# Patient Record
Sex: MALE | Race: WHITE | Employment: OTHER
[De-identification: names, ages, dates, MRNs, and addresses within clinical notes are randomized per-mention and may not be internally consistent; named-entity substitution may affect disease eponyms.]

---

## 2017-01-30 ENCOUNTER — OFFICE VISIT (OUTPATIENT)
Facility: CLINIC | Age: 62
End: 2017-01-30

## 2017-01-30 VITALS
HEART RATE: 105 BPM | SYSTOLIC BLOOD PRESSURE: 128 MMHG | TEMPERATURE: 97.5 F | OXYGEN SATURATION: 94 % | DIASTOLIC BLOOD PRESSURE: 82 MMHG | WEIGHT: 312.2 LBS | BODY MASS INDEX: 42.34 KG/M2

## 2017-01-30 DIAGNOSIS — R73.02 IGT (IMPAIRED GLUCOSE TOLERANCE): ICD-10-CM

## 2017-01-30 DIAGNOSIS — I10 ESSENTIAL HYPERTENSION: Primary | ICD-10-CM

## 2017-01-30 DIAGNOSIS — E55.9 VITAMIN D DEFICIENCY: ICD-10-CM

## 2017-01-30 DIAGNOSIS — M81.0 OSTEOPOROSIS: ICD-10-CM

## 2017-01-30 DIAGNOSIS — K21.9 GASTROESOPHAGEAL REFLUX DISEASE WITHOUT ESOPHAGITIS: ICD-10-CM

## 2017-01-30 DIAGNOSIS — E66.01 OBESITY, CLASS III, BMI 40-49.9 (MORBID OBESITY) (HCC): ICD-10-CM

## 2017-01-30 DIAGNOSIS — E78.5 HYPERLIPIDEMIA, UNSPECIFIED HYPERLIPIDEMIA TYPE: ICD-10-CM

## 2017-01-30 DIAGNOSIS — M79.2 PERIPHERAL NEUROPATHIC PAIN: ICD-10-CM

## 2017-01-30 DIAGNOSIS — M62.81 MUSCLE WEAKNESS (GENERALIZED): ICD-10-CM

## 2017-01-30 DIAGNOSIS — M10.9 GOUT, UNSPECIFIED: ICD-10-CM

## 2017-01-30 DIAGNOSIS — C61 PROSTATE CA (HCC): ICD-10-CM

## 2017-01-30 DIAGNOSIS — F32.A DEPRESSION, UNSPECIFIED DEPRESSION TYPE: ICD-10-CM

## 2017-01-30 PROCEDURE — G8427 DOCREV CUR MEDS BY ELIG CLIN: HCPCS | Performed by: FAMILY MEDICINE

## 2017-01-30 PROCEDURE — 4005F PHARM THX FOR OP RXD: CPT | Performed by: FAMILY MEDICINE

## 2017-01-30 PROCEDURE — 3017F COLORECTAL CA SCREEN DOC REV: CPT | Performed by: FAMILY MEDICINE

## 2017-01-30 PROCEDURE — 1036F TOBACCO NON-USER: CPT | Performed by: FAMILY MEDICINE

## 2017-01-30 PROCEDURE — G8484 FLU IMMUNIZE NO ADMIN: HCPCS | Performed by: FAMILY MEDICINE

## 2017-01-30 PROCEDURE — 99214 OFFICE O/P EST MOD 30 MIN: CPT | Performed by: FAMILY MEDICINE

## 2017-01-30 PROCEDURE — G8419 CALC BMI OUT NRM PARAM NOF/U: HCPCS | Performed by: FAMILY MEDICINE

## 2017-01-30 PROCEDURE — 1111F DSCHRG MED/CURRENT MED MERGE: CPT | Performed by: FAMILY MEDICINE

## 2017-02-14 ENCOUNTER — HOSPITAL ENCOUNTER (OUTPATIENT)
Age: 62
Discharge: HOME | End: 2017-02-14
Payer: COMMERCIAL

## 2017-02-14 DIAGNOSIS — E78.5 HYPERLIPIDEMIA, UNSPECIFIED HYPERLIPIDEMIA TYPE: ICD-10-CM

## 2017-02-14 DIAGNOSIS — E55.9 VITAMIN D DEFICIENCY: ICD-10-CM

## 2017-02-14 DIAGNOSIS — I10 ESSENTIAL HYPERTENSION: ICD-10-CM

## 2017-02-14 LAB
ALBUMIN SERPL-MCNC: 4.1 G/DL (ref 3.5–5.2)
ALBUMIN/GLOBULIN RATIO: ABNORMAL (ref 1–2.5)
ALP BLD-CCNC: 62 U/L (ref 40–129)
ALT SERPL-CCNC: 101 U/L (ref 5–41)
ANION GAP SERPL CALCULATED.3IONS-SCNC: 16 MMOL/L (ref 9–17)
AST SERPL-CCNC: 75 U/L
BILIRUB SERPL-MCNC: 0.33 MG/DL (ref 0.3–1.2)
BUN BLDV-MCNC: 18 MG/DL (ref 8–23)
BUN/CREAT BLD: ABNORMAL (ref 9–20)
CALCIUM SERPL-MCNC: 9.7 MG/DL (ref 8.6–10.4)
CHLORIDE BLD-SCNC: 101 MMOL/L (ref 98–107)
CHOLESTEROL/HDL RATIO: 4.8
CHOLESTEROL: 153 MG/DL
CO2: 24 MMOL/L (ref 20–31)
CREAT SERPL-MCNC: 0.76 MG/DL (ref 0.7–1.2)
GFR AFRICAN AMERICAN: >60 ML/MIN
GFR NON-AFRICAN AMERICAN: >60 ML/MIN
GFR SERPL CREATININE-BSD FRML MDRD: ABNORMAL ML/MIN/{1.73_M2}
GFR SERPL CREATININE-BSD FRML MDRD: ABNORMAL ML/MIN/{1.73_M2}
GLUCOSE BLD-MCNC: 150 MG/DL (ref 70–99)
HCT VFR BLD CALC: 41.1 % (ref 41–53)
HDLC SERPL-MCNC: 32 MG/DL
HEMOGLOBIN: 13.7 G/DL (ref 13.5–17.5)
LDL CHOLESTEROL: 54 MG/DL (ref 0–130)
MCH RBC QN AUTO: 29.9 PG (ref 26–34)
MCHC RBC AUTO-ENTMCNC: 33.4 G/DL (ref 31–37)
MCV RBC AUTO: 89.6 FL (ref 80–100)
PDW BLD-RTO: 14.5 % (ref 11.5–14.9)
PLATELET # BLD: 200 K/UL (ref 150–450)
PMV BLD AUTO: 9.5 FL (ref 6–12)
POTASSIUM SERPL-SCNC: 4.5 MMOL/L (ref 3.7–5.3)
RBC # BLD: 4.58 M/UL (ref 4.5–5.9)
SODIUM BLD-SCNC: 141 MMOL/L (ref 135–144)
TOTAL PROTEIN: 7.4 G/DL (ref 6.4–8.3)
TRIGL SERPL-MCNC: 336 MG/DL
VITAMIN D 25-HYDROXY: 22.8 NG/ML (ref 30–100)
VLDLC SERPL CALC-MCNC: ABNORMAL MG/DL (ref 1–30)
WBC # BLD: 7.1 K/UL (ref 3.5–11)

## 2017-02-14 PROCEDURE — 85027 COMPLETE CBC AUTOMATED: CPT

## 2017-02-14 PROCEDURE — 80061 LIPID PANEL: CPT

## 2017-02-14 PROCEDURE — 80053 COMPREHEN METABOLIC PANEL: CPT

## 2017-02-14 PROCEDURE — 36415 COLL VENOUS BLD VENIPUNCTURE: CPT

## 2017-02-14 PROCEDURE — 82306 VITAMIN D 25 HYDROXY: CPT

## 2017-03-01 ENCOUNTER — HOSPITAL ENCOUNTER (OUTPATIENT)
Dept: PHYSICAL THERAPY | Age: 62
Setting detail: THERAPIES SERIES
Discharge: HOME | End: 2017-03-01
Payer: COMMERCIAL

## 2017-03-01 PROCEDURE — 97162 PT EVAL MOD COMPLEX 30 MIN: CPT

## 2017-03-01 ASSESSMENT — PAIN SCALES - GENERAL: PAINLEVEL_OUTOF10: 8

## 2017-03-01 ASSESSMENT — PAIN DESCRIPTION - DESCRIPTORS: DESCRIPTORS: CONSTANT;ACHING;DULL

## 2017-03-01 ASSESSMENT — PAIN DESCRIPTION - LOCATION: LOCATION: BACK

## 2017-03-01 ASSESSMENT — PAIN DESCRIPTION - PAIN TYPE: TYPE: SURGICAL PAIN

## 2017-03-04 DIAGNOSIS — M10.9 GOUT, UNSPECIFIED: ICD-10-CM

## 2017-03-06 ENCOUNTER — OFFICE VISIT (OUTPATIENT)
Dept: UROLOGY | Facility: CLINIC | Age: 62
End: 2017-03-06

## 2017-03-06 VITALS
HEART RATE: 69 BPM | BODY MASS INDEX: 45.01 KG/M2 | SYSTOLIC BLOOD PRESSURE: 126 MMHG | DIASTOLIC BLOOD PRESSURE: 64 MMHG | HEIGHT: 70 IN | WEIGHT: 314.4 LBS | TEMPERATURE: 97.8 F

## 2017-03-06 DIAGNOSIS — C61 PROSTATE CANCER (HCC): Primary | ICD-10-CM

## 2017-03-06 DIAGNOSIS — R35.1 NOCTURIA: ICD-10-CM

## 2017-03-06 DIAGNOSIS — R97.20 RISING PSA LEVEL: ICD-10-CM

## 2017-03-06 PROCEDURE — G8484 FLU IMMUNIZE NO ADMIN: HCPCS | Performed by: UROLOGY

## 2017-03-06 PROCEDURE — G8419 CALC BMI OUT NRM PARAM NOF/U: HCPCS | Performed by: UROLOGY

## 2017-03-06 PROCEDURE — 3017F COLORECTAL CA SCREEN DOC REV: CPT | Performed by: UROLOGY

## 2017-03-06 PROCEDURE — G8427 DOCREV CUR MEDS BY ELIG CLIN: HCPCS | Performed by: UROLOGY

## 2017-03-06 PROCEDURE — 1036F TOBACCO NON-USER: CPT | Performed by: UROLOGY

## 2017-03-06 PROCEDURE — 99214 OFFICE O/P EST MOD 30 MIN: CPT | Performed by: UROLOGY

## 2017-03-06 PROCEDURE — 96402 CHEMO HORMON ANTINEOPL SQ/IM: CPT | Performed by: UROLOGY

## 2017-03-06 RX ORDER — ALLOPURINOL 300 MG/1
TABLET ORAL
Qty: 90 TABLET | Refills: 0 | OUTPATIENT
Start: 2017-03-06

## 2017-03-06 RX ORDER — HYDROCODONE BITARTRATE AND ACETAMINOPHEN 5; 325 MG/1; MG/1
1 TABLET ORAL EVERY 6 HOURS PRN
COMMUNITY
End: 2017-10-02

## 2017-03-06 ASSESSMENT — ENCOUNTER SYMPTOMS
EYE PAIN: 0
VOMITING: 0
SHORTNESS OF BREATH: 1
NAUSEA: 0
BACK PAIN: 1
EYE REDNESS: 0
ABDOMINAL PAIN: 0
COLOR CHANGE: 0
WHEEZING: 1
COUGH: 0

## 2017-03-08 ENCOUNTER — HOSPITAL ENCOUNTER (OUTPATIENT)
Dept: PHYSICAL THERAPY | Age: 62
Setting detail: THERAPIES SERIES
Discharge: HOME | End: 2017-03-08
Payer: COMMERCIAL

## 2017-03-08 PROCEDURE — 97113 AQUATIC THERAPY/EXERCISES: CPT

## 2017-03-09 ENCOUNTER — HOSPITAL ENCOUNTER (OUTPATIENT)
Dept: PHYSICAL THERAPY | Age: 62
Setting detail: THERAPIES SERIES
Discharge: HOME | End: 2017-03-09
Payer: COMMERCIAL

## 2017-03-09 PROCEDURE — 97113 AQUATIC THERAPY/EXERCISES: CPT

## 2017-03-13 ENCOUNTER — HOSPITAL ENCOUNTER (OUTPATIENT)
Dept: PHYSICAL THERAPY | Age: 62
Setting detail: THERAPIES SERIES
Discharge: HOME | End: 2017-03-13
Payer: COMMERCIAL

## 2017-03-13 PROCEDURE — 97113 AQUATIC THERAPY/EXERCISES: CPT

## 2017-03-15 ENCOUNTER — HOSPITAL ENCOUNTER (OUTPATIENT)
Dept: PHYSICAL THERAPY | Age: 62
Setting detail: THERAPIES SERIES
Discharge: HOME | End: 2017-03-15
Payer: COMMERCIAL

## 2017-03-15 PROCEDURE — 97113 AQUATIC THERAPY/EXERCISES: CPT

## 2017-03-15 ASSESSMENT — PAIN SCALES - GENERAL: PAINLEVEL_OUTOF10: 2

## 2017-03-15 ASSESSMENT — PAIN DESCRIPTION - LOCATION: LOCATION: BACK

## 2017-03-15 ASSESSMENT — PAIN DESCRIPTION - ORIENTATION: ORIENTATION: LOWER

## 2017-03-23 ENCOUNTER — HOSPITAL ENCOUNTER (OUTPATIENT)
Dept: PHYSICAL THERAPY | Age: 62
Setting detail: THERAPIES SERIES
Discharge: HOME | End: 2017-03-23
Payer: COMMERCIAL

## 2017-03-23 ENCOUNTER — OFFICE VISIT (OUTPATIENT)
Dept: FAMILY MEDICINE CLINIC | Age: 62
End: 2017-03-23
Payer: COMMERCIAL

## 2017-03-23 ENCOUNTER — HOSPITAL ENCOUNTER (OUTPATIENT)
Age: 62
Discharge: HOME | End: 2017-03-23
Payer: COMMERCIAL

## 2017-03-23 VITALS
WEIGHT: 314.2 LBS | HEART RATE: 64 BPM | TEMPERATURE: 97.2 F | SYSTOLIC BLOOD PRESSURE: 133 MMHG | BODY MASS INDEX: 43.99 KG/M2 | OXYGEN SATURATION: 91 % | DIASTOLIC BLOOD PRESSURE: 76 MMHG | HEIGHT: 71 IN

## 2017-03-23 DIAGNOSIS — I10 ESSENTIAL HYPERTENSION: ICD-10-CM

## 2017-03-23 DIAGNOSIS — M79.2 PERIPHERAL NEUROPATHIC PAIN: Primary | ICD-10-CM

## 2017-03-23 DIAGNOSIS — K75.2 NONSPECIFIC REACTIVE HEPATITIS: ICD-10-CM

## 2017-03-23 DIAGNOSIS — Z98.890 STATUS POST LUMBAR LAMINECTOMY: ICD-10-CM

## 2017-03-23 DIAGNOSIS — F33.1 MODERATE EPISODE OF RECURRENT MAJOR DEPRESSIVE DISORDER (HCC): ICD-10-CM

## 2017-03-23 DIAGNOSIS — R73.9 HYPERGLYCEMIA: ICD-10-CM

## 2017-03-23 DIAGNOSIS — M10.9 GOUT, UNSPECIFIED: ICD-10-CM

## 2017-03-23 DIAGNOSIS — J44.9 COPD MIXED TYPE (HCC): ICD-10-CM

## 2017-03-23 DIAGNOSIS — L29.9 PRURITIC DERMATITIS: ICD-10-CM

## 2017-03-23 DIAGNOSIS — E79.0 ELEVATED URIC ACID IN BLOOD: ICD-10-CM

## 2017-03-23 DIAGNOSIS — Z12.11 COLON CANCER SCREENING: ICD-10-CM

## 2017-03-23 DIAGNOSIS — I73.9 PVD (PERIPHERAL VASCULAR DISEASE) WITH CLAUDICATION (HCC): ICD-10-CM

## 2017-03-23 DIAGNOSIS — Z79.899 POLYPHARMACY: ICD-10-CM

## 2017-03-23 DIAGNOSIS — B35.3 TINEA PEDIS OF BOTH FEET: ICD-10-CM

## 2017-03-23 DIAGNOSIS — E88.09 PSEUDOCHOLINESTERASE DEFICIENCY: ICD-10-CM

## 2017-03-23 DIAGNOSIS — M79.2 PERIPHERAL NEUROPATHIC PAIN: ICD-10-CM

## 2017-03-23 LAB
ALBUMIN SERPL-MCNC: 4.2 G/DL (ref 3.5–5.2)
ALBUMIN/GLOBULIN RATIO: ABNORMAL (ref 1–2.5)
ALP BLD-CCNC: 72 U/L (ref 40–129)
ALT SERPL-CCNC: 90 U/L (ref 5–41)
ANION GAP SERPL CALCULATED.3IONS-SCNC: 16 MMOL/L (ref 9–17)
AST SERPL-CCNC: 66 U/L
BILIRUB SERPL-MCNC: 0.31 MG/DL (ref 0.3–1.2)
BUN BLDV-MCNC: 14 MG/DL (ref 8–23)
BUN/CREAT BLD: ABNORMAL (ref 9–20)
CALCIUM SERPL-MCNC: 10.5 MG/DL (ref 8.6–10.4)
CHLORIDE BLD-SCNC: 100 MMOL/L (ref 98–107)
CO2: 27 MMOL/L (ref 20–31)
CREAT SERPL-MCNC: 0.72 MG/DL (ref 0.7–1.2)
ESTIMATED AVERAGE GLUCOSE: 157 MG/DL
FOLATE: 18.5 NG/ML
GFR AFRICAN AMERICAN: >60 ML/MIN
GFR NON-AFRICAN AMERICAN: >60 ML/MIN
GFR SERPL CREATININE-BSD FRML MDRD: ABNORMAL ML/MIN/{1.73_M2}
GFR SERPL CREATININE-BSD FRML MDRD: ABNORMAL ML/MIN/{1.73_M2}
GLUCOSE BLD-MCNC: 147 MG/DL (ref 70–99)
HAV IGM SER IA-ACNC: NONREACTIVE
HBA1C MFR BLD: 7.1 % (ref 4–6)
HCT VFR BLD CALC: 43.7 % (ref 41–53)
HEMOGLOBIN: 14.5 G/DL (ref 13.5–17.5)
HEPATITIS B CORE IGM ANTIBODY: NONREACTIVE
HEPATITIS B SURFACE ANTIGEN: NONREACTIVE
HEPATITIS C ANTIBODY: NONREACTIVE
MCH RBC QN AUTO: 30 PG (ref 26–34)
MCHC RBC AUTO-ENTMCNC: 33.3 G/DL (ref 31–37)
MCV RBC AUTO: 90.3 FL (ref 80–100)
PDW BLD-RTO: 15.5 % (ref 11.5–14.9)
PLATELET # BLD: 199 K/UL (ref 150–450)
PMV BLD AUTO: 10.6 FL (ref 6–12)
POTASSIUM SERPL-SCNC: 4.3 MMOL/L (ref 3.7–5.3)
RBC # BLD: 4.84 M/UL (ref 4.5–5.9)
SODIUM BLD-SCNC: 143 MMOL/L (ref 135–144)
TOTAL PROTEIN: 7.6 G/DL (ref 6.4–8.3)
TSH SERPL DL<=0.05 MIU/L-ACNC: 0.74 MIU/L (ref 0.3–5)
URIC ACID: 5.5 MG/DL (ref 3.4–7)
VITAMIN B-12: 593 PG/ML (ref 211–946)
WBC # BLD: 7.7 K/UL (ref 3.5–11)

## 2017-03-23 PROCEDURE — G8484 FLU IMMUNIZE NO ADMIN: HCPCS | Performed by: FAMILY MEDICINE

## 2017-03-23 PROCEDURE — 3023F SPIROM DOC REV: CPT | Performed by: FAMILY MEDICINE

## 2017-03-23 PROCEDURE — G8926 SPIRO NO PERF OR DOC: HCPCS | Performed by: FAMILY MEDICINE

## 2017-03-23 PROCEDURE — 82746 ASSAY OF FOLIC ACID SERUM: CPT

## 2017-03-23 PROCEDURE — 83036 HEMOGLOBIN GLYCOSYLATED A1C: CPT

## 2017-03-23 PROCEDURE — 80074 ACUTE HEPATITIS PANEL: CPT

## 2017-03-23 PROCEDURE — 82607 VITAMIN B-12: CPT

## 2017-03-23 PROCEDURE — 3017F COLORECTAL CA SCREEN DOC REV: CPT | Performed by: FAMILY MEDICINE

## 2017-03-23 PROCEDURE — G8417 CALC BMI ABV UP PARAM F/U: HCPCS | Performed by: FAMILY MEDICINE

## 2017-03-23 PROCEDURE — 1036F TOBACCO NON-USER: CPT | Performed by: FAMILY MEDICINE

## 2017-03-23 PROCEDURE — 85027 COMPLETE CBC AUTOMATED: CPT

## 2017-03-23 PROCEDURE — 97113 AQUATIC THERAPY/EXERCISES: CPT

## 2017-03-23 PROCEDURE — 84156 ASSAY OF PROTEIN URINE: CPT

## 2017-03-23 PROCEDURE — 80053 COMPREHEN METABOLIC PANEL: CPT

## 2017-03-23 PROCEDURE — 84166 PROTEIN E-PHORESIS/URINE/CSF: CPT

## 2017-03-23 PROCEDURE — 36415 COLL VENOUS BLD VENIPUNCTURE: CPT

## 2017-03-23 PROCEDURE — 99215 OFFICE O/P EST HI 40 MIN: CPT | Performed by: FAMILY MEDICINE

## 2017-03-23 PROCEDURE — 96127 BRIEF EMOTIONAL/BEHAV ASSMT: CPT | Performed by: FAMILY MEDICINE

## 2017-03-23 PROCEDURE — G8427 DOCREV CUR MEDS BY ELIG CLIN: HCPCS | Performed by: FAMILY MEDICINE

## 2017-03-23 PROCEDURE — 84165 PROTEIN E-PHORESIS SERUM: CPT

## 2017-03-23 PROCEDURE — 84443 ASSAY THYROID STIM HORMONE: CPT

## 2017-03-23 PROCEDURE — 84550 ASSAY OF BLOOD/URIC ACID: CPT

## 2017-03-23 PROCEDURE — 84155 ASSAY OF PROTEIN SERUM: CPT

## 2017-03-23 RX ORDER — ALBUTEROL SULFATE 90 UG/1
2 AEROSOL, METERED RESPIRATORY (INHALATION) EVERY 6 HOURS PRN
Qty: 18 G | Refills: 0 | Status: SHIPPED
Start: 2017-03-23 | End: 2020-11-09 | Stop reason: SDUPTHER

## 2017-03-23 RX ORDER — ALBUTEROL SULFATE 2.5 MG/3ML
2.5 SOLUTION RESPIRATORY (INHALATION) EVERY 6 HOURS PRN
COMMUNITY
Start: 2017-03-02 | End: 2020-03-18 | Stop reason: ALTCHOICE

## 2017-03-23 RX ORDER — BUDESONIDE AND FORMOTEROL FUMARATE DIHYDRATE 160; 4.5 UG/1; UG/1
AEROSOL RESPIRATORY (INHALATION)
COMMUNITY
Start: 2017-01-18 | End: 2017-03-23

## 2017-03-23 RX ORDER — PREDNISONE 10 MG/1
TABLET ORAL
COMMUNITY
Start: 2017-01-18 | End: 2017-03-23 | Stop reason: ALTCHOICE

## 2017-03-23 RX ORDER — BUDESONIDE AND FORMOTEROL FUMARATE DIHYDRATE 160; 4.5 UG/1; UG/1
2 AEROSOL RESPIRATORY (INHALATION) 2 TIMES DAILY
Qty: 10.2 G | Refills: 3 | Status: SHIPPED | OUTPATIENT
Start: 2017-03-23 | End: 2017-03-23 | Stop reason: SDUPTHER

## 2017-03-23 RX ORDER — DULOXETIN HYDROCHLORIDE 60 MG/1
60 CAPSULE, DELAYED RELEASE ORAL EVERY MORNING
Qty: 90 CAPSULE | Refills: 0
Start: 2017-03-23 | End: 2017-04-11 | Stop reason: SDUPTHER

## 2017-03-23 RX ORDER — BUDESONIDE AND FORMOTEROL FUMARATE DIHYDRATE 160; 4.5 UG/1; UG/1
2 AEROSOL RESPIRATORY (INHALATION) 2 TIMES DAILY
Qty: 10.2 G | Refills: 3 | Status: SHIPPED | OUTPATIENT
Start: 2017-03-23 | End: 2019-04-09 | Stop reason: SDUPTHER

## 2017-03-23 RX ORDER — FUROSEMIDE 20 MG/1
TABLET ORAL
COMMUNITY
Start: 2017-01-18 | End: 2017-03-23 | Stop reason: ALTCHOICE

## 2017-03-23 RX ORDER — ASPIRIN 81 MG/1
81 TABLET ORAL DAILY
Qty: 30 TABLET | Refills: 0 | Status: ON HOLD
Start: 2017-03-23 | End: 2018-08-04 | Stop reason: HOSPADM

## 2017-03-23 RX ORDER — AMMONIUM LACTATE 12 G/100G
CREAM TOPICAL
Qty: 385 G | Refills: 4 | Status: SHIPPED | OUTPATIENT
Start: 2017-03-23 | End: 2018-09-24 | Stop reason: SDUPTHER

## 2017-03-23 RX ORDER — ALBUTEROL SULFATE 90 UG/1
2 AEROSOL, METERED RESPIRATORY (INHALATION) EVERY 6 HOURS PRN
Qty: 18 G | Refills: 0 | Status: SHIPPED | OUTPATIENT
Start: 2017-03-23 | End: 2017-03-23 | Stop reason: SDUPTHER

## 2017-03-23 ASSESSMENT — PATIENT HEALTH QUESTIONNAIRE - PHQ9
1. LITTLE INTEREST OR PLEASURE IN DOING THINGS: 1
7. TROUBLE CONCENTRATING ON THINGS, SUCH AS READING THE NEWSPAPER OR WATCHING TELEVISION: 0
SUM OF ALL RESPONSES TO PHQ9 QUESTIONS 1 & 2: 4
SUM OF ALL RESPONSES TO PHQ QUESTIONS 1-9: 15
8. MOVING OR SPEAKING SO SLOWLY THAT OTHER PEOPLE COULD HAVE NOTICED. OR THE OPPOSITE, BEING SO FIGETY OR RESTLESS THAT YOU HAVE BEEN MOVING AROUND A LOT MORE THAN USUAL: 2
2. FEELING DOWN, DEPRESSED OR HOPELESS: 3
6. FEELING BAD ABOUT YOURSELF - OR THAT YOU ARE A FAILURE OR HAVE LET YOURSELF OR YOUR FAMILY DOWN: 0
9. THOUGHTS THAT YOU WOULD BE BETTER OFF DEAD, OR OF HURTING YOURSELF: 0
10. IF YOU CHECKED OFF ANY PROBLEMS, HOW DIFFICULT HAVE THESE PROBLEMS MADE IT FOR YOU TO DO YOUR WORK, TAKE CARE OF THINGS AT HOME, OR GET ALONG WITH OTHER PEOPLE: 0
3. TROUBLE FALLING OR STAYING ASLEEP: 3
4. FEELING TIRED OR HAVING LITTLE ENERGY: 3
5. POOR APPETITE OR OVEREATING: 3

## 2017-03-23 ASSESSMENT — ENCOUNTER SYMPTOMS
ABDOMINAL PAIN: 0
WHEEZING: 0
CONSTIPATION: 0
NAUSEA: 0
BACK PAIN: 1
CHEST TIGHTNESS: 0
VOMITING: 0
COLOR CHANGE: 1
COUGH: 0
ABDOMINAL DISTENTION: 0
DIARRHEA: 0

## 2017-03-23 ASSESSMENT — PAIN DESCRIPTION - PAIN TYPE: TYPE_2: CHRONIC PAIN

## 2017-03-23 ASSESSMENT — PAIN DESCRIPTION - ORIENTATION
ORIENTATION: LOWER
ORIENTATION_2: LEFT

## 2017-03-23 ASSESSMENT — PAIN SCALES - GENERAL: PAINLEVEL_OUTOF10: 5

## 2017-03-23 ASSESSMENT — PAIN DESCRIPTION - LOCATION
LOCATION_2: KNEE
LOCATION: BACK

## 2017-03-23 ASSESSMENT — PAIN DESCRIPTION - INTENSITY: RATING_2: 9

## 2017-03-25 DIAGNOSIS — E83.52 HYPERCALCEMIA: ICD-10-CM

## 2017-03-25 DIAGNOSIS — E55.9 VITAMIN D DEFICIENCY: Primary | ICD-10-CM

## 2017-03-25 PROBLEM — R73.9 HYPERGLYCEMIA: Status: ACTIVE | Noted: 2017-03-25

## 2017-03-25 PROBLEM — E88.09 PSEUDOCHOLINESTERASE DEFICIENCY: Status: ACTIVE | Noted: 2017-03-25

## 2017-03-25 PROBLEM — J44.9 COPD MIXED TYPE (HCC): Status: ACTIVE | Noted: 2017-03-25

## 2017-03-25 PROBLEM — E79.0 ELEVATED URIC ACID IN BLOOD: Status: RESOLVED | Noted: 2017-03-25 | Resolved: 2017-03-25

## 2017-03-25 PROBLEM — I73.9 PVD (PERIPHERAL VASCULAR DISEASE) WITH CLAUDICATION (HCC): Status: ACTIVE | Noted: 2017-03-25

## 2017-03-25 PROBLEM — L29.9 PRURITIC DERMATITIS: Status: ACTIVE | Noted: 2017-03-25

## 2017-03-25 PROBLEM — L30.8 PRURITIC DERMATITIS: Status: ACTIVE | Noted: 2017-03-25

## 2017-03-25 PROBLEM — E79.0 ELEVATED URIC ACID IN BLOOD: Status: ACTIVE | Noted: 2017-03-25

## 2017-03-25 PROBLEM — M79.2 PERIPHERAL NEUROPATHIC PAIN: Status: ACTIVE | Noted: 2017-03-25

## 2017-03-25 PROBLEM — B35.3 TINEA PEDIS OF BOTH FEET: Status: ACTIVE | Noted: 2017-03-25

## 2017-03-25 PROBLEM — Z98.890 STATUS POST LUMBAR LAMINECTOMY: Status: ACTIVE | Noted: 2017-03-25

## 2017-03-25 PROBLEM — E11.65 TYPE 2 DIABETES MELLITUS WITH HYPERGLYCEMIA, WITHOUT LONG-TERM CURRENT USE OF INSULIN (HCC): Status: ACTIVE | Noted: 2017-03-25

## 2017-03-25 PROBLEM — K75.2 NONSPECIFIC REACTIVE HEPATITIS: Status: ACTIVE | Noted: 2017-03-25

## 2017-03-25 PROBLEM — Z79.899 POLYPHARMACY: Status: ACTIVE | Noted: 2017-03-25

## 2017-03-25 RX ORDER — CHOLECALCIFEROL (VITAMIN D3) 50 MCG
2000 TABLET ORAL DAILY
Qty: 30 TABLET | Refills: 3 | Status: SHIPPED | OUTPATIENT
Start: 2017-03-25 | End: 2017-04-11 | Stop reason: SDUPTHER

## 2017-03-25 ASSESSMENT — ENCOUNTER SYMPTOMS: SHORTNESS OF BREATH: 1

## 2017-03-27 LAB
ALBUMIN (CALCULATED): 4.4 G/DL (ref 3.2–5.2)
ALBUMIN PERCENT: 63 % (ref 45–65)
ALPHA 1 PERCENT: 3 % (ref 3–6)
ALPHA 2 PERCENT: 12 % (ref 6–13)
ALPHA-1-GLOBULIN: 0.2 G/DL (ref 0.1–0.4)
ALPHA-2-GLOBULIN: 0.8 G/DL (ref 0.5–0.9)
BETA GLOBULIN: 1 G/DL (ref 0.7–1.4)
BETA PERCENT: 13 % (ref 11–19)
GAMMA GLOBULIN %: 10 % (ref 9–20)
GAMMA GLOBULIN: 0.7 G/DL (ref 0.5–1.5)
P E INTERPRETATION, U: NORMAL
PATHOLOGIST: NORMAL
PATHOLOGIST: NORMAL
PROTEIN ELECTROPHORESIS, SERUM: NORMAL
SPECIMEN TYPE: NORMAL
TOTAL PROT. SUM,%: 101 % (ref 98–102)
TOTAL PROT. SUM: 7.1 G/DL (ref 6.3–8.2)
TOTAL PROTEIN: 7.1 G/DL (ref 6.4–8.3)
URINE TOTAL PROTEIN: 14 MG/DL

## 2017-03-30 ENCOUNTER — TELEPHONE (OUTPATIENT)
Dept: FAMILY MEDICINE CLINIC | Age: 62
End: 2017-03-30

## 2017-03-30 DIAGNOSIS — R19.5 POSITIVE FIT (FECAL IMMUNOCHEMICAL TEST): Primary | ICD-10-CM

## 2017-03-30 DIAGNOSIS — Z12.11 COLON CANCER SCREENING: ICD-10-CM

## 2017-03-30 DIAGNOSIS — K21.9 GASTROESOPHAGEAL REFLUX DISEASE WITHOUT ESOPHAGITIS: ICD-10-CM

## 2017-03-30 LAB
CONTROL: PRESENT
HEMOCCULT STL QL: POSITIVE

## 2017-03-30 PROCEDURE — 82274 ASSAY TEST FOR BLOOD FECAL: CPT | Performed by: FAMILY MEDICINE

## 2017-04-11 ENCOUNTER — OFFICE VISIT (OUTPATIENT)
Dept: FAMILY MEDICINE CLINIC | Age: 62
End: 2017-04-11
Payer: COMMERCIAL

## 2017-04-11 VITALS
BODY MASS INDEX: 44.81 KG/M2 | RESPIRATION RATE: 20 BRPM | DIASTOLIC BLOOD PRESSURE: 69 MMHG | WEIGHT: 313 LBS | TEMPERATURE: 98.4 F | OXYGEN SATURATION: 95 % | SYSTOLIC BLOOD PRESSURE: 136 MMHG | HEART RATE: 55 BPM | HEIGHT: 70 IN

## 2017-04-11 DIAGNOSIS — R19.5 POSITIVE FIT (FECAL IMMUNOCHEMICAL TEST): ICD-10-CM

## 2017-04-11 DIAGNOSIS — M79.2 PERIPHERAL NEUROPATHIC PAIN: ICD-10-CM

## 2017-04-11 DIAGNOSIS — Z23 NEED FOR PNEUMOCOCCAL VACCINATION: ICD-10-CM

## 2017-04-11 DIAGNOSIS — I10 ESSENTIAL HYPERTENSION: ICD-10-CM

## 2017-04-11 DIAGNOSIS — F33.1 MODERATE EPISODE OF RECURRENT MAJOR DEPRESSIVE DISORDER (HCC): ICD-10-CM

## 2017-04-11 DIAGNOSIS — M10.9 GOUT, UNSPECIFIED: ICD-10-CM

## 2017-04-11 DIAGNOSIS — E55.9 VITAMIN D DEFICIENCY: ICD-10-CM

## 2017-04-11 DIAGNOSIS — E11.65 TYPE 2 DIABETES MELLITUS WITH HYPERGLYCEMIA, WITHOUT LONG-TERM CURRENT USE OF INSULIN (HCC): Primary | ICD-10-CM

## 2017-04-11 DIAGNOSIS — Z13.31 POSITIVE DEPRESSION SCREENING: ICD-10-CM

## 2017-04-11 PROCEDURE — 90732 PPSV23 VACC 2 YRS+ SUBQ/IM: CPT | Performed by: FAMILY MEDICINE

## 2017-04-11 PROCEDURE — G8417 CALC BMI ABV UP PARAM F/U: HCPCS | Performed by: FAMILY MEDICINE

## 2017-04-11 PROCEDURE — 90471 IMMUNIZATION ADMIN: CPT | Performed by: FAMILY MEDICINE

## 2017-04-11 PROCEDURE — 3045F PR MOST RECENT HEMOGLOBIN A1C LEVEL 7.0-9.0%: CPT | Performed by: FAMILY MEDICINE

## 2017-04-11 PROCEDURE — G8431 POS CLIN DEPRES SCRN F/U DOC: HCPCS | Performed by: FAMILY MEDICINE

## 2017-04-11 PROCEDURE — 1036F TOBACCO NON-USER: CPT | Performed by: FAMILY MEDICINE

## 2017-04-11 PROCEDURE — 3017F COLORECTAL CA SCREEN DOC REV: CPT | Performed by: FAMILY MEDICINE

## 2017-04-11 PROCEDURE — G8427 DOCREV CUR MEDS BY ELIG CLIN: HCPCS | Performed by: FAMILY MEDICINE

## 2017-04-11 PROCEDURE — 99214 OFFICE O/P EST MOD 30 MIN: CPT | Performed by: FAMILY MEDICINE

## 2017-04-11 RX ORDER — CHOLECALCIFEROL (VITAMIN D3) 50 MCG
2000 TABLET ORAL DAILY
Qty: 90 TABLET | Refills: 1 | Status: SHIPPED | OUTPATIENT
Start: 2017-04-11 | End: 2017-09-29 | Stop reason: SDUPTHER

## 2017-04-11 RX ORDER — COLCHICINE 0.6 MG/1
TABLET ORAL
Qty: 90 TABLET | Refills: 1
Start: 2017-04-11 | End: 2020-03-25 | Stop reason: DRUGHIGH

## 2017-04-11 RX ORDER — LANCETS
EACH MISCELLANEOUS
Qty: 100 EACH | Refills: 3 | Status: SHIPPED | OUTPATIENT
Start: 2017-04-11 | End: 2017-05-04 | Stop reason: ALTCHOICE

## 2017-04-11 RX ORDER — DULOXETIN HYDROCHLORIDE 60 MG/1
60 CAPSULE, DELAYED RELEASE ORAL EVERY MORNING
Qty: 90 CAPSULE | Refills: 3 | Status: SHIPPED | OUTPATIENT
Start: 2017-04-11 | End: 2017-10-02 | Stop reason: SDUPTHER

## 2017-04-11 RX ORDER — ALLOPURINOL 100 MG/1
100 TABLET ORAL DAILY
Qty: 90 TABLET | Refills: 3 | Status: SHIPPED | OUTPATIENT
Start: 2017-04-11 | End: 2017-05-04 | Stop reason: SDUPTHER

## 2017-04-11 RX ORDER — METFORMIN HYDROCHLORIDE 500 MG/1
500 TABLET, EXTENDED RELEASE ORAL
Qty: 90 TABLET | Refills: 3 | Status: SHIPPED | OUTPATIENT
Start: 2017-04-11 | End: 2017-05-04 | Stop reason: SDUPTHER

## 2017-04-11 RX ORDER — BLOOD PRESSURE TEST KIT
KIT MISCELLANEOUS
Qty: 100 EACH | Refills: 5 | Status: SHIPPED | OUTPATIENT
Start: 2017-04-11 | End: 2017-05-04 | Stop reason: ALTCHOICE

## 2017-04-11 RX ORDER — GLUCOSAMINE HCL/CHONDROITIN SU 500-400 MG
CAPSULE ORAL
Qty: 100 STRIP | Refills: 3 | Status: SHIPPED | OUTPATIENT
Start: 2017-04-11 | End: 2017-05-04 | Stop reason: ALTCHOICE

## 2017-04-11 RX ORDER — THIAMINE MONONITRATE (VIT B1) 100 MG
100 TABLET ORAL DAILY
COMMUNITY
End: 2017-07-03 | Stop reason: HOSPADM

## 2017-04-11 RX ORDER — HYDROCODONE BITARTRATE AND ACETAMINOPHEN 5; 325 MG/1; MG/1
TABLET ORAL
COMMUNITY
End: 2017-04-11 | Stop reason: SDUPTHER

## 2017-04-11 ASSESSMENT — ENCOUNTER SYMPTOMS
ABDOMINAL PAIN: 0
DIARRHEA: 0
COUGH: 0
SHORTNESS OF BREATH: 1
NAUSEA: 0
COLOR CHANGE: 1
CONSTIPATION: 0
WHEEZING: 0
VOMITING: 0
BACK PAIN: 1
ABDOMINAL DISTENTION: 0
CHEST TIGHTNESS: 0

## 2017-04-12 ENCOUNTER — TELEPHONE (OUTPATIENT)
Dept: FAMILY MEDICINE CLINIC | Age: 62
End: 2017-04-12

## 2017-04-17 DIAGNOSIS — M79.2 PERIPHERAL NEUROPATHIC PAIN: ICD-10-CM

## 2017-04-17 RX ORDER — PREGABALIN 100 MG/1
100 CAPSULE ORAL 3 TIMES DAILY
Qty: 270 CAPSULE | Refills: 3 | OUTPATIENT
Start: 2017-04-17

## 2017-04-19 ENCOUNTER — HOSPITAL ENCOUNTER (OUTPATIENT)
Age: 62
Discharge: HOME | End: 2017-04-19
Payer: COMMERCIAL

## 2017-04-19 DIAGNOSIS — I10 ESSENTIAL HYPERTENSION: ICD-10-CM

## 2017-04-19 DIAGNOSIS — E11.65 TYPE 2 DIABETES MELLITUS WITH HYPERGLYCEMIA, WITHOUT LONG-TERM CURRENT USE OF INSULIN (HCC): ICD-10-CM

## 2017-04-19 LAB
ALBUMIN SERPL-MCNC: 3.9 G/DL (ref 3.5–5.2)
ALBUMIN/GLOBULIN RATIO: ABNORMAL (ref 1–2.5)
ALP BLD-CCNC: 80 U/L (ref 40–129)
ALT SERPL-CCNC: 71 U/L (ref 5–41)
ANION GAP SERPL CALCULATED.3IONS-SCNC: 12 MMOL/L (ref 9–17)
AST SERPL-CCNC: 66 U/L
BILIRUB SERPL-MCNC: 0.28 MG/DL (ref 0.3–1.2)
BUN BLDV-MCNC: 14 MG/DL (ref 8–23)
BUN/CREAT BLD: ABNORMAL (ref 9–20)
CALCIUM SERPL-MCNC: 9.7 MG/DL (ref 8.6–10.4)
CHLORIDE BLD-SCNC: 100 MMOL/L (ref 98–107)
CO2: 29 MMOL/L (ref 20–31)
CREAT SERPL-MCNC: 0.79 MG/DL (ref 0.7–1.2)
CREATININE URINE: 239.9 MG/DL (ref 39–259)
GFR AFRICAN AMERICAN: >60 ML/MIN
GFR NON-AFRICAN AMERICAN: >60 ML/MIN
GFR SERPL CREATININE-BSD FRML MDRD: ABNORMAL ML/MIN/{1.73_M2}
GFR SERPL CREATININE-BSD FRML MDRD: ABNORMAL ML/MIN/{1.73_M2}
GLUCOSE BLD-MCNC: 160 MG/DL (ref 70–99)
MICROALBUMIN/CREAT 24H UR: 35 MG/L
MICROALBUMIN/CREAT UR-RTO: 15 MCG/MG CREAT
POTASSIUM SERPL-SCNC: 4.5 MMOL/L (ref 3.7–5.3)
SODIUM BLD-SCNC: 141 MMOL/L (ref 135–144)
TOTAL PROTEIN: 7.1 G/DL (ref 6.4–8.3)

## 2017-04-19 PROCEDURE — 36415 COLL VENOUS BLD VENIPUNCTURE: CPT

## 2017-04-19 PROCEDURE — 82570 ASSAY OF URINE CREATININE: CPT

## 2017-04-19 PROCEDURE — 82043 UR ALBUMIN QUANTITATIVE: CPT

## 2017-04-19 PROCEDURE — 80053 COMPREHEN METABOLIC PANEL: CPT

## 2017-04-20 ENCOUNTER — TELEPHONE (OUTPATIENT)
Dept: FAMILY MEDICINE CLINIC | Age: 62
End: 2017-04-20

## 2017-04-20 DIAGNOSIS — M79.2 PERIPHERAL NEUROPATHIC PAIN: ICD-10-CM

## 2017-04-20 RX ORDER — PREGABALIN 100 MG/1
100 CAPSULE ORAL 3 TIMES DAILY
Qty: 270 CAPSULE | Refills: 0 | Status: SHIPPED | OUTPATIENT
Start: 2017-04-20 | End: 2017-07-03 | Stop reason: SDUPTHER

## 2017-04-26 ENCOUNTER — ANESTHESIA EVENT (OUTPATIENT)
Dept: OPERATING ROOM | Age: 62
End: 2017-04-26
Payer: COMMERCIAL

## 2017-04-26 ENCOUNTER — HOSPITAL ENCOUNTER (OUTPATIENT)
Dept: PREADMISSION TESTING | Age: 62
Discharge: HOME | End: 2017-04-26
Payer: COMMERCIAL

## 2017-04-26 VITALS
WEIGHT: 311 LBS | BODY MASS INDEX: 44.52 KG/M2 | HEART RATE: 56 BPM | SYSTOLIC BLOOD PRESSURE: 127 MMHG | RESPIRATION RATE: 18 BRPM | HEIGHT: 70 IN | TEMPERATURE: 97.3 F | OXYGEN SATURATION: 94 % | DIASTOLIC BLOOD PRESSURE: 72 MMHG

## 2017-04-26 LAB
ABSOLUTE EOS #: 0.1 K/UL (ref 0–0.4)
ABSOLUTE LYMPH #: 2.7 K/UL (ref 1–4.8)
ABSOLUTE MONO #: 0.5 K/UL (ref 0.1–1.3)
ANION GAP SERPL CALCULATED.3IONS-SCNC: 14 MMOL/L (ref 9–17)
BASOPHILS # BLD: 1 %
BASOPHILS ABSOLUTE: 0.1 K/UL (ref 0–0.2)
BUN BLDV-MCNC: 18 MG/DL (ref 8–23)
BUN/CREAT BLD: ABNORMAL (ref 9–20)
CALCIUM SERPL-MCNC: 10.1 MG/DL (ref 8.6–10.4)
CHLORIDE BLD-SCNC: 99 MMOL/L (ref 98–107)
CO2: 28 MMOL/L (ref 20–31)
CREAT SERPL-MCNC: 0.82 MG/DL (ref 0.7–1.2)
DIFFERENTIAL TYPE: ABNORMAL
EOSINOPHILS RELATIVE PERCENT: 1 %
GFR AFRICAN AMERICAN: >60 ML/MIN
GFR NON-AFRICAN AMERICAN: >60 ML/MIN
GFR SERPL CREATININE-BSD FRML MDRD: ABNORMAL ML/MIN/{1.73_M2}
GFR SERPL CREATININE-BSD FRML MDRD: ABNORMAL ML/MIN/{1.73_M2}
GLUCOSE BLD-MCNC: 134 MG/DL (ref 70–99)
HCT VFR BLD CALC: 43.1 % (ref 41–53)
HEMOGLOBIN: 14 G/DL (ref 13.5–17.5)
LYMPHOCYTES # BLD: 30 %
MCH RBC QN AUTO: 29.2 PG (ref 26–34)
MCHC RBC AUTO-ENTMCNC: 32.5 G/DL (ref 31–37)
MCV RBC AUTO: 89.9 FL (ref 80–100)
MONOCYTES # BLD: 6 %
PDW BLD-RTO: 15 % (ref 11.5–14.9)
PLATELET # BLD: 198 K/UL (ref 150–450)
PLATELET ESTIMATE: ABNORMAL
PMV BLD AUTO: 9.7 FL (ref 6–12)
POTASSIUM SERPL-SCNC: 4 MMOL/L (ref 3.7–5.3)
RBC # BLD: 4.8 M/UL (ref 4.5–5.9)
RBC # BLD: ABNORMAL 10*6/UL
SEG NEUTROPHILS: 62 %
SEGMENTED NEUTROPHILS ABSOLUTE COUNT: 5.5 K/UL (ref 1.3–9.1)
SODIUM BLD-SCNC: 141 MMOL/L (ref 135–144)
WBC # BLD: 8.8 K/UL (ref 3.5–11)
WBC # BLD: ABNORMAL 10*3/UL

## 2017-04-26 PROCEDURE — 80048 BASIC METABOLIC PNL TOTAL CA: CPT

## 2017-04-26 PROCEDURE — 85025 COMPLETE CBC W/AUTO DIFF WBC: CPT

## 2017-04-26 PROCEDURE — 36415 COLL VENOUS BLD VENIPUNCTURE: CPT

## 2017-04-26 ASSESSMENT — PAIN DESCRIPTION - DESCRIPTORS: DESCRIPTORS: ACHING;CONSTANT

## 2017-04-26 ASSESSMENT — PAIN DESCRIPTION - ORIENTATION: ORIENTATION: LEFT

## 2017-04-26 ASSESSMENT — PAIN DESCRIPTION - LOCATION: LOCATION: KNEE

## 2017-04-26 ASSESSMENT — PAIN SCALES - GENERAL: PAINLEVEL_OUTOF10: 8

## 2017-04-26 ASSESSMENT — PAIN DESCRIPTION - PROGRESSION: CLINICAL_PROGRESSION: GRADUALLY WORSENING

## 2017-04-26 ASSESSMENT — PAIN DESCRIPTION - FREQUENCY: FREQUENCY: CONTINUOUS

## 2017-04-26 ASSESSMENT — PAIN DESCRIPTION - ONSET: ONSET: ON-GOING

## 2017-05-04 ENCOUNTER — OFFICE VISIT (OUTPATIENT)
Dept: FAMILY MEDICINE CLINIC | Age: 62
End: 2017-05-04
Payer: COMMERCIAL

## 2017-05-04 VITALS
WEIGHT: 311 LBS | TEMPERATURE: 96.9 F | SYSTOLIC BLOOD PRESSURE: 138 MMHG | HEIGHT: 70 IN | HEART RATE: 60 BPM | RESPIRATION RATE: 20 BRPM | OXYGEN SATURATION: 95 % | DIASTOLIC BLOOD PRESSURE: 80 MMHG | BODY MASS INDEX: 44.52 KG/M2

## 2017-05-04 DIAGNOSIS — F33.1 MODERATE EPISODE OF RECURRENT MAJOR DEPRESSIVE DISORDER (HCC): ICD-10-CM

## 2017-05-04 DIAGNOSIS — M10.9 GOUT, UNSPECIFIED: ICD-10-CM

## 2017-05-04 DIAGNOSIS — J44.9 COPD MIXED TYPE (HCC): ICD-10-CM

## 2017-05-04 DIAGNOSIS — E78.1 HYPERTRIGLYCERIDEMIA: ICD-10-CM

## 2017-05-04 DIAGNOSIS — K75.2 NONSPECIFIC REACTIVE HEPATITIS: ICD-10-CM

## 2017-05-04 DIAGNOSIS — E78.5 HYPERLIPIDEMIA WITH TARGET LDL LESS THAN 100: ICD-10-CM

## 2017-05-04 DIAGNOSIS — I10 ESSENTIAL HYPERTENSION: Primary | ICD-10-CM

## 2017-05-04 DIAGNOSIS — M79.2 PERIPHERAL NEUROPATHIC PAIN: ICD-10-CM

## 2017-05-04 DIAGNOSIS — E11.65 TYPE 2 DIABETES MELLITUS WITH HYPERGLYCEMIA, WITHOUT LONG-TERM CURRENT USE OF INSULIN (HCC): ICD-10-CM

## 2017-05-04 DIAGNOSIS — I73.9 PVD (PERIPHERAL VASCULAR DISEASE) WITH CLAUDICATION (HCC): ICD-10-CM

## 2017-05-04 PROCEDURE — G8417 CALC BMI ABV UP PARAM F/U: HCPCS | Performed by: FAMILY MEDICINE

## 2017-05-04 PROCEDURE — 3045F PR MOST RECENT HEMOGLOBIN A1C LEVEL 7.0-9.0%: CPT | Performed by: FAMILY MEDICINE

## 2017-05-04 PROCEDURE — 3023F SPIROM DOC REV: CPT | Performed by: FAMILY MEDICINE

## 2017-05-04 PROCEDURE — 3017F COLORECTAL CA SCREEN DOC REV: CPT | Performed by: FAMILY MEDICINE

## 2017-05-04 PROCEDURE — G8926 SPIRO NO PERF OR DOC: HCPCS | Performed by: FAMILY MEDICINE

## 2017-05-04 PROCEDURE — G8427 DOCREV CUR MEDS BY ELIG CLIN: HCPCS | Performed by: FAMILY MEDICINE

## 2017-05-04 PROCEDURE — G0444 DEPRESSION SCREEN ANNUAL: HCPCS | Performed by: FAMILY MEDICINE

## 2017-05-04 PROCEDURE — 1036F TOBACCO NON-USER: CPT | Performed by: FAMILY MEDICINE

## 2017-05-04 PROCEDURE — 99215 OFFICE O/P EST HI 40 MIN: CPT | Performed by: FAMILY MEDICINE

## 2017-05-04 RX ORDER — CYANOCOBALAMIN/FOLIC AC/VIT B6 1-2.5-25MG
TABLET ORAL
COMMUNITY
Start: 2017-04-27 | End: 2018-09-04 | Stop reason: DRUGHIGH

## 2017-05-04 RX ORDER — METFORMIN HYDROCHLORIDE 500 MG/1
1000 TABLET, EXTENDED RELEASE ORAL
Qty: 180 TABLET | Refills: 3 | Status: SHIPPED | OUTPATIENT
Start: 2017-05-04 | End: 2017-10-02 | Stop reason: SDUPTHER

## 2017-05-04 RX ORDER — ALLOPURINOL 100 MG/1
100 TABLET ORAL DAILY
Qty: 90 TABLET | Refills: 0
Start: 2017-05-04 | End: 2018-10-05 | Stop reason: HOSPADM

## 2017-05-04 RX ORDER — METFORMIN HYDROCHLORIDE 500 MG/1
1000 TABLET, EXTENDED RELEASE ORAL
Qty: 180 TABLET | Refills: 3 | Status: SHIPPED | OUTPATIENT
Start: 2017-05-04 | End: 2017-05-04 | Stop reason: SDUPTHER

## 2017-05-04 ASSESSMENT — ENCOUNTER SYMPTOMS
CHEST TIGHTNESS: 0
COUGH: 0
NAUSEA: 0
ABDOMINAL DISTENTION: 0
WHEEZING: 0
BACK PAIN: 1
DIARRHEA: 0
ABDOMINAL PAIN: 0
VOMITING: 0
COLOR CHANGE: 1
SHORTNESS OF BREATH: 1
CONSTIPATION: 0

## 2017-05-04 ASSESSMENT — PATIENT HEALTH QUESTIONNAIRE - PHQ9
6. FEELING BAD ABOUT YOURSELF - OR THAT YOU ARE A FAILURE OR HAVE LET YOURSELF OR YOUR FAMILY DOWN: 0
10. IF YOU CHECKED OFF ANY PROBLEMS, HOW DIFFICULT HAVE THESE PROBLEMS MADE IT FOR YOU TO DO YOUR WORK, TAKE CARE OF THINGS AT HOME, OR GET ALONG WITH OTHER PEOPLE: 0
1. LITTLE INTEREST OR PLEASURE IN DOING THINGS: 1
SUM OF ALL RESPONSES TO PHQ QUESTIONS 1-9: 10
4. FEELING TIRED OR HAVING LITTLE ENERGY: 1
7. TROUBLE CONCENTRATING ON THINGS, SUCH AS READING THE NEWSPAPER OR WATCHING TELEVISION: 2
9. THOUGHTS THAT YOU WOULD BE BETTER OFF DEAD, OR OF HURTING YOURSELF: 0
SUM OF ALL RESPONSES TO PHQ9 QUESTIONS 1 & 2: 3
5. POOR APPETITE OR OVEREATING: 2
3. TROUBLE FALLING OR STAYING ASLEEP: 2
8. MOVING OR SPEAKING SO SLOWLY THAT OTHER PEOPLE COULD HAVE NOTICED. OR THE OPPOSITE, BEING SO FIGETY OR RESTLESS THAT YOU HAVE BEEN MOVING AROUND A LOT MORE THAN USUAL: 0
2. FEELING DOWN, DEPRESSED OR HOPELESS: 2

## 2017-05-06 DIAGNOSIS — J44.9 COPD MIXED TYPE (HCC): Primary | ICD-10-CM

## 2017-05-06 PROBLEM — G47.33 OSA ON CPAP: Status: ACTIVE | Noted: 2017-05-06

## 2017-05-06 PROBLEM — Z99.89 OSA ON CPAP: Status: ACTIVE | Noted: 2017-05-06

## 2017-05-06 PROBLEM — E78.1 HYPERTRIGLYCERIDEMIA: Status: ACTIVE | Noted: 2017-05-06

## 2017-05-09 ENCOUNTER — HOSPITAL ENCOUNTER (OUTPATIENT)
Age: 62
Setting detail: OUTPATIENT SURGERY
Discharge: HOME | End: 2017-05-09
Attending: SURGERY | Admitting: SURGERY
Payer: COMMERCIAL

## 2017-05-09 ENCOUNTER — ANESTHESIA (OUTPATIENT)
Dept: OPERATING ROOM | Age: 62
End: 2017-05-09
Payer: COMMERCIAL

## 2017-05-09 VITALS
RESPIRATION RATE: 16 BRPM | DIASTOLIC BLOOD PRESSURE: 88 MMHG | WEIGHT: 311 LBS | HEART RATE: 57 BPM | OXYGEN SATURATION: 92 % | BODY MASS INDEX: 44.52 KG/M2 | TEMPERATURE: 96.8 F | HEIGHT: 70 IN | SYSTOLIC BLOOD PRESSURE: 124 MMHG

## 2017-05-09 VITALS — OXYGEN SATURATION: 96 % | DIASTOLIC BLOOD PRESSURE: 57 MMHG | SYSTOLIC BLOOD PRESSURE: 112 MMHG

## 2017-05-09 LAB — GLUCOSE BLD-MCNC: 159 MG/DL (ref 75–110)

## 2017-05-09 PROCEDURE — 3700000000 HC ANESTHESIA ATTENDED CARE: Performed by: SURGERY

## 2017-05-09 PROCEDURE — 88305 TISSUE EXAM BY PATHOLOGIST: CPT

## 2017-05-09 PROCEDURE — 6360000002 HC RX W HCPCS: Performed by: NURSE ANESTHETIST, CERTIFIED REGISTERED

## 2017-05-09 PROCEDURE — 2580000003 HC RX 258: Performed by: SURGERY

## 2017-05-09 PROCEDURE — 7100000030 HC ASPR PHASE II RECOVERY - FIRST 15 MIN: Performed by: SURGERY

## 2017-05-09 PROCEDURE — 3700000001 HC ADD 15 MINUTES (ANESTHESIA): Performed by: SURGERY

## 2017-05-09 PROCEDURE — 3609010300 HC COLONOSCOPY W/BIOPSY SINGLE/MULTIPLE: Performed by: SURGERY

## 2017-05-09 PROCEDURE — 7100000031 HC ASPR PHASE II RECOVERY - ADDTL 15 MIN: Performed by: SURGERY

## 2017-05-09 PROCEDURE — 2500000003 HC RX 250 WO HCPCS: Performed by: NURSE ANESTHETIST, CERTIFIED REGISTERED

## 2017-05-09 PROCEDURE — 82947 ASSAY GLUCOSE BLOOD QUANT: CPT

## 2017-05-09 PROCEDURE — 7100000000 HC PACU RECOVERY - FIRST 15 MIN: Performed by: SURGERY

## 2017-05-09 PROCEDURE — 7100000001 HC PACU RECOVERY - ADDTL 15 MIN: Performed by: SURGERY

## 2017-05-09 RX ORDER — ONDANSETRON 2 MG/ML
4 INJECTION INTRAMUSCULAR; INTRAVENOUS
Status: DISCONTINUED | OUTPATIENT
Start: 2017-05-09 | End: 2017-05-09 | Stop reason: HOSPADM

## 2017-05-09 RX ORDER — SODIUM CHLORIDE 9 MG/ML
INJECTION, SOLUTION INTRAVENOUS CONTINUOUS
Status: DISCONTINUED | OUTPATIENT
Start: 2017-05-09 | End: 2017-05-09 | Stop reason: HOSPADM

## 2017-05-09 RX ORDER — DIPHENHYDRAMINE HYDROCHLORIDE 50 MG/ML
12.5 INJECTION INTRAMUSCULAR; INTRAVENOUS
Status: DISCONTINUED | OUTPATIENT
Start: 2017-05-09 | End: 2017-05-09 | Stop reason: HOSPADM

## 2017-05-09 RX ORDER — PROPOFOL 10 MG/ML
INJECTION, EMULSION INTRAVENOUS CONTINUOUS PRN
Status: DISCONTINUED | OUTPATIENT
Start: 2017-05-09 | End: 2017-05-09 | Stop reason: SDUPTHER

## 2017-05-09 RX ORDER — KETAMINE HYDROCHLORIDE 50 MG/ML
INJECTION, SOLUTION, CONCENTRATE INTRAMUSCULAR; INTRAVENOUS PRN
Status: DISCONTINUED | OUTPATIENT
Start: 2017-05-09 | End: 2017-05-09 | Stop reason: SDUPTHER

## 2017-05-09 RX ORDER — LABETALOL HYDROCHLORIDE 5 MG/ML
5 INJECTION, SOLUTION INTRAVENOUS EVERY 10 MIN PRN
Status: DISCONTINUED | OUTPATIENT
Start: 2017-05-09 | End: 2017-05-09 | Stop reason: HOSPADM

## 2017-05-09 RX ORDER — MIDAZOLAM HYDROCHLORIDE 1 MG/ML
INJECTION INTRAMUSCULAR; INTRAVENOUS PRN
Status: DISCONTINUED | OUTPATIENT
Start: 2017-05-09 | End: 2017-05-09 | Stop reason: SDUPTHER

## 2017-05-09 RX ADMIN — KETAMINE HYDROCHLORIDE 20 MG: 50 INJECTION, SOLUTION INTRAMUSCULAR; INTRAVENOUS at 08:13

## 2017-05-09 RX ADMIN — PROPOFOL 100 MCG/KG/MIN: 10 INJECTION, EMULSION INTRAVENOUS at 08:13

## 2017-05-09 RX ADMIN — SODIUM CHLORIDE: 9 INJECTION, SOLUTION INTRAVENOUS at 07:40

## 2017-05-09 RX ADMIN — MIDAZOLAM HYDROCHLORIDE 2 MG: 1 INJECTION, SOLUTION INTRAMUSCULAR; INTRAVENOUS at 08:10

## 2017-05-09 RX ADMIN — SODIUM CHLORIDE: 9 INJECTION, SOLUTION INTRAVENOUS at 08:10

## 2017-05-09 ASSESSMENT — PAIN DESCRIPTION - PAIN TYPE
TYPE: SURGICAL PAIN
TYPE: SURGICAL PAIN

## 2017-05-09 ASSESSMENT — PAIN DESCRIPTION - DESCRIPTORS: DESCRIPTORS: CRAMPING

## 2017-05-09 ASSESSMENT — PAIN DESCRIPTION - ORIENTATION: ORIENTATION: MID

## 2017-05-09 ASSESSMENT — PAIN SCALES - GENERAL
PAINLEVEL_OUTOF10: 0
PAINLEVEL_OUTOF10: 2
PAINLEVEL_OUTOF10: 3
PAINLEVEL_OUTOF10: 0
PAINLEVEL_OUTOF10: 3
PAINLEVEL_OUTOF10: 0

## 2017-05-09 ASSESSMENT — PAIN DESCRIPTION - LOCATION: LOCATION: ABDOMEN

## 2017-05-09 ASSESSMENT — PAIN DESCRIPTION - ONSET: ONSET: GRADUAL

## 2017-05-09 ASSESSMENT — ENCOUNTER SYMPTOMS
DYSPNEA ACTIVITY LEVEL: NO INTERVAL CHANGE
SHORTNESS OF BREATH: 1

## 2017-05-09 ASSESSMENT — PAIN DESCRIPTION - PROGRESSION: CLINICAL_PROGRESSION: NOT CHANGED

## 2017-05-09 ASSESSMENT — PAIN DESCRIPTION - FREQUENCY: FREQUENCY: CONTINUOUS

## 2017-05-10 ENCOUNTER — HOSPITAL ENCOUNTER (OUTPATIENT)
Dept: ULTRASOUND IMAGING | Age: 62
Discharge: HOME | End: 2017-05-10
Payer: COMMERCIAL

## 2017-05-10 DIAGNOSIS — K75.2 NONSPECIFIC REACTIVE HEPATITIS: ICD-10-CM

## 2017-05-10 LAB — SURGICAL PATHOLOGY REPORT: NORMAL

## 2017-05-10 PROCEDURE — 76705 ECHO EXAM OF ABDOMEN: CPT

## 2017-05-11 ENCOUNTER — HOSPITAL ENCOUNTER (OUTPATIENT)
Dept: VASCULAR LAB | Age: 62
Discharge: HOME | End: 2017-05-11
Payer: COMMERCIAL

## 2017-05-11 PROCEDURE — 93923 UPR/LXTR ART STDY 3+ LVLS: CPT

## 2017-05-11 PROCEDURE — 93970 EXTREMITY STUDY: CPT

## 2017-05-13 PROBLEM — D12.6 TUBULAR ADENOMA OF COLON: Status: ACTIVE | Noted: 2017-04-11

## 2017-05-19 ENCOUNTER — HOSPITAL ENCOUNTER (OUTPATIENT)
Dept: GENERAL RADIOLOGY | Age: 62
Discharge: HOME | End: 2017-05-19
Payer: COMMERCIAL

## 2017-05-19 ENCOUNTER — HOSPITAL ENCOUNTER (OUTPATIENT)
Age: 62
Discharge: HOME | End: 2017-05-19
Payer: COMMERCIAL

## 2017-05-19 ENCOUNTER — OFFICE VISIT (OUTPATIENT)
Dept: FAMILY MEDICINE CLINIC | Age: 62
End: 2017-05-19
Payer: COMMERCIAL

## 2017-05-19 VITALS
TEMPERATURE: 97.9 F | HEIGHT: 70 IN | WEIGHT: 307 LBS | BODY MASS INDEX: 43.95 KG/M2 | OXYGEN SATURATION: 89 % | SYSTOLIC BLOOD PRESSURE: 164 MMHG | DIASTOLIC BLOOD PRESSURE: 84 MMHG | RESPIRATION RATE: 16 BRPM | HEART RATE: 62 BPM

## 2017-05-19 DIAGNOSIS — R05.9 COUGH: ICD-10-CM

## 2017-05-19 DIAGNOSIS — J06.9 UPPER RESPIRATORY TRACT INFECTION, UNSPECIFIED TYPE: ICD-10-CM

## 2017-05-19 DIAGNOSIS — J06.9 UPPER RESPIRATORY TRACT INFECTION, UNSPECIFIED TYPE: Primary | ICD-10-CM

## 2017-05-19 PROCEDURE — 4004F PT TOBACCO SCREEN RCVD TLK: CPT | Performed by: FAMILY MEDICINE

## 2017-05-19 PROCEDURE — G8427 DOCREV CUR MEDS BY ELIG CLIN: HCPCS | Performed by: FAMILY MEDICINE

## 2017-05-19 PROCEDURE — 3017F COLORECTAL CA SCREEN DOC REV: CPT | Performed by: FAMILY MEDICINE

## 2017-05-19 PROCEDURE — 71020 XR CHEST STANDARD TWO VW: CPT

## 2017-05-19 PROCEDURE — 99214 OFFICE O/P EST MOD 30 MIN: CPT | Performed by: FAMILY MEDICINE

## 2017-05-19 PROCEDURE — G8417 CALC BMI ABV UP PARAM F/U: HCPCS | Performed by: FAMILY MEDICINE

## 2017-05-19 RX ORDER — BROMPHENIRAMINE MALEATE, PSEUDOEPHEDRINE HYDROCHLORIDE, AND DEXTROMETHORPHAN HYDROBROMIDE 2; 30; 10 MG/5ML; MG/5ML; MG/5ML
5 SYRUP ORAL 3 TIMES DAILY
Qty: 180 ML | Refills: 0 | Status: SHIPPED | OUTPATIENT
Start: 2017-05-19 | End: 2017-07-03

## 2017-05-19 RX ORDER — FLUTICASONE PROPIONATE 50 MCG
1 SPRAY, SUSPENSION (ML) NASAL 2 TIMES DAILY
Qty: 1 BOTTLE | Refills: 2 | Status: SHIPPED | OUTPATIENT
Start: 2017-05-19 | End: 2017-07-03

## 2017-05-19 RX ORDER — PREDNISONE 50 MG/1
50 TABLET ORAL DAILY
Qty: 7 TABLET | Refills: 0 | Status: SHIPPED | OUTPATIENT
Start: 2017-05-19 | End: 2017-05-26

## 2017-05-19 RX ORDER — LEVOFLOXACIN 500 MG/1
500 TABLET, FILM COATED ORAL DAILY
Qty: 10 TABLET | Refills: 0 | Status: SHIPPED | OUTPATIENT
Start: 2017-05-19 | End: 2017-05-29

## 2017-05-19 ASSESSMENT — ENCOUNTER SYMPTOMS
GASTROINTESTINAL NEGATIVE: 1
SHORTNESS OF BREATH: 1
SINUS PRESSURE: 1
COUGH: 1
EYES NEGATIVE: 1
ALLERGIC/IMMUNOLOGIC NEGATIVE: 1
CHEST TIGHTNESS: 0
APNEA: 0
SORE THROAT: 0
WHEEZING: 1
STRIDOR: 0
RHINORRHEA: 1

## 2017-05-23 ENCOUNTER — HOSPITAL ENCOUNTER (OUTPATIENT)
Age: 62
Discharge: HOME | End: 2017-05-23
Payer: COMMERCIAL

## 2017-05-23 ENCOUNTER — HOSPITAL ENCOUNTER (OUTPATIENT)
Dept: GENERAL RADIOLOGY | Age: 62
Discharge: HOME | End: 2017-05-23
Payer: COMMERCIAL

## 2017-05-23 PROCEDURE — 71020 XR CHEST STANDARD TWO VW: CPT

## 2017-06-02 DIAGNOSIS — I10 ESSENTIAL HYPERTENSION: ICD-10-CM

## 2017-06-02 DIAGNOSIS — I10 ESSENTIAL HYPERTENSION: Primary | ICD-10-CM

## 2017-06-02 RX ORDER — LISINOPRIL 20 MG/1
TABLET ORAL
Qty: 180 TABLET | Refills: 3 | Status: SHIPPED | OUTPATIENT
Start: 2017-06-02 | End: 2018-03-01 | Stop reason: SDUPTHER

## 2017-06-02 RX ORDER — ATENOLOL AND CHLORTHALIDONE TABLET 50; 25 MG/1; MG/1
TABLET ORAL
Qty: 90 TABLET | Refills: 3 | Status: SHIPPED | OUTPATIENT
Start: 2017-06-02 | End: 2017-12-06 | Stop reason: ALTCHOICE

## 2017-06-07 ENCOUNTER — HOSPITAL ENCOUNTER (OUTPATIENT)
Age: 62
Discharge: HOME | End: 2017-06-07
Payer: COMMERCIAL

## 2017-06-07 DIAGNOSIS — R97.20 RISING PSA LEVEL: ICD-10-CM

## 2017-06-07 DIAGNOSIS — C61 PROSTATE CANCER (HCC): ICD-10-CM

## 2017-06-07 LAB — PROSTATE SPECIFIC ANTIGEN: 7.13 UG/L

## 2017-06-07 PROCEDURE — 36415 COLL VENOUS BLD VENIPUNCTURE: CPT

## 2017-06-07 PROCEDURE — 84153 ASSAY OF PSA TOTAL: CPT

## 2017-06-12 ENCOUNTER — OFFICE VISIT (OUTPATIENT)
Dept: UROLOGY | Age: 62
End: 2017-06-12
Payer: COMMERCIAL

## 2017-06-12 VITALS
HEIGHT: 70 IN | HEART RATE: 55 BPM | TEMPERATURE: 98.2 F | BODY MASS INDEX: 43.67 KG/M2 | WEIGHT: 305 LBS | DIASTOLIC BLOOD PRESSURE: 58 MMHG | SYSTOLIC BLOOD PRESSURE: 94 MMHG

## 2017-06-12 DIAGNOSIS — R97.20 RISING PSA LEVEL: ICD-10-CM

## 2017-06-12 DIAGNOSIS — C61 PROSTATE CANCER (HCC): Primary | ICD-10-CM

## 2017-06-12 PROCEDURE — 4004F PT TOBACCO SCREEN RCVD TLK: CPT | Performed by: UROLOGY

## 2017-06-12 PROCEDURE — 99214 OFFICE O/P EST MOD 30 MIN: CPT | Performed by: UROLOGY

## 2017-06-12 PROCEDURE — G8427 DOCREV CUR MEDS BY ELIG CLIN: HCPCS | Performed by: UROLOGY

## 2017-06-12 PROCEDURE — 3017F COLORECTAL CA SCREEN DOC REV: CPT | Performed by: UROLOGY

## 2017-06-12 PROCEDURE — G8417 CALC BMI ABV UP PARAM F/U: HCPCS | Performed by: UROLOGY

## 2017-06-12 ASSESSMENT — ENCOUNTER SYMPTOMS
EYE REDNESS: 0
WHEEZING: 1
VOMITING: 0
NAUSEA: 0
ABDOMINAL PAIN: 0
SHORTNESS OF BREATH: 1
BACK PAIN: 0
EYE PAIN: 0
COLOR CHANGE: 0
COUGH: 1

## 2017-06-13 ENCOUNTER — TELEPHONE (OUTPATIENT)
Dept: UROLOGY | Age: 62
End: 2017-06-13

## 2017-06-22 ENCOUNTER — HOSPITAL ENCOUNTER (OUTPATIENT)
Dept: NUCLEAR MEDICINE | Age: 62
Discharge: HOME | End: 2017-06-22
Payer: COMMERCIAL

## 2017-06-22 DIAGNOSIS — R97.20 RISING PSA LEVEL: ICD-10-CM

## 2017-06-22 DIAGNOSIS — C61 PROSTATE CANCER (HCC): ICD-10-CM

## 2017-06-22 PROCEDURE — 3430000000 HC RX DIAGNOSTIC RADIOPHARMACEUTICAL: Performed by: UROLOGY

## 2017-06-22 PROCEDURE — A9503 TC99M MEDRONATE: HCPCS | Performed by: UROLOGY

## 2017-06-22 PROCEDURE — 78306 BONE IMAGING WHOLE BODY: CPT

## 2017-06-22 RX ORDER — TC 99M MEDRONATE 20 MG/10ML
26.8 INJECTION, POWDER, LYOPHILIZED, FOR SOLUTION INTRAVENOUS
Status: COMPLETED | OUTPATIENT
Start: 2017-06-22 | End: 2017-06-22

## 2017-06-22 RX ADMIN — Medication 26.8 MILLICURIE: at 08:06

## 2017-06-26 ENCOUNTER — HOSPITAL ENCOUNTER (OUTPATIENT)
Age: 62
Discharge: HOME | End: 2017-06-26
Payer: COMMERCIAL

## 2017-06-26 DIAGNOSIS — E78.1 HYPERTRIGLYCERIDEMIA: ICD-10-CM

## 2017-06-26 DIAGNOSIS — K75.2 NONSPECIFIC REACTIVE HEPATITIS: ICD-10-CM

## 2017-06-26 DIAGNOSIS — I10 ESSENTIAL HYPERTENSION: ICD-10-CM

## 2017-06-26 DIAGNOSIS — E11.65 TYPE 2 DIABETES MELLITUS WITH HYPERGLYCEMIA, WITHOUT LONG-TERM CURRENT USE OF INSULIN (HCC): ICD-10-CM

## 2017-06-26 DIAGNOSIS — E78.5 HYPERLIPIDEMIA WITH TARGET LDL LESS THAN 100: ICD-10-CM

## 2017-06-26 LAB
ALBUMIN SERPL-MCNC: 3.9 G/DL (ref 3.5–5.2)
ALBUMIN/GLOBULIN RATIO: ABNORMAL (ref 1–2.5)
ALP BLD-CCNC: 79 U/L (ref 40–129)
ALT SERPL-CCNC: 52 U/L (ref 5–41)
ANION GAP SERPL CALCULATED.3IONS-SCNC: 16 MMOL/L (ref 9–17)
AST SERPL-CCNC: 44 U/L
BILIRUB SERPL-MCNC: 0.36 MG/DL (ref 0.3–1.2)
BUN BLDV-MCNC: 17 MG/DL (ref 8–23)
BUN/CREAT BLD: ABNORMAL (ref 9–20)
CALCIUM SERPL-MCNC: 10 MG/DL (ref 8.6–10.4)
CHLORIDE BLD-SCNC: 97 MMOL/L (ref 98–107)
CHOLESTEROL/HDL RATIO: 4.1
CHOLESTEROL: 132 MG/DL
CO2: 26 MMOL/L (ref 20–31)
CREAT SERPL-MCNC: 0.72 MG/DL (ref 0.7–1.2)
GFR AFRICAN AMERICAN: >60 ML/MIN
GFR NON-AFRICAN AMERICAN: >60 ML/MIN
GFR SERPL CREATININE-BSD FRML MDRD: ABNORMAL ML/MIN/{1.73_M2}
GFR SERPL CREATININE-BSD FRML MDRD: ABNORMAL ML/MIN/{1.73_M2}
GLUCOSE BLD-MCNC: 147 MG/DL (ref 70–99)
HCT VFR BLD CALC: 43.8 % (ref 41–53)
HDLC SERPL-MCNC: 32 MG/DL
HEMOGLOBIN: 14.5 G/DL (ref 13.5–17.5)
LDL CHOLESTEROL: 47 MG/DL (ref 0–130)
MCH RBC QN AUTO: 29.7 PG (ref 26–34)
MCHC RBC AUTO-ENTMCNC: 33.2 G/DL (ref 31–37)
MCV RBC AUTO: 89.4 FL (ref 80–100)
PDW BLD-RTO: 14.9 % (ref 11.5–14.9)
PLATELET # BLD: 189 K/UL (ref 150–450)
PMV BLD AUTO: 10.3 FL (ref 6–12)
POTASSIUM SERPL-SCNC: 3.8 MMOL/L (ref 3.7–5.3)
RBC # BLD: 4.9 M/UL (ref 4.5–5.9)
SODIUM BLD-SCNC: 139 MMOL/L (ref 135–144)
TOTAL PROTEIN: 7.3 G/DL (ref 6.4–8.3)
TRIGL SERPL-MCNC: 263 MG/DL
VLDLC SERPL CALC-MCNC: ABNORMAL MG/DL (ref 1–30)
WBC # BLD: 7.6 K/UL (ref 3.5–11)

## 2017-06-26 PROCEDURE — 36415 COLL VENOUS BLD VENIPUNCTURE: CPT

## 2017-06-26 PROCEDURE — 80061 LIPID PANEL: CPT

## 2017-06-26 PROCEDURE — 80053 COMPREHEN METABOLIC PANEL: CPT

## 2017-06-26 PROCEDURE — 85027 COMPLETE CBC AUTOMATED: CPT

## 2017-06-30 ENCOUNTER — TELEPHONE (OUTPATIENT)
Dept: FAMILY MEDICINE CLINIC | Age: 62
End: 2017-06-30

## 2017-07-03 ENCOUNTER — OFFICE VISIT (OUTPATIENT)
Dept: UROLOGY | Age: 62
End: 2017-07-03
Payer: COMMERCIAL

## 2017-07-03 ENCOUNTER — OFFICE VISIT (OUTPATIENT)
Dept: FAMILY MEDICINE CLINIC | Age: 62
End: 2017-07-03
Payer: COMMERCIAL

## 2017-07-03 VITALS
HEART RATE: 57 BPM | WEIGHT: 305.8 LBS | DIASTOLIC BLOOD PRESSURE: 73 MMHG | TEMPERATURE: 97.7 F | SYSTOLIC BLOOD PRESSURE: 119 MMHG | BODY MASS INDEX: 43.78 KG/M2 | HEIGHT: 70 IN

## 2017-07-03 VITALS
WEIGHT: 305.4 LBS | DIASTOLIC BLOOD PRESSURE: 77 MMHG | OXYGEN SATURATION: 94 % | BODY MASS INDEX: 43.72 KG/M2 | TEMPERATURE: 97.7 F | HEIGHT: 70 IN | HEART RATE: 59 BPM | SYSTOLIC BLOOD PRESSURE: 135 MMHG

## 2017-07-03 DIAGNOSIS — R97.20 RISING PSA LEVEL: ICD-10-CM

## 2017-07-03 DIAGNOSIS — R39.198 SLOW URINARY STREAM: ICD-10-CM

## 2017-07-03 DIAGNOSIS — M79.2 PERIPHERAL NEUROPATHIC PAIN: Primary | ICD-10-CM

## 2017-07-03 DIAGNOSIS — Z11.4 SCREENING FOR HIV (HUMAN IMMUNODEFICIENCY VIRUS): ICD-10-CM

## 2017-07-03 DIAGNOSIS — R35.1 NOCTURIA: ICD-10-CM

## 2017-07-03 DIAGNOSIS — C61 PROSTATE CA (HCC): ICD-10-CM

## 2017-07-03 DIAGNOSIS — E11.65 TYPE 2 DIABETES MELLITUS WITH HYPERGLYCEMIA, WITHOUT LONG-TERM CURRENT USE OF INSULIN (HCC): ICD-10-CM

## 2017-07-03 DIAGNOSIS — C61 PROSTATE CANCER (HCC): Primary | ICD-10-CM

## 2017-07-03 DIAGNOSIS — I10 ESSENTIAL HYPERTENSION: ICD-10-CM

## 2017-07-03 DIAGNOSIS — E66.01 OBESITY, CLASS III, BMI 40-49.9 (MORBID OBESITY) (HCC): ICD-10-CM

## 2017-07-03 LAB — HBA1C MFR BLD: 7.5 %

## 2017-07-03 PROCEDURE — G8427 DOCREV CUR MEDS BY ELIG CLIN: HCPCS | Performed by: UROLOGY

## 2017-07-03 PROCEDURE — 4004F PT TOBACCO SCREEN RCVD TLK: CPT | Performed by: UROLOGY

## 2017-07-03 PROCEDURE — G8417 CALC BMI ABV UP PARAM F/U: HCPCS | Performed by: FAMILY MEDICINE

## 2017-07-03 PROCEDURE — 3017F COLORECTAL CA SCREEN DOC REV: CPT | Performed by: FAMILY MEDICINE

## 2017-07-03 PROCEDURE — 4004F PT TOBACCO SCREEN RCVD TLK: CPT | Performed by: FAMILY MEDICINE

## 2017-07-03 PROCEDURE — 3046F HEMOGLOBIN A1C LEVEL >9.0%: CPT | Performed by: FAMILY MEDICINE

## 2017-07-03 PROCEDURE — 83036 HEMOGLOBIN GLYCOSYLATED A1C: CPT | Performed by: FAMILY MEDICINE

## 2017-07-03 PROCEDURE — 99215 OFFICE O/P EST HI 40 MIN: CPT | Performed by: FAMILY MEDICINE

## 2017-07-03 PROCEDURE — G8427 DOCREV CUR MEDS BY ELIG CLIN: HCPCS | Performed by: FAMILY MEDICINE

## 2017-07-03 PROCEDURE — 99214 OFFICE O/P EST MOD 30 MIN: CPT | Performed by: UROLOGY

## 2017-07-03 PROCEDURE — G8417 CALC BMI ABV UP PARAM F/U: HCPCS | Performed by: UROLOGY

## 2017-07-03 PROCEDURE — 3017F COLORECTAL CA SCREEN DOC REV: CPT | Performed by: UROLOGY

## 2017-07-03 RX ORDER — L-METHYLFOLATE-ALGAE-VIT B12-B6 CAP 3-90.314-2-35 MG 3-90.314-2-35 MG
1 CAP ORAL DAILY
Qty: 90 CAPSULE | Refills: 3 | Status: SHIPPED | OUTPATIENT
Start: 2017-07-03 | End: 2018-01-02

## 2017-07-03 RX ORDER — PREGABALIN 100 MG/1
CAPSULE ORAL
Qty: 360 CAPSULE | Refills: 0 | Status: SHIPPED | OUTPATIENT
Start: 2017-07-03 | End: 2017-10-20 | Stop reason: SDUPTHER

## 2017-07-03 ASSESSMENT — ENCOUNTER SYMPTOMS
SHORTNESS OF BREATH: 0
CONSTIPATION: 0
VOMITING: 0
DIARRHEA: 0
ABDOMINAL PAIN: 0
COUGH: 0
ABDOMINAL PAIN: 0
COLOR CHANGE: 0
VOMITING: 0
APNEA: 1
CHEST TIGHTNESS: 0
WHEEZING: 0
EYE PAIN: 0
NAUSEA: 0
BACK PAIN: 1
BACK PAIN: 0
EYE REDNESS: 0
ABDOMINAL DISTENTION: 0
SHORTNESS OF BREATH: 1
COLOR CHANGE: 1
WHEEZING: 0
NAUSEA: 0
COUGH: 0

## 2017-07-04 ENCOUNTER — TELEPHONE (OUTPATIENT)
Dept: FAMILY MEDICINE CLINIC | Age: 62
End: 2017-07-04

## 2017-07-04 PROBLEM — Z11.4 SCREENING FOR HIV (HUMAN IMMUNODEFICIENCY VIRUS): Status: ACTIVE | Noted: 2017-07-04

## 2017-09-26 ENCOUNTER — HOSPITAL ENCOUNTER (OUTPATIENT)
Age: 62
Discharge: HOME | End: 2017-09-26
Payer: COMMERCIAL

## 2017-09-26 DIAGNOSIS — E11.65 TYPE 2 DIABETES MELLITUS WITH HYPERGLYCEMIA, WITHOUT LONG-TERM CURRENT USE OF INSULIN (HCC): ICD-10-CM

## 2017-09-26 DIAGNOSIS — C61 PROSTATE CANCER (HCC): ICD-10-CM

## 2017-09-26 DIAGNOSIS — Z11.4 SCREENING FOR HIV (HUMAN IMMUNODEFICIENCY VIRUS): ICD-10-CM

## 2017-09-26 DIAGNOSIS — R97.20 RISING PSA LEVEL: ICD-10-CM

## 2017-09-26 DIAGNOSIS — I10 ESSENTIAL HYPERTENSION: ICD-10-CM

## 2017-09-26 LAB
ALBUMIN SERPL-MCNC: 3.9 G/DL (ref 3.5–5.2)
ALBUMIN/GLOBULIN RATIO: ABNORMAL (ref 1–2.5)
ALP BLD-CCNC: 88 U/L (ref 40–129)
ALT SERPL-CCNC: 18 U/L (ref 5–41)
ANION GAP SERPL CALCULATED.3IONS-SCNC: 12 MMOL/L (ref 9–17)
AST SERPL-CCNC: 20 U/L
BILIRUB SERPL-MCNC: 0.21 MG/DL (ref 0.3–1.2)
BUN BLDV-MCNC: 19 MG/DL (ref 8–23)
BUN/CREAT BLD: ABNORMAL (ref 9–20)
CALCIUM SERPL-MCNC: 10.4 MG/DL (ref 8.6–10.4)
CHLORIDE BLD-SCNC: 103 MMOL/L (ref 98–107)
CO2: 27 MMOL/L (ref 20–31)
CREAT SERPL-MCNC: 0.8 MG/DL (ref 0.7–1.2)
GFR AFRICAN AMERICAN: >60 ML/MIN
GFR NON-AFRICAN AMERICAN: >60 ML/MIN
GFR SERPL CREATININE-BSD FRML MDRD: ABNORMAL ML/MIN/{1.73_M2}
GFR SERPL CREATININE-BSD FRML MDRD: ABNORMAL ML/MIN/{1.73_M2}
GLUCOSE BLD-MCNC: 176 MG/DL (ref 70–99)
HIV AG/AB: NONREACTIVE
POTASSIUM SERPL-SCNC: 4.6 MMOL/L (ref 3.7–5.3)
PROSTATE SPECIFIC ANTIGEN: 0.05 UG/L
SODIUM BLD-SCNC: 142 MMOL/L (ref 135–144)
TOTAL PROTEIN: 7.1 G/DL (ref 6.4–8.3)

## 2017-09-26 PROCEDURE — 84153 ASSAY OF PSA TOTAL: CPT

## 2017-09-26 PROCEDURE — 87389 HIV-1 AG W/HIV-1&-2 AB AG IA: CPT

## 2017-09-26 PROCEDURE — 80053 COMPREHEN METABOLIC PANEL: CPT

## 2017-09-26 PROCEDURE — 36415 COLL VENOUS BLD VENIPUNCTURE: CPT

## 2017-09-29 DIAGNOSIS — E78.5 HYPERLIPIDEMIA, UNSPECIFIED HYPERLIPIDEMIA TYPE: ICD-10-CM

## 2017-09-29 DIAGNOSIS — E78.5 HYPERLIPIDEMIA WITH TARGET LDL LESS THAN 100: Primary | ICD-10-CM

## 2017-09-29 DIAGNOSIS — E55.9 VITAMIN D DEFICIENCY: ICD-10-CM

## 2017-09-29 RX ORDER — CHOLECALCIFEROL (VITAMIN D3) 50 MCG
TABLET ORAL
Qty: 90 TABLET | Refills: 1 | Status: SHIPPED | OUTPATIENT
Start: 2017-09-29 | End: 2017-10-02 | Stop reason: DRUGHIGH

## 2017-09-29 RX ORDER — ROSUVASTATIN CALCIUM 10 MG/1
TABLET, COATED ORAL
Qty: 90 TABLET | Refills: 3 | Status: SHIPPED | OUTPATIENT
Start: 2017-09-29 | End: 2018-03-01 | Stop reason: ALTCHOICE

## 2017-10-02 ENCOUNTER — OFFICE VISIT (OUTPATIENT)
Dept: UROLOGY | Age: 62
End: 2017-10-02
Payer: COMMERCIAL

## 2017-10-02 ENCOUNTER — HOSPITAL ENCOUNTER (OUTPATIENT)
Age: 62
Discharge: HOME | End: 2017-10-02
Payer: COMMERCIAL

## 2017-10-02 ENCOUNTER — OFFICE VISIT (OUTPATIENT)
Dept: FAMILY MEDICINE CLINIC | Age: 62
End: 2017-10-02
Payer: COMMERCIAL

## 2017-10-02 VITALS
TEMPERATURE: 97.7 F | SYSTOLIC BLOOD PRESSURE: 127 MMHG | WEIGHT: 299 LBS | HEIGHT: 70 IN | BODY MASS INDEX: 42.8 KG/M2 | RESPIRATION RATE: 16 BRPM | DIASTOLIC BLOOD PRESSURE: 68 MMHG | HEART RATE: 46 BPM

## 2017-10-02 VITALS
HEART RATE: 53 BPM | WEIGHT: 299 LBS | HEIGHT: 70 IN | BODY MASS INDEX: 42.8 KG/M2 | DIASTOLIC BLOOD PRESSURE: 69 MMHG | TEMPERATURE: 98.3 F | OXYGEN SATURATION: 97 % | SYSTOLIC BLOOD PRESSURE: 139 MMHG

## 2017-10-02 DIAGNOSIS — I73.9 PVD (PERIPHERAL VASCULAR DISEASE) WITH CLAUDICATION (HCC): ICD-10-CM

## 2017-10-02 DIAGNOSIS — E83.52 HYPERCALCEMIA: ICD-10-CM

## 2017-10-02 DIAGNOSIS — R23.2 HOT FLASHES: ICD-10-CM

## 2017-10-02 DIAGNOSIS — M79.2 PERIPHERAL NEUROPATHIC PAIN: ICD-10-CM

## 2017-10-02 DIAGNOSIS — M54.42 CHRONIC BILATERAL LOW BACK PAIN WITH BILATERAL SCIATICA: ICD-10-CM

## 2017-10-02 DIAGNOSIS — R39.198 SLOW URINARY STREAM: ICD-10-CM

## 2017-10-02 DIAGNOSIS — M54.41 CHRONIC BILATERAL LOW BACK PAIN WITH BILATERAL SCIATICA: ICD-10-CM

## 2017-10-02 DIAGNOSIS — F33.1 MODERATE EPISODE OF RECURRENT MAJOR DEPRESSIVE DISORDER (HCC): ICD-10-CM

## 2017-10-02 DIAGNOSIS — C61 PROSTATE CANCER (HCC): Primary | ICD-10-CM

## 2017-10-02 DIAGNOSIS — E55.9 VITAMIN D DEFICIENCY: ICD-10-CM

## 2017-10-02 DIAGNOSIS — E11.65 TYPE 2 DIABETES MELLITUS WITH HYPERGLYCEMIA, WITHOUT LONG-TERM CURRENT USE OF INSULIN (HCC): Primary | ICD-10-CM

## 2017-10-02 DIAGNOSIS — Z23 NEEDS FLU SHOT: ICD-10-CM

## 2017-10-02 DIAGNOSIS — G89.29 CHRONIC BILATERAL LOW BACK PAIN WITH BILATERAL SCIATICA: ICD-10-CM

## 2017-10-02 DIAGNOSIS — J44.9 COPD MIXED TYPE (HCC): ICD-10-CM

## 2017-10-02 DIAGNOSIS — R35.1 NOCTURIA: ICD-10-CM

## 2017-10-02 DIAGNOSIS — I10 ESSENTIAL HYPERTENSION: ICD-10-CM

## 2017-10-02 LAB
HBA1C MFR BLD: 5.7 %
VITAMIN D 25-HYDROXY: 40.4 NG/ML (ref 30–100)

## 2017-10-02 PROCEDURE — G8926 SPIRO NO PERF OR DOC: HCPCS | Performed by: FAMILY MEDICINE

## 2017-10-02 PROCEDURE — 99214 OFFICE O/P EST MOD 30 MIN: CPT | Performed by: FAMILY MEDICINE

## 2017-10-02 PROCEDURE — 4004F PT TOBACCO SCREEN RCVD TLK: CPT | Performed by: UROLOGY

## 2017-10-02 PROCEDURE — 4004F PT TOBACCO SCREEN RCVD TLK: CPT | Performed by: FAMILY MEDICINE

## 2017-10-02 PROCEDURE — 83036 HEMOGLOBIN GLYCOSYLATED A1C: CPT | Performed by: FAMILY MEDICINE

## 2017-10-02 PROCEDURE — 3017F COLORECTAL CA SCREEN DOC REV: CPT | Performed by: FAMILY MEDICINE

## 2017-10-02 PROCEDURE — 83970 ASSAY OF PARATHORMONE: CPT

## 2017-10-02 PROCEDURE — G8484 FLU IMMUNIZE NO ADMIN: HCPCS | Performed by: FAMILY MEDICINE

## 2017-10-02 PROCEDURE — G8484 FLU IMMUNIZE NO ADMIN: HCPCS | Performed by: UROLOGY

## 2017-10-02 PROCEDURE — 3017F COLORECTAL CA SCREEN DOC REV: CPT | Performed by: UROLOGY

## 2017-10-02 PROCEDURE — 3046F HEMOGLOBIN A1C LEVEL >9.0%: CPT | Performed by: FAMILY MEDICINE

## 2017-10-02 PROCEDURE — 36415 COLL VENOUS BLD VENIPUNCTURE: CPT

## 2017-10-02 PROCEDURE — 99214 OFFICE O/P EST MOD 30 MIN: CPT | Performed by: UROLOGY

## 2017-10-02 PROCEDURE — 90688 IIV4 VACCINE SPLT 0.5 ML IM: CPT | Performed by: FAMILY MEDICINE

## 2017-10-02 PROCEDURE — G8427 DOCREV CUR MEDS BY ELIG CLIN: HCPCS | Performed by: FAMILY MEDICINE

## 2017-10-02 PROCEDURE — G8417 CALC BMI ABV UP PARAM F/U: HCPCS | Performed by: UROLOGY

## 2017-10-02 PROCEDURE — 96402 CHEMO HORMON ANTINEOPL SQ/IM: CPT | Performed by: UROLOGY

## 2017-10-02 PROCEDURE — G8427 DOCREV CUR MEDS BY ELIG CLIN: HCPCS | Performed by: UROLOGY

## 2017-10-02 PROCEDURE — 82306 VITAMIN D 25 HYDROXY: CPT

## 2017-10-02 PROCEDURE — G8417 CALC BMI ABV UP PARAM F/U: HCPCS | Performed by: FAMILY MEDICINE

## 2017-10-02 PROCEDURE — 82330 ASSAY OF CALCIUM: CPT

## 2017-10-02 PROCEDURE — 90471 IMMUNIZATION ADMIN: CPT | Performed by: FAMILY MEDICINE

## 2017-10-02 PROCEDURE — 3023F SPIROM DOC REV: CPT | Performed by: FAMILY MEDICINE

## 2017-10-02 RX ORDER — DULOXETIN HYDROCHLORIDE 30 MG/1
30 CAPSULE, DELAYED RELEASE ORAL EVERY MORNING
Qty: 90 CAPSULE | Refills: 3 | Status: SHIPPED | OUTPATIENT
Start: 2017-10-02 | End: 2018-01-02 | Stop reason: ALTCHOICE

## 2017-10-02 RX ORDER — METFORMIN HYDROCHLORIDE 500 MG/1
500 TABLET, EXTENDED RELEASE ORAL
Qty: 180 TABLET | Refills: 3
Start: 2017-10-02 | End: 2018-01-02

## 2017-10-02 ASSESSMENT — ENCOUNTER SYMPTOMS
ABDOMINAL DISTENTION: 0
EYE REDNESS: 0
WHEEZING: 0
COLOR CHANGE: 1
DIARRHEA: 0
BACK PAIN: 0
NAUSEA: 0
ABDOMINAL PAIN: 0
SHORTNESS OF BREATH: 0
EYE PAIN: 0
BACK PAIN: 1
CHEST TIGHTNESS: 0
ABDOMINAL PAIN: 0
NAUSEA: 0
VOMITING: 0
VOMITING: 0
COLOR CHANGE: 0
CONSTIPATION: 0
WHEEZING: 0
SHORTNESS OF BREATH: 1
COUGH: 0
COUGH: 0
APNEA: 1

## 2017-10-02 NOTE — PROGRESS NOTES
54 for men, 72 for women), diabetes mellitus, dyslipidemia, hypertension, male gender, obesity (BMI >= 30 kg/m2) and smoking/ tobacco exposure  Current diabetic medications include oral agent (monotherapy): Glucophage XR. Eye exam current (within one year): yes  Weight trend: decreasing steadily    Wt Readings from Last 3 Encounters:   10/02/17 299 lb (135.6 kg)   10/02/17 299 lb (135.6 kg)   07/03/17 (!) 305 lb 12.8 oz (138.7 kg)       Prior visit with dietician: no  Current diet: in general, a \"healthy\" diet  , diabetic, low fat/ cholesterol, low salt  Current exercise: walking but limited due to leg pains and back pain    Current monitoring regimen: home blood tests - daily  Home blood sugar records: fasting range:  119-117-124  Any episodes of hypoglycemia? no    Is He on ACE inhibitor or angiotensin II receptor blocker? Yes     A1c  Is controled and stable   Lab Results   Component Value Date    LABA1C 5.7 10/02/2017       Lab Results   Component Value Date    LABA1C 7.5 07/03/2017       . taking Aspirin 81 mg  Nicotine dependence. Smoker, counseling given to quit smoking. He is trying to quit smoking, but has been under a lot of stress. His brother is currently dying in hospice due to lung cancer  Ready to quit: Yes  Counseling given: Yes        Urine microabumin is within normal limits   Lab Results   Component Value Date    LABMICR 15 04/19/2017       LDL controlled    Lab Results   Component Value Date    LDLCHOLESTEROL 47 06/26/2017     -vital signs stable and within normal limits except mild bradycardia and Morbid obesity per BMI. /69  Pulse 53  Temp 98.3 °F (36.8 °C) (Tympanic)   Ht 5' 10\" (1.778 m)  Wt 299 lb (135.6 kg)  SpO2 97% Comment: ra @ rest  BMI 42.9 kg/m2  Body mass index is 42.9 kg/(m^2). Hypertension: Patient here for follow-up of elevated blood pressure. He is not exercising, but walking, and is adherent to low salt diet. Blood pressure is well controlled at home. PHQ9 Score 10 15     Interpretation of Total Score Depression Severity: 1-4 = Minimal depression, 5-9 = Mild depression, 10-14 = Moderate depression, 15-19 = Moderately severe depression, 20-27 = Severe depression      Needs handicap placard renewed  Has chronic back pain and history of back surgery 1/5/17. He continues to walk with walker      Discussed testing with the patient and all questions fully answered. Greatly improved PSA. Hypercalcemia noted. He takes vitamin D but denies taking multivitamins with calcium. Hypertriglyceridemia  Low HDL  LDL is controlled. Hyperglycemia  Vitamin D deficiency     Hospital Outpatient Visit on 09/26/2017   Component Date Value Ref Range Status    PSA 09/26/2017 0.05  <4.1 ug/L Final    Comment: The Roche \"ECLIA\" assay is used. Results obtained with different assay methods   cannot be used interchangeably.   Performed at [facility] [id number] [place] [address], [address] [place] [address] [phone]         Lab Results   Component Value Date    WBC 7.6 06/26/2017    HGB 14.5 06/26/2017    HCT 43.8 06/26/2017    MCV 89.4 06/26/2017     06/26/2017       Lab Results   Component Value Date     09/26/2017    K 4.6 09/26/2017     09/26/2017    CO2 27 09/26/2017    BUN 19 09/26/2017    CREATININE 0.80 09/26/2017    GLUCOSE 176 09/26/2017    CALCIUM 10.4 09/26/2017        Lab Results   Component Value Date    ALT 18 09/26/2017    AST 20 09/26/2017    ALKPHOS 88 09/26/2017    BILITOT 0.21 (L) 09/26/2017       Lab Results   Component Value Date    TSH 0.74 03/23/2017       Lab Results   Component Value Date    CHOL 132 06/26/2017    CHOL 153 02/14/2017    CHOL 155 04/11/2016     Lab Results   Component Value Date    TRIG 263 (H) 06/26/2017    TRIG 336 (H) 02/14/2017    TRIG 163 (H) 04/11/2016     Lab Results   Component Value Date    HDL 32 (L) 06/26/2017    HDL 32 (L) 02/14/2017    HDL 33 (L) 04/11/2016     Lab Results   Component Value Date    LDLCHOLESTEROL 47 06/26/2017    LDLCHOLESTEROL 54 02/14/2017    LDLCHOLESTEROL 89 04/11/2016       Lab Results   Component Value Date    CHOLHDLRATIO 4.1 06/26/2017    CHOLHDLRATIO 4.8 02/14/2017    CHOLHDLRATIO 4.7 04/11/2016         Lab Results   Component Value Date    BMAIVYXQ09 593 03/23/2017     Lab Results   Component Value Date    FOLATE 18.5 03/23/2017     Lab Results   Component Value Date    VITD25 22.8 (L) 02/14/2017             Current Outpatient Prescriptions   Medication Sig Dispense Refill    VITAMIN D-3 SUPER STRENGTH 2000 units TABS TAKE 1 TABLET DAILY 90 tablet 1    rosuvastatin (CRESTOR) 10 MG tablet TAKE 1 TABLET NIGHTLY 90 tablet 3    enzalutamide (XTANDI) 40 MG capsule Take 40 mg by mouth Indications: take four 40 mg capsules daily.  pregabalin (LYRICA) 100 MG capsule Take 100 mg po morning & lunch, and 200 mg at bedtime 360 capsule 0    L-methylfolate-B6-B12 (METANX) 1-16.674-9-20 MG CAPS capsule Take 1 capsule by mouth daily 90 capsule 3    lisinopril (PRINIVIL;ZESTRIL) 20 MG tablet TAKE 1 TABLET TWICE A  tablet 3    atenolol-chlorthalidone (TENORETIC) 50-25 MG per tablet TAKE 1 TABLET DAILY 90 tablet 3    Umeclidinium Bromide (INCRUSE ELLIPTA) 62.5 MCG/INH AEPB Inhale 1 each into the lungs daily Per Dr. Peterson Cox 30 each 11    FOLBEE 2.5-25-1 MG TABS tablet       allopurinol (ZYLOPRIM) 100 MG tablet Take 1 tablet by mouth daily 90 tablet 0    metFORMIN (GLUCOPHAGE XR) 500 MG extended release tablet Take 2 tablets by mouth daily (with breakfast) 180 tablet 3    DULoxetine (CYMBALTA) 60 MG extended release capsule Take 1 capsule by mouth every morning With food. 90 capsule 3    colchicine (COLCRYS) 0.6 MG tablet ADJUST SIG-ONLY AS NEEDED FOR GOUT ATTACK. Take 2 tablets now, then 1 tablet one hour later. Wait 12 hours then start 1 tablet bid for 5 days.  90 tablet 1    albuterol (PROVENTIL) (2.5 MG/3ML) 0.083% nebulizer solution Take 2.5 mg by nebulization 4 times daily       albuterol Future     Number of Occurrences:   1     Standing Expiration Date:   10/2/2018    PTH, INTACT WITH IONIZED CALCIUM     Standing Status:   Future     Number of Occurrences:   1     Standing Expiration Date:   10/2/2018    POCT glycosylated hemoglobin (Hb A1C)       Medications Discontinued During This Encounter   Medication Reason    HYDROcodone-acetaminophen (Herve Wilson) 5-325 MG per tablet Patient Choice    VITAMIN D-3 SUPER STRENGTH 2000 units TABS Dose adjustment    DULoxetine (CYMBALTA) 60 MG extended release capsule Reorder    metFORMIN (GLUCOPHAGE XR) 500 MG extended release tablet Reorder         Collette Rio received counseling on the following healthy behaviors: nutrition, exercise, medication adherence and tobacco cessation  Reviewed prior labs and health maintenance  Continue current medications, diet and exercise. Discussed use, benefit, and side effects of prescribed medications. Barriers to medication compliance addressed. Patient given educational materials - see patient instructions  Was a self-tracking handout given in paper form or via [employer]? Yes    Requested Prescriptions     Signed Prescriptions Disp Refills    DULoxetine (CYMBALTA) 30 MG extended release capsule 90 capsule 3     Sig: Take 1 capsule by mouth every morning With food. **Decreased dose on 10/2/2017 **    metFORMIN (GLUCOPHAGE XR) 500 MG extended release tablet 180 tablet 3     Sig: Take 1 tablet by mouth daily (with breakfast)    vitamin D (CHOLECALCIFEROL) 1000 UNIT TABS tablet 90 tablet 3     Sig: Take 1 tablet by mouth daily    Handicap Placard MISC 1 each 0     Sig: by Does not apply route DX: COPD, difficulty walking  . Can't walk greater than 200 feet. Expires in 5 years. All patient questions answered. Patient voiced understanding. Quality Measures    Body mass index is 42.9 kg/(m^2). Elevated. Weight control planned discussed conventional weight loss and Healthy diet and regular exercise.     BP: 139/69 Blood

## 2017-10-02 NOTE — MR AVS SNAPSHOT
eat, monitoring blood sugar, taking medicines if prescribed, and getting regular exercise. · Do not smoke. Smoking affects blood flow and can make foot problems worse. If you need help quitting, talk to your doctor about stop-smoking programs and medicines. These can increase your chances of quitting for good. · Eat a diet that is low in fats. High fat intake can cause fat to build up in your blood vessels and decrease blood flow. · Inspect your feet daily for blisters, cuts, cracks, or sores. If you cannot see well, use a mirror or have someone help you. · Take care of your feet:  [facility] your feet every day. Use warm (not hot) water. Check the water temperature with your wrists or other part of your body, not your feet. ¨ Dry your feet well. Pat them dry. Do not rub the skin on your feet too hard. Dry well between your toes. If the skin on your feet stays moist, bacteria or a fungus can grow, which can lead to infection. ¨ Keep your skin soft. Use moisturizing skin cream to keep the skin on your feet soft and prevent calluses and cracks. But do not put the cream between your toes, and stop using any cream that causes a rash. ¨ Clean underneath your toenails carefully. Do not use a sharp object to clean underneath your toenails. Use the blunt end of a nail file or other rounded tool. ¨ Trim and file your toenails straight across to prevent ingrown toenails. Use a nail clipper, not scissors. Use an emery board to smooth the edges. · Change socks daily. Socks without seams are best, because seams often rub the feet. You can find socks for people with diabetes from specialty catalogs. · Look inside your shoes every day for things like gravel or torn linings, which could cause blisters or sores. · Buy shoes that fit well:  ¨ Look for shoes that have plenty of space around the toes. This helps prevent bunions and blisters. ¨ Try on shoes while wearing the kind of socks you will usually wear with the shoes. ¨ Avoid plastic shoes. They may rub your feet and cause blisters. Good shoes should be made of materials that are flexible and breathable, such as leather or cloth. ¨ Break in new shoes slowly by wearing them for no more than an hour a day for several days. Take extra time to check your feet for red areas, blisters, or other problems after you wear new shoes. · Do not go barefoot. Do not wear sandals, and do not wear shoes with very thin soles. Thin soles are easy to puncture. They also do not protect your feet from hot pavement or cold weather. · Have your doctor check your feet during each visit. If you have a foot problem, see your doctor. Do not try to treat an early foot problem at home. Home remedies or treatments that you can buy without a prescription (such as corn removers) can be harmful. · Always get early treatment for foot problems. A minor irritation can lead to a major problem if not properly cared for early. When should you call for help? Call your doctor now or seek immediate medical care if:  · You have a foot sore, an ulcer or break in the skin that is not healing after 4 days, bleeding corns or calluses, or an ingrown toenail. · You have blue or black areas, which can mean bruising or blood flow problems. · You have peeling skin or tiny blisters between your toes or cracking or oozing of the skin. · You have a fever for more than 24 hours and a foot sore. · You have new numbness or tingling in your feet that does not go away after you move your feet or change positions. · You have unexplained or unusual swelling of the foot or ankle. Watch closely for changes in your health, and be sure to contact your doctor if:  · You cannot do proper foot care. Where can you learn more? Go to [email or web address] and sign in to your [employer] account. Enter A722 in the [facility] box to learn more about \"Diabetes Foot Health: Care Instructions. \" If you do not have an account, please click on the \"Sign Up Now\" link. Current as of: March 13, 2017  Content Version: 11.3  © 9829-4247 [employer]. Care instructions adapted under license by [facility] ([facility]). If you have questions about a medical condition or this instruction, always ask your healthcare professional. [address] any warranty or liability for your use of this information. Diabetes and Preventing Falls: Care Instructions  Your Care Instructions  If you are an older adult who has diabetes, you may have a higher risk of falling. Complications of diabetessuch as nerve damage, foot problems, and reduced visionmay increase your risk of a fall. Some of your medicines also may add to your risk. By making your home safer, you can lower your risk of falling. Doing things to prevent diabetes complications may also help to lower your risk. You can make your home safer with a few simple measures. Follow-up care is a key part of your treatment and safety. Be sure to make and go to all appointments, and call your doctor if you are having problems. It's also a good idea to know your test results and keep a list of the medicines you take. How can you care for yourself at home? Taking care of yourself  · Keep your blood sugar at a target level (which you set with your doctor). · Exercise regularly to improve your strength, muscle tone, and balance. Walk if you can. Swimming may be a good choice if you cannot walk easily. · Have your vision checked as often as your doctor recommends. It is usually once a year or more often if you have eye problems. · Know the side effects of the medicines you take. Ask your doctor or pharmacist whether the medicines you take can affect your balance. Sleeping pills or sedatives can affect your balance. · Limit the amount of alcohol you drink. Alcohol can impair your balance and other senses. · Have your doctor check your feet during each visit. If you have a foot problem, see your doctor. Preventing falls at home  · Remove raised doorway thresholds, throw rugs, and clutter. Repair loose carpet or raised areas in the floor. · Move furniture and electrical cords to keep them out of walking paths. · Use nonskid floor wax, and wipe up spills right away, especially on ceramic tile floors. · If you use a walker or cane, put rubber tips on it. If you use crutches, clean the bottoms of them regularly with an abrasive pad, such as steel wool. · Keep your house well lit, especially [facility], and outside walkways. Use night-lights in areas such as hallways and bathrooms. Add extra light switches or use remote switches (such as switches that go on or off when you clap your hands) to make it easier to turn lights on if you have to get up during the night. · Install sturdy handrails on stairways. Put grab bars near your shower, bathtub, and toilet. · Store household items on low shelves so that you do not have to climb or reach high. Or use a reaching device that you can get at a medical supply store. If you have to climb for something, use a step stool with handrails, or ask someone to get it for you. · Keep a cordless phone and a flashlight with new batteries by your bed. If possible, put a phone in each of the main rooms of your house, or carry a cell phone in case you fall and cannot reach a phone. Or you can wear a device around your neck or wrist. You push a button that sends a signal for help. · Wear low-heeled shoes that fit well and give your feet good support. Use footwear with nonskid soles. Check the heels and soles of your shoes for wear. Repair or replace worn heels or soles. · Do not wear socks without shoes on wood floors. · Walk on the grass when the sidewalks are slippery.  If you live in an area that gets snow and ice in the winter, sprinkle salt on slippery steps into the lungs daily Per Dr. Vincent Max 2.5-25-1 MG TABS tablet     allopurinol (ZYLOPRIM) 100 MG tablet Take 1 tablet by mouth daily    colchicine (COLCRYS) 0.6 MG tablet ADJUST SIG-ONLY AS NEEDED FOR GOUT ATTACK. Take 2 tablets now, then 1 tablet one hour later. Wait 12 hours then start 1 tablet bid for 5 days.     albuterol (PROVENTIL) (2.5 MG/3ML) 0.083% nebulizer solution Take 2.5 mg by nebulization 4 times daily     albuterol sulfate  (90 BASE) MCG/ACT inhaler Inhale 2 puffs into the lungs every 6 hours as needed for Wheezing or Shortness of Breath (COUGH)    SYMBICORT 160-4.5 MCG/ACT AERO Inhale 2 puffs into the lungs 2 times daily    aspirin EC 81 MG EC tablet Take 1 tablet by mouth daily    omeprazole (PRILOSEC) 20 MG capsule Take 1 capsule by mouth 2 times daily      Allergies              Succinylcholine Chloride Other (See Comments)    PATIENT HAS PSEUDOCHOLINESTERASE DEFICIENCY      We Ordered/Performed the following           INFLUENZA, QUADV, 3 YRS AND OLDER, IM, MDV, 0.5ML (FLUZONE QUADV)     POCT glycosylated hemoglobin (Hb A1C)          Result Summary for POCT glycosylated hemoglobin (Hb A1C)      Result Information     Status          Final result (Collected: 10/2/2017  8:15 AM)           10/2/2017  8:15 AM      Component Results     Component Value Ref Range & Units Status    Hemoglobin A1C 5.7 % Final               Additional Information        Basic Information     Date Of Birth Sex Race Ethnicity Preferred Language    1955 Male White Non-/Non  English      Problem List as of 10/2/2017  Date Reviewed: 10/2/2017                REYNALDO on CPAP    Status post lumbar laminectomy    Tubular adenoma of colon 4/11/17    Screening for HIV (human immunodeficiency virus)    Hypertriglyceridemia    Positive FIT (fecal immunochemical test)    Type 2 diabetes mellitus with hyperglycemia, without long-term current use of insulin (HCC) receive care. Remember, [employer] is NOT to be used for urgent needs. For medical emergencies, dial 911. For questions regarding your [employer] account call [phone]. If you have a clinical question, please call your doctor's office.

## 2017-10-02 NOTE — PROGRESS NOTES
DIABETES and HYPERTENSION visit    BP Readings from Last 3 Encounters:   10/02/17 139/69   07/03/17 119/73   07/03/17 135/77        Hemoglobin A1C (%)   Date Value   07/03/2017 7.5   03/23/2017 7.1 (H)   04/20/2016 5.8     Microalb/Crt. Ratio (mcg/mg creat)   Date Value   04/19/2017 15     LDL Cholesterol (mg/dL)   Date Value   06/26/2017 47     HDL (mg/dL)   Date Value   06/26/2017 32 (L)     BUN (mg/dL)   Date Value   09/26/2017 19     CREATININE (mg/dL)   Date Value   09/26/2017 0.80     Glucose (mg/dL)   Date Value   09/26/2017 176 (H)            Have you changed or started any medications since your last visit including any over-the-counter medicines, vitamins, or herbal medicines? no   Have you stopped taking any of your medications? Is so, why? -  no  Are you having any side effects from any of your medications? - no    Have you seen any other physician or provider since your last visit? yes - eye md, pod, pt   Have you had any other diagnostic tests since your last visit?  no   Have you been seen in the emergency room and/or had an admission in a hospital since we last saw you?  no   Have you had your routine dental cleaning in the past 6 months?  no     Do you have an active MyChart account? If no, what is the barrier?   Yes    Patient Care Team:  Sherwin Santacruz MD as PCP - General (Family Medicine)  Theresa Pruitt MD as Consulting Physician (Neurosurgery)  Liz Bates MD as Consulting Physician (Cardiology)  Lanny Shaffer MD as Consulting Physician (Urology)  Sean Guzman MD as Surgeon (Vascular Surgery)  Mercedes Portillo MD as Consulting Physician (Pulmonology)  Mechelle Piper MD as Consulting Physician (Orthopedic Surgery)  Joselyn Rodriguez DO as Consulting Physician (General Surgery)  Keke Schneider OD (Ophthalmology)    Medical History Review  Past Medical, Family, and Social History reviewed and does contribute to the patient presenting condition    Health Maintenance

## 2017-10-02 NOTE — MR AVS SNAPSHOT
Changed by:  Eliot Maya MD         STOP taking these medications           NORCO 5-325 MG per tablet   Generic drug:  HYDROcodone-acetaminophen   Stopped by:  Eliot Maya MD            Where to Get Your Medications      These medications were sent to [facility] [address] [facility], [facility] [address]on [address] [place] [id number]     Phone:  [phone]     DULoxetine 30 MG extended release capsule    vitamin D 1000 UNIT Tabs tablet         You can get these medications from any pharmacy     Bring a paper prescription for each of these medications     Handicap Placard Misc         Information about where to get these medications is not yet available     ! Ask your nurse or doctor about these medications     metFORMIN 500 MG extended release tablet               Your Current Medications Are              DULoxetine (CYMBALTA) 30 MG extended release capsule Take 1 capsule by mouth every morning With food. **Decreased dose on 10/2/2017 **    metFORMIN (GLUCOPHAGE XR) 500 MG extended release tablet Take 1 tablet by mouth daily (with breakfast)    vitamin D (CHOLECALCIFEROL) 1000 UNIT TABS tablet Take 1 tablet by mouth daily    Handicap Placard MISC by Does not apply route DX: COPD, difficulty walking  . Can't walk greater than 200 feet. Expires in 5 years. rosuvastatin (CRESTOR) 10 MG tablet TAKE 1 TABLET NIGHTLY    enzalutamide (XTANDI) 40 MG capsule Take 40 mg by mouth Indications: take four 40 mg capsules daily.     pregabalin (LYRICA) 100 MG capsule Take 100 mg po morning & lunch, and 200 mg at bedtime    L-methylfolate-B6-B12 (METANX) 4-47.406-5-31 MG CAPS capsule Take 1 capsule by mouth daily    lisinopril (PRINIVIL;ZESTRIL) 20 MG tablet TAKE 1 TABLET TWICE A DAY    atenolol-chlorthalidone (TENORETIC) 50-25 MG per tablet TAKE 1 TABLET DAILY    Umeclidinium Bromide (INCRUSE ELLIPTA) 62.5 MCG/INH AEPB Inhale 1 each into the lungs daily Per Dr. Vincent Max 2.5-25-1 MG TABS tablet     allopurinol (ZYLOPRIM) 100 MG tablet Take 1 tablet by mouth daily    colchicine (COLCRYS) 0.6 MG tablet ADJUST SIG-ONLY AS NEEDED FOR GOUT ATTACK. Take 2 tablets now, then 1 tablet one hour later. Wait 12 hours then start 1 tablet bid for 5 days.     albuterol (PROVENTIL) (2.5 MG/3ML) 0.083% nebulizer solution Take 2.5 mg by nebulization 4 times daily     albuterol sulfate  (90 BASE) MCG/ACT inhaler Inhale 2 puffs into the lungs every 6 hours as needed for Wheezing or Shortness of Breath (COUGH)    SYMBICORT 160-4.5 MCG/ACT AERO Inhale 2 puffs into the lungs 2 times daily    aspirin EC 81 MG EC tablet Take 1 tablet by mouth daily    omeprazole (PRILOSEC) 20 MG capsule Take 1 capsule by mouth 2 times daily      Allergies              Succinylcholine Chloride Other (See Comments)    PATIENT HAS PSEUDOCHOLINESTERASE DEFICIENCY         Additional Information        Basic Information     Date Of Birth Sex Race Ethnicity Preferred Language    1955 Male White Non-/Non  English      Problem List as of 10/2/2017  Date Reviewed: 10/2/2017                REYNALDO on CPAP    Status post lumbar laminectomy    Tubular adenoma of colon 4/11/17    Screening for HIV (human immunodeficiency virus)    Hypertriglyceridemia    Positive FIT (fecal immunochemical test)    Type 2 diabetes mellitus with hyperglycemia, without long-term current use of insulin (HCC)    PVD (peripheral vascular disease) with claudication (HCC)    Peripheral neuropathic pain (HCC)    Pruritic dermatitis    Tinea pedis of both feet    Hyperglycemia    Nonspecific reactive hepatitis    COPD mixed type (HCC)    Polypharmacy    Pseudocholinesterase deficiency    Vitamin D deficiency    Hypercalcemia    DDD (degenerative disc disease), lumbar    Chronic bilateral low back pain with bilateral sciatica    Osteoarthritis of spine with radiculopathy, lumbar region Essential hypertension    Gout, unspecified    Hyperlipidemia with target LDL less than 100    Osteoporosis    Prostate CA (HCC)    Moderate episode of recurrent major depressive disorder (HCC)    Gastroesophageal reflux disease without esophagitis    Obesity, Class III, BMI 40-49.9 (morbid obesity) ([facility] [address]      Immunizations as of 10/2/2017     Name Date    Influenza, Intradermal, Quadrivalent, Preservative Free 10/27/2016    Influenza, Quadv, 3 Years and older, IM 10/2/2017    Pneumococcal Polysaccharide ([id number]) 4/11/2017      Preventive Care        Date Due    Tetanus Combination Vaccine (1 - Tdap) 6/20/1973    Diabetic Foot Exam 4/11/2018    Urine Check For Kidney Problems 4/19/2018    Cholesterol Screening 6/26/2018    Eye Exam By An Eye Doctor 8/10/2018    Hemoglobin A1C (Test For Long-Term Glucose Control) 10/2/2018    Colonoscopy 5/9/2022            SvitStylet Signup           Our records indicate that you have an active [email or web address] account. You can view your After Visit Summary by going to [email or web address] and logging in with your [email or web address] username and password. If you don't have a [email or web address] username and password but a parent or guardian has access to your record, the parent or guardian should login with their own [email or web address] username and password and access your record to view the After Visit Summary. Additional Information  If you have questions, please contact the physician practice where you receive care. Remember, [email or web address] is NOT to be used for urgent needs. For medical emergencies, dial 911. For questions regarding your [email or web address] account call [phone]. If you have a clinical question, please call your doctor's office.

## 2017-10-02 NOTE — PROGRESS NOTES
After obtaining consent, and per orders of Dr. Tyler Reyes, injection of Eligard 45mg given in Right upper quad. abdomen by [facility] Moises BELTRÁN. Patient tolerated well without complaint.
defined types were placed in this encounter.      Orders Placed:  Orders Placed This Encounter   Procedures    PSA, Diagnostic     Standing Status:   Future     Standing Expiration Date:   10/2/2018          Edgardo Aleman MD

## 2017-10-02 NOTE — PATIENT INSTRUCTIONS
Diabetes Foot Health: Care Instructions  Your Care Instructions    When you have diabetes, your feet need extra care and attention. Diabetes can damage the nerve endings and blood vessels in your feet, making you less likely to notice when your feet are injured. Diabetes also limits your body's ability to fight infection and get blood to areas that need it. If you get a minor foot injury, it could become an ulcer or a serious infection. With good foot care, you can prevent most of these problems. Caring for your feet can be quick and easy. Most of the care can be done when you are bathing or getting ready for bed. Follow-up care is a key part of your treatment and safety. Be sure to make and go to all appointments, and call your doctor if you are having problems. Its also a good idea to know your test results and keep a list of the medicines you take. How can you care for yourself at home? · Keep your blood sugar close to normal by watching what and how much you eat, monitoring blood sugar, taking medicines if prescribed, and getting regular exercise. · Do not smoke. Smoking affects blood flow and can make foot problems worse. If you need help quitting, talk to your doctor about stop-smoking programs and medicines. These can increase your chances of quitting for good. · Eat a diet that is low in fats. High fat intake can cause fat to build up in your blood vessels and decrease blood flow. · Inspect your feet daily for blisters, cuts, cracks, or sores. If you cannot see well, use a mirror or have someone help you. · Take care of your feet:  [facility] your feet every day. Use warm (not hot) water. Check the water temperature with your wrists or other part of your body, not your feet. ¨ Dry your feet well. Pat them dry. Do not rub the skin on your feet too hard. Dry well between your toes. If the skin on your feet stays moist, bacteria or a fungus can grow, which can lead to infection.   ¨ Keep your skin soft. Use moisturizing skin cream to keep the skin on your feet soft and prevent calluses and cracks. But do not put the cream between your toes, and stop using any cream that causes a rash. ¨ Clean underneath your toenails carefully. Do not use a sharp object to clean underneath your toenails. Use the blunt end of a nail file or other rounded tool. ¨ Trim and file your toenails straight across to prevent ingrown toenails. Use a nail clipper, not scissors. Use an emery board to smooth the edges. · Change socks daily. Socks without seams are best, because seams often rub the feet. You can find socks for people with diabetes from specialty catalogs. · Look inside your shoes every day for things like gravel or torn linings, which could cause blisters or sores. · Buy shoes that fit well:  ¨ Look for shoes that have plenty of space around the toes. This helps prevent bunions and blisters. ¨ Try on shoes while wearing the kind of socks you will usually wear with the shoes. ¨ Avoid plastic shoes. They may rub your feet and cause blisters. Good shoes should be made of materials that are flexible and breathable, such as leather or cloth. ¨ Break in new shoes slowly by wearing them for no more than an hour a day for several days. Take extra time to check your feet for red areas, blisters, or other problems after you wear new shoes. · Do not go barefoot. Do not wear sandals, and do not wear shoes with very thin soles. Thin soles are easy to puncture. They also do not protect your feet from hot pavement or cold weather. · Have your doctor check your feet during each visit. If you have a foot problem, see your doctor. Do not try to treat an early foot problem at home. Home remedies or treatments that you can buy without a prescription (such as corn removers) can be harmful. · Always get early treatment for foot problems. A minor irritation can lead to a major problem if not properly cared for early.   When should you call for help? Call your doctor now or seek immediate medical care if:  · You have a foot sore, an ulcer or break in the skin that is not healing after 4 days, bleeding corns or calluses, or an ingrown toenail. · You have blue or black areas, which can mean bruising or blood flow problems. · You have peeling skin or tiny blisters between your toes or cracking or oozing of the skin. · You have a fever for more than 24 hours and a foot sore. · You have new numbness or tingling in your feet that does not go away after you move your feet or change positions. · You have unexplained or unusual swelling of the foot or ankle. Watch closely for changes in your health, and be sure to contact your doctor if:  · You cannot do proper foot care. Where can you learn more? Go to [email or web address] and sign in to your FrenchWeb account. Enter A739 in the TUTORize box to learn more about \"Diabetes Foot Health: Care Instructions. \"     If you do not have an account, please click on the \"Sign Up Now\" link. Current as of: March 13, 2017  Content Version: 11.3  © 7605-5671 Platform[employer]. Care instructions adapted under license by [facility] ([facility]). If you have questions about a medical condition or this instruction, always ask your healthcare professional. [place] [address] any warranty or liability for your use of this information. Diabetes and Preventing Falls: Care Instructions  Your Care Instructions  If you are an older adult who has diabetes, you may have a higher risk of falling. Complications of diabetessuch as nerve damage, foot problems, and reduced visionmay increase your risk of a fall. Some of your medicines also may add to your risk. By making your home safer, you can lower your risk of falling. Doing things to prevent diabetes complications may also help to lower your risk. You can make your home safer with a few simple measures.   Follow-up care is a key part of your treatment and safety. Be sure to make and go to all appointments, and call your doctor if you are having problems. It's also a good idea to know your test results and keep a list of the medicines you take. How can you care for yourself at home? Taking care of yourself  · Keep your blood sugar at a target level (which you set with your doctor). · Exercise regularly to improve your strength, muscle tone, and balance. Walk if you can. Swimming may be a good choice if you cannot walk easily. · Have your vision checked as often as your doctor recommends. It is usually once a year or more often if you have eye problems. · Know the side effects of the medicines you take. Ask your doctor or pharmacist whether the medicines you take can affect your balance. Sleeping pills or sedatives can affect your balance. · Limit the amount of alcohol you drink. Alcohol can impair your balance and other senses. · Have your doctor check your feet during each visit. If you have a foot problem, see your doctor. Preventing falls at home  · Remove raised doorway thresholds, throw rugs, and clutter. Repair loose carpet or raised areas in the floor. · Move furniture and electrical cords to keep them out of walking paths. · Use nonskid floor wax, and wipe up spills right away, especially on ceramic tile floors. · If you use a walker or cane, put rubber tips on it. If you use crutches, clean the bottoms of them regularly with an abrasive pad, such as steel wool. · Keep your house well lit, especially Bennie Basil, and outside walkways. Use night-lights in areas such as hallways and bathrooms. Add extra light switches or use remote switches (such as switches that go on or off when you clap your hands) to make it easier to turn lights on if you have to get up during the night. · Install sturdy handrails on stairways. Put grab bars near your shower, bathtub, and toilet.   · Store household items on low shelves

## 2017-10-03 LAB
CALCIUM IONIZED: 1.39 MMOL/L (ref 1.13–1.33)
PTH INTACT: 28.57 PG/ML (ref 15–65)

## 2017-10-03 NOTE — PROGRESS NOTES
PLEASE NOTIFY PATIENT. Hormonal testing is normal, PTH=parathyroid hormone normal.    Vitamin D is normal-can stop Vitamin D supplementation x 1 mo, then restart Vitamin D 1000 . Ionic calcium, the real calcium is mildly high. Increase fluids. Avoid any multivitamin with calcium or Tums.

## 2017-10-18 DIAGNOSIS — M79.2 PERIPHERAL NEUROPATHIC PAIN: ICD-10-CM

## 2017-10-18 RX ORDER — PREGABALIN 100 MG/1
CAPSULE ORAL
Qty: 360 CAPSULE | Refills: 0 | OUTPATIENT
Start: 2017-10-18

## 2017-10-20 DIAGNOSIS — M54.41 CHRONIC BILATERAL LOW BACK PAIN WITH BILATERAL SCIATICA: ICD-10-CM

## 2017-10-20 DIAGNOSIS — M54.42 CHRONIC BILATERAL LOW BACK PAIN WITH BILATERAL SCIATICA: ICD-10-CM

## 2017-10-20 DIAGNOSIS — E11.65 TYPE 2 DIABETES MELLITUS WITH HYPERGLYCEMIA, WITHOUT LONG-TERM CURRENT USE OF INSULIN (HCC): Primary | ICD-10-CM

## 2017-10-20 DIAGNOSIS — M79.2 PERIPHERAL NEUROPATHIC PAIN: ICD-10-CM

## 2017-10-20 DIAGNOSIS — E11.42 TYPE 2 DIABETES MELLITUS WITH DIABETIC POLYNEUROPATHY, WITHOUT LONG-TERM CURRENT USE OF INSULIN (HCC): ICD-10-CM

## 2017-10-20 DIAGNOSIS — G89.29 CHRONIC BILATERAL LOW BACK PAIN WITH BILATERAL SCIATICA: ICD-10-CM

## 2017-10-20 RX ORDER — PREGABALIN 100 MG/1
100 CAPSULE ORAL 3 TIMES DAILY
Qty: 270 CAPSULE | Refills: 0 | Status: SHIPPED | OUTPATIENT
Start: 2017-10-20 | End: 2017-10-26 | Stop reason: DRUGHIGH

## 2017-10-20 NOTE — TELEPHONE ENCOUNTER
Please Approve and Refuse. Send to Pharmacy per Pt's Request: [facility]   Pt states he takes 100 mg of lyrica. Next Visit Date:  1/2/2018    Hemoglobin A1C (%)   Date Value   10/02/2017 5.7   07/03/2017 7.5   03/23/2017 7.1 (H)             ( goal A1C is < 7)   Microalb/Crt.  Ratio (mcg/mg creat)   Date Value   04/19/2017 15     LDL Cholesterol (mg/dL)   Date Value   06/26/2017 47       (goal LDL is <100)   AST (U/L)   Date Value   09/26/2017 20     ALT (U/L)   Date Value   09/26/2017 18     BUN (mg/dL)   Date Value   09/26/2017 19     BP Readings from Last 3 Encounters:   10/02/17 127/68   10/02/17 139/69   07/03/17 119/73          (goal 120/80)        Patient Active Problem List:     Essential hypertension     Gout     Hyperlipidemia with target LDL less than 100     Osteoporosis     Prostate CA (HCC)     Moderate episode of recurrent major depressive disorder (HCC)     Gastroesophageal reflux disease without esophagitis     Obesity, Class III, BMI 40-49.9 (morbid obesity) (HCC)     DDD (degenerative disc disease), lumbar     Chronic bilateral low back pain with bilateral sciatica     Osteoarthritis of spine with radiculopathy, lumbar region     Type 2 diabetes mellitus with hyperglycemia, without long-term current use of insulin (HCC)     PVD (peripheral vascular disease) with claudication (HCC)     Peripheral neuropathic pain     Pruritic dermatitis     Tinea pedis of both feet     Hyperglycemia     Nonspecific reactive hepatitis     COPD mixed type ([facility] [address]     Status post lumbar laminectomy     Polypharmacy     Pseudocholinesterase deficiency     Vitamin D deficiency     Hypercalcemia     Positive FIT (fecal immunochemical test)     Hypertriglyceridemia     REYNALDO on CPAP     Tubular adenoma of colon 4/11/17     Screening for HIV (human immunodeficiency virus)      ----Macho Leaf

## 2017-10-26 RX ORDER — PREGABALIN 100 MG/1
100 CAPSULE ORAL 3 TIMES DAILY
Qty: 180 CAPSULE | Refills: 0 | Status: SHIPPED | OUTPATIENT
Start: 2017-10-26 | End: 2018-01-02 | Stop reason: SDUPTHER

## 2017-12-04 ENCOUNTER — TELEPHONE (OUTPATIENT)
Dept: FAMILY MEDICINE CLINIC | Age: 62
End: 2017-12-04

## 2017-12-04 DIAGNOSIS — E83.52 HYPERCALCEMIA: ICD-10-CM

## 2017-12-04 DIAGNOSIS — I10 ESSENTIAL HYPERTENSION: Primary | ICD-10-CM

## 2017-12-04 NOTE — TELEPHONE ENCOUNTER
Needs to stop Chlorthalidone  **    BP Readings from Last 3 Encounters:   10/02/17 127/68   10/02/17 139/69   07/03/17 119/73     Pulse Readings from Last 3 Encounters:   10/02/17 (!) 46   10/02/17 53   07/03/17 57          FYI  Health and Safety Notification    [address] works with your patients' plan sponsors to provide you with the enclosed [employer] safety and health considerations for patients in your practice. Please review the health information provided and make any changes in therapy that you believe are appropriate. [facility] understands that the information may not be applicable to every patient's therapy and therefore presents it as informational only. Drug Safety Consideration: ATENOLOL-CHLORTHALIDONE and HYPERPARATHYROIDISM OR HYPERCALCEMIA Our claims record suggests that your patient is receiving ATENOLOL-CHLORTHALIDONE and has been diagnosed with HYPERPARATHYROIDISM OR HYPERCALCEMIA. Hypercalcemia may occur with thiazides or thiazide-like diuretics, particularly in patients with hyperparathyroidism or those receiving vitamin D. Please consider the potential risks versus benefits of therapy and determine appropriate monitoring for your patient.

## 2017-12-06 PROBLEM — Z11.4 SCREENING FOR HIV (HUMAN IMMUNODEFICIENCY VIRUS): Status: RESOLVED | Noted: 2017-07-04 | Resolved: 2017-12-06

## 2017-12-06 RX ORDER — ATENOLOL 50 MG/1
50 TABLET ORAL DAILY
Qty: 90 TABLET | Refills: 3 | Status: SHIPPED | OUTPATIENT
Start: 2017-12-06 | End: 2018-01-02 | Stop reason: DRUGHIGH

## 2017-12-06 RX ORDER — FUROSEMIDE 20 MG/1
20 TABLET ORAL DAILY
Qty: 90 TABLET | Refills: 3 | Status: SHIPPED | OUTPATIENT
Start: 2017-12-06 | End: 2018-03-01 | Stop reason: SDUPTHER

## 2017-12-06 NOTE — TELEPHONE ENCOUNTER
Please let the patient know to  prescription from pharmacy. I had to stop Tenoretic 50- 25 mg due to possible increasing his calcium level. I received a note from his pharmacist..    I sent new prescriptions to his pharmacy for atenolol 50 mg, same dose he was taking before. And a water pill, furosemide, which replaces the chlorthalidone in Tenoretic he was taking before. He should continue to monitor his blood pressure and if above 140/85 call us for an appointment with a nurse for blood pressure check. Again, stopped tenoretic, start Atenolol 50 mg and Lasix 20, and he can take them together at same time he was taking the Tenoretic    Ask him to write down and also to talk with his pharmacist       Requested Prescriptions     Signed Prescriptions Disp Refills    atenolol (TENORMIN) 50 MG tablet 90 tablet 3     Sig: Take 1 tablet by mouth daily ** stop Tenoretic 50-25**     Authorizing Provider: ANABELL SNYDER    furosemide (LASIX) 20 MG tablet 90 tablet 3     Sig: Take 1 tablet by mouth daily **stop Tenoretic 50-25**     Authorizing Provider: Mikey Barksdale         Thank you!         FYI    Future Appointments  Date Time Provider Keri English   1/2/2018 8:15 AM Brenda Delgadillo MD [id number]   1/8/2018 9:15 AM Chiqui Alberto MD [address] [place] [address]       Controlled Substances Monitoring:    BP Readings from Last 3 Encounters:   10/02/17 127/68   10/02/17 139/69   07/03/17 119/73

## 2017-12-28 ENCOUNTER — HOSPITAL ENCOUNTER (OUTPATIENT)
Age: 62
Discharge: HOME | End: 2017-12-28
Payer: COMMERCIAL

## 2017-12-28 ENCOUNTER — TELEPHONE (OUTPATIENT)
Dept: CASE MANAGEMENT | Age: 62
End: 2017-12-28

## 2017-12-28 DIAGNOSIS — C61 PROSTATE CANCER (HCC): ICD-10-CM

## 2017-12-28 LAB — PROSTATE SPECIFIC ANTIGEN: 0.02 UG/L

## 2017-12-28 PROCEDURE — 84153 ASSAY OF PSA TOTAL: CPT

## 2017-12-28 PROCEDURE — 36415 COLL VENOUS BLD VENIPUNCTURE: CPT

## 2018-01-02 ENCOUNTER — OFFICE VISIT (OUTPATIENT)
Dept: FAMILY MEDICINE CLINIC | Age: 63
End: 2018-01-02
Payer: COMMERCIAL

## 2018-01-02 VITALS
DIASTOLIC BLOOD PRESSURE: 80 MMHG | HEART RATE: 51 BPM | HEIGHT: 70 IN | SYSTOLIC BLOOD PRESSURE: 162 MMHG | TEMPERATURE: 97.3 F | WEIGHT: 302.6 LBS | BODY MASS INDEX: 43.32 KG/M2 | OXYGEN SATURATION: 97 %

## 2018-01-02 DIAGNOSIS — E11.43 TYPE 2 DIABETES MELLITUS WITH DIABETIC AUTONOMIC NEUROPATHY, WITHOUT LONG-TERM CURRENT USE OF INSULIN (HCC): Primary | ICD-10-CM

## 2018-01-02 DIAGNOSIS — F33.1 MODERATE EPISODE OF RECURRENT MAJOR DEPRESSIVE DISORDER (HCC): ICD-10-CM

## 2018-01-02 DIAGNOSIS — Z13.31 POSITIVE DEPRESSION SCREENING: ICD-10-CM

## 2018-01-02 DIAGNOSIS — B37.0 THRUSH: ICD-10-CM

## 2018-01-02 DIAGNOSIS — M79.2 PERIPHERAL NEUROPATHIC PAIN: ICD-10-CM

## 2018-01-02 DIAGNOSIS — I10 ESSENTIAL HYPERTENSION: ICD-10-CM

## 2018-01-02 DIAGNOSIS — F51.04 PSYCHOPHYSIOLOGICAL INSOMNIA: ICD-10-CM

## 2018-01-02 LAB — HBA1C MFR BLD: 6.1 %

## 2018-01-02 PROCEDURE — 3017F COLORECTAL CA SCREEN DOC REV: CPT | Performed by: FAMILY MEDICINE

## 2018-01-02 PROCEDURE — G8417 CALC BMI ABV UP PARAM F/U: HCPCS | Performed by: FAMILY MEDICINE

## 2018-01-02 PROCEDURE — G0444 DEPRESSION SCREEN ANNUAL: HCPCS | Performed by: FAMILY MEDICINE

## 2018-01-02 PROCEDURE — 99215 OFFICE O/P EST HI 40 MIN: CPT | Performed by: FAMILY MEDICINE

## 2018-01-02 PROCEDURE — 4004F PT TOBACCO SCREEN RCVD TLK: CPT | Performed by: FAMILY MEDICINE

## 2018-01-02 PROCEDURE — 83036 HEMOGLOBIN GLYCOSYLATED A1C: CPT | Performed by: FAMILY MEDICINE

## 2018-01-02 PROCEDURE — G8484 FLU IMMUNIZE NO ADMIN: HCPCS | Performed by: FAMILY MEDICINE

## 2018-01-02 PROCEDURE — G8427 DOCREV CUR MEDS BY ELIG CLIN: HCPCS | Performed by: FAMILY MEDICINE

## 2018-01-02 PROCEDURE — 3044F HG A1C LEVEL LT 7.0%: CPT | Performed by: FAMILY MEDICINE

## 2018-01-02 PROCEDURE — G8431 POS CLIN DEPRES SCRN F/U DOC: HCPCS | Performed by: FAMILY MEDICINE

## 2018-01-02 RX ORDER — TRAZODONE HYDROCHLORIDE 50 MG/1
50 TABLET ORAL NIGHTLY PRN
Qty: 30 TABLET | Refills: 0 | Status: SHIPPED | OUTPATIENT
Start: 2018-01-02 | End: 2018-01-11 | Stop reason: SDUPTHER

## 2018-01-02 RX ORDER — AMLODIPINE BESYLATE 5 MG/1
5 TABLET ORAL DAILY
Qty: 90 TABLET | Refills: 3 | Status: SHIPPED | OUTPATIENT
Start: 2018-01-02 | End: 2018-01-08 | Stop reason: SDUPTHER

## 2018-01-02 RX ORDER — PREGABALIN 100 MG/1
100 CAPSULE ORAL 2 TIMES DAILY
Qty: 180 CAPSULE | Refills: 0 | Status: SHIPPED | OUTPATIENT
Start: 2018-01-02 | End: 2018-03-01 | Stop reason: SDUPTHER

## 2018-01-02 RX ORDER — ATENOLOL 25 MG/1
25 TABLET ORAL DAILY
Qty: 90 TABLET | Refills: 3 | Status: SHIPPED | OUTPATIENT
Start: 2018-01-02 | End: 2018-03-01 | Stop reason: SDUPTHER

## 2018-01-02 RX ORDER — AMLODIPINE BESYLATE 5 MG/1
5 TABLET ORAL DAILY
Qty: 30 TABLET | Refills: 0 | Status: SHIPPED | OUTPATIENT
Start: 2018-01-02 | End: 2018-03-01 | Stop reason: SDUPTHER

## 2018-01-02 RX ORDER — FLUOXETINE 20 MG/1
20 TABLET, FILM COATED ORAL DAILY
Qty: 90 TABLET | Refills: 3 | Status: SHIPPED | OUTPATIENT
Start: 2018-01-02 | End: 2018-03-01 | Stop reason: DRUGHIGH

## 2018-01-02 ASSESSMENT — PATIENT HEALTH QUESTIONNAIRE - PHQ9
3. TROUBLE FALLING OR STAYING ASLEEP: 3
9. THOUGHTS THAT YOU WOULD BE BETTER OFF DEAD, OR OF HURTING YOURSELF: 0
7. TROUBLE CONCENTRATING ON THINGS, SUCH AS READING THE NEWSPAPER OR WATCHING TELEVISION: 0
SUM OF ALL RESPONSES TO PHQ QUESTIONS 1-9: 17
4. FEELING TIRED OR HAVING LITTLE ENERGY: 3
5. POOR APPETITE OR OVEREATING: 3
8. MOVING OR SPEAKING SO SLOWLY THAT OTHER PEOPLE COULD HAVE NOTICED. OR THE OPPOSITE, BEING SO FIGETY OR RESTLESS THAT YOU HAVE BEEN MOVING AROUND A LOT MORE THAN USUAL: 0
1. LITTLE INTEREST OR PLEASURE IN DOING THINGS: 3
2. FEELING DOWN, DEPRESSED OR HOPELESS: 3
SUM OF ALL RESPONSES TO PHQ9 QUESTIONS 1 & 2: 6
10. IF YOU CHECKED OFF ANY PROBLEMS, HOW DIFFICULT HAVE THESE PROBLEMS MADE IT FOR YOU TO DO YOUR WORK, TAKE CARE OF THINGS AT HOME, OR GET ALONG WITH OTHER PEOPLE: 0
6. FEELING BAD ABOUT YOURSELF - OR THAT YOU ARE A FAILURE OR HAVE LET YOURSELF OR YOUR FAMILY DOWN: 2

## 2018-01-02 ASSESSMENT — ENCOUNTER SYMPTOMS
CHEST TIGHTNESS: 0
APNEA: 1
ABDOMINAL PAIN: 0
VOMITING: 0
COUGH: 0
ABDOMINAL DISTENTION: 0
WHEEZING: 0
NAUSEA: 0
DIARRHEA: 0
CONSTIPATION: 0
BACK PAIN: 1
COLOR CHANGE: 1

## 2018-01-02 NOTE — PROGRESS NOTES
Review  Past Medical, Family, and Social History reviewed and does contribute to the patient presenting condition    Health Maintenance   Topic Date Due    DTaP/Tdap/Td vaccine (1 - Tdap) 06/20/1973    Diabetic foot exam  04/11/2018    Diabetic microalbuminuria test  04/19/2018    Lipid screen  06/26/2018    Diabetic retinal exam  08/10/2018    Diabetic hemoglobin A1C test  10/02/2018    Colon cancer screen colonoscopy  05/09/2022    Zostavax vaccine  Completed    Flu vaccine  Completed    Pneumococcal med risk  Completed    Hepatitis C screen  Addressed    HIV screen  Addressed

## 2018-01-02 NOTE — PATIENT INSTRUCTIONS
Patient Education        Learning About Diabetes Food Guidelines  Your Care Instructions    Meal planning is important to manage diabetes. It helps keep your blood sugar at a target level (which you set with your doctor). You don't have to eat special foods. You can eat what your family eats, including sweets once in a while. But you do have to pay attention to how often you eat and how much you eat of certain foods. You may want to work with a dietitian or a certified diabetes educator (CDE) to help you plan meals and snacks. A dietitian or CDE can also help you lose weight if that is one of your goals. What should you know about eating carbs? Managing the amount of carbohydrate (carbs) you eat is an important part of healthy meals when you have diabetes. Carbohydrate is found in many foods. · Learn which foods have carbs. And learn the amounts of carbs in different foods. ¨ Bread, cereal, pasta, and rice have about 15 grams of carbs in a serving. A serving is 1 slice of bread (1 ounce), ½ cup of cooked cereal, or 1/3 cup of cooked pasta or rice. ¨ Fruits have 15 grams of carbs in a serving. A serving is 1 small fresh fruit, such as an apple or orange; ½ of a banana; ½ cup of cooked or canned fruit; ½ cup of fruit juice; 1 cup of melon or raspberries; or 2 tablespoons of dried fruit. ¨ Milk and no-sugar-added yogurt have 15 grams of carbs in a serving. A serving is 1 cup of milk or 2/3 cup of no-sugar-added yogurt. ¨ Starchy vegetables have 15 grams of carbs in a serving. A serving is ½ cup of mashed potatoes or sweet potato; 1 cup winter squash; ½ of a small baked potato; ½ cup of cooked beans; or ½ cup cooked corn or green peas. · Learn how much carbs to eat each day and at each meal. A dietitian or CDE can teach you how to keep track of the amount of carbs you eat. This is called carbohydrate counting. · If you are not sure how to count carbohydrate grams, use the Plate Method to plan meals.  It is a and go to all appointments, and call your doctor if you are having problems. It's also a good idea to know your test results and keep a list of the medicines you take. How can you care for yourself at home? · Keep your blood sugar close to normal by watching what and how much you eat, monitoring blood sugar, taking medicines if prescribed, and getting regular exercise. · Do not smoke. Smoking affects blood flow and can make foot problems worse. If you need help quitting, talk to your doctor about stop-smoking programs and medicines. These can increase your chances of quitting for good. · Eat a diet that is low in fats. High fat intake can cause fat to build up in your blood vessels and decrease blood flow. · Inspect your feet daily for blisters, cuts, cracks, or sores. If you cannot see well, use a mirror or have someone help you. · Take care of your feet:  [facility] your feet every day. Use warm (not hot) water. Check the water temperature with your wrists or other part of your body, not your feet. ¨ Dry your feet well. Pat them dry. Do not rub the skin on your feet too hard. Dry well between your toes. If the skin on your feet stays moist, bacteria or a fungus can grow, which can lead to infection. ¨ Keep your skin soft. Use moisturizing skin cream to keep the skin on your feet soft and prevent calluses and cracks. But do not put the cream between your toes, and stop using any cream that causes a rash. ¨ Clean underneath your toenails carefully. Do not use a sharp object to clean underneath your toenails. Use the blunt end of a nail file or other rounded tool. ¨ Trim and file your toenails straight across to prevent ingrown toenails. Use a nail clipper, not scissors. Use an emery board to smooth the edges. · Change socks daily. Socks without seams are best, because seams often rub the feet. You can find socks for people with diabetes from specialty catalogs.   · Look inside your shoes every day for things to contact your doctor if:  ? · You cannot do proper foot care. Where can you learn more? Go to https://chpepiceweb.[employer]. org and sign in to your [employer] account. Enter A739 in the [facility] box to learn more about \"Diabetes Foot Health: Care Instructions. \"     If you do not have an account, please click on the \"Sign Up Now\" link. Current as of: March 13, 2017  Content Version: 11.4  © 5570-6807 [employer]. Care instructions adapted under license by [employer] [facility] ([facility]). If you have questions about a medical condition or this instruction, always ask your healthcare professional. [place] [address] any warranty or liability for your use of this information. Patient Education        Learning About Tests When You Have Diabetes  Why do you need regular diabetes tests? Diabetes can be hard on your body if it's not well controlled. But having tests on a regular schedule can help you and your doctor find problems early, when it's easier to start managing them. What tests do you need? The tests you may have, how often you should have them, and the goals of the tests are:  A1c blood test. This test shows the average level of blood sugar over the past 2 to 3 months. It helps your doctor see whether blood sugar levels have been staying within your target range. · How often: Every 3 to 6 months  · Goal: A blood sugar level in your target range  Blood pressure test: This test measures the pressure of blood flow in the arteries. Controlling blood pressure can help prevent damage to nerves and blood vessels. · How often: Every 3 to 6 months  · Goal: A blood pressure level in your target range  Cholesterol test: This test measures the amount of a type of fat in the blood. It is common for people with diabetes to also have high cholesterol. Too much cholesterol in the blood can build up inside the blood vessels and raise the risk for heart attack and stroke.   · How When you choose a program, look for one that has proven success. Ask your doctor for ideas. You will greatly increase your chances of success if you take medicine as well as get counseling or join a cessation program.  Some of the changes you feel when you first quit tobacco are uncomfortable. Your body will miss the nicotine at first, and you may feel short-tempered and grumpy. You may have trouble sleeping or concentrating. Medicine can help you deal with these symptoms. You may struggle with changing your smoking habits and rituals. The last step is the tricky one: Be prepared for the smoking urge to continue for a time. This is a lot to deal with, but keep at it. You will feel better. Follow-up care is a key part of your treatment and safety. Be sure to make and go to all appointments, and call your doctor if you are having problems. It's also a good idea to know your test results and keep a list of the medicines you take. How can you care for yourself at home? · Ask your family, friends, and coworkers for support. You have a better chance of quitting if you have help and support. · Join a support group, such as Nicotine Anonymous, for people who are trying to quit smoking. · Consider signing up for a smoking cessation program, such as the American Lung Association's Freedom from Smoking program.  · Set a quit date. Pick your date carefully so that it is not right in the middle of a big deadline or stressful time. Once you quit, do not even take a puff. Get rid of all ashtrays and lighters after your last cigarette. Clean your house and your clothes so that they do not smell of smoke. · Learn how to be a nonsmoker. Think about ways you can avoid those things that make you reach for a cigarette. ¨ Avoid situations that put you at greatest risk for smoking. For some people, it is hard to have a drink with friends without smoking. For others, they might skip a coffee break with coworkers who smoke.   ¨ Change

## 2018-01-02 NOTE — PROGRESS NOTES
quit smoking. He is currently cutting down on smoking  Ready to quit: Yes  Counseling given: Yes      Urine microabumin is within normal limits . Urine microalbumin was very high on 4/7/15, but now it's normal    Lab Results   Component Value Date    LABMICR 15 04/19/2017       LDL controlled   Lab Results   Component Value Date    LDLCHOLESTEROL 47 06/26/2017       Brooke Rutledge has prostate cancer diagnosed since 2014, had radiotherapy in 2014, with rising PSA after wards. Prostate cancer is recurrent, he is now on [place], he is asking about the PSA level which is better than before. Lab Results   Component Value Date    PSA 0.02 12/28/2017    PSA 0.05 09/26/2017    PSA 7.13 (H) 06/07/2017     -vital signs stable and within normal limits except high BP, bradycardia, Morbid obesity per BMI. BP (!) 162/80   Pulse 51   Temp 97.3 °F (36.3 °C) (Tympanic)   Ht 5' 10\" (1.778 m)   Wt (!) 302 lb 9.6 oz (137.3 kg)   SpO2 97% Comment: ra @ rest  BMI 43.42 kg/m²   Body mass index is 43.42 kg/m². Hypertension: Patient here for follow-up of elevated blood pressure. He is not exercising and is adherent to low salt diet. Blood pressure is well controlled at home. Cardiac symptoms dyspnea, fatigue and lower extremity edema improved from prior . Patient denies chest pain, chest pressure/discomfort, claudication, exertional chest pressure/discomfort, irregular heart beat, near-syncope, orthopnea, palpitations, paroxysmal nocturnal dyspnea, syncope and tachypnea. Cardiovascular risk factors: advanced age (older than 54 for men, 72 for women), diabetes mellitus, dyslipidemia, hypertension, male gender, obesity (BMI >= 30 kg/m2) and smoking/ tobacco exposure. Use of agents associated with hypertension: none. History of target organ damage: peripheral artery disease. SOB when outside.   Patient has stress test on 7/20/15, low risk, EF 73%  Has known peripheral vascular disease, worse on the right side, follows with vascular surgeon, Dr. Jeremiah Bay. Patient has CPAP and reports compliance with it, follows with Dr. Alexis Melgar. He took his BP meds. BP uncontrolled     BP Readings from Last 3 Encounters:   01/02/18 (!) 162/80   10/02/17 127/68   10/02/17 139/69      . bradyc   Pulse Readings from Last 3 Encounters:   01/02/18 51   10/02/17 (!) 46   10/02/17 53       Bernardino complains of Taste buds taste changing and not feeling anything, could be from Cymbalta. He also complains of white coating on the tongue and methalic taste. Went to dentist and didn't need any anesthesia, which concerns him. He also complains of Fingertips burning sensation at nighttime. Anything on his fingers, even the bed sheet, makes the burning worse, and they hurt like when fingers get frozen. Onset of symptoms about 2-3 weeks ago. If he wakes up, he could not sleep due to burning in his hands  He cut down on Lyrica to twice a day and has been trying to cut down on Cymbalta. He is currently taking 30 mg  Every other day. He also says he stopped Metformin as his Blood Glucose are good. Intensity of pain is 6/10  Cold makes pain and burning worse in the hands, coming from the parking lot made his fingers burn and hurt. Bernardino complains of depression. Chau [place] reports anhedonia, depressed mood, fatigue, feelings of worthlessness/guilt, hopelessness and insomnia. Denies suicidal ideation, plan or intent. PHQ Scores 1/2/2018 5/4/2017 3/23/2017   PHQ2 Score 6 3 4   PHQ9 Score 17 10 15     Interpretation of Total Score Depression Severity: 1-4 = Minimal depression, 5-9 = Mild depression, 10-14 = Moderate depression, 15-19 = Moderately severe depression, 20-27 = Severe depression    Discussed testing with the patient and all questions fully answered.   Improve PSA  high blood glucose  hypertriglyceridemia   Hospital Outpatient Visit on 12/28/2017   Component Date Value Ref Range Status    PSA 12/28/2017 0.02  <4.1 ug/L Final No    Drug use: No    Sexual activity: Not Currently     Partners: Female     Other Topics Concern    Not on file     Social History Narrative    No narrative on file     Ready to quit: Yes  Counseling given: Yes        Family History   Problem Relation Age of Onset    Diabetes Mother     Lung Cancer Brother     Liver Cancer Brother                  Review of Systems   Constitutional: Positive for fatigue and unexpected weight change. Negative for activity change, appetite change, chills, diaphoresis and fever. Eyes: Positive for visual disturbance (wears glasses). Respiratory: Positive for apnea (on CPAP) and shortness of breath (worse in cold weather). Negative for cough, chest tightness and wheezing. Cardiovascular: Positive for leg swelling (improved). Negative for chest pain and palpitations. Gastrointestinal: Negative for abdominal distention, abdominal pain, constipation, diarrhea, nausea and vomiting. Endocrine: Negative for cold intolerance, heat intolerance, polydipsia, polyphagia and polyuria. Genitourinary: Positive for difficulty urinating. Musculoskeletal: Positive for arthralgias, back pain (on and off), gait problem (walks with walker) and myalgias. Skin: Positive for color change (hands, when getting out of the shower). Negative for rash. Allergic/Immunologic: Positive for immunocompromised state. Neurological: Positive for numbness. Psychiatric/Behavioral: Positive for dysphoric mood and sleep disturbance. Negative for decreased concentration, self-injury and suicidal ideas. The patient is nervous/anxious. Physical Exam   Constitutional: He is oriented to person, place, and time. He appears well-developed and well-nourished. No distress. HENT:   Head: Normocephalic and atraumatic. Mouth/Throat: Oropharynx is clear and moist.   White coated tongue on the sides and on the top   Eyes: Conjunctivae and EOM are normal. Right eye exhibits no discharge.  Left

## 2018-01-03 PROBLEM — Z13.31 POSITIVE DEPRESSION SCREENING: Status: ACTIVE | Noted: 2018-01-03

## 2018-01-03 PROBLEM — B37.0 THRUSH: Status: ACTIVE | Noted: 2018-01-03

## 2018-01-03 PROBLEM — F51.04 PSYCHOPHYSIOLOGICAL INSOMNIA: Status: ACTIVE | Noted: 2018-01-03

## 2018-01-03 ASSESSMENT — ENCOUNTER SYMPTOMS: SHORTNESS OF BREATH: 1

## 2018-01-08 ENCOUNTER — OFFICE VISIT (OUTPATIENT)
Dept: UROLOGY | Age: 63
End: 2018-01-08
Payer: COMMERCIAL

## 2018-01-08 VITALS
DIASTOLIC BLOOD PRESSURE: 76 MMHG | TEMPERATURE: 98.2 F | WEIGHT: 298 LBS | HEART RATE: 71 BPM | BODY MASS INDEX: 42.66 KG/M2 | SYSTOLIC BLOOD PRESSURE: 117 MMHG | HEIGHT: 70 IN

## 2018-01-08 DIAGNOSIS — R35.1 NOCTURIA: ICD-10-CM

## 2018-01-08 DIAGNOSIS — R35.0 FREQUENCY OF MICTURITION: ICD-10-CM

## 2018-01-08 DIAGNOSIS — C61 PROSTATE CANCER (HCC): Primary | ICD-10-CM

## 2018-01-08 DIAGNOSIS — R23.2 HOT FLASHES: ICD-10-CM

## 2018-01-08 PROCEDURE — G8484 FLU IMMUNIZE NO ADMIN: HCPCS | Performed by: UROLOGY

## 2018-01-08 PROCEDURE — 99214 OFFICE O/P EST MOD 30 MIN: CPT | Performed by: UROLOGY

## 2018-01-08 PROCEDURE — 4004F PT TOBACCO SCREEN RCVD TLK: CPT | Performed by: UROLOGY

## 2018-01-08 PROCEDURE — G8417 CALC BMI ABV UP PARAM F/U: HCPCS | Performed by: UROLOGY

## 2018-01-08 PROCEDURE — 3017F COLORECTAL CA SCREEN DOC REV: CPT | Performed by: UROLOGY

## 2018-01-08 PROCEDURE — G8427 DOCREV CUR MEDS BY ELIG CLIN: HCPCS | Performed by: UROLOGY

## 2018-01-08 ASSESSMENT — ENCOUNTER SYMPTOMS
COUGH: 0
VOMITING: 0
COLOR CHANGE: 0
WHEEZING: 0
EYE REDNESS: 0
ABDOMINAL PAIN: 0
BACK PAIN: 0
NAUSEA: 0
SHORTNESS OF BREATH: 1
EYE PAIN: 0

## 2018-01-08 NOTE — PROGRESS NOTES
after back surgery due to being given succinylcholine    Prostate cancer ([facility] [address] 10/2013    finished radiation tx 5/2014    Pseudocholinesterase deficiency 03/25/2017    Pt. \"on life support\" for 3 days after back surgery 1/5/17 after being given succinylcholine    Short of breath on exertion     Sleep apnea     uses CPAP machine nightly    Status post lumbar laminectomy 3/25/2017    Tubular adenoma of colon 4/11/17 5/13/2017    Type 2 diabetes mellitus with hyperglycemia, without long-term current use of insulin ([facility] [address] 3/25/2017    Lab Results Component Value Date  LABA1C 7.1 (H) 03/23/2017     Vitamin D deficiency 3/25/2017    Wears glasses      Past Surgical History:   Procedure Laterality Date    BACK SURGERY      x 2     BACK SURGERY  01/05/2017    lumbar laminectomy L2, L3, L4    CARDIAC CATHETERIZATION  2007    no stents    KNEE SURGERY Left     LUMBAR LAMINECTOMY  01/05/2017    L2-L4    ME COLONOSCOPY W/BIOPSY SINGLE/MULTIPLE N/A 5/9/2017    COLONOSCOPY WITH BIOPSY performed by Vermell Severe, DO at [address]     TONSILLECTOMY AND ADENOIDECTOMY       Family History   Problem Relation Age of Onset    Diabetes Mother     Lung Cancer Brother     Liver Cancer Brother      Outpatient Prescriptions Marked as Taking for the 1/8/18 encounter (Office Visit) with Candice Collazo MD   Medication Sig Dispense Refill    atenolol (TENORMIN) 25 MG tablet Take 1 tablet by mouth daily ** Dose decreased 1/2/2018 from 50 mg to 25 mg** 90 tablet 3    pregabalin (LYRICA) 100 MG capsule Take 1 capsule by mouth 2 times daily for 90 days.  180 capsule 0    FLUoxetine (PROZAC) 20 MG tablet Take 1 tablet by mouth daily 90 tablet 3    nystatin (MYCOSTATIN) 915781 UNIT/ML suspension Take 5 mLs by mouth 4 times daily Swish and swallow 240 mL 0    amLODIPine (NORVASC) 5 MG tablet Take 1 tablet by mouth daily 30 tablet 0    traZODone (DESYREL) 50 MG tablet Take 1 tablet by mouth nightly as Return in about 3 months (around 4/8/2018). Prescriptions Ordered:  No orders of the defined types were placed in this encounter.      Orders Placed:  Orders Placed This Encounter   Procedures    PSA, Diagnostic     Standing Status:   Future     Standing Expiration Date:   1/8/2019          Gissel Ralph MD

## 2018-01-11 ENCOUNTER — NURSE ONLY (OUTPATIENT)
Dept: FAMILY MEDICINE CLINIC | Age: 63
End: 2018-01-11
Payer: COMMERCIAL

## 2018-01-11 VITALS — DIASTOLIC BLOOD PRESSURE: 60 MMHG | OXYGEN SATURATION: 98 % | HEART RATE: 65 BPM | SYSTOLIC BLOOD PRESSURE: 130 MMHG

## 2018-01-11 DIAGNOSIS — I10 ESSENTIAL HYPERTENSION: Primary | ICD-10-CM

## 2018-01-11 DIAGNOSIS — F51.04 PSYCHOPHYSIOLOGICAL INSOMNIA: ICD-10-CM

## 2018-01-11 PROCEDURE — 99211 OFF/OP EST MAY X REQ PHY/QHP: CPT | Performed by: FAMILY MEDICINE

## 2018-01-11 RX ORDER — TRAZODONE HYDROCHLORIDE 50 MG/1
50 TABLET ORAL NIGHTLY PRN
Qty: 90 TABLET | Refills: 3 | Status: SHIPPED | OUTPATIENT
Start: 2018-01-11 | End: 2018-03-01 | Stop reason: DRUGHIGH

## 2018-01-11 NOTE — PROGRESS NOTES
Patient has taken his Atenolol 25 mg this am and lisinopril; 20 mg this am, he said he takes the second Lisinopril at supper time. The Norvasc he takes at [facility].

## 2018-01-15 ENCOUNTER — HOSPITAL ENCOUNTER (OUTPATIENT)
Dept: CT IMAGING | Age: 63
Discharge: HOME | End: 2018-01-15
Payer: COMMERCIAL

## 2018-01-15 DIAGNOSIS — J44.9 CHRONIC OBSTRUCTIVE PULMONARY DISEASE, UNSPECIFIED COPD TYPE (HCC): ICD-10-CM

## 2018-01-15 LAB
BUN BLDV-MCNC: 17 MG/DL (ref 8–23)
CREAT SERPL-MCNC: 0.67 MG/DL (ref 0.7–1.2)
GFR AFRICAN AMERICAN: >60 ML/MIN
GFR NON-AFRICAN AMERICAN: >60 ML/MIN
GFR SERPL CREATININE-BSD FRML MDRD: ABNORMAL ML/MIN/{1.73_M2}
GFR SERPL CREATININE-BSD FRML MDRD: ABNORMAL ML/MIN/{1.73_M2}

## 2018-01-15 PROCEDURE — 36415 COLL VENOUS BLD VENIPUNCTURE: CPT

## 2018-01-15 PROCEDURE — G0297 LDCT FOR LUNG CA SCREEN: HCPCS

## 2018-01-15 PROCEDURE — 82565 ASSAY OF CREATININE: CPT

## 2018-01-15 PROCEDURE — 84520 ASSAY OF UREA NITROGEN: CPT

## 2018-01-15 PROCEDURE — 71250 CT THORAX DX C-: CPT

## 2018-01-29 DIAGNOSIS — E11.65 TYPE 2 DIABETES MELLITUS WITH HYPERGLYCEMIA, WITHOUT LONG-TERM CURRENT USE OF INSULIN (HCC): ICD-10-CM

## 2018-01-29 RX ORDER — GLUCOSAMINE HCL/CHONDROITIN SU 500-400 MG
CAPSULE ORAL
Qty: 100 STRIP | Refills: 3 | Status: SHIPPED | OUTPATIENT
Start: 2018-01-29 | End: 2018-03-01 | Stop reason: SDUPTHER

## 2018-02-12 ENCOUNTER — HOSPITAL ENCOUNTER (OUTPATIENT)
Age: 63
Discharge: HOME | End: 2018-02-12
Payer: COMMERCIAL

## 2018-02-12 DIAGNOSIS — I10 ESSENTIAL HYPERTENSION: ICD-10-CM

## 2018-02-12 DIAGNOSIS — E11.43 TYPE 2 DIABETES MELLITUS WITH DIABETIC AUTONOMIC NEUROPATHY, WITHOUT LONG-TERM CURRENT USE OF INSULIN (HCC): ICD-10-CM

## 2018-02-12 DIAGNOSIS — F33.1 MODERATE EPISODE OF RECURRENT MAJOR DEPRESSIVE DISORDER (HCC): ICD-10-CM

## 2018-02-12 DIAGNOSIS — M79.2 PERIPHERAL NEUROPATHIC PAIN: ICD-10-CM

## 2018-02-12 LAB
ALBUMIN SERPL-MCNC: 4.1 G/DL (ref 3.5–5.2)
ALBUMIN/GLOBULIN RATIO: ABNORMAL (ref 1–2.5)
ALP BLD-CCNC: 119 U/L (ref 40–129)
ALT SERPL-CCNC: 19 U/L (ref 5–41)
ANION GAP SERPL CALCULATED.3IONS-SCNC: 13 MMOL/L (ref 9–17)
AST SERPL-CCNC: 22 U/L
BILIRUB SERPL-MCNC: 0.46 MG/DL (ref 0.3–1.2)
BUN BLDV-MCNC: 20 MG/DL (ref 8–23)
BUN/CREAT BLD: ABNORMAL (ref 9–20)
CALCIUM SERPL-MCNC: 10.6 MG/DL (ref 8.6–10.4)
CHLORIDE BLD-SCNC: 103 MMOL/L (ref 98–107)
CO2: 26 MMOL/L (ref 20–31)
CREAT SERPL-MCNC: 0.78 MG/DL (ref 0.7–1.2)
FOLATE: >20 NG/ML
GFR AFRICAN AMERICAN: >60 ML/MIN
GFR NON-AFRICAN AMERICAN: >60 ML/MIN
GFR SERPL CREATININE-BSD FRML MDRD: ABNORMAL ML/MIN/{1.73_M2}
GFR SERPL CREATININE-BSD FRML MDRD: ABNORMAL ML/MIN/{1.73_M2}
GLUCOSE BLD-MCNC: 104 MG/DL (ref 70–99)
HCT VFR BLD CALC: 45.9 % (ref 41–53)
HEMOGLOBIN: 15.5 G/DL (ref 13.5–17.5)
MCH RBC QN AUTO: 31.2 PG (ref 26–34)
MCHC RBC AUTO-ENTMCNC: 33.7 G/DL (ref 31–37)
MCV RBC AUTO: 92.5 FL (ref 80–100)
NRBC AUTOMATED: NORMAL PER 100 WBC
PDW BLD-RTO: 14.1 % (ref 11.5–14.9)
PLATELET # BLD: 233 K/UL (ref 150–450)
PMV BLD AUTO: 9.3 FL (ref 6–12)
POTASSIUM SERPL-SCNC: 5.2 MMOL/L (ref 3.7–5.3)
RBC # BLD: 4.97 M/UL (ref 4.5–5.9)
SODIUM BLD-SCNC: 142 MMOL/L (ref 135–144)
TOTAL PROTEIN: 7.6 G/DL (ref 6.4–8.3)
TSH SERPL DL<=0.05 MIU/L-ACNC: 1.93 MIU/L (ref 0.3–5)
VITAMIN B-12: 1517 PG/ML (ref 232–1245)
WBC # BLD: 7.6 K/UL (ref 3.5–11)

## 2018-02-12 PROCEDURE — 85027 COMPLETE CBC AUTOMATED: CPT

## 2018-02-12 PROCEDURE — 82746 ASSAY OF FOLIC ACID SERUM: CPT

## 2018-02-12 PROCEDURE — 80053 COMPREHEN METABOLIC PANEL: CPT

## 2018-02-12 PROCEDURE — 84443 ASSAY THYROID STIM HORMONE: CPT

## 2018-02-12 PROCEDURE — 36415 COLL VENOUS BLD VENIPUNCTURE: CPT

## 2018-02-12 PROCEDURE — 82607 VITAMIN B-12: CPT

## 2018-02-13 DIAGNOSIS — E83.52 HYPERCALCEMIA: Primary | ICD-10-CM

## 2018-03-01 ENCOUNTER — OFFICE VISIT (OUTPATIENT)
Dept: FAMILY MEDICINE CLINIC | Age: 63
End: 2018-03-01
Payer: COMMERCIAL

## 2018-03-01 VITALS
WEIGHT: 296 LBS | OXYGEN SATURATION: 98 % | DIASTOLIC BLOOD PRESSURE: 87 MMHG | SYSTOLIC BLOOD PRESSURE: 136 MMHG | HEART RATE: 57 BPM | BODY MASS INDEX: 42.37 KG/M2 | HEIGHT: 70 IN

## 2018-03-01 DIAGNOSIS — F33.1 MODERATE EPISODE OF RECURRENT MAJOR DEPRESSIVE DISORDER (HCC): ICD-10-CM

## 2018-03-01 DIAGNOSIS — I10 ESSENTIAL HYPERTENSION: ICD-10-CM

## 2018-03-01 DIAGNOSIS — Z98.890 STATUS POST LUMBAR LAMINECTOMY: ICD-10-CM

## 2018-03-01 DIAGNOSIS — C61 PROSTATE CA (HCC): ICD-10-CM

## 2018-03-01 DIAGNOSIS — E11.43 TYPE 2 DIABETES MELLITUS WITH DIABETIC AUTONOMIC NEUROPATHY, WITHOUT LONG-TERM CURRENT USE OF INSULIN (HCC): ICD-10-CM

## 2018-03-01 DIAGNOSIS — M51.36 DDD (DEGENERATIVE DISC DISEASE), LUMBAR: ICD-10-CM

## 2018-03-01 DIAGNOSIS — F51.04 PSYCHOPHYSIOLOGICAL INSOMNIA: ICD-10-CM

## 2018-03-01 DIAGNOSIS — E78.5 HYPERLIPIDEMIA WITH TARGET LDL LESS THAN 100: ICD-10-CM

## 2018-03-01 DIAGNOSIS — Z23 NEED FOR DIPHTHERIA-TETANUS-PERTUSSIS (TDAP) VACCINE: ICD-10-CM

## 2018-03-01 DIAGNOSIS — M79.2 PERIPHERAL NEUROPATHIC PAIN: Primary | ICD-10-CM

## 2018-03-01 PROCEDURE — 3017F COLORECTAL CA SCREEN DOC REV: CPT | Performed by: FAMILY MEDICINE

## 2018-03-01 PROCEDURE — G0444 DEPRESSION SCREEN ANNUAL: HCPCS | Performed by: FAMILY MEDICINE

## 2018-03-01 PROCEDURE — 3044F HG A1C LEVEL LT 7.0%: CPT | Performed by: FAMILY MEDICINE

## 2018-03-01 PROCEDURE — 4004F PT TOBACCO SCREEN RCVD TLK: CPT | Performed by: FAMILY MEDICINE

## 2018-03-01 PROCEDURE — G8484 FLU IMMUNIZE NO ADMIN: HCPCS | Performed by: FAMILY MEDICINE

## 2018-03-01 PROCEDURE — 99215 OFFICE O/P EST HI 40 MIN: CPT | Performed by: FAMILY MEDICINE

## 2018-03-01 PROCEDURE — G8427 DOCREV CUR MEDS BY ELIG CLIN: HCPCS | Performed by: FAMILY MEDICINE

## 2018-03-01 PROCEDURE — G8417 CALC BMI ABV UP PARAM F/U: HCPCS | Performed by: FAMILY MEDICINE

## 2018-03-01 RX ORDER — AMLODIPINE BESYLATE 5 MG/1
5 TABLET ORAL DAILY
Qty: 90 TABLET | Refills: 3 | Status: SHIPPED | OUTPATIENT
Start: 2018-03-01 | End: 2018-06-01 | Stop reason: HOSPADM

## 2018-03-01 RX ORDER — PRAVASTATIN SODIUM 40 MG
40 TABLET ORAL EVERY EVENING
Qty: 90 TABLET | Refills: 3 | Status: SHIPPED | OUTPATIENT
Start: 2018-03-01 | End: 2019-01-08 | Stop reason: SDUPTHER

## 2018-03-01 RX ORDER — PREGABALIN 100 MG/1
100 CAPSULE ORAL 3 TIMES DAILY
Qty: 270 CAPSULE | Refills: 0 | Status: SHIPPED | OUTPATIENT
Start: 2018-03-01 | End: 2018-03-05 | Stop reason: SDUPTHER

## 2018-03-01 RX ORDER — TRAZODONE HYDROCHLORIDE 150 MG/1
150 TABLET ORAL NIGHTLY
Qty: 90 TABLET | Refills: 3 | Status: SHIPPED | OUTPATIENT
Start: 2018-03-01 | End: 2019-01-08

## 2018-03-01 RX ORDER — ATENOLOL 25 MG/1
25 TABLET ORAL DAILY
Qty: 90 TABLET | Refills: 3 | Status: SHIPPED | OUTPATIENT
Start: 2018-03-01 | End: 2018-06-01 | Stop reason: HOSPADM

## 2018-03-01 RX ORDER — OXYCODONE HYDROCHLORIDE AND ACETAMINOPHEN 5; 325 MG/1; MG/1
1 TABLET ORAL EVERY 8 HOURS PRN
Qty: 21 TABLET | Refills: 0 | Status: SHIPPED | OUTPATIENT
Start: 2018-03-01 | End: 2018-03-08

## 2018-03-01 RX ORDER — GLUCOSAMINE HCL/CHONDROITIN SU 500-400 MG
CAPSULE ORAL
Qty: 100 STRIP | Refills: 3 | Status: SHIPPED | OUTPATIENT
Start: 2018-03-01 | End: 2021-04-23 | Stop reason: SDUPTHER

## 2018-03-01 RX ORDER — FLUOXETINE HYDROCHLORIDE 40 MG/1
40 CAPSULE ORAL DAILY
Qty: 90 CAPSULE | Refills: 3 | Status: SHIPPED | OUTPATIENT
Start: 2018-03-01 | End: 2018-09-04 | Stop reason: DRUGHIGH

## 2018-03-01 RX ORDER — LISINOPRIL 20 MG/1
TABLET ORAL
Qty: 180 TABLET | Refills: 3 | Status: SHIPPED | OUTPATIENT
Start: 2018-03-01 | End: 2018-08-02 | Stop reason: SDUPTHER

## 2018-03-01 RX ORDER — FUROSEMIDE 20 MG/1
20 TABLET ORAL DAILY
Qty: 90 TABLET | Refills: 3 | Status: SHIPPED | OUTPATIENT
Start: 2018-03-01 | End: 2018-10-05 | Stop reason: SDUPTHER

## 2018-03-01 ASSESSMENT — PATIENT HEALTH QUESTIONNAIRE - PHQ9
5. POOR APPETITE OR OVEREATING: 3
6. FEELING BAD ABOUT YOURSELF - OR THAT YOU ARE A FAILURE OR HAVE LET YOURSELF OR YOUR FAMILY DOWN: 0
3. TROUBLE FALLING OR STAYING ASLEEP: 3
2. FEELING DOWN, DEPRESSED OR HOPELESS: 1
1. LITTLE INTEREST OR PLEASURE IN DOING THINGS: 2
4. FEELING TIRED OR HAVING LITTLE ENERGY: 3
SUM OF ALL RESPONSES TO PHQ QUESTIONS 1-9: 14
7. TROUBLE CONCENTRATING ON THINGS, SUCH AS READING THE NEWSPAPER OR WATCHING TELEVISION: 0
SUM OF ALL RESPONSES TO PHQ9 QUESTIONS 1 & 2: 3
8. MOVING OR SPEAKING SO SLOWLY THAT OTHER PEOPLE COULD HAVE NOTICED. OR THE OPPOSITE, BEING SO FIGETY OR RESTLESS THAT YOU HAVE BEEN MOVING AROUND A LOT MORE THAN USUAL: 2
10. IF YOU CHECKED OFF ANY PROBLEMS, HOW DIFFICULT HAVE THESE PROBLEMS MADE IT FOR YOU TO DO YOUR WORK, TAKE CARE OF THINGS AT HOME, OR GET ALONG WITH OTHER PEOPLE: 1
9. THOUGHTS THAT YOU WOULD BE BETTER OFF DEAD, OR OF HURTING YOURSELF: 0

## 2018-03-01 ASSESSMENT — ENCOUNTER SYMPTOMS
ABDOMINAL DISTENTION: 0
WHEEZING: 0
SHORTNESS OF BREATH: 1
APNEA: 1
ABDOMINAL PAIN: 0
CONSTIPATION: 0
COUGH: 0
NAUSEA: 0
COLOR CHANGE: 1
DIARRHEA: 0
VOMITING: 0
CHEST TIGHTNESS: 0
BACK PAIN: 1

## 2018-03-01 NOTE — LETTER
listed if:  · I do not show any improvement in pain, or my activity has not improved. · I develop rapid tolerance or loss of improvement, as described in my treatment plan. · I develop significant side effects from the medication. · My behavior is inconsistent with the responsibilities outlined above, which may also result in my being prevented from receiving further care from this office. · Other:____________________________________________________________________    AGREEMENT:    I have read the above and have had all of my questions answered. For chronic disease management, I know that my symptoms can be managed with many types of treatments. A chronic medication trial may be part of my treatment, but I must be an active participant in my care. Medication therapy is only one part of my symptom management plan. In some cases, there may be limited scientific evidence to support the chronic use of certain medications to improve symptoms and daily function. Furthermore, in certain circumstances, there may be scientific information that suggests that use of chronic controlled substances may actually worsen my symptoms and increase my risk of unintentional death directly related to this medication therapy. I know that if my provider feels my risk from controlled medications is greater than my benefit, I will have my controlled substance medication(s) compassionately lowered or removed altogether. I agree to a controlled substance medication trial.      I further agree to allow this office to contact family or friends if there are concerns about my safety and use of the controlled medications. I have agreed to use the following medications above as instructed by my physician and as stated in this [place] [address].      Patient Signature:  ______________________  UAVY:4/0/2240 or _____________    Provider Signature:______________________  RHMX:5/0/4424 or _____________

## 2018-03-01 NOTE — PROGRESS NOTES
beat, near-syncope, orthopnea, palpitations, paroxysmal nocturnal dyspnea, syncope and tachypnea. Cardiovascular risk factors: advanced age (older than 54 for men, 72 for women), diabetes mellitus, dyslipidemia, hypertension, obesity (BMI >= 30 kg/m2) and smoking/ tobacco exposure. Use of agents associated with hypertension: none. History of target organ damage: peripheral artery disease. Has appointment next week with Dr. Lisette Osorio   Had stress test on 7/20/15, low risk, EF 72%. Peripheral vascular disease per Dr. Phylicia Kuhn. Has CPAP and reports compliance with beat, follows with Dr. Beata Angeles for obstructive sleep apnea and COPD    BP controlled. Yvette reports compliance with BP medications, and tolerates them well, denies side effects. BP Readings from Last 3 Encounters:   03/01/18 136/87   01/11/18 130/60   01/08/18 117/76        Pulse Readings from Last 3 Encounters:   03/01/18 57   01/11/18 65   01/08/18 71         Diabetes Mellitus Type II, Follow-up: Patient here for follow-up of Type 2 diabetes mellitus. Current symptoms/problems include hyperglycemia, paresthesia of the feet and visual disturbances and have been stable. Symptoms have been present for several years. Patient continues to complain of pain in his legs and burning. It made to better a bit. He is also on B complex. He has peripheral vascular disease and she was started on Pletal per Dr. Phylicia Kuhn, but he couldn't tolerate it. Known diabetic complications: nephropathy, peripheral neuropathy and peripheral vascular disease  Cardiovascular risk factors: advanced age (older than 54 for men, 72 for women), diabetes mellitus, dyslipidemia, hypertension, male gender, obesity (BMI >= 30 kg/m2) and smoking/ tobacco exposure  Current diabetic medications include none.   He stopped metformin, as his blood glucoses were well controlled    Eye exam current (within one year): yes  Weight trend: decreasing steadily    Wt Readings from Last 3 patient and all questions fully answered. Blood Glucose 104, controlled hyperglycemia  Hypercalcemia, persistent. Patient reports she is not taking any vitamins with calcium  PTH on 10/2/17 was normal.  Patient has history of prostate cancer and an ambulance scan on 6/22/17 was negative for metastatic disease. .    She has CT lung screening on 1/15/18- showing just emphysema    Hyperlipidemia and hypertriglyceridemia, he is on Crestor, he does have muscle cramps, he agrees to change it  Hospital Outpatient Visit on 02/12/2018   Component Date Value Ref Range Status    WBC 02/12/2018 7.6  3.5 - 11.0 k/uL Final    RBC 02/12/2018 4.97  4.5 - 5.9 m/uL Final    Hemoglobin 02/12/2018 15.5  13.5 - 17.5 g/dL Final    Hematocrit 02/12/2018 45.9  41 - 53 % Final    MCV 02/12/2018 92.5  80 - 100 fL Final    MCH 02/12/2018 31.2  26 - 34 pg Final    MCHC 02/12/2018 33.7  31 - 37 g/dL Final    RDW 02/12/2018 14.1  11.5 - 14.9 % Final    Platelets 87/51/6299 233  150 - 450 k/uL Final    MPV 02/12/2018 9.3  6.0 - 12.0 fL Final    Comment: Performed at [address] [place] [address] Dr Andrea Renteria.  [address]   [phone]      NRBC Automated 02/12/2018 NOT REPORTED  per 100 WBC Final    Glucose 02/12/2018 104* 70 - 99 mg/dL Final    BUN 02/12/2018 20  8 - 23 mg/dL Final    CREATININE 02/12/2018 0.78  0.70 - 1.20 mg/dL Final    Bun/Cre Ratio 02/12/2018 NOT REPORTED  9 - 20 Final    Calcium 02/12/2018 10.6* 8.6 - 10.4 mg/dL Final    Sodium 02/12/2018 142  135 - 144 mmol/L Final    Potassium 02/12/2018 5.2  3.7 - 5.3 mmol/L Final    Chloride 02/12/2018 103  98 - 107 mmol/L Final    CO2 02/12/2018 26  20 - 31 mmol/L Final    Anion Gap 02/12/2018 13  9 - 17 mmol/L Final    Alkaline Phosphatase 02/12/2018 119  40 - 129 U/L Final    ALT 02/12/2018 19  5 - 41 U/L Final    AST 02/12/2018 22  <40 U/L Final    Total Bilirubin 02/12/2018 0.46  0.3 - 1.2 mg/dL Final    Total Protein 02/12/2018 7.6  6.4 - 8.3 g/dL Final    Alb 02/12/2018 4.1  3.5 - 5.2 g/dL Final    Albumin/Globulin Ratio 02/12/2018 NOT REPORTED  1.0 - 2.5 Final    GFR Non- 02/12/2018 >60  >60 mL/min Final    GFR  02/12/2018 >60  >60 mL/min Final    GFR Comment 02/12/2018        Final    Comment: Average GFR for 61-76 years old:   80 mL/min/1.73sq m  Chronic Kidney Disease:   <60 mL/min/1.73sq m  Kidney failure:   <15 mL/min/1.73sq m              eGFR calculated using average adult body mass. Additional eGFR calculator   available at:        [email or web address]        Performed at [facility]: IZZYKIMBERLY SARKAR [address] [place] [address]. [address]   [phone]      GFR Staging 02/12/2018 NOT REPORTED   Final    TSH 02/12/2018 1.93  0.30 - 5.00 mIU/L Final    Comment: Performed at [facility]: RICH DYSONEZRA SARKAR [facility].  [address]   [phone]      Vitamin B-12 02/12/2018 1517* 232 - 1245 pg/mL Final    Folate 02/12/2018 >20.0  >4.8 ng/mL Final         Lab Results   Component Value Date    CHOL 132 06/26/2017    CHOL 153 02/14/2017    CHOL 155 04/11/2016     Lab Results   Component Value Date    TRIG 263 (H) 06/26/2017    TRIG 336 (H) 02/14/2017    TRIG 163 (H) 04/11/2016     Lab Results   Component Value Date    HDL 32 (L) 06/26/2017    HDL 32 (L) 02/14/2017    HDL 33 (L) 04/11/2016     Lab Results   Component Value Date    LDLCHOLESTEROL 47 06/26/2017    LDLCHOLESTEROL 54 02/14/2017    LDLCHOLESTEROL 89 04/11/2016       Lab Results   Component Value Date    CHOLHDLRATIO 4.1 06/26/2017    CHOLHDLRATIO 4.8 02/14/2017    CHOLHDLRATIO 4.7 04/11/2016         Lab Results   Component Value Date    JJGKYOCD71 8335 (H) 02/12/2018     Lab Results   Component Value Date    FOLATE >20.0 02/12/2018     Lab Results   Component Value Date    VITD25 40.4 10/02/2017     Lab Results   Component Value Date    PSA 0.02 12/28/2017    PSA 0.05 09/26/2017    PSA 7.13 (H) 06/07/2017 field. He has no wheezes. He has no rales. He exhibits no tenderness. Abdominal: Soft. Bowel sounds are normal. He exhibits no distension. There is no tenderness. Obese abdomen. Musculoskeletal: He exhibits edema (trace pitting leg edema b/l) and tenderness. Lumbar back: He exhibits decreased range of motion (not attempted), tenderness, bony tenderness and spasm. Walks slowly with walker. Neurological: He is alert and oriented to person, place, and time. No cranial nerve deficit. He exhibits normal muscle tone. Coordination normal.   Skin: Skin is warm and dry. Rash noted. He is not diaphoretic.   bilateral stasis dermatitis improved from prior    Psychiatric: He has a normal mood and affect. His behavior is normal. Judgment and thought content normal.   Nursing note and vitals reviewed. ASSESSMENT AND PLAN      1. Peripheral neuropathic pain  Not controlled  -Increased frequency from BID to TID:   pregabalin (LYRICA) 100 MG capsule; Take 1 capsule by mouth 3 times daily for 90 days. Dispense: 270 capsule; Refill: 0  - oxyCODONE-acetaminophen (PERCOCET) 5-325 MG per tablet; Take 1 tablet by mouth every 8 hours as needed for Pain for up to 7 days. Dispense: 21 tablet; Refill: 0  Stop Crestor, start pravastatin and hopefully this will help improve the pain and cramps in the legs  Continue B complex    Advised to restart taking the inhalers-Incruse and Symbicort to improve oxygenation    SpO2 Readings from Last 4 Encounters:   03/01/18 98%   01/11/18 98%   01/02/18 97%   10/02/17 97%       Has appointment  with Dr. Chandler Don and Dr. Billy Byars  Referral resent to Dr. Rosana Miranda for Hypercalcemia     2. Essential hypertension  Controlled  - furosemide (LASIX) 20 MG tablet; Take 1 tablet by mouth daily **stop Tenoretic 50-25**  Dispense: 90 tablet; Refill: 3  - atenolol (TENORMIN) 25 MG tablet; Take 1 tablet by mouth daily  Dispense: 90 tablet; Refill: 3  - amLODIPine (NORVASC) 5 MG tablet;  Take 1 Increased Physical Activity, Avoid Tobacco and Second-hand Smoke, Patient In-home Blood Pressure Monitoring and No treatment change needed. LDL controlled   Lab Results   Component Value Date    LDLCHOLESTEROL 47 06/26/2017    (goal LDL reduction with dx if diabetes is 50% LDL reduction)      Moderate depression    PHQ Scores 3/1/2018 1/2/2018 5/4/2017 3/23/2017   PHQ2 Score 3 6 3 4   PHQ9 Score 14 17 10 15     Interpretation of Total Score Depression Severity: 1-4 = Minimal depression, 5-9 = Mild depression, 10-14 = Moderate depression, 15-19 = Moderately severe depression, 20-27 = Severe depression      The patient's past medical, surgical, social, and family history as well as his   current medications and allergies were reviewed as documented in today's encounter. Medications, labs, diagnostic studies, consultations and follow-up as documented in this encounter. Return in about 3 months (around 6/1/2018) for ALWAYS NEEDS 30 MINS APPTS., POC A1C, DM2, HTN, HLP, LABS F/U. Patient was seen with total face to face time of  40 minutes due to complexity of care and multiple comorbidities. More than 50% of this visit was counseling and education. Future Appointments  Date Time Provider Department Center   4/16/2018 9:20 AM Barb Goetz  [facility]       This note was completed by using the assistance of a speech-recognition program. However, inadvertent computerized transcription errors may be present. Although every effort was made to ensure accuracy, no guarantees can be provided that every mistake has been identified and corrected by editing .     Electronically signed by Joao Islas MD on 3/4/2018 at 6:48 PM

## 2018-03-01 NOTE — PROGRESS NOTES
 Colon cancer screen colonoscopy  05/09/2022    Flu vaccine  Completed    Pneumococcal med risk  Completed    Hepatitis C screen  Addressed    HIV screen  Addressed

## 2018-03-05 ENCOUNTER — TELEPHONE (OUTPATIENT)
Dept: FAMILY MEDICINE CLINIC | Age: 63
End: 2018-03-05

## 2018-03-05 DIAGNOSIS — E11.43 TYPE 2 DIABETES MELLITUS WITH DIABETIC AUTONOMIC NEUROPATHY, WITHOUT LONG-TERM CURRENT USE OF INSULIN (HCC): ICD-10-CM

## 2018-03-05 DIAGNOSIS — M79.2 PERIPHERAL NEUROPATHIC PAIN: ICD-10-CM

## 2018-03-05 RX ORDER — PREGABALIN 100 MG/1
100 CAPSULE ORAL 3 TIMES DAILY
Qty: 12 CAPSULE | Refills: 0 | Status: SHIPPED | OUTPATIENT
Start: 2018-03-05 | End: 2018-06-01 | Stop reason: SDUPTHER

## 2018-03-05 NOTE — TELEPHONE ENCOUNTER
Please let the patient know to  prescription from pharmacy. Requested Prescriptions     Signed Prescriptions Disp Refills    pregabalin (LYRICA) 100 MG capsule 12 capsule 0     Sig: Take 1 capsule by mouth 3 times daily for 4 days. Authorizing Provider: Juan Daniel Bonilla [facility], [address] [place] [address]  Phone: [phone] Fax: [phone]      Thank you!         FYI    Future Appointments  Date Time Provider Department Center   4/16/2018 9:20 Brooke Blanchard MD [facility]   6/1/2018 10:30 AM Susie Fitzpatrick MD [facility]       Controlled Substances Monitoring:

## 2018-03-20 DIAGNOSIS — M10.9 GOUT: ICD-10-CM

## 2018-03-20 RX ORDER — ALLOPURINOL 100 MG/1
TABLET ORAL
Qty: 90 TABLET | Refills: 3 | Status: SHIPPED | OUTPATIENT
Start: 2018-03-20 | End: 2018-06-01 | Stop reason: SDUPTHER

## 2018-03-23 ENCOUNTER — HOSPITAL ENCOUNTER (OUTPATIENT)
Age: 63
Discharge: HOME | End: 2018-03-23
Payer: COMMERCIAL

## 2018-03-23 LAB
CALCIUM SERPL-MCNC: 10.1 MG/DL (ref 8.6–10.4)
PTH INTACT: 33.36 PG/ML (ref 15–65)
VITAMIN D 25-HYDROXY: 43.6 NG/ML (ref 30–100)

## 2018-03-23 PROCEDURE — 36415 COLL VENOUS BLD VENIPUNCTURE: CPT

## 2018-03-23 PROCEDURE — 82310 ASSAY OF CALCIUM: CPT

## 2018-03-23 PROCEDURE — 83970 ASSAY OF PARATHORMONE: CPT

## 2018-03-23 PROCEDURE — 82306 VITAMIN D 25 HYDROXY: CPT

## 2018-04-06 ENCOUNTER — HOSPITAL ENCOUNTER (OUTPATIENT)
Age: 63
Discharge: HOME | End: 2018-04-06
Payer: COMMERCIAL

## 2018-04-06 LAB — PROSTATE SPECIFIC ANTIGEN: 0.01 UG/L

## 2018-04-06 PROCEDURE — 36415 COLL VENOUS BLD VENIPUNCTURE: CPT

## 2018-04-06 PROCEDURE — 84153 ASSAY OF PSA TOTAL: CPT

## 2018-04-16 ENCOUNTER — OFFICE VISIT (OUTPATIENT)
Dept: UROLOGY | Age: 63
End: 2018-04-16
Payer: COMMERCIAL

## 2018-04-16 VITALS
HEIGHT: 71 IN | BODY MASS INDEX: 39.87 KG/M2 | TEMPERATURE: 97.9 F | DIASTOLIC BLOOD PRESSURE: 62 MMHG | WEIGHT: 284.8 LBS | SYSTOLIC BLOOD PRESSURE: 121 MMHG

## 2018-04-16 DIAGNOSIS — C61 PROSTATE CANCER (HCC): Primary | ICD-10-CM

## 2018-04-16 DIAGNOSIS — R23.2 HOT FLASHES: ICD-10-CM

## 2018-04-16 DIAGNOSIS — R35.1 NOCTURIA: ICD-10-CM

## 2018-04-16 PROCEDURE — G8417 CALC BMI ABV UP PARAM F/U: HCPCS | Performed by: UROLOGY

## 2018-04-16 PROCEDURE — 99214 OFFICE O/P EST MOD 30 MIN: CPT | Performed by: UROLOGY

## 2018-04-16 PROCEDURE — 4004F PT TOBACCO SCREEN RCVD TLK: CPT | Performed by: UROLOGY

## 2018-04-16 PROCEDURE — 3017F COLORECTAL CA SCREEN DOC REV: CPT | Performed by: UROLOGY

## 2018-04-16 PROCEDURE — 96402 CHEMO HORMON ANTINEOPL SQ/IM: CPT | Performed by: UROLOGY

## 2018-04-16 PROCEDURE — G8427 DOCREV CUR MEDS BY ELIG CLIN: HCPCS | Performed by: UROLOGY

## 2018-04-16 RX ORDER — AMMONIUM LACTATE 12 G/100G
CREAM TOPICAL
COMMUNITY
Start: 2018-03-17 | End: 2018-10-05

## 2018-04-16 RX ORDER — HYDROCODONE BITARTRATE AND ACETAMINOPHEN 5; 325 MG/1; MG/1
TABLET ORAL
COMMUNITY
End: 2018-06-02 | Stop reason: ALTCHOICE

## 2018-04-16 ASSESSMENT — ENCOUNTER SYMPTOMS
COUGH: 0
EYE PAIN: 1
ABDOMINAL PAIN: 0
BACK PAIN: 0
COLOR CHANGE: 0
SHORTNESS OF BREATH: 0
EYE REDNESS: 0
VOMITING: 0
WHEEZING: 0
NAUSEA: 0

## 2018-05-01 RX ORDER — CHOLECALCIFEROL (VITAMIN D3) 50 MCG
TABLET ORAL
Qty: 90 TABLET | Refills: 1 | OUTPATIENT
Start: 2018-05-01

## 2018-05-16 DIAGNOSIS — E55.9 VITAMIN D DEFICIENCY: ICD-10-CM

## 2018-05-17 RX ORDER — CHOLECALCIFEROL (VITAMIN D3) 50 MCG
TABLET ORAL
Qty: 30 TABLET | Refills: 3 | Status: SHIPPED | OUTPATIENT
Start: 2018-05-17 | End: 2018-06-01 | Stop reason: HOSPADM

## 2018-06-01 ENCOUNTER — OFFICE VISIT (OUTPATIENT)
Dept: FAMILY MEDICINE CLINIC | Age: 63
End: 2018-06-01
Payer: COMMERCIAL

## 2018-06-01 VITALS
HEIGHT: 71 IN | HEART RATE: 45 BPM | BODY MASS INDEX: 38.36 KG/M2 | WEIGHT: 274 LBS | TEMPERATURE: 97.8 F | OXYGEN SATURATION: 97 % | DIASTOLIC BLOOD PRESSURE: 66 MMHG | SYSTOLIC BLOOD PRESSURE: 133 MMHG

## 2018-06-01 DIAGNOSIS — G47.33 OSA ON CPAP: ICD-10-CM

## 2018-06-01 DIAGNOSIS — I73.9 PVD (PERIPHERAL VASCULAR DISEASE) WITH CLAUDICATION (HCC): ICD-10-CM

## 2018-06-01 DIAGNOSIS — F33.1 MODERATE EPISODE OF RECURRENT MAJOR DEPRESSIVE DISORDER (HCC): ICD-10-CM

## 2018-06-01 DIAGNOSIS — Z99.89 OSA ON CPAP: ICD-10-CM

## 2018-06-01 DIAGNOSIS — E78.5 HYPERLIPIDEMIA WITH TARGET LDL LESS THAN 100: ICD-10-CM

## 2018-06-01 DIAGNOSIS — B35.3 TINEA PEDIS OF BOTH FEET: ICD-10-CM

## 2018-06-01 DIAGNOSIS — E11.43 TYPE 2 DIABETES MELLITUS WITH DIABETIC AUTONOMIC NEUROPATHY, WITHOUT LONG-TERM CURRENT USE OF INSULIN (HCC): Primary | ICD-10-CM

## 2018-06-01 DIAGNOSIS — M79.2 PERIPHERAL NEUROPATHIC PAIN: ICD-10-CM

## 2018-06-01 DIAGNOSIS — I10 ESSENTIAL HYPERTENSION: ICD-10-CM

## 2018-06-01 DIAGNOSIS — R00.1 BRADYCARDIA: ICD-10-CM

## 2018-06-01 LAB — HBA1C MFR BLD: 5.5 %

## 2018-06-01 PROCEDURE — 83036 HEMOGLOBIN GLYCOSYLATED A1C: CPT | Performed by: FAMILY MEDICINE

## 2018-06-01 PROCEDURE — 2022F DILAT RTA XM EVC RTNOPTHY: CPT | Performed by: FAMILY MEDICINE

## 2018-06-01 PROCEDURE — 99214 OFFICE O/P EST MOD 30 MIN: CPT | Performed by: FAMILY MEDICINE

## 2018-06-01 PROCEDURE — 3017F COLORECTAL CA SCREEN DOC REV: CPT | Performed by: FAMILY MEDICINE

## 2018-06-01 PROCEDURE — 3044F HG A1C LEVEL LT 7.0%: CPT | Performed by: FAMILY MEDICINE

## 2018-06-01 PROCEDURE — 4004F PT TOBACCO SCREEN RCVD TLK: CPT | Performed by: FAMILY MEDICINE

## 2018-06-01 PROCEDURE — G8427 DOCREV CUR MEDS BY ELIG CLIN: HCPCS | Performed by: FAMILY MEDICINE

## 2018-06-01 PROCEDURE — G8417 CALC BMI ABV UP PARAM F/U: HCPCS | Performed by: FAMILY MEDICINE

## 2018-06-01 RX ORDER — CLOTRIMAZOLE AND BETAMETHASONE DIPROPIONATE 10; .64 MG/G; MG/G
CREAM TOPICAL
Qty: 45 G | Refills: 0 | Status: SHIPPED | OUTPATIENT
Start: 2018-06-01 | End: 2018-07-25 | Stop reason: SDUPTHER

## 2018-06-01 RX ORDER — NYSTATIN 100000 [USP'U]/G
POWDER TOPICAL
Qty: 56.7 G | Refills: 3 | Status: SHIPPED | OUTPATIENT
Start: 2018-06-01 | End: 2018-10-22 | Stop reason: ALTCHOICE

## 2018-06-01 RX ORDER — PREGABALIN 100 MG/1
100 CAPSULE ORAL 3 TIMES DAILY
Qty: 42 CAPSULE | Refills: 0 | Status: SHIPPED | OUTPATIENT
Start: 2018-06-01 | End: 2018-06-08

## 2018-06-01 RX ORDER — AMLODIPINE BESYLATE 10 MG/1
10 TABLET ORAL DAILY
Qty: 90 TABLET | Refills: 3 | Status: SHIPPED | OUTPATIENT
Start: 2018-06-01 | End: 2018-10-22 | Stop reason: ALTCHOICE

## 2018-06-01 RX ORDER — PREGABALIN 100 MG/1
100 CAPSULE ORAL 3 TIMES DAILY
Qty: 270 CAPSULE | Refills: 0 | Status: SHIPPED | OUTPATIENT
Start: 2018-06-01 | End: 2018-08-20 | Stop reason: SDUPTHER

## 2018-06-01 ASSESSMENT — ENCOUNTER SYMPTOMS
BACK PAIN: 1
VOMITING: 0
CONSTIPATION: 0
COUGH: 0
CHEST TIGHTNESS: 0
ABDOMINAL DISTENTION: 0
ABDOMINAL PAIN: 0
APNEA: 1
WHEEZING: 0
SHORTNESS OF BREATH: 1
NAUSEA: 0
DIARRHEA: 0

## 2018-06-02 PROBLEM — B37.0 THRUSH: Status: RESOLVED | Noted: 2018-01-03 | Resolved: 2018-06-02

## 2018-06-02 PROBLEM — R00.1 BRADYCARDIA: Status: ACTIVE | Noted: 2018-06-02

## 2018-06-08 ENCOUNTER — NURSE ONLY (OUTPATIENT)
Dept: FAMILY MEDICINE CLINIC | Age: 63
End: 2018-06-08
Payer: COMMERCIAL

## 2018-06-08 VITALS — DIASTOLIC BLOOD PRESSURE: 70 MMHG | SYSTOLIC BLOOD PRESSURE: 141 MMHG | HEART RATE: 56 BPM

## 2018-06-08 DIAGNOSIS — I10 ESSENTIAL HYPERTENSION: Primary | ICD-10-CM

## 2018-06-08 PROCEDURE — 99211 OFF/OP EST MAY X REQ PHY/QHP: CPT | Performed by: FAMILY MEDICINE

## 2018-06-11 ENCOUNTER — APPOINTMENT (OUTPATIENT)
Dept: GENERAL RADIOLOGY | Age: 63
End: 2018-06-11
Payer: COMMERCIAL

## 2018-06-11 ENCOUNTER — HOSPITAL ENCOUNTER (EMERGENCY)
Age: 63
Discharge: HOME | End: 2018-06-11
Attending: EMERGENCY MEDICINE
Payer: COMMERCIAL

## 2018-06-11 VITALS
SYSTOLIC BLOOD PRESSURE: 162 MMHG | RESPIRATION RATE: 16 BRPM | TEMPERATURE: 98 F | DIASTOLIC BLOOD PRESSURE: 76 MMHG | WEIGHT: 270 LBS | HEART RATE: 65 BPM | OXYGEN SATURATION: 98 % | HEIGHT: 71 IN | BODY MASS INDEX: 37.8 KG/M2

## 2018-06-11 DIAGNOSIS — R07.81 RIB PAIN ON LEFT SIDE: ICD-10-CM
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
## 2018-06-11 DIAGNOSIS — M25.552 LEFT HIP PAIN: ICD-10-CM

## 2018-06-11 DIAGNOSIS — M25.512 ACUTE PAIN OF LEFT SHOULDER: ICD-10-CM

## 2018-06-11 PROCEDURE — 6370000000 HC RX 637 (ALT 250 FOR IP): Performed by: PHYSICIAN ASSISTANT

## 2018-06-11 PROCEDURE — 73030 X-RAY EXAM OF SHOULDER: CPT

## 2018-06-11 PROCEDURE — 71101 X-RAY EXAM UNILAT RIBS/CHEST: CPT

## 2018-06-11 PROCEDURE — 99283 EMERGENCY DEPT VISIT LOW MDM: CPT

## 2018-06-11 PROCEDURE — 73502 X-RAY EXAM HIP UNI 2-3 VIEWS: CPT

## 2018-06-11 RX ORDER — OXYCODONE HYDROCHLORIDE AND ACETAMINOPHEN 5; 325 MG/1; MG/1
1 TABLET ORAL EVERY 6 HOURS PRN
Qty: 8 TABLET | Refills: 0 | Status: SHIPPED | OUTPATIENT
Start: 2018-06-11 | End: 2018-06-13

## 2018-06-11 RX ORDER — OXYCODONE HYDROCHLORIDE AND ACETAMINOPHEN 5; 325 MG/1; MG/1
1 TABLET ORAL ONCE
Status: COMPLETED | OUTPATIENT
Start: 2018-06-11 | End: 2018-06-11

## 2018-06-11 RX ADMIN — OXYCODONE HYDROCHLORIDE AND ACETAMINOPHEN 1 TABLET: 5; 325 TABLET ORAL at 11:29

## 2018-06-11 ASSESSMENT — PAIN DESCRIPTION - DESCRIPTORS: DESCRIPTORS: ACHING;THROBBING

## 2018-06-11 ASSESSMENT — PAIN SCALES - GENERAL
PAINLEVEL_OUTOF10: 5
PAINLEVEL_OUTOF10: 10
PAINLEVEL_OUTOF10: 8

## 2018-06-11 ASSESSMENT — PAIN DESCRIPTION - LOCATION
LOCATION: SHOULDER
LOCATION: SHOULDER

## 2018-06-11 ASSESSMENT — PAIN DESCRIPTION - PAIN TYPE
TYPE: ACUTE PAIN
TYPE: ACUTE PAIN

## 2018-06-11 ASSESSMENT — PAIN DESCRIPTION - ORIENTATION
ORIENTATION: LEFT
ORIENTATION: RIGHT

## 2018-06-14 ENCOUNTER — TELEPHONE (OUTPATIENT)
Dept: FAMILY MEDICINE CLINIC | Age: 63
End: 2018-06-14

## 2018-06-14 DIAGNOSIS — R07.81 RIB PAIN ON LEFT SIDE: ICD-10-CM

## 2018-06-14 DIAGNOSIS — M25.552 LEFT HIP PAIN: ICD-10-CM
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
## 2018-06-14 DIAGNOSIS — M25.512 ACUTE PAIN OF LEFT SHOULDER: ICD-10-CM

## 2018-06-14 RX ORDER — OXYCODONE HYDROCHLORIDE AND ACETAMINOPHEN 5; 325 MG/1; MG/1
1 TABLET ORAL EVERY 6 HOURS PRN
Qty: 12 TABLET | Refills: 0 | Status: SHIPPED | OUTPATIENT
Start: 2018-06-14 | End: 2018-06-17

## 2018-06-20 ENCOUNTER — OFFICE VISIT (OUTPATIENT)
Dept: ORTHOPEDIC SURGERY | Age: 63
End: 2018-06-20
Payer: COMMERCIAL

## 2018-06-20 ENCOUNTER — HOSPITAL ENCOUNTER (OUTPATIENT)
Age: 63
Discharge: HOME | End: 2018-06-20
Payer: COMMERCIAL

## 2018-06-20 VITALS
DIASTOLIC BLOOD PRESSURE: 63 MMHG | SYSTOLIC BLOOD PRESSURE: 130 MMHG | BODY MASS INDEX: 37.8 KG/M2 | HEART RATE: 70 BPM | WEIGHT: 270 LBS | HEIGHT: 71 IN

## 2018-06-20 DIAGNOSIS — S49.92XA INJURY OF LEFT SHOULDER, INITIAL ENCOUNTER: Primary | ICD-10-CM

## 2018-06-20 LAB
CALCIUM SERPL-MCNC: 10 MG/DL (ref 8.6–10.4)
PTH INTACT: 36.02 PG/ML (ref 15–65)

## 2018-06-20 PROCEDURE — G8417 CALC BMI ABV UP PARAM F/U: HCPCS | Performed by: ORTHOPAEDIC SURGERY

## 2018-06-20 PROCEDURE — 82310 ASSAY OF CALCIUM: CPT

## 2018-06-20 PROCEDURE — 4004F PT TOBACCO SCREEN RCVD TLK: CPT | Performed by: ORTHOPAEDIC SURGERY

## 2018-06-20 PROCEDURE — 36415 COLL VENOUS BLD VENIPUNCTURE: CPT

## 2018-06-20 PROCEDURE — 99203 OFFICE O/P NEW LOW 30 MIN: CPT | Performed by: ORTHOPAEDIC SURGERY

## 2018-06-20 PROCEDURE — 83970 ASSAY OF PARATHORMONE: CPT

## 2018-06-20 PROCEDURE — 3017F COLORECTAL CA SCREEN DOC REV: CPT | Performed by: ORTHOPAEDIC SURGERY

## 2018-06-20 PROCEDURE — G8427 DOCREV CUR MEDS BY ELIG CLIN: HCPCS | Performed by: ORTHOPAEDIC SURGERY

## 2018-06-20 RX ORDER — OXYCODONE HYDROCHLORIDE AND ACETAMINOPHEN 5; 325 MG/1; MG/1
1 TABLET ORAL EVERY 6 HOURS PRN
Qty: 28 TABLET | Refills: 0 | Status: SHIPPED | OUTPATIENT
Start: 2018-06-20 | End: 2018-06-27

## 2018-06-21 ENCOUNTER — NURSE ONLY (OUTPATIENT)
Dept: FAMILY MEDICINE CLINIC | Age: 63
End: 2018-06-21
Payer: COMMERCIAL

## 2018-06-21 VITALS — OXYGEN SATURATION: 97 % | HEART RATE: 69 BPM | SYSTOLIC BLOOD PRESSURE: 137 MMHG | DIASTOLIC BLOOD PRESSURE: 80 MMHG

## 2018-06-21 DIAGNOSIS — I10 ESSENTIAL HYPERTENSION: Primary | ICD-10-CM

## 2018-06-21 PROCEDURE — 99211 OFF/OP EST MAY X REQ PHY/QHP: CPT | Performed by: NURSE PRACTITIONER

## 2018-06-27 ENCOUNTER — TELEPHONE (OUTPATIENT)
Dept: ORTHOPEDIC SURGERY | Age: 63
End: 2018-06-27

## 2018-06-27 ENCOUNTER — HOSPITAL ENCOUNTER (OUTPATIENT)
Dept: MRI IMAGING | Facility: CLINIC | Age: 63
Discharge: HOME | End: 2018-06-29
Payer: COMMERCIAL

## 2018-06-27 DIAGNOSIS — S49.92XA INJURY OF LEFT SHOULDER, INITIAL ENCOUNTER: ICD-10-CM

## 2018-06-27 PROCEDURE — 73221 MRI JOINT UPR EXTREM W/O DYE: CPT

## 2018-06-29 ENCOUNTER — OFFICE VISIT (OUTPATIENT)
Dept: ORTHOPEDIC SURGERY | Age: 63
End: 2018-06-29
Payer: COMMERCIAL

## 2018-06-29 VITALS — HEIGHT: 71 IN | BODY MASS INDEX: 37.1 KG/M2 | WEIGHT: 265 LBS

## 2018-06-29 DIAGNOSIS — M75.122 COMPLETE TEAR OF LEFT ROTATOR CUFF: Primary | ICD-10-CM

## 2018-06-29 PROCEDURE — 3017F COLORECTAL CA SCREEN DOC REV: CPT | Performed by: ORTHOPAEDIC SURGERY

## 2018-06-29 PROCEDURE — 99213 OFFICE O/P EST LOW 20 MIN: CPT | Performed by: ORTHOPAEDIC SURGERY

## 2018-06-29 PROCEDURE — G8427 DOCREV CUR MEDS BY ELIG CLIN: HCPCS | Performed by: ORTHOPAEDIC SURGERY

## 2018-06-29 PROCEDURE — 4004F PT TOBACCO SCREEN RCVD TLK: CPT | Performed by: ORTHOPAEDIC SURGERY

## 2018-06-29 PROCEDURE — G8417 CALC BMI ABV UP PARAM F/U: HCPCS | Performed by: ORTHOPAEDIC SURGERY

## 2018-07-01 NOTE — PROGRESS NOTES
ORTHOPEDIC PATIENT EVALUATION      HPI / Chief Complaint  Demario Yusuf is a 58 y.o. old right-hand-dominant male who presents for evaluation of an acute injury to his left shoulder. On 6/11/18 he indicates that he tripped when his dog leash wrapped around his leg. He fell off his deck and hurt his left shoulder process. He has been unable to lift his left arm since that occurred. He feels a clunking in the shoulder. He was seen in the emergency department and placed in a sling. His hand goes numb from time to time when in the sling. Denies any prior issues with the shoulder. He was seen by Dr. Everett Ernst who suspected a massive rotator cuff tear and ordered an MRI study referring him to my clinic subsequently for further evaluation and treatment. Past Medical History  Macrina Hoyt  has a past medical history of Acid reflux; Arthritis; Chronic bilateral low back pain with bilateral sciatica; Gout; H/O cardiac catheterization; Hyperlipidemia; Hyperlipidemia with target LDL less than 100; Hypertension; Knee pain, chronic; Moderate episode of recurrent major depressive disorder ([address] REYNALDO on CPAP; Polypharmacy; Prolonged emergence from general anesthesia; Prostate cancer ([address] Pseudocholinesterase deficiency; Short of breath on exertion; Sleep apnea; Status post lumbar laminectomy; Tubular adenoma of colon 4/11/17; Type 2 diabetes mellitus with hyperglycemia, without long-term current use of insulin ([address] Vitamin D deficiency; and Wears glasses. Past Surgical History  Macrina Hoyt  has a past surgical history that includes knee surgery (Left); lumbar laminectomy (01/05/2017); back surgery; back surgery (01/05/2017); Tonsillectomy and adenoidectomy; Cardiac catheterization (2007); pr colonoscopy w/biopsy single/multiple (N/A, 5/9/2017); and shoulder surgery (Right, 1989?).     Current Medications  Current Outpatient Prescriptions   Medication Sig Dispense Refill    pregabalin (LYRICA) 100 MG capsule Take 1 capsule by g 0    SYMBICORT 160-4.5 MCG/ACT AERO Inhale 2 puffs into the lungs 2 times daily 10.2 g 3    aspirin EC 81 MG EC tablet Take 1 tablet by mouth daily 30 tablet 0    omeprazole (PRILOSEC) 20 MG capsule Take 1 capsule by mouth 2 times daily (Patient taking differently: Take 20 mg by mouth Daily ) 90 capsule 1     No current facility-administered medications for this visit. Allergies  Allergies have been reviewed. Lynda Duke is allergic to succinylcholine chloride and cymbalta [duloxetine hcl]. Social History  Lynda Duke  reports that he has been smoking Cigarettes. He has been smoking about 0.50 packs per day. He has never used smokeless tobacco. He reports that he does not drink alcohol or use drugs. Family History  Bernardino's family history includes Cancer in his father; Diabetes in his mother; Liver Cancer in his brother; Malena Castles in his brother. Review of Systems   History obtained from the patient. REVIEW OF SYSTEMS:   Constitution: negative for fever, chills, weight loss or malaise   Musculoskeletal: As noted in the HPI   Neurologic: As noted in the HPI    Physical Exam  Ht 5' 11\" (1.803 m)   Wt 265 lb (120.2 kg)   BMI 36.96 kg/m²    General Appearance: alert, well appearing, and in no distress  Mental Status: alert, oriented to person, place, and time  Evaluation patient's left shoulder and upper extremity demonstrates intact skin with no warmth or erythema. He does have moderate diffuse swelling with some ecchymosis involving the left chest wall. He is unable to actively elevate the arm away from his body or abduct. Sensation is intact to light touch in all dermatomes and he has a 2+ radial pulse with brisk capillary refill in his fingers. He has positive external rotation lag. Imaging Studies  Xrays of the left shoulder obtained on 6/11/18 were independently reviewed demonstrating appears to be significant superior humeral head migration.   No obvious fracture or dislocation is

## 2018-07-02 DIAGNOSIS — M75.100 TEAR OF ROTATOR CUFF, UNSPECIFIED LATERALITY, UNSPECIFIED TEAR EXTENT: Primary | ICD-10-CM

## 2018-07-02 RX ORDER — HYDROCODONE BITARTRATE AND ACETAMINOPHEN 7.5; 325 MG/1; MG/1
TABLET ORAL
Qty: 40 TABLET | Refills: 0 | Status: SHIPPED | OUTPATIENT
Start: 2018-07-02 | End: 2018-07-09

## 2018-07-05 ENCOUNTER — HOSPITAL ENCOUNTER (OUTPATIENT)
Dept: CT IMAGING | Age: 63
Discharge: HOME | End: 2018-07-07
Payer: COMMERCIAL

## 2018-07-05 DIAGNOSIS — M75.122 COMPLETE TEAR OF LEFT ROTATOR CUFF: ICD-10-CM
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
## 2018-07-05 PROCEDURE — 76376 3D RENDER W/INTRP POSTPROCES: CPT

## 2018-07-05 PROCEDURE — 73200 CT UPPER EXTREMITY W/O DYE: CPT

## 2018-07-09 ENCOUNTER — HOSPITAL ENCOUNTER (OUTPATIENT)
Age: 63
Setting detail: SPECIMEN
Discharge: HOME | End: 2018-07-09
Payer: COMMERCIAL

## 2018-07-09 ENCOUNTER — TELEPHONE (OUTPATIENT)
Dept: UROLOGY | Age: 63
End: 2018-07-09

## 2018-07-09 ENCOUNTER — HOSPITAL ENCOUNTER (OUTPATIENT)
Dept: PREADMISSION TESTING | Age: 63
Discharge: HOME | End: 2018-07-13
Payer: COMMERCIAL

## 2018-07-09 VITALS
RESPIRATION RATE: 18 BRPM | WEIGHT: 262 LBS | BODY MASS INDEX: 36.68 KG/M2 | DIASTOLIC BLOOD PRESSURE: 56 MMHG | OXYGEN SATURATION: 99 % | TEMPERATURE: 98.1 F | HEART RATE: 62 BPM | SYSTOLIC BLOOD PRESSURE: 112 MMHG | HEIGHT: 71 IN

## 2018-07-09 DIAGNOSIS — C61 PROSTATE CANCER (HCC): ICD-10-CM

## 2018-07-09 LAB
ABSOLUTE EOS #: 0.1 K/UL (ref 0–0.4)
ABSOLUTE IMMATURE GRANULOCYTE: NORMAL K/UL (ref 0–0.3)
ABSOLUTE LYMPH #: 2.1 K/UL (ref 1–4.8)
ABSOLUTE MONO #: 0.5 K/UL (ref 0.1–1.3)
ANION GAP SERPL CALCULATED.3IONS-SCNC: 13 MMOL/L (ref 9–17)
BASOPHILS # BLD: 1 % (ref 0–2)
BASOPHILS ABSOLUTE: 0 K/UL (ref 0–0.2)
BILIRUBIN URINE: NEGATIVE
BUN BLDV-MCNC: 15 MG/DL (ref 8–23)
BUN/CREAT BLD: ABNORMAL (ref 9–20)
CALCIUM SERPL-MCNC: 10.1 MG/DL (ref 8.6–10.4)
CHLORIDE BLD-SCNC: 103 MMOL/L (ref 98–107)
CO2: 25 MMOL/L (ref 20–31)
COLOR: YELLOW
COMMENT UA: NORMAL
CREAT SERPL-MCNC: 0.56 MG/DL (ref 0.7–1.2)
DIFFERENTIAL TYPE: NORMAL
EKG ATRIAL RATE: 59 BPM
EKG P AXIS: -25 DEGREES
EKG P-R INTERVAL: 150 MS
EKG Q-T INTERVAL: 398 MS
EKG QRS DURATION: 96 MS
EKG QTC CALCULATION (BAZETT): 394 MS
EKG R AXIS: 40 DEGREES
EKG T AXIS: 56 DEGREES
EKG VENTRICULAR RATE: 59 BPM
EOSINOPHILS RELATIVE PERCENT: 1 % (ref 0–4)
GFR AFRICAN AMERICAN: >60 ML/MIN
GFR NON-AFRICAN AMERICAN: >60 ML/MIN
GFR SERPL CREATININE-BSD FRML MDRD: ABNORMAL ML/MIN/{1.73_M2}
GFR SERPL CREATININE-BSD FRML MDRD: ABNORMAL ML/MIN/{1.73_M2}
GLUCOSE BLD-MCNC: 93 MG/DL (ref 70–99)
GLUCOSE URINE: NEGATIVE
HCT VFR BLD CALC: 42.2 % (ref 41–53)
HEMOGLOBIN: 14.2 G/DL (ref 13.5–17.5)
IMMATURE GRANULOCYTES: NORMAL %
KETONES, URINE: NEGATIVE
LEUKOCYTE ESTERASE, URINE: NEGATIVE
LYMPHOCYTES # BLD: 26 % (ref 24–44)
MCH RBC QN AUTO: 30.8 PG (ref 26–34)
MCHC RBC AUTO-ENTMCNC: 33.7 G/DL (ref 31–37)
MCV RBC AUTO: 91.3 FL (ref 80–100)
MONOCYTES # BLD: 6 % (ref 1–7)
MRSA, DNA, NASAL: NORMAL
NITRITE, URINE: NEGATIVE
NRBC AUTOMATED: NORMAL PER 100 WBC
PDW BLD-RTO: 13.9 % (ref 11.5–14.9)
PH UA: 5.5 (ref 5–8)
PLATELET # BLD: 223 K/UL (ref 150–450)
PLATELET ESTIMATE: NORMAL
PMV BLD AUTO: 9.5 FL (ref 6–12)
POTASSIUM SERPL-SCNC: 4.8 MMOL/L (ref 3.7–5.3)
PROSTATE SPECIFIC ANTIGEN: <0.01 UG/L
PROTEIN UA: NEGATIVE
RBC # BLD: 4.62 M/UL (ref 4.5–5.9)
RBC # BLD: NORMAL 10*6/UL
SEG NEUTROPHILS: 66 % (ref 36–66)
SEGMENTED NEUTROPHILS ABSOLUTE COUNT: 5.2 K/UL (ref 1.3–9.1)
SODIUM BLD-SCNC: 141 MMOL/L (ref 135–144)
SPECIFIC GRAVITY UA: 1.01 (ref 1–1.03)
SPECIMEN DESCRIPTION: NORMAL
TURBIDITY: CLEAR
URINE HGB: NEGATIVE
UROBILINOGEN, URINE: NORMAL
WBC # BLD: 8 K/UL (ref 3.5–11)
WBC # BLD: NORMAL 10*3/UL

## 2018-07-09 PROCEDURE — 80048 BASIC METABOLIC PNL TOTAL CA: CPT

## 2018-07-09 PROCEDURE — 81003 URINALYSIS AUTO W/O SCOPE: CPT

## 2018-07-09 PROCEDURE — 87641 MR-STAPH DNA AMP PROBE: CPT

## 2018-07-09 PROCEDURE — 36415 COLL VENOUS BLD VENIPUNCTURE: CPT

## 2018-07-09 PROCEDURE — 85025 COMPLETE CBC W/AUTO DIFF WBC: CPT

## 2018-07-09 PROCEDURE — 93005 ELECTROCARDIOGRAM TRACING: CPT

## 2018-07-09 NOTE — H&P
\"on life support\" for 3 days after back surgery 1/5/17 after being given succinylcholine    Short of breath on exertion     Sleep apnea     uses CPAP machine nightly    Status post lumbar laminectomy 3/25/2017    Tubular adenoma of colon 4/11/17 5/13/2017    Type 2 diabetes mellitus with hyperglycemia, without long-term current use of insulin ([facility] 3/25/2017    Lab Results Component Value Date  LABA1C 7.1 (H) 03/23/2017     Vitamin D deficiency 3/25/2017    Wears glasses        Pt denies any history of Diabetes mellitus type 2, hypertension, stroke, heart disease, COPD, Asthma, GERD, HLD, Cancer, Seizures,Thyroid disease, Kidney Disease, Hepatitis, TB, Psychiatric Disorders or Substance abuse. SURGICAL HISTORY       Past Surgical History:   Procedure Laterality Date    BACK SURGERY      x 2     BACK SURGERY  01/05/2017    lumbar laminectomy L2, L3, L4    CARDIAC CATHETERIZATION  2007    no stents    CARPAL TUNNEL RELEASE Bilateral     KNEE SURGERY Left     LUMBAR LAMINECTOMY  01/05/2017    L2-L4    UT COLONOSCOPY W/BIOPSY SINGLE/MULTIPLE N/A 5/9/2017    COLONOSCOPY WITH BIOPSY performed by Chalo Kincaid, DO at . [address]?     rotator cuff repair    TONSILLECTOMY AND ADENOIDECTOMY         FAMILY HISTORY       Family History   Problem Relation Age of Onset    Diabetes Mother     Lung Cancer Brother     Liver Cancer Brother     Cancer Father        SOCIAL HISTORY       Social History     Social History    Marital status:      Spouse name: N/A    Number of children: N/A    Years of education: N/A     Social History Main Topics    Smoking status: Current Every Day Smoker     Packs/day: 0.75     Years: 30.00     Types: Cigarettes    Smokeless tobacco: Never Used    Alcohol use No    Drug use: No    Sexual activity: Not Currently     Partners: Female     Other Topics Concern    None     Social History Narrative    None           REVIEW OF SYSTEMS Allergies   Allergen Reactions    Succinylcholine Chloride Other (See Comments)     PATIENT HAS PSEUDOCHOLINESTERASE DEFICIENCY      Cymbalta [Duloxetine Hcl]      Taste loss         Current Outpatient Prescriptions on File Prior to Encounter   Medication Sig Dispense Refill    HYDROcodone-acetaminophen (NORCO) 7.5-325 MG per tablet 1-2 tabs PO every 4-6 hours as needed for pain. 40 tablet 0    pregabalin (LYRICA) 100 MG capsule Take 1 capsule by mouth 3 times daily for 90 days. . 270 capsule 0    amLODIPine (NORVASC) 10 MG tablet Take 1 tablet by mouth daily Dose increased  6/1/2018. STOP ATENOLOL 90 tablet 3    nystatin (MYCOSTATIN) 617966 UNIT/GM powder Apply 3 times daily for feet 56.7 g 3    clotrimazole-betamethasone (LOTRISONE) 1-0.05 % cream Apply topically at bedtime on the feet x 4 weeks 45 g 0    mupirocin (BACTROBAN) 2 % ointment Apply topically 1-2 times daily on the affected area . OK to substitute to cream 30 g 2    ammonium lactate (AMLACTIN) 12 % cream       NONFORMULARY Bioidentical pain cream      furosemide (LASIX) 20 MG tablet Take 1 tablet by mouth daily **stop Tenoretic 50-25** 90 tablet 3    traZODone (DESYREL) 150 MG tablet Take 1 tablet by mouth nightly Insomnia 90 tablet 3    FLUoxetine (PROZAC) 40 MG capsule Take 1 capsule by mouth daily 90 capsule 3    pravastatin (PRAVACHOL) 40 MG tablet Take 1 tablet by mouth every evening **stop Crestor** 90 tablet 3    lisinopril (PRINIVIL;ZESTRIL) 20 MG tablet TAKE 1 TABLET TWICE A  tablet 3    enzalutamide (XTANDI) 40 MG capsule Take 40 mg by mouth Indications: take four 40 mg capsules daily.  Umeclidinium Bromide (INCRUSE ELLIPTA) 62.5 MCG/INH AEPB Inhale 1 each into the lungs daily Per Dr. Castle Breath 30 each 11    FOLBEE 2.5-25-1 MG TABS tablet       allopurinol (ZYLOPRIM) 100 MG tablet Take 1 tablet by mouth daily 90 tablet 0    colchicine (COLCRYS) 0.6 MG tablet ADJUST SIG-ONLY AS NEEDED FOR GOUT ATTACK.  Take 2 long-term current use of insulin ([facility] 03/25/2017    PVD (peripheral vascular disease) with claudication (HCC) 03/25/2017    Peripheral neuropathic pain 03/25/2017    Pruritic dermatitis 03/25/2017    Tinea pedis of both feet 03/25/2017    Hyperglycemia 03/25/2017    Nonspecific reactive hepatitis 03/25/2017    COPD mixed type ([facility] 03/25/2017    Polypharmacy 03/25/2017    Pseudocholinesterase deficiency 03/25/2017    Vitamin D deficiency 03/25/2017    Hypercalcemia 03/25/2017    DDD (degenerative disc disease), lumbar 11/03/2016    Chronic bilateral low back pain with bilateral sciatica 11/03/2016    Osteoarthritis of spine with radiculopathy, lumbar region 11/03/2016    Essential hypertension 01/20/2016    Gout 01/20/2016    Hyperlipidemia with target LDL less than 100 01/20/2016    Osteoporosis 01/20/2016    Prostate CA ([facility] 01/20/2016    Moderate episode of recurrent major depressive disorder ([facility] 01/20/2016    Gastroesophageal reflux disease without esophagitis 01/20/2016    Obesity, Class III, BMI 40-49.9 (morbid obesity) ([facility] 01/20/2016           MARCELINO Schultz, JAVI - CNP on 7/9/2018 at 9:18 AM

## 2018-07-09 NOTE — PROGRESS NOTES
Dr. Asmita White, anesthesia, was contacted and informed of patient's history. Medical clearance requested. Surgery scheduling will notify Dr. Yesenia Martinez office who will be responsible for arranging clearance.

## 2018-07-10 ENCOUNTER — ANESTHESIA EVENT (OUTPATIENT)
Dept: OPERATING ROOM | Age: 63
DRG: 483 | End: 2018-07-10
Payer: COMMERCIAL

## 2018-07-10 ENCOUNTER — TELEPHONE (OUTPATIENT)
Dept: FAMILY MEDICINE CLINIC | Age: 63
End: 2018-07-10

## 2018-07-13 ENCOUNTER — OFFICE VISIT (OUTPATIENT)
Dept: ORTHOPEDIC SURGERY | Age: 63
End: 2018-07-13

## 2018-07-13 VITALS — WEIGHT: 259 LBS | BODY MASS INDEX: 37.08 KG/M2 | HEIGHT: 70 IN

## 2018-07-13 DIAGNOSIS — M75.122 COMPLETE ROTATOR CUFF TEAR OF LEFT SHOULDER: Primary | ICD-10-CM

## 2018-07-13 PROCEDURE — 99024 POSTOP FOLLOW-UP VISIT: CPT | Performed by: ORTHOPAEDIC SURGERY

## 2018-07-13 RX ORDER — HYDROCODONE BITARTRATE AND ACETAMINOPHEN 7.5; 325 MG/1; MG/1
TABLET ORAL
Qty: 40 TABLET | Refills: 0 | Status: SHIPPED | OUTPATIENT
Start: 2018-07-13 | End: 2018-08-02 | Stop reason: SDUPTHER

## 2018-07-13 RX ORDER — ONDANSETRON 4 MG/1
4 TABLET, FILM COATED ORAL DAILY PRN
Qty: 20 TABLET | Refills: 0 | Status: SHIPPED | OUTPATIENT
Start: 2018-07-13 | End: 2018-09-04 | Stop reason: ALTCHOICE

## 2018-07-16 ENCOUNTER — OFFICE VISIT (OUTPATIENT)
Dept: UROLOGY | Age: 63
End: 2018-07-16
Payer: COMMERCIAL

## 2018-07-16 VITALS
TEMPERATURE: 97.9 F | BODY MASS INDEX: 37.51 KG/M2 | DIASTOLIC BLOOD PRESSURE: 65 MMHG | WEIGHT: 262 LBS | HEIGHT: 70 IN | SYSTOLIC BLOOD PRESSURE: 110 MMHG | HEART RATE: 64 BPM

## 2018-07-16 DIAGNOSIS — R35.0 FREQUENCY OF MICTURITION: ICD-10-CM

## 2018-07-16 DIAGNOSIS — C61 PROSTATE CANCER (HCC): Primary | ICD-10-CM

## 2018-07-16 DIAGNOSIS — R35.1 NOCTURIA: ICD-10-CM

## 2018-07-16 PROCEDURE — 99214 OFFICE O/P EST MOD 30 MIN: CPT | Performed by: UROLOGY

## 2018-07-16 ASSESSMENT — ENCOUNTER SYMPTOMS
COUGH: 0
WHEEZING: 0
NAUSEA: 0
VOMITING: 0
SHORTNESS OF BREATH: 0
ABDOMINAL PAIN: 0
BACK PAIN: 0
COLOR CHANGE: 0
EYE PAIN: 0
EYE REDNESS: 0

## 2018-07-16 NOTE — PROGRESS NOTES
Review of Systems   Constitutional: Negative for appetite change, chills and fever. Eyes: Negative for pain, redness and visual disturbance. Respiratory: Negative for cough, shortness of breath and wheezing. Cardiovascular: Negative for chest pain and leg swelling. Gastrointestinal: Negative for abdominal pain, nausea and vomiting. Genitourinary: Negative for difficulty urinating, discharge, dysuria, flank pain, frequency, hematuria, scrotal swelling, testicular pain and urgency. Musculoskeletal: Negative for back pain, joint swelling (shoulder ) and myalgias. Skin: Negative for color change, rash and wound. Neurological: Negative for dizziness, tremors and numbness. Hematological: Negative for adenopathy. Does not bruise/bleed easily.

## 2018-07-17 ENCOUNTER — PREP FOR PROCEDURE (OUTPATIENT)
Dept: ORTHOPEDIC SURGERY | Age: 63
End: 2018-07-17

## 2018-07-17 RX ORDER — SODIUM CHLORIDE 0.9 % (FLUSH) 0.9 %
10 SYRINGE (ML) INJECTION PRN
Status: CANCELLED | OUTPATIENT
Start: 2018-07-17 | End: 2019-07-17

## 2018-07-17 RX ORDER — DEXAMETHASONE SODIUM PHOSPHATE 10 MG/ML
10 INJECTION INTRAMUSCULAR; INTRAVENOUS ONCE
Status: CANCELLED | OUTPATIENT
Start: 2018-07-18

## 2018-07-17 RX ORDER — SODIUM CHLORIDE 0.9 % (FLUSH) 0.9 %
10 SYRINGE (ML) INJECTION EVERY 12 HOURS SCHEDULED
Status: CANCELLED | OUTPATIENT
Start: 2018-07-17 | End: 2019-07-17

## 2018-07-18 ENCOUNTER — HOSPITAL ENCOUNTER (INPATIENT)
Age: 63
LOS: 1 days | Discharge: HOME | DRG: 483 | End: 2018-07-19
Attending: ORTHOPAEDIC SURGERY | Admitting: ORTHOPAEDIC SURGERY
Payer: COMMERCIAL

## 2018-07-18 ENCOUNTER — APPOINTMENT (OUTPATIENT)
Dept: GENERAL RADIOLOGY | Age: 63
DRG: 483 | End: 2018-07-18
Attending: ORTHOPAEDIC SURGERY
Payer: COMMERCIAL

## 2018-07-18 ENCOUNTER — ANESTHESIA (OUTPATIENT)
Dept: OPERATING ROOM | Age: 63
DRG: 483 | End: 2018-07-18
Payer: COMMERCIAL

## 2018-07-18 VITALS — DIASTOLIC BLOOD PRESSURE: 57 MMHG | TEMPERATURE: 95.5 F | SYSTOLIC BLOOD PRESSURE: 124 MMHG | OXYGEN SATURATION: 90 %

## 2018-07-18 DIAGNOSIS — M75.122 COMPLETE ROTATOR CUFF TEAR OF LEFT SHOULDER: ICD-10-CM

## 2018-07-18 DIAGNOSIS — Z96.612 S/P REVERSE TOTAL SHOULDER ARTHROPLASTY, LEFT: Primary | ICD-10-CM

## 2018-07-18 LAB
ANION GAP SERPL CALCULATED.3IONS-SCNC: 14 MMOL/L (ref 9–17)
BUN BLDV-MCNC: 10 MG/DL (ref 8–23)
BUN/CREAT BLD: ABNORMAL (ref 9–20)
CALCIUM SERPL-MCNC: 9.4 MG/DL (ref 8.6–10.4)
CHLORIDE BLD-SCNC: 105 MMOL/L (ref 98–107)
CO2: 21 MMOL/L (ref 20–31)
CREAT SERPL-MCNC: 0.64 MG/DL (ref 0.7–1.2)
GFR AFRICAN AMERICAN: >60 ML/MIN
GFR NON-AFRICAN AMERICAN: >60 ML/MIN
GFR SERPL CREATININE-BSD FRML MDRD: ABNORMAL ML/MIN/{1.73_M2}
GFR SERPL CREATININE-BSD FRML MDRD: ABNORMAL ML/MIN/{1.73_M2}
GLUCOSE BLD-MCNC: 146 MG/DL (ref 75–110)
GLUCOSE BLD-MCNC: 174 MG/DL (ref 70–99)
HCT VFR BLD CALC: 41.4 % (ref 41–53)
HEMOGLOBIN: 13.4 G/DL (ref 13.5–17.5)
MCH RBC QN AUTO: 29.6 PG (ref 26–34)
MCHC RBC AUTO-ENTMCNC: 32.3 G/DL (ref 31–37)
MCV RBC AUTO: 91.6 FL (ref 80–100)
NRBC AUTOMATED: ABNORMAL PER 100 WBC
PDW BLD-RTO: 14 % (ref 11.5–14.9)
PLATELET # BLD: 189 K/UL (ref 150–450)
PMV BLD AUTO: 9.2 FL (ref 6–12)
POTASSIUM SERPL-SCNC: 4.3 MMOL/L (ref 3.7–5.3)
RBC # BLD: 4.52 M/UL (ref 4.5–5.9)
SODIUM BLD-SCNC: 140 MMOL/L (ref 135–144)
WBC # BLD: 11.5 K/UL (ref 3.5–11)

## 2018-07-18 PROCEDURE — 3600000014 HC SURGERY LEVEL 4 ADDTL 15MIN: Performed by: ORTHOPAEDIC SURGERY

## 2018-07-18 PROCEDURE — 82947 ASSAY GLUCOSE BLOOD QUANT: CPT

## 2018-07-18 PROCEDURE — 3700000000 HC ANESTHESIA ATTENDED CARE: Performed by: ORTHOPAEDIC SURGERY

## 2018-07-18 PROCEDURE — 23395 MUSCLE TRANSFER SHOULDER/ARM: CPT | Performed by: ORTHOPAEDIC SURGERY

## 2018-07-18 PROCEDURE — 6370000000 HC RX 637 (ALT 250 FOR IP): Performed by: ORTHOPAEDIC SURGERY

## 2018-07-18 PROCEDURE — 2580000003 HC RX 258: Performed by: ANESTHESIOLOGY

## 2018-07-18 PROCEDURE — 2709999900 HC NON-CHARGEABLE SUPPLY: Performed by: ORTHOPAEDIC SURGERY

## 2018-07-18 PROCEDURE — 6360000002 HC RX W HCPCS: Performed by: ORTHOPAEDIC SURGERY

## 2018-07-18 PROCEDURE — 0RRK00Z REPLACEMENT OF LEFT SHOULDER JOINT WITH REVERSE BALL AND SOCKET SYNTHETIC SUBSTITUTE, OPEN APPROACH: ICD-10-PCS | Performed by: ORTHOPAEDIC SURGERY

## 2018-07-18 PROCEDURE — 94761 N-INVAS EAR/PLS OXIMETRY MLT: CPT

## 2018-07-18 PROCEDURE — 0LX20ZZ TRANSFER LEFT SHOULDER TENDON, OPEN APPROACH: ICD-10-PCS | Performed by: ORTHOPAEDIC SURGERY

## 2018-07-18 PROCEDURE — 6360000002 HC RX W HCPCS

## 2018-07-18 PROCEDURE — 64415 NJX AA&/STRD BRCH PLXS IMG: CPT | Performed by: ANESTHESIOLOGY

## 2018-07-18 PROCEDURE — C1776 JOINT DEVICE (IMPLANTABLE): HCPCS | Performed by: ORTHOPAEDIC SURGERY

## 2018-07-18 PROCEDURE — 80048 BASIC METABOLIC PNL TOTAL CA: CPT

## 2018-07-18 PROCEDURE — 7100000001 HC PACU RECOVERY - ADDTL 15 MIN: Performed by: ORTHOPAEDIC SURGERY

## 2018-07-18 PROCEDURE — 3700000001 HC ADD 15 MINUTES (ANESTHESIA): Performed by: ORTHOPAEDIC SURGERY

## 2018-07-18 PROCEDURE — 7100000000 HC PACU RECOVERY - FIRST 15 MIN: Performed by: ORTHOPAEDIC SURGERY

## 2018-07-18 PROCEDURE — 2700000000 HC OXYGEN THERAPY PER DAY

## 2018-07-18 PROCEDURE — 94664 DEMO&/EVAL PT USE INHALER: CPT

## 2018-07-18 PROCEDURE — 94640 AIRWAY INHALATION TREATMENT: CPT

## 2018-07-18 PROCEDURE — 73020 X-RAY EXAM OF SHOULDER: CPT

## 2018-07-18 PROCEDURE — L3650 SO 8 ABD RESTRAINT PRE OTS: HCPCS | Performed by: ORTHOPAEDIC SURGERY

## 2018-07-18 PROCEDURE — 85027 COMPLETE CBC AUTOMATED: CPT

## 2018-07-18 PROCEDURE — 2580000003 HC RX 258: Performed by: ORTHOPAEDIC SURGERY

## 2018-07-18 PROCEDURE — 1200000000 HC SEMI PRIVATE

## 2018-07-18 PROCEDURE — 2500000003 HC RX 250 WO HCPCS

## 2018-07-18 PROCEDURE — 36415 COLL VENOUS BLD VENIPUNCTURE: CPT

## 2018-07-18 PROCEDURE — 23472 RECONSTRUCT SHOULDER JOINT: CPT | Performed by: ORTHOPAEDIC SURGERY

## 2018-07-18 PROCEDURE — 3600000004 HC SURGERY LEVEL 4 BASE: Performed by: ORTHOPAEDIC SURGERY

## 2018-07-18 DEVICE — BASEPLATE, GLENOID HA-COAT, RSP, 6.5MM X 30MM
Type: IMPLANTABLE DEVICE | Site: SHOULDER | Status: FUNCTIONAL
Brand: DJO SURGICAL

## 2018-07-18 DEVICE — IMPLANTABLE DEVICE: Type: IMPLANTABLE DEVICE | Site: SHOULDER | Status: FUNCTIONAL

## 2018-07-18 DEVICE — SCREW, LOCKING BONE, RSP, 5X26
Type: IMPLANTABLE DEVICE | Site: SHOULDER | Status: FUNCTIONAL
Brand: DJO SURGICAL

## 2018-07-18 DEVICE — SCREW, LOCKING BONE, RSP, 5X22
Type: IMPLANTABLE DEVICE | Site: SHOULDER | Status: FUNCTIONAL
Brand: DJO SURGICAL

## 2018-07-18 DEVICE — SCREW, LOCKING BONE, RSP, 5X18
Type: IMPLANTABLE DEVICE | Site: SHOULDER | Status: FUNCTIONAL
Brand: DJO SURGICAL

## 2018-07-18 DEVICE — GLENOID, HEAD W/RETAINING SCREW, RSP, 32MM/NEUTRAL
Type: IMPLANTABLE DEVICE | Site: SHOULDER | Status: FUNCTIONAL
Brand: DJO SURGICAL

## 2018-07-18 RX ORDER — SODIUM CHLORIDE 9 MG/ML
INJECTION, SOLUTION INTRAVENOUS CONTINUOUS
Status: DISCONTINUED | OUTPATIENT
Start: 2018-07-18 | End: 2018-07-19 | Stop reason: HOSPADM

## 2018-07-18 RX ORDER — FENTANYL CITRATE 50 UG/ML
25 INJECTION, SOLUTION INTRAMUSCULAR; INTRAVENOUS EVERY 5 MIN PRN
Status: DISCONTINUED | OUTPATIENT
Start: 2018-07-18 | End: 2018-07-18 | Stop reason: HOSPADM

## 2018-07-18 RX ORDER — HYDRALAZINE HYDROCHLORIDE 20 MG/ML
5 INJECTION INTRAMUSCULAR; INTRAVENOUS EVERY 10 MIN PRN
Status: DISCONTINUED | OUTPATIENT
Start: 2018-07-18 | End: 2018-07-18 | Stop reason: HOSPADM

## 2018-07-18 RX ORDER — FUROSEMIDE 20 MG/1
20 TABLET ORAL DAILY
Status: DISCONTINUED | OUTPATIENT
Start: 2018-07-18 | End: 2018-07-19 | Stop reason: HOSPADM

## 2018-07-18 RX ORDER — ALBUTEROL SULFATE 2.5 MG/3ML
2.5 SOLUTION RESPIRATORY (INHALATION) 4 TIMES DAILY
Status: DISCONTINUED | OUTPATIENT
Start: 2018-07-18 | End: 2018-07-19 | Stop reason: HOSPADM

## 2018-07-18 RX ORDER — OXYCODONE HYDROCHLORIDE 5 MG/1
5 TABLET ORAL EVERY 4 HOURS PRN
Status: DISCONTINUED | OUTPATIENT
Start: 2018-07-18 | End: 2018-07-19 | Stop reason: HOSPADM

## 2018-07-18 RX ORDER — ASPIRIN 81 MG/1
81 TABLET ORAL DAILY
Status: DISCONTINUED | OUTPATIENT
Start: 2018-07-18 | End: 2018-07-19 | Stop reason: HOSPADM

## 2018-07-18 RX ORDER — AMLODIPINE BESYLATE 10 MG/1
10 TABLET ORAL DAILY
Status: DISCONTINUED | OUTPATIENT
Start: 2018-07-18 | End: 2018-07-19 | Stop reason: HOSPADM

## 2018-07-18 RX ORDER — DEXAMETHASONE SODIUM PHOSPHATE 10 MG/ML
INJECTION INTRAMUSCULAR; INTRAVENOUS PRN
Status: DISCONTINUED | OUTPATIENT
Start: 2018-07-18 | End: 2018-07-18 | Stop reason: SDUPTHER

## 2018-07-18 RX ORDER — DIPHENHYDRAMINE HYDROCHLORIDE 50 MG/ML
12.5 INJECTION INTRAMUSCULAR; INTRAVENOUS
Status: DISCONTINUED | OUTPATIENT
Start: 2018-07-18 | End: 2018-07-18 | Stop reason: HOSPADM

## 2018-07-18 RX ORDER — PANTOPRAZOLE SODIUM 40 MG/1
40 TABLET, DELAYED RELEASE ORAL
Status: DISCONTINUED | OUTPATIENT
Start: 2018-07-19 | End: 2018-07-19 | Stop reason: HOSPADM

## 2018-07-18 RX ORDER — FENTANYL CITRATE 50 UG/ML
50 INJECTION, SOLUTION INTRAMUSCULAR; INTRAVENOUS EVERY 5 MIN PRN
Status: DISCONTINUED | OUTPATIENT
Start: 2018-07-18 | End: 2018-07-18 | Stop reason: HOSPADM

## 2018-07-18 RX ORDER — DEXAMETHASONE SODIUM PHOSPHATE 10 MG/ML
10 INJECTION INTRAMUSCULAR; INTRAVENOUS ONCE
Status: DISCONTINUED | OUTPATIENT
Start: 2018-07-18 | End: 2018-07-18 | Stop reason: HOSPADM

## 2018-07-18 RX ORDER — CEFAZOLIN SODIUM 1 G/3ML
INJECTION, POWDER, FOR SOLUTION INTRAMUSCULAR; INTRAVENOUS
Status: DISPENSED
Start: 2018-07-18 | End: 2018-07-18

## 2018-07-18 RX ORDER — ACETAMINOPHEN 500 MG
1000 TABLET ORAL EVERY 6 HOURS SCHEDULED
Status: DISCONTINUED | OUTPATIENT
Start: 2018-07-18 | End: 2018-07-19 | Stop reason: HOSPADM

## 2018-07-18 RX ORDER — SODIUM CHLORIDE 0.9 % (FLUSH) 0.9 %
10 SYRINGE (ML) INJECTION EVERY 12 HOURS SCHEDULED
Status: DISCONTINUED | OUTPATIENT
Start: 2018-07-18 | End: 2018-07-18 | Stop reason: HOSPADM

## 2018-07-18 RX ORDER — EPHEDRINE SULFATE/0.9% NACL/PF 50 MG/5 ML
SYRINGE (ML) INTRAVENOUS PRN
Status: DISCONTINUED | OUTPATIENT
Start: 2018-07-18 | End: 2018-07-18 | Stop reason: SDUPTHER

## 2018-07-18 RX ORDER — ALLOPURINOL 100 MG/1
100 TABLET ORAL DAILY
Status: DISCONTINUED | OUTPATIENT
Start: 2018-07-18 | End: 2018-07-19 | Stop reason: HOSPADM

## 2018-07-18 RX ORDER — PRAVASTATIN SODIUM 40 MG
40 TABLET ORAL EVERY EVENING
Status: DISCONTINUED | OUTPATIENT
Start: 2018-07-18 | End: 2018-07-19 | Stop reason: HOSPADM

## 2018-07-18 RX ORDER — MORPHINE SULFATE 2 MG/ML
2 INJECTION, SOLUTION INTRAMUSCULAR; INTRAVENOUS EVERY 5 MIN PRN
Status: DISCONTINUED | OUTPATIENT
Start: 2018-07-18 | End: 2018-07-18 | Stop reason: HOSPADM

## 2018-07-18 RX ORDER — METOCLOPRAMIDE HYDROCHLORIDE 5 MG/ML
10 INJECTION INTRAMUSCULAR; INTRAVENOUS
Status: DISCONTINUED | OUTPATIENT
Start: 2018-07-18 | End: 2018-07-18 | Stop reason: HOSPADM

## 2018-07-18 RX ORDER — ROCURONIUM BROMIDE 10 MG/ML
INJECTION, SOLUTION INTRAVENOUS PRN
Status: DISCONTINUED | OUTPATIENT
Start: 2018-07-18 | End: 2018-07-18 | Stop reason: SDUPTHER

## 2018-07-18 RX ORDER — NEOSTIGMINE METHYLSULFATE 5 MG/5 ML
SYRINGE (ML) INTRAVENOUS PRN
Status: DISCONTINUED | OUTPATIENT
Start: 2018-07-18 | End: 2018-07-18 | Stop reason: SDUPTHER

## 2018-07-18 RX ORDER — PREGABALIN 100 MG/1
100 CAPSULE ORAL 3 TIMES DAILY
Status: DISCONTINUED | OUTPATIENT
Start: 2018-07-18 | End: 2018-07-19 | Stop reason: HOSPADM

## 2018-07-18 RX ORDER — FENTANYL CITRATE 50 UG/ML
INJECTION, SOLUTION INTRAMUSCULAR; INTRAVENOUS PRN
Status: DISCONTINUED | OUTPATIENT
Start: 2018-07-18 | End: 2018-07-18 | Stop reason: SDUPTHER

## 2018-07-18 RX ORDER — PROPOFOL 10 MG/ML
INJECTION, EMULSION INTRAVENOUS PRN
Status: DISCONTINUED | OUTPATIENT
Start: 2018-07-18 | End: 2018-07-18 | Stop reason: SDUPTHER

## 2018-07-18 RX ORDER — GLYCOPYRROLATE 1 MG/5 ML
SYRINGE (ML) INTRAVENOUS PRN
Status: DISCONTINUED | OUTPATIENT
Start: 2018-07-18 | End: 2018-07-18 | Stop reason: SDUPTHER

## 2018-07-18 RX ORDER — LIDOCAINE HYDROCHLORIDE 10 MG/ML
INJECTION, SOLUTION EPIDURAL; INFILTRATION; INTRACAUDAL; PERINEURAL PRN
Status: DISCONTINUED | OUTPATIENT
Start: 2018-07-18 | End: 2018-07-18 | Stop reason: SDUPTHER

## 2018-07-18 RX ORDER — KETOROLAC TROMETHAMINE 30 MG/ML
30 INJECTION, SOLUTION INTRAMUSCULAR; INTRAVENOUS EVERY 8 HOURS
Status: COMPLETED | OUTPATIENT
Start: 2018-07-18 | End: 2018-07-19

## 2018-07-18 RX ORDER — MEPERIDINE HYDROCHLORIDE 50 MG/ML
12.5 INJECTION INTRAMUSCULAR; INTRAVENOUS; SUBCUTANEOUS EVERY 5 MIN PRN
Status: DISCONTINUED | OUTPATIENT
Start: 2018-07-18 | End: 2018-07-18 | Stop reason: HOSPADM

## 2018-07-18 RX ORDER — HYDROCODONE BITARTRATE AND ACETAMINOPHEN 5; 325 MG/1; MG/1
1 TABLET ORAL PRN
Status: DISCONTINUED | OUTPATIENT
Start: 2018-07-18 | End: 2018-07-18 | Stop reason: HOSPADM

## 2018-07-18 RX ORDER — DOCUSATE SODIUM 100 MG/1
100 CAPSULE, LIQUID FILLED ORAL DAILY
Status: DISCONTINUED | OUTPATIENT
Start: 2018-07-18 | End: 2018-07-19 | Stop reason: HOSPADM

## 2018-07-18 RX ORDER — HYDROCODONE BITARTRATE AND ACETAMINOPHEN 5; 325 MG/1; MG/1
2 TABLET ORAL PRN
Status: DISCONTINUED | OUTPATIENT
Start: 2018-07-18 | End: 2018-07-18 | Stop reason: HOSPADM

## 2018-07-18 RX ORDER — TRANEXAMIC ACID 100 MG/ML
INJECTION, SOLUTION INTRAVENOUS PRN
Status: DISCONTINUED | OUTPATIENT
Start: 2018-07-18 | End: 2018-07-18 | Stop reason: SDUPTHER

## 2018-07-18 RX ORDER — ONDANSETRON 2 MG/ML
INJECTION INTRAMUSCULAR; INTRAVENOUS PRN
Status: DISCONTINUED | OUTPATIENT
Start: 2018-07-18 | End: 2018-07-18 | Stop reason: SDUPTHER

## 2018-07-18 RX ORDER — FLUOXETINE HYDROCHLORIDE 20 MG/1
40 CAPSULE ORAL DAILY
Status: DISCONTINUED | OUTPATIENT
Start: 2018-07-18 | End: 2018-07-19 | Stop reason: HOSPADM

## 2018-07-18 RX ORDER — LIDOCAINE HYDROCHLORIDE 10 MG/ML
1 INJECTION, SOLUTION EPIDURAL; INFILTRATION; INTRACAUDAL; PERINEURAL
Status: DISCONTINUED | OUTPATIENT
Start: 2018-07-18 | End: 2018-07-18 | Stop reason: HOSPADM

## 2018-07-18 RX ORDER — ONDANSETRON 2 MG/ML
4 INJECTION INTRAMUSCULAR; INTRAVENOUS
Status: DISCONTINUED | OUTPATIENT
Start: 2018-07-18 | End: 2018-07-18 | Stop reason: HOSPADM

## 2018-07-18 RX ORDER — SODIUM CHLORIDE 0.9 % (FLUSH) 0.9 %
10 SYRINGE (ML) INJECTION PRN
Status: DISCONTINUED | OUTPATIENT
Start: 2018-07-18 | End: 2018-07-18 | Stop reason: HOSPADM

## 2018-07-18 RX ORDER — MIDAZOLAM HYDROCHLORIDE 1 MG/ML
INJECTION INTRAMUSCULAR; INTRAVENOUS PRN
Status: DISCONTINUED | OUTPATIENT
Start: 2018-07-18 | End: 2018-07-18 | Stop reason: SDUPTHER

## 2018-07-18 RX ORDER — ONDANSETRON 2 MG/ML
4 INJECTION INTRAMUSCULAR; INTRAVENOUS EVERY 6 HOURS PRN
Status: DISCONTINUED | OUTPATIENT
Start: 2018-07-18 | End: 2018-07-19 | Stop reason: HOSPADM

## 2018-07-18 RX ORDER — SODIUM CHLORIDE, SODIUM LACTATE, POTASSIUM CHLORIDE, CALCIUM CHLORIDE 600; 310; 30; 20 MG/100ML; MG/100ML; MG/100ML; MG/100ML
INJECTION, SOLUTION INTRAVENOUS CONTINUOUS
Status: DISCONTINUED | OUTPATIENT
Start: 2018-07-18 | End: 2018-07-18

## 2018-07-18 RX ORDER — ALBUTEROL SULFATE 90 UG/1
2 AEROSOL, METERED RESPIRATORY (INHALATION) EVERY 6 HOURS PRN
Status: DISCONTINUED | OUTPATIENT
Start: 2018-07-18 | End: 2018-07-19 | Stop reason: HOSPADM

## 2018-07-18 RX ORDER — LISINOPRIL 20 MG/1
20 TABLET ORAL 2 TIMES DAILY
Status: DISCONTINUED | OUTPATIENT
Start: 2018-07-19 | End: 2018-07-19 | Stop reason: HOSPADM

## 2018-07-18 RX ORDER — LABETALOL HYDROCHLORIDE 5 MG/ML
5 INJECTION, SOLUTION INTRAVENOUS EVERY 10 MIN PRN
Status: DISCONTINUED | OUTPATIENT
Start: 2018-07-18 | End: 2018-07-18 | Stop reason: HOSPADM

## 2018-07-18 RX ADMIN — Medication 0.6 MG: at 14:02

## 2018-07-18 RX ADMIN — PHENYLEPHRINE HYDROCHLORIDE 100 MCG: 10 INJECTION INTRAVENOUS at 13:16

## 2018-07-18 RX ADMIN — SODIUM CHLORIDE, POTASSIUM CHLORIDE, SODIUM LACTATE AND CALCIUM CHLORIDE: 600; 310; 30; 20 INJECTION, SOLUTION INTRAVENOUS at 09:26

## 2018-07-18 RX ADMIN — Medication 10 MG: at 11:54

## 2018-07-18 RX ADMIN — FENTANYL CITRATE 100 MCG: 50 INJECTION, SOLUTION INTRAMUSCULAR; INTRAVENOUS at 11:37

## 2018-07-18 RX ADMIN — ROCURONIUM BROMIDE 20 MG: 10 INJECTION, SOLUTION INTRAVENOUS at 13:08

## 2018-07-18 RX ADMIN — TRANEXAMIC ACID 1000 MG: 100 INJECTION, SOLUTION INTRAVENOUS at 11:57

## 2018-07-18 RX ADMIN — FENTANYL CITRATE 50 MCG: 50 INJECTION, SOLUTION INTRAMUSCULAR; INTRAVENOUS at 12:00

## 2018-07-18 RX ADMIN — CEFAZOLIN 1 G: 1 INJECTION, POWDER, FOR SOLUTION INTRAMUSCULAR; INTRAVENOUS at 17:20

## 2018-07-18 RX ADMIN — Medication 3 MG: at 14:02

## 2018-07-18 RX ADMIN — ALBUTEROL SULFATE 2.5 MG: 2.5 SOLUTION RESPIRATORY (INHALATION) at 19:23

## 2018-07-18 RX ADMIN — Medication 5 MG: at 12:24

## 2018-07-18 RX ADMIN — LIDOCAINE HYDROCHLORIDE 50 MG: 10 INJECTION, SOLUTION EPIDURAL; INFILTRATION; INTRACAUDAL; PERINEURAL at 11:37

## 2018-07-18 RX ADMIN — KETOROLAC TROMETHAMINE 30 MG: 30 INJECTION, SOLUTION INTRAMUSCULAR at 16:32

## 2018-07-18 RX ADMIN — PROPOFOL 30 MG: 10 INJECTION, EMULSION INTRAVENOUS at 14:22

## 2018-07-18 RX ADMIN — SODIUM CHLORIDE, POTASSIUM CHLORIDE, SODIUM LACTATE AND CALCIUM CHLORIDE: 600; 310; 30; 20 INJECTION, SOLUTION INTRAVENOUS at 12:00

## 2018-07-18 RX ADMIN — PRAVASTATIN SODIUM 40 MG: 40 TABLET ORAL at 16:33

## 2018-07-18 RX ADMIN — DOCUSATE SODIUM 100 MG: 100 CAPSULE, LIQUID FILLED ORAL at 16:33

## 2018-07-18 RX ADMIN — ALBUTEROL SULFATE 2.5 MG: 2.5 SOLUTION RESPIRATORY (INHALATION) at 16:07

## 2018-07-18 RX ADMIN — PROPOFOL 180 MG: 10 INJECTION, EMULSION INTRAVENOUS at 11:37

## 2018-07-18 RX ADMIN — ACETAMINOPHEN 1000 MG: 500 TABLET, FILM COATED ORAL at 16:32

## 2018-07-18 RX ADMIN — Medication 0.2 MG: at 12:12

## 2018-07-18 RX ADMIN — MIDAZOLAM HYDROCHLORIDE 2 MG: 2 INJECTION, SOLUTION INTRAMUSCULAR; INTRAVENOUS at 11:30

## 2018-07-18 RX ADMIN — CEFAZOLIN 2000 MG: 1 INJECTION, POWDER, FOR SOLUTION INTRAMUSCULAR; INTRAVENOUS at 11:42

## 2018-07-18 RX ADMIN — FENTANYL CITRATE 50 MCG: 50 INJECTION, SOLUTION INTRAMUSCULAR; INTRAVENOUS at 12:49

## 2018-07-18 RX ADMIN — Medication 5 MG: at 12:23

## 2018-07-18 RX ADMIN — ACETAMINOPHEN 1000 MG: 500 TABLET, FILM COATED ORAL at 23:34

## 2018-07-18 RX ADMIN — PREGABALIN 100 MG: 100 CAPSULE ORAL at 21:29

## 2018-07-18 RX ADMIN — FENTANYL CITRATE 50 MCG: 50 INJECTION, SOLUTION INTRAMUSCULAR; INTRAVENOUS at 12:08

## 2018-07-18 RX ADMIN — FENTANYL CITRATE 50 MCG: 50 INJECTION, SOLUTION INTRAMUSCULAR; INTRAVENOUS at 14:22

## 2018-07-18 RX ADMIN — ONDANSETRON 4 MG: 2 INJECTION, SOLUTION INTRAMUSCULAR; INTRAVENOUS at 14:00

## 2018-07-18 RX ADMIN — DEXAMETHASONE SODIUM PHOSPHATE 10 MG: 10 INJECTION INTRAMUSCULAR; INTRAVENOUS at 11:45

## 2018-07-18 RX ADMIN — SODIUM CHLORIDE: 9 INJECTION, SOLUTION INTRAVENOUS at 15:52

## 2018-07-18 RX ADMIN — TRAZODONE HYDROCHLORIDE 150 MG: 100 TABLET ORAL at 21:29

## 2018-07-18 RX ADMIN — ROCURONIUM BROMIDE 40 MG: 10 INJECTION, SOLUTION INTRAVENOUS at 11:37

## 2018-07-18 ASSESSMENT — PULMONARY FUNCTION TESTS
PIF_VALUE: 3
PIF_VALUE: 2
PIF_VALUE: 20
PIF_VALUE: 18
PIF_VALUE: 24
PIF_VALUE: 19
PIF_VALUE: 18
PIF_VALUE: 19
PIF_VALUE: 18
PIF_VALUE: 19
PIF_VALUE: 1
PIF_VALUE: 19
PIF_VALUE: 18
PIF_VALUE: 16
PIF_VALUE: 2
PIF_VALUE: 19
PIF_VALUE: 2
PIF_VALUE: 21
PIF_VALUE: 2
PIF_VALUE: 20
PIF_VALUE: 20
PIF_VALUE: 19
PIF_VALUE: 16
PIF_VALUE: 2
PIF_VALUE: 2
PIF_VALUE: 19
PIF_VALUE: 21
PIF_VALUE: 20
PIF_VALUE: 19
PIF_VALUE: 20
PIF_VALUE: 16
PIF_VALUE: 17
PIF_VALUE: 3
PIF_VALUE: 2
PIF_VALUE: 21
PIF_VALUE: 2
PIF_VALUE: 18
PIF_VALUE: 3
PIF_VALUE: 2
PIF_VALUE: 20
PIF_VALUE: 3
PIF_VALUE: 20
PIF_VALUE: 18
PIF_VALUE: 20
PIF_VALUE: 2
PIF_VALUE: 20
PIF_VALUE: 2
PIF_VALUE: 18
PIF_VALUE: 9
PIF_VALUE: 18
PIF_VALUE: 18
PIF_VALUE: 17
PIF_VALUE: 16
PIF_VALUE: 20
PIF_VALUE: 16
PIF_VALUE: 18
PIF_VALUE: 20
PIF_VALUE: 15
PIF_VALUE: 17
PIF_VALUE: 20
PIF_VALUE: 20
PIF_VALUE: 16
PIF_VALUE: 16
PIF_VALUE: 2
PIF_VALUE: 22
PIF_VALUE: 18
PIF_VALUE: 20
PIF_VALUE: 20
PIF_VALUE: 16
PIF_VALUE: 2
PIF_VALUE: 16
PIF_VALUE: 3
PIF_VALUE: 31
PIF_VALUE: 19
PIF_VALUE: 20
PIF_VALUE: 17
PIF_VALUE: 16
PIF_VALUE: 20
PIF_VALUE: 16
PIF_VALUE: 19
PIF_VALUE: 17
PIF_VALUE: 3
PIF_VALUE: 16
PIF_VALUE: 15
PIF_VALUE: 18
PIF_VALUE: 16
PIF_VALUE: 20
PIF_VALUE: 18
PIF_VALUE: 20
PIF_VALUE: 16
PIF_VALUE: 2
PIF_VALUE: 17
PIF_VALUE: 19
PIF_VALUE: 1
PIF_VALUE: 19
PIF_VALUE: 20
PIF_VALUE: 13
PIF_VALUE: 18
PIF_VALUE: 1
PIF_VALUE: 19
PIF_VALUE: 18
PIF_VALUE: 16
PIF_VALUE: 1
PIF_VALUE: 22
PIF_VALUE: 16
PIF_VALUE: 18
PIF_VALUE: 9
PIF_VALUE: 19
PIF_VALUE: 2
PIF_VALUE: 2
PIF_VALUE: 19
PIF_VALUE: 3
PIF_VALUE: 20
PIF_VALUE: 16
PIF_VALUE: 16
PIF_VALUE: 18
PIF_VALUE: 18
PIF_VALUE: 20
PIF_VALUE: 21
PIF_VALUE: 20
PIF_VALUE: 16
PIF_VALUE: 20
PIF_VALUE: 16
PIF_VALUE: 23
PIF_VALUE: 2
PIF_VALUE: 20
PIF_VALUE: 18
PIF_VALUE: 20
PIF_VALUE: 20
PIF_VALUE: 1
PIF_VALUE: 20
PIF_VALUE: 18
PIF_VALUE: 21
PIF_VALUE: 3
PIF_VALUE: 17
PIF_VALUE: 11
PIF_VALUE: 17
PIF_VALUE: 16
PIF_VALUE: 2
PIF_VALUE: 20
PIF_VALUE: 18
PIF_VALUE: 2
PIF_VALUE: 18
PIF_VALUE: 21
PIF_VALUE: 20
PIF_VALUE: 20
PIF_VALUE: 18
PIF_VALUE: 20
PIF_VALUE: 18
PIF_VALUE: 18
PIF_VALUE: 20
PIF_VALUE: 19
PIF_VALUE: 20
PIF_VALUE: 20
PIF_VALUE: 30
PIF_VALUE: 18
PIF_VALUE: 1
PIF_VALUE: 16
PIF_VALUE: 16
PIF_VALUE: 18
PIF_VALUE: 16
PIF_VALUE: 16
PIF_VALUE: 20
PIF_VALUE: 20
PIF_VALUE: 26
PIF_VALUE: 20
PIF_VALUE: 2
PIF_VALUE: 18
PIF_VALUE: 18
PIF_VALUE: 16
PIF_VALUE: 20
PIF_VALUE: 10

## 2018-07-18 ASSESSMENT — PAIN SCALES - GENERAL
PAINLEVEL_OUTOF10: 7
PAINLEVEL_OUTOF10: 7
PAINLEVEL_OUTOF10: 4
PAINLEVEL_OUTOF10: 0

## 2018-07-18 ASSESSMENT — ENCOUNTER SYMPTOMS: SHORTNESS OF BREATH: 1

## 2018-07-18 NOTE — ANESTHESIA PRE PROCEDURE
mouth every evening **stop Crestor** 3/1/18   Beba Osborn MD   lisinopril (PRINIVIL;ZESTRIL) 20 MG tablet TAKE 1 TABLET TWICE A DAY 3/1/18   Beba Osborn MD   Glucose Blood (BLOOD GLUCOSE TEST STRIPS) STRP tesing once a day fasting 3/1/18   Beba Osborn MD   enzalutamide (XTANDI) 40 MG capsule Take 40 mg by mouth Indications: take four 40 mg capsules daily. Historical Provider, MD   Umeclidinium Bromide (INCRUSE ELLIPTA) 62.5 MCG/INH AEPB Inhale 1 each into the lungs daily Per Dr. Mathur Mail 5/6/17   Beba Osborn MD   FOLBEE 2.5-25-1 MG TABS tablet  4/27/17   Historical Provider, MD   allopurinol (ZYLOPRIM) 100 MG tablet Take 1 tablet by mouth daily 5/4/17   Beba Osborn MD   colchicine (COLCRYS) 0.6 MG tablet ADJUST SIG-ONLY AS NEEDED FOR GOUT ATTACK. Take 2 tablets now, then 1 tablet one hour later. Wait 12 hours then start 1 tablet bid for 5 days.  4/11/17   Beba Osborn MD   albuterol (PROVENTIL) (2.5 MG/3ML) 0.083% nebulizer solution Take 2.5 mg by nebulization 4 times daily  3/2/17   Historical Provider, MD   albuterol sulfate  (90 BASE) MCG/ACT inhaler Inhale 2 puffs into the lungs every 6 hours as needed for Wheezing or Shortness of Breath (COUGH) 3/23/17   Beba Osborn MD   SYMBICORT 160-4.5 MCG/ACT AERO Inhale 2 puffs into the lungs 2 times daily 3/23/17   Beba Osborn MD   aspirin EC 81 MG EC tablet Take 1 tablet by mouth daily 3/23/17   Beba Osborn MD   omeprazole (PRILOSEC) 20 MG capsule Take 1 capsule by mouth 2 times daily  Patient taking differently: Take 20 mg by mouth Daily  1/20/16   Tyesha Swann MD       Current medications:    Current Facility-Administered Medications   Medication Dose Route Frequency Provider Last Rate Last Dose    lactated ringers infusion   Intravenous Continuous Mahendra Owen MD        sodium chloride flush 0.9 % injection 10 mL  10 mL Intravenous 2 times per day Mahendra Owen MD        sodium chloride flush 0.9 % injection 10 mL  10 mL Intravenous PRN Jaden Arce MD        lidocaine PF 1 % injection 1 mL  1 mL Intradermal Once PRN Jaden Arce MD        ceFAZolin (ANCEF) 2 g in dextrose 5 % 50 mL IVPB  2 g Intravenous On Call to [address], MD        sodium chloride flush 0.9 % injection 10 mL  10 mL Intravenous 2 times per day Irina Jarquin MD        sodium chloride flush 0.9 % injection 10 mL  10 mL Intravenous PRN Irina Jarquin MD        dexamethasone (DECADRON) injection 10 mg  10 mg Intravenous Once Irina Jarquin MD        ceFAZolin (ANCEF) 1 g injection                Allergies:     Allergies   Allergen Reactions    Succinylcholine Chloride Other (See Comments)     PATIENT HAS PSEUDOCHOLINESTERASE DEFICIENCY      Cymbalta [Duloxetine Hcl]      Taste loss         Problem List:    Patient Active Problem List   Diagnosis Code    Essential hypertension I10    Gout M10.9    Hyperlipidemia with target LDL less than 100 E78.5    Osteoporosis M81.0    Prostate CA ([facility] [address] C61    Moderate episode of recurrent major depressive disorder ([facility] [address] F33.1    Gastroesophageal reflux disease without esophagitis K21.9    Obesity, Class III, BMI 40-49.9 (morbid obesity) ([facility]) E66.01    DDD (degenerative disc disease), lumbar M51.36    Chronic bilateral low back pain with bilateral sciatica M54.42, M54.41, G89.29    Osteoarthritis of spine with radiculopathy, lumbar region M47.26    Type 2 diabetes mellitus with diabetic autonomic neuropathy, without long-term current use of insulin ([facility]) E11.43    PVD (peripheral vascular disease) with claudication ([facility]) I73.9    Peripheral neuropathic pain M79.2    Pruritic dermatitis L29.9    Tinea pedis of both feet B35.3    Hyperglycemia R73.9    Nonspecific reactive hepatitis K75.2    COPD mixed type ([facility]) J44.9    Status post lumbar laminectomy Z98.890    Polypharmacy Z79.899    Pseudocholinesterase deficiency E88.09    Vitamin D deficiency

## 2018-07-18 NOTE — PLAN OF CARE
Problem: Infection:  Goal: Will remain free from infection  Will remain free from infection  Outcome: Ongoing  PT remained free of s/s of infection  PT received antibiotics as ordered    Problem: Daily Care:  Goal: Daily care needs are met  Daily care needs are met  Outcome: Ongoing      Problem: Pain:  Goal: Patient's pain/discomfort is manageable  Patient's pain/discomfort is manageable  Outcome: Ongoing  Adequate pain control achieved this shift. See MAR.

## 2018-07-18 NOTE — PROGRESS NOTES
Pt arried to floor form surgery via bed at this time.   Assessment complete, vital signs obtained, no distressed noted RN will continue to monitor the PT.

## 2018-07-18 NOTE — H&P (VIEW-ONLY)
HISTORY and Tre[address] Quezada [id number]       NAME:  Lior Quezada  MRN: [id number]   YOB: 1955   Date: 7/9/2018   Age: 58 y.o. Gender: male       COMPLAINT AND PRESENT HISTORY:      Lior Quezada is 58 y.o.,  male, undergoing preadmission testing for left Shoulder rotator cuff tear, impingement. Patient injured the left Shoulder on June 11, 2018. Pt has a Complete tear of left rotator cuff. Pt has had xray, CT and MRI. Done to confirm. patient states that he had a fall after tripping over a dog and entangled leash. Pt states that he fell off the deck landing on his shoulder. Pt states that he had an immediate pop out to the shoulder with an fissing sensation. Pt reports lying 40 minutes before being able to get up. Pt c/o pain, weakness, stiffness, popping and limitation in the range of motion Pt states that he is unable to do any movements with his right arm, he states he was in a sling. Pt rates his pain at 6/10 without movement and can reach up to 9/10 with movement. Pt takes [place] for his discomfort. No recent falls or trauma. No redness or  swelling . PAST MEDICAL HISTORY     Past Medical History:   Diagnosis Date    Acid reflux     Arthritis     Chronic bilateral low back pain with bilateral sciatica 11/3/2016    Gout     H/O cardiac catheterization     yrs ago   no stents    Hyperlipidemia     Hyperlipidemia with target LDL less than 100 1/20/2016    Hypertension     Knee pain, chronic     left    MDRO (multiple drug resistant organisms) resistance     Moderate episode of recurrent major depressive disorder ([facility] [address] 1/20/2016    REYNALDO on CPAP 5/6/2017    Polypharmacy 3/25/2017    Prolonged emergence from general anesthesia 01/05/2017    Patient \"on life support for 3 days\" after back surgery due to being given succinylcholine    Prostate cancer ([address] 10/2013    finished radiation tx 5/2014    Pseudocholinesterase deficiency 03/25/2017    Pt. \"on life support\" for 3 days after back surgery 1/5/17 after being given succinylcholine    Short of breath on exertion     Sleep apnea     uses CPAP machine nightly    Status post lumbar laminectomy 3/25/2017    Tubular adenoma of colon 4/11/17 5/13/2017    Type 2 diabetes mellitus with hyperglycemia, without long-term current use of insulin ([facility] [address] 3/25/2017    Lab Results Component Value Date  LABA1C 7.1 (H) 03/23/2017     Vitamin D deficiency 3/25/2017    Wears glasses        Pt denies any history of Diabetes mellitus type 2, hypertension, stroke, heart disease, COPD, Asthma, GERD, HLD, Cancer, Seizures,Thyroid disease, Kidney Disease, Hepatitis, TB, Psychiatric Disorders or Substance abuse. SURGICAL HISTORY       Past Surgical History:   Procedure Laterality Date    BACK SURGERY      x 2     BACK SURGERY  01/05/2017    lumbar laminectomy L2, L3, L4    CARDIAC CATHETERIZATION  2007    no stents    CARPAL TUNNEL RELEASE Bilateral     KNEE SURGERY Left     LUMBAR LAMINECTOMY  01/05/2017    L2-L4    HI COLONOSCOPY W/BIOPSY SINGLE/MULTIPLE N/A 5/9/2017    COLONOSCOPY WITH BIOPSY performed by Bev Malone DO at . [address]     rotator cuff repair    TONSILLECTOMY AND ADENOIDECTOMY         FAMILY HISTORY       Family History   Problem Relation Age of Onset    Diabetes Mother     Lung Cancer Brother     Liver Cancer Brother     Cancer Father        SOCIAL HISTORY       Social History     Social History    Marital status:      Spouse name: N/A    Number of children: N/A    Years of education: N/A     Social History Main Topics    Smoking status: Current Every Day Smoker     Packs/day: 0.75     Years: 30.00     Types: Cigarettes    Smokeless tobacco: Never Used    Alcohol use No    Drug use: No    Sexual activity: Not Currently     Partners: Female     Other Topics Concern    None     Social History Narrative    None           REVIEW OF SYSTEMS Allergies   Allergen Reactions    Succinylcholine Chloride Other (See Comments)     PATIENT HAS PSEUDOCHOLINESTERASE DEFICIENCY      Cymbalta [Duloxetine Hcl]      Taste loss         Current Outpatient Prescriptions on File Prior to Encounter   Medication Sig Dispense Refill    HYDROcodone-acetaminophen (NORCO) 7.5-325 MG per tablet 1-2 tabs PO every 4-6 hours as needed for pain. 40 tablet 0    pregabalin (LYRICA) 100 MG capsule Take 1 capsule by mouth 3 times daily for 90 days. . 270 capsule 0    amLODIPine (NORVASC) 10 MG tablet Take 1 tablet by mouth daily Dose increased  6/1/2018. STOP ATENOLOL 90 tablet 3    nystatin (MYCOSTATIN) 462679 UNIT/GM powder Apply 3 times daily for feet 56.7 g 3    clotrimazole-betamethasone (LOTRISONE) 1-0.05 % cream Apply topically at bedtime on the feet x 4 weeks 45 g 0    mupirocin (BACTROBAN) 2 % ointment Apply topically 1-2 times daily on the affected area . OK to substitute to cream 30 g 2    ammonium lactate (AMLACTIN) 12 % cream       NONFORMULARY Bioidentical pain cream      furosemide (LASIX) 20 MG tablet Take 1 tablet by mouth daily **stop Tenoretic 50-25** 90 tablet 3    traZODone (DESYREL) 150 MG tablet Take 1 tablet by mouth nightly Insomnia 90 tablet 3    FLUoxetine (PROZAC) 40 MG capsule Take 1 capsule by mouth daily 90 capsule 3    pravastatin (PRAVACHOL) 40 MG tablet Take 1 tablet by mouth every evening **stop Crestor** 90 tablet 3    lisinopril (PRINIVIL;ZESTRIL) 20 MG tablet TAKE 1 TABLET TWICE A  tablet 3    enzalutamide (XTANDI) 40 MG capsule Take 40 mg by mouth Indications: take four 40 mg capsules daily.  Umeclidinium Bromide (INCRUSE ELLIPTA) 62.5 MCG/INH AEPB Inhale 1 each into the lungs daily Per Dr. Megha Gruber 30 each 11    FOLBEE 2.5-25-1 MG TABS tablet       allopurinol (ZYLOPRIM) 100 MG tablet Take 1 tablet by mouth daily 90 tablet 0    colchicine (COLCRYS) 0.6 MG tablet ADJUST SIG-ONLY AS NEEDED FOR GOUT ATTACK.  Take 2 tablets now, then 1 tablet one hour later. Wait 12 hours then start 1 tablet bid for 5 days. 90 tablet 1    albuterol (PROVENTIL) (2.5 MG/3ML) 0.083% nebulizer solution Take 2.5 mg by nebulization 4 times daily       albuterol sulfate  (90 BASE) MCG/ACT inhaler Inhale 2 puffs into the lungs every 6 hours as needed for Wheezing or Shortness of Breath (COUGH) 18 g 0    SYMBICORT 160-4.5 MCG/ACT AERO Inhale 2 puffs into the lungs 2 times daily 10.2 g 3    aspirin EC 81 MG EC tablet Take 1 tablet by mouth daily 30 tablet 0    omeprazole (PRILOSEC) 20 MG capsule Take 1 capsule by mouth 2 times daily (Patient taking differently: Take 20 mg by mouth Daily ) 90 capsule 1    Glucose Blood (BLOOD GLUCOSE TEST STRIPS) STRP tesing once a day fasting 100 strip 3     No current facility-administered medications on file prior to encounter. General health:  Fairly good. No fever or chills. Skin:  No itching, redness or rash. HEENT:  No headache, epistaxis or sore throat. Neck:  No pain, stiffness or masses. Cardiovascular/Respiratory system:  No chest pain, palpitation or shortness of breath. Gastrointestinal tract: No abdominal pain, nausea, vomiting, diarrhea or constipation. Genitourinary:  No burning on micturition. No hesitancy, urgency, frequency or discoloration of urine. Locomotor:  See HPI. Neuropsychiatric:  No referable complaints. GENERAL PHYSICAL EXAM:     Vitals: BP (!) 112/56   Pulse 62   Temp 98.1 °F (36.7 °C)   Resp 18   Ht 5' 11\" (1.803 m)   Wt 262 lb (118.8 kg)   SpO2 99%   BMI 36.54 kg/m²  Body mass index is 36.54 kg/m². GENERAL APPEARANCE:   Grayson Coyle is 58 y.o.,  male, moderately obese, nourished, conscious, alert. Does not appear to be distress or pain at this time.                             SKIN:  Warm, dry, no cyanosis or jaundice. HEAD:  Normocephalic, atraumatic, no swelling or tenderness. EYES:  Pupils equal, reactive to light. EARS:  No discharge, no marked hearing loss. NOSE:  No rhinorrhea, epistaxis or septal deformity. THROAT:  Not congested. No ulceration bleeding or discharge. NECK:  No stiffness, trachea central.  No palpable masses or L.N.                 CHEST:  Symmetrical and equal on expansion. HEART:  RRR S1 > S2. No audible murmurs or gallops. LUNGS:  Equal on expansion, normal breath sounds. No adventitious sounds. ABDOMEN:  Obese and distended. No localized tenderness. No guarding or rigidity. No palpable organomegaly. LYMPHATICS:  No palpable cervical lymphadenopathy. LOCOMOTOR, BACK AND SPINE:  No tenderness or deformities. EXTREMITIES:  Symmetrical, no pedal edema. Ginos sign negative. No discoloration or ulcerations. Unable to lift left arm, tender. Pt has drop foot to right. Using a cane. NEUROLOGIC:  The patient is conscious, alert, oriented, No apparent focal sensory or motor deficits.                                                                                    PROVISIONAL DIAGNOSES / SURGERY:      Complete tear of left rotator cuff    SHOULDER TOTAL ARTHROPLASTY REVERSE LEFT DJO & BICEP TENDON TRANSFER Left    Patient Active Problem List    Diagnosis Date Noted    REYNALDO on CPAP 05/06/2017     Priority: High    Status post lumbar laminectomy 03/25/2017     Priority: High    Tubular adenoma of colon 4/11/17 04/11/2017     Priority: Medium    Bradycardia 06/02/2018    Psychophysiological insomnia 01/03/2018    Positive depression screening 01/03/2018    Hypertriglyceridemia 05/06/2017    Positive FIT (fecal immunochemical test) 04/11/2017    Type 2 diabetes mellitus with diabetic autonomic neuropathy, without

## 2018-07-18 NOTE — BRIEF OP NOTE
Brief Postoperative Note    Stephany Jean  YOB: 1955  [id number]    Pre-operative Diagnosis: Left massive rotator cuff tear with pseudoparalysis and anterosuperior escape    Post-operative Diagnosis: Same    Procedure: Left reverse shoulder arthroplasty, biceps tendon transfer    Anesthesia: General    Surgeons/Assistants: Hyacinth Diggs MD; Marco Burt DO (PGY 4)    Estimated Blood Loss: 691     Complications: None    Specimens: Was Not Obtained    Findings: See operative report    Electronically signed by Luke Collet, MD on 7/18/2018 at 2:42 PM

## 2018-07-18 NOTE — ANESTHESIA POSTPROCEDURE EVALUATION
POST- ANESTHESIA EVALUATION       Pt Name: Doris Sandoval  MRN: [id number]  YOB: 1955  Date of evaluation: 7/18/2018  Time:  3:02 PM      BP (!) 120/47   Pulse (!) 49   Temp 96.8 °F (36 °C) (Infrared)   Resp 15   Ht 5' 11\" (1.803 m)   Wt 262 lb (118.8 kg)   SpO2 95%   BMI 36.54 kg/m²      Consciousness Level  Awake  Cardiopulmonary Status  Stable  Pain Adequately Treated YES  Nausea / Vomiting  NO  Adequate Hydration  YES  Anesthesia Related Complications NONE      Electronically signed by Rod Oh MD on 7/18/2018 at 3:02 PM       Department of Anesthesiology  Postprocedure Note    Patient: Doris Sandoval  MRN: [id number]  YOB: 1955  Date of evaluation: 7/18/2018  Time:  3:01 PM     Procedure Summary     Date:  07/18/18 Room / Location:  [place] [address]    Anesthesia Start:  1129 Anesthesia Stop:  1441    Procedure:  SHOULDER TOTAL ARTHROPLASTY REVERSE LEFT DJO & BICEP TENDON TRANSFER (Left Shoulder) Diagnosis:  (MASSIVE ROTATOR CUFF TEAR LEFT SHOULDER )    Surgeon:  Alex Jaeger MD Responsible Provider:  Rod Oh MD    Anesthesia Type:  general, regional ASA Status:  3          Anesthesia Type: general, regional    Benigno Phase I:      Benigno Phase II:      Last vitals: Reviewed and per EMR flowsheets.        Anesthesia Post Evaluation

## 2018-07-18 NOTE — OP NOTE
Operative Report     Date of Procedure: 7/18/2018     Preop Diagnosis:   Left shoulder massive rotator cuff tear (M75.122) with pseudoparalysis and anterosuperior escape    Postop Diagnosis:   Left shoulder massive rotator cuff tear (M75.122) with pseudoparalysis and anterosuperior escape    Operation:     1. Left Reverse Shoulder Arthroplasty (69187)    2. Leftopen biceps tendon transfer (92887)    Surgeon: Becky Taveras MD    Surgical Assistant:  Martinez Starks DO (PGY 4)     Anesthesia: General with left interscalene nerve block    EBL: 100 cc    Summary of findings:  Massive rotator cuff tear      Implants used:  DJO Altivate Reverse Shoulder    - Uncemented baseplate with four 5.0 mm locking screws screws measuring 26  mm, 26 mm, 22 mm, 18 mm   - 32 Neutral Glenosphere   - Uncemented AltiVate size 10 Humeral Stem   - 32 +4 Semi-constrained Poly Insert    Indications: Lior Quezada is a 58 y.o. old male who presents with severe functionally disabling shoulder pain, instability, and pseudoparalysis following a fall. An MRI demonstrated him to have a massive rotator cuff tear with anterosuperior subluxation. Following a discussion with regards to his treatment options including continued non-surgical treatment and operative intervention, he elected to forgo continued attempts at conservative treatment and proceed with surgery by way of a left reverse shoulder replacement. Risks, benefits, and alternatives were fully discussed. Postoperative limitations and limitations of the procedure were discussed in detail. The patient understood risks, alternatives, complications, & post-operative limitations and wishes to proceed. Operative Report: Following appropriate identification of the patient and his operative extremity in the preoperative area, consent was reviewed with patient. Risks, benefits, and alternatives were discussed. All questions were answered.  The anesthesia team administered a left interscalene nerve block and the patient was taken back to the OR and transferred onto the operative table. The anesthesia team administered a general anesthetic and established/secured his airway . SCDs/TEDs were placed on both legs. The patient was carefully positioned and secured in the beach-chair position, padding all prominences. The left upper extremity was prepped and draped in the standard sterile fashion. The patient finished a course of pre-operative antibiotics by way of 3 g of IV Ancef. A time out was performed during which verification of surgical site, patient identification, and surgical procedure planned were confirmed by the circulating nurse and anesthetic team.    A 12 cm deltopectoral incision was made. The cephalic vein was identified and protected medially. The subdeltoid, subcoracoid and subacromial spaces were developed and released. There is complete tear of the subscapularis, supraspinatus, and infraspinatus. The humeral head was subluxated anterosuperiorly. The biceps tendon was dislocated posteriorly behind the humeral head into the joint. The axillary nerve was identified and protected. Given the severity of this pathology, the decision was made to transfer the long head of the biceps tendon to the pectoralis major tendon and resect the intra-articular section. Thus, the biceps tendon was transferred to the pectoralis major tendon using two number 2 ethibond sutures, resulting in a stable tendon transfer. The inferior capsule was released, and the humeral head was atraumatically dislocated. An osteotomy of the humeral head was made in 30 degrees of retroversion using the appropriate jig. Attention was directed to the glenoid. The axillary nerve was identified and protected. The tug test was normal.  A 360 degree santiago-glenoid release was then performed.  The position for the centering hole was identified along the inferior half of the glenoid using the anatomic center line and drilled using a 2.5mm drill bit. The depth measured 28 mm. A 6.5mm tap was then inserted which served as a guide for cannulated reamers. Excellent purchase was noted on the tap. Sequential cannulated glenoid reamers were then used to prepare the glenoid, obtaining 100% coverage. The tap was removed, and the baseplate was inserted to achieve maximum compression on the glenoid. 4 additional 5 mm locking locking screws were placed after appropriate  holes measuring 26 mm, 26 mm, 22 mm, and 18 mm were drilled using a 4.0 mm drill. The periphery of the baseplate was co-planed and potential impingement points resected. A 32 neutral glenosphere component was then impacted onto the baseplate morise taper. The morise taper was then tested and ensured to be properly engaged. The retention screw was then placed. Attention was focused back on the proximal humerus. The metaphyseal reamer was used to shape the proximal Humerus. The canal was hand sounded to 10 mm. Four #2 fiberwire sutures were placed through bone tunnels in the lesser tuberosity to repair the subscapularis. Bone graft from the resected humeral head was placed into the graft hole on the implant and into the humeral canal.  The size 10 Altivate humeral stem was then impacted in 30 degrees of retroversion obtaining an excellent press fit. The 32 standard trial was inserted, the shoulder reduced and taken through a range of motion demonstrating forward elevation of 140, 40 of ER; however it was unstable. The trial was removed and switched to a 32+4 semi-constrained insert. Shoulder was reduced again taken through a range of motion similar motion as previously achieved and good stability. The trial was removed, and the 32+4 semi-constrained polyethylene was impacted on to the stem and the shoulder was reduced. Even after extensive releases the subscapularis was not mobilizable to be repaired consequently it was not.   The axillary nerve was examined and was noted to be intact and move freely without tension. Copious amounts of irrigation was used to irrigate the shoulder. The deltopectoral interval was closed loosely with 0 vicryl. Subcutaneous closure with 3-0 Vicryl. The skin was closed with a 3-0 prolene in a running subcuticular fashion followed by steri-strips. Sterile dressings were applied using 4 x 4 gauze and Tegaderm. The patient's arm was placed in a shoulder immobilizer. he was awoken, transferred to his bed and taken to recovery room in stable condition.        Complications: None    Condition: Stable

## 2018-07-19 VITALS
RESPIRATION RATE: 14 BRPM | DIASTOLIC BLOOD PRESSURE: 56 MMHG | SYSTOLIC BLOOD PRESSURE: 125 MMHG | HEART RATE: 58 BPM | HEIGHT: 71 IN | WEIGHT: 262 LBS | BODY MASS INDEX: 36.68 KG/M2 | TEMPERATURE: 97.8 F | OXYGEN SATURATION: 94 %

## 2018-07-19 PROCEDURE — 6370000000 HC RX 637 (ALT 250 FOR IP): Performed by: ORTHOPAEDIC SURGERY

## 2018-07-19 PROCEDURE — 97530 THERAPEUTIC ACTIVITIES: CPT

## 2018-07-19 PROCEDURE — G8980 MOBILITY D/C STATUS: HCPCS

## 2018-07-19 PROCEDURE — 99024 POSTOP FOLLOW-UP VISIT: CPT | Performed by: ORTHOPAEDIC SURGERY

## 2018-07-19 PROCEDURE — 6360000002 HC RX W HCPCS: Performed by: ORTHOPAEDIC SURGERY

## 2018-07-19 PROCEDURE — G8978 MOBILITY CURRENT STATUS: HCPCS

## 2018-07-19 PROCEDURE — G8979 MOBILITY GOAL STATUS: HCPCS

## 2018-07-19 PROCEDURE — 99221 1ST HOSP IP/OBS SF/LOW 40: CPT | Performed by: INTERNAL MEDICINE

## 2018-07-19 PROCEDURE — 2580000003 HC RX 258: Performed by: ORTHOPAEDIC SURGERY

## 2018-07-19 PROCEDURE — 97161 PT EVAL LOW COMPLEX 20 MIN: CPT

## 2018-07-19 PROCEDURE — 97165 OT EVAL LOW COMPLEX 30 MIN: CPT

## 2018-07-19 RX ORDER — CEFAZOLIN SODIUM 1 G/3ML
INJECTION, POWDER, FOR SOLUTION INTRAMUSCULAR; INTRAVENOUS PRN
Status: DISCONTINUED | OUTPATIENT
Start: 2018-07-18 | End: 2018-07-19 | Stop reason: SDUPTHER

## 2018-07-19 RX ADMIN — DOCUSATE SODIUM 100 MG: 100 CAPSULE, LIQUID FILLED ORAL at 08:07

## 2018-07-19 RX ADMIN — FUROSEMIDE 20 MG: 20 TABLET ORAL at 08:07

## 2018-07-19 RX ADMIN — ASPIRIN 81 MG: 81 TABLET, COATED ORAL at 08:07

## 2018-07-19 RX ADMIN — ACETAMINOPHEN 1000 MG: 500 TABLET, FILM COATED ORAL at 06:06

## 2018-07-19 RX ADMIN — AMLODIPINE BESYLATE 10 MG: 10 TABLET ORAL at 08:08

## 2018-07-19 RX ADMIN — LISINOPRIL 20 MG: 20 TABLET ORAL at 08:08

## 2018-07-19 RX ADMIN — FLUOXETINE 40 MG: 20 CAPSULE ORAL at 08:06

## 2018-07-19 RX ADMIN — PANTOPRAZOLE SODIUM 40 MG: 40 TABLET, DELAYED RELEASE ORAL at 06:06

## 2018-07-19 RX ADMIN — ACETAMINOPHEN 1000 MG: 500 TABLET, FILM COATED ORAL at 11:35

## 2018-07-19 RX ADMIN — KETOROLAC TROMETHAMINE 30 MG: 30 INJECTION, SOLUTION INTRAMUSCULAR at 00:50

## 2018-07-19 RX ADMIN — KETOROLAC TROMETHAMINE 30 MG: 30 INJECTION, SOLUTION INTRAMUSCULAR at 08:08

## 2018-07-19 RX ADMIN — CEFAZOLIN 1 G: 1 INJECTION, POWDER, FOR SOLUTION INTRAMUSCULAR; INTRAVENOUS at 00:50

## 2018-07-19 RX ADMIN — PREGABALIN 100 MG: 100 CAPSULE ORAL at 08:07

## 2018-07-19 RX ADMIN — SODIUM CHLORIDE: 9 INJECTION, SOLUTION INTRAVENOUS at 00:56

## 2018-07-19 RX ADMIN — ALLOPURINOL 100 MG: 100 TABLET ORAL at 08:07

## 2018-07-19 RX ADMIN — CEFAZOLIN 1 G: 1 INJECTION, POWDER, FOR SOLUTION INTRAMUSCULAR; INTRAVENOUS at 08:18

## 2018-07-19 RX ADMIN — OXYCODONE HYDROCHLORIDE 5 MG: 5 TABLET ORAL at 11:36

## 2018-07-19 ASSESSMENT — PAIN DESCRIPTION - ORIENTATION
ORIENTATION: LEFT
ORIENTATION: LEFT

## 2018-07-19 ASSESSMENT — PAIN DESCRIPTION - LOCATION
LOCATION: SHOULDER
LOCATION: SHOULDER

## 2018-07-19 ASSESSMENT — PAIN DESCRIPTION - PAIN TYPE
TYPE: SURGICAL PAIN;ACUTE PAIN
TYPE: SURGICAL PAIN

## 2018-07-19 ASSESSMENT — PAIN SCALES - GENERAL
PAINLEVEL_OUTOF10: 6
PAINLEVEL_OUTOF10: 7
PAINLEVEL_OUTOF10: 5
PAINLEVEL_OUTOF10: 5
PAINLEVEL_OUTOF10: 7
PAINLEVEL_OUTOF10: 5
PAINLEVEL_OUTOF10: 5

## 2018-07-19 ASSESSMENT — PAIN DESCRIPTION - FREQUENCY: FREQUENCY: CONTINUOUS

## 2018-07-19 ASSESSMENT — PAIN DESCRIPTION - DESCRIPTORS
DESCRIPTORS: ACHING
DESCRIPTORS: ACHING

## 2018-07-19 ASSESSMENT — PAIN DESCRIPTION - PROGRESSION: CLINICAL_PROGRESSION: RAPIDLY IMPROVING

## 2018-07-19 NOTE — PROGRESS NOTES
[address] [facility]    Occupational Therapy Evaluation  Date: 18  Patient Name: Mina Rodriguez       Room: 9581/8493-62  MRN: [id number]  Account: [de-identified]   : 1955  (61 y.o.) Gender: male     Discharge Recommendations:  Home with assist PRN    Referring Practitioner: Dr. Carol Boo  Diagnosis: L reverse TSA with biceps tendon transfer 18    Past Medical History:  has a past medical history of Acid reflux; Arthritis; Chronic bilateral low back pain with bilateral sciatica; Gout; H/O cardiac catheterization; Hyperlipidemia; Hyperlipidemia with target LDL less than 100; Hypertension; Knee pain, chronic; MDRO (multiple drug resistant organisms) resistance; Moderate episode of recurrent major depressive disorder ([facility] REYNALDO on CPAP; Polypharmacy; Prolonged emergence from general anesthesia; Prostate cancer ([facility] Pseudocholinesterase deficiency; Short of breath on exertion; Sleep apnea; Status post lumbar laminectomy; Tubular adenoma of colon 17; Type 2 diabetes mellitus with hyperglycemia, without long-term current use of insulin ([facility] Vitamin D deficiency; and Wears glasses. Past Surgical History:   has a past surgical history that includes knee surgery (Left); lumbar laminectomy (2017); Tonsillectomy and adenoidectomy; Cardiac catheterization (); pr colonoscopy w/biopsy single/multiple (N/A, 2017); shoulder surgery (Right, ?); back surgery; back surgery (2017); Carpal tunnel release (Bilateral); and pr reconstr total shoulder implant (Left, 2018).     Restrictions  Restrictions/Precautions: Fall Risk, Surgical Protocols, General Precautions  Implants present? : Metal implants (L reverse TSA)  Required Braces or Orthoses?: Yes (L shoulder immobilizer)     Vitals  Temp: 97.8 °F (36.6 °C)  Pulse: 58  Resp: 14  BP: (!) 125/56  Height: 5' 11\" (180.3 cm)  Weight: 262 lb (118.8 kg)  BMI (Calculated): 36.6  Oxygen Therapy  SpO2: 94 %  Pulse Oximeter Device Mode: Intermittent  Pulse Oximeter Device Location: Finger  O2 Device: None (Room air)  O2 Flow Rate (L/min): 3 L/min  Level of Consciousness: Alert    Subjective  Subjective: \"I think it was sleeping in the bes last night - my pain was just out of control this morning and I had a headache. I don't usually get headaches. Once I got up to this recliner it just all went away\"  Comments: Pt reporting that he will be sleeping in a recliner at home and that he has been for awhile leading up to surgery. Overall Orientation Status: Within Functional Limits  Vision  Vision: Impaired  Vision Exceptions: Wears glasses at all times  Hearing  Hearing: Within functional limits  Social/Functional History  Lives With: Spouse  Type of Home: House  Home Layout: One level  Home Access: Stairs to enter with rails, Ramped entrance  [address] - Number of Steps: 2  Entrance Stairs - Rails: Both  Bathroom Shower/Tub: Walk-in shower  Bathroom Toilet: Handicap height  Bathroom Equipment: Grab bars in shower, Built-in shower seat  Bathroom Accessibility: Accessible  Home Equipment: Rolling walker, Cane  ADL Assistance: Independent  Homemaking Assistance: Independent  Homemaking Responsibilities: Yes (Shares duties with spouse)  Ambulation Assistance: Independent (w/cane)  Transfer Assistance: Independent  Active : Yes  Occupation: Retired  Additional Comments: Pt's spouse is also retired and able to A as needed. Objective  Vision - Basic Assessment  Patient Visual Report: No visual complaint reported. Cognition  Overall Cognitive Status: WFL   Perception  Overall Perceptual Status: WFL  Sensation  Overall Sensation Status: Impaired  Additional Comments: Reports premorbid numbness in digits 3-5 on B hands, slightly worse in L hand post-op   ADL  Feeding: Setup  Grooming: Setup, Increased time to complete  UE Bathing: Minimal assistance  LE Bathing: Minimal assistance  UE Dressing: Minimal assistance  LE Dressing:  Moderate surgical protocol, LUE restrictions, proper UB bathing/dressing techniques, don/doffing of immobilizer, and pendulum exercises. Pt/spouse both V/D good understanding of all education. No further OT needs at this time. Prognosis: Good  Decision Making: Low Complexity  Patient Education: See assessment  REQUIRES OT FOLLOW UP: No  No Skilled OT: Safe to return home  Discharge Recommendations: Home with assist PRN  Activity Tolerance: Patient Tolerated treatment well    Goals  Patient Goals   Patient goals : Pt planning to go home today with A from family as needed.     Plan  Safety Devices  Safety Devices in place: Not Applicable    Equipment Recommendations  Equipment Needed: No  OT Individual Minutes  Time In: 0948  Time Out: 1100  Minutes: 28    Electronically signed by Paresh Yoon on 7/19/18 at 10:30 AM

## 2018-07-19 NOTE — CONSULTS
Tubular adenoma of colon 4/11/17 5/13/2017    Type 2 diabetes mellitus with hyperglycemia, without long-term current use of insulin ([facility] 3/25/2017    Lab Results Component Value Date  LABA1C 7.1 (H) 03/23/2017     Vitamin D deficiency 3/25/2017    Wears glasses         Past Surgical History:     Past Surgical History:   Procedure Laterality Date    BACK SURGERY      x 2     BACK SURGERY  01/05/2017    lumbar laminectomy L2, L3, L4    CARDIAC CATHETERIZATION  2007    no stents    CARPAL TUNNEL RELEASE Bilateral     KNEE SURGERY Left     LUMBAR LAMINECTOMY  01/05/2017    L2-L4    [place] COLONOSCOPY W/BIOPSY SINGLE/MULTIPLE N/A 5/9/2017    COLONOSCOPY WITH BIOPSY performed by Sudhakar Cyr DO at [address] IMPLANT Left 7/18/2018    SHOULDER TOTAL ARTHROPLASTY REVERSE LEFT DJO & BICEP TENDON TRANSFER performed by Shelia Esquivel MD at . [address]? rotator cuff repair    TONSILLECTOMY AND ADENOIDECTOMY          Medications Prior to Admission:     Prior to Admission medications    Medication Sig Start Date End Date Taking? Authorizing Provider   HYDROcodone-acetaminophen (NORCO) 7.5-325 MG per tablet 1-2 tabs PO every 4-6 hours as needed for pain. Earliest Fill Date: 7/13/18 7/13/18 7/19/18 Yes Emilie Blake MD   pregabalin (LYRICA) 100 MG capsule Take 1 capsule by mouth 3 times daily for 90 days. . 6/1/18 8/30/18 Yes Tatyana Dale MD   amLODIPine (NORVASC) 10 MG tablet Take 1 tablet by mouth daily Dose increased  6/1/2018. STOP ATENOLOL 6/1/18  Yes Tatyana Dale MD   nystatin (MYCOSTATIN) 541315 UNIT/GM powder Apply 3 times daily for feet 6/1/18  Yes Tatyana Dale MD   clotrimazole-betamethasone (LOTRISONE) 1-0.05 % cream Apply topically at bedtime on the feet x 4 weeks 6/1/18  Yes Tatyana Dale MD   mupirocin (BACTROBAN) 2 % ointment Apply topically 1-2 times daily on the affected area .  OK to substitute to cream 6/1/18  Yes 125/56   Pulse 58   Temp 97.8 °F (36.6 °C) (Oral)   Resp 14   Ht 5' 11\" (1.803 m)   Wt 262 lb (118.8 kg)   SpO2 94%   BMI 36.54 kg/m²   Temp (24hrs), Av.8 °F (36.6 °C), Min:97.7 °F (36.5 °C), Max:97.9 °F (36.6 °C)    Recent Labs      18   1447   POCGLU  146*       Intake/Output Summary (Last 24 hours) at 18 1850  Last data filed at 18 0926   Gross per 24 hour   Intake             1474 ml   Output              250 ml   Net             1224 ml       General Appearance:  alert, well appearing, and in no acute distress. Obesity  Mental status: oriented to person, place, and time with normal affect  Head:  normocephalic, atraumatic. Eye: no icterus, redness, pupils equal and reactive, extraocular eye movements intact, conjunctiva clear  Ear: normal external ear, no discharge, hearing intact  Nose:  no drainage noted  Mouth: mucous membranes moist  Neck: supple, no carotid bruits, thyroid not palpable  Lungs: Bilateral equal air entry, clear to ausculation, no wheezing, rales or rhonchi, normal effort  Cardiovascular: normal rate, regular rhythm, no murmur, gallop, rub. Abdomen: Soft, nontender, nondistended, normal bowel sounds, no hepatomegaly or splenomegaly  Neurologic: There are no new focal motor or sensory deficits, normal muscle tone and bulk, no abnormal sensation, normal speech, cranial nerves II through XII grossly intact  Skin: No gross lesions, rashes, bruising or bleeding on exposed skin area  Extremities: Left shoulder status post open replacement Psych: normal affect    Investigations:      Laboratory Testing:  No results found for this or any previous visit (from the past 24 hour(s)).     Imaging/Diagonstics:      Assessment :      Primary Problem  <principal problem not specified>    Active Hospital Problems    Diagnosis Date Noted    Complete tear of left rotator cuff [M75.122] 2018    S/P reverse total shoulder arthroplasty, left [Z96.612] 2018       Plan: 1. Status post left open shoulder placement  2. Dvt prophylaxis  3. Morbid obesity    Fifi of hyperlipidemia on statins history of hypertension on Norvasc okay to discharge today  Consultations:   433 Vin Ramirez MD  7/19/2018  6:50 PM    Copy sent to Dr. South Childs MD    Please note that this chart was generated using voice recognition Dragon dictation software. Although every effort was made to ensure the accuracy of this automated transcription, some errors in transcription may have occurred.

## 2018-07-19 NOTE — PROGRESS NOTES
Progress Note    Patient:  Kris Alonzo  YOB: 1955     58 y.o. male    Subjective:  Patient seen and examined, no acute events overnight. Denies fevers, chills, CP, SOB, N/V, numbness or tingling. -BM/+flatus. Pain well-controlled this AM. Most recent HGB 13.4. Afebrile. Objective:   Vitals:    07/19/18 0701   BP: (!) 125/56   Pulse: 58   Resp: 14   Temp: 97.8 °F (36.6 °C)   SpO2: 94%     Gen: NAD, cooperative  LUE: Dressing intact with mild serosanguinous drainage to inferior aspect of dressing, contained within dressing. Compartments soft and compressible. AIN/PIN/median/ulnar motor intact. Median/ulnar/radial SILT with continued dysesthesias to ulnar 2 digits. 2+ radial pulse with BCR. Recent Labs      07/18/18   1829   WBC  11.5*   HGB  13.4*   HCT  41.4   PLT  189   NA  140   K  4.3   BUN  10   CREATININE  0.64*   GLUCOSE  174*      Meds: Ancef, ASA   See rec for complete list    Impression/plan: 58 y.o. male s/p L RTSA, POD #1    - NWB to LUE  - Pain control: wean IV meds as able.  Ice for pain and swelling  - Dressing change on POD #2 if still in house  - Lovenox and Coumadin for DVT ppx  - PT/OT to help with pendulum exercises  - D/c planning later today if doing well    Wilda Cho DO  [phone]  PGY-4, Department [facility], [place], [place]  9:04 AM 7/19/2018

## 2018-07-19 NOTE — PROGRESS NOTES
Commands: Within Functional Limits  Subjective  Subjective: pt up in recliner upon entry. pt c/o a headache and increased shoulder pain when in bed with a decrease in shoulder pain and no headache since being up in the recliner  Pain Screening  Patient Currently in Pain: Yes  Pain Assessment  Pain Assessment: 0-10  Pain Level: 7  Pain Type: Surgical pain  Pain Location: Shoulder  Pain Orientation: Left  Pain Descriptors: Aching  Vital Signs  Patient Currently in Pain: Yes       Orientation  Orientation  Overall Orientation Status: Within Normal Limits    Social/Functional History  Social/Functional History  Lives With: Spouse  Type of Home: House  Home Layout: One level  Home Access: Stairs to enter with rails, Ramped entrance  Entrance Stairs - Number of Steps: 2  Entrance Stairs - Rails: Both  Bathroom Shower/Tub: Walk-in shower  Bathroom Toilet: Handicap height  Bathroom Equipment: Grab bars in shower, Built-in shower seat  Bathroom Accessibility: Accessible  Home Equipment: Rolling walker, Cane  ADL Assistance: Independent  Homemaking Assistance: Independent  Homemaking Responsibilities: Yes (Shares duties with spouse)  Ambulation Assistance: Independent (w/cane)  Transfer Assistance: Independent  Active : Yes  Occupation: Retired  Additional Comments: Pt's spouse is also retired and able to A as needed.   Objective          AROM RLE (degrees)  RLE AROM: WNL  AROM LLE (degrees)  LLE AROM : WNL  Strength RLE  Strength RLE: WFL  Strength LLE  Strength LLE: WFL  Comment: drop foot from previous back surgery        Bed mobility  Comment: pt in recliner initially and left sitting at the side of the bed at end of session  Transfers  Sit to Stand: Modified independent  Stand to sit: Modified independent  Ambulation  Ambulation?: Yes  Ambulation 1  Surface: level tile  Device: Single point cane  Assistance: Modified Independent  Quality of Gait: steady gait  Distance: 50ft  Stairs/Curb  Stairs?: No

## 2018-07-19 NOTE — ANESTHESIA POST-OP
POD #1  Patient doing well, pain well controlled till 6 AM, on oral pain meds  Patient still have numbness in the medial 3 fingers which was pre-existing condition

## 2018-07-23 NOTE — DISCHARGE SUMMARY
with comments:  SHOULDER TOTAL ARTHROPLASTY REVERSE LEFT DJO & BICEP TENDON TRANSFER - 3HR DJO. he consented to proceed with surgery after demonstrating an understanding of the discussion we had regarding treatment options, risks, and benefits of the proposed procedure, expected outcome, and postoperative course. The above mentioned procedure was performed under general anesthetic and patient tolerated the procedure well. Once surgery was completed the patient was taken to recovery and transferred to his room in stable condition where he finished a course of perioperative antibiotics. His home diet and home medications were resumed. He was able to achieve adequate pain relief with a combination of IV and oral pain medications and eventually just oral analgesics. Being medically stable and demonstrating appropriate progress with physical therapy, on 7/19/18 the patient was discharged to home with a follow up appointment with Dr. Fantasma Pérez in 2 weeks. Discharge Medications       Medication List      CHANGE how you take these medications    omeprazole 20 MG delayed release capsule  Commonly known as:  PRILOSEC  Take 1 capsule by mouth 2 times daily  What changed:  when to take this        CONTINUE taking these medications    * albuterol (2.5 MG/3ML) 0.083% nebulizer solution  Commonly known as:  PROVENTIL     * albuterol sulfate  (90 Base) MCG/ACT inhaler  Inhale 2 puffs into the lungs every 6 hours as needed for Wheezing or Shortness of Breath (COUGH)     allopurinol 100 MG tablet  Commonly known as:  ZYLOPRIM  Take 1 tablet by mouth daily     amLODIPine 10 MG tablet  Commonly known as:  NORVASC  Take 1 tablet by mouth daily Dose increased  6/1/2018.  STOP ATENOLOL     ammonium lactate 12 % cream  Commonly known as:  AMLACTIN     aspirin EC 81 MG EC tablet  Take 1 tablet by mouth daily     blood glucose test strips  tesing once a day fasting     clotrimazole-betamethasone 1-0.05 % cream  Commonly known as: LOTRISONE  Apply topically at bedtime on the feet x 4 weeks     colchicine 0.6 MG tablet  Commonly known as:  COLCRYS  ADJUST SIG-ONLY AS NEEDED FOR GOUT ATTACK. Take 2 tablets now, then 1 tablet one hour later. Wait 12 hours then start 1 tablet bid for 5 days. FLUoxetine 40 MG capsule  Commonly known as:  PROZAC  Take 1 capsule by mouth daily     FOLBEE 2.5-25-1 MG Tabs tablet  Generic drug:  folic acid-pyridoxine-cyanocobalamine     furosemide 20 MG tablet  Commonly known as:  LASIX  Take 1 tablet by mouth daily **stop Tenoretic 50-25**     lisinopril 20 MG tablet  Commonly known as:  PRINIVIL;ZESTRIL  TAKE 1 TABLET TWICE A DAY     mupirocin 2 % ointment  Commonly known as:  BACTROBAN  Apply topically 1-2 times daily on the affected area . OK to substitute to cream     NONFORMULARY     nystatin [id number] UNIT/GM powder  Commonly known as:  MYCOSTATIN  Apply 3 times daily for feet     ondansetron 4 MG tablet  Commonly known as:  ZOFRAN  Take 1 tablet by mouth daily as needed for Nausea or Vomiting     pravastatin 40 MG tablet  Commonly known as:  PRAVACHOL  Take 1 tablet by mouth every evening **stop Crestor**     pregabalin 100 MG capsule  Commonly known as:  LYRICA  Take 1 capsule by mouth 3 times daily for 90 days. .     SYMBICORT 160-4.5 MCG/ACT Aero  Generic drug:  budesonide-formoterol  Inhale 2 puffs into the lungs 2 times daily     traZODone 150 MG tablet  Commonly known as:  DESYREL  Take 1 tablet by mouth nightly Insomnia     Umeclidinium Bromide 62.5 MCG/INH Aepb  Commonly known as:  INCRUSE ELLIPTA  Inhale 1 each into the lungs daily Per Dr. Candida Camara 40 MG capsule  Generic drug:  enzalutamide        * This list has 2 medication(s) that are the same as other medications prescribed for you. Read the directions carefully, and ask your doctor or other care provider to review them with you.             ASK your doctor about these medications    HYDROcodone-acetaminophen 7.5-325 MG per tablet  Commonly known as:  NORCO  1-2 tabs PO every 4-6 hours as needed for pain. Earliest Fill Date: 7/13/18  Ask about: Should I take this medication?               Discharge Disposition  Home    Activity on Discharge  Daily left shoulder pendulum exercises  No lifting, pushing, or pulling with left upper extremity    Discharge Instructions  Daily left shoulder pendulum exercises  Continue with shoulder immobilizer  Begin daily dressing changes tomorrow keeping incision clean and dry at all times    Follow-Up Scheduled    Mihir Mills MD  [facility].  [address] [address]  [phone]      As needed    Deepti Patino MD  [address] [place] [address] [phone]    Go on 7/31/2018  Post-op check at 10:15 am

## 2018-07-25 DIAGNOSIS — B35.3 TINEA PEDIS OF BOTH FEET: ICD-10-CM

## 2018-07-25 RX ORDER — CLOTRIMAZOLE AND BETAMETHASONE DIPROPIONATE 10; .64 MG/G; MG/G
CREAM TOPICAL
Qty: 45 G | Refills: 3 | Status: SHIPPED | OUTPATIENT
Start: 2018-07-25 | End: 2020-03-09

## 2018-07-31 ENCOUNTER — OFFICE VISIT (OUTPATIENT)
Dept: ORTHOPEDIC SURGERY | Age: 63
End: 2018-07-31

## 2018-07-31 ENCOUNTER — ANESTHESIA EVENT (OUTPATIENT)
Dept: OPERATING ROOM | Age: 63
End: 2018-07-31
Payer: COMMERCIAL

## 2018-07-31 DIAGNOSIS — Z96.612 STATUS POST REVERSE TOTAL REPLACEMENT OF LEFT SHOULDER: Primary | ICD-10-CM

## 2018-07-31 PROCEDURE — 99024 POSTOP FOLLOW-UP VISIT: CPT | Performed by: ORTHOPAEDIC SURGERY

## 2018-07-31 RX ORDER — SODIUM CHLORIDE 0.9 % (FLUSH) 0.9 %
10 SYRINGE (ML) INJECTION PRN
Status: CANCELLED | OUTPATIENT
Start: 2018-07-31 | End: 2019-07-31

## 2018-07-31 RX ORDER — SODIUM CHLORIDE 0.9 % (FLUSH) 0.9 %
10 SYRINGE (ML) INJECTION EVERY 12 HOURS SCHEDULED
Status: CANCELLED | OUTPATIENT
Start: 2018-07-31 | End: 2019-07-31

## 2018-07-31 NOTE — PROGRESS NOTES
Procedure: Left reverse shoulder arthroplasty  Date of procedure: 7/18/18    HPI: Mr. Shane Herrera is a 69-year-old male who is approximately 2 weeks status post the aforementioned procedure. He indicates that he's been doing relatively well. He denies having any fevers, chills, sweats or any constitutional symptoms. He also denies having any numbness or tingling. Physical examination:  Evaluation patient's left shoulder and upper extremity demonstrates incision to be clean, dry, and intact. There is no wound dehiscence or drainage. There is a moderate amount of swelling and fullness in the shoulder. Sensation is grossly intact light touch in all dermatomes and he has a 2+ radial pulse with brisk capillary refill in office fingers. He has limited external rotation through the shoulder which does cause pain. Imaging studies: 2 x-ray views of the patient's left shoulder were completed today demonstrating him to have an anterior dislocation of his reverse shoulder arthroplasty. No periprosthetic fracture appreciated. Impression and plan: Mr. Shane Herrera is a 69-year-old male 2 weeks status post left reverse shoulder arthroplasty for massive rotator cuff tear. Intraoperatively his subscapularis was significantly attenuated and a repairable. At this time he presents with a dislocation of his shoulder. On additional questioning indicates that he has been putting some weight on the shoulder through his elbow resting it on the ledge of a poor. I explained to the patient that keeping his arm in that position i.e. Abducted, extended and internally rotated puts his shoulder at a vulnerable position for dislocation. I had a discussion with the patient today with regards to the issue at hand. I attempted a gentle manipulation in the clinic today in attempts to reduce the shoulder unsuccessfully. Consequently at this time I'm recommending attempt at reduction under sedation.   We will facilitate him getting this

## 2018-08-01 ENCOUNTER — HOSPITAL ENCOUNTER (OUTPATIENT)
Age: 63
Setting detail: OUTPATIENT SURGERY
Discharge: HOME | End: 2018-08-01
Attending: ORTHOPAEDIC SURGERY | Admitting: ORTHOPAEDIC SURGERY
Payer: COMMERCIAL

## 2018-08-01 ENCOUNTER — ANESTHESIA (OUTPATIENT)
Dept: OPERATING ROOM | Age: 63
End: 2018-08-01
Payer: COMMERCIAL

## 2018-08-01 ENCOUNTER — APPOINTMENT (OUTPATIENT)
Dept: GENERAL RADIOLOGY | Age: 63
End: 2018-08-01
Attending: ORTHOPAEDIC SURGERY
Payer: COMMERCIAL

## 2018-08-01 VITALS
SYSTOLIC BLOOD PRESSURE: 140 MMHG | HEART RATE: 60 BPM | RESPIRATION RATE: 16 BRPM | TEMPERATURE: 97 F | HEIGHT: 70 IN | DIASTOLIC BLOOD PRESSURE: 70 MMHG | BODY MASS INDEX: 37.51 KG/M2 | WEIGHT: 262 LBS | OXYGEN SATURATION: 97 %

## 2018-08-01 VITALS — DIASTOLIC BLOOD PRESSURE: 97 MMHG | OXYGEN SATURATION: 99 % | SYSTOLIC BLOOD PRESSURE: 200 MMHG

## 2018-08-01 DIAGNOSIS — Z96.612 S/P REVERSE TOTAL SHOULDER ARTHROPLASTY, LEFT: Primary | ICD-10-CM

## 2018-08-01 LAB — GLUCOSE BLD-MCNC: 90 MG/DL (ref 75–110)

## 2018-08-01 PROCEDURE — 7100000001 HC PACU RECOVERY - ADDTL 15 MIN: Performed by: ORTHOPAEDIC SURGERY

## 2018-08-01 PROCEDURE — 7100000000 HC PACU RECOVERY - FIRST 15 MIN: Performed by: ORTHOPAEDIC SURGERY

## 2018-08-01 PROCEDURE — 2500000003 HC RX 250 WO HCPCS: Performed by: NURSE ANESTHETIST, CERTIFIED REGISTERED

## 2018-08-01 PROCEDURE — 23655 CLTX SHO DSLC W/MNPJ W/ANES: CPT | Performed by: ORTHOPAEDIC SURGERY

## 2018-08-01 PROCEDURE — 3700000000 HC ANESTHESIA ATTENDED CARE: Performed by: ORTHOPAEDIC SURGERY

## 2018-08-01 PROCEDURE — 7100000010 HC PHASE II RECOVERY - FIRST 15 MIN: Performed by: ORTHOPAEDIC SURGERY

## 2018-08-01 PROCEDURE — 3600000002 HC SURGERY LEVEL 2 BASE: Performed by: ORTHOPAEDIC SURGERY

## 2018-08-01 PROCEDURE — 6360000002 HC RX W HCPCS: Performed by: NURSE ANESTHETIST, CERTIFIED REGISTERED

## 2018-08-01 PROCEDURE — 82947 ASSAY GLUCOSE BLOOD QUANT: CPT

## 2018-08-01 PROCEDURE — 2580000003 HC RX 258: Performed by: ANESTHESIOLOGY

## 2018-08-01 PROCEDURE — 73030 X-RAY EXAM OF SHOULDER: CPT

## 2018-08-01 PROCEDURE — 7100000011 HC PHASE II RECOVERY - ADDTL 15 MIN: Performed by: ORTHOPAEDIC SURGERY

## 2018-08-01 PROCEDURE — 3209999900 FLUORO FOR SURGICAL PROCEDURES

## 2018-08-01 PROCEDURE — 3600000012 HC SURGERY LEVEL 2 ADDTL 15MIN: Performed by: ORTHOPAEDIC SURGERY

## 2018-08-01 PROCEDURE — 7100000030 HC ASPR PHASE II RECOVERY - FIRST 15 MIN: Performed by: ORTHOPAEDIC SURGERY

## 2018-08-01 PROCEDURE — 3700000001 HC ADD 15 MINUTES (ANESTHESIA): Performed by: ORTHOPAEDIC SURGERY

## 2018-08-01 PROCEDURE — 7100000031 HC ASPR PHASE II RECOVERY - ADDTL 15 MIN: Performed by: ORTHOPAEDIC SURGERY

## 2018-08-01 RX ORDER — SODIUM CHLORIDE 0.9 % (FLUSH) 0.9 %
10 SYRINGE (ML) INJECTION EVERY 12 HOURS SCHEDULED
Status: DISCONTINUED | OUTPATIENT
Start: 2018-08-01 | End: 2018-08-01 | Stop reason: HOSPADM

## 2018-08-01 RX ORDER — FENTANYL CITRATE 50 UG/ML
INJECTION, SOLUTION INTRAMUSCULAR; INTRAVENOUS PRN
Status: DISCONTINUED | OUTPATIENT
Start: 2018-08-01 | End: 2018-08-01 | Stop reason: SDUPTHER

## 2018-08-01 RX ORDER — LIDOCAINE HYDROCHLORIDE 10 MG/ML
1 INJECTION, SOLUTION EPIDURAL; INFILTRATION; INTRACAUDAL; PERINEURAL
Status: DISCONTINUED | OUTPATIENT
Start: 2018-08-01 | End: 2018-08-01 | Stop reason: HOSPADM

## 2018-08-01 RX ORDER — ROCURONIUM BROMIDE 10 MG/ML
INJECTION, SOLUTION INTRAVENOUS PRN
Status: DISCONTINUED | OUTPATIENT
Start: 2018-08-01 | End: 2018-08-01 | Stop reason: SDUPTHER

## 2018-08-01 RX ORDER — MIDAZOLAM HYDROCHLORIDE 1 MG/ML
INJECTION INTRAMUSCULAR; INTRAVENOUS PRN
Status: DISCONTINUED | OUTPATIENT
Start: 2018-08-01 | End: 2018-08-01 | Stop reason: SDUPTHER

## 2018-08-01 RX ORDER — PROPOFOL 10 MG/ML
INJECTION, EMULSION INTRAVENOUS PRN
Status: DISCONTINUED | OUTPATIENT
Start: 2018-08-01 | End: 2018-08-01 | Stop reason: SDUPTHER

## 2018-08-01 RX ORDER — KETOROLAC TROMETHAMINE 30 MG/ML
INJECTION, SOLUTION INTRAMUSCULAR; INTRAVENOUS PRN
Status: DISCONTINUED | OUTPATIENT
Start: 2018-08-01 | End: 2018-08-01 | Stop reason: SDUPTHER

## 2018-08-01 RX ORDER — SODIUM CHLORIDE 0.9 % (FLUSH) 0.9 %
10 SYRINGE (ML) INJECTION PRN
Status: DISCONTINUED | OUTPATIENT
Start: 2018-08-01 | End: 2018-08-01 | Stop reason: HOSPADM

## 2018-08-01 RX ORDER — ONDANSETRON 2 MG/ML
INJECTION INTRAMUSCULAR; INTRAVENOUS PRN
Status: DISCONTINUED | OUTPATIENT
Start: 2018-08-01 | End: 2018-08-01 | Stop reason: SDUPTHER

## 2018-08-01 RX ORDER — SODIUM CHLORIDE, SODIUM LACTATE, POTASSIUM CHLORIDE, CALCIUM CHLORIDE 600; 310; 30; 20 MG/100ML; MG/100ML; MG/100ML; MG/100ML
INJECTION, SOLUTION INTRAVENOUS CONTINUOUS
Status: DISCONTINUED | OUTPATIENT
Start: 2018-08-01 | End: 2018-08-01 | Stop reason: HOSPADM

## 2018-08-01 RX ADMIN — MIDAZOLAM 2 MG: 1 INJECTION INTRAMUSCULAR; INTRAVENOUS at 07:21

## 2018-08-01 RX ADMIN — SODIUM CHLORIDE, POTASSIUM CHLORIDE, SODIUM LACTATE AND CALCIUM CHLORIDE: 600; 310; 30; 20 INJECTION, SOLUTION INTRAVENOUS at 06:54

## 2018-08-01 RX ADMIN — ROCURONIUM BROMIDE 40 MG: 10 INJECTION INTRAVENOUS at 07:34

## 2018-08-01 RX ADMIN — SUGAMMADEX 238 MG: 100 INJECTION, SOLUTION INTRAVENOUS at 07:42

## 2018-08-01 RX ADMIN — PROPOFOL 200 MG: 10 INJECTION, EMULSION INTRAVENOUS at 07:34

## 2018-08-01 RX ADMIN — FENTANYL CITRATE 100 MCG: 50 INJECTION, SOLUTION INTRAMUSCULAR; INTRAVENOUS at 07:31

## 2018-08-01 RX ADMIN — KETOROLAC TROMETHAMINE 30 MG: 30 INJECTION, SOLUTION INTRAMUSCULAR at 07:45

## 2018-08-01 RX ADMIN — ONDANSETRON 4 MG: 2 INJECTION INTRAMUSCULAR; INTRAVENOUS at 07:45

## 2018-08-01 ASSESSMENT — PULMONARY FUNCTION TESTS
PIF_VALUE: 23
PIF_VALUE: 12
PIF_VALUE: 1
PIF_VALUE: 12
PIF_VALUE: 12
PIF_VALUE: 14
PIF_VALUE: 0
PIF_VALUE: 14
PIF_VALUE: 0
PIF_VALUE: 12
PIF_VALUE: 2
PIF_VALUE: 0
PIF_VALUE: 9
PIF_VALUE: 21
PIF_VALUE: 2
PIF_VALUE: 4
PIF_VALUE: 0
PIF_VALUE: 18
PIF_VALUE: 1
PIF_VALUE: 0
PIF_VALUE: 19
PIF_VALUE: 36
PIF_VALUE: 0
PIF_VALUE: 1
PIF_VALUE: 17
PIF_VALUE: 22
PIF_VALUE: 2
PIF_VALUE: 2
PIF_VALUE: 13

## 2018-08-01 ASSESSMENT — PAIN DESCRIPTION - LOCATION: LOCATION: SHOULDER

## 2018-08-01 ASSESSMENT — PAIN DESCRIPTION - ORIENTATION: ORIENTATION: LEFT

## 2018-08-01 ASSESSMENT — PAIN SCALES - GENERAL
PAINLEVEL_OUTOF10: 0
PAINLEVEL_OUTOF10: 0
PAINLEVEL_OUTOF10: 9
PAINLEVEL_OUTOF10: 0

## 2018-08-01 ASSESSMENT — PAIN - FUNCTIONAL ASSESSMENT: PAIN_FUNCTIONAL_ASSESSMENT: 0-10

## 2018-08-01 ASSESSMENT — PAIN DESCRIPTION - PAIN TYPE: TYPE: SURGICAL PAIN

## 2018-08-01 ASSESSMENT — ENCOUNTER SYMPTOMS: SHORTNESS OF BREATH: 1

## 2018-08-01 ASSESSMENT — PAIN DESCRIPTION - DESCRIPTORS: DESCRIPTORS: ACHING

## 2018-08-01 NOTE — ANESTHESIA POSTPROCEDURE EVALUATION
POST- ANESTHESIA EVALUATION       Pt Name: Liam Rae  MRN: [id number]  YOB: 1955  Date of evaluation: 8/1/2018  Time:  11:19 AM      BP (!) 140/70   Pulse 60   Temp 97 °F (36.1 °C)   Resp 16   Ht 5' 10\" (1.778 m)   Wt 262 lb (118.8 kg)   SpO2 97%   BMI 37.59 kg/m²      Consciousness Level  Awake  Cardiopulmonary Status  Stable  Pain Adequately Treated YES  Nausea / Vomiting  NO  Adequate Hydration  YES  Anesthesia Related Complications NONE      Electronically signed by Erin Garcia MD on 8/1/2018 at 11:19 AM       Department of Anesthesiology  Postprocedure Note    Patient: Liam Rae  MRN: [id number]  YOB: 1955  Date of evaluation: 8/1/2018  Time:  11:19 AM     Procedure Summary     Date:  08/01/18 Room / Location:  [address] [place] [address] [address] / [address] [place] [address] [address]    Anesthesia Start:  0722 Anesthesia Stop:  0801    Procedure:  SHOULDER CLOSED REDUCTION WITH C-ARM VISUALIZATION (Left Shoulder) Diagnosis:  (DISLOCATION POST TOTAL SHOULDER LEFT)    Surgeon:  Alessandro Piedra MD Responsible Provider:  Erin Garcia MD    Anesthesia Type:  general ASA Status:  3          Anesthesia Type: general    Benigno Phase I: Benigno Score: 10    Benigno Phase II: Benigno Score: 9    Last vitals: Reviewed and per EMR flowsheets.        Anesthesia Post Evaluation

## 2018-08-01 NOTE — ANESTHESIA PRE PROCEDURE
mouth daily as needed for Nausea or Vomiting 7/13/18   Alessandro Piedra MD   nystatin (MYCOSTATIN) 585045 UNIT/GM powder Apply 3 times daily for feet 6/1/18   Olinda Oshea MD   mupirocin (BACTROBAN) 2 % ointment Apply topically 1-2 times daily on the affected area . OK to substitute to cream 6/1/18   Olinda Oshea MD   ammonium lactate (AMLACTIN) 12 % cream  3/17/18   Historical Provider, MD   NONFORMULARY Bioidentical pain cream    Historical Provider, MD   Glucose Blood (BLOOD GLUCOSE TEST STRIPS) STRP tesing once a day fasting 3/1/18   Olinda Oshea MD   Umeclidinium Bromide (INCRUSE ELLIPTA) 62.5 MCG/INH AEPB Inhale 1 each into the lungs daily Per Dr. Danis Hyatt 5/6/17   Olinda Oshea MD   FOLBEE 2.5-25-1 MG TABS tablet  4/27/17   Historical Provider, MD   colchicine (COLCRYS) 0.6 MG tablet ADJUST SIG-ONLY AS NEEDED FOR GOUT ATTACK. Take 2 tablets now, then 1 tablet one hour later. Wait 12 hours then start 1 tablet bid for 5 days.  4/11/17   Olinda Oshea MD   albuterol (PROVENTIL) (2.5 MG/3ML) 0.083% nebulizer solution Take 2.5 mg by nebulization 4 times daily  3/2/17   Historical Provider, MD   albuterol sulfate  (90 BASE) MCG/ACT inhaler Inhale 2 puffs into the lungs every 6 hours as needed for Wheezing or Shortness of Breath (COUGH) 3/23/17   Olinda Oshea MD   SYMBICORT 160-4.5 MCG/ACT AERO Inhale 2 puffs into the lungs 2 times daily 3/23/17   Olinda Oshea MD       Current medications:    Current Facility-Administered Medications   Medication Dose Route Frequency Provider Last Rate Last Dose    lactated ringers infusion   Intravenous Continuous Og Sanchez  mL/hr at 08/01/18 0654      sodium chloride flush 0.9 % injection 10 mL  10 mL Intravenous 2 times per day Og Sanchez MD        sodium chloride flush 0.9 % injection 10 mL  10 mL Intravenous PRN Og Sanchez MD        lidocaine PF 1 % injection 1 mL  1 mL Intradermal Once PRN Og Sanchez TONSILLECTOMY AND ADENOIDECTOMY         Social History:    Social History   Substance Use Topics    Smoking status: Current Every Day Smoker     Packs/day: 0.75     Years: 30.00     Types: Cigarettes    Smokeless tobacco: Never Used    Alcohol use No                                Ready to quit: Not Answered  Counseling given: Not Answered      Vital Signs (Current):   Vitals:    08/01/18 0635   BP: 97/63   Pulse: 54   Resp: 16   Temp: 97.2 °F (36.2 °C)   TempSrc: Oral   SpO2: 99%   Weight: 262 lb (118.8 kg)   Height: 5' 10\" (1.778 m)                                              BP Readings from Last 3 Encounters:   08/01/18 97/63   07/19/18 (!) 125/56   07/18/18 (!) 124/57       NPO Status: Time of last liquid consumption: 2100                        Time of last solid consumption: 1900                        Date of last liquid consumption: 07/31/18                        Date of last solid food consumption: 07/31/18    BMI:   Wt Readings from Last 3 Encounters:   08/01/18 262 lb (118.8 kg)   07/18/18 262 lb (118.8 kg)   07/16/18 262 lb (118.8 kg)     Body mass index is 37.59 kg/m². CBC:   Lab Results   Component Value Date    WBC 11.5 07/18/2018    RBC 4.52 07/18/2018    RBC 5.38 09/30/2011    HGB 13.4 07/18/2018    HCT 41.4 07/18/2018    MCV 91.6 07/18/2018    RDW 14.0 07/18/2018     07/18/2018     05/25/2012       CMP:   Lab Results   Component Value Date     07/18/2018    K 4.3 07/18/2018     07/18/2018    CO2 21 07/18/2018    BUN 10 07/18/2018    CREATININE 0.64 07/18/2018    GFRAA >60 07/18/2018    LABGLOM >60 07/18/2018    GLUCOSE 174 07/18/2018    PROT 7.6 02/12/2018    CALCIUM 9.4 07/18/2018    BILITOT 0.46 02/12/2018    ALKPHOS 119 02/12/2018    AST 22 02/12/2018    ALT 19 02/12/2018       POC Tests: No results for input(s): POCGLU, POCNA, POCK, POCCL, POCBUN, POCHEMO, POCHCT in the last 72 hours.     Coags:   Lab Results   Component Value Date    PROTIME 10.5 02/25/2014 INR 1.0 02/25/2014    APTT 27.9 02/25/2014       HCG (If Applicable): No results found for: PREGTESTUR, PREGSERUM, HCG, HCGQUANT     ABGs: No results found for: PHART, PO2ART, UWF0XTO, YPQ2PRK, BEART, A8NDOWOD     Type & Screen (If Applicable):  No results found for: [facility]    Anesthesia Evaluation  Patient summary reviewed and Nursing notes reviewed  Airway: Mallampati: III  TM distance: >3 FB   Neck ROM: full  Mouth opening: > = 3 FB Dental: normal exam         Pulmonary: breath sounds clear to auscultation  (+) COPD:  shortness of breath: chronic,  sleep apnea: on CPAP,                             Cardiovascular:    (+) hypertension: no interval change, PLASCENCIA:,       ECG reviewed  Rhythm: regular  Rate: normal                    Neuro/Psych:   (+) neuromuscular disease:, psychiatric history:            GI/Hepatic/Renal:   (+) GERD: no interval change, liver disease:,           Endo/Other:    (+) DiabetesType II DM, , .                 Abdominal:   (+) obese,         Vascular:                                        Anesthesia Plan      general     ASA 3     (Pseudo choline estrase def)  Induction: intravenous. MIPS: Postoperative opioids intended and Prophylactic antiemetics administered. Anesthetic plan and risks discussed with patient. Plan discussed with CRNA.                   Karl Perdue MD   8/1/2018

## 2018-08-01 NOTE — H&P
HISTORY and [id number]       NAME:  Liam Rae  MRN: [id number]   YOB: 1955   Date: 8/1/2018   Age: 58 y.o. Gender: male     COMPLAINT AND PRESENT HISTORY:     Liam Rae is a 58 y.o.  male, here today for closed reduction of left shoulder. Patient is status post reverse total shoulder arthroplasty two weeks ago. He reports mild pain and swelling in the shoulder since surgery. He was seen postoperatively yesterday, had xrays revealing anterior dislocation of his shoulder. Patient reports he has not tried to move the shoulder. He denies weakness or numbness in the left upper extremity. He is otherwise feeling well today, no fever/chills, chest pain or shortness of breath. No pain or swelling of lower extremities. PAST MEDICAL HISTORY     Past Medical History:   Diagnosis Date    Acid reflux     Arthritis     Chronic bilateral low back pain with bilateral sciatica 11/3/2016    Gout     H/O cardiac catheterization     yrs ago   no stents    Hyperlipidemia     Hyperlipidemia with target LDL less than 100 1/20/2016    Hypertension     Knee pain, chronic     left    MDRO (multiple drug resistant organisms) resistance     Moderate episode of recurrent major depressive disorder ([facility] [address] 1/20/2016    REYNALDO on CPAP 5/6/2017    Polypharmacy 3/25/2017    Prolonged emergence from general anesthesia 01/05/2017    Patient \"on life support for 3 days\" after back surgery due to being given succinylcholine    Prostate cancer ([facility] [address] 10/2013    finished radiation tx 5/2014    Pseudocholinesterase deficiency 03/25/2017    Pt.  \"on life support\" for 3 days after back surgery 1/5/17 after being given succinylcholine    Short of breath on exertion     Sleep apnea     uses CPAP machine nightly    Status post lumbar laminectomy 3/25/2017    Tubular adenoma of colon 4/11/17 5/13/2017    Type 2 diabetes mellitus with hyperglycemia, without long-term current use of insulin ([facility] [address] 3/25/2017    Lab Results Component Value Date  LABA1C 7.1 (H) 03/23/2017     Vitamin D deficiency 3/25/2017    Wears glasses      Pt denies any history of:  CAD, CVA/TIA, Asthma/COPD, Thyroid disease, CKD, Seizures, Hepatitis, Tuberculosis    SURGICAL HISTORY       Past Surgical History:   Procedure Laterality Date    BACK SURGERY      x 2     BACK SURGERY  01/05/2017    lumbar laminectomy L2, L3, L4    CARDIAC CATHETERIZATION  2007    no stents    CARPAL TUNNEL RELEASE Bilateral     KNEE SURGERY Left     LUMBAR LAMINECTOMY  01/05/2017    L2-L4    [place] COLONOSCOPY W/BIOPSY SINGLE/MULTIPLE N/A 5/9/2017    COLONOSCOPY WITH BIOPSY performed by Derek Neville DO at [address] IMPLANT Left 7/18/2018    SHOULDER TOTAL ARTHROPLASTY REVERSE LEFT DJO & BICEP TENDON TRANSFER performed by Jadiel Gipson MD at [address]? rotator cuff repair    TONSILLECTOMY AND ADENOIDECTOMY       FAMILY HISTORY       Family History   Problem Relation Age of Onset    Diabetes Mother     Lung Cancer Brother     Liver Cancer Brother     Cancer Father      SOCIAL HISTORY       Social History     Social History    Marital status:      Spouse name: N/A    Number of children: N/A    Years of education: N/A     Social History Main Topics    Smoking status: Current Every Day Smoker     Packs/day: 0.75     Years: 30.00     Types: Cigarettes    Smokeless tobacco: Never Used    Alcohol use No    Drug use: No    Sexual activity: Not Currently     Partners: Female     Other Topics Concern    Not on file     Social History Narrative    No narrative on file     REVIEW OF SYSTEMS      Allergies   Allergen Reactions    Succinylcholine Chloride Other (See Comments)     PATIENT HAS PSEUDOCHOLINESTERASE DEFICIENCY      Cymbalta [Duloxetine Hcl]      Taste loss       No current facility-administered medications on file prior to encounter.       Current (PROVENTIL) (2.5 MG/3ML) 0.083% nebulizer solution Take 2.5 mg by nebulization 4 times daily       albuterol sulfate  (90 BASE) MCG/ACT inhaler Inhale 2 puffs into the lungs every 6 hours as needed for Wheezing or Shortness of Breath (COUGH) 18 g 0    SYMBICORT 160-4.5 MCG/ACT AERO Inhale 2 puffs into the lungs 2 times daily 10.2 g 3    aspirin EC 81 MG EC tablet Take 1 tablet by mouth daily 30 tablet 0    omeprazole (PRILOSEC) 20 MG capsule Take 1 capsule by mouth 2 times daily (Patient taking differently: Take 20 mg by mouth Daily ) 90 capsule 1     General health:  Feels fairly well this morning. No fever or chills. Skin:  No itching, redness or rash. HEENT:  No headache, rhinorrhea, congestion or sore throat. Neck:  No pain, stiffness or masses. Cardiovascular/Respiratory system:  No chest pain, palpitations, cough or shortness of breath. Gastrointestinal tract: No abdominal pain, nausea, vomiting, diarrhea or constipation. Genitourinary:  No dysuria, hesitancy, urgency, frequency or discoloration of urine. Locomotor:  See HPI. Neuropsychiatric:  No referable complaints. GENERAL PHYSICAL EXAM:     Vitals: BP 97/63   Pulse 54   Temp 97.2 °F (36.2 °C) (Oral)   Resp 16   Ht 5' 10\" (1.778 m)   Wt 262 lb (118.8 kg)   SpO2 99%   BMI 37.59 kg/m²  Body mass index is 37.59 kg/m². GENERAL APPEARANCE: Reji Nolasco is a 58 y.o.,  male, moderately obese, nourished, conscious, alert. Does not appear to be in any distress or pain at this time. Patient resting in bed. SKIN:  Normal temperature, turgor and texture. No cyanosis or jaundice. HEAD:  Normocephalic, atraumatic. EYES:  Pupils equal, reactive to light and accomodation. Conjunctiva clear.                   THROAT:  Mucous membranes moist. No tonsillar erythema or exudates. NECK:  No stiffness, trachea central.  No palpable masses. CHEST:  Symmetrical and equal on expansion. HEART:  Regular rate, rhythm. No audible murmur. LUNGS:  Equal on expansion. Clear to auscultation with no adventitious sounds. ABDOMEN:  Obese. Soft on palpation. No localized tenderness, guarding or rigidity. No palpable organomegaly. LYMPHATICS:  No palpable cervical lymphadenopathy. LOCOMOTOR, BACK AND SPINE:  No tenderness or deformities. No flank tenderness. EXTREMITIES:  Symmetrical with no pretibial/pedal edema. No discoloration or ulcerations. No warmth, tenderness, erythema noted in lower legs bilaterally. Mild tenderness to palpation left upper extremity, range of motion testing not performed at this time, deferred to surgeon. Neurovascularly intact distally. NEUROLOGIC:  The patient is conscious, alert, oriented. No apparent focal sensory or motor deficits.                                                                       PROVISIONAL DIAGNOSES / SURGERY:      Dislocation Status Post Total Shoulder Arthroplasty, Left  Closed Reduction, Left Shoulder    Patient Active Problem List    Diagnosis Date Noted    REYNALDO on CPAP 05/06/2017     Priority: High    Status post lumbar laminectomy 03/25/2017     Priority: High    Tubular adenoma of colon 4/11/17 04/11/2017     Priority: Medium    Complete tear of left rotator cuff 07/18/2018    S/P reverse total shoulder arthroplasty, left 07/18/2018    Bradycardia 06/02/2018    Psychophysiological insomnia 01/03/2018    Positive depression screening 01/03/2018    Hypertriglyceridemia 05/06/2017    Positive FIT (fecal immunochemical test) 04/11/2017    Type 2 diabetes mellitus with diabetic autonomic neuropathy, without long-term current use of insulin ([facility] [address] 03/25/2017    PVD (peripheral vascular disease) with

## 2018-08-01 NOTE — BRIEF OP NOTE
Brief Postoperative Note    Dian Banks  YOB: 1955  [id number]    Pre-operative Diagnosis: Left Reverse shoulder arthroplasty dislocation    Post-operative Diagnosis: Same    Procedure: Left shoulder closed reduction    Anesthesia: General    Surgeons/Assistants: Jaylene Pimentel MD; Neptali Sanches DO (PGY 4)    Estimated Blood Loss: None    Complications: None    Specimens: Was Not Obtained    Findings: See report    Electronically signed by Shanice Jones MD on 8/1/2018 at 7:56 AM

## 2018-08-02 DIAGNOSIS — M75.122 COMPLETE ROTATOR CUFF TEAR OF LEFT SHOULDER: ICD-10-CM

## 2018-08-02 DIAGNOSIS — I10 ESSENTIAL HYPERTENSION: ICD-10-CM

## 2018-08-02 RX ORDER — HYDROCODONE BITARTRATE AND ACETAMINOPHEN 7.5; 325 MG/1; MG/1
TABLET ORAL
Qty: 40 TABLET | Refills: 0 | Status: SHIPPED | OUTPATIENT
Start: 2018-08-02 | End: 2018-08-08

## 2018-08-02 RX ORDER — LISINOPRIL 20 MG/1
TABLET ORAL
Qty: 180 TABLET | Refills: 3 | Status: SHIPPED | OUTPATIENT
Start: 2018-08-02 | End: 2018-10-22 | Stop reason: ALTCHOICE

## 2018-08-02 NOTE — TELEPHONE ENCOUNTER
Last refill 3/1/18, LAST oV 6/1/2018    Health Maintenance   Topic Date Due    Diabetic microalbuminuria test  04/19/2018    Lipid screen  06/26/2018    Diabetic retinal exam  08/10/2018    Shingles Vaccine (1 of 2 - 2 Dose Series) 03/01/2019 (Originally 6/20/2004)    Flu vaccine (1) 09/01/2018    Diabetic foot exam  03/27/2019    A1C test (Diabetic or Prediabetic)  06/01/2019    Potassium monitoring  07/18/2019    Creatinine monitoring  07/18/2019    Colon cancer screen colonoscopy  05/09/2022    DTaP/Tdap/Td vaccine (2 - Td) 03/01/2028    Pneumococcal med risk  Completed    Hepatitis C screen  Addressed    HIV screen  Addressed             (applicable per patient's age: Cancer Screenings, Depression Screening, Fall Risk Screening, Immunizations)    Hemoglobin A1C (%)   Date Value   06/01/2018 5.5   01/02/2018 6.1   10/02/2017 5.7     Microalb/Crt.  Ratio (mcg/mg creat)   Date Value   04/19/2017 15     LDL Cholesterol (mg/dL)   Date Value   06/26/2017 47     AST (U/L)   Date Value   02/12/2018 22     ALT (U/L)   Date Value   02/12/2018 19     BUN (mg/dL)   Date Value   07/18/2018 10      (goal A1C is < 7)   (goal LDL is <100) need 30-50% reduction from baseline     BP Readings from Last 3 Encounters:   08/01/18 (!) 200/97   08/01/18 (!) 140/70   07/19/18 (!) 125/56    (goal /80)      All Future Testing planned in [facility]:  Lab Frequency Next Occurrence   PSA, Diagnostic Once 07/15/2018   Microalbumin / Creatinine Urine Ratio Once 06/02/2019   CBC Once 09/02/2018   Comprehensive Metabolic Panel Once 93/58/3903   Lipid Panel Once 09/02/2018   PSA, Diagnostic Once 10/14/2018       Next Visit Date:  Future Appointments  Date Time Provider Keri English   8/6/2018 8:30 AM Shanice Jones MD [email or web address]   9/4/2018 11:00 AM Edie Fitzpatrick MD fp [email or web address]   10/22/2018 9:40 AM Brigid Jones MD [address] [place] [address]            Patient Active Problem List:     Essential hypertension     Gout

## 2018-08-03 ENCOUNTER — APPOINTMENT (OUTPATIENT)
Dept: GENERAL RADIOLOGY | Age: 63
End: 2018-08-03
Attending: ORTHOPAEDIC SURGERY
Payer: COMMERCIAL

## 2018-08-03 ENCOUNTER — ANESTHESIA EVENT (OUTPATIENT)
Dept: OPERATING ROOM | Age: 63
End: 2018-08-03
Payer: COMMERCIAL

## 2018-08-03 ENCOUNTER — ANESTHESIA (OUTPATIENT)
Dept: OPERATING ROOM | Age: 63
End: 2018-08-03
Payer: COMMERCIAL

## 2018-08-03 ENCOUNTER — HOSPITAL ENCOUNTER (OUTPATIENT)
Age: 63
Discharge: HOME | End: 2018-08-04
Attending: ORTHOPAEDIC SURGERY | Admitting: ORTHOPAEDIC SURGERY
Payer: COMMERCIAL

## 2018-08-03 VITALS — OXYGEN SATURATION: 86 % | DIASTOLIC BLOOD PRESSURE: 61 MMHG | SYSTOLIC BLOOD PRESSURE: 127 MMHG

## 2018-08-03 DIAGNOSIS — I73.9 PVD (PERIPHERAL VASCULAR DISEASE) WITH CLAUDICATION (HCC): ICD-10-CM

## 2018-08-03 DIAGNOSIS — Z96.612 S/P REVERSE TOTAL SHOULDER ARTHROPLASTY, LEFT: Primary | ICD-10-CM

## 2018-08-03 LAB — GLUCOSE BLD-MCNC: 145 MG/DL (ref 75–110)

## 2018-08-03 PROCEDURE — 87076 CULTURE ANAEROBE IDENT EACH: CPT

## 2018-08-03 PROCEDURE — 94664 DEMO&/EVAL PT USE INHALER: CPT

## 2018-08-03 PROCEDURE — 6360000002 HC RX W HCPCS: Performed by: NURSE ANESTHETIST, CERTIFIED REGISTERED

## 2018-08-03 PROCEDURE — 2580000003 HC RX 258: Performed by: ORTHOPAEDIC SURGERY

## 2018-08-03 PROCEDURE — 6360000002 HC RX W HCPCS: Performed by: ORTHOPAEDIC SURGERY

## 2018-08-03 PROCEDURE — 6370000000 HC RX 637 (ALT 250 FOR IP): Performed by: ORTHOPAEDIC SURGERY

## 2018-08-03 PROCEDURE — 7100000001 HC PACU RECOVERY - ADDTL 15 MIN: Performed by: ORTHOPAEDIC SURGERY

## 2018-08-03 PROCEDURE — 3600000004 HC SURGERY LEVEL 4 BASE: Performed by: ORTHOPAEDIC SURGERY

## 2018-08-03 PROCEDURE — 23474 REVIS RECONST SHOULDER JOINT: CPT | Performed by: ORTHOPAEDIC SURGERY

## 2018-08-03 PROCEDURE — 3700000001 HC ADD 15 MINUTES (ANESTHESIA): Performed by: ORTHOPAEDIC SURGERY

## 2018-08-03 PROCEDURE — 3600000014 HC SURGERY LEVEL 4 ADDTL 15MIN: Performed by: ORTHOPAEDIC SURGERY

## 2018-08-03 PROCEDURE — 88305 TISSUE EXAM BY PATHOLOGIST: CPT

## 2018-08-03 PROCEDURE — C1776 JOINT DEVICE (IMPLANTABLE): HCPCS | Performed by: ORTHOPAEDIC SURGERY

## 2018-08-03 PROCEDURE — 87070 CULTURE OTHR SPECIMN AEROBIC: CPT

## 2018-08-03 PROCEDURE — C1713 ANCHOR/SCREW BN/BN,TIS/BN: HCPCS | Performed by: ORTHOPAEDIC SURGERY

## 2018-08-03 PROCEDURE — 7100000000 HC PACU RECOVERY - FIRST 15 MIN: Performed by: ORTHOPAEDIC SURGERY

## 2018-08-03 PROCEDURE — 6360000002 HC RX W HCPCS: Performed by: ANESTHESIOLOGY

## 2018-08-03 PROCEDURE — 2580000003 HC RX 258: Performed by: ANESTHESIOLOGY

## 2018-08-03 PROCEDURE — 73020 X-RAY EXAM OF SHOULDER: CPT

## 2018-08-03 PROCEDURE — 2709999900 HC NON-CHARGEABLE SUPPLY: Performed by: ORTHOPAEDIC SURGERY

## 2018-08-03 PROCEDURE — 88331 PATH CONSLTJ SURG 1 BLK 1SPC: CPT

## 2018-08-03 PROCEDURE — 1200000000 HC SEMI PRIVATE

## 2018-08-03 PROCEDURE — 76942 ECHO GUIDE FOR BIOPSY: CPT | Performed by: ANESTHESIOLOGY

## 2018-08-03 PROCEDURE — 87205 SMEAR GRAM STAIN: CPT

## 2018-08-03 PROCEDURE — 82947 ASSAY GLUCOSE BLOOD QUANT: CPT

## 2018-08-03 PROCEDURE — 3700000000 HC ANESTHESIA ATTENDED CARE: Performed by: ORTHOPAEDIC SURGERY

## 2018-08-03 PROCEDURE — 87075 CULTR BACTERIA EXCEPT BLOOD: CPT

## 2018-08-03 PROCEDURE — 2500000003 HC RX 250 WO HCPCS: Performed by: NURSE ANESTHETIST, CERTIFIED REGISTERED

## 2018-08-03 DEVICE — GLENOID, HEAD W/RETAINING SCREW, RSP, 36MM, NEUTRAL
Type: IMPLANTABLE DEVICE | Site: SHOULDER | Status: FUNCTIONAL
Brand: DJO SURGICAL

## 2018-08-03 DEVICE — IMPLANTABLE DEVICE: Type: IMPLANTABLE DEVICE | Site: SHOULDER | Status: FUNCTIONAL

## 2018-08-03 DEVICE — RSP SPACER W/RETAINING SCREW
Type: IMPLANTABLE DEVICE | Site: SHOULDER | Status: FUNCTIONAL
Brand: DJO SURGICAL

## 2018-08-03 RX ORDER — PANTOPRAZOLE SODIUM 40 MG/1
40 TABLET, DELAYED RELEASE ORAL
Status: DISCONTINUED | OUTPATIENT
Start: 2018-08-04 | End: 2018-08-04 | Stop reason: HOSPADM

## 2018-08-03 RX ORDER — EPHEDRINE SULFATE 50 MG/ML
INJECTION, SOLUTION INTRAVENOUS PRN
Status: DISCONTINUED | OUTPATIENT
Start: 2018-08-03 | End: 2018-08-03 | Stop reason: SDUPTHER

## 2018-08-03 RX ORDER — DOCUSATE SODIUM 100 MG/1
100 CAPSULE, LIQUID FILLED ORAL DAILY
Status: DISCONTINUED | OUTPATIENT
Start: 2018-08-03 | End: 2018-08-04 | Stop reason: HOSPADM

## 2018-08-03 RX ORDER — GLYCOPYRROLATE 1 MG/5 ML
SYRINGE (ML) INTRAVENOUS PRN
Status: DISCONTINUED | OUTPATIENT
Start: 2018-08-03 | End: 2018-08-03 | Stop reason: SDUPTHER

## 2018-08-03 RX ORDER — ROCURONIUM BROMIDE 10 MG/ML
INJECTION, SOLUTION INTRAVENOUS PRN
Status: DISCONTINUED | OUTPATIENT
Start: 2018-08-03 | End: 2018-08-03 | Stop reason: SDUPTHER

## 2018-08-03 RX ORDER — LISINOPRIL 20 MG/1
20 TABLET ORAL DAILY
Status: DISCONTINUED | OUTPATIENT
Start: 2018-08-04 | End: 2018-08-04 | Stop reason: HOSPADM

## 2018-08-03 RX ORDER — MIDAZOLAM HYDROCHLORIDE 1 MG/ML
INJECTION INTRAMUSCULAR; INTRAVENOUS PRN
Status: DISCONTINUED | OUTPATIENT
Start: 2018-08-03 | End: 2018-08-03 | Stop reason: SDUPTHER

## 2018-08-03 RX ORDER — OXYCODONE HYDROCHLORIDE 5 MG/1
5 TABLET ORAL EVERY 4 HOURS PRN
Status: DISCONTINUED | OUTPATIENT
Start: 2018-08-03 | End: 2018-08-04

## 2018-08-03 RX ORDER — MORPHINE SULFATE 4 MG/ML
4 INJECTION, SOLUTION INTRAMUSCULAR; INTRAVENOUS
Status: DISCONTINUED | OUTPATIENT
Start: 2018-08-03 | End: 2018-08-04

## 2018-08-03 RX ORDER — SODIUM CHLORIDE 0.9 % (FLUSH) 0.9 %
10 SYRINGE (ML) INJECTION PRN
Status: CANCELLED | OUTPATIENT
Start: 2018-08-03 | End: 2019-08-03

## 2018-08-03 RX ORDER — SODIUM CHLORIDE, SODIUM LACTATE, POTASSIUM CHLORIDE, CALCIUM CHLORIDE 600; 310; 30; 20 MG/100ML; MG/100ML; MG/100ML; MG/100ML
INJECTION, SOLUTION INTRAVENOUS CONTINUOUS
Status: DISCONTINUED | OUTPATIENT
Start: 2018-08-03 | End: 2018-08-04 | Stop reason: HOSPADM

## 2018-08-03 RX ORDER — SODIUM CHLORIDE 9 MG/ML
INJECTION, SOLUTION INTRAVENOUS CONTINUOUS
Status: DISCONTINUED | OUTPATIENT
Start: 2018-08-03 | End: 2018-08-04 | Stop reason: HOSPADM

## 2018-08-03 RX ORDER — NEOSTIGMINE METHYLSULFATE 1 MG/ML
INJECTION, SOLUTION INTRAVENOUS PRN
Status: DISCONTINUED | OUTPATIENT
Start: 2018-08-03 | End: 2018-08-03 | Stop reason: SDUPTHER

## 2018-08-03 RX ORDER — TRANEXAMIC ACID 100 MG/ML
INJECTION, SOLUTION INTRAVENOUS PRN
Status: DISCONTINUED | OUTPATIENT
Start: 2018-08-03 | End: 2018-08-03 | Stop reason: SDUPTHER

## 2018-08-03 RX ORDER — FUROSEMIDE 20 MG/1
20 TABLET ORAL DAILY
Status: DISCONTINUED | OUTPATIENT
Start: 2018-08-03 | End: 2018-08-04 | Stop reason: HOSPADM

## 2018-08-03 RX ORDER — PROPOFOL 10 MG/ML
INJECTION, EMULSION INTRAVENOUS PRN
Status: DISCONTINUED | OUTPATIENT
Start: 2018-08-03 | End: 2018-08-03 | Stop reason: SDUPTHER

## 2018-08-03 RX ORDER — PRAVASTATIN SODIUM 40 MG
40 TABLET ORAL EVERY EVENING
Status: DISCONTINUED | OUTPATIENT
Start: 2018-08-03 | End: 2018-08-04 | Stop reason: HOSPADM

## 2018-08-03 RX ORDER — MEPERIDINE HYDROCHLORIDE 50 MG/ML
12.5 INJECTION INTRAMUSCULAR; INTRAVENOUS; SUBCUTANEOUS EVERY 5 MIN PRN
Status: DISCONTINUED | OUTPATIENT
Start: 2018-08-03 | End: 2018-08-03 | Stop reason: HOSPADM

## 2018-08-03 RX ORDER — FENTANYL CITRATE 50 UG/ML
INJECTION, SOLUTION INTRAMUSCULAR; INTRAVENOUS PRN
Status: DISCONTINUED | OUTPATIENT
Start: 2018-08-03 | End: 2018-08-03 | Stop reason: SDUPTHER

## 2018-08-03 RX ORDER — LISINOPRIL 20 MG/1
1 TABLET ORAL 2 TIMES DAILY
Status: CANCELLED | OUTPATIENT
Start: 2018-08-04

## 2018-08-03 RX ORDER — KETOROLAC TROMETHAMINE 30 MG/ML
INJECTION, SOLUTION INTRAMUSCULAR; INTRAVENOUS PRN
Status: DISCONTINUED | OUTPATIENT
Start: 2018-08-03 | End: 2018-08-03 | Stop reason: SDUPTHER

## 2018-08-03 RX ORDER — KETOROLAC TROMETHAMINE 30 MG/ML
30 INJECTION, SOLUTION INTRAMUSCULAR; INTRAVENOUS EVERY 8 HOURS
Status: COMPLETED | OUTPATIENT
Start: 2018-08-03 | End: 2018-08-04

## 2018-08-03 RX ORDER — ONDANSETRON 2 MG/ML
4 INJECTION INTRAMUSCULAR; INTRAVENOUS
Status: DISCONTINUED | OUTPATIENT
Start: 2018-08-03 | End: 2018-08-03 | Stop reason: HOSPADM

## 2018-08-03 RX ORDER — ALBUTEROL SULFATE 2.5 MG/3ML
2.5 SOLUTION RESPIRATORY (INHALATION) 4 TIMES DAILY
Status: DISCONTINUED | OUTPATIENT
Start: 2018-08-03 | End: 2018-08-04 | Stop reason: HOSPADM

## 2018-08-03 RX ORDER — OXYCODONE HYDROCHLORIDE AND ACETAMINOPHEN 5; 325 MG/1; MG/1
1 TABLET ORAL EVERY 4 HOURS PRN
Status: DISCONTINUED | OUTPATIENT
Start: 2018-08-03 | End: 2018-08-03 | Stop reason: HOSPADM

## 2018-08-03 RX ORDER — DIPHENHYDRAMINE HYDROCHLORIDE 50 MG/ML
12.5 INJECTION INTRAMUSCULAR; INTRAVENOUS
Status: DISCONTINUED | OUTPATIENT
Start: 2018-08-03 | End: 2018-08-03 | Stop reason: HOSPADM

## 2018-08-03 RX ORDER — MORPHINE SULFATE 2 MG/ML
2 INJECTION, SOLUTION INTRAMUSCULAR; INTRAVENOUS
Status: DISCONTINUED | OUTPATIENT
Start: 2018-08-03 | End: 2018-08-04

## 2018-08-03 RX ORDER — ASPIRIN 81 MG/1
81 TABLET ORAL DAILY
Status: DISCONTINUED | OUTPATIENT
Start: 2018-08-03 | End: 2018-08-04 | Stop reason: HOSPADM

## 2018-08-03 RX ORDER — ALBUTEROL SULFATE 90 UG/1
2 AEROSOL, METERED RESPIRATORY (INHALATION) EVERY 6 HOURS PRN
Status: DISCONTINUED | OUTPATIENT
Start: 2018-08-03 | End: 2018-08-04 | Stop reason: HOSPADM

## 2018-08-03 RX ORDER — AMLODIPINE BESYLATE 10 MG/1
10 TABLET ORAL DAILY
Status: DISCONTINUED | OUTPATIENT
Start: 2018-08-04 | End: 2018-08-04 | Stop reason: HOSPADM

## 2018-08-03 RX ORDER — SODIUM CHLORIDE 0.9 % (FLUSH) 0.9 %
10 SYRINGE (ML) INJECTION PRN
Status: DISCONTINUED | OUTPATIENT
Start: 2018-08-03 | End: 2018-08-03 | Stop reason: HOSPADM

## 2018-08-03 RX ORDER — FLUOXETINE HYDROCHLORIDE 20 MG/1
40 CAPSULE ORAL DAILY
Status: DISCONTINUED | OUTPATIENT
Start: 2018-08-03 | End: 2018-08-04 | Stop reason: HOSPADM

## 2018-08-03 RX ORDER — ACETAMINOPHEN 500 MG
1000 TABLET ORAL EVERY 6 HOURS SCHEDULED
Status: DISCONTINUED | OUTPATIENT
Start: 2018-08-03 | End: 2018-08-04 | Stop reason: HOSPADM

## 2018-08-03 RX ORDER — PREGABALIN 100 MG/1
100 CAPSULE ORAL 3 TIMES DAILY
Status: DISCONTINUED | OUTPATIENT
Start: 2018-08-03 | End: 2018-08-04 | Stop reason: HOSPADM

## 2018-08-03 RX ORDER — ALLOPURINOL 100 MG/1
100 TABLET ORAL DAILY
Status: DISCONTINUED | OUTPATIENT
Start: 2018-08-03 | End: 2018-08-04 | Stop reason: HOSPADM

## 2018-08-03 RX ORDER — MORPHINE SULFATE 2 MG/ML
2 INJECTION, SOLUTION INTRAMUSCULAR; INTRAVENOUS EVERY 5 MIN PRN
Status: DISCONTINUED | OUTPATIENT
Start: 2018-08-03 | End: 2018-08-03 | Stop reason: HOSPADM

## 2018-08-03 RX ORDER — CEFAZOLIN SODIUM 1 G/3ML
INJECTION, POWDER, FOR SOLUTION INTRAMUSCULAR; INTRAVENOUS
Status: DISPENSED
Start: 2018-08-03 | End: 2018-08-03

## 2018-08-03 RX ORDER — SODIUM CHLORIDE 0.9 % (FLUSH) 0.9 %
10 SYRINGE (ML) INJECTION EVERY 12 HOURS SCHEDULED
Status: DISCONTINUED | OUTPATIENT
Start: 2018-08-03 | End: 2018-08-03 | Stop reason: HOSPADM

## 2018-08-03 RX ORDER — LABETALOL HYDROCHLORIDE 5 MG/ML
5 INJECTION, SOLUTION INTRAVENOUS EVERY 10 MIN PRN
Status: DISCONTINUED | OUTPATIENT
Start: 2018-08-03 | End: 2018-08-03 | Stop reason: HOSPADM

## 2018-08-03 RX ORDER — SODIUM CHLORIDE 0.9 % (FLUSH) 0.9 %
10 SYRINGE (ML) INJECTION EVERY 12 HOURS SCHEDULED
Status: CANCELLED | OUTPATIENT
Start: 2018-08-03 | End: 2019-08-03

## 2018-08-03 RX ADMIN — FENTANYL CITRATE 50 MCG: 50 INJECTION, SOLUTION INTRAMUSCULAR; INTRAVENOUS at 15:01

## 2018-08-03 RX ADMIN — TRANEXAMIC ACID 1000 MG: 100 INJECTION, SOLUTION INTRAVENOUS at 12:58

## 2018-08-03 RX ADMIN — ROCURONIUM BROMIDE 10 MG: 10 INJECTION INTRAVENOUS at 14:33

## 2018-08-03 RX ADMIN — OXYCODONE HYDROCHLORIDE 5 MG: 5 TABLET ORAL at 18:34

## 2018-08-03 RX ADMIN — PROPOFOL 200 MG: 10 INJECTION, EMULSION INTRAVENOUS at 12:49

## 2018-08-03 RX ADMIN — FENTANYL CITRATE 50 MCG: 50 INJECTION, SOLUTION INTRAMUSCULAR; INTRAVENOUS at 14:47

## 2018-08-03 RX ADMIN — ROCURONIUM BROMIDE 10 MG: 10 INJECTION INTRAVENOUS at 14:17

## 2018-08-03 RX ADMIN — PRAVASTATIN SODIUM 40 MG: 40 TABLET ORAL at 18:34

## 2018-08-03 RX ADMIN — KETOROLAC TROMETHAMINE 30 MG: 30 INJECTION, SOLUTION INTRAMUSCULAR at 14:41

## 2018-08-03 RX ADMIN — KETOROLAC TROMETHAMINE 30 MG: 30 INJECTION, SOLUTION INTRAMUSCULAR at 18:24

## 2018-08-03 RX ADMIN — Medication 0.6 MG: at 14:59

## 2018-08-03 RX ADMIN — SODIUM CHLORIDE: 9 INJECTION, SOLUTION INTRAVENOUS at 18:27

## 2018-08-03 RX ADMIN — OXYCODONE HYDROCHLORIDE 5 MG: 5 TABLET ORAL at 22:40

## 2018-08-03 RX ADMIN — CEFAZOLIN 1 G: 1 INJECTION, POWDER, FOR SOLUTION INTRAMUSCULAR; INTRAVENOUS at 22:40

## 2018-08-03 RX ADMIN — FENTANYL CITRATE 100 MCG: 50 INJECTION, SOLUTION INTRAMUSCULAR; INTRAVENOUS at 12:49

## 2018-08-03 RX ADMIN — MOMETASONE FUROATE AND FORMOTEROL FUMARATE DIHYDRATE 2 PUFF: 100; 5 AEROSOL RESPIRATORY (INHALATION) at 22:40

## 2018-08-03 RX ADMIN — FENTANYL CITRATE 50 MCG: 50 INJECTION, SOLUTION INTRAMUSCULAR; INTRAVENOUS at 15:12

## 2018-08-03 RX ADMIN — ROCURONIUM BROMIDE 50 MG: 10 INJECTION INTRAVENOUS at 12:49

## 2018-08-03 RX ADMIN — PREGABALIN 100 MG: 100 CAPSULE ORAL at 22:40

## 2018-08-03 RX ADMIN — EPHEDRINE SULFATE 10 MG: 50 INJECTION INTRAMUSCULAR; INTRAVENOUS; SUBCUTANEOUS at 13:21

## 2018-08-03 RX ADMIN — HYDROMORPHONE HYDROCHLORIDE 0.5 MG: 1 INJECTION, SOLUTION INTRAMUSCULAR; INTRAVENOUS; SUBCUTANEOUS at 16:35

## 2018-08-03 RX ADMIN — MORPHINE SULFATE 4 MG: 4 INJECTION INTRAVENOUS at 20:21

## 2018-08-03 RX ADMIN — TRANEXAMIC ACID 1000 MG: 100 INJECTION, SOLUTION INTRAVENOUS at 15:01

## 2018-08-03 RX ADMIN — Medication 2 G: at 12:49

## 2018-08-03 RX ADMIN — HYDROMORPHONE HYDROCHLORIDE 0.5 MG: 1 INJECTION, SOLUTION INTRAMUSCULAR; INTRAVENOUS; SUBCUTANEOUS at 16:43

## 2018-08-03 RX ADMIN — SODIUM CHLORIDE 500 ML: 9 INJECTION, SOLUTION INTRAVENOUS at 16:06

## 2018-08-03 RX ADMIN — FLUOXETINE 40 MG: 20 CAPSULE ORAL at 18:34

## 2018-08-03 RX ADMIN — ROCURONIUM BROMIDE 20 MG: 10 INJECTION INTRAVENOUS at 13:22

## 2018-08-03 RX ADMIN — HYDROMORPHONE HYDROCHLORIDE 0.5 MG: 1 INJECTION, SOLUTION INTRAMUSCULAR; INTRAVENOUS; SUBCUTANEOUS at 16:27

## 2018-08-03 RX ADMIN — MIDAZOLAM 2 MG: 1 INJECTION INTRAMUSCULAR; INTRAVENOUS at 12:36

## 2018-08-03 RX ADMIN — SODIUM CHLORIDE, POTASSIUM CHLORIDE, SODIUM LACTATE AND CALCIUM CHLORIDE: 600; 310; 30; 20 INJECTION, SOLUTION INTRAVENOUS at 11:08

## 2018-08-03 RX ADMIN — ACETAMINOPHEN 1000 MG: 500 TABLET, FILM COATED ORAL at 18:24

## 2018-08-03 RX ADMIN — NEOSTIGMINE METHYLSULFATE 4 MG: 1 INJECTION, SOLUTION INTRAVENOUS at 14:59

## 2018-08-03 ASSESSMENT — PULMONARY FUNCTION TESTS
PIF_VALUE: 20
PIF_VALUE: 19
PIF_VALUE: 20
PIF_VALUE: 40
PIF_VALUE: 17
PIF_VALUE: 21
PIF_VALUE: 18
PIF_VALUE: 20
PIF_VALUE: 20
PIF_VALUE: 16
PIF_VALUE: 20
PIF_VALUE: 16
PIF_VALUE: 20
PIF_VALUE: 20
PIF_VALUE: 21
PIF_VALUE: 20
PIF_VALUE: 14
PIF_VALUE: 20
PIF_VALUE: 0
PIF_VALUE: 4
PIF_VALUE: 20
PIF_VALUE: 15
PIF_VALUE: 20
PIF_VALUE: 16
PIF_VALUE: 20
PIF_VALUE: 18
PIF_VALUE: 20
PIF_VALUE: 20
PIF_VALUE: 16
PIF_VALUE: 20
PIF_VALUE: 1
PIF_VALUE: 3
PIF_VALUE: 28
PIF_VALUE: 17
PIF_VALUE: 20
PIF_VALUE: 20
PIF_VALUE: 0
PIF_VALUE: 16
PIF_VALUE: 16
PIF_VALUE: 20
PIF_VALUE: 19
PIF_VALUE: 0
PIF_VALUE: 20
PIF_VALUE: 19
PIF_VALUE: 20
PIF_VALUE: 0
PIF_VALUE: 3
PIF_VALUE: 20
PIF_VALUE: 20
PIF_VALUE: 15
PIF_VALUE: 18
PIF_VALUE: 5
PIF_VALUE: 20
PIF_VALUE: 20
PIF_VALUE: 17
PIF_VALUE: 16
PIF_VALUE: 3
PIF_VALUE: 15
PIF_VALUE: 20
PIF_VALUE: 19
PIF_VALUE: 20
PIF_VALUE: 16
PIF_VALUE: 18
PIF_VALUE: 20
PIF_VALUE: 17
PIF_VALUE: 15
PIF_VALUE: 15
PIF_VALUE: 16
PIF_VALUE: 20
PIF_VALUE: 19
PIF_VALUE: 20
PIF_VALUE: 20
PIF_VALUE: 1
PIF_VALUE: 20
PIF_VALUE: 15
PIF_VALUE: 17
PIF_VALUE: 20
PIF_VALUE: 19
PIF_VALUE: 20
PIF_VALUE: 20
PIF_VALUE: 19
PIF_VALUE: 20
PIF_VALUE: 20
PIF_VALUE: 16
PIF_VALUE: 20
PIF_VALUE: 20
PIF_VALUE: 17
PIF_VALUE: 26
PIF_VALUE: 1
PIF_VALUE: 3
PIF_VALUE: 20
PIF_VALUE: 1
PIF_VALUE: 15
PIF_VALUE: 20
PIF_VALUE: 20
PIF_VALUE: 16
PIF_VALUE: 20
PIF_VALUE: 25
PIF_VALUE: 19
PIF_VALUE: 20
PIF_VALUE: 20
PIF_VALUE: 4
PIF_VALUE: 20
PIF_VALUE: 17
PIF_VALUE: 16
PIF_VALUE: 17
PIF_VALUE: 26
PIF_VALUE: 20
PIF_VALUE: 17
PIF_VALUE: 20
PIF_VALUE: 16
PIF_VALUE: 19
PIF_VALUE: 3
PIF_VALUE: 20
PIF_VALUE: 1
PIF_VALUE: 20
PIF_VALUE: 16
PIF_VALUE: 20
PIF_VALUE: 21
PIF_VALUE: 20
PIF_VALUE: 16
PIF_VALUE: 20
PIF_VALUE: 20

## 2018-08-03 ASSESSMENT — PAIN SCALES - GENERAL
PAINLEVEL_OUTOF10: 5
PAINLEVEL_OUTOF10: 8
PAINLEVEL_OUTOF10: 9
PAINLEVEL_OUTOF10: 9
PAINLEVEL_OUTOF10: 8
PAINLEVEL_OUTOF10: 9
PAINLEVEL_OUTOF10: 8
PAINLEVEL_OUTOF10: 6
PAINLEVEL_OUTOF10: 7
PAINLEVEL_OUTOF10: 5
PAINLEVEL_OUTOF10: 9

## 2018-08-03 ASSESSMENT — PAIN DESCRIPTION - PAIN TYPE
TYPE: SURGICAL PAIN

## 2018-08-03 ASSESSMENT — PAIN DESCRIPTION - LOCATION
LOCATION: SHOULDER

## 2018-08-03 ASSESSMENT — LIFESTYLE VARIABLES: SMOKING_STATUS: 0

## 2018-08-03 ASSESSMENT — ENCOUNTER SYMPTOMS
DYSPNEA ACTIVITY LEVEL: NO INTERVAL CHANGE
SHORTNESS OF BREATH: 1

## 2018-08-03 ASSESSMENT — PAIN DESCRIPTION - ORIENTATION
ORIENTATION: LEFT

## 2018-08-03 ASSESSMENT — PAIN DESCRIPTION - DESCRIPTORS
DESCRIPTORS: ACHING

## 2018-08-03 ASSESSMENT — COPD QUESTIONNAIRES: CAT_SEVERITY: NO INTERVAL CHANGE

## 2018-08-03 ASSESSMENT — PAIN - FUNCTIONAL ASSESSMENT: PAIN_FUNCTIONAL_ASSESSMENT: 0-10

## 2018-08-03 ASSESSMENT — PAIN DESCRIPTION - FREQUENCY
FREQUENCY: CONTINUOUS

## 2018-08-03 NOTE — PROGRESS NOTES
Admitted to room 2047 per bed from surgery. Awake, alert, and in no distress. Family at bedside. Assessment completed. No signs of bleeding. Neuro checks intact to left arm.

## 2018-08-03 NOTE — ANESTHESIA PROCEDURE NOTES
Peripheral Block    Patient location during procedure: PACU  Start time: 8/3/2018 11:20 AM  End time: 8/3/2018 11:40 AM  Staffing  Anesthesiologist: Bekah Wagner  Preanesthetic Checklist  Completed: patient identified, site marked, surgical consent, pre-op evaluation, timeout performed, IV checked, risks and benefits discussed, monitors and equipment checked, anesthesia consent given, oxygen available and patient being monitored  Peripheral Block  Patient position: supine  Prep: ChloraPrep  Patient monitoring: cardiac monitor, continuous pulse ox, frequent blood pressure checks and IV access  Block type: Brachial plexus  Laterality: left  Injection technique: single-shot  Procedures: ultrasound guided  Interscalene  Provider prep: mask and sterile gloves  Needle  Needle type: short-bevel   Needle gauge: 22 G  Needle length: 5 cm  Needle localization: ultrasound guidance  Test dose: negative  Assessment  Injection assessment: negative aspiration for heme, no paresthesia on injection and local visualized surrounding nerve on ultrasound  Slow fractionated injection: yes  Hemodynamics: stable  Reason for block: post-op pain management

## 2018-08-03 NOTE — H&P
HISTORY and [address]       NAME:  Karen Martinez  MRN: [id number]   YOB: 1955   Date: 8/3/2018   Age: 58 y.o. Gender: male     H&P Update Note    H&P from 8/1/2018  reviewed and updated, Patient examined. Vitals: BP (!) 117/53   Pulse 62   Temp 97.3 °F (36.3 °C) (Oral)   Resp 16   Ht 5' 10\" (1.778 m)   Wt 262 lb (118.8 kg)   SpO2 97%   BMI 37.59 kg/m²  Body mass index is 37.59 kg/m². No interval changes except for pt was here two days ago 8/1/18 for closed reduction of the left shoulder after having on Left reverse shoulder arthroplasty on 7/18/18. He is here today for left total shoulder reverse arthroplasty revision. I concur with the findings. He is currently in a shoulder immobilizer brace. Pain 3/10 currently, aching, deep. On exam, pt alert and oriented. HRRR. Lungs clear.      PROVISIONAL DIAGNOSES / SURGERY:      Dislocated Left Total Shoulder   Left Total Shoulder Reverse Arthroplasty Revision    Patient Active Problem List    Diagnosis Date Noted    REYNALDO on CPAP 05/06/2017     Priority: High    Status post lumbar laminectomy 03/25/2017     Priority: High    Tubular adenoma of colon 4/11/17 04/11/2017     Priority: Medium    Complete tear of left rotator cuff 07/18/2018    S/P reverse total shoulder arthroplasty, left 07/18/2018    Bradycardia 06/02/2018    Psychophysiological insomnia 01/03/2018    Positive depression screening 01/03/2018    Hypertriglyceridemia 05/06/2017    Positive FIT (fecal immunochemical test) 04/11/2017    Type 2 diabetes mellitus with diabetic autonomic neuropathy, without long-term current use of insulin ([address] 03/25/2017    PVD (peripheral vascular disease) with claudication ([address] 03/25/2017    Peripheral neuropathic pain 03/25/2017    Pruritic dermatitis 03/25/2017    Tinea pedis of both feet 03/25/2017    Hyperglycemia 03/25/2017    Nonspecific reactive hepatitis 03/25/2017    COPD mixed type ([address] 03/25/2017    Polypharmacy 03/25/2017    Pseudocholinesterase deficiency 03/25/2017    Vitamin D deficiency 03/25/2017    Hypercalcemia 03/25/2017    DDD (degenerative disc disease), lumbar 11/03/2016    Chronic bilateral low back pain with bilateral sciatica 11/03/2016    Osteoarthritis of spine with radiculopathy, lumbar region 11/03/2016    Essential hypertension 01/20/2016    Gout 01/20/2016    Hyperlipidemia with target LDL less than 100 01/20/2016    Osteoporosis 01/20/2016    Prostate CA ([facility] 01/20/2016    Moderate episode of recurrent major depressive disorder ([facility] 01/20/2016    Gastroesophageal reflux disease without esophagitis 01/20/2016    Obesity, Class III, BMI 40-49.9 (morbid obesity) ([facility] 01/20/2016           JAVI Brewer - CNP on 8/3/2018 at 10:51 AM    Isatu Olson PA-C Physician Assistant Signed Internal Medicine  H&P Date of Service: 8/1/2018  6:21 AM   Related encounter: Admission (Discharged) from 8/1/2018 in [facility]    []Manual[]Template  []Copied        HISTORY and [address]         NAME:  Stephany Jean  MRN: [id number]   YOB: 1955   Date: 8/1/2018   Age: 58 y.o. Gender: male      COMPLAINT AND PRESENT HISTORY:      Stephany Jean is a 58 y.o.  male, here today for closed reduction of left shoulder. Patient is status post reverse total shoulder arthroplasty two weeks ago. He reports mild pain and swelling in the shoulder since surgery. He was seen postoperatively yesterday, had xrays revealing anterior dislocation of his shoulder. Patient reports he has not tried to move the shoulder. He denies weakness or numbness in the left upper extremity. He is otherwise feeling well today, no fever/chills, chest pain or shortness of breath.  No pain or swelling of lower extremities.      PAST MEDICAL HISTORY      Past Medical History        Past Medical History:   Diagnosis Date    Acid reflux      Arthritis      Chronic bilateral low back pain with bilateral sciatica 11/3/2016    Gout      H/O cardiac catheterization       yrs ago   no stents    Hyperlipidemia      Hyperlipidemia with target LDL less than 100 1/20/2016    Hypertension      Knee pain, chronic       left    MDRO (multiple drug resistant organisms) resistance      Moderate episode of recurrent major depressive disorder ([facility] 1/20/2016    REYNALDO on CPAP 5/6/2017    Polypharmacy 3/25/2017    Prolonged emergence from general anesthesia 01/05/2017     Patient \"on life support for 3 days\" after back surgery due to being given succinylcholine    Prostate cancer ([facility] 10/2013     finished radiation tx 5/2014    Pseudocholinesterase deficiency 03/25/2017     Pt.  \"on life support\" for 3 days after back surgery 1/5/17 after being given succinylcholine    Short of breath on exertion      Sleep apnea       uses CPAP machine nightly    Status post lumbar laminectomy 3/25/2017    Tubular adenoma of colon 4/11/17 5/13/2017    Type 2 diabetes mellitus with hyperglycemia, without long-term current use of insulin ([facility] 3/25/2017     Lab Results Component   Value   Date            LABA1C          7.1 (H)      03/23/2017     Vitamin D deficiency 3/25/2017    Wears glasses           Pt denies any history of:  CAD, CVA/TIA, Asthma/COPD, Thyroid disease, CKD, Seizures, Hepatitis, Tuberculosis     SURGICAL HISTORY        Past Surgical History         Past Surgical History:   Procedure Laterality Date    BACK SURGERY         x 2     BACK SURGERY   01/05/2017     lumbar laminectomy L2, L3, L4    CARDIAC CATHETERIZATION   2007     no stents    CARPAL TUNNEL RELEASE Bilateral      KNEE SURGERY Left      LUMBAR LAMINECTOMY   01/05/2017     L2-L4    NC COLONOSCOPY W/BIOPSY SINGLE/MULTIPLE N/A 5/9/2017     COLONOS[facility] WITH [facility]OPSY [facility] Sudhakar Cyr DO [facility]18[facility]2018     chills. Skin:  No itching, redness or rash. HEENT:  No headache, rhinorrhea, congestion or sore throat. Neck:  No pain, stiffness or masses. Cardiovascular/Respiratory system:  No chest pain, palpitations, cough or shortness of breath. Gastrointestinal tract: No abdominal pain, nausea, vomiting, diarrhea or constipation. Genitourinary:  No dysuria, hesitancy, urgency, frequency or discoloration of urine. Locomotor:  See HPI. Neuropsychiatric:  No referable complaints.                  GENERAL PHYSICAL EXAM:      Vitals: BP 97/63   Pulse 54   Temp 97.2 °F (36.2 °C) (Oral)   Resp 16   Ht 5' 10\" (1.778 m)   Wt 262 lb (118.8 kg)   SpO2 99%   BMI 37.59 kg/m²  Body mass index is 37.59 kg/m².      GENERAL APPEARANCE: Susan Manning is a 58 y.o.,  male, moderately obese, nourished, conscious, alert. Does not appear to be in any distress or pain at this time. Patient resting in bed. SKIN:  Normal temperature, turgor and texture. No cyanosis or jaundice. HEAD:  Normocephalic, atraumatic. EYES:  Pupils equal, reactive to light and accomodation. Conjunctiva clear. THROAT:  Mucous membranes moist. No tonsillar erythema or exudates. NECK:  No stiffness, trachea central.  No palpable masses. CHEST:  Symmetrical and equal on expansion. HEART:  Regular rate, rhythm. No audible murmur. LUNGS:  Equal on expansion. Clear to auscultation with no adventitious sounds. ABDOMEN:  Obese. Soft on palpation. No localized tenderness, guarding or rigidity. No palpable organomegaly. LYMPHATICS:  No palpable cervical lymphadenopathy.      LOCOMOTOR, BACK AND SPINE:  No tenderness or deformities. No flank tenderness.                 EXTREMITIES: Symmetrical with no pretibial/pedal edema. No discoloration or ulcerations. No warmth, tenderness, erythema noted in lower legs bilaterally. Mild tenderness to palpation left upper extremity, range of motion testing not performed at this time, deferred to surgeon. Neurovascularly intact distally.      NEUROLOGIC:  The patient is conscious, alert, oriented. No apparent focal sensory or motor deficits.                                                                       PROVISIONAL DIAGNOSES / SURGERY:       Dislocation Status Post Total Shoulder Arthroplasty, Left  Closed Reduction, Left Shoulder           Patient Active Problem List     Diagnosis Date Noted    REYNALDO on CPAP 05/06/2017       Priority: High    Status post lumbar laminectomy 03/25/2017       Priority: High    Tubular adenoma of colon 4/11/17 04/11/2017       Priority: Medium    Complete tear of left rotator cuff 07/18/2018    S/P reverse total shoulder arthroplasty, left 07/18/2018    Bradycardia 06/02/2018    Psychophysiological insomnia 01/03/2018    Positive depression screening 01/03/2018    Hypertriglyceridemia 05/06/2017    Positive FIT (fecal immunochemical test) 04/11/2017    Type 2 diabetes mellitus with diabetic autonomic neuropathy, without long-term current use of insulin ([address] 03/25/2017    PVD (peripheral vascular disease) with claudication ([facility]) 03/25/2017    Peripheral neuropathic pain 03/25/2017    Pruritic dermatitis 03/25/2017    Tinea pedis of both feet 03/25/2017    Hyperglycemia 03/25/2017    Nonspecific reactive hepatitis 03/25/2017    COPD mixed type ([address] 03/25/2017    Polypharmacy 03/25/2017    Pseudocholinesterase deficiency 03/25/2017    Vitamin D deficiency 03/25/2017    Hypercalcemia 03/25/2017    DDD (degenerative disc disease), lumbar 11/03/2016    Chronic bilateral low back pain with bilateral sciatica 11/03/2016    Osteoarthritis of spine with radiculopathy, lumbar region 11/03/2016   [facility]

## 2018-08-03 NOTE — OP NOTE
Operative Report     Date of Procedure: 8/3/2018     Preop Diagnosis:   Dislocated left reverse shoulder arthroplasty     Postop Diagnosis:   Dislocated left reverse shoulder arthroplasty    Operation:     Revision left Reverse Shoulder Arthroplasty      Surgeon: Jaylene Pimentel MD    Surgical Assistant: Neptali Sanches DO ([id number])     Anesthesia: General with left interscalene nerve block    EBL: 100 cc    Summary of findings:  Unstable reverse shoulder arthroplasty      Implants used:  DJO Altivate Reverse Shoulder    - 36 neutral Glenosphere   - +8 humeral augment   - 36 semi-constrained Poly Insert    Indications: Dian Banks is a 58 y.o. old male who presented to weeks status post left reverse shoulder arthroplasty with a dislocated shoulder. He was successfully reduced in closed fashion under general anesthetic but then redislocated. Consequently following a discussion with the patient with regards to his treatment options including continued non-surgical treatment and operative intervention, he elected to forgo continued attempts at conservative treatment and proceed with surgery by way of a revision left reverse shoulder replacement. The goal of surgery was to better tension shoulder and provide additional stability. Risks, benefits, and alternatives were fully discussed. Postoperative limitations and limitations of the procedure were discussed in detail. The patient understood risks, alternatives, complications, & post-operative limitations and wishes to proceed. Operative Report: Following appropriate identification of the patient and his operative extremity in the preoperative area, consent was reviewed with patient. Risks, benefits, and alternatives were discussed. All questions were answered. The anesthesia team administered a left interscalene nerve block and the patient was taken back to the OR and transferred onto the operative table.  The anesthesia team administered a general anesthetic and established/secured his airway . SCDs/TEDs were placed on both legs. The patient was carefully positioned and secured in the beach-chair position, padding all prominences. The left upper extremity was prepped and draped in the standard sterile fashion. The patient finished a course of pre-operative antibiotics by way of 2 g of IV Ancef. A time out was performed during which verification of surgical site, patient identification, and surgical procedure planned were confirmed by the circulating nurse and anesthetic team.    The patient's left shoulder was reduced and a 12 cm deltopectoral incision was made using his previous incisional scar. The deltopectoral interval was developed. The subdeltoid, subcoracoid and subacromial spaces were developed and released. There was a tear of the supraspinatus. The axillary nerve was identified and protected. Through the course of the exposure and approach series of cultures were obtained from the subcutaneous tissue, subdeltoid space, and subcoracoid space. The patient's joint was dislocated and the polyethylene insert removed by drilling a  hole into the insert and following this up by inserting a 6.5 mm cancellus screw. The base of the humerus was then cultured. Focus was turned to the [place] has fair which was removed and again cultures taken from the baseplate. Frozen section samples were obtained from deep within the wound. This came back demonstrating 3-4 white blood cells per high-powered field. After performing a thorough debridement he periphery of the baseplate properly assessed to ensure there were no impingement points. A trial 36 neutral glenosphere component was then impacted onto the baseplate morise taper. Attention was then focused back in the proximal humerus. A series of 36 trial inserts were inserted each time reducing the joint and assessing for appropriate stability.   I felt additional tension was required consequently a +8 trial

## 2018-08-03 NOTE — ANESTHESIA PRE PROCEDURE
Department of Anesthesiology  Preprocedure Note       Name:  Kris Alonzo   Age:  58 y.o.  :  1955                                          MRN:  [id number]         Date:  8/3/2018      Surgeon: Carl Hutchison):  Justino Hanson MD    Procedure: Procedure(s):  SHOULDER TOTAL REVERSE  ARTHROPLASTY REVISION    Medications prior to admission:   Prior to Admission medications    Medication Sig Start Date End Date Taking? Authorizing Provider   HYDROcodone-acetaminophen (NORCO) 7.5-325 MG per tablet 1-2 tabs PO every 4-6 hours as needed for pain. 18 Yes Angelita Blake MD   lisinopril (PRINIVIL;ZESTRIL) 20 MG tablet TAKE 1 TABLET TWICE A DAY 18  Yes Pili Myers MD   pregabalin (LYRICA) 100 MG capsule Take 1 capsule by mouth 3 times daily for 90 days. . 18 Yes Pili Myers MD   amLODIPine (NORVASC) 10 MG tablet Take 1 tablet by mouth daily Dose increased  2018.  STOP ATENOLOL 18  Yes Pili Myers MD   furosemide (LASIX) 20 MG tablet Take 1 tablet by mouth daily **stop Tenoretic 50-25** 3/1/18  Yes Pili Myers MD   traZODone (DESYREL) 150 MG tablet Take 1 tablet by mouth nightly Insomnia 3/1/18  Yes Pili Myers MD   FLUoxetine (PROZAC) 40 MG capsule Take 1 capsule by mouth daily 3/1/18  Yes Pili Myers MD   allopurinol (ZYLOPRIM) 100 MG tablet Take 1 tablet by mouth daily 17  Yes Pili Myers MD   aspirin EC 81 MG EC tablet Take 1 tablet by mouth daily 3/23/17  Yes Pili Myers MD   omeprazole (PRILOSEC) 20 MG capsule Take 1 capsule by mouth 2 times daily  Patient taking differently: Take 20 mg by mouth Daily  16  Yes Urmila Das MD   clotrimazole-betamethasone (LOTRISONE) 1-0.05 % cream Apply topically at bedtime on the feet x 4 weeks 18   Pili Myers MD   ondansetron (ZOFRAN) 4 MG tablet Take 1 tablet by mouth daily as needed for Nausea or Vomiting 18   Justino Hanson MD   nystatin (MYCOSTATIN) 412150 UNIT/GM powder Apply 3 times daily for feet 6/1/18   Beba Osborn MD   mupirocin (BACTROBAN) 2 % ointment Apply topically 1-2 times daily on the affected area . OK to substitute to cream 6/1/18   Beba Osborn MD   ammonium lactate (AMLACTIN) 12 % cream  3/17/18   Historical Provider, MD   NONFORMULARY Bioidentical pain cream    Historical Provider, MD   pravastatin (PRAVACHOL) 40 MG tablet Take 1 tablet by mouth every evening **stop Crestor** 3/1/18   Beba Osborn MD   Glucose Blood (BLOOD GLUCOSE TEST STRIPS) STRP tesing once a day fasting 3/1/18   Beba Osborn MD   enzalutamide (XTANDI) 40 MG capsule Take 40 mg by mouth Indications: take four 40 mg capsules daily. Historical Provider, MD   Umeclidinium Bromide (INCRUSE ELLIPTA) 62.5 MCG/INH AEPB Inhale 1 each into the lungs daily Per Dr. Mathur Mail 5/6/17   Beba Osborn MD   FOLBEE 2.5-25-1 MG TABS tablet  4/27/17   Historical Provider, MD   colchicine (COLCRYS) 0.6 MG tablet ADJUST SIG-ONLY AS NEEDED FOR GOUT ATTACK. Take 2 tablets now, then 1 tablet one hour later. Wait 12 hours then start 1 tablet bid for 5 days.  4/11/17   Beba Osborn MD   albuterol (PROVENTIL) (2.5 MG/3ML) 0.083% nebulizer solution Take 2.5 mg by nebulization 4 times daily  3/2/17   Historical Provider, MD   albuterol sulfate  (90 BASE) MCG/ACT inhaler Inhale 2 puffs into the lungs every 6 hours as needed for Wheezing or Shortness of Breath (COUGH) 3/23/17   Beba Osborn MD   SYMBICORT 160-4.5 MCG/ACT AERO Inhale 2 puffs into the lungs 2 times daily 3/23/17   Beba Osborn MD       Current medications:    Current Facility-Administered Medications   Medication Dose Route Frequency Provider Last Rate Last Dose    ceFAZolin (ANCEF) 1 g injection             ceFAZolin (ANCEF) 2 g in dextrose 5 % 50 mL IVPB  2 g Intravenous On Call to [facility] [address], MD        sodium chloride flush 0.9 % injection 10 mL  10 mL Intravenous 2 times per day Azalia Mccracken MD        sodium chloride flush 0.9 % injection 10 mL  10 mL Intravenous PRN Azalia Mccracken MD           Allergies:     Allergies   Allergen Reactions    Succinylcholine Chloride Other (See Comments)     PATIENT HAS PSEUDOCHOLINESTERASE DEFICIENCY      Cymbalta [Duloxetine Hcl]      Taste loss         Problem List:    Patient Active Problem List   Diagnosis Code    Essential hypertension I10    Gout M10.9    Hyperlipidemia with target LDL less than 100 E78.5    Osteoporosis M81.0    Prostate CA ([facility] C61    Moderate episode of recurrent major depressive disorder ([facility] F33.1    Gastroesophageal reflux disease without esophagitis K21.9    Obesity, Class III, BMI 40-49.9 (morbid obesity) ([facility]) E66.01    DDD (degenerative disc disease), lumbar M51.36    Chronic bilateral low back pain with bilateral sciatica M54.42, M54.41, G89.29    Osteoarthritis of spine with radiculopathy, lumbar region M47.26    Type 2 diabetes mellitus with diabetic autonomic neuropathy, without long-term current use of insulin ([facility]) E11.43    PVD (peripheral vascular disease) with claudication ([facility]) I73.9    Peripheral neuropathic pain M79.2    Pruritic dermatitis L29.9    Tinea pedis of both feet B35.3    Hyperglycemia R73.9    Nonspecific reactive hepatitis K75.2    COPD mixed type ([facility]) J44.9    Status post lumbar laminectomy Z98.890    Polypharmacy Z79.899    Pseudocholinesterase deficiency E88.09    Vitamin D deficiency E55.9    Hypercalcemia E83.52    Positive FIT (fecal immunochemical test) R19.5    Hypertriglyceridemia E78.1    REYNALDO on CPAP G47.33, Z99.89    Tubular adenoma of colon 4/11/17 D12.6    Psychophysiological insomnia F51.04    Positive depression screening Z13.89    Bradycardia R00.1    Complete tear of left rotator cuff M75.122    S/P reverse total shoulder arthroplasty, left J15.342       Past Medical History:        Diagnosis Date    Acid reflux     Arthritis PROTIME 10.5 02/25/2014    INR 1.0 02/25/2014    APTT 27.9 02/25/2014       HCG (If Applicable): No results found for: PREGTESTUR, PREGSERUM, HCG, HCGQUANT     ABGs: No results found for: PHART, PO2ART, OFO4EAR, ZVE7ELA, BEART, H3VRWIXI     Type & Screen (If Applicable):  No results found for: LABABO, [address]    Anesthesia Evaluation  Patient summary reviewed no history of anesthetic complications:   Airway: Mallampati: III  TM distance: >3 FB   Neck ROM: full  Mouth opening: > = 3 FB Dental: normal exam         Pulmonary:normal exam  breath sounds clear to auscultation  (+) COPD: no interval change,  shortness of breath: no interval change,  sleep apnea: on CPAP,      (-) asthma, recent URI and not a current smoker          Patient did not smoke on day of surgery. Cardiovascular:  Exercise tolerance: good (>4 METS),   (+) hypertension: no interval change, PLASCENCIA: no interval change,     (-) pacemaker, valvular problems/murmurs, past MI, CAD, CABG/stent, dysrhythmias,  angina,  CHF, orthopnea, PND, murmur, weak pulses,  friction rub, systolic click, carotid bruit,  JVD and peripheral edema    ECG reviewed  Rhythm: regular  Rate: normal           Beta Blocker:  Not on Beta Blocker         Neuro/Psych:   (+) neuromuscular disease:, psychiatric history: stable with treatment   (-) seizures, TIA, CVA, headaches and depression/anxiety            GI/Hepatic/Renal:   (+) GERD: no interval change, liver disease:,      (-) hiatal hernia, PUD, hepatitis, no renal disease, bowel prep and no morbid obesity       Endo/Other:    (+) DiabetesType II DM, , no malignancy/cancer. (-) hypothyroidism, hyperthyroidism, blood dyscrasia, arthritis, no electrolyte abnormalities, no malignancy/cancer               Abdominal:           Vascular:                                        Anesthesia Plan      regional and general     ASA 3       Induction: intravenous.     MIPS: Postoperative opioids intended and Prophylactic antiemetics administered. Anesthetic plan and risks discussed with patient. Plan discussed with CRNA.                   Dinora Montano MD   8/3/2018

## 2018-08-04 VITALS
BODY MASS INDEX: 37.51 KG/M2 | RESPIRATION RATE: 16 BRPM | OXYGEN SATURATION: 93 % | SYSTOLIC BLOOD PRESSURE: 131 MMHG | TEMPERATURE: 97.5 F | HEIGHT: 70 IN | DIASTOLIC BLOOD PRESSURE: 72 MMHG | HEART RATE: 70 BPM | WEIGHT: 262 LBS

## 2018-08-04 LAB
ANION GAP SERPL CALCULATED.3IONS-SCNC: 12 MMOL/L (ref 9–17)
BUN BLDV-MCNC: 17 MG/DL (ref 8–23)
BUN/CREAT BLD: ABNORMAL (ref 9–20)
CALCIUM SERPL-MCNC: 9.5 MG/DL (ref 8.6–10.4)
CHLORIDE BLD-SCNC: 102 MMOL/L (ref 98–107)
CO2: 24 MMOL/L (ref 20–31)
CREAT SERPL-MCNC: 0.82 MG/DL (ref 0.7–1.2)
GFR AFRICAN AMERICAN: >60 ML/MIN
GFR NON-AFRICAN AMERICAN: >60 ML/MIN
GFR SERPL CREATININE-BSD FRML MDRD: ABNORMAL ML/MIN/{1.73_M2}
GFR SERPL CREATININE-BSD FRML MDRD: ABNORMAL ML/MIN/{1.73_M2}
GLUCOSE BLD-MCNC: 122 MG/DL (ref 70–99)
HCT VFR BLD CALC: 36.2 % (ref 41–53)
HEMOGLOBIN: 12.2 G/DL (ref 13.5–17.5)
MCH RBC QN AUTO: 30.5 PG (ref 26–34)
MCHC RBC AUTO-ENTMCNC: 33.6 G/DL (ref 31–37)
MCV RBC AUTO: 90.8 FL (ref 80–100)
NRBC AUTOMATED: ABNORMAL PER 100 WBC
PDW BLD-RTO: 14.2 % (ref 11.5–14.9)
PLATELET # BLD: 273 K/UL (ref 150–450)
PMV BLD AUTO: 9.1 FL (ref 6–12)
POTASSIUM SERPL-SCNC: 4.5 MMOL/L (ref 3.7–5.3)
RBC # BLD: 3.99 M/UL (ref 4.5–5.9)
SODIUM BLD-SCNC: 138 MMOL/L (ref 135–144)
WBC # BLD: 9.4 K/UL (ref 3.5–11)

## 2018-08-04 PROCEDURE — 36415 COLL VENOUS BLD VENIPUNCTURE: CPT

## 2018-08-04 PROCEDURE — 6370000000 HC RX 637 (ALT 250 FOR IP): Performed by: ORTHOPAEDIC SURGERY

## 2018-08-04 PROCEDURE — 80048 BASIC METABOLIC PNL TOTAL CA: CPT

## 2018-08-04 PROCEDURE — 85027 COMPLETE CBC AUTOMATED: CPT

## 2018-08-04 PROCEDURE — 99024 POSTOP FOLLOW-UP VISIT: CPT | Performed by: ORTHOPAEDIC SURGERY

## 2018-08-04 PROCEDURE — 2580000003 HC RX 258: Performed by: ORTHOPAEDIC SURGERY

## 2018-08-04 PROCEDURE — 6360000002 HC RX W HCPCS: Performed by: ORTHOPAEDIC SURGERY

## 2018-08-04 RX ORDER — ASPIRIN 325 MG
325 TABLET, DELAYED RELEASE (ENTERIC COATED) ORAL DAILY
Qty: 28 TABLET | Refills: 0 | Status: SHIPPED | OUTPATIENT
Start: 2018-08-04 | End: 2018-09-04 | Stop reason: ALTCHOICE

## 2018-08-04 RX ORDER — OXYCODONE HYDROCHLORIDE 5 MG/1
10 TABLET ORAL EVERY 4 HOURS PRN
Status: DISCONTINUED | OUTPATIENT
Start: 2018-08-04 | End: 2018-08-04 | Stop reason: HOSPADM

## 2018-08-04 RX ADMIN — CEFAZOLIN 1 G: 1 INJECTION, POWDER, FOR SOLUTION INTRAMUSCULAR; INTRAVENOUS at 06:19

## 2018-08-04 RX ADMIN — PANTOPRAZOLE SODIUM 40 MG: 40 TABLET, DELAYED RELEASE ORAL at 06:22

## 2018-08-04 RX ADMIN — FLUOXETINE 40 MG: 20 CAPSULE ORAL at 08:10

## 2018-08-04 RX ADMIN — LISINOPRIL 20 MG: 20 TABLET ORAL at 08:03

## 2018-08-04 RX ADMIN — MORPHINE SULFATE 4 MG: 4 INJECTION INTRAVENOUS at 02:10

## 2018-08-04 RX ADMIN — MOMETASONE FUROATE AND FORMOTEROL FUMARATE DIHYDRATE 2 PUFF: 100; 5 AEROSOL RESPIRATORY (INHALATION) at 08:02

## 2018-08-04 RX ADMIN — PREGABALIN 100 MG: 100 CAPSULE ORAL at 08:04

## 2018-08-04 RX ADMIN — ACETAMINOPHEN 1000 MG: 500 TABLET, FILM COATED ORAL at 00:03

## 2018-08-04 RX ADMIN — KETOROLAC TROMETHAMINE 30 MG: 30 INJECTION, SOLUTION INTRAMUSCULAR at 09:40

## 2018-08-04 RX ADMIN — AMLODIPINE BESYLATE 10 MG: 10 TABLET ORAL at 08:02

## 2018-08-04 RX ADMIN — DOCUSATE SODIUM 100 MG: 100 CAPSULE, LIQUID FILLED ORAL at 08:04

## 2018-08-04 RX ADMIN — FUROSEMIDE 20 MG: 20 TABLET ORAL at 08:04

## 2018-08-04 RX ADMIN — ACETAMINOPHEN 1000 MG: 500 TABLET, FILM COATED ORAL at 06:19

## 2018-08-04 RX ADMIN — ASPIRIN 81 MG: 81 TABLET, COATED ORAL at 08:03

## 2018-08-04 RX ADMIN — OXYCODONE HYDROCHLORIDE 5 MG: 5 TABLET ORAL at 02:51

## 2018-08-04 RX ADMIN — KETOROLAC TROMETHAMINE 30 MG: 30 INJECTION, SOLUTION INTRAMUSCULAR at 02:51

## 2018-08-04 RX ADMIN — OXYCODONE HYDROCHLORIDE 5 MG: 5 TABLET ORAL at 08:10

## 2018-08-04 RX ADMIN — MORPHINE SULFATE 4 MG: 4 INJECTION INTRAVENOUS at 08:02

## 2018-08-04 RX ADMIN — ALLOPURINOL 100 MG: 100 TABLET ORAL at 08:03

## 2018-08-04 ASSESSMENT — PAIN SCALES - GENERAL
PAINLEVEL_OUTOF10: 8
PAINLEVEL_OUTOF10: 9
PAINLEVEL_OUTOF10: 8
PAINLEVEL_OUTOF10: 6
PAINLEVEL_OUTOF10: 8
PAINLEVEL_OUTOF10: 5
PAINLEVEL_OUTOF10: 9
PAINLEVEL_OUTOF10: 5

## 2018-08-06 LAB — SURGICAL PATHOLOGY REPORT: NORMAL

## 2018-08-17 LAB
CULTURE: ABNORMAL
CULTURE: ABNORMAL
CULTURE: NORMAL
DIRECT EXAM: ABNORMAL
DIRECT EXAM: ABNORMAL
DIRECT EXAM: NORMAL
Lab: ABNORMAL
Lab: NORMAL
SPECIMEN DESCRIPTION: ABNORMAL
SPECIMEN DESCRIPTION: NORMAL
STATUS: ABNORMAL
STATUS: NORMAL

## 2018-08-20 ENCOUNTER — OFFICE VISIT (OUTPATIENT)
Dept: ORTHOPEDIC SURGERY | Age: 63
End: 2018-08-20

## 2018-08-20 VITALS — HEIGHT: 70 IN | WEIGHT: 253.6 LBS | BODY MASS INDEX: 36.31 KG/M2

## 2018-08-20 DIAGNOSIS — M79.2 PERIPHERAL NEUROPATHIC PAIN: ICD-10-CM

## 2018-08-20 DIAGNOSIS — Z96.612 S/P REVERSE TOTAL SHOULDER ARTHROPLASTY, LEFT: Primary | ICD-10-CM

## 2018-08-20 DIAGNOSIS — E11.43 TYPE 2 DIABETES MELLITUS WITH DIABETIC AUTONOMIC NEUROPATHY, WITHOUT LONG-TERM CURRENT USE OF INSULIN (HCC): ICD-10-CM

## 2018-08-20 PROCEDURE — 99024 POSTOP FOLLOW-UP VISIT: CPT | Performed by: ORTHOPAEDIC SURGERY

## 2018-08-20 RX ORDER — PREGABALIN 100 MG/1
100 CAPSULE ORAL 3 TIMES DAILY
Qty: 270 CAPSULE | Refills: 0 | Status: SHIPPED | OUTPATIENT
Start: 2018-08-20 | End: 2018-10-30 | Stop reason: ALTCHOICE

## 2018-08-20 NOTE — PROGRESS NOTES
extremity for light activities of daily living sparingly. I'll see him back in my clinic in 4 weeks for reevaluation but he was encouraged to return or call earlier with questions and/or concerns. Of note his intraoperative cultures have remained without growth 2 weeks out.

## 2018-08-27 ENCOUNTER — HOSPITAL ENCOUNTER (OUTPATIENT)
Age: 63
Discharge: HOME | End: 2018-08-27
Payer: COMMERCIAL

## 2018-08-27 DIAGNOSIS — E11.43 TYPE 2 DIABETES MELLITUS WITH DIABETIC AUTONOMIC NEUROPATHY, WITHOUT LONG-TERM CURRENT USE OF INSULIN (HCC): ICD-10-CM

## 2018-08-27 DIAGNOSIS — I10 ESSENTIAL HYPERTENSION: ICD-10-CM

## 2018-08-27 DIAGNOSIS — E78.5 HYPERLIPIDEMIA WITH TARGET LDL LESS THAN 100: ICD-10-CM

## 2018-08-27 LAB
ALBUMIN SERPL-MCNC: 3.9 G/DL (ref 3.5–5.2)
ALBUMIN/GLOBULIN RATIO: ABNORMAL (ref 1–2.5)
ALP BLD-CCNC: 97 U/L (ref 40–129)
ALT SERPL-CCNC: 12 U/L (ref 5–41)
ANION GAP SERPL CALCULATED.3IONS-SCNC: 12 MMOL/L (ref 9–17)
AST SERPL-CCNC: 16 U/L
BILIRUB SERPL-MCNC: 0.39 MG/DL (ref 0.3–1.2)
BUN BLDV-MCNC: 8 MG/DL (ref 8–23)
BUN/CREAT BLD: ABNORMAL (ref 9–20)
CALCIUM SERPL-MCNC: 10.1 MG/DL (ref 8.6–10.4)
CHLORIDE BLD-SCNC: 107 MMOL/L (ref 98–107)
CHOLESTEROL/HDL RATIO: 4.3
CHOLESTEROL: 173 MG/DL
CO2: 24 MMOL/L (ref 20–31)
CREAT SERPL-MCNC: 0.63 MG/DL (ref 0.7–1.2)
CREATININE URINE: 255.5 MG/DL (ref 39–259)
GFR AFRICAN AMERICAN: >60 ML/MIN
GFR NON-AFRICAN AMERICAN: >60 ML/MIN
GFR SERPL CREATININE-BSD FRML MDRD: ABNORMAL ML/MIN/{1.73_M2}
GFR SERPL CREATININE-BSD FRML MDRD: ABNORMAL ML/MIN/{1.73_M2}
GLUCOSE BLD-MCNC: 98 MG/DL (ref 70–99)
HCT VFR BLD CALC: 38.2 % (ref 41–53)
HDLC SERPL-MCNC: 40 MG/DL
HEMOGLOBIN: 12.7 G/DL (ref 13.5–17.5)
LDL CHOLESTEROL: 106 MG/DL (ref 0–130)
MCH RBC QN AUTO: 29.8 PG (ref 26–34)
MCHC RBC AUTO-ENTMCNC: 33.2 G/DL (ref 31–37)
MCV RBC AUTO: 89.7 FL (ref 80–100)
MICROALBUMIN/CREAT 24H UR: 13 MG/L
MICROALBUMIN/CREAT UR-RTO: 5 MCG/MG CREAT
NRBC AUTOMATED: ABNORMAL PER 100 WBC
PDW BLD-RTO: 14.5 % (ref 11.5–14.9)
PLATELET # BLD: 236 K/UL (ref 150–450)
PMV BLD AUTO: 9 FL (ref 6–12)
POTASSIUM SERPL-SCNC: 3.8 MMOL/L (ref 3.7–5.3)
RBC # BLD: 4.26 M/UL (ref 4.5–5.9)
SODIUM BLD-SCNC: 143 MMOL/L (ref 135–144)
TOTAL PROTEIN: 6.9 G/DL (ref 6.4–8.3)
TRIGL SERPL-MCNC: 137 MG/DL
VLDLC SERPL CALC-MCNC: ABNORMAL MG/DL (ref 1–30)
WBC # BLD: 5.8 K/UL (ref 3.5–11)

## 2018-08-27 PROCEDURE — 36415 COLL VENOUS BLD VENIPUNCTURE: CPT

## 2018-08-27 PROCEDURE — 80061 LIPID PANEL: CPT

## 2018-08-27 PROCEDURE — 85027 COMPLETE CBC AUTOMATED: CPT

## 2018-08-27 PROCEDURE — 82043 UR ALBUMIN QUANTITATIVE: CPT

## 2018-08-27 PROCEDURE — 82570 ASSAY OF URINE CREATININE: CPT

## 2018-08-27 PROCEDURE — 80053 COMPREHEN METABOLIC PANEL: CPT

## 2018-08-27 NOTE — PROGRESS NOTES
Suleman comment sent to patient. Will discuss at appointment on 9/4.  worsening lipids. Anemia stable, just had shoulder surgery on 8/1/18. Otherwise labs within normal limits  Continue current treatment.

## 2018-09-04 ENCOUNTER — OFFICE VISIT (OUTPATIENT)
Dept: FAMILY MEDICINE CLINIC | Age: 63
End: 2018-09-04
Payer: COMMERCIAL

## 2018-09-04 VITALS
HEART RATE: 66 BPM | OXYGEN SATURATION: 96 % | BODY MASS INDEX: 36.42 KG/M2 | HEIGHT: 70 IN | TEMPERATURE: 98.8 F | DIASTOLIC BLOOD PRESSURE: 75 MMHG | WEIGHT: 254.4 LBS | SYSTOLIC BLOOD PRESSURE: 127 MMHG

## 2018-09-04 DIAGNOSIS — Z23 NEED FOR IMMUNIZATION AGAINST INFLUENZA: ICD-10-CM

## 2018-09-04 DIAGNOSIS — I10 ESSENTIAL HYPERTENSION: ICD-10-CM

## 2018-09-04 DIAGNOSIS — F33.42 RECURRENT MAJOR DEPRESSIVE DISORDER, IN FULL REMISSION (HCC): ICD-10-CM

## 2018-09-04 DIAGNOSIS — Z96.612 STATUS POST REVERSE TOTAL ARTHROPLASTY OF LEFT SHOULDER: ICD-10-CM

## 2018-09-04 DIAGNOSIS — E78.5 HYPERLIPIDEMIA WITH TARGET LDL LESS THAN 100: ICD-10-CM

## 2018-09-04 DIAGNOSIS — R43.2 ALTERED TASTE: ICD-10-CM

## 2018-09-04 DIAGNOSIS — E11.42 TYPE 2 DIABETES MELLITUS WITH DIABETIC POLYNEUROPATHY, WITHOUT LONG-TERM CURRENT USE OF INSULIN (HCC): Primary | ICD-10-CM

## 2018-09-04 DIAGNOSIS — B37.0 ORAL THRUSH: ICD-10-CM

## 2018-09-04 PROBLEM — L82.1 SEBORRHEIC KERATOSIS: Status: ACTIVE | Noted: 2018-09-04

## 2018-09-04 PROBLEM — L57.8 SOLAR DEGENERATION: Status: ACTIVE | Noted: 2018-09-04

## 2018-09-04 LAB — HBA1C MFR BLD: 5.3 %

## 2018-09-04 PROCEDURE — 90686 IIV4 VACC NO PRSV 0.5 ML IM: CPT | Performed by: FAMILY MEDICINE

## 2018-09-04 PROCEDURE — 83036 HEMOGLOBIN GLYCOSYLATED A1C: CPT | Performed by: FAMILY MEDICINE

## 2018-09-04 PROCEDURE — 99214 OFFICE O/P EST MOD 30 MIN: CPT | Performed by: FAMILY MEDICINE

## 2018-09-04 PROCEDURE — 90471 IMMUNIZATION ADMIN: CPT | Performed by: FAMILY MEDICINE

## 2018-09-04 RX ORDER — FLUOXETINE HYDROCHLORIDE 20 MG/1
20 CAPSULE ORAL DAILY
Qty: 90 CAPSULE | Refills: 3 | Status: SHIPPED | OUTPATIENT
Start: 2018-09-04 | End: 2018-10-05 | Stop reason: HOSPADM

## 2018-09-04 RX ORDER — ASPIRIN 81 MG/1
81 TABLET ORAL DAILY
Qty: 90 TABLET | Refills: 0
Start: 2018-09-04

## 2018-09-04 ASSESSMENT — ENCOUNTER SYMPTOMS
VOMITING: 0
DIARRHEA: 0
SHORTNESS OF BREATH: 1
CONSTIPATION: 0
ABDOMINAL DISTENTION: 0
APNEA: 1
WHEEZING: 0
COUGH: 0
BACK PAIN: 1
ABDOMINAL PAIN: 0
NAUSEA: 0
CHEST TIGHTNESS: 0

## 2018-09-04 ASSESSMENT — PATIENT HEALTH QUESTIONNAIRE - PHQ9
SUM OF ALL RESPONSES TO PHQ QUESTIONS 1-9: 0
2. FEELING DOWN, DEPRESSED OR HOPELESS: 0
SUM OF ALL RESPONSES TO PHQ9 QUESTIONS 1 & 2: 0
1. LITTLE INTEREST OR PLEASURE IN DOING THINGS: 0
SUM OF ALL RESPONSES TO PHQ QUESTIONS 1-9: 0

## 2018-09-04 NOTE — PROGRESS NOTES
5/4/2017 3/23/2017   PHQ2 Score 0 3 6 3 4   PHQ9 Score 0 14 17 10 15     Interpretation of Total Score Depression Severity: 1-4 = Minimal depression, 5-9 = Mild depression, 10-14 = Moderate depression, 15-19 = Moderately severe depression, 20-27 = Severe depression          Current Outpatient Prescriptions   Medication Sig Dispense Refill    pregabalin (LYRICA) 100 MG capsule Take 1 capsule by mouth 3 times daily for 90 days. . 270 capsule 0    lisinopril (PRINIVIL;ZESTRIL) 20 MG tablet TAKE 1 TABLET TWICE A  tablet 3    clotrimazole-betamethasone (LOTRISONE) 1-0.05 % cream Apply topically at bedtime on the feet x 4 weeks 45 g 3    ondansetron (ZOFRAN) 4 MG tablet Take 1 tablet by mouth daily as needed for Nausea or Vomiting 20 tablet 0    amLODIPine (NORVASC) 10 MG tablet Take 1 tablet by mouth daily Dose increased  6/1/2018. STOP ATENOLOL 90 tablet 3    nystatin (MYCOSTATIN) 763598 UNIT/GM powder Apply 3 times daily for feet 56.7 g 3    mupirocin (BACTROBAN) 2 % ointment Apply topically 1-2 times daily on the affected area . OK to substitute to cream 30 g 2    ammonium lactate (AMLACTIN) 12 % cream       NONFORMULARY Bioidentical pain cream      furosemide (LASIX) 20 MG tablet Take 1 tablet by mouth daily **stop Tenoretic 50-25** 90 tablet 3    traZODone (DESYREL) 150 MG tablet Take 1 tablet by mouth nightly Insomnia 90 tablet 3    FLUoxetine (PROZAC) 40 MG capsule Take 1 capsule by mouth daily 90 capsule 3    pravastatin (PRAVACHOL) 40 MG tablet Take 1 tablet by mouth every evening **stop Crestor** 90 tablet 3    Glucose Blood (BLOOD GLUCOSE TEST STRIPS) STRP tesing once a day fasting 100 strip 3    enzalutamide (XTANDI) 40 MG capsule Take 40 mg by mouth Indications: take four 40 mg capsules daily.       Umeclidinium Bromide (INCRUSE ELLIPTA) 62.5 MCG/INH AEPB Inhale 1 each into the lungs daily Per Dr. Skip Shankar 30 each 11    FOLBEE 2.5-25-1 MG TABS tablet       allopurinol (ZYLOPRIM) 100 MG Platelets 88/49/8234 236  150 - 450 k/uL Final    MPV 08/27/2018 9.0  6.0 - 12.0 fL Final    NRBC Automated 08/27/2018 NOT REPORTED  per 100 WBC Final    Glucose 08/27/2018 98  70 - 99 mg/dL Final    BUN 08/27/2018 8  8 - 23 mg/dL Final    CREATININE 08/27/2018 0.63* 0.70 - 1.20 mg/dL Final    Bun/Cre Ratio 08/27/2018 NOT REPORTED  9 - 20 Final    Calcium 08/27/2018 10.1  8.6 - 10.4 mg/dL Final    Sodium 08/27/2018 143  135 - 144 mmol/L Final    Potassium 08/27/2018 3.8  3.7 - 5.3 mmol/L Final    Chloride 08/27/2018 107  98 - 107 mmol/L Final    CO2 08/27/2018 24  20 - 31 mmol/L Final    Anion Gap 08/27/2018 12  9 - 17 mmol/L Final    Alkaline Phosphatase 08/27/2018 97  40 - 129 U/L Final    ALT 08/27/2018 12  5 - 41 U/L Final    AST 08/27/2018 16  <40 U/L Final    Total Bilirubin 08/27/2018 0.39  0.3 - 1.2 mg/dL Final    Total Protein 08/27/2018 6.9  6.4 - 8.3 g/dL Final    Alb 08/27/2018 3.9  3.5 - 5.2 g/dL Final    Albumin/Globulin Ratio 08/27/2018 NOT REPORTED  1.0 - 2.5 Final    GFR Non- 08/27/2018 >60  >60 mL/min Final    GFR  08/27/2018 >60  >60 mL/min Final    GFR Comment 08/27/2018        Final    Comment: Average GFR for 61-76 years old:   80 mL/min/1.73sq m  Chronic Kidney Disease:   <60 mL/min/1.73sq m  Kidney failure:   <15 mL/min/1.73sq m              eGFR calculated using average adult body mass.  Additional eGFR calculator   available at:        [email or web address]            GFR Staging 08/27/2018 NOT REPORTED   Final    Cholesterol 08/27/2018 173  <200 mg/dL Final    Comment:    Cholesterol Guidelines:      <200  Desirable   200-240  Borderline      >240  Undesirable         HDL 08/27/2018 40* >40 mg/dL Final    Comment:    HDL Guidelines:    <40     Undesirable   40-59    Borderline    >59     Desirable         LDL Cholesterol 08/27/2018 106  0 - 130 mg/dL Final    Comment:    LDL Guidelines:     <100 in visit on 09/04/18   POCT glycosylated hemoglobin (Hb A1C)   Result Value Ref Range    Hemoglobin A1C 5.3 %       - aspirin EC 81 MG EC tablet; Take 1 tablet by mouth daily  Dispense: 90 tablet; Refill: 0  - folic acid-pyridoxine-cyanocobalamine (FOLTX) 2.5-25-1 MG TABS tablet; Take 1 tablet by mouth daily  Dispense: 30 tablet; Refill: 0-unable to afford $90/mo, advised to buy separate vitamins      -advised home blood glucose testing  daily  -daily feet exam, Foot care: advised to wash feet daily, pat dry and apply lotion at night, avoiding between toes. Need to look at feet daily and report to a physician any signs of inflammation or skin damage  -annual dilated eye exam  -Low carb, low fat diet, increase fruits and vegetables, and exercise 4-5 times a day 30-40 minutes a day discussed  -continue current treatment-diet control  -continue Aspirin  -continue ACEI and statin  On Lyrica for neuropathy and chronic pain      2. Essential hypertension  Controlled  Discussed low salt diet and BP and pulse monitoring daily, BP log given  Continue current treatment. - CBC; Future  - Comprehensive Metabolic Panel; Future    3. Oral thrush  - nystatin (MYCOSTATIN) 944493 UNIT/ML suspension; Take 5 mLs by mouth 4 times daily Swish and swallow  Dispense: 240 mL; Refill: 0    4. Recurrent major depressive disorder, in full remission ([facility] [address]  Improved    -Dose decreased  FLUoxetine (PROZAC) 20 MG capsule; Take 1 capsule by mouth daily Dose decreased 9/4/2018  Dispense: 90 capsule; Refill: 3    5. Hyperlipidemia with target LDL less than 100  Worsening  He says he is taking Pravastatin, continue same dose, will rechek in 6 mo   Lab Results   Component Value Date    LDLCHOLESTEROL 106 08/27/2018         6. Need for immunization against influenza  - INFLUENZA, QUADV, 3 YRS AND OLDER, IM, PF, PREFILL SYR OR SDV, 0.5ML (FLUZONE QUADV, PF)    7.  Altered taste  Dose decreased for Prozac  Trial of Lyrica 100 mg BID instead of TID  Nystatin for thrush given  Call or return if symptoms persist or fail to improve. 8. Status post reverse total arthroplasty of left shoulder  In sling  follow up with orthopedics        Orders Placed This Encounter   Procedures    INFLUENZA, QUADV, 3 YRS AND OLDER, IM, PF, PREFILL SYR OR SDV, 0.5ML (FLUZONE QUADV, PF)    CBC     Standing Status:   Future     Standing Expiration Date:   9/4/2019    Comprehensive Metabolic Panel     Standing Status:   Future     Standing Expiration Date:   9/4/2019    POCT glycosylated hemoglobin (Hb A1C)       Medications Discontinued During This Encounter   Medication Reason    ondansetron (ZOFRAN) 4 MG tablet Therapy completed    Umeclidinium Bromide (INCRUSE ELLIPTA) 62.5 MCG/INH AEPB LIST CLEANUP    aspirin 325 MG EC tablet Therapy completed    FOLBEE 2.5-25-1 MG TABS tablet DOSE ADJUSTMENT    FLUoxetine (PROZAC) 40 MG capsule DOSE ADJUSTMENT         Bernardino received counseling on the following healthy behaviors: nutrition, exercise, medication adherence, tobacco cessation and weight loss    Reviewed prior labs and health maintenance  Continue current medications, diet and exercise. Discussed use, benefit, and side effects of prescribed medications. Barriers to medication compliance addressed. Patient given educational materials - see patient instructions  Was a self-tracking handout given in paper form or via Venmo? Yes    Requested Prescriptions     Signed Prescriptions Disp Refills    FLUoxetine (PROZAC) 20 MG capsule 90 capsule 3     Sig: Take 1 capsule by mouth daily Dose decreased 9/4/2018    aspirin EC 81 MG EC tablet 90 tablet 0     Sig: Take 1 tablet by mouth daily    folic acid-pyridoxine-cyanocobalamine (FOLTX) 2.5-25-1 MG TABS tablet 30 tablet 0     Sig: Take 1 tablet by mouth daily    nystatin (MYCOSTATIN) 027246 UNIT/ML suspension 240 mL 0     Sig: Take 5 mLs by mouth 4 times daily Swish and swallow       All patient questions answered. Patient voiced understanding. Quality Measures    Body mass index is 36.5 kg/m². Elevated. Weight control planned discussed conventional weight loss and Healthy diet and regular exercise. BP: 127/75 Blood pressure is normal. Treatment plan consists of Weight Reduction, DASH Eating Plan, Dietary Sodium Restriction, Increased Physical Activity, Avoid Tobacco and Second-hand Smoke, Patient In-home Blood Pressure Monitoring and No treatment change needed. Lab Results   Component Value Date    LDLCHOLESTEROL 106 08/27/2018    (goal LDL reduction with dx if diabetes is 50% LDL reduction)      PHQ Scores 9/4/2018 3/1/2018 1/2/2018 5/4/2017 3/23/2017   PHQ2 Score 0 3 6 3 4   PHQ9 Score 0 14 17 10 15     Interpretation of Total Score Depression Severity: 1-4 = Minimal depression, 5-9 = Mild depression, 10-14 = Moderate depression, 15-19 = Moderately severe depression, 20-27 = Severe depression        The patient's past medical, surgical, social, and family history as well as his   current medications and allergies were reviewed as documented in today's encounter. Medications, labs, diagnostic studies, consultations and follow-up as documented in this encounter. Return in about 4 months (around 1/4/2019) for Always 30 mins for chronic problems F/U only, POC A1C, DM2, HTN, HLP, LABS F/U. Patient was seen with total face to face time of  25 minutes. More than 50% of this visit was counseling and education. Future Appointments  Date Time Provider Keri English   9/17/2018 9:15 AM Tabitha Smith MD [id number]   10/22/2018 9:40 AM Harvin Baumgarten, MD [address] [place] [address]       This note was completed by using the assistance of a speech-recognition program. However, inadvertent computerized transcription errors may be present. Although every effort was made to ensure accuracy, no guarantees can be provided that every mistake has been identified and corrected by editing .     Electronically signed by Adriana White MD on 9/4/2018 at 11:03 PM

## 2018-09-04 NOTE — PROGRESS NOTES
cancer screen colonoscopy  05/09/2022    DTaP/Tdap/Td vaccine (2 - Td) 03/01/2028    Pneumococcal med risk  Completed    Hepatitis C screen  Addressed    HIV screen  Addressed

## 2018-09-04 NOTE — PROGRESS NOTES
Addressed during office visit today A1c 5.3, improved from prior,  within normal limits   Continue current treatment discussed during visit

## 2018-09-04 NOTE — PATIENT INSTRUCTIONS
carbohydrate (carbs) you eat is an important part of healthy meals when you have diabetes. Carbohydrate is found in many foods. · Learn which foods have carbs. And learn the amounts of carbs in different foods. ¨ Bread, cereal, pasta, and rice have about 15 grams of carbs in a serving. A serving is 1 slice of bread (1 ounce), ½ cup of cooked cereal, or 1/3 cup of cooked pasta or rice. ¨ Fruits have 15 grams of carbs in a serving. A serving is 1 small fresh fruit, such as an apple or orange; ½ of a banana; ½ cup of cooked or canned fruit; ½ cup of fruit juice; 1 cup of melon or raspberries; or 2 tablespoons of dried fruit. ¨ Milk and no-sugar-added yogurt have 15 grams of carbs in a serving. A serving is 1 cup of milk or 2/3 cup of no-sugar-added yogurt. ¨ Starchy vegetables have 15 grams of carbs in a serving. A serving is ½ cup of mashed potatoes or sweet potato; 1 cup winter squash; ½ of a small baked potato; ½ cup of cooked beans; or ½ cup cooked corn or green peas. · Learn how much carbs to eat each day and at each meal. A dietitian or CDE can teach you how to keep track of the amount of carbs you eat. This is called carbohydrate counting. · If you are not sure how to count carbohydrate grams, use the Plate Method to plan meals. It is a good, quick way to make sure that you have a balanced meal. It also helps you spread carbs throughout the day. ¨ Divide your plate by types of foods. Put non-starchy vegetables on half the plate, meat or other protein food on one-quarter of the plate, and a grain or starchy vegetable in the final quarter of the plate. To this you can add a small piece of fruit and 1 cup of milk or yogurt, depending on how many carbs you are supposed to eat at a meal.  · Try to eat about the same amount of carbs at each meal. Do not \"save up\" your daily allowance of carbs to eat at one meal.  · Proteins have very little or no carbs per serving.  Examples of proteins are beef, chicken, turkey, fish, eggs, tofu, cheese, cottage cheese, and peanut butter. A serving size of meat is 3 ounces, which is about the size of a deck of cards. Examples of meat substitute serving sizes (equal to 1 ounce of meat) are 1/4 cup of cottage cheese, 1 egg, 1 tablespoon of peanut butter, and ½ cup of tofu. How can you eat out and still eat healthy? · Learn to estimate the serving sizes of foods that have carbohydrate. If you measure food at home, it will be easier to estimate the amount in a serving of restaurant food. · If the meal you order has too much carbohydrate (such as potatoes, corn, or baked beans), ask to have a low-carbohydrate food instead. Ask for a salad or green vegetables. · If you use insulin, check your blood sugar before and after eating out to help you plan how much to eat in the future. · If you eat more carbohydrate at a meal than you had planned, take a walk or do other exercise. This will help lower your blood sugar. What else should you know? · Limit saturated fat, such as the fat from meat and dairy products. This is a healthy choice because people who have diabetes are at higher risk of heart disease. So choose lean cuts of meat and nonfat or low-fat dairy products. Use olive or canola oil instead of butter or shortening when cooking. · Don't skip meals. Your blood sugar may drop too low if you skip meals and take insulin or certain medicines for diabetes. · Check with your doctor before you drink alcohol. Alcohol can cause your blood sugar to drop too low. Alcohol can also cause a bad reaction if you take certain diabetes medicines. Follow-up care is a key part of your treatment and safety. Be sure to make and go to all appointments, and call your doctor if you are having problems. It's also a good idea to know your test results and keep a list of the medicines you take. Where can you learn more? Go to https://[email or web address].[email or web address] org and sign in to your [employer] account. Enter O105 in the [facility] box to learn more about \"Learning About Diabetes Food Guidelines. \"     If you do not have an account, please click on the \"Sign Up Now\" link. Current as of: December 7, 2017  Content Version: 11.7  © 4147-5034 [employer]. Care instructions adapted under license by [facility] ([facility]). If you have questions about a medical condition or this instruction, always ask your healthcare professional. [address] any warranty or liability for your use of this information.

## 2018-09-17 ENCOUNTER — OFFICE VISIT (OUTPATIENT)
Dept: ORTHOPEDIC SURGERY | Age: 63
End: 2018-09-17

## 2018-09-17 DIAGNOSIS — Z96.612 S/P REVERSE TOTAL SHOULDER ARTHROPLASTY, LEFT: Primary | ICD-10-CM

## 2018-09-17 PROCEDURE — 99024 POSTOP FOLLOW-UP VISIT: CPT | Performed by: ORTHOPAEDIC SURGERY

## 2018-09-17 NOTE — PROGRESS NOTES
Procedure: Left reverse shoulder arthroplasty; revision left reverse shoulder arthroplasty  Date of procedure: 7/18/18; 8/3/18    HPI: Mr. Elizabeth Rousseau is a 26-year-old male who is approximately 6 weeks status post aforementioned procedures. He is doing well at this time indicating that he has no pain. He has remained compliant with his mobilization and daily pendulum exercises. Physical examination:  Evaluation of patient's left shoulder and upper extremity demonstrates a healed incision. No warmth or erythema is present. No wound dehiscence or drainage present. Sensation is grossly intact light touch in all dermatomes and he has a 2+ radial pulse with brisk capillary refill in his fingers. Imaging studies: Four x-ray views of the patient's left shoulder completed on 9/17/18 were independently reviewed demonstrating his reverse shoulder arthroplasty to be in acceptable alignment without any obvious dislocation or subluxation. No periprosthetic fracture noted. Impression and plan: Mr. Elizabeth Rousseau is a 26-year-old male now approximately 6 weeks status post the revision left reverse shoulder arthroplasty. He continues to do well at this time. He was taken out of his immobilizer which she can stop wearing at this juncture. He was taught active-assisted range of motion exercises. He may start using the arm for light activities of daily living. I'll see him back my clinic in 6 weeks for reevaluation but he was encouraged to return or call earlier with questions and/or concerns.

## 2018-09-24 DIAGNOSIS — L29.9 PRURITIC DERMATITIS: ICD-10-CM

## 2018-09-25 RX ORDER — AMMONIUM LACTATE 12 G/100G
CREAM TOPICAL
Qty: 1 BOTTLE | Refills: 3 | Status: SHIPPED | OUTPATIENT
Start: 2018-09-25 | End: 2020-03-09

## 2018-09-25 NOTE — TELEPHONE ENCOUNTER
Please Approve or Refuse.   Send to Pharmacy per Pt's Request:     Next Visit Date:  1/8/2019   Last Visit Date: 9/4/2018    Hemoglobin A1C (%)   Date Value   09/04/2018 5.3   06/01/2018 5.5   01/02/2018 6.1             ( goal A1C is < 7)   BP Readings from Last 3 Encounters:   09/04/18 127/75   08/04/18 131/72   08/03/18 127/61          (goal 120/80)  BUN   Date Value Ref Range Status   08/27/2018 8 8 - 23 mg/dL Final     CREATININE   Date Value Ref Range Status   08/27/2018 0.63 (L) 0.70 - 1.20 mg/dL Final     Potassium   Date Value Ref Range Status   08/27/2018 3.8 3.7 - 5.3 mmol/L Final

## 2018-09-27 ENCOUNTER — HOSPITAL ENCOUNTER (OUTPATIENT)
Age: 63
Discharge: HOME | End: 2018-09-27
Payer: COMMERCIAL

## 2018-09-27 DIAGNOSIS — Z96.612 HISTORY OF LEFT SHOULDER REPLACEMENT: Primary | ICD-10-CM

## 2018-09-27 DIAGNOSIS — Z96.619 HISTORY OF SHOULDER REPLACEMENT, UNSPECIFIED LATERALITY: Primary | ICD-10-CM

## 2018-09-27 LAB
CALCIUM SERPL-MCNC: 10.4 MG/DL (ref 8.6–10.4)
PTH INTACT: 37.09 PG/ML (ref 15–65)

## 2018-09-27 PROCEDURE — 36415 COLL VENOUS BLD VENIPUNCTURE: CPT

## 2018-09-27 PROCEDURE — 83970 ASSAY OF PARATHORMONE: CPT

## 2018-09-27 PROCEDURE — 82310 ASSAY OF CALCIUM: CPT

## 2018-09-27 RX ORDER — AMOXICILLIN 500 MG/1
500 CAPSULE ORAL 2 TIMES DAILY
Qty: 6 CAPSULE | Refills: 0 | Status: CANCELLED | OUTPATIENT
Start: 2018-09-27 | End: 2018-09-30

## 2018-09-28 RX ORDER — AMOXICILLIN 500 MG/1
CAPSULE ORAL
Qty: 6 CAPSULE | Refills: 0 | OUTPATIENT
Start: 2018-09-28

## 2018-09-28 NOTE — TELEPHONE ENCOUNTER
Tried to contact patient to let him know that it is too soon post total shoulder for routine dental appt( He likes it no sooner than 90 days post op). Left message for him to call back when he gets his appointment scheduled and we will call in his antibiotic at that time. Dr Arjun Fairchild, pt's dentist was notified as well.

## 2018-09-28 NOTE — TELEPHONE ENCOUNTER
I would recommend patient be 3 months out prior to dental procedure.  Can call prior to his dental appointment at that time to get a prescription for amoxicillin

## 2018-10-02 ENCOUNTER — TELEPHONE (OUTPATIENT)
Dept: ORTHOPEDIC SURGERY | Age: 63
End: 2018-10-02

## 2018-10-03 ENCOUNTER — TELEPHONE (OUTPATIENT)
Dept: FAMILY MEDICINE CLINIC | Age: 63
End: 2018-10-03

## 2018-10-03 ENCOUNTER — APPOINTMENT (OUTPATIENT)
Dept: GENERAL RADIOLOGY | Age: 63
End: 2018-10-03
Payer: COMMERCIAL

## 2018-10-03 ENCOUNTER — APPOINTMENT (OUTPATIENT)
Dept: CT IMAGING | Age: 63
End: 2018-10-03
Payer: COMMERCIAL

## 2018-10-03 ENCOUNTER — HOSPITAL ENCOUNTER (EMERGENCY)
Age: 63
Discharge: HOME | End: 2018-10-03
Attending: EMERGENCY MEDICINE
Payer: COMMERCIAL

## 2018-10-03 VITALS
DIASTOLIC BLOOD PRESSURE: 65 MMHG | WEIGHT: 247 LBS | HEART RATE: 65 BPM | SYSTOLIC BLOOD PRESSURE: 148 MMHG | HEIGHT: 71 IN | OXYGEN SATURATION: 95 % | RESPIRATION RATE: 18 BRPM | BODY MASS INDEX: 34.58 KG/M2

## 2018-10-03 DIAGNOSIS — R42 LIGHTHEADEDNESS: Primary | ICD-10-CM

## 2018-10-03 DIAGNOSIS — R20.0 NUMBNESS: ICD-10-CM

## 2018-10-03 LAB
ABSOLUTE EOS #: 0.1 K/UL (ref 0–0.4)
ABSOLUTE IMMATURE GRANULOCYTE: ABNORMAL K/UL (ref 0–0.3)
ABSOLUTE LYMPH #: 2.4 K/UL (ref 1–4.8)
ABSOLUTE MONO #: 0.5 K/UL (ref 0.1–1.3)
ANION GAP SERPL CALCULATED.3IONS-SCNC: 15 MMOL/L (ref 9–17)
BASOPHILS # BLD: 0 % (ref 0–2)
BASOPHILS ABSOLUTE: 0 K/UL (ref 0–0.2)
BILIRUBIN URINE: NEGATIVE
BUN BLDV-MCNC: 11 MG/DL (ref 8–23)
BUN/CREAT BLD: NORMAL (ref 9–20)
CALCIUM SERPL-MCNC: 10 MG/DL (ref 8.6–10.4)
CHLORIDE BLD-SCNC: 100 MMOL/L (ref 98–107)
CO2: 24 MMOL/L (ref 20–31)
COLOR: YELLOW
COMMENT UA: NORMAL
CREAT SERPL-MCNC: 0.76 MG/DL (ref 0.7–1.2)
DIFFERENTIAL TYPE: ABNORMAL
EKG ATRIAL RATE: 63 BPM
EKG P AXIS: 74 DEGREES
EKG P-R INTERVAL: 166 MS
EKG Q-T INTERVAL: 408 MS
EKG QRS DURATION: 92 MS
EKG QTC CALCULATION (BAZETT): 417 MS
EKG R AXIS: 49 DEGREES
EKG T AXIS: 58 DEGREES
EKG VENTRICULAR RATE: 63 BPM
EOSINOPHILS RELATIVE PERCENT: 1 % (ref 0–4)
GFR AFRICAN AMERICAN: >60 ML/MIN
GFR NON-AFRICAN AMERICAN: >60 ML/MIN
GFR SERPL CREATININE-BSD FRML MDRD: NORMAL ML/MIN/{1.73_M2}
GFR SERPL CREATININE-BSD FRML MDRD: NORMAL ML/MIN/{1.73_M2}
GLUCOSE BLD-MCNC: 101 MG/DL (ref 75–110)
GLUCOSE BLD-MCNC: 93 MG/DL (ref 70–99)
GLUCOSE URINE: NEGATIVE
HCT VFR BLD CALC: 39.9 % (ref 41–53)
HEMOGLOBIN: 13.6 G/DL (ref 13.5–17.5)
IMMATURE GRANULOCYTES: ABNORMAL %
KETONES, URINE: NEGATIVE
LEUKOCYTE ESTERASE, URINE: NEGATIVE
LYMPHOCYTES # BLD: 27 % (ref 24–44)
MCH RBC QN AUTO: 30.7 PG (ref 26–34)
MCHC RBC AUTO-ENTMCNC: 34.1 G/DL (ref 31–37)
MCV RBC AUTO: 90.1 FL (ref 80–100)
MONOCYTES # BLD: 5 % (ref 1–7)
NITRITE, URINE: NEGATIVE
NRBC AUTOMATED: ABNORMAL PER 100 WBC
PDW BLD-RTO: 14.2 % (ref 11.5–14.9)
PH UA: 6 (ref 5–8)
PLATELET # BLD: 269 K/UL (ref 150–450)
PLATELET ESTIMATE: ABNORMAL
PMV BLD AUTO: 8.9 FL (ref 6–12)
POTASSIUM SERPL-SCNC: 3.8 MMOL/L (ref 3.7–5.3)
PROTEIN UA: NEGATIVE
RBC # BLD: 4.43 M/UL (ref 4.5–5.9)
RBC # BLD: ABNORMAL 10*6/UL
SEG NEUTROPHILS: 67 % (ref 36–66)
SEGMENTED NEUTROPHILS ABSOLUTE COUNT: 6.1 K/UL (ref 1.3–9.1)
SODIUM BLD-SCNC: 139 MMOL/L (ref 135–144)
SPECIFIC GRAVITY UA: 1.01 (ref 1–1.03)
TROPONIN INTERP: NORMAL
TROPONIN T: <0.03 NG/ML
TURBIDITY: CLEAR
URINE HGB: NEGATIVE
UROBILINOGEN, URINE: NORMAL
WBC # BLD: 9.2 K/UL (ref 3.5–11)
WBC # BLD: ABNORMAL 10*3/UL

## 2018-10-03 PROCEDURE — 99284 EMERGENCY DEPT VISIT MOD MDM: CPT

## 2018-10-03 PROCEDURE — 84484 ASSAY OF TROPONIN QUANT: CPT

## 2018-10-03 PROCEDURE — 80048 BASIC METABOLIC PNL TOTAL CA: CPT

## 2018-10-03 PROCEDURE — 70450 CT HEAD/BRAIN W/O DYE: CPT

## 2018-10-03 PROCEDURE — 81003 URINALYSIS AUTO W/O SCOPE: CPT

## 2018-10-03 PROCEDURE — 36415 COLL VENOUS BLD VENIPUNCTURE: CPT

## 2018-10-03 PROCEDURE — 2580000003 HC RX 258: Performed by: STUDENT IN AN ORGANIZED HEALTH CARE EDUCATION/TRAINING PROGRAM

## 2018-10-03 PROCEDURE — 71046 X-RAY EXAM CHEST 2 VIEWS: CPT

## 2018-10-03 PROCEDURE — 93005 ELECTROCARDIOGRAM TRACING: CPT

## 2018-10-03 PROCEDURE — 85025 COMPLETE CBC W/AUTO DIFF WBC: CPT

## 2018-10-03 PROCEDURE — 82947 ASSAY GLUCOSE BLOOD QUANT: CPT

## 2018-10-03 RX ORDER — 0.9 % SODIUM CHLORIDE 0.9 %
1000 INTRAVENOUS SOLUTION INTRAVENOUS ONCE
Status: COMPLETED | OUTPATIENT
Start: 2018-10-03 | End: 2018-10-03

## 2018-10-03 RX ADMIN — SODIUM CHLORIDE 1000 ML: 9 INJECTION, SOLUTION INTRAVENOUS at 21:56

## 2018-10-03 ASSESSMENT — ENCOUNTER SYMPTOMS
NAUSEA: 0
VOMITING: 0
SHORTNESS OF BREATH: 0
RHINORRHEA: 0
COUGH: 0
ABDOMINAL PAIN: 0
DIARRHEA: 0

## 2018-10-04 NOTE — ED PROVIDER NOTES
tablet 3    clotrimazole-betamethasone (LOTRISONE) 1-0.05 % cream Apply topically at bedtime on the feet x 4 weeks 45 g 3    amLODIPine (NORVASC) 10 MG tablet Take 1 tablet by mouth daily Dose increased  6/1/2018. STOP ATENOLOL 90 tablet 3    nystatin (MYCOSTATIN) 456611 UNIT/GM powder Apply 3 times daily for feet 56.7 g 3    mupirocin (BACTROBAN) 2 % ointment Apply topically 1-2 times daily on the affected area . OK to substitute to cream 30 g 2    ammonium lactate (AMLACTIN) 12 % cream       NONFORMULARY Bioidentical pain cream      furosemide (LASIX) 20 MG tablet Take 1 tablet by mouth daily **stop Tenoretic 50-25** 90 tablet 3    traZODone (DESYREL) 150 MG tablet Take 1 tablet by mouth nightly Insomnia 90 tablet 3    pravastatin (PRAVACHOL) 40 MG tablet Take 1 tablet by mouth every evening **stop Crestor** 90 tablet 3    Glucose Blood (BLOOD GLUCOSE TEST STRIPS) STRP tesing once a day fasting 100 strip 3    enzalutamide (XTANDI) 40 MG capsule Take 40 mg by mouth Indications: take four 40 mg capsules daily.  allopurinol (ZYLOPRIM) 100 MG tablet Take 1 tablet by mouth daily 90 tablet 0    colchicine (COLCRYS) 0.6 MG tablet ADJUST SIG-ONLY AS NEEDED FOR GOUT ATTACK. Take 2 tablets now, then 1 tablet one hour later. Wait 12 hours then start 1 tablet bid for 5 days.  90 tablet 1    albuterol (PROVENTIL) (2.5 MG/3ML) 0.083% nebulizer solution Take 2.5 mg by nebulization 4 times daily       albuterol sulfate  (90 BASE) MCG/ACT inhaler Inhale 2 puffs into the lungs every 6 hours as needed for Wheezing or Shortness of Breath (COUGH) 18 g 0    SYMBICORT 160-4.5 MCG/ACT AERO Inhale 2 puffs into the lungs 2 times daily 10.2 g 3    omeprazole (PRILOSEC) 20 MG capsule Take 1 capsule by mouth 2 times daily (Patient taking differently: Take 20 mg by mouth Daily ) 90 capsule 1       Past Medical History:   Diagnosis Date    Acid reflux     Arthritis     Chronic bilateral low back pain with bilateral sciatica 11/3/2016    Gout     H/O cardiac catheterization     yrs ago   no stents    Hyperlipidemia     Hyperlipidemia with target LDL less than 100 1/20/2016    Hypertension     Knee pain, chronic     left    MDRO (multiple drug resistant organisms) resistance     Moderate episode of recurrent major depressive disorder ([address] 1/20/2016    Obesity, Class III, BMI 40-49.9 (morbid obesity) ([address] 1/20/2016    REYANLDO on CPAP 5/6/2017    Polypharmacy 3/25/2017    Prolonged emergence from general anesthesia 01/05/2017    Patient \"on life support for 3 days\" after back surgery due to being given succinylcholine    Prostate cancer ([address] 10/2013    finished radiation tx 5/2014    Pseudocholinesterase deficiency 03/25/2017    Pt.  \"on life support\" for 3 days after back surgery 1/5/17 after being given succinylcholine    Short of breath on exertion     Sleep apnea     uses CPAP machine nightly    Status post lumbar laminectomy 3/25/2017    Tubular adenoma of colon 4/11/17 5/13/2017    Type 2 diabetes mellitus with hyperglycemia, without long-term current use of insulin ([address] 3/25/2017    Lab Results Component Value Date  LABA1C 7.1 (H) 03/23/2017     Vitamin D deficiency 3/25/2017    Wears glasses        Past Surgical History:   Procedure Laterality Date    BACK SURGERY      x 2     BACK SURGERY  01/05/2017    lumbar laminectomy L2, L3, L4    CARDIAC CATHETERIZATION  2007    no stents    CARPAL TUNNEL RELEASE Bilateral     KNEE SURGERY Left     LUMBAR LAMINECTOMY  01/05/2017    L2-L4    [place] CLOSED RX SHLDR DISLOC,ANESTHESIA Left 8/1/2018    SHOULDER CLOSED REDUCTION WITH C-ARM VISUALIZATION performed by Lucila Grimaldo MD at [facility] N/A 5/9/2017    COLONOSCOPY WITH BIOPSY performed by Latasha Resendiz DO at [address] IMPLANT Left 7/18/2018    SHOULDER TOTAL ARTHROPLASTY REVERSE LEFT DJO & BICEP TENDON TRANSFER performed by Ferrell Bamberger, MD at [address] HUMERAL/GLENOID COMPNT Left 8/3/2018    SHOULDER TOTAL REVERSE  ARTHROPLASTY REVISION performed by Ferrell Bamberger, MD at [address]? rotator cuff repair    TONSILLECTOMY AND ADENOIDECTOMY         Family History   Problem Relation Age of Onset    Diabetes Mother     Lung Cancer Brother     Liver Cancer Brother     Cancer Father          Exam: Vitals:   Vitals:    10/03/18 2115 10/03/18 2130 10/03/18 2145 10/03/18 2149   BP: (!) 142/65 137/69 (!) 149/67    Pulse:  59 60    Resp:       TempSrc:       SpO2:  94% 94% 93%   Weight:       Height:         EKG Interpretation    Interpreted by emergency department physician    Rhythm: normal sinus   Rate: normal, 63  Axis: left  Ectopy: none  Conduction: normal  ST Segments: normal  T Waves: normal  Q Waves: nonspecific    EKG  Impression: non-specific EKG    Patient presents with multiple nonspecific complaints, infiltrating through most of them, this  Being complains her lightheadedness this feeling of numbness. The numbness is not unilateral, he reports it feels all over his body, low suspicion for stroke. Due to the lightheadedness, we'll obtain cardiopulmonary evaluation CBC BMP and troponin EKG chest x-ray. EKG is normal first troponin is negative, low suspicion for ACS dissection or pulmonary Embolus. Urinalysis is also negative. At this time we'll provide fluid bolus, namely patient and is back to his baseline likely discharge home follow up PCP otherwise admit. Patient reevaluated 2247, asymptomatic, waiting for his fluid bolus to finish, will ambulate. .    Patient reliably without difficulty, asymptomatic, okay to discharge home follow up with PCP. Low suspicion for hemorrhagic or ischemic stroke, brain mass, infectious process.     Patient instructed to follow-up with PCP or return to the nearest emergency department for uncontrolled fevers, vomiting,

## 2018-10-04 NOTE — ED NOTES
Pt said that he's been having these issues with numbness, mouth taste, and feeling faint/dizzy for \"about 10 days\".       Octavia Henderson RN  10/04/18 8621

## 2018-10-04 NOTE — ED PROVIDER NOTES
Skin: Negative for rash. Neurological: Positive for dizziness, weakness, light-headedness and numbness. Negative for headaches. Psychiatric/Behavioral: Negative for confusion. PAST MEDICAL / SURGICAL / SOCIAL / FAMILY HISTORY      has a past medical history of Acid reflux; Arthritis; Chronic bilateral low back pain with bilateral sciatica; Gout; H/O cardiac catheterization; Hyperlipidemia; Hyperlipidemia with target LDL less than 100; Hypertension; Knee pain, chronic; MDRO (multiple drug resistant organisms) resistance; Moderate episode of recurrent major depressive disorder ([facility] [address] Obesity, Class III, BMI 40-49.9 (morbid obesity) ([facility] [address] REYNALDO on CPAP; Polypharmacy; Prolonged emergence from general anesthesia; Prostate cancer ([facility] [address] Pseudocholinesterase deficiency; Short of breath on exertion; Sleep apnea; Status post lumbar laminectomy; Tubular adenoma of colon 4/11/17; Type 2 diabetes mellitus with hyperglycemia, without long-term current use of insulin ([facility] [address] Vitamin D deficiency; and Wears glasses. has a past surgical history that includes knee surgery (Left); lumbar laminectomy (01/05/2017); Tonsillectomy and adenoidectomy; Cardiac catheterization (2007); pr colonoscopy w/biopsy single/multiple (N/A, 5/9/2017); shoulder surgery (Right, 1989?); back surgery; back surgery (01/05/2017); Carpal tunnel release (Bilateral); pr reconstr total shoulder implant (Left, 7/18/2018); pr closed rx shldr disloc,anesthesia (Left, 8/1/2018); and pr taryn shoulder arthrplsty humeral/glenoid compnt (Left, 8/3/2018). Social History     Social History    Marital status:      Spouse name: N/A    Number of children: N/A    Years of education: N/A     Occupational History    Not on file.      Social History Main Topics    Smoking status: Current Every Day Smoker     Packs/day: 0.75     Years: 30.00     Types: Cigarettes    Smokeless tobacco: Never Used    Alcohol use No    Drug use: No    Sexual activity: MD   SYMBICORT 160-4.5 MCG/ACT AERO Inhale 2 puffs into the lungs 2 times daily 3/23/17   Janusz Patricia MD   omeprazole (PRILOSEC) 20 MG capsule Take 1 capsule by mouth 2 times daily  Patient taking differently: Take 20 mg by mouth Daily  1/20/16   Leena Hendrickson MD     PHYSICAL EXAM   (up to 7 for level 4, 8 or more for level 5)      INITIAL VITALS:    height is 5' 11\" (1.803 m) and weight is 247 lb (112 kg). His blood pressure is 148/65 (abnormal) and his pulse is 65. His respiration is 18 and oxygen saturation is 95%. Physical Exam   Constitutional: He is oriented to person, place, and time. He appears well-developed and well-nourished. HENT:   Head: Normocephalic and atraumatic. Mouth/Throat: Oropharynx is clear and moist.   Eyes: Pupils are equal, round, and reactive to light. EOM are normal.   Neck: Normal range of motion. Neck supple. Cardiovascular: Normal rate, regular rhythm, normal heart sounds and intact distal pulses. Exam reveals no gallop and no friction rub. No murmur heard. Pulmonary/Chest: Effort normal and breath sounds normal.   Abdominal: Soft. Bowel sounds are normal. He exhibits no distension. There is no tenderness. There is no rebound and no guarding. Musculoskeletal: Normal range of motion. He exhibits no edema. Neurological: He is alert and oriented to person, place, and time. No cranial nerve deficit. Coordination normal.   5/5 strength all extremities   Skin: Skin is warm and dry. No rash noted. Psychiatric: He has a normal mood and affect. His behavior is normal.   Vitals reviewed.     Vitals:    Vitals:    10/03/18 2245 10/03/18 2300 10/03/18 2315 10/03/18 2330   BP: (!) 134/58 (!) 140/59 (!) 147/60 (!) 148/65   Pulse: 59 62 62 65   Resp:       TempSrc:       SpO2: 97% 96% 94% 95%   Weight:       Height:         DIFFERENTIAL  DIAGNOSIS     PLAN (LABS / IMAGING / EKG):  Orders Placed This Encounter   Procedures    XR CHEST STANDARD (2 VW)    CT Head WO normal.  No acute infiltrate, failure, effusion, or pneumothorax is noted. Left shoulder arthroplasty is noted. Interstitial markings are coarsened but unchanged, likely related to stable fibrosis. Chronic changes. No evidence of acute cardiopulmonary disease. Ct Head Wo Contrast    Result Date: 10/3/2018  EXAMINATION: CT OF THE HEAD WITHOUT CONTRAST  10/3/2018 9:01 pm TECHNIQUE: CT of the head was performed without the administration of intravenous contrast. Dose modulation, iterative reconstruction, and/or weight based adjustment of the mA/kV was utilized to reduce the radiation dose to as low as reasonably achievable. COMPARISON: 12 July 2011 HISTORY: ORDERING SYSTEM PROVIDED HISTORY: syncope, numbness TECHNOLOGIST PROVIDED HISTORY: Ordering Physician Provided Reason for Exam: patient c/o dizziness, his whole body being numb and tasting metal for 10 days FINDINGS: BRAIN/VENTRICLES: There is no acute intracranial hemorrhage, mass effect or midline shift. No abnormal extra-axial fluid collection. The gray-white differentiation is maintained without evidence of an acute infarct. There is no evidence of hydrocephalus. Scattered hypodensity is present in the white matter likely related to chronic microvascular change. Calcification of both cavernous carotid arteries and of the vertebral arteries is noted. ORBITS: The visualized portion of the orbits demonstrate no acute abnormality. SINUSES: The visualized paranasal sinuses and mastoid air cells demonstrate no acute abnormality. SOFT TISSUES/SKULL:  No acute abnormality of the visualized skull or soft tissues. Estimated biologic radiation dose for this procedure:952.11 mGy/cm2. No acute intracranial abnormality. Senescent changes including mild chronic small vessel ischemic change.      EKG  EKG Interpretation    Interpreted by emergency department physician    Rhythm: normal sinus   Rate: 63   Axis: normal  Ectopy: none  Conduction: normal  ST

## 2018-10-05 ENCOUNTER — OFFICE VISIT (OUTPATIENT)
Dept: FAMILY MEDICINE CLINIC | Age: 63
End: 2018-10-05
Payer: COMMERCIAL

## 2018-10-05 VITALS
SYSTOLIC BLOOD PRESSURE: 117 MMHG | HEIGHT: 70 IN | HEART RATE: 60 BPM | OXYGEN SATURATION: 100 % | DIASTOLIC BLOOD PRESSURE: 65 MMHG | BODY MASS INDEX: 35.1 KG/M2 | WEIGHT: 245.2 LBS | TEMPERATURE: 97.2 F

## 2018-10-05 DIAGNOSIS — R53.1 GENERAL WEAKNESS: ICD-10-CM

## 2018-10-05 DIAGNOSIS — I10 ESSENTIAL HYPERTENSION: ICD-10-CM

## 2018-10-05 DIAGNOSIS — Z80.9 FAMILY HISTORY OF CANCER: ICD-10-CM

## 2018-10-05 DIAGNOSIS — J44.9 COPD MIXED TYPE (HCC): ICD-10-CM

## 2018-10-05 DIAGNOSIS — D64.9 ANEMIA, UNSPECIFIED TYPE: ICD-10-CM

## 2018-10-05 DIAGNOSIS — I65.22 STENOSIS OF LEFT CAROTID ARTERY: ICD-10-CM

## 2018-10-05 DIAGNOSIS — R42 DIZZINESSES: Primary | ICD-10-CM

## 2018-10-05 DIAGNOSIS — R63.4 UNINTENDED WEIGHT LOSS: ICD-10-CM

## 2018-10-05 PROCEDURE — 99214 OFFICE O/P EST MOD 30 MIN: CPT | Performed by: FAMILY MEDICINE

## 2018-10-05 RX ORDER — FOLIC ACID/VIT B COMPLEX AND C 5 MG
1 TABLET ORAL DAILY
COMMUNITY
End: 2019-01-21 | Stop reason: SDUPTHER

## 2018-10-05 RX ORDER — FUROSEMIDE 20 MG/1
20 TABLET ORAL PRN
Qty: 90 TABLET | Refills: 0
Start: 2018-10-05 | End: 2019-01-08 | Stop reason: SDUPTHER

## 2018-10-05 ASSESSMENT — ENCOUNTER SYMPTOMS
VOMITING: 0
WHEEZING: 0
COUGH: 0
SHORTNESS OF BREATH: 0
CONSTIPATION: 0
BACK PAIN: 1
ABDOMINAL DISTENTION: 0
DIARRHEA: 0
CHEST TIGHTNESS: 0
ABDOMINAL PAIN: 0
NAUSEA: 0

## 2018-10-05 NOTE — PATIENT INSTRUCTIONS
might trip on. ¨ Letting family and friends know that you have been feeling dizzy. This will help them know how to help you. When should you call for help? Call 911 anytime you think you may need emergency care. For example, call if:    · You passed out (lost consciousness).     · You have dizziness along with symptoms of a heart attack. These may include:  ¨ Chest pain or pressure, or a strange feeling in the chest.  ¨ Sweating. ¨ Shortness of breath. ¨ Nausea or vomiting. ¨ Pain, pressure, or a strange feeling in the back, neck, jaw, or upper belly or in one or both shoulders or arms. ¨ Lightheadedness or sudden weakness. ¨ A fast or irregular heartbeat.     · You have symptoms of a stroke. These may include:  ¨ Sudden numbness, tingling, weakness, or loss of movement in your face, arm, or leg, especially on only one side of your body. ¨ Sudden vision changes. ¨ Sudden trouble speaking. ¨ Sudden confusion or trouble understanding simple statements. ¨ Sudden problems with walking or balance. ¨ A sudden, severe headache that is different from past headaches.    Call your doctor now or seek immediate medical care if:    · You feel dizzy and have a fever, headache, or ringing in your ears.     · You have new or increased nausea and vomiting.     · Your dizziness does not go away or comes back.    Watch closely for changes in your health, and be sure to contact your doctor if:    · You do not get better as expected. Where can you learn more? Go to https://CertusNetjocy.Science Fantasy. org and sign in to your Include Fitness account. Enter R064 in the SourceLair box to learn more about \"Dizziness: Care Instructions. \"     If you do not have an account, please click on the \"Sign Up Now\" link. Current as of: November 20, 2017  Content Version: 11.7  © 7478-1603 [employer]. Care instructions adapted under license by [facility] ([facility]).  If you have questions about a medical condition or this

## 2018-10-05 NOTE — PROGRESS NOTES
quality, interpretation may be adversely affected  Normal sinus rhythm  Normal ECG  When compared with ECG of 09-JUL-2018 08:34,  No significant change was found         Allergies   Allergen Reactions    Succinylcholine Chloride Other (See Comments)     PATIENT HAS PSEUDOCHOLINESTERASE DEFICIENCY      Cymbalta [Duloxetine Hcl]      Taste loss           Current Outpatient Prescriptions   Medication Sig Dispense Refill    folbee plus (FOLBEE PLUS) TABS Take 1 tablet by mouth daily      ammonium lactate (AMLACTIN) 12 % cream APPLY TOPICALLY TO LEGS ONE TO TWO TIMES A DAY AS NEEDED 1 Bottle 3    FLUoxetine (PROZAC) 20 MG capsule Take 1 capsule by mouth daily Dose decreased 9/4/2018 90 capsule 3    aspirin EC 81 MG EC tablet Take 1 tablet by mouth daily 90 tablet 0    folic acid-pyridoxine-cyanocobalamine (FOLTX) 2.5-25-1 MG TABS tablet Take 1 tablet by mouth daily 30 tablet 0    nystatin (MYCOSTATIN) 672977 UNIT/ML suspension Take 5 mLs by mouth 4 times daily Swish and swallow 240 mL 0    pregabalin (LYRICA) 100 MG capsule Take 1 capsule by mouth 3 times daily for 90 days. . 270 capsule 0    lisinopril (PRINIVIL;ZESTRIL) 20 MG tablet TAKE 1 TABLET TWICE A  tablet 3    clotrimazole-betamethasone (LOTRISONE) 1-0.05 % cream Apply topically at bedtime on the feet x 4 weeks 45 g 3    amLODIPine (NORVASC) 10 MG tablet Take 1 tablet by mouth daily Dose increased  6/1/2018.  STOP ATENOLOL 90 tablet 3    NONFORMULARY Bioidentical pain cream      furosemide (LASIX) 20 MG tablet Take 1 tablet by mouth daily **stop Tenoretic 50-25** 90 tablet 3    traZODone (DESYREL) 150 MG tablet Take 1 tablet by mouth nightly Insomnia 90 tablet 3    pravastatin (PRAVACHOL) 40 MG tablet Take 1 tablet by mouth every evening **stop Crestor** 90 tablet 3    Glucose Blood (BLOOD GLUCOSE TEST STRIPS) STRP tesing once a day fasting 100 strip 3    enzalutamide (XTANDI) 40 MG capsule Take 40 mg by mouth Indications: take four 40 mg capsules daily.  allopurinol (ZYLOPRIM) 100 MG tablet Take 1 tablet by mouth daily 90 tablet 0    colchicine (COLCRYS) 0.6 MG tablet ADJUST SIG-ONLY AS NEEDED FOR GOUT ATTACK. Take 2 tablets now, then 1 tablet one hour later. Wait 12 hours then start 1 tablet bid for 5 days. 90 tablet 1    omeprazole (PRILOSEC) 20 MG capsule Take 1 capsule by mouth 2 times daily (Patient taking differently: Take 20 mg by mouth Daily ) 90 capsule 1    nystatin (MYCOSTATIN) 063972 UNIT/GM powder Apply 3 times daily for feet 56.7 g 3    mupirocin (BACTROBAN) 2 % ointment Apply topically 1-2 times daily on the affected area . OK to substitute to cream 30 g 2    albuterol (PROVENTIL) (2.5 MG/3ML) 0.083% nebulizer solution Take 2.5 mg by nebulization 4 times daily       albuterol sulfate  (90 BASE) MCG/ACT inhaler Inhale 2 puffs into the lungs every 6 hours as needed for Wheezing or Shortness of Breath (COUGH) 18 g 0    SYMBICORT 160-4.5 MCG/ACT AERO Inhale 2 puffs into the lungs 2 times daily 10.2 g 3     No current facility-administered medications for this visit. Social History     Social History    Marital status:      Spouse name: N/A    Number of children: N/A    Years of education: N/A     Social History Main Topics    Smoking status: Current Every Day Smoker     Packs/day: 0.75     Years: 30.00     Types: Cigarettes    Smokeless tobacco: Never Used    Alcohol use No    Drug use: No    Sexual activity: Not Currently     Partners: Female     Other Topics Concern    None     Social History Narrative    None     Ready to quit: Yes  Counseling given: Yes                    The patient's past medical, surgical, social, and family history as well as his current medications and allergies were reviewed as documented in today's encounter. Rest of complaints with corresponding details per ROS. Review of Systems   Constitutional: Positive for fatigue and unexpected weight change.  Negative for activity change, appetite change, chills, diaphoresis and fever. Respiratory: Positive for apnea (on CPAP). Negative for cough, chest tightness, shortness of breath and wheezing. Cardiovascular: Negative for chest pain, palpitations and leg swelling. Gastrointestinal: Negative for abdominal distention, abdominal pain, constipation, diarrhea, nausea and vomiting. Endocrine: Positive for heat intolerance (due to [address]). Negative for cold intolerance, polydipsia, polyphagia and polyuria. Musculoskeletal: Positive for arthralgias, back pain and gait problem (ambulates with cane). Allergic/Immunologic: Positive for immunocompromised state (due to h/o prostate cancer on Xtandi). Neurological: Positive for dizziness, weakness, light-headedness and numbness (feet). Psychiatric/Behavioral: Positive for sleep disturbance. Negative for dysphoric mood, self-injury and suicidal ideas. The patient is nervous/anxious.          -vital signs stable and within normal limits except Obesity per BMI. /65   Pulse 60   Temp 97.2 °F (36.2 °C) (Tympanic)   Ht 5' 10\" (1.778 m)   Wt 245 lb 3.2 oz (111.2 kg)   SpO2 100%   BMI 35.18 kg/m²        Physical Exam   Constitutional: He is oriented to person, place, and time. He appears well-developed and well-nourished. No distress. HENT:   Head: Normocephalic and atraumatic. Mouth/Throat: Oropharynx is clear and moist.   Looks tired   Eyes: Conjunctivae and EOM are normal. Right eye exhibits no discharge. Left eye exhibits no discharge. No scleral icterus. Neck: Normal range of motion. Neck supple. No thyromegaly present. Cardiovascular: Normal rate, regular rhythm, normal heart sounds and intact distal pulses. No murmur heard. Pulmonary/Chest: Effort normal and breath sounds normal. No respiratory distress. He has no wheezes. He has no rales. He exhibits no tenderness. Abdominal: Soft. Bowel sounds are normal. He exhibits no distension.  There is no in this encounter. Return for KEEP APPT. Patient was seen with total face to face time of  25 minutes. More than 50% of this visit was counseling and education. Future Appointments  Date Time Provider Keri Tameka   10/22/2018 9:40 AM Benita Solorio MD [facility]   10/29/2018 10:15 AM Fadi Looney MD [facility]   1/8/2019 10:00 AM Donette Soulier, MD [facility]     This note was completed by using the assistance of a speech-recognition program. However, inadvertent computerized transcription errors may be present. Although every effort was made to ensure accuracy, no guarantees can be provided that every mistake has been identified and corrected by editing.     Electronically signed by Donette Soulier, MD on 10/7/2018 at 12:47 PM

## 2018-10-07 PROBLEM — R53.1 GENERAL WEAKNESS: Status: ACTIVE | Noted: 2018-10-07

## 2018-10-07 PROBLEM — R42 DIZZINESSES: Status: ACTIVE | Noted: 2018-10-07

## 2018-10-07 PROBLEM — E88.89: Status: ACTIVE | Noted: 2017-03-25

## 2018-10-07 PROBLEM — I77.9 PERIPHERAL ARTERIAL OCCLUSIVE DISEASE (HCC): Status: ACTIVE | Noted: 2017-03-25

## 2018-10-07 PROBLEM — I65.22 STENOSIS OF LEFT CAROTID ARTERY: Status: ACTIVE | Noted: 2018-10-07

## 2018-10-07 PROBLEM — I71.40 ABDOMINAL AORTIC ANEURYSM (HCC): Status: ACTIVE | Noted: 2018-04-30

## 2018-10-07 PROBLEM — Z80.9 FAMILY HISTORY OF CANCER: Status: ACTIVE | Noted: 2018-10-07

## 2018-10-07 PROBLEM — D64.9 ANEMIA: Status: ACTIVE | Noted: 2018-10-07

## 2018-10-07 PROBLEM — R63.4 UNINTENDED WEIGHT LOSS: Status: ACTIVE | Noted: 2018-10-07

## 2018-10-07 ASSESSMENT — ENCOUNTER SYMPTOMS: APNEA: 1

## 2018-10-11 ENCOUNTER — HOSPITAL ENCOUNTER (OUTPATIENT)
Age: 63
Discharge: HOME | End: 2018-10-11
Payer: COMMERCIAL

## 2018-10-11 DIAGNOSIS — C61 PROSTATE CANCER (HCC): ICD-10-CM

## 2018-10-11 LAB — PROSTATE SPECIFIC ANTIGEN: <0.01 UG/L

## 2018-10-11 PROCEDURE — 84153 ASSAY OF PSA TOTAL: CPT

## 2018-10-11 PROCEDURE — 36415 COLL VENOUS BLD VENIPUNCTURE: CPT

## 2018-10-22 ENCOUNTER — OFFICE VISIT (OUTPATIENT)
Dept: UROLOGY | Age: 63
End: 2018-10-22
Payer: COMMERCIAL

## 2018-10-22 VITALS
WEIGHT: 251 LBS | BODY MASS INDEX: 35.14 KG/M2 | HEART RATE: 52 BPM | HEIGHT: 71 IN | DIASTOLIC BLOOD PRESSURE: 70 MMHG | SYSTOLIC BLOOD PRESSURE: 147 MMHG | TEMPERATURE: 97.5 F

## 2018-10-22 DIAGNOSIS — R35.0 FREQUENCY OF MICTURITION: ICD-10-CM

## 2018-10-22 DIAGNOSIS — R23.2 HOT FLASHES: ICD-10-CM

## 2018-10-22 DIAGNOSIS — R39.198 SLOW URINARY STREAM: ICD-10-CM

## 2018-10-22 DIAGNOSIS — R35.1 NOCTURIA: ICD-10-CM

## 2018-10-22 DIAGNOSIS — C61 PROSTATE CANCER (HCC): Primary | ICD-10-CM

## 2018-10-22 PROCEDURE — 96402 CHEMO HORMON ANTINEOPL SQ/IM: CPT | Performed by: UROLOGY

## 2018-10-22 PROCEDURE — 99214 OFFICE O/P EST MOD 30 MIN: CPT | Performed by: UROLOGY

## 2018-10-22 ASSESSMENT — ENCOUNTER SYMPTOMS
COLOR CHANGE: 0
COUGH: 0
VOMITING: 0
EYE PAIN: 0
EYE REDNESS: 0
ABDOMINAL PAIN: 0
WHEEZING: 0
NAUSEA: 0
SHORTNESS OF BREATH: 0
BACK PAIN: 0

## 2018-10-22 NOTE — PROGRESS NOTES
After obtaining consent, and per orders of Dr. Yoli Nuñez, injection of Eligard 45 mg given SQ in Right upper quad. abdomen by [facility] Moises BELTRÁN. Patient tolerated well without complaint.

## 2018-10-22 NOTE — PROGRESS NOTES
[facility]  [facility] [place]  [address]  [address]  [address] [facility] [address]  Dept: 92 Margie Handley [facility] Office Note - Established    Patient:  Rasheeda Mcintyre  YOB: 1955  Date: 10/22/2018    The patient is a 58 y.o. male who presents todayfor evaluation of the following problems:   Chief Complaint   Patient presents with    Prostate Cancer     eligard  last given 4/16/2018 luq abdomen       HPI  Pt has h/o prostate ca. On xtandi and eligard. Has had undetectable psa. Has LUTS including nocturia, frequency and weak stream. Has occasional hot flashes from eligard, but this is better. Was told to stop taking ca and vit d due to high ca. Summary of old records: N/A    Additional History: N/A    Procedures Today: N/A    Urinalysis today:  No results found for this visit on 10/22/18. Last several PSA's:  Lab Results   Component Value Date    PSA <0.01 10/11/2018    PSA <0.01 07/09/2018    PSA 0.01 04/06/2018     Last total testosterone:  No results found for: TESTOSTERONE    AUA Symptom Score (10/22/2018):   INCOMPLETE EMPTYING: How often have you had the sensation of not emptying your bladder?: Not at all  FREQUENCY: How often do you have to urinate less than every two hours?: Less than 1 to 5 times (with lasix)  INTERMITTENCY: How often have you found you stopped and started again several times when you urinated?: Not at all  URGENCY: How often have you found it difficult to postpone urination?: Less than Half the time (with lasix)  WEAK STREAM: How often have you had a weak urinary stream?: Not at all  STRAINING: How often have you had to strain to start  urination?: Not at all  NOCTURIA: How many times did you typically get up at night to uriniate?: 1 Time  TOTAL I-PSS SCORE[de-identified] 4  How would you feel if you were to spend the rest of your life with your urinary condition?: Pleased    Last BUN and creatinine:  Lab Results   Component Value Date    BUN

## 2018-10-29 ENCOUNTER — OFFICE VISIT (OUTPATIENT)
Dept: ORTHOPEDIC SURGERY | Age: 63
End: 2018-10-29

## 2018-10-29 DIAGNOSIS — Z96.612 S/P REVERSE TOTAL SHOULDER ARTHROPLASTY, LEFT: Primary | ICD-10-CM

## 2018-10-29 PROCEDURE — 99024 POSTOP FOLLOW-UP VISIT: CPT | Performed by: ORTHOPAEDIC SURGERY

## 2018-10-30 ENCOUNTER — OFFICE VISIT (OUTPATIENT)
Dept: FAMILY MEDICINE CLINIC | Age: 63
End: 2018-10-30
Payer: COMMERCIAL

## 2018-10-30 VITALS
WEIGHT: 247 LBS | HEART RATE: 73 BPM | BODY MASS INDEX: 34.58 KG/M2 | SYSTOLIC BLOOD PRESSURE: 130 MMHG | OXYGEN SATURATION: 92 % | DIASTOLIC BLOOD PRESSURE: 64 MMHG | HEIGHT: 71 IN | RESPIRATION RATE: 16 BRPM | TEMPERATURE: 97.7 F

## 2018-10-30 DIAGNOSIS — Z96.612 H/O TOTAL SHOULDER REPLACEMENT, LEFT: Primary | ICD-10-CM

## 2018-10-30 DIAGNOSIS — R06.2 WHEEZING: ICD-10-CM

## 2018-10-30 DIAGNOSIS — J06.9 ACUTE URI: ICD-10-CM

## 2018-10-30 DIAGNOSIS — J20.9 ACUTE BRONCHITIS, UNSPECIFIED ORGANISM: Primary | ICD-10-CM

## 2018-10-30 PROCEDURE — 99213 OFFICE O/P EST LOW 20 MIN: CPT | Performed by: FAMILY MEDICINE

## 2018-10-30 RX ORDER — AZITHROMYCIN 250 MG/1
TABLET, FILM COATED ORAL
Qty: 1 PACKET | Refills: 1 | Status: SHIPPED | OUTPATIENT
Start: 2018-10-30 | End: 2018-11-09

## 2018-10-30 RX ORDER — BENZONATATE 100 MG/1
100 CAPSULE ORAL 3 TIMES DAILY PRN
Qty: 25 CAPSULE | Refills: 0 | Status: SHIPPED | OUTPATIENT
Start: 2018-10-30 | End: 2018-11-24

## 2018-10-30 RX ORDER — PREDNISONE 20 MG/1
20 TABLET ORAL 2 TIMES DAILY
Qty: 10 TABLET | Refills: 0 | Status: SHIPPED | OUTPATIENT
Start: 2018-10-30 | End: 2018-11-09

## 2018-10-30 RX ORDER — PREDNISONE 20 MG/1
20 TABLET ORAL 2 TIMES DAILY
Qty: 10 TABLET | Refills: 0 | Status: SHIPPED | OUTPATIENT
Start: 2018-10-30 | End: 2018-10-30 | Stop reason: SDUPTHER

## 2018-10-30 RX ORDER — AMOXICILLIN 500 MG/1
CAPSULE ORAL
Qty: 6 CAPSULE | Refills: 0 | Status: SHIPPED | OUTPATIENT
Start: 2018-10-30 | End: 2018-12-13 | Stop reason: SDUPTHER

## 2018-10-30 ASSESSMENT — ENCOUNTER SYMPTOMS
WHEEZING: 1
ABDOMINAL PAIN: 0
RHINORRHEA: 1
DIARRHEA: 0
SINUS PRESSURE: 0
SINUS PAIN: 0
CHEST TIGHTNESS: 1
VOICE CHANGE: 1
BACK PAIN: 0
EYE PAIN: 0
NAUSEA: 0
VOMITING: 0
COUGH: 1
EYE REDNESS: 0
SHORTNESS OF BREATH: 1
SORE THROAT: 0
HEMOPTYSIS: 0

## 2018-10-30 NOTE — PROGRESS NOTES
myalgias. Skin: Negative for rash. Allergic/Immunologic: Negative for environmental allergies. Neurological: Positive for dizziness and light-headedness. Negative for headaches. Objective:     Physical Exam   Constitutional: He is oriented to person, place, and time. He appears well-developed and well-nourished. No distress. HENT:   Head: Normocephalic. Right Ear: External ear normal. A middle ear effusion is present. Left Ear: External ear normal. A middle ear effusion is present. Nose: Mucosal edema, rhinorrhea and sinus tenderness present. Mouth/Throat: Uvula is midline, oropharynx is clear and moist and mucous membranes are normal. No uvula swelling. No oropharyngeal exudate or posterior oropharyngeal edema. Eyes: Pupils are equal, round, and reactive to light. Conjunctivae and EOM are normal. Right eye exhibits no discharge. Left eye exhibits no discharge. Neck: Normal range of motion. Neck supple. Cardiovascular: Normal rate, regular rhythm and normal heart sounds. No murmur heard. Pulmonary/Chest: Effort normal and breath sounds normal. No respiratory distress. He has no wheezes. He has no rales. Abdominal: Soft. Bowel sounds are normal. He exhibits no distension and no mass. There is no tenderness. Musculoskeletal: Normal range of motion. He exhibits no edema or tenderness. Lymphadenopathy:     He has no cervical adenopathy. Neurological: He is alert and oriented to person, place, and time. No cranial nerve deficit. Coordination normal.   Skin: Skin is warm. No rash noted. He is not diaphoretic. Psychiatric: He has a normal mood and affect. His behavior is normal. Judgment and thought content normal.   Nursing note and vitals reviewed.     /64 (Site: Right Upper Arm, Position: Sitting, Cuff Size: Large Adult)   Pulse 73   Temp 97.7 °F (36.5 °C) (Oral)   Resp 16   Ht 5' 10.5\" (1.791 m)   Wt 247 lb (112 kg)   SpO2 92%   BMI 34.94 kg/m²     Assessment:

## 2018-10-30 NOTE — PATIENT INSTRUCTIONS
good.  When should you call for help? Call 911 anytime you think you may need emergency care. For example, call if:    · You have severe trouble breathing.    Call your doctor now or seek immediate medical care if:    · You have new or worse trouble breathing.     · You cough up dark brown or bloody mucus (sputum).     · You have a new or higher fever.     · You have a new rash.    Watch closely for changes in your health, and be sure to contact your doctor if:    · You cough more deeply or more often, especially if you notice more mucus or a change in the color of your mucus.     · You are not getting better as expected. Where can you learn more? Go to [email or web address]. org and sign in to your Fitocracy account. Enter H333 in the PhotoSpotLand box to learn more about \"Bronchitis: Care Instructions. \"     If you do not have an account, please click on the \"Sign Up Now\" link. Current as of: December 6, 2017  Content Version: 11.7  © 4930-9978 [employer]. Care instructions adapted under license by [facility] ([facility]). If you have questions about a medical condition or this instruction, always ask your healthcare professional. [place] [address] any warranty or liability for your use of this information. Patient Education        Wheezing or Bronchoconstriction: Care Instructions  Your Care Instructions  Wheezing is a whistling noise made during breathing. It occurs when the small airways, or bronchial tubes, that lead to your lungs swell or contract (spasm) and become narrow. This narrowing is called bronchoconstriction. When your airways constrict, it is hard for air to pass through and this makes it hard for you to breathe. Wheezing and bronchoconstriction can be caused by many problems, including:  · An infection such as the flu or a cold. · Allergies such as hay fever.   · Diseases such as asthma or chronic obstructive pulmonary away.  Follow-up care is a key part of your treatment and safety. Be sure to make and go to all appointments, and call your doctor if you are having problems. It's also a good idea to know your test results and keep a list of the medicines you take. How can you care for yourself at home? · To prevent dehydration, drink plenty of fluids, enough so that your urine is light yellow or clear like water. Choose water and other caffeine-free clear liquids until you feel better. If you have kidney, heart, or liver disease and have to limit fluids, talk with your doctor before you increase the amount of fluids you drink. · Take an over-the-counter pain medicine, such as acetaminophen (Tylenol), ibuprofen (Advil, Motrin), or naproxen (Aleve). Read and follow all instructions on the label. · Before you use cough and cold medicines, check the label. These medicines may not be safe for young children or for people with certain health problems. · Be careful when taking over-the-counter cold or flu medicines and Tylenol at the same time. Many of these medicines have acetaminophen, which is Tylenol. Read the labels to make sure that you are not taking more than the recommended dose. Too much acetaminophen (Tylenol) can be harmful. · Get plenty of rest.  · Do not smoke or allow others to smoke around you. If you need help quitting, talk to your doctor about stop-smoking programs and medicines. These can increase your chances of quitting for good. When should you call for help? Call 911 anytime you think you may need emergency care.  For example, call if:    · You have severe trouble breathing.    Call your doctor now or seek immediate medical care if:    · You seem to be getting much sicker.     · You have new or worse trouble breathing.     · You have a new or higher fever.     · You have a new rash.    Watch closely for changes in your health, and be sure to contact your doctor if:    · You have a new symptom, such as a

## 2018-11-07 ENCOUNTER — HOSPITAL ENCOUNTER (OUTPATIENT)
Age: 63
Discharge: HOME | End: 2018-11-07
Payer: COMMERCIAL

## 2018-11-07 ENCOUNTER — OFFICE VISIT (OUTPATIENT)
Dept: GASTROENTEROLOGY | Age: 63
End: 2018-11-07
Payer: COMMERCIAL

## 2018-11-07 VITALS
DIASTOLIC BLOOD PRESSURE: 73 MMHG | HEART RATE: 60 BPM | WEIGHT: 241.6 LBS | BODY MASS INDEX: 34.18 KG/M2 | SYSTOLIC BLOOD PRESSURE: 145 MMHG

## 2018-11-07 DIAGNOSIS — D64.9 ANEMIA, UNSPECIFIED TYPE: ICD-10-CM

## 2018-11-07 DIAGNOSIS — D64.9 ANEMIA, UNSPECIFIED TYPE: Primary | ICD-10-CM

## 2018-11-07 LAB
ABSOLUTE RETIC #: 0.07 M/UL (ref 0.02–0.1)
FOLATE: >20 NG/ML
HCT VFR BLD CALC: 42.3 % (ref 41–53)
HEMOGLOBIN: 14.1 G/DL (ref 13.5–17.5)
IMMATURE RETIC FRACT: NORMAL %
IRON SATURATION: 22 % (ref 20–55)
IRON: 74 UG/DL (ref 59–158)
MCH RBC QN AUTO: 30.4 PG (ref 26–34)
MCHC RBC AUTO-ENTMCNC: 33.2 G/DL (ref 31–37)
MCV RBC AUTO: 91.3 FL (ref 80–100)
NRBC AUTOMATED: ABNORMAL PER 100 WBC
PDW BLD-RTO: 14.1 % (ref 11.5–14.9)
PLATELET # BLD: 402 K/UL (ref 150–450)
PMV BLD AUTO: 8.9 FL (ref 6–12)
RBC # BLD: 4.64 M/UL (ref 4.5–5.9)
RETIC %: 1.5 % (ref 0.5–2)
RETIC HEMOGLOBIN: NORMAL PG (ref 28.2–35.7)
TOTAL IRON BINDING CAPACITY: 334 UG/DL (ref 250–450)
UNSATURATED IRON BINDING CAPACITY: 260 UG/DL (ref 112–347)
VITAMIN B-12: 1335 PG/ML (ref 232–1245)
WBC # BLD: 11.5 K/UL (ref 3.5–11)

## 2018-11-07 PROCEDURE — 82607 VITAMIN B-12: CPT

## 2018-11-07 PROCEDURE — 82746 ASSAY OF FOLIC ACID SERUM: CPT

## 2018-11-07 PROCEDURE — 83550 IRON BINDING TEST: CPT

## 2018-11-07 PROCEDURE — 83516 IMMUNOASSAY NONANTIBODY: CPT

## 2018-11-07 PROCEDURE — 83540 ASSAY OF IRON: CPT

## 2018-11-07 PROCEDURE — 99244 OFF/OP CNSLTJ NEW/EST MOD 40: CPT | Performed by: INTERNAL MEDICINE

## 2018-11-07 PROCEDURE — 85045 AUTOMATED RETICULOCYTE COUNT: CPT

## 2018-11-07 PROCEDURE — 85027 COMPLETE CBC AUTOMATED: CPT

## 2018-11-07 PROCEDURE — 36415 COLL VENOUS BLD VENIPUNCTURE: CPT

## 2018-11-07 ASSESSMENT — ENCOUNTER SYMPTOMS
CHOKING: 0
SINUS PAIN: 0
SINUS PRESSURE: 0
VOMITING: 0
COUGH: 0
CONSTIPATION: 0
ABDOMINAL PAIN: 0
ABDOMINAL DISTENTION: 0
SHORTNESS OF BREATH: 1
NAUSEA: 0
TROUBLE SWALLOWING: 0
BLOOD IN STOOL: 0
DIARRHEA: 0
ANAL BLEEDING: 0
RECTAL PAIN: 0

## 2018-11-08 LAB — TISSUE TRANSGLUTAMINASE IGA: 0.8 U/ML

## 2018-11-29 ENCOUNTER — TELEPHONE (OUTPATIENT)
Dept: ONCOLOGY | Age: 63
End: 2018-11-29

## 2018-11-29 NOTE — LETTER
12/3/2018        [address] [place] [address]    Dear Kriss Gleason:    Your healthcare provider has ordered a low dose CT scan of the chest for lung cancer screening. You will find enclosed, information about CT lung screening. Please review the statement of understanding, you will be asked to sign a copy of this at the time of your CT scan    If you have not already been contacted to make the appointment for your scan, please call our scheduling department at [phone]    Keep in mind that CT lung screening does not take the place of smoking cessation. If you are a current smoker, you will find enclosed smoking cessation resources. Please do not hesitate to contact me if you have any questions or concerns.     [address] [place] [address] Lung Screening Program  [phone]

## 2018-11-29 NOTE — TELEPHONE ENCOUNTER
Patient called and left message to follow up on scheduling Lung CT. We or scheduling have not received orders. I tried calling Dr. Alisha Cobb office but there was not answer on multiple tries, it would ring several times then disconnect the call. I faxed request to the office for the orders. I called patient back and informed him that once we receive the orders we will have scheduling contact him. He expressed understanding.

## 2018-12-06 ENCOUNTER — OFFICE VISIT (OUTPATIENT)
Dept: GASTROENTEROLOGY | Age: 63
End: 2018-12-06
Payer: COMMERCIAL

## 2018-12-06 VITALS
HEART RATE: 69 BPM | SYSTOLIC BLOOD PRESSURE: 125 MMHG | BODY MASS INDEX: 35.79 KG/M2 | DIASTOLIC BLOOD PRESSURE: 59 MMHG | WEIGHT: 253 LBS

## 2018-12-06 DIAGNOSIS — D64.9 ANEMIA, UNSPECIFIED TYPE: Primary | ICD-10-CM

## 2018-12-06 PROCEDURE — 99213 OFFICE O/P EST LOW 20 MIN: CPT | Performed by: INTERNAL MEDICINE

## 2018-12-06 ASSESSMENT — ENCOUNTER SYMPTOMS
NAUSEA: 0
APNEA: 1
RECTAL PAIN: 0
COUGH: 0
SINUS PAIN: 0
SHORTNESS OF BREATH: 1
DIARRHEA: 0
VOMITING: 0
TROUBLE SWALLOWING: 0
BLOOD IN STOOL: 0
ABDOMINAL PAIN: 0
CONSTIPATION: 0
ANAL BLEEDING: 0
SINUS PRESSURE: 0
ABDOMINAL DISTENTION: 0
CHOKING: 0

## 2018-12-10 ENCOUNTER — TELEPHONE (OUTPATIENT)
Dept: FAMILY MEDICINE CLINIC | Age: 63
End: 2018-12-10

## 2018-12-10 DIAGNOSIS — M79.2 PERIPHERAL NEUROPATHIC PAIN: ICD-10-CM

## 2018-12-10 DIAGNOSIS — E11.42 TYPE 2 DIABETES MELLITUS WITH DIABETIC POLYNEUROPATHY, WITHOUT LONG-TERM CURRENT USE OF INSULIN (HCC): Primary | ICD-10-CM

## 2018-12-10 RX ORDER — PREGABALIN 100 MG/1
100 CAPSULE ORAL 3 TIMES DAILY
Qty: 270 CAPSULE | Refills: 0 | Status: SHIPPED | OUTPATIENT
Start: 2018-12-10 | End: 2019-01-08 | Stop reason: SDUPTHER

## 2018-12-10 NOTE — TELEPHONE ENCOUNTER
Please let the patient know I sent it prescription from pharmacy. NEEDS prior authorization. Requested Prescriptions     Signed Prescriptions Disp Refills    pregabalin (LYRICA) 100 MG capsule 270 capsule 0     Sig: Take 1 capsule by mouth 3 times daily for 90 days. Polina Paige     Authorizing Provider: Mauricio Ortiz [facility] [address], [address] [phone]  [place] [address]  Phone: [phone] Fax: [phone]      Thank you! FYI    Future Appointments  Date Time Provider Keri English   1/8/2019 10:00 AM Abel Freeman MD fp sc [facility]   1/28/2019 9:40 AM Joe Cantor MD [facility] Uro [facility]   1/29/2019 9:15 AM Laura James MD [facility]   9/9/2019 1:30 PM Patti Justice MD [facility] GI [facility]       Controlled Substances Monitoring: Attestation: The Prescription Monitoring Report for this patient was reviewed today. Abel Freeman MD)  Documentation: No signs of potential drug abuse or diversion identified.  Abel Freeman MD)

## 2018-12-13 DIAGNOSIS — Z96.612 H/O TOTAL SHOULDER REPLACEMENT, LEFT: ICD-10-CM

## 2018-12-13 RX ORDER — AMOXICILLIN 500 MG/1
CAPSULE ORAL
Qty: 6 CAPSULE | Refills: 0 | Status: SHIPPED | OUTPATIENT
Start: 2018-12-13 | End: 2019-01-03 | Stop reason: ALTCHOICE

## 2018-12-17 ENCOUNTER — TELEPHONE (OUTPATIENT)
Dept: ONCOLOGY | Age: 63
End: 2018-12-17

## 2019-01-02 ENCOUNTER — HOSPITAL ENCOUNTER (OUTPATIENT)
Age: 64
Discharge: HOME | End: 2019-01-02
Payer: COMMERCIAL

## 2019-01-02 DIAGNOSIS — I10 ESSENTIAL HYPERTENSION: ICD-10-CM

## 2019-01-02 LAB
ALBUMIN SERPL-MCNC: 3.9 G/DL (ref 3.5–5.2)
ALBUMIN/GLOBULIN RATIO: ABNORMAL (ref 1–2.5)
ALP BLD-CCNC: 123 U/L (ref 40–129)
ALT SERPL-CCNC: 12 U/L (ref 5–41)
ANION GAP SERPL CALCULATED.3IONS-SCNC: 14 MMOL/L (ref 9–17)
AST SERPL-CCNC: 13 U/L
BILIRUB SERPL-MCNC: 0.44 MG/DL (ref 0.3–1.2)
BUN BLDV-MCNC: 10 MG/DL (ref 8–23)
BUN/CREAT BLD: ABNORMAL (ref 9–20)
CALCIUM SERPL-MCNC: 10.4 MG/DL (ref 8.6–10.4)
CALCIUM SERPL-MCNC: 10.4 MG/DL (ref 8.6–10.4)
CHLORIDE BLD-SCNC: 104 MMOL/L (ref 98–107)
CO2: 26 MMOL/L (ref 20–31)
CREAT SERPL-MCNC: 0.58 MG/DL (ref 0.7–1.2)
GFR AFRICAN AMERICAN: >60 ML/MIN
GFR NON-AFRICAN AMERICAN: >60 ML/MIN
GFR SERPL CREATININE-BSD FRML MDRD: ABNORMAL ML/MIN/{1.73_M2}
GFR SERPL CREATININE-BSD FRML MDRD: ABNORMAL ML/MIN/{1.73_M2}
GLUCOSE BLD-MCNC: 103 MG/DL (ref 70–99)
HCT VFR BLD CALC: 39.1 % (ref 41–53)
HEMOGLOBIN: 13.4 G/DL (ref 13.5–17.5)
MCH RBC QN AUTO: 31.5 PG (ref 26–34)
MCHC RBC AUTO-ENTMCNC: 34.3 G/DL (ref 31–37)
MCV RBC AUTO: 91.8 FL (ref 80–100)
NRBC AUTOMATED: ABNORMAL PER 100 WBC
PDW BLD-RTO: 14.6 % (ref 11.5–14.9)
PLATELET # BLD: 288 K/UL (ref 150–450)
PMV BLD AUTO: 8.6 FL (ref 6–12)
POTASSIUM SERPL-SCNC: 4.8 MMOL/L (ref 3.7–5.3)
PTH INTACT: 41.29 PG/ML (ref 15–65)
RBC # BLD: 4.26 M/UL (ref 4.5–5.9)
SODIUM BLD-SCNC: 144 MMOL/L (ref 135–144)
TOTAL PROTEIN: 7.4 G/DL (ref 6.4–8.3)
VITAMIN D 25-HYDROXY: 56.1 NG/ML (ref 30–100)
WBC # BLD: 8.3 K/UL (ref 3.5–11)

## 2019-01-02 PROCEDURE — 85027 COMPLETE CBC AUTOMATED: CPT

## 2019-01-02 PROCEDURE — 36415 COLL VENOUS BLD VENIPUNCTURE: CPT

## 2019-01-02 PROCEDURE — 83970 ASSAY OF PARATHORMONE: CPT

## 2019-01-02 PROCEDURE — 82310 ASSAY OF CALCIUM: CPT

## 2019-01-02 PROCEDURE — 82306 VITAMIN D 25 HYDROXY: CPT

## 2019-01-02 PROCEDURE — 80053 COMPREHEN METABOLIC PANEL: CPT

## 2019-01-03 ENCOUNTER — OFFICE VISIT (OUTPATIENT)
Dept: FAMILY MEDICINE CLINIC | Age: 64
End: 2019-01-03
Payer: COMMERCIAL

## 2019-01-03 VITALS
OXYGEN SATURATION: 95 % | RESPIRATION RATE: 16 BRPM | BODY MASS INDEX: 36.08 KG/M2 | HEIGHT: 70 IN | TEMPERATURE: 97.5 F | WEIGHT: 252 LBS | DIASTOLIC BLOOD PRESSURE: 69 MMHG | HEART RATE: 67 BPM | SYSTOLIC BLOOD PRESSURE: 130 MMHG

## 2019-01-03 DIAGNOSIS — J01.90 ACUTE SINUSITIS, RECURRENCE NOT SPECIFIED, UNSPECIFIED LOCATION: ICD-10-CM

## 2019-01-03 DIAGNOSIS — J20.9 ACUTE BRONCHITIS, UNSPECIFIED ORGANISM: Primary | ICD-10-CM

## 2019-01-03 DIAGNOSIS — H66.90 ACUTE OTITIS MEDIA, UNSPECIFIED OTITIS MEDIA TYPE: ICD-10-CM

## 2019-01-03 PROCEDURE — 99213 OFFICE O/P EST LOW 20 MIN: CPT | Performed by: FAMILY MEDICINE

## 2019-01-03 RX ORDER — BUDESONIDE AND FORMOTEROL FUMARATE DIHYDRATE 160; 4.5 UG/1; UG/1
AEROSOL RESPIRATORY (INHALATION)
COMMUNITY
End: 2019-01-08 | Stop reason: SDUPTHER

## 2019-01-03 RX ORDER — ALLOPURINOL 100 MG/1
TABLET ORAL
COMMUNITY
End: 2019-01-08 | Stop reason: ALTCHOICE

## 2019-01-03 RX ORDER — ATENOLOL 25 MG/1
TABLET ORAL
COMMUNITY
End: 2019-01-08 | Stop reason: ALTCHOICE

## 2019-01-03 RX ORDER — PRAVASTATIN SODIUM 40 MG
TABLET ORAL
COMMUNITY
End: 2019-01-03 | Stop reason: SDUPTHER

## 2019-01-03 RX ORDER — COLCHICINE 0.6 MG/1
TABLET ORAL
COMMUNITY
End: 2019-01-03 | Stop reason: SDUPTHER

## 2019-01-03 RX ORDER — IPRATROPIUM BROMIDE AND ALBUTEROL SULFATE 2.5; .5 MG/3ML; MG/3ML
SOLUTION RESPIRATORY (INHALATION)
COMMUNITY
End: 2019-01-08 | Stop reason: CLARIF

## 2019-01-03 RX ORDER — FUROSEMIDE 20 MG/1
TABLET ORAL
COMMUNITY
End: 2019-01-03 | Stop reason: SDUPTHER

## 2019-01-03 RX ORDER — TRIAMCINOLONE ACETONIDE 0.25 MG/G
CREAM TOPICAL
COMMUNITY
End: 2019-01-08 | Stop reason: SDUPTHER

## 2019-01-03 RX ORDER — AMMONIUM LACTATE 12 G/100G
CREAM TOPICAL
COMMUNITY
End: 2019-01-03 | Stop reason: SDUPTHER

## 2019-01-03 RX ORDER — CLOTRIMAZOLE AND BETAMETHASONE DIPROPIONATE 10; .64 MG/G; MG/G
CREAM TOPICAL
COMMUNITY
End: 2019-01-03 | Stop reason: SDUPTHER

## 2019-01-03 RX ORDER — FLUOXETINE HYDROCHLORIDE 40 MG/1
CAPSULE ORAL
COMMUNITY
End: 2019-01-08

## 2019-01-03 RX ORDER — ROSUVASTATIN CALCIUM 10 MG/1
TABLET, COATED ORAL
COMMUNITY
End: 2019-01-08 | Stop reason: ALTCHOICE

## 2019-01-03 RX ORDER — BROMPHENIRAMINE MALEATE, PSEUDOEPHEDRINE HYDROCHLORIDE, AND DEXTROMETHORPHAN HYDROBROMIDE 2; 30; 10 MG/5ML; MG/5ML; MG/5ML
5 SYRUP ORAL 4 TIMES DAILY PRN
Qty: 180 ML | Refills: 0 | Status: SHIPPED | OUTPATIENT
Start: 2019-01-03 | End: 2019-01-08 | Stop reason: ALTCHOICE

## 2019-01-03 RX ORDER — FLUTICASONE PROPIONATE 50 MCG
SPRAY, SUSPENSION (ML) NASAL
COMMUNITY
End: 2019-01-28 | Stop reason: ALTCHOICE

## 2019-01-03 RX ORDER — AZITHROMYCIN 250 MG/1
TABLET, FILM COATED ORAL
Qty: 1 PACKET | Refills: 1 | Status: SHIPPED | OUTPATIENT
Start: 2019-01-03 | End: 2019-01-08 | Stop reason: ALTCHOICE

## 2019-01-03 RX ORDER — ALBUTEROL SULFATE 90 UG/1
AEROSOL, METERED RESPIRATORY (INHALATION)
COMMUNITY
Start: 2017-03-23 | End: 2019-01-03 | Stop reason: SDUPTHER

## 2019-01-03 ASSESSMENT — ENCOUNTER SYMPTOMS
SHORTNESS OF BREATH: 1
COUGH: 1
SORE THROAT: 1
ABDOMINAL PAIN: 0
NAUSEA: 0
WHEEZING: 1
HEMOPTYSIS: 0
DIARRHEA: 0
SINUS PRESSURE: 1
RHINORRHEA: 1
BACK PAIN: 1
VOMITING: 0
CHEST TIGHTNESS: 0
SINUS PAIN: 0

## 2019-01-08 ENCOUNTER — OFFICE VISIT (OUTPATIENT)
Dept: FAMILY MEDICINE CLINIC | Age: 64
End: 2019-01-08
Payer: COMMERCIAL

## 2019-01-08 VITALS
HEIGHT: 72 IN | WEIGHT: 248 LBS | BODY MASS INDEX: 33.59 KG/M2 | OXYGEN SATURATION: 97 % | HEART RATE: 65 BPM | SYSTOLIC BLOOD PRESSURE: 127 MMHG | DIASTOLIC BLOOD PRESSURE: 70 MMHG

## 2019-01-08 DIAGNOSIS — E11.42 TYPE 2 DIABETES MELLITUS WITH DIABETIC POLYNEUROPATHY, WITHOUT LONG-TERM CURRENT USE OF INSULIN (HCC): Primary | ICD-10-CM

## 2019-01-08 DIAGNOSIS — F33.42 RECURRENT MAJOR DEPRESSIVE DISORDER, IN FULL REMISSION (HCC): ICD-10-CM

## 2019-01-08 DIAGNOSIS — M79.2 PERIPHERAL NEUROPATHIC PAIN: ICD-10-CM

## 2019-01-08 DIAGNOSIS — I73.9 PVD (PERIPHERAL VASCULAR DISEASE) WITH CLAUDICATION (HCC): ICD-10-CM

## 2019-01-08 DIAGNOSIS — M21.371 FOOT DROP, RIGHT: ICD-10-CM

## 2019-01-08 DIAGNOSIS — J44.9 COPD MIXED TYPE (HCC): ICD-10-CM

## 2019-01-08 DIAGNOSIS — I10 ESSENTIAL HYPERTENSION: ICD-10-CM

## 2019-01-08 DIAGNOSIS — E78.5 HYPERLIPIDEMIA WITH TARGET LDL LESS THAN 100: ICD-10-CM

## 2019-01-08 DIAGNOSIS — D64.9 ANEMIA, UNSPECIFIED TYPE: ICD-10-CM

## 2019-01-08 DIAGNOSIS — R26.89 POOR BALANCE: ICD-10-CM

## 2019-01-08 LAB — HBA1C MFR BLD: 5.1 %

## 2019-01-08 PROCEDURE — 83036 HEMOGLOBIN GLYCOSYLATED A1C: CPT | Performed by: FAMILY MEDICINE

## 2019-01-08 PROCEDURE — 99214 OFFICE O/P EST MOD 30 MIN: CPT | Performed by: FAMILY MEDICINE

## 2019-01-08 RX ORDER — AMLODIPINE BESYLATE 10 MG/1
10 TABLET ORAL DAILY
Qty: 90 TABLET | Refills: 3 | Status: SHIPPED | OUTPATIENT
Start: 2019-01-08 | End: 2020-01-28 | Stop reason: SDUPTHER

## 2019-01-08 RX ORDER — FUROSEMIDE 20 MG/1
20 TABLET ORAL DAILY PRN
Qty: 90 TABLET | Refills: 3 | Status: SHIPPED | OUTPATIENT
Start: 2019-01-08 | End: 2019-01-21 | Stop reason: SDUPTHER

## 2019-01-08 RX ORDER — PREGABALIN 100 MG/1
100 CAPSULE ORAL 3 TIMES DAILY
Qty: 270 CAPSULE | Refills: 0 | Status: SHIPPED | OUTPATIENT
Start: 2019-01-08 | End: 2019-04-09 | Stop reason: SDUPTHER

## 2019-01-08 RX ORDER — LISINOPRIL 20 MG/1
20 TABLET ORAL DAILY
Qty: 90 TABLET | Refills: 0
Start: 2019-01-08 | End: 2019-04-09 | Stop reason: SDUPTHER

## 2019-01-08 RX ORDER — PRAVASTATIN SODIUM 40 MG
40 TABLET ORAL EVERY EVENING
Qty: 90 TABLET | Refills: 3 | Status: SHIPPED | OUTPATIENT
Start: 2019-01-08 | End: 2019-01-21 | Stop reason: SDUPTHER

## 2019-01-08 ASSESSMENT — ENCOUNTER SYMPTOMS
APNEA: 1
WHEEZING: 0
BACK PAIN: 1
CONSTIPATION: 0
ABDOMINAL PAIN: 0
COUGH: 0
SHORTNESS OF BREATH: 0
ABDOMINAL DISTENTION: 0
CHEST TIGHTNESS: 0
NAUSEA: 0
VOMITING: 0
DIARRHEA: 0

## 2019-01-08 ASSESSMENT — PATIENT HEALTH QUESTIONNAIRE - PHQ9
2. FEELING DOWN, DEPRESSED OR HOPELESS: 0
SUM OF ALL RESPONSES TO PHQ9 QUESTIONS 1 & 2: 0
SUM OF ALL RESPONSES TO PHQ QUESTIONS 1-9: 0
1. LITTLE INTEREST OR PLEASURE IN DOING THINGS: 0
SUM OF ALL RESPONSES TO PHQ QUESTIONS 1-9: 0

## 2019-01-13 PROBLEM — R42 DIZZINESSES: Status: RESOLVED | Noted: 2018-10-07 | Resolved: 2019-01-13

## 2019-01-13 PROBLEM — R26.89 POOR BALANCE: Status: ACTIVE | Noted: 2019-01-13

## 2019-01-13 PROBLEM — Z79.899 POLYPHARMACY: Status: RESOLVED | Noted: 2017-03-25 | Resolved: 2019-01-13

## 2019-01-13 PROBLEM — R53.1 GENERAL WEAKNESS: Status: RESOLVED | Noted: 2018-10-07 | Resolved: 2019-01-13

## 2019-01-13 PROBLEM — R00.1 BRADYCARDIA: Status: RESOLVED | Noted: 2018-06-02 | Resolved: 2019-01-13

## 2019-01-13 PROBLEM — M21.371 FOOT DROP, RIGHT: Status: ACTIVE | Noted: 2019-01-13

## 2019-01-16 ENCOUNTER — HOSPITAL ENCOUNTER (OUTPATIENT)
Dept: CT IMAGING | Age: 64
Discharge: HOME | End: 2019-01-18
Payer: COMMERCIAL

## 2019-01-16 DIAGNOSIS — Z72.0 TOBACCO USE: ICD-10-CM

## 2019-01-16 PROCEDURE — G0297 LDCT FOR LUNG CA SCREEN: HCPCS

## 2019-01-21 DIAGNOSIS — E11.42 TYPE 2 DIABETES MELLITUS WITH DIABETIC POLYNEUROPATHY, WITHOUT LONG-TERM CURRENT USE OF INSULIN (HCC): Primary | ICD-10-CM

## 2019-01-21 DIAGNOSIS — E78.5 HYPERLIPIDEMIA WITH TARGET LDL LESS THAN 100: ICD-10-CM

## 2019-01-21 DIAGNOSIS — I10 ESSENTIAL HYPERTENSION: ICD-10-CM

## 2019-01-21 RX ORDER — PRAVASTATIN SODIUM 40 MG
40 TABLET ORAL EVERY EVENING
Qty: 90 TABLET | Refills: 3 | Status: SHIPPED | OUTPATIENT
Start: 2019-01-21 | End: 2019-09-24 | Stop reason: SDUPTHER

## 2019-01-21 RX ORDER — FOLIC ACID/VIT B COMPLEX AND C 5 MG
1 TABLET ORAL DAILY
Qty: 90 TABLET | Refills: 3 | Status: ON HOLD | OUTPATIENT
Start: 2019-01-21 | End: 2021-07-08

## 2019-01-21 RX ORDER — FUROSEMIDE 20 MG/1
20 TABLET ORAL DAILY PRN
Qty: 90 TABLET | Refills: 3 | Status: SHIPPED | OUTPATIENT
Start: 2019-01-21 | End: 2020-01-28 | Stop reason: SDUPTHER

## 2019-01-22 ENCOUNTER — HOSPITAL ENCOUNTER (OUTPATIENT)
Age: 64
Discharge: HOME | End: 2019-01-22
Payer: COMMERCIAL

## 2019-01-22 DIAGNOSIS — C61 PROSTATE CANCER (HCC): ICD-10-CM

## 2019-01-22 LAB — PROSTATE SPECIFIC ANTIGEN: <0.01 UG/L

## 2019-01-22 PROCEDURE — 84153 ASSAY OF PSA TOTAL: CPT

## 2019-01-22 PROCEDURE — 36415 COLL VENOUS BLD VENIPUNCTURE: CPT

## 2019-01-28 ENCOUNTER — OFFICE VISIT (OUTPATIENT)
Dept: UROLOGY | Age: 64
End: 2019-01-28
Payer: COMMERCIAL

## 2019-01-28 VITALS — SYSTOLIC BLOOD PRESSURE: 133 MMHG | HEART RATE: 65 BPM | DIASTOLIC BLOOD PRESSURE: 72 MMHG | TEMPERATURE: 97.7 F

## 2019-01-28 DIAGNOSIS — R35.1 NOCTURIA: ICD-10-CM

## 2019-01-28 DIAGNOSIS — R35.0 FREQUENCY OF MICTURITION: ICD-10-CM

## 2019-01-28 DIAGNOSIS — C61 PROSTATE CANCER (HCC): Primary | ICD-10-CM

## 2019-01-28 DIAGNOSIS — Z96.612 S/P REVERSE TOTAL SHOULDER ARTHROPLASTY, LEFT: Primary | ICD-10-CM

## 2019-01-28 DIAGNOSIS — R23.2 HOT FLASHES: ICD-10-CM

## 2019-01-28 PROCEDURE — 99214 OFFICE O/P EST MOD 30 MIN: CPT | Performed by: UROLOGY

## 2019-01-28 ASSESSMENT — ENCOUNTER SYMPTOMS
NAUSEA: 0
BACK PAIN: 0
COLOR CHANGE: 0
EYE PAIN: 0
EYE REDNESS: 0
SHORTNESS OF BREATH: 0
ABDOMINAL PAIN: 0
VOMITING: 0
WHEEZING: 0
COUGH: 0

## 2019-01-29 ENCOUNTER — OFFICE VISIT (OUTPATIENT)
Dept: ORTHOPEDIC SURGERY | Age: 64
End: 2019-01-29
Payer: COMMERCIAL

## 2019-01-29 VITALS — WEIGHT: 248.02 LBS | BODY MASS INDEX: 33.59 KG/M2 | HEIGHT: 72 IN

## 2019-01-29 DIAGNOSIS — Z96.612 S/P REVERSE TOTAL SHOULDER ARTHROPLASTY, LEFT: Primary | ICD-10-CM

## 2019-01-29 PROCEDURE — 99213 OFFICE O/P EST LOW 20 MIN: CPT | Performed by: ORTHOPAEDIC SURGERY

## 2019-04-01 ENCOUNTER — HOSPITAL ENCOUNTER (OUTPATIENT)
Age: 64
Setting detail: SPECIMEN
Discharge: HOME | End: 2019-04-01
Payer: COMMERCIAL

## 2019-04-01 DIAGNOSIS — M79.2 PERIPHERAL NEUROPATHIC PAIN: ICD-10-CM

## 2019-04-01 DIAGNOSIS — I10 ESSENTIAL HYPERTENSION: ICD-10-CM

## 2019-04-01 DIAGNOSIS — E11.42 TYPE 2 DIABETES MELLITUS WITH DIABETIC POLYNEUROPATHY, WITHOUT LONG-TERM CURRENT USE OF INSULIN (HCC): ICD-10-CM

## 2019-04-01 DIAGNOSIS — D64.9 ANEMIA, UNSPECIFIED TYPE: ICD-10-CM

## 2019-04-01 LAB
ABSOLUTE EOS #: 0.2 K/UL (ref 0–0.4)
ABSOLUTE IMMATURE GRANULOCYTE: NORMAL K/UL (ref 0–0.3)
ABSOLUTE LYMPH #: 1.9 K/UL (ref 1–4.8)
ABSOLUTE MONO #: 0.5 K/UL (ref 0.1–1.3)
ALBUMIN SERPL-MCNC: 4.3 G/DL (ref 3.5–5.2)
ALBUMIN/GLOBULIN RATIO: ABNORMAL (ref 1–2.5)
ALP BLD-CCNC: 117 U/L (ref 40–129)
ALT SERPL-CCNC: 18 U/L (ref 5–41)
ANION GAP SERPL CALCULATED.3IONS-SCNC: 13 MMOL/L (ref 9–17)
AST SERPL-CCNC: 16 U/L
BASOPHILS # BLD: 1 % (ref 0–2)
BASOPHILS ABSOLUTE: 0.1 K/UL (ref 0–0.2)
BILIRUB SERPL-MCNC: 0.19 MG/DL (ref 0.3–1.2)
BUN BLDV-MCNC: 13 MG/DL (ref 8–23)
BUN/CREAT BLD: ABNORMAL (ref 9–20)
CALCIUM SERPL-MCNC: 10.1 MG/DL (ref 8.6–10.4)
CHLORIDE BLD-SCNC: 104 MMOL/L (ref 98–107)
CO2: 25 MMOL/L (ref 20–31)
CREAT SERPL-MCNC: 0.63 MG/DL (ref 0.7–1.2)
DIFFERENTIAL TYPE: NORMAL
EOSINOPHILS RELATIVE PERCENT: 3 % (ref 0–4)
GFR AFRICAN AMERICAN: >60 ML/MIN
GFR NON-AFRICAN AMERICAN: >60 ML/MIN
GFR SERPL CREATININE-BSD FRML MDRD: ABNORMAL ML/MIN/{1.73_M2}
GFR SERPL CREATININE-BSD FRML MDRD: ABNORMAL ML/MIN/{1.73_M2}
GLUCOSE BLD-MCNC: 97 MG/DL (ref 70–99)
HCT VFR BLD CALC: 44.6 % (ref 41–53)
HEMOGLOBIN: 14.7 G/DL (ref 13.5–17.5)
IMMATURE GRANULOCYTES: NORMAL %
LYMPHOCYTES # BLD: 26 % (ref 24–44)
MCH RBC QN AUTO: 30.1 PG (ref 26–34)
MCHC RBC AUTO-ENTMCNC: 32.9 G/DL (ref 31–37)
MCV RBC AUTO: 91.5 FL (ref 80–100)
MONOCYTES # BLD: 7 % (ref 1–7)
NRBC AUTOMATED: NORMAL PER 100 WBC
PDW BLD-RTO: 13.3 % (ref 11.5–14.9)
PLATELET # BLD: 235 K/UL (ref 150–450)
PLATELET ESTIMATE: NORMAL
PMV BLD AUTO: 9.2 FL (ref 6–12)
POTASSIUM SERPL-SCNC: 4.3 MMOL/L (ref 3.7–5.3)
RBC # BLD: 4.87 M/UL (ref 4.5–5.9)
RBC # BLD: NORMAL 10*6/UL
SEG NEUTROPHILS: 63 % (ref 36–66)
SEGMENTED NEUTROPHILS ABSOLUTE COUNT: 4.9 K/UL (ref 1.3–9.1)
SODIUM BLD-SCNC: 142 MMOL/L (ref 135–144)
TOTAL PROTEIN: 7.4 G/DL (ref 6.4–8.3)
TSH SERPL DL<=0.05 MIU/L-ACNC: 1.81 MIU/L (ref 0.3–5)
WBC # BLD: 7.6 K/UL (ref 3.5–11)
WBC # BLD: NORMAL 10*3/UL

## 2019-04-01 PROCEDURE — 36415 COLL VENOUS BLD VENIPUNCTURE: CPT

## 2019-04-01 PROCEDURE — 84443 ASSAY THYROID STIM HORMONE: CPT

## 2019-04-01 PROCEDURE — 80053 COMPREHEN METABOLIC PANEL: CPT

## 2019-04-01 PROCEDURE — 85025 COMPLETE CBC W/AUTO DIFF WBC: CPT

## 2019-04-09 ENCOUNTER — OFFICE VISIT (OUTPATIENT)
Dept: FAMILY MEDICINE CLINIC | Age: 64
End: 2019-04-09
Payer: COMMERCIAL

## 2019-04-09 ENCOUNTER — TELEPHONE (OUTPATIENT)
Dept: FAMILY MEDICINE CLINIC | Age: 64
End: 2019-04-09

## 2019-04-09 VITALS
HEART RATE: 70 BPM | BODY MASS INDEX: 36.79 KG/M2 | HEIGHT: 71 IN | DIASTOLIC BLOOD PRESSURE: 72 MMHG | SYSTOLIC BLOOD PRESSURE: 119 MMHG | WEIGHT: 262.8 LBS | OXYGEN SATURATION: 99 %

## 2019-04-09 DIAGNOSIS — M54.42 CHRONIC BILATERAL LOW BACK PAIN WITH BILATERAL SCIATICA: Primary | ICD-10-CM

## 2019-04-09 DIAGNOSIS — M79.2 PERIPHERAL NEUROPATHIC PAIN: ICD-10-CM

## 2019-04-09 DIAGNOSIS — R68.89 DIFFICULTY RISING FROM CHAIR: ICD-10-CM

## 2019-04-09 DIAGNOSIS — R26.89 POOR BALANCE: ICD-10-CM

## 2019-04-09 DIAGNOSIS — J44.9 COPD MIXED TYPE (HCC): ICD-10-CM

## 2019-04-09 DIAGNOSIS — G89.29 CHRONIC BILATERAL LOW BACK PAIN WITH BILATERAL SCIATICA: Primary | ICD-10-CM

## 2019-04-09 DIAGNOSIS — M54.41 CHRONIC BILATERAL LOW BACK PAIN WITH BILATERAL SCIATICA: Primary | ICD-10-CM

## 2019-04-09 DIAGNOSIS — Z23 NEED FOR PROPHYLACTIC VACCINATION AND INOCULATION AGAINST VARICELLA: ICD-10-CM

## 2019-04-09 DIAGNOSIS — Z98.890 STATUS POST LUMBAR LAMINECTOMY: ICD-10-CM

## 2019-04-09 DIAGNOSIS — E11.42 TYPE 2 DIABETES MELLITUS WITH DIABETIC POLYNEUROPATHY, WITHOUT LONG-TERM CURRENT USE OF INSULIN (HCC): ICD-10-CM

## 2019-04-09 DIAGNOSIS — F51.04 PSYCHOPHYSIOLOGICAL INSOMNIA: ICD-10-CM

## 2019-04-09 DIAGNOSIS — I10 ESSENTIAL HYPERTENSION: ICD-10-CM

## 2019-04-09 DIAGNOSIS — M21.371 FOOT DROP, RIGHT: ICD-10-CM

## 2019-04-09 PROBLEM — Z13.31 POSITIVE DEPRESSION SCREENING: Status: RESOLVED | Noted: 2018-01-03 | Resolved: 2019-04-09

## 2019-04-09 PROBLEM — R19.5 POSITIVE FIT (FECAL IMMUNOCHEMICAL TEST): Status: RESOLVED | Noted: 2017-04-11 | Resolved: 2019-04-09

## 2019-04-09 LAB — HBA1C MFR BLD: 5.5 %

## 2019-04-09 PROCEDURE — 83036 HEMOGLOBIN GLYCOSYLATED A1C: CPT | Performed by: FAMILY MEDICINE

## 2019-04-09 PROCEDURE — 99215 OFFICE O/P EST HI 40 MIN: CPT | Performed by: FAMILY MEDICINE

## 2019-04-09 RX ORDER — LISINOPRIL 20 MG/1
20 TABLET ORAL DAILY
Qty: 90 TABLET | Refills: 3 | Status: SHIPPED | OUTPATIENT
Start: 2019-04-09 | End: 2020-01-28 | Stop reason: SDUPTHER

## 2019-04-09 RX ORDER — TRAZODONE HYDROCHLORIDE 150 MG/1
150 TABLET ORAL NIGHTLY
Qty: 90 TABLET | Refills: 3 | Status: SHIPPED | OUTPATIENT
Start: 2019-04-09 | End: 2020-01-28 | Stop reason: ALTCHOICE

## 2019-04-09 RX ORDER — BUDESONIDE AND FORMOTEROL FUMARATE DIHYDRATE 160; 4.5 UG/1; UG/1
2 AEROSOL RESPIRATORY (INHALATION) 2 TIMES DAILY
Qty: 3 INHALER | Refills: 3 | Status: SHIPPED | OUTPATIENT
Start: 2019-04-09 | End: 2020-03-18 | Stop reason: SDUPTHER

## 2019-04-09 RX ORDER — LIDOCAINE 50 MG/G
1 PATCH TOPICAL DAILY
Qty: 90 PATCH | Refills: 0 | Status: SHIPPED | OUTPATIENT
Start: 2019-04-09 | End: 2019-07-08

## 2019-04-09 RX ORDER — PREGABALIN 150 MG/1
150 CAPSULE ORAL 3 TIMES DAILY
Qty: 270 CAPSULE | Refills: 0 | Status: SHIPPED | OUTPATIENT
Start: 2019-04-09 | End: 2020-01-28 | Stop reason: DRUGHIGH

## 2019-04-09 RX ORDER — HYDROCODONE BITARTRATE AND ACETAMINOPHEN 5; 325 MG/1; MG/1
1 TABLET ORAL EVERY 8 HOURS PRN
Qty: 21 TABLET | Refills: 0 | Status: SHIPPED | OUTPATIENT
Start: 2019-04-09 | End: 2019-09-24 | Stop reason: SDUPTHER

## 2019-04-09 ASSESSMENT — ENCOUNTER SYMPTOMS
ABDOMINAL PAIN: 0
CHEST TIGHTNESS: 0
CONSTIPATION: 0
SHORTNESS OF BREATH: 0
NAUSEA: 0
ABDOMINAL DISTENTION: 0
DIARRHEA: 0
VOMITING: 0
BACK PAIN: 1
COUGH: 0
APNEA: 1
WHEEZING: 0

## 2019-04-09 NOTE — PROGRESS NOTES
Chief Complaint   Patient presents with    Diabetes    Hypertension    Hyperlipidemia    Discuss Labs    Back Pain       Kris Specter  here today for follow up on chronic medical problems, go over labs and/or diagnostic studies, and medication refills. Diabetes; Hypertension; Hyperlipidemia; Discuss Labs; and Back Pain    Magy Al complains of flare up of his back pain. He says he pulled a muscle in the back and has pain bilateral lumbar. Sometimes the pain radiates to the legs  Onset after turning wrong. He says he gets muscle spasms and frequent flare ups of his back pain. Pain is worse with range of motion. .  When wakes up, his back is doing better. Had back surgery, lumbar laminectomy L2 L4, on 1/5/17  Did try to use a back brace that it makes his back pain worse. Has a Water bed which helps with his back pain. Intensity of pain is 10/10, on bilateral sides and sacroiliac  Patient had surgery by Dr. Rashawn Rosa,, I advised him to follow up with him    Patient has chronic Right foot drop since 1990s. Balance problems his balance is getting worse. He also has severe neuropathy in his legs. Ambulates with cane, didn't fall but walks \"like a turtle\". He does have a walker which he uses at times,    Patient reports difficulty getting up from regular chairs, severe neuropathy in his legs and his legs feel weak. He is asking for a lift chair. He says his insurance will cover  He never completed the physical therapy due to the death of his mother      Diabetes Mellitus Type II, Follow-up: Patient here for follow-up ofType 2 diabetes mellitus. Current symptoms/problems include hyperglycemia, increase appetite and paresthesia of the feet Symptoms have been present for several years. Feet feel pounding, and throbbing at nighttime. He also has numbness and tingling during the daytime.   Takes Lyrica TID and Folbee    Known diabetic complications: peripheral neuropathy and peripheral vascular disease  Cardiovascular risk factors: advanced age (older than 54 for men, 72 for women), diabetes mellitus, dyslipidemia, hypertension, male gender, obesity (BMI >= 30 kg/m2), sedentary lifestyle and smoking/ tobacco exposure    Medication compliance:  n/A  Current diabetic medications include none. Eye exam current (within one year): yes  Weight trend: increasing rapidly 14 lbs    Wt Readings from Last 3 Encounters:   04/09/19 262 lb 12.8 oz (119.2 kg)   01/29/19 248 lb 0.3 oz (112.5 kg)   01/08/19 248 lb (112.5 kg)         Wt Readings from Last 6 Encounters:   04/09/19 262 lb 12.8 oz (119.2 kg)   01/29/19 248 lb 0.3 oz (112.5 kg)   01/08/19 248 lb (112.5 kg)   01/03/19 252 lb (114.3 kg)   12/06/18 253 lb (114.8 kg)   11/07/18 241 lb 9.6 oz (109.6 kg)       Prior visit with dietician: yes   Current diet: in general, a \"healthy\" diet  , diabetic, low fat/ cholesterol, low salt   Patient says he has been cooking more since his shoulder is better    Current exercise: none  Barriers: impairment:  Chronic back pain, difficulty walking    Current monitoring regimen: home blood tests - daily  Home blood sugar records: fasting range: 127, 102-125  Any episodes of hypoglycemia? no    Is He on ACE inhibitor or angiotensin II receptor blocker? Yes      reports that he has been smoking cigarettes. He has a 45.00 pack-year smoking history. He has never used smokeless tobacco.     Ready to quit: No  Counseling given: Yes      BP Readings from Last 3 Encounters:   04/09/19 119/72   01/28/19 133/72   01/08/19 127/70       Daily Aspirin?  Yes      A1c worsening , but Well controlled     Urine microabumin is within normal limits   Lab Results   Component Value Date    LABA1C 5.5 04/09/2019    LABA1C 5.1 01/08/2019    LABA1C 5.3 09/04/2018     Lab Results   Component Value Date    LABMICR 5 08/27/2018    CREATININE 0.63 (L) 04/01/2019     Lab Results   Component Value Date    ALT 18 04/01/2019    AST 16 04/01/2019     Lab Results   Component Value Date    CHOL 173 08/27/2018    TRIG 137 08/27/2018    HDL 40 (L) 08/27/2018        Lab Results   Component Value Date    LDLCHOLESTEROL 106 08/27/2018         Hypertension: Patient here for follow-up of elevated blood pressure. He is not  exercising and is adherent to low salt diet. Blood pressure is well controlled at home. Cardiac symptoms  dyspnea and fatigue. Patient denies  chest pain, chest pressure/discomfort, claudication, exertional chest pressure/discomfort, irregular heart beat, lower extremity edema, near-syncope, orthopnea, palpitations, paroxysmal nocturnal dyspnea, syncope and tachypnea. Cardiovascular risk factors: advanced age (older than 54 for men, 72 for women), diabetes mellitus, dyslipidemia, hypertension, male gender, obesity (BMI >= 30 kg/m2), sedentary lifestyle and smoking/ tobacco exposure. Use of agents associated with hypertension: none. History of target organ damage: peripheral artery disease. Cardiac cath 2017 showed nonobstructive coronary artery disease  Patient has peripheral vascular disease, follows with Dr. Violet Thompson  He denies muscle cramps when he walks  He couldn't tolerate Pletal in the past and he started her on time ago    PVR on 5/11/17 showed right peripheral vascular disease, normal on the left side      BP controlled. Blanca Currie reports compliance with BP medications, and tolerates them well, denies side effects. BP Readings from Last 3 Encounters:   04/09/19 119/72   01/28/19 133/72   01/08/19 127/70        Pulse Readings from Last 3 Encounters:   04/09/19 70   01/28/19 65   01/08/19 65         Hyperlipidemia:  No GI upset on pravastatin (Pravachol). Medication compliance: compliant all of the time. Patient is  following a low fat, low cholesterol diet. He is not exercising regularly.      Lab Results   Component Value Date    CHOL 173 08/27/2018    TRIG 137 08/27/2018    HDL 40 (L) 08/27/2018     Lab Results   Component Value Date    ALT 18 intolerance, polydipsia and polyuria. Genitourinary: Negative for difficulty urinating. Musculoskeletal: Positive for arthralgias (left knee, left shoulder), back pain, gait problem (balance is poor) and myalgias (back). Ambulate with cane at all times   Skin: Negative for rash. Allergic/Immunologic: Positive for immunocompromised state. Neurological: Positive for numbness (feet). Psychiatric/Behavioral: Positive for sleep disturbance. Negative for dysphoric mood, self-injury and suicidal ideas. The patient is nervous/anxious.          -vital signs stable and within normal limits except Obesity per BMI. /72   Pulse 70   Ht 5' 11\" (1.803 m)   Wt 262 lb 12.8 oz (119.2 kg)   SpO2 99%   BMI 36.65 kg/m²         Physical Exam   Constitutional: He is oriented to person, place, and time. He appears well-developed and well-nourished. No distress. HENT:   Head: Normocephalic and atraumatic. Right Ear: External ear normal.   Left Ear: External ear normal.   Nose: Nose normal.   Mouth/Throat: Oropharynx is clear and moist. No oropharyngeal exudate. Eyes: Conjunctivae and EOM are normal. Right eye exhibits no discharge. Left eye exhibits no discharge. No scleral icterus. Neck: Normal range of motion. Neck supple. No thyromegaly present. Cardiovascular: Normal rate, regular rhythm, normal heart sounds and intact distal pulses. No murmur heard. Pulses:       Dorsalis pedis pulses are 2+ on the right side, and 2+ on the left side. Posterior tibial pulses are 2+ on the right side, and 2+ on the left side. Pulmonary/Chest: Effort normal and breath sounds normal. No respiratory distress. He has no wheezes. He has no rales. He exhibits no tenderness. Abdominal: Soft. Bowel sounds are normal. He exhibits no distension. There is no hepatosplenomegaly. There is no tenderness. Obese abdomen. Musculoskeletal: He exhibits tenderness. He exhibits no edema.         Left shoulder: He exhibits tenderness, pain and spasm. Left knee: He exhibits decreased range of motion. Tenderness found. Patellar tendon tenderness noted. Right foot: There is decreased range of motion and deformity. Left foot: There is decreased range of motion and deformity. coarse crepitus in the left knee  Right leg foot drop. Walks with wide base. Ambulates with cane. Up and go test more than 12 seconds. High risk for falls. Feet:   Right Foot:   Protective Sensation: 5 sites tested. 0 sites sensed. Skin Integrity: Positive for callus and dry skin (wart ). Negative for ulcer, blister, skin breakdown, erythema or warmth. Left Foot:   Protective Sensation: 5 sites tested. 2 sites sensed. Skin Integrity: Positive for callus and dry skin. Negative for ulcer, blister, skin breakdown, erythema or warmth. Neurological: He is alert and oriented to person, place, and time. A sensory deficit is present. No cranial nerve deficit. He exhibits abnormal muscle tone. Gait abnormal. Coordination normal.   Right leg foot drop. Walks with wide base. Ambulates with cane. Up and go test more than 12 seconds. High risk for falls. Skin: Skin is warm and dry. Capillary refill takes less than 2 seconds. No rash noted. He is not diaphoretic. Psychiatric: His behavior is normal. Judgment and thought content normal. His mood appears anxious. Nursing note and vitals reviewed. Discussed testing withthe patient and all questions fully answered. I personally reviewed testing with patient.   Hyperglycemia controlled  hyperlipidemia worsening        Otherwise labs within normal limits    Hospital Outpatient Visit on 04/01/2019   Component Date Value Ref Range Status    WBC 04/01/2019 7.6  3.5 - 11.0 k/uL Final    RBC 04/01/2019 4.87  4.5 - 5.9 m/uL Final    Hemoglobin 04/01/2019 14.7  13.5 - 17.5 g/dL Final    Hematocrit 04/01/2019 44.6  41 - 53 % Final    MCV 04/01/2019 91.5  80 - 100 fL Final    MCH 04/01/2019 30.1  26 - 34 pg Final    MCHC 04/01/2019 32.9  31 - 37 g/dL Final    RDW 04/01/2019 13.3  11.5 - 14.9 % Final    Platelets 49/19/8943 235  150 - 450 k/uL Final    MPV 04/01/2019 9.2  6.0 - 12.0 fL Final    NRBC Automated 04/01/2019 NOT REPORTED  per 100 WBC Final    Differential Type 04/01/2019 NOT REPORTED   Final    Seg Neutrophils 04/01/2019 63  36 - 66 % Final    Lymphocytes 04/01/2019 26  24 - 44 % Final    Monocytes 04/01/2019 7  1 - 7 % Final    Eosinophils % 04/01/2019 3  0 - 4 % Final    Basophils 04/01/2019 1  0 - 2 % Final    Immature Granulocytes 04/01/2019 NOT REPORTED  0 % Final    Segs Absolute 04/01/2019 4.90  1.3 - 9.1 k/uL Final    Absolute Lymph # 04/01/2019 1.90  1.0 - 4.8 k/uL Final    Absolute Mono # 04/01/2019 0.50  0.1 - 1.3 k/uL Final    Absolute Eos # 04/01/2019 0.20  0.0 - 0.4 k/uL Final    Basophils # 04/01/2019 0.10  0.0 - 0.2 k/uL Final    Absolute Immature Granulocyte 04/01/2019 NOT REPORTED  0.00 - 0.30 k/uL Final    WBC Morphology 04/01/2019 NOT REPORTED   Final    RBC Morphology 04/01/2019 NOT REPORTED   Final    Platelet Estimate 11/71/5633 NOT REPORTED   Final    Glucose 04/01/2019 97  70 - 99 mg/dL Final    BUN 04/01/2019 13  8 - 23 mg/dL Final    CREATININE 04/01/2019 0.63* 0.70 - 1.20 mg/dL Final    Bun/Cre Ratio 04/01/2019 NOT REPORTED  9 - 20 Final    Calcium 04/01/2019 10.1  8.6 - 10.4 mg/dL Final    Sodium 04/01/2019 142  135 - 144 mmol/L Final    Potassium 04/01/2019 4.3  3.7 - 5.3 mmol/L Final    Chloride 04/01/2019 104  98 - 107 mmol/L Final    CO2 04/01/2019 25  20 - 31 mmol/L Final    Anion Gap 04/01/2019 13  9 - 17 mmol/L Final    Alkaline Phosphatase 04/01/2019 117  40 - 129 U/L Final    ALT 04/01/2019 18  5 - 41 U/L Final    AST 04/01/2019 16  <40 U/L Final    Total Bilirubin 04/01/2019 0.19* 0.3 - 1.2 mg/dL Final    Total Protein 04/01/2019 7.4  6.4 - 8.3 g/dL Final    Alb 04/01/2019 4.3  3.5 - 5.2 g/dL Final    Albumin/Globulin Ratio 04/01/2019 NOT REPORTED  1.0 - 2.5 Final    GFR Non- 04/01/2019 >60  >60 mL/min Final    GFR  04/01/2019 >60  >60 mL/min Final    GFR Comment 04/01/2019        Final    Comment: Average GFR for 61-76 years old:   80 mL/min/1.73sq m  Chronic Kidney Disease:   <60 mL/min/1.73sq m  Kidney failure:   <15 mL/min/1.73sq m              eGFR calculated using average adult body mass. Additional eGFR calculator available at:        [email or web address]            GFR Staging 04/01/2019 NOT REPORTED   Final    TSH 04/01/2019 1.81  0.30 - 5.00 mIU/L Final                    Lab Results   Component Value Date    TSH 1.81 04/01/2019       Lab Results   Component Value Date    CHOL 173 08/27/2018    CHOL 132 06/26/2017    CHOL 153 02/14/2017     Lab Results   Component Value Date    TRIG 137 08/27/2018    TRIG 263 (H) 06/26/2017    TRIG 336 (H) 02/14/2017     Lab Results   Component Value Date    HDL 40 (L) 08/27/2018    HDL 32 (L) 06/26/2017    HDL 32 (L) 02/14/2017     Lab Results   Component Value Date    LDLCHOLESTEROL 106 08/27/2018    LDLCHOLESTEROL 47 06/26/2017    LDLCHOLESTEROL 54 02/14/2017       Lab Results   Component Value Date    CHOLHDLRATIO 4.3 08/27/2018    CHOLHDLRATIO 4.1 06/26/2017    CHOLHDLRATIO 4.8 02/14/2017         Lab Results   Component Value Date    GYYSGFRV40 1335 (H) 11/07/2018     Lab Results   Component Value Date    FOLATE >20.0 11/07/2018     Lab Results   Component Value Date    VITD25 56.1 01/02/2019           ASSESSMENT AND PLAN  1. Chronic bilateral low back pain with bilateral sciatica  Inadequately controlled    -start Camphor-Menthol-Methyl Sal 3.1-16-10 % GEL; Apply every 8 hours as needed for pain. OK to substitute  Dispense: 1 Tube; Refill: 1  - [facility]  - Dose increased  pregabalin (LYRICA) 150 MG capsule; Take 1 capsule by mouth 3 times daily for 90 days.   Dispense: 270 capsule; Refill: 0  - Lift Chair MISC; by Does not apply route Patient has difficulty getting up from usual chair  Dispense: 1 each; Refill: 0  -short term HYDROcodone-acetaminophen (NORCO) 5-325 MG per tablet; Take 1 tablet by mouth every 8 hours as needed for Pain for up to 7 days. Take lowest dose possible to manage pain  Dispense: 21 tablet; Refill: 0    Falls precautions discussed  follow up with neurosurgeon    2. Type 2 diabetes mellitus with diabetic polyneuropathy, without long-term current use of insulin ([facility])  Worsening, but still Well controlled      -  DIABETES FOOT EXAM  - POCT glycosylated hemoglobin (Hb A1C)  Lab Results   Component Value Date    LABA1C 5.5 04/09/2019    LABA1C 5.1 01/08/2019    LABA1C 5.3 09/04/2018       -Dose increased:  pregabalin (LYRICA) 150 MG capsule; Take 1 capsule by mouth 3 times daily for 90 days. Dispense: 270 capsule; Refill: 0  - Basic Metabolic Panel; Future  -start lidocaine (LIDODERM) 5 %; Place 1 patch onto the skin daily 12 hours on, 12 hours off. Dispense: 90 patch; Refill: 0  - Lift Chair MISC; by Does not apply route Patient has difficulty getting up from usual chair  Dispense: 1 each; Refill: 0    -advised home blood glucose testing daily  -daily feet exam, Foot care: advised to wash feet daily, pat dry and apply lotion at night, avoiding between toes. Need to look at feet daily and report to a physician any signs of inflammation or skin damage  -annual dilated eye exam  -Low carb, low fat diet, increase fruits and vegetables, and exercise 4-5 times a day 30-40 minutes a day discussed  -continue current treatment  -continue Aspirin  -continue ACEI and statin      3. Essential hypertension  Well controlled  Discussed low salt diet and BP and pulse monitoring daily, BP log given  Continue current treatment. - lisinopril (PRINIVIL;ZESTRIL) 20 MG tablet; Take 1 tablet by mouth daily  Dispense: 90 tablet; Refill: 3    4.  COPD mixed type ([address]  stable  - SYMBICORT 160-4.5 MCG/ACT AERO; Inhale 2 puffs into the lungs 2 times daily 10.2 inhaler  Dispense: 3 Inhaler; Refill: 3  Will request PFTs from Dr. Kerline Kovacs  Smoking cessation counseling given. 5. Difficulty rising from chair  - [address]; by Does not apply route Patient has difficulty getting up from usual chair  Dispense: 1 each; Refill: 0  It is medically necessary for him to have a lift chair to be able to move around and get up from regular chairs, to prevent falls    6. Status post lumbar laminectomy  follow up with neurosurgeon   - Camphor-Menthol-Methyl Sal 3.1-16-10 % GEL; Apply every 8 hours as needed for pain. OK to substitute  Dispense: 1 Tube; Refill: 1  - [address]; by Does not apply route Patient has difficulty getting up from usual chair  Dispense: 1 each; Refill: 0    7. Poor balance  worsening    - Camphor-Menthol-Methyl Sal 3.1-16-10 % GEL; Apply every 8 hours as needed for pain. OK to substitute  Dispense: 1 Tube; Refill: 1  - [address]; by Does not apply route Patient has difficulty getting up from usual chair  Dispense: 1 each; Refill: 0    8. Foot drop, right  worsening    - Camphor-Menthol-Methyl Sal 3.1-16-10 % GEL; Apply every 8 hours as needed for pain. OK to substitute  Dispense: 1 Tube; Refill: 1  - [address]; by Does not apply route Patient has difficulty getting up from usual chair  Dispense: 1 each; Refill: 0    9. Peripheral neuropathic pain  worsening    -Dose increased   pregabalin (LYRICA) 150 MG capsule; Take 1 capsule by mouth 3 times daily for 90 days. Dispense: 270 capsule; Refill: 0  - Lift Chair MISC; by Does not apply route Patient has difficulty getting up from usual chair  Dispense: 1 each; Refill: 0    10. Psychophysiological insomnia  Inadequately controlled  Increase Lyrica   - traZODone (DESYREL) 150 MG tablet;  Take 1 tablet by mouth nightly  Dispense: 90 tablet; Refill: 3    11. Need for prophylactic vaccination and inoculation against varicella  - zoster recombinant adjuvanted vaccine [facility]) 50 MCG/0.5ML SUSR injection; Inject 0.5 mLs into the muscle once for 1 dose 50 MCG IM then repeat 2-6 months. Dispense: 1 each; Refill: 1      Data Unavailable     Controlled Substances Monitoring:     RX Monitoring 4/9/2019   Attestation The Prescription Monitoring Report for this patient was reviewed today. Chronic Pain Routine Monitoring Possible medication side effects, risk of tolerance/dependence & alternative treatments discussed. ;No signs of potential drug abuse or diversion identified: otherwise, see note documentation   Chronic Pain > 50 MEDD Obtained or confirmened \"Consent of Opioid Use\" on file. Chronic Pain > 80 MEDD -         Orders Placed This Encounter   Procedures    Basic Metabolic Panel     Standing Status:   Future     Standing Expiration Date:   4/8/2020   [facility] [address]     Referral Priority:   Routine     Referral Type:   Eval and Treat     Referral Reason:   Specialty Services Required     Requested Specialty:   Physical Therapy     Number of Visits Requested:   1    POCT glycosylated hemoglobin (Hb A1C)     DIABETES FOOT EXAM       Medications Discontinued During This Encounter   Medication Reason    pregabalin (LYRICA) 100 MG capsule REORDER    SYMBICORT 160-4.5 MCG/ACT AERO REORDER    lisinopril (PRINIVIL;ZESTRIL) 20 MG tablet Jean Carlos Fierro received counseling on the following healthy behaviors: nutrition, exercise, medication adherence, tobacco cessation and weight loss   Reviewed prior labs and health maintenance  Continue current medications, diet and exercise. Discussed use, benefit, and side effects of prescribed medications. Barriers to medication compliance addressed.    Patient given educational materials - see patient instructions  Was a self-tracking handout given in paper form or via [employer]? Yes    Requested Prescriptions     Signed Prescriptions Disp Refills    zoster recombinant adjuvanted vaccine (SHINGRIX) 50 MCG/0.5ML SUSR injection 1 each 1     Sig: Inject 0.5 mLs into the muscle once for 1 dose 50 MCG IM then repeat 2-6 months.  Camphor-Menthol-Methyl Sal 3.1-16-10 % GEL 1 Tube 1     Sig: Apply every 8 hours as needed for pain. OK to substitute    pregabalin (LYRICA) 150 MG capsule 270 capsule 0     Sig: Take 1 capsule by mouth 3 times daily for 90 days.  SYMBICORT 160-4.5 MCG/ACT AERO 3 Inhaler 3     Sig: Inhale 2 puffs into the lungs 2 times daily 10.2 inhaler    lidocaine (LIDODERM) 5 % 90 patch 0     Sig: Place 1 patch onto the skin daily 12 hours on, 12 hours off.  Lift Chair MISC 1 each 0     Sig: by Does not apply route Patient has difficulty getting up from usual chair    lisinopril (PRINIVIL;ZESTRIL) 20 MG tablet 90 tablet 3     Sig: Take 1 tablet by mouth daily    traZODone (DESYREL) 150 MG tablet 90 tablet 3     Sig: Take 1 tablet by mouth nightly    HYDROcodone-acetaminophen (NORCO) 5-325 MG per tablet 21 tablet 0     Sig: Take 1 tablet by mouth every 8 hours as needed for Pain for up to 7 days. Take lowest dose possible to manage pain       All patient questions answered. Patient voiced understanding. Quality Measures    Body mass index is 36.65 kg/m². Elevated. Weight control planned discussed conventional weight loss and Healthy diet and regular exercise. BP: 119/72 Blood pressure is normal. Treatment plan consists of Weight Reduction, DASH Eating Plan, Dietary Sodium Restriction, Increased Physical Activity, Avoid Tobacco and Second-hand Smoke, Patient In-home Blood Pressure Monitoring and No treatment change needed.     Lab Results   Component Value Date    LDLCHOLESTEROL 106 08/27/2018    (goal LDL reduction with dx if diabetes is 50% LDL reduction)      PHQ Scores 1/8/2019 9/4/2018 3/1/2018 1/2/2018 5/4/2017 3/23/2017

## 2019-04-09 NOTE — PATIENT INSTRUCTIONS
well between your toes. If the skin on your feet stays moist, bacteria or a fungus can grow, which can lead to infection. ? Keep your skin soft. Use moisturizing skin cream to keep the skin on your feet soft and prevent calluses and cracks. But do not put the cream between your toes, and stop using any cream that causes a rash. ? Clean underneath your toenails carefully. Do not use a sharp object to clean underneath your toenails. Use the blunt end of a nail file or other rounded tool. ? Trim and file your toenails straight across to prevent ingrown toenails. Use a nail clipper, not scissors. Use an emery board to smooth the edges. · Change socks daily. Socks without seams are best, because seams often rub the feet. You can find socks for people with diabetes from specialty catalogs. · Look inside your shoes every day for things like gravel or torn linings, which could cause blisters or sores. · Buy shoes that fit well:  ? Look for shoes that have plenty of space around the toes. This helps prevent bunions and blisters. ? Try on shoes while wearing the kind of socks you will usually wear with the shoes. ? Avoid plastic shoes. They may rub your feet and cause blisters. Good shoes should be made of materials that are flexible and breathable, such as leather or cloth. ? Break in new shoes slowly by wearing them for no more than an hour a day for several days. Take extra time to check your feet for red areas, blisters, or other problems after you wear new shoes. · Do not go barefoot. Do not wear sandals, and do not wear shoes with very thin soles. Thin soles are easy to puncture. They also do not protect your feet from hot pavement or cold weather. · Have your doctor check your feet during each visit. If you have a foot problem, see your doctor. Do not try to treat an early foot problem at home.  Home remedies or treatments that you can buy without a prescription (such as corn removers) can be harmful. · Always get early treatment for foot problems. A minor irritation can lead to a major problem if not properly cared for early. When should you call for help? Call your doctor now or seek immediate medical care if:    · You have a foot sore, an ulcer or break in the skin that is not healing after 4 days, bleeding corns or calluses, or an ingrown toenail.     · You have blue or black areas, which can mean bruising or blood flow problems.     · You have peeling skin or tiny blisters between your toes or cracking or oozing of the skin.     · You have a fever for more than 24 hours and a foot sore.     · You have new numbness or tingling in your feet that does not go away after you move your feet or change positions.     · You have unexplained or unusual swelling of the foot or ankle.    Watch closely for changes in your health, and be sure to contact your doctor if:    · You cannot do proper foot care. Where can you learn more? Go to https://[employer]. org and sign in to your [employer] account. Enter A739 in the Aubrey box to learn more about \"Diabetes Foot Health: Care Instructions. \"     If you do not have an account, please click on the \"Sign Up Now\" link. Current as of: July 25, 2018  Content Version: 11.9  © 0078-5049 [employer]. Care instructions adapted under license by [facility] [place] ([facility]). If you have questions about a medical condition or this instruction, always ask your healthcare professional. [place] [address] any warranty or liability for your use of this information.            Lab Results   Component Value Date    WBC 7.6 04/01/2019    HGB 14.7 04/01/2019    HCT 44.6 04/01/2019    MCV 91.5 04/01/2019     04/01/2019       Lab Results   Component Value Date     04/01/2019    K 4.3 04/01/2019     04/01/2019    CO2 25 04/01/2019    BUN 13 04/01/2019    CREATININE 0.63 04/01/2019    GLUCOSE 97 04/01/2019    CALCIUM 10.1 04/01/2019        Lab Results   Component Value Date    ALT 18 04/01/2019    AST 16 04/01/2019    ALKPHOS 117 04/01/2019    BILITOT 0.19 (L) 04/01/2019       Lab Results   Component Value Date    TSH 1.81 04/01/2019       Lab Results   Component Value Date    CHOL 173 08/27/2018    CHOL 132 06/26/2017    CHOL 153 02/14/2017     Lab Results   Component Value Date    TRIG 137 08/27/2018    TRIG 263 (H) 06/26/2017    TRIG 336 (H) 02/14/2017     Lab Results   Component Value Date    HDL 40 (L) 08/27/2018    HDL 32 (L) 06/26/2017    HDL 32 (L) 02/14/2017     Lab Results   Component Value Date    LDLCHOLESTEROL 106 08/27/2018    LDLCHOLESTEROL 47 06/26/2017    LDLCHOLESTEROL 54 02/14/2017     Lab Results   Component Value Date    VLDL NOT REPORTED 08/27/2018    VLDL NOT REPORTED 06/26/2017    VLDL NOT REPORTED 02/14/2017     Lab Results   Component Value Date    CHOLHDLRATIO 4.3 08/27/2018    CHOLHDLRATIO 4.1 06/26/2017    CHOLHDLRATIO 4.8 02/14/2017       Lab Results   Component Value Date    LABA1C 5.5 04/09/2019       Lab Results   Component Value Date    WFCRPTTP85 1335 (H) 11/07/2018       Lab Results   Component Value Date    FOLATE >20.0 11/07/2018       Lab Results   Component Value Date    IRON 74 11/07/2018    TIBC 334 11/07/2018       Lab Results   Component Value Date    VITD25 56.1 01/02/2019

## 2019-04-09 NOTE — LETTER
MEDICATION AGREEMENT     Alaina Lauraet  5/76/1937      For certain conditions, multiple classes of medications may be used to help better manage your symptoms, and to improve your ability to function at home, work and in social settings. However, these medications do have risks, which will be discussed with you, including addiction and dependency. The following prescribed medications need frequent monitoring and will require you to partner and assist in your healthcare. Medication  Dose, instructions and quantity as indicated on current prescription bottle Diagnosis/Reason(s) for Taking Category   Orders Placed This Encounter   Medications    pregabalin (LYRICA) 150 MG capsule     Sig: Take 1 capsule by mouth 3 times daily for 90 days. Dispense:  270 capsule     Refill:  0         Peripheral neuropathic pain   Type 2 diabetes mellitus with diabetic polyneuropathy, without long-term current use of insulin (HCC)  Chronic bilateral low back pain with bilateral sciatica   OLIVERIO analog           f                 Benefits and goals of Controlled Substance Medications: There are two potential goals for your treatment: (1) decreased pain and suffering (2) improved daily life functions. There are many possible treatments for your chronic condition(s), and, in addition to controlled substance medications, we will try alternatives such as physical therapy, yoga, massage, home daily exercise, meditation, relaxation techniques, injections, chiropractic manipulations, surgery, cognitive therapy, hypnosis and many medications that are not habit-forming. Use of controlled substance medications may be helpful, but they are unlikely to resolve all of your symptoms or restore all function. Risks of Controlled Substance Medications:    Opioid pain medications:   These medications can lead to problems such as addiction/dependence, sedation, lightheadedness/dizziness, memory issues, falls, constipation, nausea, or vomiting. They may also impair the ability to drive or operate machinery. Additionally, these medications may lower testosterone levels, leading to loss of bone strength, stamina and sex drive. They may cause problems with breathing, sleep apnea and reduced coughing, which are especially dangerous for patients with lung disease. Overdose or dangerous interactions with alcohol and other medications may occur, leading to death. Hyperalgesia may develop, in which patients receiving opioids for the treatment of pain may actually become more sensitive to certain painful stimuli, and in some cases, experience pain from ordinarily non-painful stimuli. Women between the ages of 14-53 who could become pregnant should carefully weigh the risks and benefits of opioids with their physicians, as these medications increase the risk of pregnancy complications, including miscarriage,  delivery and stillbirth. It is also possible for babies to be born addicted to opioids. Opioid dependence withdrawal symptoms may include; feelings of uneasiness, increased pain, irritability, belly pain, diarrhea, sweats and goose-flesh. Benzodiazepines and non-benzodiazepine sleep medications: These medications can lead to problems such as addiction/dependence, sedation, fatigue, lightheadedness, dizziness, incoordination, falls, depression, hallucinations, and impaired judgment, memory and concentration. The ability to drive and operate machinery may also be affected. Abnormal sleep-related behaviors have been reported, including sleep walking, driving, making telephone calls, eating, or having sex while not fully awake. These medications can suppress breathing and worsen sleep apnea, particularly when combined with alcohol or other sedating medications, potentially leading to death. Dependence withdrawal symptoms may include tremors, anxiety, hallucinations and seizures. Stimulants:  Common adverse effects include addiction/dependence, increased blood pressure and heart rate, decreased appetite, nausea, involuntary weight loss, insomnia, irritability, and headaches. These risks may increase when these medications are combined with other stimulants, such as caffeine pills or energy drinks, certain weight loss supplements and oral decongestants. Dependence withdrawal symptoms may include depressed mood, loss of interest, suicidal thoughts, anxiety, fatigue, appetite changes and agitation. Testosterone replacement therapy:  Potential side effects include increased risk of stroke and heart attack, blood clots, increased blood pressure, increased cholesterol, enlarged prostate, sleep apnea, irritability/aggression and other mood disorders, and decreased fertility. Other:     1. I understand that I have the following responsibilities:  · I will take medications at the dose and frequency prescribed. · I will not increase or change how I take my medications without the approval of the health care provider who signs this Medication Agreement. · I will arrange for refills at the prescribed interval ONLY during regular office hours. I will not ask for refills earlier than agreed, after-hours, on holidays or on weekends. · I will obtain all refills for these medications at  ·  ____________________________________  pharmacy (phone number  ·  ________________________), with full consent for my provider and pharmacist to exchange information in writing or verbally. · I will not request any pain medications or controlled substances from other providers and will inform this provider of all other medications I am taking. · I will inform my other health care providers that I am taking these medications and of the existence of this [place] [address]. In the event of an emergency, I will provide the same information to the emergency department providers. · I will protect my prescriptions and medications. I understand that lost or misplaced prescriptions will not be replaced. · I will keep medications only for my own use and will not share them with others. I will keep all medications away from children. · I agree to participate in any medical, psychological or psychiatric assessments recommended by my provider. · I will actively participate in any program designed to improve function, including social, physical, psychological and daily or work activities. 2. I will not use illegal or street drugs or another person's prescription. If I have an addiction problem with drugs or alcohol and my provider asks me to enter a program to address this issue, I agree to follow through. Such programs may include:  · 12-Step program and securing a sponsor  · Individual counseling   · Inpatient or outpatient treatment  · Other:_____________________________________________________________________________________________________________________________________________    If in treatment, I will request that a copy of the programs initial evaluation and treatment recommendations be sent to this provider and will not expect refills until that is received. I will also request written monthly updates be sent to this provider to verify my continuing treatment. 3. I will consent to drug screening upon my providers request to assure I am only taking the prescribed drugs, described in this MEDICATION AGREEMENT. I understand that a drug screen is a laboratory test in which a sample of my urine, blood or saliva is checked to see what drugs I have been taking. 4. I agree that I will treat the providers and staff at this office with respect at all times. I will keep all of my scheduled appointments, but if I need to cancel my appointment, I will do so a minimum of 24 hours before it is scheduled.       5. I understand that this provider may stop prescribing the medications

## 2019-04-09 NOTE — RESULT ENCOUNTER NOTE
Addressed during office visit today, A1c 5.5, worsening diabetes but still well controlled  Continue current treatment discussed during visit

## 2019-04-15 ENCOUNTER — HOSPITAL ENCOUNTER (OUTPATIENT)
Dept: PHYSICAL THERAPY | Age: 64
Setting detail: THERAPIES SERIES
Discharge: HOME | End: 2019-04-15
Payer: COMMERCIAL

## 2019-04-15 PROCEDURE — 97162 PT EVAL MOD COMPLEX 30 MIN: CPT

## 2019-04-15 PROCEDURE — 97110 THERAPEUTIC EXERCISES: CPT

## 2019-04-15 NOTE — FLOWSHEET NOTE
Bo Fall Risk Assessment    Patient Name:  Mack Craig  : 1955        Risk Factor Scale  Score   History of Falls [] Yes  [x] No 25  0    Secondary Diagnosis [] Yes  [] No 15  0    Ambulatory Aid [] Furniture  [x] Crutches/cane/walker  [] None/bedrest/wheelchair/nurse 30  15  0    IV/Heparin Lock [] Yes  [x] No 20  0    Gait/Transferring [] Impaired  [] Weak  [x] Normal/bedrest/immobile 20  10  0    Mental Status [] Forgets limitations  [x] Oriented to own ability 15  0       Total: 15     Based on the Assessment score: check the appropriate box.     []  No intervention needed   Low =   Score of 0-24    [x]  Use standard prevention interventions Moderate =  Score of 24-44   [x] Give patient handout and discuss fall prevention strategies   [x] Establish goal of education for patient/family RE: fall prevention strategies    []  Use high risk prevention interventions High = Score of 45 and higher   [] Give patient handout and discuss fall prevention strategies   [] Establish goal of education for patient/family Re: fall prevention strategies   [] Discuss lifeline / other resources    Electronically signed by:   Terrell Jang PT  Date: 4/15/2019

## 2019-04-15 NOTE — CONSULTS
[address] [facility]  [address] [facility]         Phone: [phone]       Fax: [phone]     Physical Therapy Spine Evaluation    Date:  4/15/2019  Patient: Sebastian Elizabeth  : 1955  MRN: [id number]  Physician: Luigi Vallejo MD   Insurance:  [facility]               Eligibility Status:  Eligible        DOS: 4/15/19  # of visits allowed/remainin / 61 combined with OT and Speech. HARD MAX. Source: Website  Spoke To: [employer]  Reference: [id number]  Auth: NPRe     Electronically signed by Willard Brenner on 4/15/19 at 11:28 AM     Medical Diagnosis:   M54.42, M54.41, G89.29 (ICD-10-CM) - Chronic bilateral low back pain with bilateral sciatica   Z98.890 (ICD-10-CM) - Status post lumbar laminectomy   R26.89 (ICD-10-CM) - Poor balance   M21.371 (ICD-10-CM) - Foot drop, right     Rehab Codes: M62.81, M54.5, R26.81  Onset Date: 16  Next 's appt. : tbd    Subjective:   CC:  HPI: (16  Bernardino complains of flare up of his back pain. He says he pulled a muscle in the back and has pain bilateral lumbar. Sometimes the pain radiates to the legs  Onset after turning wrong. He says he gets muscle spasms and frequent flare ups of his back pain. Pain is worse with range of motion. .  When wakes up, his back is doing better. Had back surgery, lumbar laminectomy L2 L4, on 17  Did try to use a back brace that it makes his back pain worse.     Has a Water bed which helps with his back pain. Intensity of pain is 10/10, on bilateral sides and sacroiliac  Patient had surgery by Dr. Vita Sheldon,, I advised him to follow up with him     Patient has chronic Right foot drop since . Balance problems his balance is getting worse. He also has severe neuropathy in his legs. Ambulates with cane, didn't fall but walks \"like a turtle\".   He does have a walker which he uses at times,     Patient reports difficulty getting up from regular chairs, severe neuropathy in Yes:  Domestic Concerns:  [x] No  [] Yes:    Pt. Education:  [x] Plans/Goals, Risks/Benefits discussed  [x] Home exercise program  Method of Education: [x] Verbal  [x] Demo  [x] Written  Comprehension of Education:  [x] Verbalizes understanding. [x] Demonstrates understanding. [x] Needs Review. [] Demonstrates/verbalizes understanding of HEP/Ed previously given. Treatment Plan:  [x] Therapeutic Exercise    [x] Modalities:  [] Therapeutic Activity    [] Ultrasound  [x] Electrical Stimulation  [x] Gait Training     []Massage       [] Lumbar/Cervical Traction  [x] Neuromuscular Re-education [x] Cold/hotpack [] Iontophoresis: 4 mg/mL  [x] Instruction in HEP             Dexamethasone Sodium  [x] Manual Therapy             Phosphate 40-80 mAmin  [x] Aquatic Therapy   [] Other:    []  Medication allergies reviewed for use of    Dexamethasone Sodium Phosphate 4mg/ml     with iontophoresis treatments. Pt is not allergic. Frequency:  2 x/week for 12 visits    Todays Treatment:  Modalities:   Precautions:  Exercises:  Exercise Reps/ Time Weight/ Level Comments   SKTC   HEP   Hooklie rotation   HEP   Piriformis stretch   HEP               Other: HEP issued with HO, instruction, RD, copy in softchart    Specific Instructions for next treatment:  Flexion bias, LE/core strength and flexibility, pain modulation    Treatment Charges: Mins Units   [x] Evaluation       []  Low       [x]  Moderate       []  High 40 1   []  Modalities     [x]  Ther Exercise 15 1   []  Manual Therapy     []  Ther Activities     []  Aquatics     []  Neuromuscular     []  Other       TOTAL TREATMENT TIME: 55    Time in: 1800      Time out:1855    Electronically signed by: Rody Justice PT        Physician Signature:________________________________Date:__________________  By signing above or cosigning this note, I have reviewed this plan of care and certify a need for medically necessary rehabilitation services.      *PLEASE SIGN ABOVE AND FAX BACK ALL PAGES*

## 2019-04-23 ENCOUNTER — HOSPITAL ENCOUNTER (OUTPATIENT)
Dept: PHYSICAL THERAPY | Age: 64
Setting detail: THERAPIES SERIES
Discharge: HOME | End: 2019-04-23
Payer: COMMERCIAL

## 2019-04-23 ENCOUNTER — HOSPITAL ENCOUNTER (OUTPATIENT)
Age: 64
Setting detail: SPECIMEN
Discharge: HOME | End: 2019-04-23
Payer: COMMERCIAL

## 2019-04-23 DIAGNOSIS — C61 PROSTATE CANCER (HCC): ICD-10-CM

## 2019-04-23 LAB — PROSTATE SPECIFIC ANTIGEN: <0.01 UG/L

## 2019-04-23 PROCEDURE — 97110 THERAPEUTIC EXERCISES: CPT

## 2019-04-23 PROCEDURE — 84153 ASSAY OF PSA TOTAL: CPT

## 2019-04-23 PROCEDURE — G0283 ELEC STIM OTHER THAN WOUND: HCPCS

## 2019-04-23 PROCEDURE — 36415 COLL VENOUS BLD VENIPUNCTURE: CPT

## 2019-04-23 NOTE — PROGRESS NOTES
[address] [facility]   [address] [facility] [address]   Phone: [phone]   Fax: [phone]    Physical Therapy Daily Treatment Note      Date:  2019  Patient Name:  Krishna Madden    :  1955  MRN: [id number]  Physician: Carlo Pham MD                             Insurance:  [facility]               Eligibility Status: Chay Hui     DOS: 4/15/19  # of visits allowed/remainin / 61 combined with OT and Speech. HARD MAX. Source: Website  Spoke To: Definition 6  Reference: [id number]  Auth: NPRe     Electronically signed by [facility] on 4/15/19 at 11:28 AM     Medical Diagnosis:   M54.42, M54.41, G89.29 (ICD-10-CM) - Chronic bilateral low back pain with bilateral sciatica   Z98.890 (ICD-10-CM) - Status post lumbar laminectomy   R26.89 (ICD-10-CM) - Poor balance   M21.371 (ICD-10-CM) - Foot drop, right      Rehab Codes: M62.81, M54.5, R26.81  Onset Date: 16                 Next 's appt. : tbd        Visit# / total visits:  Cancels/No Shows: 0/0    Subjective:  Patient arrived stating he cannot complete HEP any longer due to unable to lay flat on back. Reports he feels as though he \"pulled\" something even worse from getting up off floor and to complete HEP. Upon arrival patient immediately stated \" I will not be doing exercise today, I should've canceled. \"   Pain:  [x] Yes  [] No Location: Low back R>L Pain Rating: (0-10 scale) 10/10  Pain altered Tx:  [] No  [] Yes  Action:    Objective:  Modalities:  IFC 15' LB with HP patient sitting.    Precautions:  Exercises:  Exercise Reps/ Time Weight/ Level Comments   SKTC     HEP   Hooklie rotation     HEP   Piriformis stretch     HEP    Seated marches  10x2         Seated lumbar flexion  10x 5\" hold        Seated HS curls  10x      Seated Rows/Pull downs  10x  R2     Seated hip add/abd ball/band  10x2  Lime     Other: HEP issued with HO, instruction, RD, copy in softchart     Specific Instructions for next treatment:  Flexion bias, LE/core strength and flexibility, pain modulation    Treatment Charges: Mins Units   [x]  Modalities-IFC 15 1   [x]  Ther Exercise 25 2   []  Manual Therapy     []  Ther Activities     []  Aquatics     []  Neuromuscular     []  Other     Total Treatment time 40 3       Assessment: [] Progressing toward goals. Patient required increased amount of time to complete all exercises with various breaks in between reps/sets. Patient required multiple cues of encouragement throughout session to participate in therapy exercise despite pain levels, towards end of session patient felt some relief noting pain 7/10, patient requests to continue with seated exercises only. [x] No change. [] Other:    STG: (to be met in 10 treatments)  1. ? Pain: Back pain 5/10  2. ? ROM:  Lumbar 75% all planes  3. ? Strength:  LE and core 5-  4. ? Function: Oswestry 40%  5. Independent with Home Exercise Programs  6. Demonstrate Knowledge of fall prevention  LTG: (to be met in 20 treatments)  1. Back pain 2/10  2. Oswestry 20%     Patient goals: Don't know    Pt. Education:  [] Yes  [x] No  [] Reviewed Prior HEP/Ed  Method of Education: [] Verbal  [] Demo  [] Written  Comprehension of Education:  [] Verbalizes understanding. [] Demonstrates understanding. [x] Needs review. [] Demonstrates/verbalizes HEP/Ed previously given. Plan: [x] Continue per plan of care.    [] Other:      Time In:1245pm           Time Out: 125pm    Electronically signed by:  Niru Barrios PTA

## 2019-04-26 ENCOUNTER — HOSPITAL ENCOUNTER (OUTPATIENT)
Dept: PHYSICAL THERAPY | Age: 64
Setting detail: THERAPIES SERIES
Discharge: HOME | End: 2019-04-26
Payer: COMMERCIAL

## 2019-04-26 PROCEDURE — 97113 AQUATIC THERAPY/EXERCISES: CPT

## 2019-04-26 NOTE — PROGRESS NOTES
[address] [place] [address] Outpatient Physical Therapy   [address] [place] [address] [address]   Phone: [phone]   Fax: [phone]    Physical Therapy Daily Treatment Note    Date:  2019  Patient Name:  Krishna Madden    :  1955  MRN: [id number]  Physician: Carlo Pham MD                             Insurance:  [facility]               Eligibility Status: Chay Hui     DOS: 4/15/19  # of visits allowed/remainin / 61 combined with OT and Speech. HARD MAX. Source: Website  Spoke To: [place]  Reference: [id number]  Auth: NPRe     Electronically signed by [facility] on 4/15/19 at 11:28 AM     Medical Diagnosis:   M54.42, M54.41, G89.29 (ICD-10-CM) - Chronic bilateral low back pain with bilateral sciatica   Z98.890 (ICD-10-CM) - Status post lumbar laminectomy   R26.89 (ICD-10-CM) - Poor balance   M21.371 (ICD-10-CM) - Foot drop, right      Rehab Codes: M62.81, M54.5, R26.81  Onset Date: 16                 Next 's appt. : tbd    Visit# / total visits: 3 Cancels/No Shows: 0/0    Subjective:  Pt reports pain in lumbar back and (B) feet today. Notes neuropathy numbness in (B) feet that does not change. Pain:  [x] Yes  [] No Location: Low back R>L Pain Rating: (0-10 scale) 3/10  Pain altered Tx:  [] No  [] Yes  Action:  Comments:  Initial aquatic therapy visit. Emphasis on cores stability and postural awareness. Instructed in warm ups, exercise technique and working in pain free ranges. Objective:    [address]  [employer] Exercise Log  Aquatic, Hip & DLS Program- Phase 1    Date of Eval:                                Primary PT:  Diagnosis:   Things to Focus On (goals):   Surgical Precautions:  Medical Precautions:  [] C-9 dates  [] Occ Med   [] Medicare       Date 19       Visit # 3/12       Walk F/L/R 2 Laps       Marching 10x       Squats 10x5\"       Step-Ups F/L        Heel-toe raises 10x       SLR F/L/R 10x       Knee/Flex/Ext 10x       F/L Lunges

## 2019-04-29 ENCOUNTER — OFFICE VISIT (OUTPATIENT)
Dept: UROLOGY | Age: 64
End: 2019-04-29
Payer: COMMERCIAL

## 2019-04-29 VITALS
TEMPERATURE: 98.1 F | WEIGHT: 266 LBS | DIASTOLIC BLOOD PRESSURE: 70 MMHG | SYSTOLIC BLOOD PRESSURE: 132 MMHG | HEART RATE: 62 BPM | BODY MASS INDEX: 37.24 KG/M2 | HEIGHT: 71 IN

## 2019-04-29 DIAGNOSIS — C61 PROSTATE CANCER (HCC): Primary | ICD-10-CM

## 2019-04-29 DIAGNOSIS — R23.2 HOT FLASHES: ICD-10-CM

## 2019-04-29 DIAGNOSIS — R35.0 FREQUENCY OF MICTURITION: ICD-10-CM

## 2019-04-29 PROCEDURE — 96402 CHEMO HORMON ANTINEOPL SQ/IM: CPT | Performed by: UROLOGY

## 2019-04-29 PROCEDURE — 99214 OFFICE O/P EST MOD 30 MIN: CPT | Performed by: UROLOGY

## 2019-04-29 ASSESSMENT — ENCOUNTER SYMPTOMS
COUGH: 0
ABDOMINAL PAIN: 0
EYE REDNESS: 0
SHORTNESS OF BREATH: 0
BACK PAIN: 0
NAUSEA: 0
EYE PAIN: 0
VOMITING: 0
WHEEZING: 0
COLOR CHANGE: 0

## 2019-04-29 NOTE — PROGRESS NOTES
[facility]  [facility] UROLOGY SPECIALISTS - [place]  [address]  [address]  [address] [facility] [address]  Dept: [address] Urology Office Note - Established    Patient:  Xu Rodriguez  YOB: 1955  Date: 4/29/2019    The patient is a 58 y.o. male who presents todayfor evaluation of the following problems:   Chief Complaint   Patient presents with    Prostate Cancer     3 mo f/u with PSA, last Eligard 10/22/18        HPI  Pt has history of prostate cancer. On xtandi and eligard. Due today for injections. Has urgency and frequency of urination and nocturia x 2 per night. Has hot flashes. No new complaints. Summary of old records: N/A    Additional History: N/A    Procedures Today: N/A    Urinalysis today:  No results found for this visit on 04/29/19.   Last several PSA's:  Lab Results   Component Value Date    PSA <0.01 04/23/2019    PSA <0.01 01/22/2019    PSA <0.01 10/11/2018     Last total testosterone:  No results found for: TESTOSTERONE    AUA Symptom Score (4/29/2019):                               Last BUN and creatinine:  Lab Results   Component Value Date    BUN 13 04/01/2019     Lab Results   Component Value Date    CREATININE 0.63 (L) 04/01/2019       Additional Lab/Culture results: none    Imaging Reviewed during this Office Visit: none  (results were independently reviewed by physician and radiology report verified)    PAST MEDICAL, FAMILY AND SOCIAL HISTORY UPDATE:  Past Medical History:   Diagnosis Date    Acid reflux     Arthritis     Chronic bilateral low back pain with bilateral sciatica 11/3/2016    Complete tear of left rotator cuff 7/18/2018    COPD (chronic obstructive pulmonary disease) ([address]     Gout     H/O cardiac catheterization     yrs ago   no stents    Hyperlipidemia     Hyperlipidemia with target LDL less than 100 1/20/2016    Hypertension     Knee pain, chronic     left    Liver disease     MDRO (multiple drug resistant organisms) performed by Arpit Aviles MD at . [address]? rotator cuff repair    TONSILLECTOMY AND ADENOIDECTOMY       Family History   Problem Relation Age of Onset    Diabetes Mother     Lung Cancer Brother     Liver Cancer Brother     Cancer Father      Outpatient Medications Marked as Taking for the 4/29/19 encounter (Office Visit) with Michelle Torrez MD   Medication Sig Dispense Refill    Camphor-Menthol-Methyl Sal 3.1-16-10 % GEL Apply every 8 hours as needed for pain. OK to substitute 1 Tube 1    pregabalin (LYRICA) 150 MG capsule Take 1 capsule by mouth 3 times daily for 90 days. 270 capsule 0    SYMBICORT 160-4.5 MCG/ACT AERO Inhale 2 puffs into the lungs 2 times daily 10.2 inhaler 3 Inhaler 3    lidocaine (LIDODERM) 5 % Place 1 patch onto the skin daily 12 hours on, 12 hours off. 90 patch 0    Lift Chair MISC by Does not apply route Patient has difficulty getting up from usual chair 1 each 0    lisinopril (PRINIVIL;ZESTRIL) 20 MG tablet Take 1 tablet by mouth daily 90 tablet 3    traZODone (DESYREL) 150 MG tablet Take 1 tablet by mouth nightly 90 tablet 3    folbee plus (FOLBEE PLUS) TABS Take 1 tablet by mouth daily 90 tablet 3    furosemide (LASIX) 20 MG tablet Take 1 tablet by mouth daily as needed (only if BP over 140/90 or leg edema) **stop Tenoretic 50-25** 90 tablet 3    pravastatin (PRAVACHOL) 40 MG tablet Take 1 tablet by mouth every evening **stop Crestor** 90 tablet 3    amLODIPine (NORVASC) 10 MG tablet Take 1 tablet by mouth daily 90 tablet 3    ammonium lactate (AMLACTIN) 12 % cream APPLY TOPICALLY TO LEGS ONE TO TWO TIMES A DAY AS NEEDED 1 Bottle 3    aspirin EC 81 MG EC tablet Take 1 tablet by mouth daily 90 tablet 0    clotrimazole-betamethasone (LOTRISONE) 1-0.05 % cream Apply topically at bedtime on the feet x 4 weeks 45 g 3    mupirocin (BACTROBAN) 2 % ointment Apply topically 1-2 times daily on the affected area .  OK to substitute to cream 30 g 2    NONFORMULARY Bioidentical pain cream      Glucose Blood (BLOOD GLUCOSE TEST STRIPS) STRP tesing once a day fasting 100 strip 3    enzalutamide (XTANDI) 40 MG capsule Take 40 mg by mouth Indications: take four 40 mg capsules daily.  colchicine (COLCRYS) 0.6 MG tablet ADJUST SIG-ONLY AS NEEDED FOR GOUT ATTACK. Take 2 tablets now, then 1 tablet one hour later. Wait 12 hours then start 1 tablet bid for 5 days. 90 tablet 1    albuterol (PROVENTIL) (2.5 MG/3ML) 0.083% nebulizer solution Take 2.5 mg by nebulization 4 times daily       albuterol sulfate  (90 BASE) MCG/ACT inhaler Inhale 2 puffs into the lungs every 6 hours as needed for Wheezing or Shortness of Breath (COUGH) 18 g 0       Succinylcholine chloride and Cymbalta [duloxetine hcl]  Social History     Tobacco Use   Smoking Status Current Every Day Smoker    Packs/day: 1.50    Years: 30.00    Pack years: 45.00    Types: Cigarettes   Smokeless Tobacco Never Used     (Ifpatient a smoker, smoking cessation counseling offered)    Social History     Substance and Sexual Activity   Alcohol Use No    Alcohol/week: 0.0 oz       REVIEW OF SYSTEMS:  Review of Systems    Physical Exam:      Vitals:    04/29/19 0944   BP: 132/70   Pulse: 62   Temp: 98.1 °F (36.7 °C)     Body mass index is 37.12 kg/m². Patient is a 58 y.o. male in no acute distress and alert and oriented to person, place and time. Physical Exam  Constitutional: Patient in no acute distress. Neuro: Alert and oriented to person, place and time.   Psych: Mood normal, affect normal  Skin: No rash noted  HEENT: Head: Normocephalic andatraumatic  Conjunctivae and EOM are normal. Pupils are equal, round  Nose:Normal  Right External Ear: Normal; Left External Ear: Normal  Mouth: Mucosa Moist  Neck: Supple  Lungs: Respiratory effort is normal  Cardiovascular: Warm & Pink  Abdomen: Soft, non-tender, non-distended with no CVA,  No flank tenderness,  Or hepatosplenomegaly   Lymphatics: No palpablelymphadenopathy. Bladder non-tender and not distended. Musculoskeletal: Normal gait and station      Assessment and Plan      1. Prostate cancer ([address]    2. Hot flashes    3. Frequency of micturition           Plan:   eligard today  F/u 3 mo with psa and T      Return in about 3 months (around 7/29/2019). Prescriptions Ordered:  No orders of the defined types were placed in this encounter. Orders Placed:  Orders Placed This Encounter   Procedures    PSA, Diagnostic     Standing Status:   Future     Standing Expiration Date:   4/28/2020    Testosterone     Standing Status:   Future     Standing Expiration Date:   4/23/2020           Hai Pulliam MD    Agree with the ROS entered by the MA.

## 2019-04-30 ENCOUNTER — HOSPITAL ENCOUNTER (OUTPATIENT)
Dept: PHYSICAL THERAPY | Age: 64
Setting detail: THERAPIES SERIES
Discharge: HOME | End: 2019-04-30
Payer: COMMERCIAL

## 2019-04-30 PROCEDURE — G0283 ELEC STIM OTHER THAN WOUND: HCPCS

## 2019-04-30 PROCEDURE — 97110 THERAPEUTIC EXERCISES: CPT

## 2019-04-30 NOTE — PROGRESS NOTES
[address] [facility] Outpatient Physical Therapy   [address] [place] [address] [address]   Phone: [phone]   Fax: [phone]    Physical Therapy Daily Treatment Note      Date:  2019  Patient Name:  Karyn Diehl    :  1955  MRN: [id number]  Physician: Lucille Mendez MD                             Insurance:  [facility]               Eligibility Status: Billy Come     DOS: 4/15/19  # of visits allowed/remainin / 61 combined with OT and Speech. HARD MAX. Source: Website  Spoke To: [employer]  Reference: [id number]  Auth: NPRe     Electronically signed by Isa Boeck on 4/15/19 at 11:28 AM     Medical Diagnosis:   M54.42, M54.41, G89.29 (ICD-10-CM) - Chronic bilateral low back pain with bilateral sciatica   Z98.890 (ICD-10-CM) - Status post lumbar laminectomy   R26.89 (ICD-10-CM) - Poor balance   M21.371 (ICD-10-CM) - Foot drop, right      Rehab Codes: M62.81, M54.5, R26.81  Onset Date: 16                 Next 's appt. : tbd        Visit# / total visits:  Cancels/No Shows: 0/0    Subjective:  Patient arrived using straight cane this date noting back pain is more tolerable, states post last gym session he felt like \"a million bucks\" but once he got home and tried reaching for something on low table he felt a \"strain\" in lower back and was back to high pain once again. Pain:  [x] Yes  [] No Location: Low back R>L Pain Rating: (0-10 scale) 7/10  Pain altered Tx:  [] No  [] Yes  Action:    Objective:  Modalities:  IFC 15' LB patient sitting. Precautions: Prostate Cancer, No Heat.   Exercises:  Exercise Reps/ Time Weight/ Level Comments   SKTC     HEP   Hooklie rotation     HEP   Piriformis stretch     HEP    Seated marches  10x2         Seated lumbar flexion  10x 5\" hold        Seated HS curls  10x      Standing Rows/Pull downs  10x  R2      Seated hip add/abd ball/band  10x2  Lime     Other: HEP issued with HO, instruction, RD, copy in softchart     Specific Instructions for next treatment:  Flexion bias, LE/core strength and flexibility, pain modulation    Treatment Charges: Mins Units   [x]  Modalities-IFC 15 1   [x]  Ther Exercise 25 2   []  Manual Therapy     []  Ther Activities     []  Aquatics     []  Neuromuscular     []  Other     Total Treatment time 40 3       Assessment: [] Progressing toward goals. Patient  Had better tolerance to exercise this date with reports of some relief of pain post estim at 6/10. [x] No change. [] Other:    STG: (to be met in 10 treatments)  1. ? Pain: Back pain 5/10  2. ? ROM:  Lumbar 75% all planes  3. ? Strength:  LE and core 5-  4. ? Function: Oswestry 40%  5. Independent with Home Exercise Programs  6. Demonstrate Knowledge of fall prevention  LTG: (to be met in 20 treatments)  1. Back pain 2/10  2. Oswestry 20%     Patient goals: Don't know    Pt. Education:  [] Yes  [x] No  [] Reviewed Prior HEP/Ed  Method of Education: [] Verbal  [] Demo  [] Written  Comprehension of Education:  [] Verbalizes understanding. [] Demonstrates understanding. [x] Needs review. [] Demonstrates/verbalizes HEP/Ed previously given. Plan: [x] Continue per plan of care.    [] Other:      Time In:1245pm           Time Out: 125pm    Electronically signed by:  Mumtaz Steward PTA

## 2019-05-01 ENCOUNTER — TELEPHONE (OUTPATIENT)
Dept: UROLOGY | Age: 64
End: 2019-05-01

## 2019-05-01 NOTE — TELEPHONE ENCOUNTER
Pt called stating that he has been undergoing physical therapy for back pain and they have been using heat packs and TENS unit. He was advised to ask if using these things would cause any problems regarding previous cancer diagnosis. Writer informed him that this will be discussed with Dr Baby Mom next Monday when he is in the office next and pt verbalized understanding.

## 2019-05-03 ENCOUNTER — HOSPITAL ENCOUNTER (OUTPATIENT)
Dept: PHYSICAL THERAPY | Age: 64
Setting detail: THERAPIES SERIES
Discharge: HOME | End: 2019-05-03
Payer: COMMERCIAL

## 2019-05-03 PROCEDURE — 97113 AQUATIC THERAPY/EXERCISES: CPT

## 2019-05-03 NOTE — PROGRESS NOTES
[address] [facility] Outpatient Physical Therapy   [address] [place] [address] [address]   Phone: [phone]   Fax: [phone]    Physical Therapy Daily Treatment Note    Date:  5/3/2019  Patient Name:  Karyn Diehl    :  1955  MRN: [id number]  Physician: Lucille Mendez MD                             Insurance:  [facility]               Eligibility Status: Billy Come     DOS: 4/15/19  # of visits allowed/remainin / 61 combined with OT and Speech. HARD MAX. Source: Website  Spoke To: [employer]  Reference: [id number]  Auth: NPRe     Electronically signed by Isa Boeck on 4/15/19 at 11:28 AM     Medical Diagnosis:   M54.42, M54.41, G89.29 (ICD-10-CM) - Chronic bilateral low back pain with bilateral sciatica   Z98.890 (ICD-10-CM) - Status post lumbar laminectomy   R26.89 (ICD-10-CM) - Poor balance   M21.371 (ICD-10-CM) - Foot drop, right      Rehab Codes: M62.81, M54.5, R26.81  Onset Date: 16                 Next 's appt. : tbd    Visit# / total visits:  Cancels/No Shows: 0/0    Subjective:  Pt reports pain in lumbar back and (B) feet today. Notes neuropathy numbness in (B) feet that does not change. Pain:  [x] Yes  [] No Location: Low back R>L Pain Rating: (0-10 scale) 3/10  Pain altered Tx:  [] No  [] Yes  Action:  Comments:  Initial aquatic therapy visit. Emphasis on cores stability and postural awareness. Instructed in warm ups, exercise technique and working in pain free ranges. Objective:    [address] [employer] Exercise Log  Aquatic, Hip & DLS Program- Phase 1    Date of Eval:                                Primary PT:  Diagnosis:   Things to Focus On (goals):   Surgical Precautions:  Medical Precautions:  [] C-9 dates  [] Occ Med   [] Medicare       Date 4/26/19 5/3/19      Visit # 3/12 4/12      Walk F/L/R 2 Laps 2 Laps      Marching 10x 15      Squats 10x5\" 15x5\"      Step-Ups F/L  Low 15x      Heel-toe raises 10x 15      SLR F/L/R 10x 15 Knee/Flex/Ext 10x 15      F/L Lunges        Kickboard Ex. Iso Abd. Push-pull        Paddling                Balance                        Stretches        Achllies        Hamstring 2x20\" 2x20\"              Deep water Hang 1 noodle  5' 1 Noodle  5' #2 weights              Cool down                Pain Rating 3 3         Specific Instructions for next treatment:  Flexion bias, LE/core strength and flexibility, pain modulation    Treatment Charges: Mins Units   []  Modalities-IFC     []  Ther Exercise     []  Manual Therapy     []  Ther Activities     [x]  Aquatics 30 2   []  Neuromuscular     []  Other     Total Treatment time 30 2     Assessment: [x] Progressing toward goals. Initiated aquatic therapy today. [] No change. [] Other:    STG: (to be met in 10 treatments)  1. ? Pain: Back pain 5/10  2. ? ROM:  Lumbar 75% all planes  3. ? Strength:  LE and core 5-  4. ? Function: Oswestry 40%  5. Independent with Home Exercise Programs  6. Demonstrate Knowledge of fall prevention  LTG: (to be met in 20 treatments)  1. Back pain 2/10  2. Oswestry 20%     Patient goals: Don't know    Pt. Education:  [] Yes  [x] No  [] Reviewed Prior HEP/Ed  Method of Education: [] Verbal  [] Demo  [] Written  Comprehension of Education:  [] Verbalizes understanding. [] Demonstrates understanding. [x] Needs review. [] Demonstrates/verbalizes HEP/Ed previously given. Plan: [x] Continue per plan of care.    [] Other:      Time In:1110         Time Out: 1155    Electronically signed by:  Vivian Gutierres PTA

## 2019-05-07 ENCOUNTER — APPOINTMENT (OUTPATIENT)
Dept: PHYSICAL THERAPY | Age: 64
End: 2019-05-07
Payer: COMMERCIAL

## 2019-05-07 ENCOUNTER — HOSPITAL ENCOUNTER (OUTPATIENT)
Dept: PHYSICAL THERAPY | Age: 64
Setting detail: THERAPIES SERIES
Discharge: HOME | End: 2019-05-07
Payer: COMMERCIAL

## 2019-05-07 PROCEDURE — G0283 ELEC STIM OTHER THAN WOUND: HCPCS

## 2019-05-07 NOTE — DISCHARGE SUMMARY
modulation    Treatment Charges: Mins Units   [x]  Modalities-IFC 15 1   []  Ther Exercise     []  Manual Therapy     []  Ther Activities     []  Aquatics     []  Neuromuscular     []  Other     Total Treatment time 15 1   Patient 30 min late for  Appointment, mixed up time. Assessment: [] Progressing toward goals. Patient  Had better tolerance to exercise this date with reports of some relief of pain post estim at 6/10. [] No change. [x] Other: DC Summary:  Patient seen for initial PT evaluation 4/15/19. Medical Diagnosis:   M54.42, M54.41, G89.29 (ICD-10-CM) - Chronic bilateral low back pain with bilateral sciatica   Z98.890 (ICD-10-CM) - Status post lumbar laminectomy   R26.89 (ICD-10-CM) - Poor balance   M21.371 (ICD-10-CM) - Foot drop, right     Patient seen for 6 visits attending land and aqua PT. Patient pain fluctuates. He states he will return to Dr. Deshaun Quinn for follow up and wants to DC at this time. STG: (to be met in 10 treatments)  1. ? Pain: Back pain 5/10. Goal Met  2. ? ROM:  Lumbar 75% all planes  3. ? Strength:  LE and core 5-  4. ? Function: Oswestry 40%  5. Independent with Home Exercise Programs  6. Demonstrate Knowledge of fall prevention  LTG: (to be met in 20 treatments)  1. Back pain 2/10. Goal Not Met  2. Oswestry 20%     Patient goals: Don't know    Pt. Education:  [] Yes  [] No  [] Reviewed Prior HEP/Ed  Method of Education: [] Verbal  [] Demo  [] Written  Comprehension of Education:  [] Verbalizes understanding. [] Demonstrates understanding. [x] Needs review. [] Demonstrates/verbalizes HEP/Ed previously given. Plan: [] Continue per plan of care.    [x] Other:  DC PT per patient request.      Time VX:6252         Time Out: 1500    Electronically signed by:  Argelia Burden, PT

## 2019-05-10 ENCOUNTER — APPOINTMENT (OUTPATIENT)
Dept: PHYSICAL THERAPY | Age: 64
End: 2019-05-10
Payer: COMMERCIAL

## 2019-05-23 DIAGNOSIS — Z96.612 H/O TOTAL SHOULDER REPLACEMENT, LEFT: Primary | ICD-10-CM

## 2019-05-24 ENCOUNTER — OFFICE VISIT (OUTPATIENT)
Dept: ORTHOPEDIC SURGERY | Age: 64
End: 2019-05-24
Payer: COMMERCIAL

## 2019-05-24 VITALS — WEIGHT: 270 LBS | BODY MASS INDEX: 36.57 KG/M2 | HEIGHT: 72 IN

## 2019-05-24 DIAGNOSIS — Z96.612 S/P REVERSE TOTAL SHOULDER ARTHROPLASTY, LEFT: Primary | ICD-10-CM

## 2019-05-24 PROCEDURE — 99213 OFFICE O/P EST LOW 20 MIN: CPT | Performed by: ORTHOPAEDIC SURGERY

## 2019-05-27 NOTE — PROGRESS NOTES
Procedure: Left reverse shoulder arthroplasty; revision left reverse shoulder arthroplasty for dislocation  Date of procedure: 7/18/18; 8/3/18    HPI: Jennifer James is a 58 y.o.  right-hand dominant male who presents for evaluation of his left shoulder. 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 He indicates that he was doing well up until several weeks ago when he was working out in the pool and he was pushing down on the ball and felt a pop in his left shoulder. Subsequent to that he developed some pain in the anterior aspect of the shoulder. He states that it comes on randomly at which time he has a difficult time moving his shoulder due to pain but then quickly goes away restoring motion. His pain doesn't radiate and it is not associated with any numbness or tingling. He denies having any fevers, chills, sweats or any constitutional symptoms.     Previous treatment:    NSAIDs: None    Physical Therapy:  no    Injections: none    Surgeries: See above    Review of Systems:     Constitution: no fever or chills   Pain level: 10/10  Musculoskeletal: As noted in the HPI   Neurologic: no neurologic symptoms    Past Medical History  [facility]  has a past medical history of Acid reflux, Arthritis, Chronic bilateral low back pain with bilateral sciatica, Complete tear of left rotator cuff, COPD (chronic obstructive pulmonary disease) ([address] Gout, H/O cardiac catheterization, Hyperlipidemia, Hyperlipidemia with target LDL less than 100, Hypertension, Knee pain, chronic, Liver disease, MDRO (multiple drug resistant organisms) resistance, Moderate episode of recurrent major depressive disorder ([address] Obesity, Class III, BMI 40-49.9 (morbid obesity) ([address] REYNALDO on CPAP, Osteoarthritis, Polypharmacy, Positive FIT (fecal immunochemical test), Prolonged emergence from general anesthesia, Prostate cancer ([address] Pseudocholinesterase deficiency, Short of breath on exertion, Sleep apnea, Status post lumbar laminectomy, Stenosis of left carotid artery, Tubular adenoma of colon 4/11/17, Type 2 diabetes mellitus with hyperglycemia, without long-term current use of insulin ([facility] Vitamin D deficiency, and Wears glasses. Past Surgical History  [facility]  has a past surgical history that includes knee surgery (Left); lumbar laminectomy (01/05/2017); Tonsillectomy and adenoidectomy; Cardiac catheterization (2007); pr colonoscopy w/biopsy single/multiple (N/A, 5/9/2017); shoulder surgery (Right, 1989?); back surgery; back surgery (01/05/2017); Carpal tunnel release (Bilateral); pr reconstr total shoulder implant (Left, 7/18/2018); pr closed rx shldr disloc,anesthesia (Left, 8/1/2018); and pr taryn shoulder arthrplsty humeral/glenoid compnt (Left, 8/3/2018). Current Medications  Current Outpatient Medications   Medication Sig Dispense Refill    Camphor-Menthol-Methyl Sal 3.1-16-10 % GEL Apply every 8 hours as needed for pain. OK to substitute 1 Tube 1    pregabalin (LYRICA) 150 MG capsule Take 1 capsule by mouth 3 times daily for 90 days. 270 capsule 0    SYMBICORT 160-4.5 MCG/ACT AERO Inhale 2 puffs into the lungs 2 times daily 10.2 inhaler 3 Inhaler 3    lidocaine (LIDODERM) 5 % Place 1 patch onto the skin daily 12 hours on, 12 hours off.  90 patch 0    Lift Chair MISC by Does not apply route Patient has difficulty getting up from usual chair 1 each 0    lisinopril (PRINIVIL;ZESTRIL) 20 MG tablet Take 1 tablet by mouth daily 90 tablet 3    traZODone (DESYREL) 150 MG tablet Take 1 tablet by mouth nightly 90 tablet 3    folbee plus (FOLBEE PLUS) TABS Take 1 tablet by mouth daily 90 tablet 3    furosemide (LASIX) 20 MG tablet Take 1 tablet by mouth daily as needed (only if BP over 140/90 or leg edema) **stop Tenoretic 50-25** 90 tablet 3    pravastatin (PRAVACHOL) 40 MG tablet Take 1 tablet by mouth every evening **stop Crestor** 90 tablet 3  amLODIPine (NORVASC) 10 MG tablet Take 1 tablet by mouth daily 90 tablet 3    ammonium lactate (AMLACTIN) 12 % cream APPLY TOPICALLY TO LEGS ONE TO TWO TIMES A DAY AS NEEDED 1 Bottle 3    aspirin EC 81 MG EC tablet Take 1 tablet by mouth daily 90 tablet 0    clotrimazole-betamethasone (LOTRISONE) 1-0.05 % cream Apply topically at bedtime on the feet x 4 weeks 45 g 3    mupirocin (BACTROBAN) 2 % ointment Apply topically 1-2 times daily on the affected area . OK to substitute to cream 30 g 2    NONFORMULARY Bioidentical pain cream      Glucose Blood (BLOOD GLUCOSE TEST STRIPS) STRP tesing once a day fasting 100 strip 3    enzalutamide (XTANDI) 40 MG capsule Take 40 mg by mouth Indications: take four 40 mg capsules daily.  colchicine (COLCRYS) 0.6 MG tablet ADJUST SIG-ONLY AS NEEDED FOR GOUT ATTACK. Take 2 tablets now, then 1 tablet one hour later. Wait 12 hours then start 1 tablet bid for 5 days. 90 tablet 1    albuterol (PROVENTIL) (2.5 MG/3ML) 0.083% nebulizer solution Take 2.5 mg by nebulization 4 times daily       albuterol sulfate  (90 BASE) MCG/ACT inhaler Inhale 2 puffs into the lungs every 6 hours as needed for Wheezing or Shortness of Breath (COUGH) 18 g 0     No current facility-administered medications for this visit. Allergies  Allergies have been reviewed. Emma Knowles is allergic to succinylcholine chloride and cymbalta [duloxetine hcl]. Social History  Emma Knowles  reports that he has been smoking cigarettes. He has a 45.00 pack-year smoking history. He has never used smokeless tobacco. He reports that he does not drink alcohol or use drugs. Family History  Bernardino's family history includes Cancer in his father; Diabetes in his mother; Liver Cancer in his brother; [place] in his brother.      Physical Exam:     Ht 6' (1.829 m)   Wt 270 lb (122.5 kg)   BMI 36.62 kg/m²    General Appearance: alert, well appearing, and in no distress  Mental Status: alert, oriented to

## 2019-07-08 ENCOUNTER — HOSPITAL ENCOUNTER (OUTPATIENT)
Age: 64
Setting detail: SPECIMEN
Discharge: HOME | End: 2019-07-08
Payer: MEDICARE

## 2019-07-08 LAB
CALCIUM SERPL-MCNC: 10 MG/DL (ref 8.6–10.4)
PTH INTACT: 48.06 PG/ML (ref 15–65)

## 2019-07-08 PROCEDURE — 82306 VITAMIN D 25 HYDROXY: CPT

## 2019-07-08 PROCEDURE — 82310 ASSAY OF CALCIUM: CPT

## 2019-07-08 PROCEDURE — 36415 COLL VENOUS BLD VENIPUNCTURE: CPT

## 2019-07-08 PROCEDURE — 83970 ASSAY OF PARATHORMONE: CPT

## 2019-07-09 LAB — VITAMIN D 25-HYDROXY: 43.2 NG/ML (ref 30–100)

## 2019-07-15 ENCOUNTER — HOSPITAL ENCOUNTER (OUTPATIENT)
Age: 64
Setting detail: SPECIMEN
Discharge: HOME | End: 2019-07-15
Payer: MEDICARE

## 2019-07-15 LAB
CALCIUM SERPL-MCNC: 9.9 MG/DL (ref 8.6–10.4)
PTH INTACT: 63.5 PG/ML (ref 15–65)
VITAMIN D 25-HYDROXY: 43.3 NG/ML (ref 30–100)

## 2019-07-15 PROCEDURE — 83970 ASSAY OF PARATHORMONE: CPT

## 2019-07-15 PROCEDURE — 82306 VITAMIN D 25 HYDROXY: CPT

## 2019-07-15 PROCEDURE — 82310 ASSAY OF CALCIUM: CPT

## 2019-07-15 PROCEDURE — 36415 COLL VENOUS BLD VENIPUNCTURE: CPT

## 2019-07-23 ENCOUNTER — HOSPITAL ENCOUNTER (OUTPATIENT)
Age: 64
Setting detail: SPECIMEN
Discharge: HOME | End: 2019-07-23
Payer: MEDICARE

## 2019-07-23 ENCOUNTER — OFFICE VISIT (OUTPATIENT)
Dept: FAMILY MEDICINE CLINIC | Age: 64
End: 2019-07-23
Payer: MEDICARE

## 2019-07-23 VITALS
HEART RATE: 55 BPM | WEIGHT: 263.8 LBS | HEIGHT: 72 IN | SYSTOLIC BLOOD PRESSURE: 130 MMHG | OXYGEN SATURATION: 99 % | DIASTOLIC BLOOD PRESSURE: 76 MMHG | BODY MASS INDEX: 35.73 KG/M2

## 2019-07-23 DIAGNOSIS — G47.33 OSA ON CPAP: ICD-10-CM

## 2019-07-23 DIAGNOSIS — G89.29 CHRONIC NECK PAIN: Primary | ICD-10-CM

## 2019-07-23 DIAGNOSIS — Z23 NEED FOR PROPHYLACTIC VACCINATION AGAINST STREPTOCOCCUS PNEUMONIAE (PNEUMOCOCCUS): ICD-10-CM

## 2019-07-23 DIAGNOSIS — I73.9 PVD (PERIPHERAL VASCULAR DISEASE) WITH CLAUDICATION (HCC): ICD-10-CM

## 2019-07-23 DIAGNOSIS — I10 ESSENTIAL HYPERTENSION: ICD-10-CM

## 2019-07-23 DIAGNOSIS — E88.89: ICD-10-CM

## 2019-07-23 DIAGNOSIS — Z99.89 OSA ON CPAP: ICD-10-CM

## 2019-07-23 DIAGNOSIS — C61 PROSTATE CANCER (HCC): ICD-10-CM

## 2019-07-23 DIAGNOSIS — M54.2 CHRONIC NECK PAIN: Primary | ICD-10-CM

## 2019-07-23 DIAGNOSIS — E11.42 TYPE 2 DIABETES MELLITUS WITH DIABETIC POLYNEUROPATHY, WITHOUT LONG-TERM CURRENT USE OF INSULIN (HCC): ICD-10-CM

## 2019-07-23 DIAGNOSIS — Z91.81 AT HIGH RISK FOR FALLS: ICD-10-CM

## 2019-07-23 DIAGNOSIS — E78.5 HYPERLIPIDEMIA WITH TARGET LDL LESS THAN 100: ICD-10-CM

## 2019-07-23 LAB
HBA1C MFR BLD: 5.4 %
PROSTATE SPECIFIC ANTIGEN: <0.01 UG/L
TESTOSTERONE TOTAL: 3 NG/DL (ref 220–1000)

## 2019-07-23 PROCEDURE — 4040F PNEUMOC VAC/ADMIN/RCVD: CPT | Performed by: FAMILY MEDICINE

## 2019-07-23 PROCEDURE — 1123F ACP DISCUSS/DSCN MKR DOCD: CPT | Performed by: FAMILY MEDICINE

## 2019-07-23 PROCEDURE — 4004F PT TOBACCO SCREEN RCVD TLK: CPT | Performed by: FAMILY MEDICINE

## 2019-07-23 PROCEDURE — G8427 DOCREV CUR MEDS BY ELIG CLIN: HCPCS | Performed by: FAMILY MEDICINE

## 2019-07-23 PROCEDURE — 84403 ASSAY OF TOTAL TESTOSTERONE: CPT

## 2019-07-23 PROCEDURE — 0518F FALL PLAN OF CARE DOCD: CPT | Performed by: FAMILY MEDICINE

## 2019-07-23 PROCEDURE — 3288F FALL RISK ASSESSMENT DOCD: CPT | Performed by: FAMILY MEDICINE

## 2019-07-23 PROCEDURE — 99214 OFFICE O/P EST MOD 30 MIN: CPT | Performed by: FAMILY MEDICINE

## 2019-07-23 PROCEDURE — 3044F HG A1C LEVEL LT 7.0%: CPT | Performed by: FAMILY MEDICINE

## 2019-07-23 PROCEDURE — 83036 HEMOGLOBIN GLYCOSYLATED A1C: CPT | Performed by: FAMILY MEDICINE

## 2019-07-23 PROCEDURE — 2022F DILAT RTA XM EVC RTNOPTHY: CPT | Performed by: FAMILY MEDICINE

## 2019-07-23 PROCEDURE — G0009 ADMIN PNEUMOCOCCAL VACCINE: HCPCS | Performed by: FAMILY MEDICINE

## 2019-07-23 PROCEDURE — G8599 NO ASA/ANTIPLAT THER USE RNG: HCPCS | Performed by: FAMILY MEDICINE

## 2019-07-23 PROCEDURE — 84153 ASSAY OF PSA TOTAL: CPT

## 2019-07-23 PROCEDURE — 36415 COLL VENOUS BLD VENIPUNCTURE: CPT

## 2019-07-23 PROCEDURE — 3017F COLORECTAL CA SCREEN DOC REV: CPT | Performed by: FAMILY MEDICINE

## 2019-07-23 PROCEDURE — G8417 CALC BMI ABV UP PARAM F/U: HCPCS | Performed by: FAMILY MEDICINE

## 2019-07-23 PROCEDURE — 90670 PCV13 VACCINE IM: CPT | Performed by: FAMILY MEDICINE

## 2019-07-23 ASSESSMENT — ENCOUNTER SYMPTOMS
COUGH: 0
NAUSEA: 0
ABDOMINAL PAIN: 0
CONSTIPATION: 0
VOMITING: 0
DIARRHEA: 0
CHEST TIGHTNESS: 0
APNEA: 1
WHEEZING: 0
SHORTNESS OF BREATH: 0
ABDOMINAL DISTENTION: 0
BACK PAIN: 1

## 2019-07-23 NOTE — PROGRESS NOTES
Breath (COUGH) 18 g 0    pregabalin (LYRICA) 150 MG capsule Take 1 capsule by mouth 3 times daily for 90 days. 270 capsule 0     No current facility-administered medications for this visit. Allergies   Allergen Reactions    Succinylcholine Chloride Other (See Comments)     PATIENT HAS PSEUDOCHOLINESTERASE DEFICIENCY      Cymbalta [Duloxetine Hcl]      Taste loss          Rest of complaints with corresponding details per ROS      The patient'spast medical, surgical, social, and family history as well as his current medications and allergies were reviewed as documented in today's encounter. Social History     Tobacco Use    Smoking status: Current Every Day Smoker     Packs/day: 1.50     Years: 30.00     Pack years: 45.00     Types: Cigarettes    Smokeless tobacco: Never Used   Substance Use Topics    Alcohol use: No     Alcohol/week: 0.0 standard drinks    Drug use: No       Ready to quit: No  Counseling given: Yes            Quality & Risk Score Accuracy    Visit Dx:  E88.89 - Serum cholinesterase defect (HCC)  Assessment and plan:  Stable based upon symptoms and exam. Continue current treatment plan and follow up at least yearly. Last edited 07/23/19 11:17 EDT by Jessy Nielson MD               Review of Systems   Constitutional: Positive for fatigue. Negative for activity change, appetite change, chills, diaphoresis, fever and unexpected weight change. Eyes: Positive for visual disturbance (stable, chronic, wears glasses). Respiratory: Positive for apnea (using CPAP). Negative for cough, chest tightness, shortness of breath and wheezing. Cardiovascular: Negative for chest pain, palpitations and leg swelling. Gastrointestinal: Negative for abdominal distention, abdominal pain, constipation, diarrhea, nausea and vomiting. Endocrine: Positive for heat intolerance (due to prostate cancer medication, Rebecca Abad). Negative for cold intolerance, polydipsia, polyphagia and polyuria.

## 2019-07-23 NOTE — PROGRESS NOTES
Visit Information    Have you changed or started any medications since your last visit including any over-the-counter medicines, vitamins, or herbal medicines? no   Are you having any side effects from any of your medications? -  no  Have you stopped taking any of your medications? Is so, why? -  no    Have you seen any other physician or provider since your last visit? Yes - Records Obtained  Have you had any other diagnostic tests since your last visit? Yes - Records Obtained  Have you been seen in the emergency room and/or had an admission to a hospital since we last saw you? No  Have you had your routine dental cleaning in the past 6 months? yes -     Have you activated your [employer] account? If not, what are your barriers?  Yes     Patient Care Team:  Juno Dolan MD as PCP - General (Family Medicine)  Juno Dolan MD as PCP - [facility]  Iwona Aranda MD as Consulting Physician (Neurosurgery)  Saskia Pacheco MD as Consulting Physician (Cardiology)  Nic Lake MD as Consulting Physician (Urology)  Sonal Byers MD as Surgeon (Vascular Surgery)  Jose Antonio MD as Consulting Physician (Pulmonology)  Reji Csatro MD as Consulting Physician (Orthopedic Surgery)  Rod Massey DO as Consulting Physician (General Surgery)  Porfirio Waggoner OD (Ophthalmology)  Debbie Solano, RN, BSN as Nurse Cristobal Menchaca MD as Consulting Physician (Endocrinology)  Ifrah Alas MD as Consulting Physician (Orthopedic Surgery)  Larry Lind MD as Consulting Physician (Gastroenterology)    Medical History Review  Past Medical, Family, and Social History reviewed and does contribute to the patient presenting condition    Health Maintenance   Topic Date Due    Shingles Vaccine (1 of 2) 06/20/2004    Annual Wellness Visit (AWV)  06/20/2017    Pneumococcal 65+ years Vaccine (1 of 2 - PCV13) 06/20/2019    Diabetic microalbuminuria test  08/27/2019    Lipid screen  08/27/2019    Flu vaccine (1) 09/01/2019    Diabetic retinal exam  10/29/2019    Low dose CT lung screening  01/16/2020    Potassium monitoring  04/01/2020    Creatinine monitoring  04/01/2020    Diabetic foot exam  04/09/2020    A1C test (Diabetic or Prediabetic)  04/09/2020    Colon cancer screen colonoscopy  05/09/2022    DTaP/Tdap/Td vaccine (2 - Td) 03/01/2028    Hepatitis C screen  Addressed    HIV screen  Addressed

## 2019-07-27 ENCOUNTER — HOSPITAL ENCOUNTER (OUTPATIENT)
Dept: GENERAL RADIOLOGY | Age: 64
Discharge: HOME | End: 2019-07-29
Payer: MEDICARE

## 2019-07-27 ENCOUNTER — HOSPITAL ENCOUNTER (OUTPATIENT)
Age: 64
Discharge: HOME | End: 2019-07-29
Payer: MEDICARE

## 2019-07-27 DIAGNOSIS — M54.2 CHRONIC NECK PAIN: ICD-10-CM

## 2019-07-27 DIAGNOSIS — G89.29 CHRONIC NECK PAIN: ICD-10-CM

## 2019-07-27 PROCEDURE — 72040 X-RAY EXAM NECK SPINE 2-3 VW: CPT

## 2019-07-28 PROBLEM — M54.2 CHRONIC NECK PAIN: Status: ACTIVE | Noted: 2019-07-28

## 2019-07-28 PROBLEM — G89.29 CHRONIC NECK PAIN: Status: ACTIVE | Noted: 2019-07-28

## 2019-07-28 PROBLEM — Z91.81 AT HIGH RISK FOR FALLS: Status: ACTIVE | Noted: 2019-07-28

## 2019-07-28 PROBLEM — M50.30 DEGENERATIVE DISC DISEASE, CERVICAL: Status: ACTIVE | Noted: 2019-07-28

## 2019-07-29 ENCOUNTER — OFFICE VISIT (OUTPATIENT)
Dept: UROLOGY | Age: 64
End: 2019-07-29
Payer: MEDICARE

## 2019-07-29 VITALS
DIASTOLIC BLOOD PRESSURE: 91 MMHG | WEIGHT: 264 LBS | TEMPERATURE: 97.8 F | SYSTOLIC BLOOD PRESSURE: 173 MMHG | HEART RATE: 63 BPM | HEIGHT: 72 IN | BODY MASS INDEX: 35.76 KG/M2

## 2019-07-29 DIAGNOSIS — R23.2 HOT FLASHES: ICD-10-CM

## 2019-07-29 DIAGNOSIS — Z96.612 H/O TOTAL SHOULDER REPLACEMENT, LEFT: Primary | ICD-10-CM

## 2019-07-29 DIAGNOSIS — C61 PROSTATE CANCER (HCC): Primary | ICD-10-CM

## 2019-07-29 DIAGNOSIS — R35.0 FREQUENCY OF MICTURITION: ICD-10-CM

## 2019-07-29 PROCEDURE — 3017F COLORECTAL CA SCREEN DOC REV: CPT | Performed by: UROLOGY

## 2019-07-29 PROCEDURE — 99214 OFFICE O/P EST MOD 30 MIN: CPT | Performed by: UROLOGY

## 2019-07-29 PROCEDURE — 4040F PNEUMOC VAC/ADMIN/RCVD: CPT | Performed by: UROLOGY

## 2019-07-29 PROCEDURE — 1123F ACP DISCUSS/DSCN MKR DOCD: CPT | Performed by: UROLOGY

## 2019-07-29 PROCEDURE — G8417 CALC BMI ABV UP PARAM F/U: HCPCS | Performed by: UROLOGY

## 2019-07-29 PROCEDURE — 3288F FALL RISK ASSESSMENT DOCD: CPT | Performed by: UROLOGY

## 2019-07-29 PROCEDURE — G8599 NO ASA/ANTIPLAT THER USE RNG: HCPCS | Performed by: UROLOGY

## 2019-07-29 PROCEDURE — 4004F PT TOBACCO SCREEN RCVD TLK: CPT | Performed by: UROLOGY

## 2019-07-29 PROCEDURE — G8427 DOCREV CUR MEDS BY ELIG CLIN: HCPCS | Performed by: UROLOGY

## 2019-07-29 PROCEDURE — 0518F FALL PLAN OF CARE DOCD: CPT | Performed by: UROLOGY

## 2019-07-29 ASSESSMENT — ENCOUNTER SYMPTOMS
COUGH: 0
ABDOMINAL PAIN: 0
WHEEZING: 0
DIARRHEA: 0
VOMITING: 0
EYE REDNESS: 0
BACK PAIN: 0
EYE PAIN: 0
CONSTIPATION: 0
NAUSEA: 0
SHORTNESS OF BREATH: 0

## 2019-07-29 NOTE — PROGRESS NOTES
[facility]  [facility] - [facility]  [address] [address]  Dept:  Margie Handley [facility] Urology Office Note - Established    Patient:  Driss Vincent  YOB: 1955  Date: 7/29/2019    The patient is a 71 y.o. male who presents todayfor evaluation of the following problems:   Chief Complaint   Patient presents with    Results    Prostate Cancer       HPI    Pt has h/o prostate cancer. Has been on eligard and xtandi. Has hot flashes but manages well. Has frequency of urination but not bothered. No pain. Summary of old records: N/A    Additional History: N/A    Procedures Today: N/A    Urinalysis today:  No results found for this visit on 07/29/19.   Last several PSA's:  Lab Results   Component Value Date    PSA <0.01 07/23/2019    PSA <0.01 04/23/2019    PSA <0.01 01/22/2019     Last total testosterone:  Lab Results   Component Value Date    TESTOSTERONE 3 (L) 07/23/2019       AUA Symptom Score (7/29/2019):  INCOMPLETE EMPTYING: How often have you had the sensation of not emptying your bladder?: Not at all  FREQUENCY: How often do you have to urinate less than every two hours?: Not at all  INTERMITTENCY: How often have you found you stopped and started again several times when you urinated?: Not at all  URGENCY: How often have you found it difficult to postpone urination?: Not at all  WEAK STREAM: How often have you had a weak urinary stream?: Not at all  STRAINING: How often have you had to strain to start  urination?: Not at all  NOCTURIA: How many times did you typically get up at night to uriniate?: 1 Time  TOTAL I-PSS SCORE[de-identified] 1       Last BUN and creatinine:  Lab Results   Component Value Date    BUN 13 04/01/2019     Lab Results   Component Value Date    CREATININE 0.63 (L) 04/01/2019       Additional Lab/Culture results: none    Imaging Reviewed during this Office Visit: none  (results were independently reviewed by physician and tablet 3    ammonium lactate (AMLACTIN) 12 % cream APPLY TOPICALLY TO LEGS ONE TO TWO TIMES A DAY AS NEEDED 1 Bottle 3    aspirin EC 81 MG EC tablet Take 1 tablet by mouth daily 90 tablet 0    clotrimazole-betamethasone (LOTRISONE) 1-0.05 % cream Apply topically at bedtime on the feet x 4 weeks 45 g 3    mupirocin (BACTROBAN) 2 % ointment Apply topically 1-2 times daily on the affected area . OK to substitute to cream 30 g 2    NONFORMULARY Bioidentical pain cream      Glucose Blood (BLOOD GLUCOSE TEST STRIPS) STRP tesing once a day fasting 100 strip 3    enzalutamide (XTANDI) 40 MG capsule Take 40 mg by mouth Indications: take four 40 mg capsules daily.  colchicine (COLCRYS) 0.6 MG tablet ADJUST SIG-ONLY AS NEEDED FOR GOUT ATTACK. Take 2 tablets now, then 1 tablet one hour later. Wait 12 hours then start 1 tablet bid for 5 days. 90 tablet 1    albuterol (PROVENTIL) (2.5 MG/3ML) 0.083% nebulizer solution Take 2.5 mg by nebulization 4 times daily       albuterol sulfate  (90 BASE) MCG/ACT inhaler Inhale 2 puffs into the lungs every 6 hours as needed for Wheezing or Shortness of Breath (COUGH) 18 g 0       Succinylcholine chloride and Cymbalta [duloxetine hcl]  Social History     Tobacco Use   Smoking Status Current Every Day Smoker    Packs/day: 1.50    Years: 30.00    Pack years: 45.00    Types: Cigarettes   Smokeless Tobacco Never Used     (Ifpatient a smoker, smoking cessation counseling offered)    Social History     Substance and Sexual Activity   Alcohol Use No    Alcohol/week: 0.0 standard drinks       REVIEW OF SYSTEMS:  Review of Systems    Physical Exam:      Vitals:    07/29/19 0941   BP: (!) 173/91   Pulse: 63   Temp: 97.8 °F (36.6 °C)     Body mass index is 35.8 kg/m². Patient is a 71 y.o. male in no acute distress and alert and oriented to person, place and time. Physical Exam  Constitutional: Patient in no acute distress.   Neuro: Alert and oriented to person, place and

## 2019-07-30 ENCOUNTER — OFFICE VISIT (OUTPATIENT)
Dept: ORTHOPEDIC SURGERY | Age: 64
End: 2019-07-30
Payer: MEDICARE

## 2019-07-30 DIAGNOSIS — Z96.612 S/P REVERSE TOTAL SHOULDER ARTHROPLASTY, LEFT: Primary | ICD-10-CM

## 2019-07-30 PROCEDURE — 3288F FALL RISK ASSESSMENT DOCD: CPT | Performed by: ORTHOPAEDIC SURGERY

## 2019-07-30 PROCEDURE — 1123F ACP DISCUSS/DSCN MKR DOCD: CPT | Performed by: ORTHOPAEDIC SURGERY

## 2019-07-30 PROCEDURE — G8417 CALC BMI ABV UP PARAM F/U: HCPCS | Performed by: ORTHOPAEDIC SURGERY

## 2019-07-30 PROCEDURE — 4040F PNEUMOC VAC/ADMIN/RCVD: CPT | Performed by: ORTHOPAEDIC SURGERY

## 2019-07-30 PROCEDURE — 0518F FALL PLAN OF CARE DOCD: CPT | Performed by: ORTHOPAEDIC SURGERY

## 2019-07-30 PROCEDURE — G8427 DOCREV CUR MEDS BY ELIG CLIN: HCPCS | Performed by: ORTHOPAEDIC SURGERY

## 2019-07-30 PROCEDURE — 99213 OFFICE O/P EST LOW 20 MIN: CPT | Performed by: ORTHOPAEDIC SURGERY

## 2019-07-30 PROCEDURE — 4004F PT TOBACCO SCREEN RCVD TLK: CPT | Performed by: ORTHOPAEDIC SURGERY

## 2019-07-30 PROCEDURE — G8599 NO ASA/ANTIPLAT THER USE RNG: HCPCS | Performed by: ORTHOPAEDIC SURGERY

## 2019-07-30 PROCEDURE — 3017F COLORECTAL CA SCREEN DOC REV: CPT | Performed by: ORTHOPAEDIC SURGERY

## 2019-07-30 NOTE — PROGRESS NOTES
Orthopedic Shoulder Encounter Note     Procedure: Left reverse shoulder arthroplasty; revision left reverse shoulder arthroplasty for dislocation  Date of procedure: 7/18/18; 8/3/18    HPI: Lena Garcia is a 71 y.o.  right-hand dominant male who presents for reevaluation of his left shoulder. Is about a year out from a reverse shoulder arthroplasty. He indicates that he is doing well. The pain that he was having a couple months ago has pretty much resolved. He is able to use his arm for everything he needs to and he indicates that he is really having no issues. He is happy with his shoulder at this time. Previous treatment:    NSAIDs: None    Physical Therapy: No    Injections: None    Surgeries: See above    Review of Systems:     Constitution: no fever or chills   Pain level: 0/10  Musculoskeletal: As noted in the HPI   Neurologic: no neurologic symptoms    Past Medical History  Roxie Grimaldo  has a past medical history of Acid reflux, Arthritis, Chronic bilateral low back pain with bilateral sciatica, Complete tear of left rotator cuff, COPD (chronic obstructive pulmonary disease) ([address] Degenerative disc disease, cervical, Gout, H/O cardiac catheterization, Hyperlipidemia, Hyperlipidemia with target LDL less than 100, Hypertension, Knee pain, chronic, Liver disease, MDRO (multiple drug resistant organisms) resistance, Moderate episode of recurrent major depressive disorder ([address] Obesity, Class III, BMI 40-49.9 (morbid obesity) ([address] REYNALDO on CPAP, Osteoarthritis, Polypharmacy, Positive FIT (fecal immunochemical test), Prolonged emergence from general anesthesia, Prostate cancer ([address] Pseudocholinesterase deficiency, Short of breath on exertion, Sleep apnea, Status post lumbar laminectomy, Stenosis of left carotid artery, Tubular adenoma of colon 4/11/17, Type 2 diabetes mellitus with hyperglycemia, without long-term current use of insulin ([address] Vitamin D deficiency, and Wears glasses.     Past Surgical History  Antonio Sheldon  has a past surgical history that includes knee surgery (Left); lumbar laminectomy (01/05/2017); Tonsillectomy and adenoidectomy; Cardiac catheterization (2007); pr colonoscopy w/biopsy single/multiple (N/A, 5/9/2017); shoulder surgery (Right, 1989?); back surgery; back surgery (01/05/2017); Carpal tunnel release (Bilateral); pr reconstr total shoulder implant (Left, 7/18/2018); pr closed rx shldr disloc,anesthesia (Left, 8/1/2018); and pr tarny shoulder arthrplsty humeral/glenoid compnt (Left, 8/3/2018). Current Medications  Current Outpatient Medications   Medication Sig Dispense Refill    Camphor-Menthol-Methyl Sal 3.1-16-10 % GEL Apply every 8 hours as needed for pain. OK to substitute 1 Tube 1    pregabalin (LYRICA) 150 MG capsule Take 1 capsule by mouth 3 times daily for 90 days.  270 capsule 0    SYMBICORT 160-4.5 MCG/ACT AERO Inhale 2 puffs into the lungs 2 times daily 10.2 inhaler 3 Inhaler 3    Lift Chair MISC by Does not apply route Patient has difficulty getting up from usual chair 1 each 0    lisinopril (PRINIVIL;ZESTRIL) 20 MG tablet Take 1 tablet by mouth daily 90 tablet 3    traZODone (DESYREL) 150 MG tablet Take 1 tablet by mouth nightly 90 tablet 3    folbee plus (FOLBEE PLUS) TABS Take 1 tablet by mouth daily 90 tablet 3    furosemide (LASIX) 20 MG tablet Take 1 tablet by mouth daily as needed (only if BP over 140/90 or leg edema) **stop Tenoretic 50-25** 90 tablet 3    pravastatin (PRAVACHOL) 40 MG tablet Take 1 tablet by mouth every evening **stop Crestor** 90 tablet 3    amLODIPine (NORVASC) 10 MG tablet Take 1 tablet by mouth daily 90 tablet 3    ammonium lactate (AMLACTIN) 12 % cream APPLY TOPICALLY TO LEGS ONE TO TWO TIMES A DAY AS NEEDED 1 Bottle 3    aspirin EC 81 MG EC tablet Take 1 tablet by mouth daily 90 tablet 0    clotrimazole-betamethasone (LOTRISONE) 1-0.05 % cream Apply topically at bedtime on the feet x 4 weeks 45 g 3    mupirocin (BACTROBAN) 2 %

## 2019-07-31 ENCOUNTER — HOSPITAL ENCOUNTER (OUTPATIENT)
Dept: VASCULAR LAB | Age: 64
Discharge: HOME | End: 2019-07-31
Payer: MEDICARE

## 2019-07-31 DIAGNOSIS — I73.9 PVD (PERIPHERAL VASCULAR DISEASE) WITH CLAUDICATION (HCC): ICD-10-CM

## 2019-07-31 PROCEDURE — 93923 UPR/LXTR ART STDY 3+ LVLS: CPT

## 2019-08-15 DIAGNOSIS — E78.5 HYPERLIPIDEMIA WITH TARGET LDL LESS THAN 100: Primary | ICD-10-CM

## 2019-08-15 RX ORDER — CHLORAL HYDRATE 500 MG
1000 CAPSULE ORAL 4 TIMES DAILY
Qty: 120 CAPSULE | Refills: 3
Start: 2019-08-15 | End: 2020-03-09

## 2019-08-29 ENCOUNTER — HOSPITAL ENCOUNTER (OUTPATIENT)
Age: 64
Setting detail: SPECIMEN
Discharge: HOME | End: 2019-08-29
Payer: MEDICARE

## 2019-08-29 LAB
ABSOLUTE EOS #: 0.2 K/UL (ref 0–0.4)
ABSOLUTE IMMATURE GRANULOCYTE: NORMAL K/UL (ref 0–0.3)
ABSOLUTE LYMPH #: 2.8 K/UL (ref 1–4.8)
ABSOLUTE MONO #: 0.4 K/UL (ref 0.1–1.3)
BASOPHILS # BLD: 1 % (ref 0–2)
BASOPHILS ABSOLUTE: 0.1 K/UL (ref 0–0.2)
DIFFERENTIAL TYPE: NORMAL
EOSINOPHILS RELATIVE PERCENT: 2 % (ref 0–4)
HCT VFR BLD CALC: 43.2 % (ref 41–53)
HEMOGLOBIN: 14.6 G/DL (ref 13.5–17.5)
IMMATURE GRANULOCYTES: NORMAL %
IRON: 84 UG/DL (ref 59–158)
LYMPHOCYTES # BLD: 33 % (ref 24–44)
MCH RBC QN AUTO: 31.3 PG (ref 26–34)
MCHC RBC AUTO-ENTMCNC: 33.7 G/DL (ref 31–37)
MCV RBC AUTO: 93 FL (ref 80–100)
MONOCYTES # BLD: 5 % (ref 1–7)
NRBC AUTOMATED: NORMAL PER 100 WBC
PDW BLD-RTO: 13.8 % (ref 11.5–14.9)
PLATELET # BLD: 228 K/UL (ref 150–450)
PLATELET ESTIMATE: NORMAL
PMV BLD AUTO: 9.8 FL (ref 6–12)
RBC # BLD: 4.64 M/UL (ref 4.5–5.9)
RBC # BLD: NORMAL 10*6/UL
SEG NEUTROPHILS: 59 % (ref 36–66)
SEGMENTED NEUTROPHILS ABSOLUTE COUNT: 5 K/UL (ref 1.3–9.1)
WBC # BLD: 8.5 K/UL (ref 3.5–11)
WBC # BLD: NORMAL 10*3/UL

## 2019-08-29 PROCEDURE — 85025 COMPLETE CBC W/AUTO DIFF WBC: CPT

## 2019-08-29 PROCEDURE — 83540 ASSAY OF IRON: CPT

## 2019-08-29 PROCEDURE — 36415 COLL VENOUS BLD VENIPUNCTURE: CPT

## 2019-09-09 ENCOUNTER — OFFICE VISIT (OUTPATIENT)
Dept: GASTROENTEROLOGY | Age: 64
End: 2019-09-09
Payer: MEDICARE

## 2019-09-09 VITALS
WEIGHT: 265 LBS | SYSTOLIC BLOOD PRESSURE: 124 MMHG | DIASTOLIC BLOOD PRESSURE: 65 MMHG | BODY MASS INDEX: 35.94 KG/M2 | HEART RATE: 65 BPM

## 2019-09-09 DIAGNOSIS — D12.6 TUBULAR ADENOMA OF COLON: ICD-10-CM

## 2019-09-09 DIAGNOSIS — K75.2 NONSPECIFIC REACTIVE HEPATITIS: ICD-10-CM

## 2019-09-09 DIAGNOSIS — Z12.11 SCREEN FOR COLON CANCER: ICD-10-CM

## 2019-09-09 DIAGNOSIS — K21.9 GASTROESOPHAGEAL REFLUX DISEASE WITHOUT ESOPHAGITIS: Primary | ICD-10-CM

## 2019-09-09 PROCEDURE — 99213 OFFICE O/P EST LOW 20 MIN: CPT | Performed by: INTERNAL MEDICINE

## 2019-09-09 PROCEDURE — G8417 CALC BMI ABV UP PARAM F/U: HCPCS | Performed by: INTERNAL MEDICINE

## 2019-09-09 PROCEDURE — APPSS15 APP SPLIT SHARED TIME 0-15 MINUTES: Performed by: NURSE PRACTITIONER

## 2019-09-09 PROCEDURE — 3288F FALL RISK ASSESSMENT DOCD: CPT | Performed by: INTERNAL MEDICINE

## 2019-09-09 PROCEDURE — 1123F ACP DISCUSS/DSCN MKR DOCD: CPT | Performed by: INTERNAL MEDICINE

## 2019-09-09 PROCEDURE — G8599 NO ASA/ANTIPLAT THER USE RNG: HCPCS | Performed by: INTERNAL MEDICINE

## 2019-09-09 PROCEDURE — 0518F FALL PLAN OF CARE DOCD: CPT | Performed by: INTERNAL MEDICINE

## 2019-09-09 PROCEDURE — 3017F COLORECTAL CA SCREEN DOC REV: CPT | Performed by: INTERNAL MEDICINE

## 2019-09-09 PROCEDURE — 4040F PNEUMOC VAC/ADMIN/RCVD: CPT | Performed by: INTERNAL MEDICINE

## 2019-09-09 PROCEDURE — G8427 DOCREV CUR MEDS BY ELIG CLIN: HCPCS | Performed by: INTERNAL MEDICINE

## 2019-09-09 PROCEDURE — 4004F PT TOBACCO SCREEN RCVD TLK: CPT | Performed by: INTERNAL MEDICINE

## 2019-09-09 ASSESSMENT — ENCOUNTER SYMPTOMS
GASTROINTESTINAL NEGATIVE: 1
EYES NEGATIVE: 1
ALLERGIC/IMMUNOLOGIC NEGATIVE: 1
RESPIRATORY NEGATIVE: 1

## 2019-09-16 ENCOUNTER — HOSPITAL ENCOUNTER (OUTPATIENT)
Age: 64
Setting detail: SPECIMEN
Discharge: HOME | End: 2019-09-16
Payer: MEDICARE

## 2019-09-16 DIAGNOSIS — E11.42 TYPE 2 DIABETES MELLITUS WITH DIABETIC POLYNEUROPATHY, WITHOUT LONG-TERM CURRENT USE OF INSULIN (HCC): ICD-10-CM

## 2019-09-16 DIAGNOSIS — I10 ESSENTIAL HYPERTENSION: ICD-10-CM

## 2019-09-16 DIAGNOSIS — E78.5 HYPERLIPIDEMIA WITH TARGET LDL LESS THAN 100: ICD-10-CM

## 2019-09-16 LAB
ANION GAP SERPL CALCULATED.3IONS-SCNC: 13 MMOL/L (ref 9–17)
BILIRUBIN URINE: NEGATIVE
BUN BLDV-MCNC: 10 MG/DL (ref 8–23)
BUN/CREAT BLD: ABNORMAL (ref 9–20)
CALCIUM SERPL-MCNC: 10.5 MG/DL (ref 8.6–10.4)
CHLORIDE BLD-SCNC: 103 MMOL/L (ref 98–107)
CHOLESTEROL/HDL RATIO: 4.5
CHOLESTEROL: 201 MG/DL
CO2: 27 MMOL/L (ref 20–31)
COLOR: YELLOW
COMMENT UA: NORMAL
CREAT SERPL-MCNC: 0.61 MG/DL (ref 0.7–1.2)
CREATININE URINE: 7.3 MG/DL (ref 39–259)
FOLATE: >20 NG/ML
GFR AFRICAN AMERICAN: >60 ML/MIN
GFR NON-AFRICAN AMERICAN: >60 ML/MIN
GFR SERPL CREATININE-BSD FRML MDRD: ABNORMAL ML/MIN/{1.73_M2}
GFR SERPL CREATININE-BSD FRML MDRD: ABNORMAL ML/MIN/{1.73_M2}
GLUCOSE BLD-MCNC: 94 MG/DL (ref 70–99)
GLUCOSE URINE: NEGATIVE
HDLC SERPL-MCNC: 45 MG/DL
KETONES, URINE: NEGATIVE
LDL CHOLESTEROL: 107 MG/DL (ref 0–130)
LEUKOCYTE ESTERASE, URINE: NEGATIVE
MICROALBUMIN/CREAT 24H UR: <12 MG/L
MICROALBUMIN/CREAT UR-RTO: ABNORMAL MCG/MG CREAT
NITRITE, URINE: NEGATIVE
PH UA: 6.5 (ref 5–8)
POTASSIUM SERPL-SCNC: 4.1 MMOL/L (ref 3.7–5.3)
PROTEIN UA: NEGATIVE
SODIUM BLD-SCNC: 143 MMOL/L (ref 135–144)
SPECIFIC GRAVITY UA: 1 (ref 1–1.03)
TRIGL SERPL-MCNC: 247 MG/DL
TURBIDITY: CLEAR
URINE HGB: NEGATIVE
UROBILINOGEN, URINE: NORMAL
VITAMIN B-12: 1281 PG/ML (ref 232–1245)
VLDLC SERPL CALC-MCNC: ABNORMAL MG/DL (ref 1–30)

## 2019-09-16 PROCEDURE — 82043 UR ALBUMIN QUANTITATIVE: CPT

## 2019-09-16 PROCEDURE — 82570 ASSAY OF URINE CREATININE: CPT

## 2019-09-16 PROCEDURE — 36415 COLL VENOUS BLD VENIPUNCTURE: CPT

## 2019-09-16 PROCEDURE — 81003 URINALYSIS AUTO W/O SCOPE: CPT

## 2019-09-16 PROCEDURE — 80061 LIPID PANEL: CPT

## 2019-09-16 PROCEDURE — 80048 BASIC METABOLIC PNL TOTAL CA: CPT

## 2019-09-16 PROCEDURE — 82607 VITAMIN B-12: CPT

## 2019-09-16 PROCEDURE — 82746 ASSAY OF FOLIC ACID SERUM: CPT

## 2019-09-24 ENCOUNTER — OFFICE VISIT (OUTPATIENT)
Dept: FAMILY MEDICINE CLINIC | Age: 64
End: 2019-09-24
Payer: MEDICARE

## 2019-09-24 VITALS
SYSTOLIC BLOOD PRESSURE: 138 MMHG | OXYGEN SATURATION: 97 % | BODY MASS INDEX: 36.13 KG/M2 | WEIGHT: 272.6 LBS | DIASTOLIC BLOOD PRESSURE: 78 MMHG | HEIGHT: 73 IN | HEART RATE: 57 BPM

## 2019-09-24 DIAGNOSIS — M54.42 CHRONIC BILATERAL LOW BACK PAIN WITH BILATERAL SCIATICA: ICD-10-CM

## 2019-09-24 DIAGNOSIS — E83.52 HYPERCALCEMIA: ICD-10-CM

## 2019-09-24 DIAGNOSIS — I73.9 PVD (PERIPHERAL VASCULAR DISEASE) WITH CLAUDICATION (HCC): ICD-10-CM

## 2019-09-24 DIAGNOSIS — G89.29 CHRONIC BILATERAL LOW BACK PAIN WITH BILATERAL SCIATICA: ICD-10-CM

## 2019-09-24 DIAGNOSIS — E11.42 TYPE 2 DIABETES MELLITUS WITH DIABETIC POLYNEUROPATHY, WITHOUT LONG-TERM CURRENT USE OF INSULIN (HCC): Primary | ICD-10-CM

## 2019-09-24 DIAGNOSIS — I10 ESSENTIAL HYPERTENSION: ICD-10-CM

## 2019-09-24 DIAGNOSIS — E78.5 HYPERLIPIDEMIA WITH TARGET LDL LESS THAN 100: ICD-10-CM

## 2019-09-24 DIAGNOSIS — M54.41 CHRONIC BILATERAL LOW BACK PAIN WITH BILATERAL SCIATICA: ICD-10-CM

## 2019-09-24 DIAGNOSIS — M79.2 PERIPHERAL NEUROPATHIC PAIN: ICD-10-CM

## 2019-09-24 PROCEDURE — 2022F DILAT RTA XM EVC RTNOPTHY: CPT | Performed by: FAMILY MEDICINE

## 2019-09-24 PROCEDURE — 4040F PNEUMOC VAC/ADMIN/RCVD: CPT | Performed by: FAMILY MEDICINE

## 2019-09-24 PROCEDURE — G8417 CALC BMI ABV UP PARAM F/U: HCPCS | Performed by: FAMILY MEDICINE

## 2019-09-24 PROCEDURE — 3288F FALL RISK ASSESSMENT DOCD: CPT | Performed by: FAMILY MEDICINE

## 2019-09-24 PROCEDURE — 3044F HG A1C LEVEL LT 7.0%: CPT | Performed by: FAMILY MEDICINE

## 2019-09-24 PROCEDURE — 4004F PT TOBACCO SCREEN RCVD TLK: CPT | Performed by: FAMILY MEDICINE

## 2019-09-24 PROCEDURE — 1123F ACP DISCUSS/DSCN MKR DOCD: CPT | Performed by: FAMILY MEDICINE

## 2019-09-24 PROCEDURE — 3017F COLORECTAL CA SCREEN DOC REV: CPT | Performed by: FAMILY MEDICINE

## 2019-09-24 PROCEDURE — 99214 OFFICE O/P EST MOD 30 MIN: CPT | Performed by: FAMILY MEDICINE

## 2019-09-24 PROCEDURE — 0518F FALL PLAN OF CARE DOCD: CPT | Performed by: FAMILY MEDICINE

## 2019-09-24 PROCEDURE — G8599 NO ASA/ANTIPLAT THER USE RNG: HCPCS | Performed by: FAMILY MEDICINE

## 2019-09-24 PROCEDURE — G8427 DOCREV CUR MEDS BY ELIG CLIN: HCPCS | Performed by: FAMILY MEDICINE

## 2019-09-24 RX ORDER — PRAVASTATIN SODIUM 80 MG/1
80 TABLET ORAL EVERY EVENING
Qty: 90 TABLET | Refills: 3 | Status: SHIPPED | OUTPATIENT
Start: 2019-09-24 | End: 2020-07-07 | Stop reason: SDUPTHER

## 2019-09-24 RX ORDER — HYDROCODONE BITARTRATE AND ACETAMINOPHEN 5; 325 MG/1; MG/1
1 TABLET ORAL EVERY 8 HOURS PRN
Qty: 21 TABLET | Refills: 0 | Status: SHIPPED | OUTPATIENT
Start: 2019-09-24 | End: 2019-10-01

## 2019-09-24 RX ORDER — PREGABALIN 200 MG/1
200 CAPSULE ORAL 3 TIMES DAILY
Qty: 270 CAPSULE | Refills: 0 | Status: SHIPPED | OUTPATIENT
Start: 2019-09-24 | End: 2020-01-19 | Stop reason: SDUPTHER

## 2019-09-24 ASSESSMENT — ENCOUNTER SYMPTOMS
APNEA: 1
VOMITING: 0
SHORTNESS OF BREATH: 0
CONSTIPATION: 0
COUGH: 0
ABDOMINAL PAIN: 0
NAUSEA: 0
WHEEZING: 0
ABDOMINAL DISTENTION: 0
CHEST TIGHTNESS: 0
DIARRHEA: 0
BACK PAIN: 1

## 2019-09-24 NOTE — PATIENT INSTRUCTIONS
Lab Results   Component Value Date    WBC 8.5 08/29/2019    HGB 14.6 08/29/2019    HCT 43.2 08/29/2019    MCV 93.0 08/29/2019     08/29/2019       Lab Results   Component Value Date     09/16/2019    K 4.1 09/16/2019     09/16/2019    CO2 27 09/16/2019    BUN 10 09/16/2019    CREATININE 0.61 09/16/2019    GLUCOSE 94 09/16/2019    CALCIUM 10.5 09/16/2019        Lab Results   Component Value Date    ALT 18 04/01/2019    AST 16 04/01/2019    ALKPHOS 117 04/01/2019    BILITOT 0.19 (L) 04/01/2019       Lab Results   Component Value Date    TSH 1.81 04/01/2019       Lab Results   Component Value Date    CHOL 201 (H) 09/16/2019    CHOL 173 08/27/2018    CHOL 132 06/26/2017     Lab Results   Component Value Date    TRIG 247 (H) 09/16/2019    TRIG 137 08/27/2018    TRIG 263 (H) 06/26/2017     Lab Results   Component Value Date    HDL 45 09/16/2019    HDL 40 (L) 08/27/2018    HDL 32 (L) 06/26/2017     Lab Results   Component Value Date    LDLCHOLESTEROL 107 09/16/2019    LDLCHOLESTEROL 106 08/27/2018    LDLCHOLESTEROL 47 06/26/2017     Lab Results   Component Value Date    VLDL NOT REPORTED (H) 09/16/2019    VLDL NOT REPORTED 08/27/2018    VLDL NOT REPORTED 06/26/2017     Lab Results   Component Value Date    CHOLHDLRATIO 4.5 09/16/2019    CHOLHDLRATIO 4.3 08/27/2018    CHOLHDLRATIO 4.1 06/26/2017       Lab Results   Component Value Date    LABA1C 5.4 07/23/2019       Lab Results   Component Value Date    QVMYCAUU30 1281 (H) 09/16/2019       Lab Results   Component Value Date    FOLATE >20.0 09/16/2019       Lab Results   Component Value Date    IRON 84 08/29/2019    TIBC 334 11/07/2018       Lab Results   Component Value Date    VITD25 43.3 07/15/2019       Patient Education        Learning About High Cholesterol  What is high cholesterol? Cholesterol is a type of fat in your blood. It is needed for many body functions, such as making new cells. Cholesterol is made by your body.  It also comes from food

## 2019-09-24 NOTE — PROGRESS NOTES
difficulty getting up from usual chair 1 each 0    lisinopril (PRINIVIL;ZESTRIL) 20 MG tablet Take 1 tablet by mouth daily 90 tablet 3    traZODone (DESYREL) 150 MG tablet Take 1 tablet by mouth nightly 90 tablet 3    folbee plus (FOLBEE PLUS) TABS Take 1 tablet by mouth daily 90 tablet 3    furosemide (LASIX) 20 MG tablet Take 1 tablet by mouth daily as needed (only if BP over 140/90 or leg edema) **stop Tenoretic 50-25** 90 tablet 3    pravastatin (PRAVACHOL) 40 MG tablet Take 1 tablet by mouth every evening **stop Crestor** 90 tablet 3    amLODIPine (NORVASC) 10 MG tablet Take 1 tablet by mouth daily 90 tablet 3    ammonium lactate (AMLACTIN) 12 % cream APPLY TOPICALLY TO LEGS ONE TO TWO TIMES A DAY AS NEEDED 1 Bottle 3    aspirin EC 81 MG EC tablet Take 1 tablet by mouth daily 90 tablet 0    clotrimazole-betamethasone (LOTRISONE) 1-0.05 % cream Apply topically at bedtime on the feet x 4 weeks 45 g 3    mupirocin (BACTROBAN) 2 % ointment Apply topically 1-2 times daily on the affected area . OK to substitute to cream 30 g 2    NONFORMULARY Bioidentical pain cream      Glucose Blood (BLOOD GLUCOSE TEST STRIPS) STRP tesing once a day fasting 100 strip 3    enzalutamide (XTANDI) 40 MG capsule Take 40 mg by mouth Indications: take four 40 mg capsules daily.  colchicine (COLCRYS) 0.6 MG tablet ADJUST SIG-ONLY AS NEEDED FOR GOUT ATTACK. Take 2 tablets now, then 1 tablet one hour later. Wait 12 hours then start 1 tablet bid for 5 days. 90 tablet 1    albuterol (PROVENTIL) (2.5 MG/3ML) 0.083% nebulizer solution Take 2.5 mg by nebulization 4 times daily       albuterol sulfate  (90 BASE) MCG/ACT inhaler Inhale 2 puffs into the lungs every 6 hours as needed for Wheezing or Shortness of Breath (COUGH) 18 g 0    pregabalin (LYRICA) 150 MG capsule Take 1 capsule by mouth 3 times daily for 90 days. 270 capsule 0     No current facility-administered medications for this visit.         Allergies Final    Urinalysis Comments 09/16/2019 Microscopic exam not performed based on chemical results unless requested in original order. Final       Lab Results   Component Value Date    WBC 8.5 08/29/2019    HGB 14.6 08/29/2019    HCT 43.2 08/29/2019    MCV 93.0 08/29/2019     08/29/2019            Lab Results   Component Value Date    ALT 18 04/01/2019    AST 16 04/01/2019    ALKPHOS 117 04/01/2019    BILITOT 0.19 (L) 04/01/2019       Lab Results   Component Value Date    TSH 1.81 04/01/2019       Lab Results   Component Value Date    CHOL 201 (H) 09/16/2019    CHOL 173 08/27/2018    CHOL 132 06/26/2017     Lab Results   Component Value Date    TRIG 247 (H) 09/16/2019    TRIG 137 08/27/2018    TRIG 263 (H) 06/26/2017     Lab Results   Component Value Date    HDL 45 09/16/2019    HDL 40 (L) 08/27/2018    HDL 32 (L) 06/26/2017     Lab Results   Component Value Date    LDLCHOLESTEROL 107 09/16/2019    LDLCHOLESTEROL 106 08/27/2018    LDLCHOLESTEROL 47 06/26/2017     Lab Results   Component Value Date    CHOLHDLRATIO 4.5 09/16/2019    CHOLHDLRATIO 4.3 08/27/2018    CHOLHDLRATIO 4.1 06/26/2017         Lab Results   Component Value Date    [id number] 1281 (H) 09/16/2019     Lab Results   Component Value Date    FOLATE >20.0 09/16/2019     Lab Results   Component Value Date    VITD25 43.3 07/15/2019           ASSESSMENT AND PLAN    1. Type 2 diabetes mellitus with diabetic polyneuropathy, without long-term current use of insulin ([facility]  improved  Lab Results   Component Value Date    LABA1C 5.4 07/23/2019    LABA1C 5.5 04/09/2019    LABA1C 5.1 01/08/2019       - Hemoglobin A1C; Future  - Comprehensive Metabolic Panel; Future  -Dose increased   pregabalin (LYRICA) 200 MG capsule; Take 1 capsule by mouth 3 times daily for 90 days. Dispense: 270 capsule;  Refill: 0  -advised home blood glucose testing  daily  -daily feet exam, Foot care: advised to wash feet daily, pat dry and apply lotion at night, avoiding between Monitoring:    Acute and Chronic Pain Monitoring:   RX Monitoring 9/24/2019   Attestation -   Periodic Controlled Substance Monitoring Possible medication side effects, risk of tolerance/dependence & alternative treatments discussed. ;No signs of potential drug abuse or diversion identified. ;Assessed functional status. Chronic Pain > 50 MEDD Obtained or confirmed \"Consent for Opioid Use\" on file. Chronic Pain > 80 MEDD -           Data Unavailable    Orders Placed This Encounter   Procedures    Hemoglobin A1C     Standing Status:   Future     Standing Expiration Date:   9/23/2020    PTH, INTACT WITH IONIZED CALCIUM     Standing Status:   Future     Standing Expiration Date:   9/23/2020    Comprehensive Metabolic Panel     Standing Status:   Future     Standing Expiration Date:   9/24/2020       Medications Discontinued During This Encounter   Medication Reason    pravastatin (PRAVACHOL) 40 MG tablet REORDER    HYDROcodone-acetaminophen (NORCO) 5-325 MG per tablet Liz Mckeon received counseling on the following healthy behaviors: nutrition, exercise, medication adherence, tobacco cessation and weight loss    Reviewed prior labs and health maintenance  Continue current medications, diet and exercise. Discussed use, benefit, and side effects of prescribed medications. Barriers to medication compliance addressed. Patient given educational materials - see patient instructions  Was a self-tracking handout given in paper form or via [employer]? Yes    Requested Prescriptions     Signed Prescriptions Disp Refills    pravastatin (PRAVACHOL) 80 MG tablet 90 tablet 3     Sig: Take 1 tablet by mouth every evening Dose increased  9/24/2019    HYDROcodone-acetaminophen (NORCO) 5-325 MG per tablet 21 tablet 0     Sig: Take 1 tablet by mouth every 8 hours as needed for Pain for up to 7 days.  Take lowest dose possible to manage pain    pregabalin (LYRICA) 200 MG capsule 270 capsule 0     Sig: Take 1 capsule by mouth 3 times daily for 90 days. All patient questions answered. Patient voiced understanding. Quality Measures    Body mass index is 35.97 kg/m². Elevated. Weight control planned discussed conventional weight loss and Healthy diet and regular exercise. BP: 138/78. Blood pressure is normal. Treatment plan consists of Weight Reduction, DASH Eating Plan, Dietary Sodium Restriction, Increased Physical Activity, Avoid Tobacco and Second-hand Smoke, Patient In-home Blood Pressure Monitoring and No treatment change needed. Fall Risk 7/23/2019   2 or more falls in past year? yes   Fall with injury in past year? yes     The patient has a history of falls. I did , complete a risk assessment for falls. A plan of care for falls in-office gait and balance testing performed using The Timed Up and Go Test was positive for increased falls risk, home safety tips provided, patient declines any further evaluation/treatment for increased falls risk    Lab Results   Component Value Date    LDLCHOLESTEROL 107 09/16/2019    (goal LDL reduction with dx if diabetes is 50% LDL reduction)      Negative depression screening. PHQ Scores 1/8/2019 9/4/2018 3/1/2018 1/2/2018 5/4/2017 3/23/2017   PHQ2 Score 0 0 3 6 3 4   PHQ9 Score 0 0 14 17 10 15     Interpretation of Total Score Depression Severity: 1-4 = Minimal depression, 5-9 = Mild depression, 10-14 = Moderate depression, 15-19 = Moderately severe depression, 20-27 = Severe depression        The patient's past medical,surgical, social, and family history as well as his   current medications and allergies were reviewed as documented in today's encounter. Medications, labs, diagnostic studies, consultations and follow-up asdocumented in this encounter. Return in about 4 months (around 1/24/2020) for Needs 30 mins ONLY FOR F/U chronic pb., **POC A1C, DM2, HTN, HLP, LABS F/U. Patient was seen with total face to face time of  25 minutes.  More than 50% of this visit was counseling and education. Future Appointments   Date Time Provider Keri English   11/4/2019  9:40 AM [facility]   1/28/2020 10:00 AM [facility]   7/30/2020  8:45 AM Manfred Vieyra MD [facility]       This note was completed by using the assistance of a speech-recognition program. However, inadvertent computerized transcription errors may be present. Although every effort was made to ensure accuracy, no guarantees can be provided that every mistake has been identified and corrected by editing .     Electronically signed by Kanwal Hernandez MD on 9/29/2019 at 2:48 PM

## 2019-09-29 PROBLEM — E88.09 PSEUDOCHOLINESTERASE DEFICIENCY: Status: ACTIVE | Noted: 2017-03-25

## 2019-10-29 ENCOUNTER — HOSPITAL ENCOUNTER (OUTPATIENT)
Age: 64
Setting detail: SPECIMEN
Discharge: HOME | End: 2019-10-29
Payer: MEDICARE

## 2019-10-29 DIAGNOSIS — C61 PROSTATE CANCER (HCC): ICD-10-CM

## 2019-10-29 LAB
PROSTATE SPECIFIC ANTIGEN: <0.01 UG/L
TESTOSTERONE TOTAL: 3 NG/DL (ref 220–1000)

## 2019-11-04 ENCOUNTER — OFFICE VISIT (OUTPATIENT)
Dept: UROLOGY | Age: 64
End: 2019-11-04
Payer: MEDICARE

## 2019-11-04 VITALS
TEMPERATURE: 97.7 F | WEIGHT: 276 LBS | SYSTOLIC BLOOD PRESSURE: 132 MMHG | HEART RATE: 63 BPM | HEIGHT: 73 IN | BODY MASS INDEX: 36.58 KG/M2 | DIASTOLIC BLOOD PRESSURE: 72 MMHG

## 2019-11-04 DIAGNOSIS — C61 PROSTATE CANCER (HCC): Primary | ICD-10-CM

## 2019-11-04 DIAGNOSIS — R35.0 FREQUENCY OF MICTURITION: ICD-10-CM

## 2019-11-04 DIAGNOSIS — R23.2 HOT FLASHES: ICD-10-CM

## 2019-11-04 DIAGNOSIS — R35.1 NOCTURIA: ICD-10-CM

## 2019-11-04 PROCEDURE — 0518F FALL PLAN OF CARE DOCD: CPT | Performed by: UROLOGY

## 2019-11-04 PROCEDURE — G8428 CUR MEDS NOT DOCUMENT: HCPCS | Performed by: UROLOGY

## 2019-11-04 PROCEDURE — G8417 CALC BMI ABV UP PARAM F/U: HCPCS | Performed by: UROLOGY

## 2019-11-04 PROCEDURE — 96402 CHEMO HORMON ANTINEOPL SQ/IM: CPT | Performed by: UROLOGY

## 2019-11-04 PROCEDURE — G8484 FLU IMMUNIZE NO ADMIN: HCPCS | Performed by: UROLOGY

## 2019-11-04 PROCEDURE — 4004F PT TOBACCO SCREEN RCVD TLK: CPT | Performed by: UROLOGY

## 2019-11-04 PROCEDURE — 4040F PNEUMOC VAC/ADMIN/RCVD: CPT | Performed by: UROLOGY

## 2019-11-04 PROCEDURE — 99214 OFFICE O/P EST MOD 30 MIN: CPT | Performed by: UROLOGY

## 2019-11-04 PROCEDURE — 3288F FALL RISK ASSESSMENT DOCD: CPT | Performed by: UROLOGY

## 2019-11-04 PROCEDURE — 1123F ACP DISCUSS/DSCN MKR DOCD: CPT | Performed by: UROLOGY

## 2019-11-04 PROCEDURE — 3017F COLORECTAL CA SCREEN DOC REV: CPT | Performed by: UROLOGY

## 2019-11-04 PROCEDURE — G8599 NO ASA/ANTIPLAT THER USE RNG: HCPCS | Performed by: UROLOGY

## 2019-11-04 ASSESSMENT — ENCOUNTER SYMPTOMS
VOMITING: 0
BACK PAIN: 0
NAUSEA: 0
COLOR CHANGE: 0
WHEEZING: 0
EYE REDNESS: 0
COUGH: 0
EYE PAIN: 0
ABDOMINAL PAIN: 0
SHORTNESS OF BREATH: 0

## 2019-11-23 ENCOUNTER — PATIENT MESSAGE (OUTPATIENT)
Dept: FAMILY MEDICINE CLINIC | Age: 64
End: 2019-11-23

## 2019-11-25 ENCOUNTER — PATIENT MESSAGE (OUTPATIENT)
Dept: FAMILY MEDICINE CLINIC | Age: 64
End: 2019-11-25

## 2019-12-05 LAB
LEFT VENTRICULAR EJECTION FRACTION MODE: NORMAL
LV EF: 60 % (ref 60–65)

## 2019-12-10 ENCOUNTER — HOSPITAL ENCOUNTER (OUTPATIENT)
Age: 64
Setting detail: SPECIMEN
Discharge: HOME | End: 2019-12-10
Payer: MEDICARE

## 2019-12-10 DIAGNOSIS — C61 PROSTATE CANCER (HCC): ICD-10-CM

## 2019-12-10 LAB
PROSTATE SPECIFIC ANTIGEN: <0.01 UG/L
TESTOSTERONE TOTAL: <3 NG/DL (ref 220–1000)

## 2019-12-10 PROCEDURE — 36415 COLL VENOUS BLD VENIPUNCTURE: CPT

## 2019-12-10 PROCEDURE — 84403 ASSAY OF TOTAL TESTOSTERONE: CPT

## 2019-12-10 PROCEDURE — 84153 ASSAY OF PSA TOTAL: CPT

## 2019-12-23 ENCOUNTER — HOSPITAL ENCOUNTER (OUTPATIENT)
Age: 64
Setting detail: SPECIMEN
Discharge: HOME | End: 2019-12-23
Payer: MEDICARE

## 2019-12-23 ENCOUNTER — OFFICE VISIT (OUTPATIENT)
Dept: UROLOGY | Age: 64
End: 2019-12-23
Payer: MEDICARE

## 2019-12-23 VITALS
SYSTOLIC BLOOD PRESSURE: 122 MMHG | HEART RATE: 62 BPM | WEIGHT: 275.57 LBS | DIASTOLIC BLOOD PRESSURE: 63 MMHG | TEMPERATURE: 98 F | HEIGHT: 73 IN | BODY MASS INDEX: 36.52 KG/M2

## 2019-12-23 DIAGNOSIS — R35.0 FREQUENCY OF MICTURITION: ICD-10-CM

## 2019-12-23 DIAGNOSIS — C61 PROSTATE CANCER (HCC): Primary | ICD-10-CM

## 2019-12-23 DIAGNOSIS — R23.2 HOT FLASHES: ICD-10-CM

## 2019-12-23 DIAGNOSIS — R35.1 NOCTURIA: ICD-10-CM

## 2019-12-23 LAB
-: NORMAL
CALCIUM SERPL-MCNC: 9.8 MG/DL (ref 8.6–10.4)
REASON FOR REJECTION: NORMAL
VITAMIN D 25-HYDROXY: 40.8 NG/ML (ref 30–100)
ZZ NTE CLEAN UP: ORDERED TEST: NORMAL
ZZ NTE WITH NAME CLEAN UP: SPECIMEN SOURCE: NORMAL

## 2019-12-23 PROCEDURE — G8427 DOCREV CUR MEDS BY ELIG CLIN: HCPCS | Performed by: UROLOGY

## 2019-12-23 PROCEDURE — 0518F FALL PLAN OF CARE DOCD: CPT | Performed by: UROLOGY

## 2019-12-23 PROCEDURE — 99214 OFFICE O/P EST MOD 30 MIN: CPT | Performed by: UROLOGY

## 2019-12-23 PROCEDURE — 3017F COLORECTAL CA SCREEN DOC REV: CPT | Performed by: UROLOGY

## 2019-12-23 PROCEDURE — 36415 COLL VENOUS BLD VENIPUNCTURE: CPT

## 2019-12-23 PROCEDURE — 4004F PT TOBACCO SCREEN RCVD TLK: CPT | Performed by: UROLOGY

## 2019-12-23 PROCEDURE — 4040F PNEUMOC VAC/ADMIN/RCVD: CPT | Performed by: UROLOGY

## 2019-12-23 PROCEDURE — G8417 CALC BMI ABV UP PARAM F/U: HCPCS | Performed by: UROLOGY

## 2019-12-23 PROCEDURE — 3288F FALL RISK ASSESSMENT DOCD: CPT | Performed by: UROLOGY

## 2019-12-23 PROCEDURE — G8484 FLU IMMUNIZE NO ADMIN: HCPCS | Performed by: UROLOGY

## 2019-12-23 PROCEDURE — 82306 VITAMIN D 25 HYDROXY: CPT

## 2019-12-23 PROCEDURE — G8598 ASA/ANTIPLAT THER USED: HCPCS | Performed by: UROLOGY

## 2019-12-23 PROCEDURE — 1123F ACP DISCUSS/DSCN MKR DOCD: CPT | Performed by: UROLOGY

## 2019-12-23 PROCEDURE — 82310 ASSAY OF CALCIUM: CPT

## 2019-12-23 RX ORDER — CLOPIDOGREL BISULFATE 75 MG/1
1 TABLET ORAL DAILY
COMMUNITY
Start: 2019-12-18 | End: 2020-01-17

## 2019-12-23 ASSESSMENT — ENCOUNTER SYMPTOMS
WHEEZING: 0
COLOR CHANGE: 0
BACK PAIN: 0
VOMITING: 0
EYE PAIN: 0
ABDOMINAL PAIN: 0
COUGH: 0
SHORTNESS OF BREATH: 0
EYE REDNESS: 0
NAUSEA: 0

## 2020-01-15 ENCOUNTER — OFFICE VISIT (OUTPATIENT)
Dept: ORTHOPEDIC SURGERY | Age: 65
End: 2020-01-15
Payer: MEDICARE

## 2020-01-15 PROCEDURE — 99213 OFFICE O/P EST LOW 20 MIN: CPT | Performed by: ORTHOPAEDIC SURGERY

## 2020-01-15 ASSESSMENT — PROMIS GLOBAL HEALTH SCALE
SUM OF RESPONSES TO QUESTIONS 2, 4, 5, & 10: 11
IN GENERAL, WOULD YOU SAY YOUR HEALTH IS...[ON A SCALE OF 1 (POOR) TO 5 (EXCELLENT)]: 2
SUM OF RESPONSES TO QUESTIONS 3, 6, 7, & 8: 15
IN GENERAL, PLEASE RATE HOW WELL YOU CARRY OUT YOUR USUAL SOCIAL ACTIVITIES (INCLUDES ACTIVITIES AT HOME, AT WORK, AND IN YOUR COMMUNITY, AND RESPONSIBILITIES AS A PARENT, CHILD, SPOUSE, EMPLOYEE, FRIEND, ETC) [ON A SCALE OF 1 (POOR) TO 5 (EXCELLENT)]?: 4
IN GENERAL, HOW WOULD YOU RATE YOUR SATISFACTION WITH YOUR SOCIAL ACTIVITIES AND RELATIONSHIPS [ON A SCALE OF 1 (POOR) TO 5 (EXCELLENT)]?: 4
IN THE PAST 7 DAYS, HOW WOULD YOU RATE YOUR FATIGUE ON AVERAGE [ON A SCALE FROM 1 (NONE) TO 5 (VERY SEVERE)]?: 2
IN THE PAST 7 DAYS, HOW OFTEN HAVE YOU BEEN BOTHERED BY EMOTIONAL PROBLEMS, SUCH AS FEELING ANXIOUS, DEPRESSED, OR IRRITABLE [ON A SCALE FROM 1 (NEVER) TO 5 (ALWAYS)]?: 3
HOW IS THE PROMIS V1.1 BEING ADMINISTERED?: 0
WHO IS THE PERSON COMPLETING THE PROMIS V1.1 SURVEY?: 0
IN THE PAST 7 DAYS, HOW WOULD YOU RATE YOUR PAIN ON AVERAGE [ON A SCALE FROM 0 (NO PAIN) TO 10 (WORST IMAGINABLE PAIN)]?: 8
IN GENERAL, HOW WOULD YOU RATE YOUR MENTAL HEALTH, INCLUDING YOUR MOOD AND YOUR ABILITY TO THINK [ON A SCALE OF 1 (POOR) TO 5 (EXCELLENT)]?: 2
IN GENERAL, HOW WOULD YOU RATE YOUR PHYSICAL HEALTH [ON A SCALE OF 1 (POOR) TO 5 (EXCELLENT)]?: 2
IN GENERAL, WOULD YOU SAY YOUR QUALITY OF LIFE IS...[ON A SCALE OF 1 (POOR) TO 5 (EXCELLENT)]: 2
TO WHAT EXTENT ARE YOU ABLE TO CARRY OUT YOUR EVERYDAY PHYSICAL ACTIVITIES SUCH AS WALKING, CLIMBING STAIRS, CARRYING GROCERIES, OR MOVING A CHAIR [ON A SCALE OF 1 (NOT AT ALL) TO 5 (COMPLETELY)]?: 3

## 2020-01-15 ASSESSMENT — KOOS JR
STANDING UPRIGHT: 3
RISING FROM SITTING: 3
TWISING OR PIVOTING ON KNEE: 3
HOW SEVERE IS YOUR KNEE STIFFNESS AFTER FIRST WAKING IN MORNING: 4
STRAIGHTENING KNEE FULLY: 4
BENDING TO THE FLOOR TO PICK UP OBJECT: 4
GOING UP OR DOWN STAIRS: 3

## 2020-01-15 NOTE — PROGRESS NOTES
3. 1-16-10 % GEL, Apply every 8 hours as needed for pain. OK to substitute, Disp: 1 Tube, Rfl: 1    pregabalin (LYRICA) 150 MG capsule, Take 1 capsule by mouth 3 times daily for 90 days. , Disp: 270 capsule, Rfl: 0    SYMBICORT 160-4.5 MCG/ACT AERO, Inhale 2 puffs into the lungs 2 times daily 10.2 inhaler, Disp: 3 Inhaler, Rfl: 3    Lift Chair MISC, by Does not apply route Patient has difficulty getting up from usual chair, Disp: 1 each, Rfl: 0    lisinopril (PRINIVIL;ZESTRIL) 20 MG tablet, Take 1 tablet by mouth daily, Disp: 90 tablet, Rfl: 3    traZODone (DESYREL) 150 MG tablet, Take 1 tablet by mouth nightly, Disp: 90 tablet, Rfl: 3    folbee plus (FOLBEE PLUS) TABS, Take 1 tablet by mouth daily, Disp: 90 tablet, Rfl: 3    furosemide (LASIX) 20 MG tablet, Take 1 tablet by mouth daily as needed (only if BP over 140/90 or leg edema) **stop Tenoretic 50-25**, Disp: 90 tablet, Rfl: 3    amLODIPine (NORVASC) 10 MG tablet, Take 1 tablet by mouth daily, Disp: 90 tablet, Rfl: 3    ammonium lactate (AMLACTIN) 12 % cream, APPLY TOPICALLY TO LEGS ONE TO TWO TIMES A DAY AS NEEDED, Disp: 1 Bottle, Rfl: 3    aspirin EC 81 MG EC tablet, Take 1 tablet by mouth daily, Disp: 90 tablet, Rfl: 0    clotrimazole-betamethasone (LOTRISONE) 1-0.05 % cream, Apply topically at bedtime on the feet x 4 weeks, Disp: 45 g, Rfl: 3    mupirocin (BACTROBAN) 2 % ointment, Apply topically 1-2 times daily on the affected area . OK to substitute to cream, Disp: 30 g, Rfl: 2    NONFORMULARY, Bioidentical pain cream, Disp: , Rfl:     Glucose Blood (BLOOD GLUCOSE TEST STRIPS) STRP, tesing once a day fasting, Disp: 100 strip, Rfl: 3    colchicine (COLCRYS) 0.6 MG tablet, ADJUST SIG-ONLY AS NEEDED FOR GOUT ATTACK. Take 2 tablets now, then 1 tablet one hour later. Wait 12 hours then start 1 tablet bid for 5 days. , Disp: 90 tablet, Rfl: 1    albuterol (PROVENTIL) (2.5 MG/3ML) 0.083% nebulizer solution, Take 2.5 mg by nebulization 4 times daily Chronic bilateral low back pain with bilateral sciatica 11/3/2016    Complete tear of left rotator cuff 7/18/2018    COPD (chronic obstructive pulmonary disease) (HCC)     Degenerative disc disease, cervical 7/28/2019    Gout     H/O cardiac catheterization     yrs ago   no stents    Hyperlipidemia     Hyperlipidemia with target LDL less than 100 1/20/2016    Hypertension     Knee pain, chronic     left    Liver disease     MDRO (multiple drug resistant organisms) resistance     Moderate episode of recurrent major depressive disorder ([address] 1/20/2016    Obesity, Class III, BMI 40-49.9 (morbid obesity) ([address] 1/20/2016    REYNALDO on CPAP 5/6/2017    Osteoarthritis     Polypharmacy 3/25/2017    Positive FIT (fecal immunochemical test) 4/11/2017    Prolonged emergence from general anesthesia 01/05/2017    Patient \"on life support for 3 days\" after back surgery due to being given succinylcholine    Prostate cancer ([address] 10/2013    finished radiation tx 5/2014    Pseudocholinesterase deficiency 03/25/2017    Pt.  \"on life support\" for 3 days after back surgery 1/5/17 after being given succinylcholine    Short of breath on exertion     Sleep apnea     uses CPAP machine nightly    Status post lumbar laminectomy 3/25/2017    Stenosis of left carotid artery 10/7/2018    Tubular adenoma of colon 4/11/17 5/13/2017    Type 2 diabetes mellitus with hyperglycemia, without long-term current use of insulin ([address] 3/25/2017    Lab Results Component Value Date  LABA1C 7.1 (H) 03/23/2017     Vitamin D deficiency 3/25/2017    Wears glasses      Past Surgical History:   Procedure Laterality Date    BACK SURGERY      x 2     BACK SURGERY  01/05/2017    lumbar laminectomy L2, L3, L4    CARDIAC CATHETERIZATION  2007    no stents    CAROTID ENDARTERECTOMY  12/16/2019    CARPAL TUNNEL RELEASE Bilateral     KNEE SURGERY Left     LUMBAR LAMINECTOMY  01/05/2017    L2-L4    OK CLOSED RX SHLDR DISLOC,ANESTHESIA Left 8/1/2018    SHOULDER CLOSED REDUCTION WITH C-ARM VISUALIZATION performed by Joann Vincent MD at [address] [facility] W/BIOPSY SINGLE/MULTIPLE N/A 5/9/2017    COLONOSCOPY WITH BIOPSY performed by Renny Johnson DO at [address] [facility] [address] [facility] [address] IMPLANT Left 7/18/2018    SHOULDER TOTAL ARTHROPLASTY REVERSE LEFT DJO & BICEP TENDON TRANSFER performed by Joann Vincent MD at [address] HUMERAL/GLENOID COMPNT Left 8/3/2018    SHOULDER TOTAL REVERSE  ARTHROPLASTY REVISION performed by Joann Vincent MD at [address]? rotator cuff repair    TONSILLECTOMY AND ADENOIDECTOMY       Family History   Problem Relation Age of Onset    Diabetes Mother     Lung Cancer Brother     Liver Cancer Brother     Cancer Father           Provider Attestation:  I, Matilde Zamora, personally performed the services described in this documentation. All medical record entries made by the scribe were at my direction and in my presence. I have reviewed the chart and discharge instructions and agree that the records reflect my personal performance and is accurate and complete. Matilde Zamora MD. 01/15/20      Please note that this chart was generated using voice recognition Dragon dictation software. Although every effort was made to ensure the accuracy of this automated transcription, some errors in transcription may have occurred.

## 2020-01-17 ENCOUNTER — HOSPITAL ENCOUNTER (OUTPATIENT)
Age: 65
Setting detail: SPECIMEN
Discharge: HOME | End: 2020-01-17
Payer: MEDICARE

## 2020-01-17 PROBLEM — E21.3 HYPERPARATHYROIDISM (HCC): Status: ACTIVE | Noted: 2020-01-17

## 2020-01-17 LAB
ALBUMIN SERPL-MCNC: 4.2 G/DL (ref 3.5–5.2)
ALBUMIN/GLOBULIN RATIO: 1.3 (ref 1–2.5)
ALP BLD-CCNC: 118 U/L (ref 40–129)
ALT SERPL-CCNC: 33 U/L (ref 5–41)
ANION GAP SERPL CALCULATED.3IONS-SCNC: 16 MMOL/L (ref 9–17)
AST SERPL-CCNC: 25 U/L
BILIRUB SERPL-MCNC: 0.27 MG/DL (ref 0.3–1.2)
BUN BLDV-MCNC: 8 MG/DL (ref 8–23)
BUN/CREAT BLD: ABNORMAL (ref 9–20)
CALCIUM IONIZED: 1.34 MMOL/L (ref 1.13–1.33)
CALCIUM SERPL-MCNC: 10 MG/DL (ref 8.6–10.4)
CHLORIDE BLD-SCNC: 104 MMOL/L (ref 98–107)
CO2: 24 MMOL/L (ref 20–31)
CREAT SERPL-MCNC: 0.59 MG/DL (ref 0.7–1.2)
ESTIMATED AVERAGE GLUCOSE: 114 MG/DL
GFR AFRICAN AMERICAN: >60 ML/MIN
GFR NON-AFRICAN AMERICAN: >60 ML/MIN
GFR SERPL CREATININE-BSD FRML MDRD: ABNORMAL ML/MIN/{1.73_M2}
GFR SERPL CREATININE-BSD FRML MDRD: ABNORMAL ML/MIN/{1.73_M2}
GLUCOSE BLD-MCNC: 89 MG/DL (ref 70–99)
HBA1C MFR BLD: 5.6 % (ref 4–6)
POTASSIUM SERPL-SCNC: 4 MMOL/L (ref 3.7–5.3)
PTH INTACT: 69.28 PG/ML (ref 15–65)
SODIUM BLD-SCNC: 144 MMOL/L (ref 135–144)
TOTAL PROTEIN: 7.5 G/DL (ref 6.4–8.3)

## 2020-01-20 RX ORDER — PREGABALIN 200 MG/1
200 CAPSULE ORAL 3 TIMES DAILY
Qty: 270 CAPSULE | Refills: 0 | Status: SHIPPED | OUTPATIENT
Start: 2020-01-20 | End: 2020-04-20 | Stop reason: SDUPTHER

## 2020-01-22 ENCOUNTER — TELEPHONE (OUTPATIENT)
Dept: ONCOLOGY | Age: 65
End: 2020-01-22

## 2020-01-28 ENCOUNTER — OFFICE VISIT (OUTPATIENT)
Dept: FAMILY MEDICINE CLINIC | Age: 65
End: 2020-01-28
Payer: MEDICARE

## 2020-01-28 VITALS
OXYGEN SATURATION: 95 % | BODY MASS INDEX: 39.06 KG/M2 | SYSTOLIC BLOOD PRESSURE: 128 MMHG | DIASTOLIC BLOOD PRESSURE: 72 MMHG | HEIGHT: 72 IN | HEART RATE: 63 BPM | WEIGHT: 288.4 LBS

## 2020-01-28 LAB — HBA1C MFR BLD: 5.5 %

## 2020-01-28 PROCEDURE — 90653 IIV ADJUVANT VACCINE IM: CPT | Performed by: FAMILY MEDICINE

## 2020-01-28 PROCEDURE — G0296 VISIT TO DETERM LDCT ELIG: HCPCS | Performed by: FAMILY MEDICINE

## 2020-01-28 PROCEDURE — G0444 DEPRESSION SCREEN ANNUAL: HCPCS | Performed by: FAMILY MEDICINE

## 2020-01-28 PROCEDURE — 83036 HEMOGLOBIN GLYCOSYLATED A1C: CPT | Performed by: FAMILY MEDICINE

## 2020-01-28 PROCEDURE — 99214 OFFICE O/P EST MOD 30 MIN: CPT | Performed by: FAMILY MEDICINE

## 2020-01-28 PROCEDURE — G0008 ADMIN INFLUENZA VIRUS VAC: HCPCS | Performed by: FAMILY MEDICINE

## 2020-01-28 RX ORDER — VENLAFAXINE 50 MG/1
50 TABLET ORAL 2 TIMES DAILY
Qty: 60 TABLET | Refills: 0 | Status: SHIPPED | OUTPATIENT
Start: 2020-01-28 | End: 2020-03-18 | Stop reason: ALTCHOICE

## 2020-01-28 RX ORDER — FUROSEMIDE 20 MG/1
20 TABLET ORAL DAILY PRN
Qty: 90 TABLET | Refills: 3 | Status: SHIPPED | OUTPATIENT
Start: 2020-01-28 | End: 2020-03-25 | Stop reason: DRUGHIGH

## 2020-01-28 RX ORDER — LISINOPRIL 20 MG/1
20 TABLET ORAL DAILY
Qty: 90 TABLET | Refills: 3 | Status: SHIPPED | OUTPATIENT
Start: 2020-01-28 | End: 2020-03-18 | Stop reason: SDUPTHER

## 2020-01-28 RX ORDER — AMLODIPINE BESYLATE 10 MG/1
10 TABLET ORAL DAILY
Qty: 90 TABLET | Refills: 3 | Status: SHIPPED | OUTPATIENT
Start: 2020-01-28 | End: 2020-10-13 | Stop reason: SDUPTHER

## 2020-01-28 RX ORDER — CLOPIDOGREL BISULFATE 75 MG/1
75 TABLET ORAL DAILY
COMMUNITY
End: 2020-03-25 | Stop reason: ALTCHOICE

## 2020-01-28 ASSESSMENT — PATIENT HEALTH QUESTIONNAIRE - PHQ9
SUM OF ALL RESPONSES TO PHQ QUESTIONS 1-9: 17
8. MOVING OR SPEAKING SO SLOWLY THAT OTHER PEOPLE COULD HAVE NOTICED. OR THE OPPOSITE, BEING SO FIGETY OR RESTLESS THAT YOU HAVE BEEN MOVING AROUND A LOT MORE THAN USUAL: 3
5. POOR APPETITE OR OVEREATING: 1
SUM OF ALL RESPONSES TO PHQ9 QUESTIONS 1 & 2: 6
9. THOUGHTS THAT YOU WOULD BE BETTER OFF DEAD, OR OF HURTING YOURSELF: 0
1. LITTLE INTEREST OR PLEASURE IN DOING THINGS: 3
4. FEELING TIRED OR HAVING LITTLE ENERGY: 3
2. FEELING DOWN, DEPRESSED OR HOPELESS: 3
6. FEELING BAD ABOUT YOURSELF - OR THAT YOU ARE A FAILURE OR HAVE LET YOURSELF OR YOUR FAMILY DOWN: 0
3. TROUBLE FALLING OR STAYING ASLEEP: 1
7. TROUBLE CONCENTRATING ON THINGS, SUCH AS READING THE NEWSPAPER OR WATCHING TELEVISION: 3
SUM OF ALL RESPONSES TO PHQ QUESTIONS 1-9: 17

## 2020-01-28 ASSESSMENT — ENCOUNTER SYMPTOMS
COUGH: 0
DIARRHEA: 0
SHORTNESS OF BREATH: 1
ABDOMINAL DISTENTION: 0
VOMITING: 0
BACK PAIN: 1
CHEST TIGHTNESS: 0
NAUSEA: 0
WHEEZING: 0
ABDOMINAL PAIN: 0
CONSTIPATION: 0

## 2020-01-28 NOTE — PROGRESS NOTES
smokeless tobacco.     Ready to quit: No  Counseling given: Yes      Daily Aspirin? Yes      A1c is improved    Lab Results   Component Value Date    LABA1C 5.5 01/28/2020    LABA1C 5.6 01/17/2020    LABA1C 5.4 07/23/2019       Urine microabumin is Normal less than 12 on 9/16/2019       LDL is Elevated. Lab Results   Component Value Date    LDLCHOLESTEROL 107 09/16/2019           Hypertension :  He is not  exercising and is adherent to low salt diet. Blood pressure is well controlled at home. Cardiac symptoms occasional  Claudication, dyspnea on exertion and fatigue at baseline. Patient denies  chest pain, chest pressure/discomfort, dyspnea, exertional chest pressure/discomfort, irregular heart beat, lower extremity edema, near-syncope, orthopnea, palpitations, paroxysmal nocturnal dyspnea, syncope and tachypnea. Use of agents associated with hypertension: none. History of target organ damage: peripheral artery disease and CAD. BP controlled. Valentín Reis reports compliance with BP medications, and tolerates them well, denies side effects. BP Readings from Last 3 Encounters:   01/28/20 128/72   12/23/19 122/63   11/04/19 132/72        Pulse is Normal.    Pulse Readings from Last 3 Encounters:   01/28/20 63   12/23/19 62   11/04/19 63     PVD  Denies cramps at rest, but occasionally gets cramps in the right lower extremity , relieved with rest  Saw Dr. Jessa Nur recently , will retest him in FEB 2020    Dr. Jessa Nur did \"right carotid cleaning\" was at 73% on 12/16/19 7/31/19-RLE PVD  Summary        Evidence of severe iliac inflow disease involving right lower extremity.    Normal PVR at rest of the left lower extremity.    Severe small vessel and/or vasospastic disease of the digits.             Hyperlipidemia:  No new myalgias or GI upset on pravastatin (Pravachol) and fish oil. Medication compliance: compliant all of the time. Patient is  following a low fat, low cholesterol diet.      LDL is having little energy: Nearly every day  Poor appetite or overeating: Several days  Feeling bad about yourself - or that you are a failure or have let yourself or your family down: Not at all  Trouble concentrating on things, such as reading the newspaper or watching television: Nearly every day  Moving or speaking so slowly that other people could have noticed. Or the opposite - being so fidgety or restless that you have been moving around a lot more than usual: Nearly every day  Thoughts that you would be better off dead, or of hurting yourself in some way: Not at all  PHQ-9 Completed?: Complete  PHQ-9 Total Score: 17       [x]15-19 = Moderately severe depression      PHQ Scores 1/28/2020 1/8/2019 9/4/2018 3/1/2018 1/2/2018 5/4/2017 3/23/2017   PHQ2 Score 6 0 0 3 6 3 4   PHQ9 Score 17 0 0 14 17 10 15       Patient has history of pseudocholinesterase deficiency, he cannot have anesthesia with succinylcholine. Had prolonged emergence from general anesthesia in 2017, and he was kept on life support for 3 days due to succinylcholine    Duncan Odor is due for Shingles vaccine. his  indication is age over 48. Benefits of getting immunization against shingles discussed, and patient is agreeable. he  denies side effects to prior immunizations. Duncan Odor is due for Influenza vaccine. Denies side effects from prior flu shots. Denies allergy to eggs. Denies history of Guillain-Barré syndrome. Current Outpatient Medications   Medication Sig Dispense Refill    clopidogrel (PLAVIX) 75 MG tablet Take 75 mg by mouth daily      pregabalin (LYRICA) 200 MG capsule Take 1 capsule by mouth 3 times daily for 90 days. 270 capsule 0    pravastatin (PRAVACHOL) 80 MG tablet Take 1 tablet by mouth every evening Dose increased  9/24/2019 90 tablet 3    Camphor-Menthol-Methyl Sal 3.1-16-10 % GEL Apply every 8 hours as needed for pain.  OK to substitute 1 Tube 1    SYMBICORT 160-4.5 MCG/ACT AERO Inhale 2 puffs into the Pulse 63   Ht 6' (1.829 m)   Wt 288 lb 6.4 oz (130.8 kg)   SpO2 95%   BMI 39.11 kg/m²         Physical Exam  Vitals signs and nursing note reviewed. Constitutional:       General: He is not in acute distress. Appearance: He is well-developed. He is obese. He is not diaphoretic. HENT:      Head: Normocephalic and atraumatic. Right Ear: External ear normal.      Left Ear: External ear normal.      Nose: Nose normal.      Mouth/Throat:      Mouth: Mucous membranes are moist.      Pharynx: No oropharyngeal exudate. Eyes:      General: Lids are normal. No scleral icterus. Right eye: No discharge. Left eye: No discharge. Conjunctiva/sclera: Conjunctivae normal.   Neck:      Musculoskeletal: Normal range of motion and neck supple. Thyroid: No thyromegaly. Cardiovascular:      Rate and Rhythm: Normal rate and regular rhythm. Pulses:           Dorsalis pedis pulses are 0 on the right side and 1+ on the left side. Posterior tibial pulses are 1+ on the right side and 1+ on the left side. Heart sounds: Normal heart sounds. No murmur. Comments: Hairless legs bilateral   Pulmonary:      Effort: Pulmonary effort is normal. No respiratory distress. Breath sounds: Normal breath sounds. No wheezing or rales. Chest:      Chest wall: No tenderness. Abdominal:      General: Bowel sounds are normal. There is no distension. Palpations: Abdomen is soft. There is no hepatomegaly or splenomegaly. Tenderness: There is no abdominal tenderness. Comments: Obese abdomen. Musculoskeletal:         General: Tenderness present. Right shoulder: He exhibits decreased range of motion, tenderness and bony tenderness. Lumbar back: He exhibits decreased range of motion, tenderness and bony tenderness. Right lower leg: No edema. Left lower leg: No edema. Comments: Right leg foot drop. Walks with wide base. Ambulates with cane.   Up and 118  40 - 129 U/L Final    ALT 01/17/2020 33  5 - 41 U/L Final    AST 01/17/2020 25  <40 U/L Final    Total Bilirubin 01/17/2020 0.27* 0.3 - 1.2 mg/dL Final    Total Protein 01/17/2020 7.5  6.4 - 8.3 g/dL Final    Alb 01/17/2020 4.2  3.5 - 5.2 g/dL Final    Albumin/Globulin Ratio 01/17/2020 1.3  1.0 - 2.5 Final    GFR Non- 01/17/2020 >60  >60 mL/min Final    GFR  01/17/2020 >60  >60 mL/min Final    GFR Comment 01/17/2020        Final    Comment: Average GFR for 61-76 years old:   80 mL/min/1.73sq m  Chronic Kidney Disease:   <60 mL/min/1.73sq m  Kidney failure:   <15 mL/min/1.73sq m              eGFR calculated using average adult body mass. Additional eGFR calculator available at:        [employer]            GFR Staging 01/17/2020 NOT REPORTED   Final    Pth Intact 01/17/2020 69.28* 15.0 - 65.0 pg/mL Final    Comment: SAMPLES FROM PATIENTS ROUTINELY RECEIVING HIGH DOSE BIOTIN THERAPY MAY SHOW FALSELY   DEPRESSED RESULTS. ADDITIONAL INFORMATION MAY BE REQUIRED FOR DIAGNOSIS.  Calcium, Ion 01/17/2020 1.34* 1.13 - 1.33 mmol/L Final    Hemoglobin A1C 01/17/2020 5.6  4.0 - 6.0 % Final    Estimated Avg Glucose 01/17/2020 114  mg/dL Final    Comment: The ADA and AACC recommend providing the estimated average glucose result to permit better   patient understanding of their HBA1c result.          Lab Results   Component Value Date    WBC 8.5 08/29/2019    HGB 14.6 08/29/2019    HCT 43.2 08/29/2019    MCV 93.0 08/29/2019     08/29/2019                Lab Results   Component Value Date    TSH 1.81 04/01/2019       Lab Results   Component Value Date    CHOL 201 (H) 09/16/2019    CHOL 173 08/27/2018    CHOL 132 06/26/2017     Lab Results   Component Value Date    TRIG 247 (H) 09/16/2019    TRIG 137 08/27/2018    TRIG 263 (H) 06/26/2017     Lab Results   Component Value Date    HDL 45 09/16/2019    HDL 40 (L) 08/27/2018    HDL 32 (L) Plavix, attempt to walk more  Patient does have appointment with Dr. Melvi Garcia in February    4. Hyperlipidemia with target LDL less than 100  Worsening  Continue pravastatin and fish oil  Recheck labs  - Lipid Panel; Future    5. COPD mixed type ([facility]  Stable  Continue current inhalers  Smoking cessation    6. Prostate CA (HCC)  Stable  PSA undetectable  Lab Results   Component Value Date    PSA <0.01 12/10/2019    PSA <0.01 10/29/2019    PSA <0.01 07/23/2019     Has been off chemotherapy    7. Hyperparathyroidism ([facility]  Worsening  Patient advised to follow-up with Dr. Rios   The patient verbalizes understanding and agrees with the plan. 8. Moderate episode of recurrent major depressive disorder (HCC)  worsening  -start venlafaxine (EFFEXOR) 50 MG tablet; Take 1 tablet by mouth 2 times daily For depression, neuropathy, pain  Dispense: 60 tablet; Refill: 0    9. Serum cholinesterase defect (HCC)  Stable  Patient to avoid succinylcholine for anesthesia    10. Personal history of tobacco use  - DC VISIT TO DISCUSS LUNG CA SCREEN W LDCT  - CT Lung Screen (Annual); Future    11. Need for shingles vaccine  - zoster recombinant adjuvanted vaccine [facility]) 50 MCG/0.5ML SUSR injection; Inject 0.5 mLs into the muscle See Admin Instructions 1 dose now and repeat in 2-6 months  Dispense: 0.5 mL; Refill: 0    12. Need for immunization against influenza  - INFLUENZA, TRIV, INACTIVATED, SUBUNIT, ADJUVANTED, 65 YRS AND OLDER, IM, PREFILL SYR, 0.5ML (FLUAD TRIV)    13. Severe obesity (BMI 35.0-35.9 with comorbidity) ([facility]  worsening  Patient was asked about his current diet and exercise habits, and personalized advice was provided regarding recommended lifestyle changes.   Patient's comorbid health conditions associated with elevated BMI were discussed, including COPD/asthma, coronary artery disease, degenerative joint disease, diabetes, dyslipidemia, hypertension, mood disorder and peripheral vascular disease, as well as the

## 2020-01-28 NOTE — PROGRESS NOTES
Visit Information    Have you changed or started any medications since your last visit including any over-the-counter medicines, vitamins, or herbal medicines? no   Have you stopped taking any of your medications? Is so, why? -  no  Are you having any side effects from any of your medications? - no    Have you seen any other physician or provider since your last visit?  no   Have you had any other diagnostic tests since your last visit?  no   Have you been seen in the emergency room and/or had an admission in a hospital since we last saw you?  no   Have you had your routine dental cleaning in the past 6 months?  yes -      Do you have an active MyChart account? If no, what is the barrier?   Yes    Patient Care Team:  Miller Simon MD as PCP - General (Family Medicine)  Miller Simon MD as PCP - [facility] Provider  Grace Esquivel MD as Consulting Physician (Neurosurgery)  Mónica Kaye MD as Consulting Physician (Cardiology)  Heaven Peterson MD as Consulting Physician (Urology)  Yovani Singh MD as Surgeon (Vascular Surgery)  Gallo Pineda MD as Consulting Physician (Pulmonology)  Maninder Hernandez MD as Consulting Physician (Orthopedic Surgery)  Kym Pugh DO as Consulting Physician (General Surgery)  Riya Farnsworth OD (Ophthalmology)  Porsha Barba, RN, BSN as Nurse Leonardo Vergara MD as Consulting Physician (Endocrinology)  Yamilet Arango MD as Consulting Physician (Orthopedic Surgery)  Obinna Felipe MD as Consulting Physician (Gastroenterology)    Medical History Review  Past Medical, Family, and Social History reviewed and does contribute to the patient presenting condition    Health Maintenance   Topic Date Due    Shingles Vaccine (2 of 3) 03/26/2016    Annual Wellness Visit (AWV)  07/09/2019    Flu vaccine (1) 09/01/2019    Low dose CT lung screening  01/16/2020    Diabetic foot exam  04/09/2020    Diabetic microalbuminuria test  09/16/2020    Lipid screen  09/16/2020    Diabetic retinal exam  11/07/2020    A1C test (Diabetic or Prediabetic)  01/17/2021    Potassium monitoring  01/17/2021    Creatinine monitoring  01/17/2021    Pneumococcal 65+ years Vaccine (2 of 2 - PPSV23) 04/11/2022    Colon cancer screen colonoscopy  05/09/2022    DTaP/Tdap/Td vaccine (2 - Td) 03/01/2028    Hepatitis C screen  Addressed    HIV screen  Addressed

## 2020-01-30 ENCOUNTER — HOSPITAL ENCOUNTER (EMERGENCY)
Age: 65
Discharge: HOME | End: 2020-01-30
Attending: EMERGENCY MEDICINE
Payer: MEDICARE

## 2020-01-30 ENCOUNTER — APPOINTMENT (OUTPATIENT)
Dept: GENERAL RADIOLOGY | Age: 65
End: 2020-01-30
Payer: MEDICARE

## 2020-01-30 VITALS
DIASTOLIC BLOOD PRESSURE: 62 MMHG | TEMPERATURE: 98.2 F | OXYGEN SATURATION: 98 % | BODY MASS INDEX: 39.01 KG/M2 | HEART RATE: 79 BPM | SYSTOLIC BLOOD PRESSURE: 149 MMHG | WEIGHT: 288 LBS | HEIGHT: 72 IN | RESPIRATION RATE: 17 BRPM

## 2020-01-30 PROCEDURE — 73030 X-RAY EXAM OF SHOULDER: CPT

## 2020-01-30 PROCEDURE — 99283 EMERGENCY DEPT VISIT LOW MDM: CPT

## 2020-01-30 RX ORDER — OXYCODONE HYDROCHLORIDE AND ACETAMINOPHEN 5; 325 MG/1; MG/1
1 TABLET ORAL EVERY 6 HOURS PRN
Qty: 15 TABLET | Refills: 0 | Status: SHIPPED | OUTPATIENT
Start: 2020-01-30 | End: 2020-02-03

## 2020-01-30 ASSESSMENT — PAIN DESCRIPTION - PAIN TYPE: TYPE: ACUTE PAIN

## 2020-01-30 ASSESSMENT — PAIN DESCRIPTION - LOCATION: LOCATION: SHOULDER

## 2020-01-30 ASSESSMENT — PAIN DESCRIPTION - ORIENTATION: ORIENTATION: LEFT

## 2020-01-30 ASSESSMENT — PAIN DESCRIPTION - FREQUENCY: FREQUENCY: CONTINUOUS

## 2020-01-30 ASSESSMENT — PAIN SCALES - GENERAL: PAINLEVEL_OUTOF10: 9

## 2020-01-30 ASSESSMENT — PAIN DESCRIPTION - DESCRIPTORS: DESCRIPTORS: ACHING

## 2020-01-31 ENCOUNTER — TELEPHONE (OUTPATIENT)
Dept: FAMILY MEDICINE CLINIC | Age: 65
End: 2020-01-31

## 2020-01-31 NOTE — TELEPHONE ENCOUNTER
[facility] ([facility]) ED Follow up Call    Reason for ED visit: strain left shoulder        Hi Mallory Catching , this is [place]  from Dr. Rayford Goldmann office, just calling to see how you are doing after your recent ED visit. Did you receive discharge instructions? Yes  Do you understand the discharge instructions? Yes  Did the ED give you any new prescriptions? Yes  Were you able to fill your prescriptions? Yes      Do you have one of our red, yellow and green  Zone sheets that help you to determine when you should go to the ED? Not Applicable      Do you need or want to make a follow up appt with your PCP? No    Do you have any further needs in the home i.e. Equipment?   No        FU appts/Provider:    Future Appointments   Date Time Provider Keri English   2/7/2020  9:00 AM Lore Rangel MD [facility]   3/30/2020  9:40 AM Michael Brown MD [facility]   5/1/2020  1:15 PM Kiya Bolanos MD [facility]

## 2020-02-02 PROBLEM — E66.01 SEVERE OBESITY (BMI 35.0-35.9 WITH COMORBIDITY) (HCC): Status: ACTIVE | Noted: 2020-02-02

## 2020-02-02 PROBLEM — I25.10 CORONARY ARTERY DISEASE INVOLVING NATIVE CORONARY ARTERY OF NATIVE HEART WITHOUT ANGINA PECTORIS: Status: ACTIVE | Noted: 2020-02-02

## 2020-02-02 ASSESSMENT — ENCOUNTER SYMPTOMS: TROUBLE SWALLOWING: 0

## 2020-02-04 NOTE — ED PROVIDER NOTES
[address]  eMERGENCY dEPARTMENT eNCOUnter      Pt Name: Yandy Bower  MRN: [id number]  [place] 1955  Date of evaluation: 1/30/2020  Provider: NATACHA Rao    CHIEF COMPLAINT       Chief Complaint   Patient presents with    Shoulder Pain           HISTORY OF PRESENT ILLNESS  (Location/Symptom, Timing/Onset, Context/Setting, Quality, Duration, Modifying Factors, Severity.)   Yandy Bower is a 71 y.o. male who presents to the emergency department complaining of right shoulder pain. States that it started hurting 3 days ago. Pt hx recent hx of shoulder reversal by Dr. Shashank Rodriguez 1 year ago. . Patient states that the pain is worse with over the head reaching and flexion and extension. Patient denies any chest pain or shortness of breath, denies any abdominal pain nausea or vomiting   Denies any injury, states last time it felt this way it was dislocated    Location/Symptom: left shoulder  Timing/Onset: 3 days  Severity: mild-moderate    Nursing Notes were reviewed. REVIEW OF SYSTEMS    (2-9 systems for level 4, 10 or more for level 5)     Review of Systems   Constitutional: Negative. Musculoskeletal: left shoulder pain. Except as noted above the remainder of the review of systems was reviewed and negative.        PAST MEDICAL HISTORY         Diagnosis Date    Acid reflux     Arthritis     Chronic bilateral low back pain with bilateral sciatica 11/3/2016    Complete tear of left rotator cuff 7/18/2018    COPD (chronic obstructive pulmonary disease) ([address]     Coronary artery disease involving native coronary artery of native heart without angina pectoris 2/2/2020    Degenerative disc disease, cervical 7/28/2019    Gout     H/O cardiac catheterization     yrs ago   no stents    Hyperlipidemia     Hyperlipidemia with target LDL less than 100 1/20/2016    Hyperparathyroidism ([address] 1/17/2020    Hypertension     Knee pain, chronic     left    Liver disease     MDRO REVERSE LEFT DJO & BICEP TENDON TRANSFER performed by Mamadou Brooks MD at [address] HUMERAL/GLENOID COMPNT Left 8/3/2018    SHOULDER TOTAL REVERSE  ARTHROPLASTY REVISION performed by Mamadou Brooks MD at . [address]? rotator cuff repair    TONSILLECTOMY AND ADENOIDECTOMY       None otherwise stated in nurses note    CURRENT MEDICATIONS       Discharge Medication List as of 1/30/2020  2:40 PM      CONTINUE these medications which have NOT CHANGED    Details   clopidogrel (PLAVIX) 75 MG tablet Take 75 mg by mouth dailyHistorical Med      amLODIPine (NORVASC) 10 MG tablet Take 1 tablet by mouth daily, Disp-90 tablet, R-3Normal      lisinopril (PRINIVIL;ZESTRIL) 20 MG tablet Take 1 tablet by mouth daily, Disp-90 tablet, R-3Normal      furosemide (LASIX) 20 MG tablet Take 1 tablet by mouth daily as needed (only if BP over 140/90 or leg edema) **stop Tenoretic 50-25**, Disp-90 tablet, R-3Normal      venlafaxine (EFFEXOR) 50 MG tablet Take 1 tablet by mouth 2 times daily For depression, neuropathy, pain, Disp-60 tablet, R-0Normal      zoster recombinant adjuvanted vaccine (SHINGRIX) 50 MCG/0.5ML SUSR injection Inject 0.5 mLs into the muscle See Admin Instructions 1 dose now and repeat in 2-6 months, Disp-0.5 mL, R-0Normal      pregabalin (LYRICA) 200 MG capsule Take 1 capsule by mouth 3 times daily for 90 days. , Disp-270 capsule, R-0Normal      pravastatin (PRAVACHOL) 80 MG tablet Take 1 tablet by mouth every evening Dose increased  9/24/2019, Disp-90 tablet, R-3Normal      Omega-3 Fatty Acids (FISH OIL) 1000 MG CAPS Take 1 capsule by mouth 4 times daily, Disp-120 capsule, R-3Adjust Sig      Camphor-Menthol-Methyl Sal 3.1-16-10 % GEL Apply every 8 hours as needed for pain.  OK to substitute, Disp-1 Tube, R-1Normal      SYMBICORT 160-4.5 MCG/ACT AERO Inhale 2 puffs into the lungs 2 times daily 10.2 inhaler, Disp-3 Inhaler, R-3, DAWNormal      Lift Chair MISC Starting 2019, Disp-1 each, R-0, PrintPatient has difficulty getting up from usual chair      folbee plus (FOLBEE PLUS) TABS Take 1 tablet by mouth daily, Disp-90 tablet, R-3Normal      ammonium lactate (AMLACTIN) 12 % cream APPLY TOPICALLY TO LEGS ONE TO TWO TIMES A DAY AS NEEDED, Disp-1 Bottle, R-3, Normal      aspirin EC 81 MG EC tablet Take 1 tablet by mouth daily, Disp-90 tablet, R-0Adjust Sig      clotrimazole-betamethasone (LOTRISONE) 1-0.05 % cream Apply topically at bedtime on the feet x 4 weeks, Disp-45 g, R-3, Normal      mupirocin (BACTROBAN) 2 % ointment Apply topically 1-2 times daily on the affected area . OK to substitute to cream, Disp-30 g, R-2, Normal      NONFORMULARY Bioidentical pain creamHistorical Med      Glucose Blood (BLOOD GLUCOSE TEST STRIPS) STRP tesing once a day fasting, Disp-100 strip, R-3Print      colchicine (COLCRYS) 0.6 MG tablet ADJUST SIG-ONLY AS NEEDED FOR GOUT ATTACK. Take 2 tablets now, then 1 tablet one hour later. Wait 12 hours then start 1 tablet bid for 5 days. , Disp-90 tablet, R-1Adjust Sig      albuterol (PROVENTIL) (2.5 MG/3ML) 0.083% nebulizer solution Take 2.5 mg by nebulization 4 times daily Historical Med      albuterol sulfate  (90 BASE) MCG/ACT inhaler Inhale 2 puffs into the lungs every 6 hours as needed for Wheezing or Shortness of Breath (COUGH), Disp-18 g, R-0Normal             ALLERGIES     Succinylcholine chloride and Cymbalta [duloxetine hcl]    FAMILY HISTORY           Problem Relation Age of Onset    Diabetes Mother     Lung Cancer Brother     Liver Cancer Brother     Cancer Father      Family Status   Relation Name Status    Mother  Alive    Brother  (Not Specified)    Brother  (Not Specified)    Father        None otherwise stated in nurses note    SOCIAL HISTORY      reports that he has been smoking cigarettes. He has a 45.00 pack-year smoking history.  He has never used smokeless tobacco. He reports that he does not drink alcohol or use drugs. Lives at home with others    PHYSICAL EXAM    (up to 7 for level 4, 8 or more for level 5)     ED Triage Vitals [01/30/20 1356]   BP Temp Temp src Pulse Resp SpO2 Height Weight   (!) 149/62 98.2 °F (36.8 °C) -- 79 17 98 % 6' (1.829 m) 288 lb (130.6 kg)       Physical Exam   Nursing note and vitals reviewed. Constitutional: Oriented to person, place, and time and well-developed, well-nourished, and in no distress. Head: Normocephalic and atraumatic. Ear: External ears normal.   Nose: Nose normal and midline. Eyes: Conjunctivae and EOM are normal. Pupils are equal, round, and reactive to light. Cardiovascular: Normal rate, regular rhythm, normal heart sounds and intact distal pulses. Pulmonary/Chest: Effort normal and breath sounds normal. No respiratory distress. No wheezes. No rales. No chest tenderness. Musculoskeletal: there is minimal tenderness palpation to the trapezius and the general left shoulder. Range of motion is intact but painful and limited. Distal neurovascular intact with cap refill less than 2 seconds. There is no obvious deformity or swelling or warmth to the joint. There is no dislocations present. Neurological: Alert and oriented to person, place, and time. GCS score is 15. Skin: Skin is warm and dry. No rash noted. No erythema. No pallor. DIAGNOSTIC RESULTS     RADIOLOGY:   All plain film, CT, MRI, and formal ultrasound images (except ED bedside ultrasound) are read by the radiologist   XR SHOULDER LEFT (MIN 2 VIEWS)   Final Result   Left total shoulder arthroplasty. No hardware complication. Xr Knee Left (1-2 Views)    Result Date: 1/15/2020  X-rays taken today reviewed by me show standing AP of both knees and a lateral left knee. Patient has significant degenerative joint disease of the left knee with essentially bone-on-bone apposition of the medial compartment.   He also has spur components on both medial and lateral sides. Patient's lateral view and also is noted very market patellofemoral narrowing and spur formation as well. Patient's right knee is relatively unremarkable. Xr Shoulder Left (min 2 Views)    Result Date: 1/31/2020  EXAMINATION: THREE XRAY VIEWS OF THE LEFT SHOULDER 1/30/2020 2:11 pm COMPARISON: July 30, 2019 HISTORY: ORDERING SYSTEM PROVIDED HISTORY: left shoulder injury TECHNOLOGIST PROVIDED HISTORY: left shoulder injury Reason for Exam: left shoulder injury, hx of dislocation per pt Acuity: Unknown Type of Exam: Unknown Initial exam FINDINGS: Stable appearance of the left total shoulder arthroplasty. No periprosthetic fracture or lucency. The [facility] joint is degenerated. The visualized left lung is clear. Left total shoulder arthroplasty. No hardware complication. LABS:  Labs Reviewed - No data to display    All other labs were within normal range or not returned as of this dictation. EMERGENCY DEPARTMENT COURSE and DIFFERENTIAL DIAGNOSIS/MDM:   Vitals:    Vitals:    01/30/20 1356   BP: (!) 149/62   Pulse: 79   Resp: 17   Temp: 98.2 °F (36.8 °C)   SpO2: 98%   Weight: 288 lb (130.6 kg)   Height: 6' (1.829 m)       Pt isntructed to see his ortho doctor who performed the relacement which he has an appointment with next week. Will give short RX pain medication which helped in the past  Patient instructed to return to the emergency room if symptoms worsen, return, or any other concern right away which is agreed. MEDS  Orders Placed This Encounter   Medications    oxyCODONE-acetaminophen (PERCOCET) 5-325 MG per tablet     Sig: Take 1 tablet by mouth every 6 hours as needed for Pain for up to 4 days. Intended supply: 3 days. Take lowest dose possible to manage pain     Dispense:  15 tablet     Refill:  0         CONSULTS:  None    PROCEDURES:  None        FINAL IMPRESSION      1.  Strain of left shoulder, initial encounter          DISPOSITION/PLAN   DISPOSITION Decision To

## 2020-02-07 ENCOUNTER — OFFICE VISIT (OUTPATIENT)
Dept: ORTHOPEDIC SURGERY | Age: 65
End: 2020-02-07
Payer: MEDICARE

## 2020-02-07 ENCOUNTER — HOSPITAL ENCOUNTER (OUTPATIENT)
Age: 65
Setting detail: SPECIMEN
Discharge: HOME | End: 2020-02-07
Payer: MEDICARE

## 2020-02-07 VITALS — HEIGHT: 72 IN | BODY MASS INDEX: 39.01 KG/M2 | WEIGHT: 288 LBS

## 2020-02-07 LAB
C-REACTIVE PROTEIN: 10 MG/L (ref 0–5)
HCT VFR BLD CALC: 44.3 % (ref 41–53)
HEMOGLOBIN: 14.8 G/DL (ref 13.5–17.5)
MCH RBC QN AUTO: 30.1 PG (ref 26–34)
MCHC RBC AUTO-ENTMCNC: 33.4 G/DL (ref 31–37)
MCV RBC AUTO: 90.1 FL (ref 80–100)
NRBC AUTOMATED: NORMAL PER 100 WBC
PDW BLD-RTO: 14.1 % (ref 11.5–14.9)
PLATELET # BLD: 195 K/UL (ref 150–450)
PMV BLD AUTO: 10.4 FL (ref 6–12)
RBC # BLD: 4.92 M/UL (ref 4.5–5.9)
SEDIMENTATION RATE, ERYTHROCYTE: 15 MM (ref 0–15)
WBC # BLD: 9.1 K/UL (ref 3.5–11)

## 2020-02-07 PROCEDURE — 85027 COMPLETE CBC AUTOMATED: CPT

## 2020-02-07 PROCEDURE — 85651 RBC SED RATE NONAUTOMATED: CPT

## 2020-02-07 PROCEDURE — 99213 OFFICE O/P EST LOW 20 MIN: CPT | Performed by: ORTHOPAEDIC SURGERY

## 2020-02-07 PROCEDURE — 36415 COLL VENOUS BLD VENIPUNCTURE: CPT

## 2020-02-07 PROCEDURE — 86140 C-REACTIVE PROTEIN: CPT

## 2020-02-07 NOTE — PROGRESS NOTES
depressive disorder ([facility] [address] Obesity, Class III, BMI 40-49.9 (morbid obesity) ([facility] [address] REYNALDO on CPAP, Osteoarthritis, Polypharmacy, Positive FIT (fecal immunochemical test), Prolonged emergence from general anesthesia, Prostate cancer ([facility] [address] Pseudocholinesterase deficiency, Severe obesity (BMI 35.0-35.9 with comorbidity) ([facility] [address] Short of breath on exertion, Sleep apnea, Status post lumbar laminectomy, Stenosis of left carotid artery, Tubular adenoma of colon 4/11/17, Type 2 diabetes mellitus with hyperglycemia, without long-term current use of insulin ([facility] [address] Vitamin D deficiency, and Wears glasses. Past Surgical History  Rebeca Askew  has a past surgical history that includes knee surgery (Left); lumbar laminectomy (01/05/2017); Tonsillectomy and adenoidectomy; Cardiac catheterization (2007); pr colonoscopy w/biopsy single/multiple (N/A, 5/9/2017); shoulder surgery (Right, 1989?); back surgery; back surgery (01/05/2017); Carpal tunnel release (Bilateral); pr reconstr total shoulder implant (Left, 7/18/2018); pr closed rx shldr disloc,anesthesia (Left, 8/1/2018); pr taryn shoulder arthrplsty humeral/glenoid compnt (Left, 8/3/2018); and Carotid endarterectomy (Right, 12/16/2019).     Current Medications  Current Outpatient Medications   Medication Sig Dispense Refill    clopidogrel (PLAVIX) 75 MG tablet Take 75 mg by mouth daily      amLODIPine (NORVASC) 10 MG tablet Take 1 tablet by mouth daily 90 tablet 3    lisinopril (PRINIVIL;ZESTRIL) 20 MG tablet Take 1 tablet by mouth daily 90 tablet 3    furosemide (LASIX) 20 MG tablet Take 1 tablet by mouth daily as needed (only if BP over 140/90 or leg edema) **stop Tenoretic 50-25** 90 tablet 3    venlafaxine (EFFEXOR) 50 MG tablet Take 1 tablet by mouth 2 times daily For depression, neuropathy, pain 60 tablet 0    zoster recombinant adjuvanted vaccine (SHINGRIX) 50 MCG/0.5ML SUSR injection Inject 0.5 mLs into the muscle See Admin Instructions 1 dose now and repeat in 2-6 months 0.5 mL 0    pregabalin (LYRICA) 200 MG capsule Take 1 capsule by mouth 3 times daily for 90 days. 270 capsule 0    pravastatin (PRAVACHOL) 80 MG tablet Take 1 tablet by mouth every evening Dose increased  9/24/2019 90 tablet 3    Omega-3 Fatty Acids (FISH OIL) 1000 MG CAPS Take 1 capsule by mouth 4 times daily (Patient not taking: Reported on 1/28/2020) 120 capsule 3    Camphor-Menthol-Methyl Sal 3.1-16-10 % GEL Apply every 8 hours as needed for pain. OK to substitute 1 Tube 1    SYMBICORT 160-4.5 MCG/ACT AERO Inhale 2 puffs into the lungs 2 times daily 10.2 inhaler 3 Inhaler 3    Lift Chair MISC by Does not apply route Patient has difficulty getting up from usual chair 1 each 0    folbee plus (FOLBEE PLUS) TABS Take 1 tablet by mouth daily 90 tablet 3    ammonium lactate (AMLACTIN) 12 % cream APPLY TOPICALLY TO LEGS ONE TO TWO TIMES A DAY AS NEEDED 1 Bottle 3    aspirin EC 81 MG EC tablet Take 1 tablet by mouth daily 90 tablet 0    clotrimazole-betamethasone (LOTRISONE) 1-0.05 % cream Apply topically at bedtime on the feet x 4 weeks 45 g 3    mupirocin (BACTROBAN) 2 % ointment Apply topically 1-2 times daily on the affected area . OK to substitute to cream 30 g 2    NONFORMULARY Bioidentical pain cream      Glucose Blood (BLOOD GLUCOSE TEST STRIPS) STRP tesing once a day fasting 100 strip 3    colchicine (COLCRYS) 0.6 MG tablet ADJUST SIG-ONLY AS NEEDED FOR GOUT ATTACK. Take 2 tablets now, then 1 tablet one hour later. Wait 12 hours then start 1 tablet bid for 5 days. 90 tablet 1    albuterol (PROVENTIL) (2.5 MG/3ML) 0.083% nebulizer solution Take 2.5 mg by nebulization 4 times daily       albuterol sulfate  (90 BASE) MCG/ACT inhaler Inhale 2 puffs into the lungs every 6 hours as needed for Wheezing or Shortness of Breath (COUGH) 18 g 0     No current facility-administered medications for this visit. Allergies  Allergies have been reviewed.   Daniella Betancur is allergic to succinylcholine and discuss and proceed with additional treatment recommendations as indicated at that time.

## 2020-02-10 ENCOUNTER — TELEPHONE (OUTPATIENT)
Dept: ONCOLOGY | Age: 65
End: 2020-02-10

## 2020-02-11 ENCOUNTER — HOSPITAL ENCOUNTER (OUTPATIENT)
Age: 65
Discharge: HOME | End: 2020-02-11
Payer: MEDICARE

## 2020-02-11 ENCOUNTER — HOSPITAL ENCOUNTER (OUTPATIENT)
Dept: CT IMAGING | Age: 65
Discharge: HOME | End: 2020-02-13
Payer: MEDICARE

## 2020-02-11 LAB
ALBUMIN SERPL-MCNC: 4.1 G/DL (ref 3.5–5.2)
ALBUMIN/GLOBULIN RATIO: ABNORMAL (ref 1–2.5)
ALP BLD-CCNC: 115 U/L (ref 40–129)
ALT SERPL-CCNC: 29 U/L (ref 5–41)
ANION GAP SERPL CALCULATED.3IONS-SCNC: 14 MMOL/L (ref 9–17)
AST SERPL-CCNC: 20 U/L
BILIRUB SERPL-MCNC: 0.25 MG/DL (ref 0.3–1.2)
BUN BLDV-MCNC: 12 MG/DL (ref 8–23)
BUN/CREAT BLD: ABNORMAL (ref 9–20)
CALCIUM SERPL-MCNC: 10 MG/DL (ref 8.6–10.4)
CHLORIDE BLD-SCNC: 103 MMOL/L (ref 98–107)
CHOLESTEROL/HDL RATIO: 3.6
CHOLESTEROL: 160 MG/DL
CO2: 28 MMOL/L (ref 20–31)
CREAT SERPL-MCNC: 0.63 MG/DL (ref 0.7–1.2)
FOLATE: >20 NG/ML
GFR AFRICAN AMERICAN: >60 ML/MIN
GFR NON-AFRICAN AMERICAN: >60 ML/MIN
GFR SERPL CREATININE-BSD FRML MDRD: ABNORMAL ML/MIN/{1.73_M2}
GFR SERPL CREATININE-BSD FRML MDRD: ABNORMAL ML/MIN/{1.73_M2}
GLUCOSE BLD-MCNC: 98 MG/DL (ref 70–99)
HCT VFR BLD CALC: 45.1 % (ref 41–53)
HDLC SERPL-MCNC: 44 MG/DL
HEMOGLOBIN: 15 G/DL (ref 13.5–17.5)
LDL CHOLESTEROL: 80 MG/DL (ref 0–130)
MCH RBC QN AUTO: 29.9 PG (ref 26–34)
MCHC RBC AUTO-ENTMCNC: 33.3 G/DL (ref 31–37)
MCV RBC AUTO: 89.9 FL (ref 80–100)
NRBC AUTOMATED: NORMAL PER 100 WBC
PDW BLD-RTO: 14.1 % (ref 11.5–14.9)
PLATELET # BLD: 206 K/UL (ref 150–450)
PMV BLD AUTO: 8.9 FL (ref 6–12)
POTASSIUM SERPL-SCNC: 4.3 MMOL/L (ref 3.7–5.3)
RBC # BLD: 5.02 M/UL (ref 4.5–5.9)
SODIUM BLD-SCNC: 145 MMOL/L (ref 135–144)
TOTAL PROTEIN: 7.4 G/DL (ref 6.4–8.3)
TRIGL SERPL-MCNC: 182 MG/DL
TSH SERPL DL<=0.05 MIU/L-ACNC: 1.84 MIU/L (ref 0.3–5)
VITAMIN B-12: 1784 PG/ML (ref 232–1245)
VLDLC SERPL CALC-MCNC: ABNORMAL MG/DL (ref 1–30)
WBC # BLD: 10.1 K/UL (ref 3.5–11)

## 2020-02-11 PROCEDURE — 80061 LIPID PANEL: CPT

## 2020-02-11 PROCEDURE — 80053 COMPREHEN METABOLIC PANEL: CPT

## 2020-02-11 PROCEDURE — 73200 CT UPPER EXTREMITY W/O DYE: CPT

## 2020-02-11 PROCEDURE — 82607 VITAMIN B-12: CPT

## 2020-02-11 PROCEDURE — 85027 COMPLETE CBC AUTOMATED: CPT

## 2020-02-11 PROCEDURE — 84443 ASSAY THYROID STIM HORMONE: CPT

## 2020-02-11 PROCEDURE — 82746 ASSAY OF FOLIC ACID SERUM: CPT

## 2020-02-11 PROCEDURE — 36415 COLL VENOUS BLD VENIPUNCTURE: CPT

## 2020-02-17 ENCOUNTER — HOSPITAL ENCOUNTER (OUTPATIENT)
Dept: CT IMAGING | Age: 65
Discharge: HOME | End: 2020-02-19
Payer: MEDICARE

## 2020-02-17 ENCOUNTER — OFFICE VISIT (OUTPATIENT)
Dept: ORTHOPEDIC SURGERY | Age: 65
End: 2020-02-17
Payer: MEDICARE

## 2020-02-17 VITALS — HEIGHT: 72 IN | WEIGHT: 288 LBS | BODY MASS INDEX: 39.01 KG/M2

## 2020-02-17 PROCEDURE — G0297 LDCT FOR LUNG CA SCREEN: HCPCS

## 2020-02-17 PROCEDURE — 99212 OFFICE O/P EST SF 10 MIN: CPT | Performed by: ORTHOPAEDIC SURGERY

## 2020-02-19 RX ORDER — TRAMADOL HYDROCHLORIDE 50 MG/1
50-100 TABLET ORAL EVERY 6 HOURS PRN
Qty: 30 TABLET | Refills: 0 | Status: ON HOLD | OUTPATIENT
Start: 2020-02-19 | End: 2020-10-15 | Stop reason: HOSPADM

## 2020-02-21 ENCOUNTER — HOSPITAL ENCOUNTER (OUTPATIENT)
Dept: NUCLEAR MEDICINE | Age: 65
Discharge: HOME | End: 2020-02-23
Payer: MEDICARE

## 2020-02-21 ENCOUNTER — HOSPITAL ENCOUNTER (OUTPATIENT)
Dept: NUCLEAR MEDICINE | Age: 65
End: 2020-02-21
Payer: MEDICARE

## 2020-02-24 ENCOUNTER — HOSPITAL ENCOUNTER (OUTPATIENT)
Dept: NUCLEAR MEDICINE | Age: 65
Discharge: HOME | End: 2020-02-26
Payer: MEDICARE

## 2020-02-24 PROCEDURE — 3430000000 HC RX DIAGNOSTIC RADIOPHARMACEUTICAL: Performed by: ORTHOPAEDIC SURGERY

## 2020-02-24 PROCEDURE — 78800 RP LOCLZJ TUM 1 AREA 1 D IMG: CPT

## 2020-02-24 PROCEDURE — A9547 IN111 OXYQUINOLINE: HCPCS | Performed by: ORTHOPAEDIC SURGERY

## 2020-02-24 PROCEDURE — 2580000003 HC RX 258: Performed by: ORTHOPAEDIC SURGERY

## 2020-02-24 RX ORDER — INDIUM IN-111 OXYQUINOLINE 1 UG/ML
500 SOLUTION INTRAVENOUS
Status: COMPLETED | OUTPATIENT
Start: 2020-02-24 | End: 2020-02-24

## 2020-02-24 RX ORDER — SODIUM CHLORIDE 0.9 % (FLUSH) 0.9 %
10 SYRINGE (ML) INJECTION PRN
Status: DISCONTINUED | OUTPATIENT
Start: 2020-02-24 | End: 2020-02-27 | Stop reason: HOSPADM

## 2020-02-24 RX ADMIN — INDIUM IN-111 OXYQUINOLINE 0.54 MILLICURIE: 1 SOLUTION INTRAVENOUS at 11:15

## 2020-02-24 RX ADMIN — Medication 10 ML: at 11:15

## 2020-02-24 RX ADMIN — Medication 10 ML: at 11:14

## 2020-02-25 ENCOUNTER — HOSPITAL ENCOUNTER (OUTPATIENT)
Dept: NUCLEAR MEDICINE | Age: 65
Discharge: HOME | End: 2020-02-27
Payer: MEDICARE

## 2020-02-25 PROCEDURE — A9541 TC99M SULFUR COLLOID: HCPCS | Performed by: ORTHOPAEDIC SURGERY

## 2020-02-25 PROCEDURE — 2580000003 HC RX 258: Performed by: ORTHOPAEDIC SURGERY

## 2020-02-25 PROCEDURE — 3430000000 HC RX DIAGNOSTIC RADIOPHARMACEUTICAL: Performed by: ORTHOPAEDIC SURGERY

## 2020-02-25 RX ORDER — SODIUM CHLORIDE 0.9 % (FLUSH) 0.9 %
10 SYRINGE (ML) INJECTION PRN
Status: DISCONTINUED | OUTPATIENT
Start: 2020-02-25 | End: 2020-02-27 | Stop reason: HOSPADM

## 2020-02-25 RX ADMIN — Medication 20.6 MILLICURIE: at 08:32

## 2020-02-25 RX ADMIN — Medication 10 ML: at 08:32

## 2020-02-26 ENCOUNTER — HOSPITAL ENCOUNTER (OUTPATIENT)
Dept: NUCLEAR MEDICINE | Age: 65
Discharge: HOME | End: 2020-02-28
Payer: MEDICARE

## 2020-02-27 ENCOUNTER — HOSPITAL ENCOUNTER (OUTPATIENT)
Dept: NUCLEAR MEDICINE | Age: 65
Discharge: HOME | End: 2020-02-29
Payer: MEDICARE

## 2020-03-03 ENCOUNTER — OFFICE VISIT (OUTPATIENT)
Dept: ORTHOPEDIC SURGERY | Age: 65
End: 2020-03-03
Payer: MEDICARE

## 2020-03-03 PROCEDURE — 99212 OFFICE O/P EST SF 10 MIN: CPT | Performed by: ORTHOPAEDIC SURGERY

## 2020-03-03 NOTE — PROGRESS NOTES
HPI: Mr. Francia Hardy is here today to review the results of his WBC indium scan. I did review the results today it appears to be negative indicating no obvious signs of infection or loosening about his left shoulder prosthesis. I had a discussion with the patient today with regards to this. At this time he states that his pain has gradually gotten better but he has noticed a decline in his function i.e. unable to raise his arm up as far as he used to in the past.  He denies having any fevers, chills, sweats or any constitutional symptoms. We did have a discussion with regards to this. Its not clear as to the cause of the sudden decline in function. It does not appear that he has a palpable defect in his deltoid. We did discuss the possibility of this being an indolent infection. A possibility would be to go in arthroscopically and take several biopsies for culture. At this time he would like to avoid this. Consequently we will get a continue to monitor him for now. He was encouraged to resume his home exercise program working on stretching as well as strengthening exercises. I will have him follow-up my clinic with persistent or worsening pain but he may return or call at anytime with questions and or concerns.

## 2020-03-09 ENCOUNTER — HOSPITAL ENCOUNTER (INPATIENT)
Age: 65
LOS: 7 days | Discharge: HOME | DRG: 194 | End: 2020-03-16
Attending: EMERGENCY MEDICINE | Admitting: INTERNAL MEDICINE
Payer: MEDICARE

## 2020-03-09 ENCOUNTER — APPOINTMENT (OUTPATIENT)
Dept: GENERAL RADIOLOGY | Age: 65
DRG: 194 | End: 2020-03-09
Payer: MEDICARE

## 2020-03-09 PROBLEM — J18.9 PNEUMONIA: Status: ACTIVE | Noted: 2020-03-09

## 2020-03-09 PROBLEM — J44.1 COPD WITH ACUTE EXACERBATION (HCC): Status: ACTIVE | Noted: 2017-03-25

## 2020-03-09 LAB
ABSOLUTE BANDS #: 0.38 K/UL (ref 0–1)
ABSOLUTE EOS #: 0 K/UL (ref 0–0.4)
ABSOLUTE IMMATURE GRANULOCYTE: ABNORMAL K/UL (ref 0–0.3)
ABSOLUTE LYMPH #: 1.9 K/UL (ref 1–4.8)
ABSOLUTE MONO #: 0.95 K/UL (ref 0.1–1.3)
ANION GAP SERPL CALCULATED.3IONS-SCNC: 14 MMOL/L (ref 9–17)
BANDS: 2 % (ref 0–10)
BASOPHILS # BLD: 0 % (ref 0–2)
BASOPHILS ABSOLUTE: 0 K/UL (ref 0–0.2)
BNP INTERPRETATION: NORMAL
BUN BLDV-MCNC: 13 MG/DL (ref 8–23)
BUN/CREAT BLD: ABNORMAL (ref 9–20)
CALCIUM SERPL-MCNC: 10.1 MG/DL (ref 8.6–10.4)
CHLORIDE BLD-SCNC: 101 MMOL/L (ref 98–107)
CO2: 25 MMOL/L (ref 20–31)
CREAT SERPL-MCNC: 0.75 MG/DL (ref 0.7–1.2)
D-DIMER QUANTITATIVE: 0.57 MG/L FEU (ref 0–0.59)
DIFFERENTIAL TYPE: ABNORMAL
DIRECT EXAM: NORMAL
EOSINOPHILS RELATIVE PERCENT: 0 % (ref 0–4)
GFR AFRICAN AMERICAN: >60 ML/MIN
GFR NON-AFRICAN AMERICAN: >60 ML/MIN
GFR SERPL CREATININE-BSD FRML MDRD: ABNORMAL ML/MIN/{1.73_M2}
GFR SERPL CREATININE-BSD FRML MDRD: ABNORMAL ML/MIN/{1.73_M2}
GLUCOSE BLD-MCNC: 122 MG/DL (ref 70–99)
GLUCOSE BLD-MCNC: 240 MG/DL (ref 75–110)
HCT VFR BLD CALC: 46.9 % (ref 41–53)
HEMOGLOBIN: 15.5 G/DL (ref 13.5–17.5)
IMMATURE GRANULOCYTES: ABNORMAL %
LYMPHOCYTES # BLD: 10 % (ref 24–44)
Lab: NORMAL
MCH RBC QN AUTO: 29.5 PG (ref 26–34)
MCHC RBC AUTO-ENTMCNC: 33.1 G/DL (ref 31–37)
MCV RBC AUTO: 89.2 FL (ref 80–100)
MONOCYTES # BLD: 5 % (ref 1–7)
MORPHOLOGY: NORMAL
NRBC AUTOMATED: ABNORMAL PER 100 WBC
PDW BLD-RTO: 14.1 % (ref 11.5–14.9)
PLATELET # BLD: 202 K/UL (ref 150–450)
PLATELET ESTIMATE: ABNORMAL
PMV BLD AUTO: 9.3 FL (ref 6–12)
POTASSIUM SERPL-SCNC: 4.5 MMOL/L (ref 3.7–5.3)
PRO-BNP: 58 PG/ML
RBC # BLD: 5.26 M/UL (ref 4.5–5.9)
RBC # BLD: ABNORMAL 10*6/UL
SEG NEUTROPHILS: 83 % (ref 36–66)
SEGMENTED NEUTROPHILS ABSOLUTE COUNT: 15.77 K/UL (ref 1.3–9.1)
SODIUM BLD-SCNC: 140 MMOL/L (ref 135–144)
SPECIMEN DESCRIPTION: NORMAL
TROPONIN INTERP: NORMAL
TROPONIN INTERP: NORMAL
TROPONIN T: NORMAL NG/ML
TROPONIN T: NORMAL NG/ML
TROPONIN, HIGH SENSITIVITY: 12 NG/L (ref 0–22)
TROPONIN, HIGH SENSITIVITY: 17 NG/L (ref 0–22)
WBC # BLD: 19 K/UL (ref 3.5–11)
WBC # BLD: ABNORMAL 10*3/UL

## 2020-03-09 PROCEDURE — 36415 COLL VENOUS BLD VENIPUNCTURE: CPT

## 2020-03-09 PROCEDURE — 99223 1ST HOSP IP/OBS HIGH 75: CPT | Performed by: INTERNAL MEDICINE

## 2020-03-09 PROCEDURE — 87070 CULTURE OTHR SPECIMN AEROBIC: CPT

## 2020-03-09 PROCEDURE — 6370000000 HC RX 637 (ALT 250 FOR IP): Performed by: EMERGENCY MEDICINE

## 2020-03-09 PROCEDURE — 83036 HEMOGLOBIN GLYCOSYLATED A1C: CPT

## 2020-03-09 PROCEDURE — 2580000003 HC RX 258: Performed by: EMERGENCY MEDICINE

## 2020-03-09 PROCEDURE — 85379 FIBRIN DEGRADATION QUANT: CPT

## 2020-03-09 PROCEDURE — 87205 SMEAR GRAM STAIN: CPT

## 2020-03-09 PROCEDURE — 94640 AIRWAY INHALATION TREATMENT: CPT

## 2020-03-09 PROCEDURE — 6360000002 HC RX W HCPCS: Performed by: EMERGENCY MEDICINE

## 2020-03-09 PROCEDURE — 6370000000 HC RX 637 (ALT 250 FOR IP): Performed by: STUDENT IN AN ORGANIZED HEALTH CARE EDUCATION/TRAINING PROGRAM

## 2020-03-09 PROCEDURE — 6370000000 HC RX 637 (ALT 250 FOR IP)

## 2020-03-09 PROCEDURE — 2580000003 HC RX 258: Performed by: STUDENT IN AN ORGANIZED HEALTH CARE EDUCATION/TRAINING PROGRAM

## 2020-03-09 PROCEDURE — 84484 ASSAY OF TROPONIN QUANT: CPT

## 2020-03-09 PROCEDURE — 94660 CPAP INITIATION&MGMT: CPT

## 2020-03-09 PROCEDURE — 80048 BASIC METABOLIC PNL TOTAL CA: CPT

## 2020-03-09 PROCEDURE — 71045 X-RAY EXAM CHEST 1 VIEW: CPT

## 2020-03-09 PROCEDURE — 99285 EMERGENCY DEPT VISIT HI MDM: CPT

## 2020-03-09 PROCEDURE — 6360000002 HC RX W HCPCS: Performed by: STUDENT IN AN ORGANIZED HEALTH CARE EDUCATION/TRAINING PROGRAM

## 2020-03-09 PROCEDURE — 83880 ASSAY OF NATRIURETIC PEPTIDE: CPT

## 2020-03-09 PROCEDURE — 6370000000 HC RX 637 (ALT 250 FOR IP): Performed by: NURSE PRACTITIONER

## 2020-03-09 PROCEDURE — 85025 COMPLETE CBC W/AUTO DIFF WBC: CPT

## 2020-03-09 PROCEDURE — 93005 ELECTROCARDIOGRAM TRACING: CPT | Performed by: EMERGENCY MEDICINE

## 2020-03-09 PROCEDURE — 2060000000 HC ICU INTERMEDIATE R&B

## 2020-03-09 PROCEDURE — 87641 MR-STAPH DNA AMP PROBE: CPT

## 2020-03-09 PROCEDURE — 2700000000 HC OXYGEN THERAPY PER DAY

## 2020-03-09 PROCEDURE — 87804 INFLUENZA ASSAY W/OPTIC: CPT

## 2020-03-09 PROCEDURE — 0100U HC RESPIRPTHGN MULT REV TRANS & AMP PRB TECH 21 TRGT: CPT

## 2020-03-09 PROCEDURE — 82947 ASSAY GLUCOSE BLOOD QUANT: CPT

## 2020-03-09 RX ORDER — SODIUM CHLORIDE 0.9 % (FLUSH) 0.9 %
10 SYRINGE (ML) INJECTION EVERY 12 HOURS SCHEDULED
Status: DISCONTINUED | OUTPATIENT
Start: 2020-03-09 | End: 2020-03-09 | Stop reason: SDUPTHER

## 2020-03-09 RX ORDER — ALBUTEROL SULFATE 2.5 MG/3ML
2.5 SOLUTION RESPIRATORY (INHALATION) EVERY 6 HOURS PRN
Status: DISCONTINUED | OUTPATIENT
Start: 2020-03-09 | End: 2020-03-13

## 2020-03-09 RX ORDER — ACETAMINOPHEN 325 MG/1
650 TABLET ORAL EVERY 4 HOURS PRN
Status: DISCONTINUED | OUTPATIENT
Start: 2020-03-09 | End: 2020-03-09 | Stop reason: SDUPTHER

## 2020-03-09 RX ORDER — BUDESONIDE AND FORMOTEROL FUMARATE DIHYDRATE 160; 4.5 UG/1; UG/1
2 AEROSOL RESPIRATORY (INHALATION) 2 TIMES DAILY
Status: DISCONTINUED | OUTPATIENT
Start: 2020-03-09 | End: 2020-03-16 | Stop reason: HOSPADM

## 2020-03-09 RX ORDER — ACETAMINOPHEN 325 MG/1
650 TABLET ORAL EVERY 6 HOURS PRN
Status: DISCONTINUED | OUTPATIENT
Start: 2020-03-09 | End: 2020-03-16 | Stop reason: HOSPADM

## 2020-03-09 RX ORDER — POTASSIUM CHLORIDE 7.45 MG/ML
10 INJECTION INTRAVENOUS PRN
Status: DISCONTINUED | OUTPATIENT
Start: 2020-03-09 | End: 2020-03-16 | Stop reason: HOSPADM

## 2020-03-09 RX ORDER — METHYLPREDNISOLONE SODIUM SUCCINATE 40 MG/ML
40 INJECTION, POWDER, LYOPHILIZED, FOR SOLUTION INTRAMUSCULAR; INTRAVENOUS EVERY 6 HOURS
Status: DISCONTINUED | OUTPATIENT
Start: 2020-03-09 | End: 2020-03-11

## 2020-03-09 RX ORDER — METHYLPREDNISOLONE SODIUM SUCCINATE 40 MG/ML
40 INJECTION, POWDER, LYOPHILIZED, FOR SOLUTION INTRAMUSCULAR; INTRAVENOUS EVERY 6 HOURS
Status: DISCONTINUED | OUTPATIENT
Start: 2020-03-09 | End: 2020-03-09 | Stop reason: SDUPTHER

## 2020-03-09 RX ORDER — POTASSIUM CHLORIDE 20 MEQ/1
40 TABLET, EXTENDED RELEASE ORAL PRN
Status: DISCONTINUED | OUTPATIENT
Start: 2020-03-09 | End: 2020-03-16 | Stop reason: HOSPADM

## 2020-03-09 RX ORDER — ONDANSETRON 2 MG/ML
4 INJECTION INTRAMUSCULAR; INTRAVENOUS EVERY 6 HOURS PRN
Status: DISCONTINUED | OUTPATIENT
Start: 2020-03-09 | End: 2020-03-16 | Stop reason: HOSPADM

## 2020-03-09 RX ORDER — PRAVASTATIN SODIUM 40 MG
80 TABLET ORAL EVERY EVENING
Status: DISCONTINUED | OUTPATIENT
Start: 2020-03-09 | End: 2020-03-16 | Stop reason: HOSPADM

## 2020-03-09 RX ORDER — IPRATROPIUM BROMIDE AND ALBUTEROL SULFATE 2.5; .5 MG/3ML; MG/3ML
SOLUTION RESPIRATORY (INHALATION)
Status: COMPLETED
Start: 2020-03-09 | End: 2020-03-09

## 2020-03-09 RX ORDER — FUROSEMIDE 20 MG/1
20 TABLET ORAL DAILY PRN
Status: DISCONTINUED | OUTPATIENT
Start: 2020-03-09 | End: 2020-03-12

## 2020-03-09 RX ORDER — DEXTROSE MONOHYDRATE 25 G/50ML
12.5 INJECTION, SOLUTION INTRAVENOUS PRN
Status: DISCONTINUED | OUTPATIENT
Start: 2020-03-09 | End: 2020-03-16 | Stop reason: HOSPADM

## 2020-03-09 RX ORDER — ACETAMINOPHEN 325 MG/1
650 TABLET ORAL ONCE
Status: COMPLETED | OUTPATIENT
Start: 2020-03-09 | End: 2020-03-09

## 2020-03-09 RX ORDER — NICOTINE 21 MG/24HR
1 PATCH, TRANSDERMAL 24 HOURS TRANSDERMAL DAILY
Status: DISCONTINUED | OUTPATIENT
Start: 2020-03-09 | End: 2020-03-16 | Stop reason: HOSPADM

## 2020-03-09 RX ORDER — DEXTROSE MONOHYDRATE 50 MG/ML
100 INJECTION, SOLUTION INTRAVENOUS PRN
Status: DISCONTINUED | OUTPATIENT
Start: 2020-03-09 | End: 2020-03-16 | Stop reason: HOSPADM

## 2020-03-09 RX ORDER — POLYETHYLENE GLYCOL 3350 17 G/17G
17 POWDER, FOR SOLUTION ORAL DAILY PRN
Status: DISCONTINUED | OUTPATIENT
Start: 2020-03-09 | End: 2020-03-16 | Stop reason: HOSPADM

## 2020-03-09 RX ORDER — METHYLPREDNISOLONE SODIUM SUCCINATE 125 MG/2ML
125 INJECTION, POWDER, LYOPHILIZED, FOR SOLUTION INTRAMUSCULAR; INTRAVENOUS ONCE
Status: COMPLETED | OUTPATIENT
Start: 2020-03-09 | End: 2020-03-09

## 2020-03-09 RX ORDER — NICOTINE POLACRILEX 4 MG
15 LOZENGE BUCCAL PRN
Status: DISCONTINUED | OUTPATIENT
Start: 2020-03-09 | End: 2020-03-16 | Stop reason: HOSPADM

## 2020-03-09 RX ORDER — ALBUTEROL SULFATE 2.5 MG/3ML
2.5 SOLUTION RESPIRATORY (INHALATION)
Status: DISCONTINUED | OUTPATIENT
Start: 2020-03-09 | End: 2020-03-09 | Stop reason: SDUPTHER

## 2020-03-09 RX ORDER — ACETAMINOPHEN 650 MG/1
650 SUPPOSITORY RECTAL EVERY 6 HOURS PRN
Status: DISCONTINUED | OUTPATIENT
Start: 2020-03-09 | End: 2020-03-16 | Stop reason: HOSPADM

## 2020-03-09 RX ORDER — LISINOPRIL 20 MG/1
20 TABLET ORAL DAILY
Status: DISCONTINUED | OUTPATIENT
Start: 2020-03-09 | End: 2020-03-16 | Stop reason: HOSPADM

## 2020-03-09 RX ORDER — SODIUM CHLORIDE 0.9 % (FLUSH) 0.9 %
10 SYRINGE (ML) INJECTION PRN
Status: DISCONTINUED | OUTPATIENT
Start: 2020-03-09 | End: 2020-03-09 | Stop reason: SDUPTHER

## 2020-03-09 RX ORDER — ASPIRIN 81 MG/1
81 TABLET ORAL DAILY
Status: DISCONTINUED | OUTPATIENT
Start: 2020-03-09 | End: 2020-03-16 | Stop reason: HOSPADM

## 2020-03-09 RX ORDER — SODIUM CHLORIDE 0.9 % (FLUSH) 0.9 %
10 SYRINGE (ML) INJECTION PRN
Status: DISCONTINUED | OUTPATIENT
Start: 2020-03-09 | End: 2020-03-16 | Stop reason: HOSPADM

## 2020-03-09 RX ORDER — PREGABALIN 100 MG/1
200 CAPSULE ORAL 3 TIMES DAILY
Status: DISCONTINUED | OUTPATIENT
Start: 2020-03-09 | End: 2020-03-16 | Stop reason: HOSPADM

## 2020-03-09 RX ORDER — GUAIFENESIN 600 MG/1
600 TABLET, EXTENDED RELEASE ORAL 2 TIMES DAILY
Status: DISCONTINUED | OUTPATIENT
Start: 2020-03-09 | End: 2020-03-11

## 2020-03-09 RX ORDER — BENZONATATE 100 MG/1
100 CAPSULE ORAL 3 TIMES DAILY PRN
Status: DISCONTINUED | OUTPATIENT
Start: 2020-03-09 | End: 2020-03-11

## 2020-03-09 RX ORDER — IPRATROPIUM BROMIDE AND ALBUTEROL SULFATE 2.5; .5 MG/3ML; MG/3ML
1 SOLUTION RESPIRATORY (INHALATION)
Status: DISCONTINUED | OUTPATIENT
Start: 2020-03-09 | End: 2020-03-09 | Stop reason: SDUPTHER

## 2020-03-09 RX ORDER — AMLODIPINE BESYLATE 5 MG/1
10 TABLET ORAL DAILY
Status: DISCONTINUED | OUTPATIENT
Start: 2020-03-09 | End: 2020-03-16 | Stop reason: HOSPADM

## 2020-03-09 RX ORDER — IPRATROPIUM BROMIDE AND ALBUTEROL SULFATE 2.5; .5 MG/3ML; MG/3ML
1 SOLUTION RESPIRATORY (INHALATION)
Status: DISCONTINUED | OUTPATIENT
Start: 2020-03-10 | End: 2020-03-13

## 2020-03-09 RX ORDER — CLOPIDOGREL BISULFATE 75 MG/1
75 TABLET ORAL DAILY
Status: CANCELLED | OUTPATIENT
Start: 2020-03-09

## 2020-03-09 RX ORDER — VENLAFAXINE 50 MG/1
50 TABLET ORAL 2 TIMES DAILY
Status: DISCONTINUED | OUTPATIENT
Start: 2020-03-09 | End: 2020-03-16 | Stop reason: HOSPADM

## 2020-03-09 RX ORDER — SODIUM CHLORIDE 0.9 % (FLUSH) 0.9 %
10 SYRINGE (ML) INJECTION EVERY 12 HOURS SCHEDULED
Status: DISCONTINUED | OUTPATIENT
Start: 2020-03-09 | End: 2020-03-16 | Stop reason: HOSPADM

## 2020-03-09 RX ORDER — IPRATROPIUM BROMIDE AND ALBUTEROL SULFATE 2.5; .5 MG/3ML; MG/3ML
1 SOLUTION RESPIRATORY (INHALATION) ONCE
Status: COMPLETED | OUTPATIENT
Start: 2020-03-09 | End: 2020-03-09

## 2020-03-09 RX ADMIN — METHYLPREDNISOLONE SODIUM SUCCINATE 40 MG: 40 INJECTION, POWDER, FOR SOLUTION INTRAMUSCULAR; INTRAVENOUS at 19:58

## 2020-03-09 RX ADMIN — AZITHROMYCIN MONOHYDRATE 500 MG: 500 INJECTION, POWDER, LYOPHILIZED, FOR SOLUTION INTRAVENOUS at 11:38

## 2020-03-09 RX ADMIN — PRAVASTATIN SODIUM 80 MG: 40 TABLET ORAL at 22:54

## 2020-03-09 RX ADMIN — IPRATROPIUM BROMIDE AND ALBUTEROL SULFATE 1 AMPULE: .5; 3 SOLUTION RESPIRATORY (INHALATION) at 20:01

## 2020-03-09 RX ADMIN — ALBUTEROL SULFATE 2.5 MG: 2.5 SOLUTION RESPIRATORY (INHALATION) at 15:48

## 2020-03-09 RX ADMIN — METHYLPREDNISOLONE SODIUM SUCCINATE 40 MG: 40 INJECTION, POWDER, LYOPHILIZED, FOR SOLUTION INTRAMUSCULAR; INTRAVENOUS at 22:55

## 2020-03-09 RX ADMIN — IPRATROPIUM BROMIDE AND ALBUTEROL SULFATE 3 ML: 2.5; .5 SOLUTION RESPIRATORY (INHALATION) at 10:39

## 2020-03-09 RX ADMIN — BUDESONIDE AND FORMOTEROL FUMARATE DIHYDRATE 2 PUFF: 160; 4.5 AEROSOL RESPIRATORY (INHALATION) at 22:55

## 2020-03-09 RX ADMIN — VENLAFAXINE 50 MG: 50 TABLET ORAL at 22:54

## 2020-03-09 RX ADMIN — ACETAMINOPHEN 650 MG: 325 TABLET, FILM COATED ORAL at 10:59

## 2020-03-09 RX ADMIN — METHYLPREDNISOLONE SODIUM SUCCINATE 125 MG: 125 INJECTION, POWDER, FOR SOLUTION INTRAMUSCULAR; INTRAVENOUS at 10:47

## 2020-03-09 RX ADMIN — LISINOPRIL 20 MG: 20 TABLET ORAL at 22:54

## 2020-03-09 RX ADMIN — CEFTRIAXONE SODIUM 1 G: 1 INJECTION, POWDER, FOR SOLUTION INTRAMUSCULAR; INTRAVENOUS at 11:12

## 2020-03-09 RX ADMIN — AMLODIPINE BESYLATE 10 MG: 5 TABLET ORAL at 22:55

## 2020-03-09 RX ADMIN — GUAIFENESIN 600 MG: 600 TABLET, EXTENDED RELEASE ORAL at 22:54

## 2020-03-09 RX ADMIN — IPRATROPIUM BROMIDE AND ALBUTEROL SULFATE 3 ML: .5; 3 SOLUTION RESPIRATORY (INHALATION) at 10:39

## 2020-03-09 RX ADMIN — PREGABALIN 200 MG: 100 CAPSULE ORAL at 22:54

## 2020-03-09 RX ADMIN — Medication 10 ML: at 22:55

## 2020-03-09 RX ADMIN — INSULIN LISPRO 2 UNITS: 100 INJECTION, SOLUTION INTRAVENOUS; SUBCUTANEOUS at 23:18

## 2020-03-09 RX ADMIN — ENOXAPARIN SODIUM 30 MG: 30 INJECTION SUBCUTANEOUS at 22:56

## 2020-03-09 ASSESSMENT — PAIN SCALES - GENERAL
PAINLEVEL_OUTOF10: 0

## 2020-03-09 ASSESSMENT — ENCOUNTER SYMPTOMS
SHORTNESS OF BREATH: 1
SORE THROAT: 0
SINUS PAIN: 0
WHEEZING: 1
RHINORRHEA: 0
COUGH: 1
EYE DISCHARGE: 0
CONSTIPATION: 0
COLOR CHANGE: 0
VOMITING: 0
CHEST TIGHTNESS: 0
EYE REDNESS: 0
FACIAL SWELLING: 0
BLOOD IN STOOL: 0
DIARRHEA: 0
NAUSEA: 0
SINUS PRESSURE: 0
TROUBLE SWALLOWING: 0
BACK PAIN: 0
ABDOMINAL PAIN: 0
EYE PAIN: 0

## 2020-03-09 NOTE — ED PROVIDER NOTES
[address]  eMERGENCY dEPARTMENT eNCOUnter      Pt Name: Sunil Velarde  MRN: [id number]  [place] 1955  Date of evaluation: 3/9/20      CHIEF COMPLAINT       Chief Complaint   Patient presents with    Shortness of Breath    Cough         HISTORY OF PRESENT ILLNESS    Sunil Velarde is a 71 y.o. male who presents complaining of shortness of breath. Patient states last night before going to bed he started feeling short of breath. Patient states he has had a cough productive of sputum. Patient does have a history of COPD and he was taking his inhalers which were not helping. Patient denies any chest pain. Patient denies any swelling in the legs. EMS arrived found the patient was slightly hypoxic start him on a breathing treatment not improved but patient is still feeling short of breath. REVIEW OF SYSTEMS       Review of Systems   Constitutional: Negative for activity change, appetite change, chills, diaphoresis and fever. HENT: Negative for congestion, ear pain, facial swelling, nosebleeds, rhinorrhea, sinus pressure, sore throat and trouble swallowing. Eyes: Negative for pain, discharge and redness. Respiratory: Positive for cough, shortness of breath and wheezing. Negative for chest tightness. Cardiovascular: Negative for chest pain, palpitations and leg swelling. Gastrointestinal: Negative for abdominal pain, blood in stool, constipation, diarrhea, nausea and vomiting. Genitourinary: Negative for difficulty urinating, dysuria, flank pain, frequency, genital sores and hematuria. Musculoskeletal: Negative for arthralgias, back pain, gait problem, joint swelling, myalgias and neck pain. Skin: Negative for color change, pallor, rash and wound. Neurological: Negative for dizziness, tremors, seizures, syncope, speech difficulty, weakness, numbness and headaches.    Psychiatric/Behavioral: Negative for confusion, decreased concentration, hallucinations, self-injury, sleep NEEDED FOR GOUT ATTACK. Take 2 tablets now, then 1 tablet one hour later. Wait 12 hours then start 1 tablet bid for 5 days. FOLBEE PLUS (FOLBEE PLUS) TABS    Take 1 tablet by mouth daily    FUROSEMIDE (LASIX) 20 MG TABLET    Take 1 tablet by mouth daily as needed (only if BP over 140/90 or leg edema) **stop Tenoretic 50-25**    GLUCOSE BLOOD (BLOOD GLUCOSE TEST STRIPS) STRP    tesing once a day fasting    LIFT CHAIR MISC    by Does not apply route Patient has difficulty getting up from usual chair    LISINOPRIL (PRINIVIL;ZESTRIL) 20 MG TABLET    Take 1 tablet by mouth daily    MUPIROCIN (BACTROBAN) 2 % OINTMENT    Apply topically 1-2 times daily on the affected area . OK to substitute to cream    NONFORMULARY    Bioidentical pain cream    OMEGA-3 FATTY ACIDS (FISH OIL) 1000 MG CAPS    Take 1 capsule by mouth 4 times daily    PRAVASTATIN (PRAVACHOL) 80 MG TABLET    Take 1 tablet by mouth every evening Dose increased  9/24/2019    PREGABALIN (LYRICA) 200 MG CAPSULE    Take 1 capsule by mouth 3 times daily for 90 days. SYMBICORT 160-4.5 MCG/ACT AERO    Inhale 2 puffs into the lungs 2 times daily 10.2 inhaler    TRAMADOL (ULTRAM) 50 MG TABLET    Take 1-2 tablets by mouth every 6 hours as needed for Pain. VENLAFAXINE (EFFEXOR) 50 MG TABLET    Take 1 tablet by mouth 2 times daily For depression, neuropathy, pain    ZOSTER RECOMBINANT ADJUVANTED VACCINE (SHINGRIX) 50 MCG/0.5ML SUSR INJECTION    Inject 0.5 mLs into the muscle See Admin Instructions 1 dose now and repeat in 2-6 months       ALLERGIES     is allergic to succinylcholine chloride and cymbalta [duloxetine hcl]. FAMILY HISTORY     [unfilled]     SOCIAL HISTORY      reports that he has been smoking cigarettes. He has a 52.50 pack-year smoking history. He has never used smokeless tobacco. He reports that he does not drink alcohol or use drugs.     PHYSICAL EXAM     INITIAL VITALS: /74   Pulse 93   Temp 100.3 °F (37.9 °C) (Oral)   Resp 22   Ht 6' (1.829 m)   Wt 280 lb (127 kg)   SpO2 94%   BMI 37.97 kg/m²      Physical Exam  Vitals signs and nursing note reviewed. Constitutional:       General: He is not in acute distress. Appearance: He is well-developed. He is not diaphoretic. HENT:      Head: Normocephalic and atraumatic. Eyes:      General: No scleral icterus. Right eye: No discharge. Left eye: No discharge. Conjunctiva/sclera: Conjunctivae normal.      Pupils: Pupils are equal, round, and reactive to light. Cardiovascular:      Rate and Rhythm: Normal rate and regular rhythm. Heart sounds: Normal heart sounds. No murmur. No friction rub. No gallop. Pulmonary:      Effort: Pulmonary effort is normal. Tachypnea present. No respiratory distress. Breath sounds: Decreased breath sounds present. No wheezing or rales. Chest:      Chest wall: No tenderness. Abdominal:      General: Bowel sounds are normal. There is no distension. Palpations: Abdomen is soft. There is no mass. Tenderness: There is no abdominal tenderness. There is no guarding or rebound. Musculoskeletal: Normal range of motion. General: No tenderness. Skin:     General: Skin is warm and dry. Coloration: Skin is not pale. Findings: No erythema or rash. Neurological:      Mental Status: He is alert and oriented to person, place, and time. Cranial Nerves: No cranial nerve deficit. Sensory: No sensory deficit. Motor: No abnormal muscle tone. Coordination: Coordination normal.      Deep Tendon Reflexes: Reflexes normal.   Psychiatric:         Behavior: Behavior normal.         Thought Content: Thought content normal.         Judgment: Judgment normal.         MEDICAL DECISION MAKING:     Patient symptoms are most likely related to a COPD exacerbation. Will do a full work-up on the patient to make sure that this is not a cardiac issue.     DIAGNOSTIC RESULTS     EKG: All EKG's are interpreted by the Emergency Department Physician who either signs or Co-signs this chart in the absence of a cardiologist.    EKG Interpretation    Interpreted by me    Rhythm: normal sinus   Rate: normal  Axis: normal  Ectopy: none  Conduction: normal  ST Segments: no acute change  T Waves: no acute change  Q Waves: none    Clinical Impression: no acute changes and normal EKG    RADIOLOGY:All plain film, CT, MRI, and formal ultrasound images (except ED bedside ultrasound)are read by the radiologist and interpretations are directly viewed by the emergency physician. Xr Chest Portable    Result Date: 3/9/2020  Cardiomegaly with bilateral lower lobe infiltrates. LABS: All lab results were reviewed bymyself, and all abnormals are listed below. Labs Reviewed   RAPID INFLUENZA A/B ANTIGENS   CBC WITH AUTO DIFFERENTIAL   BASIC METABOLIC PANEL   D-DIMER, QUANTITATIVE   TROPONIN   TROPONIN   BRAIN NATRIURETIC PEPTIDE         EMERGENCY DEPARTMENT COURSE:   Vitals:    Vitals:    03/09/20 1030 03/09/20 1040   BP: 132/74    Pulse: 93    Resp: 22    Temp: 100.3 °F (37.9 °C)    TempSrc: Oral    SpO2: 92% 94%   Weight: 280 lb (127 kg)    Height: 6' (1.829 m)        The patient was given the following medications while in the emergency department:     Orders Placed This Encounter   Medications    methylPREDNISolone sodium (SOLU-MEDROL) injection 125 mg    ipratropium-albuterol (DUONEB) nebulizer solution 1 ampule    albuterol (PROVENTIL) nebulizer solution 2.5 mg    acetaminophen (TYLENOL) tablet 650 mg    ipratropium-albuterol (DUONEB) 0.5-2.5 (3) MG/3ML nebulizer solution     IMAN ALERGIA: cabinet override    cefTRIAXone (ROCEPHIN) 1 g IVPB in 50 mL D5W minibag    azithromycin (ZITHROMAX) 500 mg in D5W 250ml addavial       -------------------------  10:49 AM EDT  Patient was updated on results and we will go ahead and start treating as community-acquired pneumonia. I spoke with Dr. Wendy Allan who agrees to the admission.

## 2020-03-09 NOTE — ED NOTES
Bed: 07B  Expected date:   Expected time:   Means of arrival: [facility]  Comments: Pt is brought to this ER by EMS from home with c/o SOB at rest and yellow productive cough. [facility] reports pt's SpO2 = 87% RA upon their arrival.  Pt was placed on NC and given a Duoneb while enroute. Pt's SpO2 increases to 98% for EMS. Pt arrives A+O x 4, GCS = 15, PMS x 4 intact, and PWD. Pt speaks in complete sentences without difficulty, but he has diminished lung sounds t/o bilat. Pt has no visible peripheral edema.      Jordan Mcfadden, RN  03/09/20 9141

## 2020-03-09 NOTE — H&P
ago   no stents    Hyperlipidemia     Hyperlipidemia with target LDL less than 100 1/20/2016    Hyperparathyroidism ([address] 1/17/2020    Hypertension     Knee pain, chronic     left    Liver disease     MDRO (multiple drug resistant organisms) resistance     Moderate episode of recurrent major depressive disorder ([address] 1/20/2016    Obesity, Class III, BMI 40-49.9 (morbid obesity) ([address] 1/20/2016    REYNALDO on CPAP 5/6/2017    Osteoarthritis     Polypharmacy 3/25/2017    Positive FIT (fecal immunochemical test) 4/11/2017    Prolonged emergence from general anesthesia 01/05/2017    Patient \"on life support for 3 days\" after back surgery due to being given succinylcholine    Prostate cancer ([address] 10/2013    finished radiation tx 5/2014    Pseudocholinesterase deficiency 03/25/2017    Pt.  \"on life support\" for 3 days after back surgery 1/5/17 after being given succinylcholine    Severe obesity (BMI 35.0-35.9 with comorbidity) ([address] 2/2/2020    Short of breath on exertion     Sleep apnea     uses CPAP machine nightly    Status post lumbar laminectomy 3/25/2017    Stenosis of left carotid artery 10/7/2018    Tubular adenoma of colon 4/11/17 5/13/2017    Type 2 diabetes mellitus with hyperglycemia, without long-term current use of insulin ([address] 3/25/2017    Lab Results Component Value Date  LABA1C 7.1 (H) 03/23/2017     Vitamin D deficiency 3/25/2017    Wears glasses         Past SurgicalHistory:     Past Surgical History:   Procedure Laterality Date    BACK SURGERY      x 2     BACK SURGERY  01/05/2017    lumbar laminectomy L2, L3, L4    CARDIAC CATHETERIZATION  2007    no stents    CAROTID ENDARTERECTOMY Right 12/16/2019    Dr. Dionna Stevens Bilateral     KNEE SURGERY Left     LUMBAR LAMINECTOMY  01/05/2017    L2-L4    CA CLOSED RX SHLDR DISLOC,ANESTHESIA Left 8/1/2018    SHOULDER CLOSED REDUCTION WITH C-ARM VISUALIZATION performed by Marius Gottron, MD at [address] [place] [address] COLONOSCOPY W/BIOPSY SINGLE/MULTIPLE N/A 5/9/2017    COLONOSCOPY WITH BIOPSY performed by Francisco Silva DO at [address] IMPLANT Left 7/18/2018    SHOULDER TOTAL ARTHROPLASTY REVERSE LEFT DJO & BICEP TENDON TRANSFER performed by Kalen Diamond MD at [address] HUMERAL/GLENOID COMPNT Left 8/3/2018    SHOULDER TOTAL REVERSE  ARTHROPLASTY REVISION performed by Kalen Diamond MD at [address]? rotator cuff repair    TONSILLECTOMY AND ADENOIDECTOMY          Medications Prior to Admission:        Prior to Admission medications    Medication Sig Start Date End Date Taking? Authorizing Provider   clopidogrel (PLAVIX) 75 MG tablet Take 75 mg by mouth daily   Yes Historical Provider, MD   amLODIPine (NORVASC) 10 MG tablet Take 1 tablet by mouth daily 1/28/20  Yes Janusz Maya MD   lisinopril (PRINIVIL;ZESTRIL) 20 MG tablet Take 1 tablet by mouth daily 1/28/20  Yes Janusz Maya MD   furosemide (LASIX) 20 MG tablet Take 1 tablet by mouth daily as needed (only if BP over 140/90 or leg edema) **stop Tenoretic 50-25** 1/28/20  Yes Janusz Maya MD   venlafaxine (EFFEXOR) 50 MG tablet Take 1 tablet by mouth 2 times daily For depression, neuropathy, pain 1/28/20  Yes Janusz Maya MD   pregabalin (LYRICA) 200 MG capsule Take 1 capsule by mouth 3 times daily for 90 days. 1/20/20 4/19/20 Yes Janusz Maya MD   pravastatin (PRAVACHOL) 80 MG tablet Take 1 tablet by mouth every evening Dose increased  9/24/2019 9/24/19  Yes Janusz Maya MD   Camphor-Menthol-Methyl Sal 3.1-16-10 % GEL Apply every 8 hours as needed for pain.  OK to substitute 4/9/19  Yes Janusz Maya MD   SYMBICORT 160-4.5 MCG/ACT AERO Inhale 2 puffs into the lungs 2 times daily 10.2 inhaler 4/9/19  Yes Janusz Maya MD   folbee plus (FOLBEE PLUS) TABS Take 1 tablet by mouth daily 1/21/19  Yes Janusz Maya MD   aspirin EC 81 MG EC tablet Take 1 tablet by mouth daily 9/4/18  Yes Pratibha Nicholas MD   colchicine (COLCRYS) 0.6 MG tablet ADJUST SIG-ONLY AS NEEDED FOR GOUT ATTACK. Take 2 tablets now, then 1 tablet one hour later. Wait 12 hours then start 1 tablet bid for 5 days. 4/11/17  Yes Pratibha Nicholas MD   albuterol (PROVENTIL) (2.5 MG/3ML) 0.083% nebulizer solution Take 2.5 mg by nebulization every 6 hours as needed  3/2/17  Yes Historical Provider, MD   albuterol sulfate  (90 BASE) MCG/ACT inhaler Inhale 2 puffs into the lungs every 6 hours as needed for Wheezing or Shortness of Breath (COUGH) 3/23/17  Yes Pratibha Nicholas MD   traMADol (ULTRAM) 50 MG tablet Take 1-2 tablets by mouth every 6 hours as needed for Pain. 2/19/20 2/18/21  Marrie Lennox, MD   zoster recombinant adjuvanted vaccine [facility]) 50 MCG/0.5ML SUSR injection Inject 0.5 mLs into the muscle See Admin Instructions 1 dose now and repeat in 2-6 months 1/28/20 7/26/20  Pratibha Nicholas MD   Lift Chair MISC by Does not apply route Patient has difficulty getting up from usual chair 4/9/19   Pratibha Nicholas MD   Glucose Blood (BLOOD GLUCOSE TEST STRIPS) STRP tesing once a day fasting 3/1/18   Pratibha Nicholas MD        Allergies:     Succinylcholine chloride and Cymbalta [duloxetine hcl]    Social History:     Tobacco:    reports that he has been smoking cigarettes. He has a 52.50 pack-year smoking history. He has never used smokeless tobacco.  Alcohol:      reports no history of alcohol use. Drug Use:  reports no history of drug use. Family History:     Family History   Problem Relation Age of Onset    Diabetes Mother     Lung Cancer Brother     Liver Cancer Brother     Cancer Father        Review of Systems:     Positive and Negative as described in HPI. Review of Systems   Constitutional: Positive for fatigue. Negative for chills, diaphoresis and fever.    HENT: Negative for congestion, rhinorrhea, sinus pressure, sinus pain and sore throat. Respiratory: Positive for cough and shortness of breath. Negative for chest tightness. Cardiovascular: Negative for chest pain, palpitations and leg swelling. Gastrointestinal: Negative for abdominal pain, constipation, diarrhea, nausea and vomiting. Genitourinary: Negative. Musculoskeletal: Positive for myalgias (Bilateral lower extremity myalgias, chronic). Skin: Negative for color change and wound. Neurological: Negative. Physical Exam:   /73   Pulse 82   Temp 99 °F (37.2 °C) (Oral)   Resp 18   Ht 6' (1.829 m)   Wt 280 lb (127 kg)   SpO2 90%   BMI 37.97 kg/m²   Temp (24hrs), Av.7 °F (37.6 °C), Min:99 °F (37.2 °C), Max:100.3 °F (37.9 °C)    No results for input(s): POCGLU in the last 72 hours. Intake/Output Summary (Last 24 hours) at 3/9/2020 1712  Last data filed at 3/9/2020 1607  Gross per 24 hour   Intake 550 ml   Output 950 ml   Net -400 ml       Physical Exam  Constitutional:       Appearance: He is obese. He is ill-appearing. HENT:      Head: Normocephalic and atraumatic. Nose: Nose normal.      Mouth/Throat:      Mouth: Mucous membranes are moist.      Pharynx: Oropharynx is clear. Eyes:      Extraocular Movements: Extraocular movements intact. Pupils: Pupils are equal, round, and reactive to light. Neck:      Musculoskeletal: Normal range of motion and neck supple. Cardiovascular:      Rate and Rhythm: Normal rate and regular rhythm. Pulses: Normal pulses. Heart sounds: No murmur. Pulmonary:      Effort: Pulmonary effort is normal.      Breath sounds: Wheezing and rales present. Abdominal:      General: Abdomen is flat. Bowel sounds are normal. There is no distension. Tenderness: There is no abdominal tenderness. Musculoskeletal:      Right lower leg: No edema. Left lower leg: No edema. Skin:     General: Skin is warm and dry. Findings: No erythema.    Neurological:      General: No focal deficit present.          Investigations:     Laboratory Testing:  Recent Results (from the past 24 hour(s))   EKG 12 Lead    Collection Time: 03/09/20 10:34 AM   Result Value Ref Range    Ventricular Rate 85 BPM    Atrial Rate 85 BPM    P-R Interval 158 ms    QRS Duration 92 ms    Q-T Interval 346 ms    QTc Calculation (Bazett) 411 ms    P Axis 80 degrees    R Axis 73 degrees    T Axis 71 degrees   CBC Auto Differential    Collection Time: 03/09/20 10:44 AM   Result Value Ref Range    WBC 19.0 (H) 3.5 - 11.0 k/uL    RBC 5.26 4.5 - 5.9 m/uL    Hemoglobin 15.5 13.5 - 17.5 g/dL    Hematocrit 46.9 41 - 53 %    MCV 89.2 80 - 100 fL    MCH 29.5 26 - 34 pg    MCHC 33.1 31 - 37 g/dL    RDW 14.1 11.5 - 14.9 %    Platelets 585 723 - 315 k/uL    MPV 9.3 6.0 - 12.0 fL    NRBC Automated NOT REPORTED per 100 WBC    Differential Type NOT REPORTED     Immature Granulocytes NOT REPORTED 0 %    Absolute Immature Granulocyte NOT REPORTED 0.00 - 0.30 k/uL    WBC Morphology NOT REPORTED     RBC Morphology NOT REPORTED     Platelet Estimate NOT REPORTED     Seg Neutrophils 83 (H) 36 - 66 %    Lymphocytes 10 (L) 24 - 44 %    Monocytes 5 1 - 7 %    Eosinophils % 0 0 - 4 %    Basophils 0 0 - 2 %    Bands 2 0 - 10 %    Segs Absolute 15.77 (H) 1.3 - 9.1 k/uL    Absolute Lymph # 1.90 1.0 - 4.8 k/uL    Absolute Mono # 0.95 0.1 - 1.3 k/uL    Absolute Eos # 0.00 0.0 - 0.4 k/uL    Basophils Absolute 0.00 0.0 - 0.2 k/uL    Absolute Bands # 0.38 0.0 - 1.0 k/uL    Morphology Normal    Basic Metabolic Panel    Collection Time: 03/09/20 10:44 AM   Result Value Ref Range    Glucose 122 (H) 70 - 99 mg/dL    BUN 13 8 - 23 mg/dL    CREATININE 0.75 0.70 - 1.20 mg/dL    Bun/Cre Ratio NOT REPORTED 9 - 20    Calcium 10.1 8.6 - 10.4 mg/dL    Sodium 140 135 - 144 mmol/L    Potassium 4.5 3.7 - 5.3 mmol/L    Chloride 101 98 - 107 mmol/L    CO2 25 20 - 31 mmol/L    Anion Gap 14 9 - 17 mmol/L    GFR Non-African American >60 >60 mL/min    GFR African American >60 >60 mL/min shoulder replacement 1.5 years ago FINDINGS: [employer] bone images: A photopenic area is present in the left shoulder consistent with a left shoulder arthroplasty. Symmetrical activity is noted in the acromioclavicular and glenohumeral aspects of both shoulders as well as the sternoclavicular joints. No areas of abnormal asymmetric radiotracer uptake is noted. No evidence of prosthetic loosening. [employer] 111 white blood cell study for infection: Photopenic area in the left shoulder. No areas of abnormal radiotracer uptake to suggest infection. No imaging evidence of prosthetic loosening or infection. Evidence of left shoulder arthroplasty. Ct Shoulder Left Wo Contrast    Result Date: 2/11/2020  EXAMINATION: CT OF THE LEFT SHOULDER WITHOUT CONTRAST 2/11/2020 9:56 am TECHNIQUE: CT of the left shoulder was performed without the administration of intravenous contrast.  Multiplanar reformatted images are provided for review. Dose modulation, iterative reconstruction, and/or weight based adjustment of the mA/kV was utilized to reduce the radiation dose to as low as reasonably achievable. COMPARISON: Radiographs 1/30/2020 HISTORY: ORDERING SYSTEM PROVIDED HISTORY: H/O total shoulder replacement, left TECHNOLOGIST PROVIDED HISTORY: Reason for Exam: H/O total shoulder replacement, left Acuity: Unknown Type of Exam: Unknown Additional signs and symptoms: PT [address] [place] 08/2018, C/O PAIN TO SHOULDER FINDINGS: Left shoulder reverse arthroplasty components cause artifact, limiting adjacent evaluation. No shoulder dislocation. No acute periprosthetic fracture. Reverse left shoulder arthroplasty components appear intact. Moderate acromioclavicular degenerative changes. No large shoulder joint effusion. No left axillary adenopathy. No acute soft tissue abnormality. Left shoulder arthroplasty without evidence of complication.      Xr Chest Portable    Result Date: 3/9/2020  EXAMINATION: ONE Stable adreniform enlargement of the adrenal glands. Soft Tissues/Bones: Reverse left shoulder arthroplasty hardware. Mild diffuse degenerative changes throughout the spine. 1. Aspirated or mucoid secretions in the posterior trachea. 2. Moderate emphysema. Scarring and atelectasis in the lingula. No discrete noncalcified pulmonary nodule. 3. Cholelithiasis. 4. Atherosclerotic calcification of the aorta and branch vasculature. Coronary artery disease. 5. Reverse left shoulder arthroplasty hardware. 6. Stable prominent mediastinal lymph nodes without overt lymphadenopathy. 7. Stable adreniform enlargement of the adrenal glands likely adrenal hyperplasia. LUNG RADS: Per ACR Lung-RADS Version 1.0 Category 1, Negative (No nodules and definitely benign nodules). Management:Continue annual lung screening with LDCT in 12 months.(probability of malignancy <1%). RECOMMENDATIONS: If you would like to register your patient with the [facility] Nodule/Lung Cancer Screening Program, please contact the Nurse Navigator at [phone]        Assessment :      Primary Problem  Pneumonia    Active Hospital Problems    Diagnosis Date Noted    Severe obesity (BMI 35.0-35.9 with comorbidity) ([facility] [E66.01, Z68.35] 02/02/2020     Priority: High    Pneumonia [J18.9] 03/09/2020    Coronary artery disease involving native coronary artery of native heart without angina pectoris [I25.10] 02/02/2020    COPD mixed type ([facility] [J44.9] 03/25/2017    Peripheral neuropathic pain [M79.2] 03/25/2017    Type 2 diabetes mellitus with diabetic polyneuropathy, without long-term current use of insulin ([facility] [E11.42] 03/25/2017    Essential hypertension [I10] 01/20/2016       Plan:     Patient status Admit as inpatient in the  Progressive Unit/Step down    Community Acquired Pneumonia, Likely Strep vs. Haemophilus vs. Atypicals vs. Viral  -CXR: bilateral lower lobe infiltrates  -WBC 19  -Rocephin 1 g daily  -Zithromax 500  -F/u pneumonia

## 2020-03-10 LAB
ABSOLUTE BANDS #: 0.45 K/UL (ref 0–1)
ABSOLUTE EOS #: 0 K/UL (ref 0–0.4)
ABSOLUTE IMMATURE GRANULOCYTE: ABNORMAL K/UL (ref 0–0.3)
ABSOLUTE LYMPH #: 1.36 K/UL (ref 1–4.8)
ABSOLUTE MONO #: 0.91 K/UL (ref 0.1–1.3)
ADENOVIRUS PCR: NOT DETECTED
ALBUMIN SERPL-MCNC: 4.1 G/DL (ref 3.5–5.2)
ALBUMIN/GLOBULIN RATIO: ABNORMAL (ref 1–2.5)
ALP BLD-CCNC: 102 U/L (ref 40–129)
ALT SERPL-CCNC: 27 U/L (ref 5–41)
ANION GAP SERPL CALCULATED.3IONS-SCNC: 14 MMOL/L (ref 9–17)
AST SERPL-CCNC: 21 U/L
BANDS: 2 % (ref 0–10)
BASOPHILS # BLD: 0 % (ref 0–2)
BASOPHILS ABSOLUTE: 0 K/UL (ref 0–0.2)
BILIRUB SERPL-MCNC: 0.35 MG/DL (ref 0.3–1.2)
BORDETELLA PARAPERTUSSIS: NOT DETECTED
BORDETELLA PERTUSSIS PCR: NOT DETECTED
BUN BLDV-MCNC: 28 MG/DL (ref 8–23)
BUN/CREAT BLD: ABNORMAL (ref 9–20)
CALCIUM SERPL-MCNC: 9.9 MG/DL (ref 8.6–10.4)
CHLAMYDIA PNEUMONIAE BY PCR: NOT DETECTED
CHLORIDE BLD-SCNC: 102 MMOL/L (ref 98–107)
CO2: 21 MMOL/L (ref 20–31)
CORONAVIRUS 229E PCR: NOT DETECTED
CORONAVIRUS HKU1 PCR: NOT DETECTED
CORONAVIRUS NL63 PCR: NOT DETECTED
CORONAVIRUS OC43 PCR: NOT DETECTED
CREAT SERPL-MCNC: 0.8 MG/DL (ref 0.7–1.2)
DIFFERENTIAL TYPE: ABNORMAL
DIRECT EXAM: NORMAL
DIRECT EXAM: NORMAL
EKG ATRIAL RATE: 78 BPM
EKG ATRIAL RATE: 85 BPM
EKG P AXIS: 70 DEGREES
EKG P AXIS: 80 DEGREES
EKG P-R INTERVAL: 158 MS
EKG P-R INTERVAL: 176 MS
EKG Q-T INTERVAL: 346 MS
EKG Q-T INTERVAL: 362 MS
EKG QRS DURATION: 92 MS
EKG QRS DURATION: 96 MS
EKG QTC CALCULATION (BAZETT): 411 MS
EKG QTC CALCULATION (BAZETT): 412 MS
EKG R AXIS: 38 DEGREES
EKG R AXIS: 73 DEGREES
EKG T AXIS: 61 DEGREES
EKG T AXIS: 71 DEGREES
EKG VENTRICULAR RATE: 78 BPM
EKG VENTRICULAR RATE: 85 BPM
EOSINOPHILS RELATIVE PERCENT: 0 % (ref 0–4)
ESTIMATED AVERAGE GLUCOSE: 114 MG/DL
GFR AFRICAN AMERICAN: >60 ML/MIN
GFR NON-AFRICAN AMERICAN: >60 ML/MIN
GFR SERPL CREATININE-BSD FRML MDRD: ABNORMAL ML/MIN/{1.73_M2}
GFR SERPL CREATININE-BSD FRML MDRD: ABNORMAL ML/MIN/{1.73_M2}
GLUCOSE BLD-MCNC: 155 MG/DL (ref 75–110)
GLUCOSE BLD-MCNC: 164 MG/DL (ref 70–99)
GLUCOSE BLD-MCNC: 181 MG/DL (ref 75–110)
GLUCOSE BLD-MCNC: 218 MG/DL (ref 75–110)
GLUCOSE BLD-MCNC: 235 MG/DL (ref 75–110)
GLUCOSE BLD-MCNC: 245 MG/DL (ref 75–110)
HBA1C MFR BLD: 5.6 % (ref 4–6)
HCT VFR BLD CALC: 43 % (ref 41–53)
HEMOGLOBIN: 14.3 G/DL (ref 13.5–17.5)
HUMAN METAPNEUMOVIRUS PCR: NOT DETECTED
IMMATURE GRANULOCYTES: ABNORMAL %
INFLUENZA A BY PCR: NOT DETECTED
INFLUENZA A H1 (2009) PCR: NORMAL
INFLUENZA A H1 PCR: NORMAL
INFLUENZA A H3 PCR: NORMAL
INFLUENZA B BY PCR: NOT DETECTED
LYMPHOCYTES # BLD: 6 % (ref 24–44)
Lab: NORMAL
Lab: NORMAL
MCH RBC QN AUTO: 29.7 PG (ref 26–34)
MCHC RBC AUTO-ENTMCNC: 33.2 G/DL (ref 31–37)
MCV RBC AUTO: 89.3 FL (ref 80–100)
MONOCYTES # BLD: 4 % (ref 1–7)
MORPHOLOGY: NORMAL
MRSA, DNA, NASAL: NORMAL
MYCOPLASMA PNEUMONIAE PCR: NOT DETECTED
NRBC AUTOMATED: ABNORMAL PER 100 WBC
PARAINFLUENZA 1 PCR: NOT DETECTED
PARAINFLUENZA 2 PCR: NOT DETECTED
PARAINFLUENZA 3 PCR: NOT DETECTED
PARAINFLUENZA 4 PCR: NOT DETECTED
PDW BLD-RTO: 14.2 % (ref 11.5–14.9)
PLATELET # BLD: 188 K/UL (ref 150–450)
PLATELET ESTIMATE: ABNORMAL
PMV BLD AUTO: 9.5 FL (ref 6–12)
POTASSIUM SERPL-SCNC: 4 MMOL/L (ref 3.7–5.3)
RBC # BLD: 4.81 M/UL (ref 4.5–5.9)
RBC # BLD: ABNORMAL 10*6/UL
RESP SYNCYTIAL VIRUS PCR: NOT DETECTED
RHINO/ENTEROVIRUS PCR: NOT DETECTED
SEG NEUTROPHILS: 88 % (ref 36–66)
SEGMENTED NEUTROPHILS ABSOLUTE COUNT: 19.98 K/UL (ref 1.3–9.1)
SODIUM BLD-SCNC: 137 MMOL/L (ref 135–144)
SPECIMEN DESCRIPTION: NORMAL
TOTAL PROTEIN: 7.8 G/DL (ref 6.4–8.3)
WBC # BLD: 22.7 K/UL (ref 3.5–11)
WBC # BLD: ABNORMAL 10*3/UL

## 2020-03-10 PROCEDURE — 94640 AIRWAY INHALATION TREATMENT: CPT

## 2020-03-10 PROCEDURE — 36415 COLL VENOUS BLD VENIPUNCTURE: CPT

## 2020-03-10 PROCEDURE — 6370000000 HC RX 637 (ALT 250 FOR IP): Performed by: STUDENT IN AN ORGANIZED HEALTH CARE EDUCATION/TRAINING PROGRAM

## 2020-03-10 PROCEDURE — 2060000000 HC ICU INTERMEDIATE R&B

## 2020-03-10 PROCEDURE — 80053 COMPREHEN METABOLIC PANEL: CPT

## 2020-03-10 PROCEDURE — 87449 NOS EACH ORGANISM AG IA: CPT

## 2020-03-10 PROCEDURE — 82947 ASSAY GLUCOSE BLOOD QUANT: CPT

## 2020-03-10 PROCEDURE — 2580000003 HC RX 258: Performed by: STUDENT IN AN ORGANIZED HEALTH CARE EDUCATION/TRAINING PROGRAM

## 2020-03-10 PROCEDURE — 94761 N-INVAS EAR/PLS OXIMETRY MLT: CPT

## 2020-03-10 PROCEDURE — 6370000000 HC RX 637 (ALT 250 FOR IP): Performed by: NURSE PRACTITIONER

## 2020-03-10 PROCEDURE — 6360000002 HC RX W HCPCS: Performed by: STUDENT IN AN ORGANIZED HEALTH CARE EDUCATION/TRAINING PROGRAM

## 2020-03-10 PROCEDURE — 99232 SBSQ HOSP IP/OBS MODERATE 35: CPT | Performed by: INTERNAL MEDICINE

## 2020-03-10 PROCEDURE — 87899 AGENT NOS ASSAY W/OPTIC: CPT

## 2020-03-10 PROCEDURE — 93010 ELECTROCARDIOGRAM REPORT: CPT | Performed by: INTERNAL MEDICINE

## 2020-03-10 PROCEDURE — 2700000000 HC OXYGEN THERAPY PER DAY

## 2020-03-10 PROCEDURE — 85025 COMPLETE CBC W/AUTO DIFF WBC: CPT

## 2020-03-10 RX ADMIN — LISINOPRIL 20 MG: 20 TABLET ORAL at 08:23

## 2020-03-10 RX ADMIN — METHYLPREDNISOLONE SODIUM SUCCINATE 40 MG: 40 INJECTION, POWDER, LYOPHILIZED, FOR SOLUTION INTRAMUSCULAR; INTRAVENOUS at 08:23

## 2020-03-10 RX ADMIN — METHYLPREDNISOLONE SODIUM SUCCINATE 40 MG: 40 INJECTION, POWDER, LYOPHILIZED, FOR SOLUTION INTRAMUSCULAR; INTRAVENOUS at 21:49

## 2020-03-10 RX ADMIN — METHYLPREDNISOLONE SODIUM SUCCINATE 40 MG: 40 INJECTION, POWDER, LYOPHILIZED, FOR SOLUTION INTRAMUSCULAR; INTRAVENOUS at 04:30

## 2020-03-10 RX ADMIN — GUAIFENESIN 600 MG: 600 TABLET, EXTENDED RELEASE ORAL at 08:12

## 2020-03-10 RX ADMIN — AMLODIPINE BESYLATE 10 MG: 5 TABLET ORAL at 08:12

## 2020-03-10 RX ADMIN — PREGABALIN 200 MG: 100 CAPSULE ORAL at 08:12

## 2020-03-10 RX ADMIN — ENOXAPARIN SODIUM 30 MG: 30 INJECTION SUBCUTANEOUS at 08:11

## 2020-03-10 RX ADMIN — INSULIN LISPRO 4 UNITS: 100 INJECTION, SOLUTION INTRAVENOUS; SUBCUTANEOUS at 11:33

## 2020-03-10 RX ADMIN — IPRATROPIUM BROMIDE AND ALBUTEROL SULFATE 1 AMPULE: .5; 3 SOLUTION RESPIRATORY (INHALATION) at 15:28

## 2020-03-10 RX ADMIN — INSULIN LISPRO 2 UNITS: 100 INJECTION, SOLUTION INTRAVENOUS; SUBCUTANEOUS at 21:49

## 2020-03-10 RX ADMIN — AZITHROMYCIN MONOHYDRATE 500 MG: 500 INJECTION, POWDER, LYOPHILIZED, FOR SOLUTION INTRAVENOUS at 12:24

## 2020-03-10 RX ADMIN — IPRATROPIUM BROMIDE AND ALBUTEROL SULFATE 1 AMPULE: .5; 3 SOLUTION RESPIRATORY (INHALATION) at 10:49

## 2020-03-10 RX ADMIN — METHYLPREDNISOLONE SODIUM SUCCINATE 40 MG: 40 INJECTION, POWDER, LYOPHILIZED, FOR SOLUTION INTRAMUSCULAR; INTRAVENOUS at 15:11

## 2020-03-10 RX ADMIN — PREGABALIN 200 MG: 100 CAPSULE ORAL at 15:11

## 2020-03-10 RX ADMIN — ACETAMINOPHEN 650 MG: 325 TABLET, FILM COATED ORAL at 07:31

## 2020-03-10 RX ADMIN — PRAVASTATIN SODIUM 80 MG: 40 TABLET ORAL at 16:53

## 2020-03-10 RX ADMIN — VENLAFAXINE 50 MG: 50 TABLET ORAL at 21:51

## 2020-03-10 RX ADMIN — ASPIRIN 81 MG: 81 TABLET, COATED ORAL at 08:12

## 2020-03-10 RX ADMIN — Medication 10 ML: at 21:49

## 2020-03-10 RX ADMIN — VENLAFAXINE 50 MG: 50 TABLET ORAL at 12:24

## 2020-03-10 RX ADMIN — IPRATROPIUM BROMIDE AND ALBUTEROL SULFATE 1 AMPULE: .5; 3 SOLUTION RESPIRATORY (INHALATION) at 07:12

## 2020-03-10 RX ADMIN — BUDESONIDE AND FORMOTEROL FUMARATE DIHYDRATE 2 PUFF: 160; 4.5 AEROSOL RESPIRATORY (INHALATION) at 21:49

## 2020-03-10 RX ADMIN — CEFTRIAXONE SODIUM 1 G: 1 INJECTION, POWDER, FOR SOLUTION INTRAMUSCULAR; INTRAVENOUS at 11:33

## 2020-03-10 RX ADMIN — PREGABALIN 200 MG: 100 CAPSULE ORAL at 21:49

## 2020-03-10 RX ADMIN — INSULIN LISPRO 2 UNITS: 100 INJECTION, SOLUTION INTRAVENOUS; SUBCUTANEOUS at 08:13

## 2020-03-10 RX ADMIN — INSULIN LISPRO 2 UNITS: 100 INJECTION, SOLUTION INTRAVENOUS; SUBCUTANEOUS at 17:12

## 2020-03-10 RX ADMIN — ENOXAPARIN SODIUM 30 MG: 30 INJECTION SUBCUTANEOUS at 21:49

## 2020-03-10 RX ADMIN — IPRATROPIUM BROMIDE AND ALBUTEROL SULFATE 1 AMPULE: .5; 3 SOLUTION RESPIRATORY (INHALATION) at 19:46

## 2020-03-10 RX ADMIN — GUAIFENESIN 600 MG: 600 TABLET, EXTENDED RELEASE ORAL at 21:49

## 2020-03-10 RX ADMIN — BUDESONIDE AND FORMOTEROL FUMARATE DIHYDRATE 2 PUFF: 160; 4.5 AEROSOL RESPIRATORY (INHALATION) at 08:11

## 2020-03-10 ASSESSMENT — PAIN SCALES - GENERAL
PAINLEVEL_OUTOF10: 7
PAINLEVEL_OUTOF10: 0

## 2020-03-10 NOTE — PROGRESS NOTES
Pt is sitting and awake and on 3.5 LPM nasal cannula at this time with a SpO2 of 92%. No resp distress reported. Pt doesn't wear O2 at home.

## 2020-03-10 NOTE — CARE COORDINATION
ONGOING DISCHARGE PLAN:    Spoke with patient regarding discharge plan and patient confirms that plan is still to go home with no needs. Following for home oxygen    Remains on Iv rocephin, IV Zithromax, IV solu medrol    Labs; WBC 22.7    Will continue to follow for additional discharge needs.     Electronically signed by Inder Mora RN on 3/10/2020 at 2:26 PM

## 2020-03-10 NOTE — PROGRESS NOTES
Patient arrived to PCU room 2110 around 2100. Patient transferred to bed per self w/ cane. Telemetry applied and vital signs obtained. Admission and assessment completed. No s/s of distress. Will continue to monitor.    Electronically signed by Chela Ronquillo RN on 3/9/2020 at 9:47 PM

## 2020-03-10 NOTE — PROGRESS NOTES
[address]   OCCUPATIONAL THERAPY MISSED TREATMENT NOTE   INPATIENT   Date: 3/10/20  Patient Name: Wolfgang Castañeda       Room:   MRN: [id number]   Account #: [de-identified]    : 1955  (71 y.o.)  Gender: male     REASON FOR MISSED TREATMENT:  Pt reports that he is at baseline with mobility and self-care tasks, denies need for therapy at this time.   -   OT being discontinued at this time.  Patient functioning at Premorbid Level  No further needs  [facility] [address]

## 2020-03-11 LAB
ABSOLUTE EOS #: 0 K/UL (ref 0–0.4)
ABSOLUTE IMMATURE GRANULOCYTE: ABNORMAL K/UL (ref 0–0.3)
ABSOLUTE LYMPH #: 2.07 K/UL (ref 1–4.8)
ABSOLUTE MONO #: 0.69 K/UL (ref 0.1–1.3)
ALBUMIN SERPL-MCNC: 4.3 G/DL (ref 3.5–5.2)
ALBUMIN/GLOBULIN RATIO: ABNORMAL (ref 1–2.5)
ALP BLD-CCNC: 102 U/L (ref 40–129)
ALT SERPL-CCNC: 38 U/L (ref 5–41)
ANION GAP SERPL CALCULATED.3IONS-SCNC: 15 MMOL/L (ref 9–17)
AST SERPL-CCNC: 31 U/L
ATYPICAL LYMPHOCYTE ABSOLUTE COUNT: 0.23 K/UL
ATYPICAL LYMPHOCYTES: 1 %
BASOPHILS # BLD: 0 % (ref 0–2)
BASOPHILS ABSOLUTE: 0 K/UL (ref 0–0.2)
BILIRUB SERPL-MCNC: 0.24 MG/DL (ref 0.3–1.2)
BUN BLDV-MCNC: 27 MG/DL (ref 8–23)
BUN/CREAT BLD: ABNORMAL (ref 9–20)
CALCIUM SERPL-MCNC: 10.2 MG/DL (ref 8.6–10.4)
CHLORIDE BLD-SCNC: 99 MMOL/L (ref 98–107)
CO2: 22 MMOL/L (ref 20–31)
CREAT SERPL-MCNC: 0.67 MG/DL (ref 0.7–1.2)
CULTURE: ABNORMAL
DIFFERENTIAL TYPE: ABNORMAL
DIRECT EXAM: ABNORMAL
EOSINOPHILS RELATIVE PERCENT: 0 % (ref 0–4)
GFR AFRICAN AMERICAN: >60 ML/MIN
GFR NON-AFRICAN AMERICAN: >60 ML/MIN
GFR SERPL CREATININE-BSD FRML MDRD: ABNORMAL ML/MIN/{1.73_M2}
GFR SERPL CREATININE-BSD FRML MDRD: ABNORMAL ML/MIN/{1.73_M2}
GLUCOSE BLD-MCNC: 169 MG/DL (ref 75–110)
GLUCOSE BLD-MCNC: 194 MG/DL (ref 70–99)
GLUCOSE BLD-MCNC: 242 MG/DL (ref 75–110)
GLUCOSE BLD-MCNC: 248 MG/DL (ref 75–110)
GLUCOSE BLD-MCNC: 333 MG/DL (ref 75–110)
HCT VFR BLD CALC: 43.4 % (ref 41–53)
HEMOGLOBIN: 14.4 G/DL (ref 13.5–17.5)
IMMATURE GRANULOCYTES: ABNORMAL %
LYMPHOCYTES # BLD: 9 % (ref 24–44)
Lab: ABNORMAL
MCH RBC QN AUTO: 29.4 PG (ref 26–34)
MCHC RBC AUTO-ENTMCNC: 33.2 G/DL (ref 31–37)
MCV RBC AUTO: 88.6 FL (ref 80–100)
MONOCYTES # BLD: 3 % (ref 1–7)
MORPHOLOGY: ABNORMAL
NRBC AUTOMATED: ABNORMAL PER 100 WBC
PDW BLD-RTO: 14.5 % (ref 11.5–14.9)
PLATELET # BLD: 203 K/UL (ref 150–450)
PLATELET ESTIMATE: ABNORMAL
PMV BLD AUTO: 9.1 FL (ref 6–12)
POTASSIUM SERPL-SCNC: 4.4 MMOL/L (ref 3.7–5.3)
RBC # BLD: 4.89 M/UL (ref 4.5–5.9)
RBC # BLD: ABNORMAL 10*6/UL
SEG NEUTROPHILS: 87 % (ref 36–66)
SEGMENTED NEUTROPHILS ABSOLUTE COUNT: 20.01 K/UL (ref 1.3–9.1)
SODIUM BLD-SCNC: 136 MMOL/L (ref 135–144)
SPECIMEN DESCRIPTION: ABNORMAL
TOTAL PROTEIN: 7.9 G/DL (ref 6.4–8.3)
WBC # BLD: 23 K/UL (ref 3.5–11)
WBC # BLD: ABNORMAL 10*3/UL

## 2020-03-11 PROCEDURE — 6360000002 HC RX W HCPCS: Performed by: INTERNAL MEDICINE

## 2020-03-11 PROCEDURE — 80053 COMPREHEN METABOLIC PANEL: CPT

## 2020-03-11 PROCEDURE — 6370000000 HC RX 637 (ALT 250 FOR IP): Performed by: STUDENT IN AN ORGANIZED HEALTH CARE EDUCATION/TRAINING PROGRAM

## 2020-03-11 PROCEDURE — 82947 ASSAY GLUCOSE BLOOD QUANT: CPT

## 2020-03-11 PROCEDURE — 2580000003 HC RX 258: Performed by: STUDENT IN AN ORGANIZED HEALTH CARE EDUCATION/TRAINING PROGRAM

## 2020-03-11 PROCEDURE — 94640 AIRWAY INHALATION TREATMENT: CPT

## 2020-03-11 PROCEDURE — 94660 CPAP INITIATION&MGMT: CPT

## 2020-03-11 PROCEDURE — 85025 COMPLETE CBC W/AUTO DIFF WBC: CPT

## 2020-03-11 PROCEDURE — 36415 COLL VENOUS BLD VENIPUNCTURE: CPT

## 2020-03-11 PROCEDURE — 6370000000 HC RX 637 (ALT 250 FOR IP): Performed by: NURSE PRACTITIONER

## 2020-03-11 PROCEDURE — 94761 N-INVAS EAR/PLS OXIMETRY MLT: CPT

## 2020-03-11 PROCEDURE — 94664 DEMO&/EVAL PT USE INHALER: CPT

## 2020-03-11 PROCEDURE — 6370000000 HC RX 637 (ALT 250 FOR IP): Performed by: INTERNAL MEDICINE

## 2020-03-11 PROCEDURE — 2060000000 HC ICU INTERMEDIATE R&B

## 2020-03-11 PROCEDURE — 2700000000 HC OXYGEN THERAPY PER DAY

## 2020-03-11 PROCEDURE — 0100U HC RESPIRPTHGN MULT REV TRANS & AMP PRB TECH 21 TRGT: CPT

## 2020-03-11 PROCEDURE — 99232 SBSQ HOSP IP/OBS MODERATE 35: CPT | Performed by: INTERNAL MEDICINE

## 2020-03-11 PROCEDURE — 6360000002 HC RX W HCPCS: Performed by: STUDENT IN AN ORGANIZED HEALTH CARE EDUCATION/TRAINING PROGRAM

## 2020-03-11 RX ORDER — GUAIFENESIN 600 MG/1
1200 TABLET, EXTENDED RELEASE ORAL 2 TIMES DAILY
Status: DISCONTINUED | OUTPATIENT
Start: 2020-03-11 | End: 2020-03-16 | Stop reason: HOSPADM

## 2020-03-11 RX ORDER — INSULIN GLARGINE 100 [IU]/ML
5 INJECTION, SOLUTION SUBCUTANEOUS ONCE
Status: COMPLETED | OUTPATIENT
Start: 2020-03-11 | End: 2020-03-11

## 2020-03-11 RX ORDER — BENZONATATE 100 MG/1
100 CAPSULE ORAL 3 TIMES DAILY
Status: DISCONTINUED | OUTPATIENT
Start: 2020-03-11 | End: 2020-03-16 | Stop reason: HOSPADM

## 2020-03-11 RX ORDER — METHYLPREDNISOLONE SODIUM SUCCINATE 40 MG/ML
40 INJECTION, POWDER, LYOPHILIZED, FOR SOLUTION INTRAMUSCULAR; INTRAVENOUS EVERY 8 HOURS
Status: DISCONTINUED | OUTPATIENT
Start: 2020-03-11 | End: 2020-03-12

## 2020-03-11 RX ORDER — DEXTROMETHORPHAN POLISTIREX 30 MG/5ML
60 SUSPENSION ORAL 2 TIMES DAILY
Status: DISCONTINUED | OUTPATIENT
Start: 2020-03-11 | End: 2020-03-16 | Stop reason: HOSPADM

## 2020-03-11 RX ADMIN — CEFTRIAXONE SODIUM 1 G: 1 INJECTION, POWDER, FOR SOLUTION INTRAMUSCULAR; INTRAVENOUS at 10:10

## 2020-03-11 RX ADMIN — AZITHROMYCIN MONOHYDRATE 500 MG: 500 INJECTION, POWDER, LYOPHILIZED, FOR SOLUTION INTRAVENOUS at 10:51

## 2020-03-11 RX ADMIN — Medication 10 ML: at 15:35

## 2020-03-11 RX ADMIN — AMLODIPINE BESYLATE 10 MG: 5 TABLET ORAL at 09:59

## 2020-03-11 RX ADMIN — INSULIN LISPRO 8 UNITS: 100 INJECTION, SOLUTION INTRAVENOUS; SUBCUTANEOUS at 11:40

## 2020-03-11 RX ADMIN — METHYLPREDNISOLONE SODIUM SUCCINATE 40 MG: 40 INJECTION, POWDER, LYOPHILIZED, FOR SOLUTION INTRAMUSCULAR; INTRAVENOUS at 04:30

## 2020-03-11 RX ADMIN — BENZONATATE 100 MG: 100 CAPSULE ORAL at 10:10

## 2020-03-11 RX ADMIN — METHYLPREDNISOLONE SODIUM SUCCINATE 40 MG: 40 INJECTION, POWDER, LYOPHILIZED, FOR SOLUTION INTRAMUSCULAR; INTRAVENOUS at 15:34

## 2020-03-11 RX ADMIN — VENLAFAXINE 50 MG: 50 TABLET ORAL at 09:59

## 2020-03-11 RX ADMIN — ENOXAPARIN SODIUM 30 MG: 30 INJECTION SUBCUTANEOUS at 09:57

## 2020-03-11 RX ADMIN — GUAIFENESIN 1200 MG: 600 TABLET, EXTENDED RELEASE ORAL at 20:32

## 2020-03-11 RX ADMIN — PREGABALIN 200 MG: 100 CAPSULE ORAL at 20:31

## 2020-03-11 RX ADMIN — ENOXAPARIN SODIUM 30 MG: 30 INJECTION SUBCUTANEOUS at 20:32

## 2020-03-11 RX ADMIN — BUDESONIDE AND FORMOTEROL FUMARATE DIHYDRATE 2 PUFF: 160; 4.5 AEROSOL RESPIRATORY (INHALATION) at 10:00

## 2020-03-11 RX ADMIN — VENLAFAXINE 50 MG: 50 TABLET ORAL at 20:35

## 2020-03-11 RX ADMIN — IPRATROPIUM BROMIDE AND ALBUTEROL SULFATE 1 AMPULE: .5; 3 SOLUTION RESPIRATORY (INHALATION) at 18:58

## 2020-03-11 RX ADMIN — INSULIN LISPRO 2 UNITS: 100 INJECTION, SOLUTION INTRAVENOUS; SUBCUTANEOUS at 22:07

## 2020-03-11 RX ADMIN — METHYLPREDNISOLONE SODIUM SUCCINATE 40 MG: 40 INJECTION, POWDER, LYOPHILIZED, FOR SOLUTION INTRAMUSCULAR; INTRAVENOUS at 10:10

## 2020-03-11 RX ADMIN — Medication 10 ML: at 22:07

## 2020-03-11 RX ADMIN — BUDESONIDE AND FORMOTEROL FUMARATE DIHYDRATE 2 PUFF: 160; 4.5 AEROSOL RESPIRATORY (INHALATION) at 20:31

## 2020-03-11 RX ADMIN — DEXTROMETHORPHAN POLISTIREX 60 MG: 30 SUSPENSION ORAL at 20:35

## 2020-03-11 RX ADMIN — Medication 10 ML: at 10:00

## 2020-03-11 RX ADMIN — IPRATROPIUM BROMIDE AND ALBUTEROL SULFATE 1 AMPULE: .5; 3 SOLUTION RESPIRATORY (INHALATION) at 08:25

## 2020-03-11 RX ADMIN — GUAIFENESIN 600 MG: 600 TABLET, EXTENDED RELEASE ORAL at 09:58

## 2020-03-11 RX ADMIN — PREGABALIN 200 MG: 100 CAPSULE ORAL at 15:35

## 2020-03-11 RX ADMIN — INSULIN LISPRO 4 UNITS: 100 INJECTION, SOLUTION INTRAVENOUS; SUBCUTANEOUS at 17:12

## 2020-03-11 RX ADMIN — INSULIN GLARGINE 5 UNITS: 100 INJECTION, SOLUTION SUBCUTANEOUS at 15:35

## 2020-03-11 RX ADMIN — IPRATROPIUM BROMIDE AND ALBUTEROL SULFATE 1 AMPULE: .5; 3 SOLUTION RESPIRATORY (INHALATION) at 14:46

## 2020-03-11 RX ADMIN — ASPIRIN 81 MG: 81 TABLET, COATED ORAL at 09:59

## 2020-03-11 RX ADMIN — METHYLPREDNISOLONE SODIUM SUCCINATE 40 MG: 40 INJECTION, POWDER, LYOPHILIZED, FOR SOLUTION INTRAMUSCULAR; INTRAVENOUS at 22:07

## 2020-03-11 RX ADMIN — PREGABALIN 200 MG: 100 CAPSULE ORAL at 09:58

## 2020-03-11 RX ADMIN — BENZONATATE 100 MG: 100 CAPSULE ORAL at 20:32

## 2020-03-11 RX ADMIN — LISINOPRIL 20 MG: 20 TABLET ORAL at 09:59

## 2020-03-11 RX ADMIN — IPRATROPIUM BROMIDE AND ALBUTEROL SULFATE 1 AMPULE: .5; 3 SOLUTION RESPIRATORY (INHALATION) at 11:12

## 2020-03-11 RX ADMIN — PRAVASTATIN SODIUM 80 MG: 40 TABLET ORAL at 17:11

## 2020-03-11 ASSESSMENT — PAIN SCALES - GENERAL: PAINLEVEL_OUTOF10: 0

## 2020-03-11 NOTE — PROGRESS NOTES
Home Oxygen Evaluation    Room air SpO2 at Rest = 95%    Room air with exercise/exertion = 84%    SpO2 on prescribed O2 level at 2LPM  at rest = 95% , 4LPM with exercise/exertion =96%     Pt does qualify for home Oxygen

## 2020-03-11 NOTE — FLOWSHEET NOTE
Pt up to bathroom, oxygen had accidentally pulled off, pulse ox down to 84% on RA, O2 2L/NC reapplied, pulse ox up to 91%. no acute distress noted. No c/o pain, no increase in SOB.

## 2020-03-11 NOTE — PROGRESS NOTES
[address] [facility].    PROGRESS NOTE             3/11/2020    7:52 AM    Name:   Radha Macias  MRN:     [id number]     Acct:      [de-identified]   Room:   2110/2110-01  IP Day:  2  Admit Date:  3/9/2020 10:23 AM    PCP:  Verna Dukes MD  Code Status:  Full Code    Subjective:     C/C:   Chief Complaint   Patient presents with    Shortness of Breath    Cough     Interval History Status: improved. Patient has been seen and examined at the bedside, no major event happened over the night, patient requested to come off of home CPAP this time. Patient is sitting comfortably on the bed he is using nasal cannula, he is not using his BiPAP machine he brought his BiPAP machine from home, patient mentioned that he almost got back to his normal level. Brief History:     See H&P    Review of Systems:     Review of Systems  Constitutional: Positive for fatigue. Negative for chills, diaphoresis and fever. HENT: Negative for congestion, rhinorrhea, sinus pressure, sinus pain and sore throat.    Respiratory: Positive for cough and shortness of breath. Negative for chest tightness.    Cardiovascular: Negative for chest pain, palpitations and leg swelling. Gastrointestinal: Negative for abdominal pain, constipation, diarrhea, nausea and vomiting. Genitourinary: Negative.    Skin: Negative for color change and wound. Neurological: Negative.      Medications: Allergies:     Allergies   Allergen Reactions    Succinylcholine Chloride Other (See Comments)     PATIENT HAS PSEUDOCHOLINESTERASE DEFICIENCY      Cymbalta [Duloxetine Hcl]      Taste loss         Current Meds:   Scheduled Meds:    ipratropium-albuterol  1 ampule Inhalation Q4H WA    amLODIPine  10 mg Oral Daily    aspirin EC  81 mg Oral Daily    lisinopril  20 mg Oral Daily    pravastatin  80 mg Oral QPM    pregabalin  200 mg Oral TID    budesonide-formoterol  2 puff Inhalation BID    venlafaxine  50 mg Oral BID    sodium chloride flush  10 mL Intravenous 2 times per day    enoxaparin  30 mg Subcutaneous BID    nicotine  1 patch Transdermal Daily    methylPREDNISolone  40 mg Intravenous Q6H    guaiFENesin  600 mg Oral BID    cefTRIAXone (ROCEPHIN) IV  1 g Intravenous Q24H    azithromycin  500 mg Intravenous Q24H    insulin lispro  0-12 Units Subcutaneous TID WC    insulin lispro  0-6 Units Subcutaneous Nightly     Continuous Infusions:    dextrose       PRN Meds: albuterol, furosemide, sodium chloride flush, acetaminophen **OR** acetaminophen, polyethylene glycol, potassium chloride **OR** potassium alternative oral replacement **OR** potassium chloride, ondansetron, benzonatate, glucose, dextrose, glucagon (rDNA), dextrose    Data:     Past Medical History:   has a past medical history of Acid reflux, Arthritis, Chronic bilateral low back pain with bilateral sciatica, Complete tear of left rotator cuff, COPD (chronic obstructive pulmonary disease) ([facility] [address] Coronary artery disease involving native coronary artery of native heart without angina pectoris, Degenerative disc disease, cervical, Gout, H/O cardiac catheterization, Hyperlipidemia, Hyperlipidemia with target LDL less than 100, Hyperparathyroidism ([facility] [address] Hypertension, Knee pain, chronic, Liver disease, MDRO (multiple drug resistant organisms) resistance, Moderate episode of recurrent major depressive disorder ([facility] [address] Obesity, Class III, BMI 40-49.9 (morbid obesity) ([facility] [address] REYNALDO on CPAP, Osteoarthritis, Polypharmacy, Positive FIT (fecal immunochemical test), Prolonged emergence from general anesthesia, Prostate cancer ([facility] [address] Pseudocholinesterase deficiency, Severe obesity (BMI 35.0-35.9 with comorbidity) ([facility] Short of breath on exertion, Sleep apnea, Status post lumbar laminectomy, Stenosis of left carotid artery, Tubular adenoma of colon 4/11/17, Type 2 diabetes mellitus with hyperglycemia, without long-term current use of insulin ([address] Vitamin D deficiency, and Wears glasses. Social History:   reports that he has been smoking cigarettes. He has a 52.50 pack-year smoking history. He has never used smokeless tobacco. He reports that he does not drink alcohol or use drugs. Family History:   Family History   Problem Relation Age of Onset    Diabetes Mother     Lung Cancer Brother     Liver Cancer Brother     Cancer Father        Vitals:  /67   Pulse 73   Temp 97.7 °F (36.5 °C) (Oral)   Resp 16   Ht 6' (1.829 m)   Wt 296 lb 1.2 oz (134.3 kg)   SpO2 (!) 89%   BMI 40.16 kg/m²   Temp (24hrs), Av.7 °F (36.5 °C), Min:97.2 °F (36.2 °C), Max:97.8 °F (36.6 °C)    Recent Labs     03/10/20  1122 03/10/20  1705 03/10/20  2103 03/10/20  2127   POCGLU 235* 181* 218* 245*       I/O(24Hr): Intake/Output Summary (Last 24 hours) at 3/11/2020 0752  Last data filed at 3/11/2020 0530  Gross per 24 hour   Intake 400 ml   Output --   Net 400 ml       Labs:  [unfilled]    Lab Results   Component Value Date/Time    SPECIAL NOT REPORTED 03/10/2020 02:57 AM     Lab Results   Component Value Date/Time    CULTURE PENDING 2020 11:46 PM       [facility]    Radiology:    Nm Abscess Localization Limited    Result Date: 2020  EXAMINATION: INDIUM WBC FOR ABCESS 2020 7:59 am TECHNIQUE: Gamma camera imaging of the neck, shoulders and chest was performed following IV administration of white blood cells labeled with 538 microcuries of indium 111.  24-hour delayed images were obtained. Gamma camera imaging of the shoulders was performed following uneventful IV administration of 05.5 millicuries of PIYMFAOXAM-06O.  COMPARISON: Left shoulder x-rays of 2019 and 2020 HISTORY: ORDERING SYSTEM PROVIDED HISTORY: H/O total shoulder replacement, left TECHNOLOGIST PROVIDED HISTORY: Reason for Exam: H/O total shoulder replacement, left Acuity: Unknown Type of Exam: Unknown Additional signs and 3/9/2020  EXAMINATION: ONE XRAY VIEW OF THE CHEST 3/9/2020 10:28 am COMPARISON: 10/03/2018 HISTORY: ORDERING SYSTEM PROVIDED HISTORY: shortness of breath TECHNOLOGIST PROVIDED HISTORY: shortness of breath Reason for Exam: chest pain, sob Acuity: Unknown Type of Exam: Unknown Initial exam. FINDINGS: Airspace opacities both lower lobes right greater than left. The heart is enlarged. No pneumothorax or pulmonary edema. Cardiomegaly with bilateral lower lobe infiltrates. Ct Lung Screen (annual)    Result Date: 2020  EXAMINATION: LOW DOSE SCREENING CT OF THE CHEST WITHOUT CONTRAST TECHNIQUE: Low dose lung cancer screening CT of the chest was performed without the administration of intravenous contrast.  Multiplanar reformatted images are provided for review. Dose modulation, iterative reconstruction, and/or weight based adjustment of the mA/kV was utilized to reduce the radiation dose to as low as reasonably achievable. Field-of-view: 40 cm Dose Length Product: 106.97 mGy CTDlvol: 2.71 mGy COMPARISON: 2019 HISTORY: Screening. Patient Age: 70 y/o Number of pack years of smokin pack years If no longer smoking, number of years since cessation:  Current everyday smoker Other symptoms:  None. FINDINGS: Mediastinum:  Visualized thyroid gland grossly unremarkable in appearance. Atherosclerotic calcification of the aorta and branch vasculature. Coronary artery disease. No pericardial or pleural effusions. Prominent mediastinal lymph nodes, stable. No axillary, mediastinal, or axillary lymphadenopathy. No periaortic or mediastinal hemorrhage. Lungs/Pleura:  Mild opacity in the posterior aspect of the trachea which may represent aspirated or mucoid secretions. Trachea and proximal central airways appear patent. Moderate emphysema. Scarring and atelectasis in the lingula. No pneumothorax. No discrete noncalcified pulmonary nodule.  Upper Abdomen:  Atherosclerotic calcification of the upper abdominal aorta. Cholelithiasis. Stable adreniform enlargement of the adrenal glands. Soft Tissues/Bones: Reverse left shoulder arthroplasty hardware. Mild diffuse degenerative changes throughout the spine. 1. Aspirated or mucoid secretions in the posterior trachea. 2. Moderate emphysema. Scarring and atelectasis in the lingula. No discrete noncalcified pulmonary nodule. 3. Cholelithiasis. 4. Atherosclerotic calcification of the aorta and branch vasculature. Coronary artery disease. 5. Reverse left shoulder arthroplasty hardware. 6. Stable prominent mediastinal lymph nodes without overt lymphadenopathy. 7. Stable adreniform enlargement of the adrenal glands likely adrenal hyperplasia. LUNG RADS: Per ACR Lung-RADS Version 1.0 Category 1, Negative (No nodules and definitely benign nodules). Management:Continue annual lung screening with LDCT in 12 months.(probability of malignancy <1%). RECOMMENDATIONS: If you would like to register your patient with the [facility] Nodule/Lung Cancer Screening Program, please contact the Nurse Navigator at [phone] Physical Examination:        Physical Exam  Constitutional:       Appearance: He is obese. He is ill-appearing. HENT:      Head: Normocephalic and atraumatic.      Nose: Nose normal.      Mouth/Throat:      Mouth: Mucous membranes are moist.      Pharynx: Oropharynx is clear. Eyes:      Extraocular Movements: Extraocular movements intact.      Pupils: Pupils are equal, round, and reactive to light. Neck:      Musculoskeletal: Normal range of motion and neck supple. Cardiovascular:      Rate and Rhythm: Normal rate and regular rhythm.      Pulses: Normal pulses.      Heart sounds: No murmur. Pulmonary:      Effort: Pulmonary effort is normal.      Breath sounds: Wheezing and rales present. Abdominal:      General: Abdomen is flat. Bowel sounds are normal. There is no distension.      Tenderness: There is no abdominal tenderness. Musculoskeletal:      Right lower leg: No edema.      Left lower leg: No edema. Skin:     General: Skin is warm and dry.      Findings: No erythema.    Neurological:      General: No focal deficit present.      Assessment:        Primary Problem  Pneumonia    Active Hospital Problems    Diagnosis Date Noted    Severe obesity (BMI 35.0-35.9 with comorbidity) ([address] [facility] [E66.01, Z68.35] 02/02/2020     Priority: High    Pneumonia [J18.9] 03/09/2020    Coronary artery disease involving native coronary artery of native heart without angina pectoris [I25.10] 02/02/2020    Peripheral neuropathic pain [M79.2] 03/25/2017    Type 2 diabetes mellitus with diabetic polyneuropathy, without long-term current use of insulin ([address] [E11.42] 03/25/2017    COPD with acute exacerbation ([address] [J44.1] 03/25/2017    Essential hypertension [I10] 01/20/2016       Plan:        Vitals: 127/55, 81, 20, 98.1      Community Acquired Pneumonia, Likely Strep vs. Haemophilus vs. Atypicals vs. Viral  -On admission: T-max: 100.3  -CXR: bilateral lower lobe infiltrates  -WBC 19  -Legionella antigen, strep pneumonia antigen, culture respiratory, MRSA DNA probe, respiratory virus PCR, mycoplasma antibody: All negative  -Blood culture: Pending  -Sporadic culture: Mixed bacteria morphology seen on Gram stain  -D-dimer normal  -Rocephin 1 g daily  -Zithromax 500  -f/u BNP for possible other causes     COPD Exacerbation 2/2 Pneumonia  -Solumedrol 40 q6hr  -Duoneb tx  -Respiratory therapy eval and tx  -Continue home meds  -Symbicort  -CXR: Cardiomegaly with bilateral lower lobe infiltrates     Type II Diabetes Mellitus  -POC Glucose 333  -Lantus 5 units  -Hypoglycemia protocol  -Monitor  -Follow-up on Lantus dose     Diabetic Peripheral Neuropathy  -Continue home meds  -Lyrica 200 mg     Essential Hypertension  -Continue home meds  -Norvasc 10 mg  -Lisinopril 20 mg     Carotid Artery Stenosis s/p Carotid endarterectomy  -Continue home

## 2020-03-11 NOTE — CARE COORDINATION
ONGOING DISCHARGE PLAN:    Spoke with patient regarding discharge plan and patient confirms that plan is still to go return home with  No needs. Following for possible home oxygen. He is currently on 2l/nc at 92% sitting in chair. Patients sats charted in RN note this morning  At 84% while up to bathroom. Patient does have his CPAP at home and spouse brought it in today    Remains on IV Rocephin, IV Zithromax,IV  Solu medrol. Will continue to follow for additional discharge needs.     Electronically signed by Inder Mora RN on 3/11/2020 at 3:30 PM

## 2020-03-12 ENCOUNTER — ANESTHESIA EVENT (OUTPATIENT)
Dept: ENDOSCOPY | Age: 65
DRG: 194 | End: 2020-03-12
Payer: MEDICARE

## 2020-03-12 ENCOUNTER — APPOINTMENT (OUTPATIENT)
Dept: CT IMAGING | Age: 65
DRG: 194 | End: 2020-03-12
Payer: MEDICARE

## 2020-03-12 LAB
ABSOLUTE BANDS #: 0.33 K/UL (ref 0–1)
ABSOLUTE EOS #: 0 K/UL (ref 0–0.4)
ABSOLUTE IMMATURE GRANULOCYTE: ABNORMAL K/UL (ref 0–0.3)
ABSOLUTE LYMPH #: 1.8 K/UL (ref 1–4.8)
ABSOLUTE MONO #: 0.98 K/UL (ref 0.1–1.3)
ADENOVIRUS PCR: NOT DETECTED
ALBUMIN SERPL-MCNC: 3.8 G/DL (ref 3.5–5.2)
ALBUMIN/GLOBULIN RATIO: ABNORMAL (ref 1–2.5)
ALP BLD-CCNC: 91 U/L (ref 40–129)
ALT SERPL-CCNC: 48 U/L (ref 5–41)
ANION GAP SERPL CALCULATED.3IONS-SCNC: 12 MMOL/L (ref 9–17)
AST SERPL-CCNC: 27 U/L
ATYPICAL LYMPHOCYTE ABSOLUTE COUNT: 0.16 K/UL
ATYPICAL LYMPHOCYTES: 1 %
BANDS: 2 % (ref 0–10)
BASOPHILS # BLD: 0 % (ref 0–2)
BASOPHILS ABSOLUTE: 0 K/UL (ref 0–0.2)
BILIRUB SERPL-MCNC: 0.21 MG/DL (ref 0.3–1.2)
BORDETELLA PARAPERTUSSIS: NOT DETECTED
BORDETELLA PERTUSSIS PCR: NOT DETECTED
BUN BLDV-MCNC: 20 MG/DL (ref 8–23)
BUN/CREAT BLD: ABNORMAL (ref 9–20)
CALCIUM SERPL-MCNC: 9.8 MG/DL (ref 8.6–10.4)
CHLAMYDIA PNEUMONIAE BY PCR: NOT DETECTED
CHLORIDE BLD-SCNC: 103 MMOL/L (ref 98–107)
CO2: 25 MMOL/L (ref 20–31)
CORONAVIRUS 229E PCR: NOT DETECTED
CORONAVIRUS HKU1 PCR: NOT DETECTED
CORONAVIRUS NL63 PCR: NOT DETECTED
CORONAVIRUS OC43 PCR: NOT DETECTED
CREAT SERPL-MCNC: 0.61 MG/DL (ref 0.7–1.2)
DIFFERENTIAL TYPE: ABNORMAL
EOSINOPHILS RELATIVE PERCENT: 0 % (ref 0–4)
GFR AFRICAN AMERICAN: >60 ML/MIN
GFR NON-AFRICAN AMERICAN: >60 ML/MIN
GFR SERPL CREATININE-BSD FRML MDRD: ABNORMAL ML/MIN/{1.73_M2}
GFR SERPL CREATININE-BSD FRML MDRD: ABNORMAL ML/MIN/{1.73_M2}
GLUCOSE BLD-MCNC: 144 MG/DL (ref 75–110)
GLUCOSE BLD-MCNC: 147 MG/DL (ref 75–110)
GLUCOSE BLD-MCNC: 165 MG/DL (ref 70–99)
GLUCOSE BLD-MCNC: 174 MG/DL (ref 75–110)
GLUCOSE BLD-MCNC: 249 MG/DL (ref 75–110)
HCT VFR BLD CALC: 42.5 % (ref 41–53)
HEMOGLOBIN: 14.1 G/DL (ref 13.5–17.5)
HUMAN METAPNEUMOVIRUS PCR: NOT DETECTED
IMMATURE GRANULOCYTES: ABNORMAL %
INFLUENZA A BY PCR: NOT DETECTED
INFLUENZA A H1 (2009) PCR: NORMAL
INFLUENZA A H1 PCR: NORMAL
INFLUENZA A H3 PCR: NORMAL
INFLUENZA B BY PCR: NOT DETECTED
LYMPHOCYTES # BLD: 11 % (ref 24–44)
MCH RBC QN AUTO: 29.3 PG (ref 26–34)
MCHC RBC AUTO-ENTMCNC: 33.1 G/DL (ref 31–37)
MCV RBC AUTO: 88.8 FL (ref 80–100)
MONOCYTES # BLD: 6 % (ref 1–7)
MORPHOLOGY: NORMAL
MYCOPLASMA PNEUMONIAE PCR: NOT DETECTED
NRBC AUTOMATED: ABNORMAL PER 100 WBC
PARAINFLUENZA 1 PCR: NOT DETECTED
PARAINFLUENZA 2 PCR: NOT DETECTED
PARAINFLUENZA 3 PCR: NOT DETECTED
PARAINFLUENZA 4 PCR: NOT DETECTED
PDW BLD-RTO: 14.6 % (ref 11.5–14.9)
PLATELET # BLD: 235 K/UL (ref 150–450)
PLATELET ESTIMATE: ABNORMAL
PMV BLD AUTO: 9.8 FL (ref 6–12)
POTASSIUM SERPL-SCNC: 4.9 MMOL/L (ref 3.7–5.3)
RBC # BLD: 4.79 M/UL (ref 4.5–5.9)
RBC # BLD: ABNORMAL 10*6/UL
RESP SYNCYTIAL VIRUS PCR: NOT DETECTED
RHINO/ENTEROVIRUS PCR: NOT DETECTED
SEG NEUTROPHILS: 80 % (ref 36–66)
SEGMENTED NEUTROPHILS ABSOLUTE COUNT: 13.13 K/UL (ref 1.3–9.1)
SODIUM BLD-SCNC: 140 MMOL/L (ref 135–144)
SPECIMEN DESCRIPTION: NORMAL
TOTAL PROTEIN: 7.2 G/DL (ref 6.4–8.3)
WBC # BLD: 16.4 K/UL (ref 3.5–11)
WBC # BLD: ABNORMAL 10*3/UL

## 2020-03-12 PROCEDURE — 94761 N-INVAS EAR/PLS OXIMETRY MLT: CPT

## 2020-03-12 PROCEDURE — 6370000000 HC RX 637 (ALT 250 FOR IP): Performed by: STUDENT IN AN ORGANIZED HEALTH CARE EDUCATION/TRAINING PROGRAM

## 2020-03-12 PROCEDURE — 6370000000 HC RX 637 (ALT 250 FOR IP): Performed by: INTERNAL MEDICINE

## 2020-03-12 PROCEDURE — 2060000000 HC ICU INTERMEDIATE R&B

## 2020-03-12 PROCEDURE — 80053 COMPREHEN METABOLIC PANEL: CPT

## 2020-03-12 PROCEDURE — 2700000000 HC OXYGEN THERAPY PER DAY

## 2020-03-12 PROCEDURE — 6360000002 HC RX W HCPCS: Performed by: STUDENT IN AN ORGANIZED HEALTH CARE EDUCATION/TRAINING PROGRAM

## 2020-03-12 PROCEDURE — 94640 AIRWAY INHALATION TREATMENT: CPT

## 2020-03-12 PROCEDURE — 6370000000 HC RX 637 (ALT 250 FOR IP): Performed by: NURSE PRACTITIONER

## 2020-03-12 PROCEDURE — 99232 SBSQ HOSP IP/OBS MODERATE 35: CPT | Performed by: INTERNAL MEDICINE

## 2020-03-12 PROCEDURE — 6360000002 HC RX W HCPCS: Performed by: INTERNAL MEDICINE

## 2020-03-12 PROCEDURE — 82947 ASSAY GLUCOSE BLOOD QUANT: CPT

## 2020-03-12 PROCEDURE — 71250 CT THORAX DX C-: CPT

## 2020-03-12 PROCEDURE — 36415 COLL VENOUS BLD VENIPUNCTURE: CPT

## 2020-03-12 PROCEDURE — 2580000003 HC RX 258: Performed by: STUDENT IN AN ORGANIZED HEALTH CARE EDUCATION/TRAINING PROGRAM

## 2020-03-12 PROCEDURE — 85025 COMPLETE CBC W/AUTO DIFF WBC: CPT

## 2020-03-12 RX ORDER — METHYLPREDNISOLONE SODIUM SUCCINATE 40 MG/ML
40 INJECTION, POWDER, LYOPHILIZED, FOR SOLUTION INTRAMUSCULAR; INTRAVENOUS EVERY 12 HOURS
Status: DISCONTINUED | OUTPATIENT
Start: 2020-03-12 | End: 2020-03-16

## 2020-03-12 RX ORDER — FUROSEMIDE 20 MG/1
20 TABLET ORAL DAILY
Status: DISCONTINUED | OUTPATIENT
Start: 2020-03-12 | End: 2020-03-16 | Stop reason: HOSPADM

## 2020-03-12 RX ADMIN — BENZONATATE 100 MG: 100 CAPSULE ORAL at 17:06

## 2020-03-12 RX ADMIN — BENZONATATE 100 MG: 100 CAPSULE ORAL at 20:33

## 2020-03-12 RX ADMIN — DEXTROMETHORPHAN POLISTIREX 60 MG: 30 SUSPENSION ORAL at 09:07

## 2020-03-12 RX ADMIN — METHYLPREDNISOLONE SODIUM SUCCINATE 40 MG: 40 INJECTION, POWDER, FOR SOLUTION INTRAMUSCULAR; INTRAVENOUS at 20:48

## 2020-03-12 RX ADMIN — AZITHROMYCIN MONOHYDRATE 500 MG: 500 INJECTION, POWDER, LYOPHILIZED, FOR SOLUTION INTRAVENOUS at 13:09

## 2020-03-12 RX ADMIN — GUAIFENESIN 1200 MG: 600 TABLET, EXTENDED RELEASE ORAL at 20:33

## 2020-03-12 RX ADMIN — INSULIN LISPRO 1 UNITS: 100 INJECTION, SOLUTION INTRAVENOUS; SUBCUTANEOUS at 20:57

## 2020-03-12 RX ADMIN — PREGABALIN 200 MG: 100 CAPSULE ORAL at 09:07

## 2020-03-12 RX ADMIN — GUAIFENESIN 1200 MG: 600 TABLET, EXTENDED RELEASE ORAL at 09:07

## 2020-03-12 RX ADMIN — PREGABALIN 200 MG: 100 CAPSULE ORAL at 20:33

## 2020-03-12 RX ADMIN — LISINOPRIL 20 MG: 20 TABLET ORAL at 09:08

## 2020-03-12 RX ADMIN — BUDESONIDE AND FORMOTEROL FUMARATE DIHYDRATE 2 PUFF: 160; 4.5 AEROSOL RESPIRATORY (INHALATION) at 20:43

## 2020-03-12 RX ADMIN — IPRATROPIUM BROMIDE AND ALBUTEROL SULFATE 1 AMPULE: .5; 3 SOLUTION RESPIRATORY (INHALATION) at 08:01

## 2020-03-12 RX ADMIN — DEXTROMETHORPHAN POLISTIREX 60 MG: 30 SUSPENSION ORAL at 21:04

## 2020-03-12 RX ADMIN — INSULIN LISPRO 2 UNITS: 100 INJECTION, SOLUTION INTRAVENOUS; SUBCUTANEOUS at 09:09

## 2020-03-12 RX ADMIN — AMLODIPINE BESYLATE 10 MG: 5 TABLET ORAL at 09:07

## 2020-03-12 RX ADMIN — IPRATROPIUM BROMIDE AND ALBUTEROL SULFATE 1 AMPULE: .5; 3 SOLUTION RESPIRATORY (INHALATION) at 20:07

## 2020-03-12 RX ADMIN — ENOXAPARIN SODIUM 30 MG: 30 INJECTION SUBCUTANEOUS at 20:32

## 2020-03-12 RX ADMIN — BUDESONIDE AND FORMOTEROL FUMARATE DIHYDRATE 2 PUFF: 160; 4.5 AEROSOL RESPIRATORY (INHALATION) at 09:07

## 2020-03-12 RX ADMIN — IPRATROPIUM BROMIDE AND ALBUTEROL SULFATE 1 AMPULE: .5; 3 SOLUTION RESPIRATORY (INHALATION) at 15:31

## 2020-03-12 RX ADMIN — INSULIN LISPRO 4 UNITS: 100 INJECTION, SOLUTION INTRAVENOUS; SUBCUTANEOUS at 17:06

## 2020-03-12 RX ADMIN — VENLAFAXINE 50 MG: 50 TABLET ORAL at 09:07

## 2020-03-12 RX ADMIN — ENOXAPARIN SODIUM 30 MG: 30 INJECTION SUBCUTANEOUS at 09:07

## 2020-03-12 RX ADMIN — VENLAFAXINE 50 MG: 50 TABLET ORAL at 20:44

## 2020-03-12 RX ADMIN — Medication 10 ML: at 09:08

## 2020-03-12 RX ADMIN — PRAVASTATIN SODIUM 80 MG: 40 TABLET ORAL at 17:06

## 2020-03-12 RX ADMIN — PREGABALIN 200 MG: 100 CAPSULE ORAL at 17:06

## 2020-03-12 RX ADMIN — METHYLPREDNISOLONE SODIUM SUCCINATE 40 MG: 40 INJECTION, POWDER, LYOPHILIZED, FOR SOLUTION INTRAMUSCULAR; INTRAVENOUS at 09:07

## 2020-03-12 RX ADMIN — INSULIN LISPRO 2 UNITS: 100 INJECTION, SOLUTION INTRAVENOUS; SUBCUTANEOUS at 13:09

## 2020-03-12 RX ADMIN — Medication 10 ML: at 20:41

## 2020-03-12 RX ADMIN — FUROSEMIDE 20 MG: 20 TABLET ORAL at 13:09

## 2020-03-12 RX ADMIN — BENZONATATE 100 MG: 100 CAPSULE ORAL at 09:07

## 2020-03-12 RX ADMIN — ASPIRIN 81 MG: 81 TABLET, COATED ORAL at 09:07

## 2020-03-12 RX ADMIN — CEFTRIAXONE SODIUM 1 G: 1 INJECTION, POWDER, FOR SOLUTION INTRAMUSCULAR; INTRAVENOUS at 11:35

## 2020-03-12 RX ADMIN — IPRATROPIUM BROMIDE AND ALBUTEROL SULFATE 1 AMPULE: .5; 3 SOLUTION RESPIRATORY (INHALATION) at 11:24

## 2020-03-12 ASSESSMENT — ENCOUNTER SYMPTOMS
COUGH: 1
SHORTNESS OF BREATH: 1
ABDOMINAL DISTENTION: 0
VOMITING: 0
NAUSEA: 0
ABDOMINAL PAIN: 0
SHORTNESS OF BREATH: 1

## 2020-03-12 ASSESSMENT — LIFESTYLE VARIABLES: SMOKING_STATUS: 1

## 2020-03-12 NOTE — CONSULTS
from Dr. Alveta Ganser states that his AHI is 1.6 with  100% compliance. He does not have oxygen at home and here, he has been  on oxygen at 2-4 liters. PAST MEDICAL HISTORY:  Significant for COPD, sleep apnea, hypertension,  morbid obesity. He has a history of prostate cancer, basal cell  carcinoma. Reportedly pseudocholinesterase deficiency, which is why he  may be allergic to the succinylcholine. Tonsillectomy, \"lumbar tumor. \"   He has had a lumbar laminectomy in 01/2017. SOCIAL HISTORY:  He is smoking. He smoked about a pack a day initially  for 25 years and quit 15 years and then resumed smoking about 10 years  ago and was smoking until he came into the hospital.    FAMILY HISTORY:  Noncontributory. REVIEW OF SYSTEMS:  As per history of present illness, all other systems  reviewed and otherwise negative. PHYSICAL EXAMINATION:  GENERAL APPEARANCE:  Middle-aged gentleman in no acute respiratory  stress. VITAL SIGNS:  His temperature is 98.1, respiratory rate 16-20, pulse is  80, blood pressure 127/55, oxygen saturation on 2 liters is 94%. HEENT:  Oral cavity is clear. He has macroglossia, Mallampati class III  airway. Tonsils are removed. NECK:  Neck size easily 19 inches diameter. LUNGS:  Notable for some crackles with dullness at the right base but no  wheezes or rhonchi. CARDIAC:  S1, S2.  ABDOMEN:  Obese. EXTREMITIES:  Show no peripheral edema. NEUROLOGIC:  There are no focal deficits. LABORATORY DATA:  Sodium 136, potassium 4.4, chloride 99, bicarb 22, BUN  27, creatinine 0.67. Blood sugars have been in the 200s to 300s. White  count is 23,000 with an H and H of 14.4 and 43.4, platelet count is 171. Sputum culture is growing normal respiratory wisam. DIAGNOSTIC DATA:  Chest x-ray results are as above. IMPRESSION:  My impression is that the patient probably has pneumonia. I suspect right lower lobe but he is an active smoker.   He did have a  screening CT of the chest done in mid February, which showed \"aspirated  or mucoid secretions in the posterior trachea. \"  There was no obvious  pulmonary nodule noted. IMPRESSION:  My impression is that the patient probably has pneumonia,  probably in the right lower lobe. He has exacerbation of COPD, which  seems to be improving but he also has hemoptysis. He was on Plavix  before but has not been getting it since his admission. At this point,  my recommendation would be to repeat CT of the chest, see what is going  on in his right lower lobe, decrease his IV Solu-Medrol to 40 q.8 and  depending on the results, most likely we need a bronchoscopy. His sleep  apnea appears to be stable. He is using and benefiting from his  machine. He is currently on Rocephin and Zithromax, which is adequate  coverage. I will also order a respiratory viral panel. We will also  put him on antitussives, specifically Tessalon Perles and Delsym cough  syrup. I would also increase his Mucinex to 1200 mg twice a day. Further recommendations to follow.         Jessie Barragan    D: 03/11/2020 19:14:22       T: 03/11/2020 19:21:42     [id number]  Job#: [id number]     Doc#: [id number]    CC:

## 2020-03-12 NOTE — PROGRESS NOTES
sounds are normal.  Regular rate and rhythm   Abdomen - Soft, nontender, nondistended, no masses or organomegaly  Neurologic - There are no focal motor or sensory deficits  Skin - No bruising or bleeding  Extremities - No clubbing, cyanosis, edema    MEDS      furosemide  20 mg Oral Daily    methylPREDNISolone  40 mg Intravenous Q8H    guaiFENesin  1,200 mg Oral BID    benzonatate  100 mg Oral TID    dextromethorphan  60 mg Oral BID    ipratropium-albuterol  1 ampule Inhalation Q4H WA    amLODIPine  10 mg Oral Daily    aspirin EC  81 mg Oral Daily    lisinopril  20 mg Oral Daily    pravastatin  80 mg Oral QPM    pregabalin  200 mg Oral TID    budesonide-formoterol  2 puff Inhalation BID    venlafaxine  50 mg Oral BID    sodium chloride flush  10 mL Intravenous 2 times per day    enoxaparin  30 mg Subcutaneous BID    nicotine  1 patch Transdermal Daily    cefTRIAXone (ROCEPHIN) IV  1 g Intravenous Q24H    azithromycin  500 mg Intravenous Q24H    insulin lispro  0-12 Units Subcutaneous TID WC    insulin lispro  0-6 Units Subcutaneous Nightly      dextrose       albuterol, sodium chloride flush, acetaminophen **OR** acetaminophen, polyethylene glycol, potassium chloride **OR** potassium alternative oral replacement **OR** potassium chloride, ondansetron, glucose, dextrose, glucagon (rDNA), dextrose    LABS   CBC   Recent Labs     03/12/20  0518   WBC 16.4*   HGB 14.1   HCT 42.5   MCV 88.8        BMP:   Lab Results   Component Value Date     03/12/2020    K 4.9 03/12/2020     03/12/2020    CO2 25 03/12/2020    BUN 20 03/12/2020    LABALBU 3.8 03/12/2020    LABALBU 4.1 02/16/2012    CREATININE 0.61 03/12/2020    CALCIUM 9.8 03/12/2020    GFRAA >60 03/12/2020    LABGLOM >60 03/12/2020     ABGs:No results found for: PHART, PO2ART, DDV1KFT No results found for: IFIO2, MODE, SETTIDVOL, SETPEEP  Ionized Calcium:  No results found for: IONCA  Magnesium:  No results found for: MG  Phosphorus:  No results found for: PHOS     LIVER PROFILE   Recent Labs     03/12/20  0518   AST 27   ALT 48*   BILITOT 0.21*   ALKPHOS 91     INR No results for input(s): INR in the last 72 hours. PTT   Lab Results   Component Value Date    APTT 27.9 02/25/2014         RADIOLOGY     CT of chest noted, no discrete mass or significant nodules, there is evidence of \"debris\" which I suspect is mucus in the trachea    ASSESSMENT/PLAN   Principal Problem:    Pneumonia  Active Problems:    Severe obesity (BMI 35.0-35.9 with comorbidity) ([facility] [address]    Essential hypertension    Type 2 diabetes mellitus with diabetic polyneuropathy, without long-term current use of insulin (HCC)    Peripheral neuropathic pain    COPD with acute exacerbation (HCC)    Coronary artery disease involving native coronary artery of native heart without angina pectoris  Resolved Problems:    * No resolved hospital problems.  *    Will plan on bronchoscopy tomorrow patient agreeable, risks and benefits explained  Decrease IV Solu-Medrol  Will have anesthesia do conscious sedation since he has COPD and sleep apnea and will want to concentrate on airway  Continue antitussives  Electronically signed by Emperatriz Campos MD on 3/12/2020 at 1:11 PM

## 2020-03-12 NOTE — ANESTHESIA PRE PROCEDURE
patch Transdermal Daily Chales Claude, MD   1 patch at 03/12/20 0915    cefTRIAXone (ROCEPHIN) 1 g IVPB in 50 mL D5W minibag  1 g Intravenous Q24H Chales Claude, MD   Stopped at 03/12/20 1205    azithromycin (ZITHROMAX) 500 mg in D5W 250ml addavial  500 mg Intravenous Q24H Lance Price  mL/hr at 03/12/20 1309 500 mg at 03/12/20 1309    glucose (GLUTOSE) 40 % oral gel 15 g  15 g Oral PRN Chrissy Baston, APRN - CNP        dextrose 50 % IV solution  12.5 g Intravenous PRN Chrissy Baston, APRN - CNP        glucagon (rDNA) injection 1 mg  1 mg Intramuscular PRN Chrissy Baston, APRN - CNP        dextrose 5 % solution  100 mL/hr Intravenous PRN Chrissy Baston, APRN - CNP        insulin lispro (HUMALOG) injection vial 0-12 Units  0-12 Units Subcutaneous TID WC Chrissy Baston, APRN - CNP   2 Units at 03/12/20 1309    insulin lispro (HUMALOG) injection vial 0-6 Units  0-6 Units Subcutaneous Nightly Chrissy Baston, APRN - CNP   2 Units at 03/11/20 2207       Allergies:     Allergies   Allergen Reactions    Succinylcholine Chloride Other (See Comments)     PATIENT HAS PSEUDOCHOLINESTERASE DEFICIENCY      Cymbalta [Duloxetine Hcl]      Taste loss         Problem List:    Patient Active Problem List   Diagnosis Code    Essential hypertension I10    Gout M10.9    Hyperlipidemia with target LDL less than 100 E78.5    Osteoporosis M81.0    Prostate CA ([facility] C61    Moderate episode of recurrent major depressive disorder ([facility] F33.1    Gastroesophageal reflux disease without esophagitis K21.9    DDD (degenerative disc disease), lumbar M51.36    Chronic bilateral low back pain with bilateral sciatica M54.42, M54.41, G89.29    Osteoarthritis of spine with radiculopathy, lumbar region M47.26    Type 2 diabetes mellitus with diabetic polyneuropathy, without long-term current use of insulin (HCC) E11.42    PVD (peripheral vascular disease) with claudication ([facility]) I73.9    Peripheral neuropathic pain M79.2    Pruritic dermatitis L29.9    Tinea pedis of both feet B35.3    Hyperglycemia R73.9    Nonspecific reactive hepatitis K75.2    COPD with acute exacerbation ([facility]) J44.1    Status post lumbar laminectomy Z98.890    Serum cholinesterase defect ([facility]) E88.89    Vitamin D deficiency E55.9    Hypercalcemia E83.52    Hypertriglyceridemia E78.1    REYNALDO on CPAP G47.33, Z99.89    Tubular adenoma of colon 4/11/17 D12.6    Psychophysiological insomnia F51.04    S/P reverse total shoulder arthroplasty, left Z96.612    Recurrent major depressive disorder, in full remission ([facility]) F33.42    Actinic keratosis L57.0    Photodermatitis due to sun L56.8    Seborrheic keratosis L82.1    Senile hyperkeratosis L57.0    Solar degeneration L57.8    Abdominal aortic aneurysm ([facility]) I71.4    Peripheral arterial occlusive disease ([facility]) I77.9    Anemia D64.9    Unintended weight loss R63.4    Family history of cancer Z80.9    Stenosis of left carotid artery I65.22    Poor balance R26.89    Foot drop, right M21.371    Difficulty rising from chair R68.89    Degenerative disc disease, cervical M50.30    Chronic neck pain M54.2, G89.29    At high risk for falls Z91.81    Pseudocholinesterase deficiency E88.09    Hyperparathyroidism ([facility]) E21.3    Coronary artery disease involving native coronary artery of native heart without angina pectoris I25.10    Severe obesity (BMI 35.0-35.9 with comorbidity) ([facility]) E66.01, Z68.35    Pneumonia J18.9       Past Medical History:        Diagnosis Date    Acid reflux     Arthritis     Chronic bilateral low back pain with bilateral sciatica 11/3/2016    Complete tear of left rotator cuff 7/18/2018    COPD (chronic obstructive pulmonary disease) ([facility] [facility]     Coronary artery disease involving native coronary artery of native heart without angina pectoris 2/2/2020    Degenerative disc disease, cervical 7/28/2019    Gout     H/O cardiac catheterization     yrs ago SINGLE/MULTIPLE N/A 5/9/2017    COLONOSCOPY WITH BIOPSY performed by Clay Jean DO at [address] IMPLANT Left 7/18/2018    SHOULDER TOTAL ARTHROPLASTY REVERSE LEFT DJO & BICEP TENDON TRANSFER performed by Mayra Jean MD at [address] HUMERAL/GLENOID COMPNT Left 8/3/2018    SHOULDER TOTAL REVERSE  ARTHROPLASTY REVISION performed by Mayra Jean MD at [address]? rotator cuff repair    TONSILLECTOMY AND ADENOIDECTOMY         Social History:    Social History     Tobacco Use    Smoking status: Current Every Day Smoker     Packs/day: 1.50     Years: 35.00     Pack years: 52.50     Types: Cigarettes    Smokeless tobacco: Never Used   Substance Use Topics    Alcohol use: No     Alcohol/week: 0.0 standard drinks                                Ready to quit: Not Answered  Counseling given: Not Answered      Vital Signs (Current):   Vitals:    03/12/20 0801 03/12/20 1124 03/12/20 1343 03/12/20 1531   BP:   138/64    Pulse:   71    Resp:   16 20   Temp:   97.5 °F (36.4 °C)    TempSrc:   Oral    SpO2: 90% 91% 94% 90%   Weight:       Height:                                                  BP Readings from Last 3 Encounters:   03/12/20 138/64   01/30/20 (!) 149/62   01/28/20 128/72       NPO Status:                                                                                 BMI:   Wt Readings from Last 3 Encounters:   03/11/20 296 lb 1.2 oz (134.3 kg)   02/17/20 288 lb (130.6 kg)   02/07/20 288 lb (130.6 kg)     Body mass index is 40.16 kg/m².     CBC:   Lab Results   Component Value Date    WBC 16.4 03/12/2020    RBC 4.79 03/12/2020    RBC 5.38 09/30/2011    HGB 14.1 03/12/2020    HCT 42.5 03/12/2020    MCV 88.8 03/12/2020    RDW 14.6 03/12/2020     03/12/2020     05/25/2012       CMP:   Lab Results   Component Value Date     03/12/2020    K 4.9 03/12/2020     03/12/2020    CO2 25 03/12/2020 BUN 20 03/12/2020    CREATININE 0.61 03/12/2020    GFRAA >60 03/12/2020    LABGLOM >60 03/12/2020    GLUCOSE 165 03/12/2020    PROT 7.2 03/12/2020    CALCIUM 9.8 03/12/2020    BILITOT 0.21 03/12/2020    ALKPHOS 91 03/12/2020    AST 27 03/12/2020    ALT 48 03/12/2020       POC Tests:   Recent Labs     03/12/20  1616   POCGLU 249*       Coags:   Lab Results   Component Value Date    PROTIME 10.5 02/25/2014    INR 1.0 02/25/2014    APTT 27.9 02/25/2014       HCG (If Applicable): No results found for: PREGTESTUR, PREGSERUM, HCG, HCGQUANT     ABGs: No results found for: PHART, PO2ART, PJY1CGR, RKR6NFI, BEART, D5FOQKQE     Type & Screen (If Applicable):  No results found for: LABABO, [address]    Anesthesia Evaluation  Patient summary reviewed and Nursing notes reviewed history of anesthetic complications (Pseudocholinesterase deficiency): Airway: Mallampati: III  TM distance: <3 FB   Neck ROM: limited  Mouth opening: > = 3 FB Dental:      Comment: Many missing teeth, poor dentition, chipped and broken    Pulmonary:normal exam  breath sounds clear to auscultation  (+) pneumonia:  COPD:  shortness of breath:  sleep apnea: on CPAP,  decreased breath sounds,  current smoker                          ROS comment: hemoptysis   Cardiovascular:    (+) hypertension:, CAD: no interval change, PLASCENCIA:,       ECG reviewed  Rhythm: regular  Rate: normal  Echocardiogram reviewed  Stress test reviewed                Neuro/Psych:   (+) neuromuscular disease:, psychiatric history:            GI/Hepatic/Renal:   (+) GERD:, liver disease:, morbid obesity          Endo/Other:    (+) DiabetesType II DM, , : arthritis:., .                 Abdominal:           Vascular:   + PVD, aortic or cerebral, . ROS comment: S/p CEA. Anesthesia Plan      MAC     ASA 3       Induction: intravenous. Anesthetic plan and risks discussed with patient. Plan discussed with CRNA.                   Trula Councilman, MD 3/12/2020

## 2020-03-12 NOTE — PROGRESS NOTES
[facility]    PROGRESS NOTE             3/12/2020    9:04 AM    Name:   Joelle Resendiz  MRN:     [id number]     Acct:      [de-identified]   Room:   2110/2110-01   Day:  3  Admit Date:  3/9/2020 10:23 AM    PCP:  Apurva Puente MD  Code Status:  Full Code    Subjective:     C/C:   Chief Complaint   Patient presents with    Shortness of Breath    Cough     Interval History Status: improved. No acute events overnight. Patient sitting up in chair this morning. Complaining of mild shortness of breath and continued productive cough. Cough has decreased in frequency. Still producing re/brown sputum. Patient went for CT chest this morning. Will f/u with read. Plavix currently on hold per pulm. Currently on rocephin and zithromax. Brief History:     See H&P     Review of Systems:     Review of Systems   Constitutional: Negative for chills and fever. HENT: Negative for congestion. Eyes: Negative for visual disturbance. Respiratory: Positive for cough and shortness of breath. Cardiovascular: Negative for chest pain, palpitations and leg swelling. Gastrointestinal: Negative for abdominal distention, abdominal pain, nausea and vomiting. Musculoskeletal: Negative for arthralgias. Neurological: Negative for weakness and headaches. Psychiatric/Behavioral: Negative for agitation. Medications: Allergies:     Allergies   Allergen Reactions    Succinylcholine Chloride Other (See Comments)     PATIENT HAS PSEUDOCHOLINESTERASE DEFICIENCY      Cymbalta [Duloxetine Hcl]      Taste loss         Current Meds:   Scheduled Meds:    methylPREDNISolone  40 mg Intravenous Q8H    guaiFENesin  1,200 mg Oral BID    benzonatate  100 mg Oral TID    dextromethorphan  60 mg Oral BID    ipratropium-albuterol  1 ampule Inhalation Q4H WA    amLODIPine  10 mg Oral Daily    aspirin EC  81 mg Oral Daily    lisinopril  20 mg Oral Daily    pravastatin  80 mg Oral QPM    pregabalin  200 mg Oral TID    budesonide-formoterol  2 puff Inhalation BID    venlafaxine  50 mg Oral BID    sodium chloride flush  10 mL Intravenous 2 times per day    enoxaparin  30 mg Subcutaneous BID    nicotine  1 patch Transdermal Daily    cefTRIAXone (ROCEPHIN) IV  1 g Intravenous Q24H    azithromycin  500 mg Intravenous Q24H    insulin lispro  0-12 Units Subcutaneous TID WC    insulin lispro  0-6 Units Subcutaneous Nightly     Continuous Infusions:    dextrose       PRN Meds: albuterol, furosemide, sodium chloride flush, acetaminophen **OR** acetaminophen, polyethylene glycol, potassium chloride **OR** potassium alternative oral replacement **OR** potassium chloride, ondansetron, glucose, dextrose, glucagon (rDNA), dextrose    Data:     Past Medical History:   has a past medical history of Acid reflux, Arthritis, Chronic bilateral low back pain with bilateral sciatica, Complete tear of left rotator cuff, COPD (chronic obstructive pulmonary disease) ([facility] [address] Coronary artery disease involving native coronary artery of native heart without angina pectoris, Degenerative disc disease, cervical, Gout, H/O cardiac catheterization, Hyperlipidemia, Hyperlipidemia with target LDL less than 100, Hyperparathyroidism ([facility] [address] Hypertension, Knee pain, chronic, Liver disease, MDRO (multiple drug resistant organisms) resistance, Moderate episode of recurrent major depressive disorder ([facility] [address] Obesity, Class III, BMI 40-49.9 (morbid obesity) ([facility] REYNALDO on CPAP, Osteoarthritis, Polypharmacy, Positive FIT (fecal immunochemical test), Prolonged emergence from general anesthesia, Prostate cancer ([facility] [address] Pseudocholinesterase deficiency, Severe obesity (BMI 35.0-35.9 with comorbidity) ([facility] Short of breath on exertion, Sleep apnea, Status post lumbar laminectomy, Stenosis of left carotid artery, Tubular adenoma of colon 4/11/17, Type 2 diabetes mellitus with hyperglycemia, without long-term current use of insulin ([address] Vitamin D deficiency, and Wears glasses. Social History:   reports that he has been smoking cigarettes. He has a 52.50 pack-year smoking history. He has never used smokeless tobacco. He reports that he does not drink alcohol or use drugs. Family History:   Family History   Problem Relation Age of Onset    Diabetes Mother     Lung Cancer Brother     Liver Cancer Brother     Cancer Father        Vitals:  /76   Pulse 67   Temp 98.2 °F (36.8 °C) (Oral)   Resp 16   Ht 6' (1.829 m)   Wt 296 lb 1.2 oz (134.3 kg)   SpO2 90%   BMI 40.16 kg/m²   Temp (24hrs), Av.8 °F (36.6 °C), Min:97.3 °F (36.3 °C), Max:98.2 °F (36.8 °C)    Recent Labs     20  1111 20  1608 20  2041 20  0722   POCGLU 333* 242* 248* 144*       I/O(24Hr): Intake/Output Summary (Last 24 hours) at 3/12/2020 0904  Last data filed at 3/11/2020 1818  Gross per 24 hour   Intake 1180 ml   Output --   Net 1180 ml       Labs:  [unfilled]    Lab Results   Component Value Date/Time    SPECIAL NOT REPORTED 03/10/2020 02:57 AM     Lab Results   Component Value Date/Time    CULTURE NORMAL RESPIRATORY IN[facility]S [facility]OW[facility] 2020 11:46 PM       [unfilled]    Radiology:    Ct Chest Wo Contrast    Result Date: 3/12/2020  EXAMINATION: CT OF THE CHEST WITHOUT CONTRAST 3/12/2020 8:23 am TECHNIQUE: CT of the chest was performed without the administration of intravenous contrast. Multiplanar reformatted images are provided for review. Dose modulation, iterative reconstruction, and/or weight based adjustment of the mA/kV was utilized to reduce the radiation dose to as low as reasonably achievable. COMPARISON: 2020 HISTORY: ORDERING SYSTEM PROVIDED HISTORY: hemoptysis, smoker/copd TECHNOLOGIST PROVIDED HISTORY: hemoptysis, smoker/copd Reason for Exam: hemoptysis, smoker/copd Acuity: Acute Type of Exam: Initial FINDINGS: Mediastinum: The heart and great vessels are normal in size. Calcified atheromatous plaque and coronary calcifications are noted. No pericardial effusion. No enlarged or suspicious-appearing lymph nodes are identified. Lungs/pleura: Centrilobular emphysematous disease. Bilateral interstitial opacities and more localized alveolar opacities in the left lower lobe are noted. No effusion. Small amount debris in the distal trachea and mainstem bronchi. Upper Abdomen: Cholelithiasis in a contracted gallbladder. Low-density nodular enlargement of the adrenal glands consistent with hyperplasia and likely underlying adenomas, unchanged. Soft Tissues/Bones: No acute findings. Status post laminectomy at T12. Partially visualized left shoulder hardware. 1.  Small amount debris in the distal trachea and mainstem bronchi. Bilateral lower lobe opacities, left greater than right, consistent with an inflammatory process, infection or aspiration. 2.  Emphysema. 3.  Cholelithiasis. Nm Abscess Localization Limited    Result Date: 2/25/2020  EXAMINATION: INDIUM WBC FOR ABCESS 2/24/2020 7:59 am TECHNIQUE: Gamma camera imaging of the neck, shoulders and chest was performed following IV administration of white blood cells labeled with 538 microcuries of indium 111.  24-hour delayed images were obtained. Gamma camera imaging of the shoulders was performed following uneventful IV administration of 38.0 millicuries of [id number]. COMPARISON: Left shoulder x-rays of 16 January 2019 and 30 January 2020 HISTORY: ORDERING SYSTEM PROVIDED HISTORY: H/O total shoulder replacement, left TECHNOLOGIST PROVIDED HISTORY: Reason for Exam: H/O total shoulder replacement, left Acuity: Unknown Type of Exam: Unknown Additional signs and symptoms: lt shoulder replacement 1.5 years ago FINDINGS: [employer] bone images: A photopenic area is present in the left shoulder consistent with a left shoulder arthroplasty.   Symmetrical activity is noted in the acromioclavicular and glenohumeral aspects of both shoulders as well as the sternoclavicular joints. No areas of abnormal asymmetric radiotracer uptake is noted. No evidence of prosthetic loosening. Indium 111 white blood cell study for infection: Photopenic area in the left shoulder. No areas of abnormal radiotracer uptake to suggest infection. No imaging evidence of prosthetic loosening or infection. Evidence of left shoulder arthroplasty. Ct Shoulder Left Wo Contrast    Result Date: 2/11/2020  EXAMINATION: CT OF THE LEFT SHOULDER WITHOUT CONTRAST 2/11/2020 9:56 am TECHNIQUE: CT of the left shoulder was performed without the administration of intravenous contrast.  Multiplanar reformatted images are provided for review. Dose modulation, iterative reconstruction, and/or weight based adjustment of the mA/kV was utilized to reduce the radiation dose to as low as reasonably achievable. COMPARISON: Radiographs 1/30/2020 HISTORY: ORDERING SYSTEM PROVIDED HISTORY: H/O total shoulder replacement, left TECHNOLOGIST PROVIDED HISTORY: Reason for Exam: H/O total shoulder replacement, left Acuity: Unknown Type of Exam: Unknown Additional signs and symptoms: PT [address] [place] 08/2018, C/O PAIN TO SHOULDER FINDINGS: Left shoulder reverse arthroplasty components cause artifact, limiting adjacent evaluation. No shoulder dislocation. No acute periprosthetic fracture. Reverse left shoulder arthroplasty components appear intact. Moderate acromioclavicular degenerative changes. No large shoulder joint effusion. No left axillary adenopathy. No acute soft tissue abnormality. Left shoulder arthroplasty without evidence of complication.      Xr Chest Portable    Result Date: 3/11/2020  EXAMINATION: ONE XRAY VIEW OF THE CHEST 3/9/2020 10:28 am COMPARISON: 10/03/2018 HISTORY: ORDERING SYSTEM PROVIDED HISTORY: shortness of breath TECHNOLOGIST PROVIDED HISTORY: shortness of breath Reason for Exam: chest pain, sob Acuity: Unknown Type of Exam: Unknown Initial exam. FINDINGS: Airspace opacities both lower lobes right greater than left. The heart is enlarged. No pneumothorax or pulmonary edema. Cardiomegaly with bilateral lower lobe infiltrates. Ct Lung Screen (annual)    Result Date: 2020  EXAMINATION: LOW DOSE SCREENING CT OF THE CHEST WITHOUT CONTRAST TECHNIQUE: Low dose lung cancer screening CT of the chest was performed without the administration of intravenous contrast.  Multiplanar reformatted images are provided for review. Dose modulation, iterative reconstruction, and/or weight based adjustment of the mA/kV was utilized to reduce the radiation dose to as low as reasonably achievable. Field-of-view: 40 cm Dose Length Product: 106.97 mGy CTDlvol: 2.71 mGy COMPARISON: 2019 HISTORY: Screening. Patient Age: 70 y/o Number of pack years of smokin pack years If no longer smoking, number of years since cessation:  Current everyday smoker Other symptoms:  None. FINDINGS: Mediastinum:  Visualized thyroid gland grossly unremarkable in appearance. Atherosclerotic calcification of the aorta and branch vasculature. Coronary artery disease. No pericardial or pleural effusions. Prominent mediastinal lymph nodes, stable. No axillary, mediastinal, or axillary lymphadenopathy. No periaortic or mediastinal hemorrhage. Lungs/Pleura:  Mild opacity in the posterior aspect of the trachea which may represent aspirated or mucoid secretions. Trachea and proximal central airways appear patent. Moderate emphysema. Scarring and atelectasis in the lingula. No pneumothorax. No discrete noncalcified pulmonary nodule. Upper Abdomen:  Atherosclerotic calcification of the upper abdominal aorta. Cholelithiasis. Stable adreniform enlargement of the adrenal glands. Soft Tissues/Bones: Reverse left shoulder arthroplasty hardware. Mild diffuse degenerative changes throughout the spine. 1. Aspirated or mucoid secretions in the posterior trachea.  2. Moderate pain [M79.2] 03/25/2017    Type 2 diabetes mellitus with diabetic polyneuropathy, without long-term current use of insulin ([facility] [E11.42] 03/25/2017    COPD with acute exacerbation ([facility] [J44.1] 03/25/2017    Essential hypertension [I10] 01/20/2016       Plan:        Community Acquired Pneumonia, Likely Strep vs. Haemophilus vs. Atypicals vs. Viral  -On admission: T-max: 100.3  -CXR: bilateral lower lobe infiltrates  -WBC trending down. 16.4   -Legionella antigen, strep pneumonia antigen, culture respiratory, MRSA DNA probe, respiratory virus PCR, mycoplasma antibody: All negative  -Blood culture: no result  -Sporadic culture: Mixed bacteria morphology seen on Gram stain  -D-dimer normal  -Rocephin 1 g daily  -Zithromax 500  -f/u BNP for possible other causes  - CT chest : trachea debris and bibasilar infiltrates  - bronch tomorrow per pulm      COPD Exacerbation 2/2 Pneumonia  -Solumedrol 40 q8  -Duoneb tx  -Respiratory therapy eval and tx  -Continue home meds  -Symbicort  -CXR: Cardiomegaly with bilateral lower lobe infiltrates     Type II Diabetes Mellitus  -POC Glucose 144  -Lantus 5 units  -Hypoglycemia protocol  -Monitor  -Follow-up on Lantus dose     Diabetic Peripheral Neuropathy  -Continue home meds  -Lyrica 200 mg     Essential Hypertension  -Continue home meds  -Norvasc 10 mg  -Lisinopril 20 mg     Carotid Artery Stenosis s/p Carotid endarterectomy  -Continue home meds  -ASA  -Troponin: Unremarkable  -proBNP: 58 normal  -D-dimer: 0.57 normal     DVT Prophylaxis: Lovenox 30  Diet: Cardiac, Diabetic diet  PT/OT  Dispo: social work for [address] Carina Orosco MD  3/12/2020  9:04 AM     Attending Physician Statement  I have discussed the care of Azam Lennon with the resident team. I have examined the patient myself and taken ros and hpi , including pertinent history and exam findings,  with the resident. I have reviewed the key elements of all parts of the encounter with the resident.   I agree with the assessment, plan and orders as documented by the resident. 59-year-old male with history of COPD admitted with COPD exacerbation secondary to pneumonia. No further wheeze, still needing nasal cannula O2-2 L. Qualified for home O2. Continues to have hemoptysis although improved since admission. CT chest shows-tracheal debris. Discussed with pulmonology-plan for bronchoscopy tomorrow. Class III obesityBody mass index is 40.16 kg/m².       Electronically signed by Polo Walls MD

## 2020-03-12 NOTE — CARE COORDINATION
[address] Transitions Interview     3/12/2020    Patient: Cornelio Argueta Patient : 1955   MRN: [id number]  Reason for Admission: pneumonia  RARS: Readmission Risk Score: 21         Spoke with: Aníbal Brown met with patient, explained role of CTN, provided contact information, plan is home with no services will continue to follow //JU      Readmission Risk  Patient Active Problem List   Diagnosis    Essential hypertension    Gout    Hyperlipidemia with target LDL less than 100    Osteoporosis    Prostate CA ([address]    Moderate episode of recurrent major depressive disorder ([address]    Gastroesophageal reflux disease without esophagitis    DDD (degenerative disc disease), lumbar    Chronic bilateral low back pain with bilateral sciatica    Osteoarthritis of spine with radiculopathy, lumbar region    Type 2 diabetes mellitus with diabetic polyneuropathy, without long-term current use of insulin ([address]    PVD (peripheral vascular disease) with claudication (HCC)    Peripheral neuropathic pain    Pruritic dermatitis    Tinea pedis of both feet    Hyperglycemia    Nonspecific reactive hepatitis    COPD with acute exacerbation ([address]    Status post lumbar laminectomy    Serum cholinesterase defect ([address]    Vitamin D deficiency    Hypercalcemia    Hypertriglyceridemia    REYNALDO on CPAP    Tubular adenoma of colon 17    Psychophysiological insomnia    S/P reverse total shoulder arthroplasty, left    Recurrent major depressive disorder, in full remission ([address]    Actinic keratosis    Photodermatitis due to sun    Seborrheic keratosis    Senile hyperkeratosis    Solar degeneration    Abdominal aortic aneurysm ([address]    Peripheral arterial occlusive disease ([address]    Anemia    Unintended weight loss    Family history of cancer    Stenosis of left carotid artery    Poor balance    Foot drop, right    Difficulty rising from chair    Degenerative disc disease, cervical

## 2020-03-12 NOTE — DISCHARGE INSTR - COC
Continuity of Care Form    Patient Name: Arslan Irene   :  1955  MRN:  Manfred Marrufo date:  3/9/2020  Discharge date:  ***    Code Status Order: Full Code   Advance Directives:   [facility] Documentation     Date/Time Healthcare Directive Type of Healthcare Directive Copy in [address] Agent's Name Healthcare Agent's Phone Number    20  Yes, patient has an advance directive for healthcare treatment  Durable power of  for health care;Living will  No, copy requested from family  --  --  --          Admitting Physician:  April Samuel MD  PCP: Alta Gonzalez MD    Discharging Nurse: [facility] Unit/Room#:   Discharging Unit Phone Number: ***    Emergency Contact:   Extended Emergency Contact Information  Primary Emergency Contact: [facility]  Address: [facility], [address] [place] [address] Phone: [phone]  Work Phone: [phone]  Mobile Phone: [phone]  Relation: Spouse  Hearing or visual needs: None  Other needs: None  Preferred language: English   needed?  No    Past Surgical History:  Past Surgical History:   Procedure Laterality Date    BACK SURGERY      x 2     BACK SURGERY  2017    lumbar laminectomy L2, L3, L4    CARDIAC CATHETERIZATION      no stents    CAROTID ENDARTERECTOMY Right 2019    Dr. Banks Para Bilateral     KNEE SURGERY Left     LUMBAR LAMINECTOMY  2017    L2-L4    SC CLOSED RX SHLDR DISLOC,ANESTHESIA Left 2018    SHOULDER CLOSED REDUCTION WITH C-ARM VISUALIZATION performed by Sterling Hubbard MD at [facility] W/BIOPSY SINGLE/MULTIPLE N/A 2017    COLONOSCOPY WITH BIOPSY performed by Jaimie Prince DO at [facility] IMPLANT Left 2018    SHOULDER TOTAL ARTHROPLASTY REVERSE LEFT DJO & BICEP TENDON TRANSFER performed by MKEGJD:[id number]}    Nursing Mobility/ADLs:  Walking   {CHP DME KUHK:257743131}  Transfer  {CHP DME ATED:332489151}  Bathing  {CHP DME ZJWW:310228619}  Dressing  {CHP DME URCA:142973741}  Toileting  {CHP DME YUCL:285198579}  Feeding  {[facility] DME PXSE:524123869}  Med Admin  {P DME PRXB:929796391}  Med Delivery   {[facility] MED Delivery:712629725}    Wound Care Documentation and Therapy:  Incision 18 Shoulder Left (Active)   Number of days: 586        Elimination:  Continence:   · Bowel: {YES / SN:67629}  · Bladder: {YES / TM:28429}  Urinary Catheter: {Urinary Catheter:134607361}   Colostomy/Ileostomy/Ileal Conduit: {YES / NC:88225}       Date of Last BM: ***    Intake/Output Summary (Last 24 hours) at 3/12/2020 1434  Last data filed at 3/11/2020 1818  Gross per 24 hour   Intake 400 ml   Output --   Net 400 ml     I/O last 3 completed shifts: In: 1480 [P.O.:1170;  I.V.:10; IV Piggyback:300]  Out: -     Safety Concerns:     [address] [employer] Safety Concerns:313495732}    Impairments/Disabilities:      [address] [employer] Impairments/Disabilities:519704999}    Nutrition Therapy:  Current Nutrition Therapy:   [address] [employer] Diet List:016771842}    Routes of Feeding: {[facility] DME Other Feedings:892728024}  Liquids: {Slp liquid thickness:98467}  Daily Fluid Restriction: {CHP DME Yes amt example:572190923}  Last Modified Barium Swallow with Video (Video Swallowing Test): {Done Not Done HNJO:590691159}    Treatments at the Time of Hospital Discharge:   Respiratory Treatments: ***  Oxygen Therapy:  {Therapy; copd oxygen:04679}  Ventilator:    { CC Vent AYXI:717346840}    Rehab Therapies: {THERAPEUTIC INTERVENTION:9308635854}  Weight Bearing Status/Restrictions: [address] [employer] Weight Bearin}  Other Medical Equipment (for information only, NOT a DME order):  {EQUIPMENT:465916430}  Other Treatments: ***    Patient's personal belongings (please select all that are sent with patient):  {KAEL DME Belongings:130594167}    RN SIGNATURE: {Esignature:400983981}    CASE MANAGEMENT/SOCIAL WORK SECTION    Inpatient Status Date: ***    Readmission Risk Assessment Score:  Readmission Risk              Risk of Unplanned Readmission:        20           Discharging to Facility/ Agency   · Name:   · Address:  · Phone:  · Fax:    Dialysis Facility (if applicable)   · Name:  · Address:  · Dialysis Schedule:  · Phone:  · Fax:    / signature: {Esignature:622246292}    PHYSICIAN SECTION    Prognosis: {Prognosis:5504946488}    Condition at Discharge: [address] [place] [address] Patient Condition:301218432}    Rehab Potential (if transferring to Rehab): {Prognosis:0683628404}    Recommended Labs or Other Treatments After Discharge: ***    Physician Certification: I certify the above information and transfer of Yariel Hastings  is necessary for the continuing treatment of the diagnosis listed and that he requires {Admit to Appropriate Level of Care:02641} for {GREATER/LESS:828879536} 30 days.      Update Admission H&P: {CHP DME Changes in OMQII:390530990}    PHYSICIAN SIGNATURE:  {Esignature:995078811}

## 2020-03-13 ENCOUNTER — ANESTHESIA (OUTPATIENT)
Dept: ENDOSCOPY | Age: 65
DRG: 194 | End: 2020-03-13
Payer: MEDICARE

## 2020-03-13 VITALS — SYSTOLIC BLOOD PRESSURE: 168 MMHG | OXYGEN SATURATION: 90 % | DIASTOLIC BLOOD PRESSURE: 81 MMHG

## 2020-03-13 LAB
-: NORMAL
ABSOLUTE EOS #: 0 K/UL (ref 0–0.4)
ABSOLUTE IMMATURE GRANULOCYTE: ABNORMAL K/UL (ref 0–0.3)
ABSOLUTE LYMPH #: 2.9 K/UL (ref 1–4.8)
ABSOLUTE MONO #: 1.38 K/UL (ref 0.1–1.3)
ALBUMIN SERPL-MCNC: 3.7 G/DL (ref 3.5–5.2)
ALBUMIN/GLOBULIN RATIO: ABNORMAL (ref 1–2.5)
ALP BLD-CCNC: 90 U/L (ref 40–129)
ALT SERPL-CCNC: 65 U/L (ref 5–41)
ANION GAP SERPL CALCULATED.3IONS-SCNC: 12 MMOL/L (ref 9–17)
AST SERPL-CCNC: 54 U/L
BAL DIFF COMMENT: ABNORMAL
BASOPHILS # BLD: 0 % (ref 0–2)
BASOPHILS ABSOLUTE: 0 K/UL (ref 0–0.2)
BILIRUB SERPL-MCNC: 0.24 MG/DL (ref 0.3–1.2)
BUN BLDV-MCNC: 21 MG/DL (ref 8–23)
BUN/CREAT BLD: ABNORMAL (ref 9–20)
CALCIUM SERPL-MCNC: 9.5 MG/DL (ref 8.6–10.4)
CHLORIDE BLD-SCNC: 104 MMOL/L (ref 98–107)
CO2: 24 MMOL/L (ref 20–31)
COLUMNAR EPIS BAL: ABNORMAL
CREAT SERPL-MCNC: 0.69 MG/DL (ref 0.7–1.2)
DIFFERENTIAL TYPE: ABNORMAL
EOSINOPHILS BAL: 4 % (ref 0–1)
EOSINOPHILS RELATIVE PERCENT: 0 % (ref 0–4)
FLUID DIFF COMMENT: NORMAL
GFR AFRICAN AMERICAN: >60 ML/MIN
GFR NON-AFRICAN AMERICAN: >60 ML/MIN
GFR SERPL CREATININE-BSD FRML MDRD: ABNORMAL ML/MIN/{1.73_M2}
GFR SERPL CREATININE-BSD FRML MDRD: ABNORMAL ML/MIN/{1.73_M2}
GLUCOSE BLD-MCNC: 117 MG/DL (ref 75–110)
GLUCOSE BLD-MCNC: 126 MG/DL (ref 75–110)
GLUCOSE BLD-MCNC: 127 MG/DL (ref 75–110)
GLUCOSE BLD-MCNC: 147 MG/DL (ref 75–110)
GLUCOSE BLD-MCNC: 162 MG/DL (ref 70–99)
GLUCOSE BLD-MCNC: 182 MG/DL (ref 75–110)
GLUCOSE BLD-MCNC: 220 MG/DL (ref 75–110)
HCT VFR BLD CALC: 44.7 % (ref 41–53)
HEMOGLOBIN: 14.8 G/DL (ref 13.5–17.5)
IMMATURE GRANULOCYTES: ABNORMAL %
LYMPHOCYTES # BLD: 21 % (ref 24–44)
LYMPHOCYTES, BAL: 28 % (ref 8–12)
MACROPHAGES, BAL: 17 % (ref 85–95)
MCH RBC QN AUTO: 29.4 PG (ref 26–34)
MCHC RBC AUTO-ENTMCNC: 33 G/DL (ref 31–37)
MCV RBC AUTO: 89 FL (ref 80–100)
MONOCYTES # BLD: 10 % (ref 1–7)
MORPHOLOGY: NORMAL
NRBC AUTOMATED: ABNORMAL PER 100 WBC
PDW BLD-RTO: 14.4 % (ref 11.5–14.9)
PLATELET # BLD: 248 K/UL (ref 150–450)
PLATELET ESTIMATE: ABNORMAL
PMV BLD AUTO: 8.7 FL (ref 6–12)
POTASSIUM SERPL-SCNC: 4.6 MMOL/L (ref 3.7–5.3)
RBC # BLD: 5.02 M/UL (ref 4.5–5.9)
RBC # BLD: ABNORMAL 10*6/UL
RBC, BAL: 340 /MM3
REASON FOR REJECTION: NORMAL
SEG NEUTROPHILS: 69 % (ref 36–66)
SEGMENTED NEUTROPHILS ABSOLUTE COUNT: 9.52 K/UL (ref 1.3–9.1)
SEGMENTED NEUTROPHILS, BAL: 51 % (ref 0–10)
SODIUM BLD-SCNC: 140 MMOL/L (ref 135–144)
SPECIMEN TYPE: NORMAL
TOTAL PROTEIN: 6.9 G/DL (ref 6.4–8.3)
WBC # BLD: 13.8 K/UL (ref 3.5–11)
WBC # BLD: ABNORMAL 10*3/UL
WBC, BAL: 230 /MM3
ZZ NTE CLEAN UP: ORDERED TEST: NORMAL
ZZ NTE WITH NAME CLEAN UP: SPECIMEN SOURCE: NORMAL

## 2020-03-13 PROCEDURE — 94640 AIRWAY INHALATION TREATMENT: CPT

## 2020-03-13 PROCEDURE — 2709999900 HC NON-CHARGEABLE SUPPLY: Performed by: INTERNAL MEDICINE

## 2020-03-13 PROCEDURE — 6370000000 HC RX 637 (ALT 250 FOR IP): Performed by: INTERNAL MEDICINE

## 2020-03-13 PROCEDURE — 2500000003 HC RX 250 WO HCPCS: Performed by: INTERNAL MEDICINE

## 2020-03-13 PROCEDURE — 6360000002 HC RX W HCPCS: Performed by: INTERNAL MEDICINE

## 2020-03-13 PROCEDURE — 3700000001 HC ADD 15 MINUTES (ANESTHESIA): Performed by: INTERNAL MEDICINE

## 2020-03-13 PROCEDURE — 87102 FUNGUS ISOLATION CULTURE: CPT

## 2020-03-13 PROCEDURE — 2700000000 HC OXYGEN THERAPY PER DAY

## 2020-03-13 PROCEDURE — 36415 COLL VENOUS BLD VENIPUNCTURE: CPT

## 2020-03-13 PROCEDURE — 6370000000 HC RX 637 (ALT 250 FOR IP): Performed by: STUDENT IN AN ORGANIZED HEALTH CARE EDUCATION/TRAINING PROGRAM

## 2020-03-13 PROCEDURE — 87252 VIRUS INOCULATION TISSUE: CPT

## 2020-03-13 PROCEDURE — 82947 ASSAY GLUCOSE BLOOD QUANT: CPT

## 2020-03-13 PROCEDURE — 7100000000 HC PACU RECOVERY - FIRST 15 MIN: Performed by: INTERNAL MEDICINE

## 2020-03-13 PROCEDURE — 2060000000 HC ICU INTERMEDIATE R&B

## 2020-03-13 PROCEDURE — 87015 SPECIMEN INFECT AGNT CONCNTJ: CPT

## 2020-03-13 PROCEDURE — 87300 AG DETECTION POLYVAL IF: CPT

## 2020-03-13 PROCEDURE — 2580000003 HC RX 258: Performed by: INTERNAL MEDICINE

## 2020-03-13 PROCEDURE — 87070 CULTURE OTHR SPECIMN AEROBIC: CPT

## 2020-03-13 PROCEDURE — 99232 SBSQ HOSP IP/OBS MODERATE 35: CPT | Performed by: INTERNAL MEDICINE

## 2020-03-13 PROCEDURE — 87206 SMEAR FLUORESCENT/ACID STAI: CPT

## 2020-03-13 PROCEDURE — 88112 CYTOPATH CELL ENHANCE TECH: CPT

## 2020-03-13 PROCEDURE — 88108 CYTOPATH CONCENTRATE TECH: CPT

## 2020-03-13 PROCEDURE — 3700000000 HC ANESTHESIA ATTENDED CARE: Performed by: INTERNAL MEDICINE

## 2020-03-13 PROCEDURE — 0B9J8ZX DRAINAGE OF LEFT LOWER LUNG LOBE, VIA NATURAL OR ARTIFICIAL OPENING ENDOSCOPIC, DIAGNOSTIC: ICD-10-PCS | Performed by: INTERNAL MEDICINE

## 2020-03-13 PROCEDURE — 6360000002 HC RX W HCPCS: Performed by: NURSE ANESTHETIST, CERTIFIED REGISTERED

## 2020-03-13 PROCEDURE — 87116 MYCOBACTERIA CULTURE: CPT

## 2020-03-13 PROCEDURE — 3609027000 HC BRONCHOSCOPY: Performed by: INTERNAL MEDICINE

## 2020-03-13 PROCEDURE — 87205 SMEAR GRAM STAIN: CPT

## 2020-03-13 PROCEDURE — 80053 COMPREHEN METABOLIC PANEL: CPT

## 2020-03-13 PROCEDURE — 2500000003 HC RX 250 WO HCPCS: Performed by: NURSE ANESTHETIST, CERTIFIED REGISTERED

## 2020-03-13 PROCEDURE — 89051 BODY FLUID CELL COUNT: CPT

## 2020-03-13 PROCEDURE — 87106 FUNGI IDENTIFICATION YEAST: CPT

## 2020-03-13 PROCEDURE — 87255 GENET VIRUS ISOLATE HSV: CPT

## 2020-03-13 PROCEDURE — 7100000001 HC PACU RECOVERY - ADDTL 15 MIN: Performed by: INTERNAL MEDICINE

## 2020-03-13 PROCEDURE — 85025 COMPLETE CBC W/AUTO DIFF WBC: CPT

## 2020-03-13 PROCEDURE — 88305 TISSUE EXAM BY PATHOLOGIST: CPT

## 2020-03-13 PROCEDURE — 87140 CULTURE TYPE IMMUNOFLUORESC: CPT

## 2020-03-13 PROCEDURE — 2580000003 HC RX 258: Performed by: ANESTHESIOLOGY

## 2020-03-13 RX ORDER — LABETALOL 20 MG/4 ML (5 MG/ML) INTRAVENOUS SYRINGE
5 EVERY 10 MIN PRN
Status: DISCONTINUED | OUTPATIENT
Start: 2020-03-13 | End: 2020-03-13 | Stop reason: HOSPADM

## 2020-03-13 RX ORDER — ALBUTEROL SULFATE 2.5 MG/3ML
2.5 SOLUTION RESPIRATORY (INHALATION) ONCE
Status: COMPLETED | OUTPATIENT
Start: 2020-03-13 | End: 2020-03-13

## 2020-03-13 RX ORDER — LIDOCAINE HYDROCHLORIDE 10 MG/ML
INJECTION, SOLUTION EPIDURAL; INFILTRATION; INTRACAUDAL; PERINEURAL PRN
Status: DISCONTINUED | OUTPATIENT
Start: 2020-03-13 | End: 2020-03-13 | Stop reason: ALTCHOICE

## 2020-03-13 RX ORDER — MEPERIDINE HYDROCHLORIDE 25 MG/ML
12.5 INJECTION INTRAMUSCULAR; INTRAVENOUS; SUBCUTANEOUS EVERY 5 MIN PRN
Status: DISCONTINUED | OUTPATIENT
Start: 2020-03-13 | End: 2020-03-13 | Stop reason: HOSPADM

## 2020-03-13 RX ORDER — MORPHINE SULFATE 2 MG/ML
2 INJECTION, SOLUTION INTRAMUSCULAR; INTRAVENOUS EVERY 5 MIN PRN
Status: DISCONTINUED | OUTPATIENT
Start: 2020-03-13 | End: 2020-03-13 | Stop reason: HOSPADM

## 2020-03-13 RX ORDER — LIDOCAINE HYDROCHLORIDE 20 MG/ML
SOLUTION OROPHARYNGEAL PRN
Status: DISCONTINUED | OUTPATIENT
Start: 2020-03-13 | End: 2020-03-13 | Stop reason: ALTCHOICE

## 2020-03-13 RX ORDER — PROPOFOL 10 MG/ML
INJECTION, EMULSION INTRAVENOUS CONTINUOUS PRN
Status: DISCONTINUED | OUTPATIENT
Start: 2020-03-13 | End: 2020-03-13 | Stop reason: SDUPTHER

## 2020-03-13 RX ORDER — PROPOFOL 10 MG/ML
INJECTION, EMULSION INTRAVENOUS PRN
Status: DISCONTINUED | OUTPATIENT
Start: 2020-03-13 | End: 2020-03-13 | Stop reason: SDUPTHER

## 2020-03-13 RX ORDER — ONDANSETRON 2 MG/ML
4 INJECTION INTRAMUSCULAR; INTRAVENOUS
Status: DISCONTINUED | OUTPATIENT
Start: 2020-03-13 | End: 2020-03-13 | Stop reason: HOSPADM

## 2020-03-13 RX ORDER — ALBUTEROL SULFATE 2.5 MG/3ML
2.5 SOLUTION RESPIRATORY (INHALATION) EVERY 4 HOURS
Status: DISCONTINUED | OUTPATIENT
Start: 2020-03-13 | End: 2020-03-16 | Stop reason: HOSPADM

## 2020-03-13 RX ORDER — MIDAZOLAM HYDROCHLORIDE 1 MG/ML
INJECTION INTRAMUSCULAR; INTRAVENOUS PRN
Status: DISCONTINUED | OUTPATIENT
Start: 2020-03-13 | End: 2020-03-13 | Stop reason: SDUPTHER

## 2020-03-13 RX ORDER — METOPROLOL TARTRATE 5 MG/5ML
INJECTION INTRAVENOUS PRN
Status: DISCONTINUED | OUTPATIENT
Start: 2020-03-13 | End: 2020-03-13 | Stop reason: SDUPTHER

## 2020-03-13 RX ORDER — KETAMINE HYDROCHLORIDE 10 MG/ML
INJECTION, SOLUTION INTRAMUSCULAR; INTRAVENOUS PRN
Status: DISCONTINUED | OUTPATIENT
Start: 2020-03-13 | End: 2020-03-13 | Stop reason: SDUPTHER

## 2020-03-13 RX ORDER — ACETYLCYSTEINE 200 MG/ML
200 SOLUTION ORAL; RESPIRATORY (INHALATION) EVERY 4 HOURS
Status: DISCONTINUED | OUTPATIENT
Start: 2020-03-13 | End: 2020-03-16 | Stop reason: HOSPADM

## 2020-03-13 RX ORDER — LIDOCAINE HYDROCHLORIDE 40 MG/ML
4 INJECTION, SOLUTION RETROBULBAR; TOPICAL ONCE
Status: COMPLETED | OUTPATIENT
Start: 2020-03-13 | End: 2020-03-13

## 2020-03-13 RX ORDER — DIPHENHYDRAMINE HYDROCHLORIDE 50 MG/ML
12.5 INJECTION INTRAMUSCULAR; INTRAVENOUS
Status: DISCONTINUED | OUTPATIENT
Start: 2020-03-13 | End: 2020-03-13 | Stop reason: HOSPADM

## 2020-03-13 RX ORDER — SODIUM CHLORIDE 9 MG/ML
INJECTION, SOLUTION INTRAVENOUS CONTINUOUS
Status: DISCONTINUED | OUTPATIENT
Start: 2020-03-13 | End: 2020-03-13

## 2020-03-13 RX ORDER — GLYCOPYRROLATE 1 MG/5 ML
SYRINGE (ML) INTRAVENOUS PRN
Status: DISCONTINUED | OUTPATIENT
Start: 2020-03-13 | End: 2020-03-13 | Stop reason: SDUPTHER

## 2020-03-13 RX ORDER — LIDOCAINE HYDROCHLORIDE 20 MG/ML
INJECTION, SOLUTION EPIDURAL; INFILTRATION; INTRACAUDAL; PERINEURAL PRN
Status: DISCONTINUED | OUTPATIENT
Start: 2020-03-13 | End: 2020-03-13 | Stop reason: SDUPTHER

## 2020-03-13 RX ADMIN — ALBUTEROL SULFATE 2.5 MG: 2.5 SOLUTION RESPIRATORY (INHALATION) at 09:00

## 2020-03-13 RX ADMIN — INSULIN LISPRO 2 UNITS: 100 INJECTION, SOLUTION INTRAVENOUS; SUBCUTANEOUS at 18:29

## 2020-03-13 RX ADMIN — PREGABALIN 200 MG: 100 CAPSULE ORAL at 13:04

## 2020-03-13 RX ADMIN — ACETYLCYSTEINE 200 MG: 200 SOLUTION ORAL; RESPIRATORY (INHALATION) at 15:39

## 2020-03-13 RX ADMIN — MIDAZOLAM 2 MG: 1 INJECTION INTRAMUSCULAR; INTRAVENOUS at 09:43

## 2020-03-13 RX ADMIN — GUAIFENESIN 1200 MG: 600 TABLET, EXTENDED RELEASE ORAL at 20:45

## 2020-03-13 RX ADMIN — METHYLPREDNISOLONE SODIUM SUCCINATE 40 MG: 40 INJECTION, POWDER, FOR SOLUTION INTRAMUSCULAR; INTRAVENOUS at 20:49

## 2020-03-13 RX ADMIN — KETAMINE HYDROCHLORIDE 20 MG: 10 INJECTION, SOLUTION INTRAMUSCULAR; INTRAVENOUS at 09:49

## 2020-03-13 RX ADMIN — SODIUM CHLORIDE: 9 INJECTION, SOLUTION INTRAVENOUS at 09:01

## 2020-03-13 RX ADMIN — LIDOCAINE HYDROCHLORIDE 100 MG: 20 INJECTION, SOLUTION EPIDURAL; INFILTRATION; INTRACAUDAL at 09:49

## 2020-03-13 RX ADMIN — BENZONATATE 100 MG: 100 CAPSULE ORAL at 20:45

## 2020-03-13 RX ADMIN — ALBUTEROL SULFATE 2.5 MG: 2.5 SOLUTION RESPIRATORY (INHALATION) at 15:39

## 2020-03-13 RX ADMIN — METHYLPREDNISOLONE SODIUM SUCCINATE 40 MG: 40 INJECTION, POWDER, FOR SOLUTION INTRAMUSCULAR; INTRAVENOUS at 08:26

## 2020-03-13 RX ADMIN — ALBUTEROL SULFATE 2.5 MG: 2.5 SOLUTION RESPIRATORY (INHALATION) at 20:54

## 2020-03-13 RX ADMIN — NYSTATIN 500000 UNITS: 100000 SUSPENSION ORAL at 20:45

## 2020-03-13 RX ADMIN — PROPOFOL 30 MG: 10 INJECTION, EMULSION INTRAVENOUS at 10:00

## 2020-03-13 RX ADMIN — LIDOCAINE HYDROCHLORIDE 4 ML: 40 INJECTION, SOLUTION RETROBULBAR; TOPICAL at 09:00

## 2020-03-13 RX ADMIN — Medication 10 ML: at 20:49

## 2020-03-13 RX ADMIN — BENZONATATE 100 MG: 100 CAPSULE ORAL at 13:04

## 2020-03-13 RX ADMIN — Medication 0.2 MG: at 09:43

## 2020-03-13 RX ADMIN — NYSTATIN 500000 UNITS: 100000 SUSPENSION ORAL at 18:29

## 2020-03-13 RX ADMIN — BUDESONIDE AND FORMOTEROL FUMARATE DIHYDRATE 2 PUFF: 160; 4.5 AEROSOL RESPIRATORY (INHALATION) at 20:48

## 2020-03-13 RX ADMIN — NYSTATIN 500000 UNITS: 100000 SUSPENSION ORAL at 13:03

## 2020-03-13 RX ADMIN — INSULIN LISPRO 2 UNITS: 100 INJECTION, SOLUTION INTRAVENOUS; SUBCUTANEOUS at 21:02

## 2020-03-13 RX ADMIN — BUDESONIDE AND FORMOTEROL FUMARATE DIHYDRATE 2 PUFF: 160; 4.5 AEROSOL RESPIRATORY (INHALATION) at 08:26

## 2020-03-13 RX ADMIN — PROPOFOL 30 MG: 10 INJECTION, EMULSION INTRAVENOUS at 09:49

## 2020-03-13 RX ADMIN — AZITHROMYCIN MONOHYDRATE 500 MG: 500 INJECTION, POWDER, LYOPHILIZED, FOR SOLUTION INTRAVENOUS at 13:04

## 2020-03-13 RX ADMIN — ENOXAPARIN SODIUM 30 MG: 30 INJECTION SUBCUTANEOUS at 20:51

## 2020-03-13 RX ADMIN — PROPOFOL 100 MCG/KG/MIN: 10 INJECTION, EMULSION INTRAVENOUS at 09:49

## 2020-03-13 RX ADMIN — DEXTROMETHORPHAN POLISTIREX 60 MG: 30 SUSPENSION ORAL at 21:02

## 2020-03-13 RX ADMIN — ACETYLCYSTEINE 200 MG: 200 SOLUTION ORAL; RESPIRATORY (INHALATION) at 20:55

## 2020-03-13 RX ADMIN — PREGABALIN 200 MG: 100 CAPSULE ORAL at 20:45

## 2020-03-13 RX ADMIN — MIDAZOLAM 2 MG: 1 INJECTION INTRAMUSCULAR; INTRAVENOUS at 09:49

## 2020-03-13 RX ADMIN — VENLAFAXINE 50 MG: 50 TABLET ORAL at 20:46

## 2020-03-13 RX ADMIN — METOROPROLOL TARTRATE 2.5 MG: 5 INJECTION, SOLUTION INTRAVENOUS at 09:56

## 2020-03-13 RX ADMIN — PRAVASTATIN SODIUM 80 MG: 40 TABLET ORAL at 18:29

## 2020-03-13 ASSESSMENT — PULMONARY FUNCTION TESTS
PIF_VALUE: 1
PIF_VALUE: 0
PIF_VALUE: 1
PIF_VALUE: 0
PIF_VALUE: 1

## 2020-03-13 ASSESSMENT — PAIN - FUNCTIONAL ASSESSMENT: PAIN_FUNCTIONAL_ASSESSMENT: 0-10

## 2020-03-13 ASSESSMENT — PAIN SCALES - GENERAL
PAINLEVEL_OUTOF10: 0
PAINLEVEL_OUTOF10: 0

## 2020-03-13 NOTE — PROGRESS NOTES
Pulmonary Progress Note  [facility]      Patient - Adenike Malone,  Age - 71 y.o.    - 1955      Room Number - [facility] Pool/NONE   MRN -  [id number]   [facility]  Date of Admission -  3/9/2020 10:23 AM        Consulting Nikia Kapoor MD  Primary Care Physician - Kiya Bolanos MD     SUBJECTIVE   Still coughing up some dark red secretions but otherwise had an uneventful night    OBJECTIVE   VITALS    height is 6' (1.829 m) and weight is 295 lb 6.7 oz (134 kg). His oral temperature is 98.1 °F (36.7 °C). His blood pressure is 154/76 (abnormal) and his pulse is 59. His respiration is 14 and oxygen saturation is 100%. Body mass index is 40.07 kg/m². Temperature Range: Temp: 98.1 °F (36.7 °C) Temp  Av.8 °F (36.6 °C)  Min: 97.5 °F (36.4 °C)  Max: 98.1 °F (36.7 °C)  BP Range:  Systolic (07FUO), KWM:336 , Min:114 , ADA:249     Diastolic (03KNG), UQT:27, Min:63, Max:96    Pulse Range: Pulse  Av.8  Min: 59  Max: 76  Respiration Range: Resp  Av.3  Min: 14  Max: 20  Current Pulse Ox[de-identified]  SpO2: 100 %  24HR Pulse Ox Range:  SpO2  Av %  Min: 87 %  Max: 100 %  Oxygen Amount and Delivery: O2 Flow Rate (L/min): 2 L/min    Wt Readings from Last 3 Encounters:   20 295 lb 6.7 oz (134 kg)   20 288 lb (130.6 kg)   20 288 lb (130.6 kg)       I/O (24 Hours)    Intake/Output Summary (Last 24 hours) at 3/13/2020 1012  Last data filed at 3/13/2020 1010  Gross per 24 hour   Intake 1160 ml   Output 1000 ml   Net 160 ml       EXAM     General Appearance  Awake, alert, oriented, in no acute distress  HEENT - normocephalic, atraumatic.  []  Mallampati  [x] Crowded airway   [x] Macroglossia  []  Retrognathia  [] Micrognathia  []  Normal tongue size []  Normal Bite  [] [place] sign positive    Neck - Supple,  trachea midline   Lungs -diminished scattered wheezes  Cardiovascular - Heart sounds are normal.  Regular rate and rhythm   Abdomen - Soft, nontender, nondistended, no masses or organomegaly  Neurologic - There are no focal motor or sensory deficits  Skin - No bruising or bleeding  Extremities - No clubbing, cyanosis, edema    MEDS      [MAR Hold] furosemide  20 mg Oral Daily    [MAR Hold] methylPREDNISolone  40 mg Intravenous Q12H    [MAR Hold] guaiFENesin  1,200 mg Oral BID    [MAR Hold] benzonatate  100 mg Oral TID    [MAR Hold] dextromethorphan  60 mg Oral BID    [MAR Hold] ipratropium-albuterol  1 ampule Inhalation Q4H WA    [MAR Hold] amLODIPine  10 mg Oral Daily    [MAR Hold] aspirin EC  81 mg Oral Daily    [MAR Hold] lisinopril  20 mg Oral Daily    [MAR Hold] pravastatin  80 mg Oral QPM    [MAR Hold] pregabalin  200 mg Oral TID    [MAR Hold] budesonide-formoterol  2 puff Inhalation BID    [MAR Hold] venlafaxine  50 mg Oral BID    [MAR Hold] sodium chloride flush  10 mL Intravenous 2 times per day    [MAR Hold] enoxaparin  30 mg Subcutaneous BID    [MAR Hold] nicotine  1 patch Transdermal Daily    [MAR Hold] cefTRIAXone (ROCEPHIN) IV  1 g Intravenous Q24H    [MAR Hold] azithromycin  500 mg Intravenous Q24H    [MAR Hold] insulin lispro  0-12 Units Subcutaneous TID WC    [MAR Hold] insulin lispro  0-6 Units Subcutaneous Nightly      sodium chloride 100 mL/hr at 03/13/20 0901    [MAR Hold] dextrose       HYDROmorphone, morphine, diphenhydrAMINE, ondansetron, labetalol, meperidine, lidocaine viscous hcl, lidocaine PF, [MAR Hold] albuterol, [MAR Hold] sodium chloride flush, [MAR Hold] acetaminophen **OR** [MAR Hold] acetaminophen, [MAR Hold] polyethylene glycol, [MAR Hold] potassium chloride **OR** [MAR Hold] potassium alternative oral replacement **OR** [MAR Hold] potassium chloride, [MAR Hold] ondansetron, [MAR Hold] glucose, [MAR Hold] dextrose, [MAR Hold] glucagon (rDNA), [MAR Hold] dextrose    LABS   CBC   Recent Labs     03/13/20  0711   WBC 13.8*   HGB 14.8   HCT 44.7 MCV 89.0        BMP:   Lab Results   Component Value Date     03/13/2020    K 4.6 03/13/2020     03/13/2020    CO2 24 03/13/2020    BUN 21 03/13/2020    LABALBU 3.7 03/13/2020    LABALBU 4.1 02/16/2012    CREATININE 0.69 03/13/2020    CALCIUM 9.5 03/13/2020    GFRAA >60 03/13/2020    LABGLOM >60 03/13/2020     ABGs:No results found for: PHART, PO2ART, SMU8GDG No results found for: IFIO2, MODE, SETTIDVOL, SETPEEP  Ionized Calcium:  No results found for: IONCA  Magnesium:  No results found for: MG  Phosphorus:  No results found for: PHOS     LIVER PROFILE   Recent Labs     03/13/20  0522   AST 54*   ALT 65*   BILITOT 0.24*   ALKPHOS 90     INR No results for input(s): INR in the last 72 hours. PTT   Lab Results   Component Value Date    APTT 27.9 02/25/2014         RADIOLOGY     (See actual reports for details)    ASSESSMENT/PLAN   Principal Problem:    Pneumonia  Active Problems:    Severe obesity (BMI 35.0-35.9 with comorbidity) ([address]    Essential hypertension    Type 2 diabetes mellitus with diabetic polyneuropathy, without long-term current use of insulin (HCC)    Peripheral neuropathic pain    COPD with acute exacerbation (HCC)    Coronary artery disease involving native coronary artery of native heart without angina pectoris  Resolved Problems:    * No resolved hospital problems.  *    Plan on bronchoscopy both diagnostic and therapeutic  Continue antibiotics  Needs aggressive pulmonary hygiene  Continue his CPAP machine    Electronically signed by Claudia Judge MD on 3/13/2020 at 10:12 AM

## 2020-03-13 NOTE — OP NOTE
Bronchoscopy Procedure Note    Date of Operation: 3/13/2020    Pre-op Diagnosis: Hemoptysis    Post-op Diagnosis: Significant mucus plugging bilaterally, no active bleeding possible thrush    Surgeon: Joao Morocho    Assistants: None    Anesthesia: Monitored Local Anesthesia with Sedation    Operation: Flexible fiberoptic bronchoscopy, diagnostic BAL done in left lower lobe      Estimated Blood Loss: None      Complications: None    Indications and History:  The patient is a 71 y.o. male with REYNALDO, COPD and active tobacco use who was having hemoptysis. The risks, benefits, complications, treatment options and expected outcomes were discussed with the patient. The possibilities of reaction to medication, pulmonary aspiration, perforation of a viscus, bleeding, failure to diagnose a condition and creating a complication requiring transfusion or operation were discussed with the patient who freely signed the consent. Description of Procedure: The patient was seen in the Holding Room and the site of surgery properly noted/marked. The patient was taken to endoscopy suite, identified as Huma Waddell and the procedure verified as Flexible Fiberoptic Bronchoscopy. A Time Out was held and the above information confirmed. After the induction of topical nasopharyngeal anesthesia, the patient was positioned supine and the bronchoscope was passed through the right nares. The vocal cords were visualized and  1% plain lidocaine 3 ml was topically placed onto the cords. The cords were covered with mucus and also it appears that there may have been thrush in the oral cavity. The scope was then passed into the trachea. 1% plain lidocaine 3 ml was used topically on the elizabeth. Careful inspection of the tracheal lumen was accomplished. There was significant amount of secretions in the trachea. They clogged up the scope.   Also in the right and left mainstem bronchus there were thick copious dark gray to reddish

## 2020-03-13 NOTE — PLAN OF CARE
Problem: Falls - Risk of:  Goal: Will remain free from falls  Description: Will remain free from falls  Outcome: Met This Shift  Goal: Absence of physical injury  Description: Absence of physical injury  Outcome: Met This Shift     Problem: Safety:  Goal: Free from accidental physical injury  Description: Free from accidental physical injury  Outcome: Met This Shift  Goal: Free from intentional harm  Description: Free from intentional harm  Outcome: Met This Shift     Problem: Pain:  Goal: Patient's pain/discomfort is manageable  Description: Patient's pain/discomfort is manageable  Outcome: Met This Shift     Problem: Skin Integrity:  Goal: Skin integrity will stabilize  Description: Skin integrity will stabilize  Outcome: Met This Shift     Problem: Discharge Planning:  Goal: Discharged to appropriate level of care  Description: Discharged to appropriate level of care  Outcome: Ongoing  Goal: Participates in care planning  Description: Participates in care planning  Outcome: Ongoing     Problem: Airway Clearance - Ineffective:  Goal: Clear lung sounds  Description: Clear lung sounds  Outcome: Ongoing  Goal: Ability to maintain a clear airway will improve  Description: Ability to maintain a clear airway will improve  Outcome: Ongoing     Problem: Gas Exchange - Impaired:  Goal: Levels of oxygenation will improve  Description: Levels of oxygenation will improve  Outcome: Ongoing     Problem: Infection:  Goal: Will remain free from infection  Description: Will remain free from infection  Outcome: Ongoing     Problem: Daily Care:  Goal: Daily care needs are met  Description: Daily care needs are met  Outcome: Ongoing

## 2020-03-13 NOTE — PROGRESS NOTES
[facility]    PROGRESS NOTE             3/13/2020    1:43 PM    Name:   Singh Sexton  MRN:     [id number]     Acct:      [de-identified]   Room:   2110/2110-01  IP Day:  4  Admit Date:  3/9/2020 10:23 AM    PCP:  Virgil Bartlett MD  Code Status:  Full Code    Subjective:     C/C:   Chief Complaint   Patient presents with    Shortness of Breath    Cough     Interval History Status: improved. And examined at the bedside patient has been seen and examined at the bedside, no major event happened over the night, patient is off BiPAP, he said he feels much better today, pulmonology performed bronchoscopy showed thick mucus in the prone chills to the point clogged the scope, will keep the patient for 1 more day keep him on antibiotic and acapella. Brief History:     See H&P    Review of Systems:     Review of Systems  Constitutional: Negative for chills and fever. HENT: Negative for congestion. Eyes: Negative for visual disturbance. Respiratory: Positive for cough and shortness of breath. Cardiovascular: Negative for chest pain, palpitations and leg swelling. Gastrointestinal: Negative for abdominal distention, abdominal pain, nausea and vomiting. Musculoskeletal: Negative for arthralgias. Neurological: Negative for weakness and headaches. Psychiatric/Behavioral: Negative for agitation. Medications: Allergies:     Allergies   Allergen Reactions    Succinylcholine Chloride Other (See Comments)     PATIENT HAS PSEUDOCHOLINESTERASE DEFICIENCY      Cymbalta [Duloxetine Hcl]      Taste loss         Current Meds:   Scheduled Meds:    albuterol  2.5 mg Nebulization Q4H    acetylcysteine  200 mg Inhalation Q4H    nystatin  5 mL Oral 4x Daily    furosemide  20 mg Oral Daily    methylPREDNISolone  40 mg Intravenous Q12H    guaiFENesin  1,200 mg Oral BID    benzonatate  100 mg Oral TID    dextromethorphan  60 mg Tubular adenoma of colon 17, Type 2 diabetes mellitus with hyperglycemia, without long-term current use of insulin ([address] Vitamin D deficiency, and Wears glasses. Social History:   reports that he has been smoking cigarettes. He has a 52.50 pack-year smoking history. He has never used smokeless tobacco. He reports that he does not drink alcohol or use drugs. Family History:   Family History   Problem Relation Age of Onset    Diabetes Mother     Lung Cancer Brother     Liver Cancer Brother     Cancer Father        Vitals:  BP (!) 175/92   Pulse 73   Temp 97.5 °F (36.4 °C) (Infrared)   Resp 14   Ht 6' (1.829 m)   Wt 295 lb 6.7 oz (134 kg)   SpO2 92%   BMI 40.07 kg/m²   Temp (24hrs), Av.8 °F (36.6 °C), Min:97.5 °F (36.4 °C), Max:98.1 °F (36.7 °C)    Recent Labs     20  0758 20  0901 20  1014 20  1206   POCGLU 147* 126* 117* 127*       I/O(24Hr): Intake/Output Summary (Last 24 hours) at 3/13/2020 1343  Last data filed at 3/13/2020 1050  Gross per 24 hour   Intake 1230 ml   Output 1000 ml   Net 230 ml       Labs:  [unfilled]    Lab Results   Component Value Date/Time    SPECIAL NOT REPORTED 03/10/2020 02:57 AM     Lab Results   Component Value Date/Time    CULTURE NORMAL RESPIRATORY INÉS HEAVY GROWTH 2020 11:46 PM       [unfilled]    Radiology:    Ct Chest Wo Contrast    Result Date: 3/12/2020  EXAMINATION: CT OF THE CHEST WITHOUT CONTRAST 3/12/2020 8:23 am TECHNIQUE: CT of the chest was performed without the administration of intravenous contrast. Multiplanar reformatted images are provided for review. Dose modulation, iterative reconstruction, and/or weight based adjustment of the mA/kV was utilized to reduce the radiation dose to as low as reasonably achievable.  COMPARISON: 2020 HISTORY: ORDERING SYSTEM PROVIDED HISTORY: hemoptysis, smoker/copd TECHNOLOGIST PROVIDED HISTORY: hemoptysis, smoker/copd Reason for Exam: hemoptysis, smoker/copd Acuity: arthroplasty. Symmetrical activity is noted in the acromioclavicular and glenohumeral aspects of both shoulders as well as the sternoclavicular joints. No areas of abnormal asymmetric radiotracer uptake is noted. No evidence of prosthetic loosening. Indium 111 white blood cell study for infection: Photopenic area in the left shoulder. No areas of abnormal radiotracer uptake to suggest infection. No imaging evidence of prosthetic loosening or infection. Evidence of left shoulder arthroplasty. Xr Chest Portable    Result Date: 3/11/2020  EXAMINATION: ONE XRAY VIEW OF THE CHEST 3/9/2020 10:28 am COMPARISON: 10/03/2018 HISTORY: ORDERING SYSTEM PROVIDED HISTORY: shortness of breath TECHNOLOGIST PROVIDED HISTORY: shortness of breath Reason for Exam: chest pain, sob Acuity: Unknown Type of Exam: Unknown Initial exam. FINDINGS: Airspace opacities both lower lobes right greater than left. The heart is enlarged. No pneumothorax or pulmonary edema. Cardiomegaly with bilateral lower lobe infiltrates. Ct Lung Screen (annual)    Result Date: 2020  EXAMINATION: LOW DOSE SCREENING CT OF THE CHEST WITHOUT CONTRAST TECHNIQUE: Low dose lung cancer screening CT of the chest was performed without the administration of intravenous contrast.  Multiplanar reformatted images are provided for review. Dose modulation, iterative reconstruction, and/or weight based adjustment of the mA/kV was utilized to reduce the radiation dose to as low as reasonably achievable. Field-of-view: 40 cm Dose Length Product: 106.97 mGy CTDlvol: 2.71 mGy COMPARISON: 2019 HISTORY: Screening. Patient Age: 72 y/o Number of pack years of smokin pack years If no longer smoking, number of years since cessation:  Current everyday smoker Other symptoms:  None. FINDINGS: Mediastinum:  Visualized thyroid gland grossly unremarkable in appearance. Atherosclerotic calcification of the aorta and branch vasculature.   Coronary artery

## 2020-03-14 ENCOUNTER — APPOINTMENT (OUTPATIENT)
Dept: GENERAL RADIOLOGY | Age: 65
DRG: 194 | End: 2020-03-14
Payer: MEDICARE

## 2020-03-14 LAB
ABSOLUTE EOS #: 0 K/UL (ref 0–0.4)
ABSOLUTE IMMATURE GRANULOCYTE: ABNORMAL K/UL (ref 0–0.3)
ABSOLUTE LYMPH #: 1.63 K/UL (ref 1–4.8)
ABSOLUTE MONO #: 0.91 K/UL (ref 0.1–1.3)
ALBUMIN SERPL-MCNC: 3.6 G/DL (ref 3.5–5.2)
ALBUMIN/GLOBULIN RATIO: ABNORMAL (ref 1–2.5)
ALP BLD-CCNC: 93 U/L (ref 40–129)
ALT SERPL-CCNC: 64 U/L (ref 5–41)
ANION GAP SERPL CALCULATED.3IONS-SCNC: 12 MMOL/L (ref 9–17)
AST SERPL-CCNC: 47 U/L
BASOPHILS # BLD: 0 % (ref 0–2)
BASOPHILS ABSOLUTE: 0 K/UL (ref 0–0.2)
BILIRUB SERPL-MCNC: 0.22 MG/DL (ref 0.3–1.2)
BNP INTERPRETATION: NORMAL
BUN BLDV-MCNC: 23 MG/DL (ref 8–23)
BUN/CREAT BLD: ABNORMAL (ref 9–20)
CALCIUM SERPL-MCNC: 9.5 MG/DL (ref 8.6–10.4)
CHLORIDE BLD-SCNC: 103 MMOL/L (ref 98–107)
CO2: 24 MMOL/L (ref 20–31)
CREAT SERPL-MCNC: 0.7 MG/DL (ref 0.7–1.2)
CULTURE: ABNORMAL
DIFFERENTIAL TYPE: ABNORMAL
DIRECT EXAM: ABNORMAL
DIRECT EXAM: ABNORMAL
DIRECT EXAM: NORMAL
EOSINOPHILS RELATIVE PERCENT: 0 % (ref 0–4)
GFR AFRICAN AMERICAN: >60 ML/MIN
GFR NON-AFRICAN AMERICAN: >60 ML/MIN
GFR SERPL CREATININE-BSD FRML MDRD: ABNORMAL ML/MIN/{1.73_M2}
GFR SERPL CREATININE-BSD FRML MDRD: ABNORMAL ML/MIN/{1.73_M2}
GLUCOSE BLD-MCNC: 130 MG/DL (ref 75–110)
GLUCOSE BLD-MCNC: 134 MG/DL (ref 75–110)
GLUCOSE BLD-MCNC: 149 MG/DL (ref 75–110)
GLUCOSE BLD-MCNC: 156 MG/DL (ref 70–99)
GLUCOSE BLD-MCNC: 178 MG/DL (ref 75–110)
HCT VFR BLD CALC: 47.2 % (ref 41–53)
HEMOGLOBIN: 15.5 G/DL (ref 13.5–17.5)
IMMATURE GRANULOCYTES: ABNORMAL %
LYMPHOCYTES # BLD: 9 % (ref 24–44)
Lab: ABNORMAL
Lab: NORMAL
MCH RBC QN AUTO: 29 PG (ref 26–34)
MCHC RBC AUTO-ENTMCNC: 32.9 G/DL (ref 31–37)
MCV RBC AUTO: 88 FL (ref 80–100)
MONOCYTES # BLD: 5 % (ref 1–7)
MORPHOLOGY: NORMAL
NRBC AUTOMATED: ABNORMAL PER 100 WBC
PDW BLD-RTO: 14.4 % (ref 11.5–14.9)
PLATELET # BLD: 246 K/UL (ref 150–450)
PLATELET ESTIMATE: ABNORMAL
PMV BLD AUTO: 9.1 FL (ref 6–12)
POTASSIUM SERPL-SCNC: 4.4 MMOL/L (ref 3.7–5.3)
PRO-BNP: 157 PG/ML
RBC # BLD: 5.36 M/UL (ref 4.5–5.9)
RBC # BLD: ABNORMAL 10*6/UL
SEG NEUTROPHILS: 86 % (ref 36–66)
SEGMENTED NEUTROPHILS ABSOLUTE COUNT: 15.56 K/UL (ref 1.3–9.1)
SODIUM BLD-SCNC: 139 MMOL/L (ref 135–144)
SPECIMEN DESCRIPTION: ABNORMAL
SPECIMEN DESCRIPTION: NORMAL
TOTAL PROTEIN: 6.9 G/DL (ref 6.4–8.3)
WBC # BLD: 18.1 K/UL (ref 3.5–11)
WBC # BLD: ABNORMAL 10*3/UL

## 2020-03-14 PROCEDURE — 80053 COMPREHEN METABOLIC PANEL: CPT

## 2020-03-14 PROCEDURE — 94640 AIRWAY INHALATION TREATMENT: CPT

## 2020-03-14 PROCEDURE — 83880 ASSAY OF NATRIURETIC PEPTIDE: CPT

## 2020-03-14 PROCEDURE — 6360000002 HC RX W HCPCS: Performed by: INTERNAL MEDICINE

## 2020-03-14 PROCEDURE — 2580000003 HC RX 258: Performed by: INTERNAL MEDICINE

## 2020-03-14 PROCEDURE — 82947 ASSAY GLUCOSE BLOOD QUANT: CPT

## 2020-03-14 PROCEDURE — 99232 SBSQ HOSP IP/OBS MODERATE 35: CPT | Performed by: INTERNAL MEDICINE

## 2020-03-14 PROCEDURE — 6370000000 HC RX 637 (ALT 250 FOR IP): Performed by: INTERNAL MEDICINE

## 2020-03-14 PROCEDURE — 94761 N-INVAS EAR/PLS OXIMETRY MLT: CPT

## 2020-03-14 PROCEDURE — 2060000000 HC ICU INTERMEDIATE R&B

## 2020-03-14 PROCEDURE — 85025 COMPLETE CBC W/AUTO DIFF WBC: CPT

## 2020-03-14 PROCEDURE — 2700000000 HC OXYGEN THERAPY PER DAY

## 2020-03-14 PROCEDURE — 71046 X-RAY EXAM CHEST 2 VIEWS: CPT

## 2020-03-14 PROCEDURE — 36415 COLL VENOUS BLD VENIPUNCTURE: CPT

## 2020-03-14 RX ORDER — OMEPRAZOLE 10 MG/1
20 CAPSULE, DELAYED RELEASE ORAL DAILY
COMMUNITY
End: 2020-03-18 | Stop reason: DRUGHIGH

## 2020-03-14 RX ORDER — FUROSEMIDE 10 MG/ML
40 INJECTION INTRAMUSCULAR; INTRAVENOUS ONCE
Status: COMPLETED | OUTPATIENT
Start: 2020-03-14 | End: 2020-03-14

## 2020-03-14 RX ORDER — CALCIUM CARBONATE 200(500)MG
500 TABLET,CHEWABLE ORAL 3 TIMES DAILY PRN
Status: DISCONTINUED | OUTPATIENT
Start: 2020-03-14 | End: 2020-03-16 | Stop reason: HOSPADM

## 2020-03-14 RX ORDER — OMEPRAZOLE 20 MG/1
20 CAPSULE, DELAYED RELEASE ORAL DAILY
Status: DISCONTINUED | OUTPATIENT
Start: 2020-03-14 | End: 2020-03-16 | Stop reason: HOSPADM

## 2020-03-14 RX ADMIN — Medication 10 ML: at 15:32

## 2020-03-14 RX ADMIN — ONDANSETRON 4 MG: 2 INJECTION INTRAMUSCULAR; INTRAVENOUS at 05:25

## 2020-03-14 RX ADMIN — ACETYLCYSTEINE 200 MG: 200 SOLUTION ORAL; RESPIRATORY (INHALATION) at 11:53

## 2020-03-14 RX ADMIN — BUDESONIDE AND FORMOTEROL FUMARATE DIHYDRATE 2 PUFF: 160; 4.5 AEROSOL RESPIRATORY (INHALATION) at 08:40

## 2020-03-14 RX ADMIN — NYSTATIN 500000 UNITS: 100000 SUSPENSION ORAL at 08:40

## 2020-03-14 RX ADMIN — ALBUTEROL SULFATE 2.5 MG: 2.5 SOLUTION RESPIRATORY (INHALATION) at 17:06

## 2020-03-14 RX ADMIN — Medication 10 ML: at 20:49

## 2020-03-14 RX ADMIN — ALBUTEROL SULFATE 2.5 MG: 2.5 SOLUTION RESPIRATORY (INHALATION) at 07:57

## 2020-03-14 RX ADMIN — DEXTROMETHORPHAN POLISTIREX 60 MG: 30 SUSPENSION ORAL at 20:56

## 2020-03-14 RX ADMIN — PREGABALIN 200 MG: 100 CAPSULE ORAL at 13:37

## 2020-03-14 RX ADMIN — Medication 10 ML: at 08:40

## 2020-03-14 RX ADMIN — BUDESONIDE AND FORMOTEROL FUMARATE DIHYDRATE 2 PUFF: 160; 4.5 AEROSOL RESPIRATORY (INHALATION) at 20:49

## 2020-03-14 RX ADMIN — ALBUTEROL SULFATE 2.5 MG: 2.5 SOLUTION RESPIRATORY (INHALATION) at 20:04

## 2020-03-14 RX ADMIN — OMEPRAZOLE 20 MG: 20 CAPSULE, DELAYED RELEASE ORAL at 09:46

## 2020-03-14 RX ADMIN — PREGABALIN 200 MG: 100 CAPSULE ORAL at 08:40

## 2020-03-14 RX ADMIN — METHYLPREDNISOLONE SODIUM SUCCINATE 40 MG: 40 INJECTION, POWDER, FOR SOLUTION INTRAMUSCULAR; INTRAVENOUS at 08:40

## 2020-03-14 RX ADMIN — ASPIRIN 81 MG: 81 TABLET, COATED ORAL at 08:40

## 2020-03-14 RX ADMIN — ENOXAPARIN SODIUM 30 MG: 30 INJECTION SUBCUTANEOUS at 08:40

## 2020-03-14 RX ADMIN — VENLAFAXINE 50 MG: 50 TABLET ORAL at 20:49

## 2020-03-14 RX ADMIN — VENLAFAXINE 50 MG: 50 TABLET ORAL at 08:41

## 2020-03-14 RX ADMIN — NYSTATIN 500000 UNITS: 100000 SUSPENSION ORAL at 13:37

## 2020-03-14 RX ADMIN — AMLODIPINE BESYLATE 10 MG: 5 TABLET ORAL at 08:40

## 2020-03-14 RX ADMIN — ENOXAPARIN SODIUM 30 MG: 30 INJECTION SUBCUTANEOUS at 20:48

## 2020-03-14 RX ADMIN — DEXTROMETHORPHAN POLISTIREX 60 MG: 30 SUSPENSION ORAL at 08:40

## 2020-03-14 RX ADMIN — ACETYLCYSTEINE 200 MG: 200 SOLUTION ORAL; RESPIRATORY (INHALATION) at 17:06

## 2020-03-14 RX ADMIN — FUROSEMIDE 40 MG: 10 INJECTION, SOLUTION INTRAMUSCULAR; INTRAVENOUS at 15:32

## 2020-03-14 RX ADMIN — LISINOPRIL 20 MG: 20 TABLET ORAL at 08:41

## 2020-03-14 RX ADMIN — BENZONATATE 100 MG: 100 CAPSULE ORAL at 08:40

## 2020-03-14 RX ADMIN — GUAIFENESIN 1200 MG: 600 TABLET, EXTENDED RELEASE ORAL at 08:41

## 2020-03-14 RX ADMIN — PREGABALIN 200 MG: 100 CAPSULE ORAL at 20:49

## 2020-03-14 RX ADMIN — BENZONATATE 100 MG: 100 CAPSULE ORAL at 20:49

## 2020-03-14 RX ADMIN — NYSTATIN 500000 UNITS: 100000 SUSPENSION ORAL at 17:06

## 2020-03-14 RX ADMIN — ACETYLCYSTEINE 200 MG: 200 SOLUTION ORAL; RESPIRATORY (INHALATION) at 20:04

## 2020-03-14 RX ADMIN — INSULIN LISPRO 2 UNITS: 100 INJECTION, SOLUTION INTRAVENOUS; SUBCUTANEOUS at 17:07

## 2020-03-14 RX ADMIN — ALBUTEROL SULFATE 2.5 MG: 2.5 SOLUTION RESPIRATORY (INHALATION) at 11:53

## 2020-03-14 RX ADMIN — METHYLPREDNISOLONE SODIUM SUCCINATE 40 MG: 40 INJECTION, POWDER, FOR SOLUTION INTRAMUSCULAR; INTRAVENOUS at 20:48

## 2020-03-14 RX ADMIN — BENZONATATE 100 MG: 100 CAPSULE ORAL at 13:37

## 2020-03-14 RX ADMIN — ANTACID TABLETS 500 MG: 500 TABLET, CHEWABLE ORAL at 09:46

## 2020-03-14 RX ADMIN — ACETYLCYSTEINE 200 MG: 200 SOLUTION ORAL; RESPIRATORY (INHALATION) at 07:57

## 2020-03-14 RX ADMIN — PRAVASTATIN SODIUM 80 MG: 40 TABLET ORAL at 17:06

## 2020-03-14 RX ADMIN — GUAIFENESIN 1200 MG: 600 TABLET, EXTENDED RELEASE ORAL at 20:49

## 2020-03-14 RX ADMIN — FUROSEMIDE 20 MG: 20 TABLET ORAL at 08:41

## 2020-03-14 RX ADMIN — NYSTATIN 500000 UNITS: 100000 SUSPENSION ORAL at 20:48

## 2020-03-14 ASSESSMENT — PAIN SCALES - GENERAL
PAINLEVEL_OUTOF10: 0
PAINLEVEL_OUTOF10: 0

## 2020-03-14 NOTE — CARE COORDINATION
DISCHARGE PLANNING NOTE:    Plan is for this patient to return to home. We are following for home O2 - Previous home O2 eval  and order placed for another eval to be done tomorrow. Will need order and face to face notes. Declines need for VNS. On IV steroids 40Q12. Orange Header - 22%    Has F/U appt scheduled. Will continue to follow along.      Electronically signed by Karla Bartlett RN on 3/14/2020 at 2:12 PM

## 2020-03-14 NOTE — PLAN OF CARE
Problem: Falls - Risk of:  Goal: Will remain free from falls  Description: Will remain free from falls  3/14/2020 0049 by Isidra Rivero RN  Outcome: Ongoing  Note: Patient alert and oriented. Up per steady gait in room. Instructed to call nurse if help needed. 3/13/2020 1834 by Andrew Murray RN  Outcome: Met This Shift  Goal: Absence of physical injury  Description: Absence of physical injury  3/14/2020 0049 by Isidra Rivero RN  Outcome: Ongoing  3/13/2020 1834 by Andrew Murray RN  Outcome: Met This Shift     Problem: Discharge Planning:  Goal: Discharged to appropriate level of care  Description: Discharged to appropriate level of care  3/14/2020 0049 by Isidra Rivero RN  Outcome: Ongoing  Note: Lung sounds diminished. Short of breath with exertion.   Bipap worn at [facility]  3/13/2020 1834 by Andrew Murray RN  Outcome: Ongoing  Goal: Participates in care planning  Description: Participates in care planning  3/14/2020 0049 by Isidra Rivero RN  Outcome: Ongoing  3/13/2020 1834 by Andrew Murray RN  Outcome: Ongoing     Problem: Airway Clearance - Ineffective:  Goal: Clear lung sounds  Description: Clear lung sounds  3/14/2020 0049 by Isidra Rivero RN  Outcome: Ongoing  3/13/2020 1834 by Andrew Murray RN  Outcome: Ongoing  Goal: Ability to maintain a clear airway will improve  Description: Ability to maintain a clear airway will improve  3/14/2020 0049 by Isidra Rivero RN  Outcome: Ongoing  3/13/2020 1834 by Andrew Murray RN  Outcome: Ongoing     Problem: Gas Exchange - Impaired:  Goal: Levels of oxygenation will improve  Description: Levels of oxygenation will improve  3/14/2020 0049 by Isidra Rivero RN  Outcome: Ongoing  3/13/2020 1834 by Andrew Murray RN  Outcome: Ongoing     Problem: Infection:  Goal: Will remain free from infection  Description: Will remain free from infection  3/14/2020 0049 by Isidra Rivero RN  Outcome:

## 2020-03-14 NOTE — PROGRESS NOTES
[address] [facility].    PROGRESS NOTE             3/14/2020    9:26 AM    Name:   Yariel Hastings  MRN:     [id number]     Acct:      [de-identified]   Room:   2110/2110-01   Day:  5  Admit Date:  3/9/2020 10:23 AM    PCP:  Liz Lim MD  Code Status:  Full Code    Subjective:     C/C:   Chief Complaint   Patient presents with    Shortness of Breath    Cough     Interval History Status: improved. Patient has been seen and examined at the bedside, no major event happened over the night, patient is feeling much better, his shortness of breath is subsided, however he still on nasal cannula, he denied any further hemoptysis, however patient complained of some heartburn    Brief History:     See H&P    Review of Systems:     Review of Systems  Constitutional: Negative for chills and fever. HENT: Negative for congestion.    Eyes: Negative for visual disturbance. Respiratory: Positive for cough and shortness of breath.    Cardiovascular: Negative for chest pain, palpitations and leg swelling. Gastrointestinal: Negative for abdominal distention, abdominal pain, nausea and vomiting. Musculoskeletal: Negative for arthralgias. Neurological: Negative for weakness and headaches. Psychiatric/Behavioral: Negative for agitation.     Medications: Allergies:     Allergies   Allergen Reactions    Succinylcholine Chloride Other (See Comments)     PATIENT HAS PSEUDOCHOLINESTERASE DEFICIENCY      Cymbalta [Duloxetine Hcl]      Taste loss         Current Meds:   Scheduled Meds:    omeprazole  20 mg Oral Daily    albuterol  2.5 mg Nebulization Q4H    acetylcysteine  200 mg Inhalation Q4H    nystatin  5 mL Oral 4x Daily    furosemide  20 mg Oral Daily    methylPREDNISolone  40 mg Intravenous Q12H    guaiFENesin  1,200 mg Oral BID    benzonatate  100 mg Oral TID    dextromethorphan  60 mg Oral BID    amLODIPine  10 mg Oral Daily    hyperglycemia, without long-term current use of insulin ([address] Vitamin D deficiency, and Wears glasses. Social History:   reports that he has been smoking cigarettes. He has a 52.50 pack-year smoking history. He has never used smokeless tobacco. He reports that he does not drink alcohol or use drugs. Family History:   Family History   Problem Relation Age of Onset    Diabetes Mother     Lung Cancer Brother     Liver Cancer Brother     Cancer Father        Vitals:  BP (!) 154/78   Pulse 68   Temp 98.4 °F (36.9 °C) (Oral)   Resp 16   Ht 6' (1.829 m)   Wt 295 lb 6.7 oz (134 kg)   SpO2 93%   BMI 40.07 kg/m²   Temp (24hrs), Av.9 °F (36.6 °C), Min:97.4 °F (36.3 °C), Max:98.4 °F (36.9 °C)    Recent Labs     20  1206 20  1653 20  2045 20  0721   POCGLU 127* 182* 220* 134*       I/O(24Hr): Intake/Output Summary (Last 24 hours) at 3/14/2020 0926  Last data filed at 3/14/2020 0538  Gross per 24 hour   Intake 1230 ml   Output 600 ml   Net 630 ml       Labs:  [unfilled]    Lab Results   Component Value Date/Time    SPECIAL NOT REPORTED 03/10/2020 02:57 AM     Lab Results   Component Value Date/Time    CULTURE NORMAL RESPIRATORY INÉS HEAVY GROWTH 2020 11:46 PM       [unfilled]    Radiology:    Ct Chest Wo Contrast    Result Date: 3/12/2020  EXAMINATION: CT OF THE CHEST WITHOUT CONTRAST 3/12/2020 8:23 am TECHNIQUE: CT of the chest was performed without the administration of intravenous contrast. Multiplanar reformatted images are provided for review. Dose modulation, iterative reconstruction, and/or weight based adjustment of the mA/kV was utilized to reduce the radiation dose to as low as reasonably achievable.  COMPARISON: 2020 HISTORY: ORDERING SYSTEM PROVIDED HISTORY: hemoptysis, smoker/copd TECHNOLOGIST PROVIDED HISTORY: hemoptysis, smoker/copd Reason for Exam: hemoptysis, smoker/copd Acuity: Acute Type of Exam: Initial FINDINGS: Mediastinum: The heart and great glenohumeral aspects of both shoulders as well as the sternoclavicular joints. No areas of abnormal asymmetric radiotracer uptake is noted. No evidence of prosthetic loosening. Indium 111 white blood cell study for infection: Photopenic area in the left shoulder. No areas of abnormal radiotracer uptake to suggest infection. No imaging evidence of prosthetic loosening or infection. Evidence of left shoulder arthroplasty. Xr Chest Portable    Result Date: 3/11/2020  EXAMINATION: ONE XRAY VIEW OF THE CHEST 3/9/2020 10:28 am COMPARISON: 10/03/2018 HISTORY: ORDERING SYSTEM PROVIDED HISTORY: shortness of breath TECHNOLOGIST PROVIDED HISTORY: shortness of breath Reason for Exam: chest pain, sob Acuity: Unknown Type of Exam: Unknown Initial exam. FINDINGS: Airspace opacities both lower lobes right greater than left. The heart is enlarged. No pneumothorax or pulmonary edema. Cardiomegaly with bilateral lower lobe infiltrates. Ct Lung Screen (annual)    Result Date: 2020  EXAMINATION: LOW DOSE SCREENING CT OF THE CHEST WITHOUT CONTRAST TECHNIQUE: Low dose lung cancer screening CT of the chest was performed without the administration of intravenous contrast.  Multiplanar reformatted images are provided for review. Dose modulation, iterative reconstruction, and/or weight based adjustment of the mA/kV was utilized to reduce the radiation dose to as low as reasonably achievable. Field-of-view: 40 cm Dose Length Product: 106.97 mGy CTDlvol: 2.71 mGy COMPARISON: 2019 HISTORY: Screening. Patient Age: 70 y/o Number of pack years of smokin pack years If no longer smoking, number of years since cessation:  Current everyday smoker Other symptoms:  None. FINDINGS: Mediastinum:  Visualized thyroid gland grossly unremarkable in appearance. Atherosclerotic calcification of the aorta and branch vasculature. Coronary artery disease. No pericardial or pleural effusions.   Prominent mediastinal lymph Pulmonary:      Effort: Respiratory distress on nasal cannula     Breath sounds:  Wheezing  Abdominal:      General: Abdomen is flat. Bowel sounds are normal. There is no distension.      Palpations: Abdomen is soft.      Tenderness: There is no abdominal tenderness. Neurological:      Mental Status: He is alert.         Assessment:        Primary Problem  Pneumonia    Active Hospital Problems    Diagnosis Date Noted    Severe obesity (BMI 35.0-35.9 with comorbidity) [facility] [E66.01, Z68.35] 02/02/2020     Priority: High    Pneumonia [J18.9] 03/09/2020    Coronary artery disease involving native coronary artery of native heart without angina pectoris [I25.10] 02/02/2020    Peripheral neuropathic pain [M79.2] 03/25/2017    Type 2 diabetes mellitus with diabetic polyneuropathy, without long-term current use of insulin ([facility] [E11.42] 03/25/2017    COPD with acute exacerbation ([facility] [J44.1] 03/25/2017    Essential hypertension [I10] 01/20/2016       Plan:        Community Acquired Pneumonia, Likely Strep vs. Haemophilus vs. Atypicals vs. Viral  -On admission: T-max: 100.3  -CXR: bilateral lower lobe infiltrates  -WBC trending down. 16.4   -Legionella antigen, strep pneumonia antigen, culture respiratory, MRSA DNA probe, respiratory virus PCR, mycoplasma antibody: All negative  -Blood culture: no result  -Respiratory culture: Mixed bacteria morphology seen on Gram stain  -D-dimer normal  -Rocephin 1 g daily given for 5 days discontinued  -Zithromax 500 given for 4 days discontinued  -f/u BNP for possible other causes  - CT chest : trachea debris and bibasilar infiltrates  - bronch by pulmo:  Thick mucus clogging the scope, will keep the patient for 1 more day, on antibiotic and acapella     COPD Exacerbation 2/2 Pneumonia  -Solumedrol 40 every 12  -Patient is qualified for home oxygen  -Respiratory therapy eval and tx  -Continue home meds  -Symbicort  -CXR: Cardiomegaly with bilateral lower lobe infiltrates     Type II Diabetes Mellitus  -POC Glucose 144  -Lantus 5 units  -Hypoglycemia protocol  -Monitor  -Follow-up on Lantus dose     Diabetic Peripheral Neuropathy  -Continue home meds  -Lyrica 200 mg     Essential Hypertension  -Continue home meds  -Norvasc 10 mg  -Lisinopril 20 mg     Carotid Artery Stenosis s/p Carotid endarterectomy  -Continue home meds  -ASA  -Troponin: Unremarkable  -proBNP: 58 normal  -D-dimer: 0.57 normal     DVT Prophylaxis: Lovenox 30  Diet: Cardiac, Diabetic diet  PT/OT  Dispo: social work for [address] [place] [address], MD  3/14/2020  9:26 AM     Attending Physician Statement  I have discussed the care of Cornelio Argueta and I have examined the patient myselft and taken ros and hpi , including pertinent history and exam findings,  with the resident. I have reviewed the key elements of all parts of the encounter with the resident. I agree with the assessment, plan and orders as documented by the resident.       Electronically signed by Nato Edmond MD

## 2020-03-14 NOTE — PROGRESS NOTES
Pulmonary Progress Note  [facility]      Patient - Caryle Keels,  Age - 71 y.o.    - 1955      Room Number - 2110/0-01   MRN -  [id number]   Acct # - [de-identified]  Date of Admission -  3/9/2020 10:23 AM    Consulting Kristie Nice MD  Primary Care Physician - Lawanda Borjas MD     SUBJECTIVE   Patient appears to be in decent spirits. He is having some increased swelling and his O2 requirements are still high at 4 L. Lasix has been ordered and the chest x-ray is ordered as well. The cultures from the BAL yesterday are so far nondiagnostic    OBJECTIVE   VITALS    height is 6' (1.829 m) and weight is 295 lb 6.7 oz (134 kg). His oral temperature is 98.6 °F (37 °C). His blood pressure is 141/72 (abnormal) and his pulse is 73. His respiration is 18 and oxygen saturation is 93%. Body mass index is 40.07 kg/m². Temperature Range: Temp: 98.6 °F (37 °C) Temp  Av.2 °F (36.8 °C)  Min: 97.4 °F (36.3 °C)  Max: 98.6 °F (37 °C)  BP Range:  Systolic (10YTW), ISK:198 , Min:141 , VRV:843     Diastolic (52EBA), JS, Min:55, Max:78    Pulse Range: Pulse  Av.3  Min: 68  Max: 80  Respiration Range: Resp  Av.8  Min: 16  Max: 18  Current Pulse Ox[de-identified]  SpO2: 93 %  24HR Pulse Ox Range:  SpO2  Av.5 %  Min: 92 %  Max: 98 %  Oxygen Amount and Delivery: O2 Flow Rate (L/min): 4 L/min    Wt Readings from Last 3 Encounters:   20 295 lb 6.7 oz (134 kg)   20 288 lb (130.6 kg)   20 288 lb (130.6 kg)       I/O (24 Hours)    Intake/Output Summary (Last 24 hours) at 3/14/2020 1427  Last data filed at 3/14/2020 0825  Gross per 24 hour   Intake 660 ml   Output 950 ml   Net -290 ml       EXAM     General Appearance  Awake, alert, oriented, in no acute distress  HEENT - normocephalic, atraumatic.   Neck - Supple,  trachea midline   Lungs -coarse breath sounds no crackles rales or wheezes  Heart Exam:PMI normal. No lifts, heaves, or thrills. RRR. No murmurs, clicks, gallops, or rubs  Abdomen Exam: Abdomen soft, non-tender.   Extremity Exam: No edema    MEDS      omeprazole  20 mg Oral Daily    albuterol  2.5 mg Nebulization Q4H    acetylcysteine  200 mg Inhalation Q4H    nystatin  5 mL Oral 4x Daily    furosemide  20 mg Oral Daily    methylPREDNISolone  40 mg Intravenous Q12H    guaiFENesin  1,200 mg Oral BID    benzonatate  100 mg Oral TID    dextromethorphan  60 mg Oral BID    amLODIPine  10 mg Oral Daily    aspirin EC  81 mg Oral Daily    lisinopril  20 mg Oral Daily    pravastatin  80 mg Oral QPM    pregabalin  200 mg Oral TID    budesonide-formoterol  2 puff Inhalation BID    venlafaxine  50 mg Oral BID    sodium chloride flush  10 mL Intravenous 2 times per day    enoxaparin  30 mg Subcutaneous BID    nicotine  1 patch Transdermal Daily    insulin lispro  0-12 Units Subcutaneous TID WC    insulin lispro  0-6 Units Subcutaneous Nightly      dextrose       calcium carbonate, sodium chloride flush, acetaminophen **OR** acetaminophen, polyethylene glycol, potassium chloride **OR** potassium alternative oral replacement **OR** potassium chloride, ondansetron, glucose, dextrose, glucagon (rDNA), dextrose    LABS   CBC   Recent Labs     03/14/20  0456   WBC 18.1*   HGB 15.5   HCT 47.2   MCV 88.0        BMP:   Lab Results   Component Value Date     03/14/2020    K 4.4 03/14/2020     03/14/2020    CO2 24 03/14/2020    BUN 23 03/14/2020    LABALBU 3.6 03/14/2020    LABALBU 4.1 02/16/2012    CREATININE 0.70 03/14/2020    CALCIUM 9.5 03/14/2020    GFRAA >60 03/14/2020    LABGLOM >60 03/14/2020     ABGs:No results found for: PHART, PO2ART, VNX2SHH No results found for: IFIO2, MODE, SETTIDVOL, SETPEEP  Ionized Calcium:  No results found for: IONCA  Magnesium:  No results found for: MG  Phosphorus:  No results found for: PHOS     LIVER PROFILE   Recent Labs     03/14/20  0456   AST 47*   ALT 64*   BILITOT 0.22*

## 2020-03-15 LAB
ABSOLUTE BANDS #: 0.51 K/UL (ref 0–1)
ABSOLUTE EOS #: 0 K/UL (ref 0–0.4)
ABSOLUTE IMMATURE GRANULOCYTE: ABNORMAL K/UL (ref 0–0.3)
ABSOLUTE LYMPH #: 2.7 K/UL (ref 1–4.8)
ABSOLUTE MONO #: 0.85 K/UL (ref 0.1–1.3)
ALBUMIN SERPL-MCNC: 3.4 G/DL (ref 3.5–5.2)
ALBUMIN/GLOBULIN RATIO: ABNORMAL (ref 1–2.5)
ALP BLD-CCNC: 78 U/L (ref 40–129)
ALT SERPL-CCNC: 48 U/L (ref 5–41)
ANION GAP SERPL CALCULATED.3IONS-SCNC: 12 MMOL/L (ref 9–17)
AST SERPL-CCNC: 23 U/L
BANDS: 3 % (ref 0–10)
BASOPHILS # BLD: 0 % (ref 0–2)
BASOPHILS ABSOLUTE: 0 K/UL (ref 0–0.2)
BILIRUB SERPL-MCNC: 0.19 MG/DL (ref 0.3–1.2)
BUN BLDV-MCNC: 24 MG/DL (ref 8–23)
BUN/CREAT BLD: ABNORMAL (ref 9–20)
CALCIUM SERPL-MCNC: 9.1 MG/DL (ref 8.6–10.4)
CHLORIDE BLD-SCNC: 102 MMOL/L (ref 98–107)
CO2: 24 MMOL/L (ref 20–31)
CREAT SERPL-MCNC: 0.71 MG/DL (ref 0.7–1.2)
DIFFERENTIAL TYPE: ABNORMAL
EOSINOPHILS RELATIVE PERCENT: 0 % (ref 0–4)
GFR AFRICAN AMERICAN: >60 ML/MIN
GFR NON-AFRICAN AMERICAN: >60 ML/MIN
GFR SERPL CREATININE-BSD FRML MDRD: ABNORMAL ML/MIN/{1.73_M2}
GFR SERPL CREATININE-BSD FRML MDRD: ABNORMAL ML/MIN/{1.73_M2}
GLUCOSE BLD-MCNC: 120 MG/DL (ref 75–110)
GLUCOSE BLD-MCNC: 134 MG/DL (ref 75–110)
GLUCOSE BLD-MCNC: 160 MG/DL (ref 75–110)
GLUCOSE BLD-MCNC: 228 MG/DL (ref 75–110)
GLUCOSE BLD-MCNC: 248 MG/DL (ref 70–99)
HCT VFR BLD CALC: 44 % (ref 41–53)
HEMOGLOBIN: 14.5 G/DL (ref 13.5–17.5)
IMMATURE GRANULOCYTES: ABNORMAL %
LYMPHOCYTES # BLD: 16 % (ref 24–44)
MCH RBC QN AUTO: 29.3 PG (ref 26–34)
MCHC RBC AUTO-ENTMCNC: 33 G/DL (ref 31–37)
MCV RBC AUTO: 88.8 FL (ref 80–100)
MONOCYTES # BLD: 5 % (ref 1–7)
MORPHOLOGY: ABNORMAL
NRBC AUTOMATED: ABNORMAL PER 100 WBC
PDW BLD-RTO: 14.2 % (ref 11.5–14.9)
PLATELET # BLD: 206 K/UL (ref 150–450)
PLATELET ESTIMATE: ABNORMAL
PMV BLD AUTO: 8.8 FL (ref 6–12)
POTASSIUM SERPL-SCNC: 4.7 MMOL/L (ref 3.7–5.3)
RBC # BLD: 4.95 M/UL (ref 4.5–5.9)
RBC # BLD: ABNORMAL 10*6/UL
SEG NEUTROPHILS: 76 % (ref 36–66)
SEGMENTED NEUTROPHILS ABSOLUTE COUNT: 12.84 K/UL (ref 1.3–9.1)
SODIUM BLD-SCNC: 138 MMOL/L (ref 135–144)
TOTAL PROTEIN: 6.2 G/DL (ref 6.4–8.3)
WBC # BLD: 16.9 K/UL (ref 3.5–11)
WBC # BLD: ABNORMAL 10*3/UL

## 2020-03-15 PROCEDURE — 6360000002 HC RX W HCPCS: Performed by: INTERNAL MEDICINE

## 2020-03-15 PROCEDURE — 2700000000 HC OXYGEN THERAPY PER DAY

## 2020-03-15 PROCEDURE — 2060000000 HC ICU INTERMEDIATE R&B

## 2020-03-15 PROCEDURE — 99239 HOSP IP/OBS DSCHRG MGMT >30: CPT | Performed by: INTERNAL MEDICINE

## 2020-03-15 PROCEDURE — 36415 COLL VENOUS BLD VENIPUNCTURE: CPT

## 2020-03-15 PROCEDURE — 2580000003 HC RX 258: Performed by: INTERNAL MEDICINE

## 2020-03-15 PROCEDURE — 82947 ASSAY GLUCOSE BLOOD QUANT: CPT

## 2020-03-15 PROCEDURE — 6370000000 HC RX 637 (ALT 250 FOR IP): Performed by: INTERNAL MEDICINE

## 2020-03-15 PROCEDURE — 85025 COMPLETE CBC W/AUTO DIFF WBC: CPT

## 2020-03-15 PROCEDURE — 80053 COMPREHEN METABOLIC PANEL: CPT

## 2020-03-15 PROCEDURE — 94761 N-INVAS EAR/PLS OXIMETRY MLT: CPT

## 2020-03-15 PROCEDURE — 94640 AIRWAY INHALATION TREATMENT: CPT

## 2020-03-15 RX ADMIN — PREGABALIN 200 MG: 100 CAPSULE ORAL at 21:54

## 2020-03-15 RX ADMIN — ALBUTEROL SULFATE 2.5 MG: 2.5 SOLUTION RESPIRATORY (INHALATION) at 07:36

## 2020-03-15 RX ADMIN — NYSTATIN 500000 UNITS: 100000 SUSPENSION ORAL at 16:39

## 2020-03-15 RX ADMIN — ACETYLCYSTEINE 200 MG: 200 SOLUTION ORAL; RESPIRATORY (INHALATION) at 11:21

## 2020-03-15 RX ADMIN — ALBUTEROL SULFATE 2.5 MG: 2.5 SOLUTION RESPIRATORY (INHALATION) at 19:57

## 2020-03-15 RX ADMIN — GUAIFENESIN 1200 MG: 600 TABLET, EXTENDED RELEASE ORAL at 08:32

## 2020-03-15 RX ADMIN — DEXTROMETHORPHAN POLISTIREX 60 MG: 30 SUSPENSION ORAL at 08:30

## 2020-03-15 RX ADMIN — ACETYLCYSTEINE 200 MG: 200 SOLUTION ORAL; RESPIRATORY (INHALATION) at 07:37

## 2020-03-15 RX ADMIN — AMLODIPINE BESYLATE 10 MG: 5 TABLET ORAL at 08:32

## 2020-03-15 RX ADMIN — ALBUTEROL SULFATE 2.5 MG: 2.5 SOLUTION RESPIRATORY (INHALATION) at 15:01

## 2020-03-15 RX ADMIN — DEXTROMETHORPHAN POLISTIREX 60 MG: 30 SUSPENSION ORAL at 21:56

## 2020-03-15 RX ADMIN — LISINOPRIL 20 MG: 20 TABLET ORAL at 08:32

## 2020-03-15 RX ADMIN — METHYLPREDNISOLONE SODIUM SUCCINATE 40 MG: 40 INJECTION, POWDER, FOR SOLUTION INTRAMUSCULAR; INTRAVENOUS at 08:43

## 2020-03-15 RX ADMIN — PREGABALIN 200 MG: 100 CAPSULE ORAL at 08:32

## 2020-03-15 RX ADMIN — GUAIFENESIN 1200 MG: 600 TABLET, EXTENDED RELEASE ORAL at 21:54

## 2020-03-15 RX ADMIN — ACETYLCYSTEINE 200 MG: 200 SOLUTION ORAL; RESPIRATORY (INHALATION) at 19:57

## 2020-03-15 RX ADMIN — ENOXAPARIN SODIUM 30 MG: 30 INJECTION SUBCUTANEOUS at 08:31

## 2020-03-15 RX ADMIN — ENOXAPARIN SODIUM 30 MG: 30 INJECTION SUBCUTANEOUS at 21:54

## 2020-03-15 RX ADMIN — FUROSEMIDE 20 MG: 20 TABLET ORAL at 08:32

## 2020-03-15 RX ADMIN — VENLAFAXINE 50 MG: 50 TABLET ORAL at 21:57

## 2020-03-15 RX ADMIN — VENLAFAXINE 50 MG: 50 TABLET ORAL at 08:33

## 2020-03-15 RX ADMIN — BENZONATATE 100 MG: 100 CAPSULE ORAL at 12:17

## 2020-03-15 RX ADMIN — NYSTATIN 500000 UNITS: 100000 SUSPENSION ORAL at 12:18

## 2020-03-15 RX ADMIN — METHYLPREDNISOLONE SODIUM SUCCINATE 40 MG: 40 INJECTION, POWDER, FOR SOLUTION INTRAMUSCULAR; INTRAVENOUS at 21:55

## 2020-03-15 RX ADMIN — ASPIRIN 81 MG: 81 TABLET, COATED ORAL at 08:32

## 2020-03-15 RX ADMIN — PREGABALIN 200 MG: 100 CAPSULE ORAL at 12:18

## 2020-03-15 RX ADMIN — ACETYLCYSTEINE 200 MG: 200 SOLUTION ORAL; RESPIRATORY (INHALATION) at 15:01

## 2020-03-15 RX ADMIN — NYSTATIN 500000 UNITS: 100000 SUSPENSION ORAL at 21:54

## 2020-03-15 RX ADMIN — BENZONATATE 100 MG: 100 CAPSULE ORAL at 21:55

## 2020-03-15 RX ADMIN — INSULIN LISPRO 2 UNITS: 100 INJECTION, SOLUTION INTRAVENOUS; SUBCUTANEOUS at 08:43

## 2020-03-15 RX ADMIN — NYSTATIN 500000 UNITS: 100000 SUSPENSION ORAL at 08:30

## 2020-03-15 RX ADMIN — PRAVASTATIN SODIUM 80 MG: 40 TABLET ORAL at 16:39

## 2020-03-15 RX ADMIN — OMEPRAZOLE 20 MG: 20 CAPSULE, DELAYED RELEASE ORAL at 08:32

## 2020-03-15 RX ADMIN — ALBUTEROL SULFATE 2.5 MG: 2.5 SOLUTION RESPIRATORY (INHALATION) at 11:22

## 2020-03-15 RX ADMIN — Medication 10 ML: at 08:32

## 2020-03-15 RX ADMIN — BENZONATATE 100 MG: 100 CAPSULE ORAL at 08:32

## 2020-03-15 RX ADMIN — BUDESONIDE AND FORMOTEROL FUMARATE DIHYDRATE 2 PUFF: 160; 4.5 AEROSOL RESPIRATORY (INHALATION) at 08:30

## 2020-03-15 RX ADMIN — Medication 10 ML: at 22:01

## 2020-03-15 RX ADMIN — INSULIN LISPRO 4 UNITS: 100 INJECTION, SOLUTION INTRAVENOUS; SUBCUTANEOUS at 16:39

## 2020-03-15 RX ADMIN — BUDESONIDE AND FORMOTEROL FUMARATE DIHYDRATE 2 PUFF: 160; 4.5 AEROSOL RESPIRATORY (INHALATION) at 21:55

## 2020-03-15 ASSESSMENT — PAIN SCALES - GENERAL: PAINLEVEL_OUTOF10: 0

## 2020-03-15 NOTE — PROGRESS NOTES
pregabalin  200 mg Oral TID    budesonide-formoterol  2 puff Inhalation BID    venlafaxine  50 mg Oral BID    sodium chloride flush  10 mL Intravenous 2 times per day    enoxaparin  30 mg Subcutaneous BID    nicotine  1 patch Transdermal Daily    insulin lispro  0-12 Units Subcutaneous TID WC    insulin lispro  0-6 Units Subcutaneous Nightly     Continuous Infusions:    dextrose       PRN Meds: calcium carbonate, sodium chloride flush, acetaminophen **OR** acetaminophen, polyethylene glycol, potassium chloride **OR** potassium alternative oral replacement **OR** potassium chloride, ondansetron, glucose, dextrose, glucagon (rDNA), dextrose    Data:     Past Medical History:   has a past medical history of Acid reflux, Arthritis, Chronic bilateral low back pain with bilateral sciatica, Complete tear of left rotator cuff, COPD (chronic obstructive pulmonary disease) ([address] Coronary artery disease involving native coronary artery of native heart without angina pectoris, Degenerative disc disease, cervical, Gout, H/O cardiac catheterization, Hyperlipidemia, Hyperlipidemia with target LDL less than 100, Hyperparathyroidism ([address] Hypertension, Knee pain, chronic, Liver disease, MDRO (multiple drug resistant organisms) resistance, Moderate episode of recurrent major depressive disorder ([address] Obesity, Class III, BMI 40-49.9 (morbid obesity) ([address] REYNALDO on CPAP, Osteoarthritis, Polypharmacy, Positive FIT (fecal immunochemical test), Prolonged emergence from general anesthesia, Prostate cancer ([address] Pseudocholinesterase deficiency, Severe obesity (BMI 35.0-35.9 with comorbidity) ([address] Short of breath on exertion, Sleep apnea, Status post lumbar laminectomy, Stenosis of left carotid artery, Tubular adenoma of colon 4/11/17, Type 2 diabetes mellitus with hyperglycemia, without long-term current use of insulin ([address] Vitamin D deficiency, and Wears glasses.     Social History:   reports that he has been smoking 3/12/2020  EXAMINATION: CT OF THE CHEST WITHOUT CONTRAST 3/12/2020 8:23 am TECHNIQUE: CT of the chest was performed without the administration of intravenous contrast. Multiplanar reformatted images are provided for review. Dose modulation, iterative reconstruction, and/or weight based adjustment of the mA/kV was utilized to reduce the radiation dose to as low as reasonably achievable. COMPARISON: 02/17/2020 HISTORY: ORDERING SYSTEM PROVIDED HISTORY: hemoptysis, smoker/copd TECHNOLOGIST PROVIDED HISTORY: hemoptysis, smoker/copd Reason for Exam: hemoptysis, smoker/copd Acuity: Acute Type of Exam: Initial FINDINGS: Mediastinum: The heart and great vessels are normal in size. Calcified atheromatous plaque and coronary calcifications are noted. No pericardial effusion. No enlarged or suspicious-appearing lymph nodes are identified. Lungs/pleura: Centrilobular emphysematous disease. Bilateral interstitial opacities and more localized alveolar opacities in the left lower lobe are noted. No effusion. Small amount debris in the distal trachea and mainstem bronchi. Upper Abdomen: Cholelithiasis in a contracted gallbladder. Low-density nodular enlargement of the adrenal glands consistent with hyperplasia and likely underlying adenomas, unchanged. Soft Tissues/Bones: No acute findings. Status post laminectomy at T12. Partially visualized left shoulder hardware. 1.  Small amount debris in the distal trachea and mainstem bronchi. Bilateral lower lobe opacities, left greater than right, consistent with an inflammatory process, infection or aspiration. 2.  Emphysema. 3.  Cholelithiasis. Nm Abscess Localization Limited    Result Date: 2/25/2020  EXAMINATION: INDIUM WBC FOR ABCESS 2/24/2020 7:59 am TECHNIQUE: Gamma camera imaging of the neck, shoulders and chest was performed following IV administration of white blood cells labeled with 538 microcuries of indium 111.  24-hour delayed images were obtained.   Gamma

## 2020-03-15 NOTE — PROGRESS NOTES
omeprazole  20 mg Oral Daily    albuterol  2.5 mg Nebulization Q4H    acetylcysteine  200 mg Inhalation Q4H    nystatin  5 mL Oral 4x Daily    furosemide  20 mg Oral Daily    methylPREDNISolone  40 mg Intravenous Q12H    guaiFENesin  1,200 mg Oral BID    benzonatate  100 mg Oral TID    dextromethorphan  60 mg Oral BID    amLODIPine  10 mg Oral Daily    aspirin EC  81 mg Oral Daily    lisinopril  20 mg Oral Daily    pravastatin  80 mg Oral QPM    pregabalin  200 mg Oral TID    budesonide-formoterol  2 puff Inhalation BID    venlafaxine  50 mg Oral BID    sodium chloride flush  10 mL Intravenous 2 times per day    enoxaparin  30 mg Subcutaneous BID    nicotine  1 patch Transdermal Daily    insulin lispro  0-12 Units Subcutaneous TID WC    insulin lispro  0-6 Units Subcutaneous Nightly      dextrose       calcium carbonate, sodium chloride flush, acetaminophen **OR** acetaminophen, polyethylene glycol, potassium chloride **OR** potassium alternative oral replacement **OR** potassium chloride, ondansetron, glucose, dextrose, glucagon (rDNA), dextrose    LABS   CBC   Recent Labs     03/15/20  0452   WBC 16.9*   HGB 14.5   HCT 44.0   MCV 88.8        BMP:   Lab Results   Component Value Date     03/15/2020    K 4.7 03/15/2020     03/15/2020    CO2 24 03/15/2020    BUN 24 03/15/2020    LABALBU 3.4 03/15/2020    LABALBU 4.1 02/16/2012    CREATININE 0.71 03/15/2020    CALCIUM 9.1 03/15/2020    GFRAA >60 03/15/2020    LABGLOM >60 03/15/2020     ABGs:No results found for: PHART, PO2ART, [id number] No results found for: IFIO2, MODE, SETTIDVOL, SETPEEP  Ionized Calcium:  No results found for: IONCA  Magnesium:  No results found for: MG  Phosphorus:  No results found for: PHOS     LIVER PROFILE   Recent Labs     03/15/20  0452   AST 23   ALT 48*   BILITOT 0.19*   ALKPHOS 78     INR No results for input(s): INR in the last 72 hours.   PTT   Lab Results   Component Value Date    APTT 27.9

## 2020-03-16 ENCOUNTER — TELEPHONE (OUTPATIENT)
Dept: FAMILY MEDICINE CLINIC | Age: 65
End: 2020-03-16

## 2020-03-16 VITALS
WEIGHT: 289.19 LBS | HEART RATE: 82 BPM | OXYGEN SATURATION: 95 % | DIASTOLIC BLOOD PRESSURE: 51 MMHG | SYSTOLIC BLOOD PRESSURE: 129 MMHG | TEMPERATURE: 98.3 F | BODY MASS INDEX: 39.17 KG/M2 | HEIGHT: 72 IN | RESPIRATION RATE: 18 BRPM

## 2020-03-16 PROBLEM — Z96.612 STATUS POST TOTAL SHOULDER ARTHROPLASTY, LEFT: Status: ACTIVE | Noted: 2020-03-16

## 2020-03-16 PROBLEM — F17.200 TOBACCO USE DISORDER: Status: ACTIVE | Noted: 2020-03-16

## 2020-03-16 LAB
ABSOLUTE BANDS #: 0.38 K/UL (ref 0–1)
ABSOLUTE EOS #: 0 K/UL (ref 0–0.4)
ABSOLUTE IMMATURE GRANULOCYTE: ABNORMAL K/UL (ref 0–0.3)
ABSOLUTE LYMPH #: 1.73 K/UL (ref 1–4.8)
ABSOLUTE MONO #: 1.15 K/UL (ref 0.1–1.3)
ALBUMIN SERPL-MCNC: 3.5 G/DL (ref 3.5–5.2)
ALBUMIN/GLOBULIN RATIO: ABNORMAL (ref 1–2.5)
ALP BLD-CCNC: 72 U/L (ref 40–129)
ALT SERPL-CCNC: 40 U/L (ref 5–41)
ANION GAP SERPL CALCULATED.3IONS-SCNC: 11 MMOL/L (ref 9–17)
AST SERPL-CCNC: 16 U/L
BANDS: 2 % (ref 0–10)
BASOPHILS # BLD: 0 % (ref 0–2)
BASOPHILS ABSOLUTE: 0 K/UL (ref 0–0.2)
BILIRUB SERPL-MCNC: 0.21 MG/DL (ref 0.3–1.2)
BUN BLDV-MCNC: 30 MG/DL (ref 8–23)
BUN/CREAT BLD: ABNORMAL (ref 9–20)
CALCIUM SERPL-MCNC: 9 MG/DL (ref 8.6–10.4)
CHLORIDE BLD-SCNC: 104 MMOL/L (ref 98–107)
CO2: 25 MMOL/L (ref 20–31)
CREAT SERPL-MCNC: 0.74 MG/DL (ref 0.7–1.2)
DIFFERENTIAL TYPE: ABNORMAL
EOSINOPHILS RELATIVE PERCENT: 0 % (ref 0–4)
GFR AFRICAN AMERICAN: >60 ML/MIN
GFR NON-AFRICAN AMERICAN: >60 ML/MIN
GFR SERPL CREATININE-BSD FRML MDRD: ABNORMAL ML/MIN/{1.73_M2}
GFR SERPL CREATININE-BSD FRML MDRD: ABNORMAL ML/MIN/{1.73_M2}
GLUCOSE BLD-MCNC: 134 MG/DL (ref 75–110)
GLUCOSE BLD-MCNC: 145 MG/DL (ref 75–110)
GLUCOSE BLD-MCNC: 161 MG/DL (ref 70–99)
HCT VFR BLD CALC: 42.4 % (ref 41–53)
HEMOGLOBIN: 13.9 G/DL (ref 13.5–17.5)
IMMATURE GRANULOCYTES: ABNORMAL %
LYMPHOCYTES # BLD: 9 % (ref 24–44)
MCH RBC QN AUTO: 29.1 PG (ref 26–34)
MCHC RBC AUTO-ENTMCNC: 32.7 G/DL (ref 31–37)
MCV RBC AUTO: 89 FL (ref 80–100)
MONOCYTES # BLD: 6 % (ref 1–7)
MORPHOLOGY: NORMAL
NRBC AUTOMATED: ABNORMAL PER 100 WBC
PDW BLD-RTO: 14.5 % (ref 11.5–14.9)
PLATELET # BLD: 216 K/UL (ref 150–450)
PLATELET ESTIMATE: ABNORMAL
PMV BLD AUTO: 8.5 FL (ref 6–12)
POTASSIUM SERPL-SCNC: 4.7 MMOL/L (ref 3.7–5.3)
RBC # BLD: 4.77 M/UL (ref 4.5–5.9)
RBC # BLD: ABNORMAL 10*6/UL
SEG NEUTROPHILS: 83 % (ref 36–66)
SEGMENTED NEUTROPHILS ABSOLUTE COUNT: 15.94 K/UL (ref 1.3–9.1)
SODIUM BLD-SCNC: 140 MMOL/L (ref 135–144)
SURGICAL PATHOLOGY REPORT: NORMAL
TOTAL PROTEIN: 6.1 G/DL (ref 6.4–8.3)
WBC # BLD: 19.2 K/UL (ref 3.5–11)
WBC # BLD: ABNORMAL 10*3/UL

## 2020-03-16 PROCEDURE — 99239 HOSP IP/OBS DSCHRG MGMT >30: CPT | Performed by: INTERNAL MEDICINE

## 2020-03-16 PROCEDURE — 6370000000 HC RX 637 (ALT 250 FOR IP): Performed by: INTERNAL MEDICINE

## 2020-03-16 PROCEDURE — 82947 ASSAY GLUCOSE BLOOD QUANT: CPT

## 2020-03-16 PROCEDURE — 36415 COLL VENOUS BLD VENIPUNCTURE: CPT

## 2020-03-16 PROCEDURE — 85025 COMPLETE CBC W/AUTO DIFF WBC: CPT

## 2020-03-16 PROCEDURE — 94761 N-INVAS EAR/PLS OXIMETRY MLT: CPT

## 2020-03-16 PROCEDURE — 2700000000 HC OXYGEN THERAPY PER DAY

## 2020-03-16 PROCEDURE — 6360000002 HC RX W HCPCS: Performed by: INTERNAL MEDICINE

## 2020-03-16 PROCEDURE — 94640 AIRWAY INHALATION TREATMENT: CPT

## 2020-03-16 PROCEDURE — 80053 COMPREHEN METABOLIC PANEL: CPT

## 2020-03-16 PROCEDURE — 2580000003 HC RX 258: Performed by: INTERNAL MEDICINE

## 2020-03-16 RX ORDER — PREDNISONE 10 MG/1
15 TABLET ORAL DAILY
Status: DISCONTINUED | OUTPATIENT
Start: 2020-03-19 | End: 2020-03-16 | Stop reason: HOSPADM

## 2020-03-16 RX ORDER — PREDNISONE 10 MG/1
10 TABLET ORAL DAILY
Qty: 3 TABLET | Refills: 0 | Status: ON HOLD | OUTPATIENT
Start: 2020-03-22 | End: 2020-03-22 | Stop reason: HOSPADM

## 2020-03-16 RX ORDER — PREDNISONE 1 MG/1
5 TABLET ORAL DAILY
Qty: 7 TABLET | Refills: 0 | Status: ON HOLD | OUTPATIENT
Start: 2020-03-25 | End: 2020-03-22 | Stop reason: HOSPADM

## 2020-03-16 RX ORDER — PREDNISONE 10 MG/1
10 TABLET ORAL DAILY
Status: DISCONTINUED | OUTPATIENT
Start: 2020-03-22 | End: 2020-03-16 | Stop reason: HOSPADM

## 2020-03-16 RX ORDER — IPRATROPIUM BROMIDE AND ALBUTEROL SULFATE 2.5; .5 MG/3ML; MG/3ML
1 SOLUTION RESPIRATORY (INHALATION) 4 TIMES DAILY
Qty: 360 ML | Refills: 3 | Status: SHIPPED | OUTPATIENT
Start: 2020-03-16 | End: 2020-03-25 | Stop reason: DRUGHIGH

## 2020-03-16 RX ORDER — PREDNISONE 20 MG/1
20 TABLET ORAL DAILY
Qty: 3 TABLET | Refills: 0 | Status: ON HOLD | OUTPATIENT
Start: 2020-03-16 | End: 2020-03-22 | Stop reason: HOSPADM

## 2020-03-16 RX ORDER — PREDNISONE 1 MG/1
15 TABLET ORAL DAILY
Qty: 9 TABLET | Refills: 0 | Status: ON HOLD | OUTPATIENT
Start: 2020-03-19 | End: 2020-03-22 | Stop reason: HOSPADM

## 2020-03-16 RX ORDER — PREDNISONE 10 MG/1
5 TABLET ORAL DAILY
Status: DISCONTINUED | OUTPATIENT
Start: 2020-03-25 | End: 2020-03-16 | Stop reason: HOSPADM

## 2020-03-16 RX ORDER — PREDNISONE 20 MG/1
20 TABLET ORAL DAILY
Status: DISCONTINUED | OUTPATIENT
Start: 2020-03-16 | End: 2020-03-16 | Stop reason: HOSPADM

## 2020-03-16 RX ADMIN — ALBUTEROL SULFATE 2.5 MG: 2.5 SOLUTION RESPIRATORY (INHALATION) at 11:02

## 2020-03-16 RX ADMIN — METHYLPREDNISOLONE SODIUM SUCCINATE 40 MG: 40 INJECTION, POWDER, FOR SOLUTION INTRAMUSCULAR; INTRAVENOUS at 08:03

## 2020-03-16 RX ADMIN — OMEPRAZOLE 20 MG: 20 CAPSULE, DELAYED RELEASE ORAL at 08:15

## 2020-03-16 RX ADMIN — ACETYLCYSTEINE 200 MG: 200 SOLUTION ORAL; RESPIRATORY (INHALATION) at 07:47

## 2020-03-16 RX ADMIN — DEXTROMETHORPHAN POLISTIREX 60 MG: 30 SUSPENSION ORAL at 08:02

## 2020-03-16 RX ADMIN — NYSTATIN 500000 UNITS: 100000 SUSPENSION ORAL at 14:20

## 2020-03-16 RX ADMIN — FUROSEMIDE 20 MG: 20 TABLET ORAL at 08:05

## 2020-03-16 RX ADMIN — PREGABALIN 200 MG: 100 CAPSULE ORAL at 14:20

## 2020-03-16 RX ADMIN — GUAIFENESIN 1200 MG: 600 TABLET, EXTENDED RELEASE ORAL at 08:05

## 2020-03-16 RX ADMIN — NYSTATIN 500000 UNITS: 100000 SUSPENSION ORAL at 08:02

## 2020-03-16 RX ADMIN — ASPIRIN 81 MG: 81 TABLET, COATED ORAL at 08:05

## 2020-03-16 RX ADMIN — BUDESONIDE AND FORMOTEROL FUMARATE DIHYDRATE 2 PUFF: 160; 4.5 AEROSOL RESPIRATORY (INHALATION) at 08:24

## 2020-03-16 RX ADMIN — ACETYLCYSTEINE 200 MG: 200 SOLUTION ORAL; RESPIRATORY (INHALATION) at 11:03

## 2020-03-16 RX ADMIN — AMLODIPINE BESYLATE 10 MG: 5 TABLET ORAL at 08:05

## 2020-03-16 RX ADMIN — BENZONATATE 100 MG: 100 CAPSULE ORAL at 08:05

## 2020-03-16 RX ADMIN — LISINOPRIL 20 MG: 20 TABLET ORAL at 08:05

## 2020-03-16 RX ADMIN — VENLAFAXINE 50 MG: 50 TABLET ORAL at 08:05

## 2020-03-16 RX ADMIN — ALBUTEROL SULFATE 2.5 MG: 2.5 SOLUTION RESPIRATORY (INHALATION) at 07:47

## 2020-03-16 RX ADMIN — BENZONATATE 100 MG: 100 CAPSULE ORAL at 14:20

## 2020-03-16 RX ADMIN — INSULIN LISPRO 2 UNITS: 100 INJECTION, SOLUTION INTRAVENOUS; SUBCUTANEOUS at 08:03

## 2020-03-16 RX ADMIN — ENOXAPARIN SODIUM 30 MG: 30 INJECTION SUBCUTANEOUS at 08:02

## 2020-03-16 RX ADMIN — PREGABALIN 200 MG: 100 CAPSULE ORAL at 08:05

## 2020-03-16 RX ADMIN — PREDNISONE 20 MG: 20 TABLET ORAL at 14:20

## 2020-03-16 RX ADMIN — Medication 10 ML: at 08:12

## 2020-03-16 NOTE — PROGRESS NOTES
At 12:50 patient was placed on room air and dropped to 88%. We started walking and the patient dropped to 85% so I placed him on 1L nasal cannula and he went up to 90%. Patient said he was comfortable and did not feel short of breath when he was walking with 1 L nasal cannula.

## 2020-03-16 NOTE — CARE COORDINATION
DISCHARGE PLANNING NOTE:    Home O2 tanks were delivered to patients bedside by Сергей Xie from [place] [employer] [place].      Electronically signed by Juanjo Kaur RN on 3/16/2020 at 2:09 PM

## 2020-03-16 NOTE — TELEPHONE ENCOUNTER
Pt needs to schedule a transitional appt he is being dc'd today from [facility] with pneumonia. Do you want to see him in the ofc.

## 2020-03-16 NOTE — PROGRESS NOTES
[facility]    PROGRESS NOTE             3/16/2020    8:21 AM    Name:   Kendal Winters  MRN:     [id number]     Acct:      [de-identified]   Room:   2110/2110-01   Day:  7  Admit Date:  3/9/2020 10:23 AM    PCP:  Jennifer Richard MD  Code Status:  Full Code    Subjective:     C/C:   Chief Complaint   Patient presents with    Shortness of Breath    Cough     Interval History Status: improved. Patient has been seen and examined at the bedside, no major event happened over the night, patient is qualified for home oxygen, pulmonary discussed with the patient the importance of quit smoking and wearing oxygen at home 3 L nasal cannula upon rest and exertion, patient is clear to be discharged today from pulmonary standpoint    Brief History:     See H&P    Review of Systems:     Review of Systems  Constitutional: Negative for chills and fever. HENT: Negative for congestion.    Eyes: Negative for visual disturbance. Respiratory: Positive for cough and shortness of breath.    Cardiovascular: Negative for chest pain, palpitations and leg swelling. Gastrointestinal: Negative for abdominal distention, abdominal pain, nausea and vomiting. Musculoskeletal: Negative for arthralgias. Neurological: Negative for weakness and headaches. Psychiatric/Behavioral: Negative for agitation.     Medications: Allergies:     Allergies   Allergen Reactions    Succinylcholine Chloride Other (See Comments)     PATIENT HAS PSEUDOCHOLINESTERASE DEFICIENCY      Cymbalta [Duloxetine Hcl]      Taste loss         Current Meds:   Scheduled Meds:    omeprazole  20 mg Oral Daily    albuterol  2.5 mg Nebulization Q4H    acetylcysteine  200 mg Inhalation Q4H    nystatin  5 mL Oral 4x Daily    furosemide  20 mg Oral Daily    methylPREDNISolone  40 mg Intravenous Q12H    guaiFENesin  1,200 mg Oral BID    benzonatate  100 mg Oral TID    dextromethorphan  60 mg Oral BID    amLODIPine  10 mg Oral Daily    aspirin EC  81 mg Oral Daily    lisinopril  20 mg Oral Daily    pravastatin  80 mg Oral QPM    pregabalin  200 mg Oral TID    budesonide-formoterol  2 puff Inhalation BID    venlafaxine  50 mg Oral BID    sodium chloride flush  10 mL Intravenous 2 times per day    enoxaparin  30 mg Subcutaneous BID    nicotine  1 patch Transdermal Daily    insulin lispro  0-12 Units Subcutaneous TID WC    insulin lispro  0-6 Units Subcutaneous Nightly     Continuous Infusions:    dextrose       PRN Meds: calcium carbonate, sodium chloride flush, acetaminophen **OR** acetaminophen, polyethylene glycol, potassium chloride **OR** potassium alternative oral replacement **OR** potassium chloride, ondansetron, glucose, dextrose, glucagon (rDNA), dextrose    Data:     Past Medical History:   has a past medical history of Acid reflux, Arthritis, Chronic bilateral low back pain with bilateral sciatica, Complete tear of left rotator cuff, COPD [facility]chronic obstructive [facility] disease) [facility] Coronary artery disease involving native coronary artery of native heart without angina pectoris, Degenerative disc disease, cervical, Gout, H/O cardiac catheterization, Hyperlipidemia, Hyperlipidemia with target LDL less than 100, Hyperparathyroidism ([facility] Hypertension, Knee pain, chronic, Liver disease, MDRO (multiple drug resistant organisms) resistance, Moderate episode of recurrent major depressive disorder ([facility] Obesity, Class III, BMI 40-49.9 (morbid obesity) ([facility] REYNALDO on CPAP, Osteoarthritis, Polypharmacy, Positive FIT (fecal immunochemical test), Prolonged emergence from general anesthesia, Prostate cancer ([facility] Pseudocholinesterase deficiency, Severe obesity (BMI 35.0-35.9 with comorbidity) ([facility] Short of breath on exertion, Sleep apnea, Status post lumbar laminectomy, Stenosis of left carotid artery, Tubular adenoma of colon 4/11/17, Type 2 diabetes mellitus consistent with hyperplasia and likely underlying adenomas, unchanged. Soft Tissues/Bones: No acute findings. Status post laminectomy at T12. Partially visualized left shoulder hardware. 1.  Small amount debris in the distal trachea and mainstem bronchi. Bilateral lower lobe opacities, left greater than right, consistent with an inflammatory process, infection or aspiration. 2.  Emphysema. 3.  Cholelithiasis. Nm Abscess Localization Limited    Result Date: 2/25/2020  EXAMINATION: INDIUM WBC FOR ABCESS 2/24/2020 7:59 am TECHNIQUE: Gamma camera imaging of the neck, shoulders and chest was performed following IV administration of white blood cells labeled with 538 microcuries of indium 111.  24-hour delayed images were obtained. Gamma camera imaging of the shoulders was performed following uneventful IV administration of 41.9 millicuries of [id number]. COMPARISON: Left shoulder x-rays of 16 January 2019 and 30 January 2020 HISTORY: ORDERING SYSTEM PROVIDED HISTORY: H/O total shoulder replacement, left TECHNOLOGIST PROVIDED HISTORY: Reason for Exam: H/O total shoulder replacement, left Acuity: Unknown Type of Exam: Unknown Additional signs and symptoms: lt shoulder replacement 1.5 years ago FINDINGS: Technet[employer] bone images: A photopenic area is present in the left shoulder consistent with a left shoulder arthroplasty. Symmetrical activity is noted in the acromioclavicular and glenohumeral aspects of both shoulders as well as the sternoclavicular joints. No areas of abnormal asymmetric radiotracer uptake is noted. No evidence of prosthetic loosening. Indium 111 white blood cell study for infection: Photopenic area in the left shoulder. No areas of abnormal radiotracer uptake to suggest infection. No imaging evidence of prosthetic loosening or infection. Evidence of left shoulder arthroplasty.      Xr Chest Portable    Result Date: 3/11/2020  EXAMINATION: ONE XRAY VIEW OF THE CHEST 3/9/2020 10:28 am adrenal glands. Soft Tissues/Bones: Reverse left shoulder arthroplasty hardware. Mild diffuse degenerative changes throughout the spine. 1. Aspirated or mucoid secretions in the posterior trachea. 2. Moderate emphysema. Scarring and atelectasis in the lingula. No discrete noncalcified pulmonary nodule. 3. Cholelithiasis. 4. Atherosclerotic calcification of the aorta and branch vasculature. Coronary artery disease. 5. Reverse left shoulder arthroplasty hardware. 6. Stable prominent mediastinal lymph nodes without overt lymphadenopathy. 7. Stable adreniform enlargement of the adrenal glands likely adrenal hyperplasia. LUNG RADS: Per ACR Lung-RADS Version 1.0 Category 1, Negative (No nodules and definitely benign nodules). Management:Continue annual lung screening with LDCT in 12 months.(probability of malignancy <1%). RECOMMENDATIONS: If you would like to register your patient with the [facility] Nodule/Lung Cancer Screening Program, please contact the Nurse Navigator at [phone] Physical Examination:        Physical Exam    Constitutional:       Appearance: Normal appearance. He is obese. HENT:      Head: Normocephalic and atraumatic. Eyes:      General: No scleral icterus.     Conjunctiva/sclera: Conjunctivae normal.   Cardiovascular:      Rate and Rhythm: Normal rate and regular rhythm.      Pulses: Normal pulses.      Heart sounds: Normal heart sounds. Pulmonary:      Effort: Respiratory distress on nasal cannula     Breath sounds:  Wheezing  Abdominal:      General: Abdomen is flat. Bowel sounds are normal. There is no distension.      Palpations: Abdomen is soft.      Tenderness: There is no abdominal tenderness.    Neurological:      Mental Status: He is alert.    Assessment:        Primary Problem  Pneumonia    Active Hospital Problems    Diagnosis Date Noted    Severe obesity (BMI 35.0-35.9 with comorbidity) ([facility] [E66.01, Z68.35] 02/02/2020     Priority: High    Pneumonia [J18.9] 03/09/2020    Coronary artery disease involving native coronary artery of native heart without angina pectoris [I25.10] 02/02/2020    Peripheral neuropathic pain [M79.2] 03/25/2017    Type 2 diabetes mellitus with diabetic polyneuropathy, without long-term current use of insulin ([facility] [E11.42] 03/25/2017    COPD with acute exacerbation ([facility] [J44.1] 03/25/2017    Essential hypertension [I10] 01/20/2016       Plan:        Community Acquired Pneumonia, Likely Strep vs. Haemophilus vs. Atypicals vs. Viral  -On admission: T-max: 100.3  -Yesterday's CXR: Near complete resolution of previously present bibasilar pulmonary opacities  -WBC trending down. 16.4   -Legionella antigen, strep pneumonia antigen, culture respiratory, MRSA DNA probe, respiratory virus PCR, mycoplasma antibody: All negative  -Blood culture: no result  -Respiratory culture: Mixed bacteria morphology seen on Gram stain  -D-dimer normal  -Rocephin 1 g daily given for 5 days discontinued  -Zithromax 500 given for 4 days discontinued  -f/u BNP for possible other causes:157 yesterday  - CT chest : trachea debris and bibasilar infiltrates, emphysema, cholelithiasis  - bronch by pulmo: Thick mucus clogging the scope, will keep the patient for 1 more day, on antibiotic and acapella     COPD Exacerbation 2/2 Pneumonia  -Solumedrol 40 every 12  -Patient is qualified for home oxygen  -Respiratory therapy eval and tx: Patient qualified with saturation of 88 on room air, during activity.   On room air patient dropped to 87% with 1 to 2 L of oxygen patient maintained saturation of 88-89 and with 3 L of oxygen during activity patient sats went up to 97  -pt is ready to be discharged today  -Continue home meds  -Symbicort  -Yesterday's CXR: Near complete resolution of previously present bibasilar pulmonary opacities     Type II Diabetes Mellitus  -POC Glucose 160  -Lantus 5 units  -Hypoglycemia protocol  -Monitor  -Follow-up on Lantus dose     Diabetic

## 2020-03-16 NOTE — PROGRESS NOTES
non-tender. Extremity Exam: No edema    MEDS      omeprazole  20 mg Oral Daily    albuterol  2.5 mg Nebulization Q4H    acetylcysteine  200 mg Inhalation Q4H    nystatin  5 mL Oral 4x Daily    furosemide  20 mg Oral Daily    methylPREDNISolone  40 mg Intravenous Q12H    guaiFENesin  1,200 mg Oral BID    benzonatate  100 mg Oral TID    dextromethorphan  60 mg Oral BID    amLODIPine  10 mg Oral Daily    aspirin EC  81 mg Oral Daily    lisinopril  20 mg Oral Daily    pravastatin  80 mg Oral QPM    pregabalin  200 mg Oral TID    budesonide-formoterol  2 puff Inhalation BID    venlafaxine  50 mg Oral BID    sodium chloride flush  10 mL Intravenous 2 times per day    enoxaparin  30 mg Subcutaneous BID    nicotine  1 patch Transdermal Daily    insulin lispro  0-12 Units Subcutaneous TID WC    insulin lispro  0-6 Units Subcutaneous Nightly      dextrose       calcium carbonate, sodium chloride flush, acetaminophen **OR** acetaminophen, polyethylene glycol, potassium chloride **OR** potassium alternative oral replacement **OR** potassium chloride, ondansetron, glucose, dextrose, glucagon (rDNA), dextrose    LABS   CBC   Recent Labs     03/16/20  0540   WBC 19.2*   HGB 13.9   HCT 42.4   MCV 89.0        BMP:   Lab Results   Component Value Date     03/16/2020    K 4.7 03/16/2020     03/16/2020    CO2 25 03/16/2020    BUN 30 03/16/2020    LABALBU 3.5 03/16/2020    LABALBU 4.1 02/16/2012    CREATININE 0.74 03/16/2020    CALCIUM 9.0 03/16/2020    GFRAA >60 03/16/2020    LABGLOM >60 03/16/2020     ABGs:No results found for: PHART, PO2ART, EOA4FWZ No results found for: IFIO2, MODE, SETTIDVOL, SETPEEP  Ionized Calcium:  No results found for: IONCA  Magnesium:  No results found for: MG  Phosphorus:  No results found for: PHOS     LIVER PROFILE   Recent Labs     03/16/20  0540   AST 16   ALT 40   BILITOT 0.21*   ALKPHOS 72     INR No results for input(s): INR in the last 72 hours.   PTT   Lab Results   Component Value Date    APTT 27.9 02/25/2014         RADIOLOGY     (See actual reports for details)    ASSESSMENT/PLAN     Patient Active Problem List   Diagnosis    Essential hypertension    Gout    Hyperlipidemia with target LDL less than 100    Osteoporosis    Prostate CA ([address]    Moderate episode of recurrent major depressive disorder (HCC)    Gastroesophageal reflux disease without esophagitis    DDD (degenerative disc disease), lumbar    Chronic bilateral low back pain with bilateral sciatica    Osteoarthritis of spine with radiculopathy, lumbar region    Type 2 diabetes mellitus with diabetic polyneuropathy, without long-term current use of insulin (HCC)    PVD (peripheral vascular disease) with claudication (HCC)    Peripheral neuropathic pain    Pruritic dermatitis    Tinea pedis of both feet    Hyperglycemia    Nonspecific reactive hepatitis    COPD with acute exacerbation (HCC)    Status post lumbar laminectomy    Serum cholinesterase defect (HCC)    Vitamin D deficiency    Hypercalcemia    Hypertriglyceridemia    REYNALDO on CPAP    Tubular adenoma of colon 4/11/17    Psychophysiological insomnia    S/P reverse total shoulder arthroplasty, left    Recurrent major depressive disorder, in full remission ([address]    Actinic keratosis    Photodermatitis due to sun    Seborrheic keratosis    Senile hyperkeratosis    Solar degeneration    Abdominal aortic aneurysm ([address]    Peripheral arterial occlusive disease ([address]    Anemia    Unintended weight loss    Family history of cancer    Stenosis of left carotid artery    Poor balance    Foot drop, right    Difficulty rising from chair    Degenerative disc disease, cervical    Chronic neck pain    At high risk for falls    Pseudocholinesterase deficiency    Hyperparathyroidism ([address]    Coronary artery disease involving native coronary artery of native heart without angina pectoris    Severe obesity (BMI 35.0-35.9 with comorbidity) ([facility]    Pneumonia    Status post total shoulder arthroplasty, left    Tobacco use disorder     Severe COPD  Chronic respiratory failure with hypoxemia    We will repeat a home O2 evaluation  Prednisone tapering regimen  Home this afternoon----   Patient follows up with me in 4 weeks time    Electronically signed by Jennifer Webster MD on 3/16/2020 at 12:44 PM    Patient will need O2 2 L nasal cannula at rest and upon exertion.   We will include portability  Orders were placed in place yesterday  Home reasonable

## 2020-03-16 NOTE — PLAN OF CARE
Problem: Falls - Risk of:  Goal: Will remain free from falls  Description: Will remain free from falls  3/16/2020 0453 by Ayaan Rojas RN  Outcome: Ongoing  Note: No falls during this shift. Call light is within reach. Side rails up x2 with bed in lowest position. Patient safety maintained. 3/15/2020 1839 by Mekhi Garcia RN  Outcome: Met This Shift  Note: No falls this shift. Pt used call light appropriately. Up to bathroom and up to chair per self with walker. Problem: Falls - Risk of:  Goal: Absence of physical injury  Description: Absence of physical injury  Outcome: Ongoing  Note: No injury this shift. Patient safety maintained. Problem: Gas Exchange - Impaired:  Goal: Levels of oxygenation will improve  Description: Levels of oxygenation will improve  3/16/2020 0453 by Ayaan Rojas RN  Outcome: Ongoing  Note: Oxygen levels remain within normal limits this shift. Home cpap applied. Will continue to monitor. 3/15/2020 1839 by Mekhi Garcia RN  Outcome: Ongoing  Note: Oxygen needs decreased from 4L/NC to 3L/NC. Pt does get SOB with exertion still. Pt qualified for home O2.

## 2020-03-17 ENCOUNTER — CARE COORDINATION (OUTPATIENT)
Dept: CASE MANAGEMENT | Age: 65
End: 2020-03-17

## 2020-03-17 NOTE — CARE COORDINATION
[facility] Transitions Initial Follow Up Call    Call within 2 business days of discharge: Yes    Patient: Lisa Avery Patient : 1955   MRN: [id number]  Reason for Admission: pneumonia  Discharge Date: 3/16/20 RARS: Readmission Risk Score: 27      Last Discharge [facility]       Complaint Diagnosis Description Type Department Provider    3/9/20 Shortness of Breath; Cough Chronic respiratory failure with hypoxia ([facility] . .. ED to Hosp-Admission (Discharged) (ADMITTED) Nicole Ambriz MD; Jeferson Melgoza. ..            # 1 attempt-unable to reach patient, left vm message with name and contact information, requested call back//JU    Facility: [facility]    Non-face-to-face services provided:      Care Transitions 24 Hour Call    Care Transitions Interventions         Follow Up  Future Appointments   Date Time Provider Keri English   3/18/2020  2:45 PM MD [facility]uggan [facility]   3/30/2020  9:40 AM [facility]   2020  1:15 PM MD mehnaz Duggan RN

## 2020-03-18 ENCOUNTER — CARE COORDINATION (OUTPATIENT)
Dept: CASE MANAGEMENT | Age: 65
End: 2020-03-18

## 2020-03-18 ENCOUNTER — HOSPITAL ENCOUNTER (OUTPATIENT)
Age: 65
Setting detail: SPECIMEN
Discharge: HOME | DRG: 811 | End: 2020-03-18
Payer: MEDICARE

## 2020-03-18 ENCOUNTER — HOSPITAL ENCOUNTER (OUTPATIENT)
Age: 65
Setting detail: SPECIMEN
Discharge: HOME | End: 2020-03-18
Payer: MEDICARE

## 2020-03-18 ENCOUNTER — TELEPHONE (OUTPATIENT)
Dept: FAMILY MEDICINE CLINIC | Age: 65
End: 2020-03-18

## 2020-03-18 ENCOUNTER — OFFICE VISIT (OUTPATIENT)
Dept: FAMILY MEDICINE CLINIC | Age: 65
End: 2020-03-18
Payer: MEDICARE

## 2020-03-18 VITALS
TEMPERATURE: 98.4 F | WEIGHT: 281.6 LBS | HEIGHT: 72 IN | OXYGEN SATURATION: 99 % | SYSTOLIC BLOOD PRESSURE: 117 MMHG | DIASTOLIC BLOOD PRESSURE: 66 MMHG | HEART RATE: 98 BPM | BODY MASS INDEX: 38.14 KG/M2

## 2020-03-18 PROBLEM — J96.11 CHRONIC HYPOXEMIC RESPIRATORY FAILURE (HCC): Status: ACTIVE | Noted: 2020-03-18

## 2020-03-18 LAB
ABSOLUTE EOS #: 0 K/UL (ref 0–0.4)
ABSOLUTE IMMATURE GRANULOCYTE: ABNORMAL K/UL (ref 0–0.3)
ABSOLUTE LYMPH #: 3.26 K/UL (ref 1–4.8)
ABSOLUTE MONO #: 1.63 K/UL (ref 0.1–1.3)
ALBUMIN SERPL-MCNC: 3.6 G/DL (ref 3.5–5.2)
ALBUMIN/GLOBULIN RATIO: ABNORMAL (ref 1–2.5)
ALP BLD-CCNC: 64 U/L (ref 40–129)
ALT SERPL-CCNC: 35 U/L (ref 5–41)
ANION GAP SERPL CALCULATED.3IONS-SCNC: 11 MMOL/L (ref 9–17)
AST SERPL-CCNC: 14 U/L
BASOPHILS # BLD: 0 % (ref 0–2)
BASOPHILS ABSOLUTE: 0 K/UL (ref 0–0.2)
BILIRUB SERPL-MCNC: 0.23 MG/DL (ref 0.3–1.2)
BUN BLDV-MCNC: 50 MG/DL (ref 8–23)
BUN/CREAT BLD: ABNORMAL (ref 9–20)
C DIFF AG + TOXIN: ABNORMAL
CALCIUM SERPL-MCNC: 8.8 MG/DL (ref 8.6–10.4)
CHLORIDE BLD-SCNC: 109 MMOL/L (ref 98–107)
CO2: 25 MMOL/L (ref 20–31)
CREAT SERPL-MCNC: 0.73 MG/DL (ref 0.7–1.2)
DIFFERENTIAL TYPE: ABNORMAL
EOSINOPHILS RELATIVE PERCENT: 0 % (ref 0–4)
FOLATE: >20 NG/ML
GFR AFRICAN AMERICAN: >60 ML/MIN
GFR NON-AFRICAN AMERICAN: >60 ML/MIN
GFR SERPL CREATININE-BSD FRML MDRD: ABNORMAL ML/MIN/{1.73_M2}
GFR SERPL CREATININE-BSD FRML MDRD: ABNORMAL ML/MIN/{1.73_M2}
GLUCOSE BLD-MCNC: 126 MG/DL (ref 70–99)
HCT VFR BLD CALC: 40 % (ref 41–53)
HEMOGLOBIN: 13 G/DL (ref 13.5–17.5)
IMMATURE GRANULOCYTES: ABNORMAL %
LYMPHOCYTES # BLD: 14 % (ref 24–44)
MCH RBC QN AUTO: 28.9 PG (ref 26–34)
MCHC RBC AUTO-ENTMCNC: 32.5 G/DL (ref 31–37)
MCV RBC AUTO: 88.9 FL (ref 80–100)
MONOCYTES # BLD: 7 % (ref 1–7)
MORPHOLOGY: ABNORMAL
MORPHOLOGY: ABNORMAL
NRBC AUTOMATED: ABNORMAL PER 100 WBC
PDW BLD-RTO: 14.8 % (ref 11.5–14.9)
PLATELET # BLD: 271 K/UL (ref 150–450)
PLATELET ESTIMATE: ABNORMAL
PMV BLD AUTO: 9 FL (ref 6–12)
POTASSIUM SERPL-SCNC: 4.9 MMOL/L (ref 3.7–5.3)
RBC # BLD: 4.5 M/UL (ref 4.5–5.9)
RBC # BLD: ABNORMAL 10*6/UL
SEG NEUTROPHILS: 79 % (ref 36–66)
SEGMENTED NEUTROPHILS ABSOLUTE COUNT: 18.41 K/UL (ref 1.3–9.1)
SODIUM BLD-SCNC: 145 MMOL/L (ref 135–144)
SPECIMEN DESCRIPTION: ABNORMAL
TOTAL PROTEIN: 6 G/DL (ref 6.4–8.3)
VITAMIN B-12: 1163 PG/ML (ref 232–1245)
WBC # BLD: 23.3 K/UL (ref 3.5–11)
WBC # BLD: ABNORMAL 10*3/UL

## 2020-03-18 PROCEDURE — 99215 OFFICE O/P EST HI 40 MIN: CPT | Performed by: FAMILY MEDICINE

## 2020-03-18 PROCEDURE — 82607 VITAMIN B-12: CPT

## 2020-03-18 PROCEDURE — 87324 CLOSTRIDIUM AG IA: CPT

## 2020-03-18 PROCEDURE — 1111F DSCHRG MED/CURRENT MED MERGE: CPT | Performed by: FAMILY MEDICINE

## 2020-03-18 PROCEDURE — 82746 ASSAY OF FOLIC ACID SERUM: CPT

## 2020-03-18 PROCEDURE — 80053 COMPREHEN METABOLIC PANEL: CPT

## 2020-03-18 PROCEDURE — G8510 SCR DEP NEG, NO PLAN REQD: HCPCS | Performed by: FAMILY MEDICINE

## 2020-03-18 PROCEDURE — 87449 NOS EACH ORGANISM AG IA: CPT

## 2020-03-18 PROCEDURE — 36415 COLL VENOUS BLD VENIPUNCTURE: CPT

## 2020-03-18 PROCEDURE — 85025 COMPLETE CBC W/AUTO DIFF WBC: CPT

## 2020-03-18 RX ORDER — METRONIDAZOLE 500 MG/1
500 TABLET ORAL EVERY 8 HOURS
Qty: 42 TABLET | Refills: 0 | Status: ON HOLD
Start: 2020-03-18 | End: 2020-03-22 | Stop reason: HOSPADM

## 2020-03-18 RX ORDER — OMEPRAZOLE 40 MG/1
40 CAPSULE, DELAYED RELEASE ORAL
Qty: 90 CAPSULE | Refills: 1 | Status: SHIPPED | OUTPATIENT
Start: 2020-03-18 | End: 2020-07-07 | Stop reason: SDUPTHER

## 2020-03-18 RX ORDER — BUDESONIDE AND FORMOTEROL FUMARATE DIHYDRATE 160; 4.5 UG/1; UG/1
2 AEROSOL RESPIRATORY (INHALATION) 2 TIMES DAILY
Qty: 3 INHALER | Refills: 3 | Status: SHIPPED | OUTPATIENT
Start: 2020-03-18 | End: 2020-10-13 | Stop reason: SDUPTHER

## 2020-03-18 RX ORDER — GREEN TEA/HOODIA GORDONII 315-12.5MG
1 CAPSULE ORAL 2 TIMES DAILY
Qty: 60 TABLET | Refills: 0 | Status: SHIPPED | OUTPATIENT
Start: 2020-03-18 | End: 2020-04-17

## 2020-03-18 RX ORDER — LISINOPRIL 10 MG/1
10 TABLET ORAL DAILY
Qty: 90 TABLET | Refills: 3 | Status: SHIPPED | OUTPATIENT
Start: 2020-03-18 | End: 2021-01-12 | Stop reason: SDUPTHER

## 2020-03-18 ASSESSMENT — ENCOUNTER SYMPTOMS
CONSTIPATION: 0
WHEEZING: 0
BLOOD IN STOOL: 1
SHORTNESS OF BREATH: 1
NAUSEA: 0
TROUBLE SWALLOWING: 0
VOMITING: 0
CHEST TIGHTNESS: 0
DIARRHEA: 0
ABDOMINAL PAIN: 0
COUGH: 1
ABDOMINAL DISTENTION: 0

## 2020-03-18 ASSESSMENT — PATIENT HEALTH QUESTIONNAIRE - PHQ9
SUM OF ALL RESPONSES TO PHQ QUESTIONS 1-9: 0
2. FEELING DOWN, DEPRESSED OR HOPELESS: 0
SUM OF ALL RESPONSES TO PHQ9 QUESTIONS 1 & 2: 0
SUM OF ALL RESPONSES TO PHQ QUESTIONS 1-9: 0
1. LITTLE INTEREST OR PLEASURE IN DOING THINGS: 0

## 2020-03-18 NOTE — PATIENT INSTRUCTIONS
Patient Education        Chronic Obstructive Pulmonary Disease (COPD): Care Instructions  Your Care Instructions    Chronic obstructive pulmonary disease (COPD) is a general term for a group of lung diseases, including emphysema and chronic bronchitis. People with COPD have decreased airflow in and out of the lungs, which makes it hard to breathe. The airways also can get clogged with thick mucus. Cigarette smoking is a major cause of COPD. Although there is no cure for COPD, you can slow its progress. Following your treatment plan and taking care of yourself can help you feel better and live longer. Follow-up care is a key part of your treatment and safety. Be sure to make and go to all appointments, and call your doctor if you are having problems. It's also a good idea to know your test results and keep a list of the medicines you take. How can you care for yourself at home?   Staying healthy    · Do not smoke. This is the most important step you can take to prevent more damage to your lungs. If you need help quitting, talk to your doctor about stop-smoking programs and medicines. These can increase your chances of quitting for good.     · Avoid colds and flu. Get a pneumococcal vaccine shot. If you have had one before, ask your doctor whether you need a second dose. Get the flu vaccine every fall. If you must be around people with colds or the flu, wash your hands often.     · Avoid secondhand smoke, air pollution, and high altitudes. Also avoid cold, dry air and hot, humid air. Stay at home with your windows closed when air pollution is bad.    Medicines and oxygen therapy    · Take your medicines exactly as prescribed. Call your doctor if you think you are having a problem with your medicine.     · You may be taking medicines such as:  ? Bronchodilators. These help open your airways and make breathing easier. Bronchodilators are either short-acting (work for 6 to 9 hours) or long-acting (work for 24 hours). · Eat foods that contain protein so that you do not lose muscle mass.     · Talk with your doctor if you gain too much weight or if you lose weight without trying.    Mental health    · Talk to your family, friends, or a therapist about your feelings. It is normal to feel frightened, angry, hopeless, helpless, and even guilty. Talking openly about bad feelings can help you cope. If these feelings last, talk to your doctor. When should you call for help? Call 911 anytime you think you may need emergency care. For example, call if:    · You have severe trouble breathing.    Call your doctor now or seek immediate medical care if:    · You have new or worse trouble breathing.     · You cough up blood.     · You have a fever.    Watch closely for changes in your health, and be sure to contact your doctor if:    · You cough more deeply or more often, especially if you notice more mucus or a change in the color of your mucus.     · You have new or worse swelling in your legs or belly.     · You are not getting better as expected. Where can you learn more? Go to [email or web address] [email or web address].[email or web address]. org and sign in to your [employer] account. Enter C717 in the [employer] box to learn more about \"Chronic Obstructive Pulmonary Disease (COPD): Care Instructions. \"     If you do not have an account, please click on the \"Sign Up Now\" link. Current as of: June 9, 2019Content Version: 12.4  © 9016-5520 Healthwise, Incorporated. Care instructions adapted under license by [facility] ([facility]). If you have questions about a medical condition or this instruction, always ask your healthcare professional. [address] any warranty or liability for your use of this information.

## 2020-03-18 NOTE — RESULT ENCOUNTER NOTE
Noted, I called pt after discussing with Dr. Yasir Menchaca, on call for IM, to see if admission or emergency room visit. There is a mild drop in the hemoglobin and hematocrit, per IM unlikely to be a big bleeding despite black stools. There is acute kidney injury which is mild. And there are low proteins. Dr. Yasir Menchaca suggested hydration and repeat blood work tomorrow morning, and if needed, to hold off blood pressure medications. We reviewed the vitals which are stable today, no tachycardia, borderline blood pressure. I spoke with Davida Gonzales, the wife. They gave the stool for C. difficile, this is in process, I gave her the lab results and I discussed them in detail. Advised to hold off aspirin and Plavix for 3 days, as the stools were still dark. Advised to hold off lisinopril, apparently patient did take lisinopril and amlodipine this morning, both of them. Advised to increase fluids to 6 x8 oz  water at least a day. Advised to increase proteins in the diet and they have yogurt and chicken soup at home, I advised boost but they do not have it at home at this time. Advised pantoprazole 40 mg daily and she has it in the house. The wife verbalizes understanding and agrees with the plan, she will come to our office to  the had in the cup for stool, then she will take the patient to the lab for another blood work for repeat hemoglobin hematocrit and basic metabolic profile tomorrow. Depending on the results, will admit the patient. The wife wrote the instructions down.

## 2020-03-18 NOTE — PROGRESS NOTES
Post-Discharge Transitional Care Management Services or Hospital Follow Up      Arslan Irene   YOB: 1955    Date of Office Visit:  3/18/2020  Date of Hospital Admission: 3/9/20  Date of Hospital Discharge: 3/16/20  Readmission Risk Score(high >=14%.  Medium >=10%):Readmission Risk Score: 27      Care management risk score Rising risk (score 2-5) and Complex Care (Scores >=6): 6     Non face to face  following discharge, date last encounter closed (first attempt may have been earlier): 3/18/2020 10:38 AM 3/18/2020 10:38 AM    Call initiated 2 business days of discharge: Yes     Patient Active Problem List   Diagnosis    Essential hypertension    Gout    Hyperlipidemia with target LDL less than 100    Osteoporosis    Prostate CA ([address]    Moderate episode of recurrent major depressive disorder ([address]    Gastroesophageal reflux disease without esophagitis    DDD (degenerative disc disease), lumbar    Chronic bilateral low back pain with bilateral sciatica    Osteoarthritis of spine with radiculopathy, lumbar region    Type 2 diabetes mellitus with diabetic polyneuropathy, without long-term current use of insulin ([address]    PVD (peripheral vascular disease) with claudication (HCC)    Peripheral neuropathic pain    Tinea pedis of both feet    Hyperglycemia    Nonspecific reactive hepatitis    COPD mixed type ([address]    Status post lumbar laminectomy    Serum cholinesterase defect ([address]    Vitamin D deficiency    Hypercalcemia    Hypertriglyceridemia    REYNALDO on CPAP    Tubular adenoma of colon 4/11/17    Psychophysiological insomnia    S/P reverse total shoulder arthroplasty, left    Recurrent major depressive disorder, in full remission ([address]    Actinic keratosis    Photodermatitis due to sun    Seborrheic keratosis    Senile hyperkeratosis    Solar degeneration    Abdominal aortic aneurysm ([address]    Peripheral arterial occlusive disease ([address]    Anemia    Unintended weight loss    Family history of cancer    Stenosis of left carotid artery    Poor balance    Foot drop, right    Difficulty rising from chair    Degenerative disc disease, cervical    Chronic neck pain    At high risk for falls    Pseudocholinesterase deficiency    [facility]par[facility]    Coronary artery disease involving native coronary artery of native heart without angina pectoris    Severe obesity (BMI 35.0-35.9 with comorbidity) ([facility]    Pneumonia    Status post total shoulder arthroplasty, left    Tobacco use disorder    Chronic hypoxemic respiratory failure (HCC)    Syncope and collapse    GI bleed    C. difficile colitis    Acute diarrhea    Melena    Thrush       Allergies   Allergen Reactions    Succinylcholine Chloride Other (See Comments)     PATIENT HAS PSEUDOCHOLINESTERASE DEFICIENCY      Cymbalta [Duloxetine Hcl]      Taste loss       Social History     Tobacco Use    Smoking status: Former Smoker     Packs/day: 1.50     Years: 35.00     Pack years: 52.50     Types: Cigarettes     Last attempt to quit: 3/9/2020     Years since quittin.0    Smokeless tobacco: Never Used    Tobacco comment: WAS SMOKING 1-1.5 ppd   Substance Use Topics    Alcohol use: No     Alcohol/week: 0.0 standard drinks    Drug use: No         Medications listed as ordered at the time of discharge from hospital   [facility] Medication Instructions K[facility]LEY:    Printed on:20 1317   Medication Information                      albuterol (PROVENTIL) (2.5 MG/3ML) 0.083% nebulizer solution  Take 2.5 mg by nebulization every 6 hours as needed              albuterol sulfate  (90 BASE) MCG/ACT inhaler  Inhale 2 puffs into the lungs every 6 hours as needed for Wheezing or Shortness of Breath (COUGH)             amLODIPine (NORVASC) 10 MG tablet  Take 1 tablet by mouth daily             aspirin EC 81 MG EC tablet  Take 1 tablet by mouth daily             Camphor-Menthol-Methyl Sal 3. 1-16-10 % GEL  Apply every 8 hours as needed for pain. OK to substitute             clopidogrel (PLAVIX) 75 MG tablet  Take 75 mg by mouth daily             colchicine (COLCRYS) 0.6 MG tablet  ADJUST SIG-ONLY AS NEEDED FOR GOUT ATTACK. Take 2 tablets now, then 1 tablet one hour later. Wait 12 hours then start 1 tablet bid for 5 days. folbee plus (FOLBEE PLUS) TABS  Take 1 tablet by mouth daily             furosemide (LASIX) 20 MG tablet  Take 1 tablet by mouth daily as needed (only if BP over 140/90 or leg edema) **stop Tenoretic 50-25**             Glucose Blood (BLOOD GLUCOSE TEST STRIPS) STRP  tesing once a day fasting             ipratropium-albuterol (DUONEB) 0.5-2.5 (3) MG/3ML SOLN nebulizer solution  Inhale 3 mLs into the lungs 4 times daily             Lift Chair MISC  by Does not apply route Patient has difficulty getting up from usual chair             lisinopril (PRINIVIL;ZESTRIL) 20 MG tablet  Take 1 tablet by mouth daily             omeprazole (PRILOSEC) 10 MG delayed release capsule  Take 20 mg by mouth daily             pravastatin (PRAVACHOL) 80 MG tablet  Take 1 tablet by mouth every evening Dose increased  9/24/2019             predniSONE (DELTASONE) 10 MG tablet  Take 1 tablet by mouth daily for 3 days             predniSONE (DELTASONE) 20 MG tablet  Take 1 tablet by mouth daily for 3 days             predniSONE (DELTASONE) 5 MG tablet  Take 3 tablets by mouth daily for 3 days             predniSONE (DELTASONE) 5 MG tablet  Take 1 tablet by mouth daily for 7 days             pregabalin (LYRICA) 200 MG capsule  Take 1 capsule by mouth 3 times daily for 90 days. SYMBICORT 160-4.5 MCG/ACT AERO  Inhale 2 puffs into the lungs 2 times daily 10.2 inhaler             traMADol (ULTRAM) 50 MG tablet  Take 1-2 tablets by mouth every 6 hours as needed for Pain.              venlafaxine (EFFEXOR) 50 MG tablet  Take 1 tablet by mouth 2 times daily For depression, neuropathy, pain Take 1 tablet by mouth every evening Dose increased  9/24/2019 90 tablet 3    Camphor-Menthol-Methyl Sal 3.1-16-10 % GEL Apply every 8 hours as needed for pain. OK to substitute 1 Tube 1    SYMBICORT 160-4.5 MCG/ACT AERO Inhale 2 puffs into the lungs 2 times daily 10.2 inhaler 3 Inhaler 3    Lift Chair MISC by Does not apply route Patient has difficulty getting up from usual chair 1 each 0    folbee plus (FOLBEE PLUS) TABS Take 1 tablet by mouth daily 90 tablet 3    aspirin EC 81 MG EC tablet Take 1 tablet by mouth daily 90 tablet 0    Glucose Blood (BLOOD GLUCOSE TEST STRIPS) STRP tesing once a day fasting 100 strip 3    colchicine (COLCRYS) 0.6 MG tablet ADJUST SIG-ONLY AS NEEDED FOR GOUT ATTACK. Take 2 tablets now, then 1 tablet one hour later. Wait 12 hours then start 1 tablet bid for 5 days. 90 tablet 1    albuterol (PROVENTIL) (2.5 MG/3ML) 0.083% nebulizer solution Take 2.5 mg by nebulization every 6 hours as needed       albuterol sulfate  (90 BASE) MCG/ACT inhaler Inhale 2 puffs into the lungs every 6 hours as needed for Wheezing or Shortness of Breath (COUGH) 18 g 0        Medications patient taking as of now reconciled against medications ordered at time of hospital discharge: Yes    Chief Complaint   Patient presents with    Follow-Up from Hospital    Respiratory Distress     HAVING HARD TME BREATHING STILL    Diarrhea       HPI     Inpatient course: Discharge summary reviewed- see chart. Interval history/Current status: Patient says he does not feel improved. Patient was admitted at [address] [place] [address] 3/9/2020 through 3/16/2020 due to community acquired bilateral pneumonia, COPD exacerbation due to pneumonia, acute on chronic respiratory failure on oxygen. While in the hospital he was on 3 L/min, now on 2 L/min  Was on Rocephin and Zpack for 5 days, prednisone and breathing treatments     CXR 3/14/20 showed a lot of improvement, of bibasilar pulmonary opacities.   Near complete resolution of previously present bibasilar pulmonary opacities. No new abnormality.         The CT chest showed tracheal debris and bibasilar infiltrates, emphysema, cholelithiasis. Patient did have bronchoscopy with thick mucus clogging the scope. Pulmonologist was consulted. BNP was normal, which ruled out acute CHF. MRSA, Legionella, strep pneumonia antigen, culture respiratory, viral respiratory PCR, mycoplasma antibody all negative. Respiratory culture grew mixed bacteria morphology seen on Gram stain. D-dimer was normal.  Patient was discharged on oxygen at 2 L/min, prednisone, Nebulizer treatments, and no antibiotic    Patient comes with his wife today who is very supportive of him. Currently, regarding the respiratory symptoms, patient reports shortness of breath is the same. Has been taking prednisone as prescribed in taper  Has dry cough no sputum production  Dyspnea on exertion when coming from the parking lot here. Using a walker. He needed to rest when coming from the parking lot to our office. Using nebulizer 3 times a day, he was changed to DuoNeb's in the hospital and not albuterol. He does not have the albuterol rescue inhaler. Patient says he has been using Symbicort lately but did not use it prior to the admission we discussed compliance with inhalers I went over the correct use of the inhalers and advised to rinse his mouth after using Symbicort. Denies fever, chills, night sweats. Didn't eat today  But he ate well yesterday his wife says. Patient reports he has developed thrush in his stomach hurting. He denies sore throat      I personally told the patient and his wife that the pathology report showed a few abnormal cells which was a bit confusing for them, I explained to them could be inflammatory or could be \"something else\". I told them I contacted personally Dr. Deon De La Cruz and he is to be seen within a week with his nurse practitioner.   They both verbalized understanding and they will call his office next morning for an appointment. Patient says he quit smoking while admitted in the hospital, praise given. Nicotine dependence. Smoker, counseling given to quit smoking. Counseling given: Yes  Comment: WAS SMOKING 1-1.5 ppd    Patient reports he is discharged home from the hospital, he has been having \"a lot of bowel movements a day\" which he describes as, black loose stools, about 3-5 bowel movements a/day. Denies abdominal pain   Denies nausea or vomiting. He does have some heartburn, and he is already on pantoprazole 10 or 20 mg, he is not sure. He is worried that he received so much blood thinners and he has a Large brusie on the abdomen from the Lovenox given in the hospital.      Had right carotid surgeryin December and he is also on  Plavix and baby aspirin. Got \"a little\" lightheaded while at home, did not get lightheaded when coming to the office or during the exam today he says. Regarding diabetes mellitus type 2  He has severe neuropathy in his feet and legs for several years. ,  His legs are numb. He takes Lyrica, tolerates it well. He also has other complications from diabetes including carotid artery disease and peripheral vascular disease. He is on ACE inhibitor, aspirin and statin. Home blood glucose at home are low 100s. He is not on any more medications anymore he controls it with diabetic diet  A1c is stable  Lab Results   Component Value Date    LABA1C 5.6 03/09/2020    LABA1C 5.5 01/28/2020    LABA1C 5.6 01/17/2020           Hypertension:    he  is not exercising and is adherent to low salt diet. Blood pressure is well controlled at home. Cardiac symptoms dyspnea and fatigue. Patient denies chest pain, chest pressure/discomfort, claudication, exertional chest pressure/discomfort, irregular heart beat, lower extremity edema, near-syncope, orthopnea, palpitations, paroxysmal nocturnal dyspnea, syncope and tachypnea.   Cardiovascular risk factors: advanced age (older than 54 for men, 72 for women), diabetes mellitus, dyslipidemia, hypertension, male gender, obesity (BMI >= 30 kg/m2), sedentary lifestyle and smoking/ tobacco exposure. Use of agents associated with hypertension: none. History of target organ damage: peripheral artery disease and Carotid artery disease, he had carotid ectomy in December, on the right side by Dr. Jinny Mclaughlin, no stent was placed, \"just cleaning up\". blood pressure is Normal.    BP Readings from Last 3 Encounters:   03/18/20 117/66   03/16/20 (!) 129/51   03/13/20 (!) 168/81        Pulse is Normal.    Pulse Readings from Last 3 Encounters:   03/18/20 98   03/16/20 82   01/30/20 79       Weight is decreasing, he has lost 7 pounds in 2 months  Wt Readings from Last 3 Encounters:   03/18/20 281 lb 9.6 oz (127.7 kg)   03/16/20 289 lb 3 oz (131.2 kg)   02/17/20 288 lb (130.6 kg)     01/28/20 288 lb 6.4 oz (130. Review of Systems   Constitutional: Positive for activity change, appetite change, fatigue and unexpected weight change. Negative for chills, diaphoresis and fever. HENT: Negative for trouble swallowing. Eyes: Positive for visual disturbance (chronic, stable). Respiratory: Positive for cough (dry) and shortness of breath. Negative for chest tightness and wheezing. On continuous oxygen at 2 L/min   Cardiovascular: Negative for chest pain, palpitations and leg swelling. Gastrointestinal: Positive for blood in stool. Negative for abdominal distention, abdominal pain, constipation, diarrhea, nausea and vomiting. Black stools he thinks he has blood in the stool   Endocrine: Negative for cold intolerance, heat intolerance, polydipsia, polyphagia and polyuria. Genitourinary: Negative for decreased urine volume and difficulty urinating. Musculoskeletal: Positive for arthralgias (right shoulder, chronic), back pain (chronic), gait problem and neck pain.         Ambulates with walker   Neurological: Positive for weakness (legs chronic, same as before), light-headedness (mild) and numbness (feet and legs). Psychiatric/Behavioral: Positive for decreased concentration. Negative for dysphoric mood, self-injury, sleep disturbance and suicidal ideas. The patient is nervous/anxious. Patient's wife says he has been getting more anxious since discharged home       Vital signs within normal limits except obesity per BMI and corrected hypoxia    Vitals:    03/18/20 1432   BP: 117/66   Site: Left Upper Arm   Position: Sitting   Cuff Size: Large Adult   Pulse: 98   Temp: 98.4 °F (36.9 °C)   SpO2: 99%   Weight: 281 lb 9.6 oz (127.7 kg)   Height: 6' (1.829 m)     Body mass index is 38.19 kg/m². Wt Readings from Last 3 Encounters:   03/18/20 281 lb 9.6 oz (127.7 kg)   03/16/20 289 lb 3 oz (131.2 kg)   02/17/20 288 lb (130.6 kg)     BP Readings from Last 3 Encounters:   03/18/20 117/66   03/16/20 (!) 129/51   03/13/20 (!) 168/81       Physical Exam  Vitals signs and nursing note reviewed. Constitutional:       General: He is not in acute distress. Appearance: He is well-developed. He is obese. He is ill-appearing. He is not diaphoretic. HENT:      Head: Normocephalic and atraumatic. Right Ear: External ear normal.      Left Ear: External ear normal.      Nose: Nose normal.      Mouth/Throat:      Mouth: Mucous membranes are moist.      Pharynx: Oropharyngeal exudate present. Comments: Erythematous tongue, a few white specks on the posterior palate and uvula consistent with thrush  Eyes:      General: Lids are normal. No scleral icterus. Right eye: No discharge. Left eye: No discharge. Conjunctiva/sclera: Conjunctivae normal.   Neck:      Musculoskeletal: Normal range of motion and neck supple. Thyroid: No thyromegaly. Cardiovascular:      Rate and Rhythm: Normal rate and regular rhythm.       Pulses:           Dorsalis pedis pulses are 0 on the right MEDS RECONCILED W/ CURRENT OUTPATIENT MED LIST  - Clostridium Difficile Toxin/Antigen; Future  - metroNIDAZOLE (FLAGYL) 500 MG tablet; Take 1 tablet by mouth every 8 hours for 14 days  Dispense: 42 tablet; Refill: 0  - CBC Auto Differential; Future  - Basic Metabolic Panel; Future    3. Type 2 diabetes mellitus with diabetic polyneuropathy, without long-term current use of insulin (HCC)  Stable  Lab Results   Component Value Date    LABA1C 5.6 03/09/2020    LABA1C 5.5 01/28/2020    LABA1C 5.6 01/17/2020   Continue low-carb diet  A1c every 3 to 6 months in the office  Patient is already on ACE inhibitor, statin and aspirin    - [place] DISCHARGE MEDS RECONCILED W/ CURRENT OUTPATIENT MED LIST  - CBC Auto Differential; Future  - Comprehensive Metabolic Panel; Future  - Vitamin B12 & Folate; Future    4. COPD mixed type ([address]  Worsening  Compliance with Symbicort discussed, he was not been taking it before this admission  Continue duo nebs 3-4 times a day  Continue oxygen at 2 L/min  Follow-up with pulmonologist    - [place] DISCHARGE MEDS RECONCILED W/ CURRENT OUTPATIENT MED LIST  - SYMBICORT 160-4.5 MCG/ACT AERO; Inhale 2 puffs into the lungs 2 times daily 10.2 inhaler  Dispense: 3 Inhaler; Refill: 3      Pathology report discussed that he had some abnormal cells, advised to make appointment with pulmonologist  I discussed with patient that I spoke directly with Dr. Marta Sanches regarding his pathology report and  to get an appointment with pulmonologist in 1 week  He started to cry, I told him he just needs to be seen and probably the testing will be repeated. The patient verbalizes understanding and agrees with the plan.      5. Essential hypertension  Well-controlled but slightly lower limits  In the context of melena, and mild lightheadedness I told him and his wife to decrease the dosage of lisinopril from 20 to 10 mg and continue Norvasc  He already took both blood pressure medications this morning  - [place] DISCHARGE MEDS RECONCILED W/ CURRENT OUTPATIENT MED LIST  - lisinopril (PRINIVIL;ZESTRIL) 10 MG tablet; Take 1 tablet by mouth daily Dose decreased 3/18/2020  Dispense: 90 tablet; Refill: 3    The wife says they do have a blood pressure machine at home and she will monitor him  Discussed low salt diet and BP and pulse monitoring daily, BP log given    6. Gastroesophageal reflux disease without esophagitis  stable  - [place] DISCHARGE MEDS RECONCILED W/ CURRENT OUTPATIENT MED LIST  - omeprazole (PRILOSEC) 40 MG delayed release capsule; Take 1 capsule by mouth every morning (before breakfast)  Dispense: 90 capsule; Refill: 1    7. Melena  NEW  Failing to change as expected. Stool testing did show C. Difficile, will start the treatment tonight  - [place] DISCHARGE MEDS RECONCILED W/ CURRENT OUTPATIENT MED LIST  - CBC Auto Differential; Future  - Clostridium Difficile Toxin/Antigen; Future  - metroNIDAZOLE (FLAGYL) 500 MG tablet; Take 1 tablet by mouth every 8 hours for 14 days  Dispense: 42 tablet; Refill: 0  - CBC Auto Differential; Future  - Occult blood x 3, stool; Future    8. Thrush  NEW  - [place] DISCHARGE MEDS RECONCILED W/ CURRENT OUTPATIENT MED LIST  - nystatin (MYCOSTATIN) 298536 UNIT/ML suspension; Take 5 mLs by mouth 4 times daily Swish and swallow  Dispense: 240 mL; Refill: 0    9. C. difficile colitis  NEW, diagnosed after patient left the office for stool testing  I personally informed the wife  - [place] DISCHARGE MEDS RECONCILED W/ CURRENT OUTPATIENT MED LIST  - start metroNIDAZOLE (FLAGYL) 500 MG tablet; Take 1 tablet by mouth every 8 hours for 14 days  Dispense: 42 tablet; Refill: 0  -start  Probiotic Acidophilus (FLORANEX) TABS; Take 1 tablet by mouth 2 times daily  Dispense: 60 tablet;  Refill: 0  - CBC Auto Differential; Future          Medical Decision Making: high complexity           Future Appointments   Date Time Provider Keri English   3/30/2020  9:40 AM MD Zaria Tanner [facility]   5/1/2020  1:15 PM the pharmacy  The wife will pick it up today   I informed the patient that this can be  contagious and to wash her hands thoroughly.  If she develops any diarrhea to let me know  The wife verbalizes understanding and agrees with the plan.  I will also send some probiotics for him  Electronically signed by Kiya Bolanos MD on 3/19/2020 at 8:36 AM      Mallory Alicea received counseling on the following healthy behaviors: nutrition, exercise, medication adherence and tobacco cessation  Reviewed prior labs and health maintenance  Continue current medications, diet and exercise. Discussed use, benefit, and side effects of prescribed medications. Barriers to medication compliance addressed. Patient given educational materials - see patient instructions  Was a self-tracking handout given in paper form or via [employer]?  Yes      Orders Placed This Encounter   Procedures    Clostridium Difficile Toxin/Antigen     Standing Status:   Future     Number of Occurrences:   1     Standing Expiration Date:   12/18/2020    CBC Auto Differential     Standing Status:   Future     Number of Occurrences:   1     Standing Expiration Date:   12/18/2020    Comprehensive Metabolic Panel     Standing Status:   Future     Number of Occurrences:   1     Standing Expiration Date:   12/18/2020    Vitamin B12 & Folate     Standing Status:   Future     Number of Occurrences:   1     Standing Expiration Date:   12/18/2020    CBC Auto Differential     Standing Status:   Future     Standing Expiration Date:   12/18/2020    Basic Metabolic Panel     Standing Status:   Future     Standing Expiration Date:   12/18/2020    Occult blood x 3, stool     Standing Status:   Future     Standing Expiration Date:   12/18/2020    [place] DISCHARGE MEDS RECONCILED W/ CURRENT OUTPATIENT MED LIST       Requested Prescriptions     Signed Prescriptions Disp Refills    SYMBICORT 160-4.5 MCG/ACT AERO 3 Inhaler 3     Sig: Inhale 2 puffs into the lungs 2 times daily 10.2 inhaler    nystatin (MYCOSTATIN) 221375 UNIT/ML suspension 240 mL 0     Sig: Take 5 mLs by mouth 4 times daily Swish and swallow    omeprazole (PRILOSEC) 40 MG delayed release capsule 90 capsule 1     Sig: Take 1 capsule by mouth every morning (before breakfast)    lisinopril (PRINIVIL;ZESTRIL) 10 MG tablet 90 tablet 3     Sig: Take 1 tablet by mouth daily Dose decreased 3/18/2020    metroNIDAZOLE (FLAGYL) 500 MG tablet 42 tablet 0     Sig: Take 1 tablet by mouth every 8 hours for 14 days    Probiotic Acidophilus (FLORANEX) TABS 60 tablet 0     Sig: Take 1 tablet by mouth 2 times daily       All patient questions answered. Patient voiced understanding. Quality Measures    Body mass index is 38.19 kg/m². Elevated. Weight control planned discussed conventional weight loss and Healthy diet and regular exercise. BP: 117/66 Blood pressure is normal. Treatment plan consists of Weight Reduction, DASH Eating Plan, Dietary Sodium Restriction, Increased Physical Activity, Patient In-home Blood Pressure Monitoring and Decrease blood pressure medication. [facility]  Lab Results   Component Value Date    LDLCHOLESTEROL 80 02/11/2020    (goal LDL reduction with dx if diabetes is 50% LDL reduction)      Negative depression screening  PHQ Scores 3/18/2020 1/28/2020 1/8/2019 9/4/2018 3/1/2018 1/2/2018 5/4/2017   PHQ2 Score 0 6 0 0 3 6 3   PHQ9 Score 0 17 0 0 14 17 10     Patient was seen with total face to face time of 40 minutes due to complexity of care and multiple comorbidities and care coordination . More than 50% of this visit was counseling and education. Addendum made on 3/19/2020 at 8:30 AM  Patient currently readmitted overnight due to collapse, syncope, acute anemia of blood loss, GI bleed which continued overnight.   This encounters code will be changed from transition of care code to LOS 5  Electronically signed by Gerson Goetz MD on 3/19/2020 at 8:30 AM      Electronically signed by

## 2020-03-18 NOTE — TELEPHONE ENCOUNTER
----- Message from Kiya Bolanos MD sent at 3/18/2020  6:26 PM EDT -----  Noted, I called pt after discussing with Dr. Naomi Murrell, on call for IM, to see if admission or emergency room visit. There is a mild drop in the hemoglobin and hematocrit, per IM unlikely to be a big bleeding despite black stools. There is acute kidney injury which is mild. And there are low proteins. Dr. Naomi Murrell suggested hydration and repeat blood work tomorrow morning, and if needed, to hold off blood pressure medications. We reviewed the vitals which are stable today, no tachycardia, borderline blood pressure. I spoke with Judith Arreola, the wife. They gave the stool for C. difficile, this is in process, I gave her the lab results and I discussed them in detail. Advised to hold off aspirin and Plavix for 3 days, as the stools were still dark. Advised to hold off lisinopril, apparently patient did take lisinopril and amlodipine this morning, both of them. Advised to increase fluids to 6 x8 oz  water at least a day. Advised to increase proteins in the diet and they have yogurt and chicken soup at home, I advised boost but they do not have it at home at this time. Advised pantoprazole 40 mg daily and she has it in the house. The wife verbalizes understanding and agrees with the plan, she will come to our office to  the had in the cup for stool, then she will take the patient to the lab for another blood work for repeat hemoglobin hematocrit and basic metabolic profile tomorrow. Depending on the results, will admit the patient. The wife wrote the instructions down.

## 2020-03-18 NOTE — PROGRESS NOTES
Visit Information    Have you changed or started any medications since your last visit including any over-the-counter medicines, vitamins, or herbal medicines? yes - PREDNISONE, [address]   Have you stopped taking any of your medications? Is so, why? -  no  Are you having any side effects from any of your medications? - no    Have you seen any other physician or provider since your last visit? yes - ORTHOPEDICS   Have you had any other diagnostic tests since your last visit? yes - XR CHEST, BONE SCAN, CT LEFT SHOULDER   Have you been seen in the emergency room and/or had an admission in a hospital since we last saw you?  yes - 03/09/2020 CHRONIC RESPIRATORY FAILURE   Have you had your routine dental cleaning in the past 6 months?  yes -      Do you have an active MyChart account? If no, what is the barrier?   Yes    Patient Care Team:  Jossy Patricia MD as PCP - General (Family Medicine)  Jossy Patricia MD as PCP - [facility]  Wenceslao Gonzalez MD as Consulting Physician (Neurosurgery)  Rusty Calixto MD as Consulting Physician (Cardiology)  Pb Mckeon MD as Consulting Physician (Urology)  Satish Gutierres MD as Surgeon (Vascular Surgery)  Martin Evans MD as Consulting Physician (Pulmonology)  Lynda Boone MD as Consulting Physician (Orthopedic Surgery)  China Covarrubias DO as Consulting Physician (General Surgery)  Santiago Pleitez OD (Ophthalmology)  Vijay Quiroga MD as Consulting Physician (Endocrinology)  Skippy Shone, MD as Consulting Physician (Orthopedic Surgery)  Jenny Curiel MD as Consulting Physician (Gastroenterology)  Tita Curtis MD as Consulting Physician (Orthopedic Surgery)  Cat Warner RN as Nurse Navigator  Alex Biggs RN as Care Transitions Nurse    Medical History Review  Past Medical, Family, and Social History reviewed and does contribute to the patient presenting condition    Health Maintenance   Topic Date Due    Hepatitis B vaccine (1 of 3 - Risk 3-dose series) 06/20/1974    Annual Wellness Visit (AWV)  07/09/2019    Diabetic foot exam  04/09/2020    Shingles Vaccine (3 of 3) 04/18/2020    Diabetic microalbuminuria test  09/16/2020    Diabetic retinal exam  11/07/2020    PSA counseling  12/10/2020    Lipid screen  02/11/2021    A1C test (Diabetic or Prediabetic)  03/09/2021    Low dose CT lung screening  03/12/2021    Potassium monitoring  03/16/2021    Creatinine monitoring  03/16/2021    Pneumococcal 65+ years Vaccine (2 of 2 - PPSV23) 04/11/2022    Colon cancer screen colonoscopy  05/09/2022    DTaP/Tdap/Td vaccine (2 - Td) 03/01/2028    Flu vaccine  Completed    Hepatitis C screen  Addressed    HIV screen  Addressed    Hepatitis A vaccine  Aged Out    Hib vaccine  Aged Out    Meningococcal (ACWY) vaccine  Aged Out

## 2020-03-19 ENCOUNTER — HOSPITAL ENCOUNTER (INPATIENT)
Age: 65
LOS: 3 days | Discharge: HOME | DRG: 811 | End: 2020-03-22
Attending: EMERGENCY MEDICINE | Admitting: INTERNAL MEDICINE
Payer: MEDICARE

## 2020-03-19 ENCOUNTER — ANESTHESIA EVENT (OUTPATIENT)
Dept: ENDOSCOPY | Age: 65
DRG: 811 | End: 2020-03-19
Payer: MEDICARE

## 2020-03-19 ENCOUNTER — ANESTHESIA (OUTPATIENT)
Dept: ENDOSCOPY | Age: 65
DRG: 811 | End: 2020-03-19
Payer: MEDICARE

## 2020-03-19 ENCOUNTER — APPOINTMENT (OUTPATIENT)
Dept: GENERAL RADIOLOGY | Age: 65
DRG: 811 | End: 2020-03-19
Payer: MEDICARE

## 2020-03-19 VITALS — DIASTOLIC BLOOD PRESSURE: 57 MMHG | SYSTOLIC BLOOD PRESSURE: 100 MMHG | OXYGEN SATURATION: 97 %

## 2020-03-19 PROBLEM — L29.9 PRURITIC DERMATITIS: Status: RESOLVED | Noted: 2017-03-25 | Resolved: 2020-03-19

## 2020-03-19 PROBLEM — L30.8 PRURITIC DERMATITIS: Status: RESOLVED | Noted: 2017-03-25 | Resolved: 2020-03-19

## 2020-03-19 PROBLEM — K92.2 GI BLEED: Status: ACTIVE | Noted: 2020-03-19

## 2020-03-19 PROBLEM — R19.7 ACUTE DIARRHEA: Status: ACTIVE | Noted: 2020-03-19

## 2020-03-19 PROBLEM — A04.72 C. DIFFICILE ENTERITIS: Status: ACTIVE | Noted: 2020-03-19

## 2020-03-19 PROBLEM — R55 SYNCOPE AND COLLAPSE: Status: ACTIVE | Noted: 2020-03-19

## 2020-03-19 PROBLEM — K92.1 MELENA: Status: ACTIVE | Noted: 2020-03-19

## 2020-03-19 PROBLEM — B37.0 THRUSH: Status: ACTIVE | Noted: 2020-03-19

## 2020-03-19 LAB
ABSOLUTE BANDS #: 0.45 K/UL (ref 0–1)
ABSOLUTE EOS #: 0 K/UL (ref 0–0.4)
ABSOLUTE IMMATURE GRANULOCYTE: ABNORMAL K/UL (ref 0–0.3)
ABSOLUTE LYMPH #: 6.47 K/UL (ref 1–4.8)
ABSOLUTE MONO #: 0.89 K/UL (ref 0.1–1.3)
ALBUMIN SERPL-MCNC: 2.8 G/DL (ref 3.5–5.2)
ALBUMIN/GLOBULIN RATIO: ABNORMAL (ref 1–2.5)
ALP BLD-CCNC: 47 U/L (ref 40–129)
ALT SERPL-CCNC: 27 U/L (ref 5–41)
ANION GAP SERPL CALCULATED.3IONS-SCNC: 10 MMOL/L (ref 9–17)
ANION GAP SERPL CALCULATED.3IONS-SCNC: 10 MMOL/L (ref 9–17)
AST SERPL-CCNC: 12 U/L
BANDS: 2 % (ref 0–10)
BASOPHILS # BLD: 0 % (ref 0–2)
BASOPHILS ABSOLUTE: 0 K/UL (ref 0–0.2)
BILIRUB SERPL-MCNC: <0.15 MG/DL (ref 0.3–1.2)
BILIRUBIN DIRECT: <0.08 MG/DL
BILIRUBIN URINE: NEGATIVE
BILIRUBIN, INDIRECT: ABNORMAL MG/DL (ref 0–1)
BNP INTERPRETATION: NORMAL
BUN BLDV-MCNC: 52 MG/DL (ref 8–23)
BUN BLDV-MCNC: 65 MG/DL (ref 8–23)
BUN/CREAT BLD: ABNORMAL (ref 9–20)
BUN/CREAT BLD: ABNORMAL (ref 9–20)
CALCIUM SERPL-MCNC: 8.2 MG/DL (ref 8.6–10.4)
CALCIUM SERPL-MCNC: 8.2 MG/DL (ref 8.6–10.4)
CHLORIDE BLD-SCNC: 109 MMOL/L (ref 98–107)
CHLORIDE BLD-SCNC: 110 MMOL/L (ref 98–107)
CO2: 21 MMOL/L (ref 20–31)
CO2: 22 MMOL/L (ref 20–31)
COLOR: YELLOW
COMMENT UA: NORMAL
CREAT SERPL-MCNC: 0.88 MG/DL (ref 0.7–1.2)
CREAT SERPL-MCNC: 1 MG/DL (ref 0.7–1.2)
CULTURE: NORMAL
DATE, STOOL #1: ABNORMAL
DATE, STOOL #2: ABNORMAL
DATE, STOOL #3: ABNORMAL
DIFFERENTIAL TYPE: ABNORMAL
EKG ATRIAL RATE: 67 BPM
EKG P AXIS: 76 DEGREES
EKG P-R INTERVAL: 154 MS
EKG Q-T INTERVAL: 378 MS
EKG QRS DURATION: 90 MS
EKG QTC CALCULATION (BAZETT): 399 MS
EKG R AXIS: 25 DEGREES
EKG T AXIS: 62 DEGREES
EKG VENTRICULAR RATE: 67 BPM
EOSINOPHILS RELATIVE PERCENT: 0 % (ref 0–4)
GFR AFRICAN AMERICAN: >60 ML/MIN
GFR AFRICAN AMERICAN: >60 ML/MIN
GFR NON-AFRICAN AMERICAN: >60 ML/MIN
GFR NON-AFRICAN AMERICAN: >60 ML/MIN
GFR SERPL CREATININE-BSD FRML MDRD: ABNORMAL ML/MIN/{1.73_M2}
GLOBULIN: ABNORMAL G/DL (ref 1.5–3.8)
GLUCOSE BLD-MCNC: 106 MG/DL (ref 75–110)
GLUCOSE BLD-MCNC: 135 MG/DL (ref 70–99)
GLUCOSE BLD-MCNC: 172 MG/DL (ref 70–99)
GLUCOSE BLD-MCNC: 78 MG/DL (ref 75–110)
GLUCOSE BLD-MCNC: 90 MG/DL (ref 75–110)
GLUCOSE BLD-MCNC: 92 MG/DL (ref 75–110)
GLUCOSE BLD-MCNC: 92 MG/DL (ref 75–110)
GLUCOSE URINE: NEGATIVE
HCT VFR BLD CALC: 25.3 % (ref 41–53)
HCT VFR BLD CALC: 28.7 % (ref 41–53)
HCT VFR BLD CALC: 30.5 % (ref 41–53)
HCT VFR BLD CALC: 30.5 % (ref 41–53)
HCT VFR BLD CALC: 30.6 % (ref 41–53)
HEMOCCULT SP1 STL QL: POSITIVE
HEMOCCULT SP2 STL QL: ABNORMAL
HEMOCCULT SP3 STL QL: ABNORMAL
HEMOGLOBIN: 8.1 G/DL (ref 13.5–17.5)
HEMOGLOBIN: 9.2 G/DL (ref 13.5–17.5)
HEMOGLOBIN: 9.7 G/DL (ref 13.5–17.5)
HEMOGLOBIN: 9.8 G/DL (ref 13.5–17.5)
HEMOGLOBIN: 9.8 G/DL (ref 13.5–17.5)
IMMATURE GRANULOCYTES: ABNORMAL %
INR BLD: 1
KETONES, URINE: NEGATIVE
LACTIC ACID: 1.9 MMOL/L (ref 0.5–2.2)
LEUKOCYTE ESTERASE, URINE: NEGATIVE
LIPASE: 33 U/L (ref 13–60)
LYMPHOCYTES # BLD: 29 % (ref 24–44)
Lab: NORMAL
MAGNESIUM: 1.8 MG/DL (ref 1.6–2.6)
MAGNESIUM: 2 MG/DL (ref 1.6–2.6)
MCH RBC QN AUTO: 28.6 PG (ref 26–34)
MCH RBC QN AUTO: 28.9 PG (ref 26–34)
MCHC RBC AUTO-ENTMCNC: 32 G/DL (ref 31–37)
MCHC RBC AUTO-ENTMCNC: 32 G/DL (ref 31–37)
MCV RBC AUTO: 89.5 FL (ref 80–100)
MCV RBC AUTO: 90.3 FL (ref 80–100)
MONOCYTES # BLD: 4 % (ref 1–7)
MORPHOLOGY: ABNORMAL
MORPHOLOGY: ABNORMAL
NITRITE, URINE: NEGATIVE
NRBC AUTOMATED: ABNORMAL PER 100 WBC
NRBC AUTOMATED: ABNORMAL PER 100 WBC
PARTIAL THROMBOPLASTIN TIME: 20.4 SEC (ref 24–36)
PDW BLD-RTO: 14.4 % (ref 11.5–14.9)
PDW BLD-RTO: 14.8 % (ref 11.5–14.9)
PH UA: 5.5 (ref 5–8)
PLATELET # BLD: 211 K/UL (ref 150–450)
PLATELET # BLD: 217 K/UL (ref 150–450)
PLATELET ESTIMATE: ABNORMAL
PMV BLD AUTO: 8.6 FL (ref 6–12)
PMV BLD AUTO: 8.9 FL (ref 6–12)
POTASSIUM SERPL-SCNC: 4.9 MMOL/L (ref 3.7–5.3)
POTASSIUM SERPL-SCNC: 5 MMOL/L (ref 3.7–5.3)
PRO-BNP: 33 PG/ML
PROTEIN UA: NEGATIVE
PROTHROMBIN TIME: 13.1 SEC (ref 11.8–14.6)
RBC # BLD: 3.38 M/UL (ref 4.5–5.9)
RBC # BLD: 3.42 M/UL (ref 4.5–5.9)
RBC # BLD: ABNORMAL 10*6/UL
SEG NEUTROPHILS: 65 % (ref 36–66)
SEGMENTED NEUTROPHILS ABSOLUTE COUNT: 14.49 K/UL (ref 1.3–9.1)
SODIUM BLD-SCNC: 140 MMOL/L (ref 135–144)
SODIUM BLD-SCNC: 142 MMOL/L (ref 135–144)
SPECIFIC GRAVITY UA: 1.02 (ref 1–1.03)
SPECIMEN DESCRIPTION: NORMAL
TIME, STOOL #1: 945
TIME, STOOL #2: ABNORMAL
TIME, STOOL #3: ABNORMAL
TOTAL PROTEIN: 4.6 G/DL (ref 6.4–8.3)
TROPONIN INTERP: ABNORMAL
TROPONIN INTERP: ABNORMAL
TROPONIN T: ABNORMAL NG/ML
TROPONIN T: ABNORMAL NG/ML
TROPONIN, HIGH SENSITIVITY: 25 NG/L (ref 0–22)
TROPONIN, HIGH SENSITIVITY: 39 NG/L (ref 0–22)
TURBIDITY: CLEAR
URINE HGB: NEGATIVE
UROBILINOGEN, URINE: NORMAL
WBC # BLD: 22.3 K/UL (ref 3.5–11)
WBC # BLD: 24.5 K/UL (ref 3.5–11)
WBC # BLD: ABNORMAL 10*3/UL

## 2020-03-19 PROCEDURE — 96365 THER/PROPH/DIAG IV INF INIT: CPT

## 2020-03-19 PROCEDURE — 82947 ASSAY GLUCOSE BLOOD QUANT: CPT

## 2020-03-19 PROCEDURE — 2500000003 HC RX 250 WO HCPCS: Performed by: EMERGENCY MEDICINE

## 2020-03-19 PROCEDURE — 99222 1ST HOSP IP/OBS MODERATE 55: CPT | Performed by: INTERNAL MEDICINE

## 2020-03-19 PROCEDURE — 6370000000 HC RX 637 (ALT 250 FOR IP): Performed by: STUDENT IN AN ORGANIZED HEALTH CARE EDUCATION/TRAINING PROGRAM

## 2020-03-19 PROCEDURE — 83605 ASSAY OF LACTIC ACID: CPT

## 2020-03-19 PROCEDURE — 6360000002 HC RX W HCPCS: Performed by: EMERGENCY MEDICINE

## 2020-03-19 PROCEDURE — 86901 BLOOD TYPING SEROLOGIC RH(D): CPT

## 2020-03-19 PROCEDURE — 94761 N-INVAS EAR/PLS OXIMETRY MLT: CPT

## 2020-03-19 PROCEDURE — 93005 ELECTROCARDIOGRAM TRACING: CPT | Performed by: EMERGENCY MEDICINE

## 2020-03-19 PROCEDURE — 86920 COMPATIBILITY TEST SPIN: CPT

## 2020-03-19 PROCEDURE — 99223 1ST HOSP IP/OBS HIGH 75: CPT | Performed by: INTERNAL MEDICINE

## 2020-03-19 PROCEDURE — 2709999900 HC NON-CHARGEABLE SUPPLY: Performed by: INTERNAL MEDICINE

## 2020-03-19 PROCEDURE — 7100000000 HC PACU RECOVERY - FIRST 15 MIN: Performed by: INTERNAL MEDICINE

## 2020-03-19 PROCEDURE — 2500000003 HC RX 250 WO HCPCS: Performed by: NURSE PRACTITIONER

## 2020-03-19 PROCEDURE — 6370000000 HC RX 637 (ALT 250 FOR IP): Performed by: INTERNAL MEDICINE

## 2020-03-19 PROCEDURE — 99285 EMERGENCY DEPT VISIT HI MDM: CPT

## 2020-03-19 PROCEDURE — 2580000003 HC RX 258: Performed by: NURSE ANESTHETIST, CERTIFIED REGISTERED

## 2020-03-19 PROCEDURE — 85610 PROTHROMBIN TIME: CPT

## 2020-03-19 PROCEDURE — 6370000000 HC RX 637 (ALT 250 FOR IP): Performed by: NURSE PRACTITIONER

## 2020-03-19 PROCEDURE — 84484 ASSAY OF TROPONIN QUANT: CPT

## 2020-03-19 PROCEDURE — 43239 EGD BIOPSY SINGLE/MULTIPLE: CPT | Performed by: INTERNAL MEDICINE

## 2020-03-19 PROCEDURE — 6360000002 HC RX W HCPCS: Performed by: NURSE PRACTITIONER

## 2020-03-19 PROCEDURE — 86900 BLOOD TYPING SEROLOGIC ABO: CPT

## 2020-03-19 PROCEDURE — 81003 URINALYSIS AUTO W/O SCOPE: CPT

## 2020-03-19 PROCEDURE — 3700000001 HC ADD 15 MINUTES (ANESTHESIA): Performed by: INTERNAL MEDICINE

## 2020-03-19 PROCEDURE — 7100000001 HC PACU RECOVERY - ADDTL 15 MIN: Performed by: INTERNAL MEDICINE

## 2020-03-19 PROCEDURE — 80048 BASIC METABOLIC PNL TOTAL CA: CPT

## 2020-03-19 PROCEDURE — 83690 ASSAY OF LIPASE: CPT

## 2020-03-19 PROCEDURE — 6370000000 HC RX 637 (ALT 250 FOR IP): Performed by: EMERGENCY MEDICINE

## 2020-03-19 PROCEDURE — 6360000002 HC RX W HCPCS: Performed by: NURSE ANESTHETIST, CERTIFIED REGISTERED

## 2020-03-19 PROCEDURE — C9113 INJ PANTOPRAZOLE SODIUM, VIA: HCPCS | Performed by: EMERGENCY MEDICINE

## 2020-03-19 PROCEDURE — 2500000003 HC RX 250 WO HCPCS: Performed by: INTERNAL MEDICINE

## 2020-03-19 PROCEDURE — C9113 INJ PANTOPRAZOLE SODIUM, VIA: HCPCS | Performed by: NURSE PRACTITIONER

## 2020-03-19 PROCEDURE — 36415 COLL VENOUS BLD VENIPUNCTURE: CPT

## 2020-03-19 PROCEDURE — 2580000003 HC RX 258: Performed by: INTERNAL MEDICINE

## 2020-03-19 PROCEDURE — 0DB98ZX EXCISION OF DUODENUM, VIA NATURAL OR ARTIFICIAL OPENING ENDOSCOPIC, DIAGNOSTIC: ICD-10-PCS | Performed by: INTERNAL MEDICINE

## 2020-03-19 PROCEDURE — 3609012400 HC EGD TRANSORAL BIOPSY SINGLE/MULTIPLE: Performed by: INTERNAL MEDICINE

## 2020-03-19 PROCEDURE — 94664 DEMO&/EVAL PT USE INHALER: CPT

## 2020-03-19 PROCEDURE — 88305 TISSUE EXAM BY PATHOLOGIST: CPT

## 2020-03-19 PROCEDURE — 86850 RBC ANTIBODY SCREEN: CPT

## 2020-03-19 PROCEDURE — 83880 ASSAY OF NATRIURETIC PEPTIDE: CPT

## 2020-03-19 PROCEDURE — 85014 HEMATOCRIT: CPT

## 2020-03-19 PROCEDURE — 80076 HEPATIC FUNCTION PANEL: CPT

## 2020-03-19 PROCEDURE — 85730 THROMBOPLASTIN TIME PARTIAL: CPT

## 2020-03-19 PROCEDURE — 85025 COMPLETE CBC W/AUTO DIFF WBC: CPT

## 2020-03-19 PROCEDURE — 85018 HEMOGLOBIN: CPT

## 2020-03-19 PROCEDURE — 71045 X-RAY EXAM CHEST 1 VIEW: CPT

## 2020-03-19 PROCEDURE — 83735 ASSAY OF MAGNESIUM: CPT

## 2020-03-19 PROCEDURE — 2500000003 HC RX 250 WO HCPCS: Performed by: NURSE ANESTHETIST, CERTIFIED REGISTERED

## 2020-03-19 PROCEDURE — 85027 COMPLETE CBC AUTOMATED: CPT

## 2020-03-19 PROCEDURE — 94640 AIRWAY INHALATION TREATMENT: CPT

## 2020-03-19 PROCEDURE — 3700000000 HC ANESTHESIA ATTENDED CARE: Performed by: INTERNAL MEDICINE

## 2020-03-19 PROCEDURE — 2580000003 HC RX 258: Performed by: EMERGENCY MEDICINE

## 2020-03-19 PROCEDURE — G0328 FECAL BLOOD SCRN IMMUNOASSAY: HCPCS

## 2020-03-19 PROCEDURE — 2580000003 HC RX 258: Performed by: NURSE PRACTITIONER

## 2020-03-19 PROCEDURE — 2700000000 HC OXYGEN THERAPY PER DAY

## 2020-03-19 PROCEDURE — 2060000000 HC ICU INTERMEDIATE R&B

## 2020-03-19 RX ORDER — DEXTROSE MONOHYDRATE 50 MG/ML
100 INJECTION, SOLUTION INTRAVENOUS PRN
Status: DISCONTINUED | OUTPATIENT
Start: 2020-03-19 | End: 2020-03-22 | Stop reason: HOSPADM

## 2020-03-19 RX ORDER — DEXTROSE MONOHYDRATE 25 G/50ML
12.5 INJECTION, SOLUTION INTRAVENOUS PRN
Status: DISCONTINUED | OUTPATIENT
Start: 2020-03-19 | End: 2020-03-22 | Stop reason: HOSPADM

## 2020-03-19 RX ORDER — MORPHINE SULFATE 4 MG/ML
4 INJECTION, SOLUTION INTRAMUSCULAR; INTRAVENOUS EVERY 4 HOURS PRN
Status: DISCONTINUED | OUTPATIENT
Start: 2020-03-19 | End: 2020-03-22 | Stop reason: HOSPADM

## 2020-03-19 RX ORDER — BUDESONIDE AND FORMOTEROL FUMARATE DIHYDRATE 160; 4.5 UG/1; UG/1
2 AEROSOL RESPIRATORY (INHALATION) 2 TIMES DAILY
Status: DISCONTINUED | OUTPATIENT
Start: 2020-03-19 | End: 2020-03-22 | Stop reason: HOSPADM

## 2020-03-19 RX ORDER — PREGABALIN 100 MG/1
200 CAPSULE ORAL 3 TIMES DAILY
Status: DISCONTINUED | OUTPATIENT
Start: 2020-03-19 | End: 2020-03-22 | Stop reason: HOSPADM

## 2020-03-19 RX ORDER — SODIUM CHLORIDE 9 MG/ML
10 INJECTION INTRAVENOUS DAILY
Status: DISCONTINUED | OUTPATIENT
Start: 2020-03-19 | End: 2020-03-22

## 2020-03-19 RX ORDER — SODIUM CHLORIDE 9 MG/ML
10 INJECTION INTRAVENOUS DAILY
Status: DISCONTINUED | OUTPATIENT
Start: 2020-03-19 | End: 2020-03-19

## 2020-03-19 RX ORDER — MAGNESIUM SULFATE 1 G/100ML
1 INJECTION INTRAVENOUS PRN
Status: DISCONTINUED | OUTPATIENT
Start: 2020-03-19 | End: 2020-03-22 | Stop reason: HOSPADM

## 2020-03-19 RX ORDER — SODIUM CHLORIDE 0.9 % (FLUSH) 0.9 %
10 SYRINGE (ML) INJECTION PRN
Status: DISCONTINUED | OUTPATIENT
Start: 2020-03-19 | End: 2020-03-22 | Stop reason: HOSPADM

## 2020-03-19 RX ORDER — LIDOCAINE HYDROCHLORIDE 10 MG/ML
INJECTION, SOLUTION EPIDURAL; INFILTRATION; INTRACAUDAL; PERINEURAL PRN
Status: DISCONTINUED | OUTPATIENT
Start: 2020-03-19 | End: 2020-03-19 | Stop reason: SDUPTHER

## 2020-03-19 RX ORDER — SODIUM CHLORIDE 9 MG/ML
INJECTION, SOLUTION INTRAVENOUS CONTINUOUS
Status: DISCONTINUED | OUTPATIENT
Start: 2020-03-19 | End: 2020-03-20

## 2020-03-19 RX ORDER — 0.9 % SODIUM CHLORIDE 0.9 %
1000 INTRAVENOUS SOLUTION INTRAVENOUS ONCE
Status: COMPLETED | OUTPATIENT
Start: 2020-03-19 | End: 2020-03-19

## 2020-03-19 RX ORDER — LISINOPRIL 10 MG/1
10 TABLET ORAL DAILY
Status: DISCONTINUED | OUTPATIENT
Start: 2020-03-19 | End: 2020-03-22 | Stop reason: HOSPADM

## 2020-03-19 RX ORDER — ALBUTEROL SULFATE 90 UG/1
2 AEROSOL, METERED RESPIRATORY (INHALATION) EVERY 6 HOURS PRN
Status: DISCONTINUED | OUTPATIENT
Start: 2020-03-19 | End: 2020-03-22 | Stop reason: HOSPADM

## 2020-03-19 RX ORDER — PROPOFOL 10 MG/ML
INJECTION, EMULSION INTRAVENOUS PRN
Status: DISCONTINUED | OUTPATIENT
Start: 2020-03-19 | End: 2020-03-19 | Stop reason: SDUPTHER

## 2020-03-19 RX ORDER — SODIUM CHLORIDE, SODIUM LACTATE, POTASSIUM CHLORIDE, CALCIUM CHLORIDE 600; 310; 30; 20 MG/100ML; MG/100ML; MG/100ML; MG/100ML
INJECTION, SOLUTION INTRAVENOUS CONTINUOUS PRN
Status: DISCONTINUED | OUTPATIENT
Start: 2020-03-19 | End: 2020-03-19 | Stop reason: SDUPTHER

## 2020-03-19 RX ORDER — ONDANSETRON 2 MG/ML
4 INJECTION INTRAMUSCULAR; INTRAVENOUS EVERY 6 HOURS PRN
Status: DISCONTINUED | OUTPATIENT
Start: 2020-03-19 | End: 2020-03-22 | Stop reason: HOSPADM

## 2020-03-19 RX ORDER — AMLODIPINE BESYLATE 10 MG/1
10 TABLET ORAL DAILY
Status: DISCONTINUED | OUTPATIENT
Start: 2020-03-19 | End: 2020-03-22 | Stop reason: HOSPADM

## 2020-03-19 RX ORDER — BENZONATATE 100 MG/1
100 CAPSULE ORAL 3 TIMES DAILY PRN
Status: DISCONTINUED | OUTPATIENT
Start: 2020-03-19 | End: 2020-03-22 | Stop reason: HOSPADM

## 2020-03-19 RX ORDER — IPRATROPIUM BROMIDE AND ALBUTEROL SULFATE 2.5; .5 MG/3ML; MG/3ML
1 SOLUTION RESPIRATORY (INHALATION) 4 TIMES DAILY
Status: DISCONTINUED | OUTPATIENT
Start: 2020-03-19 | End: 2020-03-22 | Stop reason: HOSPADM

## 2020-03-19 RX ORDER — NICOTINE 21 MG/24HR
1 PATCH, TRANSDERMAL 24 HOURS TRANSDERMAL DAILY PRN
Status: DISCONTINUED | OUTPATIENT
Start: 2020-03-19 | End: 2020-03-22 | Stop reason: HOSPADM

## 2020-03-19 RX ORDER — PANTOPRAZOLE SODIUM 40 MG/10ML
40 INJECTION, POWDER, LYOPHILIZED, FOR SOLUTION INTRAVENOUS DAILY
Status: DISCONTINUED | OUTPATIENT
Start: 2020-03-19 | End: 2020-03-19

## 2020-03-19 RX ORDER — PROMETHAZINE HYDROCHLORIDE 25 MG/1
12.5 TABLET ORAL EVERY 6 HOURS PRN
Status: DISCONTINUED | OUTPATIENT
Start: 2020-03-19 | End: 2020-03-22 | Stop reason: HOSPADM

## 2020-03-19 RX ORDER — ACETAMINOPHEN 325 MG/1
650 TABLET ORAL EVERY 6 HOURS PRN
Status: DISCONTINUED | OUTPATIENT
Start: 2020-03-19 | End: 2020-03-22 | Stop reason: HOSPADM

## 2020-03-19 RX ORDER — PANTOPRAZOLE SODIUM 40 MG/10ML
40 INJECTION, POWDER, LYOPHILIZED, FOR SOLUTION INTRAVENOUS DAILY
Status: DISCONTINUED | OUTPATIENT
Start: 2020-03-20 | End: 2020-03-22

## 2020-03-19 RX ORDER — MORPHINE SULFATE 2 MG/ML
2 INJECTION, SOLUTION INTRAMUSCULAR; INTRAVENOUS EVERY 4 HOURS PRN
Status: DISCONTINUED | OUTPATIENT
Start: 2020-03-19 | End: 2020-03-22 | Stop reason: HOSPADM

## 2020-03-19 RX ORDER — POTASSIUM CHLORIDE 20 MEQ/1
40 TABLET, EXTENDED RELEASE ORAL PRN
Status: DISCONTINUED | OUTPATIENT
Start: 2020-03-19 | End: 2020-03-22 | Stop reason: HOSPADM

## 2020-03-19 RX ORDER — SODIUM CHLORIDE 0.9 % (FLUSH) 0.9 %
10 SYRINGE (ML) INJECTION EVERY 12 HOURS SCHEDULED
Status: DISCONTINUED | OUTPATIENT
Start: 2020-03-19 | End: 2020-03-22 | Stop reason: HOSPADM

## 2020-03-19 RX ORDER — NICOTINE POLACRILEX 4 MG
15 LOZENGE BUCCAL PRN
Status: DISCONTINUED | OUTPATIENT
Start: 2020-03-19 | End: 2020-03-22 | Stop reason: HOSPADM

## 2020-03-19 RX ORDER — POTASSIUM CHLORIDE 7.45 MG/ML
10 INJECTION INTRAVENOUS PRN
Status: DISCONTINUED | OUTPATIENT
Start: 2020-03-19 | End: 2020-03-22 | Stop reason: HOSPADM

## 2020-03-19 RX ORDER — ACETAMINOPHEN 650 MG/1
650 SUPPOSITORY RECTAL EVERY 6 HOURS PRN
Status: DISCONTINUED | OUTPATIENT
Start: 2020-03-19 | End: 2020-03-22 | Stop reason: HOSPADM

## 2020-03-19 RX ADMIN — PROPOFOL 100 MG: 10 INJECTION, EMULSION INTRAVENOUS at 15:22

## 2020-03-19 RX ADMIN — AMLODIPINE BESYLATE 10 MG: 10 TABLET ORAL at 20:07

## 2020-03-19 RX ADMIN — LIDOCAINE HYDROCHLORIDE 50 MG: 10 INJECTION, SOLUTION EPIDURAL; INFILTRATION; INTRACAUDAL; PERINEURAL at 15:26

## 2020-03-19 RX ADMIN — Medication 125 MG: at 11:48

## 2020-03-19 RX ADMIN — METRONIDAZOLE 500 MG: 500 INJECTION, SOLUTION INTRAVENOUS at 11:49

## 2020-03-19 RX ADMIN — PANTOPRAZOLE SODIUM 40 MG: 40 INJECTION, POWDER, FOR SOLUTION INTRAVENOUS at 08:21

## 2020-03-19 RX ADMIN — VANCOMYCIN HYDROCHLORIDE 125 MG: KIT at 02:15

## 2020-03-19 RX ADMIN — LIDOCAINE HYDROCHLORIDE 50 MG: 10 INJECTION, SOLUTION EPIDURAL; INFILTRATION; INTRACAUDAL; PERINEURAL at 15:22

## 2020-03-19 RX ADMIN — SODIUM CHLORIDE 80 MG: 9 INJECTION, SOLUTION INTRAVENOUS at 03:22

## 2020-03-19 RX ADMIN — SODIUM CHLORIDE: 9 INJECTION, SOLUTION INTRAVENOUS at 17:29

## 2020-03-19 RX ADMIN — NYSTATIN 500000 UNITS: 100000 SUSPENSION ORAL at 08:21

## 2020-03-19 RX ADMIN — IPRATROPIUM BROMIDE AND ALBUTEROL SULFATE 3 ML: 2.5; .5 SOLUTION RESPIRATORY (INHALATION) at 19:46

## 2020-03-19 RX ADMIN — SODIUM CHLORIDE 10 ML: 9 INJECTION, SOLUTION INTRAMUSCULAR; INTRAVENOUS; SUBCUTANEOUS at 08:21

## 2020-03-19 RX ADMIN — Medication 10 ML: at 08:39

## 2020-03-19 RX ADMIN — SODIUM CHLORIDE 1000 ML: 9 INJECTION, SOLUTION INTRAVENOUS at 03:22

## 2020-03-19 RX ADMIN — SODIUM CHLORIDE 1000 ML: 9 INJECTION, SOLUTION INTRAVENOUS at 02:16

## 2020-03-19 RX ADMIN — Medication 10 ML: at 20:08

## 2020-03-19 RX ADMIN — SODIUM CHLORIDE, POTASSIUM CHLORIDE, SODIUM LACTATE AND CALCIUM CHLORIDE: 600; 310; 30; 20 INJECTION, SOLUTION INTRAVENOUS at 15:16

## 2020-03-19 RX ADMIN — NYSTATIN 500000 UNITS: 100000 SUSPENSION ORAL at 20:07

## 2020-03-19 RX ADMIN — NYSTATIN 500000 UNITS: 100000 SUSPENSION ORAL at 17:31

## 2020-03-19 RX ADMIN — Medication 125 MG: at 08:21

## 2020-03-19 RX ADMIN — METRONIDAZOLE 500 MG: 500 INJECTION, SOLUTION INTRAVENOUS at 03:22

## 2020-03-19 RX ADMIN — SODIUM CHLORIDE: 9 INJECTION, SOLUTION INTRAVENOUS at 05:37

## 2020-03-19 RX ADMIN — Medication 125 MG: at 17:30

## 2020-03-19 RX ADMIN — BUDESONIDE AND FORMOTEROL FUMARATE DIHYDRATE 2 PUFF: 160; 4.5 AEROSOL RESPIRATORY (INHALATION) at 20:07

## 2020-03-19 RX ADMIN — LISINOPRIL 10 MG: 10 TABLET ORAL at 20:07

## 2020-03-19 RX ADMIN — PREGABALIN 200 MG: 100 CAPSULE ORAL at 20:07

## 2020-03-19 RX ADMIN — IPRATROPIUM BROMIDE AND ALBUTEROL SULFATE 3 ML: 2.5; .5 SOLUTION RESPIRATORY (INHALATION) at 08:01

## 2020-03-19 RX ADMIN — IPRATROPIUM BROMIDE AND ALBUTEROL SULFATE 3 ML: 2.5; .5 SOLUTION RESPIRATORY (INHALATION) at 11:29

## 2020-03-19 RX ADMIN — LIDOCAINE HYDROCHLORIDE 50 MG: 10 INJECTION, SOLUTION EPIDURAL; INFILTRATION; INTRACAUDAL; PERINEURAL at 15:31

## 2020-03-19 RX ADMIN — METRONIDAZOLE 500 MG: 500 INJECTION, SOLUTION INTRAVENOUS at 17:32

## 2020-03-19 ASSESSMENT — PULMONARY FUNCTION TESTS
PIF_VALUE: 1

## 2020-03-19 ASSESSMENT — ENCOUNTER SYMPTOMS
TROUBLE SWALLOWING: 0
NAUSEA: 0
DIARRHEA: 0
SHORTNESS OF BREATH: 0
SHORTNESS OF BREATH: 1
ANAL BLEEDING: 0
APNEA: 0
DIARRHEA: 1
SHORTNESS OF BREATH: 1
WHEEZING: 0
ABDOMINAL DISTENTION: 0
CHOKING: 0
BACK PAIN: 1
VOMITING: 0
COUGH: 1
BLOOD IN STOOL: 1
STRIDOR: 0
CONSTIPATION: 0
SORE THROAT: 0
BACK PAIN: 1
BACK PAIN: 0
ABDOMINAL PAIN: 0
COUGH: 0
VOICE CHANGE: 0

## 2020-03-19 ASSESSMENT — PAIN SCALES - GENERAL
PAINLEVEL_OUTOF10: 0
PAINLEVEL_OUTOF10: 0

## 2020-03-19 ASSESSMENT — COPD QUESTIONNAIRES: CAT_SEVERITY: NO INTERVAL CHANGE

## 2020-03-19 NOTE — OP NOTE
at this time. While withdrawing the scope the above findings were verified and the scope was removed. The patient has tolerated the procedure without unusual events. Recommendations/Plan:   1. F/U Biopsies  2. F/U In Office as instructed  3.  Discussed with the family                   Electronically signed by Jayce Castelan MD  on 3/19/2020 at 3:49 PM

## 2020-03-19 NOTE — PLAN OF CARE
Problem: Falls - Risk of:  Goal: Will remain free from falls  Description: Will remain free from falls  3/19/2020 1700 by Chuyita Boone RN  Outcome: Ongoing  3/19/2020 0534 by Al Mcelroy RN  Outcome: Ongoing  Goal: Absence of physical injury  Description: Absence of physical injury  3/19/2020 1700 by Chuyita Boone RN  Outcome: Ongoing  3/19/2020 0534 by Al Mcelroy RN  Outcome: Ongoing     Problem: Bleeding:  Goal: Will show no signs and symptoms of excessive bleeding  Description: Will show no signs and symptoms of excessive bleeding  3/19/2020 1700 by Chuyita Boone RN  Outcome: Ongoing  3/19/2020 0534 by Al Mcelroy RN  Outcome: Ongoing

## 2020-03-19 NOTE — CONSULTS
GI Consult Note:    Name: Nuria Clemente  MRN: [id number]     Acct: [de-identified]  Room: [facility] ENDO Pool/NONE    Admit Date: 3/19/2020  PCP: Marcello Antonio MD    Physician Requesting Consult: Kj Harvey MD     Reason for Consult: Melanotic stools. Chief Complaint:     Chief Complaint   Patient presents with    Loss of Consciousness       History Obtained From:     patient, electronic medical record, nursing staff. History of Present Illness:      Nuria Clemente is a  71 y.o.  male who presents with Loss of Consciousness      Symptoms:  Patient seen at [facility].  He is admitted to the hospital with a history of melanotic stools, dizziness, weakness, questionable symptom of passing out. Looks like this patient was in the hospital up to last week for treatment of pneumonia. At the time of discharge he was doing reasonably well. He is symptom of shortness of breath cough resolved. While at home, prior to admission for couple of days he noticed melanotic stools. The day of admission he had several episodes of liquid melanotic stools. At that time he felt weak tired and dizzy. Subsequently he was brought to the hospital and got admitted. His stool was positive for occult blood. His hemoglobin decreased to 9 g. Used to run around 14 g in the past.  Today he had 1 bowel movement, dark color. He denies abdominal pain. No nausea vomiting. He does have chronic GERD. Denies taking NSAIDs. He was on antiplatelet therapy at home. At present he was taking Lovenox. Has multiple medical issues reviewed as written in the chart. Also discussed with admitting physician and nursing staff. His labs reviewed. Stool is positive for C. difficile. And being treated accordingly.   Past Medical History:     Past Medical History:   Diagnosis Date    Acid reflux     Arthritis     Chronic bilateral low back pain with bilateral sciatica 11/3/2016    Complete tear of left [address] [facility] CARDIAC CATHETERIZATION  2007    no stents    CAROTID ENDARTERECTOMY Right 12/16/2019    Dr. Andujar Deep Bilateral     KNEE SURGERY Left     LUMBAR LAMINECTOMY  01/05/2017    L2-L4    GA CLOSED RX SHLDR DISLOC,ANESTHESIA Left 8/1/2018    SHOULDER CLOSED REDUCTION WITH C-ARM VISUALIZATION performed by Cruz Dixon MD at [address] [facility] W/BIOPSY SINGLE/MULTIPLE N/A 5/9/2017    COLONOSCOPY WITH BIOPSY performed by Phil Forte DO at [address] [facility] IMPLANT Left 7/18/2018    SHOULDER TOTAL ARTHROPLASTY REVERSE LEFT DJO & BICEP TENDON TRANSFER performed by Cruz Dixon MD at [address] HUMERAL/GLENOID COMPNT Left 8/3/2018    SHOULDER TOTAL REVERSE  ARTHROPLASTY REVISION performed by Cruz Dixon MD at . [address]? rotator cuff repair    TONSILLECTOMY AND ADENOIDECTOMY          Medications Prior to Admission:       Prior to Admission medications    Medication Sig Start Date End Date Taking?  Authorizing Provider   SYMBICORT 160-4.5 MCG/ACT AERO Inhale 2 puffs into the lungs 2 times daily 10.2 inhaler 3/18/20  Yes Sergio Hall MD   nystatin (MYCOSTATIN) 391863 UNIT/ML suspension Take 5 mLs by mouth 4 times daily Swish and swallow 3/18/20  Yes Sergio Hall MD   omeprazole (PRILOSEC) 40 MG delayed release capsule Take 1 capsule by mouth every morning (before breakfast) 3/18/20  Yes Sergio Hall MD   lisinopril (PRINIVIL;ZESTRIL) 10 MG tablet Take 1 tablet by mouth daily Dose decreased 3/18/2020 3/18/20  Yes Sergio Hall MD   metroNIDAZOLE (FLAGYL) 500 MG tablet Take 1 tablet by mouth every 8 hours for 14 days 3/18/20 4/1/20 Yes Sergio Hall MD   Probiotic Acidophilus (FLORANEX) TABS Take 1 tablet by mouth 2 times daily 3/18/20 4/17/20 Yes Sergio Hall MD   predniSONE (DELTASONE) 20 MG tablet Take 1 tablet by mouth daily for 3 days 3/16/20 3/19/20 Yes Susan Coleman MD   ipratropium-albuterol (DUONEB) 0.5-2.5 (3) MG/3ML SOLN nebulizer solution Inhale 3 mLs into the lungs 4 times daily 3/16/20 4/15/20 Yes Susan Coleman MD   clopidogrel (PLAVIX) 75 MG tablet Take 75 mg by mouth daily   Yes Adriano Aden MD   amLODIPine (NORVASC) 10 MG tablet Take 1 tablet by mouth daily 1/28/20  Yes Marcello Antonio MD   furosemide (LASIX) 20 MG tablet Take 1 tablet by mouth daily as needed (only if BP over 140/90 or leg edema) **stop Tenoretic 50-25** 1/28/20  Yes Marcello Antonio MD   pregabalin (LYRICA) 200 MG capsule Take 1 capsule by mouth 3 times daily for 90 days. 1/20/20 4/19/20 Yes Marcello Antonio MD   pravastatin (PRAVACHOL) 80 MG tablet Take 1 tablet by mouth every evening Dose increased  9/24/2019 9/24/19  Yes Marcello Antonio MD   folbee plus (FOLBEE PLUS) TABS Take 1 tablet by mouth daily 1/21/19  Yes Marcello Antonio MD   aspirin EC 81 MG EC tablet Take 1 tablet by mouth daily 9/4/18  Yes Marcello Antonio MD   colchicine (COLCRYS) 0.6 MG tablet ADJUST SIG-ONLY AS NEEDED FOR GOUT ATTACK. Take 2 tablets now, then 1 tablet one hour later. Wait 12 hours then start 1 tablet bid for 5 days. Patient taking differently: Take 0.6 mg by mouth daily  4/11/17  Yes Marcello Antonio MD   albuterol sulfate  (90 BASE) MCG/ACT inhaler Inhale 2 puffs into the lungs every 6 hours as needed for Wheezing or Shortness of Breath (COUGH) 3/23/17  Yes Marcello Antonio MD   predniSONE (DELTASONE) 5 MG tablet Take 3 tablets by mouth daily for 3 days 3/19/20 3/22/20  Susan Coleman MD   predniSONE (DELTASONE) 10 MG tablet Take 1 tablet by mouth daily for 3 days 3/22/20 3/25/20  Susan Coleman MD   predniSONE (DELTASONE) 5 MG tablet Take 1 tablet by mouth daily for 7 days 3/25/20 4/1/20  Susan Coleman MD   traMADol (ULTRAM) 50 MG tablet Take 1-2 tablets by mouth every 6 hours as needed for Pain.  2/19/20 2/18/21  Amy Bailey MD Camphor-Menthol-Methyl Sal 3.1-16-10 % GEL Apply every 8 hours as needed for pain. OK to substitute 4/9/19   Maikel Obregon MD   Lift Chair MISC by Does not apply route Patient has difficulty getting up from usual chair 4/9/19   Maikel bOregon MD   Glucose Blood (BLOOD GLUCOSE TEST STRIPS) STRP tesing once a day fasting 3/1/18   Maikel Obregon MD        Allergies:       Succinylcholine chloride and Cymbalta [duloxetine hcl]    Social History:     Tobacco:    reports that he quit smoking 10 days ago. His smoking use included cigarettes. He has a 52.50 pack-year smoking history. He has never used smokeless tobacco.  Alcohol:      reports no history of alcohol use. Drug Use: reports no history of drug use. Family History:     Family History   Problem Relation Age of Onset    Diabetes Mother     Lung Cancer Brother     Liver Cancer Brother     Cancer Father        Review of Systems:     Review of Systems   Constitutional: Positive for appetite change and fatigue. Negative for fever. HENT: Negative for mouth sores, sore throat, trouble swallowing and voice change. Eyes:        No ictirus   Respiratory: Negative for apnea, cough, choking, shortness of breath and wheezing. Cardiovascular: Negative for chest pain, palpitations and leg swelling. Gastrointestinal: Positive for blood in stool and diarrhea. Negative for abdominal distention, abdominal pain, anal bleeding, nausea and vomiting. Endocrine: Negative for polydipsia, polyphagia and polyuria. Genitourinary: Negative for frequency, hematuria and urgency. Musculoskeletal: Positive for back pain, gait problem and myalgias. Negative for arthralgias and joint swelling. Skin: Negative for pallor and rash. Allergic/Immunologic: Negative for food allergies. Neurological: Positive for dizziness and weakness. Negative for seizures and headaches. Hematological: Negative for adenopathy. Does not bruise/bleed easily. Psychiatric/Behavioral: Negative for sleep disturbance. The patient is nervous/anxious. Code Status:  Full Code    Physical Exam:     Vitals:  /68   Pulse 78   Temp 98.1 °F (36.7 °C) (Oral)   Resp 18   Ht 6' (1.829 m)   Wt 257 lb 8 oz (116.8 kg)   SpO2 97%   BMI 34.92 kg/m²   Temp (24hrs), Av °F (36.7 °C), Min:97.6 °F (36.4 °C), Max:98.3 °F (36.8 °C)      Physical Exam  Vitals signs and nursing note reviewed. Constitutional:       General: He is not in acute distress. Appearance: He is well-developed. HENT:      Head: Normocephalic and atraumatic. Mouth/Throat:      Pharynx: No oropharyngeal exudate. Eyes:      General: No scleral icterus. Conjunctiva/sclera: Conjunctivae normal.      Pupils: Pupils are equal, round, and reactive to light. Neck:      Musculoskeletal: Normal range of motion and neck supple. Thyroid: No thyromegaly. Trachea: No tracheal deviation. Cardiovascular:      Rate and Rhythm: Normal rate and regular rhythm. Heart sounds: Normal heart sounds. No murmur. Pulmonary:      Effort: Pulmonary effort is normal. No respiratory distress. Breath sounds: Normal breath sounds. No wheezing or rales. Abdominal:      General: Bowel sounds are normal. There is no distension. Palpations: Abdomen is soft. There is no hepatomegaly or mass. Tenderness: There is no abdominal tenderness. There is no guarding. Hernia: No hernia is present. Comments: No peripheral signs of ch. Liver disease   Genitourinary:     Rectum: Normal.   Lymphadenopathy:      Cervical: No cervical adenopathy. Skin:     General: Skin is warm and dry. Findings: No erythema or rash. Neurological:      Mental Status: He is alert and oriented to person, place, and time. Cranial Nerves: No cranial nerve deficit. Psychiatric:         Thought Content:  Thought content normal.       Data:   CBC:   Lab Results   Component Value Date    WBC 24.5 collapse    Active Hospital Problems    Diagnosis Date Noted    Severe obesity (BMI 35.0-35.9 with comorbidity) ([facility] [E66.01, Z68.35] 02/02/2020     Priority: High    REYNALDO on CPAP [G47.33, Z99.89] 05/06/2017     Priority: High    Syncope and collapse [R55] 03/19/2020    GI bleed [K92.2] 03/19/2020    C. difficile colitis [A04.72] 03/19/2020    Type 2 diabetes mellitus with diabetic polyneuropathy, without long-term current use of insulin ([facility] [E11.42] 03/25/2017       Plan:     1. Patient appears stable. 2. He is known to have chronic anemia. 3. Melanotic stools etiology not clear. Need to evaluate upper GI issues. 4. At present being treated as C. difficile diarrhea. His cardiopulmonary status stable for the procedure. Discussed with the patient, explained regarding EGD procedure, risks and benefits. He understood and verbalized consent. Thank you for allowing me to participate in the care of your patient. Please feel free to contact me with anyquestions or concerns. Electronically signed by Dimas Hair MD on 3/19/2020 at 3:04 PM     Copy sent to Dr. Julissa Vinson MD    Note is dictated utilizing voice recognition software. Unfortunately this leads to occasional typographical errors. Please contact our office if you have any questions.

## 2020-03-19 NOTE — PROGRESS NOTES
Pt arrived to floor via stretcher from ED and was transfered to bed. Vitals taken. Admission and assessment complete. Heart monitor applied. Call light within reach, and pt educated on its use. Bed in lowest position, and locked.

## 2020-03-19 NOTE — ANESTHESIA PRE PROCEDURE
Department of Anesthesiology  Preprocedure Note       Name:  Elida Hunt   Age:  71 y.o.  :  1955                                          MRN:  [id number]         Date:  3/19/2020      Surgeon: Hanna Doe):  Marlo De Jesus MD    Procedure: EGD ESOPHAGOGASTRODUODENOSCOPY (N/A Esophagus)    Medications prior to admission:   Prior to Admission medications    Medication Sig Start Date End Date Taking?  Authorizing Provider   SYMBICORT 160-4.5 MCG/ACT AERO Inhale 2 puffs into the lungs 2 times daily 10.2 inhaler 3/18/20  Yes Donovan Oakley MD   nystatin (MYCOSTATIN) 568205 UNIT/ML suspension Take 5 mLs by mouth 4 times daily Swish and swallow 3/18/20  Yes Donovan Oakley MD   omeprazole (PRILOSEC) 40 MG delayed release capsule Take 1 capsule by mouth every morning (before breakfast) 3/18/20  Yes Donovan Oakley MD   lisinopril (PRINIVIL;ZESTRIL) 10 MG tablet Take 1 tablet by mouth daily Dose decreased 3/18/2020 3/18/20  Yes Donovan Oakley MD   metroNIDAZOLE (FLAGYL) 500 MG tablet Take 1 tablet by mouth every 8 hours for 14 days 3/18/20 4/1/20 Yes Donovan Oakley MD   Probiotic Acidophilus [facility]) TABS Take 1 tablet by mouth 2 times daily 3/18/20 4/17/20 Yes Donovan Oakley MD   predniSONE (DELTASONE) 20 MG tablet Take 1 tablet by mouth daily for 3 days 3/16/20 3/19/20 Yes Mariel Garland MD   ipratropium-albuterol (DUONEB) 0.5-2.5 (3) MG/3ML SOLN nebulizer solution Inhale 3 mLs into the lungs 4 times daily 3/16/20 4/15/20 Yes Mariel Garland MD   clopidogrel (PLAVIX) 75 MG tablet Take 75 mg by mouth daily   Yes Historical Provider, MD   amLODIPine (NORVASC) 10 MG tablet Take 1 tablet by mouth daily 20  Yes Donovan Oakley MD   furosemide (LASIX) 20 MG tablet Take 1 tablet by mouth daily as needed (only if BP over 140/90 or leg edema) **stop Tenoretic 50-25** 20  Yes Donovan Oakley MD   pregabalin (LYRICA) 200 MG capsule Take 1 capsule by mouth 3 times daily for 90 days. 1/20/20 4/19/20 Yes Kiya Bolanos MD   pravastatin (PRAVACHOL) 80 MG tablet Take 1 tablet by mouth every evening Dose increased  9/24/2019 9/24/19  Yes Kiya Bolanos MD   folbee plus (FOLBEE PLUS) TABS Take 1 tablet by mouth daily 1/21/19  Yes Kiya Bolanos MD   aspirin EC 81 MG EC tablet Take 1 tablet by mouth daily 9/4/18  Yes Kiya Bolanos MD   colchicine (COLCRYS) 0.6 MG tablet ADJUST SIG-ONLY AS NEEDED FOR GOUT ATTACK. Take 2 tablets now, then 1 tablet one hour later. Wait 12 hours then start 1 tablet bid for 5 days. Patient taking differently: Take 0.6 mg by mouth daily  4/11/17  Yes Kiya Bolanos MD   albuterol sulfate  (90 BASE) MCG/ACT inhaler Inhale 2 puffs into the lungs every 6 hours as needed for Wheezing or Shortness of Breath (COUGH) 3/23/17  Yes Kiya Bolanos MD   predniSONE (DELTASONE) 5 MG tablet Take 3 tablets by mouth daily for 3 days 3/19/20 3/22/20  Jeremy Mejía MD   predniSONE (DELTASONE) 10 MG tablet Take 1 tablet by mouth daily for 3 days 3/22/20 3/25/20  Jeremy Mejía MD   predniSONE (DELTASONE) 5 MG tablet Take 1 tablet by mouth daily for 7 days 3/25/20 4/1/20  Jeremy Mejía MD   traMADol (ULTRAM) 50 MG tablet Take 1-2 tablets by mouth every 6 hours as needed for Pain. 2/19/20 2/18/21  Diana Blake MD   Camphor-Menthol-Methyl Sal 3.1-16-10 % GEL Apply every 8 hours as needed for pain.  OK to substitute 4/9/19   Kiya Bolanos MD   Lift Chair MISC by Does not apply route Patient has difficulty getting up from usual chair 4/9/19   Kiya Bolanos MD   Glucose Blood (BLOOD GLUCOSE TEST STRIPS) STRP tesing once a day fasting 3/1/18   Kiya Bolanos MD       Current medications:    Current Facility-Administered Medications   Medication Dose Route Frequency Provider Last Rate Last Dose    [MAR Hold] albuterol sulfate  (90 Base) MCG/ACT inhaler 2 puff  2 puff Inhalation Q6H PRN Christopher Moore, APRN - CNP  [MAR Hold] ipratropium-albuterol (DUONEB) nebulizer solution 3 mL  1 vial Inhalation 4x Daily JAVI Nevarez - CNP   3 mL at 03/19/20 1129    [MAR Hold] nystatin (MYCOSTATIN) 821383 UNIT/ML suspension 500,000 Units  500,000 Units Oral 4x Daily JAVI Nevarez - CNP   500,000 Units at 03/19/20 0821    [MAR Hold] budesonide-formoterol (SYMBICORT) 160-4.5 MCG/ACT inhaler 2 puff  2 puff Inhalation BID JAVI Nevarez - CNP        [MAR Hold] metronidazole (FLAGYL) 500 mg in NaCl 100 mL IVPB premix  500 mg Intravenous Q8H JAVI Nevarez CNP   Stopped at 03/19/20 1249    [MAR Hold] vancomycin (FIRVANQ) 50 MG/ML oral solution 125 mg  125 mg Oral 4 times per day JAVI Nevarez - CNP   125 mg at 03/19/20 1148    [MAR Hold] 0.9 % sodium chloride infusion   Intravenous Continuous JAVI Nevarez -  mL/hr at 03/19/20 0537      [MAR Hold] sodium chloride flush 0.9 % injection 10 mL  10 mL Intravenous 2 times per day JAVI Nevarez - CNP   10 mL at 03/19/20 0839    [MAR Hold] sodium chloride flush 0.9 % injection 10 mL  10 mL Intravenous PRN JAVI Nevarez - CNP        Yakov Laity Hold] potassium chloride (KLOR-CON M) extended release tablet 40 mEq  40 mEq Oral PRN JAVI Nevarez - CNP        Or    Yakov Laity Hold] potassium bicarb-citric acid (EFFER-K) effervescent tablet 40 mEq  40 mEq Oral PRN JAVI Nevarez - CNP        Or   [facility] Yakov Laity Hold] potassium chloride 10 mEq/100 mL IVPB (Peripheral Line)  10 mEq Intravenous PRN JAVI Nevarez - CNP        Yakov Laity Hold] magnesium sulfate 1 g in dextrose 5% 100 mL IVPB  1 g Intravenous PRN Christopher Moore APRN - CNP        [MAR Hold] acetaminophen (TYLENOL) tablet 650 mg  650 mg Oral Q6H PRN JAVI Nevarez - CNP        Or    [MAR Hold] acetaminophen (TYLENOL) suppository 650 mg  650 mg Rectal Q6H PRN Christopher Moore, JAVI - JADEN        [MAR Hold] promethazine (PHENERGAN) tablet 12.5 mg  12.5 mg Oral Q6H PRN Christopher Mooer, APRN - CNP        Or DDD (degenerative disc disease), lumbar M51.36    Chronic bilateral low back pain with bilateral sciatica M54.42, M54.41, G89.29    Osteoarthritis of spine with radiculopathy, lumbar region M47.26    Type 2 diabetes mellitus with diabetic polyneuropathy, without long-term current use of insulin ([facility]) E11.42    PVD (peripheral vascular disease) with claudication ([facility]) I73.9    Peripheral neuropathic pain M79.2    Tinea pedis of both feet B35.3    Hyperglycemia R73.9    Nonspecific reactive hepatitis K75.2    COPD mixed type ([address] J44.9    Status post lumbar laminectomy Z98.890    Serum cholinesterase defect ([facility]) E88.89    Vitamin D deficiency E55.9    Hypercalcemia E83.52    Hypertriglyceridemia E78.1    REYNALDO on CPAP G47.33, Z99.89    Tubular adenoma of colon 4/11/17 D12.6    Psychophysiological insomnia F51.04    S/P reverse total shoulder arthroplasty, left Z96.612    Recurrent major depressive disorder, in full remission ([facility]) F33.42    Actinic keratosis L57.0    Photodermatitis due to sun L56.8    Seborrheic keratosis L82.1    Senile hyperkeratosis L57.0    Solar degeneration L57.8    Abdominal aortic aneurysm ([facility]) I71.4    Peripheral arterial occlusive disease ([facility]) I77.9    Anemia D64.9    Unintended weight loss R63.4    Family history of cancer Z80.9    Stenosis of left carotid artery I65.22    Poor balance R26.89    Foot drop, right M21.371    Difficulty rising from chair R68.89    Degenerative disc disease, cervical M50.30    Chronic neck pain M54.2, G89.29    At high risk for falls Z91.81    Pseudocholinesterase deficiency E88.09    Hyperparathyroidism ([facility]) E21.3    Coronary artery disease involving native coronary artery of native heart without angina pectoris I25.10    Severe obesity (BMI 35.0-35.9 with comorbidity) ([facility]) E66.01, Z68.35    Pneumonia J18.9    Status post total shoulder arthroplasty, left Z96.612    Tobacco use disorder F17.200    Chronic hypoxemic respiratory failure (HCC) J96.11    Syncope and collapse R55    GI bleed K92.2    C. difficile colitis A04.72    Acute diarrhea R19.7    Melena K92.1    Thrush B37.0       Past Medical History:        Diagnosis Date    Acid reflux     Arthritis     Chronic bilateral low back pain with bilateral sciatica 11/3/2016    Complete tear of left rotator cuff 7/18/2018    COPD (chronic obstructive pulmonary disease) ([address]     Coronary artery disease involving native coronary artery of native heart without angina pectoris 2/2/2020    Degenerative disc disease, cervical 7/28/2019    Gout     H/O cardiac catheterization     yrs ago   no stents    Hyperlipidemia     Hyperlipidemia with target LDL less than 100 1/20/2016    Hyperparathyroidism ([address] 1/17/2020    Hypertension     Knee pain, chronic     left    Liver disease     MDRO (multiple drug resistant organisms) resistance     Moderate episode of recurrent major depressive disorder ([address] 1/20/2016    Obesity, Class III, BMI 40-49.9 (morbid obesity) ([address] 1/20/2016    REYNALDO on CPAP 5/6/2017    Osteoarthritis     Polypharmacy 3/25/2017    Positive FIT (fecal immunochemical test) 4/11/2017    Prolonged emergence from general anesthesia 01/05/2017    Patient \"on life support for 3 days\" after back surgery due to being given succinylcholine    Prostate cancer ([address] 10/2013    finished radiation tx 5/2014    Pseudocholinesterase deficiency 03/25/2017    Pt.  \"on life support\" for 3 days after back surgery 1/5/17 after being given succinylcholine    Severe obesity (BMI 35.0-35.9 with comorbidity) ([address] 2/2/2020    Short of breath on exertion     Sleep apnea     uses CPAP machine nightly    Status post lumbar laminectomy 3/25/2017    Stenosis of left carotid artery 10/7/2018    Tubular adenoma of colon 4/11/17 5/13/2017    Type 2 diabetes mellitus with hyperglycemia, without long-term current use of insulin ([address] 3/25/2017    Lab Results Component Value Date  LABA1C 7.1 (H) 2017     Vitamin D deficiency 3/25/2017    Wears glasses        Past Surgical History:        Procedure Laterality Date    BACK SURGERY      x 2     BACK SURGERY  2017    lumbar laminectomy L2, L3, L4    BRONCHOSCOPY N/A 3/13/2020    BRONCHOSCOPY performed by Tracy Hurd MD at [facility]  2007    no stents    CAROTID ENDARTERECTOMY Right 2019    Dr. Banks Para Bilateral     KNEE SURGERY Left     LUMBAR LAMINECTOMY  2017    L2-L4    AR CLOSED RX SHLDR DISLOC,ANESTHESIA Left 2018    SHOULDER CLOSED REDUCTION WITH C-ARM VISUALIZATION performed by Sterling Hubbard MD at [facility] W/BIOPSY SINGLE/MULTIPLE N/A 2017    COLONOSCOPY WITH BIOPSY performed by Jaimie Prince DO at [address] IMPLANT Left 2018    SHOULDER TOTAL ARTHROPLASTY REVERSE LEFT DJO & BICEP TENDON TRANSFER performed by Sterling Hubbard MD at [address] HUMERAL/GLENOID COMPNT Left 8/3/2018    SHOULDER TOTAL REVERSE  ARTHROPLASTY REVISION performed by Sterling Hubbard MD at [address]     rotator cuff repair    TONSILLECTOMY AND ADENOIDECTOMY         Social History:    Social History     Tobacco Use    Smoking status: Former Smoker     Packs/day: 1.50     Years: 35.00     Pack years: 52.50     Types: Cigarettes     Last attempt to quit: 3/9/2020     Years since quittin.0    Smokeless tobacco: Never Used    Tobacco comment: WAS SMOKING 1-1.5 ppd   Substance Use Topics    Alcohol use: No     Alcohol/week: 0.0 standard drinks                                Counseling given: Not Answered  Comment: WAS SMOKING 1-1.5 ppd      Vital Signs (Current):   Vitals:    20 0816 20 1130 20 1501 20 1504   BP: 118/63  136/68 107/68   Pulse: 72  78    Resp: 16  18    Temp: 98.3 °F (36.8 °C)  98.1 °F (36.7 °C)    TempSrc: Oral  Oral    SpO2:  95% 97%    Weight:       Height:                                                  BP Readings from Last 3 Encounters:   03/19/20 107/68   03/18/20 117/66   03/16/20 (!) 129/51       NPO Status: Time of last liquid consumption: 2359                        Time of last solid consumption: 1700                        Date of last liquid consumption: 03/18/20                        Date of last solid food consumption: 03/18/20    BMI:   Wt Readings from Last 3 Encounters:   03/19/20 257 lb 8 oz (116.8 kg)   03/18/20 281 lb 9.6 oz (127.7 kg)   03/16/20 289 lb 3 oz (131.2 kg)     Body mass index is 34.92 kg/m².     CBC:   Lab Results   Component Value Date    WBC 24.5 03/19/2020    RBC 3.42 03/19/2020    RBC 5.38 09/30/2011    HGB 9.2 03/19/2020    HCT 28.7 03/19/2020    MCV 89.5 03/19/2020    RDW 14.8 03/19/2020     03/19/2020     05/25/2012       CMP:   Lab Results   Component Value Date     03/19/2020    K 4.9 03/19/2020     03/19/2020    CO2 22 03/19/2020    BUN 52 03/19/2020    CREATININE 0.88 03/19/2020    GFRAA >60 03/19/2020    LABGLOM >60 03/19/2020    GLUCOSE 135 03/19/2020    PROT 4.6 03/19/2020    CALCIUM 8.2 03/19/2020    BILITOT <0.15 03/19/2020    ALKPHOS 47 03/19/2020    AST 12 03/19/2020    ALT 27 03/19/2020       POC Tests:   Recent Labs     03/19/20  1155   POCGLU 92       Coags:   Lab Results   Component Value Date    PROTIME 13.1 03/19/2020    INR 1.0 03/19/2020    APTT 20.4 03/19/2020       HCG (If Applicable): No results found for: PREGTESTUR, PREGSERUM, HCG, HCGQUANT     ABGs: No results found for: PHART, PO2ART, MZN3MOM, JMD3HYZ, BEART, V8DQPTNQ     Type & Screen (If Applicable):  [facility] results found for: [facility]    Anesthesia Evaluation  Patient summary reviewed and Nursing notes reviewed history of anesthetic complications:   Airway: Mallampati: III  TM distance: >3 FB   Neck ROM: full  Mouth opening: > = 3 FB Dental:

## 2020-03-19 NOTE — ED PROVIDER NOTES
not apply route Patient has difficulty getting up from usual chair    LISINOPRIL (PRINIVIL;ZESTRIL) 10 MG TABLET    Take 1 tablet by mouth daily Dose decreased 3/18/2020    METRONIDAZOLE (FLAGYL) 500 MG TABLET    Take 1 tablet by mouth every 8 hours for 14 days    NYSTATIN (MYCOSTATIN) 871825 UNIT/ML SUSPENSION    Take 5 mLs by mouth 4 times daily Swish and swallow    OMEPRAZOLE (PRILOSEC) 40 MG DELAYED RELEASE CAPSULE    Take 1 capsule by mouth every morning (before breakfast)    PRAVASTATIN (PRAVACHOL) 80 MG TABLET    Take 1 tablet by mouth every evening Dose increased  2019    PREDNISONE (DELTASONE) 10 MG TABLET    Take 1 tablet by mouth daily for 3 days    PREDNISONE (DELTASONE) 20 MG TABLET    Take 1 tablet by mouth daily for 3 days    PREDNISONE (DELTASONE) 5 MG TABLET    Take 3 tablets by mouth daily for 3 days    PREDNISONE (DELTASONE) 5 MG TABLET    Take 1 tablet by mouth daily for 7 days    PREGABALIN (LYRICA) 200 MG CAPSULE    Take 1 capsule by mouth 3 times daily for 90 days. PROBIOTIC ACIDOPHILUS (FLORANEX) TABS    Take 1 tablet by mouth 2 times daily    SYMBICORT 160-4.5 MCG/ACT AERO    Inhale 2 puffs into the lungs 2 times daily 10.2 inhaler    TRAMADOL (ULTRAM) 50 MG TABLET    Take 1-2 tablets by mouth every 6 hours as needed for Pain. ALLERGIES     is allergic to succinylcholine chloride and cymbalta [duloxetine hcl]. FAMILY HISTORY     He indicated that his mother is alive. He indicated that his father is .      SOCIAL HISTORY       Social History     Tobacco Use    Smoking status: Former Smoker     Packs/day: 1.50     Years: 35.00     Pack years: 52.50     Types: Cigarettes     Last attempt to quit: 3/9/2020     Years since quittin.0    Smokeless tobacco: Never Used    Tobacco comment: WAS SMOKING 1-1.5 ppd   Substance Use Topics    Alcohol use: No     Alcohol/week: 0.0 standard drinks    Drug use: No     PHYSICAL EXAM     INITIAL VITALS: BP (!) 107/51   Pulse 68 limits   APTT - Abnormal; Notable for the following components:    PTT 20.4 (*)     All other components within normal limits   HEPATIC FUNCTION PANEL - Abnormal; Notable for the following components:    Alb 2.8 (*)     Total Bilirubin <0.15 (*)     Total Protein 4.6 (*)     All other components within normal limits   PROTIME-INR   MAGNESIUM   LIPASE   URINALYSIS   HEMOGLOBIN AND HEMATOCRIT, BLOOD   TYPE AND SCREEN       EMERGENCY DEPARTMENTCOURSE:   Differential diagnosis includes electrolyte abnormality, syncope, seizure, orthostatic hypotension, anemia. Clinically this sounds like an orthostatic hypotension this happened while he was having a bowel movement on the toilet. But I am also concerned for volume depletion because his blood pressure is low at 100/50.    3:09 AM EDT  Patient's LFTs are not elevated. He does have a leukocytosis of 22. His hemoglobin went from 13-9.8 from yesterday until today. Will place 2 large-bore IVs give him IV fluids give him Protonix as well as oral vancomycin and IV Flagyl. I did speak to Dr. Hero Gandhi from gastroenterology who agrees with the plan. I also spoke to [address] [place] [address] practitioner who will admit the patient for syncope hypotension and anemia with GI bleed and C. difficile colitis. I did think about giving the patient blood at this time but I want to hold off because his hemoglobin is 9.8. I spoke to Jose wu about doing serial H&H's and if his hemoglobin continues to drop then he will need blood a type and screen has been ordered. EKG: All EKG's are interpreted by the Emergency Department Physician who either signs or Co-signs this chart in the absence of a cardiologist.  Normal sinus rhythm with a heart rate of 67 bpm no prolonged intervals no acute ST or T wave changes    CONSULTS:  IP CONSULT TO GI    Vitals:    Vitals:    03/19/20 0126   BP: (!) 107/51   Pulse: 68   Resp: 16   Temp: 98.1 °F (36.7 °C)   TempSrc: Oral   SpO2: 95%   Weight: 280 lb (127 kg)   Height: 6' (1.829 m)       The patient was given the following medications while in the emergency department:  Orders Placed This Encounter   Medications    vancomycin [facility]) 50 MG/ML oral solution 125 mg    0.9 % sodium chloride bolus    pantoprazole (PROTONIX) 80 mg in sodium chloride 0.9 % 50 mL bolus    0.9 % sodium chloride bolus    metronidazole (FLAGYL) 500 mg in NaCl 100 mL IVPB premix         FINAL IMPRESSION      1. Syncope and collapse    2. Gastrointestinal hemorrhage, unspecified gastrointestinal hemorrhage type    3. C. difficile colitis    4.  Symptomatic anemia         DISPOSITION/PLAN   DISPOSITION Decision To Admit 03/19/2020 03:10:56 AM    Salvador Guo MD  Attending Emergency Physician    This charting supersedes any ED resident or staff charting and was written using speech recognition Estela Garcia MD  03/19/20 [id number]

## 2020-03-19 NOTE — ANESTHESIA POSTPROCEDURE EVALUATION
POST- ANESTHESIA EVALUATION       Pt Name: Wolfgang Castañeda  MRN: [id number]  YOB: 1955  Date of evaluation: 3/19/2020  Time:  4:24 PM      BP (!) 117/55   Pulse 71   Temp 97.8 °F (36.6 °C)   Resp 14   Ht 6' (1.829 m)   Wt 257 lb 8 oz (116.8 kg)   SpO2 96%   BMI 34.92 kg/m²      Consciousness Level  Awake  Cardiopulmonary Status  Stable  Pain Adequately Treated YES  Nausea / Vomiting  NO  Adequate Hydration  YES  Anesthesia Related Complications NONE      Electronically signed by Jay Acosta MD on 3/19/2020 at 4:24 PM       Department of Anesthesiology  Postprocedure Note    Patient: Wolfgang Castañeda  MRN: [id number]  YOB: 1955  Date of evaluation: 3/19/2020  Time:  4:24 PM     Procedure Summary     Date:  03/19/20 Room / Location:  [place] [address] 03 / [facility]    Anesthesia Start:  1518 Anesthesia Stop:  3058    Procedure:  EGD BIOPSY (N/A Esophagus) Diagnosis:  (GI BLEED)    Surgeon:  Amalia Bravo MD Responsible Provider:  Jay Acosta MD    Anesthesia Type:  MAC ASA Status:  3          Anesthesia Type: MAC    Benigno Phase I: Benigno Score: 9    Benigno Phase II:      Last vitals: Reviewed and per EMR flowsheets.        Anesthesia Post Evaluation

## 2020-03-19 NOTE — H&P
[address] [facility]     HISTORY AND PHYSICAL EXAMINATION            Date:   3/19/2020  Patientname:  Javier Mclaughlin  Date of admission:  3/19/2020  1:26 AM  MRN:   [id number]  Account:  [de-identified]  YOB: 1955  PCP:    Debbie Wang MD  Room:   2125/2125-01  Code Status:    Full Code    CHIEF COMPLAINT     Chief Complaint   Patient presents with    Loss of Consciousness       HISTORY OF PRESENT ILLNESS  (Character, Onset, Location, Duration,  Exacerbating/RelievingFactors, Radiation,   Associated Symptoms, Severity )      The patient is a 71 y.o.  male, with a history of AAA, COPD, HTN, GERD, pneumonia, and DM type II, who presents with loss of consciousness. According to patient, shortly after sitting on the commode this evening, he developed sudden numbness in his extremities and reportedly experienced a syncopal event lasting approximately 15 seconds. Wife was at patient side at time of episode; patient did not fall or hit head during incident. Patient reports that he was discharged from this facility on 3/16, following a one-week inpatient stay for the treatment of pneumonia. Symptoms are associated with frequent bloody stools and shortness of breath with activity, which started today. Denies fever, chills, chest pain, abdominal pain, nausea, vomiting, diarrhea, and urinary symptoms. There are no aggravating or alleviating factors. Symptoms are reported as constant and moderate; reports feeling much improved since arrival.    According to ED physician and EHR documentation, patient was seen by her PCP on 3/18 for hospital follow-up. Patient was sent for blood work and instructed to take stool specimen to the lab, both of which were completed. Hemoglobin dropped from 13.9 to 13.0 since 3/16. Stool tested positive for C. difficile. Patient was started on Flagyl and instructed by PCP to hold ASA, Plavix, and prednisone.   Upon arrival to ED following syncopal event, hemoglobin noted to be 9.8. PAST MEDICAL HISTORY   Patient  has a past medical history of Acid reflux, Arthritis, Chronic bilateral low back pain with bilateral sciatica, Complete tear of left rotator cuff, COPD (chronic obstructive pulmonary disease) ([address] Coronary artery disease involving native coronary artery of native heart without angina pectoris, Degenerative disc disease, cervical, Gout, H/O cardiac catheterization, Hyperlipidemia, Hyperlipidemia with target LDL less than 100, Hyperparathyroidism ([address] Hypertension, Knee pain, chronic, Liver disease, MDRO (multiple drug resistant organisms) resistance, Moderate episode of recurrent major depressive disorder ([address] Obesity, Class III, BMI 40-49.9 (morbid obesity) ([address] REYNALDO on CPAP, Osteoarthritis, Polypharmacy, Positive FIT (fecal immunochemical test), Prolonged emergence from general anesthesia, Prostate cancer ([address] Pseudocholinesterase deficiency, Severe obesity (BMI 35.0-35.9 with comorbidity) ([address] Short of breath on exertion, Sleep apnea, Status post lumbar laminectomy, Stenosis of left carotid artery, Tubular adenoma of colon 4/11/17, Type 2 diabetes mellitus with hyperglycemia, without long-term current use of insulin ([address] Vitamin D deficiency, and Wears glasses. PAST SURGICAL HISTORY    Patient  has a past surgical history that includes knee surgery (Left); lumbar laminectomy (01/05/2017); Tonsillectomy and adenoidectomy; Cardiac catheterization (2007); pr colonoscopy w/biopsy single/multiple (N/A, 5/9/2017); shoulder surgery (Right, 1989?); back surgery; back surgery (01/05/2017); Carpal tunnel release (Bilateral); pr reconstr total shoulder implant (Left, 7/18/2018); pr closed rx shldr disloc,anesthesia (Left, 8/1/2018); pr taryn shoulder arthrplsty humeral/glenoid compnt (Left, 8/3/2018); Carotid endarterectomy (Right, 12/16/2019); and bronchoscopy (N/A, 3/13/2020).      FAMILY HISTORY    Patient family history includes Cancer in his father; Diabetes in his mother; Liver Cancer in his brother; Harle Merle in his brother. SOCIAL HISTORY    Patient  reports that he quit smoking 10 days ago. His smoking use included cigarettes. He has a 52.50 pack-year smoking history. He has never used smokeless tobacco. He reports that he does not drink alcohol or use drugs. HOME MEDICATIONS        Prior to Admission medications    Medication Sig Start Date End Date Taking?  Authorizing Provider   SYMBICORT 160-4.5 MCG/ACT AERO Inhale 2 puffs into the lungs 2 times daily 10.2 inhaler 3/18/20  Yes Gerson Goetz MD   nystatin (MYCOSTATIN) 285775 UNIT/ML suspension Take 5 mLs by mouth 4 times daily Swish and swallow 3/18/20  Yes Gerson Goetz MD   omeprazole (PRILOSEC) 40 MG delayed release capsule Take 1 capsule by mouth every morning (before breakfast) 3/18/20  Yes Gerson Goetz MD   lisinopril (PRINIVIL;ZESTRIL) 10 MG tablet Take 1 tablet by mouth daily Dose decreased 3/18/2020 3/18/20  Yes Gerson Goetz MD   metroNIDAZOLE (FLAGYL) 500 MG tablet Take 1 tablet by mouth every 8 hours for 14 days 3/18/20 4/1/20 Yes Gerson Goetz MD   Probiotic Acidophilus [facility]) TABS Take 1 tablet by mouth 2 times daily 3/18/20 4/17/20 Yes Gerson Goetz MD   predniSONE (DELTASONE) 20 MG tablet Take 1 tablet by mouth daily for 3 days 3/16/20 3/19/20 Yes Joceline Rodriguez MD   ipratropium-albuterol (DUONEB) 0.5-2.5 (3) MG/3ML SOLN nebulizer solution Inhale 3 mLs into the lungs 4 times daily 3/16/20 4/15/20 Yes Joceline Rodriguez MD   clopidogrel (PLAVIX) 75 MG tablet Take 75 mg by mouth daily   Yes Historical Provider, MD   amLODIPine (NORVASC) 10 MG tablet Take 1 tablet by mouth daily 1/28/20  Yes Gerson Goetz MD   furosemide (LASIX) 20 MG tablet Take 1 tablet by mouth daily as needed (only if BP over 140/90 or leg edema) **stop Tenoretic 50-25** 1/28/20  Yes Gerson Goetz MD   pregabalin (LYRICA) 200 MG sore throat. Respiratory: Positive for cough (occasional nonproductive -residual from peumonia; improving) and shortness of breath (with activity today). Negative for wheezing. Cardiovascular: Negative for chest pain, palpitations and leg swelling. Gastrointestinal: Positive for blood in stool. Negative for abdominal pain, constipation, diarrhea, nausea and vomiting. Genitourinary: Negative for dysuria, frequency and urgency. Musculoskeletal: Negative for back pain and myalgias. Skin: Negative for rash. Neurological: Positive for syncope. Negative for dizziness, weakness and headaches. Psychiatric/Behavioral: The patient is not nervous/anxious. PHYSICAL EXAM      /60   Pulse 74   Temp 97.6 °F (36.4 °C) (Axillary)   Resp 16   Ht 6' (1.829 m)   Wt 257 lb 8 oz (116.8 kg)   SpO2 99%   BMI 34.92 kg/m²  Body mass index is 34.92 kg/m². Physical Exam  Constitutional:       General: He is not in acute distress. Appearance: He is well-developed. He is obese. He is not diaphoretic. HENT:      Head: Normocephalic and atraumatic. Mouth/Throat:      Mouth: Mucous membranes are dry. Eyes:      Conjunctiva/sclera: Conjunctivae normal.      Pupils: Pupils are equal, round, and reactive to light. Neck:      Musculoskeletal: Normal range of motion and neck supple. Trachea: No tracheal deviation. Cardiovascular:      Rate and Rhythm: Normal rate and regular rhythm. Heart sounds: Normal heart sounds. No murmur. No friction rub. No gallop. Pulmonary:      Effort: Pulmonary effort is normal. No respiratory distress. Breath sounds: Normal breath sounds. No wheezing or rales. Chest:      Chest wall: No tenderness. Abdominal:      General: Bowel sounds are normal. There is no distension. Palpations: Abdomen is soft. Tenderness: There is no abdominal tenderness. There is no guarding or rebound. Musculoskeletal: Normal range of motion.          General: No tenderness. Right lower leg: No edema. Left lower leg: No edema. Lymphadenopathy:      Cervical: No cervical adenopathy. Skin:     General: Skin is warm and dry. Coloration: Skin is not pale. Findings: No erythema or rash. Neurological:      Mental Status: He is alert and oriented to person, place, and time. Motor: No seizure activity. Coordination: Coordination normal.   Psychiatric:         Behavior: Behavior normal.         Thought Content: Thought content normal.       DIAGNOSTICS      EKG:   EKG 12 Lead [[id number]]    Collected: 03/19/20 0155    Updated: 03/19/20 0159     Ventricular Rate 67 BPM    Atrial Rate 67 BPM    P-R Interval 154 ms    QRS Duration 90 ms    Q-T Interval 378 ms    QTc Calculation (Bazett) 399 ms    P Axis 76 degrees    R Axis 25 degrees    T Axis 62 degrees   Narrative:     Normal sinus rhythm  Normal ECG  When compared with ECG of 10-MAR-2020 06:39,  Premature ventricular complexes are no longer Present  Premature atrial complexes are no longer Present     Labs:  CBC:   Recent Labs     03/18/20  1535 03/19/20  0139 03/19/20  0315 03/19/20  0555   WBC 23.3* 22.3*  --  24.5*   HGB 13.0* 9.8* 9.7* 9.8*    211  --  217     BMP:    Recent Labs     03/18/20  1535 03/19/20  0139   * 140   K 4.9 5.0   * 109*   CO2 25 21   BUN 50* 65*   CREATININE 0.73 1.00   GLUCOSE 126* 172*     S. Calcium:  Recent Labs     03/19/20  0139   CALCIUM 8.2*     S. Ionized Calcium:No results for input(s): IONCA in the last 72 hours. S. Magnesium:  Recent Labs     03/19/20  0139   MG 1.8     S. Phosphorus:No results for input(s): PHOS in the last 72 hours.   S. Glucose:  Recent Labs     03/16/20  0708 03/16/20  1136   POCGLU 145* 134*     Glycosylated hemoglobin A1C:   Lab Results   Component Value Date    LABA1C 5.6 03/09/2020     Hepatic:   Recent Labs     03/18/20  1535 03/19/20  0139   AST 14 12   ALT 35 27   ALKPHOS 64 47     CARDIAC ENZY: No results for input(s): CKTOTAL, CKMB, CKMBINDEX, TROPHS, MYOGLOBIN in the last 72 hours. INR:   Recent Labs     03/19/20 0139   INR 1.0     BNP: No results for input(s): PROBNP in the last 72 hours. ABGs: No results for input(s): PH, PCO2, PO2, HCO3, O2SAT in the last 72 hours. Lipids: No results for input(s): CHOL, TRIG, HDL, LDLCALC in the last 72 hours. Invalid input(s): LDL  Pancreatic functions:  Recent Labs     03/19/20 0139   LIPASE 33     S. LacticAcid: No results for input(s): LACTA in the last 72 hours. Thyroid functions:   Lab Results   Component Value Date    TSH 1.84 02/11/2020      U/A:No results for input(s): NITRITE, COLORU, WBCUA, RBCUA, MUCUS, BACTERIA, CLARITYU, SPECGRAV, LEUKOCYTESUR, BLOODU, GLUCOSEU, AMORPHOUS in the last 72 hours. Invalid input(s): Krystina Prakash    Imaging/Diagonstics:     Xr Chest Standard (2 Vw)    Result Date: 3/14/2020  EXAMINATION: TWO XRAY VIEWS OF THE CHEST 3/14/2020 3:11 pm COMPARISON: 03/09/2020 HISTORY: ORDERING SYSTEM PROVIDED HISTORY: SOB TECHNOLOGIST PROVIDED HISTORY: SOB Reason for Exam: sob Acuity: Unknown Type of Exam: Unknown FINDINGS: Near complete resolution of previously present bibasilar pulmonary opacities. No pneumothorax or pleural effusion. Cardiac and mediastinal contours normal.  No acute osseous abnormality. Left shoulder arthroplasty hardware partially imaged. Mild degenerative changes in the thoracic spine. Near complete resolution of previously present bibasilar pulmonary opacities. No new abnormality. Ct Chest Wo Contrast    Result Date: 3/12/2020  EXAMINATION: CT OF THE CHEST WITHOUT CONTRAST 3/12/2020 8:23 am TECHNIQUE: CT of the chest was performed without the administration of intravenous contrast. Multiplanar reformatted images are provided for review. Dose modulation, iterative reconstruction, and/or weight based adjustment of the mA/kV was utilized to reduce the radiation dose to as low as reasonably achievable.  COMPARISON: 02/17/2020 HISTORY: ORDERING SYSTEM PROVIDED HISTORY: hemoptysis, smoker/copd TECHNOLOGIST PROVIDED HISTORY: hemoptysis, smoker/copd Reason for Exam: hemoptysis, smoker/copd Acuity: Acute Type of Exam: Initial FINDINGS: Mediastinum: The heart and great vessels are normal in size. Calcified atheromatous plaque and coronary calcifications are noted. No pericardial effusion. No enlarged or suspicious-appearing lymph nodes are identified. Lungs/pleura: Centrilobular emphysematous disease. Bilateral interstitial opacities and more localized alveolar opacities in the left lower lobe are noted. No effusion. Small amount debris in the distal trachea and mainstem bronchi. Upper Abdomen: Cholelithiasis in a contracted gallbladder. Low-density nodular enlargement of the adrenal glands consistent with hyperplasia and likely underlying adenomas, unchanged. Soft Tissues/Bones: No acute findings. Status post laminectomy at T12. Partially visualized left shoulder hardware. 1.  Small amount debris in the distal trachea and mainstem bronchi. Bilateral lower lobe opacities, left greater than right, consistent with an inflammatory process, infection or aspiration. 2.  Emphysema. 3.  Cholelithiasis. Nm Abscess Localization Limited    Result Date: 2/25/2020  EXAMINATION: INDIUM WBC FOR ABCESS 2/24/2020 7:59 am TECHNIQUE: Gamma camera imaging of the neck, shoulders and chest was performed following IV administration of white blood cells labeled with 538 microcuries of indium 111.  24-hour delayed images were obtained. Gamma camera imaging of the shoulders was performed following uneventful IV administration of 27.2 millicuries of [id number].  COMPARISON: Left shoulder x-rays of 16 January 2019 and 30 January 2020 HISTORY: ORDERING SYSTEM PROVIDED HISTORY: H/O total shoulder replacement, left TECHNOLOGIST PROVIDED HISTORY: Reason for Exam: H/O total shoulder replacement, left Acuity: Unknown Type of Exam: Unknown in ED  -Change protonix to IV  -Hold ASA, plavix, prednisone, VTE d/t bleeding  -GI consulted in ED  -NPO  -Pain and Nausea control    C-difficile Enteritis  -Stool tested positive on 3/18  --started on po Flagyl  --had 2 doses before coming to ED  -Vancomycin initiated in ED  --Continue on admission  -per GI - add Flagyl 500 mg IV  -Contact isolation    Diabetes Mellitus  -not currently on any diabetic meds  -POCT ac and hs with sliding scale coverage    REYNALDO  -on home CPAP  -Wife to bring machine in am    Consultations:     Prema Garcia, JAVI - CNP   3/19/2020  6:05 AM    [address]  [address], [address] Phone [phone]     Attending Physician Statement  I have discussed the care of Michelle Hunter with the CNP. I have examined the patient myself and taken ros and hpi , including pertinent history and exam findings. I have reviewed the key elements of all parts of the encounter with the CNPt. I agree with the assessment, plan and orders as documented by the CNP. 59-year-old male recently discharged from hospital after treatment for pneumonia and COPD exacerbation, presenting with collapse. 1.  Upper GI bleed- melanotic stools, drop in hemoglobin-consult GI for possible EGD. 2.  C. difficile positive- currently on p.o. Vanco and IV Flagyl. However C. difficile colitis less likely as patient had only one episode of loose stool which was immediately after taking oral Flagyl, none since. 3.  Syncope- likely vasovagal from hypotension and hypovolemia secondary to blood loss. No evidence clinically of neurological compromise, will check cardiac markers.   4.  Type 2 diabetes-sliding scale    Electronically signed by Deirdre Renteria MD

## 2020-03-20 LAB
ANION GAP SERPL CALCULATED.3IONS-SCNC: 6 MMOL/L (ref 9–17)
BUN BLDV-MCNC: 20 MG/DL (ref 8–23)
BUN/CREAT BLD: ABNORMAL (ref 9–20)
CALCIUM SERPL-MCNC: 8 MG/DL (ref 8.6–10.4)
CHLORIDE BLD-SCNC: 110 MMOL/L (ref 98–107)
CO2: 23 MMOL/L (ref 20–31)
CREAT SERPL-MCNC: 0.7 MG/DL (ref 0.7–1.2)
FOLATE: >20 NG/ML
GFR AFRICAN AMERICAN: >60 ML/MIN
GFR NON-AFRICAN AMERICAN: >60 ML/MIN
GFR SERPL CREATININE-BSD FRML MDRD: ABNORMAL ML/MIN/{1.73_M2}
GFR SERPL CREATININE-BSD FRML MDRD: ABNORMAL ML/MIN/{1.73_M2}
GLUCOSE BLD-MCNC: 109 MG/DL (ref 75–110)
GLUCOSE BLD-MCNC: 113 MG/DL (ref 75–110)
GLUCOSE BLD-MCNC: 151 MG/DL (ref 70–99)
GLUCOSE BLD-MCNC: 167 MG/DL (ref 75–110)
GLUCOSE BLD-MCNC: 91 MG/DL (ref 75–110)
HCT VFR BLD CALC: 23.1 % (ref 41–53)
HCT VFR BLD CALC: 23.3 % (ref 41–53)
HCT VFR BLD CALC: 24.7 % (ref 41–53)
HCT VFR BLD CALC: 26.2 % (ref 41–53)
HCT VFR BLD CALC: 26.6 % (ref 41–53)
HEMOGLOBIN: 7.5 G/DL (ref 13.5–17.5)
HEMOGLOBIN: 7.8 G/DL (ref 13.5–17.5)
HEMOGLOBIN: 7.9 G/DL (ref 13.5–17.5)
HEMOGLOBIN: 8.1 G/DL (ref 13.5–17.5)
HEMOGLOBIN: 8.5 G/DL (ref 13.5–17.5)
IRON SATURATION: 14 % (ref 20–55)
IRON: 28 UG/DL (ref 59–158)
MCH RBC QN AUTO: 28.8 PG (ref 26–34)
MCHC RBC AUTO-ENTMCNC: 32 G/DL (ref 31–37)
MCV RBC AUTO: 89.8 FL (ref 80–100)
NRBC AUTOMATED: ABNORMAL PER 100 WBC
PDW BLD-RTO: 14.8 % (ref 11.5–14.9)
PLATELET # BLD: 180 K/UL (ref 150–450)
PMV BLD AUTO: 8.4 FL (ref 6–12)
POTASSIUM SERPL-SCNC: 4.5 MMOL/L (ref 3.7–5.3)
RBC # BLD: 2.75 M/UL (ref 4.5–5.9)
SODIUM BLD-SCNC: 139 MMOL/L (ref 135–144)
TOTAL IRON BINDING CAPACITY: 198 UG/DL (ref 250–450)
TROPONIN INTERP: ABNORMAL
TROPONIN INTERP: ABNORMAL
TROPONIN T: ABNORMAL NG/ML
TROPONIN T: ABNORMAL NG/ML
TROPONIN, HIGH SENSITIVITY: 23 NG/L (ref 0–22)
TROPONIN, HIGH SENSITIVITY: 25 NG/L (ref 0–22)
UNSATURATED IRON BINDING CAPACITY: 170 UG/DL (ref 112–347)
VITAMIN B-12: 780 PG/ML (ref 232–1245)
WBC # BLD: 17.1 K/UL (ref 3.5–11)

## 2020-03-20 PROCEDURE — 84484 ASSAY OF TROPONIN QUANT: CPT

## 2020-03-20 PROCEDURE — 6370000000 HC RX 637 (ALT 250 FOR IP): Performed by: STUDENT IN AN ORGANIZED HEALTH CARE EDUCATION/TRAINING PROGRAM

## 2020-03-20 PROCEDURE — 6360000002 HC RX W HCPCS: Performed by: INTERNAL MEDICINE

## 2020-03-20 PROCEDURE — 94640 AIRWAY INHALATION TREATMENT: CPT

## 2020-03-20 PROCEDURE — 2060000000 HC ICU INTERMEDIATE R&B

## 2020-03-20 PROCEDURE — 82607 VITAMIN B-12: CPT

## 2020-03-20 PROCEDURE — 83540 ASSAY OF IRON: CPT

## 2020-03-20 PROCEDURE — 6370000000 HC RX 637 (ALT 250 FOR IP): Performed by: INTERNAL MEDICINE

## 2020-03-20 PROCEDURE — 82784 ASSAY IGA/IGD/IGG/IGM EACH: CPT

## 2020-03-20 PROCEDURE — 80048 BASIC METABOLIC PNL TOTAL CA: CPT

## 2020-03-20 PROCEDURE — 85018 HEMOGLOBIN: CPT

## 2020-03-20 PROCEDURE — 99232 SBSQ HOSP IP/OBS MODERATE 35: CPT | Performed by: INTERNAL MEDICINE

## 2020-03-20 PROCEDURE — 82947 ASSAY GLUCOSE BLOOD QUANT: CPT

## 2020-03-20 PROCEDURE — 2500000003 HC RX 250 WO HCPCS: Performed by: INTERNAL MEDICINE

## 2020-03-20 PROCEDURE — 83516 IMMUNOASSAY NONANTIBODY: CPT

## 2020-03-20 PROCEDURE — 83550 IRON BINDING TEST: CPT

## 2020-03-20 PROCEDURE — 94761 N-INVAS EAR/PLS OXIMETRY MLT: CPT

## 2020-03-20 PROCEDURE — C9113 INJ PANTOPRAZOLE SODIUM, VIA: HCPCS | Performed by: INTERNAL MEDICINE

## 2020-03-20 PROCEDURE — 85014 HEMATOCRIT: CPT

## 2020-03-20 PROCEDURE — 97162 PT EVAL MOD COMPLEX 30 MIN: CPT

## 2020-03-20 PROCEDURE — 97116 GAIT TRAINING THERAPY: CPT

## 2020-03-20 PROCEDURE — 2580000003 HC RX 258: Performed by: STUDENT IN AN ORGANIZED HEALTH CARE EDUCATION/TRAINING PROGRAM

## 2020-03-20 PROCEDURE — 6360000002 HC RX W HCPCS: Performed by: STUDENT IN AN ORGANIZED HEALTH CARE EDUCATION/TRAINING PROGRAM

## 2020-03-20 PROCEDURE — 82746 ASSAY OF FOLIC ACID SERUM: CPT

## 2020-03-20 PROCEDURE — 36415 COLL VENOUS BLD VENIPUNCTURE: CPT

## 2020-03-20 PROCEDURE — 2700000000 HC OXYGEN THERAPY PER DAY

## 2020-03-20 PROCEDURE — 2580000003 HC RX 258: Performed by: INTERNAL MEDICINE

## 2020-03-20 PROCEDURE — 85027 COMPLETE CBC AUTOMATED: CPT

## 2020-03-20 RX ORDER — PRAVASTATIN SODIUM 40 MG
80 TABLET ORAL EVERY EVENING
Status: DISCONTINUED | OUTPATIENT
Start: 2020-03-20 | End: 2020-03-22 | Stop reason: HOSPADM

## 2020-03-20 RX ADMIN — IPRATROPIUM BROMIDE AND ALBUTEROL SULFATE 3 ML: 2.5; .5 SOLUTION RESPIRATORY (INHALATION) at 11:11

## 2020-03-20 RX ADMIN — LISINOPRIL 10 MG: 10 TABLET ORAL at 08:22

## 2020-03-20 RX ADMIN — Medication 10 ML: at 08:20

## 2020-03-20 RX ADMIN — Medication 10 ML: at 21:04

## 2020-03-20 RX ADMIN — Medication 125 MG: at 23:25

## 2020-03-20 RX ADMIN — Medication 10 ML: at 08:21

## 2020-03-20 RX ADMIN — PREGABALIN 200 MG: 100 CAPSULE ORAL at 08:20

## 2020-03-20 RX ADMIN — SODIUM CHLORIDE: 9 INJECTION, SOLUTION INTRAVENOUS at 02:33

## 2020-03-20 RX ADMIN — PREGABALIN 200 MG: 100 CAPSULE ORAL at 14:17

## 2020-03-20 RX ADMIN — PRAVASTATIN SODIUM 80 MG: 40 TABLET ORAL at 17:34

## 2020-03-20 RX ADMIN — Medication 125 MG: at 11:06

## 2020-03-20 RX ADMIN — NYSTATIN 500000 UNITS: 100000 SUSPENSION ORAL at 17:34

## 2020-03-20 RX ADMIN — Medication 10 ML: at 08:24

## 2020-03-20 RX ADMIN — Medication 125 MG: at 17:35

## 2020-03-20 RX ADMIN — METRONIDAZOLE 500 MG: 500 INJECTION, SOLUTION INTRAVENOUS at 11:10

## 2020-03-20 RX ADMIN — Medication 125 MG: at 06:13

## 2020-03-20 RX ADMIN — IPRATROPIUM BROMIDE AND ALBUTEROL SULFATE 3 ML: 2.5; .5 SOLUTION RESPIRATORY (INHALATION) at 08:18

## 2020-03-20 RX ADMIN — NYSTATIN 500000 UNITS: 100000 SUSPENSION ORAL at 21:03

## 2020-03-20 RX ADMIN — IRON SUCROSE 200 MG: 20 INJECTION, SOLUTION INTRAVENOUS at 12:45

## 2020-03-20 RX ADMIN — BUDESONIDE AND FORMOTEROL FUMARATE DIHYDRATE 2 PUFF: 160; 4.5 AEROSOL RESPIRATORY (INHALATION) at 08:20

## 2020-03-20 RX ADMIN — INSULIN LISPRO 2 UNITS: 100 INJECTION, SOLUTION INTRAVENOUS; SUBCUTANEOUS at 11:06

## 2020-03-20 RX ADMIN — BUDESONIDE AND FORMOTEROL FUMARATE DIHYDRATE 2 PUFF: 160; 4.5 AEROSOL RESPIRATORY (INHALATION) at 21:03

## 2020-03-20 RX ADMIN — PREGABALIN 200 MG: 100 CAPSULE ORAL at 21:03

## 2020-03-20 RX ADMIN — METRONIDAZOLE 500 MG: 500 INJECTION, SOLUTION INTRAVENOUS at 17:35

## 2020-03-20 RX ADMIN — Medication 125 MG: at 00:46

## 2020-03-20 RX ADMIN — IPRATROPIUM BROMIDE AND ALBUTEROL SULFATE 3 ML: 2.5; .5 SOLUTION RESPIRATORY (INHALATION) at 19:35

## 2020-03-20 RX ADMIN — NYSTATIN 500000 UNITS: 100000 SUSPENSION ORAL at 11:06

## 2020-03-20 RX ADMIN — IPRATROPIUM BROMIDE AND ALBUTEROL SULFATE 3 ML: 2.5; .5 SOLUTION RESPIRATORY (INHALATION) at 15:29

## 2020-03-20 RX ADMIN — METRONIDAZOLE 500 MG: 500 INJECTION, SOLUTION INTRAVENOUS at 02:34

## 2020-03-20 RX ADMIN — NYSTATIN 500000 UNITS: 100000 SUSPENSION ORAL at 08:20

## 2020-03-20 RX ADMIN — PANTOPRAZOLE SODIUM 40 MG: 40 INJECTION, POWDER, FOR SOLUTION INTRAVENOUS at 08:20

## 2020-03-20 RX ADMIN — AMLODIPINE BESYLATE 10 MG: 10 TABLET ORAL at 08:21

## 2020-03-20 ASSESSMENT — PAIN SCALES - GENERAL
PAINLEVEL_OUTOF10: 0
PAINLEVEL_OUTOF10: 0

## 2020-03-20 ASSESSMENT — ENCOUNTER SYMPTOMS
BLOOD IN STOOL: 1
SHORTNESS OF BREATH: 0
ABDOMINAL PAIN: 0
NAUSEA: 0
COUGH: 0
ABDOMINAL DISTENTION: 0
WHEEZING: 0
DIARRHEA: 0
COLOR CHANGE: 0
VOMITING: 0

## 2020-03-20 NOTE — PROGRESS NOTES
[address]   Occupational Therapy Evaluation  Date: 3/20/20  Patient Name: Pankaj Rodriguez       Room: 9257/5415-54  MRN: [id number]  Account: [de-identified]   : 1955  (71 y.o.) Gender: male     Discharge Recommendations: The patient's needs are being met with no further therapy recommended at discharge. Equipment Needed: No    Referring Practitioner: Dr. Veronica Pickett  Diagnosis: Syncope and collapse  Additional Pertinent Hx: Recently discharged after hospital admission for pneumonia    Past Medical History:  has a past medical history of Acid reflux, Arthritis, Chronic bilateral low back pain with bilateral sciatica, Complete tear of left rotator cuff, COPD (chronic obstructive pulmonary disease) ([address] Coronary artery disease involving native coronary artery of native heart without angina pectoris, Degenerative disc disease, cervical, Gout, H/O cardiac catheterization, Hyperlipidemia, Hyperlipidemia with target LDL less than 100, Hyperparathyroidism ([address] Hypertension, Knee pain, chronic, Liver disease, MDRO (multiple drug resistant organisms) resistance, Moderate episode of recurrent major depressive disorder ([address] Obesity, Class III, BMI 40-49.9 (morbid obesity) ([address] REYNALDO on CPAP, Osteoarthritis, Polypharmacy, Positive FIT (fecal immunochemical test), Prolonged emergence from general anesthesia, Prostate cancer ([address] Pseudocholinesterase deficiency, Severe obesity (BMI 35.0-35.9 with comorbidity) ([address] Short of breath on exertion, Sleep apnea, Status post lumbar laminectomy, Stenosis of left carotid artery, Tubular adenoma of colon 17, Type 2 diabetes mellitus with hyperglycemia, without long-term current use of insulin ([address] Vitamin D deficiency, and Wears glasses. Past Surgical History:   has a past surgical history that includes knee surgery (Left); lumbar laminectomy (2017);  Tonsillectomy and adenoidectomy; Cardiac catheterization (); pr colonoscopy w/biopsy shower, Built-in shower seat, Hand-held shower  Home Equipment: Rolling walker, Oxygen, Reacher, Long-handled shoehorn, Cane(rollators x 2, home O2 at 2 L 24/7)  ADL Assistance: Needs assistance(spouse assists showering and bathing since back surgery, needs extra time to attempt by himself if he has the time to complete by himself)  Homemaking Assistance: Needs assistance(pt cooks and shops, spouse laundry and  cleaning)  Homemaking Responsibilities: Yes(pt cooks and shops, spouse laundry and  cleaning)  Ambulation Assistance: Independent(uses rollator outside and inside since last admission, had been using scane prior to previous admission)  Transfer Assistance: Independent  Active : Yes  Mode of Transportation: [facility]  Occupation: Retired  Type of occupation: [employer] retired  Additional Comments: spouse is a homemaker- able to assist as needed    Objective  Vision - Basic Assessment  Patient Visual Report: No visual complaint reported. Cognition  Overall Cognitive Status: WFL   Perception  Overall Perceptual Status: WFL  Sensation  Overall Sensation Status: Impaired(C/O numbness and burning bilateral feet and bilateral fingertips)   ADL  Feeding: Independent  Grooming: Setup  UE Bathing: Minimal assistance(2* decreased function in LUE)  LE Bathing: Minimal assistance  UE Dressing: Minimal assistance(2* decreased function in LUE)  LE Dressing: Minimal assistance(A for R sock; demo'd technique for L sock w/inc'd time)  Toileting: Stand by assistance  Additional Comments: Above levels based on pt report, observation, and clinical reasoning. Pt reports that spouse provides more than necessary amount of A at home 2* \"it takes me forever\". Pt states that this is his baseline and is not interested in education to increase IND.     UE Function  Hand Dominance  Hand Dominance: Right     LUE Strength  Gross LUE Strength: Exceptions to [facility]  L Hand General: 5/5  LUE Strength Comment: Shoulder 2-/5     LUE Tone:

## 2020-03-20 NOTE — PROGRESS NOTES
Hypertension, Knee pain, chronic, Liver disease, MDRO (multiple drug resistant organisms) resistance, Moderate episode of recurrent major depressive disorder ([facility] [address] Obesity, Class III, BMI 40-49.9 (morbid obesity) ([facility] [address] REYNALDO on CPAP, Osteoarthritis, Polypharmacy, Positive FIT (fecal immunochemical test), Prolonged emergence from general anesthesia, Prostate cancer ([facility] [address] Pseudocholinesterase deficiency, Severe obesity (BMI 35.0-35.9 with comorbidity) ([facility] [address] Short of breath on exertion, Sleep apnea, Status post lumbar laminectomy, Stenosis of left carotid artery, Tubular adenoma of colon 4/11/17, Type 2 diabetes mellitus with hyperglycemia, without long-term current use of insulin ([facility] [address] Vitamin D deficiency, and Wears glasses. has a past surgical history that includes knee surgery (Left); lumbar laminectomy (01/05/2017); Tonsillectomy and adenoidectomy; Cardiac catheterization (2007); pr colonoscopy w/biopsy single/multiple (N/A, 5/9/2017); shoulder surgery (Right, 1989?); back surgery; back surgery (01/05/2017); Carpal tunnel release (Bilateral); pr reconstr total shoulder implant (Left, 7/18/2018); pr closed rx shldr disloc,anesthesia (Left, 8/1/2018); pr taryn shoulder arthrplsty humeral/glenoid compnt (Left, 8/3/2018); Carotid endarterectomy (Right, 12/16/2019); bronchoscopy (N/A, 3/13/2020); and Upper gastrointestinal endoscopy (N/A, 3/19/2020). Restrictions  Restrictions/Precautions  Restrictions/Precautions: Fall Risk, Isolation((+) C-diff, O2 at 2 L, R drop foot)  Required Braces or Orthoses?: No  Implants present? : Metal implants(left total reverse shoulder)  Vision/Hearing  Vision: Impaired  Vision Exceptions: Wears glasses at all times  Hearing: Within functional limits     Subjective  General  Patient assessed for rehabilitation services?: Yes  Response To Previous Treatment: Not applicable  Family / Caregiver Present: No  Referring Practitioner: Dr. Mitchel Espinoza.  Harshil Gerardo  Referral Date : 03/20/20  Diagnosis: syncope and collapse  Follows Commands: Within Functional Limits  Other (Comment): OK w/ nurse Suzan to proceed w/ PT evaluation  General Comment  Comments: pt had upper GI endoscopy w/ bx on 3-  Subjective  Subjective: pt reports that he was passing bloody stools. Pt reports that he felt faint and passed out on the toilet at home.   Pain Screening  Patient Currently in Pain: Denies  Vital Signs  Patient Currently in Pain: Denies       Orientation  Orientation  Overall Orientation Status: Within Normal Limits  Social/Functional History  Social/Functional History  Lives With: Spouse  Type of Home: House  Home Layout: One level  Home Access: Stairs to enter with rails  Entrance Stairs - Number of Steps: ramp or 2 steps w/ 2 rails combo at front entrance(typically climbs the steps)  Entrance Stairs - Rails: Both  Bathroom Shower/Tub: Walk-in shower, Doors  Bathroom Toilet: Standard  Bathroom Equipment: Grab bars in shower, Built-in shower seat, Hand-held shower  Home Equipment: Rolling walker, Oxygen, Reacher, Long-handled shoehorn, Cane(rollators x 2, home O2 at 2 L 24/7)  ADL Assistance: Needs assistance(spouse assists showering and bathing since back surgery, needs extra time to attempt by himself if he has the time to complete by himself)  Homemaking Assistance: Needs assistance(pt cooks and shops, spouse laundry and  cleaning)  Homemaking Responsibilities: Yes(pt cooks and shops, spouse laundry and  cleaning)  Ambulation Assistance: Independent(uses rollator outside and inside since last admission, had been using scane prior to previous admission)  Transfer Assistance: Independent  Active : Yes  Mode of Transportation: [facility]  Occupation: Retired  Type of occupation: [employer] retired  Additional Comments: spouse is a homemaker- able to assist as needed  Cognition        Objective     Observation/Palpation  Observation: (+) C-diff, O2 at 2 L    AROM RLE (degrees)  RLE AROM: WFL  RLE General AROM: except right Training, Patient/Caregiver Education & Training, Stair training, Balance Training, Endurance Training, Functional Mobility Training, Transfer Training, Safety Education & Training, Home Exercise Program  Safety Devices  Type of devices: Call light within reach, Gait belt, Patient at risk for falls, Left in chair, Nurse notified(nurse Suzan)    G-Code       OutComes Score                                                  AM-PAC Score  AM-PAC Inpatient Mobility Raw Score : 10 (03/20/20 1445)  AM-PAC Inpatient T-Scale Score : 32.29 (03/20/20 1445)  Mobility Inpatient CMS 0-100% Score: 76.75 (03/20/20 1445)  Mobility Inpatient CMS G-Code Modifier : CL (03/20/20 1445)          Goals  Short term goals  Time Frame for Short term goals: 5-7 treatments/ week  Short term goal 1: pt to demonstrate good technique for LE strengthening exercises and energy conservation techniques  Short term goal 2: pt to demonstrate independent bed mobility for rolling and supine <> sit  Short term goal 3: pt to demonstrate MODIFIED independent transfers sit <> stand and bed <> chair using rollator  Short term goal 4: pt to demonstrate MODIFIED independent giat [de-identified]' using rollator for household distances  Short term goal 5: pt to demonstrate MODIFIED good (-) balance  using rollator  Short term goal 6: pt to demonstrate ability to ascend/ descend ramp using rollator or 2 steps w/ 2 rails w/ supervision  Patient Goals   Patient goals : return home w/ spouse       Therapy Time   Individual Concurrent Group Co-treatment   Time In 1445         Time Out 1513         Minutes 28         Timed Code Treatment Minutes: [facility], PT

## 2020-03-20 NOTE — PROGRESS NOTES
GI Progress notes    3/20/2020   11:47 AM    Name:  Yandy Bower  MRN:    [id number]     Acct:     [de-identified]   Room:  [facility] [place] [address] Day: 1     Admit Date: 3/19/2020  1:26 AM  PCP: Henry Allen MD    Subjective:     C/C:   Chief Complaint   Patient presents with    Loss of Consciousness       Interval History: Status: not changed. Patient seen and examined. No acute events overnight. Patient had EGD yesterday. No source of active bleeding. Patient reports 2 bowel movements overnight. He reports that they are lighter in color. No overt bleeding, melena. Denies abdominal pain, nausea, vomiting. Labs and progress notes reviewed. Hemoglobin 7.9. WBC improved to 17.1 from 24.5.    ROS:  Constitutional: negative for chills, fevers and sweats  Gastrointestinal: negative for abdominal pain, constipation, diarrhea, nausea and vomiting      Medications: Allergies:    Allergies   Allergen Reactions    Succinylcholine Chloride Other (See Comments)     PATIENT HAS PSEUDOCHOLINESTERASE DEFICIENCY      Cymbalta [Duloxetine Hcl]      Taste loss         Current Meds: pravastatin (PRAVACHOL) tablet 80 mg, QPM  iron sucrose (VENOFER) 200 mg in sodium chloride 0.9 % 100 mL IVPB, Q24H  albuterol sulfate  (90 Base) MCG/ACT inhaler 2 puff, Q6H PRN  ipratropium-albuterol (DUONEB) nebulizer solution 3 mL, 4x Daily  nystatin (MYCOSTATIN) 399700 UNIT/ML suspension 500,000 Units, 4x Daily  budesonide-formoterol (SYMBICORT) 160-4.5 MCG/ACT inhaler 2 puff, BID  metronidazole (FLAGYL) 500 mg in NaCl 100 mL IVPB premix, Q8H  vancomycin (FIRVANQ) 50 MG/ML oral solution 125 mg, 4 times per day  sodium chloride flush 0.9 % injection 10 mL, 2 times per day  sodium chloride flush 0.9 % injection 10 mL, PRN  potassium chloride (KLOR-CON M) extended release tablet 40 mEq, PRN    Or  potassium bicarb-citric acid (EFFER-K) effervescent tablet 40 mEq, PRN    Or  potassium chloride 10 mEq/100 mL IVPB (Peripheral Alcohol/week: 0.0 standard drinks    Drug use: No    Sexual activity: Not Currently     Partners: Female   Lifestyle    Physical activity     Days per week: Not on file     Minutes per session: Not on file    Stress: Not on file   Relationships    Social connections     Talks on phone: Not on file     Gets together: Not on file     Attends Buddhism service: Not on file     Active member of club or organization: Not on file     Attends meetings of clubs or organizations: Not on file     Relationship status: Not on file    Intimate partner violence     Fear of current or ex partner: Not on file     Emotionally abused: Not on file     Physically abused: Not on file     Forced sexual activity: Not on file   Other Topics Concern    Not on file   Social History Narrative    Not on file       Vitals:  BP (!) 130/55   Pulse 81   Temp 97.8 °F (36.6 °C) (Oral)   Resp 16   Ht 6' (1.829 m)   Wt 257 lb 8 oz (116.8 kg)   SpO2 93%   BMI 34.92 kg/m²   Temp (24hrs), Av.1 °F (36.7 °C), Min:97.7 °F (36.5 °C), Max:98.8 °F (37.1 °C)    Recent Labs     20  1646 20  1922 20  0709 20  1053   POCGLU 78 109 91 167*       I/O (24Hr):     Intake/Output Summary (Last 24 hours) at 3/20/2020 1147  Last data filed at 3/20/2020 0839  Gross per 24 hour   Intake 1832 ml   Output 900 ml   Net 932 ml       Labs:      CBC:   Lab Results   Component Value Date    WBC 17.1 2020    RBC 2.75 2020    RBC 5.38 2011    HGB 7.9 2020    HCT 24.7 2020    MCV 89.8 2020    MCH 28.8 2020    MCHC 32.0 2020    RDW 14.8 2020     2020     2012    MPV 8.4 2020     CBC with Differential:    Lab Results   Component Value Date    WBC 17.1 2020    RBC 2.75 2020    RBC 5.38 2011    HGB 7.9 2020    HCT 24.7 2020     2020     2012    MCV 89.8 2020    MCH 28.8 2020    MCHC 32.0 colitis [A04.72] 03/19/2020    Type 2 diabetes mellitus with diabetic polyneuropathy, without long-term current use of insulin ([address] [E11.42] 03/25/2017     Past Medical History:   Diagnosis Date    Acid reflux     Arthritis     Chronic bilateral low back pain with bilateral sciatica 11/3/2016    Complete tear of left rotator cuff 7/18/2018    COPD (chronic obstructive pulmonary disease) ([address]     Coronary artery disease involving native coronary artery of native heart without angina pectoris 2/2/2020    Degenerative disc disease, cervical 7/28/2019    Gout     H/O cardiac catheterization     yrs ago   no stents    Hyperlipidemia     Hyperlipidemia with target LDL less than 100 1/20/2016    Hyperparathyroidism ([address] 1/17/2020    Hypertension     Knee pain, chronic     left    Liver disease     MDRO (multiple drug resistant organisms) resistance     Moderate episode of recurrent major depressive disorder ([address] 1/20/2016    Obesity, Class III, BMI 40-49.9 (morbid obesity) ([address] 1/20/2016    REYNALDO on CPAP 5/6/2017    Osteoarthritis     Polypharmacy 3/25/2017    Positive FIT (fecal immunochemical test) 4/11/2017    Prolonged emergence from general anesthesia 01/05/2017    Patient \"on life support for 3 days\" after back surgery due to being given succinylcholine    Prostate cancer ([address] 10/2013    finished radiation tx 5/2014    Pseudocholinesterase deficiency 03/25/2017    Pt.  \"on life support\" for 3 days after back surgery 1/5/17 after being given succinylcholine    Severe obesity (BMI 35.0-35.9 with comorbidity) ([address] 2/2/2020    Short of breath on exertion     Sleep apnea     uses CPAP machine nightly    Status post lumbar laminectomy 3/25/2017    Stenosis of left carotid artery 10/7/2018    Tubular adenoma of colon 4/11/17 5/13/2017    Type 2 diabetes mellitus with hyperglycemia, without long-term current use of insulin ([address] 3/25/2017    Lab Results Component Value Date  LABA1C 7.1

## 2020-03-20 NOTE — PROGRESS NOTES
Reactions    Succinylcholine Chloride Other (See Comments)     PATIENT HAS PSEUDOCHOLINESTERASE DEFICIENCY      Cymbalta [Duloxetine Hcl]      Taste loss         Current Meds:   Scheduled Meds:    pravastatin  80 mg Oral QPM    iron sucrose  200 mg Intravenous Q24H    ipratropium-albuterol  1 vial Inhalation 4x Daily    nystatin  500,000 Units Oral 4x Daily    budesonide-formoterol  2 puff Inhalation BID    metroNIDAZOLE  500 mg Intravenous Q8H    vancomycin  125 mg Oral 4 times per day    sodium chloride flush  10 mL Intravenous 2 times per day    insulin lispro  0-12 Units Subcutaneous TID WC    insulin lispro  0-6 Units Subcutaneous Nightly    pantoprazole  40 mg Intravenous Daily    And    sodium chloride (PF)  10 mL Intravenous Daily    amLODIPine  10 mg Oral Daily    lisinopril  10 mg Oral Daily    pregabalin  200 mg Oral TID     Continuous Infusions:    dextrose       PRN Meds: albuterol sulfate HFA, sodium chloride flush, potassium chloride **OR** potassium alternative oral replacement **OR** potassium chloride, magnesium sulfate, acetaminophen **OR** acetaminophen, promethazine **OR** ondansetron, nicotine, glucose, dextrose, glucagon (rDNA), dextrose, morphine **OR** morphine, benzonatate    Data:     Past Medical History:   has a past medical history of Acid reflux, Arthritis, Chronic bilateral low back pain with bilateral sciatica, Complete tear of left rotator cuff, COPD (chronic obstructive pulmonary disease) ([address] Coronary artery disease involving native coronary artery of native heart without angina pectoris, Degenerative disc disease, cervical, Gout, H/O cardiac catheterization, Hyperlipidemia, Hyperlipidemia with target LDL less than 100, Hyperparathyroidism ([address] Hypertension, Knee pain, chronic, Liver disease, MDRO (multiple drug resistant organisms) resistance, Moderate episode of recurrent major depressive disorder ([address] Obesity, Class III, BMI 40-49.9 (morbid obesity) 10:16 AM    CULTURE  03/13/2020 10:16 AM     NEGATIVE. No Herpes Simplex Virus detected by Enzyme Linked Virus Inducible System culture. at day 2    CULTURE  03/13/2020 10:16 AM     NEGATIVE for Influenza A and B, Para influenza 1,2,3, Adenovirus and RSV. at day 2    CULTURE  03/13/2020 10:16 AM     Negative results do not preclude respiratory virus infection and should not be used as the sole basis for diagnosis, treatment or other management decisions. CULTURE NEGATIVE for Cytomegalovirus at day 3 03/13/2020 10:16 AM    CULTURE NEGATIVE for Enterovirus at day 5 03/13/2020 10:16 AM       [unfilled]    Radiology:    Xr Chest Standard (2 Vw)    Result Date: 3/14/2020  EXAMINATION: TWO XRAY VIEWS OF THE CHEST 3/14/2020 3:11 pm COMPARISON: 03/09/2020 HISTORY: ORDERING SYSTEM PROVIDED HISTORY: SOB TECHNOLOGIST PROVIDED HISTORY: SOB Reason for Exam: sob Acuity: Unknown Type of Exam: Unknown FINDINGS: Near complete resolution of previously present bibasilar pulmonary opacities. No pneumothorax or pleural effusion. Cardiac and mediastinal contours normal.  No acute osseous abnormality. Left shoulder arthroplasty hardware partially imaged. Mild degenerative changes in the thoracic spine. Near complete resolution of previously present bibasilar pulmonary opacities. No new abnormality. Ct Chest Wo Contrast    Result Date: 3/12/2020  EXAMINATION: CT OF THE CHEST WITHOUT CONTRAST 3/12/2020 8:23 am TECHNIQUE: CT of the chest was performed without the administration of intravenous contrast. Multiplanar reformatted images are provided for review. Dose modulation, iterative reconstruction, and/or weight based adjustment of the mA/kV was utilized to reduce the radiation dose to as low as reasonably achievable.  COMPARISON: 02/17/2020 HISTORY: ORDERING SYSTEM PROVIDED HISTORY: hemoptysis, smoker/copd TECHNOLOGIST PROVIDED HISTORY: hemoptysis, smoker/copd Reason for Exam: hemoptysis, smoker/copd Acuity: Acute Type of Symmetrical activity is noted in the acromioclavicular and glenohumeral aspects of both shoulders as well as the sternoclavicular joints. No areas of abnormal asymmetric radiotracer uptake is noted. No evidence of prosthetic loosening. Indium 111 white blood cell study for infection: Photopenic area in the left shoulder. No areas of abnormal radiotracer uptake to suggest infection. No imaging evidence of prosthetic loosening or infection. Evidence of left shoulder arthroplasty. Xr Chest Portable    Result Date: 3/19/2020  EXAMINATION: ONE XRAY VIEW OF THE CHEST 3/19/2020 10:22 am COMPARISON: Chest radiograph performed 03/14/2020. HISTORY: ORDERING SYSTEM PROVIDED HISTORY: cough TECHNOLOGIST PROVIDED HISTORY: cough Reason for Exam: cough, congestion Acuity: Acute Type of Exam: Initial FINDINGS: Lungs are without consolidation or effusion. There is no pneumothorax. The heart is enlarged. The upper abdomen is unremarkable. The extrathoracic soft tissues are unremarkable. Cardiomegaly without acute pulmonary process. Xr Chest Portable    Result Date: 3/11/2020  EXAMINATION: ONE XRAY VIEW OF THE CHEST 3/9/2020 10:28 am COMPARISON: 10/03/2018 HISTORY: ORDERING SYSTEM PROVIDED HISTORY: shortness of breath TECHNOLOGIST PROVIDED HISTORY: shortness of breath Reason for Exam: chest pain, sob Acuity: Unknown Type of Exam: Unknown Initial exam. FINDINGS: Airspace opacities both lower lobes right greater than left. The heart is enlarged. No pneumothorax or pulmonary edema. Cardiomegaly with bilateral lower lobe infiltrates. Physical Examination:        Physical Exam  Constitutional:       Appearance: He is obese. HENT:      Head: Normocephalic and atraumatic. Nose: Nose normal. No congestion. Mouth/Throat:      Mouth: Mucous membranes are moist.      Pharynx: Oropharynx is clear. Eyes:      Extraocular Movements: Extraocular movements intact.       Pupils: Pupils are equal, Prophylaxis: HOLD due to bleed  PT/OT  Dispo: Social work    Yobany Stratton MD  3/20/2020  1:30 PM     Attending Physician Statement  I have discussed the care of Wolfgang Castañeda with the resident team. I have examined the patient myself and taken ros and hpi , including pertinent history and exam findings,  with the resident. I have reviewed the key elements of all parts of the encounter with the resident. I agree with the assessment, plan and orders as documented by the resident. Significant, symptomatic drop in hemoglobin-status post EGD 3/20- no source of bleeding identified. H&H stable  C. difficile positive- patient is not showing symptoms of colitis-white cell count downtrending. .  DC IV Flagyl. Syncopal episode- secondary to hypovolemia and anemia,   Type II MI-troponin elevated at presentation but downtrending since admission-EKG has remained normal.  Acute blood loss anemia- IV Venofer  Fatigue and deconditioning- protein supplements, physical therapy  Patient feeling very weak today, plan for discharge tomorrow, if cleared by GI.     Electronically signed by Pro Tena MD

## 2020-03-20 NOTE — PLAN OF CARE
Problem: Falls - Risk of:  Goal: Will remain free from falls  Description: Will remain free from falls  3/20/2020 1601 by Rg Pham RN  Outcome: Met This Shift  Note: No falls this shift. Up to bathroom and to chair with minimal assist. Pt c/o general fatigue. PT/OT started to work with pt. Pt used call light appropriately. Problem: Bleeding:  Goal: Will show no signs and symptoms of excessive bleeding  Description: Will show no signs and symptoms of excessive bleeding  3/20/2020 1601 by Rg Pham RN  Outcome: Ongoing  Note: Hgb down to 7.5 today. Pt had 2 loose stool, dark brown, no melissa blood noted.

## 2020-03-21 LAB
ANION GAP SERPL CALCULATED.3IONS-SCNC: 9 MMOL/L (ref 9–17)
BUN BLDV-MCNC: 12 MG/DL (ref 8–23)
BUN/CREAT BLD: ABNORMAL (ref 9–20)
CALCIUM SERPL-MCNC: 8.1 MG/DL (ref 8.6–10.4)
CHLORIDE BLD-SCNC: 111 MMOL/L (ref 98–107)
CO2: 23 MMOL/L (ref 20–31)
CREAT SERPL-MCNC: 0.54 MG/DL (ref 0.7–1.2)
GFR AFRICAN AMERICAN: >60 ML/MIN
GFR NON-AFRICAN AMERICAN: >60 ML/MIN
GFR SERPL CREATININE-BSD FRML MDRD: ABNORMAL ML/MIN/{1.73_M2}
GFR SERPL CREATININE-BSD FRML MDRD: ABNORMAL ML/MIN/{1.73_M2}
GLUCOSE BLD-MCNC: 107 MG/DL (ref 75–110)
GLUCOSE BLD-MCNC: 125 MG/DL (ref 70–99)
GLUCOSE BLD-MCNC: 165 MG/DL (ref 75–110)
GLUCOSE BLD-MCNC: 78 MG/DL (ref 75–110)
GLUCOSE BLD-MCNC: 84 MG/DL (ref 75–110)
HCT VFR BLD CALC: 22.4 % (ref 41–53)
HCT VFR BLD CALC: 25.5 % (ref 41–53)
HCT VFR BLD CALC: 25.6 % (ref 41–53)
HEMOGLOBIN: 7.2 G/DL (ref 13.5–17.5)
HEMOGLOBIN: 8.1 G/DL (ref 13.5–17.5)
HEMOGLOBIN: 8.3 G/DL (ref 13.5–17.5)
MCH RBC QN AUTO: 28.7 PG (ref 26–34)
MCHC RBC AUTO-ENTMCNC: 32.1 G/DL (ref 31–37)
MCV RBC AUTO: 89.5 FL (ref 80–100)
NRBC AUTOMATED: ABNORMAL PER 100 WBC
PDW BLD-RTO: 14.7 % (ref 11.5–14.9)
PLATELET # BLD: 185 K/UL (ref 150–450)
PMV BLD AUTO: 8.1 FL (ref 6–12)
POTASSIUM SERPL-SCNC: 3.7 MMOL/L (ref 3.7–5.3)
RBC # BLD: 2.5 M/UL (ref 4.5–5.9)
SODIUM BLD-SCNC: 143 MMOL/L (ref 135–144)
WBC # BLD: 14.6 K/UL (ref 3.5–11)

## 2020-03-21 PROCEDURE — 6360000002 HC RX W HCPCS: Performed by: STUDENT IN AN ORGANIZED HEALTH CARE EDUCATION/TRAINING PROGRAM

## 2020-03-21 PROCEDURE — 2500000003 HC RX 250 WO HCPCS: Performed by: INTERNAL MEDICINE

## 2020-03-21 PROCEDURE — 6370000000 HC RX 637 (ALT 250 FOR IP): Performed by: INTERNAL MEDICINE

## 2020-03-21 PROCEDURE — 36415 COLL VENOUS BLD VENIPUNCTURE: CPT

## 2020-03-21 PROCEDURE — APPNB30 APP NON BILLABLE TIME 0-30 MINS: Performed by: NURSE PRACTITIONER

## 2020-03-21 PROCEDURE — 2580000003 HC RX 258: Performed by: STUDENT IN AN ORGANIZED HEALTH CARE EDUCATION/TRAINING PROGRAM

## 2020-03-21 PROCEDURE — 85014 HEMATOCRIT: CPT

## 2020-03-21 PROCEDURE — 85027 COMPLETE CBC AUTOMATED: CPT

## 2020-03-21 PROCEDURE — 94640 AIRWAY INHALATION TREATMENT: CPT

## 2020-03-21 PROCEDURE — 2580000003 HC RX 258: Performed by: NURSE PRACTITIONER

## 2020-03-21 PROCEDURE — 80048 BASIC METABOLIC PNL TOTAL CA: CPT

## 2020-03-21 PROCEDURE — 6370000000 HC RX 637 (ALT 250 FOR IP): Performed by: STUDENT IN AN ORGANIZED HEALTH CARE EDUCATION/TRAINING PROGRAM

## 2020-03-21 PROCEDURE — 6360000002 HC RX W HCPCS: Performed by: INTERNAL MEDICINE

## 2020-03-21 PROCEDURE — P9016 RBC LEUKOCYTES REDUCED: HCPCS

## 2020-03-21 PROCEDURE — 2580000003 HC RX 258: Performed by: INTERNAL MEDICINE

## 2020-03-21 PROCEDURE — 36430 TRANSFUSION BLD/BLD COMPNT: CPT

## 2020-03-21 PROCEDURE — 99232 SBSQ HOSP IP/OBS MODERATE 35: CPT | Performed by: INTERNAL MEDICINE

## 2020-03-21 PROCEDURE — 2060000000 HC ICU INTERMEDIATE R&B

## 2020-03-21 PROCEDURE — 97530 THERAPEUTIC ACTIVITIES: CPT

## 2020-03-21 PROCEDURE — 85018 HEMOGLOBIN: CPT

## 2020-03-21 PROCEDURE — C9113 INJ PANTOPRAZOLE SODIUM, VIA: HCPCS | Performed by: INTERNAL MEDICINE

## 2020-03-21 PROCEDURE — 99233 SBSQ HOSP IP/OBS HIGH 50: CPT | Performed by: INTERNAL MEDICINE

## 2020-03-21 PROCEDURE — 2700000000 HC OXYGEN THERAPY PER DAY

## 2020-03-21 PROCEDURE — 97110 THERAPEUTIC EXERCISES: CPT

## 2020-03-21 PROCEDURE — 94761 N-INVAS EAR/PLS OXIMETRY MLT: CPT

## 2020-03-21 PROCEDURE — 86900 BLOOD TYPING SEROLOGIC ABO: CPT

## 2020-03-21 RX ORDER — 0.9 % SODIUM CHLORIDE 0.9 %
20 INTRAVENOUS SOLUTION INTRAVENOUS ONCE
Status: COMPLETED | OUTPATIENT
Start: 2020-03-21 | End: 2020-03-21

## 2020-03-21 RX ADMIN — IRON SUCROSE 200 MG: 20 INJECTION, SOLUTION INTRAVENOUS at 11:35

## 2020-03-21 RX ADMIN — BENZONATATE 100 MG: 100 CAPSULE ORAL at 10:25

## 2020-03-21 RX ADMIN — Medication 10 ML: at 20:26

## 2020-03-21 RX ADMIN — PRAVASTATIN SODIUM 80 MG: 40 TABLET ORAL at 18:40

## 2020-03-21 RX ADMIN — METRONIDAZOLE 500 MG: 500 INJECTION, SOLUTION INTRAVENOUS at 20:24

## 2020-03-21 RX ADMIN — Medication 125 MG: at 05:32

## 2020-03-21 RX ADMIN — IPRATROPIUM BROMIDE AND ALBUTEROL SULFATE 3 ML: 2.5; .5 SOLUTION RESPIRATORY (INHALATION) at 11:18

## 2020-03-21 RX ADMIN — Medication 10 ML: at 10:30

## 2020-03-21 RX ADMIN — NYSTATIN 500000 UNITS: 100000 SUSPENSION ORAL at 10:25

## 2020-03-21 RX ADMIN — NYSTATIN 500000 UNITS: 100000 SUSPENSION ORAL at 16:34

## 2020-03-21 RX ADMIN — Medication 125 MG: at 11:35

## 2020-03-21 RX ADMIN — AMLODIPINE BESYLATE 10 MG: 10 TABLET ORAL at 10:25

## 2020-03-21 RX ADMIN — BUDESONIDE AND FORMOTEROL FUMARATE DIHYDRATE 2 PUFF: 160; 4.5 AEROSOL RESPIRATORY (INHALATION) at 20:24

## 2020-03-21 RX ADMIN — PREGABALIN 200 MG: 100 CAPSULE ORAL at 20:25

## 2020-03-21 RX ADMIN — INSULIN LISPRO 2 UNITS: 100 INJECTION, SOLUTION INTRAVENOUS; SUBCUTANEOUS at 11:35

## 2020-03-21 RX ADMIN — PREGABALIN 200 MG: 100 CAPSULE ORAL at 10:25

## 2020-03-21 RX ADMIN — LISINOPRIL 10 MG: 10 TABLET ORAL at 10:25

## 2020-03-21 RX ADMIN — METRONIDAZOLE 500 MG: 500 INJECTION, SOLUTION INTRAVENOUS at 10:25

## 2020-03-21 RX ADMIN — IPRATROPIUM BROMIDE AND ALBUTEROL SULFATE 3 ML: 2.5; .5 SOLUTION RESPIRATORY (INHALATION) at 19:49

## 2020-03-21 RX ADMIN — BUDESONIDE AND FORMOTEROL FUMARATE DIHYDRATE 2 PUFF: 160; 4.5 AEROSOL RESPIRATORY (INHALATION) at 10:24

## 2020-03-21 RX ADMIN — IPRATROPIUM BROMIDE AND ALBUTEROL SULFATE 3 ML: 2.5; .5 SOLUTION RESPIRATORY (INHALATION) at 15:51

## 2020-03-21 RX ADMIN — PANTOPRAZOLE SODIUM 40 MG: 40 INJECTION, POWDER, FOR SOLUTION INTRAVENOUS at 10:24

## 2020-03-21 RX ADMIN — NYSTATIN 500000 UNITS: 100000 SUSPENSION ORAL at 20:25

## 2020-03-21 RX ADMIN — Medication 125 MG: at 18:40

## 2020-03-21 RX ADMIN — Medication 10 ML: at 10:31

## 2020-03-21 RX ADMIN — PREGABALIN 200 MG: 100 CAPSULE ORAL at 15:42

## 2020-03-21 RX ADMIN — SODIUM CHLORIDE 20 ML: 9 INJECTION, SOLUTION INTRAVENOUS at 16:34

## 2020-03-21 RX ADMIN — NYSTATIN 500000 UNITS: 100000 SUSPENSION ORAL at 15:42

## 2020-03-21 RX ADMIN — METRONIDAZOLE 500 MG: 500 INJECTION, SOLUTION INTRAVENOUS at 02:34

## 2020-03-21 RX ADMIN — IPRATROPIUM BROMIDE AND ALBUTEROL SULFATE 3 ML: 2.5; .5 SOLUTION RESPIRATORY (INHALATION) at 07:45

## 2020-03-21 ASSESSMENT — ENCOUNTER SYMPTOMS
ABDOMINAL DISTENTION: 0
NAUSEA: 0
BLOOD IN STOOL: 0
SHORTNESS OF BREATH: 0
WHEEZING: 0
VOMITING: 0
DIARRHEA: 0
COUGH: 1
ABDOMINAL PAIN: 0
COLOR CHANGE: 0

## 2020-03-21 ASSESSMENT — PAIN DESCRIPTION - LOCATION: LOCATION: SHOULDER

## 2020-03-21 ASSESSMENT — PAIN DESCRIPTION - ORIENTATION: ORIENTATION: LEFT;RIGHT

## 2020-03-21 ASSESSMENT — PAIN SCALES - GENERAL: PAINLEVEL_OUTOF10: 8

## 2020-03-21 NOTE — CARE COORDINATION
ONGOING DISCHARGE PLAN:    Discharge plan for the patient is home with declines VNS.  Rosanne was to the bedside this am and he declines VNS need. 3/19 EGD. IV iron and flagyl and oral vanco.     Will continue to follow for additional discharge needs.     Electronically signed by Annamarie Escudero RN on 3/21/2020 at 12:37 PM

## 2020-03-21 NOTE — PROGRESS NOTES
[facility]    Gastroenterology Daily Progress Note      Patient:   Gabriella Hernandez   :    1955   Facility:   [facility]  Date:     3/21/2020  Consultant:   Rosa Loya CNP      SUBJECTIVE  71 y.o. male admitted 3/19/2020 with Syncope and collapse [R55] and seen for c diff melena with iron deficiency anemia. The pt was seen and examined. He reports continued shortness of breath with minimal activity. hgb 7.2. Had one normal appearing bowel movement overnight, stool is becoming formed. No c/o abdominal pain or nausea.          OBJECTIVE  Scheduled Meds:   pravastatin  80 mg Oral QPM    iron sucrose  200 mg Intravenous Q24H    ipratropium-albuterol  1 vial Inhalation 4x Daily    nystatin  500,000 Units Oral 4x Daily    budesonide-formoterol  2 puff Inhalation BID    metroNIDAZOLE  500 mg Intravenous Q8H    vancomycin  125 mg Oral 4 times per day    sodium chloride flush  10 mL Intravenous 2 times per day    insulin lispro  0-12 Units Subcutaneous TID WC    insulin lispro  0-6 Units Subcutaneous Nightly    pantoprazole  40 mg Intravenous Daily    And    sodium chloride (PF)  10 mL Intravenous Daily    amLODIPine  10 mg Oral Daily    lisinopril  10 mg Oral Daily    pregabalin  200 mg Oral TID       Vital Signs:  BP (!) 114/40   Pulse 67   Temp 98 °F (36.7 °C) (Oral)   Resp 16   Ht 6' (1.829 m)   Wt 257 lb 8 oz (116.8 kg)   SpO2 99%   BMI 34.92 kg/m²        General Appearance: alert and oriented to person, place and time, well-developed and well-nourished, in no acute distress  Skin: warm and dry, no rash or erythema  Head: normocephalic and atraumatic  Eyes: pupils equal, round, and reactive to light, extraocular eye movements intact, conjunctivae normal  ENT: hearing grossly normal bilaterally  Neck: neck supple and non tender without mass, no thyromegaly or thyroid nodules, no cervical lymphadenopathy   Pulmonary/Chest: clear/diminished  to auscultation

## 2020-03-21 NOTE — DISCHARGE INSTR - COC
without long-term current use of insulin (HCC) E11.42    PVD (peripheral vascular disease) with claudication ([facility]) I73.9    Peripheral neuropathic pain M79.2    Tinea pedis of both feet B35.3    Hyperglycemia R73.9    Nonspecific reactive hepatitis K75.2    COPD mixed type ([facility] J44.9    Status post lumbar laminectomy Z98.890    Serum cholinesterase defect ([facility]) E88.89    Vitamin D deficiency E55.9    Hypercalcemia E83.52    Hypertriglyceridemia E78.1    REYNALDO on CPAP G47.33, Z99.89    Tubular adenoma of colon 4/11/17 D12.6    Psychophysiological insomnia F51.04    S/P reverse total shoulder arthroplasty, left Z96.612    Recurrent major depressive disorder, in full remission ([facility]) F33.42    Actinic keratosis L57.0    Photodermatitis due to sun L56.8    Seborrheic keratosis L82.1    Senile hyperkeratosis L57.0    Solar degeneration L57.8    Abdominal aortic aneurysm ([facility]) I71.4    Peripheral arterial occlusive disease ([facility]) I77.9    Anemia D64.9    Unintended weight loss R63.4    Family history of cancer Z80.9    Stenosis of left carotid artery I65.22    Poor balance R26.89    Foot drop, right M21.371    Difficulty rising from chair R68.89    Degenerative disc disease, cervical M50.30    Chronic neck pain M54.2, G89.29    At high risk for falls Z91.81    Pseudocholinesterase deficiency E88.09    Hyperparathyroidism ([facility]) E21.3    Coronary artery disease involving native coronary artery of native heart without angina pectoris I25.10    Severe obesity (BMI 35.0-35.9 with comorbidity) ([facility]) E66.01, Z68.35    Pneumonia J18.9    Status post total shoulder arthroplasty, left Z96.612    Tobacco use disorder F17.200    Chronic hypoxemic respiratory failure ([facility]) J96.11    Syncope and collapse R55    GI bleed K92.2    C. difficile colitis A04.72    Acute diarrhea R19.7    Melena K92.1    Thrush B37.0       Isolation/Infection:   Isolation          C Diff Contact        Patient Infection Status     Infection Onset Added Last Indicated Last Indicated By Review Planned Expiration Resolved Resolved By    C-diff (Clostridium difficile)  03/19/20 03/19/20 Mikey Washington RN 03/26/20       Resolved    C-diff Rule Out 03/18/20 03/18/20 03/18/20 Clostridium Difficile Toxin/Antigen (Ordered)   03/18/20 Rule-Out Test Resulted          Nurse Assessment:  Last Vital Signs: BP (!) 114/40   Pulse 67   Temp 98 °F (36.7 °C) (Oral)   Resp 16   Ht 6' (1.829 m)   Wt 257 lb 8 oz (116.8 kg)   SpO2 99%   BMI 34.92 kg/m²     Last documented pain score (0-10 scale): Pain Level: 0  Last Weight:   Wt Readings from Last 1 Encounters:   03/19/20 257 lb 8 oz (116.8 kg)     Mental Status:  {IP PT MENTAL STATUS:20030}    IV Access:  { KATHIE IV ACCESS:985108203}    Nursing Mobility/ADLs:  Walking   {[facility] DME BQPU:837563674}  Transfer  {P DME GMKK:334172289}  Bathing  {P DME IDPS:368501072}  Dressing  {P DME GNPT:609166017}  Toileting  {P DME YSWT:127437311}  Feeding  {P DME THBQ:649471238}  Med Admin  {P DME ZQNO:785772595}  Med Delivery   { KATHIE MED Delivery:776167122}    Wound Care Documentation and Therapy:        Elimination:  Continence:   · Bowel: {YES / MF:95963}  · Bladder: {YES / WI:32761}  Urinary Catheter: {Urinary Catheter:656385235}   Colostomy/Ileostomy/Ileal Conduit: {YES / NW:93630}       Date of Last BM: ***    Intake/Output Summary (Last 24 hours) at 3/21/2020 1058  Last data filed at 3/20/2020 1345  Gross per 24 hour   Intake 500 ml   Output --   Net 500 ml     I/O last 3 completed shifts: In: 1632 [P. O.:660; I.V.:772; IV Piggyback:200]  Out: 900 [Urine:900]    Safety Concerns:     [facility] Safety Concerns:748534779}    Impairments/Disabilities:      [address] V[address]ZQUEZ Impairments/Disabilities:488889919}    Nutrition Therapy:  Current Nutrition Therapy:   [address] V[address]ZQUEZ Diet List:234800129}    Routes of Feeding: {CHP DME Other Feedings:748395548}  Liquids: {Slp liquid thickness:62145}  Daily Fluid Restriction: {CHP DME Yes amt example:505837989}  Last Modified Barium Swallow with Video (Video Swallowing Test): {Done Not Done DPMN:350977459}    Treatments at the Time of Hospital Discharge:   Respiratory Treatments: ***  Oxygen Therapy:  {Therapy; copd oxygen:73188}  Ventilator:    {MH CC Vent PGYF:870714763}    Rehab Therapies: {THERAPEUTIC INTERVENTION:0423963712}  Weight Bearing Status/Restrictions: { CC Weight Bearin}  Other Medical Equipment (for information only, NOT a DME order):  {EQUIPMENT:923091149}  Other Treatments: ***    Patient's personal belongings (please select all that are sent with patient):  {CHP DME Belongings:935792302}    RN SIGNATURE:  {Esignature:700605042}    CASE MANAGEMENT/SOCIAL WORK SECTION    Inpatient Status Date: ***    Readmission Risk Assessment Score:  Readmission Risk              Risk of Unplanned Readmission:        29           Discharging to Facility/ Agency   · Name:   · Address:  · Phone:  · Fax:    Dialysis Facility (if applicable)   · Name:  · Address:  · Dialysis Schedule:  · Phone:  · Fax:    / signature: {Esignature:442482473}    PHYSICIAN SECTION    Prognosis: {Prognosis:0387357083}    Condition at Discharge: [address] [place] [address] Patient Condition:757944451}    Rehab Potential (if transferring to Rehab): {Prognosis:0911691623}    Recommended Labs or Other Treatments After Discharge: ***    Physician Certification: I certify the above information and transfer of Adenike Malone  is necessary for the continuing treatment of the diagnosis listed and that he requires {Admit to Appropriate Level of Care:37717} for {GREATER/LESS:544515682} 30 days.      Update Admission H&P: {CHP DME Changes in PHJUU:430825177}    PHYSICIAN SIGNATURE:  Electronically signed by Marta Best MD on 3/22/20 at 1:48 PM EDT

## 2020-03-21 NOTE — PROGRESS NOTES
[address] [facility].    PROGRESS NOTE             3/21/2020    8:00 AM    Name:   Lisa Avery  MRN:     [id number]     Acct:      [de-identified]   Room:   2116/2116-01  IP Day:  2  Admit Date:  3/19/2020  1:26 AM    PCP:  Miller Simon MD  Code Status:  Full Code    Subjective:     C/C:   Chief Complaint   Patient presents with    Loss of Consciousness     Interval History Status: not changed. Patient was seen and examined at bedside. He is resting comfortably in bed. No acute events overnight. He states that he continues to feel fatigued and weak although he does feel better than when he was admitted. He states that he has been up and ambulating but is very tired easily. He also states that his bowel movements have decreased and are no longer dark. Although the patient feels better his hemoglobin continues to fluctuate and this morning was 7.2. Brief History:     Patient presented after syncopal episode on the toilet after several loose stools. Reports no trauma. LOC for 15 seconds. Was found to be C. Diff positive and started on PO flagyl day of admission. Admits to taking 2x500 mg tabs of flagyl then having 3 loose stools in short period. Recent hospitalization with antibiotic use. On admission had low hemoglobin. EGD negative for bleed. Review of Systems:     Review of Systems   Constitutional: Positive for fatigue. Negative for chills and fever. HENT: Negative. Respiratory: Positive for cough. Negative for shortness of breath and wheezing. Cardiovascular: Negative for chest pain, palpitations and leg swelling. Gastrointestinal: Negative for abdominal distention, abdominal pain, blood in stool, diarrhea, nausea and vomiting. Genitourinary: Negative for dysuria. Musculoskeletal: Negative. Skin: Negative for color change, pallor and wound. Neurological: Positive for weakness. Negative for dizziness. recurrent major depressive disorder ([facility] Obesity, Class III, BMI 40-49.9 (morbid obesity) ([facility] REYNALDO on CPAP, Osteoarthritis, Polypharmacy, Positive FIT (fecal immunochemical test), Prolonged emergence from general anesthesia, Prostate cancer ([facility] Pseudocholinesterase deficiency, Severe obesity (BMI 35.0-35.9 with comorbidity) ([facility] Short of breath on exertion, Sleep apnea, Status post lumbar laminectomy, Stenosis of left carotid artery, Tubular adenoma of colon 17, Type 2 diabetes mellitus with hyperglycemia, without long-term current use of insulin ([facility] Vitamin D deficiency, and Wears glasses. Social History:   reports that he quit smoking 12 days ago. His smoking use included cigarettes. He has a 52.50 pack-year smoking history. He has never used smokeless tobacco. He reports that he does not drink alcohol or use drugs. Family History:   Family History   Problem Relation Age of Onset    Diabetes Mother     Lung Cancer Brother     Liver Cancer Brother     Cancer Father        Vitals:  BP (!) 114/40   Pulse 67   Temp 98 °F (36.7 °C) (Oral)   Resp 16   Ht 6' (1.829 m)   Wt 257 lb 8 oz (116.8 kg)   SpO2 99%   BMI 34.92 kg/m²   Temp (24hrs), Av.9 °F (36.6 °C), Min:97.9 °F (36.6 °C), Max:98 °F (36.7 °C)    Recent Labs     20  1922 20  0709 20  1053 20  2100   POCGLU 109 91 167* 113*       I/O(24Hr):     Intake/Output Summary (Last 24 hours) at 3/21/2020 0800  Last data filed at 3/20/2020 1345  Gross per 24 hour   Intake 1632 ml   Output 900 ml   Net 732 ml       Labs:  [unfilled]    Lab Results   Component Value Date/Time    SPECIAL NOT REPORTED 2020 10:16 AM    SPECIAL NOT REPORTED 2020 10:16 AM    SPECIAL NOT REPORTED 2020 10:16 AM    SPECIAL NOT REPORTED 2020 10:16 AM    SPECIAL NOT REPORTED 2020 10:16 AM     Lab Results   Component Value Date/Time    CULTURE NORMAL RESPIRATORY INÉS 6,000 CFU/ML 2020 10:16 AM    CULTURE YEAST ISOLATED, ID TO FOLLOW (A) 03/13/2020 10:16 AM    CULTURE NO GROWTH 2 DAYS 03/13/2020 10:16 AM    CULTURE  03/13/2020 10:16 AM     NEGATIVE. No Herpes Simplex Virus detected by Enzyme Linked Virus Inducible System culture. at day 2    CULTURE  03/13/2020 10:16 AM     NEGATIVE for Influenza A and B, Para influenza 1,2,3, Adenovirus and RSV. at day 2    CULTURE  03/13/2020 10:16 AM     Negative results do not preclude respiratory virus infection and should not be used as the sole basis for diagnosis, treatment or other management decisions. CULTURE NEGATIVE for Cytomegalovirus at day 3 03/13/2020 10:16 AM    CULTURE NEGATIVE for Enterovirus at day 5 03/13/2020 10:16 AM       [unfilled]    Radiology:    Xr Chest Standard (2 Vw)    Result Date: 3/14/2020  EXAMINATION: TWO XRAY VIEWS OF THE CHEST 3/14/2020 3:11 pm COMPARISON: 03/09/2020 HISTORY: ORDERING SYSTEM PROVIDED HISTORY: SOB TECHNOLOGIST PROVIDED HISTORY: SOB Reason for Exam: sob Acuity: Unknown Type of Exam: Unknown FINDINGS: Near complete resolution of previously present bibasilar pulmonary opacities. No pneumothorax or pleural effusion. Cardiac and mediastinal contours normal.  No acute osseous abnormality. Left shoulder arthroplasty hardware partially imaged. Mild degenerative changes in the thoracic spine. Near complete resolution of previously present bibasilar pulmonary opacities. No new abnormality. Ct Chest Wo Contrast    Result Date: 3/12/2020  EXAMINATION: CT OF THE CHEST WITHOUT CONTRAST 3/12/2020 8:23 am TECHNIQUE: CT of the chest was performed without the administration of intravenous contrast. Multiplanar reformatted images are provided for review. Dose modulation, iterative reconstruction, and/or weight based adjustment of the mA/kV was utilized to reduce the radiation dose to as low as reasonably achievable.  COMPARISON: 02/17/2020 HISTORY: ORDERING SYSTEM PROVIDED HISTORY: hemoptysis, smoker/copd TECHNOLOGIST PROVIDED Extraocular Movements: Extraocular movements intact. Pupils: Pupils are equal, round, and reactive to light. Neck:      Musculoskeletal: Normal range of motion. Cardiovascular:      Rate and Rhythm: Normal rate and regular rhythm. Pulses: Normal pulses. Heart sounds: No murmur. Pulmonary:      Effort: Pulmonary effort is normal. No respiratory distress. Breath sounds: No wheezing. Abdominal:      General: Abdomen is flat. Palpations: Abdomen is soft. Tenderness: There is no abdominal tenderness. There is no guarding. Musculoskeletal: Normal range of motion. General: No swelling. Right lower leg: No edema. Left lower leg: No edema. Skin:     General: Skin is warm and dry. Neurological:      General: No focal deficit present. Mental Status: He is alert and oriented to person, place, and time. Assessment:        Primary Problem  Syncope and collapse    Active Hospital Problems    Diagnosis Date Noted    Severe obesity (BMI 35.0-35.9 with comorbidity) ([facility] [E66.01, Z68.35] 02/02/2020     Priority: High    REYNALDO on CPAP [G47.33, Z99.89] 05/06/2017     Priority: High    Syncope and collapse [R55] 03/19/2020    GI bleed [K92.2] 03/19/2020    C. difficile colitis [A04.72] 03/19/2020    Type 2 diabetes mellitus with diabetic polyneuropathy, without long-term current use of insulin ([facility] [E11.42] 03/25/2017       Plan:      Acute Blood Loss Anemia, Upper vs Lower GI bleed  -Hgb 13-->7.2  -EGD negative for acute bleed  -Fe 28, TIBC 198  -H&H q6hr  -IV Venofer 200 for 5 days  -Transfuse for Hgb<7  -GI consult; appreciate recs  -Will likely need colonoscopy outpatient  -Consider 1x transfusion, outpatient CBC     Syncopal Episode 2/2 likely vasovagal 2/2 dehydration  -IVF  -IV Iron  -DC w/ oral iron sup     C. Diff Colitis  -Symptoms not consistent w/ C. Diff  -PO Vanco  -IV Flagyl  -C.  Diff isolation     Type II NSTEMI 2/2 Dehydration/Anemia  -Trop 39-->23  -Hydrated w/ IVF  -Tolerating PO intake  -Monitor     Type II Diabetes Mellitus  -MDISS  -POC glucose  -Hypoglycemia protocol     REYNALDO  -home CPAP     Diet: Dental Soft  DVT Prophylaxis: HOLD due to bleed  PT/OT  Dispo: Social work      Joan Meyer MD  3/21/2020  8:00 AM

## 2020-03-21 NOTE — PROGRESS NOTES
Physical Therapy  Facility/Department: [address] [place] [address] PROGRESSIVE CARE  Daily Treatment Note  NAME: Wolfgang Castañeda  : 1955  MRN: [id number]    Date of Service: 3/21/2020    Discharge Recommendations:  Patient would benefit from continued therapy after discharge   PT Equipment Recommendations  Equipment Needed: No    Assessment      REQUIRES PT FOLLOW UP: Yes  Activity Tolerance  Activity Tolerance: Patient limited by fatigue;Patient limited by endurance     Patient Diagnosis(es): The primary encounter diagnosis was Syncope and collapse. Diagnoses of Gastrointestinal hemorrhage, unspecified gastrointestinal hemorrhage type, C. difficile colitis, and Symptomatic anemia were also pertinent to this visit. has a past medical history of Acid reflux, Arthritis, Chronic bilateral low back pain with bilateral sciatica, Complete tear of left rotator cuff, COPD (chronic obstructive pulmonary disease) ([address] Coronary artery disease involving native coronary artery of native heart without angina pectoris, Degenerative disc disease, cervical, Gout, H/O cardiac catheterization, Hyperlipidemia, Hyperlipidemia with target LDL less than 100, Hyperparathyroidism ([address] Hypertension, Knee pain, chronic, Liver disease, MDRO (multiple drug resistant organisms) resistance, Moderate episode of recurrent major depressive disorder ([address] Obesity, Class III, BMI 40-49.9 (morbid obesity) ([address] REYNALDO on CPAP, Osteoarthritis, Polypharmacy, Positive FIT (fecal immunochemical test), Prolonged emergence from general anesthesia, Prostate cancer ([address] Pseudocholinesterase deficiency, Severe obesity (BMI 35.0-35.9 with comorbidity) ([address] Short of breath on exertion, Sleep apnea, Status post lumbar laminectomy, Stenosis of left carotid artery, Tubular adenoma of colon 17, Type 2 diabetes mellitus with hyperglycemia, without long-term current use of insulin ([address] Vitamin D deficiency, and Wears glasses.    has a past surgical history that includes and pursed lip breathing   Goals  Short term goals  Time Frame for Short term goals: 5-7 treatments/ week  Short term goal 1: pt to demonstrate good technique for LE strengthening exercises and energy conservation techniques  Short term goal 2: pt to demonstrate independent bed mobility for rolling and supine <> sit  Short term goal 3: pt to demonstrate MODIFIED independent transfers sit <> stand and bed <> chair using rollator  Short term goal 4: pt to demonstrate MODIFIED independent giat [de-identified]' using rollator for household distances  Short term goal 5: pt to demonstrate MODIFIED good (-) balance  using rollator  Short term goal 6: pt to demonstrate ability to ascend/ descend ramp using rollator or 2 steps w/ 2 rails w/ supervision  Patient Goals   Patient goals : return home w/ spouse    Plan    Plan  Times per week: 5-7 treatments/ week  Current Treatment Recommendations: Strengthening, Gait Training, Patient/Caregiver Education & Training, Stair training, Balance Training, Endurance Training, Functional Mobility Training, Transfer Training, Safety Education & Training, Home Exercise Program  Safety Devices  Type of devices: Call light within reach, Gait belt, Patient at risk for falls, Left in chair     Therapy Time         03/21/20 1429   PT Individual Minutes   Time In 1350   Time Out 1413   Minutes [address] [place] [address]

## 2020-03-22 VITALS
BODY MASS INDEX: 34.88 KG/M2 | RESPIRATION RATE: 16 BRPM | SYSTOLIC BLOOD PRESSURE: 112 MMHG | HEIGHT: 72 IN | HEART RATE: 80 BPM | OXYGEN SATURATION: 98 % | TEMPERATURE: 97.9 F | WEIGHT: 257.5 LBS | DIASTOLIC BLOOD PRESSURE: 62 MMHG

## 2020-03-22 LAB
ABO/RH: NORMAL
ANION GAP SERPL CALCULATED.3IONS-SCNC: 10 MMOL/L (ref 9–17)
ANTIBODY SCREEN: NEGATIVE
ARM BAND NUMBER: NORMAL
BLD PROD TYP BPU: NORMAL
BUN BLDV-MCNC: 10 MG/DL (ref 8–23)
BUN/CREAT BLD: ABNORMAL (ref 9–20)
CALCIUM SERPL-MCNC: 8.3 MG/DL (ref 8.6–10.4)
CHLORIDE BLD-SCNC: 108 MMOL/L (ref 98–107)
CO2: 23 MMOL/L (ref 20–31)
CREAT SERPL-MCNC: 0.63 MG/DL (ref 0.7–1.2)
CROSSMATCH RESULT: NORMAL
CULTURE: ABNORMAL
CULTURE: ABNORMAL
DISPENSE STATUS BLOOD BANK: NORMAL
EXPIRATION DATE: NORMAL
GFR AFRICAN AMERICAN: >60 ML/MIN
GFR NON-AFRICAN AMERICAN: >60 ML/MIN
GFR SERPL CREATININE-BSD FRML MDRD: ABNORMAL ML/MIN/{1.73_M2}
GFR SERPL CREATININE-BSD FRML MDRD: ABNORMAL ML/MIN/{1.73_M2}
GLUCOSE BLD-MCNC: 114 MG/DL (ref 75–110)
GLUCOSE BLD-MCNC: 119 MG/DL (ref 75–110)
GLUCOSE BLD-MCNC: 138 MG/DL (ref 70–99)
GLUCOSE BLD-MCNC: 155 MG/DL (ref 75–110)
HCT VFR BLD CALC: 25.1 % (ref 41–53)
HCT VFR BLD CALC: 25.8 % (ref 41–53)
HCT VFR BLD CALC: 25.8 % (ref 41–53)
HEMOGLOBIN: 8.2 G/DL (ref 13.5–17.5)
HEMOGLOBIN: 8.4 G/DL (ref 13.5–17.5)
HEMOGLOBIN: 8.6 G/DL (ref 13.5–17.5)
Lab: ABNORMAL
MCH RBC QN AUTO: 29.6 PG (ref 26–34)
MCHC RBC AUTO-ENTMCNC: 32.9 G/DL (ref 31–37)
MCV RBC AUTO: 90 FL (ref 80–100)
NRBC AUTOMATED: ABNORMAL PER 100 WBC
PDW BLD-RTO: 14.8 % (ref 11.5–14.9)
PLATELET # BLD: 197 K/UL (ref 150–450)
PMV BLD AUTO: 8.5 FL (ref 6–12)
POTASSIUM SERPL-SCNC: 3.9 MMOL/L (ref 3.7–5.3)
RBC # BLD: 2.78 M/UL (ref 4.5–5.9)
SODIUM BLD-SCNC: 141 MMOL/L (ref 135–144)
SPECIMEN DESCRIPTION: ABNORMAL
TRANSFUSION STATUS: NORMAL
UNIT DIVISION: 0
UNIT NUMBER: NORMAL
WBC # BLD: 15.6 K/UL (ref 3.5–11)

## 2020-03-22 PROCEDURE — 2580000003 HC RX 258: Performed by: STUDENT IN AN ORGANIZED HEALTH CARE EDUCATION/TRAINING PROGRAM

## 2020-03-22 PROCEDURE — 2500000003 HC RX 250 WO HCPCS: Performed by: INTERNAL MEDICINE

## 2020-03-22 PROCEDURE — 6360000002 HC RX W HCPCS: Performed by: INTERNAL MEDICINE

## 2020-03-22 PROCEDURE — 82947 ASSAY GLUCOSE BLOOD QUANT: CPT

## 2020-03-22 PROCEDURE — 80048 BASIC METABOLIC PNL TOTAL CA: CPT

## 2020-03-22 PROCEDURE — 85014 HEMATOCRIT: CPT

## 2020-03-22 PROCEDURE — 36415 COLL VENOUS BLD VENIPUNCTURE: CPT

## 2020-03-22 PROCEDURE — 99239 HOSP IP/OBS DSCHRG MGMT >30: CPT | Performed by: INTERNAL MEDICINE

## 2020-03-22 PROCEDURE — 6370000000 HC RX 637 (ALT 250 FOR IP): Performed by: STUDENT IN AN ORGANIZED HEALTH CARE EDUCATION/TRAINING PROGRAM

## 2020-03-22 PROCEDURE — APPNB30 APP NON BILLABLE TIME 0-30 MINS: Performed by: NURSE PRACTITIONER

## 2020-03-22 PROCEDURE — 85027 COMPLETE CBC AUTOMATED: CPT

## 2020-03-22 PROCEDURE — 99232 SBSQ HOSP IP/OBS MODERATE 35: CPT | Performed by: INTERNAL MEDICINE

## 2020-03-22 PROCEDURE — 94761 N-INVAS EAR/PLS OXIMETRY MLT: CPT

## 2020-03-22 PROCEDURE — 6370000000 HC RX 637 (ALT 250 FOR IP): Performed by: INTERNAL MEDICINE

## 2020-03-22 PROCEDURE — 6360000002 HC RX W HCPCS: Performed by: STUDENT IN AN ORGANIZED HEALTH CARE EDUCATION/TRAINING PROGRAM

## 2020-03-22 PROCEDURE — 94640 AIRWAY INHALATION TREATMENT: CPT

## 2020-03-22 PROCEDURE — 85018 HEMOGLOBIN: CPT

## 2020-03-22 PROCEDURE — 2700000000 HC OXYGEN THERAPY PER DAY

## 2020-03-22 PROCEDURE — 2580000003 HC RX 258: Performed by: INTERNAL MEDICINE

## 2020-03-22 PROCEDURE — C9113 INJ PANTOPRAZOLE SODIUM, VIA: HCPCS | Performed by: INTERNAL MEDICINE

## 2020-03-22 RX ORDER — FERROUS SULFATE 325(65) MG
325 TABLET ORAL 2 TIMES DAILY
Qty: 60 TABLET | Refills: 0 | Status: SHIPPED | OUTPATIENT
Start: 2020-03-22 | End: 2020-10-13 | Stop reason: ALTCHOICE

## 2020-03-22 RX ORDER — PANTOPRAZOLE SODIUM 40 MG/1
40 TABLET, DELAYED RELEASE ORAL
Status: DISCONTINUED | OUTPATIENT
Start: 2020-03-23 | End: 2020-03-22 | Stop reason: HOSPADM

## 2020-03-22 RX ADMIN — NYSTATIN 500000 UNITS: 100000 SUSPENSION ORAL at 17:44

## 2020-03-22 RX ADMIN — Medication 125 MG: at 00:10

## 2020-03-22 RX ADMIN — IPRATROPIUM BROMIDE AND ALBUTEROL SULFATE 3 ML: 2.5; .5 SOLUTION RESPIRATORY (INHALATION) at 07:06

## 2020-03-22 RX ADMIN — BUDESONIDE AND FORMOTEROL FUMARATE DIHYDRATE 2 PUFF: 160; 4.5 AEROSOL RESPIRATORY (INHALATION) at 08:56

## 2020-03-22 RX ADMIN — PANTOPRAZOLE SODIUM 40 MG: 40 INJECTION, POWDER, FOR SOLUTION INTRAVENOUS at 08:56

## 2020-03-22 RX ADMIN — PRAVASTATIN SODIUM 80 MG: 40 TABLET ORAL at 17:44

## 2020-03-22 RX ADMIN — LISINOPRIL 10 MG: 10 TABLET ORAL at 08:55

## 2020-03-22 RX ADMIN — METRONIDAZOLE 500 MG: 500 INJECTION, SOLUTION INTRAVENOUS at 12:19

## 2020-03-22 RX ADMIN — AMLODIPINE BESYLATE 10 MG: 10 TABLET ORAL at 08:55

## 2020-03-22 RX ADMIN — Medication 10 ML: at 09:00

## 2020-03-22 RX ADMIN — NYSTATIN 500000 UNITS: 100000 SUSPENSION ORAL at 08:55

## 2020-03-22 RX ADMIN — Medication 10 ML: at 08:56

## 2020-03-22 RX ADMIN — Medication 10 ML: at 03:18

## 2020-03-22 RX ADMIN — INSULIN LISPRO 2 UNITS: 100 INJECTION, SOLUTION INTRAVENOUS; SUBCUTANEOUS at 12:26

## 2020-03-22 RX ADMIN — Medication 125 MG: at 17:43

## 2020-03-22 RX ADMIN — IPRATROPIUM BROMIDE AND ALBUTEROL SULFATE 3 ML: 2.5; .5 SOLUTION RESPIRATORY (INHALATION) at 14:52

## 2020-03-22 RX ADMIN — IPRATROPIUM BROMIDE AND ALBUTEROL SULFATE 3 ML: 2.5; .5 SOLUTION RESPIRATORY (INHALATION) at 11:19

## 2020-03-22 RX ADMIN — Medication 125 MG: at 06:16

## 2020-03-22 RX ADMIN — METRONIDAZOLE 500 MG: 500 INJECTION, SOLUTION INTRAVENOUS at 03:17

## 2020-03-22 RX ADMIN — PREGABALIN 200 MG: 100 CAPSULE ORAL at 08:55

## 2020-03-22 RX ADMIN — Medication 125 MG: at 12:20

## 2020-03-22 RX ADMIN — IRON SUCROSE 200 MG: 20 INJECTION, SOLUTION INTRAVENOUS at 12:28

## 2020-03-22 ASSESSMENT — ENCOUNTER SYMPTOMS
WHEEZING: 0
SHORTNESS OF BREATH: 0
ABDOMINAL DISTENTION: 0
DIARRHEA: 0
COLOR CHANGE: 0
VOMITING: 0
NAUSEA: 0
BLOOD IN STOOL: 0
COUGH: 1
ABDOMINAL PAIN: 0

## 2020-03-22 NOTE — DISCHARGE INSTR - DIET

## 2020-03-22 NOTE — PLAN OF CARE
Problem: Falls - Risk of:  Goal: Will remain free from falls  Description: Will remain free from falls  Outcome: Ongoing     Problem: Falls - Risk of:  Goal: Absence of physical injury  Description: Absence of physical injury  Outcome: Ongoing     Problem: Bleeding:  Goal: Will show no signs and symptoms of excessive bleeding  Description: Will show no signs and symptoms of excessive bleeding  Outcome: Ongoing     Problem: Activity:  Goal: Capacity to carry out activities will improve  Description: Capacity to carry out activities will improve  Outcome: Ongoing     Problem:  Activity:  Goal: Fatigue will decrease  Description: Fatigue will decrease  Outcome: Ongoing     Problem: Musculor/Skeletal Functional Status  Goal: Absence of falls  Outcome: Ongoing

## 2020-03-22 NOTE — CARE COORDINATION
ONGOING DISCHARGE PLAN:    Discharge plan for the patient is home with declines VNS.     3/19 EGD.     IV iron and flagyl and oral vanco (C DIFF). Will continue to follow for additional discharge needs.     Electronically signed by Marika Kessler RN on 3/22/2020 at 12:32 PM

## 2020-03-22 NOTE — PROGRESS NOTES
Physical Therapy  DATE: 3/22/2020    NAME: Radha Macias  MRN: [id number]   : 1955    Patient not seen this date for Physical Therapy due to:  [] Blood transfusion in progress  [] Hemodialysis  [x]  Patient Declined: Atttempt @1257- Pt refuses tx   [] Spine Precautions   [] Strict Bedrest  [] Surgery/ Procedure  [] Testing      [] Other        [] PT being discontinued at this time. Patient independent. No further needs. [] PT being discontinued at this time as the patient has been transferred to palliative care. No further needs.     Dorthebird Flujasiel, PTA

## 2020-03-22 NOTE — PROGRESS NOTES
erosions and a duodenal polyp, he has not had further melena  -hgb 8.2 shortness of breath improved today after transfusion  -keep hgb>7  - the pt will eventually need repeat egd with push enteroscopy; given the current situation with COVID 19 and cancellation of outpatient testing discussed doing this inpatient and the pt is declining  -continue the Venofir      This plan was formulated in collaboration with Dr. Avery Abad.     Electronically signed by: JAVI Doan CNP on 3/22/2020 at 8:19 AM

## 2020-03-22 NOTE — PROGRESS NOTES
Other (See Comments)     PATIENT HAS PSEUDOCHOLINESTERASE DEFICIENCY      Cymbalta [Duloxetine Hcl]      Taste loss         Current Meds:   Scheduled Meds:    [START ON 3/23/2020] pantoprazole  40 mg Oral QAM AC    pravastatin  80 mg Oral QPM    iron sucrose  200 mg Intravenous Q24H    ipratropium-albuterol  1 vial Inhalation 4x Daily    nystatin  500,000 Units Oral 4x Daily    budesonide-formoterol  2 puff Inhalation BID    metroNIDAZOLE  500 mg Intravenous Q8H    vancomycin  125 mg Oral 4 times per day    sodium chloride flush  10 mL Intravenous 2 times per day    insulin lispro  0-12 Units Subcutaneous TID WC    insulin lispro  0-6 Units Subcutaneous Nightly    amLODIPine  10 mg Oral Daily    lisinopril  10 mg Oral Daily    pregabalin  200 mg Oral TID     Continuous Infusions:    dextrose       PRN Meds: albuterol sulfate HFA, sodium chloride flush, potassium chloride **OR** potassium alternative oral replacement **OR** potassium chloride, magnesium sulfate, acetaminophen **OR** acetaminophen, promethazine **OR** ondansetron, nicotine, glucose, dextrose, glucagon (rDNA), dextrose, morphine **OR** morphine, benzonatate    Data:     Past Medical History:   has a past medical history of Acid reflux, Arthritis, Chronic bilateral low back pain with bilateral sciatica, Complete tear of left rotator cuff, COPD (chronic obstructive pulmonary disease) (HCC), Coronary artery disease involving native coronary artery of native heart without angina pectoris, Degenerative disc disease, cervical, Gout, H/O cardiac catheterization, Hyperlipidemia, Hyperlipidemia with target LDL less than 100, Hyperparathyroidism ([facility] [address] Hypertension, Knee pain, chronic, Liver disease, MDRO (multiple drug resistant organisms) resistance, Moderate episode of recurrent major depressive disorder ([facility] [address] Obesity, Class III, BMI 40-49.9 (morbid obesity) ([facility] [address] REYNALDO on CPAP, Osteoarthritis, Polypharmacy, Positive FIT (fecal immunochemical test), Prolonged emergence from general anesthesia, Prostate cancer ([address] Pseudocholinesterase deficiency, Severe obesity (BMI 35.0-35.9 with comorbidity) ([address] Short of breath on exertion, Sleep apnea, Status post lumbar laminectomy, Stenosis of left carotid artery, Tubular adenoma of colon 17, Type 2 diabetes mellitus with hyperglycemia, without long-term current use of insulin ([address] Vitamin D deficiency, and Wears glasses. Social History:   reports that he quit smoking 13 days ago. His smoking use included cigarettes. He has a 52.50 pack-year smoking history. He has never used smokeless tobacco. He reports that he does not drink alcohol or use drugs. Family History:   Family History   Problem Relation Age of Onset    Diabetes Mother     Lung Cancer Brother     Liver Cancer Brother     Cancer Father        Vitals:  /61   Pulse 64   Temp 97.8 °F (36.6 °C) (Oral)   Resp 16   Ht 6' (1.829 m)   Wt 257 lb 8 oz (116.8 kg)   SpO2 100%   BMI 34.92 kg/m²   Temp (24hrs), Av.3 °F (36.8 °C), Min:97.7 °F (36.5 °C), Max:98.7 °F (37.1 °C)    Recent Labs     20  1046 20  1734 20  2004 20  0701   POCGLU 165* 84 107 114*       I/O(24Hr):     Intake/Output Summary (Last 24 hours) at 3/22/2020 1101  Last data filed at 3/22/2020 8394  Gross per 24 hour   Intake 500 ml   Output 1900 ml   Net -1400 ml       Labs:  [unfilled]    Lab Results   Component Value Date/Time    SPECIAL NOT REPORTED 2020 10:16 AM    SPECIAL NOT REPORTED 2020 10:16 AM    SPECIAL NOT REPORTED 2020 10:16 AM    SPECIAL NOT REPORTED 2020 10:16 AM    SPECIAL NOT REPORTED 2020 10:16 AM     Lab Results   Component Value Date/Time    CULTURE NORMAL RESPIRATORY INÉS 6,000 CFU/ML 2020 10:16 AM    CULTURE YEAST ISOLATED, ID TO FOLLOW (A) 2020 10:16 AM    CULTURE CANDIDA ALBICANS (A) 2020 10:16 AM    CULTURE NO GROWTH 2 DAYS 2020 10:16 AM    CULTURE  2020 10:16 AM     NEGATIVE. No Herpes Simplex Virus detected by Enzyme Linked Virus Inducible System culture. at day 2    CULTURE  03/13/2020 10:16 AM     NEGATIVE for Influenza A and B, Para influenza 1,2,3, Adenovirus and RSV. at day 2    CULTURE  03/13/2020 10:16 AM     Negative results do not preclude respiratory virus infection and should not be used as the sole basis for diagnosis, treatment or other management decisions. CULTURE NEGATIVE for Cytomegalovirus at day 3 03/13/2020 10:16 AM    CULTURE NEGATIVE for Enterovirus at day 5 03/13/2020 10:16 AM       [unfilled]    Radiology:    Xr Chest Standard (2 Vw)    Result Date: 3/14/2020  EXAMINATION: TWO XRAY VIEWS OF THE CHEST 3/14/2020 3:11 pm COMPARISON: 03/09/2020 HISTORY: ORDERING SYSTEM PROVIDED HISTORY: SOB TECHNOLOGIST PROVIDED HISTORY: SOB Reason for Exam: sob Acuity: Unknown Type of Exam: Unknown FINDINGS: Near complete resolution of previously present bibasilar pulmonary opacities. No pneumothorax or pleural effusion. Cardiac and mediastinal contours normal.  No acute osseous abnormality. Left shoulder arthroplasty hardware partially imaged. Mild degenerative changes in the thoracic spine. Near complete resolution of previously present bibasilar pulmonary opacities. No new abnormality. Ct Chest Wo Contrast    Result Date: 3/12/2020  EXAMINATION: CT OF THE CHEST WITHOUT CONTRAST 3/12/2020 8:23 am TECHNIQUE: CT of the chest was performed without the administration of intravenous contrast. Multiplanar reformatted images are provided for review. Dose modulation, iterative reconstruction, and/or weight based adjustment of the mA/kV was utilized to reduce the radiation dose to as low as reasonably achievable.  COMPARISON: 02/17/2020 HISTORY: ORDERING SYSTEM PROVIDED HISTORY: hemoptysis, smoker/copd TECHNOLOGIST PROVIDED HISTORY: hemoptysis, smoker/copd Reason for Exam: hemoptysis, smoker/copd Acuity: Acute Type of Exam: Initial FINDINGS: Mediastinum: The heart and great vessels are normal in size. Calcified atheromatous plaque and coronary calcifications are noted. No pericardial effusion. No enlarged or suspicious-appearing lymph nodes are identified. Lungs/pleura: Centrilobular emphysematous disease. Bilateral interstitial opacities and more localized alveolar opacities in the left lower lobe are noted. No effusion. Small amount debris in the distal trachea and mainstem bronchi. Upper Abdomen: Cholelithiasis in a contracted gallbladder. Low-density nodular enlargement of the adrenal glands consistent with hyperplasia and likely underlying adenomas, unchanged. Soft Tissues/Bones: No acute findings. Status post laminectomy at T12. Partially visualized left shoulder hardware. 1.  Small amount debris in the distal trachea and mainstem bronchi. Bilateral lower lobe opacities, left greater than right, consistent with an inflammatory process, infection or aspiration. 2.  Emphysema. 3.  Cholelithiasis. Nm Abscess Localization Limited    Result Date: 2/25/2020  EXAMINATION: INDIUM WBC FOR ABCESS 2/24/2020 7:59 am TECHNIQUE: Gamma camera imaging of the neck, shoulders and chest was performed following IV administration of white blood cells labeled with 538 microcuries of indium 111.  24-hour delayed images were obtained. Gamma camera imaging of the shoulders was performed following uneventful IV administration of 74.6 millicuries of [id number]. COMPARISON: Left shoulder x-rays of 16 January 2019 and 30 January 2020 HISTORY: ORDERING SYSTEM PROVIDED HISTORY: H/O total shoulder replacement, left TECHNOLOGIST PROVIDED HISTORY: Reason for Exam: H/O total shoulder replacement, left Acuity: Unknown Type of Exam: Unknown Additional signs and symptoms: lt shoulder replacement 1.5 years ago FINDINGS: Techn[employer] bone images: A photopenic area is present in the left shoulder consistent with a left shoulder arthroplasty.   Symmetrical activity is Mellitus  -MDISS  -POC glucose  -Hypoglycemia protocol     REYNALDO  -home CPAP     Diet: Dental Soft  DVT Prophylaxis: HOLD due to bleed  PT/OT  Dispo: Social work      Josr Santacruz MD  3/22/2020  11:01 AM

## 2020-03-23 ENCOUNTER — CARE COORDINATION (OUTPATIENT)
Dept: CASE MANAGEMENT | Age: 65
End: 2020-03-23

## 2020-03-23 LAB
GLIADIN DEAMINIDATED PEPTIDE AB IGA: 0.6 U/ML
GLIADIN DEAMINIDATED PEPTIDE AB IGG: <0.4 U/ML
IGA: 121 MG/DL (ref 70–400)
SURGICAL PATHOLOGY REPORT: NORMAL
TISSUE TRANSGLUTAMINASE IGA: 0.3 U/ML

## 2020-03-23 NOTE — DISCHARGE SUMMARY
demonstrated no bleeds. Patient was started on IV iron. He did receive 1 unit packed red blood cells. His hemoglobin stabilized and he was discharged in good condition      Significant therapeutic interventions:   Acute blood loss anemia: IV iron, 1 unit PRBCs, discharged with iron supplement  C. difficile colitis: P.o. vancomycin, IV Flagyl, discharged with p.o. vancomycin  Syncopal episode, Likely secondary to dehydration, anemia: IV hydration, IV iron, 1 unit PVCs    Significant Diagnostic Studies:   Labs / Micro:  CBC:   Lab Results   Component Value Date    WBC 15.6 03/22/2020    RBC 2.78 03/22/2020    RBC 5.38 09/30/2011    HGB 8.6 03/22/2020    HCT 25.8 03/22/2020    MCV 90.0 03/22/2020    MCH 29.6 03/22/2020    MCHC 32.9 03/22/2020    RDW 14.8 03/22/2020     03/22/2020     05/25/2012     BMP:    Lab Results   Component Value Date    GLUCOSE 138 03/22/2020     03/22/2020    K 3.9 03/22/2020     03/22/2020    CO2 23 03/22/2020    ANIONGAP 10 03/22/2020    BUN 10 03/22/2020    CREATININE 0.63 03/22/2020    BUNCRER NOT REPORTED 03/22/2020    CALCIUM 8.3 03/22/2020    LABGLOM >60 03/22/2020    GFRAA >60 03/22/2020    GFR      03/22/2020    GFR NOT REPORTED 03/22/2020         Radiology:    Xr Chest Standard (2 Vw)    Result Date: 3/14/2020  EXAMINATION: TWO XRAY VIEWS OF THE CHEST 3/14/2020 3:11 pm COMPARISON: 03/09/2020 HISTORY: ORDERING SYSTEM PROVIDED HISTORY: SOB TECHNOLOGIST PROVIDED HISTORY: SOB Reason for Exam: sob Acuity: Unknown Type of Exam: Unknown FINDINGS: Near complete resolution of previously present bibasilar pulmonary opacities. No pneumothorax or pleural effusion. Cardiac and mediastinal contours normal.  No acute osseous abnormality. Left shoulder arthroplasty hardware partially imaged. Mild degenerative changes in the thoracic spine. Near complete resolution of previously present bibasilar pulmonary opacities. No new abnormality.      Ct Chest Wo Contrast    Result Date: 3/12/2020  EXAMINATION: CT OF THE CHEST WITHOUT CONTRAST 3/12/2020 8:23 am TECHNIQUE: CT of the chest was performed without the administration of intravenous contrast. Multiplanar reformatted images are provided for review. Dose modulation, iterative reconstruction, and/or weight based adjustment of the mA/kV was utilized to reduce the radiation dose to as low as reasonably achievable. COMPARISON: 02/17/2020 HISTORY: ORDERING SYSTEM PROVIDED HISTORY: hemoptysis, smoker/copd TECHNOLOGIST PROVIDED HISTORY: hemoptysis, smoker/copd Reason for Exam: hemoptysis, smoker/copd Acuity: Acute Type of Exam: Initial FINDINGS: Mediastinum: The heart and great vessels are normal in size. Calcified atheromatous plaque and coronary calcifications are noted. No pericardial effusion. No enlarged or suspicious-appearing lymph nodes are identified. Lungs/pleura: Centrilobular emphysematous disease. Bilateral interstitial opacities and more localized alveolar opacities in the left lower lobe are noted. No effusion. Small amount debris in the distal trachea and mainstem bronchi. Upper Abdomen: Cholelithiasis in a contracted gallbladder. Low-density nodular enlargement of the adrenal glands consistent with hyperplasia and likely underlying adenomas, unchanged. Soft Tissues/Bones: No acute findings. Status post laminectomy at T12. Partially visualized left shoulder hardware. 1.  Small amount debris in the distal trachea and mainstem bronchi. Bilateral lower lobe opacities, left greater than right, consistent with an inflammatory process, infection or aspiration. 2.  Emphysema. 3.  Cholelithiasis.      Nm Abscess Localization Limited    Result Date: 2/25/2020  EXAMINATION: INDIUM WBC FOR ABCESS 2/24/2020 7:59 am TECHNIQUE: Gamma camera imaging of the neck, shoulders and chest was performed following IV administration of white blood cells labeled with 538 microcuries of indium 111.  24-hour delayed images Acuity: Unknown Type of Exam: Unknown Initial exam. FINDINGS: Airspace opacities both lower lobes right greater than left. The heart is enlarged. No pneumothorax or pulmonary edema. Cardiomegaly with bilateral lower lobe infiltrates. Consultations:    Consults:     Final Specialist Recommendations/Findings:   IP CONSULT TO GI      The patient was seen and examined on day of discharge and this discharge summary is in conjunction with any daily progress note from day of discharge. Discharge plan:       Disposition: Home    Physician Follow Up:     MD Lisa Mcqueen.  [address] [place] [address] Road  [address] [facility] [id number] [phone]    Schedule an appointment as soon as possible for a visit in 1 week  follow up    MD Lisa Olivarez, Maxine [address]. [place] [id number]  [address] [facility] [id number] [phone]    Schedule an appointment as soon as possible for a visit  In June or July, follow up       Requiring Further Evaluation/Follow Up POST HOSPITALIZATION/Incidental Findings: Blood loss anemia requiring repeat EGD, colonoscopy    Diet: regular diet    Activity: As tolerated    Instructions to Patient:   - Take all medications as prescribed  - Follow up with PCP   - In case of any worsening condition please visit Emergency Room. Discharge Medications:      Medication List      START taking these medications    ferrous sulfate 325 (65 Fe) MG tablet  Commonly known as:  IRON 325  Take 1 tablet by mouth 2 times daily     vancomycin 50 MG/ML Solr oral solution  Commonly known as:  FIRVANQ  Take 2.5 mLs by mouth every 6 hours for 11 days        CHANGE how you take these medications    colchicine 0.6 MG tablet  Commonly known as:  Colcrys  ADJUST SIG-ONLY AS NEEDED FOR GOUT ATTACK. Take 2 tablets now, then 1 tablet one hour later. Wait 12 hours then start 1 tablet bid for 5 days.   What changed:    · how much to take  · how to take this  · when to take this  · additional instructions        CONTINUE taking these medications    albuterol sulfate  (90 Base) MCG/ACT inhaler  Inhale 2 puffs into the lungs every 6 hours as needed for Wheezing or Shortness of Breath (COUGH)     amLODIPine 10 MG tablet  Commonly known as:  NORVASC  Take 1 tablet by mouth daily     aspirin EC 81 MG EC tablet  Take 1 tablet by mouth daily     blood glucose test strips  tesing once a day fasting     Camphor-Menthol-Methyl Sal 3.1-16-10 % Gel  Apply every 8 hours as needed for pain. OK to substitute     folbee plus Tabs  Take 1 tablet by mouth daily     furosemide 20 MG tablet  Commonly known as:  Lasix  Take 1 tablet by mouth daily as needed (only if BP over 140/90 or leg edema) **stop Tenoretic 50-25**     ipratropium-albuterol 0.5-2.5 (3) MG/3ML Soln nebulizer solution  Commonly known as:  DUONEB  Inhale 3 mLs into the lungs 4 times daily     Lift Chair Misc  by Does not apply route Patient has difficulty getting up from usual chair     lisinopril 10 MG tablet  Commonly known as:  PRINIVIL;ZESTRIL  Take 1 tablet by mouth daily Dose decreased 3/18/2020     nystatin 663814 UNIT/ML suspension  Commonly known as:  MYCOSTATIN  Take 5 mLs by mouth 4 times daily Swish and swallow     omeprazole 40 MG delayed release capsule  Commonly known as:  PRILOSEC  Take 1 capsule by mouth every morning (before breakfast)     Plavix 75 MG tablet  Generic drug:  clopidogrel     pravastatin 80 MG tablet  Commonly known as:  Pravachol  Take 1 tablet by mouth every evening Dose increased  9/24/2019     pregabalin 200 MG capsule  Commonly known as:  LYRICA  Take 1 capsule by mouth 3 times daily for 90 days. Probiotic Acidophilus Tabs  Take 1 tablet by mouth 2 times daily     Symbicort 160-4.5 MCG/ACT Aero  Generic drug:  budesonide-formoterol  Inhale 2 puffs into the lungs 2 times daily 10.2 inhaler     traMADol 50 MG tablet  Commonly known as:  Ultram  Take 1-2 tablets by mouth every 6 hours as needed for Pain.         STOP taking these medications

## 2020-03-23 NOTE — DISCHARGE SUMMARY
[address]    Discharge Summary     Patient ID: Joaquina Gaming  :  1955   MRN: [id number]     ACCOUNT:  [de-identified]   Patient's PCP: Ashutosh Coppola MD  Admit Date: 3/9/2020   Discharge Date: 3/16/2020   Length of Stay: 7  Code Status:  Prior  Admitting Physician: Davion Selby MD  Discharge Physician: Newton Henderson MD     Active Discharge Diagnoses:       Primary Problem  Pneumonia      Hospital Problems  Active Hospital Problems    Diagnosis Date Noted    Severe obesity (BMI 35.0-35.9 with comorbidity) ([facility] [E66.01, Z68.35] 2020     Priority: High    Pneumonia [J18.9] 2020    Coronary artery disease involving native coronary artery of native heart without angina pectoris [I25.10] 2020    Peripheral neuropathic pain [M79.2] 2017    Type 2 diabetes mellitus with diabetic polyneuropathy, without long-term current use of insulin ([facility] [E11.42] 2017    COPD mixed type ([facility] [J44.9] 2017    Essential hypertension [I10] 2016       Admission Condition:  fair     Discharged Condition: good    Hospital Stay:       Hospital Course:  Joaquina Gaming is a 71 y.o. male who was admitted for the management of Pneumonia , presented to ER with Shortness of Breath and Cough  Patient admitted to the hospital for management of bilateral lower lobe pneumonia and COPD exacerbation, patient has a past medical history of COPD, hypertension, type 2 diabetes, coronary artery disease, who presented with acute onset of shortness of breath that began this morning, patient also admitted to productive cough which has been ongoing for the past 2 days now, patient admitted to have wheezing, patient denies any recent hospitalization for COPD,    ED course: Vitals stable, within normal limits, T-max 100.3, WBC 19, CXR demonstrated bilateral lower lobe infiltrates,  Patient on Rocephin and Zithromax, Solu-Medrol for a COPD exacerbation, patient pneumonia panel was unremarkable, blood culture: Unremarkable, d-dimer normal, pulmonary critical care consulted, bronchoscopy by Pulm:  Thick mucus clogging the scope, patient qualified with saturation of 88 on room air, during activity,    On the 16th patient is cleared to be discharged, he is a stable on nasal cannula, patient is qualified to use oxygen at home, follow-up outpatient    Significant therapeutic interventions: Rocephin and Zithromax, Solu-Medrol for a COPD exacerbation, CT scan, bronchoscopy on 3/13    Significant Diagnostic Studies:   Labs / Micro:  CBC:   Lab Results   Component Value Date    WBC 15.6 03/22/2020    RBC 2.78 03/22/2020    RBC 5.38 09/30/2011    HGB 8.6 03/22/2020    HCT 25.8 03/22/2020    MCV 90.0 03/22/2020    MCH 29.6 03/22/2020    MCHC 32.9 03/22/2020    RDW 14.8 03/22/2020     03/22/2020     05/25/2012     BMP:    Lab Results   Component Value Date    GLUCOSE 138 03/22/2020     03/22/2020    K 3.9 03/22/2020     03/22/2020    CO2 23 03/22/2020    ANIONGAP 10 03/22/2020    BUN 10 03/22/2020    CREATININE 0.63 03/22/2020    BUNCRER NOT REPORTED 03/22/2020    CALCIUM 8.3 03/22/2020    LABGLOM >60 03/22/2020    GFRAA >60 03/22/2020    GFR      03/22/2020    GFR NOT REPORTED 03/22/2020     HFP:    Lab Results   Component Value Date    PROT 4.6 03/19/2020     CMP:    Lab Results   Component Value Date    GLUCOSE 138 03/22/2020     03/22/2020    K 3.9 03/22/2020     03/22/2020    CO2 23 03/22/2020    BUN 10 03/22/2020    CREATININE 0.63 03/22/2020    ANIONGAP 10 03/22/2020    ALKPHOS 47 03/19/2020    ALT 27 03/19/2020    AST 12 03/19/2020    BILITOT <0.15 03/19/2020    LABALBU 2.8 03/19/2020    LABALBU 4.1 02/16/2012    ALBUMIN NOT REPORTED 03/19/2020    LABGLOM >60 03/22/2020    GFRAA >60 03/22/2020    GFR      03/22/2020    GFR NOT REPORTED 03/22/2020    PROT 4.6 03/19/2020    CALCIUM 8.3 03/22/2020     PT/INR: hemoptysis, smoker/copd TECHNOLOGIST PROVIDED HISTORY: hemoptysis, smoker/copd Reason for Exam: hemoptysis, smoker/copd Acuity: Acute Type of Exam: Initial FINDINGS: Mediastinum: The heart and great vessels are normal in size. Calcified atheromatous plaque and coronary calcifications are noted. No pericardial effusion. No enlarged or suspicious-appearing lymph nodes are identified. Lungs/pleura: Centrilobular emphysematous disease. Bilateral interstitial opacities and more localized alveolar opacities in the left lower lobe are noted. No effusion. Small amount debris in the distal trachea and mainstem bronchi. Upper Abdomen: Cholelithiasis in a contracted gallbladder. Low-density nodular enlargement of the adrenal glands consistent with hyperplasia and likely underlying adenomas, unchanged. Soft Tissues/Bones: No acute findings. Status post laminectomy at T12. Partially visualized left shoulder hardware. 1.  Small amount debris in the distal trachea and mainstem bronchi. Bilateral lower lobe opacities, left greater than right, consistent with an inflammatory process, infection or aspiration. 2.  Emphysema. 3.  Cholelithiasis. Nm Abscess Localization Limited    Result Date: 2/25/2020  EXAMINATION: INDIUM WBC FOR ABCESS 2/24/2020 7:59 am TECHNIQUE: Gamma camera imaging of the neck, shoulders and chest was performed following IV administration of white blood cells labeled with 538 microcuries of indium 111.  24-hour delayed images were obtained. Gamma camera imaging of the shoulders was performed following uneventful IV administration of 72.5 millicuries of [id number]-32L.  COMPARISON: Left shoulder x-rays of 16 January 2019 and 30 January 2020 HISTORY: ORDERING SYSTEM PROVIDED HISTORY: H/O total shoulder replacement, left TECHNOLOGIST PROVIDED HISTORY: Reason for Exam: H/O total shoulder replacement, left Acuity: Unknown Type of Exam: Unknown Additional signs and symptoms: lt shoulder replacement 1.5 years ago FINDINGS: Technetium bone images: A photopenic area is present in the left shoulder consistent with a left shoulder arthroplasty. Symmetrical activity is noted in the acromioclavicular and glenohumeral aspects of both shoulders as well as the sternoclavicular joints. No areas of abnormal asymmetric radiotracer uptake is noted. No evidence of prosthetic loosening. Indium 111 white blood cell study for infection: Photopenic area in the left shoulder. No areas of abnormal radiotracer uptake to suggest infection. No imaging evidence of prosthetic loosening or infection. Evidence of left shoulder arthroplasty. Xr Chest Portable    Result Date: 3/19/2020  EXAMINATION: ONE XRAY VIEW OF THE CHEST 3/19/2020 10:22 am COMPARISON: Chest radiograph performed 03/14/2020. HISTORY: ORDERING SYSTEM PROVIDED HISTORY: cough TECHNOLOGIST PROVIDED HISTORY: cough Reason for Exam: cough, congestion Acuity: Acute Type of Exam: Initial FINDINGS: Lungs are without consolidation or effusion. There is no pneumothorax. The heart is enlarged. The upper abdomen is unremarkable. The extrathoracic soft tissues are unremarkable. Cardiomegaly without acute pulmonary process. Xr Chest Portable    Result Date: 3/11/2020  EXAMINATION: ONE XRAY VIEW OF THE CHEST 3/9/2020 10:28 am COMPARISON: 10/03/2018 HISTORY: ORDERING SYSTEM PROVIDED HISTORY: shortness of breath TECHNOLOGIST PROVIDED HISTORY: shortness of breath Reason for Exam: chest pain, sob Acuity: Unknown Type of Exam: Unknown Initial exam. FINDINGS: Airspace opacities both lower lobes right greater than left. The heart is enlarged. No pneumothorax or pulmonary edema. Cardiomegaly with bilateral lower lobe infiltrates.          Consultations:    Consults:     Final Specialist Recommendations/Findings:   IP CONSULT TO PRIMARY CARE PROVIDER  IP CONSULT TO SOCIAL WORK  IP CONSULT TO PULMONOLOGY      The patient was seen and examined on day of discharge and this discharge summary is in conjunction with any daily progress note from day of discharge. Discharge plan:       Disposition: Home    Physician Follow Up:     [facility] Jaime [facility] [address] [phone]    This is the company providing your oxygen. Call them when you get home to have the equipment delivered. Sophia Piña MD  [address]  [address] [facility]  [address] [facility]    Go to  For post hospital follow-up appointment - Time:     Acosta Schroeder MD  [address] [place] [address]  [phone]    Schedule an appointment as soon as possible for a visit in 4 weeks  follow up       Requiring Further Evaluation/Follow Up POST HOSPITALIZATION/Incidental Findings: n/a    Diet: diabetic diet    Activity: As tolerated    Instructions to Patient: - Take all medications as prescribed  - Follow up with PCP   - In case of any worsening condition please visit Emergency Room. Discharge Medications:      Medication List      START taking these medications    ipratropium-albuterol 0.5-2.5 (3) MG/3ML Soln nebulizer solution  Commonly known as:  DUONEB  Inhale 3 mLs into the lungs 4 times daily        CHANGE how you take these medications    albuterol sulfate  (90 Base) MCG/ACT inhaler  Inhale 2 puffs into the lungs every 6 hours as needed for Wheezing or Shortness of Breath (COUGH)  What changed:  Another medication with the same name was removed. Continue taking this medication, and follow the directions you see here. colchicine 0.6 MG tablet  Commonly known as:  Colcrys  ADJUST SIG-ONLY AS NEEDED FOR GOUT ATTACK. Take 2 tablets now, then 1 tablet one hour later. Wait 12 hours then start 1 tablet bid for 5 days.   What changed:    · how much to take  · how to take this  · when to take this  · additional instructions        CONTINUE taking these medications    amLODIPine 10 MG tablet  Commonly known as:  NORVASC  Take 1 tablet by mouth daily     aspirin EC 81 MG EC tablet  Take 1 tablet by mouth daily     blood glucose test strips  tesing once a day fasting     Camphor-Menthol-Methyl Sal 3.1-16-10 % Gel  Apply every 8 hours as needed for pain. OK to substitute     folbee plus Tabs  Take 1 tablet by mouth daily     furosemide 20 MG tablet  Commonly known as:  Lasix  Take 1 tablet by mouth daily as needed (only if BP over 140/90 or leg edema) **stop Tenoretic 50-25**     Lift Chair Misc  by Does not apply route Patient has difficulty getting up from usual chair     Plavix 75 MG tablet  Generic drug:  clopidogrel     pravastatin 80 MG tablet  Commonly known as:  Pravachol  Take 1 tablet by mouth every evening Dose increased  9/24/2019     pregabalin 200 MG capsule  Commonly known as:  LYRICA  Take 1 capsule by mouth 3 times daily for 90 days. traMADol 50 MG tablet  Commonly known as:  Ultram  Take 1-2 tablets by mouth every 6 hours as needed for Pain. STOP taking these medications    venlafaxine 50 MG tablet  Commonly known as:  EFFEXOR     zoster recombinant adjuvanted vaccine 50 MCG/0.5ML Susr injection  Commonly known as:  SHINGRIX           Where to Get Your Medications      You can get these medications from any pharmacy    Bring a paper prescription for each of these medications  · ipratropium-albuterol 0.5-2.5 (3) MG/3ML Soln nebulizer solution         Time Spent on discharge is  31 mins in patient examination, evaluation, counseling as well as medication reconciliation, prescriptions for required medications, discharge plan and follow up. Electronically signed by   Timothy Tejeda MD  3/23/2020  4:07 PM      Thank you Dr. Keith Lam MD for the opportunity to be involved in this patient's care.

## 2020-03-23 NOTE — CARE COORDINATION
Jesus 45 Transitions Initial Follow Up Call    Call within 2 business days of discharge: Yes    Patient: Yandy Bower Patient : 1955   MRN: [id number]  Reason for Admission: LOC  Discharge Date: 3/22/20 RARS: Readmission Risk Score: 27      Last Discharge [facility]       Complaint Diagnosis Description Type Department Provider    3/19/20 Loss of Consciousness Syncope and collapse . .. ED to Hosp-Admission (Discharged) (ADMITTED) Swetha Maynard MD; Delfina Cantor           #1 attempt-unable to reach patient, home# unavailable, left vm message on mobile# with name and call back number, requested call back//JU    Facility: [facility]    Non-face-to-face services provided:      Care Transitions 24 Hour Call    Care Transitions Interventions         Follow Up  Future Appointments   Date Time Provider Keri English   3/30/2020  9:40 AM Zaida Gray MD [address] [place] [address]   2020  1:15 PM Henry Allen MD fp rommel Smith RN

## 2020-03-24 ENCOUNTER — CARE COORDINATION (OUTPATIENT)
Dept: CASE MANAGEMENT | Age: 65
End: 2020-03-24

## 2020-03-24 NOTE — CARE COORDINATION
Jesus 45 Transitions Initial Follow Up Call    Call within 2 business days of discharge: Yes    Patient: Radha Macias Patient : 1955   MRN: [id number]  Reason for Admission: LOC  Discharge Date: 3/22/20 RARS: Readmission Risk Score: 27      Last Discharge [facility]       Complaint Diagnosis Description Type Department Provider    3/19/20 Loss of Consciousness Syncope and collapse . .. ED to Hosp-Admission (Discharged) (ADMITTED) Maryse Carranza MD; Jaquelin Schrader ...            # 2nd attempt- unable to reach patient, left vm message with name and call back number, requested call back//JU    Facility: [facility]    Non-face-to-face services provided:      Care Transitions 24 Hour Call    Care Transitions Interventions         Follow Up  Future Appointments   Date Time Provider Keri English   3/30/2020  9:40 AM Asher Doe MD [address] [place] [address]   2020  1:15 PM Verna Dukes MD fp sc Jeannie Walker RN

## 2020-03-25 ENCOUNTER — CARE COORDINATION (OUTPATIENT)
Dept: CASE MANAGEMENT | Age: 65
End: 2020-03-25

## 2020-03-25 ENCOUNTER — TELEPHONE (OUTPATIENT)
Dept: PHARMACY | Facility: CLINIC | Age: 65
End: 2020-03-25

## 2020-03-25 PROCEDURE — 1111F DSCHRG MED/CURRENT MED MERGE: CPT | Performed by: PHARMACIST

## 2020-03-25 RX ORDER — VANCOMYCIN HYDROCHLORIDE 125 MG/1
125 CAPSULE ORAL 4 TIMES DAILY
Qty: 40 CAPSULE | Refills: 0 | Status: SHIPPED | OUTPATIENT
Start: 2020-03-25 | End: 2020-04-04

## 2020-03-25 RX ORDER — GLUCOSAMINE HCL/CHONDROITIN SU 500-400 MG
CAPSULE ORAL
COMMUNITY
End: 2021-04-23 | Stop reason: SDUPTHER

## 2020-03-25 RX ORDER — COLCHICINE 0.6 MG/1
0.6 TABLET ORAL DAILY
COMMUNITY
End: 2020-10-13

## 2020-03-25 RX ORDER — IPRATROPIUM BROMIDE AND ALBUTEROL SULFATE 2.5; .5 MG/3ML; MG/3ML
1 SOLUTION RESPIRATORY (INHALATION) 3 TIMES DAILY
Status: ON HOLD | COMMUNITY
End: 2021-07-08

## 2020-03-25 RX ORDER — FUROSEMIDE 20 MG/1
20 TABLET ORAL DAILY
COMMUNITY
End: 2020-04-06

## 2020-03-25 NOTE — TELEPHONE ENCOUNTER
Dr. Liz Lim MD,     Patient states he was told clopidogrel to stop taking clopidogrel. Per chart review, appears he may have been advised to hold therapy given recent cause for hospitalization. Do you want patient to be on clopidogrel? Patient has not yet gotten vancomycin from the pharmacy - he states needs a PA. I attempted to call [facility] and had to leave a voicemail so I don't know where in the process the PA is but I did advise to send to your office if they have not already. Additionally, patient did mention some difficulty breathing that started last night - he does have rescue inhaler medications he can use. I contacted CTC RN and she will be calling patient today as well to follow up on these symptoms. See note below for complete details. Thank you,  Samira Carmen, PharmD, Rebel Dean, [facility]  Ambulatory Clinical Care Pharmacist   [address] [facility]  [phone], [address]    =======================================================    CLINICAL PHARMACY NOTE - Post-Discharge Transitions of Care (HANSEL)  Patient outreach to review discharge medications and provide medication review and management. Spoke with patient. Non-face-to-face services provided:  Assessment and support for treatment adherence and medication management-Medication Review. SUBJECTIVE/OBJECTIVE:     Yariel Hastings is a 71 y.o. male discharged from [place] [address] on 03/22/2020.   Primary diagnosis: syncope and collapse - acute blood loss anemia, c. diff    Discharge Medications (as per discharging medication list found on the AVS)  New Medications  Medication Sig Comments    vancomycin (FIRVANQ) 50 MG/ML SOLR oral solution Take 2.5 mLs by mouth every 6 hours for 11 days Does not have yet    ferrous sulfate (IRON 325) 325 (65 Fe) MG tablet Take 1 tablet by mouth 2 times daily      Changed Medications  Medication Sig Comments    colchicine (COLCRYS) 0.6 MG tablet ADJUST SIG-ONLY AS NEEDED FOR GOUT ATTACK. Take 2 tablets now, then 1 tablet one hour later. Wait 12 hours then start 1 tablet bid for 5 days. Takes 1 tab once daily     Continued Medications  Medication Sig Comments    SYMBICORT 160-4.5 MCG/ACT AERO Inhale 2 puffs into the lungs 2 times daily 10.2 inhaler     nystatin (MYCOSTATIN) 304977 UNIT/ML suspension Take 5 mLs by mouth 4 times daily Swish and swallow Uses TID    omeprazole (PRILOSEC) 40 MG delayed release capsule Take 1 capsule by mouth every morning (before breakfast)     lisinopril (PRINIVIL;ZESTRIL) 10 MG tablet Take 1 tablet by mouth daily Dose decreased 3/18/2020     Probiotic Acidophilus (FLORANEX) TABS Take 1 tablet by mouth 2 times daily     ipratropium-albuterol (DUONEB) 0.5-2.5 (3) MG/3ML SOLN nebulizer solution Inhale 3 mLs into the lungs 4 times daily Uses TID - 7a, 4p, 8p    traMADol (ULTRAM) 50 MG tablet Take 1-2 tablets by mouth every 6 hours as needed for Pain. Not using, never started taking    clopidogrel (PLAVIX) 75 MG tablet Take 75 mg by mouth daily No longer uses, he states provider discontinued     amLODIPine (NORVASC) 10 MG tablet Take 1 tablet by mouth daily     furosemide (LASIX) 20 MG tablet Take 1 tablet by mouth daily as needed (only if BP over 140/90 or leg edema) **stop Tenoretic 50-25** Patient takes daily, regardless of BP - he states does take BP BID, lowest ever been is 111/70    pregabalin (LYRICA) 200 MG capsule Take 1 capsule by mouth 3 times daily for 90 days.  pravastatin (PRAVACHOL) 80 MG tablet Take 1 tablet by mouth every evening Dose increased  9/24/2019     Camphor-Menthol-Methyl Sal 3.1-16-10 % GEL Apply every 8 hours as needed for pain.  OK to substitute Does not use    Lift Chair MISC by Does not apply route Patient has difficulty getting up from usual chair     folbee plus (FOLBEE PLUS) TABS Take 1 tablet by mouth daily     aspirin EC 81 MG EC tablet Take 1 tablet by mouth · Patient did share he started having a hard time breathing, SOB - he was wondering if it's because he does not have vancomycin, explained it could be something else so not to worry too much. Sent message to [facility] Roosevelt Hung to call and assess patient. Patient states he will be available for her call. · Patient states was advised to not take clopidogrel. Per chart review, it looks like he may have been advised to hold it with recent anemia/blood loss. Will route to PCP to ask.      Follow up appointment(s) and dates  Date Time Provider Keri English   3/30/2020  9:40 AM Kelli Keen MD [address] [place] [address]   5/1/2020  1:15 PM Radha Saucedo MD fp sc Kathi Bustos, PharmD, [place] [facility]  Ambulatory Clinical Care Pharmacist   [address] [place] [phone], Option 7    =======================================================    For Pharmacy Admin Tracking Only  TCM Call Made?: No  [facility] Select Patient?: Yes  Total # of Interventions Recommended: 1 - Updated Order #: 1  Total # Interventions Accepted: 1  Intervention Severity:   - Level 1 Intervention Present?: No   - Level 2 #: 0   - Level 3 #: 0  Outreach Status: Review Complete  Care Coordinator Outreach to Patient?: Yes  Provider Contacted?: Yes - Note Routed, Routine  Waiting on response from: n/a  Time Spent (min): 60

## 2020-03-25 NOTE — CARE COORDINATION
Jesus 45 Transitions Initial Follow Up Call    Call within 2 business days of discharge: Yes    Patient: Azam Lennon Patient : 1955   MRN: [id number]  Reason for Admission: LOC  Discharge Date: 3/22/20 RARS: Readmission Risk Score: 27      Last Discharge [facility]       Complaint Diagnosis Description Type Department Provider    3/19/20 Loss of Consciousness Syncope and collapse . .. ED to Hosp-Admission (Discharged) (ADMITTED) Irais Perez MD; Mike Myers ... Spoke with:  [address]: [address] [place] [address]    Non-face-to-face services provided:  Obtained and reviewed discharge summary and/or continuity of care documents  Communication with home health agencies or other community services the patient is currently using-writer contacted Angelo Eid and also contacted [employer] spoke to [employer] and support for treatment adherence and medication management-reviewed discharge instructions and medications, Dipesh Murphy Pharmacist reconciled medications,  COVID-19 screening and education     Writer spoke to patient, he is doing ok, did have an episode last night where he had some difficulty breathing, used his inhaler and did a breathing treatment during the night and again this morning and his breathing is better, patient still had not gotten his antibiotic, pharmacy said they needed a PA, writer contacted [employer] spoke to Kavon Lopez, medication is ready for , writer informed patient, he v/u going to  today, reviewed discharge instructions and medications, Dipesh murphy pharmacist reconciled medications with patient, reviewed s/s of CHF, daily weights, monitoring for edema, CP, keeping legs elevated, patient v/u, patient going to [facility] lab today for blood work, no vns, confirmed patient has writer's contact information, will continue to follow//JU   COVID-19 Screening Initial Follow-up Note    Patient contacted regarding COVID-19 patient also a PHP patient following for care transitions//JU. Care Transition Nurse/ Ambulatory Care Manager contacted the patient by telephone to perform post discharge assessment. Verified name and  with patient as identifiers. Provided introduction to self, and explanation of the CTN/ACM role, and reason for call due to risk factors for infection and/or exposure to COVID-19. Symptoms reviewed with patient who verbalized the following symptoms:, shortness of breath,no new, no new/worsening symptoms       Due to no new or worsening symptoms encounter was not routed to provider for escalation. Patient has following risk factors of: DM, COPD  CTN/ACM reviewed discharge instructions, medical action plan and red flags such as increased shortness of breath, increasing fever and signs of decompensation with patient who verbalized understanding. Discussed exposure protocols and quarantine with CDC Guidelines What to do if you are sick with coronavirus disease 2019 Patient who was given an opportunity for questions and concerns. The patient agrees to contact the [employer] exposure line [phone], local [facility] department [place] [facility]: ([phone]) and PCP office for questions related to their healthcare. CTN/ACM provided contact information for future reference.     Reviewed and educated patient on any new and changed medications related to discharge diagnosis     Plan for follow-up call in 3-5 days based on severity of symptoms and risk factors    Care Transitions 24 Hour Call    Care Transitions Interventions         Follow Up  Future Appointments   Date Time Provider Keri English   3/30/2020  9:40 AM Asher Doe MD [address] [place] [address]   2020  1:15 PM Verna Dukes MD fp sc Jeannie Walker RN

## 2020-03-25 NOTE — TELEPHONE ENCOUNTER
Patient or wife to write instructions    Yes, hold plavix=clopidogrel due to recent GI bleed, continue only aspirin 81 mg    I resent vanco as capsule for his C diff      Please let the patient know to  prescription from pharmacy. Requested Prescriptions     Signed Prescriptions Disp Refills    vancomycin (VANCOCIN) 125 MG capsule 40 capsule 0     Sig: Take 1 capsule by mouth 4 times daily for 10 days     Authorizing Provider: Josie Finley [address] [facility], [address] [place] [address]  Phone: [phone] Fax: [phone]      Regarding the shortness of breath, he is to take Symbicort 2 puffs twice a day, albuterol as needed, he also has DuoNebs, does he have a nebulizer machine working? Start using it every 6 hours as needed, until the shortness of breath gets better. I hope he did not restart smoking either    Is he still using his oxygen? What is his pulse ox reading? Thank you!         FYI    Future Appointments   Date Time Provider Department Center   3/30/2020  9:40 AM Heaven Peterson MD [facility]   5/1/2020  1:15 PM [facility]       Controlled Substances Monitoring:

## 2020-03-26 ENCOUNTER — TELEPHONE (OUTPATIENT)
Dept: UROLOGY | Age: 65
End: 2020-03-26

## 2020-03-26 ENCOUNTER — HOSPITAL ENCOUNTER (OUTPATIENT)
Age: 65
Setting detail: SPECIMEN
Discharge: HOME | End: 2020-03-26
Payer: MEDICARE

## 2020-03-26 PROBLEM — Z85.46 HISTORY OF PROSTATE CANCER: Status: ACTIVE | Noted: 2020-03-26

## 2020-03-26 PROBLEM — D50.0 ANEMIA, BLOOD LOSS: Status: ACTIVE | Noted: 2018-10-07

## 2020-03-26 LAB
ABSOLUTE EOS #: 0.09 K/UL (ref 0–0.44)
ABSOLUTE IMMATURE GRANULOCYTE: 0.07 K/UL (ref 0–0.3)
ABSOLUTE LYMPH #: 1.63 K/UL (ref 1.1–3.7)
ABSOLUTE MONO #: 0.72 K/UL (ref 0.1–1.2)
ANION GAP SERPL CALCULATED.3IONS-SCNC: 16 MMOL/L (ref 9–17)
BASOPHILS # BLD: 0 % (ref 0–2)
BASOPHILS ABSOLUTE: 0.03 K/UL (ref 0–0.2)
BUN BLDV-MCNC: 8 MG/DL (ref 8–23)
BUN/CREAT BLD: ABNORMAL (ref 9–20)
CALCIUM SERPL-MCNC: 8.9 MG/DL (ref 8.6–10.4)
CHLORIDE BLD-SCNC: 110 MMOL/L (ref 98–107)
CO2: 22 MMOL/L (ref 20–31)
CREAT SERPL-MCNC: 0.71 MG/DL (ref 0.7–1.2)
DIFFERENTIAL TYPE: ABNORMAL
EOSINOPHILS RELATIVE PERCENT: 1 % (ref 1–4)
GFR AFRICAN AMERICAN: >60 ML/MIN
GFR NON-AFRICAN AMERICAN: >60 ML/MIN
GFR SERPL CREATININE-BSD FRML MDRD: ABNORMAL ML/MIN/{1.73_M2}
GFR SERPL CREATININE-BSD FRML MDRD: ABNORMAL ML/MIN/{1.73_M2}
GLUCOSE BLD-MCNC: 140 MG/DL (ref 70–99)
HCT VFR BLD CALC: 30.7 % (ref 40.7–50.3)
HEMOGLOBIN: 9.2 G/DL (ref 13–17)
IMMATURE GRANULOCYTES: 1 %
LYMPHOCYTES # BLD: 16 % (ref 24–43)
MCH RBC QN AUTO: 30.1 PG (ref 25.2–33.5)
MCHC RBC AUTO-ENTMCNC: 30 G/DL (ref 28.4–34.8)
MCV RBC AUTO: 100.3 FL (ref 82.6–102.9)
MONOCYTES # BLD: 7 % (ref 3–12)
NRBC AUTOMATED: 0 PER 100 WBC
PDW BLD-RTO: 17.3 % (ref 11.8–14.4)
PLATELET # BLD: 241 K/UL (ref 138–453)
PLATELET ESTIMATE: ABNORMAL
PMV BLD AUTO: 10.5 FL (ref 8.1–13.5)
POTASSIUM SERPL-SCNC: 4.7 MMOL/L (ref 3.7–5.3)
RBC # BLD: 3.06 M/UL (ref 4.21–5.77)
RBC # BLD: ABNORMAL 10*6/UL
SEG NEUTROPHILS: 75 % (ref 36–65)
SEGMENTED NEUTROPHILS ABSOLUTE COUNT: 7.87 K/UL (ref 1.5–8.1)
SODIUM BLD-SCNC: 148 MMOL/L (ref 135–144)
WBC # BLD: 10.4 K/UL (ref 3.5–11.3)
WBC # BLD: ABNORMAL 10*3/UL

## 2020-03-26 NOTE — TELEPHONE ENCOUNTER
office visit cancelled due to COVID-19 protocol   Spoke with patient, he does not have access to computer, smart phone   Please place order for PSA, patient did not have lab done

## 2020-03-28 ENCOUNTER — HOSPITAL ENCOUNTER (OUTPATIENT)
Age: 65
Setting detail: SPECIMEN
Discharge: HOME | End: 2020-03-28
Payer: MEDICARE

## 2020-03-30 LAB
DATE, STOOL #1: NORMAL
DATE, STOOL #2: NORMAL
DATE, STOOL #3: NORMAL
HEMOCCULT SP1 STL QL: NEGATIVE
HEMOCCULT SP2 STL QL: NEGATIVE
HEMOCCULT SP3 STL QL: NEGATIVE
TIME, STOOL #1: NORMAL
TIME, STOOL #2: NORMAL
TIME, STOOL #3: NORMAL

## 2020-04-02 ENCOUNTER — CARE COORDINATION (OUTPATIENT)
Dept: CASE MANAGEMENT | Age: 65
End: 2020-04-02

## 2020-04-03 ENCOUNTER — HOSPITAL ENCOUNTER (OUTPATIENT)
Age: 65
Setting detail: SPECIMEN
Discharge: HOME | End: 2020-04-03
Payer: MEDICARE

## 2020-04-03 LAB
ANION GAP SERPL CALCULATED.3IONS-SCNC: 15 MMOL/L (ref 9–17)
BUN BLDV-MCNC: 17 MG/DL (ref 8–23)
BUN/CREAT BLD: ABNORMAL (ref 9–20)
CALCIUM SERPL-MCNC: 9.6 MG/DL (ref 8.6–10.4)
CHLORIDE BLD-SCNC: 104 MMOL/L (ref 98–107)
CO2: 22 MMOL/L (ref 20–31)
CREAT SERPL-MCNC: 0.62 MG/DL (ref 0.7–1.2)
GFR AFRICAN AMERICAN: >60 ML/MIN
GFR NON-AFRICAN AMERICAN: >60 ML/MIN
GFR SERPL CREATININE-BSD FRML MDRD: ABNORMAL ML/MIN/{1.73_M2}
GFR SERPL CREATININE-BSD FRML MDRD: ABNORMAL ML/MIN/{1.73_M2}
GLUCOSE BLD-MCNC: 105 MG/DL (ref 70–99)
HCT VFR BLD CALC: 36.5 % (ref 40.7–50.3)
HEMOGLOBIN: 10.8 G/DL (ref 13–17)
MCH RBC QN AUTO: 29.8 PG (ref 25.2–33.5)
MCHC RBC AUTO-ENTMCNC: 29.6 G/DL (ref 28.4–34.8)
MCV RBC AUTO: 100.8 FL (ref 82.6–102.9)
NRBC AUTOMATED: 0 PER 100 WBC
PDW BLD-RTO: 17.6 % (ref 11.8–14.4)
PLATELET # BLD: 308 K/UL (ref 138–453)
PMV BLD AUTO: 10.9 FL (ref 8.1–13.5)
POTASSIUM SERPL-SCNC: 5.1 MMOL/L (ref 3.7–5.3)
RBC # BLD: 3.62 M/UL (ref 4.21–5.77)
SODIUM BLD-SCNC: 141 MMOL/L (ref 135–144)
WBC # BLD: 7.6 K/UL (ref 3.5–11.3)

## 2020-04-06 RX ORDER — FUROSEMIDE 20 MG/1
TABLET ORAL
Qty: 90 TABLET | Refills: 3 | Status: SHIPPED | OUTPATIENT
Start: 2020-04-06 | End: 2021-01-06 | Stop reason: SDUPTHER

## 2020-04-06 NOTE — TELEPHONE ENCOUNTER
Please Approve or Refuse.   Send to Pharmacy per Pt's Request:     [facility]: [facility]       Next Visit Date:  5/1/2020   Last Visit Date: 3/18/2020    Hemoglobin A1C (%)   Date Value   03/09/2020 5.6   01/28/2020 5.5   01/17/2020 5.6             ( goal A1C is < 7)   BP Readings from Last 3 Encounters:   03/22/20 112/62   03/19/20 (!) 100/57   03/18/20 117/66          (goal 120/80)  BUN   Date Value Ref Range Status   04/03/2020 17 8 - 23 mg/dL Final     CREATININE   Date Value Ref Range Status   04/03/2020 0.62 (L) 0.70 - 1.20 mg/dL Final     Potassium   Date Value Ref Range Status   04/03/2020 5.1 3.7 - 5.3 mmol/L Final

## 2020-04-07 ENCOUNTER — CARE COORDINATION (OUTPATIENT)
Dept: CASE MANAGEMENT | Age: 65
End: 2020-04-07

## 2020-04-15 ENCOUNTER — HOSPITAL ENCOUNTER (OUTPATIENT)
Age: 65
Setting detail: SPECIMEN
Discharge: HOME | End: 2020-04-15
Payer: MEDICARE

## 2020-04-15 LAB — PROSTATE SPECIFIC ANTIGEN: <0.01 UG/L

## 2020-04-16 ENCOUNTER — TELEPHONE (OUTPATIENT)
Dept: FAMILY MEDICINE CLINIC | Age: 65
End: 2020-04-16

## 2020-04-16 ENCOUNTER — CARE COORDINATION (OUTPATIENT)
Dept: CASE MANAGEMENT | Age: 65
End: 2020-04-16

## 2020-04-20 ENCOUNTER — VIRTUAL VISIT (OUTPATIENT)
Dept: UROLOGY | Age: 65
End: 2020-04-20
Payer: MEDICARE

## 2020-04-20 PROCEDURE — 99214 OFFICE O/P EST MOD 30 MIN: CPT | Performed by: UROLOGY

## 2020-04-20 RX ORDER — PREGABALIN 200 MG/1
200 CAPSULE ORAL 3 TIMES DAILY
Qty: 270 CAPSULE | Refills: 0 | OUTPATIENT
Start: 2020-04-20 | End: 2020-07-19

## 2020-04-20 RX ORDER — PREGABALIN 200 MG/1
200 CAPSULE ORAL 3 TIMES DAILY
Qty: 270 CAPSULE | Refills: 0 | Status: SHIPPED | OUTPATIENT
Start: 2020-04-20 | End: 2020-07-07 | Stop reason: SDUPTHER

## 2020-04-20 ASSESSMENT — ENCOUNTER SYMPTOMS
COUGH: 0
SHORTNESS OF BREATH: 0
WHEEZING: 0
COLOR CHANGE: 0
EYE PAIN: 0
BACK PAIN: 0
ABDOMINAL PAIN: 0
EYE REDNESS: 0
VOMITING: 0
NAUSEA: 0

## 2020-04-20 NOTE — PROGRESS NOTES
morning (before breakfast) 90 capsule 1    lisinopril (PRINIVIL;ZESTRIL) 10 MG tablet Take 1 tablet by mouth daily Dose decreased 3/18/2020 90 tablet 3    amLODIPine (NORVASC) 10 MG tablet Take 1 tablet by mouth daily 90 tablet 3    pregabalin (LYRICA) 200 MG capsule Take 1 capsule by mouth 3 times daily for 90 days. 270 capsule 0    pravastatin (PRAVACHOL) 80 MG tablet Take 1 tablet by mouth every evening Dose increased  2019 90 tablet 3    Lift Chair MISC by Does not apply route Patient has difficulty getting up from usual chair 1 each 0    folbee plus (FOLBEE PLUS) TABS Take 1 tablet by mouth daily 90 tablet 3    aspirin EC 81 MG EC tablet Take 1 tablet by mouth daily 90 tablet 0    Glucose Blood (BLOOD GLUCOSE TEST STRIPS) STRP tesing once a day fasting 100 strip 3    albuterol sulfate  (90 BASE) MCG/ACT inhaler Inhale 2 puffs into the lungs every 6 hours as needed for Wheezing or Shortness of Breath (COUGH) 18 g 0       Succinylcholine chloride and Cymbalta [duloxetine hcl]  Social History     Tobacco Use   Smoking Status Former Smoker    Packs/day: 1.50    Years: 35.00    Pack years: 52.50    Types: Cigarettes    Last attempt to quit: 3/9/2020    Years since quittin.1   Smokeless Tobacco Never Used   Tobacco Comment    WAS SMOKING 1-1.5 ppd     (Ifpatient a smoker, smoking cessation counseling offered)    Social History     Substance and Sexual Activity   Alcohol Use No    Alcohol/week: 0.0 standard drinks       REVIEW OF SYSTEMS:  Review of Systems    Physical Exam:    There were no vitals filed for this visit. There is no height or weight on file to calculate BMI. Patient is a 71 y.o. male in no acute distress and alert and oriented to person, place and time.   Physical Exam  Constitutional- no acute distress, alert and oriented  Eyes- extra ocular movements intact, no scleral icterus  Ears, nose, mouth, throat- no nasal deformity, external auricles normal  Cardiovascular- no evidence of cyanosis, no obvious jugular distension   Respiratory- normal respiratory effort, and normal chest rise  Gastrointestinal- abdomen non-distended  Musculoskeletal- no upper extremity deformity, normal range of motion of upper extremities  Skin- no obvious abrasions, no rash of head and neck  Psych- normal mood, normal affect  Neuro- no facial droop, no slurred speech      Assessment and Plan      1. Prostate cancer ([address]    2. Hot flashes    3. Frequency of micturition    4. Nocturia           Plan: f/u one month for eligard and psa      Return in about 4 weeks (around 5/18/2020) for psa, eligard. Prescriptions Ordered:  No orders of the defined types were placed in this encounter. Orders Placed:  Orders Placed This Encounter   Procedures    PSA, Diagnostic     Standing Status:   Future     Standing Expiration Date:   4/15/2021           Barry Harris MD    Agree with the ROS entered by the MA.

## 2020-04-20 NOTE — TELEPHONE ENCOUNTER
Please Approve or Refuse.   Send to Pharmacy per Pt's Request:     [facility]     Next Visit Date:  7/7/2020   Last Visit Date: 3/18/2020    Hemoglobin A1C (%)   Date Value   03/09/2020 5.6   01/28/2020 5.5   01/17/2020 5.6             ( goal A1C is < 7)   BP Readings from Last 3 Encounters:   03/22/20 112/62   03/19/20 (!) 100/57   03/18/20 117/66          (goal 120/80)  BUN   Date Value Ref Range Status   04/03/2020 17 8 - 23 mg/dL Final     CREATININE   Date Value Ref Range Status   04/03/2020 0.62 (L) 0.70 - 1.20 mg/dL Final     Potassium   Date Value Ref Range Status   04/03/2020 5.1 3.7 - 5.3 mmol/L Final

## 2020-04-27 LAB
CULTURE: NORMAL
DIRECT EXAM: NORMAL
Lab: NORMAL
SPECIMEN DESCRIPTION: NORMAL

## 2020-04-28 ENCOUNTER — CARE COORDINATION (OUTPATIENT)
Dept: CASE MANAGEMENT | Age: 65
End: 2020-04-28

## 2020-05-14 ENCOUNTER — HOSPITAL ENCOUNTER (OUTPATIENT)
Age: 65
Setting detail: SPECIMEN
Discharge: HOME | End: 2020-05-14
Payer: MEDICARE

## 2020-05-14 LAB — PROSTATE SPECIFIC ANTIGEN: <0.01 UG/L

## 2020-05-20 ENCOUNTER — TELEPHONE (OUTPATIENT)
Dept: FAMILY MEDICINE CLINIC | Age: 65
End: 2020-05-20

## 2020-05-20 ENCOUNTER — OFFICE VISIT (OUTPATIENT)
Dept: UROLOGY | Age: 65
End: 2020-05-20
Payer: MEDICARE

## 2020-05-20 VITALS — TEMPERATURE: 98.1 F

## 2020-05-20 PROCEDURE — 99213 OFFICE O/P EST LOW 20 MIN: CPT | Performed by: NURSE PRACTITIONER

## 2020-05-20 PROCEDURE — 96402 CHEMO HORMON ANTINEOPL SQ/IM: CPT | Performed by: NURSE PRACTITIONER

## 2020-05-20 ASSESSMENT — ENCOUNTER SYMPTOMS
COLOR CHANGE: 0
VOMITING: 0
EYE REDNESS: 0
BACK PAIN: 0
ABDOMINAL PAIN: 0
NAUSEA: 0
EYE PAIN: 0
WHEEZING: 0
SHORTNESS OF BREATH: 0
COUGH: 0

## 2020-05-20 NOTE — PROGRESS NOTES
After obtaining consent, and per orders of Sadaf Aiken CNP, injection of Eligard 45mg given in Left upper quad. abdomen by Dc Strauss. Patient instructed to remain in clinic for 20 minutes afterwards, and to report any adverse reaction to me immediately.

## 2020-05-20 NOTE — PROGRESS NOTES
Review of Systems   Constitutional: Negative for activity change, chills and fever. Eyes: Negative for pain, redness and visual disturbance. Respiratory: Negative for cough, shortness of breath and wheezing. Cardiovascular: Negative for chest pain and leg swelling. Gastrointestinal: Negative for abdominal pain, nausea and vomiting. Genitourinary: Negative for difficulty urinating, discharge, dysuria, flank pain, frequency, hematuria, scrotal swelling and testicular pain. Musculoskeletal: Positive for arthralgias. Negative for back pain, joint swelling and myalgias. Skin: Negative for color change and rash. Neurological: Negative for dizziness, tremors and numbness. Hematological: Negative for adenopathy. Does not bruise/bleed easily.

## 2020-05-20 NOTE — PROGRESS NOTES
[facility]  [facility] - [address] [address]  Dept: [address] [facility] Urology Office Note - Established    Patient:  Adrianne Ruby  YOB: 1955  Date: 5/20/2020    The patient is a 71 y.o. male who presents todayfor evaluation of the following problems:   Chief Complaint   Patient presents with    Prostate Cancer     1 mo f/u with PSA and Eligard (last given 11/4/19); pt states no urinary complaints        HPI  Here for follow up on prostate cancer. He is no longer on Xtandi DT balance issues and memory loss. Both of these issues have improved since stopping Xtandi. Continues with hot flashes, no weight loss. He is voiding well, has morning urgency after Lasix, but does well overall, nocturia 0-1x,  no blood or dysuria. Is not currently taking calcium aned Vit D. Summary of old records: N/A    Additional History: N/A    Procedures Today: N/A    Urinalysis today:  No results found for this visit on 05/20/20.   Last several PSA's:  Lab Results   Component Value Date    PSA <0.01 05/14/2020    PSA <0.01 04/15/2020    PSA <0.01 12/10/2019     Last total testosterone:  Lab Results   Component Value Date    TESTOSTERONE <3 (L) 12/10/2019       AUA Symptom Score (5/20/2020):  INCOMPLETE EMPTYING: How often have you had the sensation of not emptying your bladder?: Not at all  FREQUENCY: How often do you have to urinate less than every two hours?: Not at all  INTERMITTENCY: How often have you found you stopped and started again several times when you urinated?: Not at all  URGENCY: How often have you found it difficult to postpone urination?: Less than 1 to 5 times  WEAK STREAM: How often have you had a weak urinary stream?: Not at all  STRAINING: How often have you had to strain to start  urination?: Not at all  NOCTURIA: How many times did you typically get up at night to uriniate?: 1 Time  TOTAL I-PSS SCORE[de-identified] 2  How would you feel Dispense Refill    pregabalin (LYRICA) 200 MG capsule Take 1 capsule by mouth 3 times daily for 90 days. 270 capsule 0    furosemide (LASIX) 20 MG tablet TAKE 1 TABLET DAILY AS NEEDED (ONLY IF BLOOD PRESSURE OVER 140/90 OR LEG EDEMA) (STOP TENORETIC 50-25) 90 tablet 3    colchicine (COLCRYS) 0.6 MG tablet Take 0.6 mg by mouth daily      ipratropium-albuterol (DUONEB) 0.5-2.5 (3) MG/3ML SOLN nebulizer solution Inhale 1 vial into the lungs 3 times daily      blood glucose monitor strips Use to test twice daily and as needed as directed by provider      nystatin (MYCOSTATIN) 972119 UNIT/ML suspension Use 5mLs by mouth 3 times daily to swish and swallow      ferrous sulfate (IRON 325) 325 (65 Fe) MG tablet Take 1 tablet by mouth 2 times daily 60 tablet 0    SYMBICORT 160-4.5 MCG/ACT AERO Inhale 2 puffs into the lungs 2 times daily 10.2 inhaler 3 Inhaler 3    omeprazole (PRILOSEC) 40 MG delayed release capsule Take 1 capsule by mouth every morning (before breakfast) 90 capsule 1    lisinopril (PRINIVIL;ZESTRIL) 10 MG tablet Take 1 tablet by mouth daily Dose decreased 3/18/2020 90 tablet 3    traMADol (ULTRAM) 50 MG tablet Take 1-2 tablets by mouth every 6 hours as needed for Pain.  30 tablet 0    amLODIPine (NORVASC) 10 MG tablet Take 1 tablet by mouth daily 90 tablet 3    pravastatin (PRAVACHOL) 80 MG tablet Take 1 tablet by mouth every evening Dose increased  9/24/2019 90 tablet 3    Lift Chair MISC by Does not apply route Patient has difficulty getting up from usual chair 1 each 0    folbee plus (FOLBEE PLUS) TABS Take 1 tablet by mouth daily 90 tablet 3    aspirin EC 81 MG EC tablet Take 1 tablet by mouth daily 90 tablet 0    Glucose Blood (BLOOD GLUCOSE TEST STRIPS) STRP tesing once a day fasting 100 strip 3    albuterol sulfate  (90 BASE) MCG/ACT inhaler Inhale 2 puffs into the lungs every 6 hours as needed for Wheezing or Shortness of Breath (COUGH) 18 g 0       Succinylcholine chloride and Cymbalta [duloxetine hcl]  Social History     Tobacco Use   Smoking Status Former Smoker    Packs/day: 1.50    Years: 35.00    Pack years: 52.50    Types: Cigarettes    Last attempt to quit: 3/9/2020    Years since quittin.1   Smokeless Tobacco Never Used   Tobacco Comment    WAS SMOKING 1-1.5 ppd     (Ifpatient a smoker, smoking cessation counseling offered)    Social History     Substance and Sexual Activity   Alcohol Use No    Alcohol/week: 0.0 standard drinks       REVIEW OF SYSTEMS:  Review of Systems    Physical Exam:      Vitals:    20 0919   Temp: 98.1 °F (36.7 °C)     There is no height or weight on file to calculate BMI. Patient is a 71 y.o. male in no acute distress and alert and oriented to person, place and time. Physical Exam  Constitutional: Patient in no acute distress. Neuro: Alert and oriented to person, place and time. Psych: Mood normal, affect normal  Skin: warm, pink,  No rash noted  HEENT: Head: Normocephalic andatraumatic  Conjunctivae and EOM are normal. Pupils are equal, round  Nose:Normal  Right External Ear: Normal; Left External Ear: Normal  Mouth: Mucosa Moist  Neck: Supple  Lungs: Respiratory effort is normal  Cardiovascular: strong and reg. No edema  Abdomen: Soft, non-tender, non-distended  Bladder non-tender and not distended. Musculoskeletal: Normal gait and station      Assessment and Plan      1. Prostate cancer ([address]    2. Androgen deprivation therapy    3. Nocturia           Plan:     PSA in 6 months prior to next Eligard shot    Eligard given today and again in 6 months    Follow up with Dr. Prabhu Soriano in 6 months. Calcium and Vitamin D daily. Stay well hydrated. No follow-ups on file. Prescriptions Ordered:  No orders of the defined types were placed in this encounter.     Orders Placed:  Orders Placed This Encounter   Procedures    PSA, Diagnostic     Standing Status:   Future     Standing Expiration Date:   2021           JAVI Burton

## 2020-05-26 ENCOUNTER — HOSPITAL ENCOUNTER (OUTPATIENT)
Age: 65
Setting detail: SPECIMEN
Discharge: HOME | End: 2020-05-26
Payer: MEDICARE

## 2020-05-26 LAB
HCT VFR BLD CALC: 44.2 % (ref 40.7–50.3)
HEMOGLOBIN: 13.7 G/DL (ref 13–17)

## 2020-05-26 NOTE — LETTER
[address] [place] [address]. [facility] [address]  Phone: [phone]  Fax: [phone]    MD Marquis Sonia Arias MD Lorrayne Goodie, MD Gretchen [facility], [address] [employer] [phone]    We were unable to reach you by phone. Please call our office at your earliest convenience. This message is regarding: results. Please call between the hours of 8:30am and 4:00pm Monday through Friday if possible. If you have any questions or concerns, please don't hesitate to call. Sincerely,      [facility].

## 2020-06-01 ENCOUNTER — OFFICE VISIT (OUTPATIENT)
Dept: ORTHOPEDIC SURGERY | Age: 65
End: 2020-06-01
Payer: MEDICARE

## 2020-06-01 VITALS — HEIGHT: 72 IN | WEIGHT: 286 LBS | BODY MASS INDEX: 38.74 KG/M2

## 2020-06-01 PROCEDURE — 99212 OFFICE O/P EST SF 10 MIN: CPT | Performed by: ORTHOPAEDIC SURGERY

## 2020-06-01 NOTE — PROGRESS NOTES
HPI: Mr. Vickey Cottrell is here today for reevaluation of his left shoulder. He indicates that his shoulder is \"not as bad as it was\" but it still hurts and cannot use it as much as he was able to before. He started using it more and more recently and overall feels that it \"seems better than what it was\". He continues to deny having any fevers, chills, sweats or any constitutional symptoms. Physical examination:  Evaluation of his left shoulder and upper extremity demonstrates a well-healed incisional scar over the anterior aspect of the shoulder. No obvious warmth, erythema, or swelling. He is nontender to palpation about the shoulder at this time. He can actively elevate the left shoulder to approximately 70 degrees, he has 70 degrees of abduction as well and he externally rotates to neutral.  Passively he has 120 degrees of elevation, 105 210 degrees of abduction and 25 degrees of external rotation. No gross instability. He has pain-free passive range of motion. Sensation is grossly intact light touch in all dermatomes and he has a 2+ radial pulse with brisk capillary refill in all fingers. Imaging studies: 4 x-ray views of the left shoulder completed on 6/1/2020 were independently reviewed demonstrating normal alignment of his reverse shoulder arthroplasty. No obvious loosening  dislocation, or subluxation noted. No periprosthetic fracture noted. Impression and plan: Mr. Vickey Cottrell is a 58-year-old getting close to 2 years status post a left reverse shoulder arthroplasty. His immediate postoperative course was complicated by a dislocation with a revision. He has since remained stable however recently has been dealing with shoulder pain and decreased function. Work-up thus far has been negative. I again had a discussion with Mr. Vickey Cottrell today with regards to the fact that this could certainly be an indolent infection. Neck steps would likely involve an arthroscopic biopsy.   He like to continue to hold off of this. He did bring up the possibility of him having an allergic reaction to the implants. This is certainly a possibility but somewhat unusual given the fact that he did well for some time prior to becoming symptomatic. We could look into having him tested for this. He would like to continue to monitor at this point. He can continue on with activities as tolerated and I will have him follow-up in my clinic annually for recheck but he may return or call at anytime with questions and or concerns.

## 2020-07-07 ENCOUNTER — OFFICE VISIT (OUTPATIENT)
Dept: FAMILY MEDICINE CLINIC | Age: 65
End: 2020-07-07
Payer: MEDICARE

## 2020-07-07 VITALS
WEIGHT: 278 LBS | OXYGEN SATURATION: 94 % | DIASTOLIC BLOOD PRESSURE: 66 MMHG | BODY MASS INDEX: 37.65 KG/M2 | HEIGHT: 72 IN | SYSTOLIC BLOOD PRESSURE: 122 MMHG | HEART RATE: 65 BPM | TEMPERATURE: 98.8 F

## 2020-07-07 PROBLEM — K92.2 GI BLEED: Status: RESOLVED | Noted: 2020-03-19 | Resolved: 2020-07-07

## 2020-07-07 PROBLEM — I77.9 PERIPHERAL ARTERIAL OCCLUSIVE DISEASE (HCC): Status: RESOLVED | Noted: 2017-03-25 | Resolved: 2020-07-07

## 2020-07-07 PROBLEM — R19.7 ACUTE DIARRHEA: Status: RESOLVED | Noted: 2020-03-19 | Resolved: 2020-07-07

## 2020-07-07 PROBLEM — D50.0 ANEMIA, BLOOD LOSS: Status: RESOLVED | Noted: 2018-10-07 | Resolved: 2020-07-07

## 2020-07-07 PROBLEM — R55 SYNCOPE AND COLLAPSE: Status: RESOLVED | Noted: 2020-03-19 | Resolved: 2020-07-07

## 2020-07-07 PROBLEM — J18.9 PNEUMONIA: Status: RESOLVED | Noted: 2020-03-09 | Resolved: 2020-07-07

## 2020-07-07 PROBLEM — R63.4 UNINTENDED WEIGHT LOSS: Status: RESOLVED | Noted: 2018-10-07 | Resolved: 2020-07-07

## 2020-07-07 PROBLEM — A04.72 C. DIFFICILE COLITIS: Status: RESOLVED | Noted: 2020-03-19 | Resolved: 2020-07-07

## 2020-07-07 PROBLEM — B37.0 THRUSH: Status: RESOLVED | Noted: 2020-03-19 | Resolved: 2020-07-07

## 2020-07-07 PROBLEM — R73.9 HYPERGLYCEMIA: Status: RESOLVED | Noted: 2017-03-25 | Resolved: 2020-07-07

## 2020-07-07 PROCEDURE — G0438 PPPS, INITIAL VISIT: HCPCS | Performed by: FAMILY MEDICINE

## 2020-07-07 RX ORDER — NEBULIZER ACCESSORIES
EACH MISCELLANEOUS
Qty: 30 EACH | Refills: 5 | Status: ON HOLD | OUTPATIENT
Start: 2020-07-07 | End: 2022-03-15 | Stop reason: HOSPADM

## 2020-07-07 RX ORDER — PRAVASTATIN SODIUM 80 MG/1
80 TABLET ORAL EVERY EVENING
Qty: 90 TABLET | Refills: 3 | Status: SHIPPED | OUTPATIENT
Start: 2020-07-07 | End: 2021-07-20 | Stop reason: SDUPTHER

## 2020-07-07 RX ORDER — OMEPRAZOLE 40 MG/1
40 CAPSULE, DELAYED RELEASE ORAL
Qty: 90 CAPSULE | Refills: 3 | Status: SHIPPED | OUTPATIENT
Start: 2020-07-07 | End: 2021-07-20 | Stop reason: SDUPTHER

## 2020-07-07 RX ORDER — PREGABALIN 200 MG/1
200 CAPSULE ORAL 3 TIMES DAILY
Qty: 270 CAPSULE | Refills: 0 | Status: SHIPPED | OUTPATIENT
Start: 2020-07-07 | End: 2020-10-13 | Stop reason: SDUPTHER

## 2020-07-07 ASSESSMENT — VISUAL ACUITY
OS_CC: 20/13
OD_CC: 20/15

## 2020-07-07 ASSESSMENT — PATIENT HEALTH QUESTIONNAIRE - PHQ9
SUM OF ALL RESPONSES TO PHQ QUESTIONS 1-9: 0
SUM OF ALL RESPONSES TO PHQ QUESTIONS 1-9: 0

## 2020-07-07 ASSESSMENT — LIFESTYLE VARIABLES: HOW OFTEN DO YOU HAVE A DRINK CONTAINING ALCOHOL: 0

## 2020-07-07 NOTE — LETTER
MEDICATION AGREEMENT     Ryan Novoa  [birth year]      For certain conditions, multiple classes of medications may be used to help better manage your symptoms, and to improve your ability to function at home, work and in social settings. However, these medications do have risks, which will be discussed with you, including addiction and dependency. The following prescribed medications need frequent monitoring and will require you to partner and assist in your healthcare. Medication  Dose, instructions and quantity as indicated on current prescription bottle Diagnosis/Reason(s) for Taking Category    Lyrica TID Type 2 diabetes mellitus with diabetic polyneuropathy, without long-term current use of insulin OLIVERIO analog                            Benefits and goals of Controlled Substance Medications: There are two potential goals for your treatment: (1) decreased pain and suffering (2) improved daily life functions. There are many possible treatments for your chronic condition(s), and, in addition to controlled substance medications, we will try alternatives such as physical therapy, yoga, massage, home daily exercise, meditation, relaxation techniques, injections, chiropractic manipulations, surgery, cognitive therapy, hypnosis and many medications that are not habit-forming. Use of controlled substance medications may be helpful, but they are unlikely to resolve all of your symptoms or restore all function. Risks of Controlled Substance Medications:    Opioid pain medications: These medications can lead to problems such as addiction/dependence, sedation, lightheadedness/dizziness, memory issues, falls, constipation, nausea, or vomiting. They may also impair the ability to drive or operate machinery. Additionally, these medications may lower testosterone levels, leading to loss of bone strength, stamina and sex drive.   They may cause problems with breathing, sleep apnea and reduced coughing, which are especially dangerous for patients with lung disease. Overdose or dangerous interactions with alcohol and other medications may occur, leading to death. Hyperalgesia may develop, in which patients receiving opioids for the treatment of pain may actually become more sensitive to certain painful stimuli, and in some cases, experience pain from ordinarily non-painful stimuli. Women between the ages of 14-53 who could become pregnant should carefully weigh the risks and benefits of opioids with their physicians, as these medications increase the risk of pregnancy complications, including miscarriage,  delivery and stillbirth. It is also possible for babies to be born addicted to opioids. Opioid dependence withdrawal symptoms may include; feelings of uneasiness, increased pain, irritability, belly pain, diarrhea, sweats and goose-flesh. Benzodiazepines and non-benzodiazepine sleep medications: These medications can lead to problems such as addiction/dependence, sedation, fatigue, lightheadedness, dizziness, incoordination, falls, depression, hallucinations, and impaired judgment, memory and concentration. The ability to drive and operate machinery may also be affected. Abnormal sleep-related behaviors have been reported, including sleep walking, driving, making telephone calls, eating, or having sex while not fully awake. These medications can suppress breathing and worsen sleep apnea, particularly when combined with alcohol or other sedating medications, potentially leading to death. Dependence withdrawal symptoms may include tremors, anxiety, hallucinations and seizures. Stimulants:  Common adverse effects include addiction/dependence, increased blood pressure and heart rate, decreased appetite, nausea, involuntary weight loss, insomnia, irritability, and headaches.   These risks may increase when these medications are combined with other stimulants, such as caffeine pills or energy drinks, certain weight loss supplements and oral decongestants. Dependence withdrawal symptoms may include depressed mood, loss of interest, suicidal thoughts, anxiety, fatigue, appetite changes and agitation. Testosterone replacement therapy:  Potential side effects include increased risk of stroke and heart attack, blood clots, increased blood pressure, increased cholesterol, enlarged prostate, sleep apnea, irritability/aggression and other mood disorders, and decreased fertility. Other:     1. I understand that I have the following responsibilities:  · I will take medications at the dose and frequency prescribed. · I will not increase or change how I take my medications without the approval of the health care provider who signs this Medication Agreement. · I will arrange for refills at the prescribed interval ONLY during regular office hours. I will not ask for refills earlier than agreed, after-hours, on holidays or on weekends. · I will obtain all refills for these medications at  ·  ____________________________________  pharmacy (phone number  ·  ________________________), with full consent for my provider and pharmacist to exchange information in writing or verbally. · I will not request any pain medications or controlled substances from other providers and will inform this provider of all other medications I am taking. · I will inform my other health care providers that I am taking these medications and of the existence of this [address]. In the event of an emergency, I will provide the same information to the emergency department providers. · I will protect my prescriptions and medications. I understand that lost or misplaced prescriptions will not be replaced. · I will keep medications only for my own use and will not share them with others. I will keep all medications away from children.   · I agree to participate in any medical, psychological or psychiatric assessments recommended by my provider. · I will actively participate in any program designed to improve function, including social, physical, psychological and daily or work activities. 2. I will not use illegal or street drugs or another person's prescription. If I have an addiction problem with drugs or alcohol and my provider asks me to enter a program to address this issue, I agree to follow through. Such programs may include:  · 12-Step program and securing a sponsor  · Individual counseling   · Inpatient or outpatient treatment  · Other:_____________________________________________________________________________________________________________________________________________    If in treatment, I will request that a copy of the programs initial evaluation and treatment recommendations be sent to this provider and will not expect refills until that is received. I will also request written monthly updates be sent to this provider to verify my continuing treatment. 3. I will consent to drug screening upon my providers request to assure I am only taking the prescribed drugs, described in this MEDICATION AGREEMENT. I understand that a drug screen is a laboratory test in which a sample of my urine, blood or saliva is checked to see what drugs I have been taking. 4. I agree that I will treat the providers and staff at this office with respect at all times. I will keep all of my scheduled appointments, but if I need to cancel my appointment, I will do so a minimum of 24 hours before it is scheduled. 5. I understand that this provider may stop prescribing the medications listed if:  · I do not show any improvement in pain, or my activity has not improved. · I develop rapid tolerance or loss of improvement, as described in my treatment plan. · I develop significant side effects from the medication.   · My behavior is inconsistent with the responsibilities outlined above, which may also result in my being prevented from receiving further care from this office. · Other:____________________________________________________________________    AGREEMENT:    I have read the above and have had all of my questions answered. For chronic disease management, I know that my symptoms can be managed with many types of treatments. A chronic medication trial may be part of my treatment, but I must be an active participant in my care. Medication therapy is only one part of my symptom management plan. In some cases, there may be limited scientific evidence to support the chronic use of certain medications to improve symptoms and daily function. Furthermore, in certain circumstances, there may be scientific information that suggests that use of chronic controlled substances may actually worsen my symptoms and increase my risk of unintentional death directly related to this medication therapy. I know that if my provider feels my risk from controlled medications is greater than my benefit, I will have my controlled substance medication(s) compassionately lowered or removed altogether. I agree to a controlled substance medication trial.      I further agree to allow this office to contact my HIPAA contact on file if there are concerns about my safety and use of controlled medications. I have agreed to use the following medications above as instructed by my physician and as stated in this [address].      Patient Signature:  ______________________  HFTX:1/0/1130 or _____________    Provider Signature:______________________  ABFO:3/8/2321 or _____________

## 2020-07-07 NOTE — PROGRESS NOTES
Mini Cog    Instructions Script   3 Item Recall Test Tell the patient you are going to give them 3 words to remember. Pause after stating each word. \"Mr. Cote, I am going to tell you 3 words that I want you to remember. Apple (pause)  Watch (pause) Waynette Alba (pause)      Make sure the patient heard the words correctly by asking them to repeat the words back to you Mr. Cote, what words did I just tell you?     Allow patient to repeat words back to you. If they did not hear them or understand correctly, repeat the words again and have them repeat the words back to you. When the patient has repeated the words Apple, Watch, Daisha back to you, move on to the clock drawing test    The Clock Drawing Test Hand the patient a piece of paper on which you have drawn a medium size [place]. Tell the patient you would like them to draw a picture of a clock using the exact words in the script. Mr. Cote, I would like you to draw a picture of a clock. Inside the [place], please draw the number hours on a clock as they normally appear    When the patient is done filling in the clock to the best of their ability, tell the patient to draw the hands of the clock at 11:10 using the exact words in the script. Mr. Cote, I want you to draw the hands of the clock at ten minutes after eleven oclock. 3 Item Recall Ask the patient to recall the 3 words you asked them to remember. No prompting is allowed. Mr. Cote, I asked you to remember 3 words. What are the 3 words I asked you to remember?      Recall  The patient receives 1 point for each word remembered. Score a 0 for each word missed.     Apple- Yes  Watch- Yes  Daisha- No    Patient refused = No  Recall Score= 2  Clock Draw = normal  Mini[facility]og outcome:  negative    ________________________________________________________________________________________________  Scoring Instructions  Clock Draw Scoring Normal Abnormal   Numbers All present Number(s) missing Number sequence & position Correct sequences & position Out of sequence; spacing abnormal   Hands Display correct time: 11:10 Missing, wrong time     Patient refuses   Score of 3 = Negative Screen  Score of 1 or 2 with normal clock draw = Negative Screen  Score of 1 or 2 with abnormal clock draw = Positive Screen  Score of 0 = Positive Screen regardless of Clock Drawing Score  Patient refuses = Positive Screen  Remember to have nursing scan in the Clock Draw page into the chart. Coding  ICD-9 Code Diagnosis Code To be used for Follow up   V70.5 Health exam of defined subpopulation: >58 y/o adults   SCREENING for cognitive function Screening yearly   V71 Observation and evaluation  for suspected conditions NOT found NEGATIVE SCREENING Repeat screening in 1 year   799.59 Other signs and symptoms involving cognition POSITIVE SCREENING   Refer to  recommended follow-up  process       Visit Information    Have you changed or started any medications since your last visit including any over-the-counter medicines, vitamins, or herbal medicines? no   Are you having any side effects from any of your medications? -  no  Have you stopped taking any of your medications? Is so, why? -  no    Have you seen any other physician or provider since your last visit? Yes - Records Obtained  Have you had any other diagnostic tests since your last visit? Yes - Records Obtained  Have you been seen in the emergency room and/or had an admission to a hospital since we last saw you? Yes - Records Obtained  Have you had your routine dental cleaning in the past 6 months? yes -     Have you activated your [employer] account? If not, what are your barriers?  Yes     Patient Care Team:  Feliberto Ying MD as PCP - General (Family Medicine)  Feliberto Ying MD as PCP - [facility]  Bijal Grossman MD as Consulting Physician (Neurosurgery)  Miranda Bajwa MD as Consulting Physician (Cardiology)  Jayde Gonzalez MD as Consulting Physician (Urology)  Monica Moralez MD as Surgeon (Vascular Surgery)  Angelique Hernandez MD as Consulting Physician (Pulmonology)  Renea Ferreira MD as Consulting Physician (Orthopedic Surgery)  Nadira Arauz DO as Consulting Physician (General Surgery)  Adia Briscoe OD (Ophthalmology)  Connie Hernandez MD as Consulting Physician (Endocrinology)  Ted Dan MD as Consulting Physician (Orthopedic Surgery)  Margo Nowak MD as Consulting Physician (Gastroenterology)  Belem Nunez MD as Consulting Physician (Orthopedic Surgery)  Zev Elmore, [place] as Nurse Navigator    Medical History Review  Past Medical, Family, and Social History reviewed and does contribute to the patient presenting condition    Health Maintenance   Topic Date Due    Diabetic foot exam  04/09/2020    Flu vaccine (1) 09/01/2020    Diabetic microalbuminuria test  09/16/2020    Diabetic retinal exam  11/07/2020    Annual Wellness Visit (AWV)  01/23/2021    Lipid screen  02/11/2021    A1C test (Diabetic or Prediabetic)  03/09/2021    Low dose CT lung screening  03/12/2021    Potassium monitoring  04/03/2021    Creatinine monitoring  04/03/2021    PSA counseling  05/14/2021    Pneumococcal 65+ years Vaccine (2 of 2 - PPSV23) 04/11/2022    Colon cancer screen colonoscopy  05/09/2022    DTaP/Tdap/Td vaccine (2 - Td) 03/01/2028    Shingles Vaccine  Completed    Hepatitis C screen  Addressed    Hepatitis A vaccine  Aged Out    Hib vaccine  Aged Out    Meningococcal (ACWY) vaccine  Aged Out

## 2020-07-07 NOTE — PATIENT INSTRUCTIONS
DASH Diet: Care Instructions  Your Care Instructions     The DASH diet is an eating plan that can help lower your blood pressure. DASH stands for Dietary Approaches to Stop Hypertension. Hypertension is high blood pressure. The DASH diet focuses on eating foods that are high in calcium, potassium, and magnesium. These nutrients can lower blood pressure. The foods that are highest in these nutrients are fruits, vegetables, low-fat dairy products, nuts, seeds, and legumes. But taking calcium, potassium, and magnesium supplements instead of eating foods that are high in those nutrients does not have the same effect. The DASH diet also includes whole grains, fish, and poultry. The DASH diet is one of several lifestyle changes your doctor may recommend to lower your high blood pressure. Your doctor may also want you to decrease the amount of sodium in your diet. Lowering sodium while following the DASH diet can lower blood pressure even further than just the DASH diet alone. Follow-up care is a key part of your treatment and safety. Be sure to make and go to all appointments, and call your doctor if you are having problems. It's also a good idea to know your test results and keep a list of the medicines you take. How can you care for yourself at home? Following the DASH diet  · Eat 4 to 5 servings of fruit each day. A serving is 1 medium-sized piece of fruit, ½ cup chopped or canned fruit, 1/4 cup dried fruit, or 4 ounces (½ cup) of fruit juice. Choose fruit more often than fruit juice. · Eat 4 to 5 servings of vegetables each day. A serving is 1 cup of lettuce or raw leafy vegetables, ½ cup of chopped or cooked vegetables, or 4 ounces (½ cup) of vegetable juice. Choose vegetables more often than vegetable juice. · Get 2 to 3 servings of low-fat and fat-free dairy each day. A serving is 8 ounces of milk, 1 cup of yogurt, or 1 ½ ounces of cheese. · Eat 6 to 8 servings of grains each day.  A serving is 1 slice of bread, 1 ounce of dry cereal, or ½ cup of cooked rice, pasta, or cooked cereal. Try to choose whole-grain products as much as possible. · Limit lean meat, poultry, and fish to 2 servings each day. A serving is 3 ounces, about the size of a deck of cards. · Eat 4 to 5 servings of nuts, seeds, and legumes (cooked dried beans, lentils, and split peas) each week. A serving is 1/3 cup of nuts, 2 tablespoons of seeds, or ½ cup of cooked beans or peas. · Limit fats and oils to 2 to 3 servings each day. A serving is 1 teaspoon of vegetable oil or 2 tablespoons of salad dressing. · Limit sweets and added sugars to 5 servings or less a week. A serving is 1 tablespoon jelly or jam, ½ cup sorbet, or 1 cup of lemonade. · Eat less than 2,300 milligrams (mg) of sodium a day. If you limit your sodium to 1,500 mg a day, you can lower your blood pressure even more. Tips for success  · Start small. Do not try to make dramatic changes to your diet all at once. You might feel that you are missing out on your favorite foods and then be more likely to not follow the plan. Make small changes, and stick with them. Once those changes become habit, add a few more changes. · Try some of the following:  ? Make it a goal to eat a fruit or vegetable at every meal and at snacks. This will make it easy to get the recommended amount of fruits and vegetables each day. ? Try yogurt topped with fruit and nuts for a snack or healthy dessert. ? Add lettuce, tomato, cucumber, and onion to sandwiches. ? Combine a ready-made pizza crust with low-fat mozzarella cheese and lots of vegetable toppings. Try using tomatoes, squash, spinach, broccoli, carrots, cauliflower, and onions. ? Have a variety of cut-up vegetables with a low-fat dip as an appetizer instead of chips and dip. ? Sprinkle sunflower seeds or chopped almonds over salads. Or try adding chopped walnuts or almonds to cooked vegetables.   ? Try some vegetarian meals using beans and peas. Add garbanzo or kidney beans to salads. Make burritos and tacos with mashed castillo beans or black beans. Where can you learn more? Go to https://chpesharmila.Truly [email or web address] org and sign in to your DearLocal account. Enter U187 in the [facility] box to learn more about \"DASH Diet: Care Instructions. \"     If you do not have an account, please click on the \"Sign Up Now\" link. Current as of: December 16, 2019               Content Version: 12.5  © 1889-0094 Healthwise, [employer]Good. Care instructions adapted under license by [facility] ([facility]). If you have questions about a medical condition or this instruction, always ask your healthcare professional. [address] any warranty or liability for your use of this information. Learning About Low-Carbohydrate Diets  What is a low-carbohydrate diet? A low-carbohydrate (or \"low-carb\") diet limits foods and drinks that have carbohydrates. This includes grains, fruits, milk and yogurt, and starchy vegetables like potatoes, beans, and corn. It also avoids foods and drinks that have added sugar. Instead, low-carb diets include foods that are high in protein and fat. Why might you follow a low-carb diet? Low-carb diets may be used for a variety of reasons, such as for weight loss. People who have diabetes may use a low-carb diet to help manage their blood sugar levels. What should you do before you start the diet? Talk to your doctor before you try any diet. This is even more important if you have health problems like kidney disease, heart disease, or diabetes. Your doctor may suggest that you meet with a registered dietitian. A dietitian can help you make an eating plan that works for you. What foods do you eat on a low-carb diet? On a low-carb diet, you choose foods that are high in protein and fat. Examples of these are:  · Meat, poultry, and fish. · Eggs.   · Nuts, such as walnuts, pecans, almonds, and peanuts. · Peanut butter and other nut butters. · Tofu. · Avocado. · Deb Habermann. · Non-starchy vegetables like broccoli, cauliflower, green beans, mushrooms, peppers, lettuce, and spinach. · Unsweetened non-dairy milks like almond milk and coconut milk. · Cheese, cottage cheese, and cream cheese. Current as of: August 22, 2019               Content Version: 12.5  © 2006-2020 Healthwise, [employer]. Care instructions adapted under license by [facility] ([facility]). If you have questions about a medical condition or this instruction, always ask your healthcare professional. [address] any warranty or liability for your use of this information. Stopping Smoking: Care Instructions  Your Care Instructions     Cigarette smokers crave the nicotine in cigarettes. Giving it up is much harder than simply changing a habit. Your body has to stop craving the nicotine. It is hard to quit, but you can do it. There are many tools that people use to quit smoking. You may find that combining tools works best for you. There are several steps to quitting. First you get ready to quit. Then you get support to help you. After that, you learn new skills and behaviors to become a nonsmoker. For many people, a necessary step is getting and using medicine. Your doctor will help you set up the plan that best meets your needs. You may want to attend a smoking cessation program to help you quit smoking. When you choose a program, look for one that has proven success. Ask your doctor for ideas. You will greatly increase your chances of success if you take medicine as well as get counseling or join a cessation program.  Some of the changes you feel when you first quit tobacco are uncomfortable. Your body will miss the nicotine at first, and you may feel short-tempered and grumpy. You may have trouble sleeping or concentrating. Medicine can help you deal with these symptoms.  You may struggle with changing your smoking habits and rituals. The last step is the tricky one: Be prepared for the smoking urge to continue for a time. This is a lot to deal with, but keep at it. You will feel better. Follow-up care is a key part of your treatment and safety. Be sure to make and go to all appointments, and call your doctor if you are having problems. It's also a good idea to know your test results and keep a list of the medicines you take. How can you care for yourself at home? · Ask your family, friends, and coworkers for support. You have a better chance of quitting if you have help and support. · Join a support group, such as Nicotine Anonymous, for people who are trying to quit smoking. · Consider signing up for a smoking cessation program, such as the American Lung Association's Freedom from Smoking program.  · Get text messaging support. Go to the website at www.smokefree. gov to sign up for the [facility] program.  · Set a quit date. Pick your date carefully so that it is not right in the middle of a big deadline or stressful time. Once you quit, do not even take a puff. Get rid of all ashtrays and lighters after your last cigarette. Clean your house and your clothes so that they do not smell of smoke. · Learn how to be a nonsmoker. Think about ways you can avoid those things that make you reach for a cigarette. ? Avoid situations that put you at greatest risk for smoking. For some people, it is hard to have a drink with friends without smoking. For others, they might skip a coffee break with coworkers who smoke. ? Change your daily routine. Take a different route to work or eat a meal in a different place. · Cut down on stress. Calm yourself or release tension by doing an activity you enjoy, such as reading a book, taking a hot bath, or gardening. · Talk to your doctor or pharmacist about nicotine replacement therapy, which replaces the nicotine in your body.  You still get nicotine but you do not use tobacco. Nicotine replacement products help you slowly reduce the amount of nicotine you need. These products come in several forms, many of them available over-the-counter:  ? Nicotine patches  ? Nicotine gum and lozenges  ? Nicotine inhaler  · Ask your doctor about bupropion (Wellbutrin) or varenicline (Chantix), which are prescription medicines. They do not contain nicotine. They help you by reducing withdrawal symptoms, such as stress and anxiety. · Some people find hypnosis, acupuncture, and massage helpful for ending the smoking habit. · Eat a healthy diet and get regular exercise. Having healthy habits will help your body move past its craving for nicotine. · Be prepared to keep trying. Most people are not successful the first few times they try to quit. Do not get mad at yourself if you smoke again. Make a list of things you learned and think about when you want to try again, such as next week, next month, or next year. Where can you learn more? Go to https://TouchPal.Strikingly. org and sign in to your [employer] account. Enter U816 in the SimpleSite box to learn more about \"Stopping Smoking: Care Instructions. \"     If you do not have an account, please click on the \"Sign Up Now\" link. Current as of: March 12, 2020               Content Version: 12.5  © 1374-6446 Healthwise, Incorporated. Care instructions adapted under license by [facility] ([facility]). If you have questions about a medical condition or this instruction, always ask your healthcare professional. [place] [address] any warranty or liability for your use of this information. Learning About Benefits From Quitting Smoking  How does quitting smoking make you healthier? If you're thinking about quitting smoking, you may have a few reasons to be smoke-free. Your health may be one of them.   · When you quit smoking, you lower your risks for cancer, lung disease, heart attack, stroke, blood vessel disease, and blindness from macular degeneration. · When you're smoke-free, you get sick less often, and you heal faster. You are less likely to get colds, flu, bronchitis, and pneumonia. · As a nonsmoker, you may find that your mood is better and you are less stressed. When and how will you feel healthier? Quitting has real health benefits that start from day 1 of being smoke-free. And the longer you stay smoke-free, the healthier you get and the better you feel. The first hours  · After just 20 minutes, your blood pressure and heart rate go down. That means there's less stress on your heart and blood vessels. · Within 12 hours, the level of carbon monoxide in your blood drops back to normal. That makes room for more oxygen. With more oxygen in your body, you may notice that you have more energy than when you smoked. After 2 weeks  · Your lungs start to work better. · Your risk of heart attack starts to drop. After 1 month  · When your lungs are clear, you cough less and breathe deeper, so it's easier to be active. · Your sense of taste and smell return. That means you can enjoy food more than you have since you started smoking. Over the years  · Over the years, your risks of heart disease, heart attack, and stroke are lower. · After 10 years, your risk of dying from lung cancer is cut by about half. And your risk for many other types of cancer is lower too. How would quitting help others in your life? When you quit smoking, you improve the health of everyone who now breathes in your smoke. · Their heart, lung, and cancer risks drop, much like yours. · They are sick less. For babies and small children, living smoke-free means they're less likely to have ear infections, pneumonia, and bronchitis. · If you're a woman who is or will be pregnant someday, quitting smoking means a healthier . · Children who are close to you are less likely to become adult smokers. Where can you learn more?   Go to https://chpepiceweb.healthfarmflo. org and sign in to your [employer] account. Enter [id number] in the [facility] box to learn more about \"Learning About Benefits From Quitting Smoking. \"     If you do not have an account, please click on the \"Sign Up Now\" link. Current as of: March 12, 2020               Content Version: 12.5  © 9405-8597 Healthwise, Medicast. Care instructions adapted under license by [facility] ([facility]). If you have questions about a medical condition or this instruction, always ask your healthcare professional. [place] [address] any warranty or liability for your use of this information. Personalized Preventive Plan for [employer] - 7/7/2020  Medicare offers a range of preventive health benefits. Some of the tests and screenings are paid in full while other may be subject to a deductible, co-insurance, and/or copay. Some of these benefits include a comprehensive review of your medical history including lifestyle, illnesses that may run in your family, and various assessments and screenings as appropriate. After reviewing your medical record and screening and assessments performed today your provider may have ordered immunizations, labs, imaging, and/or referrals for you. A list of these orders (if applicable) as well as your Preventive Care list are included within your After Visit Summary for your review. Other Preventive Recommendations:    · A preventive eye exam performed by an eye specialist is recommended every 1-2 years to screen for glaucoma; cataracts, macular degeneration, and other eye disorders. · A preventive dental visit is recommended every 6 months. · Try to get at least 150 minutes of exercise per week or 10,000 steps per day on a pedometer . · Order or download the FREE \"Exercise & Physical Activity: Your Everyday Guide\" from [employer].  Call 6-719.297.7567 or search [employer] online. · You need 8760-8537 mg of calcium and 1042-6715 IU of vitamin D per day. It is possible to meet your calcium requirement with diet alone, but a vitamin D supplement is usually necessary to meet this goal.  · When exposed to the sun, use a sunscreen that protects against both UVA and UVB radiation with an SPF of 30 or greater. Reapply every 2 to 3 hours or after sweating, drying off with a towel, or swimming. · Always wear a seat belt when traveling in a car. Always wear a helmet when riding a bicycle or motorcycle. Heart-Healthy Diet   Sodium, Fat, and Cholesterol Controlled Diet       What Is a Heart Healthy Diet? A heart-healthy diet is one that limits sodium , certain types of fat , and cholesterol . This type of diet is recommended for:   People with any form of cardiovascular disease (eg, coronary heart disease , peripheral vascular disease , previous heart attack , previous stroke )   People with risk factors for cardiovascular disease, such as high blood pressure , high cholesterol , or diabetes   Anyone who wants to lower their risk of developing cardiovascular disease   Sodium    Sodium is a mineral found in many foods. In general, most people consume much more sodium than they need. Diets high in sodium can increase blood pressure and lead to edema (water retention). On a heart-healthy diet, you should consume no more than 2,300 mg (milligrams) of sodium per dayabout the amount in one teaspoon of table salt. The foods highest in sodium include table salt (about 50% sodium), processed foods, convenience foods, and preserved foods. Cholesterol    Cholesterol is a fat-like, waxy substance in your blood. Our bodies make some cholesterol. It is also found in animal products, with the highest amounts in fatty meat, egg yolks, whole milk, cheese, shellfish, and organ meats. On a heart-healthy diet, you should limit your cholesterol intake to less than 200 mg per day.    It is normal and important products, many fast foods, and a few vegetables; increases total blood cholesterol, including LDL levels   Animal fats that are saturated include: butter, lard, whole-milk dairy products, meat fat, and poultry skin   Vegetable fats that are saturated include: hydrogenated shortening, palm oil, coconut oil, cocoa butter   Hydrogenated or trans fat  found in margarine and vegetable shortening, most shelf stable snack foods, and fried foods; increases LDL and decreases HDL     It is generally recommended that you limit your total fat for the day to less than 30% of your total calories. If you follow an 1800-calorie heart healthy diet, for example, this would mean 60 grams of fat or less per day. Saturated fat and trans fat in your diet raises your blood cholesterol the most, much more than dietary cholesterol does. For this reason, on a heart-healthy diet, less than 7% of your calories should come from saturated fat and ideally 0% from trans fat. On an 1800-calorie diet, this translates into less than 14 grams of saturated fat per day, leaving 46 grams of fat to come from mono- and polyunsaturated fats.    Food Choices on a Heart Healthy Diet   Food Category   Foods Recommended   Foods to Avoid   Grains   Breads and rolls without salted tops Most dry and cooked cereals Unsalted crackers and breadsticks Low-sodium or homemade breadcrumbs or stuffing All rice and pastas   Breads, rolls, and crackers with salted tops High-fat baked goods (eg, muffins, donuts, pastries) Quick breads, self-rising flour, and biscuit mixes Regular bread crumbs Instant hot cereals Commercially prepared rice, pasta, or stuffing mixes   Vegetables   Most fresh, frozen, and low-sodium canned vegetables Low-sodium and salt-free vegetable juices Canned vegetables if unsalted or rinsed   Regular canned vegetables and juices, including sauerkraut and pickled vegetables Frozen vegetables with sauces Commercially prepared potato and vegetable mixes Fruits   Most fresh, frozen, and canned fruits All fruit juices   Fruits processed with salt or sodium   Milk   Nonfat or low-fat (1%) milk Nonfat or low-fat yogurt Cottage cheese, low-fat ricotta, cheeses labeled as low-fat and low-sodium   Whole milk Reduced-fat (2%) milk Malted and chocolate milk Full fat yogurt Most cheeses (unless low-fat and low salt) Buttermilk (no more than 1 cup per week)   Meats and Beans   Lean cuts of fresh or frozen beef, veal, lamb, or pork (look for the word loin) Fresh or frozen poultry without the skin Fresh or frozen fish and some shellfish Egg whites and egg substitutes (Limit whole eggs to three per week) Tofu Nuts or seeds (unsalted, dry-roasted), low-sodium peanut butter Dried peas, beans, and lentils   Any smoked, cured, salted, or canned meat, fish, or poultry (including recinos, chipped beef, cold cuts, hot dogs, sausages, sardines, and anchovies) Poultry skins Breaded and/or fried fish or meats Canned peas, beans, and lentils Salted nuts   Fats and Oils   Olive oil and canola oil Low-sodium, low-fat salad dressings and mayonnaise   Butter, margarine, coconut and palm oils, recinos fat   Snacks, Sweets, and Condiments   Low-sodium or unsalted versions of broths, soups, soy sauce, and condiments Pepper, herbs, and spices; vinegar, lemon, or lime juice Low-fat frozen desserts (yogurt, sherbet, fruit bars) Sugar, cocoa powder, honey, syrup, jam, and preserves Low-fat, trans-fat free cookies, cakes, and pies Arnoldo and animal crackers, fig bars, rosi snaps   High-fat desserts Broth, soups, gravies, and sauces, made from instant mixes or other high-sodium ingredients Salted snack foods Canned olives Meat tenderizers, seasoning salt, and most flavored vinegars   Beverages   Low-sodium carbonated beverages Tea and coffee in moderation Soy milk   Commercially softened water   Suggestions   Make whole grains, fruits, and vegetables the base of your diet.     Choose heart-healthy fats such as canola, olive, and flaxseed oil, and foods high in heart-healthy fats, such as nuts, seeds, soybeans, tofu, and fish. Eat fish at least twice per week; the fish highest in omega-3 fatty acids and lowest in mercury include salmon, herring, mackerel, sardines, and canned chunk light tuna. If you eat fish less than twice per week or have high triglycerides, talk to your doctor about taking fish oil supplements. Read food labels. For products low in fat and cholesterol, look for fat free, low-fat, cholesterol free, saturated fat free, and trans fat freeAlso scan the Nutrition Facts Label, which lists saturated fat, trans fat, and cholesterol amounts. For products low in sodium, look for sodium free, very low sodium, low sodium, no added salt, and unsalted   Skip the salt when cooking or at the table; if food needs more flavor, get creative and try out different herbs and spices. Garlic and onion also add substantial flavor to foods. Trim any visible fat off meat and poultry before cooking, and drain the fat off after beckwith. Use cooking methods that require little or no added fat, such as grilling, boiling, baking, poaching, broiling, roasting, steaming, stir-frying, and sauting. Avoid fast food and convenience food. They tend to be high in saturated and trans fat and have a lot of added salt. Talk to a registered dietitian for individualized diet advice. Last Reviewed: March 2011 Meliton Marin MS, MPH, RD   Updated: 3/29/2011   ·     Heart-Healthy Diet   Sodium, Fat, and Cholesterol Controlled Diet       What Is a Heart Healthy Diet? A heart-healthy diet is one that limits sodium , certain types of fat , and cholesterol .  This type of diet is recommended for:   People with any form of cardiovascular disease (eg, coronary heart disease , peripheral vascular disease , previous heart attack , previous stroke )   People with risk factors for cardiovascular disease, such as high blood pressure , high cholesterol , or diabetes   Anyone who wants to lower their risk of developing cardiovascular disease   Sodium    Sodium is a mineral found in many foods. In general, most people consume much more sodium than they need. Diets high in sodium can increase blood pressure and lead to edema (water retention). On a heart-healthy diet, you should consume no more than 2,300 mg (milligrams) of sodium per dayabout the amount in one teaspoon of table salt. The foods highest in sodium include table salt (about 50% sodium), processed foods, convenience foods, and preserved foods. Cholesterol    Cholesterol is a fat-like, waxy substance in your blood. Our bodies make some cholesterol. It is also found in animal products, with the highest amounts in fatty meat, egg yolks, whole milk, cheese, shellfish, and organ meats. On a heart-healthy diet, you should limit your cholesterol intake to less than 200 mg per day. It is normal and important to have some cholesterol in your bloodstream. But too much cholesterol can cause plaque to build up within your arteries, which can eventually lead to a heart attack or stroke. The two types of cholesterol that are most commonly referred to are:   Low-density lipoprotein (LDL) cholesterol  Also known as bad cholesterol, this is the cholesterol that tends to build up along your arteries. Bad cholesterol levels are increased by eating fats that are saturated or hydrogenated. Optimal level of this cholesterol is less than 100. Over 130 starts to get risky for heart disease. High-density lipoprotein (HDL) cholesterol  Also known as good cholesterol, this type of cholesterol actually carries cholesterol away from your arteries and may, therefore, help lower your risk of having a heart attack. You want this level to be high (ideally greater than 60). It is a risk to have a level less than 40. You can raise this good cholesterol by eating olive oil, canola oil, avocados, or nuts. Exercise raises this level, too. Fat    Fat is calorie dense and packs a lot of calories into a small amount of food. Even though fats should be limited due to their high calorie content, not all fats are bad. In fact, some fats are quite healthful. Fat can be broken down into four main types. The good-for-you fats are:   Monounsaturated fat  found in oils such as olive and canola, avocados, and nuts and natural nut butters; can decrease cholesterol levels, while keeping levels of HDL cholesterol high   Polyunsaturated fat  found in oils such as safflower, sunflower, soybean, corn, and sesame; can decrease total cholesterol and LDL cholesterol   Omega-3 fatty acids  particularly those found in fatty fish (such as salmon, trout, tuna, mackerel, herring, and sardines); can decrease risk of arrhythmias, decrease triglyceride levels, and slightly lower blood pressure   The fats that you want to limit are:   Saturated fat  found in animal products, many fast foods, and a few vegetables; increases total blood cholesterol, including LDL levels   Animal fats that are saturated include: butter, lard, whole-milk dairy products, meat fat, and poultry skin   Vegetable fats that are saturated include: hydrogenated shortening, palm oil, coconut oil, cocoa butter   Hydrogenated or trans fat  found in margarine and vegetable shortening, most shelf stable snack foods, and fried foods; increases LDL and decreases HDL     It is generally recommended that you limit your total fat for the day to less than 30% of your total calories. If you follow an 1800-calorie heart healthy diet, for example, this would mean 60 grams of fat or less per day. Saturated fat and trans fat in your diet raises your blood cholesterol the most, much more than dietary cholesterol does. For this reason, on a heart-healthy diet, less than 7% of your calories should come from saturated fat and ideally 0% from trans fat.  On an 1800-calorie diet, this translates into less than 14 grams of saturated fat per day, leaving 46 grams of fat to come from mono- and polyunsaturated fats.    Food Choices on a Heart Healthy Diet   Food Category   Foods Recommended   Foods to Avoid   Grains   Breads and rolls without salted tops Most dry and cooked cereals Unsalted crackers and breadsticks Low-sodium or homemade breadcrumbs or stuffing All rice and pastas   Breads, rolls, and crackers with salted tops High-fat baked goods (eg, muffins, donuts, pastries) Quick breads, self-rising flour, and biscuit mixes Regular bread crumbs Instant hot cereals Commercially prepared rice, pasta, or stuffing mixes   Vegetables   Most fresh, frozen, and low-sodium canned vegetables Low-sodium and salt-free vegetable juices Canned vegetables if unsalted or rinsed   Regular canned vegetables and juices, including sauerkraut and pickled vegetables Frozen vegetables with sauces Commercially prepared potato and vegetable mixes   Fruits   Most fresh, frozen, and canned fruits All fruit juices   Fruits processed with salt or sodium   Milk   Nonfat or low-fat (1%) milk Nonfat or low-fat yogurt Cottage cheese, low-fat ricotta, cheeses labeled as low-fat and low-sodium   Whole milk Reduced-fat (2%) milk Malted and chocolate milk Full fat yogurt Most cheeses (unless low-fat and low salt) Buttermilk (no more than 1 cup per week)   Meats and Beans   Lean cuts of fresh or frozen beef, veal, lamb, or pork (look for the word loin) Fresh or frozen poultry without the skin Fresh or frozen fish and some shellfish Egg whites and egg substitutes (Limit whole eggs to three per week) Tofu Nuts or seeds (unsalted, dry-roasted), low-sodium peanut butter Dried peas, beans, and lentils   Any smoked, cured, salted, or canned meat, fish, or poultry (including recinos, chipped beef, cold cuts, hot dogs, sausages, sardines, and anchovies) Poultry skins Breaded and/or fried fish or meats Canned peas, beans, and lentils Salted nuts   Fats and Oils   Olive oil and canola oil Low-sodium, low-fat salad dressings and mayonnaise   Butter, margarine, coconut and palm oils, recinos fat   Snacks, Sweets, and Condiments   Low-sodium or unsalted versions of broths, soups, soy sauce, and condiments Pepper, herbs, and spices; vinegar, lemon, or lime juice Low-fat frozen desserts (yogurt, sherbet, fruit bars) Sugar, cocoa powder, honey, syrup, jam, and preserves Low-fat, trans-fat free cookies, cakes, and pies Arnoldo and animal crackers, fig bars, rosi snaps   High-fat desserts Broth, soups, gravies, and sauces, made from instant mixes or other high-sodium ingredients Salted snack foods Canned olives Meat tenderizers, seasoning salt, and most flavored vinegars   Beverages   Low-sodium carbonated beverages Tea and coffee in moderation Soy milk   Commercially softened water   Suggestions   Make whole grains, fruits, and vegetables the base of your diet. Choose heart-healthy fats such as canola, olive, and flaxseed oil, and foods high in heart-healthy fats, such as nuts, seeds, soybeans, tofu, and fish. Eat fish at least twice per week; the fish highest in omega-3 fatty acids and lowest in mercury include salmon, herring, mackerel, sardines, and canned chunk light tuna. If you eat fish less than twice per week or have high triglycerides, talk to your doctor about taking fish oil supplements. Read food labels. For products low in fat and cholesterol, look for fat free, low-fat, cholesterol free, saturated fat free, and trans fat freeAlso scan the Nutrition Facts Label, which lists saturated fat, trans fat, and cholesterol amounts. For products low in sodium, look for sodium free, very low sodium, low sodium, no added salt, and unsalted   Skip the salt when cooking or at the table; if food needs more flavor, get creative and try out different herbs and spices. Garlic and onion also add substantial flavor to foods.     Trim any visible fat off meat and poultry before cooking, and drain the fat off after beckwith. Use cooking methods that require little or no added fat, such as grilling, boiling, baking, poaching, broiling, roasting, steaming, stir-frying, and sauting. Avoid fast food and convenience food. They tend to be high in saturated and trans fat and have a lot of added salt. Talk to a registered dietitian for individualized diet advice. Last Reviewed: March 2011 Milagros Gordon MS, MPH, RD   Updated: 3/29/2011   ·     Preventing Osteoporosis: After Your Visit  Your Care Instructions  Osteoporosis means the bones are weak and thin enough that they can break easily. The older you are, the more likely you are to get osteoporosis. But with plenty of calcium, vitamin D, and exercise, you can help prevent osteoporosis. The preteen and teen years are a key time for bone building. With the help of calcium, vitamin D, and exercise in those early years and beyond, the bones reach their peak density and strength by age 27. After age 27, your bones naturally start to thin and weaken. The stronger your bones are at around age 27, the lower your risk for osteoporosis. But no matter what your age and risk are, your bones still need calcium, vitamin D, and exercise to stay strong. Also avoid smoking, and limit alcohol. Smoking and heavy alcohol use can make your bones thinner. Talk to your doctor about any special risks you might have, such as having a close relative with osteoporosis or taking a medicine that can weaken bones. Your doctor can tell you the best ways to protect your bones from thinning. Follow-up care is a key part of your treatment and safety. Be sure to make and go to all appointments, and call your doctor if you are having problems. It's also a good idea to know your test results and keep a list of the medicines you take. How can you care for yourself at home? Get enough calcium and vitamin D.  The [place] [employer] recommends adults younger than age 46 need 1,000 mg of calcium and 600 IU of vitamin D each day. Women ages 46 to 79 need 1,200 mg of calcium and 600 IU of vitamin D each day. Men ages 46 to 79 need 1,000 mg of calcium and 600 IU of vitamin D each day. Adults 71 and older need 1,200 mg of calcium and 800 IU of vitamin D each day. Eat foods rich in calcium, like yogurt, cheese, milk, and dark green vegetables. Eat foods rich in vitamin D, like eggs, fatty fish, cereal, and fortified milk. Get some sunshine. Your body uses sunshine to make its own vitamin D. The safest time to be out in the sun is before 10 a.m. or after 3 p.m. Avoid getting sunburned. Sunburn can increase your risk of skin cancer. Talk to your doctor about taking a calcium plus vitamin D supplement. Ask about what type of calcium is right for you, and how much to take at a time. Adults ages 23 to 48 should not get more than 2,500 mg of calcium and 4,000 IU of vitamin D each day, whether it is from supplements and/or food. Adults ages 46 and older should not get more than 2,000 mg of calcium and 4,000 IU of vitamin D each day from supplements and/or food. Get regular bone-building exercise. Weight-bearing and resistance exercises keep bones healthy by working the muscles and bones against gravity. Start out at an exercise level that feels right for you. Add a little at a time until you can do the following:  Do 30 minutes of weight-bearing exercise on most days of the week. Walking, jogging, stair climbing, and dancing are good choices. Do resistance exercises with weights or elastic bands 2 to 3 days a week. Limit alcohol. Drink no more than 1 alcohol drink a day if you are a woman. Drink no more than 2 alcohol drinks a day if you are a man. Do not smoke. Smoking can make bones thin faster. If you need help quitting, talk to your doctor about stop-smoking programs and medicines. These can increase your chances of quitting for good.   When should you call for help? Watch closely for changes in your health, and be sure to contact your doctor if:  You need help with a healthy eating plan. You need help with an exercise plan    © 5516-0332 [employer]. Care instructions adapted under license by [facility]. This care instruction is for use with your licensed healthcare professional. If you have questions about a medical condition or this instruction, always ask your healthcare professional. [address] any warranty or liability for your use of this information. Content Version: 9.4.87180; Last Revised: June 20, 2011              ·     DASH Diet: After Your Visit  Your Care Instructions  The DASH diet is an eating plan that can help lower your blood pressure. DASH stands for Dietary Approaches to Stop Hypertension. Hypertension is high blood pressure. The DASH diet focuses on eating foods that are high in calcium, potassium, and magnesium. These nutrients can lower blood pressure. The foods that are highest in these nutrients are fruits, vegetables, low-fat dairy products, nuts, seeds, and legumes. But taking calcium, potassium, and magnesium supplements instead of eating foods that are high in those nutrients does not have the same effect. The DASH diet also includes whole grains, fish, and poultry. The DASH diet is one of several lifestyle changes your doctor may recommend to lower your high blood pressure. Your doctor may also want you to decrease the amount of sodium in your diet. Lowering sodium while following the DASH diet can lower blood pressure even further than just the DASH diet alone. Follow-up care is a key part of your treatment and safety. Be sure to make and go to all appointments, and call your doctor if you are having problems. Its also a good idea to know your test results and keep a list of the medicines you take. How can you care for yourself at home?   Following the [facility] diet  Eat 4 to 5 servings of fruit each day. A serving is 1 medium-sized piece of fruit, ½ cup chopped or canned fruit, 1/4 cup dried fruit, or 4 ounces (½ cup) of fruit juice. Choose fruit more often than fruit juice. Eat 4 to 5 servings of vegetables each day. A serving is 1 cup of lettuce or raw leafy vegetables, ½ cup of chopped or cooked vegetables, or 4 ounces (½ cup) of vegetable juice. Choose vegetables more often than vegetable juice. Get 2 to 3 servings of low-fat and fat-free dairy each day. A serving is 8 ounces of milk, 1 cup of yogurt, or 1 ½ ounces of cheese. Eat 7 to 8 servings of grains each day. A serving is 1 slice of bread, 1 ounce of dry cereal, or ½ cup of cooked rice, pasta, or cooked cereal. Try to choose whole-grain products as much as possible. Limit lean meat, poultry, and fish to 6 ounces each day. Six ounces is about the size of two decks of cards. Eat 4 to 5 servings of nuts, seeds, and legumes (cooked dried beans, lentils, and split peas) each week. A serving is 1/3 cup of nuts, 2 tablespoons of seeds, or ½ cup cooked dried beans or peas. Limit sweets and added sugars to 5 servings or less a week. A serving is 1 tablespoon jelly or jam, ½ cup sorbet, or 1 cup of lemonade. Tips for success  Start small. Do not try to make dramatic changes to your diet all at once. You might feel that you are missing out on your favorite foods and then be more likely to not follow the plan. Make small changes, and stick with them. Once those changes become habit, add a few more changes. Try some of the following:  Make it a goal to eat a fruit or vegetable at every meal and at snacks. This will make it easy to get the recommended amount of fruits and vegetables each day. Try yogurt topped with fruit and nuts for a snack or healthy dessert. Add lettuce, tomato, cucumber, and onion to sandwiches. Combine a ready-made pizza crust with low-fat mozzarella cheese and lots of vegetable toppings.  Try using tomatoes, squash, spinach, broccoli, carrots, cauliflower, and onions. Have a variety of cut-up vegetables with a low-fat dip as an appetizer instead of chips and dip. Sprinkle sunflower seeds or chopped almonds over salads. Or try adding chopped walnuts or almonds to cooked vegetables. Try some vegetarian meals using beans and peas. Add garbanzo or kidney beans to salads. Make burritos and tacos with mashed castillo beans or black beans    © 4787-7734 [employer]. Care instructions adapted under license by [facility]. This care instruction is for use with your licensed healthcare professional. If you have questions about a medical condition or this instruction, always ask your healthcare professional. [address] any warranty or liability for your use of this information. Content Version: 9.4.16200; Last Revised: March 15, 2012              ·     High-Fiber Diet     What Is Fiber? Dietary fiber is a form of carbohydrate found in plants that cannot be digested by humans. All plants contain fiber, including fruits, vegetables, grains, and legumes. Fiber is often classified into two categories: soluble and insoluble. Soluble fiber draws water into the bowel and can help slow digestion. Examples of foods that are high in soluble fiber include oatmeal, oat bran, barley, legumes (eg, beans and peas), apples, and strawberries. Insoluble fiber speeds digestion and can add bulk to the stool. Examples of foods that are high in insoluble fiber include whole-wheat products, wheat bran, cauliflower, green beans, and potatoes. Why Follow a High-Fiber Diet? A high-fiber diet is often recommended to prevent and treat constipation , hemorrhoids , diverticulitis , and irritable bowel syndrome .  Eating a high-fiber diet can also help improve your cholesterol levels, lower your risk of coronary heart disease , reduce your risk of type 2 diabetes , and lower your weight. For people with type 1 or 2 diabetes, a high-fiber diet can also help stabilize blood sugar levels. How Much Fiber Should I Eat? A high-fiber diet should contain  20-35 grams  of fiber a day. This is actually the amount recommended for the general adult population; however, most Americans eat only 15 grams of fiber per day. Digestion of Fiber   Eating a higher fiber diet than usual can take some getting used to by your body's digestive system. To avoid the side effects of sudden increases in dietary fiber (eg, gas, cramping, bloating, and diarrhea), increase fiber gradually and be sure to drink plenty of fluids every day. Tips for Increasing Fiber Intake   Whenever possible, choose whole grains over refined grains (eg, brown rice instead of white rice, whole-wheat bread instead of white bread). Include a variety of grains in your diet, such as wheat, rye, barley, oats, quinoa, and bulgur. Eat more vegetarian-based meals. Here are some ideas: black bean burgers, eggplant lasagna, and veggie tofu stir-jerome. Choose high-fiber snacks, such as fruits, popcorn, whole-grain crackers, and nuts. Make whole-grain cereal or whole-grain toast part of your daily breakfast regime. When eating out, whether ordering a sandwich or dinner, ask for extra vegetables. When baking, replace part of the white flour with whole-wheat flour. Whole-wheat flour is particularly easy to incorporate into a recipe. High-Fiber Diet Eating Guide   Food Category   Foods Recommended   Notes   Grains   Whole-grain breads, muffins, bagels, or janenth bread Rye bread Whole-wheat crackers or crisp breads Whole-grain or bran cereals Oatmeal, oat bran, or grits Wheat germ Whole-wheat pasta and brown rice   Read the ingredients list on food labels. Look for products that list \"whole\" as the first ingredient (eg, whole-wheat, whole oats). Choose cereals with at least 2 grams of fiber per serving.    Vegetables   All vegetables, especially asparagus, bean sprouts, broccoli, [place] sprouts, cabbage, carrots, cauliflower, celery, corn, greens, green beans, green pepper, onions, peas, potatoes (with skin), snow peas, spinach, squash, sweet potatoes, tomatoes, zucchini   For maximum fiber intake, eat the peels of fruits and vegetablesjust be sure to wash them well first.   Fruits   All fruits, especially apples, berries, grapefruits, mangoes, nectarines, oranges, peaches, pears, dried fruits (figs, dates, prunes, raisins)   Choose raw fruits and vegetables over juice, cooked, or cannedraw fruit has more fiber. Dried fruit is also a good source of fiber. Milk   With the exception of yogurt containing inulin (a type of fiber), dairy foods provide little fiber. Add more fiber by topping your yogurt or cottage cheese with fresh fruit, whole grain or bran cereals, nuts, or seeds. Meats and Beans   All beans and peas, especially Garbanzo beans, kidney beans, lentils, lima beans, split peas, and castillo beans All nuts and seeds, especially almonds, peanuts, [place] nuts, cashews, peanut butter, walnuts, sesame and sunflower seeds All meat, poultry, fish, and eggs   Increase fiber in meat dishes by adding castillo beans, kidney beans, black-eyed peas, bran, or oatmeal. If you are following a low-fat diet, use nuts and seeds only in moderation. Fats and Oils   All in moderation   Fats and oils do not provide fiber   Snacks, Sweets, and Condiments   Fruit Nuts Popcorn, whole-wheat pretzels, or trail mix made with dried fruits, nuts, and seeds Cakes, breads, and cookies made with oatmeal or whole-wheat flour   Most snack foods do not provide much fiber. Choose snacks with at least 2 grams of fiber per serving. Last Reviewed: March 2011 Milagros Gordon MS, MPH, RD   Updated: 3/29/2011   ·     Keep Your Memory Lázaro Sin       Many factors can affect your ability to remembera hectic lifestyle, aging, stress, chronic disease, and certain medicines.  But, there are steps you can take to sharpen your mind and help preserve your memory. Challenge Your Brain   Regularly challenging your mind may help keeps it in top shape. Good mental exercises include:   Crossword puzzlesUse a dictionary if you need it; you will learn more that way. Brainteasers Try some! Crafts, such as wood working and sewing   Hobbies, such as gardening and building model airplanes   SocializingVisit old friends or join groups to meet new ones. Reading   Learning a new language   Taking a class, whether it be art history or nel chi   TravelingExperience the food, history, and culture of your destination   Learning to use a computer   Going to museums, the theater, or thought-provoking movies   Changing things in your daily life, such as reversing your pattern in the grocery store or brushing your teeth using your nondominant hand   Use Memory Aids   There is no need to remember every detail on your own. These memory aids can help:   Calendars and day planners   Electronic organizers to store all sorts of helpful informationThese devices can \"beep\" to remind you of appointments. A book of days to record birthdays, anniversaries, and other occasions that occur on the same date every year   Detailed \"to-do\" lists and strategically placed sticky notes   Quick \"study\" sessionsBefore a gathering, review who will be there so their names will be fresh in your mind. Establish routinesFor example, keep your keys, wallet, and umbrella in the same place all the time or take medicine with your 8:00 AM glass of juice   Live a Healthy Life   Many actions that will keep your body strong will do the same for your mind. For example:   Talk to Your Doctor About Herbs and Supplements    Malnutrition and vitamin deficiencies can impair your mental function. For example, vitamin B12 deficiency can cause a range of symptoms, including confusion. But, what if your nutritional needs are being met?  Can herbs and Higher incidence of chronic health problems (eg, arthritis, diabetes) that may limit mobility, agility or sensory awareness   Side effects of medicine (eg, dizziness, blurred vision)especially medicines like prescription pain medicines and drugs used to treat mental health conditions   Depending on the brittleness of your bones, the consequences of a fall can be serious and long lasting. Home Life   Research by the [employer] of Aging [facility]) shows that some home accidents among older adults can be prevented by making simple lifestyle changes and basic modifications and repairs to the home environment. Here are some lifestyle changes that experts recommend:   Have your hearing and vision checked regularly. Be sure to wear prescription glasses that are right for you. Speak to your doctor or pharmacist about the possible side effects of your medicines. A number of medicines can cause dizziness. If you have problems with sleep, talk to your doctor. Limit your intake of alcohol. If necessary, use a cane or walker to help maintain your balance. Wear supportive, rubber-soled shoes, even at home. If you live in a region that gets wintry weather, you may want to put special cleats on your shoes to prevent you from slipping on the snow and ice. Exercise regularly to help maintain muscle tone, agility, and balance. Always hold the banister when going up or down stairs. Also, use  bars when getting in or out of the bath or shower, or using the toilet. To avoid dizziness, get up slowly from a lying down position. Sit up first, dangling your legs for a minute or two before rising to a standing position. Overall Home Safety Check   According to the [employer]'s \"Older Consumer Home Safety Checklist,\" it is important to check for potential hazards in each room. And remember, proper lighting is an essential factor in home safety. If you cannot see clearly, you are more likely to fall. Important questions to ask yourself include:   Are lamp, electric, extension, and telephone cords placed out of the flow of traffic and maintained in good condition? Have frayed cords been replaced? Are all small rugs and runners slip resistant? If not, you can secure them to the floor with a special double-sided carpet tape. Are smoke detectors properly locatedone on every floor of your home and one outside of every sleeping area? Are they in good working order? Are batteries replaced at least once a year? Do you have a well-maintained carbon monoxide detector outside every sleeping are in your home? Does your furniture layout leave plenty of space to maneuver between and around chairs, tables, beds, and sofas? Are hallways, stairs and passages between rooms well lit? Can you reach a lamp without getting out of bed? Are floor surfaces well maintained? Shag rugs, high-pile carpeting, tile floors, and polished wood floors can be particularly slippery. Stairs should always have handrails and be carpeted or fitted with a non-skid tread. Is your telephone easily reachable. Is the cord safely tucked away? Room by Room   According to the [employer], bathrooms and reyna are the two most potentially hazardous rooms in your home. In the Kitchen    Be sure your stove is in proper working order and always make sure burners and the oven are off before you go out or go to sleep. Keep pots on the back burners, turn handles away from the front of the stove, and keep stove clean and free of grease build-up. Kitchen ventilation systems and range exhausts should be working properly. Keep flammable objects such as towels and pot holders away from the cooking area except when in use. Make sure kitchen curtains are tied back. Move cords and appliances away from the sink and hot surfaces.  If extension cords are needed, install wiring guides so they do not hang over the sink, range, or working areas.    Look for coffee pots, kettles and toaster ovens with automatic shut-offs. Keep a mop handy in the kitchen so you can wipe up spills instantly. You should also have a small fire extinguisher. Arrange your kitchen with frequently used items on lower shelves to avoid the need to stand on a stepstool to reach them. Make sure countertops are well-lit to avoid injuries while cutting and preparing food. In the Bathroom    Use a non-slip mat or decals in the tub and shower, since wet, soapy tile or porcelain surfaces are extremely slippery. Make sure bathroom rugs are non-skid or tape them firmly to the floor. Bathtubs should have at least one, preferably two, grab bars, firmly attached to structural supports in the wall. (Do not use built-in soap holders or glass shower doors as grab bars.)    Tub seats fitted with non-slip material on the legs allow you to wash sitting down. For people with limited mobility, bathtub transfer benches allow you to slide safely into the tub. Raised toilet seats and toilet safety rails are helpful for those with knee or hip problems. In the [facility]    Make sure you use a nightlight and that the area around your bed is clear of potential obstacles. Be careful with electric blankets and never go to sleep with a heating pad, which can cause serious burns even if on a low setting. Use fire-resistant mattress covers and pillows, and NEVER smoke in bed. Keep a phone next to the bed that is programmed to dial 911 at the push of a button. If you have a chronic condition, you may want to sign on with an automatic call-in service. Typically the system includes a small pendant that connects directly to an emergency medical voice-response system. You should also make arrangements to stay in contact with someonefriend, neighbor, family memberon a regular schedule.    Fire Prevention   According to the [employer] (Smoke Alarms for Every) [address] [place] [address], senior citizens are one of the two highest risk groups for death and serious injuries due to residential fires. When cooking, wear short-sleeved items, never a bulky long-sleeved robe. The [facility]'s Safety Checklist for Older Consumers emphasizes the importance of checking basements, garages, workshops and storage areas for fire hazards, such as volatile liquids, piles of old rags or clothing and overloaded circuits. Never smoke in bed or when lying down on a couch or recliner chair. Small portable electric or kerosene heaters are responsible for many home fires and should be used cautiously if at all. If you do use one, be sure to keep them away from flammable materials. In case of fire, make sure you have a pre-established emergency exit plan. Have a professional check your fireplace and other fuel-burning appliances yearly. [employer]   Baby boomers entering the ren years will continue to see the development of new products to help older adults live safely and independently in spite of age-related changes. Making Life More Livable  , by [employer], lists over 1,000 products for \"living well in the mature years,\" such as bathing and mobility aids, household security devices, ergonomically designed knives and peelers, and faucet valves and knobs for temperature control. Medical supply stores and organizations are good sources of information about products that improve your quality of life and insure your safety.      Last Reviewed: November 2009 Elia Sanchez MD   Updated: 3/7/2011     ·

## 2020-07-07 NOTE — PROGRESS NOTES
Medicare Annual Wellness Visit  Name: Antonino Garcias Date: 2020   MRN: [id number] Sex: Male   Age: 76 y.o. Ethnicity: Non-/Non    : 1955 Race: Amanda Oliver is here for Medicare AWV    Screenings for behavioral, psychosocial and functional/safety risks, and cognitive dysfunction are all negative except as indicated below. These results, as well as other patient data from the [address] form, are documented in Flowsheets linked to this Encounter. Allergies   Allergen Reactions    Succinylcholine Chloride Other (See Comments)     PATIENT HAS PSEUDOCHOLINESTERASE DEFICIENCY      Cymbalta [Duloxetine Hcl]      Taste loss         Prior to Visit Medications    Medication Sig Taking? Authorizing Provider   pregabalin (LYRICA) 200 MG capsule Take 1 capsule by mouth 3 times daily for 90 days.  Yes Easton Childs MD   furosemide (LASIX) 20 MG tablet TAKE 1 TABLET DAILY AS NEEDED (ONLY IF BLOOD PRESSURE OVER 140/90 OR LEG EDEMA) (STOP TENORETIC 50-25) Yes Easton Childs MD   colchicine (COLCRYS) 0.6 MG tablet Take 0.6 mg by mouth daily Yes Historical Provider, MD   ipratropium-albuterol (DUONEB) 0.5-2.5 (3) MG/3ML SOLN nebulizer solution Inhale 1 vial into the lungs 3 times daily Yes Historical Provider, MD   blood glucose monitor strips Use to test twice daily and as needed as directed by provider Yes Historical Provider, MD   nystatin (MYCOSTATIN) 882419 UNIT/ML suspension Use 5mLs by mouth 3 times daily to swish and swallow Yes Historical Provider, MD   ferrous sulfate (IRON 325) 325 (65 Fe) MG tablet Take 1 tablet by mouth 2 times daily Yes Pat Oakley MD   SYMBICORT 160-4.5 MCG/ACT AERO Inhale 2 puffs into the lungs 2 times daily 10.2 inhaler Yes Easton Childs MD   omeprazole (PRILOSEC) 40 MG delayed release capsule Take 1 capsule by mouth every morning (before breakfast) Yes Easton Childs MD   lisinopril (PRINIVIL;ZESTRIL) 10 MG tablet Take 1 tablet by mouth daily Dose decreased 3/18/2020 Yes Asmita Hebert MD   traMADol (ULTRAM) 50 MG tablet Take 1-2 tablets by mouth every 6 hours as needed for Pain.  Yes Rebecca Sosa MD   amLODIPine (NORVASC) 10 MG tablet Take 1 tablet by mouth daily Yes Francisco Whitfield MD   pravastatin (PRAVACHOL) 80 MG tablet Take 1 tablet by mouth every evening Dose increased  9/24/2019 Yes Francisco Whitfield MD   Lift Chair MISC by Does not apply route Patient has difficulty getting up from usual chair Yes Francisco Whitfield MD   folbee plus (FOLBEE PLUS) TABS Take 1 tablet by mouth daily Yes Francisco Whitfield MD   aspirin EC 81 MG EC tablet Take 1 tablet by mouth daily Yes Francisco Whitfield MD   Glucose Blood (BLOOD GLUCOSE TEST STRIPS) STRP tesing once a day fasting Yes Francisco Whitfield MD   albuterol sulfate  (90 BASE) MCG/ACT inhaler Inhale 2 puffs into the lungs every 6 hours as needed for Wheezing or Shortness of Breath (COUGH) Yes Francisco Whitfield MD         Past Medical History:   Diagnosis Date    Acid reflux     Arthritis     Chronic bilateral low back pain with bilateral sciatica 11/3/2016    Complete tear of left rotator cuff 7/18/2018    COPD (chronic obstructive pulmonary disease) ([address]     Coronary artery disease involving native coronary artery of native heart without angina pectoris 2/2/2020    Degenerative disc disease, cervical 7/28/2019    Gout     H/O cardiac catheterization     yrs ago   no stents    Hyperlipidemia     Hyperlipidemia with target LDL less than 100 1/20/2016    Hyperparathyroidism ([address] 1/17/2020    Hypertension     Knee pain, chronic     left    Liver disease     MDRO (multiple drug resistant organisms) resistance     Moderate episode of recurrent major depressive disorder ([address] 1/20/2016    Obesity, Class III, BMI 40-49.9 (morbid obesity) ([address] 1/20/2016    REYNALDO on CPAP 5/6/2017    Osteoarthritis     Polypharmacy 3/25/2017  Positive FIT (fecal immunochemical test) 4/11/2017    Prolonged emergence from general anesthesia 01/05/2017    Patient \"on life support for 3 days\" after back surgery due to being given succinylcholine    Prostate CA ([address] 1/20/2016    Prostate cancer ([address] 10/2013    finished radiation tx 5/2014    Pseudocholinesterase deficiency 03/25/2017    Pt.  \"on life support\" for 3 days after back surgery 1/5/17 after being given succinylcholine    Severe obesity (BMI 35.0-35.9 with comorbidity) ([address] 2/2/2020    Short of breath on exertion     Sleep apnea     uses CPAP machine nightly    Status post lumbar laminectomy 3/25/2017    Stenosis of left carotid artery 10/7/2018    Tubular adenoma of colon 4/11/17 5/13/2017    Type 2 diabetes mellitus with hyperglycemia, without long-term current use of insulin ([address] 3/25/2017    Lab Results Component Value Date  LABA1C 7.1 (H) 03/23/2017     Vitamin D deficiency 3/25/2017    Wears glasses        Past Surgical History:   Procedure Laterality Date    BACK SURGERY      x 2     BACK SURGERY  01/05/2017    lumbar laminectomy L2, L3, L4    BRONCHOSCOPY N/A 3/13/2020    BRONCHOSCOPY performed by Linda Walker MD at [address] [place] [address]  2007    no stents    CAROTID ENDARTERECTOMY Right 12/16/2019    Dr. Kallie Edwards Bilateral     KNEE SURGERY Left     LUMBAR LAMINECTOMY  01/05/2017    L2-L4    IN CLOSED RX SHLDR DISLOC,ANESTHESIA Left 8/1/2018    SHOULDER CLOSED REDUCTION WITH C-ARM VISUALIZATION performed by Dustin Ruiz MD at [facility] N/A 5/9/2017    COLONOSCOPY WITH BIOPSY performed by Nitza Hopper DO at [address] IMPLANT Left 7/18/2018    SHOULDER TOTAL ARTHROPLASTY REVERSE LEFT DJO & BICEP TENDON TRANSFER performed by Dustin Ruiz MD at [address] [facility] HUMERAL/GLENOID COMPNT Left 8/3/2018    SHOULDER TOTAL REVERSE ARTHROPLASTY REVISION performed by Anthony Baldwin MD at . [address]? rotator cuff repair    TONSILLECTOMY AND ADENOIDECTOMY      UPPER GASTROINTESTINAL ENDOSCOPY N/A 3/19/2020    EGD BIOPSY performed by Eliceo Puente MD at [facility]         Family History   Problem Relation Age of Onset    Diabetes Mother     Lung Cancer Brother     Liver Cancer Brother     Cancer Father        CareTeam (Including outside providers/suppliers regularly involved in providing care):   Patient Care Team:  Easton Childs MD as PCP - General (Family Medicine)  Easton Childs MD as PCP - [facility]  Camryn Anne MD as Consulting Physician (Neurosurgery)  Suzanne Cotton MD as Consulting Physician (Cardiology)  Merlene Ramirez MD as Consulting Physician (Urology)  Rod Hernandez MD as Surgeon (Vascular Surgery)  Argelia Contreras MD as Consulting Physician (Pulmonology)  Latosha Gray MD as Consulting Physician (Orthopedic Surgery)  Hanna Miles DO as Consulting Physician (General Surgery)  Enzo Perry OD (Ophthalmology)  Mylo Cabot, MD as Consulting Physician (Endocrinology)  Anthony Baldwin MD as Consulting Physician (Orthopedic Surgery)  Eliceo Puente MD as Consulting Physician (Gastroenterology)  Isabel Arizmendi MD as Consulting Physician (Orthopedic Surgery)  Mar Meng, RN as Nurse Navigator    Vital signs within normal limits except obesity per BMI and mildly low pulse ox    Wt Readings from Last 3 Encounters:   07/07/20 278 lb (126.1 kg)   06/01/20 286 lb (129.7 kg)   03/19/20 257 lb 8 oz (116.8 kg)     Vitals:    07/07/20 1133   BP: 122/66   Pulse: 65   Temp: 98.8 °F (37.1 °C)   TempSrc: Temporal   SpO2: 94%   Weight: 278 lb (126.1 kg)   Height: 6' (1.829 m)     Body mass index is 37.7 kg/m². Based upon direct observation of the patient, evaluation of cognition reveals recent and remote memory intact.     Physical exam  General Appearance: alert and oriented to person, place and time, well-developed and well-nourished, in no acute distress, obese  ENT: tympanic membrane, external ear and ear canal normal bilaterally, oropharynx clear and moist with normal mucous membranes and hearing grossly normal bilaterally  Pulmonary/Chest: clear to auscultation bilaterally- no wheezes, rales or rhonchi, normal air movement, no respiratory distress  Cardiovascular: normal rate, regular rhythm and normal S1 and S2    Up and go test more than 12 seconds. High risk for falls. Right foot drop, ambulates with cane    Patient's complete Health Risk Assessment and screening values have been reviewed and are found in Flowsheets. The following problems were reviewed today and where indicated follow up appointments were made and/or referrals ordered. Positive Risk Factor Screenings with Interventions:     Fall Risk:  2 or more falls in past year?: (!) yes  Fall with injury in past year?: no  Fall Risk Interventions:    · Home safety tips provided  · Home exercises provided to promote strength and balance  · Physical therapy referral ordered for strength and balance training   · Continue pool exercises every day.   Last fall was March 19, 2020 when he was sick    Substance Abuse:  Social History     Tobacco History     Smoking Status  Current Every Day Smoker Last attempt to quit  3/9/2020 Smoking Frequency  1.5 packs/day for 35 years (52.5 pk yrs) Smoking Tobacco Type  Cigarettes    Smokeless Tobacco Use  Never Used    Tobacco Comment  WAS SMOKING 1-1.5 ppd          Alcohol History     Alcohol Use Status  No          Drug Use     Drug Use Status  No          Sexual Activity     Sexually Active  Not Currently Partners  Female               Audit Questionnaire: Screen for Alcohol Misuse  How often do you have a drink containing alcohol?: Never  Substance Abuse Interventions:  · Tobacco abuse:  tobacco cessation tips and resources provided, patient agrees to tell someone he/she is trying to quit smoking, patient agrees to avoid triggers and negative influences, patient agrees to try the following tobacco cessation strategies:  gradual reduction of tobacco use: On his own    General Health:  General  In general, how would you say your health is?: (!) Poor  In the past 7 days, have you experienced any of the following? New or Increased Pain, New or Increased Fatigue, Loneliness, Social Isolation, Stress or Anger?: (!) New or Increased Fatigue  Do you get the social and emotional support that you need?: Yes  Do you have a Living Will?: Yes  General Health Risk Interventions:  · Poor self-assessment of health status: due to multiple medical problems, he feels he is falling apart. Patient says has not been going anywhere due to the coronavirus pandemic. · Fatigue: labs ordered- to check for anemia, diabetes and other medical problems . Patient had recent GI bleeding and pneumonia for which she was admitted in March twice. ·     Health Habits/Nutrition:  Health Habits/Nutrition  Do you exercise for at least 20 minutes 2-3 times per week?: (!) No  Have you lost any weight without trying in the past 3 months?: No  Do you eat fewer than 2 meals per day?: (!) Yes  Have you seen a dentist within the past year?: Yes  Body mass index is 37.7 kg/m².   Health Habits/Nutrition Interventions:  · Inadequate physical activity:  patient agrees to increase physical activity as follows: Patient advised to exercise in the pool every day, start physical therapy and learn new exercises to do at home and in the pool, Patient has his own pool in the backyard  · Nutritional issues:  educational materials for healthy, well-balanced diet provided, Patient advised to have 3 meals a day    Hearing/Vision:   Visual Acuity Screening    Right eye Left eye Both eyes   Without correction:      With correction: 20/15 20/13 20/13     Hearing/Vision  Do you or your family notice any trouble with your hearing?: No  Do you have difficulty driving, watching TV, or doing any of your daily activities because of your eyesight?: No  Have you had an eye exam within the past year?: Yes  Hearing/Vision Interventions:  · Vision concerns:  patient encouraged to make appointment with his/her eye specialist, patient is due for an eye exam in September    Safety:  Safety  Do you have working smoke detectors?: Yes  Have all throw rugs been removed or fastened?: (!) No  Do you have non-slip mats or surfaces in all bathtubs/showers?: Yes  Do all of your stairways have a railing or banister?: Yes  Are your doorways, halls and stairs free of clutter?: Yes  Do you always fasten your seatbelt when you are in a car?: Yes  Safety Interventions:  · Home safety tips provided    ADL:  ADLs  In the past 7 days, did you need help from others to perform any of the following everyday activities? Eating, dressing, grooming, bathing, toileting, or walking/balance?: (!) Bathing, Dressing  In the past 7 days, did you need help from others to take care of any of the following? Laundry, housekeeping, banking/finances, shopping, telephone use, food preparation, transportation, or taking medications?: None  ADL Interventions:  · Patient declines any further evaluation/treatment for this issue  · Patient says he is wife is helping him sometimes, but mostly he is doing it on his own, however he has to take his    Patient complains of chronic left lower extremity drop foot which makes his walking difficult and sometimes falls. Last fall was in March when she was very sick   Patient recently saw his vascular surgeon due to peripheral vascular disease, per Dr. Eyal Jean  Had right CEA doing well, having left  carotid artery stenosis which is moderate, follow-up in 1 more year with vascular    Carotid vascular 6/15/20--COPY [address]     Conclusions: RIGHT:   Essentially normal post-carotid endarterectomy duplex evaluation.  Antegrade vertebral artery flow.   LEFT:   50-69% stenosis of the internal carotid artery.  Antegrade vertebral artery flow. Recommendations: Any questions prior to finalization, please call the reading physician during normal business hours at the phone number beside their name. [address] compared to previous  report significant changes were noted. PVR --peripheral vascular disease moderate on the right side, left side is mild, stable, I discussed with him risk factor modifications  Result Narrative   Right: Moderately abnormal PVR waveform contour at the ankle level. Monophasic PT and DP artery CW Doppler waveforms. PT KRISTIN is .31; DP KRISTIN is . 52. Left: Mildly abnormal PVR waveform contour at the ankle level. Hyperemic PT and DP artery CW Doppler waveforms. PT KRISTIN is .86; DP KRISTIN is . 82. Conclusions: RIGHT:   Lower extremity KRISTIN  is consistent with moderate arterial disease. LEFT: BILATERAL:  Lower extremity KRISTIN  is consistent with mild arterial disease. Recommendations: Any questions prior to finalization, please call the reading physician during normal business hours at the phone number beside their name.              Personalized Preventive Plan   Current Health Maintenance Status  Immunization History   Administered Date(s) Administered    Influenza Virus Vaccine 12/30/2014, 12/28/2015, 10/27/2016, 10/02/2017, 09/04/2018, 10/01/2018    Influenza, Intradermal, Quadrivalent, Preservative Free 10/27/2016    Influenza, Quadv, IM, (6 mo and older Fluzone, Flulaval, Fluarix and 3 yrs and older Afluria) 10/02/2017    Influenza, Quadv, IM, PF (6 mo and older Fluzone, Flulaval, Fluarix, and 3 yrs and older Afluria) 09/04/2018    Influenza, Triv, inactivated, subunit, adjuvanted, IM (Fluad 65 yrs and older) 01/28/2020    Pneumococcal Conjugate 13-valent ([id number]) 04/21/2016, 07/23/2019    Pneumococcal Polysaccharide ([id number]) 12/30/2014, 04/11/2017    Tdap (Boostrix, Adacel) 03/01/2018    Zoster Live (Zostavax) 01/30/2016    Zoster Recombinant (Shingrix) 02/22/2020, 06/04/2020        Health Maintenance   Topic Date Due    Diabetic foot exam  04/09/2020    Flu vaccine (1) 09/01/2020    Diabetic microalbuminuria test  09/16/2020    Diabetic retinal exam  11/07/2020    Annual Wellness Visit (AWV)  01/23/2021    Lipid screen  02/11/2021    A1C test (Diabetic or Prediabetic)  03/09/2021    Low dose CT lung screening  03/12/2021    Potassium monitoring  04/03/2021    Creatinine monitoring  04/03/2021    PSA counseling  05/14/2021    Pneumococcal 65+ years Vaccine (2 of 2 - PPSV23) 04/11/2022    Colon cancer screen colonoscopy  05/09/2022    DTaP/Tdap/Td vaccine (2 - Td) 03/01/2028    Shingles Vaccine  Completed    Hepatitis C screen  Addressed    Hepatitis A vaccine  Aged Out    Hib vaccine  Aged Out    Meningococcal (ACWY) vaccine  Aged Out     Recommendations for [place] Due: see orders and patient instructions/AVS.  . Recommended screening schedule for the next 5-10 years is provided to the patient in written form: see Patient Radha Lujan was seen today for medicare awv. Diagnoses and all orders for this visit:    Routine general medical examination at a health care facility    Type 2 diabetes mellitus with diabetic polyneuropathy, without long-term current use of insulin ([address]  Stable  Continue monitoring with A1c every 3 to 6 months  Low-carb diet advised  Refilled Lyrica  Lab Results   Component Value Date    LABA1C 5.6 03/09/2020    LABA1C 5.5 01/28/2020    LABA1C 5.6 01/17/2020       -     Hemoglobin A1C; Future  -     pregabalin (LYRICA) 200 MG capsule; Take 1 capsule by mouth 3 times daily for 90 days. -     CBC; Future  -     Comprehensive Metabolic Panel; Future  -     Vitamin B12 & Folate;  Future  Continue aspirin and statin  Peripheral neuropathic pain  Improved with medications  Continue Lyrica  -     Hemoglobin A1C; Future  -     pregabalin (LYRICA) 200 MG capsule; Take 1 capsule by mouth 3 times daily for 90 days. -     CBC; Future  -     Comprehensive Metabolic Panel; Future  -     Vitamin B12 & Folate; Future    Hyperlipidemia with target LDL less than 70  Improving but not at goal, for him would like LDL below 70    Lab Results   Component Value Date    LDLCHOLESTEROL 80 02/11/2020       -     pravastatin (PRAVACHOL) 80 MG tablet; Take 1 tablet by mouth every evening Dose increased  9/24/2019  -     Lipid Panel; Future    Gastroesophageal reflux disease without esophagitis  Improved with medication  Continue current treatment  -     omeprazole (PRILOSEC) 40 MG delayed release capsule; Take 1 capsule by mouth every morning (before breakfast)    At high risk for falls  -     [address]    Other orders  -     Respiratory Therapy Supplies (ADULT MASK) MISC; Needs to use mask daily due to coronavirus pandemic. Patient reports having a hard time breathing through the fabric masks, will order mask for him due to multiple risk factors      Controlled Substance Monitoring:    Acute and Chronic Pain Monitoring:   RX Monitoring 7/7/2020   Attestation -   Periodic Controlled Substance Monitoring Possible medication side effects, risk of tolerance/dependence & alternative treatments discussed. ;No signs of potential drug abuse or diversion identified. ;Assessed functional status.    Chronic Pain > 50 MEDD -   Chronic Pain > 80 MEDD -       Future Appointments   Date Time Provider Keri English   10/13/2020 11:30 AM Rubina Garcia MD [facility]   11/23/2020  9:50 AM [facility]   7/8/2021 11:30 AM Rubina Garcia MD [facility]

## 2020-07-09 ENCOUNTER — HOSPITAL ENCOUNTER (OUTPATIENT)
Age: 65
Setting detail: SPECIMEN
Discharge: HOME | End: 2020-07-09
Payer: MEDICARE

## 2020-07-09 LAB
ALBUMIN SERPL-MCNC: 4 G/DL (ref 3.5–5.2)
ALBUMIN/GLOBULIN RATIO: ABNORMAL (ref 1–2.5)
ALP BLD-CCNC: 104 U/L (ref 40–129)
ALT SERPL-CCNC: 21 U/L (ref 5–41)
ANION GAP SERPL CALCULATED.3IONS-SCNC: 10 MMOL/L (ref 9–17)
AST SERPL-CCNC: 23 U/L
BILIRUB SERPL-MCNC: 0.28 MG/DL (ref 0.3–1.2)
BUN BLDV-MCNC: 8 MG/DL (ref 8–23)
BUN/CREAT BLD: ABNORMAL (ref 9–20)
CALCIUM SERPL-MCNC: 9.6 MG/DL (ref 8.6–10.4)
CHLORIDE BLD-SCNC: 107 MMOL/L (ref 98–107)
CHOLESTEROL/HDL RATIO: 4.3
CHOLESTEROL: 138 MG/DL
CO2: 27 MMOL/L (ref 20–31)
CREAT SERPL-MCNC: 0.65 MG/DL (ref 0.7–1.2)
ESTIMATED AVERAGE GLUCOSE: 137 MG/DL
FOLATE: >20 NG/ML
GFR AFRICAN AMERICAN: >60 ML/MIN
GFR NON-AFRICAN AMERICAN: >60 ML/MIN
GFR SERPL CREATININE-BSD FRML MDRD: ABNORMAL ML/MIN/{1.73_M2}
GFR SERPL CREATININE-BSD FRML MDRD: ABNORMAL ML/MIN/{1.73_M2}
GLUCOSE BLD-MCNC: 91 MG/DL (ref 70–99)
HBA1C MFR BLD: 6.4 % (ref 4–6)
HCT VFR BLD CALC: 45.2 % (ref 41–53)
HDLC SERPL-MCNC: 32 MG/DL
HEMOGLOBIN: 14.5 G/DL (ref 13.5–17.5)
LDL CHOLESTEROL: 65 MG/DL (ref 0–130)
MCH RBC QN AUTO: 26.9 PG (ref 26–34)
MCHC RBC AUTO-ENTMCNC: 32.2 G/DL (ref 31–37)
MCV RBC AUTO: 83.7 FL (ref 80–100)
NRBC AUTOMATED: ABNORMAL PER 100 WBC
PDW BLD-RTO: 15.9 % (ref 11.5–14.9)
PLATELET # BLD: 201 K/UL (ref 150–450)
PMV BLD AUTO: 10.3 FL (ref 6–12)
POTASSIUM SERPL-SCNC: 4.1 MMOL/L (ref 3.7–5.3)
RBC # BLD: 5.4 M/UL (ref 4.5–5.9)
SODIUM BLD-SCNC: 144 MMOL/L (ref 135–144)
TOTAL PROTEIN: 6.9 G/DL (ref 6.4–8.3)
TRIGL SERPL-MCNC: 203 MG/DL
VITAMIN B-12: >2000 PG/ML (ref 232–1245)
VLDLC SERPL CALC-MCNC: ABNORMAL MG/DL (ref 1–30)
WBC # BLD: 8.2 K/UL (ref 3.5–11)

## 2020-07-09 PROCEDURE — 80061 LIPID PANEL: CPT

## 2020-07-09 PROCEDURE — 80053 COMPREHEN METABOLIC PANEL: CPT

## 2020-07-09 PROCEDURE — 83036 HEMOGLOBIN GLYCOSYLATED A1C: CPT

## 2020-07-09 PROCEDURE — 82607 VITAMIN B-12: CPT

## 2020-07-09 PROCEDURE — 36415 COLL VENOUS BLD VENIPUNCTURE: CPT

## 2020-07-09 PROCEDURE — 82746 ASSAY OF FOLIC ACID SERUM: CPT

## 2020-07-09 PROCEDURE — 85027 COMPLETE CBC AUTOMATED: CPT

## 2020-07-13 ENCOUNTER — HOSPITAL ENCOUNTER (OUTPATIENT)
Dept: PHYSICAL THERAPY | Age: 65
Setting detail: THERAPIES SERIES
Discharge: HOME | End: 2020-07-13
Payer: MEDICARE

## 2020-07-13 PROCEDURE — 97162 PT EVAL MOD COMPLEX 30 MIN: CPT

## 2020-07-13 NOTE — CONSULTS
[address] [facility]              [address] [facility] [address]              Phone: [phone]              Fax: [phone]        Physical Therapy Lower Extremity Evaluation    Date:  2020  Patient: Pao Mirza  : 1955  MRN: [id number]  Physician: Bright Eagle MD    Insurance: [employer] [employer]  Medical Diagnosis: At high risk for falls  Rehab Codes: Z91.81  Onset date: 2017   Next 's appt.: 20    Subjective:   CC/HPI: Pt reports to PT as high risk for falls. Pt states that he has a hx of falls, last one in March. Pt reports he has not fallen since then, and denies any other falls. 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 Pt also notes that he has neuropathy in angie feet and hands. Pt reports that he has not been through PT for his balance in the past. Pt presents to PT with a cane, stating that he uses this in the community, but that he also has a 4 wheeled walker that he uses in the home.     PMHx: [] Unremarkable [] Diabetes [] HTN  [] Pacemaker   [] MI/Heart Problems [] Cancer [] Arthritis   [] Other:              [x] Refer to full medical chart  In EPIC     Tests: [] X-Ray:    [] MRI:    [] Other:     Medications:  [x] Refer to full medical record [] None [] Other:  Allergies:       [x] Refer to full medical record [] None [] Other:    Gait Prior level of function Current level of function    [x] Independent  [] Assist [x] Independent  [] Assist   Device: [] Independent [x] Independent    [x] Straight Cane [] Quad cane [] Straight Cane [] Quad cane    [] Standard walker [] Rolling walker   [x] 4 wheeled walker [] Standard walker [] Rolling walker   [x] 4 wheeled walker    [] Wheelchair [] Wheelchair       Marital Status    Home type 1 SH   Stairs from outside [facility] inside --   Employement --   Job status --   Work Activities/duties  --   Recreational Activities --       Pain present? Yes   Location L shoulder   Pain Rating currently 9/10   Pain at worse 10/10   Pain at best 4/10   Description of pain Constant, burning, aching, numb   Altered Sensation Pt reports he has peripheral neuropathy in angie hands/feet   What makes it worse Walking or using shoulders   What makes it better Medication   Symptom progression Gotten worse   Sleep              Objective:    ROM  ° A/P STRENGTH    Left Right Left Right   Hip Flex   4/5 4/5   Ext       ER       IR       ABD       ADD       Knee Flex   5/5 5/5   Ext   5/5 5/5   Ankle DF   5/5 0/5   PF   5/5 5/5   INV       EVER                    OBSERVATION No Deficit Deficit Not Tested Comments   Posture       Forward Head [] [] []    Rounded Shoulders [] [] []    Kyphosis [] [] []    Lordosis [] [] []    Lateral Shift [] [] []    Scoliosis [] [] []    Iliac Crest [] [] []    PSIS [] [] []    ASIS [] [] []    Genu Valgus [] [] []    Genu Varus [] [] []    Genu Recurvatum [] [] []    Pronation [] [] []    Supination [] [] []    Leg Length Discrp [] [] []    Slumped Sitting [] [] []    Palpation [] [] [x]    Sensation [] [x] [] Pt reports minimal to no sensation in R foot, numbness and tingling in L   Edema [] [] [x]    Neurological [] [] [x]    Gait [] [x] [] Analysis: Pt walks with SC, ER of legs, excessive supination of R foot                                            [place] Fall Risk Assessment    Patient Name:  Se Shafer  : 1955        Risk Factor Scale  Score   History of Falls [x] Yes  [] No 25  0 25   Secondary Diagnosis [x] Yes  [] No 15  0 15   Ambulatory Aid [] Furniture  [x] Crutches/cane/walker  [] None/bedrest/wheelchair/nurse 30  15  0 15   IV/Heparin Lock [] Yes  [x] No 20  0 0   Gait/Transferring [] Impaired  [] Weak  [x] Normal/bedrest/immobile 20  10  0 0   Mental Status [] Forgets limitations  [x] Oriented to own ability 15  0 0      Total:55     Based on the Assessment score: check the appropriate box.     []  No intervention needed   Low =   Score of 0-24    []  Use standard prevention interventions Moderate =  Score of 24-44   [] Give patient handout and discuss fall prevention strategies   [] Establish goal of education for patient/family RE: fall prevention strategies    [x]  Use high risk prevention interventions High = Score of 45 and higher   [] Give patient handout and discuss fall prevention strategies   [x] Establish goal of education for patient/family Re: fall prevention strategies   [] Discuss lifeline / other resources    Electronically signed by: Sera Andrade, PT  Date: 7/13/2020        10 sit to stands with use of R arm in 30 seconds    Pt scored 36/56 on Najera Balance Scale, making pt a moderate risk for falls    Assessment:        STG: (to be met in 6 treatments)  1. Pt will be able to maintain balance with eyes closed for 30 seconds with no supervision needed, demonstrating improved proprioceptive control to help reduce risk for falls. 2. Independent with Home Exercise Programs  3. Demonstrate Knowledge of fall prevention    LTG: (to be met in 12 treatments)  1. Improve score on Najera Balance Scale from 36/56 to 46/56 to improve balance and decrease risk for falls. 2. Pt will demonstrate ability to complete 13 sit to stands in 30 seconds, demonstrating improved LE strength and balance needed during transition from sitting to standing, and back to sitting. 3. ? Strength: Increase functional strength of hip extensors to allow pt to complete 10 sit to stands without use of arms, easing ability to complete transfers.                     Patient goals: \" Easing the pain and balance\"    Rehab Potential:  [] Good  [x] Fair  [] Poor   Suggested Professional Referral:  [x] No  [] Yes:  Barriers to Goal Achievement[de-identified]  [x] No  [] Yes:  Domestic Concerns:  [x] No  [] Yes:    Pt. Education:  [x] Plans/Goals, Risks/Benefits discussed  [] Home exercise program    Method of Education: [x] Verbal  [] Demo  []

## 2020-07-16 ENCOUNTER — HOSPITAL ENCOUNTER (OUTPATIENT)
Dept: PHYSICAL THERAPY | Age: 65
Setting detail: THERAPIES SERIES
Discharge: HOME | End: 2020-07-16
Payer: MEDICARE

## 2020-07-16 PROCEDURE — 97112 NEUROMUSCULAR REEDUCATION: CPT

## 2020-07-16 NOTE — FLOWSHEET NOTE
Programs  3. Demonstrate Knowledge of fall prevention     LTG: (to be met in 12 treatments)  1. Improve score on Najera Balance Scale from 36/56 to 46/56 to improve balance and decrease risk for falls. 2. Pt will demonstrate ability to complete 13 sit to stands in 30 seconds, demonstrating improved LE strength and balance needed during transition from sitting to standing, and back to sitting. 3. ? Strength: Increase functional strength of hip extensors to allow pt to complete 10 sit to stands without use of arms, easing ability to complete transfers.                     Patient goals: \" Easing the pain and balance\"    Pt. Education:  [x] Yes  [] No  [] Reviewed Prior HEP/Ed  Method of Education: [x] Verbal  [x] Demo  [] Written  Comprehension of Education: Discussed with patient performing balance exercises at home. [x] Verbalizes understanding. [] Demonstrates understanding. [x] Needs review. [] Demonstrates/verbalizes HEP/Ed previously given. Plan: [x] Continue per plan of care.    [] Other:      Treatment Charges: Mins Units   []  Modalities     []  Ther Exercise     []  Manual Therapy     []  Ther Activities     []  Aquatics     [x]  Neuromuscular 25 2   [] Vasocompression     [] Gait Training     [] Dry needling        [] 1 or 2 muscles        [] 3 or more muscles     []  Other     Total Treatment time 25 2     Time In: 646        Time Out: 1010    Electronically signed by:  Marcia Nichols PTA

## 2020-07-23 ENCOUNTER — HOSPITAL ENCOUNTER (OUTPATIENT)
Dept: PHYSICAL THERAPY | Age: 65
Setting detail: THERAPIES SERIES
Discharge: HOME | End: 2020-07-23
Payer: MEDICARE

## 2020-07-23 PROCEDURE — 97112 NEUROMUSCULAR REEDUCATION: CPT

## 2020-07-23 NOTE — FLOWSHEET NOTE
[address]  [facility]   [address] [facility] [address]   Phone: [phone]   Fax: [phone]    Physical Therapy Daily Treatment Note      Date:  2020  Patient Name:  Shirley Houser    :  1955  MRN: [id number]  Physician: Stacy Eagle MD                                         Insurance: [facility] Diagnosis: At high risk for falls                   Rehab Codes: Z91.81  Onset date: 2017                         Next Dr's appt.: 20  Visit# / total visits: 3  Cancels/No Shows: 0/0    Subjective: Patient still reports he is in high levels of pain upon arrival in both shoulder and knee. Pt reports he has an appointment for shoulder pain tomorrow morning with physician. Pain:  [x] Yes  [] No  Location: L Shoulder, Legs  Pain Rating: (0-10 scale) 8/10  Pain altered Tx:  [x] No  [] Yes  Action:  Comments:     Objective:  Modalities:   Precautions:  Exercises:  Exercise  Balance Reps/ Time Weight/ Level Comments    Marching 2x20 Foam  x   HR/TR  2x20 Foam  x   SLS  3x ea 10-15\"     Tandem Stance  3x ea 10-15\"     NOBS with Head Turns 20x   x   NBOS with Nods 20x   x   Steps with min assist 8x R LE  2x L LE 6\"  4\"            Alternating SLS 15x5\"   x   Alternating step taps 2x15 6\"  x   Alternating standing circles x5   x   Other:     Specific Instructions for next treatment: Continue tx per POC      Assessment: [x] Progressing toward goals. Continued with balance program with good tolerance to activity today. Pt able to correct balance without therapist assist or using hands about 70% of the time during alternating SLS, but struggles more with SLS on R side throughout treatment. Added alternating step taps to improve pt's ability to weight shift with good tolerance. Pt with no LOB requiring therapist assist to prevent falling today. [] No change.      [] Other:    [x] Patient would continue to benefit from skilled physical therapy services in order to: Improve balance and function of B LE.     STG: (to be met in 6 treatments)  1. Pt will be able to maintain balance with eyes closed for 30 seconds with no supervision needed, demonstrating improved proprioceptive control to help reduce risk for falls. 2. Independent with Home Exercise Programs  3. Demonstrate Knowledge of fall prevention     LTG: (to be met in 12 treatments)  1. Improve score on Najera Balance Scale from 36/56 to 46/56 to improve balance and decrease risk for falls. 2. Pt will demonstrate ability to complete 13 sit to stands in 30 seconds, demonstrating improved LE strength and balance needed during transition from sitting to standing, and back to sitting. 3. ? Strength: Increase functional strength of hip extensors to allow pt to complete 10 sit to stands without use of arms, easing ability to complete transfers.                     Patient goals: \" Easing the pain and balance\"    Pt. Education:  [x] Yes  [] No  [] Reviewed Prior HEP/Ed  Method of Education: [x] Verbal  [x] Demo  [] Written  Comprehension of Education:   [x] Verbalizes understanding. [x] Demonstrates understanding. [x] Needs review. [] Demonstrates/verbalizes HEP/Ed previously given. Plan: [x] Continue per plan of care. [] Other:      Treatment Charges: Mins Units   []  Modalities     []  Ther Exercise     []  Manual Therapy     []  Ther Activities     []  Aquatics     [x]  Neuromuscular 30 2   [] Vasocompression     [] Gait Training     [] Dry needling        [] 1 or 2 muscles        [] 3 or more muscles     []  Other     Total Treatment time 30 2     Time In: 1101        Time Out: 3497    Electronically signed by:   Niya Antunez, PT

## 2020-07-24 ENCOUNTER — OFFICE VISIT (OUTPATIENT)
Dept: ORTHOPEDIC SURGERY | Age: 65
End: 2020-07-24
Payer: MEDICARE

## 2020-07-24 ENCOUNTER — TELEPHONE (OUTPATIENT)
Dept: FAMILY MEDICINE CLINIC | Age: 65
End: 2020-07-24

## 2020-07-24 VITALS — WEIGHT: 272 LBS | BODY MASS INDEX: 36.84 KG/M2 | TEMPERATURE: 97.1 F | HEIGHT: 72 IN

## 2020-07-24 PROCEDURE — 99213 OFFICE O/P EST LOW 20 MIN: CPT | Performed by: ORTHOPAEDIC SURGERY

## 2020-07-24 RX ORDER — TRAMADOL HYDROCHLORIDE 50 MG/1
50 TABLET ORAL EVERY 4 HOURS PRN
Qty: 30 TABLET | Refills: 0 | Status: SHIPPED | OUTPATIENT
Start: 2020-07-24 | End: 2020-08-13 | Stop reason: SDUPTHER

## 2020-07-24 NOTE — LETTER
[facility]  [address] [place], [place] [address] [facility] [address]  Phone: [phone]  Fax: [phone]        Collette Ramirez MD    9/3/2020      Ryan Novoa  1955        Patient will be moderate risk for SHOULDER SURGERY. Patient is cleared for proposed surgery.   Please see the recommendations from cardiologist and pulmonologist.    Thank Car Torres MD

## 2020-07-26 NOTE — PROGRESS NOTES
HPI: Mr. Zia Sidhu is here today for follow-up evaluation of his left shoulder. He indicates that he is not getting any better. In fact he feels that it is getting worse. He has been dealing with pain in the left shoulder for several months now. He has a history of a left reverse shoulder arthroplasty back in July 2018 and several weeks later had a revised because of instability. He was doing well up until 5 months ago. Radiographically there has not been any significant change however clinically he reports having sharp pains in the shoulder that randomly occur associated with some popping and clunking. He denies having any fevers, chills, sweats or any constitutional symptoms. His CRP levels were slightly elevated several months back however other testing has been normal.  My concern has been the fact that he may have an indolent infection which can be difficult to diagnose especially if P acnes is involved. We had discussed in the past going in surgically for resection arthroplasty and assessment with cultures. He had declined this in the past but today indicates that he is willing to do what ever is necessary as a result of his persistent pain. At this time I would recommend resection of his left reverse shoulder arthroplasty with obtainment of intraoperative frozen sections as well as multiple cultures. A spacer will be inserted at the time and we will initiate postoperative antibiotic treatment pending intraoperative cultures. I explained to the patient in detail what the surgery would entail in terms of the procedure, risks and benefits of surgery, expected outcome and postoperative recovery course. His function will likely not be what it is currently although he has noticed a significant drop off in his function since this pain began.   Risks as discussed included but are not limited to risk of infection, wound healing problems, instability, persistent pain, stiffness, neurovascular injury, DVT/PE/stroke, and risk of anesthesia. He demonstrated a good understanding of our discussion and would like to proceed. We will get him scheduled for surgery at his convenience and facilitate him getting appropriate preoperative medical clearance. I will once again repeat labs by way of CBC, ESR, and CRP. Again all questions were appropriately answered. I have spent 15 minutes face-to-face with the patient more than 50% of this time was spent counseling and coordinating care as outlined above. The patient was counseled at length about the risks of maritza Covid-19 during their perioperative period and any recovery window from their procedure. The patient was made aware that maritza Covid-19  may worsen their prognosis for recovering from their procedure  and lend to a higher morbidity and/or mortality risk. All material risks, benefits, and reasonable alternatives including postponing the procedure were discussed. The patient does wish to proceed with the procedure at this time.

## 2020-07-27 ENCOUNTER — HOSPITAL ENCOUNTER (OUTPATIENT)
Dept: PHYSICAL THERAPY | Age: 65
Setting detail: THERAPIES SERIES
Discharge: HOME | End: 2020-07-27
Payer: MEDICARE

## 2020-07-27 PROCEDURE — 97110 THERAPEUTIC EXERCISES: CPT

## 2020-07-27 NOTE — FLOWSHEET NOTE
[address] [facility]   [address] [place], [place] [address] [facility] [address]   Phone: [phone]   Fax: [phone]    Physical Therapy Daily Treatment Note      Date:  2020  Patient Name:  Warden Moralez    :  1955  MRN: [id number]  Physician: Michelle Cook MD                                         Insurance: [employer]  Medical Diagnosis: At high risk for falls                   Rehab Codes: Z91.81  Onset date: 2017                         Next Dr's appt.: 20  Visit# / total visits:   Cancels/No Shows: 0/0    Subjective:   Pain:  [x] Yes  [] No  Location: L Shoulder, Legs  Pain Rating: (0-10 scale) 8/10  Pain altered Tx:  [x] No  [] Yes  Action:  Comments: Patient reports today cont to feel \"terrible\" with cont pain in L shoulder, legs and LB. Noted that \"today is a bad day\" and almost cancelled appt secondary to feeling as bad as he does upon arrival.       Objective:  Modalities:   Precautions:  Exercises:  Exercise  Balance Reps/ Time Weight/ Level Comments    Marching 2x20 Foam  x   HR/TR  2x20 Foam  x   SLS  3x ea 10-15\"  x   Tandem Stance  3x ea 10-15\"  x   NOBS with Head Turns 20x      NBOS with Nods 20x   x   Steps with min assist 8x R LE  2x L LE 6\"  4\"            Alternating SLS 15x5\"      Alternating step taps 2x15 6\"  x   Alternating standing circles x5   x   NBOS eyes closed  4x30\"      Other:     Specific Instructions for next treatment: Continue tx per POC      Assessment: [x] Progressing toward goals. Continued with balance program with fair tolerance to activity today. Withheld head turns exercise secondary to increased cervical pain with active motion. Added NBOS with eyes closed for additional challenge to balance. [] No change. [] Other:    [x] Patient would continue to benefit from skilled physical therapy services in order to: Improve balance and function of B LE.     STG: (to be met in 6 treatments)  1.  Pt will be able to maintain balance with eyes closed for 30 seconds with no supervision needed, demonstrating improved proprioceptive control to help reduce risk for falls. 2. Independent with Home Exercise Programs  3. Demonstrate Knowledge of fall prevention     LTG: (to be met in 12 treatments)  1. Improve score on Najera Balance Scale from 36/56 to 46/56 to improve balance and decrease risk for falls. 2. Pt will demonstrate ability to complete 13 sit to stands in 30 seconds, demonstrating improved LE strength and balance needed during transition from sitting to standing, and back to sitting. 3. ? Strength: Increase functional strength of hip extensors to allow pt to complete 10 sit to stands without use of arms, easing ability to complete transfers.                     Patient goals: \" Easing the pain and balance\"    Pt. Education:  [x] Yes  [] No  [] Reviewed Prior HEP/Ed  Method of Education: [x] Verbal  [x] Demo  [] Written  Comprehension of Education:   [x] Verbalizes understanding. [x] Demonstrates understanding. [x] Needs review. [] Demonstrates/verbalizes HEP/Ed previously given. Plan: [x] Continue per plan of care.    [] Other:      Treatment Charges: Mins Units   []  Modalities     []  Ther Exercise     []  Manual Therapy     []  Ther Activities     []  Aquatics     [x]  Neuromuscular 28 2   [] Vasocompression     [] Gait Training     [] Dry needling        [] 1 or 2 muscles        [] 3 or more muscles     []  Other     Total Treatment time 28 2     Time In: 6354  Time Out: 4580    Electronically signed by:  Domingo Arriola PTA

## 2020-07-30 ENCOUNTER — HOSPITAL ENCOUNTER (OUTPATIENT)
Dept: PHYSICAL THERAPY | Age: 65
Setting detail: THERAPIES SERIES
Discharge: HOME | End: 2020-07-30
Payer: MEDICARE

## 2020-07-30 NOTE — CARE COORDINATION
[] [address]  [address]    [phone]  F: [phone]   [] [address]  [facility]   [address]  P: [phone]  F: [phone]  [] [place]  [address] [facility]  P: [phone]  F: [phone]  [] [address]  [facility]   [address]   [place]: [phone]  F: [phone]   [] [facility]  [address] [facility] [address]  [place]: [phone]   F: [phone]     Physical Therapy Cancel/No Show note    Date: 2020  Patient: Venice Lowe  : 1955  MRN: [id number]    Cancels/No Shows to date:     For today's appointment patient:    [x]  Cancelled    [] Rescheduled appointment    [] No-show     Reason given by patient:    []  Patient ill    []  Conflicting appointment    [] No transportation      [] Conflict with work    [] No reason given    [] Weather related    [] COVID-19    [x] Other:  Pt showed up to clinic, stating that he was having some breathing difficulties, recommended pt go home at this time as he states that he would not be able to do much    Comments:        [] Next appointment was confirmed    Electronically signed by:  Nelson Angulo, PT

## 2020-08-11 NOTE — TELEPHONE ENCOUNTER
Patient is calling for another refill for tramadol     LRF #30 7/24/20  9/10/20 total Rev.  Shoulder schedule

## 2020-08-13 RX ORDER — TRAMADOL HYDROCHLORIDE 50 MG/1
50 TABLET ORAL EVERY 4 HOURS PRN
Qty: 30 TABLET | Refills: 0 | Status: SHIPPED | OUTPATIENT
Start: 2020-08-13 | End: 2020-10-06 | Stop reason: SDUPTHER

## 2020-08-17 NOTE — DISCHARGE SUMMARY
[] [facility]) @ [facility]  [address] [address] [address]  Phone [phone]  Fax [phone]    Physical Therapy Discharge Note    Date: 2020      Patient: Noé Mariee  : 1955  MRN: Brenna Paget, MD                                           Medical Diagnosis: At high risk for falls                   Rehab Codes: Z91.81  Onset date: 2017                           Visit# / total visits: 4                      Cancels/No Shows: 1/0  Date of initial visit: 20                   [] Patient recovered from conditions. Treatment goals were met. [] Patient received maximum benefit. No further therapy indicated at this time. [] Patient demonstrated improvement from condition with  ** Of  ** Short term goals met. []Patient demonstrated improvement from condition with **   Of **  Long term goals met. [] Patient to continue exercise/home instructions independently. [] Therapy interrupted due to:    [] Patient has 2 or more no shows/cancels, is discontinued per our policy. [] Patient has completed prescribed number of treatment sessions. [x] Other: Pt reports that he was having a lot more pain in L shoulder and his legs, so wanted to follow up with physician before continuing with anymore PT      Pain level at evaluation was      9/10 and at discharge was       Unknown/10    It Is My Understanding That The:  [] Patient returned to work. [] Patient demonstrated improved level of function. [] Patient returned to previous functional level.   [] Patient's current functional status is unknown due to no-shows  [x] Other: Pt was coming to PT for balance issues, but L shoulder and legs became bigger concern, so he was going to follow up with physician before continuing with PT     Recommendations/Comments:                   Treatment Included:     [] Therapeutic Exercise   36449  [] Iontophoresis: 4 mg/mL Dexamethasone Sodium Phosphate  mAmin  33914   [] Therapeutic Activity  68609 [] Vasopneumatic cold with compression  16575    [] Gait Training   80419 [] Ultrasound   11986   [x] Neuromuscular Re-education  27951 [] Electrical Stimulation Unattended  73368   [] Manual Therapy  93831 [] Electrical Stimulation Attended  47516   [] Instruction in HEP  [] Lumbar/Cervical Traction  [id number]   [] Aquatic Therapy   53447 [] Cold/hotpack    [] Massage   04944      [] Dry Needling, 1 or 2 muscles  32828   [] Biofeedback, first 15 minutes   36704  [] Biofeedback, additional 15 minutes   84916 [] Dry Needling, 3 or more muscles  70754                If you have any questions or concerns regarding this patient's care, please contact us. Thank you for your referral.      Electronically signed by:  Cherelle Boyer PT

## 2020-08-18 ENCOUNTER — HOSPITAL ENCOUNTER (OUTPATIENT)
Dept: GENERAL RADIOLOGY | Age: 65
Discharge: HOME | End: 2020-08-20
Payer: MEDICARE

## 2020-08-18 ENCOUNTER — HOSPITAL ENCOUNTER (OUTPATIENT)
Age: 65
Discharge: HOME | End: 2020-08-20
Payer: MEDICARE

## 2020-08-18 PROCEDURE — 71046 X-RAY EXAM CHEST 2 VIEWS: CPT

## 2020-08-27 ENCOUNTER — HOSPITAL ENCOUNTER (OUTPATIENT)
Dept: PREADMISSION TESTING | Age: 65
Discharge: HOME | End: 2020-08-31
Payer: MEDICARE

## 2020-08-27 VITALS
TEMPERATURE: 98.6 F | SYSTOLIC BLOOD PRESSURE: 128 MMHG | DIASTOLIC BLOOD PRESSURE: 63 MMHG | BODY MASS INDEX: 36.16 KG/M2 | RESPIRATION RATE: 20 BRPM | HEART RATE: 56 BPM | WEIGHT: 267 LBS | OXYGEN SATURATION: 95 % | HEIGHT: 72 IN

## 2020-08-27 LAB
ABSOLUTE EOS #: 0.1 K/UL (ref 0–0.4)
ABSOLUTE IMMATURE GRANULOCYTE: ABNORMAL K/UL (ref 0–0.3)
ABSOLUTE LYMPH #: 1.8 K/UL (ref 1–4.8)
ABSOLUTE MONO #: 0.7 K/UL (ref 0.1–1.3)
ANION GAP SERPL CALCULATED.3IONS-SCNC: 7 MMOL/L (ref 9–17)
BASOPHILS # BLD: 1 % (ref 0–2)
BASOPHILS ABSOLUTE: 0.1 K/UL (ref 0–0.2)
BILIRUBIN URINE: NEGATIVE
BUN BLDV-MCNC: 6 MG/DL (ref 8–23)
BUN/CREAT BLD: ABNORMAL (ref 9–20)
CALCIUM SERPL-MCNC: 9.9 MG/DL (ref 8.6–10.4)
CHLORIDE BLD-SCNC: 105 MMOL/L (ref 98–107)
CO2: 31 MMOL/L (ref 20–31)
COLOR: YELLOW
COMMENT UA: NORMAL
CREAT SERPL-MCNC: 0.62 MG/DL (ref 0.7–1.2)
DIFFERENTIAL TYPE: ABNORMAL
EOSINOPHILS RELATIVE PERCENT: 1 % (ref 0–4)
GFR AFRICAN AMERICAN: >60 ML/MIN
GFR NON-AFRICAN AMERICAN: >60 ML/MIN
GFR SERPL CREATININE-BSD FRML MDRD: ABNORMAL ML/MIN/{1.73_M2}
GFR SERPL CREATININE-BSD FRML MDRD: ABNORMAL ML/MIN/{1.73_M2}
GLUCOSE BLD-MCNC: 104 MG/DL (ref 70–99)
GLUCOSE URINE: NEGATIVE
HCT VFR BLD CALC: 43.8 % (ref 41–53)
HEMOGLOBIN: 14.5 G/DL (ref 13.5–17.5)
IMMATURE GRANULOCYTES: ABNORMAL %
KETONES, URINE: NEGATIVE
LEUKOCYTE ESTERASE, URINE: NEGATIVE
LYMPHOCYTES # BLD: 21 % (ref 24–44)
MCH RBC QN AUTO: 27.5 PG (ref 26–34)
MCHC RBC AUTO-ENTMCNC: 33 G/DL (ref 31–37)
MCV RBC AUTO: 83.2 FL (ref 80–100)
MONOCYTES # BLD: 8 % (ref 1–7)
NITRITE, URINE: NEGATIVE
NRBC AUTOMATED: ABNORMAL PER 100 WBC
PDW BLD-RTO: 17.3 % (ref 11.5–14.9)
PH UA: 7 (ref 5–8)
PLATELET # BLD: 184 K/UL (ref 150–450)
PLATELET ESTIMATE: ABNORMAL
PMV BLD AUTO: 8.7 FL (ref 6–12)
POTASSIUM SERPL-SCNC: 4.5 MMOL/L (ref 3.7–5.3)
PROTEIN UA: NEGATIVE
RBC # BLD: 5.27 M/UL (ref 4.5–5.9)
RBC # BLD: ABNORMAL 10*6/UL
SEG NEUTROPHILS: 69 % (ref 36–66)
SEGMENTED NEUTROPHILS ABSOLUTE COUNT: 6 K/UL (ref 1.3–9.1)
SODIUM BLD-SCNC: 143 MMOL/L (ref 135–144)
SPECIFIC GRAVITY UA: 1 (ref 1–1.03)
TURBIDITY: CLEAR
URINE HGB: NEGATIVE
UROBILINOGEN, URINE: NORMAL
WBC # BLD: 8.7 K/UL (ref 3.5–11)
WBC # BLD: ABNORMAL 10*3/UL

## 2020-08-27 PROCEDURE — 80048 BASIC METABOLIC PNL TOTAL CA: CPT

## 2020-08-27 PROCEDURE — 81003 URINALYSIS AUTO W/O SCOPE: CPT

## 2020-08-27 PROCEDURE — 85025 COMPLETE CBC W/AUTO DIFF WBC: CPT

## 2020-08-27 PROCEDURE — 36415 COLL VENOUS BLD VENIPUNCTURE: CPT

## 2020-08-27 PROCEDURE — 87641 MR-STAPH DNA AMP PROBE: CPT

## 2020-08-27 RX ORDER — MULTIVIT-MIN/IRON/FOLIC ACID/K 18-600-40
1 CAPSULE ORAL DAILY
COMMUNITY
End: 2020-11-09 | Stop reason: ALTCHOICE

## 2020-08-27 ASSESSMENT — ENCOUNTER SYMPTOMS
SHORTNESS OF BREATH: 1
BACK PAIN: 1
SINUS PAIN: 0
COLOR CHANGE: 0
TROUBLE SWALLOWING: 0
DIARRHEA: 0
VOICE CHANGE: 0
WHEEZING: 0
BLOOD IN STOOL: 0
CHEST TIGHTNESS: 0
SINUS PRESSURE: 0
ABDOMINAL PAIN: 0
CONSTIPATION: 0
VOMITING: 0
SORE THROAT: 0
NAUSEA: 0
COUGH: 0

## 2020-08-27 ASSESSMENT — PAIN SCALES - GENERAL: PAINLEVEL_OUTOF10: 7

## 2020-08-27 ASSESSMENT — PAIN DESCRIPTION - LOCATION: LOCATION: SHOULDER

## 2020-08-27 ASSESSMENT — PAIN DESCRIPTION - PAIN TYPE: TYPE: CHRONIC PAIN

## 2020-08-27 ASSESSMENT — PAIN DESCRIPTION - ORIENTATION: ORIENTATION: LEFT

## 2020-08-27 ASSESSMENT — PAIN DESCRIPTION - DESCRIPTORS: DESCRIPTORS: ACHING

## 2020-08-27 NOTE — H&P
HISTORY and [id number]       NAME:  Ryan Novoa  MRN: [id number]   YOB: 1955   Date: 8/27/2020   Age: 76 y.o. Gender: male     COMPLAINT ANDPRESENT HISTORY:     Ryan Novoa is a 76 y.o.  male, undergoing preadmission testing for left reverse total shoulder resection with I&D and antibiotic spacer implantation. Patient reports history of reverse total shoulder 07/2018, had no problems postoperative but developed pain in the shoulder over the past few months, having sharp pains in the shoulder as well as some popping and clunking. Imaging revealed no significant change however CRP levels slightly elevated. No redness, warmth, swelling, fever/chills. Patient reports history of coronary artery disease, denies history of stent placement or bypass surgery, follows with cardiologist, denies recent chest pain/pressure, does have mild dyspnea on exertion. Patient denies medical history of CHF, CVA/TIA, CKD or IDDM. Echocardiogram 12/5/19 LV EF 60-65%, nuclear stress test without evidence of ischemia, low risk. No history of venous thrombosis, no pain or swelling in the lower extremities.      PAST MEDICAL HISTORY     Past Medical History:   Diagnosis Date    Acid reflux     Anemia, blood loss 10/7/2018    Arthritis     C. difficile colitis 3/19/2020    Chronic bilateral low back pain with bilateral sciatica 11/3/2016    Complete tear of left rotator cuff 7/18/2018    COPD (chronic obstructive pulmonary disease) [facility]     Coronary artery disease involving native coronary artery of native heart without angina pectoris 2/2/2020    Degenerative disc disease, cervical 7/28/2019    GI bleed 3/19/2020    Gout     H/O cardiac catheterization     yrs ago   no stents    History of blood transfusion     Hyperlipidemia     Hyperlipidemia with target LDL less than 100 1/20/2016    Hyperparathyroidism ([address] 1/17/2020    Hypertension     Knee pain, chronic left    Liver disease     MDRO (multiple drug resistant organisms) resistance     Moderate episode of recurrent major depressive disorder ([address] 1/20/2016    Obesity, Class III, BMI 40-49.9 (morbid obesity) ([address] 1/20/2016    REYNALDO on CPAP 5/6/2017    Osteoarthritis     Peripheral arterial occlusive disease ([address] 3/25/2017    Pneumonia 3/9/2020    Polypharmacy 3/25/2017    Positive FIT (fecal immunochemical test) 4/11/2017    Prolonged emergence from general anesthesia 01/05/2017    Patient \"on life support for 3 days\" after back surgery due to being given succinylcholine    Prostate CA ([address] 1/20/2016    Prostate cancer ([address] 10/2013    finished radiation tx 5/2014    Pseudocholinesterase deficiency 03/25/2017    Pt.  \"on life support\" for 3 days after back surgery 1/5/17 after being given succinylcholine    Severe obesity (BMI 35.0-35.9 with comorbidity) ([address] 2/2/2020    Short of breath on exertion     Sleep apnea     uses CPAP machine nightly    Status post lumbar laminectomy 3/25/2017    Stenosis of left carotid artery 10/7/2018    Syncope and collapse 3/19/2020    Tubular adenoma of colon 4/11/17 5/13/2017    Type 2 diabetes mellitus with hyperglycemia, without long-term current use of insulin ([address] 3/25/2017    Lab Results Component Value Date  LABA1C 7.1 (H) 03/23/2017     Unintended weight loss 10/7/2018    Vitamin D deficiency 3/25/2017    Wears glasses      SURGICAL HISTORY       Past Surgical History:   Procedure Laterality Date    BACK SURGERY      x 2     BACK SURGERY  01/05/2017    lumbar laminectomy L2, L3, L4    BRONCHOSCOPY N/A 3/13/2020    BRONCHOSCOPY performed by Latrell Lira MD at [address] [place] [address]  2007    no stents    CAROTID ENDARTERECTOMY Right 12/16/2019    Dr. González Morrell Bilateral     COLONOSCOPY      KNEE SURGERY Left     LUMBAR LAMINECTOMY  01/05/2017    L2-L4    [place] CLOSED RX SHLDR DISLOC,ANESTHESIA Left 2018    SHOULDER CLOSED REDUCTION WITH C-ARM VISUALIZATION performed by Ted Dan MD at [address] [facility] W/BIOPSY SINGLE/MULTIPLE N/A 2017    COLONOSCOPY WITH BIOPSY performed by Nadira Arauz DO at [address] [address] IMPLANT Left 2018    SHOULDER TOTAL ARTHROPLASTY REVERSE LEFT DJO & BICEP TENDON TRANSFER performed by Ted Dan MD at [address] HUMERAL/GLENOID COMPNT Left 8/3/2018    SHOULDER TOTAL REVERSE  ARTHROPLASTY REVISION performed by Ted Dan MD at [address]     rotator cuff repair    SKIN BIOPSY      ear, forehead    TONSILLECTOMY AND ADENOIDECTOMY      UPPER GASTROINTESTINAL ENDOSCOPY N/A 3/19/2020    EGD BIOPSY performed by Margo Nowak MD at [address]       Family History   Problem Relation Age of Onset    Diabetes Mother     Lung Cancer Brother     Liver Cancer Brother     Cancer Father      SOCIAL HISTORY       Social History     Socioeconomic History    Marital status:      Spouse name: Not on file    Number of children: Not on file    Years of education: Not on file    Highest education level: Not on file   Occupational History    Not on file   Social Needs    Financial resource strain: Not on file    Food insecurity     Worry: Not on file     Inability: Not on file    Transportation needs     Medical: Not on file     Non-medical: Not on file   Tobacco Use    Smoking status: Current Every Day Smoker     Packs/day: 1.50     Years: 35.00     Pack years: 52.50     Types: Cigarettes     Last attempt to quit: 3/9/2020     Years since quittin.4    Smokeless tobacco: Never Used    Tobacco comment: WAS SMOKING 1-1.5 ppd   Substance and Sexual Activity    Alcohol use: No     Alcohol/week: 0.0 standard drinks    Drug use: No    Sexual activity: Not Currently     Partners: Female   Lifestyle    Physical activity     Days per week: Not on file     Minutes per session: Not on file    Stress: Not on file   Relationships    Social connections     Talks on phone: Not on file     Gets together: Not on file     Attends Yarsanism service: Not on file     Active member of club or organization: Not on file     Attends meetings of clubs or organizations: Not on file     Relationship status: Not on file    Intimate partner violence     Fear of current or ex partner: Not on file     Emotionally abused: Not on file     Physically abused: Not on file     Forced sexual activity: Not on file   Other Topics Concern    Not on file   Social History Narrative    Not on file     REVIEW OF SYSTEMS      Allergies   Allergen Reactions    Succinylcholine Chloride Other (See Comments)     PATIENT HAS PSEUDOCHOLINESTERASE DEFICIENCY      Cymbalta [Duloxetine Hcl]      Taste loss       Current Outpatient Medications on File Prior to Encounter   Medication Sig Dispense Refill    Cholecalciferol (VITAMIN D) 50 MCG (2000 UT) CAPS capsule Take 1 capsule by mouth daily      pregabalin (LYRICA) 200 MG capsule Take 1 capsule by mouth 3 times daily for 90 days.  270 capsule 0    pravastatin (PRAVACHOL) 80 MG tablet Take 1 tablet by mouth every evening Dose increased  9/24/2019 90 tablet 3    omeprazole (PRILOSEC) 40 MG delayed release capsule Take 1 capsule by mouth every morning (before breakfast) 90 capsule 3    furosemide (LASIX) 20 MG tablet TAKE 1 TABLET DAILY AS NEEDED (ONLY IF BLOOD PRESSURE OVER 140/90 OR LEG EDEMA) (STOP TENORETIC 50-25) 90 tablet 3    colchicine (COLCRYS) 0.6 MG tablet Take 0.6 mg by mouth daily      ipratropium-albuterol (DUONEB) 0.5-2.5 (3) MG/3ML SOLN nebulizer solution Inhale 1 vial into the lungs 3 times daily      ferrous sulfate (IRON 325) 325 (65 Fe) MG tablet Take 1 tablet by mouth 2 times daily 60 tablet 0    SYMBICORT 160-4.5 MCG/ACT AERO Inhale 2 puffs into the lungs 2 times daily 10.2 inhaler 3 Inhaler 3    lisinopril (PRINIVIL;ZESTRIL) 10 MG tablet Take 1 tablet by mouth daily Dose decreased 3/18/2020 90 tablet 3    traMADol (ULTRAM) 50 MG tablet Take 1-2 tablets by mouth every 6 hours as needed for Pain. 30 tablet 0    amLODIPine (NORVASC) 10 MG tablet Take 1 tablet by mouth daily (Patient taking differently: Take 5 mg by mouth daily ) 90 tablet 3    folbee plus (FOLBEE PLUS) TABS Take 1 tablet by mouth daily 90 tablet 3    aspirin EC 81 MG EC tablet Take 1 tablet by mouth daily 90 tablet 0    albuterol sulfate  (90 BASE) MCG/ACT inhaler Inhale 2 puffs into the lungs every 6 hours as needed for Wheezing or Shortness of Breath (COUGH) 18 g 0    Respiratory Therapy Supplies (ADULT MASK) MISC Needs to use mask daily due to coronavirus pandemic. 30 each 5    blood glucose monitor strips Use to test twice daily and as needed as directed by provider     [facility] by Does not apply route Patient has difficulty getting up from usual chair 1 each 0    Glucose Blood (BLOOD GLUCOSE TEST STRIPS) STRP tesing once a day fasting 100 strip 3     No current facility-administered medications on file prior to encounter. Review of Systems   Constitutional: Negative for chills, diaphoresis, fatigue and fever. HENT: Negative for congestion, ear discharge, ear pain, postnasal drip, sinus pressure, sinus pain, sore throat, trouble swallowing and voice change. Respiratory: Positive for shortness of breath (mild, exertional). Negative for cough, chest tightness and wheezing. Cardiovascular: Negative for chest pain, palpitations and leg swelling. Gastrointestinal: Negative for abdominal pain, blood in stool, constipation, diarrhea, nausea and vomiting. Genitourinary: Negative for difficulty urinating, dysuria, flank pain, frequency, hematuria and urgency. Musculoskeletal: Positive for arthralgias and back pain. Negative for joint swelling, neck pain and neck stiffness.    Skin: Negative for color change, rash and wound.   Neurological: Positive for weakness (right leg following back surgery). Negative for dizziness, seizures, syncope, speech difficulty, light-headedness, numbness and headaches. GENERAL PHYSICAL EXAM:     Vitals: /63   Pulse 56   Temp 98.6 °F (37 °C) (Infrared)   Resp 20   Ht 5' 11.5\" (1.816 m)   Wt 267 lb (121.1 kg)   SpO2 95%   BMI 36.72 kg/m²  Body mass index is 36.72 kg/m². GENERAL APPEARANCE: Solange Brown is a 76 y.o.   male, moderately obese, nourished, conscious, alert. Does not appear to be in any distress or pain at this time. SKIN:  Normal temperature, turgor and texture. No cyanosis or jaundice. HEAD:  Normocephalic, atraumatic. EYES:  Pupils equal, reactive to light andaccomodation. Conjunctiva clear. EOMs intact bilaterally. THROAT:  Mucous membranes moist. No tonsillar erythema or exudates. NECK:  No stiffness, trachea central.  No palpable masses. CHEST:  Symmetrical and equal on expansion. HEART:  Normotensive. Regular rate, rhythm. No murmurs. LUNGS:  Equal on expansion. Clear to auscultation with no adventitious sounds. ABDOMEN:  Obese. Normoactive bowel sounds. Soft on palpation. No localized tenderness, guarding or rigidity. No palpable organomegaly. LYMPHATICS:  No palpable cervical lymphadenopathy. LOCOMOTOR, BACK AND SPINE:  No tenderness or deformities. No flank tenderness. Ambulates with cane. EXTREMITIES:  Lower extremities symmetrical with no pretibial/pedal edema. No discoloration or ulcerations. No warmth, tenderness, erythema noted bilaterally. Left upper extremity status post reverse total shoulder, limited range of motion in all directions, no joint swelling, erythema, warmth. No tender to palpation. NEUROLOGIC:  The patient is conscious, alert, oriented.  No focal sensory deficits, mild right sided foot weakness. Cranial nerve exam reveals no deficits. PROVISIONAL DIAGNOSES / SURGERY:      1. Left shoulder pain  2.  Status post reverse total shoulder arthroplasty    Reverse total shoulder resection with I&D and antibiotic spacer implantation    Arron Madera PA-C on8/27/2020 at 10:09 AM

## 2020-08-28 LAB
DIRECT EXAM: NORMAL
Lab: NORMAL
SPECIMEN DESCRIPTION: NORMAL

## 2020-08-28 RX ORDER — SODIUM CHLORIDE 0.9 % (FLUSH) 0.9 %
10 SYRINGE (ML) INJECTION EVERY 12 HOURS SCHEDULED
Status: CANCELLED | OUTPATIENT
Start: 2020-08-28

## 2020-08-28 RX ORDER — LIDOCAINE HYDROCHLORIDE 10 MG/ML
1 INJECTION, SOLUTION EPIDURAL; INFILTRATION; INTRACAUDAL; PERINEURAL
Status: CANCELLED | OUTPATIENT
Start: 2020-08-28 | End: 2020-08-28

## 2020-08-28 RX ORDER — SODIUM CHLORIDE 0.9 % (FLUSH) 0.9 %
10 SYRINGE (ML) INJECTION PRN
Status: CANCELLED | OUTPATIENT
Start: 2020-08-28

## 2020-08-28 RX ORDER — SODIUM CHLORIDE, SODIUM LACTATE, POTASSIUM CHLORIDE, CALCIUM CHLORIDE 600; 310; 30; 20 MG/100ML; MG/100ML; MG/100ML; MG/100ML
INJECTION, SOLUTION INTRAVENOUS CONTINUOUS
Status: CANCELLED | OUTPATIENT
Start: 2020-08-28

## 2020-09-08 ENCOUNTER — OFFICE VISIT (OUTPATIENT)
Dept: UROLOGY | Age: 65
End: 2020-09-08
Payer: MEDICARE

## 2020-09-08 VITALS — TEMPERATURE: 97.8 F

## 2020-09-08 PROCEDURE — 99213 OFFICE O/P EST LOW 20 MIN: CPT | Performed by: NURSE PRACTITIONER

## 2020-09-08 ASSESSMENT — ENCOUNTER SYMPTOMS
ABDOMINAL PAIN: 0
VOMITING: 0
EYE PAIN: 0
EYE REDNESS: 0
COUGH: 0
DIARRHEA: 0
CONSTIPATION: 0
WHEEZING: 0
BACK PAIN: 0
NAUSEA: 0
SHORTNESS OF BREATH: 0

## 2020-09-08 NOTE — LETTER
[address] [address]  Dept: [phone]  Dept Fax: [phone]        9/8/20    Patient: Noé Mariee  YOB: 1955    Dear Washington Rajput MD,    I had the pleasure of seeing one of your patients, Tracy Rodriguez today in the office today. Below are the relevant portions of my assessment and plan of care. IMPRESSION:  1. Injury to scrotum, initial encounter    2. Testicular pain, left        PLAN:  will get scrotal US    Consider heat to area 20 min on 20 min off. ( injury occurred >1 week ago)     Tylenol 24 hour dose not to exceed 3000 mg. Call with worsening symptoms. Return if symptoms worsen or fail to improve, for routine F/U. Prescriptions Ordered:  No orders of the defined types were placed in this encounter. Orders Placed:  Orders Placed This Encounter   Procedures    US SCROTUM AND TESTICLES     Please schedule with doppler     Standing Status:   Future     Standing Expiration Date:   9/3/2021        Thank you for allowing me to participate in the care of this patient. I will keep you updated on this patient's follow up and I look forward to serving you and your patients again in the future.     Alex Muñoz, JAVI - CNP

## 2020-09-08 NOTE — PATIENT INSTRUCTIONS
will get scrotal US    Consider heat to area 20 min on 20 min off. ( injury occurred >1 week ago)     Tylenol 24 hour dose not to exceed 3000 mg. Call with worsening symptoms.

## 2020-09-08 NOTE — PROGRESS NOTES
[address] [address]  Dept: [address] [facility] Urology Office Note - Established    Patient:  Adrian Alejandra  YOB: 1955  Date: 9/8/2020    The patient is a 76 y.o. male who presents todayfor evaluation of the following problems:   Chief Complaint   Patient presents with    Testicle Pain     pt hurt his private area falling through a chair        HPI  He feel through a chair onto the cement with residual testicular soreness and edema on the LT. In the shower last night he had pain with moving his leg to remove clothing. Ronold Kanner He has bruising of his buttocks. He  Is on routine Tramadal for his shoulder pain which seems to help with his scrotal pain as well. He is also adding Tylenol for additional relief. His pain is 8/10, worse with walking. The pain is improving over the last week. Summary of old records: N/A    Additional History: N/A    Procedures Today: N/A    Urinalysis today:  No results found for this visit on 09/08/20.   Last several PSA's:  Lab Results   Component Value Date    PSA <0.01 05/14/2020    PSA <0.01 04/15/2020    PSA <0.01 12/10/2019     Last total testosterone:  Lab Results   Component Value Date    TESTOSTERONE <3 (L) 12/10/2019       AUA Symptom Score (9/8/2020):  INCOMPLETE EMPTYING: How often have you had the sensation of not emptying your bladder?: Not at all  FREQUENCY: How often do you have to urinate less than every two hours?: Not at all  INTERMITTENCY: How often have you found you stopped and started again several times when you urinated?: Not at all  URGENCY: How often have you found it difficult to postpone urination?: Not at all  WEAK STREAM: How often have you had a weak urinary stream?: Not at all  STRAINING: How often have you had to strain to start  urination?: Not at all  NOCTURIA: How many times did you typically get up at night to uriniate?: 1 Time  TOTAL I-PSS SCORE[de-identified] 1  How would you feel if you were to spend the rest of your life with your urinary condition?: Pleased    Last BUN and creatinine:  Lab Results   Component Value Date    BUN 6 (L) 08/27/2020     Lab Results   Component Value Date    CREATININE 0.62 (L) 08/27/2020       Additional Lab/Culture results:     Imaging Reviewed during this Office Visit:   (results were independently reviewed by physician and radiology report verified)    PAST MEDICAL, FAMILY AND SOCIAL HISTORY UPDATE:  Past Medical History:   Diagnosis Date    Acid reflux     Anemia, blood loss 10/7/2018    Arthritis     C. difficile colitis 3/19/2020    Chronic bilateral low back pain with bilateral sciatica 11/3/2016    Complete tear of left rotator cuff 7/18/2018    COPD (chronic obstructive pulmonary disease) ([address]     Coronary artery disease involving native coronary artery of native heart without angina pectoris 2/2/2020    Degenerative disc disease, cervical 7/28/2019    GI bleed 3/19/2020    Gout     H/O cardiac catheterization     yrs ago   no stents    History of blood transfusion     Hyperlipidemia     Hyperlipidemia with target LDL less than 100 1/20/2016    Hyperparathyroidism ([address] 1/17/2020    Hypertension     Knee pain, chronic     left    Liver disease     MDRO (multiple drug resistant organisms) resistance     Moderate episode of recurrent major depressive disorder ([address] 1/20/2016    Obesity, Class III, BMI 40-49.9 (morbid obesity) ([address] 1/20/2016    REYNALDO on CPAP 5/6/2017    Osteoarthritis     Peripheral arterial occlusive disease ([address] 3/25/2017    Pneumonia 3/9/2020    Polypharmacy 3/25/2017    Positive FIT (fecal immunochemical test) 4/11/2017    Prolonged emergence from general anesthesia 01/05/2017    Patient \"on life support for 3 days\" after back surgery due to being given succinylcholine    Prostate CA ([address] 1/20/2016    Prostate cancer ([address] 10/2013    finished radiation tx 5/2014    Pseudocholinesterase deficiency 03/25/2017    Pt. \"on life support\" for 3 days after back surgery 1/5/17 after being given succinylcholine    Severe obesity (BMI 35.0-35.9 with comorbidity) ([facility] 2/2/2020    Short of breath on exertion     Sleep apnea     uses CPAP machine nightly    Status post lumbar laminectomy 3/25/2017    Stenosis of left carotid artery 10/7/2018    Syncope and collapse 3/19/2020    Tubular adenoma of colon 4/11/17 5/13/2017    Type 2 diabetes mellitus with hyperglycemia, without long-term current use of insulin ([facility] 3/25/2017    Lab Results Component Value Date  LABA1C 7.1 (H) 03/23/2017     Unintended weight loss 10/7/2018    Vitamin D deficiency 3/25/2017    Wears glasses      Past Surgical History:   Procedure Laterality Date    BACK SURGERY      x 2     BACK SURGERY  01/05/2017    lumbar laminectomy L2, L3, L4    BRONCHOSCOPY N/A 3/13/2020    BRONCHOSCOPY performed by Brennan Adams MD at [address] [place] [address]    no stents    CAROTID ENDARTERECTOMY Right 12/16/2019    Dr. Sana Gray Bilateral     COLONOSCOPY      KNEE SURGERY Left     LUMBAR LAMINECTOMY  01/05/2017    L2-L4    [place] CLOSED RX SHLDR DISLOC,ANESTHESIA Left 8/1/2018    SHOULDER CLOSED REDUCTION WITH C-ARM VISUALIZATION performed by David Carroll MD at [facility] N/A 5/9/2017    COLONOSCOPY WITH BIOPSY performed by Liz Rivers DO at [address] [facility] [address] IMPLANT Left 7/18/2018    SHOULDER TOTAL ARTHROPLASTY REVERSE LEFT DJO & BICEP TENDON TRANSFER performed by David Carroll MD at [address] [facility] HUMERAL/GLENOID COMPNT Left 8/3/2018    SHOULDER TOTAL REVERSE  ARTHROPLASTY REVISION performed by David Carroll MD at [address]     rotator cuff repair    SKIN BIOPSY      ear, forehead    TONSILLECTOMY AND ADENOIDECTOMY      UPPER GASTROINTESTINAL ENDOSCOPY N/A 3/19/2020    EGD BIOPSY performed by Obinna Saavedra MD at [address] [facility] ENDO     Family History   Problem Relation Age of Onset    Diabetes Mother     Lung Cancer Brother     Liver Cancer Brother     Cancer Father      Outpatient Medications Marked as Taking for the 9/8/20 encounter (Office Visit) with JAVI Reed CNP   Medication Sig Dispense Refill    Cholecalciferol (VITAMIN D) 50 MCG (2000 UT) CAPS capsule Take 1 capsule by mouth daily      pregabalin (LYRICA) 200 MG capsule Take 1 capsule by mouth 3 times daily for 90 days. 270 capsule 0    pravastatin (PRAVACHOL) 80 MG tablet Take 1 tablet by mouth every evening Dose increased  9/24/2019 90 tablet 3    omeprazole (PRILOSEC) 40 MG delayed release capsule Take 1 capsule by mouth every morning (before breakfast) 90 capsule 3    Respiratory Therapy Supplies (ADULT MASK) MISC Needs to use mask daily due to coronavirus pandemic. 30 each 5    furosemide (LASIX) 20 MG tablet TAKE 1 TABLET DAILY AS NEEDED (ONLY IF BLOOD PRESSURE OVER 140/90 OR LEG EDEMA) (STOP TENORETIC 50-25) 90 tablet 3    colchicine (COLCRYS) 0.6 MG tablet Take 0.6 mg by mouth daily      ipratropium-albuterol (DUONEB) 0.5-2.5 (3) MG/3ML SOLN nebulizer solution Inhale 1 vial into the lungs 3 times daily      blood glucose monitor strips Use to test twice daily and as needed as directed by provider      ferrous sulfate (IRON 325) 325 (65 Fe) MG tablet Take 1 tablet by mouth 2 times daily 60 tablet 0    SYMBICORT 160-4.5 MCG/ACT AERO Inhale 2 puffs into the lungs 2 times daily 10.2 inhaler 3 Inhaler 3    lisinopril (PRINIVIL;ZESTRIL) 10 MG tablet Take 1 tablet by mouth daily Dose decreased 3/18/2020 90 tablet 3    traMADol (ULTRAM) 50 MG tablet Take 1-2 tablets by mouth every 6 hours as needed for Pain.  30 tablet 0    amLODIPine (NORVASC) 10 MG tablet Take 1 tablet by mouth daily (Patient taking differently: Take 5 mg by mouth daily ) 90 tablet 3    Lift Chair MISC by Does not apply route Patient has difficulty getting up from usual chair 1 each 0    folbee plus (FOLBEE PLUS) TABS Take 1 tablet by mouth daily 90 tablet 3    aspirin EC 81 MG EC tablet Take 1 tablet by mouth daily 90 tablet 0    Glucose Blood (BLOOD GLUCOSE TEST STRIPS) STRP tesing once a day fasting 100 strip 3    albuterol sulfate  (90 BASE) MCG/ACT inhaler Inhale 2 puffs into the lungs every 6 hours as needed for Wheezing or Shortness of Breath (COUGH) 18 g 0       Succinylcholine chloride and Cymbalta [duloxetine hcl]  Social History     Tobacco Use   Smoking Status Current Every Day Smoker    Packs/day: 1.50    Years: 35.00    Pack years: 52.50    Types: Cigarettes    Last attempt to quit: 3/9/2020    Years since quittin.5   Smokeless Tobacco Never Used   Tobacco Comment    WAS SMOKING 1-1.5 ppd     (Ifpatient a smoker, smoking cessation counseling offered)    Social History     Substance and Sexual Activity   Alcohol Use No    Alcohol/week: 0.0 standard drinks       REVIEW OF SYSTEMS:  Review of Systems    Physical Exam:      Vitals:    20 1514   Temp: 97.8 °F (36.6 °C)     There is no height or weight on file to calculate BMI. Patient is a 76 y.o. male in no acute distress and alert and oriented to person, place and time. Physical Exam  Constitutional: Patient in no acute distress. Neuro: Alert and oriented to person, place and time. Psych: Mood normal, affect normal  Skin: No rash noted  HEENT: Head: Normocephalic andatraumatic  Conjunctivae and EOM are normal. Pupils are equal, round  Nose:Normal  Right External Ear: Normal; Left External Ear: Normal  Mouth: Mucosa Moist  Neck: Supple  Lungs: Respiratory effort is normal  Cardiovascular: Warm & Pink  Abdomen: Soft, non-tender, non-distended   Bladder non-tender and not distended. Musculoskeletal: Normal gait and station  Scrotum: no contusion. RT testicle somewhat retracted, LT testicle enlarged, smooth. Assessment and Plan      1. Injury to scrotum, initial encounter    2. Testicular pain, left           Plan:     will get scrotal US    Consider heat to area 20 min on 20 min off. ( injury occurred >1 week ago)     Tylenol 24 hour dose not to exceed 3000 mg. Call with worsening symptoms. Return if symptoms worsen or fail to improve, for routine F/U. Prescriptions Ordered:  No orders of the defined types were placed in this encounter. Orders Placed:  Orders Placed This Encounter   Procedures    US SCROTUM AND TESTICLES     Please schedule with doppler     Standing Status:   Future     Standing Expiration Date:   9/3/2021           JAVI Mclean - CNP    Agree with the ROS entered by the MA.

## 2020-09-08 NOTE — PROGRESS NOTES
Review of Systems   Constitutional: Negative for appetite change, chills and fatigue. Eyes: Negative for pain, redness and visual disturbance. Respiratory: Negative for cough, shortness of breath and wheezing. Cardiovascular: Positive for leg swelling. Negative for chest pain. Gastrointestinal: Negative for abdominal pain, constipation, diarrhea, nausea and vomiting. Genitourinary: Positive for scrotal swelling. Negative for difficulty urinating, dysuria, flank pain, frequency, hematuria and urgency. Musculoskeletal: Negative for back pain, joint swelling and myalgias. Skin: Negative for rash and wound. Neurological: Negative for dizziness, weakness and numbness. Hematological: Does not bruise/bleed easily.

## 2020-09-09 ENCOUNTER — HOSPITAL ENCOUNTER (OUTPATIENT)
Dept: ULTRASOUND IMAGING | Age: 65
Discharge: HOME | End: 2020-09-11
Payer: MEDICARE

## 2020-09-09 PROCEDURE — 76870 US EXAM SCROTUM: CPT

## 2020-09-09 PROCEDURE — 93976 VASCULAR STUDY: CPT

## 2020-09-14 ENCOUNTER — NURSE TRIAGE (OUTPATIENT)
Dept: OTHER | Facility: CLINIC | Age: 65
End: 2020-09-14

## 2020-09-14 NOTE — TELEPHONE ENCOUNTER
Patient not yet scheduled, please schedule him with either me or my nurse practitioners for shortness of breath, groin pain and shoulder pain    Future Appointments   Date Time Provider Keri Greggi   9/21/2020 11:15 AM Colin Wagner MD [facility]   10/13/2020 11:30 [facility]   11/23/2020  9:50 AM Belinda Nixon MD [facility]   7/8/2021 11:30 [facility]

## 2020-09-14 NOTE — TELEPHONE ENCOUNTER
Patient called [facility]) to schedule appointment with red flag complaint, transferred to Nurse Access for triage by Edita silva to report symptoms of sob, testicular and shoulder pain. Pt states sob started 3-4 days ago with exertion. Hx of COPD. Pt reports breathing difficulty as mild at this time. Denies fevers or chest pain at this time. Informed of disposition. Care advice as documented. Instructed to call back with worsening symptoms. Soft transfer to [facility] to schedule appointment as recommended. Please do not respond to the triage nurse through this encounter. Any subsequent communication should be directly with the patient. Reason for Disposition   MILD difficulty breathing (e.g., minimal/no SOB at rest, SOB with walking, pulse < 100) of new onset or worse than normal    Answer Assessment - Initial Assessment Questions  1. RESPIRATORY STATUS: \"Describe your breathing? \" (e.g., wheezing, shortness of breath, unable to speak, severe coughing)       Sob with any exertion  2. ONSET: \"When did this breathing problem begin? \"       3-4 days ago  3. PATTERN \"Does the difficult breathing come and go, or has it been constant since it started? \"       intermittent  4. SEVERITY: \"How bad is your breathing? \" (e.g., mild, moderate, severe)     - MILD: No SOB at rest, mild SOB with walking, speaks normally in sentences, can lay down, no retractions, pulse < 100.     - MODERATE: SOB at rest, SOB with minimal exertion and prefers to sit, cannot lie down flat, speaks in phrases, mild retractions, audible wheezing, pulse 100-120.     - SEVERE: Very SOB at rest, speaks in single words, struggling to breathe, sitting hunched forward, retractions, pulse > 120       mild  5. RECURRENT SYMPTOM: \"Have you had difficulty breathing before? \" If so, ask: \"When was the last time? \" and \"What happened that time? \"       Yes with pneumonia  6.  CARDIAC HISTORY: \"Do you have any history of heart disease? \" (e.g., heart attack, angina, bypass surgery, angioplasty)       No  7. LUNG HISTORY: \"Do you have any history of lung disease? \"  (e.g., pulmonary embolus, asthma, emphysema)      COPD  8. CAUSE: \"What do you think is causing the breathing problem? \"       unknown  9. OTHER SYMPTOMS: \"Do you have any other symptoms? (e.g., dizziness, runny nose, cough, chest pain, fever)     Testicle and shoulder pain  10. PREGNANCY: \"Is there any chance you are pregnant? \" \"When was your last menstrual period? \"       NO  11. TRAVEL: \"Have you traveled out of the country in the last month? \" (e.g., travel history, exposures)        No    Protocols used: BREATHING DIFFICULTY-ADULT-[place]

## 2020-09-17 ENCOUNTER — HOSPITAL ENCOUNTER (OUTPATIENT)
Dept: CT IMAGING | Facility: CLINIC | Age: 65
Discharge: HOME | End: 2020-09-19
Payer: MEDICARE

## 2020-09-17 ENCOUNTER — OFFICE VISIT (OUTPATIENT)
Dept: UROLOGY | Age: 65
End: 2020-09-17
Payer: MEDICARE

## 2020-09-17 VITALS — TEMPERATURE: 97.7 F

## 2020-09-17 LAB
APPEARANCE FLUID: CLEAR
BILIRUBIN, POC: NORMAL
BLOOD URINE, POC: NORMAL
CLARITY, POC: CLEAR
COLOR, POC: YELLOW
GLUCOSE URINE, POC: NORMAL
KETONES, POC: NORMAL
LEUKOCYTE EST, POC: NORMAL
NITRITE, POC: NORMAL
PH, POC: NORMAL
PROTEIN, POC: NORMAL
SPECIFIC GRAVITY, POC: NORMAL
UROBILINOGEN, POC: NORMAL

## 2020-09-17 PROCEDURE — 99214 OFFICE O/P EST MOD 30 MIN: CPT | Performed by: NURSE PRACTITIONER

## 2020-09-17 PROCEDURE — 74176 CT ABD & PELVIS W/O CONTRAST: CPT

## 2020-09-17 PROCEDURE — 81002 URINALYSIS NONAUTO W/O SCOPE: CPT | Performed by: NURSE PRACTITIONER

## 2020-09-17 ASSESSMENT — ENCOUNTER SYMPTOMS
NAUSEA: 0
WHEEZING: 0
EYE PAIN: 0
SHORTNESS OF BREATH: 0
ABDOMINAL PAIN: 0
COUGH: 0
VOMITING: 0
BACK PAIN: 0
COLOR CHANGE: 0
EYE REDNESS: 0

## 2020-09-17 NOTE — PROGRESS NOTES
Review of Systems   Constitutional: Negative for activity change, chills and fever. Eyes: Negative for pain, redness and visual disturbance. Respiratory: Negative for cough, shortness of breath and wheezing. Cardiovascular: Negative for chest pain and leg swelling. Gastrointestinal: Negative for abdominal pain, nausea and vomiting. Genitourinary: Positive for dysuria, scrotal swelling and testicular pain. Negative for difficulty urinating, discharge, flank pain, frequency and hematuria. Musculoskeletal: Negative for back pain, joint swelling and myalgias. Skin: Negative for color change and rash. Neurological: Negative for dizziness, tremors and numbness. Hematological: Negative for adenopathy. Does not bruise/bleed easily.

## 2020-09-17 NOTE — PROGRESS NOTES
[address] [address]  Dept: 92 Margie Hand[facility] Urology Office Note - Established    Patient:  Henry Hernandez  YOB: 1955  Date: 9/17/2020    The patient is a 76 y.o. male who presents todayfor evaluation of the following problems:   Chief Complaint   Patient presents with    Testicle Pain     pt continues to c/o left testicle swelling and pain; states it has worsened since last visit        HPI  Here for follow up on LT testicular pain and swelling that is not improving. He fell through a chair base that broke a few weeks ago, pain and swelling started after that. He has pain with leaning forward extending his LT hand into his LT testicle. He has pain at times with urination. No Hx of kidney stone. Had [address] done 9/9/2020 with no acute findings. He was improving when called on 9/10 but he has noticed worsening pain since painting the deck. He can hardly reach with the LT hand to tie his shoe, reaching forward increases testicular discomfort significantly. His wife thinks the swelling has increased on the LT side. He has no tenderness with showering or touch to the area. He has occasional dysuria, no hematuria, no changes in stream and is emptying well. Hx of Prostate cancer- on ADT, XRT- 2014, stopped Xtandi DT dizziness. Last Eligard- 5/20/20, last PSA non-detectable 5/14/20. Has had back surgery and is anticipating shoulder surgery, denies back pain.      Summary of old records: N/A    Additional History: N/A    Procedures Today: N/A    Urinalysis today:  Results for POC orders placed in visit on 09/17/20   POCT Urine Dipstick   Result Value Ref Range    Color, UA yellow     Clarity, UA clear     Glucose, UA POC neg     Bilirubin, UA      Ketones, UA      Spec Grav, UA      Blood, UA POC neg     pH, UA      Protein, UA POC neg     Urobilinogen, UA      Leukocytes, UA neg     Nitrite, UA neg Suspect a fact containing left inguinal/scrotal hernia.  Correlate with    physical exam findings; CT is suggested if additional imaging is warranted    clinically. (results were independently reviewed by physician and radiology report verified)    PAST MEDICAL, FAMILY AND SOCIAL HISTORY UPDATE:  Past Medical History:   Diagnosis Date    Acid reflux     Anemia, blood loss 10/7/2018    Arthritis     C. difficile colitis 3/19/2020    Chronic bilateral low back pain with bilateral sciatica 11/3/2016    Complete tear of left rotator cuff 7/18/2018    COPD (chronic obstructive pulmonary disease) ([address]     Coronary artery disease involving native coronary artery of native heart without angina pectoris 2/2/2020    Degenerative disc disease, cervical 7/28/2019    GI bleed 3/19/2020    Gout     H/O cardiac catheterization     yrs ago   no stents    History of blood transfusion     Hyperlipidemia     Hyperlipidemia with target LDL less than 100 1/20/2016    Hyperparathyroidism ([address] 1/17/2020    Hypertension     Knee pain, chronic     left    Liver disease     MDRO (multiple drug resistant organisms) resistance     Moderate episode of recurrent major depressive disorder ([address] 1/20/2016    Obesity, Class III, BMI 40-49.9 (morbid obesity) ([address] 1/20/2016    REYNALDO on CPAP 5/6/2017    Osteoarthritis     Peripheral arterial occlusive disease ([address] 3/25/2017    Pneumonia 3/9/2020    Polypharmacy 3/25/2017    Positive FIT (fecal immunochemical test) 4/11/2017    Prolonged emergence from general anesthesia 01/05/2017    Patient \"on life support for 3 days\" after back surgery due to being given succinylcholine    Prostate CA ([address] 1/20/2016    Prostate cancer ([address] 10/2013    finished radiation tx 5/2014    Pseudocholinesterase deficiency 03/25/2017    Pt.  \"on life support\" for 3 days after back surgery 1/5/17 after being given succinylcholine    Severe obesity (BMI 35.0-35.9 with comorbidity) ([facility] 2/2/2020    Short of breath on exertion     Sleep apnea     uses CPAP machine nightly    Status post lumbar laminectomy 3/25/2017    Stenosis of left carotid artery 10/7/2018    Syncope and collapse 3/19/2020    Tubular adenoma of colon 4/11/17 5/13/2017    Type 2 diabetes mellitus with hyperglycemia, without long-term current use of insulin ([address] 3/25/2017    Lab Results Component Value Date  LABA1C 7.1 (H) 03/23/2017     Unintended weight loss 10/7/2018    Vitamin D deficiency 3/25/2017    Wears glasses      Past Surgical History:   Procedure Laterality Date    BACK SURGERY      x 2     BACK SURGERY  01/05/2017    lumbar laminectomy L2, L3, L4    BRONCHOSCOPY N/A 3/13/2020    BRONCHOSCOPY performed by Shari Dillard MD at [address] [place] [address]    no stents    CAROTID ENDARTERECTOMY Right 12/16/2019    Dr. Maria Eugenia Natarajan Bilateral     COLONOSCOPY      KNEE SURGERY Left     LUMBAR LAMINECTOMY  01/05/2017    L2-L4    SC CLOSED RX SHLDR DISLOC,ANESTHESIA Left 8/1/2018    SHOULDER CLOSED REDUCTION WITH C-ARM VISUALIZATION performed by Ryne Rome MD at [facility] N/A 5/9/2017    COLONOSCOPY WITH BIOPSY performed by Rozina Canela DO at [address] [facility] [facility] [address] IMPLANT Left 7/18/2018    SHOULDER TOTAL ARTHROPLASTY REVERSE LEFT DJO & BICEP TENDON TRANSFER performed by Ryne Rome MD at [address] HUMERAL/GLENOID COMPNT Left 8/3/2018    SHOULDER TOTAL REVERSE  ARTHROPLASTY REVISION performed by Ryne Rome MD at [address]     rotator cuff repair    SKIN BIOPSY      ear, forehead    TONSILLECTOMY AND ADENOIDECTOMY      UPPER GASTROINTESTINAL ENDOSCOPY N/A 3/19/2020    EGD BIOPSY performed by Ephraim Logan MD at [facility]     Family History   Problem Relation Age of Onset    Diabetes Mother     Lung Cancer Brother     Liver Cancer Brother     Cancer Father      Outpatient Medications Marked as Taking for the 9/17/20 encounter (Office Visit) with JAVI Puri CNP   Medication Sig Dispense Refill    Cholecalciferol (VITAMIN D) 50 MCG (2000 UT) CAPS capsule Take 1 capsule by mouth daily      pregabalin (LYRICA) 200 MG capsule Take 1 capsule by mouth 3 times daily for 90 days. 270 capsule 0    pravastatin (PRAVACHOL) 80 MG tablet Take 1 tablet by mouth every evening Dose increased  9/24/2019 90 tablet 3    omeprazole (PRILOSEC) 40 MG delayed release capsule Take 1 capsule by mouth every morning (before breakfast) 90 capsule 3    Respiratory Therapy Supplies (ADULT MASK) MISC Needs to use mask daily due to coronavirus pandemic. 30 each 5    furosemide (LASIX) 20 MG tablet TAKE 1 TABLET DAILY AS NEEDED (ONLY IF BLOOD PRESSURE OVER 140/90 OR LEG EDEMA) (STOP TENORETIC 50-25) 90 tablet 3    colchicine (COLCRYS) 0.6 MG tablet Take 0.6 mg by mouth daily      ipratropium-albuterol (DUONEB) 0.5-2.5 (3) MG/3ML SOLN nebulizer solution Inhale 1 vial into the lungs 3 times daily      blood glucose monitor strips Use to test twice daily and as needed as directed by provider      ferrous sulfate (IRON 325) 325 (65 Fe) MG tablet Take 1 tablet by mouth 2 times daily 60 tablet 0    SYMBICORT 160-4.5 MCG/ACT AERO Inhale 2 puffs into the lungs 2 times daily 10.2 inhaler 3 Inhaler 3    lisinopril (PRINIVIL;ZESTRIL) 10 MG tablet Take 1 tablet by mouth daily Dose decreased 3/18/2020 90 tablet 3    traMADol (ULTRAM) 50 MG tablet Take 1-2 tablets by mouth every 6 hours as needed for Pain.  30 tablet 0    amLODIPine (NORVASC) 10 MG tablet Take 1 tablet by mouth daily (Patient taking differently: Take 5 mg by mouth daily ) 90 tablet 3    Lift Chair MISC by Does not apply route Patient has difficulty getting up from usual chair 1 each 0    folbee plus (FOLBEE PLUS) TABS Take 1 tablet by mouth daily 90 tablet 3    aspirin EC 81 MG EC Prostate cancer [facility])           Plan:     Reviewed symptoms, renal US, physical assessment with Dr Lucas Modi. He is in agreement with CT abd and pelvis. Pt will go now for CT and f/u based on findings. No follow-ups on file. Prescriptions Ordered:  No orders of the defined types were placed in this encounter. Orders Placed:  Orders Placed This Encounter   Procedures    POCT Urine Dipstick           JAVI Clemons CNP    reviewed and Agree with the ROS entered by the MA.

## 2020-09-17 NOTE — PATIENT INSTRUCTIONS
Reviewed symptoms, renal US, physical assessment with Dr Lucas Modi. He is in agreement with CT abd and pelvis. Pt will go now for CT and f/u based on findings.

## 2020-09-21 ENCOUNTER — OFFICE VISIT (OUTPATIENT)
Dept: ORTHOPEDIC SURGERY | Age: 65
End: 2020-09-21
Payer: MEDICARE

## 2020-09-21 VITALS — HEIGHT: 72 IN | TEMPERATURE: 99.1 F | BODY MASS INDEX: 37.25 KG/M2 | WEIGHT: 275 LBS

## 2020-09-21 PROCEDURE — 99212 OFFICE O/P EST SF 10 MIN: CPT | Performed by: ORTHOPAEDIC SURGERY

## 2020-09-21 NOTE — PROGRESS NOTES
HPI: Mr. Theresa Khalil is here today with persistent left shoulder pain. He was scheduled for an explantation of his implants in addition to biopsy for possible indolent infection but it has not been approved yet by insurance. He indicates that a few weeks ago he was sitting on a chair and it ripped and he fell through with both his arms caught on the armrest and as a result he had a forceful abduction of both of his shoulders. He had significant increase in his pain that lasted several days which has improved but he continues to be pretty painful in the shoulder. He also hurt his groin region and is currently seen a urologist for this. Physical examination:  Evaluation patient's left shoulder and upper extremity demonstrates intact skin without warmth, erythema, or notable swelling. Actively he has 30 degrees of elevation, 55 degrees of abduction and lacks 50 degrees of external rotation from neutral.  He does have crepitation with his range and is tender to palpation diffusely about the shoulder. Sensation is grossly intact light touch in all dermatomes and he has a 2+ radial pulse of brisk capillary refill in his fingers. Imaging studies: 4 x-ray views of the left shoulder completed on 9/21/2020 were reviewed demonstrating a reverse shoulder arthroplasty to be in acceptable alignment and well fixed without obvious fracture, dislocation, or subluxation. Impression and plan: Mr. Theresa Khalil is a 69-year-old with a painful left reverse shoulder arthroplasty. Again my suspicion is that he has an indolent infection. No obvious structural damage from his recent fall through his chair. I had a discussion with the patient today with regards to the shoulder. At this point we are going to continue to work to try and get approval from the insurance company to move ahead with surgery so that he can get taking care of.   We will call once we have obtained approval and proceed with surgery again by way of an explantation of his implants in addition to obtaining intraoperative frozen sections as well as cultures. Plan will be to place a functional spacer in the shoulder and initiate antibiotic treatment pending return of his cultures.

## 2020-10-01 ENCOUNTER — TELEPHONE (OUTPATIENT)
Dept: ORTHOPEDIC SURGERY | Age: 65
End: 2020-10-01

## 2020-10-01 NOTE — TELEPHONE ENCOUNTER
Patient called wanting to know where it stands with getting his surgery approved.     Yanni Weinberg he is in awful pain What Is The Reason For Today's Visit?: Full Body Skin Examination What Is The Reason For Today's Visit? (Being Monitored For X): concerning skin lesions on an annual basis How Severe Are Your Spot(S)?: mild

## 2020-10-07 RX ORDER — TRAMADOL HYDROCHLORIDE 50 MG/1
50 TABLET ORAL EVERY 4 HOURS PRN
Qty: 30 TABLET | Refills: 0 | Status: SHIPPED | OUTPATIENT
Start: 2020-10-07 | End: 2020-10-12

## 2020-10-11 ENCOUNTER — HOSPITAL ENCOUNTER (OUTPATIENT)
Dept: PREADMISSION TESTING | Age: 65
Setting detail: SPECIMEN
Discharge: HOME | End: 2020-10-15
Payer: MEDICARE

## 2020-10-11 PROCEDURE — U0003 INFECTIOUS AGENT DETECTION BY NUCLEIC ACID (DNA OR RNA); SEVERE ACUTE RESPIRATORY SYNDROME CORONAVIRUS 2 (SARS-COV-2) (CORONAVIRUS DISEASE [COVID-19]), AMPLIFIED PROBE TECHNIQUE, MAKING USE OF HIGH THROUGHPUT TECHNOLOGIES AS DESCRIBED BY CMS-2020-01-R: HCPCS

## 2020-10-12 LAB
SARS-COV-2, RAPID: NORMAL
SARS-COV-2: NORMAL
SARS-COV-2: NOT DETECTED
SOURCE: NORMAL

## 2020-10-13 ENCOUNTER — HOSPITAL ENCOUNTER (OUTPATIENT)
Age: 65
Setting detail: SPECIMEN
Discharge: HOME | End: 2020-10-13
Payer: MEDICARE

## 2020-10-13 ENCOUNTER — OFFICE VISIT (OUTPATIENT)
Dept: FAMILY MEDICINE CLINIC | Age: 65
End: 2020-10-13
Payer: MEDICARE

## 2020-10-13 VITALS
OXYGEN SATURATION: 95 % | DIASTOLIC BLOOD PRESSURE: 82 MMHG | HEIGHT: 71 IN | SYSTOLIC BLOOD PRESSURE: 144 MMHG | BODY MASS INDEX: 38.56 KG/M2 | TEMPERATURE: 97.9 F | WEIGHT: 275.4 LBS | HEART RATE: 61 BPM

## 2020-10-13 LAB
ALBUMIN SERPL-MCNC: 4 G/DL (ref 3.5–5.2)
ALBUMIN/GLOBULIN RATIO: 1.3 (ref 1–2.5)
ALP BLD-CCNC: 96 U/L (ref 40–129)
ALT SERPL-CCNC: 27 U/L (ref 5–41)
ANION GAP SERPL CALCULATED.3IONS-SCNC: 15 MMOL/L (ref 9–17)
AST SERPL-CCNC: 25 U/L
BILIRUB SERPL-MCNC: 0.22 MG/DL (ref 0.3–1.2)
BUN BLDV-MCNC: 9 MG/DL (ref 8–23)
BUN/CREAT BLD: ABNORMAL (ref 9–20)
CALCIUM SERPL-MCNC: 9.6 MG/DL (ref 8.6–10.4)
CHLORIDE BLD-SCNC: 103 MMOL/L (ref 98–107)
CO2: 24 MMOL/L (ref 20–31)
CREAT SERPL-MCNC: 0.56 MG/DL (ref 0.7–1.2)
CREATININE URINE: 106.9 MG/DL (ref 39–259)
GFR AFRICAN AMERICAN: >60 ML/MIN
GFR NON-AFRICAN AMERICAN: >60 ML/MIN
GFR SERPL CREATININE-BSD FRML MDRD: ABNORMAL ML/MIN/{1.73_M2}
GFR SERPL CREATININE-BSD FRML MDRD: ABNORMAL ML/MIN/{1.73_M2}
GLUCOSE BLD-MCNC: 73 MG/DL (ref 70–99)
HBA1C MFR BLD: 5.5 %
MICROALBUMIN/CREAT 24H UR: <12 MG/L
MICROALBUMIN/CREAT UR-RTO: NORMAL MCG/MG CREAT
POTASSIUM SERPL-SCNC: 4.6 MMOL/L (ref 3.7–5.3)
SODIUM BLD-SCNC: 142 MMOL/L (ref 135–144)
TOTAL PROTEIN: 7 G/DL (ref 6.4–8.3)

## 2020-10-13 PROCEDURE — 83036 HEMOGLOBIN GLYCOSYLATED A1C: CPT | Performed by: FAMILY MEDICINE

## 2020-10-13 PROCEDURE — 99214 OFFICE O/P EST MOD 30 MIN: CPT | Performed by: FAMILY MEDICINE

## 2020-10-13 RX ORDER — METHYLPREDNISOLONE 4 MG/1
TABLET ORAL
Qty: 1 KIT | Refills: 0 | Status: SHIPPED | OUTPATIENT
Start: 2020-10-13 | End: 2021-01-12 | Stop reason: ALTCHOICE

## 2020-10-13 RX ORDER — ALBUTEROL SULFATE 90 UG/1
2 AEROSOL, METERED RESPIRATORY (INHALATION) EVERY 6 HOURS PRN
Qty: 3 INHALER | Refills: 1 | Status: SHIPPED | OUTPATIENT
Start: 2020-10-13 | End: 2021-11-18

## 2020-10-13 RX ORDER — PREGABALIN 200 MG/1
200 CAPSULE ORAL 3 TIMES DAILY
Qty: 270 CAPSULE | Refills: 0 | Status: SHIPPED | OUTPATIENT
Start: 2020-10-13 | End: 2021-01-12 | Stop reason: SDUPTHER

## 2020-10-13 RX ORDER — VARENICLINE TARTRATE
KIT
Qty: 1 BOX | Refills: 0 | Status: SHIPPED
Start: 2020-10-13 | End: 2021-01-12

## 2020-10-13 RX ORDER — VARENICLINE TARTRATE 1 MG/1
1 TABLET, FILM COATED ORAL 2 TIMES DAILY
Qty: 60 TABLET | Refills: 3 | Status: SHIPPED
Start: 2020-10-13 | End: 2020-10-13

## 2020-10-13 RX ORDER — BUDESONIDE AND FORMOTEROL FUMARATE DIHYDRATE 160; 4.5 UG/1; UG/1
2 AEROSOL RESPIRATORY (INHALATION) 2 TIMES DAILY
Qty: 3 INHALER | Refills: 3 | Status: ON HOLD | OUTPATIENT
Start: 2020-10-13 | End: 2021-07-08

## 2020-10-13 RX ORDER — AMLODIPINE BESYLATE 10 MG/1
10 TABLET ORAL DAILY
Qty: 90 TABLET | Refills: 3 | Status: SHIPPED | OUTPATIENT
Start: 2020-10-13 | End: 2020-10-16 | Stop reason: SDUPTHER

## 2020-10-13 ASSESSMENT — ENCOUNTER SYMPTOMS
TROUBLE SWALLOWING: 0
BACK PAIN: 1
WHEEZING: 0
BLOOD IN STOOL: 0
VOMITING: 0
COUGH: 1
CONSTIPATION: 0
ABDOMINAL PAIN: 0
CHEST TIGHTNESS: 0
SHORTNESS OF BREATH: 1
ABDOMINAL DISTENTION: 0
DIARRHEA: 0
NAUSEA: 0

## 2020-10-13 NOTE — PROGRESS NOTES
Visit Information    Have you changed or started any medications since your last visit including any over-the-counter medicines, vitamins, or herbal medicines? no   Have you stopped taking any of your medications? Is so, why? -  yes - ASA DUE TO SURGERY 10/15/2020  Are you having any side effects from any of your medications? - no    Have you seen any other physician or provider since your last visit? yes - DR PRUITT/ UROLOGY   Have you had any other diagnostic tests since your last visit? yes - CT SCAN, XR SHOULDER, US, CHEST XR   Have you been seen in the emergency room and/or had an admission in a hospital since we last saw you?  no   Have you had your routine dental cleaning in the past 6 months?  yes -      Do you have an active MyChart account? If no, what is the barrier?   Yes    Patient Care Team:  Andreia Portillo MD as PCP - General (Family Medicine)  Andreia Portillo MD as PCP - [address] [place] [address]  [facility] Provider  Leonides Cardenas MD as Consulting Physician (Neurosurgery)  Luis Gonzalez MD as Consulting Physician (Cardiology)  David Oshea MD as Consulting Physician (Urology)  Prabha Salas MD as Surgeon (Vascular Surgery)  Radha Peterson MD as Consulting Physician (Pulmonology)  María Damon MD as Consulting Physician (Orthopedic Surgery)  Tevin Turcios DO as Consulting Physician (General Surgery)  Dany Cox OD (Ophthalmology)  Desean Ibarra MD as Consulting Physician (Endocrinology)  Sam Dillard MD as Consulting Physician (Orthopedic Surgery)  Bhaskar Miguel MD as Consulting Physician (Gastroenterology)  Cj Cazares MD as Consulting Physician (Orthopedic Surgery)  Tejas Walter RN as Nurse Navigator    Medical History Review  Past Medical, Family, and Social History reviewed and does contribute to the patient presenting condition    Health Maintenance   Topic Date Due    Diabetic foot exam  04/09/2020    Flu vaccine (1) 09/01/2020    Diabetic microalbuminuria test  09/16/2020    Diabetic retinal exam  11/07/2020    Low dose CT lung screening  03/12/2021    PSA counseling  05/14/2021    Annual Wellness Visit (AWV)  07/08/2021    A1C test (Diabetic or Prediabetic)  07/09/2021    Lipid screen  07/09/2021    Potassium monitoring  08/27/2021    Creatinine monitoring  08/27/2021    Pneumococcal 65+ years Vaccine (2 of 2 - PPSV23) 04/11/2022    Colon cancer screen colonoscopy  05/09/2022    DTaP/Tdap/Td vaccine (2 - Td) 03/01/2028    Shingles Vaccine  Completed    Hepatitis C screen  Addressed    Hepatitis A vaccine  Aged Out    Hib vaccine  Aged Out    Meningococcal (ACWY) vaccine  Aged Out

## 2020-10-13 NOTE — PATIENT INSTRUCTIONS
Patient Education       TAKE 10 MG NORVASC UNTIL NEW BOTTLE COMES     Learning About Diabetes Food Guidelines  Your Care Instructions     Meal planning is important to manage diabetes. It helps keep your blood sugar at a target level (which you set with your doctor). You don't have to eat special foods. You can eat what your family eats, including sweets once in a while. But you do have to pay attention to how often you eat and how much you eat of certain foods. You may want to work with a dietitian or a certified diabetes educator (CDE) to help you plan meals and snacks. A dietitian or CDE can also help you lose weight if that is one of your goals. What should you know about eating carbs? Managing the amount of carbohydrate (carbs) you eat is an important part of healthy meals when you have diabetes. Carbohydrate is found in many foods. · Learn which foods have carbs. And learn the amounts of carbs in different foods. ? Bread, cereal, pasta, and rice have about 15 grams of carbs in a serving. A serving is 1 slice of bread (1 ounce), ½ cup of cooked cereal, or 1/3 cup of cooked pasta or rice. ? Fruits have 15 grams of carbs in a serving. A serving is 1 small fresh fruit, such as an apple or orange; ½ of a banana; ½ cup of cooked or canned fruit; ½ cup of fruit juice; 1 cup of melon or raspberries; or 2 tablespoons of dried fruit. ? Milk and no-sugar-added yogurt have 15 grams of carbs in a serving. A serving is 1 cup of milk or 2/3 cup of no-sugar-added yogurt. ? Starchy vegetables have 15 grams of carbs in a serving. A serving is ½ cup of mashed potatoes or sweet potato; 1 cup winter squash; ½ of a small baked potato; ½ cup of cooked beans; or ½ cup cooked corn or green peas. · Learn how much carbs to eat each day and at each meal. A dietitian or CDE can teach you how to keep track of the amount of carbs you eat. This is called carbohydrate counting.   · If you are not sure how to count carbohydrate grams, use the Plate Method to plan meals. It is a good, quick way to make sure that you have a balanced meal. It also helps you spread carbs throughout the day. ? Divide your plate by types of foods. Put non-starchy vegetables on half the plate, meat or other protein food on one-quarter of the plate, and a grain or starchy vegetable in the final quarter of the plate. To this you can add a small piece of fruit and 1 cup of milk or yogurt, depending on how many carbs you are supposed to eat at a meal.  · Try to eat about the same amount of carbs at each meal. Do not \"save up\" your daily allowance of carbs to eat at one meal.  · Proteins have very little or no carbs per serving. Examples of proteins are beef, chicken, turkey, fish, eggs, tofu, cheese, cottage cheese, and peanut butter. A serving size of meat is 3 ounces, which is about the size of a deck of cards. Examples of meat substitute serving sizes (equal to 1 ounce of meat) are 1/4 cup of cottage cheese, 1 egg, 1 tablespoon of peanut butter, and ½ cup of tofu. How can you eat out and still eat healthy? · Learn to estimate the serving sizes of foods that have carbohydrate. If you measure food at home, it will be easier to estimate the amount in a serving of restaurant food. · If the meal you order has too much carbohydrate (such as potatoes, corn, or baked beans), ask to have a low-carbohydrate food instead. Ask for a salad or green vegetables. · If you use insulin, check your blood sugar before and after eating out to help you plan how much to eat in the future. · If you eat more carbohydrate at a meal than you had planned, take a walk or do other exercise. This will help lower your blood sugar. What else should you know? · Limit saturated fat, such as the fat from meat and dairy products. This is a healthy choice because people who have diabetes are at higher risk of heart disease. So choose lean cuts of meat and nonfat or low-fat dairy products.  Use olive or canola oil instead of butter or shortening when cooking. · Don't skip meals. Your blood sugar may drop too low if you skip meals and take insulin or certain medicines for diabetes. · Check with your doctor before you drink alcohol. Alcohol can cause your blood sugar to drop too low. Alcohol can also cause a bad reaction if you take certain diabetes medicines. Follow-up care is a key part of your treatment and safety. Be sure to make and go to all appointments, and call your doctor if you are having problems. It's also a good idea to know your test results and keep a list of the medicines you take. Where can you learn more? Go to https://[employer]QU[employer] PURE.RunAlong. org and sign in to your [employer] account. Enter L250 in the [employer] box to learn more about \"Learning About Diabetes Food Guidelines. \"     If you do not have an account, please click on the \"Sign Up Now\" link. Current as of: December 20, 2019               Content Version: 12.6  © 5129-9055 [employer]. Care instructions adapted under license by [facility] ([facility]). If you have questions about a medical condition or this instruction, always ask your healthcare professional. [place] [address] any warranty or liability for your use of this information.

## 2020-10-13 NOTE — PROGRESS NOTES
Chief Complaint   Patient presents with    Hypertension    Diabetes    Shoulder Pain     right, will have surgery    Testicle Pain     b/l, told from back pain    COPD    Back Pain       Patient is here to follow-up on chronic and new medical problems. Bernardino complains of flare up of back pain  He says the Chair broke about 3 weeks ago, and fell onto the right shoulder, flared up his back pain and also hit his left testicle which is still swollen and tender. Patient reports he is feeling a ton of pain in his testicles and is not getting better. He cannot sleep due to pain  Dawson Cummins, urology, on 9/17/20, and he did have an ultrasound of the testicles, did not show any acute disease or hematoma or torsion. Reassurance given  Recent urine test done at urologist was negative for blood in the urine    He thinks might be pinched nerve in the back, pain is worse since 3 weeks ago after the chair broke. I always have back pain , but now it is worse. Pain interferes with sleep and activities. Pain is worse with activities. Intensity of pain is 8/10        Diabetes Mellitus Type II, Follow-up:    Current symptoms/problems include hyperglycemia, paresthesia of the feet and visual disturbances. Symptoms have been present for several years. Known diabetic complications: peripheral neuropathy and peripheral vascular disease    Cardiovascular risk factors: advanced age (older than 54 for men, 72 for women), diabetes mellitus, dyslipidemia, hypertension, male gender, obesity (BMI >= 30 kg/m2) and smoking/ tobacco exposure    Medication compliance:  N/A  Current diabetic medications include none. Eye exam current (within one year): yes  Weight trend: decreasing steadily, lost 13 lbs in 9 months.   Wt Readings from Last 3 Encounters:   10/13/20 275 lb 6.4 oz (124.9 kg)   09/21/20 275 lb (124.7 kg)   08/27/20 267 lb (121.1 kg)    lbs on 1/28/2020    Prior visit with dietician: yes   Current diet: in general, a \"healthy\" diet    Current exercise: none    Barriers: impairment:  physical: Patient has chronic back pain, chronic neck pain, neuropathy, difficulty walking, prostate cancer    Current monitoring regimen: office lab tests - A1c every 3 months  Home blood sugar records: patient does not test  Is He on ACE inhibitor or angiotensin II receptor blocker? Yes      reports that he has been smoking cigarettes. He has a 52.50 pack-year smoking history. He has never used smokeless tobacco.     Ready to quit: Yes  Counseling given: Yes  Comment: WAS SMOKING 1-1.5 ppd      Daily Aspirin? Yes      A1c is 5.5, improving   Lab Results   Component Value Date    LABA1C 5.5 10/13/2020    LABA1C 6.4 (H) 07/09/2020    LABA1C 5.6 03/09/2020     Hypertension:   Noni Allen  is not  exercising and is adherent to low salt diet. Blood pressure is well controlled at home. Cardiac symptoms  dyspnea, fatigue and lower extremity edema. Patient denies  chest pain, chest pressure/discomfort, claudication, exertional chest pressure/discomfort, irregular heart beat, near-syncope, orthopnea, palpitations, paroxysmal nocturnal dyspnea, syncope and tachypnea. Use of agents associated with hypertension: none. History of target organ damage: peripheral artery disease and coronary artery disease     BP is high today, likely related to pain. Has been taking his meds. BP Readings from Last 3 Encounters:   10/13/20 (!) 144/82   08/27/20 128/63   07/07/20 122/66        Pulse is Normal.    Pulse Readings from Last 3 Encounters:   10/13/20 61   08/27/20 56   07/07/20 65         Hyperlipidemia:  No new myalgias or GI upset on pravastatin (Pravachol). Medication compliance: compliant all of the time. Patient is  following a low fat, low cholesterol diet.      LDL is Normal.    Lab Results   Component Value Date    LDLCHOLESTEROL 65 07/09/2020       Right shoulder pain  Intensity of pain is 8/10  Will have surgery in 2 days      COPD: Current treatment includes short-acting beta agonist inhaler, nebulized beta agonist, combined beta agonist/steroid inhaler, which has been effective. Residual symptoms: chronic dyspnea and non-productive cough. He denies purulent sputum, wheezing. He requires his rescue inhaler 1 time(s) per day. Nicotine dependence. Smoker, counseling given to quit smoking. Restarted  To smoke, currently  Smoking 1 PPD,  Has  Nicotine patches at home and intends to start using them due to upcoming surgery, but agrees for CHantix   Ready to quit: Yes  Counseling given: Yes  Comment: WAS SMOKING 1-1.5 ppd        Current Outpatient Medications   Medication Sig Dispense Refill    OXYGEN Provide portable oxygen concentrator      Cholecalciferol (VITAMIN D) 50 MCG (2000 UT) CAPS capsule Take 1 capsule by mouth daily      pravastatin (PRAVACHOL) 80 MG tablet Take 1 tablet by mouth every evening Dose increased  9/24/2019 90 tablet 3    omeprazole (PRILOSEC) 40 MG delayed release capsule Take 1 capsule by mouth every morning (before breakfast) 90 capsule 3    Respiratory Therapy Supplies (ADULT MASK) MISC Needs to use mask daily due to coronavirus pandemic.  30 each 5    furosemide (LASIX) 20 MG tablet TAKE 1 TABLET DAILY AS NEEDED (ONLY IF BLOOD PRESSURE OVER 140/90 OR LEG EDEMA) (STOP TENORETIC 50-25) 90 tablet 3    colchicine (COLCRYS) 0.6 MG tablet Take 0.6 mg by mouth daily      ipratropium-albuterol (DUONEB) 0.5-2.5 (3) MG/3ML SOLN nebulizer solution Inhale 1 vial into the lungs 3 times daily      blood glucose monitor strips Use to test twice daily and as needed as directed by provider      ferrous sulfate (IRON 325) 325 (65 Fe) MG tablet Take 1 tablet by mouth 2 times daily 60 tablet 0    SYMBICORT 160-4.5 MCG/ACT AERO Inhale 2 puffs into the lungs 2 times daily 10.2 inhaler 3 Inhaler 3    lisinopril (PRINIVIL;ZESTRIL) 10 MG tablet Take 1 tablet by mouth daily Dose decreased 3/18/2020 90 tablet 3    traMADol (ULTRAM) 50 MG tablet Take 1-2 tablets by mouth every 6 hours as needed for Pain. 30 tablet 0    amLODIPine (NORVASC) 10 MG tablet Take 1 tablet by mouth daily (Patient taking differently: Take 5 mg by mouth daily ) 90 tablet 3    Lift Chair MISC by Does not apply route Patient has difficulty getting up from usual chair 1 each 0    folbee plus (FOLBEE PLUS) TABS Take 1 tablet by mouth daily 90 tablet 3    aspirin EC 81 MG EC tablet Take 1 tablet by mouth daily 90 tablet 0    Glucose Blood (BLOOD GLUCOSE TEST STRIPS) STRP tesing once a day fasting 100 strip 3    albuterol sulfate  (90 BASE) MCG/ACT inhaler Inhale 2 puffs into the lungs every 6 hours as needed for Wheezing or Shortness of Breath (COUGH) 18 g 0    pregabalin (LYRICA) 200 MG capsule Take 1 capsule by mouth 3 times daily for 90 days. 270 capsule 0     No current facility-administered medications for this visit. Rest of complaints with corresponding details per ROS      The patient'spast medical, surgical, social, and family history as well as his current medications and allergies were reviewed as documented in today's encounter. Social History     Tobacco Use    Smoking status: Current Every Day Smoker     Packs/day: 1.50     Years: 35.00     Pack years: 52.50     Types: Cigarettes     Last attempt to quit: 3/9/2020     Years since quittin.5    Smokeless tobacco: Never Used    Tobacco comment: WAS SMOKING 1-1.5 ppd   Substance Use Topics    Alcohol use: No     Alcohol/week: 0.0 standard drinks    Drug use: No       Ready to quit: Yes  Counseling given: Yes  Comment: WAS SMOKING 1-1.5 ppd                  Review of Systems   Constitutional: Positive for fatigue. Negative for activity change, appetite change, chills, diaphoresis, fever and unexpected weight change. HENT: Negative for trouble swallowing. Eyes: Positive for visual disturbance (chronic, stable, wears glasses).    Respiratory: Positive for cough (dry) and shortness of breath (PLASCENCIA). Negative for chest tightness and wheezing. On continuous oxygen at 2 L/min   Cardiovascular: Positive for leg swelling. Negative for chest pain and palpitations. Has compression stockings at home, cardiologist decrease the dosage of Norvasc from 10 mg, to 5 mg to improve his leg swelling but leg swelling did not change   Gastrointestinal: Negative for abdominal distention, abdominal pain, blood in stool, constipation, diarrhea, nausea and vomiting. Endocrine: Negative for cold intolerance, heat intolerance, polydipsia, polyphagia and polyuria. Genitourinary: Positive for testicular pain. Negative for decreased urine volume, difficulty urinating, frequency and hematuria. Musculoskeletal: Positive for arthralgias (right shoulder), back pain and gait problem. Ambulates with walker with seat   Neurological: Positive for weakness (legs, chronic, same as before) and numbness (feet and legs). Hematological: Does not bruise/bleed easily.         -vital signs stable and within normal limits except high BP, mildly low pulse ox, and severe Obesity per BMI. BP (!) 144/82   Pulse 61   Temp 97.9 °F (36.6 °C)   Ht 5' 11\" (1.803 m)   Wt 275 lb 6.4 oz (124.9 kg)   SpO2 95%   BMI 38.41 kg/m²         Physical Exam  Vitals signs and nursing note reviewed. Constitutional:       General: He is not in acute distress. Appearance: Normal appearance. He is well-developed. He is obese. He is not ill-appearing or diaphoretic. HENT:      Head: Normocephalic and atraumatic. Right Ear: External ear normal.      Left Ear: External ear normal.      Nose: Nose normal.      Mouth/Throat:      Mouth: Mucous membranes are moist.      Pharynx: No oropharyngeal exudate. Eyes:      General: Lids are normal. No scleral icterus. Right eye: No discharge. Left eye: No discharge.       Conjunctiva/sclera: Conjunctivae normal.   Neck:      Musculoskeletal: Normal range of motion and neck supple. Thyroid: No thyromegaly. Cardiovascular:      Rate and Rhythm: Normal rate and regular rhythm. Pulses:           Dorsalis pedis pulses are 0 on the right side and 1+ on the left side. Posterior tibial pulses are 1+ on the right side and 1+ on the left side. Heart sounds: Normal heart sounds. No murmur. Comments: Hairless legs bilateral   Pulmonary:      Effort: Pulmonary effort is normal. No respiratory distress. Breath sounds: Examination of the right-lower field reveals decreased breath sounds. Examination of the left-lower field reveals decreased breath sounds. Decreased breath sounds present. No wheezing, rhonchi or rales. Chest:      Chest wall: No tenderness. Abdominal:      General: Bowel sounds are normal. There is no distension. Palpations: Abdomen is soft. There is no hepatomegaly or splenomegaly. Tenderness: There is no abdominal tenderness. Comments: Obese abdomen. Musculoskeletal:         General: Tenderness present. Right shoulder: He exhibits decreased range of motion, tenderness and bony tenderness. Lumbar back: He exhibits decreased range of motion, tenderness and bony tenderness. Right lower le+ Pitting Edema present. Left lower le+ Pitting Edema present. Comments: Right leg foot drop. Walks with wide base. Ambulates with walker with seat today  Up and go test more than 12 seconds. High risk for falls. Feet:      Right foot:      Protective Sensation: 5 sites tested. 0 sites sensed. Left foot:      Protective Sensation: 5 sites tested. 2 sites sensed. Lymphadenopathy:      Cervical: No cervical adenopathy. Skin:     General: Skin is warm and dry. Capillary Refill: Capillary refill takes less than 2 seconds. Findings: No rash. Neurological:      Mental Status: He is alert and oriented to person, place, and time.       Cranial Nerves: No cranial nerve deficit. Sensory: Sensory deficit (b/l feet and legs, b/l hands) present. Motor: Abnormal muscle tone present. Coordination: Coordination normal.      Gait: Gait abnormal.      Deep Tendon Reflexes: Reflexes are normal and symmetric. Comments: Right leg foot drop. Walks with wide base. Ambulates with walker with seat today. Up and go test more than 12 seconds. High risk for falls. Psychiatric:         Mood and Affect: Mood is anxious. Behavior: Behavior normal.         Thought Content: Thought content normal.         Judgment: Judgment normal.           Discussed testing withthe patient and all questions fully answered. I personally reviewed testing with patient.   Hyperglycemia  hypertriglyceridemia     Otherwise labs within normal limits        Lab Results   Component Value Date    WBC 8.7 08/27/2020    HGB 14.5 08/27/2020    HCT 43.8 08/27/2020    MCV 83.2 08/27/2020     08/27/2020       Lab Results   Component Value Date     08/27/2020    K 4.5 08/27/2020     08/27/2020    CO2 31 08/27/2020    BUN 6 08/27/2020    CREATININE 0.62 08/27/2020    GLUCOSE 104 08/27/2020    CALCIUM 9.9 08/27/2020        Lab Results   Component Value Date    ALT 21 07/09/2020    AST 23 07/09/2020    ALKPHOS 104 07/09/2020    BILITOT 0.28 (L) 07/09/2020       Lab Results   Component Value Date    TSH 1.84 02/11/2020       Lab Results   Component Value Date    CHOL 138 07/09/2020    CHOL 160 02/11/2020    CHOL 201 (H) 09/16/2019     Lab Results   Component Value Date    TRIG 203 (H) 07/09/2020    TRIG 182 (H) 02/11/2020    TRIG 247 (H) 09/16/2019     Lab Results   Component Value Date    HDL 32 (L) 07/09/2020    HDL 44 02/11/2020    HDL 45 09/16/2019     Lab Results   Component Value Date    LDLCHOLESTEROL 65 07/09/2020    LDLCHOLESTEROL 80 02/11/2020    LDLCHOLESTEROL 107 09/16/2019     Lab Results   Component Value Date    CHOLHDLRATIO 4.3 07/09/2020    CHOLHDLRATIO 3.6 02/11/2020 CHOLHDLRATIO 4.5 09/16/2019         Lab Results   Component Value Date    QIXUPXXL55 >2000 (H) 07/09/2020     Lab Results   Component Value Date    FOLATE >20.0 07/09/2020     Lab Results   Component Value Date    VITD25 40.8 12/23/2019           ASSESSMENT AND PLAN    1. Acute back pain with radiculopathy  Failing to change as expected. - methylPREDNISolone (MEDROL, ROSALIE,) 4 MG tablet; Take by mouth, with food. Keep low carb, low salt diet while taking it  Dispense: 1 kit; Refill: 0  Tylenol ES, topical creams    2. Type 2 diabetes mellitus with diabetic polyneuropathy, without long-term current use of insulin ([facility])  Improved    Lab Results   Component Value Date    LABA1C 5.5 10/13/2020    LABA1C 6.4 (H) 07/09/2020    LABA1C 5.6 03/09/2020     - pregabalin (LYRICA) 200 MG capsule; Take 1 capsule by mouth 3 times daily for 90 days. Dispense: 270 capsule; Refill: 0, denies side effects, helps his neuropathy   Continue Folbee     - POCT glycosylated hemoglobin (Hb A1C)  - Microalbumin / Creatinine Urine Ratio; Future  - Comprehensive Metabolic Panel; Future  Continue aspirin, statin , ACEI    3. Essential hypertension  uncontrolled. - amLODIPine (NORVASC) 10 MG tablet; Take 1 tablet by mouth daily Dose increased 10/16/2020 due to high BP. Correct dosage  Dispense: 90 tablet; Refill: 3  Continue Lisinopril and Lasix      4. Hyperlipidemia with target LDL less than 70  Well controlled. Continue current treatment Pravachol      5. COPD mixed type ([address]  Stable  Continue current treatment and stop smoking.     - SYMBICORT 160-4.5 MCG/ACT AERO; Inhale 2 puffs into the lungs 2 times daily 10.2 inhaler, give 3 inhalers  Dispense: 3 Inhaler; Refill: 3    6. Personal history of smoking  - varenicline (CHANTIX STARTING MONTH PAK) 0.5 MG X 11 & 1 MG X 42 tablet; 0.5 mg po daily x 3 days, 0.5 mg BID x 4 days, then 1 mg BID thereafter . Call for refill  Dispense: 1 box; Refill: 0  Patient was counseled on tobacco cessation. Based upon patient's motivation to change his behavior, the following plan was agreed upon: patient will think about his/her triggers for using tobacco, patient will think about reasons why he/she should quit using tobacco and patient will try the following tobacco cessation strategies:  Chantix: risks and benefits of this medication discussed- patient will call for any significant adverse effects. He was provided with a list of local tobacco cessation resources. Provider spent 10 minutes counseling patient. Controlled Substance Monitoring:    Acute and Chronic Pain Monitoring:   RX Monitoring 10/13/2020   Attestation -   Periodic Controlled Substance Monitoring Possible medication side effects, risk of tolerance/dependence & alternative treatments discussed. ;No signs of potential drug abuse or diversion identified. ;Assessed functional status.    Chronic Pain > 50 MEDD -   Chronic Pain > 80 MEDD -         Data Unavailable    Orders Placed This Encounter   Procedures    Microalbumin / Creatinine Urine Ratio     Standing Status:   Future     Number of Occurrences:   1     Standing Expiration Date:   12/13/2020    Comprehensive Metabolic Panel     Standing Status:   Future     Number of Occurrences:   1     Standing Expiration Date:   12/13/2020    POCT glycosylated hemoglobin (Hb A1C)       Medications Discontinued During This Encounter   Medication Reason    pregabalin (LYRICA) 200 MG capsule REORDER    varenicline (CHANTIX CONTINUING MONTH ROSALIE) 1 MG tablet Cost of medication    colchicine (COLCRYS) 0.6 MG tablet Patient Choice    ferrous sulfate (IRON 325) 325 (65 Fe) MG tablet Therapy completed    SYMBICORT 160-4.5 MCG/ACT AERO REORDER    amLODIPine (NORVASC) 10 MG tablet REORDER    amLODIPine (NORVASC) 10 MG tablet Anisa Valencia received counseling on the following healthy behaviors: nutrition, exercise, medication adherence, tobacco cessation and weight loss   Reviewed prior labs and health maintenance  Continue current medications, diet and exercise. Discussed use, benefit, and side effects of prescribed medications. Barriers to medication compliance addressed. Patient given educational materials - see patient instructions  Was a self-tracking handout given in paper form or via [employer]? Yes    Requested Prescriptions     Signed Prescriptions Disp Refills    pregabalin (LYRICA) 200 MG capsule 270 capsule 0     Sig: Take 1 capsule by mouth 3 times daily for 90 days.  methylPREDNISolone (MEDROL, ROSALIE,) 4 MG tablet 1 kit 0     Sig: Take by mouth, with food. Keep low carb, low salt diet while taking it    varenicline (CHANTIX STARTING MONTH ) 0.5 MG X 11 & 1 MG X 42 tablet 1 box 0     Si.5 mg po daily x 3 days, 0.5 mg BID x 4 days, then 1 mg BID thereafter . Call for refill    SYMBICORT 160-4.5 MCG/ACT AERO 3 Inhaler 3     Sig: Inhale 2 puffs into the lungs 2 times daily 10.2 inhaler, give 3 inhalers    amLODIPine (NORVASC) 10 MG tablet 90 tablet 3     Sig: Take 1 tablet by mouth daily    albuterol sulfate HFA (PROVENTIL HFA) 108 (90 Base) MCG/ACT inhaler 3 Inhaler 1     Sig: Inhale 2 puffs into the lungs every 6 hours as needed for Wheezing or Shortness of Breath       All patient questions answered. Patient voiced understanding. Quality Measures    Body mass index is 38.41 kg/m². Elevated. Weight control planned discussed conventional weight loss and Healthy diet and regular exercise. BP: (!) 144/82 Blood pressure is high. Treatment plan consists of Weight Reduction, DASH Eating Plan, Dietary Sodium Restriction, Increased Physical Activity, Avoid Tobacco and Second-hand Smoke, Patient In-home Blood Pressure Monitoring and Antihypertensive Medication Increased. The patient's past medical,surgical, social, and family history as well as his   current medications and allergies were reviewed as documented in today's encounter.       Medications, labs, diagnostic studies, consultations and follow-up asdocumented in this encounter. Return in about 4 months (around 2/13/2021) for ALWAYS NEEDS 30 MIN. APPT, **POC A1C, DM2, HTN, HLP. Patient was seen with total face to face time of 25 minutes. More than 50% of this visit was counseling and education. Future Appointments   Date Time Provider Keri English   10/22/2020  9:00 AM [facility] PAT RM 1 [facility] PAT [facility]   10/28/2020  9:15 AM Cody Maynard MD [facility]   11/23/2020  9:50 AM Zahra Elliott MD [facility]   7/8/2021 11:30 AM Radha Montoya MD [facility] [address] [facility]       This note was completed by using the assistance of a speech-recognition program. However, inadvertent computerized transcription errors may be present. Although every effort was made to ensure accuracy, no guarantees can be provided that every mistake has been identified and corrected by editing .     Electronically signed by Radha Montoya MD on 10/20/2020 at 8:08 PM

## 2020-10-13 NOTE — RESULT ENCOUNTER NOTE
Addressed during office visit today, A1c 5.5 within normal limits , greatly improved diabetes  Continue current treatment discussed during visit

## 2020-10-14 ENCOUNTER — ANESTHESIA EVENT (OUTPATIENT)
Dept: OPERATING ROOM | Age: 65
End: 2020-10-14
Payer: MEDICARE

## 2020-10-14 ENCOUNTER — TELEPHONE (OUTPATIENT)
Dept: ORTHOPEDIC SURGERY | Age: 65
End: 2020-10-14

## 2020-10-14 RX ORDER — ACETAMINOPHEN 325 MG/1
1000 TABLET ORAL ONCE
Status: CANCELLED | OUTPATIENT
Start: 2020-10-14 | End: 2020-10-14

## 2020-10-14 RX ORDER — SODIUM CHLORIDE 0.9 % (FLUSH) 0.9 %
10 SYRINGE (ML) INJECTION PRN
Status: CANCELLED | OUTPATIENT
Start: 2020-10-14

## 2020-10-14 RX ORDER — SODIUM CHLORIDE 0.9 % (FLUSH) 0.9 %
10 SYRINGE (ML) INJECTION EVERY 12 HOURS SCHEDULED
Status: CANCELLED | OUTPATIENT
Start: 2020-10-14

## 2020-10-14 NOTE — RESULT ENCOUNTER NOTE
Please notify patient, normal labs, continue current treatment    Future Appointments  10/22/2020 9:00 AM    [facility] PAT RM 1              [facility] PAT       [facility]  10/28/2020 9:15 AM    Aureliano Piedra MD          [facility]  11/23/2020 9:50 AM    Edwin Bajwa MD         [facility]      [facility]  12/31/2020 2:00 PM    [facility]  7/8/2021   11:30 AM   [facility] Sanchez [facility]     [facility]

## 2020-10-14 NOTE — TELEPHONE ENCOUNTER
Spoke with patient today about tomorrow's surgery. 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 While he has had a slightly elevated CRP as part of his work up in the past, all other studies have been negative for infection. As a result, today I recommended proceeding with an arthroscopic biopsy for culture as I've discussed with him previously to confirm or exclude an ongoing infection prior to removing his implants if indicated. Following our discussion, he is amenable to this course of action. Consequently, we will plan on an arthroscopic biopsy for cultures instead of an explantation tomorrow.

## 2020-10-15 ENCOUNTER — HOSPITAL ENCOUNTER (OUTPATIENT)
Age: 65
Setting detail: SURGERY ADMIT
Discharge: HOME | End: 2020-10-15
Attending: ORTHOPAEDIC SURGERY | Admitting: ORTHOPAEDIC SURGERY
Payer: MEDICARE

## 2020-10-15 ENCOUNTER — ANESTHESIA (OUTPATIENT)
Dept: OPERATING ROOM | Age: 65
End: 2020-10-15
Payer: MEDICARE

## 2020-10-15 VITALS — OXYGEN SATURATION: 92 % | SYSTOLIC BLOOD PRESSURE: 155 MMHG | DIASTOLIC BLOOD PRESSURE: 97 MMHG | TEMPERATURE: 95.5 F

## 2020-10-15 VITALS
WEIGHT: 280 LBS | HEART RATE: 66 BPM | HEIGHT: 72 IN | DIASTOLIC BLOOD PRESSURE: 67 MMHG | BODY MASS INDEX: 37.93 KG/M2 | OXYGEN SATURATION: 92 % | TEMPERATURE: 96 F | SYSTOLIC BLOOD PRESSURE: 141 MMHG | RESPIRATION RATE: 16 BRPM

## 2020-10-15 LAB
GLUCOSE BLD-MCNC: 119 MG/DL (ref 75–110)
GLUCOSE BLD-MCNC: 123 MG/DL (ref 75–110)

## 2020-10-15 PROCEDURE — 87075 CULTR BACTERIA EXCEPT BLOOD: CPT

## 2020-10-15 PROCEDURE — 29805 SHO ARTHRS DX +- SYNOVIAL BX: CPT | Performed by: ORTHOPAEDIC SURGERY

## 2020-10-15 PROCEDURE — 6370000000 HC RX 637 (ALT 250 FOR IP): Performed by: ORTHOPAEDIC SURGERY

## 2020-10-15 PROCEDURE — 82947 ASSAY GLUCOSE BLOOD QUANT: CPT

## 2020-10-15 PROCEDURE — 3600000004 HC SURGERY LEVEL 4 BASE: Performed by: ORTHOPAEDIC SURGERY

## 2020-10-15 PROCEDURE — 6360000002 HC RX W HCPCS: Performed by: ORTHOPAEDIC SURGERY

## 2020-10-15 PROCEDURE — 2720000010 HC SURG SUPPLY STERILE: Performed by: ORTHOPAEDIC SURGERY

## 2020-10-15 PROCEDURE — 7100000000 HC PACU RECOVERY - FIRST 15 MIN: Performed by: ORTHOPAEDIC SURGERY

## 2020-10-15 PROCEDURE — 7100000030 HC ASPR PHASE II RECOVERY - FIRST 15 MIN: Performed by: ORTHOPAEDIC SURGERY

## 2020-10-15 PROCEDURE — 87205 SMEAR GRAM STAIN: CPT

## 2020-10-15 PROCEDURE — L3650 SO 8 ABD RESTRAINT PRE OTS: HCPCS | Performed by: ORTHOPAEDIC SURGERY

## 2020-10-15 PROCEDURE — 2500000003 HC RX 250 WO HCPCS: Performed by: ANESTHESIOLOGY

## 2020-10-15 PROCEDURE — 3700000001 HC ADD 15 MINUTES (ANESTHESIA): Performed by: ORTHOPAEDIC SURGERY

## 2020-10-15 PROCEDURE — 7100000031 HC ASPR PHASE II RECOVERY - ADDTL 15 MIN: Performed by: ORTHOPAEDIC SURGERY

## 2020-10-15 PROCEDURE — 7100000001 HC PACU RECOVERY - ADDTL 15 MIN: Performed by: ORTHOPAEDIC SURGERY

## 2020-10-15 PROCEDURE — 3700000000 HC ANESTHESIA ATTENDED CARE: Performed by: ORTHOPAEDIC SURGERY

## 2020-10-15 PROCEDURE — 64415 NJX AA&/STRD BRCH PLXS IMG: CPT | Performed by: ANESTHESIOLOGY

## 2020-10-15 PROCEDURE — 3600000014 HC SURGERY LEVEL 4 ADDTL 15MIN: Performed by: ORTHOPAEDIC SURGERY

## 2020-10-15 PROCEDURE — 87176 TISSUE HOMOGENIZATION CULTR: CPT

## 2020-10-15 PROCEDURE — 6360000002 HC RX W HCPCS: Performed by: NURSE ANESTHETIST, CERTIFIED REGISTERED

## 2020-10-15 PROCEDURE — 87070 CULTURE OTHR SPECIMN AEROBIC: CPT

## 2020-10-15 PROCEDURE — 2580000003 HC RX 258: Performed by: ANESTHESIOLOGY

## 2020-10-15 PROCEDURE — 2500000003 HC RX 250 WO HCPCS: Performed by: NURSE ANESTHETIST, CERTIFIED REGISTERED

## 2020-10-15 PROCEDURE — 2709999900 HC NON-CHARGEABLE SUPPLY: Performed by: ORTHOPAEDIC SURGERY

## 2020-10-15 RX ORDER — BUPIVACAINE HYDROCHLORIDE 5 MG/ML
INJECTION, SOLUTION EPIDURAL; INTRACAUDAL
Status: DISCONTINUED | OUTPATIENT
Start: 2020-10-15 | End: 2020-10-15 | Stop reason: SDUPTHER

## 2020-10-15 RX ORDER — ROCURONIUM BROMIDE 10 MG/ML
INJECTION, SOLUTION INTRAVENOUS PRN
Status: DISCONTINUED | OUTPATIENT
Start: 2020-10-15 | End: 2020-10-15 | Stop reason: SDUPTHER

## 2020-10-15 RX ORDER — SODIUM CHLORIDE 9 MG/ML
INJECTION, SOLUTION INTRAVENOUS CONTINUOUS
Status: DISCONTINUED | OUTPATIENT
Start: 2020-10-15 | End: 2020-10-15 | Stop reason: HOSPADM

## 2020-10-15 RX ORDER — ACETAMINOPHEN 500 MG
1000 TABLET ORAL ONCE
Status: COMPLETED | OUTPATIENT
Start: 2020-10-15 | End: 2020-10-15

## 2020-10-15 RX ORDER — FENTANYL CITRATE 50 UG/ML
INJECTION, SOLUTION INTRAMUSCULAR; INTRAVENOUS PRN
Status: DISCONTINUED | OUTPATIENT
Start: 2020-10-15 | End: 2020-10-15 | Stop reason: SDUPTHER

## 2020-10-15 RX ORDER — SODIUM CHLORIDE 0.9 % (FLUSH) 0.9 %
10 SYRINGE (ML) INJECTION PRN
Status: DISCONTINUED | OUTPATIENT
Start: 2020-10-15 | End: 2020-10-15 | Stop reason: HOSPADM

## 2020-10-15 RX ORDER — GLYCOPYRROLATE 1 MG/5 ML
SYRINGE (ML) INTRAVENOUS PRN
Status: DISCONTINUED | OUTPATIENT
Start: 2020-10-15 | End: 2020-10-15 | Stop reason: SDUPTHER

## 2020-10-15 RX ORDER — LIDOCAINE HYDROCHLORIDE 10 MG/ML
1 INJECTION, SOLUTION EPIDURAL; INFILTRATION; INTRACAUDAL; PERINEURAL
Status: DISCONTINUED | OUTPATIENT
Start: 2020-10-15 | End: 2020-10-15 | Stop reason: HOSPADM

## 2020-10-15 RX ORDER — EPHEDRINE SULFATE 50 MG/ML
INJECTION, SOLUTION INTRAVENOUS PRN
Status: DISCONTINUED | OUTPATIENT
Start: 2020-10-15 | End: 2020-10-15 | Stop reason: SDUPTHER

## 2020-10-15 RX ORDER — LIDOCAINE HYDROCHLORIDE 20 MG/ML
INJECTION, SOLUTION INFILTRATION; PERINEURAL
Status: DISCONTINUED | OUTPATIENT
Start: 2020-10-15 | End: 2020-10-15 | Stop reason: SDUPTHER

## 2020-10-15 RX ORDER — SODIUM CHLORIDE 0.9 % (FLUSH) 0.9 %
10 SYRINGE (ML) INJECTION EVERY 12 HOURS SCHEDULED
Status: DISCONTINUED | OUTPATIENT
Start: 2020-10-15 | End: 2020-10-15 | Stop reason: SDUPTHER

## 2020-10-15 RX ORDER — MIDAZOLAM HYDROCHLORIDE 1 MG/ML
INJECTION INTRAMUSCULAR; INTRAVENOUS PRN
Status: DISCONTINUED | OUTPATIENT
Start: 2020-10-15 | End: 2020-10-15 | Stop reason: SDUPTHER

## 2020-10-15 RX ORDER — ONDANSETRON 2 MG/ML
INJECTION INTRAMUSCULAR; INTRAVENOUS PRN
Status: DISCONTINUED | OUTPATIENT
Start: 2020-10-15 | End: 2020-10-15 | Stop reason: SDUPTHER

## 2020-10-15 RX ORDER — SODIUM CHLORIDE 0.9 % (FLUSH) 0.9 %
10 SYRINGE (ML) INJECTION EVERY 12 HOURS SCHEDULED
Status: DISCONTINUED | OUTPATIENT
Start: 2020-10-15 | End: 2020-10-15 | Stop reason: HOSPADM

## 2020-10-15 RX ORDER — PROPOFOL 10 MG/ML
INJECTION, EMULSION INTRAVENOUS PRN
Status: DISCONTINUED | OUTPATIENT
Start: 2020-10-15 | End: 2020-10-15 | Stop reason: SDUPTHER

## 2020-10-15 RX ORDER — HYDROCODONE BITARTRATE AND ACETAMINOPHEN 5; 325 MG/1; MG/1
1 TABLET ORAL EVERY 4 HOURS PRN
Qty: 42 TABLET | Refills: 0 | Status: SHIPPED | OUTPATIENT
Start: 2020-10-15 | End: 2020-10-22

## 2020-10-15 RX ORDER — SODIUM CHLORIDE 0.9 % (FLUSH) 0.9 %
10 SYRINGE (ML) INJECTION PRN
Status: DISCONTINUED | OUTPATIENT
Start: 2020-10-15 | End: 2020-10-15 | Stop reason: SDUPTHER

## 2020-10-15 RX ORDER — LIDOCAINE HYDROCHLORIDE 10 MG/ML
INJECTION, SOLUTION EPIDURAL; INFILTRATION; INTRACAUDAL; PERINEURAL PRN
Status: DISCONTINUED | OUTPATIENT
Start: 2020-10-15 | End: 2020-10-15 | Stop reason: SDUPTHER

## 2020-10-15 RX ADMIN — LIDOCAINE HYDROCHLORIDE 10 ML: 20 INJECTION, SOLUTION INFILTRATION; PERINEURAL at 09:28

## 2020-10-15 RX ADMIN — BUPIVACAINE HYDROCHLORIDE 10 ML: 5 INJECTION, SOLUTION EPIDURAL; INTRACAUDAL at 09:28

## 2020-10-15 RX ADMIN — FENTANYL CITRATE 50 MCG: 50 INJECTION, SOLUTION INTRAMUSCULAR; INTRAVENOUS at 07:10

## 2020-10-15 RX ADMIN — EPHEDRINE SULFATE 10 MG: 50 INJECTION INTRAMUSCULAR; INTRAVENOUS; SUBCUTANEOUS at 08:24

## 2020-10-15 RX ADMIN — LIDOCAINE HYDROCHLORIDE 5050 MG: 10 INJECTION, SOLUTION EPIDURAL; INFILTRATION; INTRACAUDAL; PERINEURAL at 07:05

## 2020-10-15 RX ADMIN — MIDAZOLAM 2 MG: 1 INJECTION INTRAMUSCULAR; INTRAVENOUS at 07:00

## 2020-10-15 RX ADMIN — Medication 0.2 MG: at 08:08

## 2020-10-15 RX ADMIN — SUGAMMADEX 200 MG: 100 INJECTION, SOLUTION INTRAVENOUS at 08:39

## 2020-10-15 RX ADMIN — ONDANSETRON 4 MG: 2 INJECTION INTRAMUSCULAR; INTRAVENOUS at 08:41

## 2020-10-15 RX ADMIN — EPHEDRINE SULFATE 10 MG: 50 INJECTION INTRAMUSCULAR; INTRAVENOUS; SUBCUTANEOUS at 07:58

## 2020-10-15 RX ADMIN — FENTANYL CITRATE 50 MCG: 50 INJECTION, SOLUTION INTRAMUSCULAR; INTRAVENOUS at 07:05

## 2020-10-15 RX ADMIN — PROPOFOL 200 MG: 10 INJECTION, EMULSION INTRAVENOUS at 07:05

## 2020-10-15 RX ADMIN — Medication 3 G: at 07:15

## 2020-10-15 RX ADMIN — ROCURONIUM BROMIDE 20 MG: 10 INJECTION INTRAVENOUS at 07:58

## 2020-10-15 RX ADMIN — ACETAMINOPHEN 1000 MG: 500 TABLET, FILM COATED ORAL at 06:34

## 2020-10-15 RX ADMIN — SODIUM CHLORIDE: 9 INJECTION, SOLUTION INTRAVENOUS at 06:33

## 2020-10-15 RX ADMIN — ROCURONIUM BROMIDE 50 MG: 10 INJECTION INTRAVENOUS at 07:05

## 2020-10-15 ASSESSMENT — PULMONARY FUNCTION TESTS
PIF_VALUE: 15
PIF_VALUE: 19
PIF_VALUE: 18
PIF_VALUE: 3
PIF_VALUE: 15
PIF_VALUE: 17
PIF_VALUE: 0
PIF_VALUE: 17
PIF_VALUE: 0
PIF_VALUE: 6
PIF_VALUE: 2
PIF_VALUE: 16
PIF_VALUE: 17
PIF_VALUE: 18
PIF_VALUE: 17
PIF_VALUE: 18
PIF_VALUE: 15
PIF_VALUE: 17
PIF_VALUE: 0
PIF_VALUE: 0
PIF_VALUE: 17
PIF_VALUE: 18
PIF_VALUE: 17
PIF_VALUE: 14
PIF_VALUE: 0
PIF_VALUE: 16
PIF_VALUE: 17
PIF_VALUE: 15
PIF_VALUE: 0
PIF_VALUE: 17
PIF_VALUE: 15
PIF_VALUE: 3
PIF_VALUE: 14
PIF_VALUE: 17
PIF_VALUE: 14
PIF_VALUE: 17
PIF_VALUE: 1
PIF_VALUE: 17
PIF_VALUE: 17
PIF_VALUE: 0
PIF_VALUE: 15
PIF_VALUE: 19
PIF_VALUE: 17
PIF_VALUE: 17
PIF_VALUE: 19
PIF_VALUE: 16
PIF_VALUE: 16
PIF_VALUE: 17
PIF_VALUE: 18
PIF_VALUE: 16
PIF_VALUE: 17
PIF_VALUE: 17
PIF_VALUE: 16
PIF_VALUE: 1
PIF_VALUE: 17
PIF_VALUE: 16
PIF_VALUE: 16
PIF_VALUE: 17
PIF_VALUE: 14
PIF_VALUE: 16
PIF_VALUE: 15
PIF_VALUE: 23
PIF_VALUE: 17
PIF_VALUE: 16
PIF_VALUE: 17
PIF_VALUE: 19
PIF_VALUE: 18
PIF_VALUE: 17
PIF_VALUE: 17
PIF_VALUE: 18
PIF_VALUE: 14
PIF_VALUE: 15
PIF_VALUE: 17
PIF_VALUE: 1
PIF_VALUE: 16
PIF_VALUE: 17
PIF_VALUE: 1
PIF_VALUE: 17
PIF_VALUE: 0
PIF_VALUE: 16
PIF_VALUE: 17
PIF_VALUE: 0
PIF_VALUE: 26
PIF_VALUE: 17
PIF_VALUE: 17
PIF_VALUE: 14
PIF_VALUE: 2
PIF_VALUE: 18
PIF_VALUE: 18
PIF_VALUE: 1
PIF_VALUE: 15
PIF_VALUE: 17
PIF_VALUE: 15
PIF_VALUE: 28
PIF_VALUE: 17
PIF_VALUE: 17
PIF_VALUE: 18
PIF_VALUE: 0
PIF_VALUE: 17
PIF_VALUE: 0
PIF_VALUE: 39
PIF_VALUE: 3
PIF_VALUE: 17
PIF_VALUE: 17
PIF_VALUE: 2
PIF_VALUE: 18
PIF_VALUE: 0
PIF_VALUE: 18
PIF_VALUE: 16

## 2020-10-15 ASSESSMENT — PAIN SCALES - GENERAL
PAINLEVEL_OUTOF10: 0
PAINLEVEL_OUTOF10: 0

## 2020-10-15 ASSESSMENT — COPD QUESTIONNAIRES: CAT_SEVERITY: NO INTERVAL CHANGE

## 2020-10-15 ASSESSMENT — PAIN DESCRIPTION - DESCRIPTORS: DESCRIPTORS: ACHING

## 2020-10-15 ASSESSMENT — PAIN - FUNCTIONAL ASSESSMENT: PAIN_FUNCTIONAL_ASSESSMENT: 0-10

## 2020-10-15 ASSESSMENT — LIFESTYLE VARIABLES: SMOKING_STATUS: 0

## 2020-10-15 NOTE — ANESTHESIA POSTPROCEDURE EVALUATION
POST- ANESTHESIA EVALUATION       Pt Name: Eber Aguilar  MRN: [id number]  YOB: 1955  Date of evaluation: 10/15/2020  Time:  10:32 AM      /60   Pulse 62   Temp 97.7 °F (36.5 °C) (Infrared)   Resp 15   Ht 6' (1.829 m)   Wt 280 lb (127 kg)   SpO2 93%   BMI 37.97 kg/m²      Consciousness Level  Awake  Cardiopulmonary Status  Stable  Pain Adequately Treated YES  Nausea / Vomiting  NO  Adequate Hydration  YES  Anesthesia Related Complications NONE      Electronically signed by Cassandra Pacheco MD on 10/15/2020 at 10:32 AM       Department of Anesthesiology  Postprocedure Note    Patient: Eber Aguilar  MRN: [id number]  YOB: 1955  Date of evaluation: 10/15/2020  Time:  10:32 AM     Procedure Summary     Date:  10/15/20 Room / Location:  [address] [place] [address]  03 / [address] [facility]     Anesthesia Start:  0700 Anesthesia Stop:  7550    Procedure:  SHOULDER ARTHROSCOPY W/INTEROP CULTURES (Left ) Diagnosis:       (PAINFUL LEFT TOTAL SHOULDER)      (**PAT ON ADMIT PER ANESTH)    Surgeon:  Chinmay Salmno MD Responsible Provider:  Cassandra Pacheco MD    Anesthesia Type:  general, regional ASA Status:  3          Anesthesia Type: general, regional    Benigno Phase I: Benigno Score: 7    Benigno Phase II:      Last vitals: Reviewed and per EMR flowsheets.        Anesthesia Post Evaluation

## 2020-10-15 NOTE — H&P
HISTORY and [id number]       NAME:  Bhavani Fontenot  MRN: [id number]   YOB: 1955   Date: 10/15/2020   Age: 76 y.o. Gender: male       COMPLAINT AND PRESENT HISTORY:     Bhavani Fontenot is 76 y.o.,  male, undergoing  S/P  left reverse shoulder arthroplasty for massive a repairable rotator cuff tear associated with pseudoparalysis, 2 yrs ago. according to Dr. Jasper Escalante report. Patient has had continuous left shoulder pain, decreased function. Patient has been taking various pain meds . He indicates that he is not getting any better. In fact he feels that it is getting worse. He has been dealing with pain in the left shoulder for several months now. Patient reports that he recently had injury with fall from a chair that caused more aggravation and also scrotal pain. Pt C/O of pain, weakness, stiffness, popping/ cracking and limitation in the range of motion . No recent falls or trauma. No redness, swelling or rashes.       PAST MEDICAL HISTORY     Past Medical History:   Diagnosis Date    Acid reflux     Anemia, blood loss 10/7/2018    Arthritis     C. difficile colitis 3/19/2020    Chronic bilateral low back pain with bilateral sciatica 11/3/2016    Complete tear of left rotator cuff 7/18/2018    COPD (chronic obstructive pulmonary disease) ([address]     Coronary artery disease involving native coronary artery of native heart without angina pectoris 2/2/2020    Degenerative disc disease, cervical 7/28/2019    GI bleed 3/19/2020    Gout     H/O cardiac catheterization     yrs ago   no stents    History of blood transfusion     Hyperlipidemia     Hyperlipidemia with target LDL less than 100 1/20/2016    Hyperparathyroidism ([address] 1/17/2020    Hypertension     Knee pain, chronic     left    Liver disease     MDRO (multiple drug resistant organisms) resistance     Moderate episode of recurrent major depressive disorder ([address] 1/20/2016    Obesity, Class III, BMI 40-49.9 (morbid obesity) ([address] 1/20/2016    REYNALDO on CPAP 5/6/2017    Osteoarthritis     Peripheral arterial occlusive disease ([address] 3/25/2017    Pneumonia 3/9/2020    Polypharmacy 3/25/2017    Positive FIT (fecal immunochemical test) 4/11/2017    Prolonged emergence from general anesthesia 01/05/2017    Patient \"on life support for 3 days\" after back surgery due to being given succinylcholine    Prostate CA ([address] 1/20/2016    Prostate cancer ([address] 10/2013    finished radiation tx 5/2014    Pseudocholinesterase deficiency 03/25/2017    Pt.  \"on life support\" for 3 days after back surgery 1/5/17 after being given succinylcholine    Severe obesity (BMI 35.0-35.9 with comorbidity) ([address] 2/2/2020    Short of breath on exertion     Sleep apnea     uses CPAP machine nightly    Status post lumbar laminectomy 3/25/2017    Stenosis of left carotid artery 10/7/2018    Syncope and collapse 3/19/2020    Tubular adenoma of colon 4/11/17 5/13/2017    Type 2 diabetes mellitus with hyperglycemia, without long-term current use of insulin ([address] 3/25/2017    Lab Results Component Value Date  LABA1C 7.1 (H) 03/23/2017     Unintended weight loss 10/7/2018    Vitamin D deficiency 3/25/2017    Wears glasses        SURGICAL HISTORY       Past Surgical History:   Procedure Laterality Date    BACK SURGERY      x 2     BACK SURGERY  01/05/2017    lumbar laminectomy L2, L3, L4    BRONCHOSCOPY N/A 3/13/2020    BRONCHOSCOPY performed by Andreea Thao MD at [address] [place] [address]  2007    no stents    CAROTID ENDARTERECTOMY Right 12/16/2019    Dr. Cris Sosa Bilateral     COLONOSCOPY      KNEE SURGERY Left     LUMBAR LAMINECTOMY  01/05/2017    L2-L4    [place] CLOSED RX SHLDR DISLOC,ANESTHESIA Left 8/1/2018    SHOULDER CLOSED REDUCTION WITH C-ARM VISUALIZATION performed by Maryhelen Bosworth, MD at [facility] N/A 5/9/2017 COLONOSCOPY WITH BIOPSY performed by Tevin Turcios DO at [address] IMPLANT Left 2018    SHOULDER TOTAL ARTHROPLASTY REVERSE LEFT DJO & BICEP TENDON TRANSFER performed by Sam Dillard MD at [address] HUMERAL/GLENOID COMPNT Left 8/3/2018    SHOULDER TOTAL REVERSE  ARTHROPLASTY REVISION performed by Sam Dillard MD at [address]     rotator cuff repair    SKIN BIOPSY      ear, forehead    TONSILLECTOMY AND ADENOIDECTOMY      UPPER GASTROINTESTINAL ENDOSCOPY N/A 3/19/2020    EGD BIOPSY performed by Bhaskar Miguel MD at [address]       Family History   Problem Relation Age of Onset    Diabetes Mother     Lung Cancer Brother     Liver Cancer Brother     Cancer Father        SOCIAL HISTORY       Social History     Socioeconomic History    Marital status:      Spouse name: Not on file    Number of children: Not on file    Years of education: Not on file    Highest education level: Not on file   Occupational History    Not on file   Social Needs    Financial resource strain: Not on file    Food insecurity     Worry: Not on file     Inability: Not on file    Transportation needs     Medical: Not on file     Non-medical: Not on file   Tobacco Use    Smoking status: Current Every Day Smoker     Packs/day: 1.50     Years: 35.00     Pack years: 52.50     Types: Cigarettes     Last attempt to quit: 3/9/2020     Years since quittin.6    Smokeless tobacco: Never Used    Tobacco comment: WAS SMOKING 1-1.5 ppd   Substance and Sexual Activity    Alcohol use: No     Alcohol/week: 0.0 standard drinks    Drug use: No    Sexual activity: Not Currently     Partners: Female   Lifestyle    Physical activity     Days per week: Not on file     Minutes per session: Not on file    Stress: Not on file   Relationships    Social connections     Talks on phone: Not on file     Gets together: Not on file     Attends Taoism service: Not on file     Active member of club or organization: Not on file     Attends meetings of clubs or organizations: Not on file     Relationship status: Not on file    Intimate partner violence     Fear of current or ex partner: Not on file     Emotionally abused: Not on file     Physically abused: Not on file     Forced sexual activity: Not on file   Other Topics Concern    Not on file   Social History Narrative    Not on file           REVIEW OF SYSTEMS      Allergies   Allergen Reactions    Succinylcholine Chloride Other (See Comments)     PATIENT HAS PSEUDOCHOLINESTERASE DEFICIENCY      Cymbalta [Duloxetine Hcl]      Taste loss         No current facility-administered medications on file prior to encounter. Current Outpatient Medications on File Prior to Encounter   Medication Sig Dispense Refill    pravastatin (PRAVACHOL) 80 MG tablet Take 1 tablet by mouth every evening Dose increased  9/24/2019 90 tablet 3    omeprazole (PRILOSEC) 40 MG delayed release capsule Take 1 capsule by mouth every morning (before breakfast) 90 capsule 3    lisinopril (PRINIVIL;ZESTRIL) 10 MG tablet Take 1 tablet by mouth daily Dose decreased 3/18/2020 90 tablet 3    traMADol (ULTRAM) 50 MG tablet Take 1-2 tablets by mouth every 6 hours as needed for Pain. 30 tablet 0    Cholecalciferol (VITAMIN D) 50 MCG (2000 UT) CAPS capsule Take 1 capsule by mouth daily      Respiratory Therapy Supplies (ADULT MASK) MISC Needs to use mask daily due to coronavirus pandemic.  30 each 5    furosemide (LASIX) 20 MG tablet TAKE 1 TABLET DAILY AS NEEDED (ONLY IF BLOOD PRESSURE OVER 140/90 OR LEG EDEMA) (STOP TENORETIC 50-25) 90 tablet 3    ipratropium-albuterol (DUONEB) 0.5-2.5 (3) MG/3ML SOLN nebulizer solution Inhale 1 vial into the lungs 3 times daily      blood glucose monitor strips Use to test twice daily and as needed as directed by provider      Lift Chair MISC by Does not apply route Patient has difficulty getting up from usual chair 1 each 0    folbee plus (FOLBEE PLUS) TABS Take 1 tablet by mouth daily 90 tablet 3    aspirin EC 81 MG EC tablet Take 1 tablet by mouth daily 90 tablet 0    Glucose Blood (BLOOD GLUCOSE TEST STRIPS) STRP tesing once a day fasting 100 strip 3    albuterol sulfate  (90 BASE) MCG/ACT inhaler Inhale 2 puffs into the lungs every 6 hours as needed for Wheezing or Shortness of Breath (COUGH) 18 g 0       Negative except for what is mentioned in the HPI. GENERAL PHYSICAL EXAM     Vitals: see vital signs in RN flow sheet. GENERAL APPEARANCE:   Azam Bush is 76 y.o.,  male, moderately obese, nourished, conscious, alert. Does not appear to be distress or pain at this time. SKIN:  Warm, dry, no cyanosis or jaundice. HEAD:  Normocephalic, atraumatic, no swelling or tenderness. EYES:  Pupils equal, reactive to light. EARS:  No discharge, no marked hearing loss. NOSE:  No rhinorrhea, epistaxis or septal deformity. THROAT:  Not congested. No ulceration bleeding or discharge. NECK:  No stiffness, trachea central.  No palpable masses or L.N.                 CHEST:  Symmetrical and equal on expansion. HEART:  RRR S1 > S2. No audible murmurs or gallops. LUNGS:  Equal on expansion, normal breath sounds. No adventitious sounds. ABDOMEN:  Obese. Soft on palpation. No dysphagia, No localized tenderness. No guarding or rigidity. No palpable hepatosplenomegaly. LYMPHATICS:  No palpable cervical lymphadenopathy. LOCOMOTOR, BACK AND SPINE:  No tenderness or deformities. EXTREMITIES:  Symmetrical, no pretibial edema. Ginos sign negative. No discoloration or ulcerations. Left arm tenderness and limited ROM.      NEUROLOGIC:  The patient is conscious, alert, oriented,Cranial nerve II-XII intact, taste and smell were not examined. No apparent focal sensory or motor deficits.              PROVISIONAL DIAGNOSES / SURGERY:      PAINFUL LEFT SHOULDER    SHOULDER ARTHROSCOPY W/INTEROP CULTURES  Left      POSSIBLE SHOULDER REVERSE ARTHROPLASTY RESSECTION I&D POSSIBLE SPACER IMPLANT         Patient Active Problem List    Diagnosis Date Noted    Severe obesity (BMI 35.0-35.9 with comorbidity) ([facility] 02/02/2020     Priority: High    Hyperparathyroidism ([facility] 01/17/2020     Priority: High    REYNALDO on CPAP 05/06/2017     Priority: High    Status post lumbar laminectomy 03/25/2017     Priority: High    Tubular adenoma of colon 4/11/17 04/11/2017     Priority: Medium    History of prostate cancer 03/26/2020    Melena 03/19/2020    Chronic hypoxemic respiratory failure ([facility] 03/18/2020    Status post total shoulder arthroplasty, left 03/16/2020    Tobacco use disorder 03/16/2020    Coronary artery disease involving native coronary artery of native heart without angina pectoris 02/02/2020    Degenerative disc disease, cervical 07/28/2019    At high risk for falls 07/28/2019    Difficulty rising from chair 04/09/2019    Poor balance 01/13/2019    Foot drop, right 01/13/2019    Family history of cancer 10/07/2018    Stenosis of left carotid artery 10/07/2018    Recurrent major depressive disorder, in full remission ([facility] 09/04/2018    Seborrheic keratosis 09/04/2018    Solar degeneration 09/04/2018    S/P reverse total shoulder arthroplasty, left 07/18/2018    Abdominal aortic aneurysm (HCC) 04/30/2018    Psychophysiological insomnia 01/03/2018    Hypertriglyceridemia 05/06/2017    Type 2 diabetes mellitus with diabetic polyneuropathy, without long-term current use of insulin ([address] 03/25/2017    PVD (peripheral vascular disease) with claudication ([address] 03/25/2017    Peripheral neuropathic pain 03/25/2017    Tinea pedis of both feet 03/25/2017    Nonspecific reactive hepatitis 03/25/2017    COPD mixed type ([facility] 03/25/2017    Serum cholinesterase defect ([facility] 03/25/2017    Vitamin D deficiency 03/25/2017    Pseudocholinesterase deficiency 03/25/2017    DDD (degenerative disc disease), lumbar 11/03/2016    Chronic bilateral low back pain with bilateral sciatica 11/03/2016    Osteoarthritis of spine with radiculopathy, lumbar region 11/03/2016    Essential hypertension 01/20/2016    Gout 01/20/2016    Hyperlipidemia with target LDL less than 70 01/20/2016    Osteoporosis 01/20/2016    Gastroesophageal reflux disease without esophagitis 01/20/2016    Senile hyperkeratosis 01/07/2011    Actinic keratosis 01/06/2011    Photodermatitis due to sun 01/06/2011           MARCELINO Sepulveda, APRN - CNP on 10/15/2020 at 6:06 AM

## 2020-10-15 NOTE — ANESTHESIA PRE PROCEDURE
Department of Anesthesiology  Preprocedure Note       Name:  Mike Dillard   Age:  76 y.o.  :  1955                                          MRN:  [id number]         Date:  10/15/2020      Surgeon: Carmita Newby):  Jasmyn Quinonez MD    Procedure: Procedure(s):  SHOULDER ARTHROSCOPY W/INTEROP CULTURES  POSSIBLE SHOULDER REVERSE ARTHROPLASTY RESSECTION I&D POSSIBLE SPACER IMPLANT    Medications prior to admission:   Prior to Admission medications    Medication Sig Start Date End Date Taking? Authorizing Provider   pregabalin (LYRICA) 200 MG capsule Take 1 capsule by mouth 3 times daily for 90 days. 10/13/20 1/11/21 Yes Josefina Magallanes MD   SYMBICORT 160-4.5 MCG/ACT AERO Inhale 2 puffs into the lungs 2 times daily 10.2 inhaler, give 3 inhalers 10/13/20  Yes Josefina Magallanes MD   amLODIPine (NORVASC) 10 MG tablet Take 1 tablet by mouth daily 10/13/20  Yes Josefina Magallanes MD   pravastatin (PRAVACHOL) 80 MG tablet Take 1 tablet by mouth every evening Dose increased  2019  Yes Josefina Magallanes MD   omeprazole (PRILOSEC) 40 MG delayed release capsule Take 1 capsule by mouth every morning (before breakfast) 20  Yes Josefina Magallanes MD   lisinopril (PRINIVIL;ZESTRIL) 10 MG tablet Take 1 tablet by mouth daily Dose decreased 3/18/2020 3/18/20  Yes Josefina Magallanes MD   traMADol (ULTRAM) 50 MG tablet Take 1-2 tablets by mouth every 6 hours as needed for Pain. 20 Yes Jasmyn Quinonez MD   OXYGEN Provide portable oxygen concentrator 20   Historical Provider, MD   methylPREDNISolone (MEDROL, ROSALIE,) 4 MG tablet Take by mouth, with food. Keep low carb, low salt diet while taking it 10/13/20   Josefina Magallanes MD   varenicline (CHANTIX STARTING MONTH ) 0.5 MG X 11 & 1 MG X 42 tablet 0.5 mg po daily x 3 days, 0.5 mg BID x 4 days, then 1 mg BID thereafter . Call for refill 10/13/20   Josefina Magallanes MD   albuterol sulfate HFA (PROVENTIL HFA) 108 (90 Base) MCG/ACT inhaler Inhale 2 puffs into the lungs every 6 hours as needed for Wheezing or Shortness of Breath 10/13/20   Toño Jackson MD   Cholecalciferol (VITAMIN D) 50 MCG (2000 UT) CAPS capsule Take 1 capsule by mouth daily    Historical Provider, MD   Respiratory Therapy Supplies (ADULT MASK) MISC Needs to use mask daily due to coronavirus pandemic.  7/7/20   Toño Jackson MD   furosemide (LASIX) 20 MG tablet TAKE 1 TABLET DAILY AS NEEDED (ONLY IF BLOOD PRESSURE OVER 140/90 OR LEG EDEMA) (STOP TENORETIC 50-25) 4/6/20   Toño Jackson MD   ipratropium-albuterol (DUONEB) 0.5-2.5 (3) MG/3ML SOLN nebulizer solution Inhale 1 vial into the lungs 3 times daily    Historical Provider, MD   blood glucose monitor strips Use to test twice daily and as needed as directed by provider    Historical Provider, MD   Lift Chair [facility] by Does not apply route Patient has difficulty getting up from usual chair 4/9/19   Toño Jackson MD   folbee plus (FOLBEE PLUS) TABS Take 1 tablet by mouth daily 1/21/19   Toño Jackson MD   aspirin EC 81 MG EC tablet Take 1 tablet by mouth daily 9/4/18   Toño Jackson MD   Glucose Blood (BLOOD GLUCOSE TEST STRIPS) STRP tesing once a day fasting 3/1/18   Toño Jackson MD   albuterol sulfate  (90 BASE) MCG/ACT inhaler Inhale 2 puffs into the lungs every 6 hours as needed for Wheezing or Shortness of Breath (COUGH) 3/23/17   Toño Jackson MD       Current medications:    Current Facility-Administered Medications   Medication Dose Route Frequency Provider Last Rate Last Dose    lidocaine PF 1 % injection 1 mL  1 mL Intradermal Once PRN Hollis MD Winston        0.9 % sodium chloride infusion   Intravenous Continuous Hollis MD Winston 125 mL/hr at 10/15/20 2095      ceFAZolin (ANCEF) 3 g in dextrose 5 % 100 mL IVPB  3 g Intravenous On Call to [address], MD        sodium chloride flush 0.9 % injection 10 mL  10 mL Intravenous 2 times per day Amy Marrufo MD  sodium chloride flush 0.9 % injection 10 mL  10 mL Intravenous PRN Lina Wise MD           Allergies:     Allergies   Allergen Reactions    Succinylcholine Chloride Other (See Comments)     PATIENT HAS PSEUDOCHOLINESTERASE DEFICIENCY      Cymbalta [Duloxetine Hcl]      Taste loss         Problem List:    Patient Active Problem List   Diagnosis Code    Essential hypertension I10    Gout M10.9    Hyperlipidemia with target LDL less than 70 E78.5    Osteoporosis M81.0    Gastroesophageal reflux disease without esophagitis K21.9    DDD (degenerative disc disease), lumbar M51.36    Chronic bilateral low back pain with bilateral sciatica M54.42, M54.41, G89.29    Osteoarthritis of spine with radiculopathy, lumbar region M47.26    Type 2 diabetes mellitus with diabetic polyneuropathy, without long-term current use of insulin ([facility]) E11.42    PVD (peripheral vascular disease) with claudication ([facility]) I73.9    Peripheral neuropathic pain M79.2    Tinea pedis of both feet B35.3    Nonspecific reactive hepatitis K75.2    COPD mixed type ([facility]) J44.9    Status post lumbar laminectomy Z98.890    Serum cholinesterase defect ([facility]) E88.89    Vitamin D deficiency E55.9    Hypertriglyceridemia E78.1    REYNALDO on CPAP G47.33, Z99.89    Tubular adenoma of colon 4/11/17 D12.6    Psychophysiological insomnia F51.04    S/P reverse total shoulder arthroplasty, left Z96.612    Recurrent major depressive disorder, in full remission ([facility]) F33.42    Actinic keratosis L57.0    Photodermatitis due to sun L56.8    Seborrheic keratosis L82.1    Senile hyperkeratosis L57.0    Solar degeneration L57.8    Abdominal aortic aneurysm ([facility]) I71.4    Family history of cancer Z80.9    Stenosis of left carotid artery I65.22    Poor balance R26.89    Foot drop, right M21.371    Difficulty rising from chair R68.89    Degenerative disc disease, cervical M50.30    At high risk for falls Z91.81    Pseudocholinesterase deficiency E88.09    Hyperparathyroidism ([address] E21.3    Coronary artery disease involving native coronary artery of native heart without angina pectoris I25.10    Severe obesity (BMI 35.0-35.9 with comorbidity) ([facility] E66.01, Z68.35    Status post total shoulder arthroplasty, left Z96.612    Tobacco use disorder F17.200    Chronic hypoxemic respiratory failure ([facility]) J96.11    Melena K92.1    History of prostate cancer Z85.46       Past Medical History:        Diagnosis Date    Acid reflux     Anemia, blood loss 10/7/2018    Arthritis     C. difficile colitis 3/19/2020    Chronic bilateral low back pain with bilateral sciatica 11/3/2016    Complete tear of left rotator cuff 7/18/2018    COPD (chronic obstructive pulmonary disease) ([address]     Coronary artery disease involving native coronary artery of native heart without angina pectoris 2/2/2020    Degenerative disc disease, cervical 7/28/2019    GI bleed 3/19/2020    Gout     H/O cardiac catheterization     yrs ago   no stents    History of blood transfusion     Hyperlipidemia     Hyperlipidemia with target LDL less than 100 1/20/2016    Hyperparathyroidism ([address] 1/17/2020    Hypertension     Knee pain, chronic     left    Liver disease     MDRO (multiple drug resistant organisms) resistance     Moderate episode of recurrent major depressive disorder ([address] 1/20/2016    Obesity, Class III, BMI 40-49.9 (morbid obesity) ([address] 1/20/2016    REYNALDO on CPAP 5/6/2017    Osteoarthritis     Peripheral arterial occlusive disease ([address] 3/25/2017    Pneumonia 3/9/2020    Polypharmacy 3/25/2017    Positive FIT (fecal immunochemical test) 4/11/2017    Prolonged emergence from general anesthesia 01/05/2017    Patient \"on life support for 3 days\" after back surgery due to being given succinylcholine    Prostate CA ([address] 1/20/2016    Prostate cancer ([address] 10/2013    finished radiation tx 5/2014    Pseudocholinesterase deficiency 03/25/2017    Pt. \"on life support\" for 3 days after back surgery 1/5/17 after being given succinylcholine    Severe obesity (BMI 35.0-35.9 with comorbidity) ([facility] 2/2/2020    Short of breath on exertion     Sleep apnea     uses CPAP machine nightly    Status post lumbar laminectomy 3/25/2017    Stenosis of left carotid artery 10/7/2018    Syncope and collapse 3/19/2020    Tubular adenoma of colon 4/11/17 5/13/2017    Type 2 diabetes mellitus with hyperglycemia, without long-term current use of insulin ([address] 3/25/2017    Lab Results Component Value Date  LABA1C 7.1 (H) 03/23/2017     Unintended weight loss 10/7/2018    Vitamin D deficiency 3/25/2017    Wears glasses        Past Surgical History:        Procedure Laterality Date    BACK SURGERY      x 2     BACK SURGERY  01/05/2017    lumbar laminectomy L2, L3, L4    BRONCHOSCOPY N/A 3/13/2020    BRONCHOSCOPY performed by Sukumar Koehler MD at [address] [place] [address]    no stents    CAROTID ENDARTERECTOMY Right 12/16/2019    Dr. Oren Gonzalez Bilateral     COLONOSCOPY      KNEE SURGERY Left     LUMBAR LAMINECTOMY  01/05/2017    L2-L4    [place] CLOSED RX SHLDR DISLOC,ANESTHESIA Left 8/1/2018    SHOULDER CLOSED REDUCTION WITH C-ARM VISUALIZATION performed by Ervin Menendez MD at [facility] N/A 5/9/2017    COLONOSCOPY WITH BIOPSY performed by Raghu Mc DO at [address] [facility] [address] IMPLANT Left 7/18/2018    SHOULDER TOTAL ARTHROPLASTY REVERSE LEFT DJO & BICEP TENDON TRANSFER performed by Ervin Menendez MD at [address] HUMERAL/GLENOID COMPNT Left 8/3/2018    SHOULDER TOTAL REVERSE  ARTHROPLASTY REVISION performed by Ervin Menendez MD at [address]     rotator cuff repair    SKIN BIOPSY      ear, forehead    TONSILLECTOMY AND ADENOIDECTOMY      UPPER GASTROINTESTINAL ENDOSCOPY N/A 3/19/2020    EGD BIOPSY administered. Anesthetic plan and risks discussed with patient. Plan discussed with CRNA.                 Isabel White MD   10/15/2020

## 2020-10-15 NOTE — ANESTHESIA PROCEDURE NOTES
Peripheral Block    Patient location during procedure: PACU  Start time: 10/15/2020 9:10 AM  End time: 10/15/2020 9:28 AM  Staffing  Anesthesiologist: Norberto Saavedra MD  Performed: anesthesiologist   Preanesthetic Checklist  Completed: patient identified, site marked, surgical consent, pre-op evaluation, timeout performed, IV checked, risks and benefits discussed, monitors and equipment checked, anesthesia consent given, oxygen available and patient being monitored  Peripheral Block  Patient position: supine  Prep: ChloraPrep  Patient monitoring: cardiac monitor, continuous pulse ox, frequent blood pressure checks and IV access  Block type: Brachial plexus  Laterality: left  Injection technique: single-shot  Procedures: ultrasound guided  Local infiltration: lidocaine  Infiltration strength: 2 %  Dose: 5 mL  Interscalene  Provider prep: mask and sterile gloves  Local infiltration: lidocaine  Needle  Needle type: short-bevel   Needle gauge: 22 G  Needle length: 5 cm  Needle localization: ultrasound guidance  Test dose: negative  Assessment  Injection assessment: negative aspiration for heme, no paresthesia on injection and local visualized surrounding nerve on ultrasound  Paresthesia pain: none  Slow fractionated injection: yes  Hemodynamics: stable  Medications Administered  Lidocaine injection 2%, 10 mL  bupivacaine (MARCAINE) PF injection 0.5%, 10 mL  Reason for block: post-op pain management and at surgeon's request

## 2020-10-15 NOTE — OP NOTE
OPERATIVE REPORT    Date of Surgery: 10/15/2020     Pre-operative Diagnosis: Painful left reverse shoulder arthroplasty    Post-operative Diagnosis: Same as above    Procedure: Left shoulder arthroscopic culture biopsies    Surgeon(s): Galilea Rosenberg MD    Anesthesia: Minimal    Fluids: See anesthesia record    Urine output: See anesthesia record    Estimated blood loss: Minimal    Findings: Significant scar noted in subacromial space    Specimen: Tissue samples for culture x 4    Implants: None    Surgical Indications:  Rodrigue Valdez is a 76 y.o. old male who presented with a painful left reverse shoulder arthroplasty 2 years following his surgery. He had an extensive workup that was negative except for a slightly elevated CRP. Following a discussion with the patient regarding treatment options, he consented to proceed with left shoulder arthroscopic biopsy for culture to assess his shoulder for an infection. he came to this decision after demonstrating an understanding of our discussion regarding details of the procedure, risks and benefits, expected outcome, and postoperative course. Operative technique: Following appropriate identification of the patient and his operative extremity, consent was reviewed with the patient and his operative extremity was signed. he was wheeled to the operating room where he finished a course of pre-operative antibiotic prophylaxis by way of 3 G IV ancef. The anesthesia service administered a general anesthetic and secured his airway with an ET tube. All bony prominences were appropriately padded and the patient was secured to the operative table in a beach chair position. The patient's operative extremity was prepped and draped in a standard sterile fashion and a time out was performed during which the correct patient, operative extremity, and procedure were verified.      A standard posterior portal was then established and I inserted the arthroscope into the subacromial space.  A lateral portal was then established and cannula inserted through this portal.  I attempted to obtain culture sample from this location without running fluid into the space but due to poor visualization I did have to use a little bit of fluid to enhance my visualization. At this time I used 4-0 shaver to lightly open up the space and then obtained biopsies of some of the tissue in the subacromial space using basket forcep. I then used a 4-0 shaver to debride what looked to be thick layer of scar tissue. Once this was debrided I was able to visualize the glenosphere and polyethylene liner. Using basket forceps as well as graspers I was able to obtain tissue samples for cultures from the anterior and posterior capsule as well as tissue at the level of the glenoid. I then performed a thorough debridement using the shaver. Arthroscopic instruments were withdrawn and the portal incisions closed using 3-0 Prolene. Sterile dressings were applied using Xeroform, 4 x 4 gauze and Tegaderm. Patient's arm was placed in a sling. He was awoken, extubated, transferred to his bed and wheeled to recovery in stable condition.     Complications: None    Post-operative Condition: Stable

## 2020-10-16 ENCOUNTER — TELEPHONE (OUTPATIENT)
Dept: FAMILY MEDICINE CLINIC | Age: 65
End: 2020-10-16

## 2020-10-16 RX ORDER — AMLODIPINE BESYLATE 10 MG/1
10 TABLET ORAL DAILY
Qty: 90 TABLET | Refills: 3 | Status: SHIPPED | OUTPATIENT
Start: 2020-10-16 | End: 2021-07-20 | Stop reason: SDUPTHER

## 2020-10-20 PROBLEM — Z87.891 PERSONAL HISTORY OF SMOKING: Status: ACTIVE | Noted: 2020-03-16

## 2020-10-26 ENCOUNTER — OFFICE VISIT (OUTPATIENT)
Dept: UROLOGY | Age: 65
End: 2020-10-26
Payer: MEDICARE

## 2020-10-26 VITALS — TEMPERATURE: 97.9 F | HEART RATE: 69 BPM | DIASTOLIC BLOOD PRESSURE: 77 MMHG | SYSTOLIC BLOOD PRESSURE: 162 MMHG

## 2020-10-26 PROCEDURE — 99214 OFFICE O/P EST MOD 30 MIN: CPT | Performed by: UROLOGY

## 2020-10-26 ASSESSMENT — ENCOUNTER SYMPTOMS
EYE PAIN: 0
COUGH: 0
ABDOMINAL PAIN: 0
EYE REDNESS: 0
WHEEZING: 0
VOMITING: 0
CONSTIPATION: 0
BACK PAIN: 0
SHORTNESS OF BREATH: 0
NAUSEA: 0
DIARRHEA: 0

## 2020-10-26 NOTE — PROGRESS NOTES
[address] [address]  Dept: [address] [facility] Urology Office Note - Established    Patient:  Bear Larios  YOB: 1955  Date: 10/26/2020    The patient is a 76 y.o. male who presents todayfor evaluation of the following problems:   Chief Complaint   Patient presents with    Groin Swelling       HPI  Katie Wallace is a very pleasant 67-year-old gentleman whom I have been seeing for prostate cancer. He has been on hormone ablation with Eligard. He is due for his next hormone shot next month. He has had left scrotal and groin pain for the last month to 6 weeks. He did have a scrotal ultrasound which suggested a left inguinal hernia. His pain is gotten quite severe over the last few days. Summary of old records: N/A    Additional History: N/A    Procedures Today: N/A    Urinalysis today:  No results found for this visit on 10/26/20.   Last several PSA's:  Lab Results   Component Value Date    PSA <0.01 05/14/2020    PSA <0.01 04/15/2020    PSA <0.01 12/10/2019     Last total testosterone:  Lab Results   Component Value Date    TESTOSTERONE <3 (L) 12/10/2019       AUA Symptom Score (10/26/2020):  INCOMPLETE EMPTYING: How often have you had the sensation of not emptying your bladder?: Not at all  FREQUENCY: How often do you have to urinate less than every two hours?: Not at all  INTERMITTENCY: How often have you found you stopped and started again several times when you urinated?: Not at all  URGENCY: How often have you found it difficult to postpone urination?: Less than 1 to 5 times  WEAK STREAM: How often have you had a weak urinary stream?: Not at all  STRAINING: How often have you had to strain to start  urination?: Not at all  NOCTURIA: How many times did you typically get up at night to uriniate?: 2 Times  TOTAL I-PSS SCORE[de-identified] 3  How would you feel if you were to spend the rest of your life with your urinary condition?: Mostly Satisfied    Last BUN and creatinine:  Lab Results   Component Value Date    BUN 9 10/13/2020     Lab Results   Component Value Date    CREATININE 0.56 (L) 10/13/2020       Additional Lab/Culture results: none    Imaging Reviewed during this Office Visit: none  (results were independently reviewed by physician and radiology report verified)    PAST MEDICAL, FAMILY AND SOCIAL HISTORY UPDATE:  Past Medical History:   Diagnosis Date    Acid reflux     Anemia, blood loss 10/7/2018    Arthritis     C. difficile colitis 3/19/2020    Chronic bilateral low back pain with bilateral sciatica 11/3/2016    Complete tear of left rotator cuff 7/18/2018    COPD (chronic obstructive pulmonary disease) ([address]     Coronary artery disease involving native coronary artery of native heart without angina pectoris 2/2/2020    Degenerative disc disease, cervical 7/28/2019    GI bleed 3/19/2020    Gout     H/O cardiac catheterization     yrs ago   no stents    History of blood transfusion     Hyperlipidemia     Hyperlipidemia with target LDL less than 100 1/20/2016    Hyperparathyroidism ([address] 1/17/2020    Hypertension     Knee pain, chronic     left    Liver disease     MDRO (multiple drug resistant organisms) resistance     Moderate episode of recurrent major depressive disorder ([address] 1/20/2016    Obesity, Class III, BMI 40-49.9 (morbid obesity) ([address] 1/20/2016    REYNALDO on CPAP 5/6/2017    Osteoarthritis     Peripheral arterial occlusive disease ([address] 3/25/2017    Pneumonia 3/9/2020    Polypharmacy 3/25/2017    Positive FIT (fecal immunochemical test) 4/11/2017    Prolonged emergence from general anesthesia 01/05/2017    Patient \"on life support for 3 days\" after back surgery due to being given succinylcholine    Prostate CA ([address] 1/20/2016    Prostate cancer ([address] 10/2013    finished radiation tx 5/2014    Pseudocholinesterase deficiency 03/25/2017    Pt.  \"on life support\" for 3 days after back surgery 1/5/17 after being given succinylcholine    Severe obesity (BMI 35.0-35.9 with comorbidity) [facility] 2/2/2020    Short of breath on exertion     Sleep apnea     uses CPAP machine nightly    Status post lumbar laminectomy 3/25/2017    Stenosis of left carotid artery 10/7/2018    Syncope and collapse 3/19/2020    Tubular adenoma of colon 4/11/17 5/13/2017    Type 2 diabetes mellitus with hyperglycemia, without long-term current use of insulin ([address] 3/25/2017    Lab Results Component Value Date  LABA1C 7.1 (H) 03/23/2017     Unintended weight loss 10/7/2018    Vitamin D deficiency 3/25/2017    Wears glasses      Past Surgical History:   Procedure Laterality Date    BACK SURGERY      x 2     BACK SURGERY  01/05/2017    lumbar laminectomy L2, L3, L4    BRONCHOSCOPY N/A 3/13/2020    BRONCHOSCOPY performed by Jannice Cooks, MD at [address] [place] [address]    no stents    CAROTID ENDARTERECTOMY Right 12/16/2019    Dr. Ned Corado Bilateral     COLONOSCOPY      KNEE SURGERY Left     LUMBAR LAMINECTOMY  01/05/2017    L2-L4    OR CLOSED RX SHLDR DISLOC,ANESTHESIA Left 8/1/2018    SHOULDER CLOSED REDUCTION WITH C-ARM VISUALIZATION performed by Lesli Osborn MD at [facility] N/A 5/9/2017    COLONOSCOPY WITH BIOPSY performed by Tiffanie Sales DO at [address] IMPLANT Left 7/18/2018    SHOULDER TOTAL ARTHROPLASTY REVERSE LEFT DJO & BICEP TENDON TRANSFER performed by Lesli Osborn MD at [address] HUMERAL/GLENOID COMPNT Left 8/3/2018    SHOULDER TOTAL REVERSE  ARTHROPLASTY REVISION performed by Lesli Osborn MD at [address]     rotator cuff repair    SHOULDER SURGERY Left 10/15/2020    SHOULDER ARTHROSCOPY W/INTEROP CULTURES performed by Lesli Osborn MD at [address] [facility]      ear, forehead    TONSILLECTOMY AND ADENOIDECTOMY      UPPER GASTROINTESTINAL ENDOSCOPY N/A 3/19/2020    EGD BIOPSY performed by Andres Yi MD at [address] [place] [address]     Family History   Problem Relation Age of Onset    Diabetes Mother     Lung Cancer Brother     Liver Cancer Brother     Cancer Father      Outpatient Medications Marked as Taking for the 10/26/20 encounter (Office Visit) with Nathan Pinon MD   Medication Sig Dispense Refill    amLODIPine (NORVASC) 10 MG tablet Take 1 tablet by mouth daily Dose increased 10/16/2020 due to high BP. Correct dosage 90 tablet 3    OXYGEN Provide portable oxygen concentrator      pregabalin (LYRICA) 200 MG capsule Take 1 capsule by mouth 3 times daily for 90 days. 270 capsule 0    methylPREDNISolone (MEDROL, ROSALIE,) 4 MG tablet Take by mouth, with food. Keep low carb, low salt diet while taking it 1 kit 0    varenicline (CHANTIX STARTING MONTH PAK) 0.5 MG X 11 & 1 MG X 42 tablet 0.5 mg po daily x 3 days, 0.5 mg BID x 4 days, then 1 mg BID thereafter . Call for refill 1 box 0    SYMBICORT 160-4.5 MCG/ACT AERO Inhale 2 puffs into the lungs 2 times daily 10.2 inhaler, give 3 inhalers 3 Inhaler 3    albuterol sulfate HFA (PROVENTIL HFA) 108 (90 Base) MCG/ACT inhaler Inhale 2 puffs into the lungs every 6 hours as needed for Wheezing or Shortness of Breath 3 Inhaler 1    Cholecalciferol (VITAMIN D) 50 MCG (2000 UT) CAPS capsule Take 1 capsule by mouth daily      pravastatin (PRAVACHOL) 80 MG tablet Take 1 tablet by mouth every evening Dose increased  9/24/2019 90 tablet 3    omeprazole (PRILOSEC) 40 MG delayed release capsule Take 1 capsule by mouth every morning (before breakfast) 90 capsule 3    Respiratory Therapy Supplies (ADULT MASK) MISC Needs to use mask daily due to coronavirus pandemic.  30 each 5    furosemide (LASIX) 20 MG tablet TAKE 1 TABLET DAILY AS NEEDED (ONLY IF BLOOD PRESSURE OVER 140/90 OR LEG EDEMA) (STOP TENORETIC 50-25) 90 tablet 3    ipratropium-albuterol (DUONEB) 0.5-2.5 (3) MG/3ML SOLN nebulizer solution Inhale 1 vial into the lungs 3 times daily      blood glucose monitor strips Use to test twice daily and as needed as directed by provider      lisinopril (PRINIVIL;ZESTRIL) 10 MG tablet Take 1 tablet by mouth daily Dose decreased 3/18/2020 90 tablet 3    Lift Chair MISC by Does not apply route Patient has difficulty getting up from usual chair 1 each 0    folbee plus (FOLBEE PLUS) TABS Take 1 tablet by mouth daily 90 tablet 3    aspirin EC 81 MG EC tablet Take 1 tablet by mouth daily 90 tablet 0    Glucose Blood (BLOOD GLUCOSE TEST STRIPS) STRP tesing once a day fasting 100 strip 3    albuterol sulfate  (90 BASE) MCG/ACT inhaler Inhale 2 puffs into the lungs every 6 hours as needed for Wheezing or Shortness of Breath (COUGH) 18 g 0       Succinylcholine chloride and Cymbalta [duloxetine hcl]  Social History     Tobacco Use   Smoking Status Current Every Day Smoker    Packs/day: 1.50    Years: 35.00    Pack years: 52.50    Types: Cigarettes    Last attempt to quit: 3/9/2020    Years since quittin.6   Smokeless Tobacco Never Used   Tobacco Comment    WAS SMOKING 1-1.5 ppd     (Ifpatient a smoker, smoking cessation counseling offered)    Social History     Substance and Sexual Activity   Alcohol Use No    Alcohol/week: 0.0 standard drinks       REVIEW OF SYSTEMS:  Review of Systems    Physical Exam:      Vitals:    10/26/20 1527   BP: (!) 162/77   Pulse: 69   Temp: 97.9 °F (36.6 °C)     There is no height or weight on file to calculate BMI. Patient is a 76 y.o. male in no acute distress and alert and oriented to person, place and time. Physical Exam  Constitutional: Patient in no acute distress. Neuro: Alert and oriented to person, place and time.   Psych: Mood normal, affect normal  Skin: No rash noted  HEENT: Head: Normocephalic andatraumatic  Conjunctivae and EOM are normal. Pupils are equal, round  Nose:Normal  Right External Ear: Normal; Left External Ear: Normal  Mouth: Mucosa Moist  Neck: Supple  Lungs: Respiratory effort is normal  Left inguinal hernia    Assessment and Plan      1. Testicular swelling, left    2. Testicular pain, left    3. Prostate cancer [facility])           Plan: Referred to Dr. Isaias Villegas for left inguinal hernia. The patient can follow-up with me in 1 month for his scheduled Eligard and PSA. Return in about 4 weeks (around 11/23/2020) for refer to Dr. Isaias Villegas for left inguinal hernia; f/u one month eligard and psa. Prescriptions Ordered:  No orders of the defined types were placed in this encounter. Orders Placed:  Orders Placed This Encounter   Procedures    PSA, Diagnostic     Standing Status:   Future     Standing Expiration Date:   10/21/2021           Francisco Cruz MD    Agree with the ROS entered by the MA.

## 2020-10-28 ENCOUNTER — OFFICE VISIT (OUTPATIENT)
Dept: ORTHOPEDIC SURGERY | Age: 65
End: 2020-10-28

## 2020-10-28 VITALS — TEMPERATURE: 97.9 F | HEIGHT: 72 IN | WEIGHT: 280 LBS | BODY MASS INDEX: 37.93 KG/M2

## 2020-10-28 PROCEDURE — 99024 POSTOP FOLLOW-UP VISIT: CPT | Performed by: ORTHOPAEDIC SURGERY

## 2020-10-28 NOTE — PROGRESS NOTES
Procedure: Left shoulder arthroscopic biopsies/cultures. Date of procedure: 10/15/2020    HPI: Mr. Kandice Jasmine is a 50-year-old 2 weeks status post the aforementioned procedure. He indicates that he was doing well i.e. no pain up until yesterday when he started having some pain again but he is having pain all over his body. He is dealing with issues regarding his testes. He apparently fell through a chair and landed forcefully hurting himself weeks back. He states that he has been diagnosed with a hernia that skin and need to be surgically repaired. He denies having any fevers, chills, sweats or constitutional symptoms. Even though he is having some pain in the shoulder he states that it is nowhere near what it was prior to surgery. Physical examination:  Evaluation of patient's left shoulder and upper extremity demonstrates his incisions to be clean, dry, intact. There is no warmth, erythema, wound dehiscence or drainage. Sensation is grossly intact light touch in all dermatomes and he has a 2+ radial pulse with brisk capillary refill in his fingers. Impression and plan:  Luz Elena Galindo is a 76 y.o. male who is 2 weeks status post an arthroscopic left shoulder arthroscopic biopsy for culture. I did review his cultures and so far there has been no growth. He is doing relatively well at this time. Today his sutures were taken out and Steri-Strips and clean dressings applied. He may now get his incisions wet in the shower. He may continue to use this arm for activities as he feels comfortable. We will keep an eye on his cultures and I will see him back for reevaluation in another 4 weeks.

## 2020-10-29 ENCOUNTER — TELEPHONE (OUTPATIENT)
Dept: FAMILY MEDICINE CLINIC | Age: 65
End: 2020-10-29

## 2020-10-29 RX ORDER — NALOXONE HYDROCHLORIDE 4 MG/.1ML
1 SPRAY NASAL PRN
Qty: 1 EACH | Refills: 3 | Status: ON HOLD | OUTPATIENT
Start: 2020-10-29 | End: 2022-03-15 | Stop reason: HOSPADM

## 2020-10-29 NOTE — TELEPHONE ENCOUNTER
Please advise the patient to have received medication from his [facility] that Lyrica and hydrocodone interact and can increase the risk of stop breathing. He should not take hydrocodone, just Tylenol Extra Strength in case he wants to continue with Lyrica    Due to his COPD he has a high risk I will send Narcan to the pharmacy to discuss with pharmacist how to use that. Please let the patient know to  prescription from pharmacy. Requested Prescriptions     Signed Prescriptions Disp Refills    naloxone 4 MG/0.1ML LIQD nasal spray 1 each 3     Si spray by Nasal route as needed for Opioid Reversal Patient needs counseling     Authorizing Provider: Vinicius Wick [address] [place] [address], [address] [place] [id number]  Phone: [phone] Fax: [phone]      Thank you!         FYI    Future Appointments   Date Time Provider Keri English   2020  9:50 AM Luciana Garcia MD [address] [place] [address]   2020  9:45 AM Krystina Yin MD [facility]   2020  2:00 PM Nicanor Rodriguez MD [facility]   2021 11:30 AM Nicanor Rodriguez MD [facility]       Controlled Substances Monitoring:

## 2020-11-01 LAB
CULTURE: NORMAL
DIRECT EXAM: NORMAL
Lab: NORMAL
SPECIMEN DESCRIPTION: NORMAL

## 2020-11-02 LAB
CULTURE: NORMAL
DIRECT EXAM: NORMAL
DIRECT EXAM: NORMAL
Lab: NORMAL
SPECIMEN DESCRIPTION: NORMAL

## 2020-11-04 ENCOUNTER — TELEPHONE (OUTPATIENT)
Dept: FAMILY MEDICINE CLINIC | Age: 65
End: 2020-11-04

## 2020-11-04 NOTE — TELEPHONE ENCOUNTER
I just need a clearance from the cardiologist and his pulmonologist and then will clear him      He will also need a nurse visit for blood pressure recheck  BP Readings from Last 3 Encounters:   10/26/20 (!) 162/77   10/15/20 (!) 155/97   10/15/20 (!) 141/67

## 2020-11-04 NOTE — TELEPHONE ENCOUNTER
Patient was seen by Dr Olga Lidia Pimentel on Monday and he has a hernia. He needs medical clearance. He has not been scheduled for any PAT that he knows of.  He last seen you on 10/13/20

## 2020-11-05 ENCOUNTER — TELEPHONE (OUTPATIENT)
Dept: FAMILY MEDICINE CLINIC | Age: 65
End: 2020-11-05

## 2020-11-05 NOTE — TELEPHONE ENCOUNTER
Was he cleared by cardiology  Please obtain clearance from pulm,     BP Readings from Last 3 Encounters:   10/26/20 (!) 162/77   10/15/20 (!) 155/97   10/15/20 (!) 141/67     We will clear when BP good and cardio cleared him    Future Appointments   Date Time Provider Keri English   11/9/2020  8:00 AM [facility] PAT RM 2 [facility] PAT [facility]   11/14/2020 11:10 AM STA PAT RM 4 [facility] PAT [facility]   11/16/2020 10:00 AM SCHEDULE, JOSHUA HERNANDEZ [facility]   11/23/2020  9:50 AM Rob Nicholas MD [facility]   11/25/2020  9:45 AM Beata Cullen MD [facility]   12/31/2020  2:00 PM MD [facility]   7/8/2021 11:30 AM Michelle Douglas MD [facility]

## 2020-11-05 NOTE — TELEPHONE ENCOUNTER
SPOKE TO PATIENT, HE DID JUST SEE PULMONOLOGIST AND WAS CLEARED NOW JUST THE CARDIOLOGIST.  ALSO MADE A BP RECHECK APPT FOR 11/16/2020

## 2020-11-09 ENCOUNTER — HOSPITAL ENCOUNTER (OUTPATIENT)
Dept: PREADMISSION TESTING | Age: 65
Discharge: HOME | End: 2020-11-13
Payer: MEDICARE

## 2020-11-09 VITALS
TEMPERATURE: 97 F | DIASTOLIC BLOOD PRESSURE: 67 MMHG | RESPIRATION RATE: 20 BRPM | HEIGHT: 72 IN | BODY MASS INDEX: 37.93 KG/M2 | HEART RATE: 60 BPM | SYSTOLIC BLOOD PRESSURE: 138 MMHG | OXYGEN SATURATION: 98 % | WEIGHT: 280 LBS

## 2020-11-09 LAB
ABSOLUTE EOS #: 0.1 K/UL (ref 0–0.4)
ABSOLUTE IMMATURE GRANULOCYTE: ABNORMAL K/UL (ref 0–0.3)
ABSOLUTE LYMPH #: 1.6 K/UL (ref 1–4.8)
ABSOLUTE MONO #: 0.5 K/UL (ref 0.1–1.3)
ANION GAP SERPL CALCULATED.3IONS-SCNC: 11 MMOL/L (ref 9–17)
BASOPHILS # BLD: 1 % (ref 0–2)
BASOPHILS ABSOLUTE: 0.1 K/UL (ref 0–0.2)
BUN BLDV-MCNC: 17 MG/DL (ref 8–23)
BUN/CREAT BLD: ABNORMAL (ref 9–20)
CALCIUM SERPL-MCNC: 9.6 MG/DL (ref 8.6–10.4)
CHLORIDE BLD-SCNC: 106 MMOL/L (ref 98–107)
CO2: 24 MMOL/L (ref 20–31)
CREAT SERPL-MCNC: 0.55 MG/DL (ref 0.7–1.2)
DIFFERENTIAL TYPE: ABNORMAL
EOSINOPHILS RELATIVE PERCENT: 1 % (ref 0–4)
GFR AFRICAN AMERICAN: >60 ML/MIN
GFR NON-AFRICAN AMERICAN: >60 ML/MIN
GFR SERPL CREATININE-BSD FRML MDRD: ABNORMAL ML/MIN/{1.73_M2}
GFR SERPL CREATININE-BSD FRML MDRD: ABNORMAL ML/MIN/{1.73_M2}
GLUCOSE BLD-MCNC: 113 MG/DL (ref 70–99)
HCT VFR BLD CALC: 42.2 % (ref 41–53)
HEMOGLOBIN: 14.3 G/DL (ref 13.5–17.5)
IMMATURE GRANULOCYTES: ABNORMAL %
LYMPHOCYTES # BLD: 20 % (ref 24–44)
MCH RBC QN AUTO: 30 PG (ref 26–34)
MCHC RBC AUTO-ENTMCNC: 34 G/DL (ref 31–37)
MCV RBC AUTO: 88.3 FL (ref 80–100)
MONOCYTES # BLD: 7 % (ref 1–7)
NRBC AUTOMATED: ABNORMAL PER 100 WBC
PDW BLD-RTO: 15.6 % (ref 11.5–14.9)
PLATELET # BLD: 173 K/UL (ref 150–450)
PLATELET ESTIMATE: ABNORMAL
PMV BLD AUTO: 9.9 FL (ref 6–12)
POTASSIUM SERPL-SCNC: 4.5 MMOL/L (ref 3.7–5.3)
RBC # BLD: 4.77 M/UL (ref 4.5–5.9)
RBC # BLD: ABNORMAL 10*6/UL
SEG NEUTROPHILS: 71 % (ref 36–66)
SEGMENTED NEUTROPHILS ABSOLUTE COUNT: 5.8 K/UL (ref 1.3–9.1)
SODIUM BLD-SCNC: 141 MMOL/L (ref 135–144)
WBC # BLD: 8.1 K/UL (ref 3.5–11)
WBC # BLD: ABNORMAL 10*3/UL

## 2020-11-09 PROCEDURE — 80048 BASIC METABOLIC PNL TOTAL CA: CPT

## 2020-11-09 PROCEDURE — 85025 COMPLETE CBC W/AUTO DIFF WBC: CPT

## 2020-11-09 PROCEDURE — 93005 ELECTROCARDIOGRAM TRACING: CPT | Performed by: SURGERY

## 2020-11-09 PROCEDURE — 36415 COLL VENOUS BLD VENIPUNCTURE: CPT

## 2020-11-09 NOTE — H&P
HISTORY and [address]       NAME:  Antonio Gomez  MRN: [id number]   YOB: 1955   Date: 11/9/2020   Age: 76 y.o. Gender: male       COMPLAINT AND PRESENT HISTORY:   Antonio Gomez is 76 y.o.,  male, Preadmission Testing for left  Inguinal  Hernia. Patient has symptoms of : bulging out and discomfort. Patient noticed the Hernia 3 weeks ago. Patient  Reports following a injury. Patient states that he recently fell through a chair, hit the ground very hard. and he was trying and straining to get up  from the chair, he states he did some damage to his shoulder as well. The Hernia grew in regards to swelling. The Hernia is tender non expansile on coughing. Pt is more relieved with hernia when: lying down, standing aggravates hernia more. Patient will be having HERNIA INGUINAL REPAIR W/MESH OPEN -Left .  patient has hx of CAD, DM,  COPD, patient is being managed by medication. Pt denies any palpitation, chest pain or SOB. Patient is a current smoker. No abdominal pain, Dysphagia, nausea, vomiting or  diarrhea  Pt denies any constipation or bowel blockage. No fever or chills.      PAST MEDICAL HISTORY     Past Medical History:   Diagnosis Date    Acid reflux     Anemia, blood loss 10/7/2018    Arthritis     C. difficile colitis 3/19/2020    Chronic bilateral low back pain with bilateral sciatica 11/3/2016    Complete tear of left rotator cuff 7/18/2018    COPD (chronic obstructive pulmonary disease) [facility]     Coronary artery disease involving native coronary artery of native heart without angina pectoris 2/2/2020    Degenerative disc disease, cervical 7/28/2019    GI bleed 3/19/2020    Gout     H/O cardiac catheterization     yrs ago   no stents    History of blood transfusion     Hyperlipidemia     Hyperlipidemia with target LDL less than 100 1/20/2016    Hyperparathyroidism ([address] 1/17/2020    Hypertension     Knee pain, chronic 2017    L2-L4    CA CLOSED RX SHLDR DISLOC,ANESTHESIA Left 2018    SHOULDER CLOSED REDUCTION WITH C-ARM VISUALIZATION performed by Maryhelen Bosworth, MD at [address] W/BIOPSY SINGLE/MULTIPLE N/A 2017    COLONOSCOPY WITH BIOPSY performed by Jefry Natarajan DO at [address] IMPLANT Left 2018    SHOULDER TOTAL ARTHROPLASTY REVERSE LEFT DJO & BICEP TENDON TRANSFER performed by Maryhelen Bosworth, MD at [address] HUMERAL/GLENOID COMPNT Left 8/3/2018    SHOULDER TOTAL REVERSE  ARTHROPLASTY REVISION performed by Maryhelen Bosworth, MD at [address]     rotator cuff repair    SHOULDER SURGERY Left 10/15/2020    SHOULDER ARTHROSCOPY W/INTEROP CULTURES performed by Maryhelen Bosworth, MD at [address] [facility]      ear, forehead    TONSILLECTOMY AND ADENOIDECTOMY      UPPER GASTROINTESTINAL ENDOSCOPY N/A 3/19/2020    EGD BIOPSY performed by Kely Yi MD at [address]       Family History   Problem Relation Age of Onset    Diabetes Mother     Lung Cancer Brother     Liver Cancer Brother     Cancer Father        SOCIAL HISTORY       Social History     Socioeconomic History    Marital status:      Spouse name: None    Number of children: None    Years of education: None    Highest education level: None   Occupational History    None   Social Needs    Financial resource strain: None    Food insecurity     Worry: None     Inability: None    Transportation needs     Medical: None     Non-medical: None   Tobacco Use    Smoking status: Current Every Day Smoker     Packs/day: 0.50     Years: 35.00     Pack years: 17.50     Types: Cigarettes     Last attempt to quit: 3/9/2020     Years since quittin.6    Smokeless tobacco: Never Used    Tobacco comment: WAS SMOKING 1-1.5 ppd   Substance and Sexual Activity    Alcohol use: Not Currently     Alcohol/week: 0.0 standard drinks    Drug use: No    Sexual activity: Not Currently     Partners: Female   Lifestyle    Physical activity     Days per week: None     Minutes per session: None    Stress: None   Relationships    Social connections     Talks on phone: None     Gets together: None     Attends [employer] service: None     Active member of club or organization: None     Attends meetings of clubs or organizations: None     Relationship status: None    Intimate partner violence     Fear of current or ex partner: None     Emotionally abused: None     Physically abused: None     Forced sexual activity: None   Other Topics Concern    None   Social History Narrative    None           REVIEW OF SYSTEMS      Allergies   Allergen Reactions    Succinylcholine Chloride Other (See Comments)     PATIENT HAS PSEUDOCHOLINESTERASE DEFICIENCY      Cymbalta [Duloxetine Hcl]      Taste loss         Current Outpatient Medications on File Prior to Encounter   Medication Sig Dispense Refill    naloxone 4 MG/0.1ML LIQD nasal spray 1 spray by Nasal route as needed for Opioid Reversal Patient needs counseling 1 each 3    amLODIPine (NORVASC) 10 MG tablet Take 1 tablet by mouth daily Dose increased 10/16/2020 due to high BP. Correct dosage 90 tablet 3    OXYGEN With CPAP at night      pregabalin (LYRICA) 200 MG capsule Take 1 capsule by mouth 3 times daily for 90 days.  270 capsule 0    SYMBICORT 160-4.5 MCG/ACT AERO Inhale 2 puffs into the lungs 2 times daily 10.2 inhaler, give 3 inhalers 3 Inhaler 3    albuterol sulfate HFA (PROVENTIL HFA) 108 (90 Base) MCG/ACT inhaler Inhale 2 puffs into the lungs every 6 hours as needed for Wheezing or Shortness of Breath 3 Inhaler 1    pravastatin (PRAVACHOL) 80 MG tablet Take 1 tablet by mouth every evening Dose increased  9/24/2019 90 tablet 3    omeprazole (PRILOSEC) 40 MG delayed release capsule Take 1 capsule by mouth every morning (before breakfast) 90 capsule 3    furosemide (LASIX) 20 complaints. GENERAL PHYSICAL EXAM:     Vitals: /67   Pulse 60   Temp 97 °F (36.1 °C) (Infrared)   Resp 20   Ht 6' (1.829 m)   Wt 280 lb (127 kg)   SpO2 98%   BMI 37.97 kg/m²  Body mass index is 37.97 kg/m². GENERAL APPEARANCE:   Shamar Wang is 76 y.o.,  male, moderately obese, nourished, conscious, alert. Does not appear to be distress or pain at this time. SKIN:  Warm, dry, no cyanosis or jaundice. HEAD:  Normocephalic, atraumatic, no swelling or tenderness. EYES:  Pupils equal, reactive to light. EARS:  No discharge, no marked hearing loss. NOSE:  No rhinorrhea, epistaxis or septal deformity. THROAT:  Not congested. No ulceration bleeding or discharge. NECK:  No stiffness, trachea central.                  CHEST:  Symmetrical and equal on expansion. HEART:  RRR S1 > S2. No audible murmurs or gallops. LUNGS:  Equal on expansion, normal breath sounds. No adventitious sounds. ABDOMEN:  Obese. Soft on palpation. No localized tenderness. No guarding or rigidity. No palpable hepatosplenomegaly. Inguinal hernia exam deferred. LYMPHATICS:  No palpable cervical lymphadenopathy. LOCOMOTOR, BACK AND SPINE:  No tenderness or deformities. EXTREMITIES:  Symmetrical, no pretibial edema. Ginos sign negative or no calf tenderness. No discoloration or ulcerations. Using cane, noted foot drop on the right    NEUROLOGIC:  The patient is conscious, alert, oriented,Cranial nerve II-XII intact, taste and smell were not examined. No apparent focal sensory or motor deficits.                                                                                      PROVISIONAL DIAGNOSES / SURGERY:      LEFT INGUINAL HERNIA    HERNIA INGUINAL REPAIR W/MESH OPEN  Left     Patient Active Problem List Diagnosis Date Noted    Severe obesity (BMI 35.0-35.9 with comorbidity) ([address] 02/02/2020     Priority: High    Hyperparathyroidism ([address] 01/17/2020     Priority: High    REYNALDO on CPAP 05/06/2017     Priority: High    Status post lumbar laminectomy 03/25/2017     Priority: High    Tubular adenoma of colon 4/11/17 04/11/2017     Priority: Medium    History of prostate cancer 03/26/2020    Melena 03/19/2020    Chronic hypoxemic respiratory failure ([address] 03/18/2020    Status post total shoulder arthroplasty, left 03/16/2020    Personal history of smoking 03/16/2020    Coronary artery disease involving native coronary artery of native heart without angina pectoris 02/02/2020    Degenerative disc disease, cervical 07/28/2019    At high risk for falls 07/28/2019    Difficulty rising from chair 04/09/2019    Poor balance 01/13/2019    Foot drop, right 01/13/2019    Family history of cancer 10/07/2018    Stenosis of left carotid artery 10/07/2018    Recurrent major depressive disorder, in full remission ([address] 09/04/2018    Seborrheic keratosis 09/04/2018    Solar degeneration 09/04/2018    S/P reverse total shoulder arthroplasty, left 07/18/2018    Abdominal aortic aneurysm (HCC) 04/30/2018    Psychophysiological insomnia 01/03/2018    Hypertriglyceridemia 05/06/2017    Type 2 diabetes mellitus with diabetic polyneuropathy, without long-term current use of insulin ([address] 03/25/2017    PVD (peripheral vascular disease) with claudication ([address] 03/25/2017    Peripheral neuropathic pain 03/25/2017    Tinea pedis of both feet 03/25/2017    Nonspecific reactive hepatitis 03/25/2017    COPD mixed type ([address] 03/25/2017    Serum cholinesterase defect ([address] 03/25/2017    Vitamin D deficiency 03/25/2017    Pseudocholinesterase deficiency 03/25/2017    DDD (degenerative disc disease), lumbar 11/03/2016    Chronic bilateral low back pain with bilateral sciatica 11/03/2016    Osteoarthritis of spine with radiculopathy, lumbar region 11/03/2016    Essential hypertension 01/20/2016    Gout 01/20/2016    Hyperlipidemia with target LDL less than 70 01/20/2016    Osteoporosis 01/20/2016    Gastroesophageal reflux disease without esophagitis 01/20/2016    Senile hyperkeratosis 01/07/2011    Actinic keratosis 01/06/2011    Photodermatitis due to sun 01/06/2011           MARCELINO Croft, APRN - CNP on 11/9/2020 at 9:15 AM

## 2020-11-09 NOTE — H&P (VIEW-ONLY)
HISTORY and [id number]       NAME:  Rodrigue Valdez  MRN: [id number]   YOB: 1955   Date: 11/9/2020   Age: 76 y.o. Gender: male       COMPLAINT AND PRESENT HISTORY:   Rodrigue Valdez is 76 y.o.,  male, Preadmission Testing for left  Inguinal  Hernia. Patient has symptoms of : bulging out and discomfort. Patient noticed the Hernia 3 weeks ago. Patient  Reports following a injury. Patient states that he recently fell through a chair, hit the ground very hard. and he was trying and straining to get up  from the chair, he states he did some damage to his shoulder as well. The Hernia grew in regards to swelling. The Hernia is tender non expansile on coughing. Pt is more relieved with hernia when: lying down, standing aggravates hernia more. Patient will be having HERNIA INGUINAL REPAIR W/MESH OPEN -Left .  patient has hx of CAD, DM,  COPD, patient is being managed by medication. Pt denies any palpitation, chest pain or SOB. Patient is a current smoker. No abdominal pain, Dysphagia, nausea, vomiting or  diarrhea  Pt denies any constipation or bowel blockage. No fever or chills.      PAST MEDICAL HISTORY     Past Medical History:   Diagnosis Date    Acid reflux     Anemia, blood loss 10/7/2018    Arthritis     C. difficile colitis 3/19/2020    Chronic bilateral low back pain with bilateral sciatica 11/3/2016    Complete tear of left rotator cuff 7/18/2018    COPD (chronic obstructive pulmonary disease) ([facility] [address]     Coronary artery disease involving native coronary artery of native heart without angina pectoris 2/2/2020    Degenerative disc disease, cervical 7/28/2019    GI bleed 3/19/2020    Gout     H/O cardiac catheterization     yrs ago   no stents    History of blood transfusion     Hyperlipidemia     Hyperlipidemia with target LDL less than 100 1/20/2016    Hyperparathyroidism ([address] 1/17/2020    Hypertension     Knee pain, chronic left    Liver disease     MDRO (multiple drug resistant organisms) resistance     Moderate episode of recurrent major depressive disorder ([address] 1/20/2016    Obesity, Class III, BMI 40-49.9 (morbid obesity) ([address] 1/20/2016    REYNALDO on CPAP 5/6/2017    Osteoarthritis     Peripheral arterial occlusive disease ([address] 3/25/2017    Pneumonia 3/9/2020    Polypharmacy 3/25/2017    Positive FIT (fecal immunochemical test) 4/11/2017    Prolonged emergence from general anesthesia 01/05/2017    Patient \"on life support for 3 days\" after back surgery due to being given succinylcholine    Prostate CA ([address] 1/20/2016    Prostate cancer ([address] 10/2013    finished radiation tx 5/2014    Pseudocholinesterase deficiency 03/25/2017    Pt.  \"on life support\" for 3 days after back surgery 1/5/17 after being given succinylcholine    Severe obesity (BMI 35.0-35.9 with comorbidity) ([address] 2/2/2020    Short of breath on exertion     Sleep apnea     uses CPAP machine nightly    Status post lumbar laminectomy 3/25/2017    Stenosis of left carotid artery 10/7/2018    Syncope and collapse 3/19/2020    Tubular adenoma of colon 4/11/17 5/13/2017    Type 2 diabetes mellitus with hyperglycemia, without long-term current use of insulin ([address] 3/25/2017    Lab Results Component Value Date  LABA1C 7.1 (H) 03/23/2017     Unintended weight loss 10/7/2018    Vitamin D deficiency 3/25/2017    Wears glasses          SURGICAL HISTORY       Past Surgical History:   Procedure Laterality Date    BACK SURGERY      x 2     BACK SURGERY  01/05/2017    lumbar laminectomy L2, L3, L4    BRONCHOSCOPY N/A 3/13/2020    BRONCHOSCOPY performed by Anthony Lyons MD at [address] [place] [address]  2007    no stents    CAROTID ENDARTERECTOMY Right 12/16/2019    Dr. Avelina Key Bilateral     COLONOSCOPY      ENDOSCOPY, COLON, DIAGNOSTIC      JOINT REPLACEMENT      SHOULDER    KNEE SURGERY Left     LUMBAR LAMINECTOMY standard drinks    Drug use: No    Sexual activity: Not Currently     Partners: Female   Lifestyle    Physical activity     Days per week: None     Minutes per session: None    Stress: None   Relationships    Social connections     Talks on phone: None     Gets together: None     Attends Confucianist service: None     Active member of club or organization: None     Attends meetings of clubs or organizations: None     Relationship status: None    Intimate partner violence     Fear of current or ex partner: None     Emotionally abused: None     Physically abused: None     Forced sexual activity: None   Other Topics Concern    None   Social History Narrative    None           REVIEW OF SYSTEMS      Allergies   Allergen Reactions    Succinylcholine Chloride Other (See Comments)     PATIENT HAS PSEUDOCHOLINESTERASE DEFICIENCY      Cymbalta [Duloxetine Hcl]      Taste loss         Current Outpatient Medications on File Prior to Encounter   Medication Sig Dispense Refill    naloxone 4 MG/0.1ML LIQD nasal spray 1 spray by Nasal route as needed for Opioid Reversal Patient needs counseling 1 each 3    amLODIPine (NORVASC) 10 MG tablet Take 1 tablet by mouth daily Dose increased 10/16/2020 due to high BP. Correct dosage 90 tablet 3    OXYGEN With CPAP at night      pregabalin (LYRICA) 200 MG capsule Take 1 capsule by mouth 3 times daily for 90 days.  270 capsule 0    SYMBICORT 160-4.5 MCG/ACT AERO Inhale 2 puffs into the lungs 2 times daily 10.2 inhaler, give 3 inhalers 3 Inhaler 3    albuterol sulfate HFA (PROVENTIL HFA) 108 (90 Base) MCG/ACT inhaler Inhale 2 puffs into the lungs every 6 hours as needed for Wheezing or Shortness of Breath 3 Inhaler 1    pravastatin (PRAVACHOL) 80 MG tablet Take 1 tablet by mouth every evening Dose increased  9/24/2019 90 tablet 3    omeprazole (PRILOSEC) 40 MG delayed release capsule Take 1 capsule by mouth every morning (before breakfast) 90 capsule 3    furosemide (LASIX) 20 complaints. GENERAL PHYSICAL EXAM:     Vitals: /67   Pulse 60   Temp 97 °F (36.1 °C) (Infrared)   Resp 20   Ht 6' (1.829 m)   Wt 280 lb (127 kg)   SpO2 98%   BMI 37.97 kg/m²  Body mass index is 37.97 kg/m². GENERAL APPEARANCE:   Luz Elena Galindo is 76 y.o.,  male, moderately obese, nourished, conscious, alert. Does not appear to be distress or pain at this time. SKIN:  Warm, dry, no cyanosis or jaundice. HEAD:  Normocephalic, atraumatic, no swelling or tenderness. EYES:  Pupils equal, reactive to light. EARS:  No discharge, no marked hearing loss. NOSE:  No rhinorrhea, epistaxis or septal deformity. THROAT:  Not congested. No ulceration bleeding or discharge. NECK:  No stiffness, trachea central.                  CHEST:  Symmetrical and equal on expansion. HEART:  RRR S1 > S2. No audible murmurs or gallops. LUNGS:  Equal on expansion, normal breath sounds. No adventitious sounds. ABDOMEN:  Obese. Soft on palpation. No localized tenderness. No guarding or rigidity. No palpable hepatosplenomegaly. Inguinal hernia exam deferred. LYMPHATICS:  No palpable cervical lymphadenopathy. LOCOMOTOR, BACK AND SPINE:  No tenderness or deformities. EXTREMITIES:  Symmetrical, no pretibial edema. Ginos sign negative or no calf tenderness. No discoloration or ulcerations. Using cane, noted foot drop on the right    NEUROLOGIC:  The patient is conscious, alert, oriented,Cranial nerve II-XII intact, taste and smell were not examined. No apparent focal sensory or motor deficits.                                                                                      PROVISIONAL DIAGNOSES / SURGERY:      LEFT INGUINAL HERNIA    HERNIA INGUINAL REPAIR W/MESH OPEN  Left     Patient Active Problem List Diagnosis Date Noted    Severe obesity (BMI 35.0-35.9 with comorbidity) ([address] 02/02/2020     Priority: High    Hyperparathyroidism ([address] 01/17/2020     Priority: High    REYNALDO on CPAP 05/06/2017     Priority: High    Status post lumbar laminectomy 03/25/2017     Priority: High    Tubular adenoma of colon 4/11/17 04/11/2017     Priority: Medium    History of prostate cancer 03/26/2020    Melena 03/19/2020    Chronic hypoxemic respiratory failure ([address] 03/18/2020    Status post total shoulder arthroplasty, left 03/16/2020    Personal history of smoking 03/16/2020    Coronary artery disease involving native coronary artery of native heart without angina pectoris 02/02/2020    Degenerative disc disease, cervical 07/28/2019    At high risk for falls 07/28/2019    Difficulty rising from chair 04/09/2019    Poor balance 01/13/2019    Foot drop, right 01/13/2019    Family history of cancer 10/07/2018    Stenosis of left carotid artery 10/07/2018    Recurrent major depressive disorder, in full remission ([address] 09/04/2018    Seborrheic keratosis 09/04/2018    Solar degeneration 09/04/2018    S/P reverse total shoulder arthroplasty, left 07/18/2018    Abdominal aortic aneurysm (HCC) 04/30/2018    Psychophysiological insomnia 01/03/2018    Hypertriglyceridemia 05/06/2017    Type 2 diabetes mellitus with diabetic polyneuropathy, without long-term current use of insulin ([address] 03/25/2017    PVD (peripheral vascular disease) with claudication ([address] 03/25/2017    Peripheral neuropathic pain 03/25/2017    Tinea pedis of both feet 03/25/2017    Nonspecific reactive hepatitis 03/25/2017    COPD mixed type ([address] 03/25/2017    Serum cholinesterase defect ([address] 03/25/2017    Vitamin D deficiency 03/25/2017    Pseudocholinesterase deficiency 03/25/2017    DDD (degenerative disc disease), lumbar 11/03/2016    Chronic bilateral low back pain with bilateral sciatica 11/03/2016    Osteoarthritis of spine with radiculopathy, lumbar region 11/03/2016    Essential hypertension 01/20/2016    Gout 01/20/2016    Hyperlipidemia with target LDL less than 70 01/20/2016    Osteoporosis 01/20/2016    Gastroesophageal reflux disease without esophagitis 01/20/2016    Senile hyperkeratosis 01/07/2011    Actinic keratosis 01/06/2011    Photodermatitis due to sun 01/06/2011           MARCELINO Ye, APRN - CNP on 11/9/2020 at 9:15 AM

## 2020-11-11 LAB
EKG ATRIAL RATE: 58 BPM
EKG P AXIS: -26 DEGREES
EKG P-R INTERVAL: 170 MS
EKG Q-T INTERVAL: 386 MS
EKG QRS DURATION: 98 MS
EKG QTC CALCULATION (BAZETT): 378 MS
EKG R AXIS: 41 DEGREES
EKG T AXIS: 53 DEGREES
EKG VENTRICULAR RATE: 58 BPM

## 2020-11-11 PROCEDURE — 93010 ELECTROCARDIOGRAM REPORT: CPT | Performed by: INTERNAL MEDICINE

## 2020-11-14 ENCOUNTER — HOSPITAL ENCOUNTER (OUTPATIENT)
Dept: PREADMISSION TESTING | Age: 65
Setting detail: SPECIMEN
Discharge: HOME | End: 2020-11-18
Payer: MEDICARE

## 2020-11-14 PROCEDURE — U0003 INFECTIOUS AGENT DETECTION BY NUCLEIC ACID (DNA OR RNA); SEVERE ACUTE RESPIRATORY SYNDROME CORONAVIRUS 2 (SARS-COV-2) (CORONAVIRUS DISEASE [COVID-19]), AMPLIFIED PROBE TECHNIQUE, MAKING USE OF HIGH THROUGHPUT TECHNOLOGIES AS DESCRIBED BY CMS-2020-01-R: HCPCS

## 2020-11-16 ENCOUNTER — HOSPITAL ENCOUNTER (OUTPATIENT)
Age: 65
Setting detail: SPECIMEN
Discharge: HOME | End: 2020-11-16
Payer: MEDICARE

## 2020-11-16 ENCOUNTER — NURSE ONLY (OUTPATIENT)
Dept: FAMILY MEDICINE CLINIC | Age: 65
End: 2020-11-16
Payer: MEDICARE

## 2020-11-16 VITALS — DIASTOLIC BLOOD PRESSURE: 70 MMHG | SYSTOLIC BLOOD PRESSURE: 130 MMHG

## 2020-11-16 LAB
PROSTATE SPECIFIC ANTIGEN: 0.03 UG/L
SARS-COV-2, NAA: NOT DETECTED

## 2020-11-16 PROCEDURE — 99211 OFF/OP EST MAY X REQ PHY/QHP: CPT | Performed by: FAMILY MEDICINE

## 2020-11-16 NOTE — PROGRESS NOTES
Chief Complaint   Patient presents with    Blood Pressure Check      Eber Aguilar is here for BP check. Pt states he took all medications as directed this morning.      BP Readings from Last 3 Encounters:   11/09/20 138/67   10/26/20 (!) 162/77   10/15/20 (!) 155/97       BP is   Vitals:    11/16/20 1003   BP: 130/70           Future Appointments   Date Time Provider [facility]   11/23/2020  9:50 AM Zuleima Kilpatrick MD [facility]   11/25/2020  9:45 AM Chinmay Salmon MD [facility]   12/31/2020  2:00 PM Kathy Madden MD [facility]   7[facility]8/2021 11:30 [facility]

## 2020-11-17 ENCOUNTER — ANESTHESIA EVENT (OUTPATIENT)
Dept: OPERATING ROOM | Age: 65
End: 2020-11-17
Payer: MEDICARE

## 2020-11-18 ENCOUNTER — ANESTHESIA (OUTPATIENT)
Dept: OPERATING ROOM | Age: 65
End: 2020-11-18
Payer: MEDICARE

## 2020-11-18 ENCOUNTER — HOSPITAL ENCOUNTER (OUTPATIENT)
Age: 65
Setting detail: OUTPATIENT SURGERY
Discharge: HOME | End: 2020-11-18
Attending: SURGERY | Admitting: SURGERY
Payer: MEDICARE

## 2020-11-18 VITALS
RESPIRATION RATE: 18 BRPM | TEMPERATURE: 97.5 F | SYSTOLIC BLOOD PRESSURE: 144 MMHG | HEART RATE: 55 BPM | DIASTOLIC BLOOD PRESSURE: 68 MMHG | HEIGHT: 72 IN | OXYGEN SATURATION: 91 % | WEIGHT: 280 LBS | BODY MASS INDEX: 37.93 KG/M2

## 2020-11-18 VITALS — DIASTOLIC BLOOD PRESSURE: 87 MMHG | OXYGEN SATURATION: 90 % | TEMPERATURE: 97.2 F | SYSTOLIC BLOOD PRESSURE: 184 MMHG

## 2020-11-18 LAB — GLUCOSE BLD-MCNC: 94 MG/DL (ref 75–110)

## 2020-11-18 PROCEDURE — 3600000002 HC SURGERY LEVEL 2 BASE: Performed by: SURGERY

## 2020-11-18 PROCEDURE — C1781 MESH (IMPLANTABLE): HCPCS | Performed by: SURGERY

## 2020-11-18 PROCEDURE — 94640 AIRWAY INHALATION TREATMENT: CPT

## 2020-11-18 PROCEDURE — 7100000001 HC PACU RECOVERY - ADDTL 15 MIN: Performed by: SURGERY

## 2020-11-18 PROCEDURE — 6370000000 HC RX 637 (ALT 250 FOR IP): Performed by: ANESTHESIOLOGY

## 2020-11-18 PROCEDURE — 2709999900 HC NON-CHARGEABLE SUPPLY: Performed by: SURGERY

## 2020-11-18 PROCEDURE — 2500000003 HC RX 250 WO HCPCS: Performed by: NURSE ANESTHETIST, CERTIFIED REGISTERED

## 2020-11-18 PROCEDURE — 88305 TISSUE EXAM BY PATHOLOGIST: CPT

## 2020-11-18 PROCEDURE — 7100000011 HC PHASE II RECOVERY - ADDTL 15 MIN: Performed by: SURGERY

## 2020-11-18 PROCEDURE — 7100000030 HC ASPR PHASE II RECOVERY - FIRST 15 MIN: Performed by: SURGERY

## 2020-11-18 PROCEDURE — 3600000012 HC SURGERY LEVEL 2 ADDTL 15MIN: Performed by: SURGERY

## 2020-11-18 PROCEDURE — 2500000003 HC RX 250 WO HCPCS: Performed by: SURGERY

## 2020-11-18 PROCEDURE — 6360000002 HC RX W HCPCS: Performed by: NURSE ANESTHETIST, CERTIFIED REGISTERED

## 2020-11-18 PROCEDURE — 3700000001 HC ADD 15 MINUTES (ANESTHESIA): Performed by: SURGERY

## 2020-11-18 PROCEDURE — 2580000003 HC RX 258: Performed by: ANESTHESIOLOGY

## 2020-11-18 PROCEDURE — 7100000031 HC ASPR PHASE II RECOVERY - ADDTL 15 MIN: Performed by: SURGERY

## 2020-11-18 PROCEDURE — 82947 ASSAY GLUCOSE BLOOD QUANT: CPT

## 2020-11-18 PROCEDURE — 2700000000 HC OXYGEN THERAPY PER DAY

## 2020-11-18 PROCEDURE — 7100000000 HC PACU RECOVERY - FIRST 15 MIN: Performed by: SURGERY

## 2020-11-18 PROCEDURE — 7100000010 HC PHASE II RECOVERY - FIRST 15 MIN: Performed by: SURGERY

## 2020-11-18 PROCEDURE — 3700000000 HC ANESTHESIA ATTENDED CARE: Performed by: SURGERY

## 2020-11-18 PROCEDURE — 6360000002 HC RX W HCPCS: Performed by: SURGERY

## 2020-11-18 PROCEDURE — 2720000010 HC SURG SUPPLY STERILE: Performed by: SURGERY

## 2020-11-18 PROCEDURE — 99999 PR OFFICE/OUTPT VISIT,PROCEDURE ONLY: CPT | Performed by: PHYSICIAN ASSISTANT

## 2020-11-18 DEVICE — PLUG HERN L W1.6XL1.9IN INGUINAL POLYPR REP PRESHAPED ONLAY: Type: IMPLANTABLE DEVICE | Site: INGUINAL | Status: FUNCTIONAL

## 2020-11-18 RX ORDER — MIDAZOLAM HYDROCHLORIDE 1 MG/ML
INJECTION INTRAMUSCULAR; INTRAVENOUS PRN
Status: DISCONTINUED | OUTPATIENT
Start: 2020-11-18 | End: 2020-11-18 | Stop reason: SDUPTHER

## 2020-11-18 RX ORDER — LABETALOL HYDROCHLORIDE 5 MG/ML
5 INJECTION, SOLUTION INTRAVENOUS EVERY 10 MIN PRN
Status: DISCONTINUED | OUTPATIENT
Start: 2020-11-18 | End: 2020-11-18 | Stop reason: HOSPADM

## 2020-11-18 RX ORDER — METOCLOPRAMIDE HYDROCHLORIDE 5 MG/ML
INJECTION INTRAMUSCULAR; INTRAVENOUS PRN
Status: DISCONTINUED | OUTPATIENT
Start: 2020-11-18 | End: 2020-11-18 | Stop reason: SDUPTHER

## 2020-11-18 RX ORDER — PROPOFOL 10 MG/ML
INJECTION, EMULSION INTRAVENOUS PRN
Status: DISCONTINUED | OUTPATIENT
Start: 2020-11-18 | End: 2020-11-18 | Stop reason: SDUPTHER

## 2020-11-18 RX ORDER — OXYCODONE HYDROCHLORIDE AND ACETAMINOPHEN 5; 325 MG/1; MG/1
1 TABLET ORAL PRN
Status: DISCONTINUED | OUTPATIENT
Start: 2020-11-18 | End: 2020-11-18 | Stop reason: HOSPADM

## 2020-11-18 RX ORDER — ONDANSETRON 4 MG/1
TABLET, FILM COATED ORAL
Qty: 20 TABLET | Refills: 0 | Status: SHIPPED | OUTPATIENT
Start: 2020-11-18 | End: 2021-02-02

## 2020-11-18 RX ORDER — METOPROLOL TARTRATE 5 MG/5ML
INJECTION INTRAVENOUS PRN
Status: DISCONTINUED | OUTPATIENT
Start: 2020-11-18 | End: 2020-11-18 | Stop reason: SDUPTHER

## 2020-11-18 RX ORDER — OXYCODONE HYDROCHLORIDE AND ACETAMINOPHEN 5; 325 MG/1; MG/1
1 TABLET ORAL EVERY 6 HOURS PRN
Qty: 28 TABLET | Refills: 0 | Status: SHIPPED | OUTPATIENT
Start: 2020-11-18 | End: 2020-11-25

## 2020-11-18 RX ORDER — SODIUM CHLORIDE 0.9 % (FLUSH) 0.9 %
10 SYRINGE (ML) INJECTION EVERY 12 HOURS SCHEDULED
Status: DISCONTINUED | OUTPATIENT
Start: 2020-11-18 | End: 2020-11-18 | Stop reason: HOSPADM

## 2020-11-18 RX ORDER — PHENYLEPHRINE HYDROCHLORIDE 10 MG/ML
INJECTION INTRAVENOUS PRN
Status: DISCONTINUED | OUTPATIENT
Start: 2020-11-18 | End: 2020-11-18 | Stop reason: SDUPTHER

## 2020-11-18 RX ORDER — IPRATROPIUM BROMIDE AND ALBUTEROL SULFATE 2.5; .5 MG/3ML; MG/3ML
1 SOLUTION RESPIRATORY (INHALATION) ONCE
Status: COMPLETED | OUTPATIENT
Start: 2020-11-18 | End: 2020-11-18

## 2020-11-18 RX ORDER — BUPIVACAINE HYDROCHLORIDE 5 MG/ML
INJECTION, SOLUTION EPIDURAL; INTRACAUDAL PRN
Status: DISCONTINUED | OUTPATIENT
Start: 2020-11-18 | End: 2020-11-18 | Stop reason: ALTCHOICE

## 2020-11-18 RX ORDER — CEPHALEXIN 500 MG/1
CAPSULE ORAL
Qty: 21 CAPSULE | Refills: 0 | Status: SHIPPED | OUTPATIENT
Start: 2020-11-18 | End: 2021-01-12 | Stop reason: ALTCHOICE

## 2020-11-18 RX ORDER — ONDANSETRON 2 MG/ML
INJECTION INTRAMUSCULAR; INTRAVENOUS PRN
Status: DISCONTINUED | OUTPATIENT
Start: 2020-11-18 | End: 2020-11-18 | Stop reason: SDUPTHER

## 2020-11-18 RX ORDER — SODIUM CHLORIDE 9 MG/ML
INJECTION, SOLUTION INTRAVENOUS CONTINUOUS
Status: DISCONTINUED | OUTPATIENT
Start: 2020-11-18 | End: 2020-11-18 | Stop reason: HOSPADM

## 2020-11-18 RX ORDER — SODIUM CHLORIDE 0.9 % (FLUSH) 0.9 %
10 SYRINGE (ML) INJECTION PRN
Status: DISCONTINUED | OUTPATIENT
Start: 2020-11-18 | End: 2020-11-18 | Stop reason: HOSPADM

## 2020-11-18 RX ORDER — ROCURONIUM BROMIDE 10 MG/ML
INJECTION, SOLUTION INTRAVENOUS PRN
Status: DISCONTINUED | OUTPATIENT
Start: 2020-11-18 | End: 2020-11-18 | Stop reason: SDUPTHER

## 2020-11-18 RX ORDER — ONDANSETRON 2 MG/ML
4 INJECTION INTRAMUSCULAR; INTRAVENOUS
Status: DISCONTINUED | OUTPATIENT
Start: 2020-11-18 | End: 2020-11-18 | Stop reason: HOSPADM

## 2020-11-18 RX ORDER — LIDOCAINE HYDROCHLORIDE 10 MG/ML
INJECTION, SOLUTION EPIDURAL; INFILTRATION; INTRACAUDAL; PERINEURAL PRN
Status: DISCONTINUED | OUTPATIENT
Start: 2020-11-18 | End: 2020-11-18 | Stop reason: SDUPTHER

## 2020-11-18 RX ORDER — OXYCODONE HYDROCHLORIDE AND ACETAMINOPHEN 5; 325 MG/1; MG/1
2 TABLET ORAL PRN
Status: DISCONTINUED | OUTPATIENT
Start: 2020-11-18 | End: 2020-11-18 | Stop reason: HOSPADM

## 2020-11-18 RX ORDER — FENTANYL CITRATE 50 UG/ML
INJECTION, SOLUTION INTRAMUSCULAR; INTRAVENOUS PRN
Status: DISCONTINUED | OUTPATIENT
Start: 2020-11-18 | End: 2020-11-18 | Stop reason: SDUPTHER

## 2020-11-18 RX ORDER — DEXAMETHASONE SODIUM PHOSPHATE 4 MG/ML
INJECTION, SOLUTION INTRA-ARTICULAR; INTRALESIONAL; INTRAMUSCULAR; INTRAVENOUS; SOFT TISSUE PRN
Status: DISCONTINUED | OUTPATIENT
Start: 2020-11-18 | End: 2020-11-18 | Stop reason: SDUPTHER

## 2020-11-18 RX ORDER — LIDOCAINE HYDROCHLORIDE 10 MG/ML
1 INJECTION, SOLUTION EPIDURAL; INFILTRATION; INTRACAUDAL; PERINEURAL
Status: DISCONTINUED | OUTPATIENT
Start: 2020-11-18 | End: 2020-11-18 | Stop reason: HOSPADM

## 2020-11-18 RX ORDER — DIPHENHYDRAMINE HYDROCHLORIDE 50 MG/ML
12.5 INJECTION INTRAMUSCULAR; INTRAVENOUS
Status: DISCONTINUED | OUTPATIENT
Start: 2020-11-18 | End: 2020-11-18 | Stop reason: HOSPADM

## 2020-11-18 RX ORDER — MORPHINE SULFATE 10 MG/ML
INJECTION, SOLUTION INTRAMUSCULAR; INTRAVENOUS PRN
Status: DISCONTINUED | OUTPATIENT
Start: 2020-11-18 | End: 2020-11-18 | Stop reason: SDUPTHER

## 2020-11-18 RX ORDER — KETOROLAC TROMETHAMINE 30 MG/ML
INJECTION, SOLUTION INTRAMUSCULAR; INTRAVENOUS PRN
Status: DISCONTINUED | OUTPATIENT
Start: 2020-11-18 | End: 2020-11-18 | Stop reason: SDUPTHER

## 2020-11-18 RX ADMIN — ONDANSETRON 4 MG: 2 INJECTION INTRAMUSCULAR; INTRAVENOUS at 14:44

## 2020-11-18 RX ADMIN — ROCURONIUM BROMIDE 50 MG: 10 INJECTION INTRAVENOUS at 13:43

## 2020-11-18 RX ADMIN — PROPOFOL 50 MG: 10 INJECTION, EMULSION INTRAVENOUS at 14:49

## 2020-11-18 RX ADMIN — PROPOFOL 150 MG: 10 INJECTION, EMULSION INTRAVENOUS at 13:43

## 2020-11-18 RX ADMIN — MIDAZOLAM 2 MG: 1 INJECTION INTRAMUSCULAR; INTRAVENOUS at 13:34

## 2020-11-18 RX ADMIN — SODIUM CHLORIDE: 9 INJECTION, SOLUTION INTRAVENOUS at 12:26

## 2020-11-18 RX ADMIN — SUGAMMADEX 500 MG: 100 INJECTION, SOLUTION INTRAVENOUS at 14:52

## 2020-11-18 RX ADMIN — METOROPROLOL TARTRATE 2.5 MG: 5 INJECTION, SOLUTION INTRAVENOUS at 14:50

## 2020-11-18 RX ADMIN — MORPHINE SULFATE 5 MG: 10 INJECTION, SOLUTION INTRAMUSCULAR; INTRAVENOUS at 15:03

## 2020-11-18 RX ADMIN — FENTANYL CITRATE 100 MCG: 50 INJECTION, SOLUTION INTRAMUSCULAR; INTRAVENOUS at 13:43

## 2020-11-18 RX ADMIN — MORPHINE SULFATE 5 MG: 10 INJECTION, SOLUTION INTRAMUSCULAR; INTRAVENOUS at 14:59

## 2020-11-18 RX ADMIN — LIDOCAINE HYDROCHLORIDE 50 MG: 10 INJECTION, SOLUTION EPIDURAL; INFILTRATION; INTRACAUDAL; PERINEURAL at 14:47

## 2020-11-18 RX ADMIN — IPRATROPIUM BROMIDE AND ALBUTEROL SULFATE 1 AMPULE: .5; 3 SOLUTION RESPIRATORY (INHALATION) at 15:57

## 2020-11-18 RX ADMIN — METOCLOPRAMIDE 10 MG: 5 INJECTION, SOLUTION INTRAMUSCULAR; INTRAVENOUS at 14:01

## 2020-11-18 RX ADMIN — ROCURONIUM BROMIDE 10 MG: 10 INJECTION INTRAVENOUS at 14:02

## 2020-11-18 RX ADMIN — Medication 3 G: at 13:49

## 2020-11-18 RX ADMIN — ROCURONIUM BROMIDE 10 MG: 10 INJECTION INTRAVENOUS at 14:16

## 2020-11-18 RX ADMIN — LIDOCAINE HYDROCHLORIDE 50 MG: 10 INJECTION, SOLUTION EPIDURAL; INFILTRATION; INTRACAUDAL; PERINEURAL at 13:43

## 2020-11-18 RX ADMIN — DEXAMETHASONE SODIUM PHOSPHATE 4 MG: 4 INJECTION, SOLUTION INTRA-ARTICULAR; INTRALESIONAL; INTRAMUSCULAR; INTRAVENOUS; SOFT TISSUE at 14:01

## 2020-11-18 RX ADMIN — KETOROLAC TROMETHAMINE 30 MG: 30 INJECTION, SOLUTION INTRAMUSCULAR; INTRAVENOUS at 14:44

## 2020-11-18 RX ADMIN — PHENYLEPHRINE HYDROCHLORIDE 50 MCG: 10 INJECTION INTRAVENOUS at 13:43

## 2020-11-18 RX ADMIN — SODIUM CHLORIDE: 9 INJECTION, SOLUTION INTRAVENOUS at 14:27

## 2020-11-18 ASSESSMENT — PULMONARY FUNCTION TESTS
PIF_VALUE: 25
PIF_VALUE: 24
PIF_VALUE: 17
PIF_VALUE: 24
PIF_VALUE: 25
PIF_VALUE: 24
PIF_VALUE: 23
PIF_VALUE: 24
PIF_VALUE: 2
PIF_VALUE: 24
PIF_VALUE: 0
PIF_VALUE: 24
PIF_VALUE: 24
PIF_VALUE: 0
PIF_VALUE: 24
PIF_VALUE: 25
PIF_VALUE: 24
PIF_VALUE: 25
PIF_VALUE: 3
PIF_VALUE: 0
PIF_VALUE: 24
PIF_VALUE: 17
PIF_VALUE: 24
PIF_VALUE: 17
PIF_VALUE: 24
PIF_VALUE: 2
PIF_VALUE: 24
PIF_VALUE: 17
PIF_VALUE: 24
PIF_VALUE: 25
PIF_VALUE: 24
PIF_VALUE: 23
PIF_VALUE: 26
PIF_VALUE: 24
PIF_VALUE: 25
PIF_VALUE: 24
PIF_VALUE: 24
PIF_VALUE: 25
PIF_VALUE: 24
PIF_VALUE: 24
PIF_VALUE: 25
PIF_VALUE: 25
PIF_VALUE: 17
PIF_VALUE: 24
PIF_VALUE: 24
PIF_VALUE: 23
PIF_VALUE: 1
PIF_VALUE: 1
PIF_VALUE: 24
PIF_VALUE: 17
PIF_VALUE: 23
PIF_VALUE: 24
PIF_VALUE: 21
PIF_VALUE: 24
PIF_VALUE: 24
PIF_VALUE: 15
PIF_VALUE: 3
PIF_VALUE: 22
PIF_VALUE: 24
PIF_VALUE: 1
PIF_VALUE: 2
PIF_VALUE: 24
PIF_VALUE: 39
PIF_VALUE: 24
PIF_VALUE: 23
PIF_VALUE: 23
PIF_VALUE: 1
PIF_VALUE: 17
PIF_VALUE: 0

## 2020-11-18 ASSESSMENT — PAIN SCALES - GENERAL
PAINLEVEL_OUTOF10: 3
PAINLEVEL_OUTOF10: 3
PAINLEVEL_OUTOF10: 0
PAINLEVEL_OUTOF10: 3

## 2020-11-18 ASSESSMENT — COPD QUESTIONNAIRES: CAT_SEVERITY: NO INTERVAL CHANGE

## 2020-11-18 ASSESSMENT — PAIN DESCRIPTION - ORIENTATION
ORIENTATION: LEFT
ORIENTATION: LEFT

## 2020-11-18 ASSESSMENT — PAIN DESCRIPTION - LOCATION
LOCATION: GROIN
LOCATION: GROIN

## 2020-11-18 ASSESSMENT — PAIN - FUNCTIONAL ASSESSMENT: PAIN_FUNCTIONAL_ASSESSMENT: 0-10

## 2020-11-18 ASSESSMENT — PAIN DESCRIPTION - PAIN TYPE
TYPE: SURGICAL PAIN
TYPE: SURGICAL PAIN

## 2020-11-18 ASSESSMENT — LIFESTYLE VARIABLES: SMOKING_STATUS: 1

## 2020-11-18 NOTE — INTERVAL H&P NOTE
Update History & Physical    The patient's History and Physical of November 9, 2020 was reviewed with the patient and I examined the patient. There was no change. I concur with findings. Here today for open left inguinal hernia repair with mesh. Pt reports he did receive clearance from pulmonologist, Dr. Sharita Collins and cardiology, Dr. Zeke Adhikari, and PCP, Dr. Jonas Braun for this surgery. NPO. Took amlodipine this am with sip of water. Pt reports taking 3 pills this am but not able to recall the other two he took. He reports taking the 3 he was instructed to take this am. Stopped aspirin 2 weeks ago. Denies chest pain/pressure, palpitations, SOB, recent URI, N/V/D or constipation, fever or chills. Review vitals per RN flowsheet.      Electronically signed by JAVI Higuera CNP on 11/18/2020 at 11:49 AM

## 2020-11-18 NOTE — OP NOTE
Operative Note      Patient: Mallory Betts  YOB: 1955  MRN: [id number]    Date of Procedure: 11/18/2020                Preoperative diagnosis: Left inguinal hernia    Postoperative diagnosis: Left direct/indirect inguinal hernia    Procedure: Left inguinal hernia repair with large mesh plug and mesh    Surgeon: Dr. Stanley Abdul    Asst.: NATACHA Castano    Anesthesia: General    Preparation: Hibiclens    EBL: Less than 25 mL    Specimen: None    Procedure: Informed consent was obtained. Site was marked and confirmed. Preoperative antibiotics were given. Patient was taken to the operating room. General anesthesia was given. Abdomen left and draped in usual sterile fashion. Timeout was done. Left inguinal incision was made. Incision was deepened with help of Bovie cautery. Aide's was incised. External oblique aponeurosis was incised. External ring was divided. Cord structures were isolated and skeletonized. Patient was found to have indirect and a direct defect. Indirect defect was repaired using large mesh plug. Floor of the inguinal canal was repaired using a Prolene mesh which was secured to the pubic tubercle medially to the conjoined tendon superiorly to the inguinal ligament inferiorly. Mesh was encircling the cord structures. Satisfactory tension-free repair was achieved. Hemostasis was confirmed. Sponge needle instrument count was found to be correct. Abdominal wall layers were approximated dissing absorbable sutures. Local anesthetic was infiltrated. Dermabond was applied. Steri-Strips were applied. Sterile dressing was applied. Patient tolerated procedure well and was transferred to the recovery room in a stable condition.    -  Recommendations: Operative findings are discussed with the family. Postoperative care recovery restrictions follow-up were all discussed. Prescriptions called in.

## 2020-11-18 NOTE — ANESTHESIA PRE PROCEDURE
Department of Anesthesiology  Preprocedure Note       Name:  Kaity Caba   Age:  76 y.o.  :  1955                                          MRN:  [id number]         Date:  2020      Surgeon: Latesha Quinn):  Antonia Domingo MD    Procedure: Procedure(s): HERNIA INGUINAL REPAIR W/MESH OPEN - LEFT    Medications prior to admission:   Prior to Admission medications    Medication Sig Start Date End Date Taking? Authorizing Provider   naloxone 4 MG/0.1ML LIQD nasal spray 1 spray by Nasal route as needed for Opioid Reversal Patient needs counseling 10/29/20   Rahul Jennings MD   amLODIPine (NORVASC) 10 MG tablet Take 1 tablet by mouth daily Dose increased 10/16/2020 due to high BP. Correct dosage 10/16/20   Rahul Jennings MD   OXYGEN With CPAP at night 20   Historical Provider, MD   pregabalin (LYRICA) 200 MG capsule Take 1 capsule by mouth 3 times daily for 90 days. 10/13/20 1/11/21  Asmita Hebert MD   methylPREDNISolone (MEDROL, ROSALIE,) 4 MG tablet Take by mouth, with food. Keep low carb, low salt diet while taking it 10/13/20   Rahul Jennings MD   varenicline (CHANTIX STARTING MONTH ) 0.5 MG X 11 & 1 MG X 42 tablet 0.5 mg po daily x 3 days, 0.5 mg BID x 4 days, then 1 mg BID thereafter . Call for refill  Patient not taking: Reported on 2020 10/13/20   Rahul Jennings MD   SYMBICORT 160-4.5 MCG/ACT AERO Inhale 2 puffs into the lungs 2 times daily 10.2 inhaler, give 3 inhalers 10/13/20   Rahul Jennings MD   albuterol sulfate HFA (PROVENTIL HFA) 108 (90 Base) MCG/ACT inhaler Inhale 2 puffs into the lungs every 6 hours as needed for Wheezing or Shortness of Breath 10/13/20   Rahul Jennings MD   pravastatin (PRAVACHOL) 80 MG tablet Take 1 tablet by mouth every evening Dose increased  2019   Rahul Jennings MD   omeprazole (PRILOSEC) 40 MG delayed release capsule Take 1 capsule by mouth every morning (before breakfast) 20   Rahul Jennings MD Problem List:    Patient Active Problem List   Diagnosis Code    Essential hypertension I10    Gout M10.9    Hyperlipidemia with target LDL less than 70 E78.5    Osteoporosis M81.0    Gastroesophageal reflux disease without esophagitis K21.9    DDD (degenerative disc disease), lumbar M51.36    Chronic bilateral low back pain with bilateral sciatica M54.42, M54.41, G89.29    Osteoarthritis of spine with radiculopathy, lumbar region M47.26    Type 2 diabetes mellitus with diabetic polyneuropathy, without long-term current use of insulin ([facility]) E11.42    PVD (peripheral vascular disease) with claudication ([facility]) I73.9    Peripheral neuropathic pain M79.2    Tinea pedis of both feet B35.3    Nonspecific reactive hepatitis K75.2    COPD mixed type ([facility]) J44.9    Status post lumbar laminectomy Z98.890    Serum cholinesterase defect ([facility]) E88.89    Vitamin D deficiency E55.9    Hypertriglyceridemia E78.1    REYNALDO on CPAP G47.33, Z99.89    Tubular adenoma of colon 4/11/17 D12.6    Psychophysiological insomnia F51.04    S/P reverse total shoulder arthroplasty, left Z96.612    Recurrent major depressive disorder, in full remission ([facility]) F33.42    Actinic keratosis L57.0    Photodermatitis due to sun L56.8    Seborrheic keratosis L82.1    Senile hyperkeratosis L57.0    Solar degeneration L57.8    Abdominal aortic aneurysm ([facility]) I71.4    Family history of cancer Z80.9    Stenosis of left carotid artery I65.22    Poor balance R26.89    Foot drop, right M21.371    Difficulty rising from chair R68.89    Degenerative disc disease, cervical M50.30    At high risk for falls Z91.81    Pseudocholinesterase deficiency E88.09    Hyperparathyroidism ([facility]) E21.3    Coronary artery disease involving native coronary artery of native heart without angina pectoris I25.10    Severe obesity (BMI 35.0-35.9 with comorbidity) ([facility]) E66.01, Z68.35    Status post total shoulder arthroplasty, left Y54.976    Personal history of smoking Z87.891    Chronic hypoxemic respiratory failure ([address] J96.11    Melena K92.1    History of prostate cancer Z85.46       Past Medical History:        Diagnosis Date    Acid reflux     Anemia, blood loss 10/7/2018    Arthritis     C. difficile colitis 3/19/2020    Chronic bilateral low back pain with bilateral sciatica 11/3/2016    Complete tear of left rotator cuff 7/18/2018    COPD (chronic obstructive pulmonary disease) ([address]     Coronary artery disease involving native coronary artery of native heart without angina pectoris 2/2/2020    Degenerative disc disease, cervical 7/28/2019    GI bleed 3/19/2020    Gout     H/O cardiac catheterization     yrs ago   no stents    History of blood transfusion     Hyperlipidemia     Hyperlipidemia with target LDL less than 100 1/20/2016    Hyperparathyroidism ([address] 1/17/2020    Hypertension     Knee pain, chronic     left    Liver disease     MDRO (multiple drug resistant organisms) resistance     Moderate episode of recurrent major depressive disorder ([address] 1/20/2016    Obesity, Class III, BMI 40-49.9 (morbid obesity) ([address] 1/20/2016    REYNALDO on CPAP 5/6/2017    Osteoarthritis     Peripheral arterial occlusive disease ([address] 3/25/2017    Pneumonia 3/9/2020    Polypharmacy 3/25/2017    Positive FIT (fecal immunochemical test) 4/11/2017    Prolonged emergence from general anesthesia 01/05/2017    Patient \"on life support for 3 days\" after back surgery due to being given succinylcholine    Prostate CA ([address] 1/20/2016    Prostate cancer ([address] 10/2013    finished radiation tx 5/2014    Pseudocholinesterase deficiency 03/25/2017    Pt.  \"on life support\" for 3 days after back surgery 1/5/17 after being given succinylcholine    Severe obesity (BMI 35.0-35.9 with comorbidity) ([address] 2/2/2020    Short of breath on exertion     Sleep apnea     uses CPAP machine nightly    Status post lumbar laminectomy 3/25/2017    Stenosis of left carotid artery 10/7/2018    Syncope and collapse 3/19/2020    Tubular adenoma of colon 4/11/17 5/13/2017    Type 2 diabetes mellitus with hyperglycemia, without long-term current use of insulin ([address] 3/25/2017    Lab Results Component Value Date  LABA1C 7.1 (H) 03/23/2017     Unintended weight loss 10/7/2018    Vitamin D deficiency 3/25/2017    Wears glasses        Past Surgical History:        Procedure Laterality Date    BACK SURGERY      x 2     BACK SURGERY  01/05/2017    lumbar laminectomy L2, L3, L4    BRONCHOSCOPY N/A 3/13/2020    BRONCHOSCOPY performed by Sergio Chávez MD at [address] [place] [address]    no stents    CAROTID ENDARTERECTOMY Right 12/16/2019    Dr. Kurt Van Bilateral     COLONOSCOPY      ENDOSCOPY, COLON, DIAGNOSTIC      JOINT REPLACEMENT      SHOULDER    KNEE SURGERY Left     LUMBAR LAMINECTOMY  01/05/2017    L2-L4    AL CLOSED RX SHLDR DISLOC,ANESTHESIA Left 8/1/2018    SHOULDER CLOSED REDUCTION WITH C-ARM VISUALIZATION performed by Jose Francisco Samayoa MD at [facility] N/A 5/9/2017    COLONOSCOPY WITH BIOPSY performed by Bennie Gasca DO at [facility]. [address] Left 7/18/2018    SHOULDER TOTAL ARTHROPLASTY REVERSE LEFT DJO & BICEP TENDON TRANSFER performed by Jose Francisco Samayoa MD at [address] HUMERAL/GLENOID COMPNT Left 8/3/2018    SHOULDER TOTAL REVERSE  ARTHROPLASTY REVISION performed by Jose Francisco Samayoa MD at [address]     rotator cuff repair    SHOULDER SURGERY Left 10/15/2020    SHOULDER ARTHROSCOPY W/INTEROP CULTURES performed by Jose Francisco Samayoa MD at [address] [place] [address]      ear, forehead    TONSILLECTOMY AND ADENOIDECTOMY      UPPER GASTROINTESTINAL ENDOSCOPY N/A 3/19/2020    EGD BIOPSY performed by Jose Eduardo Marie MD at [address] [place] [address] History:    Social History     Tobacco Use  Smoking status: Current Every Day Smoker     Packs/day: 0.50     Years: 35.00     Pack years: 17.50     Types: Cigarettes     Last attempt to quit: 3/9/2020     Years since quittin.6    Smokeless tobacco: Never Used    Tobacco comment: WAS SMOKING 1-1.5 ppd   Substance Use Topics    Alcohol use: Not Currently     Alcohol/week: 0.0 standard drinks                                Ready to quit: Not Answered  Counseling given: Not Answered  Comment: WAS SMOKING 1-1.5 ppd      Vital Signs (Current): There were no vitals filed for this visit. BP Readings from Last 3 Encounters:   20 130/70   20 138/67   10/26/20 (!) 162/77       NPO Status:                                                                                 BMI:   Wt Readings from Last 3 Encounters:   20 280 lb (127 kg)   10/28/20 280 lb (127 kg)   10/15/20 280 lb (127 kg)     There is no height or weight on file to calculate BMI.    CBC:   Lab Results   Component Value Date    WBC 8.1 2020    RBC 4.77 2020    RBC 5.38 2011    HGB 14.3 2020    HCT 42.2 2020    MCV 88.3 2020    RDW 15.6 2020     2020     2012       CMP:   Lab Results   Component Value Date     2020    K 4.5 2020     2020    CO2 24 2020    BUN 17 2020    CREATININE 0.55 2020    GFRAA >60 2020    LABGLOM >60 2020    GLUCOSE 113 2020    PROT 7.0 10/13/2020    CALCIUM 9.6 2020    BILITOT 0.22 10/13/2020    ALKPHOS 96 10/13/2020    AST 25 10/13/2020    ALT 27 10/13/2020       POC Tests:   No results for input(s): POCGLU, POCNA, POCK, POCCL, POCBUN, POCHEMO, POCHCT in the last 72 hours.     Coags:   Lab Results   Component Value Date    PROTIME 13.1 2020    INR 1.0 2020    APTT 20.4 2020       HCG (If Applicable): No results found for: PREGTESTUR, PREGSERUM, HCG, HCGQUANT ABGs: No results found for: PHART, PO2ART, LVE2DTC, CFB0RSB, BEART, I6IDZIWX     Type & Screen (If Applicable):  No results found for: LABABO, LABRH    Drug/Infectious Status (If Applicable):  Lab Results   Component Value Date    HEPCAB NONREACTIVE 03/23/2017       COVID-19 Screening (If Applicable):   Lab Results   Component Value Date    COVID19 Not Detected 11/14/2020         Anesthesia Evaluation  Patient summary reviewed and Nursing notes reviewed history of anesthetic complications (Pseudocholineesterase deficiency): Airway: Mallampati: III  TM distance: >3 FB   Neck ROM: full  Mouth opening: > = 3 FB Dental:          Pulmonary:normal exam  breath sounds clear to auscultation  (+) COPD (CPAP with O2 at night): no interval change,  sleep apnea: on CPAP,  current smoker    (-) asthma                           Cardiovascular:    (+) hypertension:, CAD:, hyperlipidemia    (-) past MI    ECG reviewed  Rhythm: regular  Rate: normal      Cleared by cardiology              Neuro/Psych:   (+) neuromuscular disease:, psychiatric history:             ROS comment: Peripheral neuropathy  Degenerative disc disease, cervical   DDD (degenerative disc disease), lumbar  GI/Hepatic/Renal:   (+) GERD: well controlled, liver disease:, renal disease (History of prostate cancer):, morbid obesity          Endo/Other:    (+) DiabetesType II DM, , : arthritis: OA., malignancy/cancer (History of prostate cancer). ROS comment: Gout  Osteoporosis Abdominal:           Vascular:   + PVD, aortic or cerebral (Stenosis of left carotid artery ), . Anesthesia Plan      general     ASA 3       Induction: intravenous. MIPS: Postoperative opioids intended and Prophylactic antiemetics administered. Anesthetic plan and risks discussed with patient. Plan discussed with CRNA.                   Lottie Francis MD   11/18/2020

## 2020-11-18 NOTE — ANESTHESIA POSTPROCEDURE EVALUATION
Department of Anesthesiology  Postprocedure Note    Patient: Anmol Gustafson  MRN: [id number]  YOB: 1955  Date of evaluation: 11/18/2020  Time:  4:48 PM     Procedure Summary     Date:  11/18/20 Room / Location:  [address] [place] [address] [address] / [facility]: RICH SARKAR    Anesthesia Start:  0506 Anesthesia Stop:  2144    Procedure: HERNIA INGUINAL REPAIR W/MESH OPEN (Left ) Diagnosis:  (LEFT INGUINAL HERNIA)    Surgeon:  Harshad Travis MD Responsible Provider:  Brigido Gifford MD    Anesthesia Type:  general ASA Status:  3          Anesthesia Type: general    Benigno Phase I: Benigno Score: 10    Benigno Phase II:      Last vitals: Reviewed and per EMR flowsheets.        Anesthesia Post Evaluation    Comments: POST- ANESTHESIA EVALUATION       Pt Name: Anmol Gustafson  MRN: [id number]  YOB: 1955  Date of evaluation: 11/18/2020  Time:  4:48 PM      /85   Pulse 64   Temp 97.5 °F (36.4 °C) (Infrared)   Resp 18   Ht 6' (1.829 m)   Wt 280 lb (127 kg)   SpO2 91%   BMI 37.97 kg/m²      Consciousness Level  Awake  Cardiopulmonary Status  Stable  Pain Adequately Treated YES  Nausea / Vomiting  NO  Adequate Hydration  YES  Anesthesia Related Complications NONE      Electronically signed by Brigido Gifford MD on 11/18/2020 at 4:48 PM

## 2020-11-20 LAB — SURGICAL PATHOLOGY REPORT: NORMAL

## 2020-11-21 NOTE — RESULT ENCOUNTER NOTE
Benign lymph node     Future Appointments  11/23/2020 9:50 AM    Edwin Bajwa MD         [facility]      [facility]  11/25/2020 9:45 AM    [facility]iedra [facility]          [facility]  12/31/2020 2:00 PM    Ros[facility] Sanchez [facility]     [facility]  7/8/2021   11:30 AM   Rosie Sanchez MD     [facility]

## 2020-11-23 ENCOUNTER — OFFICE VISIT (OUTPATIENT)
Dept: UROLOGY | Age: 65
End: 2020-11-23
Payer: MEDICARE

## 2020-11-23 VITALS — TEMPERATURE: 98.5 F | DIASTOLIC BLOOD PRESSURE: 68 MMHG | SYSTOLIC BLOOD PRESSURE: 130 MMHG

## 2020-11-23 PROCEDURE — 99214 OFFICE O/P EST MOD 30 MIN: CPT | Performed by: UROLOGY

## 2020-11-23 PROCEDURE — 96402 CHEMO HORMON ANTINEOPL SQ/IM: CPT | Performed by: UROLOGY

## 2020-11-23 ASSESSMENT — ENCOUNTER SYMPTOMS
EYE PAIN: 0
EYE REDNESS: 0
COUGH: 0
WHEEZING: 0
SHORTNESS OF BREATH: 0
ABDOMINAL PAIN: 0
VOMITING: 0
NAUSEA: 0
DIARRHEA: 0
CONSTIPATION: 0
BACK PAIN: 0

## 2020-11-23 NOTE — PROGRESS NOTES
Review of Systems   Constitutional: Negative for chills, fatigue and fever. Eyes: Negative for pain, redness and visual disturbance. Respiratory: Negative for cough, shortness of breath and wheezing. Cardiovascular: Negative for chest pain and leg swelling. Gastrointestinal: Negative for abdominal pain, constipation, diarrhea, nausea and vomiting. Genitourinary: Positive for scrotal swelling. Negative for difficulty urinating, dysuria, flank pain, frequency, hematuria, testicular pain and urgency. Musculoskeletal: Negative for back pain, joint swelling and myalgias. Skin: Negative for rash and wound. Neurological: Negative for dizziness, tremors and numbness. Hematological: Does not bruise/bleed easily.

## 2020-11-23 NOTE — PROGRESS NOTES
After obtaining consent, and per orders of Dr. Fela Arriola, injection of Eligard 45mg given in Right lower quad. abdomnen by Seth Bautista. Patient instructed to remain in clinic for 20 minutes afterwards, and to report any adverse reaction to me immediately.

## 2020-11-23 NOTE — PROGRESS NOTES
[address] [address]  Dept:  [facility] Urology Office Note - Established    Patient:  Antonio Gomez  YOB: 1955  Date: 11/23/2020    The patient is a 76 y.o. male who presents todayfor evaluation of the following problems:   Chief Complaint   Patient presents with    6 Month Follow-Up     Due for Eligard, had PSA done        HPI  Aline Balbuena is a very pleasant 48-year-old gentleman who has a history of prostate cancer. He has been on hormone therapy with Eligard. He has had intermittent left scrotal and groin pain. He did have an inguinal herniorrhaphy last week. He is still sore but feeling better. PSA 0.03. pt still having some hot flashes. Summary of old records: N/A    Additional History: N/A    Procedures Today: N/A    Urinalysis today:  No results found for this visit on 11/23/20.   Last several PSA's:  Lab Results   Component Value Date    PSA 0.03 11/16/2020    PSA <0.01 05/14/2020    PSA <0.01 04/15/2020     Last total testosterone:  Lab Results   Component Value Date    TESTOSTERONE <3 (L) 12/10/2019       AUA Symptom Score (11/23/2020):  INCOMPLETE EMPTYING: How often have you had the sensation of not emptying your bladder?: Not at all  FREQUENCY: How often do you have to urinate less than every two hours?: Not at all  INTERMITTENCY: How often have you found you stopped and started again several times when you urinated?: Not at all  URGENCY: How often have you found it difficult to postpone urination?: Not at all  WEAK STREAM: How often have you had a weak urinary stream?: Not at all  STRAINING: How often have you had to strain to start  urination?: Not at all  NOCTURIA: How many times did you typically get up at night to uriniate?: 1 Time  TOTAL I-PSS SCORE[de-identified] 1  How would you feel if you were to spend the rest of your life with your urinary condition?: Mostly Satisfied    Last BUN and creatinine:  Lab Results   Component Value Date    BUN 17 11/09/2020     Lab Results   Component Value Date    CREATININE 0.55 (L) 11/09/2020       Additional Lab/Culture results: none    Imaging Reviewed during this Office Visit: none  (results were independently reviewed by physician and radiology report verified)    PAST MEDICAL, FAMILY AND SOCIAL HISTORY UPDATE:  Past Medical History:   Diagnosis Date    Acid reflux     Anemia, blood loss 10/7/2018    Arthritis     C. difficile colitis 3/19/2020    Chronic bilateral low back pain with bilateral sciatica 11/3/2016    Complete tear of left rotator cuff 7/18/2018    COPD (chronic obstructive pulmonary disease) ([address]     Coronary artery disease involving native coronary artery of native heart without angina pectoris 2/2/2020    Degenerative disc disease, cervical 7/28/2019    GI bleed 3/19/2020    Gout     H/O cardiac catheterization     yrs ago   no stents    History of blood transfusion     Hyperlipidemia     Hyperlipidemia with target LDL less than 100 1/20/2016    Hyperparathyroidism ([address] 1/17/2020    Hypertension     Knee pain, chronic     left    Liver disease     MDRO (multiple drug resistant organisms) resistance     Moderate episode of recurrent major depressive disorder ([address] 1/20/2016    Obesity, Class III, BMI 40-49.9 (morbid obesity) ([address] 1/20/2016    REYNALDO on CPAP 5/6/2017    Osteoarthritis     Peripheral arterial occlusive disease ([address] 3/25/2017    Pneumonia 3/9/2020    Polypharmacy 3/25/2017    Positive FIT (fecal immunochemical test) 4/11/2017    Prolonged emergence from general anesthesia 01/05/2017    Patient \"on life support for 3 days\" after back surgery due to being given succinylcholine    Prostate CA ([address] 1/20/2016    Prostate cancer ([address] 10/2013    finished radiation tx 5/2014    Pseudocholinesterase deficiency 03/25/2017    Pt.  \"on life support\" for 3 days after back surgery 1/5/17 after being given succinylcholine    Severe obesity (BMI 35.0-35.9 with comorbidity) ([address] 2/2/2020    Short of breath on exertion     Sleep apnea     uses CPAP machine nightly    Status post lumbar laminectomy 3/25/2017    Stenosis of left carotid artery 10/7/2018    Syncope and collapse 3/19/2020    Tubular adenoma of colon 4/11/17 5/13/2017    Type 2 diabetes mellitus with hyperglycemia, without long-term current use of insulin ([address] 3/25/2017    Lab Results Component Value Date  LABA1C 7.1 (H) 03/23/2017     Unintended weight loss 10/7/2018    Vitamin D deficiency 3/25/2017    Wears glasses      Past Surgical History:   Procedure Laterality Date    BACK SURGERY      x 2     BACK SURGERY  01/05/2017    lumbar laminectomy L2, L3, L4    BRONCHOSCOPY N/A 3/13/2020    BRONCHOSCOPY performed by Toby Oppenheim, MD at [address] [place] [address]    no stents    CAROTID ENDARTERECTOMY Right 12/16/2019    Dr. Arcelia Solorzano Bilateral     COLONOSCOPY      ENDOSCOPY, COLON, DIAGNOSTIC      HERNIA REPAIR Left 11/18/2020    HERNIA INGUINAL REPAIR W/MESH OPEN performed by Carolina Mcgee MD at [address] [place] [address] Left     LUMBAR LAMINECTOMY  01/05/2017    L2-L4    DC CLOSED RX SHLDR DISLOC,ANESTHESIA Left 8/1/2018    SHOULDER CLOSED REDUCTION WITH C-ARM VISUALIZATION performed by Kimberly Osborn MD at [facility] N/A 5/9/2017    COLONOSCOPY WITH BIOPSY performed by Enid Tobin DO at [facility]. [address] Left 7/18/2018    SHOULDER TOTAL ARTHROPLASTY REVERSE LEFT DJO & BICEP TENDON TRANSFER performed by Kimberly Osborn MD at [address] HUMERAL/GLENOID COMPNT Left 8/3/2018    SHOULDER TOTAL REVERSE  ARTHROPLASTY REVISION performed by Kimberly Osborn MD at [address]     rotator cuff repair    SHOULDER SURGERY Left 10/15/2020 SHOULDER ARTHROSCOPY W/INTEROP CULTURES performed by Loy Lynn MD at [facility]      ear, forehead    TONSILLECTOMY AND ADENOIDECTOMY      UPPER GASTROINTESTINAL ENDOSCOPY N/A 3/19/2020    EGD BIOPSY performed by Balbina Ramirez MD at [facility]     Family History   Problem Relation Age of Onset    Diabetes Mother     Lung Cancer Brother     Liver Cancer Brother     Cancer Father      Outpatient Medications Marked as Taking for the 11/23/20 encounter (Office Visit) with Pedro Luis Cruz MD   Medication Sig Dispense Refill    cephALEXin (KEFLEX) 500 MG capsule 500 mgTake three times daily 21 capsule 0    ondansetron (ZOFRAN) 4 MG tablet Take every six hours as needed 20 tablet 0    oxyCODONE-acetaminophen (PERCOCET) 5-325 MG per tablet Take 1 tablet by mouth every 6 hours as needed for Pain for up to 7 days. . Take lowest dose possible to manage pain 28 tablet 0    naloxone 4 MG/0.1ML LIQD nasal spray 1 spray by Nasal route as needed for Opioid Reversal Patient needs counseling 1 each 3    amLODIPine (NORVASC) 10 MG tablet Take 1 tablet by mouth daily Dose increased 10/16/2020 due to high BP. Correct dosage 90 tablet 3    OXYGEN With CPAP at night      pregabalin (LYRICA) 200 MG capsule Take 1 capsule by mouth 3 times daily for 90 days. 270 capsule 0    methylPREDNISolone (MEDROL, ROSALIE,) 4 MG tablet Take by mouth, with food. Keep low carb, low salt diet while taking it 1 kit 0    varenicline (CHANTIX STARTING MONTH PAK) 0.5 MG X 11 & 1 MG X 42 tablet 0.5 mg po daily x 3 days, 0.5 mg BID x 4 days, then 1 mg BID thereafter . Call for refill 1 box 0    SYMBICORT 160-4.5 MCG/ACT AERO Inhale 2 puffs into the lungs 2 times daily 10.2 inhaler, give 3 inhalers 3 Inhaler 3    albuterol sulfate HFA (PROVENTIL HFA) 108 (90 Base) MCG/ACT inhaler Inhale 2 puffs into the lungs every 6 hours as needed for Wheezing or Shortness of Breath 3 Inhaler 1    pravastatin (PRAVACHOL) 80 MG tablet Take 1 tablet by mouth every evening Dose increased  2019 90 tablet 3    omeprazole (PRILOSEC) 40 MG delayed release capsule Take 1 capsule by mouth every morning (before breakfast) 90 capsule 3    Respiratory Therapy Supplies (ADULT MASK) MISC Needs to use mask daily due to coronavirus pandemic. 30 each 5    furosemide (LASIX) 20 MG tablet TAKE 1 TABLET DAILY AS NEEDED (ONLY IF BLOOD PRESSURE OVER 140/90 OR LEG EDEMA) (STOP TENORETIC 50-25) 90 tablet 3    ipratropium-albuterol (DUONEB) 0.5-2.5 (3) MG/3ML SOLN nebulizer solution Inhale 1 vial into the lungs 3 times daily      blood glucose monitor strips Use to test twice daily and as needed as directed by provider      lisinopril (PRINIVIL;ZESTRIL) 10 MG tablet Take 1 tablet by mouth daily Dose decreased 3/18/2020 90 tablet 3    Lift Chair MISC by Does not apply route Patient has difficulty getting up from usual chair 1 each 0    folbee plus (FOLBEE PLUS) TABS Take 1 tablet by mouth daily 90 tablet 3    aspirin EC 81 MG EC tablet Take 1 tablet by mouth daily 90 tablet 0    Glucose Blood (BLOOD GLUCOSE TEST STRIPS) STRP tesing once a day fasting 100 strip 3       Succinylcholine chloride and Cymbalta [duloxetine hcl]  Social History     Tobacco Use   Smoking Status Current Every Day Smoker    Packs/day: 0.50    Years: 35.00    Pack years: 17.50    Types: Cigarettes    Last attempt to quit: 3/9/2020    Years since quittin.7   Smokeless Tobacco Never Used   Tobacco Comment    WAS SMOKING 1-1.5 ppd     (Ifpatient a smoker, smoking cessation counseling offered)    Social History     Substance and Sexual Activity   Alcohol Use Not Currently    Alcohol/week: 0.0 standard drinks       REVIEW OF SYSTEMS:  Review of Systems    Physical Exam:      Vitals:    20 0948   BP: 130/68   Temp: 98.5 °F (36.9 °C)     There is no height or weight on file to calculate BMI.   Patient is a 76 y.o. male in no acute distress and alert and oriented to person, place and time. Physical Exam  Constitutional: Patient in no acute distress. Neuro: Alert and oriented to person, place and time. Psych: Mood normal, affect normal  Skin: No rash noted  HEENT: Head: Normocephalic andatraumatic  Conjunctivae and EOM are normal. Pupils are equal, round  Nose:Normal  Right External Ear: Normal; Left External Ear: Normal  Mouth: Mucosa Moist  Neck: Supple  Lungs: Respiratory effort is normal  Cardiovascular: Warm & Pink  Abdomen: Soft, non-tender, non-distended with no CVA,  No flank tenderness,  Or hepatosplenomegaly     Assessment and Plan      1. Prostate cancer ([address]    2. Androgen deprivation therapy    3. Hot flashes           Plan:   eligard today  F/u 6 mo psa and T      Return in about 6 months (around 5/23/2021) for psa and T.    Prescriptions Ordered:  No orders of the defined types were placed in this encounter. Orders Placed:  Orders Placed This Encounter   Procedures    PSA, Diagnostic     Standing Status:   Future     Standing Expiration Date:   11/23/2021    Testosterone     Standing Status:   Future     Standing Expiration Date:   11/18/2021           Dennise Dejesus MD    Agree with the ROS entered by the MA.

## 2020-11-25 ENCOUNTER — OFFICE VISIT (OUTPATIENT)
Dept: ORTHOPEDIC SURGERY | Age: 65
End: 2020-11-25

## 2020-11-25 VITALS — WEIGHT: 280 LBS | TEMPERATURE: 98.4 F | BODY MASS INDEX: 37.93 KG/M2 | HEIGHT: 72 IN

## 2020-11-25 PROCEDURE — 99024 POSTOP FOLLOW-UP VISIT: CPT | Performed by: ORTHOPAEDIC SURGERY

## 2020-11-25 NOTE — PROGRESS NOTES
Procedure: Left shoulder arthroscopic biopsies/cultures. Date of procedure: 10/15/2020    HPI: Mr. Macie West is a 58-year-old 6 weeks status post the aforementioned procedure. He recently had his hernia surgery done and states that he is doing relatively well as far as this goes. With regards to his shoulder he also is doing relatively well but still has about a 5/10 pain and still has decreased function. When questioned today he does indicate that he has some left-sided neck pain. He denies having any associated numbness and tingling although he does have bilateral upper extremity neuropathy. Physical examination:  Evaluation of patient's left shoulder and upper extremity demonstrates his incisions to be healed. Sensation is grossly intact light touch in all dermatomes and he has a 2+ radial pulse with brisk capillary refill in his fingers. Actively he only has approximately 50 to 60 degrees of shoulder elevation and abduction. Evaluation of patient cervical spine demonstrates good range of motion but he is tender to palpation along the left paraspinals. He also has pain with Spurling's test.    Imaging studies:  2 x-ray views of the cervical spine completed on 11/25/2020 demonstrates normal alignment but what appears to be some degeneration at the L4-5 and 6-7 facets. Impression and plan:  Kat Morris is a 76 y.o. male who is 6 weeks status post an arthroscopic left shoulder arthroscopic biopsy for culture. His cultures remained negative for 17 days. We discussed possible etiologies for his persistent pain and decreased function. I explained to the patient that possibly could have C5 radiculopathy that is causing his symptoms. Consequently we did get x-rays as outlined above and I also ordered electrodiagnostic testing. We will follow up after this is completed with additional recommendations as indicated.

## 2020-12-21 ENCOUNTER — TELEPHONE (OUTPATIENT)
Dept: FAMILY MEDICINE CLINIC | Age: 65
End: 2020-12-21

## 2020-12-21 NOTE — TELEPHONE ENCOUNTER
Patient stated he was told that the office reshceduled his OV for next year but he would like to know if he can come in for a December visit with a different provider. Patient would like a call and can be reached at [phone]. Okay to leave a message.

## 2021-01-06 ENCOUNTER — TELEPHONE (OUTPATIENT)
Dept: FAMILY MEDICINE CLINIC | Age: 66
End: 2021-01-06

## 2021-01-06 DIAGNOSIS — R60.0 BILATERAL LEG EDEMA: Primary | ICD-10-CM

## 2021-01-06 RX ORDER — FUROSEMIDE 20 MG/1
20 TABLET ORAL DAILY
Qty: 90 TABLET | Refills: 3 | Status: SHIPPED | OUTPATIENT
Start: 2021-01-06 | End: 2021-01-12 | Stop reason: SDUPTHER

## 2021-01-06 NOTE — TELEPHONE ENCOUNTER
Please let the patient know to  prescription from the pharmacy. Increase furosemide to every day, elevate legs, compression stockings from [employer] knee-high medium strength, cut down the salt  If worsening symptoms in few days or not getting better as expected needs to let us know and make appointment. Orders Placed This Encounter   Medications    furosemide (LASIX) 20 MG tablet     Sig: Take 1 tablet by mouth daily     Dispense:  90 tablet     Refill:  [address] [facility], [place] [address]  Phone: [phone] Fax: [phone]      No orders of the defined types were placed in this encounter. Future Appointments   Date Time Provider Keri English   1/12/2021  3:45 PM Shanelle Diaz MD [facility]   4/23/2021  8:45 AM [facility]   5/24/2021  9:50 AM Giovanni Nettles MD [facility]   7/8/2021 11:30 AM [facility]       Thank you!

## 2021-01-06 NOTE — TELEPHONE ENCOUNTER
Pt called in c/o leg swelling. Pt states it has been about 4 days and there is A LOT of swelling. No openings in office so advised to go to ED for evaluation.

## 2021-01-11 ASSESSMENT — PATIENT HEALTH QUESTIONNAIRE - PHQ9
SUM OF ALL RESPONSES TO PHQ QUESTIONS 1-9: 0
2. FEELING DOWN, DEPRESSED OR HOPELESS: 0
SUM OF ALL RESPONSES TO PHQ QUESTIONS 1-9: 0
1. LITTLE INTEREST OR PLEASURE IN DOING THINGS: 0
SUM OF ALL RESPONSES TO PHQ QUESTIONS 1-9: 0

## 2021-01-11 NOTE — PROGRESS NOTES
Visit Information    Have you changed or started any medications since your last visit including any over-the-counter medicines, vitamins, or herbal medicines? no   Have you stopped taking any of your medications? Is so, why? -  no  Are you having any side effects from any of your medications? - no    Have you seen any other physician or provider since your last visit? yes - ORTHOPEDICS; UROLOGY;    Have you had any other diagnostic tests since your last visit? yes - 11/25/2020   Have you been seen in the emergency room and/or had an admission in a hospital since we last saw you?  yes - 11/18/2020   Have you had your routine dental cleaning in the past 6 months?  no     Do you have an active MyChart account? If no, what is the barrier?   Yes    Patient Care Team:  Jessica Macario MD as PCP - General (Family Medicine)  Jessica Macario MD as PCP - [facility]  Mohsen Gooden MD as Consulting Physician (Neurosurgery)  Jagdish Abel MD as Consulting Physician (Cardiology)  Steven De La Cruz MD as Consulting Physician (Urology)  Nicolas Ren MD as Surgeon (Vascular Surgery)  Trell Vaughn MD as Consulting Physician (Pulmonology)  Carol Ann Hair MD as Consulting Physician (Orthopedic Surgery)  Franki Lefort, DO as Consulting Physician (General Surgery)  Steve Mcclain OD (Ophthalmology)  Amy Hernandez MD as Consulting Physician (Endocrinology)  Zoila Goodell, MD as Consulting Physician (Orthopedic Surgery)  Yon Queen MD as Consulting Physician (Gastroenterology)  Delfino Estevez MD as Consulting Physician (Orthopedic Surgery)  John Choi RN as Nurse Navigator    Medical History Review  Past Medical, Family, and Social History reviewed and does contribute to the patient presenting condition    Health Maintenance   Topic Date Due    Diabetic foot exam  04/09/2020    Flu vaccine (1) 09/01/2020    Diabetic retinal exam  11/07/2020    Annual Wellness Visit (AWV)  07/08/2021    Lipid screen  07/09/2021    A1C test (Diabetic or Prediabetic)  10/13/2021    Diabetic microalbuminuria test  10/13/2021    Potassium monitoring  11/09/2021    Creatinine monitoring  11/09/2021    PSA counseling  11/16/2021    Pneumococcal 65+ years Vaccine (2 of 2 - PPSV23) 04/11/2022    Colon cancer screen colonoscopy  05/09/2022    DTaP/Tdap/Td vaccine (2 - Td) 03/01/2028    Shingles Vaccine  Completed    Hepatitis C screen  Completed    Hepatitis A vaccine  Aged Out    Hib vaccine  Aged Out    Meningococcal (ACWY) vaccine  Aged Out

## 2021-01-12 ENCOUNTER — VIRTUAL VISIT (OUTPATIENT)
Dept: FAMILY MEDICINE CLINIC | Age: 66
End: 2021-01-12
Payer: MEDICARE

## 2021-01-12 DIAGNOSIS — Z99.89 OSA ON CPAP: ICD-10-CM

## 2021-01-12 DIAGNOSIS — I10 ESSENTIAL HYPERTENSION: ICD-10-CM

## 2021-01-12 DIAGNOSIS — E11.42 TYPE 2 DIABETES MELLITUS WITH DIABETIC POLYNEUROPATHY, WITHOUT LONG-TERM CURRENT USE OF INSULIN (HCC): ICD-10-CM

## 2021-01-12 DIAGNOSIS — G47.33 OSA ON CPAP: ICD-10-CM

## 2021-01-12 DIAGNOSIS — R60.0 BILATERAL LEG EDEMA: Primary | ICD-10-CM

## 2021-01-12 DIAGNOSIS — F33.42 RECURRENT MAJOR DEPRESSIVE DISORDER, IN FULL REMISSION (HCC): ICD-10-CM

## 2021-01-12 PROCEDURE — 99443 PR PHYS/QHP TELEPHONE EVALUATION 21-30 MIN: CPT | Performed by: FAMILY MEDICINE

## 2021-01-12 RX ORDER — LISINOPRIL 10 MG/1
10 TABLET ORAL DAILY
Qty: 90 TABLET | Refills: 3 | Status: ON HOLD | OUTPATIENT
Start: 2021-01-12 | End: 2021-07-12 | Stop reason: HOSPADM

## 2021-01-12 RX ORDER — PREGABALIN 200 MG/1
200 CAPSULE ORAL 3 TIMES DAILY
Qty: 270 CAPSULE | Refills: 0 | Status: SHIPPED | OUTPATIENT
Start: 2021-01-12 | End: 2021-04-23 | Stop reason: SDUPTHER

## 2021-01-12 RX ORDER — FUROSEMIDE 40 MG/1
40 TABLET ORAL DAILY
Qty: 90 TABLET | Refills: 3 | Status: SHIPPED | OUTPATIENT
Start: 2021-01-12 | End: 2021-11-05 | Stop reason: SDUPTHER

## 2021-01-12 NOTE — PROGRESS NOTES
Akosua Camarena contacted provider via phone call    Patient requested office visit, was scheduled for virtual visit phone call , Kathryn Infante was unable to use [email or web address] me or [employer]. Kathryn Infante is a 76 y.o. male evaluated via telephone on  21    Kathryn Infante (:  1955) is a 76 y.o. male,Established patient, here for evaluation of the following chief complaint(s): Foot Pain (B/L FEET THE PAST FEW YEARS/ PAIN SCORE: 10/10)      ASSESSMENT/PLAN:    1. Bilateral leg edema  Worsening  Low-salt diet, elevate legs, increase dosage of Lasix, blood work in 1 week to check for electrolytes  He is on ACE inhibitor/lisinopril 10 mg, so electrolytes should not be an issue, however I did advise him to eat 1 banana every day  Compression stockings from over-the-counter advised  -     furosemide (LASIX) 40 MG tablet; Take 1 tablet by mouth daily Dose increased 2021, Disp-90 tablet, R-3Normal  -     Basic Metabolic Panel; Future  Could also be related to Lyrica as a side effect, advised him to let me know if not improving with furosemide, and we can consider changing Lyrica to high dosage gabapentin      2. REYNALDO on CPAP  Improved with CPAP  Patient benefits from using the CPAP, he reports compliance with CPAP    3. Essential hypertension  Well controlled. Continue current treatment. Will recheck labs. -     Basic Metabolic Panel; Future  -     lisinopril (PRINIVIL;ZESTRIL) 10 MG tablet; Take 1 tablet by mouth daily, Disp-90 tablet, R-3Normal  Amlodipine 10 mg, furosemide 40 mg daily  Discussed low salt diet and BP and pulse monitoring daily    4. Type 2 diabetes mellitus with diabetic polyneuropathy, without long-term current use of insulin (HCC)  Improved per most recent A1c  Lab Results   Component Value Date    LABA1C 5.5 10/13/2020    LABA1C 6.4 (H) 2020    LABA1C 5.6 2020     -refilled     pregabalin (LYRICA) 200 MG capsule; Take 1 capsule by mouth 3 times daily for 90 days. , Disp-270 capsule, R-0Normal  A1c at the next appointment. Low carb diet   Annual eye exam discussed    5. Recurrent major depressive disorder, in full remission ([address]  Stable  Will continue to monitor. Controlled Substance Monitoring:    Acute and Chronic Pain Monitoring:   RX Monitoring 1/12/2021   Attestation -   Periodic Controlled Substance Monitoring No signs of potential drug abuse or diversion identified. ;Possible medication side effects, risk of tolerance/dependence & alternative treatments discussed. ;Assessed functional status. Chronic Pain > 50 MEDD -   Chronic Pain > 80 MEDD -           Bernardino received counseling on the following healthy behaviors: nutrition, exercise, medication adherence, tobacco cessation and weight loss    Reviewed prior labs and health maintenance  Discussed use, benefit, and side effects of prescribed medications. Barriers to medication compliance addressed. Patient given educational materials - see patient instructions  Was a self-tracking handout given in paper form or via [employer]? Yes  All patient questions answered. Patient voiced understanding. The patient's past medical,surgical, social, and family history as well as his current medications and allergies were reviewed as documented in today's encounter. Medications, labs, diagnostic studies, consultations and follow-up as documented in this encounter. Return for KEEP APPT. Data Unavailable      Consent:  He is aware that that he may receive a bill for this telephone service, depending on his insurance coverage, and has provided verbal consent to proceed: Yes      Documentation and HPI:  I communicated with the patient about: Foot Pain (B/L FEET THE PAST FEW YEARS/ PAIN SCORE: 10/10)       Bernardino reports: Worsening swelling of the legs and ankles for a few weeks  Has Reddish skin, doesn't feel warm. Patient reports his feet hurt intensity of pain 10 out of 10.   Has been Elevating his legs, wearing compression stockings , but doesn't help  Has been taking furosemide 20 daily  For the past 6 days, but doesn't help     I told him to squeeze the calfs during this encounter and he says \"they don't hurt\"  Left side more swollen  Legs are not warm    Seeing Dr. Sean Nixon 6/21/21 for testing, and 6/28/21 for PVD  He says he is a bit more short of breath lately  He says his feet and hands are swollen and hurting  I explained to him that it can be related to Lyrica or water retention. Sleep Apnea:  Current treatment: cPAP. Compliance: compliant all of the time. Residual symptoms include: daytime fatigue. HTN  Reports dyspnea, fatigue and lower extremity edema  Denies chest pain       Home /75  BP controlled. Shane Bennett reports compliance with BP medications, and tolerates them well, denies side effects. BP Readings from Last 3 Encounters:   11/23/20 130/68   11/18/20 (!) 184/87   11/18/20 (!) 144/68     Diabetic neuropathy is severe  Takes lyrica, tolerated well  He says without it his feet burn and has numbness and tingling, and cannot even sleep. Today he reports the pain is 10 out of 10 and she thinks the swelling gets making it worse. Does not check home blood glucose. Last A1c improved  Lab Results   Component Value Date    LABA1C 5.5 10/13/2020    LABA1C 6.4 (H) 07/09/2020    LABA1C 5.6 03/09/2020     Takes aspirin and Pravastatin    Depression   Denies suicidal ideation, plan or intent. PHQ-2 Over the past 2 weeks, how often have you been bothered by any of the following problems? Little interest or pleasure in doing things: Not at all  Feeling down, depressed, or hopeless: Not at all  PHQ-2 Score: 0  PHQ-9 Over the past 2 weeks, how often have you been bothered by any of the following problems? PHQ-9 Total Score: 0    Negative depression screening.      PHQ Scores 1/11/2021 7/7/2020 3/18/2020 1/28/2020 1/8/2019 9/4/2018 3/1/2018   PHQ2 Score 0 0 0 6 0 0 3   PHQ9 Score 0 0 0 17 0 0 14     The patient's past medical, surgical, social, and family history as well as his current medications and allergies were reviewed as documented in today's encounter. Prior to Visit Medications    Medication Sig Taking? Authorizing Provider   furosemide (LASIX) 20 MG tablet Take 1 tablet by mouth daily Yes Dorota Mcdaniels MD   cephALEXin (KEFLEX) 500 MG capsule 500 mgTake three times daily Yes Azul Washington MD   ondansetron (ZOFRAN) 4 MG tablet Take every six hours as needed Yes Azul Washington MD   naloxone 4 MG/0.1ML LIQD nasal spray 1 spray by Nasal route as needed for Opioid Reversal Patient needs counseling Yes Dorota Mcdaniels MD   amLODIPine (NORVASC) 10 MG tablet Take 1 tablet by mouth daily Dose increased 10/16/2020 due to high BP. Correct dosage Yes Dorota Mcdaniels MD   OXYGEN With CPAP at night Yes Historical Provider, MD   pregabalin (LYRICA) 200 MG capsule Take 1 capsule by mouth 3 times daily for 90 days. Yes Dorota Mcdaniels MD   methylPREDNISolone (MEDROL, ROSALIE,) 4 MG tablet Take by mouth, with food. Keep low carb, low salt diet while taking it Yes Dorota Mcdaniels MD   varenicline (CHANTIX STARTING MONTH PAK) 0.5 MG X 11 & 1 MG X 42 tablet 0.5 mg po daily x 3 days, 0.5 mg BID x 4 days, then 1 mg BID thereafter . Call for refill Yes Dorota Mcdaniels MD   SYMBICORT 160-4.5 MCG/ACT AERO Inhale 2 puffs into the lungs 2 times daily 10.2 inhaler, give 3 inhalers Yes Dorota Mcdaniels MD   albuterol sulfate HFA (PROVENTIL HFA) 108 (90 Base) MCG/ACT inhaler Inhale 2 puffs into the lungs every 6 hours as needed for Wheezing or Shortness of Breath Yes Dorota Mcdaniels MD   pravastatin (PRAVACHOL) 80 MG tablet Take 1 tablet by mouth every evening Dose increased  9/24/2019 Yes Dorota Mcdaniels MD   omeprazole (PRILOSEC) 40 MG delayed release capsule Take 1 capsule by mouth every morning (before breakfast) Yes Dorota Mcdaniels MD   Respiratory Therapy Supplies (ADULT MASK) MISC Needs to use mask daily due to coronavirus pandemic. Yes Edilberto Bains MD   ipratropium-albuterol (DUONEB) 0.5-2.5 (3) MG/3ML SOLN nebulizer solution Inhale 1 vial into the lungs 3 times daily Yes Historical Provider, MD   blood glucose monitor strips Use to test twice daily and as needed as directed by provider Yes Historical Provider, MD   lisinopril (PRINIVIL;ZESTRIL) 10 MG tablet Take 1 tablet by mouth daily Dose decreased 3/18/2020 Yes Edilberto Bains MD   Lift Chair MISC by Does not apply route Patient has difficulty getting up from usual chair Yes Edilberto Bains MD   folbee plus (FOLBEE PLUS) TABS Take 1 tablet by mouth daily Yes Edilberto Bains MD   aspirin EC 81 MG EC tablet Take 1 tablet by mouth daily Yes Edilberto Bains MD   Glucose Blood (BLOOD GLUCOSE TEST STRIPS) STRP tesing once a day fasting Yes Edilberto Bains MD       -vital signs stable and within normal limits except severe obesity per BMI, last BMI 37.97 kg/M2  Patient-Reported Vitals 1/12/2021   Patient-Reported Weight 280 LBS   Patient-Reported Height 6 FT   Patient-Reported Systolic 558   Patient-Reported Diastolic 75          Orders Placed This Encounter   Medications    furosemide (LASIX) 40 MG tablet     Sig: Take 1 tablet by mouth daily Dose increased 1/12/2021     Dispense:  90 tablet     Refill:  3    lisinopril (PRINIVIL;ZESTRIL) 10 MG tablet     Sig: Take 1 tablet by mouth daily     Dispense:  90 tablet     Refill:  3    pregabalin (LYRICA) 200 MG capsule     Sig: Take 1 capsule by mouth 3 times daily for 90 days.      Dispense:  270 capsule     Refill:  0       Orders Placed This Encounter   Procedures    Basic Metabolic Panel     Standing Status:   Future     Standing Expiration Date:   1/12/2022       Medications Discontinued During This Encounter   Medication Reason    cephALEXin (KEFLEX) 500 MG capsule Therapy completed    methylPREDNISolone (MEDROL, ROSALIE,) 4 MG tablet Therapy completed    varenicline (CHANTIX STARTING MONTH PAK) 0.5 MG X 11 & 1 MG X 42 tablet Patient Choice    furosemide (LASIX) 20 MG tablet REORDER    lisinopril (PRINIVIL;ZESTRIL) 10 MG tablet REORDER    pregabalin (LYRICA) 200 MG capsule REORDER         Controlled Substance Monitoring:    Acute and Chronic Pain Monitoring:   RX Monitoring 1/12/2021   Attestation -   Periodic Controlled Substance Monitoring No signs of potential drug abuse or diversion identified. ;Possible medication side effects, risk of tolerance/dependence & alternative treatments discussed. ;Assessed functional status. Chronic Pain > 50 MEDD -   Chronic Pain > 80 MEDD -         I affirm this is a Patient Initiated Episode with an Established Patient who has not had a related appointment within my department in the past 7 days or scheduled within the next 24 hours. Patient location's was at home, and provider's location was at the primary practice location. Future Appointments   Date Time Provider Keri English   4/23/2021  8:45 AM Jcarlos Mendez MD [facility]   5/24/2021  9:50 AM [facility]ina [facility]   7/8/2021 11:30 AM Jcarlos Mendez MD [facility]        Patient identification was verified at the start of the visit: Yes    On this date 01/12/21 I have spent 25 minutes reviewing previous notes, test results and face to face with the patient discussing the diagnosis and importance of compliance with the treatment plan as well as documenting on the day of the visit. An electronic signature was used to authenticate this note.   Electronically signed by Jcarlos Mendez MD on 1/18/2021  at 7:31 PM

## 2021-01-18 PROBLEM — R60.0 BILATERAL LEG EDEMA: Status: ACTIVE | Noted: 2021-01-18

## 2021-01-21 ENCOUNTER — TELEPHONE (OUTPATIENT)
Dept: FAMILY MEDICINE CLINIC | Age: 66
End: 2021-01-21

## 2021-01-21 NOTE — TELEPHONE ENCOUNTER
PATIENT CALLED TODAY STATING HE WAS INFORMED HE HAD TO GET CLEARED FOR HIS NEXT SURGERY WHICH IS HIS GALLBLADDER REMOVAL DOES HE NEED A NEW APPOINTMENT TO BE CLEARED OR CAN YOU CLEAR HIM OFF HIS LAST SX CLEARANCE APPT FROM 01/12/2021?  PLEASE ADVISE

## 2021-02-02 ENCOUNTER — HOSPITAL ENCOUNTER (OUTPATIENT)
Dept: PREADMISSION TESTING | Age: 66
Discharge: HOME | End: 2021-02-06
Payer: MEDICARE

## 2021-02-02 VITALS
OXYGEN SATURATION: 97 % | DIASTOLIC BLOOD PRESSURE: 68 MMHG | RESPIRATION RATE: 20 BRPM | TEMPERATURE: 98.6 F | SYSTOLIC BLOOD PRESSURE: 132 MMHG | HEART RATE: 68 BPM | HEIGHT: 72 IN | BODY MASS INDEX: 37.93 KG/M2 | WEIGHT: 280 LBS

## 2021-02-02 LAB
ABSOLUTE EOS #: 0.1 K/UL (ref 0–0.4)
ABSOLUTE IMMATURE GRANULOCYTE: ABNORMAL K/UL (ref 0–0.3)
ABSOLUTE LYMPH #: 1.9 K/UL (ref 1–4.8)
ABSOLUTE MONO #: 0.6 K/UL (ref 0.1–1.3)
ALBUMIN SERPL-MCNC: 4.1 G/DL (ref 3.5–5.2)
ALBUMIN/GLOBULIN RATIO: ABNORMAL (ref 1–2.5)
ALP BLD-CCNC: 108 U/L (ref 40–129)
ALT SERPL-CCNC: 48 U/L (ref 5–41)
AMYLASE: 49 U/L (ref 28–100)
ANION GAP SERPL CALCULATED.3IONS-SCNC: 12 MMOL/L (ref 9–17)
AST SERPL-CCNC: 34 U/L
BASOPHILS # BLD: 2 % (ref 0–2)
BASOPHILS ABSOLUTE: 0.2 K/UL (ref 0–0.2)
BILIRUB SERPL-MCNC: 0.21 MG/DL (ref 0.3–1.2)
BUN BLDV-MCNC: 11 MG/DL (ref 8–23)
BUN/CREAT BLD: ABNORMAL (ref 9–20)
CALCIUM SERPL-MCNC: 9.9 MG/DL (ref 8.6–10.4)
CHLORIDE BLD-SCNC: 104 MMOL/L (ref 98–107)
CO2: 27 MMOL/L (ref 20–31)
CREAT SERPL-MCNC: 0.67 MG/DL (ref 0.7–1.2)
DIFFERENTIAL TYPE: ABNORMAL
EOSINOPHILS RELATIVE PERCENT: 1 % (ref 0–4)
GFR AFRICAN AMERICAN: >60 ML/MIN
GFR NON-AFRICAN AMERICAN: >60 ML/MIN
GFR SERPL CREATININE-BSD FRML MDRD: ABNORMAL ML/MIN/{1.73_M2}
GFR SERPL CREATININE-BSD FRML MDRD: ABNORMAL ML/MIN/{1.73_M2}
GLUCOSE BLD-MCNC: 125 MG/DL (ref 70–99)
HCT VFR BLD CALC: 43.1 % (ref 41–53)
HEMOGLOBIN: 14.5 G/DL (ref 13.5–17.5)
IMMATURE GRANULOCYTES: ABNORMAL %
LIPASE: 25 U/L (ref 13–60)
LYMPHOCYTES # BLD: 20 % (ref 24–44)
MCH RBC QN AUTO: 29.4 PG (ref 26–34)
MCHC RBC AUTO-ENTMCNC: 33.5 G/DL (ref 31–37)
MCV RBC AUTO: 87.6 FL (ref 80–100)
MONOCYTES # BLD: 6 % (ref 1–7)
NRBC AUTOMATED: ABNORMAL PER 100 WBC
PDW BLD-RTO: 14.9 % (ref 11.5–14.9)
PLATELET # BLD: 187 K/UL (ref 150–450)
PLATELET ESTIMATE: ABNORMAL
PMV BLD AUTO: 9.6 FL (ref 6–12)
POTASSIUM SERPL-SCNC: 4 MMOL/L (ref 3.7–5.3)
RBC # BLD: 4.93 M/UL (ref 4.5–5.9)
RBC # BLD: ABNORMAL 10*6/UL
SEG NEUTROPHILS: 71 % (ref 36–66)
SEGMENTED NEUTROPHILS ABSOLUTE COUNT: 6.8 K/UL (ref 1.3–9.1)
SODIUM BLD-SCNC: 143 MMOL/L (ref 135–144)
TOTAL PROTEIN: 7.5 G/DL (ref 6.4–8.3)
WBC # BLD: 9.1 K/UL (ref 3.5–11)
WBC # BLD: ABNORMAL 10*3/UL

## 2021-02-02 PROCEDURE — 80053 COMPREHEN METABOLIC PANEL: CPT

## 2021-02-02 PROCEDURE — 85025 COMPLETE CBC W/AUTO DIFF WBC: CPT

## 2021-02-02 PROCEDURE — 36415 COLL VENOUS BLD VENIPUNCTURE: CPT

## 2021-02-02 PROCEDURE — 82150 ASSAY OF AMYLASE: CPT

## 2021-02-02 PROCEDURE — 83690 ASSAY OF LIPASE: CPT

## 2021-02-02 NOTE — PROGRESS NOTES
Medical clearance requested per Dr. Dewayne Coronel office note, Dr. Brianne Mehta, anesthesia agrees. Surgery scheduling will notify Dr. Dewayne Coronel office who will be responsible for making sure the clearance is obtained and is in the chart for surgery.

## 2021-02-02 NOTE — H&P (VIEW-ONLY)
2  weeks ago and c/o testicular pain     FINDINGS:  Lower Chest: No acute findings.     Organs: Evaluation of the abdominal and pelvic viscera is limited in the  absence of contrast.  Cholelithiasis is noted in a contracted gallbladder. The liver, pancreas and spleen appear grossly unremarkable.  Mild nodular  thickening of the adrenal glands is again noted and remains unchanged  consistent with benign hyperplasia.  The kidneys reveal no acute abnormality. No stones or hydronephrosis.  The intraparenchymal right renal cyst again  noted.  Small exophytic renal lesion arising from the anterior interpolar  right kidney measuring 7 mm also likely represents a cyst.     GI/Bowel: There is no bowel dilatation or wall thickening identified.     Pelvis: No acute findings.  Brachytherapy seeds in the prostate.     Peritoneum/Retroperitoneum: No free air.  No ascites.  No lymphadenopathy. The aorta is normal in caliber.     Bones/Soft Tissues: Multilevel degenerative disc disease and advanced lower  lumbar facet arthropathy.  Status post laminectomy at T12, L3, L4 and L5. no  acute osseous abnormality identified.  No soft tissue hematoma.     Impression  1.  No acute traumatic injury or acute inflammatory process identified.     2.  Incidental findings include cholelithiasis, benign thickening of the  adrenal glands and right renal cysts.        PAST MEDICAL HISTORY     Past Medical History:   Diagnosis Date    Acid reflux     Anemia, blood loss 10/7/2018    Arthritis     C. difficile colitis 3/19/2020    Chronic bilateral low back pain with bilateral sciatica 11/3/2016    Complete tear of left rotator cuff 7/18/2018    COPD [facility] obstructive pulmonary disease) [facility]     Coronary artery disease involving native coronary artery of native heart without angina pectoris 2/2/2020    Degenerative disc disease, cervical 7/28/2019    GI bleed 3/19/2020    Gout     H/O cardiac catheterization     yrs ago   no stents  History of blood transfusion     Hyperlipidemia     Hyperlipidemia with target LDL less than 100 1/20/2016    Hyperparathyroidism ([address] 1/17/2020    Hypertension     Knee pain, chronic     left    Liver disease     MDRO (multiple drug resistant organisms) resistance     Memory loss     Moderate episode of recurrent major depressive disorder ([address] 1/20/2016    Obesity, Class III, BMI 40-49.9 (morbid obesity) ([address] 1/20/2016    REYNALDO on CPAP 5/6/2017    Osteoarthritis     Peripheral arterial occlusive disease ([address] 3/25/2017    Pneumonia 3/9/2020    Polypharmacy 3/25/2017    Positive FIT (fecal immunochemical test) 4/11/2017    Prolonged emergence from general anesthesia 01/05/2017    Patient \"on life support for 3 days\" after back surgery due to being given succinylcholine    Prostate CA ([address] 1/20/2016    Prostate cancer ([address] 10/2013    finished radiation tx 5/2014    Pseudocholinesterase deficiency 03/25/2017    Pt.  \"on life support\" for 3 days after back surgery 1/5/17 after being given succinylcholine    Severe obesity (BMI 35.0-35.9 with comorbidity) ([address] 2/2/2020    Short of breath on exertion     Sleep apnea     uses CPAP machine nightly    Status post lumbar laminectomy 3/25/2017    Stenosis of left carotid artery 10/7/2018    Syncope and collapse 3/19/2020    Tubular adenoma of colon 4/11/17 5/13/2017    Type 2 diabetes mellitus with hyperglycemia, without long-term current use of insulin ([address] 3/25/2017    Lab Results Component Value Date  LABA1C 7.1 (H) 03/23/2017     Unintended weight loss 10/7/2018    Vitamin D deficiency 3/25/2017    Wears glasses        SURGICAL HISTORY       Past Surgical History:   Procedure Laterality Date    BACK SURGERY      x 2     BACK SURGERY  01/05/2017    lumbar laminectomy L2, L3, L4    BRONCHOSCOPY N/A 3/13/2020    BRONCHOSCOPY performed by Steffen Bobo MD at [address] [place] [address]  2007    no stents    CAROTID ENDARTERECTOMY Right 12/16/2019    Dr. Roman Bender Bilateral     COLONOSCOPY      ENDOSCOPY, COLON, DIAGNOSTIC      HERNIA REPAIR Left 11/18/2020    HERNIA INGUINAL REPAIR W/MESH OPEN performed by Rosa Cruz MD at [address] [facility] Left     LUMBAR LAMINECTOMY  01/05/2017    L2-L4    [place] CLOSED RX SHLDR DISLOC,ANESTHESIA Left 8/1/2018    SHOULDER CLOSED REDUCTION WITH C-ARM VISUALIZATION performed by Palmira Louie MD at [facility] W/BIOPSY SINGLE/MULTIPLE N/A 5/9/2017    COLONOSCOPY WITH BIOPSY performed by Rex Brown DO at [address] IMPLANT Left 7/18/2018    SHOULDER TOTAL ARTHROPLASTY REVERSE LEFT DJO & BICEP TENDON TRANSFER performed by Palmira Louie MD at [address] HUMERAL/GLENOID COMPNT Left 8/3/2018    SHOULDER TOTAL REVERSE  ARTHROPLASTY REVISION performed by Palmira Louie MD at [address]     rotator cuff repair    SHOULDER SURGERY Left 10/15/2020    SHOULDER ARTHROSCOPY W/INTEROP CULTURES performed by Palmira Louie MD at [facility]      ear, forehead    TONSILLECTOMY AND ADENOIDECTOMY      UPPER GASTROINTESTINAL ENDOSCOPY N/A 3/19/2020    EGD BIOPSY performed by Verenice Connolly MD at [address]       Family History   Problem Relation Age of Onset    Diabetes Mother     Lung Cancer Brother     Liver Cancer Brother     Cancer Father        SOCIAL HISTORY       Social History     Socioeconomic History    Marital status:      Spouse name: None    Number of children: None    Years of education: None    Highest education level: None   Occupational History    None   Social Needs    Financial resource strain: None    Food insecurity     Worry: None     Inability: None    Transportation needs     Medical: None     Non-medical: None   Tobacco Use    Smoking status: Current Every Day Smoker     Packs/day: 0.50     Years: 35.00     Pack years: 17.50     Types: Cigarettes    Smokeless tobacco: Never Used    Tobacco comment: hx of 1-1.5 PPD   Substance and Sexual Activity    Alcohol use: Not Currently     Alcohol/week: 0.0 standard drinks    Drug use: No    Sexual activity: Not Currently     Partners: Female   Lifestyle    Physical activity     Days per week: None     Minutes per session: None    Stress: None   Relationships    Social connections     Talks on phone: None     Gets together: None     Attends [employer] service: None     Active member of club or organization: None     Attends meetings of clubs or organizations: None     Relationship status: None    Intimate partner violence     Fear of current or ex partner: None     Emotionally abused: None     Physically abused: None     Forced sexual activity: None   Other Topics Concern    None   Social History Narrative    None           REVIEW OF SYSTEMS      Allergies   Allergen Reactions    Succinylcholine Chloride Other (See Comments)     PATIENT HAS PSEUDOCHOLINESTERASE DEFICIENCY      Cymbalta [Duloxetine Hcl]      Taste loss         Current Outpatient Medications on File Prior to Encounter   Medication Sig Dispense Refill    furosemide (LASIX) 40 MG tablet Take 1 tablet by mouth daily Dose increased 1/12/2021 90 tablet 3    lisinopril (PRINIVIL;ZESTRIL) 10 MG tablet Take 1 tablet by mouth daily 90 tablet 3    pregabalin (LYRICA) 200 MG capsule Take 1 capsule by mouth 3 times daily for 90 days. 270 capsule 0    naloxone 4 MG/0.1ML LIQD nasal spray 1 spray by Nasal route as needed for Opioid Reversal Patient needs counseling 1 each 3    amLODIPine (NORVASC) 10 MG tablet Take 1 tablet by mouth daily Dose increased 10/16/2020 due to high BP.  Correct dosage 90 tablet 3    SYMBICORT 160-4.5 MCG/ACT AERO Inhale 2 puffs into the lungs 2 times daily 10.2 inhaler, give 3 inhalers 3 Inhaler 3    albuterol sulfate HFA (PROVENTIL HFA) 108 (90 Base) MCG/ACT inhaler Inhale 2 puffs into the lungs every 6 hours as needed for Wheezing or Shortness of Breath 3 Inhaler 1    pravastatin (PRAVACHOL) 80 MG tablet Take 1 tablet by mouth every evening Dose increased  9/24/2019 90 tablet 3    omeprazole (PRILOSEC) 40 MG delayed release capsule Take 1 capsule by mouth every morning (before breakfast) 90 capsule 3    ipratropium-albuterol (DUONEB) 0.5-2.5 (3) MG/3ML SOLN nebulizer solution Inhale 1 vial into the lungs 3 times daily      folbee plus (FOLBEE PLUS) TABS Take 1 tablet by mouth daily 90 tablet 3    aspirin EC 81 MG EC tablet Take 1 tablet by mouth daily 90 tablet 0    OXYGEN With CPAP at night      Respiratory Therapy Supplies (ADULT MASK) MISC Needs to use mask daily due to coronavirus pandemic. 30 each 5    blood glucose monitor strips Use to test twice daily and as needed as directed by provider     [facility] by Does not apply route Patient has difficulty getting up from usual chair 1 each 0    Glucose Blood (BLOOD GLUCOSE TEST STRIPS) STRP tesing once a day fasting 100 strip 3     No current facility-administered medications on file prior to encounter. General health:  Fairly good. No fever or chills. Skin:  No itching, redness or rash. HEENT:  No headache, epistaxis or sore throat. Neck:  No pain, stiffness or masses. Cardiovascular/Respiratory system:  No chest pain, palpitation or shortness of breath. Gastrointestinal tract: See HPI. Genitourinary:  No burning on micturition. No hesitancy, urgency, frequency or discoloration of urine. Locomotor:  No bone or joint pains. No swelling. Neuropsychiatric:  No referable complaints.                  GENERAL PHYSICAL EXAM:     Vitals: /68   Pulse 68   Temp 98.6 °F (37 °C)   Resp 20   Ht 6' (1.829 m)   Wt 280 lb (127 kg)   SpO2 97%   BMI 37.97 kg/m²  Body mass index is 37.97 kg/m². GENERAL APPEARANCE:   Karey Victor is 76 y.o.,  male, moderately obese, nourished, conscious, alert. Does not appear to be distress or pain at this time. SKIN:  Warm, dry, no cyanosis or jaundice. HEAD:  Normocephalic, atraumatic, no swelling or tenderness. EYES:  Pupils equal, reactive to light. EARS:  No discharge, no marked hearing loss. NOSE:  No rhinorrhea, epistaxis or septal deformity. THROAT:  Not congested. No ulceration bleeding or discharge. NECK:  No stiffness, trachea central.                  CHEST:  Symmetrical and equal on expansion. HEART:  RRR S1 > S2. No audible murmurs or gallops. LUNGS:  Equal on expansion, normal breath sounds. No adventitious sounds. ABDOMEN:  Obese. Soft on palpation. No localized tenderness. No guarding or rigidity. No palpable hepatosplenomegaly. LYMPHATICS:  No palpable cervical lymphadenopathy. LOCOMOTOR, BACK AND SPINE:  No tenderness or deformities. EXTREMITIES:  Symmetrical, no pretibial edema. Ginos sign negative or no calf tenderness. No discoloration or ulcerations. NEUROLOGIC:  The patient is conscious, alert, oriented,Cranial nerve II-XII intact, taste and smell were not examined. No apparent focal sensory or motor deficits.                                                                                      PROVISIONAL DIAGNOSES / SURGERY:      CHOLELITHIASIS    CHOLECYSTECTOMY LAPAROSCOPIC ROBOTIC XI    Patient Active Problem List    Diagnosis Date Noted    Severe obesity (BMI 35.0-35.9 with comorbidity) ([facility] 02/02/2020     Priority: High    Hyperpar[facility]roidism [facility] 01/17/2020     Priority: High    REYNALDO on CPAP 05/06/2017     Priority: High    Status post lumbar laminectomy 03/25/2017     Priority: High    Tubular adenoma of colon 4/11/17 04/11/2017     Priority: Medium    Bilateral leg edema 01/18/2021    Left inguinal hernia     History of prostate cancer 03/26/2020    Melena 03/19/2020    Chronic hypoxemic respiratory failure ([address] 03/18/2020    Status post total shoulder arthroplasty, left 03/16/2020    Personal history of smoking 03/16/2020    Coronary artery disease involving native coronary artery of native heart without angina pectoris 02/02/2020    Degenerative disc disease, cervical 07/28/2019    At high risk for falls 07/28/2019    Difficulty rising from chair 04/09/2019    Poor balance 01/13/2019    Foot drop, right 01/13/2019    Family history of cancer 10/07/2018    Stenosis of left carotid artery 10/07/2018    Recurrent major depressive disorder, in full remission ([address] 09/04/2018    Seborrheic keratosis 09/04/2018    Solar degeneration 09/04/2018    S/P reverse total shoulder arthroplasty, left 07/18/2018    Abdominal aortic aneurysm (HCC) 04/30/2018    Psychophysiological insomnia 01/03/2018    Hypertriglyceridemia 05/06/2017    Type 2 diabetes mellitus with diabetic polyneuropathy, without long-term current use of insulin ([address] 03/25/2017    PVD (peripheral vascular disease) with claudication ([address] 03/25/2017    Peripheral neuropathic pain 03/25/2017    Tinea pedis of both feet 03/25/2017    Nonspecific reactive hepatitis 03/25/2017    COPD mixed type ([address] 03/25/2017    Serum cholinesterase defect ([address] 03/25/2017    Vitamin D deficiency 03/25/2017    Pseudocholinesterase deficiency 03/25/2017    DDD (degenerative disc disease), lumbar 11/03/2016    Chronic bilateral low back pain with bilateral sciatica 11/03/2016    Osteoarthritis of spine with radiculopathy, lumbar region 11/03/2016    Essential hypertension 01/20/2016    Gout 01/20/2016    Hyperlipidemia with target LDL less than 70 01/20/2016    Osteoporosis 01/20/2016    Gastroesophageal reflux disease without esophagitis 01/20/2016    Senile hyperkeratosis 01/07/2011    Actinic keratosis 01/06/2011    Photodermatitis due to sun 01/06/2011           MARCELINO Rubin, APRN - CNP on 2/2/2021 at 12:20 PM

## 2021-02-02 NOTE — H&P
HISTORY and [id number]       NAME:  Pooja Keyes  MRN: [id number]   YOB: 1955   Date: 2/2/2021   Age: 76 y.o. Gender: male       COMPLAINT AND PRESENT HISTORY:     Pooja Keyes is 76 y.o.,   male, undergoing  for Robotic Laparoscopic Cholecystectomy. Pt has Cholelithiasis. abdomen pelvis showed gall stones. Patient is s/p hernia repair. Pt complains of mild  Epigastric pains. The pain is aching in character, not related to meals or bowel movements, non radiating. Patient's cholelithiasis was found incidentally with CT scan. Pt denies any  intolerance to greasy and spicy foods. Pt denies any nausea, no vomiting. No diarrhea or constipation. No change in the color of the stools. No abdominal pain, Dysphagia, nausea, vomiting, diarrhea or constipation. Any significant medical hx: CAD, HTN, HLD, DM, COPD,  REYNALDO w Cpap. Patient is managing with medications, including diuretics. Any anticoagulants or blood thinners: baby aspirin     Lab Results   Component Value Date    LABA1C 5.5 10/13/2020     Lab Results   Component Value Date     07/09/2020       No chest pain, palpitations or diaphoresis. No recent URI,  fever or chills. Patient is a smoker. CT OF THE ABDOMEN AND PELVIS WITHOUT CONTRAST 9/17/2020 8:26 am     TECHNIQUE:  CT of the abdomen and pelvis was performed without the administration of  intravenous contrast. Multiplanar reformatted images are provided for review.   Dose modulation, iterative reconstruction, and/or weight based adjustment of  the mA/kV was utilized to reduce the radiation dose to as low as reasonably  achievable.     COMPARISON:  11/05/2013     HISTORY:  ORDERING SYSTEM PROVIDED HISTORY: Dysuria  TECHNOLOGIST PROVIDED HISTORY:     Reason for Exam: patient states he fell through a chair about 2 weeks ago and  c/o testicular pain  Additional signs and symptoms: patient states he fell through a chair about 2  weeks ago and c/o testicular pain     FINDINGS:  Lower Chest: No acute findings.     Organs: Evaluation of the abdominal and pelvic viscera is limited in the  absence of contrast.  Cholelithiasis is noted in a contracted gallbladder. The liver, pancreas and spleen appear grossly unremarkable.  Mild nodular  thickening of the adrenal glands is again noted and remains unchanged  consistent with benign hyperplasia.  The kidneys reveal no acute abnormality. No stones or hydronephrosis.  The intraparenchymal right renal cyst again  noted.  Small exophytic renal lesion arising from the anterior interpolar  right kidney measuring 7 mm also likely represents a cyst.     GI/Bowel: There is no bowel dilatation or wall thickening identified.     Pelvis: No acute findings.  Brachytherapy seeds in the prostate.     Peritoneum/Retroperitoneum: No free air.  No ascites.  No lymphadenopathy. The aorta is normal in caliber.     Bones/Soft Tissues: Multilevel degenerative disc disease and advanced lower  lumbar facet arthropathy.  Status post laminectomy at T12, L3, L4 and L5. no  acute osseous abnormality identified.  No soft tissue hematoma.     Impression  1.  No acute traumatic injury or acute inflammatory process identified.     2.  Incidental findings include cholelithiasis, benign thickening of the  adrenal glands and right renal cysts.        PAST MEDICAL HISTORY     Past Medical History:   Diagnosis Date    Acid reflux     Anemia, blood loss 10/7/2018    Arthritis     C. difficile colitis 3/19/2020    Chronic bilateral low back pain with bilateral sciatica 11/3/2016    Complete tear of left rotator cuff 7/18/2018    COPD (chronic obstructive pulmonary disease) [facility] [facility]     Coronary artery disease involving native coronary artery of native heart without angina pectoris 2/2/2020    Degenerative disc disease, cervical 7/28/2019    GI bleed 3/19/2020    Gout     H/O cardiac catheterization     yrs ago   no stents  History of blood transfusion     Hyperlipidemia     Hyperlipidemia with target LDL less than 100 1/20/2016    Hyperparathyroidism ([address] 1/17/2020    Hypertension     Knee pain, chronic     left    Liver disease     MDRO (multiple drug resistant organisms) resistance     Memory loss     Moderate episode of recurrent major depressive disorder ([address] 1/20/2016    Obesity, Class III, BMI 40-49.9 (morbid obesity) ([address] 1/20/2016    REYNALDO on CPAP 5/6/2017    Osteoarthritis     Peripheral arterial occlusive disease ([address] 3/25/2017    Pneumonia 3/9/2020    Polypharmacy 3/25/2017    Positive FIT (fecal immunochemical test) 4/11/2017    Prolonged emergence from general anesthesia 01/05/2017    Patient \"on life support for 3 days\" after back surgery due to being given succinylcholine    Prostate CA ([address] 1/20/2016    Prostate cancer ([address] 10/2013    finished radiation tx 5/2014    Pseudocholinesterase deficiency 03/25/2017    Pt.  \"on life support\" for 3 days after back surgery 1/5/17 after being given succinylcholine    Severe obesity (BMI 35.0-35.9 with comorbidity) ([address] 2/2/2020    Short of breath on exertion     Sleep apnea     uses CPAP machine nightly    Status post lumbar laminectomy 3/25/2017    Stenosis of left carotid artery 10/7/2018    Syncope and collapse 3/19/2020    Tubular adenoma of colon 4/11/17 5/13/2017    Type 2 diabetes mellitus with hyperglycemia, without long-term current use of insulin ([address] 3/25/2017    Lab Results Component Value Date  LABA1C 7.1 (H) 03/23/2017     Unintended weight loss 10/7/2018    Vitamin D deficiency 3/25/2017    Wears glasses        SURGICAL HISTORY       Past Surgical History:   Procedure Laterality Date    BACK SURGERY      x 2     BACK SURGERY  01/05/2017    lumbar laminectomy L2, L3, L4    BRONCHOSCOPY N/A 3/13/2020    BRONCHOSCOPY performed by Ranjit Nixon MD at [address] [place] [address]  2007    no stents    CAROTID ENDARTERECTOMY Right 12/16/2019    Dr. Micheal Dior Bilateral     COLONOSCOPY      ENDOSCOPY, COLON, DIAGNOSTIC      HERNIA REPAIR Left 11/18/2020    HERNIA INGUINAL REPAIR W/MESH OPEN [facility]n [facility]     LUMBAR LAMINECTOMY  01/05/2017    L2-L4    AK CLOSED RX SHLDR DISLOC,ANESTHESIA Left 8/1/2018    SHOULDER CLOSED REDUCTION WITH C-ARM VISUALIZATION performed by Denzel Renteria MD at [facility] W/BIOPSY SINGLE/MULTIPLE N/A 5/9/2017    COLONOSCOPY WITH BIOPSY performed by Luz Elena Jo DO at [facility] IMPLANT Left 7/18/2018    SHOULDER TOTAL ARTHROPLASTY REVERSE LEFT DJO & BICEP TENDON TRANSFER performed by Denzel Renteria MD at [address] HUMERAL/GLENOID COMPNT Left 8/3/2018    SHOULDER TOTAL REVERSE  ARTHROPLASTY REVISION performed by Denzel Renteria MD at [address]     rotator cuff repair    SHOULDER SURGERY Left 10/15/2020    SHOULDER ARTHROSCOPY W/INTEROP CULTURES performed by Denzel Renteria MD at [facility]      ear, forehead    TONSILLECTOMY AND ADENOIDECTOMY      UPPER GASTROINTESTINAL ENDOSCOPY N/A 3/19/2020    EGD BIOPSY performed by Jose Hernández MD at [address]       Family History   Problem Relation Age of Onset    Diabetes Mother     Lung Cancer Brother     Liver Cancer Brother     Cancer Father        SOCIAL HISTORY       Social History     Socioeconomic History    Marital status:      Spouse name: None    Number of children: None    Years of education: None    Highest education level: None   Occupational History    None   Social Needs    Financial resource strain: None    Food insecurity     Worry: None     Inability: None    Transportation needs     Medical: None     Non-medical: None   Tobacco Use    Smoking status: Current Every Day Smoker     Packs/day: 0.50     Years: 35.00     Pack years: 17.50     Types: Cigarettes    Smokeless tobacco: Never Used    Tobacco comment: hx of 1-1.5 PPD   Substance and Sexual Activity    Alcohol use: Not Currently     Alcohol/week: 0.0 standard drinks    Drug use: No    Sexual activity: Not Currently     Partners: Female   Lifestyle    Physical activity     Days per week: None     Minutes per session: None    Stress: None   Relationships    Social connections     Talks on phone: None     Gets together: None     Attends [employer] service: None     Active member of club or organization: None     Attends meetings of clubs or organizations: None     Relationship status: None    Intimate partner violence     Fear of current or ex partner: None     Emotionally abused: None     Physically abused: None     Forced sexual activity: None   Other Topics Concern    None   Social History Narrative    None           REVIEW OF SYSTEMS      Allergies   Allergen Reactions    Succinylcholine Chloride Other (See Comments)     PATIENT HAS PSEUDOCHOLINESTERASE DEFICIENCY      Cymbalta [Duloxetine Hcl]      Taste loss         Current Outpatient Medications on File Prior to Encounter   Medication Sig Dispense Refill    furosemide (LASIX) 40 MG tablet Take 1 tablet by mouth daily Dose increased 1/12/2021 90 tablet 3    lisinopril (PRINIVIL;ZESTRIL) 10 MG tablet Take 1 tablet by mouth daily 90 tablet 3    pregabalin (LYRICA) 200 MG capsule Take 1 capsule by mouth 3 times daily for 90 days. 270 capsule 0    naloxone 4 MG/0.1ML LIQD nasal spray 1 spray by Nasal route as needed for Opioid Reversal Patient needs counseling 1 each 3    amLODIPine (NORVASC) 10 MG tablet Take 1 tablet by mouth daily Dose increased 10/16/2020 due to high BP.  Correct dosage 90 tablet 3    SYMBICORT 160-4.5 MCG/ACT AERO Inhale 2 puffs into the lungs 2 times daily 10.2 inhaler, give 3 inhalers 3 Inhaler 3    albuterol sulfate kg)   SpO2 97%   BMI 37.97 kg/m²  Body mass index is 37.97 kg/m². GENERAL APPEARANCE:   Anson Adams is 76 y.o.,  male, moderately obese, nourished, conscious, alert. Does not appear to be distress or pain at this time. SKIN:  Warm, dry, no cyanosis or jaundice. HEAD:  Normocephalic, atraumatic, no swelling or tenderness. EYES:  Pupils equal, reactive to light. EARS:  No discharge, no marked hearing loss. NOSE:  No rhinorrhea, epistaxis or septal deformity. THROAT:  Not congested. No ulceration bleeding or discharge. NECK:  No stiffness, trachea central.                  CHEST:  Symmetrical and equal on expansion. HEART:  RRR S1 > S2. No audible murmurs or gallops. LUNGS:  Equal on expansion, normal breath sounds. No adventitious sounds. ABDOMEN:  Obese. Soft on palpation. No localized tenderness. No guarding or rigidity. No palpable hepatosplenomegaly. LYMPHATICS:  No palpable cervical lymphadenopathy. LOCOMOTOR, BACK AND SPINE:  No tenderness or deformities. EXTREMITIES:  Symmetrical, no pretibial edema. Ginos sign negative or no calf tenderness. No discoloration or ulcerations. NEUROLOGIC:  The patient is conscious, alert, oriented,Cranial nerve II-XII intact, taste and smell were not examined. No apparent focal sensory or motor deficits.                                                                                      PROVISIONAL DIAGNOSES / SURGERY:      CHOLELITHIASIS    CHOLECYSTECTOMY LAPAROSCOPIC ROBOTIC XI    Patient Active Problem List    Diagnosis Date Noted    Severe obesity (BMI 35.0-35.9 with comorbidity) ([facility] [address] 02/02/2020     Priority: High    Hyperparathyroidism ([facility] [address] 01/17/2020     Priority: High    REYNALDO on CPAP 05/06/2017     Priority: High    Status post lumbar laminectomy 03/25/2017     Priority: High    Tubular adenoma of colon 4/11/17 04/11/2017     Priority: Medium    Bilateral leg edema 01/18/2021    Left inguinal hernia     History of prostate cancer 03/26/2020    Melena 03/19/2020    Chronic hypoxemic respiratory failure ([address] 03/18/2020    Status post total shoulder arthroplasty, left 03/16/2020    Personal history of smoking 03/16/2020    Coronary artery disease involving native coronary artery of native heart without angina pectoris 02/02/2020    Degenerative disc disease, cervical 07/28/2019    At high risk for falls 07/28/2019    Difficulty rising from chair 04/09/2019    Poor balance 01/13/2019    Foot drop, right 01/13/2019    Family history of cancer 10/07/2018    Stenosis of left carotid artery 10/07/2018    Recurrent major depressive disorder, in full remission ([address] 09/04/2018    Seborrheic keratosis 09/04/2018    Solar degeneration 09/04/2018    S/P reverse total shoulder arthroplasty, left 07/18/2018    Abdominal aortic aneurysm (HCC) 04/30/2018    Psychophysiological insomnia 01/03/2018    Hypertriglyceridemia 05/06/2017    Type 2 diabetes mellitus with diabetic polyneuropathy, without long-term current use of insulin ([address] 03/25/2017    PVD (peripheral vascular disease) with claudication ([address] 03/25/2017    Peripheral neuropathic pain 03/25/2017    Tinea pedis of both feet 03/25/2017    Nonspecific reactive hepatitis 03/25/2017    COPD mixed type ([address] 03/25/2017    Serum cholinesterase defect ([address] 03/25/2017    Vitamin D deficiency 03/25/2017    Pseudocholinesterase deficiency 03/25/2017    DDD (degenerative disc disease), lumbar 11/03/2016    Chronic bilateral low back pain with bilateral sciatica 11/03/2016    Osteoarthritis of spine with radiculopathy, lumbar region 11/03/2016    Essential hypertension 01/20/2016    Gout 01/20/2016    Hyperlipidemia with target LDL less than 70 01/20/2016    Osteoporosis 01/20/2016    Gastroesophageal reflux disease without esophagitis 01/20/2016    Senile hyperkeratosis 01/07/2011    Actinic keratosis 01/06/2011    Photodermatitis due to sun 01/06/2011           MARCELINO Martinez, APRN - CNP on 2/2/2021 at 12:20 PM

## 2021-02-03 NOTE — TELEPHONE ENCOUNTER
Did cardio and Dr. Kelly Vidal clear him?     Lab Results   Component Value Date    LABA1C 5.5 10/13/2020    LABA1C 6.4 (H) 07/09/2020    LABA1C 5.6 03/09/2020         BP Readings from Last 3 Encounters:   02/02/21 132/68   11/23/20 130/68   11/18/20 (!) 184/87

## 2021-02-03 NOTE — TELEPHONE ENCOUNTER
Please asked the patient to call Dr. America Tse and Dr. Reyes Blalock and see if they can clear him without seeing him    I did clear him in September  \"Patient will be moderate risk for SHOULDER SURGERY. Patient is cleared for proposed surgery.   Please see the recommendations from cardiologist and pulmonologist.\"

## 2021-02-03 NOTE — TELEPHONE ENCOUNTER
Pt has not talked to cardio or Osbaldo Platt.  He did just have surgery 6 weeks ago for hernia repair

## 2021-02-03 NOTE — TELEPHONE ENCOUNTER
Pt had labs drawn yesterday for pre admission, he is wondering if he needs to be seen for clearance for Gallbladder surg

## 2021-02-08 ENCOUNTER — TELEPHONE (OUTPATIENT)
Dept: ONCOLOGY | Age: 66
End: 2021-02-08

## 2021-02-08 DIAGNOSIS — Z87.891 PERSONAL HISTORY OF NICOTINE DEPENDENCE: Primary | ICD-10-CM

## 2021-02-08 NOTE — TELEPHONE ENCOUNTER
Our records indicate that your patient is due for their annual lung cancer screening follow up testing. For your convenience, we have pended the order for the scan for you. If you do not agree with the need for the test, please cancel the order and let us know. Sincerely,    [address] [place] [address] Screening Program    Auto printed reminder letter sent to patient.

## 2021-02-12 ENCOUNTER — HOSPITAL ENCOUNTER (OUTPATIENT)
Dept: LAB | Age: 66
Setting detail: SPECIMEN
Discharge: HOME | End: 2021-02-12
Payer: MEDICARE

## 2021-02-12 DIAGNOSIS — Z01.818 PREOP TESTING: Primary | ICD-10-CM

## 2021-02-12 PROCEDURE — U0003 INFECTIOUS AGENT DETECTION BY NUCLEIC ACID (DNA OR RNA); SEVERE ACUTE RESPIRATORY SYNDROME CORONAVIRUS 2 (SARS-COV-2) (CORONAVIRUS DISEASE [COVID-19]), AMPLIFIED PROBE TECHNIQUE, MAKING USE OF HIGH THROUGHPUT TECHNOLOGIES AS DESCRIBED BY CMS-2020-01-R: HCPCS

## 2021-02-12 PROCEDURE — U0005 INFEC AGEN DETEC AMPLI PROBE: HCPCS

## 2021-02-13 LAB
SARS-COV-2, RAPID: NORMAL
SARS-COV-2: NORMAL
SARS-COV-2: NOT DETECTED
SOURCE: NORMAL

## 2021-02-15 ENCOUNTER — ANESTHESIA EVENT (OUTPATIENT)
Dept: OPERATING ROOM | Age: 66
End: 2021-02-15
Payer: MEDICARE

## 2021-02-16 ENCOUNTER — APPOINTMENT (OUTPATIENT)
Dept: NUCLEAR MEDICINE | Age: 66
End: 2021-02-16
Attending: SURGERY
Payer: MEDICARE

## 2021-02-16 ENCOUNTER — HOSPITAL ENCOUNTER (OUTPATIENT)
Age: 66
Setting detail: OUTPATIENT SURGERY
Discharge: HOME | End: 2021-02-16
Attending: SURGERY | Admitting: SURGERY
Payer: MEDICARE

## 2021-02-16 ENCOUNTER — ANESTHESIA (OUTPATIENT)
Dept: OPERATING ROOM | Age: 66
End: 2021-02-16
Payer: MEDICARE

## 2021-02-16 VITALS
BODY MASS INDEX: 37.93 KG/M2 | WEIGHT: 280 LBS | HEART RATE: 72 BPM | RESPIRATION RATE: 16 BRPM | OXYGEN SATURATION: 94 % | HEIGHT: 72 IN | TEMPERATURE: 96.9 F | SYSTOLIC BLOOD PRESSURE: 147 MMHG | DIASTOLIC BLOOD PRESSURE: 78 MMHG

## 2021-02-16 VITALS — DIASTOLIC BLOOD PRESSURE: 68 MMHG | SYSTOLIC BLOOD PRESSURE: 146 MMHG | OXYGEN SATURATION: 91 % | TEMPERATURE: 96.8 F

## 2021-02-16 DIAGNOSIS — K81.1 CHRONIC CHOLECYSTITIS: Primary | ICD-10-CM

## 2021-02-16 LAB
GLUCOSE BLD-MCNC: 145 MG/DL (ref 75–110)
GLUCOSE BLD-MCNC: 95 MG/DL (ref 75–110)

## 2021-02-16 PROCEDURE — 3430000000 HC RX DIAGNOSTIC RADIOPHARMACEUTICAL: Performed by: SURGERY

## 2021-02-16 PROCEDURE — 3600000009 HC SURGERY ROBOT BASE: Performed by: SURGERY

## 2021-02-16 PROCEDURE — 2700000000 HC OXYGEN THERAPY PER DAY

## 2021-02-16 PROCEDURE — 2709999900 HC NON-CHARGEABLE SUPPLY: Performed by: SURGERY

## 2021-02-16 PROCEDURE — 78226 HEPATOBILIARY SYSTEM IMAGING: CPT

## 2021-02-16 PROCEDURE — 2580000003 HC RX 258: Performed by: SURGERY

## 2021-02-16 PROCEDURE — A9537 TC99M MEBROFENIN: HCPCS | Performed by: SURGERY

## 2021-02-16 PROCEDURE — 6370000000 HC RX 637 (ALT 250 FOR IP): Performed by: ANESTHESIOLOGY

## 2021-02-16 PROCEDURE — 82947 ASSAY GLUCOSE BLOOD QUANT: CPT

## 2021-02-16 PROCEDURE — 7100000031 HC ASPR PHASE II RECOVERY - ADDTL 15 MIN: Performed by: SURGERY

## 2021-02-16 PROCEDURE — 7100000010 HC PHASE II RECOVERY - FIRST 15 MIN: Performed by: SURGERY

## 2021-02-16 PROCEDURE — 6360000002 HC RX W HCPCS: Performed by: SURGERY

## 2021-02-16 PROCEDURE — 3700000000 HC ANESTHESIA ATTENDED CARE: Performed by: SURGERY

## 2021-02-16 PROCEDURE — 6360000002 HC RX W HCPCS

## 2021-02-16 PROCEDURE — 88304 TISSUE EXAM BY PATHOLOGIST: CPT

## 2021-02-16 PROCEDURE — 2500000003 HC RX 250 WO HCPCS

## 2021-02-16 PROCEDURE — 7100000001 HC PACU RECOVERY - ADDTL 15 MIN: Performed by: SURGERY

## 2021-02-16 PROCEDURE — 3700000001 HC ADD 15 MINUTES (ANESTHESIA): Performed by: SURGERY

## 2021-02-16 PROCEDURE — 3600000019 HC SURGERY ROBOT ADDTL 15MIN: Performed by: SURGERY

## 2021-02-16 PROCEDURE — S2900 ROBOTIC SURGICAL SYSTEM: HCPCS | Performed by: SURGERY

## 2021-02-16 PROCEDURE — 7100000011 HC PHASE II RECOVERY - ADDTL 15 MIN: Performed by: SURGERY

## 2021-02-16 PROCEDURE — 7100000000 HC PACU RECOVERY - FIRST 15 MIN: Performed by: SURGERY

## 2021-02-16 PROCEDURE — 2500000003 HC RX 250 WO HCPCS: Performed by: SURGERY

## 2021-02-16 PROCEDURE — 2580000003 HC RX 258: Performed by: ANESTHESIOLOGY

## 2021-02-16 PROCEDURE — 6360000002 HC RX W HCPCS: Performed by: ANESTHESIOLOGY

## 2021-02-16 PROCEDURE — 94640 AIRWAY INHALATION TREATMENT: CPT

## 2021-02-16 PROCEDURE — 7100000030 HC ASPR PHASE II RECOVERY - FIRST 15 MIN: Performed by: SURGERY

## 2021-02-16 RX ORDER — SODIUM CHLORIDE 9 MG/ML
INJECTION, SOLUTION INTRAVENOUS CONTINUOUS
Status: DISCONTINUED | OUTPATIENT
Start: 2021-02-16 | End: 2021-02-16 | Stop reason: HOSPADM

## 2021-02-16 RX ORDER — SODIUM CHLORIDE 0.9 % (FLUSH) 0.9 %
10 SYRINGE (ML) INJECTION EVERY 12 HOURS SCHEDULED
Status: DISCONTINUED | OUTPATIENT
Start: 2021-02-16 | End: 2021-02-16 | Stop reason: HOSPADM

## 2021-02-16 RX ORDER — DEXAMETHASONE SODIUM PHOSPHATE 4 MG/ML
INJECTION, SOLUTION INTRA-ARTICULAR; INTRALESIONAL; INTRAMUSCULAR; INTRAVENOUS; SOFT TISSUE PRN
Status: DISCONTINUED | OUTPATIENT
Start: 2021-02-16 | End: 2021-02-16 | Stop reason: SDUPTHER

## 2021-02-16 RX ORDER — GLYCOPYRROLATE 1 MG/5 ML
SYRINGE (ML) INTRAVENOUS PRN
Status: DISCONTINUED | OUTPATIENT
Start: 2021-02-16 | End: 2021-02-16 | Stop reason: SDUPTHER

## 2021-02-16 RX ORDER — ONDANSETRON 2 MG/ML
4 INJECTION INTRAMUSCULAR; INTRAVENOUS
Status: DISCONTINUED | OUTPATIENT
Start: 2021-02-16 | End: 2021-02-16 | Stop reason: HOSPADM

## 2021-02-16 RX ORDER — EPHEDRINE SULFATE/0.9% NACL/PF 50 MG/5 ML
SYRINGE (ML) INTRAVENOUS PRN
Status: DISCONTINUED | OUTPATIENT
Start: 2021-02-16 | End: 2021-02-16 | Stop reason: SDUPTHER

## 2021-02-16 RX ORDER — SODIUM CHLORIDE 0.9 % (FLUSH) 0.9 %
10 SYRINGE (ML) INJECTION PRN
Status: DISCONTINUED | OUTPATIENT
Start: 2021-02-16 | End: 2021-02-16 | Stop reason: HOSPADM

## 2021-02-16 RX ORDER — MEPERIDINE HYDROCHLORIDE 25 MG/ML
12.5 INJECTION INTRAMUSCULAR; INTRAVENOUS; SUBCUTANEOUS EVERY 5 MIN PRN
Status: DISCONTINUED | OUTPATIENT
Start: 2021-02-16 | End: 2021-02-16 | Stop reason: HOSPADM

## 2021-02-16 RX ORDER — LABETALOL HYDROCHLORIDE 5 MG/ML
5 INJECTION, SOLUTION INTRAVENOUS EVERY 10 MIN PRN
Status: DISCONTINUED | OUTPATIENT
Start: 2021-02-16 | End: 2021-02-16 | Stop reason: HOSPADM

## 2021-02-16 RX ORDER — LIDOCAINE HYDROCHLORIDE 10 MG/ML
INJECTION, SOLUTION EPIDURAL; INFILTRATION; INTRACAUDAL; PERINEURAL PRN
Status: DISCONTINUED | OUTPATIENT
Start: 2021-02-16 | End: 2021-02-16 | Stop reason: SDUPTHER

## 2021-02-16 RX ORDER — CEPHALEXIN 500 MG/1
CAPSULE ORAL
Qty: 21 CAPSULE | Refills: 0 | Status: SHIPPED | OUTPATIENT
Start: 2021-02-16 | End: 2021-04-23 | Stop reason: ALTCHOICE

## 2021-02-16 RX ORDER — LIDOCAINE HYDROCHLORIDE 10 MG/ML
1 INJECTION, SOLUTION EPIDURAL; INFILTRATION; INTRACAUDAL; PERINEURAL
Status: DISCONTINUED | OUTPATIENT
Start: 2021-02-16 | End: 2021-02-16 | Stop reason: HOSPADM

## 2021-02-16 RX ORDER — PROPOFOL 10 MG/ML
INJECTION, EMULSION INTRAVENOUS PRN
Status: DISCONTINUED | OUTPATIENT
Start: 2021-02-16 | End: 2021-02-16 | Stop reason: SDUPTHER

## 2021-02-16 RX ORDER — ONDANSETRON 4 MG/1
TABLET, FILM COATED ORAL
Qty: 20 TABLET | Refills: 0 | Status: ON HOLD | OUTPATIENT
Start: 2021-02-16 | End: 2021-07-08

## 2021-02-16 RX ORDER — MORPHINE SULFATE 2 MG/ML
2 INJECTION, SOLUTION INTRAMUSCULAR; INTRAVENOUS EVERY 5 MIN PRN
Status: DISCONTINUED | OUTPATIENT
Start: 2021-02-16 | End: 2021-02-16 | Stop reason: HOSPADM

## 2021-02-16 RX ORDER — MIDAZOLAM HYDROCHLORIDE 1 MG/ML
INJECTION INTRAMUSCULAR; INTRAVENOUS PRN
Status: DISCONTINUED | OUTPATIENT
Start: 2021-02-16 | End: 2021-02-16 | Stop reason: SDUPTHER

## 2021-02-16 RX ORDER — BUPIVACAINE HYDROCHLORIDE 5 MG/ML
INJECTION, SOLUTION EPIDURAL; INTRACAUDAL PRN
Status: DISCONTINUED | OUTPATIENT
Start: 2021-02-16 | End: 2021-02-16 | Stop reason: ALTCHOICE

## 2021-02-16 RX ORDER — OXYCODONE HYDROCHLORIDE AND ACETAMINOPHEN 5; 325 MG/1; MG/1
1 TABLET ORAL EVERY 6 HOURS PRN
Qty: 28 TABLET | Refills: 0 | Status: SHIPPED | OUTPATIENT
Start: 2021-02-16 | End: 2021-02-23

## 2021-02-16 RX ORDER — ONDANSETRON 2 MG/ML
INJECTION INTRAMUSCULAR; INTRAVENOUS PRN
Status: DISCONTINUED | OUTPATIENT
Start: 2021-02-16 | End: 2021-02-16 | Stop reason: SDUPTHER

## 2021-02-16 RX ORDER — DIPHENHYDRAMINE HYDROCHLORIDE 50 MG/ML
12.5 INJECTION INTRAMUSCULAR; INTRAVENOUS
Status: DISCONTINUED | OUTPATIENT
Start: 2021-02-16 | End: 2021-02-16 | Stop reason: HOSPADM

## 2021-02-16 RX ORDER — IPRATROPIUM BROMIDE AND ALBUTEROL SULFATE 2.5; .5 MG/3ML; MG/3ML
1 SOLUTION RESPIRATORY (INHALATION)
Status: DISCONTINUED | OUTPATIENT
Start: 2021-02-16 | End: 2021-02-16 | Stop reason: HOSPADM

## 2021-02-16 RX ORDER — ROCURONIUM BROMIDE 10 MG/ML
INJECTION, SOLUTION INTRAVENOUS PRN
Status: DISCONTINUED | OUTPATIENT
Start: 2021-02-16 | End: 2021-02-16 | Stop reason: SDUPTHER

## 2021-02-16 RX ORDER — FENTANYL CITRATE 50 UG/ML
INJECTION, SOLUTION INTRAMUSCULAR; INTRAVENOUS PRN
Status: DISCONTINUED | OUTPATIENT
Start: 2021-02-16 | End: 2021-02-16 | Stop reason: SDUPTHER

## 2021-02-16 RX ORDER — HYDROMORPHONE HCL 110MG/55ML
PATIENT CONTROLLED ANALGESIA SYRINGE INTRAVENOUS PRN
Status: DISCONTINUED | OUTPATIENT
Start: 2021-02-16 | End: 2021-02-16 | Stop reason: SDUPTHER

## 2021-02-16 RX ADMIN — MIDAZOLAM 2 MG: 1 INJECTION INTRAMUSCULAR; INTRAVENOUS at 09:17

## 2021-02-16 RX ADMIN — FENTANYL CITRATE 100 MCG: 50 INJECTION, SOLUTION INTRAMUSCULAR; INTRAVENOUS at 09:23

## 2021-02-16 RX ADMIN — SODIUM CHLORIDE: 9 INJECTION, SOLUTION INTRAVENOUS at 11:10

## 2021-02-16 RX ADMIN — ROCURONIUM BROMIDE 50 MG: 10 INJECTION, SOLUTION INTRAVENOUS at 09:23

## 2021-02-16 RX ADMIN — SUGAMMADEX 300 MG: 100 INJECTION, SOLUTION INTRAVENOUS at 10:54

## 2021-02-16 RX ADMIN — HYDROMORPHONE HYDROCHLORIDE 0.2 MG: 2 INJECTION, SOLUTION INTRAMUSCULAR; INTRAVENOUS; SUBCUTANEOUS at 11:02

## 2021-02-16 RX ADMIN — PROPOFOL 200 MG: 10 INJECTION, EMULSION INTRAVENOUS at 09:23

## 2021-02-16 RX ADMIN — IPRATROPIUM BROMIDE AND ALBUTEROL SULFATE 1 AMPULE: .5; 3 SOLUTION RESPIRATORY (INHALATION) at 11:46

## 2021-02-16 RX ADMIN — ONDANSETRON 4 MG: 2 INJECTION INTRAMUSCULAR; INTRAVENOUS at 10:45

## 2021-02-16 RX ADMIN — SODIUM CHLORIDE: 9 INJECTION, SOLUTION INTRAVENOUS at 09:16

## 2021-02-16 RX ADMIN — Medication 3 G: at 09:31

## 2021-02-16 RX ADMIN — Medication 10 ML: at 12:03

## 2021-02-16 RX ADMIN — LIDOCAINE HYDROCHLORIDE 50 MG: 10 INJECTION, SOLUTION EPIDURAL; INFILTRATION; INTRACAUDAL; PERINEURAL at 09:23

## 2021-02-16 RX ADMIN — Medication 0.2 MG: at 10:06

## 2021-02-16 RX ADMIN — Medication 10 MG: at 10:13

## 2021-02-16 RX ADMIN — ROCURONIUM BROMIDE 30 MG: 10 INJECTION, SOLUTION INTRAVENOUS at 09:39

## 2021-02-16 RX ADMIN — DEXAMETHASONE SODIUM PHOSPHATE 4 MG: 4 INJECTION, SOLUTION INTRAMUSCULAR; INTRAVENOUS at 09:38

## 2021-02-16 RX ADMIN — Medication 5 MILLICURIE: at 12:03

## 2021-02-16 RX ADMIN — HYDROMORPHONE HYDROCHLORIDE 0.4 MG: 2 INJECTION, SOLUTION INTRAMUSCULAR; INTRAVENOUS; SUBCUTANEOUS at 10:55

## 2021-02-16 RX ADMIN — HYDROMORPHONE HYDROCHLORIDE 0.5 MG: 1 INJECTION, SOLUTION INTRAMUSCULAR; INTRAVENOUS; SUBCUTANEOUS at 11:27

## 2021-02-16 RX ADMIN — HYDROMORPHONE HYDROCHLORIDE 0.4 MG: 2 INJECTION, SOLUTION INTRAMUSCULAR; INTRAVENOUS; SUBCUTANEOUS at 10:59

## 2021-02-16 RX ADMIN — ROCURONIUM BROMIDE 20 MG: 10 INJECTION, SOLUTION INTRAVENOUS at 10:05

## 2021-02-16 ASSESSMENT — PULMONARY FUNCTION TESTS
PIF_VALUE: 25
PIF_VALUE: 20
PIF_VALUE: 26
PIF_VALUE: 19
PIF_VALUE: 25
PIF_VALUE: 19
PIF_VALUE: 25
PIF_VALUE: 20
PIF_VALUE: 19
PIF_VALUE: 24
PIF_VALUE: 22
PIF_VALUE: 25
PIF_VALUE: 25
PIF_VALUE: 27
PIF_VALUE: 20
PIF_VALUE: 0
PIF_VALUE: 0
PIF_VALUE: 27
PIF_VALUE: 27
PIF_VALUE: 21
PIF_VALUE: 25
PIF_VALUE: 20
PIF_VALUE: 6
PIF_VALUE: 19
PIF_VALUE: 22
PIF_VALUE: 25
PIF_VALUE: 19
PIF_VALUE: 24
PIF_VALUE: 1
PIF_VALUE: 24
PIF_VALUE: 20
PIF_VALUE: 22
PIF_VALUE: 18
PIF_VALUE: 20
PIF_VALUE: 25
PIF_VALUE: 24
PIF_VALUE: 22
PIF_VALUE: 24
PIF_VALUE: 20
PIF_VALUE: 27
PIF_VALUE: 14
PIF_VALUE: 24
PIF_VALUE: 26
PIF_VALUE: 25
PIF_VALUE: 1
PIF_VALUE: 20
PIF_VALUE: 20
PIF_VALUE: 17
PIF_VALUE: 24
PIF_VALUE: 1
PIF_VALUE: 26
PIF_VALUE: 20
PIF_VALUE: 25
PIF_VALUE: 2

## 2021-02-16 ASSESSMENT — PAIN DESCRIPTION - ORIENTATION: ORIENTATION: MID

## 2021-02-16 ASSESSMENT — ENCOUNTER SYMPTOMS
STRIDOR: 0
SHORTNESS OF BREATH: 1
DYSPNEA ACTIVITY LEVEL: NO INTERVAL CHANGE

## 2021-02-16 ASSESSMENT — PAIN SCALES - GENERAL: PAINLEVEL_OUTOF10: 3

## 2021-02-16 ASSESSMENT — PAIN DESCRIPTION - PROGRESSION: CLINICAL_PROGRESSION: GRADUALLY WORSENING

## 2021-02-16 ASSESSMENT — PAIN DESCRIPTION - ONSET: ONSET: AWAKENED FROM SLEEP

## 2021-02-16 ASSESSMENT — PAIN DESCRIPTION - PAIN TYPE: TYPE: SURGICAL PAIN

## 2021-02-16 ASSESSMENT — LIFESTYLE VARIABLES: SMOKING_STATUS: 0

## 2021-02-16 ASSESSMENT — COPD QUESTIONNAIRES: CAT_SEVERITY: NO INTERVAL CHANGE

## 2021-02-16 ASSESSMENT — PAIN DESCRIPTION - DESCRIPTORS: DESCRIPTORS: ACHING

## 2021-02-16 NOTE — INTERVAL H&P NOTE
Update History & Physical    The patient's History and Physical of February 2, 2021 was reviewed with the patient and I examined the patient. There was no change. I concur with findings. Here today for robotic laparoscopic cholecystectomy. NPO. Took amlodipine, omeprazole and lisinopril this am. Stopped baby aspirin 10 days ago. Chronic SOB with exertion. Denies chest pain/pressure, palpitations, recent URI, fever or chills. Review vitals per RN flowsheet.          Electronically signed by JAVI Flores CNP on 2/16/2021 at 8:07 AM

## 2021-02-16 NOTE — ANESTHESIA POSTPROCEDURE EVALUATION
Department of Anesthesiology  Postprocedure Note    Patient: Karey Victor  MRN: [id number]  YOB: 1955  Date of evaluation: 2/16/2021  Time:  2:01 PM     Procedure Summary     Date: 02/16/21 Room / Location: [address] [place] [address] [place] [facility]    Anesthesia Start: Robbie Callander Anesthesia Stop: 1107    Procedure: CHOLECYSTECTOMY LAPAROSCOPIC ROBOTIC XI (N/A Abdomen) Diagnosis: (CHOLECYSTITIS)    Surgeons: Allie Schreiber MD Responsible Provider: Lyly Jaime MD    Anesthesia Type: general ASA Status: 3          Anesthesia Type: general    Benigno Phase I: Benigno Score: 7    Benigno Phase II:      Last vitals: Reviewed and per EMR flowsheets.        Anesthesia Post Evaluation    Patient location during evaluation: bedside  Patient participation: complete - patient participated  Level of consciousness: awake and alert  Airway patency: patent  Nausea & Vomiting: no nausea and no vomiting  Complications: no  Cardiovascular status: hemodynamically stable  Respiratory status: acceptable  Hydration status: stable

## 2021-02-16 NOTE — PROGRESS NOTES
Patient meets discharge criteria from PACU. Patient sent over to nuclear med in radiology department for hyda scan ordered by Dr Elizabeth Valenzuela by cart. Patients wife updated by PACU RN with plan of care.

## 2021-02-16 NOTE — OP NOTE
Operative Note      Patient: Kilo Blanchard  YOB: 1955  MRN: [id number]    Date of Procedure: 2/16/2021    Pre-Op Diagnosis: CHRONIC CHOLECYSTITIS    Post-Op Diagnosis: Same       Procedure(s):  CHOLECYSTECTOMY LAPAROSCOPIC ROBOTIC XI    Surgeon(s):  Yadira Wilson MD    Assistant:   First Assistant: NATACHA Carrillo    Anesthesia: General    Estimated Blood Loss (mL): less than 50     Complications: None    Specimens:   ID Type Source Tests Collected by Time Destination   A : GALLBLADDER Tissue Gallbladder SURGICAL PATHOLOGY Yadira Wilson MD 2/16/2021 5755        Implants:  * No implants in log *      Drains:   Closed/Suction Drain Right RUQ Bulb 10 English (Active)       Findings: Dictated    Detailed Description of Procedure:   Informed consent was obtained. Preoperative antibiotics were given. Patient was taken to the operating room. General anesthesia was given. Abdomen was prepped and draped in usual sterile fashion. Timeout was done. Supraumbilical incision was made. [place] Sussy port was introduced using the open technique without any difficulty. CO2 insufflation was carried out. Scope was introduced. Patient was placed in the reverse Trendelenburg position. 3 additional ports were placed in usual fashion. Robot was brought in. All the ports were docked in usual fashion. Camera and the ancillary instruments were advanced was a target anatomy. Assistant grabbed the fundus of the gallbladder and that was retracted anterosuperiorly. Careful dissection was continued in the triangle of calot. Cystic duct was isolated was skeletonized. Cystic artery was isolated was skeletonized. Critical view was obtained. Anatomy was confirmed. After confirming the anatomy cystic duct was clipped and divided. Cystic artery was clipped and divided. Gallbladder was peeled off the liver bed using the hook cautery.   Dissection and overall surgery was challenging patient is morbidly obese with a high BMI. Patient has abdominal lipomatosis. After the gallbladder was peeled off the gallbladder fossa was irrigated until the returning fluid was clear. Hermann-Domínguez drain was left in the gallbladder fossa was brought out and secured. Satisfactory drain placement was confirmed. Hemostasis was confirmed. Sponge needle instrument count was found to be correct. Robot was undocked. Gallbladder was retrieved and sent to pathology for further evaluation. All the port sites are visualized. No bleeding noted. All the ports were removed. Fascia the skin was approximated in the usual fashion. Local anesthetic was infiltrated. Dermabond was applied. Steri-Strips were applied. Sterile dressing was applied. Patient tolerated procedure well and was transferred to the recovery room in a stable condition. Recommendations: Operative findings are discussed with the patient's family at length. Postoperative care recovery restrictions were all discussed. Prescriptions called in. Discharge instructions in the chart.     Electronically signed by Rashawn Mendoza MD on 2/16/2021 at 10:57 AM

## 2021-02-16 NOTE — PROGRESS NOTES
Patient returns to pacu from radiology. Patient drowsy and awakens easily to stimuli. Patient remains on o2 per NC at 2L/min with O2 sat at 90 to 92%. [facility]from Dr Allison Jovel to discharge patient from PACU.

## 2021-02-16 NOTE — ANESTHESIA PRE PROCEDURE
Department of Anesthesiology  Preprocedure Note       Name:  Ngoc Gardner   Age:  76 y.o.  :  1955                                          MRN:  [id number]         Date:  2021      Surgeon: Faustino Jennings):  Arline Mo MD    Procedure: Procedure(s):  CHOLECYSTECTOMY LAPAROSCOPIC ROBOTIC XI    Medications prior to admission:   Prior to Admission medications    Medication Sig Start Date End Date Taking? Authorizing Provider   furosemide (LASIX) 40 MG tablet Take 1 tablet by mouth daily Dose increased 2021   Gracie Whitney MD   lisinopril (PRINIVIL;ZESTRIL) 10 MG tablet Take 1 tablet by mouth daily 21   Gracie Whitney MD   pregabalin (LYRICA) 200 MG capsule Take 1 capsule by mouth 3 times daily for 90 days. 21  Gracie Whitney MD   naloxone 4 MG/0.1ML LIQD nasal spray 1 spray by Nasal route as needed for Opioid Reversal Patient needs counseling 10/29/20   Gracie Whitney MD   amLODIPine (NORVASC) 10 MG tablet Take 1 tablet by mouth daily Dose increased 10/16/2020 due to high BP. Correct dosage 10/16/20   Gracie Whitney MD   OXYGEN With CPAP at night 20   Historical Provider, MD   SYMBICORT 160-4.5 MCG/ACT AERO Inhale 2 puffs into the lungs 2 times daily 10.2 inhaler, give 3 inhalers 10/13/20   Gracie Whitney MD   albuterol sulfate HFA (PROVENTIL HFA) 108 (90 Base) MCG/ACT inhaler Inhale 2 puffs into the lungs every 6 hours as needed for Wheezing or Shortness of Breath 10/13/20   Gracie Whitney MD   pravastatin (PRAVACHOL) 80 MG tablet Take 1 tablet by mouth every evening Dose increased  2019   Gracie Whitney MD   omeprazole (PRILOSEC) 40 MG delayed release capsule Take 1 capsule by mouth every morning (before breakfast) 20   Gracie Whitney MD   Respiratory Therapy Supplies (ADULT MASK) MISC Needs to use mask daily due to coronavirus pandemic.  20   Gracie Whitney MD   ipratropium-albuterol (Benny Durand) 0.5-2.5 (3) MG/3ML SOLN nebulizer solution Inhale 1 vial into the lungs 3 times daily    Historical Provider, MD   blood glucose monitor strips Use to test twice daily and as needed as directed by provider    Historical Provider, MD   Lift Chair [facility] by Does not apply route Patient has difficulty getting up from usual chair 4/9/19   Mauri Gardner MD   folbee plus (FOLBEE PLUS) TABS Take 1 tablet by mouth daily 1/21/19   Mauri Gardner MD   aspirin EC 81 MG EC tablet Take 1 tablet by mouth daily 9/4/18   Mauri Gardner MD   Glucose Blood (BLOOD GLUCOSE TEST STRIPS) STRP tesing once a day fasting 3/1/18   Mauri Gardner MD       Current medications:    Current Facility-Administered Medications   Medication Dose Route Frequency Provider Last Rate Last Admin    sodium chloride flush 0.9 % injection 10 mL  10 mL Intravenous 2 times per day Nay Tsai MD        sodium chloride flush 0.9 % injection 10 mL  10 mL Intravenous PRN Nay Tsai MD        lidocaine PF 1 % injection 1 mL  1 mL Intradermal Once PRN Nay Tsai MD        0.9 % sodium chloride infusion   Intravenous Continuous Nay Tsai MD           Allergies:     Allergies   Allergen Reactions    Succinylcholine Chloride Other (See Comments)     PATIENT HAS PSEUDOCHOLINESTERASE DEFICIENCY      Cymbalta [Duloxetine Hcl]      Taste loss         Problem List:    Patient Active Problem List   Diagnosis Code    Essential hypertension I10    Gout M10.9    Hyperlipidemia with target LDL less than 70 E78.5    Osteoporosis M81.0    Gastroesophageal reflux disease without esophagitis K21.9    DDD (degenerative disc disease), lumbar M51.36    Chronic bilateral low back pain with bilateral sciatica M54.42, M54.41, G89.29    Osteoarthritis of spine with radiculopathy, lumbar region M47.26    Type 2 diabetes mellitus with diabetic polyneuropathy, without long-term current use of insulin (HCC) E11.42    PVD (peripheral vascular disease) with claudication ([facility]) I73.9    Peripheral neuropathic pain M79.2    Tinea pedis of both feet B35.3    Nonspecific reactive hepatitis K75.2    COPD mixed type ([facility]) J44.9    Status post lumbar laminectomy Z98.890    Serum cholinesterase defect (HCC) E88.89    Vitamin D deficiency E55.9    Hypertriglyceridemia E78.1    REYNALDO on CPAP G47.33, Z99.89    Tubular adenoma of colon 4/11/17 D12.6    Psychophysiological insomnia F51.04    S/P reverse total shoulder arthroplasty, left Z96.612    Recurrent major depressive disorder, in full remission ([facility] F33.42    Actinic keratosis L57.0    Photodermatitis due to sun L56.8    Seborrheic keratosis L82.1    Senile hyperkeratosis L57.0    Solar degeneration L57.8    Abdominal aortic aneurysm (HCC) I71.4    Family history of cancer Z80.9    Stenosis of left carotid artery I65.22    Poor balance R26.89    Foot drop, right M21.371    Difficulty rising from chair R68.89    Degenerative disc disease, cervical M50.30    At high risk for falls Z91.81    Pseudocholinesterase deficiency E88.09    Hyperparathyroidism (HCC) E21.3    Coronary artery disease involving native coronary artery of native heart without angina pectoris I25.10    Severe obesity (BMI 35.0-35.9 with comorbidity) ([facility]) E66.01, Z68.35    Status post total shoulder arthroplasty, left O73.373    Personal history of smoking Z87.891    Chronic hypoxemic respiratory failure ([facility]) J96.11    Melena K92.1    History of prostate cancer Z85.46    Left inguinal hernia K40.90    Bilateral leg edema R60.0       Past Medical History:        Diagnosis Date    Acid reflux     Anemia, blood loss 10/7/2018    Arthritis     C. difficile colitis 3/19/2020    Chronic bilateral low back pain with bilateral sciatica 11/3/2016    Complete tear of left rotator cuff 7/18/2018    COPD (chronic obstructive pulmonary disease) ([facility]     Coronary artery disease involving native coronary artery of native heart without angina pectoris 2/2/2020    Degenerative disc disease, cervical 7/28/2019    GI bleed 3/19/2020    Gout     H/O cardiac catheterization     yrs ago   no stents    History of blood transfusion     Hyperlipidemia     Hyperlipidemia with target LDL less than 100 1/20/2016    Hyperparathyroidism ([address] 1/17/2020    Hypertension     Knee pain, chronic     left    Liver disease     MDRO (multiple drug resistant organisms) resistance     Memory loss     Moderate episode of recurrent major depressive disorder ([address] 1/20/2016    Obesity, Class III, BMI 40-49.9 (morbid obesity) ([address] 1/20/2016    REYNALDO on CPAP 5/6/2017    Osteoarthritis     Peripheral arterial occlusive disease ([address] 3/25/2017    Pneumonia 3/9/2020    Polypharmacy 3/25/2017    Positive FIT (fecal immunochemical test) 4/11/2017    Prolonged emergence from general anesthesia 01/05/2017    Patient \"on life support for 3 days\" after back surgery due to being given succinylcholine    Prostate CA ([address] 1/20/2016    Prostate cancer ([address] 10/2013    finished radiation tx 5/2014    Pseudocholinesterase deficiency 03/25/2017    Pt.  \"on life support\" for 3 days after back surgery 1/5/17 after being given succinylcholine    Severe obesity (BMI 35.0-35.9 with comorbidity) ([address] 2/2/2020    Short of breath on exertion     Sleep apnea     uses CPAP machine nightly    Status post lumbar laminectomy 3/25/2017    Stenosis of left carotid artery 10/7/2018    Syncope and collapse 3/19/2020    Tubular adenoma of colon 4/11/17 5/13/2017    Type 2 diabetes mellitus with hyperglycemia, without long-term current use of insulin ([address] 3/25/2017    Lab Results Component Value Date  LABA1C 7.1 (H) 03/23/2017     Unintended weight loss 10/7/2018    Vitamin D deficiency 3/25/2017    Wears glasses        Past Surgical History:        Procedure Laterality Date    BACK SURGERY      x 2     BACK SURGERY  01/05/2017    lumbar laminectomy L2, L3, L4    BRONCHOSCOPY N/A 3/13/2020    BRONCHOSCOPY performed by Fatimah Cruz MD at [address] [address]    no stents    CAROTID ENDARTERECTOMY Right 12/16/2019    Dr. Rachel Pugh Bilateral     COLONOSCOPY      ENDOSCOPY, COLON, DIAGNOSTIC      HERNIA REPAIR Left 11/18/2020    HERNIA INGUINAL REPAIR W/MESH OPEN performed by Eva Barton MD at [address] [facility] Left     LUMBAR LAMINECTOMY  01/05/2017    L2-L4    AL CLOSED RX Saeed Pandya Left 8/1/2018    SHOULDER CLOSED REDUCTION WITH C-ARM VISUALIZATION performed by Keon Pleitez MD at [address] [facility] W/BIOPSY SINGLE/MULTIPLE N/A 5/9/2017    COLONOSCOPY WITH BIOPSY performed by Markie Amanda DO at [address] IMPLANT Left 7/18/2018    SHOULDER TOTAL ARTHROPLASTY REVERSE LEFT DJO & BICEP TENDON TRANSFER performed by Keon Pleitez MD at [address] HUMERAL/GLENOID COMPNT Left 8/3/2018    SHOULDER TOTAL REVERSE  ARTHROPLASTY REVISION performed by Keon Pleitez MD at [address]     rotator cuff repair    SHOULDER SURGERY Left 10/15/2020    SHOULDER ARTHROSCOPY W/INTEROP CULTURES performed by eKon Pleitez MD at [address] [facility]      ear, forehead    TONSILLECTOMY AND ADENOIDECTOMY      UPPER GASTROINTESTINAL ENDOSCOPY N/A 3/19/2020    EGD BIOPSY performed by Erik Durán MD at [facility] History:    Social History     Tobacco Use    Smoking status: Current Every Day Smoker     Packs/day: 1.50     Years: 35.00     Pack years: 52.50     Types: Cigarettes    Smokeless tobacco: Never Used    Tobacco comment: hx of 1-1.5 PPD, currently 0.5 ppd   Substance Use Topics    Alcohol use: Not Currently     Alcohol/week: 0.0 standard drinks                                Ready to quit: Not Answered  Counseling given: Not Answered  Comment: hx of 1-1.5 PPD, currently 0.5 ppd      Vital Signs (Current): There were no vitals filed for this visit. BP Readings from Last 3 Encounters:   02/02/21 132/68   11/23/20 130/68   11/18/20 (!) 184/87       NPO Status:                                                                                 BMI:   Wt Readings from Last 3 Encounters:   02/02/21 280 lb (127 kg)   11/25/20 280 lb (127 kg)   11/18/20 280 lb (127 kg)     There is no height or weight on file to calculate BMI.    CBC:   Lab Results   Component Value Date    WBC 9.1 02/02/2021    RBC 4.93 02/02/2021    RBC 5.38 09/30/2011    HGB 14.5 02/02/2021    HCT 43.1 02/02/2021    MCV 87.6 02/02/2021    RDW 14.9 02/02/2021     02/02/2021     05/25/2012       CMP:   Lab Results   Component Value Date     02/02/2021    K 4.0 02/02/2021     02/02/2021    CO2 27 02/02/2021    BUN 11 02/02/2021    CREATININE 0.67 02/02/2021    GFRAA >60 02/02/2021    LABGLOM >60 02/02/2021    GLUCOSE 125 02/02/2021    PROT 7.5 02/02/2021    CALCIUM 9.9 02/02/2021    BILITOT 0.21 02/02/2021    ALKPHOS 108 02/02/2021    AST 34 02/02/2021    ALT 48 02/02/2021       POC Tests: No results for input(s): POCGLU, POCNA, POCK, POCCL, POCBUN, POCHEMO, POCHCT in the last 72 hours.     Coags:   Lab Results   Component Value Date    PROTIME 13.1 03/19/2020    INR 1.0 03/19/2020    APTT 20.4 03/19/2020       HCG (If Applicable): No results found for: PREGTESTUR, PREGSERUM, HCG, HCGQUANT     ABGs: No results found for: PHART, PO2ART, XLQ1VUR, GSZ2FDC, BEART, H6QCMYIX     Type & Screen (If Applicable):  No results found for: LABABO, LABRH    Drug/Infectious Status (If Applicable):  Lab Results   Component Value Date    HEPCAB NONREACTIVE 03/23/2017       COVID-19 Screening (If Applicable):   Lab Results   Component Value Date    COVID19 Not Detected 02/12/2021    COVID19 Not Detected 11/14/2020         Anesthesia Evaluation  Patient summary reviewed and Nursing notes reviewed no history of anesthetic complications:   Airway: Mallampati: III  TM distance: >3 FB   Neck ROM: full  Mouth opening: > = 3 FB Dental: normal exam         Pulmonary:normal exam  breath sounds clear to auscultation  (+) pneumonia: no interval change,  COPD: no interval change,  shortness of breath: no interval change,  sleep apnea: on CPAP,      (-) asthma, recent URI, rhonchi, wheezes, rales, stridor and not a current smoker          Patient smoked on day of surgery. Cardiovascular:  Exercise tolerance: good (>4 METS),   (+) hypertension: no interval change, CAD: no interval change, PLASCENCIA: no interval change, hyperlipidemia    (-) pacemaker, valvular problems/murmurs, past MI, CABG/stent, dysrhythmias,  angina,  CHF, orthopnea, PND, murmur, weak pulses,  friction rub, systolic click, carotid bruit,  JVD and peripheral edema    ECG reviewed  Rhythm: regular  Rate: normal           Beta Blocker:  Not on Beta Blocker         Neuro/Psych:   (+) neuromuscular disease:, psychiatric history: stable with treatmentdepression/anxiety    (-) seizures, TIA, CVA and headaches           GI/Hepatic/Renal:   (+) GERD: no interval change, liver disease:,      (-) hiatal hernia, PUD, hepatitis, no renal disease, bowel prep and no morbid obesity       Endo/Other:    (+) DiabetesType II DM, , no malignancy/cancer. (-) hypothyroidism, hyperthyroidism, blood dyscrasia, arthritis, no electrolyte abnormalities, no malignancy/cancer               Abdominal:           Vascular: negative vascular ROS. - PVD, DVT and PE. Anesthesia Plan      general     ASA 3       Induction: intravenous. MIPS: Postoperative opioids intended and Prophylactic antiemetics administered. Anesthetic plan and risks discussed with patient. Plan discussed with CRNA.             The patient was counseled at length about the risks of maritza Covid-19 during their perioperative period and any recovery window from their procedure. The patient was made aware that maritza Covid-19  may worsen their prognosis for recovering from their procedure  and lend to a higher morbidity and/or mortality risk. All material risks, benefits, and reasonable alternatives including postponing the procedure were discussed. The patient DOES wish to proceed with the procedure at this time.           Martita Costello MD   2/16/2021

## 2021-02-17 LAB — SURGICAL PATHOLOGY REPORT: NORMAL

## 2021-03-01 DIAGNOSIS — L29.9 PRURITIC DERMATITIS: ICD-10-CM

## 2021-03-01 RX ORDER — AMMONIUM LACTATE 12 G/100G
CREAM TOPICAL
Qty: 1 BOTTLE | Refills: 3 | Status: SHIPPED | OUTPATIENT
Start: 2021-03-01

## 2021-03-02 ENCOUNTER — HOSPITAL ENCOUNTER (OUTPATIENT)
Age: 66
Setting detail: SPECIMEN
Discharge: HOME | End: 2021-03-02
Payer: MEDICARE

## 2021-03-02 LAB
ANION GAP SERPL CALCULATED.3IONS-SCNC: 11 MMOL/L (ref 9–17)
BUN BLDV-MCNC: 11 MG/DL (ref 8–23)
BUN/CREAT BLD: ABNORMAL (ref 9–20)
CALCIUM SERPL-MCNC: 9.9 MG/DL (ref 8.6–10.4)
CHLORIDE BLD-SCNC: 103 MMOL/L (ref 98–107)
CO2: 28 MMOL/L (ref 20–31)
CREAT SERPL-MCNC: 0.72 MG/DL (ref 0.7–1.2)
GFR AFRICAN AMERICAN: >60 ML/MIN
GFR NON-AFRICAN AMERICAN: >60 ML/MIN
GFR SERPL CREATININE-BSD FRML MDRD: ABNORMAL ML/MIN/{1.73_M2}
GFR SERPL CREATININE-BSD FRML MDRD: ABNORMAL ML/MIN/{1.73_M2}
GLUCOSE BLD-MCNC: 116 MG/DL (ref 70–99)
POTASSIUM SERPL-SCNC: 4.2 MMOL/L (ref 3.7–5.3)
SODIUM BLD-SCNC: 142 MMOL/L (ref 135–144)

## 2021-03-02 PROCEDURE — 80048 BASIC METABOLIC PNL TOTAL CA: CPT

## 2021-03-02 PROCEDURE — 36415 COLL VENOUS BLD VENIPUNCTURE: CPT

## 2021-03-08 ENCOUNTER — TELEPHONE (OUTPATIENT)
Dept: ONCOLOGY | Age: 66
End: 2021-03-08

## 2021-03-08 NOTE — LETTER
3/8/2021        [address] [place] [id number]    Dear Nico Watson:    Your healthcare provider has ordered a low dose CT scan of the chest for lung cancer screening. You will find enclosed, information about CT lung screening. Please review the statement of understanding, you will be asked to sign a copy of this at the time of your CT scan    If you have not already been contacted to make the appointment for your scan, please call our scheduling department at [phone]    Keep in mind that CT lung screening does not take the place of smoking cessation. If you are a current smoker, you will find enclosed smoking cessation resources. Please do not hesitate to contact me if you have any questions or concerns.     [address] [place] [address],      [facility] Scre[facility]  [phone]

## 2021-03-15 ENCOUNTER — HOSPITAL ENCOUNTER (OUTPATIENT)
Dept: CT IMAGING | Age: 66
Discharge: HOME | End: 2021-03-17
Payer: MEDICARE

## 2021-03-15 DIAGNOSIS — Z87.891 PERSONAL HISTORY OF NICOTINE DEPENDENCE: ICD-10-CM

## 2021-03-15 PROCEDURE — 71271 CT THORAX LUNG CANCER SCR C-: CPT

## 2021-03-15 NOTE — RESULT ENCOUNTER NOTE
Please notify patient. CT lung shows emphysema, to stop smoking. There is some mild scarring.   There are no lung nodules, good    To repeat in 1 year      Future Appointments  4/23/2021  8:45 AM    Fuad Acevedo MD     [facility]  5/24/2021  9:50 AM    Roxie Bradley MD         . [facility]  7/8/2021   11:30 AM   Fuad Acevedo MD     [facility]

## 2021-04-19 ENCOUNTER — TELEPHONE (OUTPATIENT)
Dept: FAMILY MEDICINE CLINIC | Age: 66
End: 2021-04-19

## 2021-04-23 ENCOUNTER — OFFICE VISIT (OUTPATIENT)
Dept: FAMILY MEDICINE CLINIC | Age: 66
End: 2021-04-23
Payer: MEDICARE

## 2021-04-23 VITALS
HEIGHT: 72 IN | HEART RATE: 87 BPM | TEMPERATURE: 97.1 F | OXYGEN SATURATION: 97 % | DIASTOLIC BLOOD PRESSURE: 76 MMHG | WEIGHT: 290.8 LBS | SYSTOLIC BLOOD PRESSURE: 132 MMHG | BODY MASS INDEX: 39.39 KG/M2

## 2021-04-23 DIAGNOSIS — E11.42 TYPE 2 DIABETES MELLITUS WITH DIABETIC POLYNEUROPATHY, WITHOUT LONG-TERM CURRENT USE OF INSULIN (HCC): ICD-10-CM

## 2021-04-23 DIAGNOSIS — M79.2 PERIPHERAL NEUROPATHIC PAIN: Primary | ICD-10-CM

## 2021-04-23 DIAGNOSIS — I10 ESSENTIAL HYPERTENSION: ICD-10-CM

## 2021-04-23 DIAGNOSIS — E66.9 OBESITY (BMI 35.0-39.9 WITHOUT COMORBIDITY): ICD-10-CM

## 2021-04-23 DIAGNOSIS — I50.32 CHRONIC DIASTOLIC HEART FAILURE (HCC): ICD-10-CM

## 2021-04-23 DIAGNOSIS — H93.13 BILATERAL TINNITUS: ICD-10-CM

## 2021-04-23 DIAGNOSIS — F33.42 RECURRENT MAJOR DEPRESSIVE DISORDER, IN FULL REMISSION (HCC): ICD-10-CM

## 2021-04-23 DIAGNOSIS — I73.9 PVD (PERIPHERAL VASCULAR DISEASE) WITH CLAUDICATION (HCC): ICD-10-CM

## 2021-04-23 DIAGNOSIS — M21.371 FOOT DROP, RIGHT: ICD-10-CM

## 2021-04-23 DIAGNOSIS — E11.51 TYPE 2 DIABETES MELLITUS WITH DIABETIC PERIPHERAL ANGIOPATHY WITHOUT GANGRENE, WITHOUT LONG-TERM CURRENT USE OF INSULIN (HCC): ICD-10-CM

## 2021-04-23 DIAGNOSIS — E88.89: ICD-10-CM

## 2021-04-23 DIAGNOSIS — C61 PROSTATE CANCER (HCC): ICD-10-CM

## 2021-04-23 DIAGNOSIS — R26.2 DIFFICULTY IN WALKING: ICD-10-CM

## 2021-04-23 DIAGNOSIS — J44.9 COPD MIXED TYPE (HCC): ICD-10-CM

## 2021-04-23 LAB — HBA1C MFR BLD: 5.9 %

## 2021-04-23 PROCEDURE — 99215 OFFICE O/P EST HI 40 MIN: CPT | Performed by: FAMILY MEDICINE

## 2021-04-23 PROCEDURE — 83036 HEMOGLOBIN GLYCOSYLATED A1C: CPT | Performed by: FAMILY MEDICINE

## 2021-04-23 RX ORDER — PREGABALIN 200 MG/1
200 CAPSULE ORAL 3 TIMES DAILY
Qty: 270 CAPSULE | Refills: 0 | Status: SHIPPED | OUTPATIENT
Start: 2021-04-23 | End: 2021-07-20 | Stop reason: ALTCHOICE

## 2021-04-23 RX ORDER — BLOOD-GLUCOSE METER
1 KIT MISCELLANEOUS DAILY
Qty: 1 KIT | Refills: 0 | Status: ON HOLD | OUTPATIENT
Start: 2021-04-23 | End: 2022-03-15 | Stop reason: HOSPADM

## 2021-04-23 RX ORDER — BLOOD PRESSURE TEST KIT
KIT MISCELLANEOUS
Qty: 100 EACH | Refills: 3 | Status: ON HOLD
Start: 2021-04-23 | End: 2022-03-15 | Stop reason: HOSPADM

## 2021-04-23 RX ORDER — GLUCOSAMINE HCL/CHONDROITIN SU 500-400 MG
CAPSULE ORAL
Qty: 100 STRIP | Refills: 3 | Status: ON HOLD
Start: 2021-04-23 | End: 2022-03-15 | Stop reason: HOSPADM

## 2021-04-23 ASSESSMENT — PATIENT HEALTH QUESTIONNAIRE - PHQ9
SUM OF ALL RESPONSES TO PHQ QUESTIONS 1-9: 0
1. LITTLE INTEREST OR PLEASURE IN DOING THINGS: 0
2. FEELING DOWN, DEPRESSED OR HOPELESS: 0
SUM OF ALL RESPONSES TO PHQ QUESTIONS 1-9: 0

## 2021-04-23 ASSESSMENT — ENCOUNTER SYMPTOMS
WHEEZING: 0
CONSTIPATION: 0
NAUSEA: 0
ABDOMINAL DISTENTION: 0
ABDOMINAL PAIN: 0
DIARRHEA: 0
VOMITING: 0
CHEST TIGHTNESS: 0
COUGH: 0
BACK PAIN: 1

## 2021-04-23 NOTE — PROGRESS NOTES
Addison Hannon (:  1955) is a 76 y.o. male,Established patient, here for evaluation of the following chief complaint(s): Diabetes, Hypertension, Discuss Medications, Other (DISCUSS WALKER/ HAD DM FOOT CHECK WITH DR REESE CALLING TO GET REPORT), COPD, Difficulty Walking, and Tinnitus      ASSESSMENT/PLAN:    1. Peripheral neuropathic pain  Worsening  Continue Lyrica  Advised lidocaine, lavender, capsaicin, continue folbee, neurology referral    -     Aline Boyle MD, Neurology, [address]  2. Type 2 diabetes mellitus with diabetic polyneuropathy, without long-term current use of insulin (HCC)  worsening  But Well controlled. -     POCT glycosylated hemoglobin (Hb A1C)  Lab Results   Component Value Date    LABA1C 5.9 2021    LABA1C 5.5 10/13/2020    LABA1C 6.4 (H) 2020       -     pregabalin (LYRICA) 200 MG capsule; Take 1 capsule by mouth 3 times daily for 90 days. , Disp-270 capsule, R-0Normal  -     glucose monitoring kit (FREESTYLE) monitoring kit; DAILY Starting 2021, Until Sat 2022, For 365 days, Disp-1 kit, R-0, NormalPlease supply Patient with a glucose monitoring kit that insurance will cover. -     Lancets 30G MISC; Disp-100 each, R-3, NormalTesting once a day. Please dispense according to patients insurance. -     blood glucose monitor strips; Testing once a day. Please dispense according to patients insurance., Disp-100 strip, R-3, Normal  -     Alcohol Swabs PADS; Disp-100 each, R-3, NormalPlease dispense according to patients insurance/device. Testing once a day  -     [address]  -advised home blood glucose testing daily  -daily feet exam, Foot care: advised to wash feet daily, pat dry and apply lotion at night, avoiding between toes.  Need to look at feet daily and report to a physician any signs of inflammation or skin damage  -annual dilated eye exam  -Low carb, low fat diet, increase fruits and vegetables, and exercise 4-5 times a day 30-40 minutes a day discussed  -continue low carb   -continue Aspirin  -continue ACEI and statin      3. Type 2 diabetes mellitus with diabetic peripheral angiopathy without gangrene, without long-term current use of insulin (HCC)  worsening  risk factors modification: Aspirin, Pravachol, walking, Smoking cessation counseling given. follow up with Dr. Joon Cormier for PVR due in mid June  follow up with Dr. Joon Cormier for venous insufficiency too  compression stockings advised     -     POCT glycosylated hemoglobin (Hb A1C)  -     pregabalin (LYRICA) 200 MG capsule; Take 1 capsule by mouth 3 times daily for 90 days. , Disp-270 capsule, R-0Normal  -     glucose monitoring kit (FREESTYLE) monitoring kit; DAILY Starting Fri 4/23/2021, Until Sat 4/23/2022, For 365 days, Disp-1 kit, R-0, NormalPlease supply Patient with a glucose monitoring kit that insurance will cover. -     Lancets 30G MISC; Disp-100 each, R-3, NormalTesting once a day. Please dispense according to patients insurance. -     blood glucose monitor strips; Testing once a day. Please dispense according to patients insurance., Disp-100 strip, R-3, Normal  -     Alcohol Swabs PADS; Disp-100 each, R-3, NormalPlease dispense according to patients insurance/device. Testing once a day  -     [facility] Therapy  aJzmin Bustamante  -      DIABETES FOOT EXAM  4. Essential hypertension  Well controlled. Continue current treatment. Discussed low salt diet and BP and pulse monitoring daily    5. PVD (peripheral vascular disease) with claudication (HCC)  Likely worsening  Risk factors modification: Aspirin, Pravachol, walking, Smoking cessation counseling given. PVR in mid June, follow up with Dr. Wilbert Aggarwal  He would benefit from electric mobility device  -     [address]  6.  Recurrent major depressive disorder, in full remission ([facility] [address]  Stable  Not on antidepressant  Will continue to monitor. Continue with psychologist treatments/cognitive behavioral therapy    7. COPD mixed type ([facility] [address]  Stable  Smoking cessation counseling given. Continue current inhalers  -     [address]  8. Obesity (BMI 35.0-39.9 without comorbidity)  worsening  Low carb, low fat diet, increase fruits and vegetables, and increase activity level  -     [facility] - [facility]  9. Serum cholinesterase defect (HCC)  Stable  Avoid certain medications during anesthesia    10. Prostate cancer [facility])  Improved  Post radiotherapy and chemotherapy  Follow up with urology    Lab Results   Component Value Date    PSA 0.03 11/16/2020    PSA <0.01 05/14/2020    PSA <0.01 04/15/2020         11. Bilateral tinnitus  worsening   -     [facility] - Karen Slaughter MD, Otolaryngology, [facility]. Difficulty in walking  Failing to change as expected. Would benefit from electric mobility device  Bernardino Pressleyould benefit from electric mobility device due to weakness in his legs, in order to improve the mobility in the house and outside, near the house, to help complete his ADLs. he requires use of assistive device to leave home:  Karthik Severino with seat    Patient has poor mobility which is impacting the ability to leave his home without electric mobility device. Patient's mobility limitations will be adequately resolved with electric mobility device, and cannot be resolved with a cane or a walker, as it will be unsafe. Maylon Mohs was referred to have physical therapy evaluation for the mobility device, the report will be attached to the order when available. -     [address]  13.  Chronic diastolic heart failure ([facility] [address]  Stable  Per cardiology evaluation  Ejection fraction 60 to 65% on 12/5/2019 report is in [facility]  Continue furosemide, lisinopril, amlodipine, pravastatin and aspirin follow-up with cardiology as scheduled        -     [facility] - [facility]  14. Foot drop, right  Stable   -     Sam Bates MD, Neurology, [address] [facility] [address] [facility]      Controlled Substance Monitoring:    Acute and Chronic Pain Monitoring:   RX Monitoring 4/23/2021   Attestation -   Periodic Controlled Substance Monitoring Possible medication side effects, risk of tolerance/dependence & alternative treatments discussed. ;No signs of potential drug abuse or diversion identified. ;Assessed functional status. Chronic Pain > 50 MEDD -   Chronic Pain > 80 MEDD -           Bernardino received counseling on the following healthy behaviors: nutrition, exercise, medication adherence, tobacco cessation and WEIGHT LOSS  Reviewed prior labs and health maintenance  Discussed use, benefit, and side effects of prescribed medications. Barriers to medication compliance addressed. Patient given educational materials - see patient instructions  Was a self-tracking handout given in paper form or via [employer]? Yes  All patient questions answered. Patient voiced understanding. The patient's past medical,surgical, social, and family history as well as his current medications and allergies were reviewed as documented in today's encounter. Medications, labs, diagnostic studies, consultations and follow-up as documented in this encounter. Return for KEEP APPT. Data Unavailable    Future Appointments   Date Time Provider Keri English   4/28/2021  9:00 AM Delaney Jalloh, PhD [facility]   5/3/2021  2:45 PM Deanne Christie MD [facility]   5/24/2021  9:50 AM Argentina Mar MD .  [facility]   7/8/2021 11:30 [facility]         SUBJECTIVE/OBJECTIVE:    Mike Sweeney complains of worsening neuropathy and pain in the legs  She says she has worsening burning,numbness and tingling , affecting hands and feet  Affecting feet up to ankles, right foot worse.   On the hands on the last 3 fingers symmetric, and on the lateral side of the wrist,   Wakes him up. Pain interferes with sleep and activities. 10/10  lyrica doesn't help all the time, once a month wakes him up. Still taking Folbee    Ambulating with walker with seat  Cannot walk more than 5 minutes before stopping  Bought a scooter from someone and it is broken. Was using it to be able to to close neighbors and in the store. Needs scooter or electric mobility device which is covered by insurance. Has PVD, worsening leg edema , right foot drop,  saw Dr. Kristie Simeon recently. Never had evaluation by neurology  Had back surgery by Dr. Blanka Masters. Diabetes Mellitus Type II, Follow-up:    Current symptoms/problems include hyperglycemia, paresthesia of the feet and visual disturbances. Symptoms have been present for several years. Known diabetic complications: peripheral neuropathy and peripheral vascular disease  Cardiovascular risk factors: advanced age (older than 54 for men, 72 for women), diabetes mellitus, dyslipidemia, hypertension, male gender, obesity (BMI >= 30 kg/m2) and smoking/ tobacco exposure  Current diabetic medications include none. Eye exam current (within one year): yes, AIR[facility] CARDOSO  Current diet: in general, a \"healthy\" diet  , diabetic, low fat/ cholesterol, low salt    Barriers: impairment:  physical: chronic back pain, chronic neck pain, neuropathy, difficulty walking, prostate cancer. Current monitoring regimen: home blood tests - A1c every 3 months  Home blood sugar records: patient does not test 117, 76 in a few mins, 158, 94    Any episodes of hypoglycemia? no    Is He on ACE inhibitor or angiotensin II receptor blocker? Yes      reports that he has been smoking cigarettes. He has a 52.50 pack-year smoking history. He has never used smokeless tobacco.     Ready to quit: No  Counseling given: Yes  Comment: hx of 1-1.5 PPD, currently 0.5 ppd      Daily Aspirin?  Yes      A1c is worsening , but well controlled  worsening    Lab Results   Component Value Date    LABA1C 5.9 04/23/2021    LABA1C 5.5 10/13/2020    LABA1C 6.4 (H) 07/09/2020       Urine microabumin is Normal.       Body mass index is 39.44 kg/m². Obesity (BMI 35.0-39.9 without comorbidity)  worsening obesity  Wt Readings from Last 7 Encounters:   04/23/21 290 lb 12.8 oz (131.9 kg)   02/16/21 280 lb (127 kg)   02/02/21 280 lb (127 kg)   11/25/20 280 lb (127 kg)   11/18/20 280 lb (127 kg)   11/09/20 280 lb (127 kg)   10/28/20 280 lb (127 kg)         Hypertension, CHF diastolic, peripheral vascular disease     Davey Oneil  is not  exercising and is adherent to low salt diet. Blood pressure is well controlled at home. Cardiac symptoms  claudication, dyspnea, fatigue and lower extremity edema. Patient denies  chest pain, chest pressure/discomfort, exertional chest pressure/discomfort, irregular heart beat, near-syncope, orthopnea, palpitations, paroxysmal nocturnal dyspnea, syncope and tachypnea. Use of agents associated with hypertension: none. History of target organ damage: heart failure, left ventricular hypertrophy, peripheral artery disease and Coronary artery disease. PVD, saw Dr. Michelle Thomas had testign for DVT, negative for blood clots  Leg swelling is getting worse, he does take Lasix, does not feel is helping. He is also on amlodipine which can make the swelling worse. Wearing compression stockings, but does not help  Feels they are more red    PVR showed peripheral artery disease, right leg worse    [address]  6/12/20  Interface - Cv Results/Orders In 1 - 06/12/2020  2:47 PM EDT  Right: Moderately abnormal PVR waveform contour at the ankle level. Monophasic PT and DP artery CW Doppler waveforms. PT KRISTIN is .31; DP KRISTIN is . 52. Left: Mildly abnormal PVR waveform contour at the ankle level. Hyperemic PT and DP artery CW Doppler waveforms. PT KRISTIN is .86; DP KRISTIN is . 82.   Conclusions: RIGHT:   Lower extremity KRISTIN  is consistent with moderate arterial disease. LEFT: BILATERAL:  Lower extremity KRISTIN  is consistent with mild arterial disease\"    BP controlled. Bob Stephens reports compliance with BP medications, and tolerates them well, denies side effects. BP Readings from Last 3 Encounters:   04/23/21 132/76   02/16/21 (!) 146/68   02/16/21 (!) 147/78        Pulse is Normal.    Pulse Readings from Last 3 Encounters:   04/23/21 87   02/16/21 72   02/02/21 68     Depression  Denies suicidal ideation, plan or intent. Depression is stable, has been doing cognitive behavioral therapy with Dayo Walter       PHQ-2 Over the past 2 weeks, how often have you been bothered by any of the following problems? Little interest or pleasure in doing things: Not at all  Feeling down, depressed, or hopeless: Not at all  PHQ-2 Score: 0  PHQ-9 Over the past 2 weeks, how often have you been bothered by any of the following problems? PHQ-9 Total Score: 0      PHQ Scores 4/23/2021 1/11/2021 7/7/2020 3/18/2020 1/28/2020 1/8/2019 9/4/2018   PHQ2 Score 0 0 0 0 6 0 0   PHQ9 Score 0 0 0 0 17 0 0     COPD:  Current treatment includes short-acting beta agonist inhaler, duo nebs, Symbicort , home 02-2 L/min nighttime, which has been effective. Residual symptoms: chronic dyspnea. He denies cough, purulent sputum, wheezing, chest tightness, chest pain. He requires his rescue inhaler 1 time(s) per week. Nicotine dependence. Smoker, counseling given to quit smoking. Ready to quit: No  Counseling given: Yes  Comment: hx of 1-1.5 PPD, currently 0.5 ppd      Serum cholinesterase defect (HCC)  Stable  Did not have any episodes as he has not been given any drugs which can trigger it. Prostate cancer  Had radiation and chemotherapy. Follows with urology, on Eligard, hormonotherapy. he does have some hot flashes.       Lab Results   Component Value Date    PSA 0.03 11/16/2020    PSA <0.01 05/14/2020    PSA <0.01 04/15/2020       Bernardino complains of bilateral ear noises, tinnitus,for a few years, worsening for 2 years. Denies hearing loss. Denies ear pain    Prior to Visit Medications    Medication Sig Taking? Authorizing Provider   ammonium lactate (AMLACTIN) 12 % cream APPLY TOPICALLY TO LEGS ONE TO TWO TIMES A DAY AS NEEDED Yes Thea Gonzalez MD   cephALEXin (KEFLEX) 500 MG capsule 500 mgTake three times daily Yes Cherylene Brand, MD   ondansetron (ZOFRAN) 4 MG tablet Take every six hours as needed Yes Cherylene Brand, MD   furosemide (LASIX) 40 MG tablet Take 1 tablet by mouth daily Dose increased 1/12/2021 Yes Thea Gonzalez MD   lisinopril (PRINIVIL;ZESTRIL) 10 MG tablet Take 1 tablet by mouth daily Yes Thea Gonzalez MD   naloxone 4 MG/0.1ML LIQD nasal spray 1 spray by Nasal route as needed for Opioid Reversal Patient needs counseling Yes Thea Gonzalez MD   amLODIPine (NORVASC) 10 MG tablet Take 1 tablet by mouth daily Dose increased 10/16/2020 due to high BP. Correct dosage Yes Thea Gonzalez MD   OXYGEN With CPAP at night Yes Historical Provider, MD   SYMBICORT 160-4.5 MCG/ACT AERO Inhale 2 puffs into the lungs 2 times daily 10.2 inhaler, give 3 inhalers Yes Thea Gonzalez MD   albuterol sulfate HFA (PROVENTIL HFA) 108 (90 Base) MCG/ACT inhaler Inhale 2 puffs into the lungs every 6 hours as needed for Wheezing or Shortness of Breath Yes Thea Gonzalez MD   pravastatin (PRAVACHOL) 80 MG tablet Take 1 tablet by mouth every evening Dose increased  9/24/2019 Yes Thea Gonzalez MD   omeprazole (PRILOSEC) 40 MG delayed release capsule Take 1 capsule by mouth every morning (before breakfast) Yes Thea Gonzalez MD   Respiratory Therapy Supplies (ADULT MASK) MISC Needs to use mask daily due to coronavirus pandemic.  Yes Thea Gonzalez MD   ipratropium-albuterol (DUONEB) 0.5-2.5 (3) MG/3ML SOLN nebulizer solution Inhale 1 vial into the lungs 3 times daily Yes Historical Provider, MD   blood glucose monitor strips Use to test twice daily and as needed as directed by provider Yes Historical Provider, MD   Lift Chair MISC by Does not apply route Patient has difficulty getting up from usual chair Yes Kevon Khalil MD   folbee plus (FOLBEE PLUS) TABS Take 1 tablet by mouth daily Yes Kevon Khalil MD   aspirin EC 81 MG EC tablet Take 1 tablet by mouth daily Yes Kevon Khalil MD   Glucose Blood (BLOOD GLUCOSE TEST STRIPS) STRP tesing once a day fasting Yes Kevon Khalil MD   pregabalin (LYRICA) 200 MG capsule Take 1 capsule by mouth 3 times daily for 90 days. Kevon Khalil MD            Social History     Tobacco Use    Smoking status: Current Every Day Smoker     Packs/day: 1.50     Years: 35.00     Pack years: 52.50     Types: Cigarettes    Smokeless tobacco: Never Used    Tobacco comment: hx of 1-1.5 PPD, currently 0.5 ppd   Substance Use Topics    Alcohol use: Not Currently     Alcohol/week: 0.0 standard drinks    Drug use: No         Review of Systems   Constitutional: Positive for fatigue and unexpected weight change. Negative for activity change, appetite change, chills, diaphoresis and fever. HENT: Positive for tinnitus. Negative for congestion, drooling, ear discharge, ear pain and hearing loss. Eyes: Positive for visual disturbance (stable, chronic). Respiratory: Positive for shortness of breath (worsening). Negative for cough, chest tightness and wheezing. Cardiovascular: Positive for leg swelling. Negative for chest pain and palpitations. Has compression stockings    Gastrointestinal: Negative for abdominal distention, abdominal pain, constipation, diarrhea, nausea and vomiting. Endocrine: Positive for heat intolerance. Negative for cold intolerance, polydipsia, polyphagia and polyuria. Musculoskeletal: Positive for arthralgias, back pain, gait problem and myalgias (legs). Neurological: Positive for weakness (legs, same as before, right foot drop) and numbness (hands and feet).    Hematological: Does not bruise/bleed easily. Psychiatric/Behavioral: Negative for dysphoric mood, self-injury, sleep disturbance and suicidal ideas. The patient is not nervous/anxious.          -vital signs stable and within normal limits except severe Obesity per BMI. /76 (Site: Left Upper Arm)   Pulse 87   Temp 97.1 °F (36.2 °C)   Ht 6' (1.829 m)   Wt 290 lb 12.8 oz (131.9 kg)   SpO2 97%   BMI 39.44 kg/m²         Physical Exam  Vitals signs and nursing note reviewed. Constitutional:       General: He is not in acute distress. Appearance: Normal appearance. He is well-developed. He is obese. He is not diaphoretic. HENT:      Head: Normocephalic and atraumatic. Right Ear: Hearing and external ear normal.      Left Ear: Hearing and external ear normal.      Mouth/Throat:      Comments: I did not examine the mouth due to coronavirus pandemic and wearing masks. Eyes:      General: Lids are normal. No scleral icterus. Right eye: No discharge. Left eye: No discharge. Extraocular Movements: Extraocular movements intact. Conjunctiva/sclera: Conjunctivae normal.   Neck:      Musculoskeletal: Normal range of motion and neck supple. Thyroid: No thyromegaly. Cardiovascular:      Rate and Rhythm: Normal rate and regular rhythm. Pulses:           Dorsalis pedis pulses are 0 on the right side and 0 on the left side. Posterior tibial pulses are 1+ on the right side and 1+ on the left side. Heart sounds: Heart sounds are distant. No murmur. Comments: bilateral stasis dermatitis legs  Varicose veins on the dorsum of the feet, advised follow up with Dr. Jennifer Mata  Pulmonary:      Effort: Pulmonary effort is normal. No respiratory distress. Breath sounds: Examination of the right-middle field reveals decreased breath sounds. Examination of the left-middle field reveals decreased breath sounds. Examination of the right-lower field reveals decreased breath sounds. Examination of the left-lower field reveals decreased breath sounds. Decreased breath sounds present. No wheezing or rales. Comments: Saw both pulmonology and cardiology  Chest:      Chest wall: No tenderness. Abdominal:      General: Bowel sounds are normal. There is no distension. Palpations: Abdomen is soft. There is no hepatomegaly or splenomegaly. Tenderness: There is no abdominal tenderness. Comments: Obese abdomen. Musculoskeletal: Normal range of motion. General: No tenderness. Right lower le+ Pitting Edema present. Left lower le+ Pitting Edema present. Right foot: Normal range of motion. No deformity, bunion, Charcot foot, foot drop or prominent metatarsal heads. Left foot: Normal range of motion. No deformity, bunion, Charcot foot, foot drop or prominent metatarsal heads. Feet:      Right foot:      Protective Sensation: 5 sites tested. 0 sites sensed. Skin integrity: Skin integrity normal.      Toenail Condition: Right toenails are abnormally thick. Left foot:      Protective Sensation: 5 sites tested. 1 site sensed. Skin integrity: Skin integrity normal.      Toenail Condition: Left toenails are abnormally thick. Lymphadenopathy:      Cervical: No cervical adenopathy. Skin:     General: Skin is warm and dry. Capillary Refill: Capillary refill takes less than 2 seconds. Findings: No rash. Neurological:      Mental Status: He is alert and oriented to person, place, and time. Sensory: Sensory deficit present. Motor: Weakness present. Gait: Gait abnormal.      Deep Tendon Reflexes: Reflexes are normal and symmetric. Comments: Walks with walker with seat   Psychiatric:         Mood and Affect: Mood normal.         Behavior: Behavior normal.         Thought Content: Thought content normal.         Judgment: Judgment normal.           I personally reviewed testing with patient.   Hyperglycemia increased liver function tests   hyperlipidemia improved    Otherwise labs within normal limits    Hospital Outpatient Visit on 03/02/2021   Component Date Value Ref Range Status    Glucose 03/02/2021 116* 70 - 99 mg/dL Final    BUN 03/02/2021 11  8 - 23 mg/dL Final    CREATININE 03/02/2021 0.72  0.70 - 1.20 mg/dL Final    Bun/Cre Ratio 03/02/2021 NOT REPORTED  9 - 20 Final    Calcium 03/02/2021 9.9  8.6 - 10.4 mg/dL Final    Sodium 03/02/2021 142  135 - 144 mmol/L Final    Potassium 03/02/2021 4.2  3.7 - 5.3 mmol/L Final    Chloride 03/02/2021 103  98 - 107 mmol/L Final    CO2 03/02/2021 28  20 - 31 mmol/L Final    Anion Gap 03/02/2021 11  9 - 17 mmol/L Final    GFR Non- 03/02/2021 >60  >60 mL/min Final    GFR  03/02/2021 >60  >60 mL/min Final    GFR Comment 03/02/2021        Final    Comment: Average GFR for 61-76 years old:   80 mL/min/1.73sq m  Chronic Kidney Disease:   <60 mL/min/1.73sq m  Kidney failure:   <15 mL/min/1.73sq m              eGFR calculated using average adult body mass.  Additional eGFR calculator available at:        [email or web address]            GFR Staging 03/02/2021 NOT REPORTED   Final       Lab Results   Component Value Date    WBC 9.1 02/02/2021    HGB 14.5 02/02/2021    HCT 43.1 02/02/2021    MCV 87.6 02/02/2021     02/02/2021            Lab Results   Component Value Date    ALT 48 (H) 02/02/2021    AST 34 02/02/2021    ALKPHOS 108 02/02/2021    BILITOT 0.21 (L) 02/02/2021       Lab Results   Component Value Date    TSH 1.84 02/11/2020       Lab Results   Component Value Date    CHOL 138 07/09/2020    CHOL 160 02/11/2020    CHOL 201 (H) 09/16/2019     Lab Results   Component Value Date    TRIG 203 (H) 07/09/2020    TRIG 182 (H) 02/11/2020    TRIG 247 (H) 09/16/2019     Lab Results   Component Value Date    HDL 32 (L) 07/09/2020    HDL 44 02/11/2020    HDL 45 09/16/2019     Lab Results   Component Value Date Physical Therapy - [facility]     Referral Priority:   Routine     Referral Type:   Eval and Treat     Referral Reason:   Specialty Services Required     Requested Specialty:   Physical Therapy     Number of Visits Requested:   1    POCT glycosylated hemoglobin (Hb A1C)    HM DIABETES FOOT EXAM       Medications Discontinued During This Encounter   Medication Reason    pregabalin (LYRICA) 200 MG capsule REORDER    cephALEXin (KEFLEX) 500 MG capsule Therapy completed    blood glucose monitor strips REORDER    Glucose Blood (BLOOD GLUCOSE TEST STRIPS) STRP REORDER         On this date 4/23/2021 I have spent 45 minutes reviewing previous notes, test results and face to face with the patient discussing the diagnosis and importance of compliance with the treatment plan as well as documenting on the day of the visit. This note was completed by using the assistance of a speech-recognition program. However, inadvertent computerized transcription errors may be present. Although every effort was made to ensure accuracy, no guarantees can be provided that every mistake has been identified and corrected by editing . An electronic signature was used to authenticate this note.   Electronically signed by Ana Villar MD on 4/24/2021 at 2:10 PM

## 2021-04-23 NOTE — PROGRESS NOTES
Visit Information    Have you changed or started any medications since your last visit including any over-the-counter medicines, vitamins, or herbal medicines? no   Have you stopped taking any of your medications? Is so, why? -  no  Are you having any side effects from any of your medications? - no    Have you seen any other physician or provider since your last visit? yes - VASCULAR SURGERY; CARDIOLOGY   Have you had any other diagnostic tests since your last visit? yes - 03/15/2021 CT LUNG SCREEN   Have you been seen in the emergency room and/or had an admission in a hospital since we last saw you?  no   Have you had your routine dental cleaning in the past 6 months?  no     Do you have an active MyChart account? If no, what is the barrier?   Yes    Patient Care Team:  Geronimo Beal MD as PCP - General (Family Medicine)  Geronimo Beal MD as PCP - [facility]  Liz Rudd MD as Consulting Physician (Neurosurgery)  Renu Waldron MD as Consulting Physician (Cardiology)  Mike Villagran MD as Consulting Physician (Urology)  Esther Kenny MD as Surgeon (Vascular Surgery)  Minesh Moss MD as Consulting Physician (Pulmonology)  Zak Dias MD as Consulting Physician (Orthopedic Surgery)  Rue Kocher, DO as Consulting Physician (General Surgery)  Hussain Holly OD (Ophthalmology)  Deo Quarles MD as Consulting Physician (Endocrinology)  Trudy Martinez MD as Consulting Physician (Orthopedic Surgery)  Lia Landa MD as Consulting Physician (Gastroenterology)  Wanda Jackson MD as Consulting Physician (Orthopedic Surgery)  James Gavin RN as Nurse Francesca Ayers MD as Surgeon (General Surgery)    Medical History Review  Past Medical, Family, and Social History reviewed and does contribute to the patient presenting condition    Health Maintenance   Topic Date Due    Diabetic foot exam  04/09/2020    Diabetic retinal exam  11/07/2020  Annual Wellness Visit (AWV)  07/08/2021    Lipid screen  07/09/2021    A1C test (Diabetic or Prediabetic)  10/13/2021    Diabetic microalbuminuria test  10/13/2021    PSA counseling  11/16/2021    Potassium monitoring  03/02/2022    Creatinine monitoring  03/02/2022    Low dose CT lung screening  03/15/2022    Pneumococcal 65+ years Vaccine (2 of 2 - PPSV23) 04/11/2022    Colon cancer screen colonoscopy  05/09/2022    DTaP/Tdap/Td vaccine (2 - Td) 03/01/2028    Flu vaccine  Completed    Shingles Vaccine  Completed    COVID-19 Vaccine  Completed    Hepatitis C screen  Completed    Hepatitis A vaccine  Aged Out    Hib vaccine  Aged Out    Meningococcal (ACWY) vaccine  Aged Out

## 2021-04-23 NOTE — RESULT ENCOUNTER NOTE
Addressed during office visit today, A1c 5.9 slightly worsening diabetes but very well controlled,  continue treatment recommended during the office visit

## 2021-04-24 PROBLEM — I50.32 CHRONIC DIASTOLIC HEART FAILURE (HCC): Status: ACTIVE | Noted: 2021-02-25

## 2021-04-24 PROBLEM — I50.33 ACUTE ON CHRONIC DIASTOLIC HEART FAILURE (HCC): Status: ACTIVE | Noted: 2021-02-25

## 2021-04-24 PROBLEM — H93.13 BILATERAL TINNITUS: Status: ACTIVE | Noted: 2021-04-24

## 2021-04-24 PROBLEM — K92.1 MELENA: Status: RESOLVED | Noted: 2020-03-19 | Resolved: 2021-04-24

## 2021-04-24 PROBLEM — R26.2 DIFFICULTY IN WALKING: Status: ACTIVE | Noted: 2021-04-24

## 2021-04-24 ASSESSMENT — ENCOUNTER SYMPTOMS: SHORTNESS OF BREATH: 1

## 2021-04-28 ENCOUNTER — OFFICE VISIT (OUTPATIENT)
Dept: BEHAVIORAL/MENTAL HEALTH CLINIC | Age: 66
End: 2021-04-28
Payer: MEDICARE

## 2021-04-28 DIAGNOSIS — F43.21 ADJUSTMENT DISORDER WITH DEPRESSED MOOD: Primary | ICD-10-CM

## 2021-04-28 PROCEDURE — 90791 PSYCH DIAGNOSTIC EVALUATION: CPT | Performed by: PSYCHOLOGIST

## 2021-04-28 NOTE — PROGRESS NOTES
Jazmin Pretty,  Ph.D.   Licensed Clinical Psychologist ([phone])    Visit Date: 4/28/2021   Time of appointment:  9:12a - 9:55a   Time spent with Patient: 43 minutes. This is patient's first appointment. Reason for Consult:  Depression and Stress     Referring Provider/PCP:    No ref. provider found  Marcie Leal MD      Pt provided informed consent for the behavioral health program. Discussed with patient model of service to include the limits of confidentiality (i.e. abuse reporting, suicide intervention, etc.) and short-term intervention focused approach. Pt indicated understanding. PRESENTING PROBLEM AND HISTORY  Dieter Coker is a 76 y.o. male who presents for new evaluation and treatment of depression and irritability. He has the following symptoms: depressed mood, anhedonia, psychomotor retardation, fatigue/lack of energy, lack of motivation, isolating self and anger/irritability. Onset of symptoms was approximately 1-2 years ago. Symptoms have been variable since that time. However, he reported that his mood definitely worsened at the start of the pandemic in March 2020, especially as it coincided with multiple back to back health issues. He stated that on March 9th, he was hospitalized for a week for pneumonia in both lungs. He stated, \"I was sicker than a dog\" and he suspected that he may have had COVID-19, but no testing was really being done at the time. He reported that he felt rushed out of the hospital and on the same day after being discharged, he lost consciousness in the bathroom at his home and was taken back to the hospital.  He stated that he was admitted that time for 8 days and required a blood transfusion as his hemoglobin levels were so low and he stated that he was having \"black and blue\" stools. He stated that in August/September 2020, he began experiencing excruciating pain in the testicle area after falling out of a chair.   He reported that he tried calling all of his healthcare providers, but no one was able to get him an appointment and he was reluctant to go to the ER due to the pandemic. He reported that he was in such severe pain and he was so frustrated with his inability to obtain medical care that he experienced some suicidal ideation, stating, \"That day I really wished I was dead. \"  However, he noted that at the time, he had no plan or intent to harm himself, he just wanted to get help and make the pain stop. He reported that he finally just went to his urologist's office and was able to be seen by the nurse practitioner. He stated that she had a CT and ultrasound done, but nothing was found. He stated that he felt better for a few weeks, but then the severe pain returned. He reported that he again struggled to make an actual appointment with one of his providers, so he went back to his urologist's office and asked to be seen by his actual urologist, Dr. Ivy Persaud, who was able to identify that he had a hernia and needed surgery. He reported that it took a few weeks to get the surgery done (per records surgery was in November 2020), which was very frustrating and upsetting because he remained in intense pain the entire time. He stated that after the surgery, he felt much better and the pain was virtually gone but was upset with the multiple months he had to suffer from the pain until he was finally able to get treatment. He noted that the surgeon also found that his gallbladder had multiple stones and advised him to have surgery to remove it. Per records, the surgery to remove his gallbladder was done in February 2021. He stated that after the gallbladder surgery, he had to have a drain tube for multiple weeks, which was stressful to deal with. Marleny Bates reported that all of these health issues and frustrations with getting medical care on top of dealing with the stress of the pandemic have taken a toll on his mood. Although he is generally feeling somewhat better compared to 2020, he stated that he still struggles with limited mobility in his shoulders which makes his daily functioning more challenging and stressful. He reported that in addition to feeling more depressed over the past year, he has noticed that he has become more irritable and short tempered, to which he stated, \"I'm just not that kind of person. \"  He stated he is easily bothered by even the \"littlest things,\" which distresses him because he has never been this irritable in the past.  He reported that he is also unhappy with his weight and the way it affects his physical functioning, which affects his mood. He reported that he is seeking therapy to help him find ways to improve his mood, especially because he does not really have anyone in his life to whom he can speak about issues. He denies current suicidal and homicidal ideation. Family history significant for substance abuse. Risk factors: negative life event (multiple health issues). Previous treatment includes NONE. He complains of the following medication side effects: none. MENTAL STATUS EXAM  Mood was dysthymic and irritable with calm affect. Suicidal ideation was denied. Homicidal ideation was denied. Hygiene was good . Dress was appropriate. Behavior was Within Normal Limits with observation and self-report of some difficulties ambulating (used walker for assistance). Attitude was Cooperative. Eye-contact was good. Speech: rate - WNL, rhythm -  WNL, volume - WNL  Verbalizations were goal directed and coherent. Thought processes were intact and goal-oriented without evidence of delusions, hallucinations, obsessions, or cayden; with no cognitive distortions. Associations were characterized by intact cognitive processes. Pt was orientated oriented to person, place, time, and general circumstances;  recent:  good.   Insight and judgment were estimated to be fair, AEB, a fair  understanding of cyclical maladaptive patterns, and the ability to use insight to inform behavior change. CURRENT MEDICATIONS    Current Outpatient Medications:     pregabalin (LYRICA) 200 MG capsule, Take 1 capsule by mouth 3 times daily for 90 days. , Disp: 270 capsule, Rfl: 0    glucose monitoring kit (FREESTYLE) monitoring kit, 1 kit by Does not apply route daily Please supply Patient with a glucose monitoring kit that insurance will cover. , Disp: 1 kit, Rfl: 0    Lancets 30G MISC, Testing once a day. Please dispense according to patients insurance., Disp: 100 each, Rfl: 3    blood glucose monitor strips, Testing once a day. Please dispense according to patients insurance., Disp: 100 strip, Rfl: 3    Alcohol Swabs PADS, Please dispense according to patients insurance/device. Testing once a day, Disp: 100 each, Rfl: 3    ammonium lactate (AMLACTIN) 12 % cream, APPLY TOPICALLY TO LEGS ONE TO TWO TIMES A DAY AS NEEDED, Disp: 1 Bottle, Rfl: 3    ondansetron (ZOFRAN) 4 MG tablet, Take every six hours as needed, Disp: 20 tablet, Rfl: 0    furosemide (LASIX) 40 MG tablet, Take 1 tablet by mouth daily Dose increased 1/12/2021, Disp: 90 tablet, Rfl: 3    lisinopril (PRINIVIL;ZESTRIL) 10 MG tablet, Take 1 tablet by mouth daily, Disp: 90 tablet, Rfl: 3    naloxone 4 MG/0.1ML LIQD nasal spray, 1 spray by Nasal route as needed for Opioid Reversal Patient needs counseling, Disp: 1 each, Rfl: 3    amLODIPine (NORVASC) 10 MG tablet, Take 1 tablet by mouth daily Dose increased 10/16/2020 due to high BP.  Correct dosage, Disp: 90 tablet, Rfl: 3    OXYGEN, With CPAP at night, Disp: , Rfl:     SYMBICORT 160-4.5 MCG/ACT AERO, Inhale 2 puffs into the lungs 2 times daily 10.2 inhaler, give 3 inhalers, Disp: 3 Inhaler, Rfl: 3    albuterol sulfate HFA (PROVENTIL HFA) 108 (90 Base) MCG/ACT inhaler, Inhale 2 puffs into the lungs every 6 hours as needed for Wheezing or Shortness of Breath, Disp: 3 Inhaler, Rfl: 1    pravastatin (PRAVACHOL) 80 MG tablet, Take 1 tablet by mouth every evening Dose increased  9/24/2019, Disp: 90 tablet, Rfl: 3    omeprazole (PRILOSEC) 40 MG delayed release capsule, Take 1 capsule by mouth every morning (before breakfast), Disp: 90 capsule, Rfl: 3    Respiratory Therapy Supplies (ADULT MASK) MISC, Needs to use mask daily due to coronavirus pandemic., Disp: 30 each, Rfl: 5    ipratropium-albuterol (DUONEB) 0.5-2.5 (3) MG/3ML SOLN nebulizer solution, Inhale 1 vial into the lungs 3 times daily, Disp: , Rfl:     Lift Chair MISC, by Does not apply route Patient has difficulty getting up from usual chair, Disp: 1 each, Rfl: 0    folbee plus (FOLBEE PLUS) TABS, Take 1 tablet by mouth daily, Disp: 90 tablet, Rfl: 3    aspirin EC 81 MG EC tablet, Take 1 tablet by mouth daily, Disp: 90 tablet, Rfl: 0     FAMILY MEDICAL/MH HISTORY   His family history includes Cancer in his father; Diabetes in his mother; Liver Cancer in his brother; Umairarl Kerieous in his brother. In regards to family mental health history, he reported that one of his sons had a past addiction problem with cocaine and marijuana, but it is sober now. Other than this, he stated that he is unaware of any significant mental health history in his family. PATIENT MENTAL HEALTH HISTORY  Pt reported that he has never in engaged in psychotherapy or psychiatric services before. He has never been tried on medications for mood. He denied any history of psychiatric hospitalization or history of suicide attempt. He stated that he has only experienced about 1 incident of suicidal ideation in [place] of 2020. He stated that he was in so much pain due to a hernia he \"wished\" he was dead, but denied any intent or specific plan to harm himself at that time. He denied any other significant suicidal ideation history.     PSYCHOSOCIAL HISTORY  Current living situation:   Pt lives with his wife in a home they have owned symptoms. Leticia Weeks Rosales Poor) was in agreement with recommendations. PHQ Scores 4/23/2021 1/11/2021 7/7/2020 3/18/2020 1/28/2020 1/8/2019 9/4/2018   PHQ2 Score 0 0 0 0 6 0 0   PHQ9 Score 0 0 0 0 17 0 0     Interpretation of Total Score Depression Severity: 1-4 = Minimal depression, 5-9 = Mild depression, 10-14 = Moderate depression, 15-19 = Moderately severe depression, 20-27 = Severe depression    How often pt has had thoughts of death or hurting self (if PHQ positive for depression):       No flowsheet data found. Interpretation of SANDRA-7 score: 5-9 = mild anxiety, 10-14 = moderate anxiety, 15+ = severe anxiety. Recommend referral to behavioral health for scores 10 or greater. DIAGNOSIS  Leticia Weeks was seen today for depression and stress. Diagnoses and all orders for this visit:    Adjustment disorder with depressed mood          INTERVENTION  Discussed self-care (sleep, nutrition, rewarding activities, social support, exercise), Established rapport, Supportive techniques, Emphasized self-care as important for managing overall health and Provided Psychoeducation re: depression and adjustment issues      PLAN  1)  Pt will return for a follow-up appointment with the [facility] in 3 weeks on 5/19/21 at [address]  Is interactive complexity present?   No  Reason:  N/A  Additional Supporting Information:  N/A       Electronically signed by Salvatore Jain, PhD on 4/28/21 at 9:12 AM EDT

## 2021-05-03 ENCOUNTER — OFFICE VISIT (OUTPATIENT)
Dept: ORTHOPEDIC SURGERY | Age: 66
End: 2021-05-03
Payer: MEDICARE

## 2021-05-03 VITALS — BODY MASS INDEX: 39.28 KG/M2 | RESPIRATION RATE: 16 BRPM | HEIGHT: 72 IN | WEIGHT: 290 LBS

## 2021-05-03 DIAGNOSIS — M54.2 NECK PAIN: Primary | ICD-10-CM

## 2021-05-03 PROCEDURE — 99213 OFFICE O/P EST LOW 20 MIN: CPT | Performed by: ORTHOPAEDIC SURGERY

## 2021-05-03 NOTE — PROGRESS NOTES
Testing once a day 100 each 3    ammonium lactate (AMLACTIN) 12 % cream APPLY TOPICALLY TO LEGS ONE TO TWO TIMES A DAY AS NEEDED 1 Bottle 3    ondansetron (ZOFRAN) 4 MG tablet Take every six hours as needed 20 tablet 0    furosemide (LASIX) 40 MG tablet Take 1 tablet by mouth daily Dose increased 1/12/2021 90 tablet 3    lisinopril (PRINIVIL;ZESTRIL) 10 MG tablet Take 1 tablet by mouth daily 90 tablet 3    naloxone 4 MG/0.1ML LIQD nasal spray 1 spray by Nasal route as needed for Opioid Reversal Patient needs counseling 1 each 3    amLODIPine (NORVASC) 10 MG tablet Take 1 tablet by mouth daily Dose increased 10/16/2020 due to high BP. Correct dosage 90 tablet 3    OXYGEN With CPAP at night      SYMBICORT 160-4.5 MCG/ACT AERO Inhale 2 puffs into the lungs 2 times daily 10.2 inhaler, give 3 inhalers 3 Inhaler 3    albuterol sulfate HFA (PROVENTIL HFA) 108 (90 Base) MCG/ACT inhaler Inhale 2 puffs into the lungs every 6 hours as needed for Wheezing or Shortness of Breath 3 Inhaler 1    pravastatin (PRAVACHOL) 80 MG tablet Take 1 tablet by mouth every evening Dose increased  9/24/2019 90 tablet 3    omeprazole (PRILOSEC) 40 MG delayed release capsule Take 1 capsule by mouth every morning (before breakfast) 90 capsule 3    Respiratory Therapy Supplies (ADULT MASK) MISC Needs to use mask daily due to coronavirus pandemic. 30 each 5    ipratropium-albuterol (DUONEB) 0.5-2.5 (3) MG/3ML SOLN nebulizer solution Inhale 1 vial into the lungs 3 times daily      Lift Chair MISC by Does not apply route Patient has difficulty getting up from usual chair 1 each 0    folbee plus (FOLBEE PLUS) TABS Take 1 tablet by mouth daily 90 tablet 3    aspirin EC 81 MG EC tablet Take 1 tablet by mouth daily (Patient not taking: Reported on 5/3/2021) 90 tablet 0     No current facility-administered medications for this visit. Allergies  Allergies have been reviewed.   Jluis Sigala is allergic to succinylcholine chloride and cymbalta

## 2021-05-12 ENCOUNTER — HOSPITAL ENCOUNTER (OUTPATIENT)
Dept: PHYSICAL THERAPY | Age: 66
Setting detail: THERAPIES SERIES
Discharge: HOME | End: 2021-05-12
Payer: MEDICARE

## 2021-05-12 PROCEDURE — 97161 PT EVAL LOW COMPLEX 20 MIN: CPT

## 2021-05-19 ENCOUNTER — OFFICE VISIT (OUTPATIENT)
Dept: BEHAVIORAL/MENTAL HEALTH CLINIC | Age: 66
End: 2021-05-19
Payer: MEDICARE

## 2021-05-19 ENCOUNTER — HOSPITAL ENCOUNTER (OUTPATIENT)
Age: 66
Setting detail: SPECIMEN
Discharge: HOME | End: 2021-05-19
Payer: MEDICARE

## 2021-05-19 DIAGNOSIS — C61 PROSTATE CANCER (HCC): ICD-10-CM

## 2021-05-19 DIAGNOSIS — F43.21 ADJUSTMENT DISORDER WITH DEPRESSED MOOD: Primary | ICD-10-CM

## 2021-05-19 LAB
PROSTATE SPECIFIC ANTIGEN: 0.17 UG/L
TESTOSTERONE TOTAL: 11 NG/DL (ref 220–1000)

## 2021-05-19 PROCEDURE — 90837 PSYTX W PT 60 MINUTES: CPT | Performed by: PSYCHOLOGIST

## 2021-05-19 PROCEDURE — 84403 ASSAY OF TOTAL TESTOSTERONE: CPT

## 2021-05-19 PROCEDURE — 36415 COLL VENOUS BLD VENIPUNCTURE: CPT

## 2021-05-19 PROCEDURE — 84153 ASSAY OF PSA TOTAL: CPT

## 2021-05-19 NOTE — PROGRESS NOTES
Moshe Morgan,  Ph.D.   Licensed Clinical Psychologist ([phone])    Visit Date: 5/19/2021   Time of appointment:  1:07p - 2:03p   Time spent with Patient: 56 minutes. This is patient's second appointment. Reason for Consult:  Depression and Stress (chronic pain)     Referring Provider/PCP:    No ref. provider found  Patricia Dias MD      Pt provided informed consent for the behavioral health program. Discussed with patient model of service to include the limits of confidentiality (i.e. abuse reporting, suicide intervention, etc.) and short-term intervention focused approach. Pt indicated understanding. SUBJECTIVE  Chaparro Ledesma) is a 76 y.o. male who presents for follow up of depression and irritable mood. He reported that his mood over the past few week has generally been \"alright\" and that he has not had any recent intense bouts of mood disturbance. However, he reported that he continues to experience \"so much pain\" that he still struggles with feeling irritable and down at times. He reported that in session, he was experiencing most of his pain in his shoulders, but that he also has chronic pain in his lower back and from neuropathy in his hands and feet. He reported that he had shots in his lower back a couple weeks ago, but the pain relief only lasted about a couple days. He stated that his next injections in his back will be this Friday. He reported that his orthopedic surgeon wanted to do another surgery in his left shoulder, but it was cancelled after insurance denied coverage to stay overnight at the hospital as the surgeon wanted. He reported that he has an MRI ordered for his neck to look for a potential pinched nerve and his surgeon will decide what to do next after the results of the MRI. He reported that dealing with the chronic pain and trying to get help to manage it have been very frustrating and he's worried that nothing will ever really work. He stated that he is hardly able to do anything any more because of the physical limitations caused by his ongoing health issues, which has greatly affected his mood. Previous Recommendations:   1)  Pt will return for a follow-up appointment with the [address] [place] [address] in 3 weeks on 5/19/21 at [address] was depressed and irritable with calm affect. Suicidal ideation was denied. Homicidal ideation was denied. Hygiene was good . Dress was appropriate. Behavior was Within Normal Limits with observation and self-report of some difficulties ambulating (used walker for assistance). Attitude was Cooperative. Eye-contact was good. Speech: rate - WNL, rhythm - WNL, volume - WNL. Verbalizations were goal directed and coherent. Thought processes were intact and goal-oriented without evidence of delusions, hallucinations, obsessions, or cayden; with no cognitive distortions. Associations were characterized by intact cognitive processes. Pt was orientated oriented to person, place, time, and general circumstances;  recent:  good. Insight and judgment were estimated to be fair, AEB, a fair  understanding of cyclical maladaptive patterns, and the ability to use insight to inform behavior change. ASSESSMENT  Cordell Howard presented to the appointment today for evaluation and treatment of symptoms of depression and irritable mood. He is currently deemed no risk to himself or others and meets criteria for Adjustment Disorder with Depressed Mood. He responded very well to interventions, including modeling and practicing deep breathing to manage episodes of irritability. He was also encouraged to look up videos on [employer] specifically for guided meditation to manage chronic pain. He reported that he is familiar with how to use [employer].   Identifying and engaging in enjoyable and/or meaningful activities that he is still able to do despite his physical limitations was discussed and strongly encouraged to help boost mood and combat depression. Hortencia Tsang stated that he likes doing puzzles and is interested in trying \"pain by numbers. \"  Hortencia Tasng was in agreement with recommendations. PHQ Scores 4/23/2021 1/11/2021 7/7/2020 3/18/2020 1/28/2020 1/8/2019 9/4/2018   PHQ2 Score 0 0 0 0 6 0 0   PHQ9 Score 0 0 0 0 17 0 0     Interpretation of Total Score Depression Severity: 1-4 = Minimal depression, 5-9 = Mild depression, 10-14 = Moderate depression, 15-19 = Moderately severe depression, 20-27 = Severe depression    How often pt has had thoughts of death or hurting self (if PHQ positive for depression):       No flowsheet data found. Interpretation of SANDRA-7 score: 5-9 = mild anxiety, 10-14 = moderate anxiety, 15+ = severe anxiety. Recommend referral to behavioral health for scores 10 or greater. DIAGNOSIS  Jean Pierre Chen was seen today for depression and stress. Diagnoses and all orders for this visit:    Adjustment disorder with depressed mood          INTERVENTION  Trained in relaxation strategies, Discussed self-care (sleep, nutrition, rewarding activities, social support, exercise), Supportive techniques, Emphasized self-care as important for managing overall health, Provided Psychoeducation re: depression and chronic pain and Explained relaxed breathing technique in detail and practiced this with pt in visit      PLAN  1)  Pt will practice deep breathing and try guided meditation videos to help with irritable mood and manage chronic pain. 2)  Pt will work on identifying and engaging in more enjoyable and meaningful activities that he is still able to do despite physical limitations to help combat depressed mood. 3)  Pt will return for a follow-up appointment with the [facility] in 4 weeks on 6/16/21 (soonest available appt with [facility]) at [address] [place] [address]  Is interactive complexity present?   No  Reason:  N/A  Additional Supporting Information:  N/A       Electronically signed by Reddy Dalton Mary Carmen Ley, PhD on 5/19/21 at 1:05 PM EDT

## 2021-05-24 ENCOUNTER — TELEPHONE (OUTPATIENT)
Dept: UROLOGY | Age: 66
End: 2021-05-24

## 2021-05-24 ENCOUNTER — OFFICE VISIT (OUTPATIENT)
Dept: UROLOGY | Age: 66
End: 2021-05-24
Payer: MEDICARE

## 2021-05-24 VITALS — HEART RATE: 54 BPM | DIASTOLIC BLOOD PRESSURE: 53 MMHG | TEMPERATURE: 97.1 F | SYSTOLIC BLOOD PRESSURE: 108 MMHG

## 2021-05-24 DIAGNOSIS — Z79.818 ANDROGEN DEPRIVATION THERAPY: ICD-10-CM

## 2021-05-24 DIAGNOSIS — R35.0 FREQUENCY OF MICTURITION: ICD-10-CM

## 2021-05-24 DIAGNOSIS — R23.2 HOT FLASHES: ICD-10-CM

## 2021-05-24 DIAGNOSIS — R35.1 NOCTURIA: ICD-10-CM

## 2021-05-24 DIAGNOSIS — C61 PROSTATE CANCER (HCC): Primary | ICD-10-CM

## 2021-05-24 PROCEDURE — 96402 CHEMO HORMON ANTINEOPL SQ/IM: CPT | Performed by: UROLOGY

## 2021-05-24 PROCEDURE — 99214 OFFICE O/P EST MOD 30 MIN: CPT | Performed by: UROLOGY

## 2021-05-24 RX ORDER — OXYCODONE HYDROCHLORIDE AND ACETAMINOPHEN 5; 325 MG/1; MG/1
1 TABLET ORAL EVERY 8 HOURS PRN
COMMUNITY

## 2021-05-24 ASSESSMENT — ENCOUNTER SYMPTOMS
NAUSEA: 0
DIARRHEA: 0
WHEEZING: 0
COUGH: 0
VOMITING: 0
EYE REDNESS: 0
CONSTIPATION: 0
ABDOMINAL PAIN: 0
EYE PAIN: 0
SHORTNESS OF BREATH: 0
BACK PAIN: 0

## 2021-05-24 NOTE — TELEPHONE ENCOUNTER
Spoke with Ofelia Dillard with [facility] Approved for [facility] 6mnth  Duration: 5/24/21 through 11/24/21  [facility][id number]

## 2021-05-24 NOTE — PROGRESS NOTES
[address] [address]  Dept: [address] [facility] Urology Office Note - Established    Patient:  Agustín Bronson  YOB: 1955  Date: 5/24/2021    The patient is a 76 y.o. male who presents todayfor evaluation of the following problems:   Chief Complaint   Patient presents with    6 Month Follow-Up       HPI  This is a 78-year-old gentleman with a history of prostate cancer. He has been on androgen deprivation therapy with Eligard. He does continue to get occasional hot flashes. His recent PSA is 0.17. He is due for his Eligard today. Summary of old records: N/A    Additional History: N/A    Procedures Today: N/A    Urinalysis today:  No results found for this visit on 05/24/21. Last several PSA's:  Lab Results   Component Value Date    PSA 0.17 05/19/2021    PSA 0.03 11/16/2020    PSA <0.01 05/14/2020     Last total testosterone:  Lab Results   Component Value Date    TESTOSTERONE 11 (L) 05/19/2021       AUA Symptom Score (5/24/2021):   INCOMPLETE EMPTYING: How often have you had the sensation of not emptying your bladder?: Not at all  FREQUENCY: How often do you have to urinate less than every two hours?: Not at all  INTERMITTENCY: How often have you found you stopped and started again several times when you urinated?: Not at all  URGENCY: How often have you found it difficult to postpone urination?: Not at all  WEAK STREAM: How often have you had a weak urinary stream?: Not at all  STRAINING: How often have you had to strain to start  urination?: Not at all  NOCTURIA: How many times did you typically get up at night to uriniate?: 1 Time  TOTAL I-PSS SCORE[de-identified] 1  How would you feel if you were to spend the rest of your life with your urinary condition?: Pleased    Last BUN and creatinine:  Lab Results   Component Value Date    BUN 11 03/02/2021     Lab Results   Component Value Date    CREATININE 0.72 03/02/2021       Additional Lab/Culture results: none    Imaging Reviewed during this Office Visit: none  (results were independently reviewed by physician and radiology report verified)    PAST MEDICAL, FAMILY AND SOCIAL HISTORY UPDATE:  Past Medical History:   Diagnosis Date    Acid reflux     Anemia, blood loss 10/7/2018    Arthritis     C. difficile colitis 3/19/2020    Chronic bilateral low back pain with bilateral sciatica 11/3/2016    Chronic diastolic heart failure ([address] 2/25/2021    Complete tear of left rotator cuff 7/18/2018    COPD (chronic obstructive pulmonary disease) ([address]     Coronary artery disease involving native coronary artery of native heart without angina pectoris 2/2/2020    Degenerative disc disease, cervical 7/28/2019    GI bleed 3/19/2020    Gout     H/O cardiac catheterization     yrs ago   no stents    History of blood transfusion     Hyperlipidemia     Hyperlipidemia with target LDL less than 100 1/20/2016    Hyperparathyroidism ([address] 1/17/2020    Hypertension     Knee pain, chronic     left    Liver disease     MDRO (multiple drug resistant organisms) resistance     Melena 3/19/2020    Memory loss     Moderate episode of recurrent major depressive disorder ([address] 1/20/2016    Obesity, Class III, BMI 40-49.9 (morbid obesity) ([address] 1/20/2016    REYNALDO on CPAP 5/6/2017    Osteoarthritis     Peripheral arterial occlusive disease ([address] 3/25/2017    Pneumonia 3/9/2020    Polypharmacy 3/25/2017    Positive FIT (fecal immunochemical test) 4/11/2017    Prolonged emergence from general anesthesia 01/05/2017    Patient \"on life support for 3 days\" after back surgery due to being given succinylcholine    Prostate CA ([address] 1/20/2016    Prostate cancer ([address] 10/2013    finished radiation tx 5/2014    Pseudocholinesterase deficiency 03/25/2017    Pt.  \"on life support\" for 3 days after back surgery 1/5/17 after being given succinylcholine    Severe obesity (BMI 35.0-35.9 with comorbidity) ([facility] 2/2/2020    Short of breath on exertion     Sleep apnea     uses CPAP machine nightly    Status post lumbar laminectomy 3/25/2017    Stenosis of left carotid artery 10/7/2018    Syncope and collapse 3/19/2020    Tubular adenoma of colon 4/11/17 5/13/2017    Type 2 diabetes mellitus with hyperglycemia, without long-term current use of insulin ([facility] 3/25/2017    Lab Results Component Value Date  LABA1C 7.1 (H) 03/23/2017     Unintended weight loss 10/7/2018    Vitamin D deficiency 3/25/2017    Wears glasses      Past Surgical History:   Procedure Laterality Date    BACK SURGERY      x 2     BACK SURGERY  01/05/2017    lumbar laminectomy L2, L3, L4    BRONCHOSCOPY N/A 3/13/2020    BRONCHOSCOPY performed by Jan Lindsay MD at [address] [address]    no stents    CAROTID ENDARTERECTOMY Right 12/16/2019    Dr. Joe Huang Bilateral     CHOLECYSTECTOMY, LAPAROSCOPIC N/A 2/16/2021    CHOLECYSTECTOMY LAPAROSCOPIC ROBOTIC XI performed by Cristobal Phoenix, MD at [address] [facility], COLON, DIAGNOSTIC     [place] [facility] Left 11/18/2020    HERNIA INGUINAL REPAIR [address] OPEN performed by Cristobal Phoenix, MD at [address] [facility] Left     LUMBAR LAMINECTOMY  01/05/2017    L2-L4    [place] CLOSED RX SHLDR DISLOC,ANESTHESIA Left 8/1/2018    SHOULDER CLOSED REDUCTION WITH C-ARM VISUALIZATION performed by Juana Lundberg MD at [facility] N/A 5/9/2017    COLONOSCOPY WITH BIOPSY performed by Bk Ocampo DO at [address] [facility] [address] IMPLANT Left 7/18/2018    SHOULDER TOTAL ARTHROPLASTY REVERSE LEFT DJO & BICEP TENDON TRANSFER performed by Juana Lundberg MD at [address] HUMERAL/GLENOID COMPNT Left 8/3/2018    SHOULDER TOTAL REVERSE  ARTHROPLASTY REVISION performed by Juana Lundberg MD at [facility] OR    SHOULDER SURGERY Right 1989? rotator cuff repair    SHOULDER SURGERY Left 10/15/2020    SHOULDER ARTHROSCOPY W/INTEROP CULTURES performed by Marianna Bonner MD at [address] [place] [address]      ear, forehead    TONSILLECTOMY AND ADENOIDECTOMY      UPPER GASTROINTESTINAL ENDOSCOPY N/A 3/19/2020    EGD BIOPSY performed by Germaine Morton MD at [facility]     Family History   Problem Relation Age of Onset    Diabetes Mother     Lung Cancer Brother     Liver Cancer Brother     Cancer Father      Outpatient Medications Marked as Taking for the 5/24/21 encounter (Office Visit) with Bettye Epley, MD   Medication Sig Dispense Refill    oxyCODONE-Acetaminophen (PERCOCET PO) Take by mouth as needed      pregabalin (LYRICA) 200 MG capsule Take 1 capsule by mouth 3 times daily for 90 days. 270 capsule 0    glucose monitoring kit (FREESTYLE) monitoring kit 1 kit by Does not apply route daily Please supply Patient with a glucose monitoring kit that insurance will cover. 1 kit 0    Lancets 30G MISC Testing once a day. Please dispense according to patients insurance. 100 each 3    blood glucose monitor strips Testing once a day. Please dispense according to patients insurance. 100 strip 3    Alcohol Swabs PADS Please dispense according to patients insurance/device.  Testing once a day 100 each 3    ammonium lactate (AMLACTIN) 12 % cream APPLY TOPICALLY TO LEGS ONE TO TWO TIMES A DAY AS NEEDED 1 Bottle 3    ondansetron (ZOFRAN) 4 MG tablet Take every six hours as needed 20 tablet 0    furosemide (LASIX) 40 MG tablet Take 1 tablet by mouth daily Dose increased 1/12/2021 90 tablet 3    lisinopril (PRINIVIL;ZESTRIL) 10 MG tablet Take 1 tablet by mouth daily 90 tablet 3    naloxone 4 MG/0.1ML LIQD nasal spray 1 spray by Nasal route as needed for Opioid Reversal Patient needs counseling 1 each 3    amLODIPine (NORVASC) 10 MG tablet Take 1 tablet by mouth daily Dose increased 10/16/2020 due to high BP. Correct dosage 90 tablet 3    OXYGEN With CPAP at night      SYMBICORT 160-4.5 MCG/ACT AERO Inhale 2 puffs into the lungs 2 times daily 10.2 inhaler, give 3 inhalers 3 Inhaler 3    albuterol sulfate HFA (PROVENTIL HFA) 108 (90 Base) MCG/ACT inhaler Inhale 2 puffs into the lungs every 6 hours as needed for Wheezing or Shortness of Breath 3 Inhaler 1    pravastatin (PRAVACHOL) 80 MG tablet Take 1 tablet by mouth every evening Dose increased  9/24/2019 90 tablet 3    omeprazole (PRILOSEC) 40 MG delayed release capsule Take 1 capsule by mouth every morning (before breakfast) 90 capsule 3    Respiratory Therapy Supplies (ADULT MASK) MISC Needs to use mask daily due to coronavirus pandemic. 30 each 5    ipratropium-albuterol (DUONEB) 0.5-2.5 (3) MG/3ML SOLN nebulizer solution Inhale 1 vial into the lungs 3 times daily      Lift Chair MISC by Does not apply route Patient has difficulty getting up from usual chair 1 each 0    folbee plus (FOLBEE PLUS) TABS Take 1 tablet by mouth daily 90 tablet 3    aspirin EC 81 MG EC tablet Take 1 tablet by mouth daily 90 tablet 0       Succinylcholine chloride and Cymbalta [duloxetine hcl]  Social History     Tobacco Use   Smoking Status Current Every Day Smoker    Packs/day: 0.75    Years: 35.00    Pack years: 26.25    Types: Cigarettes   Smokeless Tobacco Never Used   Tobacco Comment    hx of 1-1.5 PPD, currently 0.5 ppd     (Ifpatient a smoker, smoking cessation counseling offered)    Social History     Substance and Sexual Activity   Alcohol Use Not Currently    Alcohol/week: 0.0 standard drinks       REVIEW OF SYSTEMS:  Review of Systems    Physical Exam:      Vitals:    05/24/21 1015   BP: (!) 108/53   Pulse: 54   Temp: 97.1 °F (36.2 °C)     There is no height or weight on file to calculate BMI. Patient is a 76 y.o. male in no acute distress and alert and oriented to person, place and time. Physical Exam  Constitutional: Patient in no acute distress.   Neuro: Alert and oriented to person, place and time. Assessment and Plan      1. Prostate cancer ([facility]    2. Androgen deprivation therapy    3. Hot flashes    4. Frequency of micturition    5. Nocturia           Plan:   Cont eligard - give shot today  F/u 6 mo psa      Return in about 6 months (around 11/24/2021) for psa. Prescriptions Ordered:  No orders of the defined types were placed in this encounter. Orders Placed:  Orders Placed This Encounter   Procedures    PSA, Diagnostic     Standing Status:   Future     Standing Expiration Date:   5/24/2022           Marcello Durán MD    Agree with the ROS entered by the MA.

## 2021-05-29 ENCOUNTER — HOSPITAL ENCOUNTER (OUTPATIENT)
Dept: MRI IMAGING | Age: 66
Discharge: HOME | End: 2021-05-31
Payer: MEDICARE

## 2021-05-29 DIAGNOSIS — M54.2 NECK PAIN: ICD-10-CM

## 2021-05-29 PROCEDURE — 72141 MRI NECK SPINE W/O DYE: CPT

## 2021-06-03 ENCOUNTER — TELEPHONE (OUTPATIENT)
Dept: ORTHOPEDIC SURGERY | Age: 66
End: 2021-06-03

## 2021-06-03 NOTE — TELEPHONE ENCOUNTER
Patient completed C-spine MRI on 5/29/21. Results below. Please advise on next steps. Multilevel degenerative disc disease with uncovertebral and facet hypertrophy   resulting in bilateral foraminal narrowing as described above.       Minimal edema involving the left C4-5 facet joint that may relate to an   infectious or inflammatory arthritis.

## 2021-06-07 NOTE — TELEPHONE ENCOUNTER
Please inform the patient I reviewed his C spin MRI study and it demonstrates right foraminal stenosis at the C3-4 level, bilateral foraminal stenosis at C4-5 level and left foraminal stenosis at C5-6 level. This may be contributing to his pain and weakness involving the deltoid. I would recommend for assessment by pain management for injections to see if this alleviates his symptoms.

## 2021-06-10 NOTE — TELEPHONE ENCOUNTER
Called patient's cell phone, mailbox full  Called patient's home number and left voice message for patient to call us back for results.

## 2021-06-16 ENCOUNTER — VIRTUAL VISIT (OUTPATIENT)
Dept: BEHAVIORAL/MENTAL HEALTH CLINIC | Age: 66
End: 2021-06-16
Payer: MEDICARE

## 2021-06-16 DIAGNOSIS — F43.21 ADJUSTMENT DISORDER WITH DEPRESSED MOOD: Primary | ICD-10-CM

## 2021-06-16 PROCEDURE — 98968 PH1 ASSMT&MGMT NQHP 21-30: CPT | Performed by: PSYCHOLOGIST

## 2021-06-16 NOTE — PROGRESS NOTES
today and his doctor said he would work on appealing the insurance company's decision. He reported that he is scheduled to see his pain management doctor in one month to discuss treatment options if insurance continues to deny covering the procedure. He reported that it has been incredibly frustrating having to deal with healthcare and feeling as though everything takes so long to get done and not being able to get help more quickly. He stated, \"there's nothing more I can really do about it,\" which has made him feel more helpless. Monique Zhou reported that his physician is prescribing pain medication, but that \"60-70% of the time\" he does NOT feel like the medication is able to [facility] take the edge off\" his pain level. He reported that he has not tried to look up videos on YouTube about guided meditation for managing chronic pain and admitted that this is mostly because he's not familiar with technology. He stated that he has a tablet that he has learned to play games on and knows how to check his email, but does not know how to do much more. Previous Recommendations:   1)  Pt will practice deep breathing and try guided meditation videos to help with irritable mood and manage chronic pain. 2)  Pt will work on identifying and engaging in more enjoyable and meaningful activities that he is still able to do despite physical limitations to help combat depressed mood. 3)  Pt will return for a follow-up appointment with the [facility] in 4 weeks on 6/16/21 (soonest available appt with [facility]) at [address] [place], [place] [address] was depressed and irritable with congruent affect. Suicidal ideation was denied. Homicidal ideation was denied. Hygiene was NOT OBSERVED. Dress was NOT OBSERVED. Behavior was NOT OBSERVED with self-report of difficulties ambulating. Attitude was Cooperative. Eye-contact was NOT OBSERVED. Speech: rate - WNL, rhythm - WNL, volume - WNL.   Verbalizations were goal directed and coherent. Thought processes were intact and goal-oriented without evidence of delusions, hallucinations, obsessions, or cayden; with no cognitive distortions. Associations were characterized by intact cognitive processes. Pt was orientated oriented to person, place, time, and general circumstances;  recent:  good. Insight and judgment were estimated to be fair, AEB, a fair  understanding of cyclical maladaptive patterns, and the ability to use insight to inform behavior change. ASSESSMENT  Harmonyrenee Ornelasalbertviviana) presented to the appointment today for evaluation and treatment of symptoms of depression and irritable mood, mostly related to chronic pain. He is currently deemed no risk to himself or others and meets criteria for Adjustment Disorder with depressed mood. He responded relatively well to interventions, including discussing how to use [employer]. It was discussed that the [facility] can email video links to [employer] videos on meditation for chronic pain, which he was encouraged to try at least a few times. Marleny Bates was in agreement with recommendations. PHQ Scores 4/23/2021 1/11/2021 7/7/2020 3/18/2020 1/28/2020 1/8/2019 9/4/2018   PHQ2 Score 0 0 0 0 6 0 0   PHQ9 Score 0 0 0 0 17 0 0     Interpretation of Total Score Depression Severity: 1-4 = Minimal depression, 5-9 = Mild depression, 10-14 = Moderate depression, 15-19 = Moderately severe depression, 20-27 = Severe depression    How often pt has had thoughts of death or hurting self (if PHQ positive for depression):       No flowsheet data found. Interpretation of SANDRA-7 score: 5-9 = mild anxiety, 10-14 = moderate anxiety, 15+ = severe anxiety. Recommend referral to behavioral health for scores 10 or greater. DIAGNOSIS  Bob Stephens was seen today for depression and stress.     Diagnoses and all orders for this visit:    Adjustment disorder with depressed mood          INTERVENTION  Discussed self-care (sleep, nutrition, rewarding activities, social support, exercise), Supportive techniques, Emphasized self-care as important for managing overall health and Provided Psychoeducation re: the use of meditation for chronic pain      PLAN  1)  Pt will practice deep breathing and try guided meditation videos to help with irritable mood and manage chronic pain. [facility] will email links to [employer] on meditation. 2)  Pt will work on identifying and engaging in more enjoyable and meaningful activities that he is still able to do despite physical limitations to help combat depressed mood. 3)  Pt will return for a follow-up appointment with the [facility] in 4 weeks on 7/14/21 (soonest available appt with [facility]) at [address] [place] [address],[address]  Is interactive complexity present?   No  Reason:  N/A  Additional Supporting Information:  N/A       Electronically signed by Araceli Jasmine, PhD on 6/16/21 at 3:14 PM EDT Statement Selected

## 2021-07-08 ENCOUNTER — APPOINTMENT (OUTPATIENT)
Dept: GENERAL RADIOLOGY | Age: 66
DRG: 190 | End: 2021-07-08
Payer: MEDICARE

## 2021-07-08 ENCOUNTER — HOSPITAL ENCOUNTER (INPATIENT)
Age: 66
LOS: 4 days | Discharge: HOME | DRG: 190 | End: 2021-07-12
Attending: STUDENT IN AN ORGANIZED HEALTH CARE EDUCATION/TRAINING PROGRAM | Admitting: INTERNAL MEDICINE
Payer: MEDICARE

## 2021-07-08 DIAGNOSIS — J40 BRONCHITIS: Primary | ICD-10-CM

## 2021-07-08 DIAGNOSIS — E11.42 TYPE 2 DIABETES MELLITUS WITH DIABETIC POLYNEUROPATHY, WITHOUT LONG-TERM CURRENT USE OF INSULIN (HCC): ICD-10-CM

## 2021-07-08 DIAGNOSIS — J44.1 COPD EXACERBATION (HCC): ICD-10-CM

## 2021-07-08 DIAGNOSIS — I50.32 CHRONIC DIASTOLIC HEART FAILURE (HCC): ICD-10-CM

## 2021-07-08 DIAGNOSIS — J44.9 COPD MIXED TYPE (HCC): ICD-10-CM

## 2021-07-08 DIAGNOSIS — J96.11 CHRONIC HYPOXEMIC RESPIRATORY FAILURE (HCC): ICD-10-CM

## 2021-07-08 LAB
ABSOLUTE EOS #: 0.2 K/UL (ref 0–0.4)
ABSOLUTE IMMATURE GRANULOCYTE: ABNORMAL K/UL (ref 0–0.3)
ABSOLUTE LYMPH #: 1.7 K/UL (ref 1–4.8)
ABSOLUTE MONO #: 0.6 K/UL (ref 0.1–1.3)
ALBUMIN SERPL-MCNC: 4.3 G/DL (ref 3.5–5.2)
ALBUMIN/GLOBULIN RATIO: ABNORMAL (ref 1–2.5)
ALP BLD-CCNC: 115 U/L (ref 40–129)
ALT SERPL-CCNC: 34 U/L (ref 5–41)
ANION GAP SERPL CALCULATED.3IONS-SCNC: 11 MMOL/L (ref 9–17)
AST SERPL-CCNC: 31 U/L
BASOPHILS # BLD: 1 % (ref 0–2)
BASOPHILS ABSOLUTE: 0 K/UL (ref 0–0.2)
BILIRUB SERPL-MCNC: 0.34 MG/DL (ref 0.3–1.2)
BUN BLDV-MCNC: 7 MG/DL (ref 8–23)
BUN/CREAT BLD: ABNORMAL (ref 9–20)
CALCIUM SERPL-MCNC: 9.4 MG/DL (ref 8.6–10.4)
CHLORIDE BLD-SCNC: 103 MMOL/L (ref 98–107)
CO2: 27 MMOL/L (ref 20–31)
CREAT SERPL-MCNC: 0.55 MG/DL (ref 0.7–1.2)
DIFFERENTIAL TYPE: ABNORMAL
EOSINOPHILS RELATIVE PERCENT: 3 % (ref 0–4)
GFR AFRICAN AMERICAN: >60 ML/MIN
GFR NON-AFRICAN AMERICAN: >60 ML/MIN
GFR SERPL CREATININE-BSD FRML MDRD: ABNORMAL ML/MIN/{1.73_M2}
GFR SERPL CREATININE-BSD FRML MDRD: ABNORMAL ML/MIN/{1.73_M2}
GLUCOSE BLD-MCNC: 112 MG/DL (ref 70–99)
HCT VFR BLD CALC: 45.5 % (ref 41–53)
HEMOGLOBIN: 15.2 G/DL (ref 13.5–17.5)
IMMATURE GRANULOCYTES: ABNORMAL %
LYMPHOCYTES # BLD: 22 % (ref 24–44)
MCH RBC QN AUTO: 29.1 PG (ref 26–34)
MCHC RBC AUTO-ENTMCNC: 33.4 G/DL (ref 31–37)
MCV RBC AUTO: 87 FL (ref 80–100)
MONOCYTES # BLD: 8 % (ref 1–7)
NRBC AUTOMATED: ABNORMAL PER 100 WBC
PDW BLD-RTO: 15 % (ref 11.5–14.9)
PLATELET # BLD: 167 K/UL (ref 150–450)
PLATELET ESTIMATE: ABNORMAL
PMV BLD AUTO: 9.1 FL (ref 6–12)
POTASSIUM SERPL-SCNC: 3.8 MMOL/L (ref 3.7–5.3)
PROCALCITONIN: 0.08 NG/ML
RBC # BLD: 5.23 M/UL (ref 4.5–5.9)
RBC # BLD: ABNORMAL 10*6/UL
SARS-COV-2, RAPID: NOT DETECTED
SEG NEUTROPHILS: 66 % (ref 36–66)
SEGMENTED NEUTROPHILS ABSOLUTE COUNT: 4.9 K/UL (ref 1.3–9.1)
SODIUM BLD-SCNC: 141 MMOL/L (ref 135–144)
SPECIMEN DESCRIPTION: NORMAL
TOTAL PROTEIN: 7.9 G/DL (ref 6.4–8.3)
TROPONIN INTERP: ABNORMAL
TROPONIN INTERP: NORMAL
TROPONIN T: ABNORMAL NG/ML
TROPONIN T: NORMAL NG/ML
TROPONIN, HIGH SENSITIVITY: 22 NG/L (ref 0–22)
TROPONIN, HIGH SENSITIVITY: 23 NG/L (ref 0–22)
WBC # BLD: 7.4 K/UL (ref 3.5–11)
WBC # BLD: ABNORMAL 10*3/UL

## 2021-07-08 PROCEDURE — 93005 ELECTROCARDIOGRAM TRACING: CPT | Performed by: STUDENT IN AN ORGANIZED HEALTH CARE EDUCATION/TRAINING PROGRAM

## 2021-07-08 PROCEDURE — 6370000000 HC RX 637 (ALT 250 FOR IP): Performed by: STUDENT IN AN ORGANIZED HEALTH CARE EDUCATION/TRAINING PROGRAM

## 2021-07-08 PROCEDURE — 2700000000 HC OXYGEN THERAPY PER DAY

## 2021-07-08 PROCEDURE — 84484 ASSAY OF TROPONIN QUANT: CPT

## 2021-07-08 PROCEDURE — 6360000002 HC RX W HCPCS

## 2021-07-08 PROCEDURE — 84145 PROCALCITONIN (PCT): CPT

## 2021-07-08 PROCEDURE — 80053 COMPREHEN METABOLIC PANEL: CPT

## 2021-07-08 PROCEDURE — 99285 EMERGENCY DEPT VISIT HI MDM: CPT

## 2021-07-08 PROCEDURE — 2580000003 HC RX 258

## 2021-07-08 PROCEDURE — 87635 SARS-COV-2 COVID-19 AMP PRB: CPT

## 2021-07-08 PROCEDURE — 96374 THER/PROPH/DIAG INJ IV PUSH: CPT

## 2021-07-08 PROCEDURE — 94640 AIRWAY INHALATION TREATMENT: CPT

## 2021-07-08 PROCEDURE — 36415 COLL VENOUS BLD VENIPUNCTURE: CPT

## 2021-07-08 PROCEDURE — 94664 DEMO&/EVAL PT USE INHALER: CPT

## 2021-07-08 PROCEDURE — 6370000000 HC RX 637 (ALT 250 FOR IP)

## 2021-07-08 PROCEDURE — 2060000000 HC ICU INTERMEDIATE R&B

## 2021-07-08 PROCEDURE — 94761 N-INVAS EAR/PLS OXIMETRY MLT: CPT

## 2021-07-08 PROCEDURE — 2580000003 HC RX 258: Performed by: INTERNAL MEDICINE

## 2021-07-08 PROCEDURE — 85025 COMPLETE CBC W/AUTO DIFF WBC: CPT

## 2021-07-08 PROCEDURE — 6360000002 HC RX W HCPCS: Performed by: STUDENT IN AN ORGANIZED HEALTH CARE EDUCATION/TRAINING PROGRAM

## 2021-07-08 PROCEDURE — 71045 X-RAY EXAM CHEST 1 VIEW: CPT

## 2021-07-08 RX ORDER — ALBUTEROL SULFATE 90 UG/1
2 AEROSOL, METERED RESPIRATORY (INHALATION) EVERY 6 HOURS PRN
Status: DISCONTINUED | OUTPATIENT
Start: 2021-07-08 | End: 2021-07-12 | Stop reason: HOSPADM

## 2021-07-08 RX ORDER — AMLODIPINE BESYLATE 10 MG/1
10 TABLET ORAL DAILY
Status: DISCONTINUED | OUTPATIENT
Start: 2021-07-08 | End: 2021-07-12 | Stop reason: HOSPADM

## 2021-07-08 RX ORDER — OXYCODONE HYDROCHLORIDE AND ACETAMINOPHEN 5; 325 MG/1; MG/1
1 TABLET ORAL 2 TIMES DAILY PRN
Status: DISCONTINUED | OUTPATIENT
Start: 2021-07-08 | End: 2021-07-12 | Stop reason: HOSPADM

## 2021-07-08 RX ORDER — SODIUM CHLORIDE 9 MG/ML
25 INJECTION, SOLUTION INTRAVENOUS PRN
Status: DISCONTINUED | OUTPATIENT
Start: 2021-07-08 | End: 2021-07-12 | Stop reason: HOSPADM

## 2021-07-08 RX ORDER — ACETAMINOPHEN 325 MG/1
650 TABLET ORAL EVERY 6 HOURS PRN
Status: DISCONTINUED | OUTPATIENT
Start: 2021-07-08 | End: 2021-07-12 | Stop reason: HOSPADM

## 2021-07-08 RX ORDER — METHYLPREDNISOLONE SODIUM SUCCINATE 125 MG/2ML
125 INJECTION, POWDER, LYOPHILIZED, FOR SOLUTION INTRAMUSCULAR; INTRAVENOUS ONCE
Status: COMPLETED | OUTPATIENT
Start: 2021-07-08 | End: 2021-07-08

## 2021-07-08 RX ORDER — ALBUTEROL SULFATE 2.5 MG/3ML
2.5 SOLUTION RESPIRATORY (INHALATION)
Status: DISCONTINUED | OUTPATIENT
Start: 2021-07-08 | End: 2021-07-12 | Stop reason: HOSPADM

## 2021-07-08 RX ORDER — IPRATROPIUM BROMIDE AND ALBUTEROL SULFATE 2.5; .5 MG/3ML; MG/3ML
1 SOLUTION RESPIRATORY (INHALATION)
Status: DISCONTINUED | OUTPATIENT
Start: 2021-07-08 | End: 2021-07-12 | Stop reason: HOSPADM

## 2021-07-08 RX ORDER — POLYETHYLENE GLYCOL 3350 17 G/17G
17 POWDER, FOR SOLUTION ORAL DAILY PRN
Status: DISCONTINUED | OUTPATIENT
Start: 2021-07-08 | End: 2021-07-12 | Stop reason: HOSPADM

## 2021-07-08 RX ORDER — ONDANSETRON 2 MG/ML
4 INJECTION INTRAMUSCULAR; INTRAVENOUS EVERY 6 HOURS PRN
Status: DISCONTINUED | OUTPATIENT
Start: 2021-07-08 | End: 2021-07-12 | Stop reason: HOSPADM

## 2021-07-08 RX ORDER — ASPIRIN 81 MG/1
81 TABLET ORAL DAILY
Status: DISCONTINUED | OUTPATIENT
Start: 2021-07-08 | End: 2021-07-12 | Stop reason: HOSPADM

## 2021-07-08 RX ORDER — PRAVASTATIN SODIUM 40 MG
80 TABLET ORAL EVERY EVENING
Status: DISCONTINUED | OUTPATIENT
Start: 2021-07-08 | End: 2021-07-12 | Stop reason: HOSPADM

## 2021-07-08 RX ORDER — ONDANSETRON 4 MG/1
4 TABLET, ORALLY DISINTEGRATING ORAL EVERY 8 HOURS PRN
Status: DISCONTINUED | OUTPATIENT
Start: 2021-07-08 | End: 2021-07-08

## 2021-07-08 RX ORDER — ONDANSETRON 4 MG/1
4 TABLET, ORALLY DISINTEGRATING ORAL EVERY 8 HOURS PRN
Status: DISCONTINUED | OUTPATIENT
Start: 2021-07-08 | End: 2021-07-12 | Stop reason: HOSPADM

## 2021-07-08 RX ORDER — IPRATROPIUM BROMIDE AND ALBUTEROL SULFATE 2.5; .5 MG/3ML; MG/3ML
1 SOLUTION RESPIRATORY (INHALATION) 3 TIMES DAILY
Status: CANCELLED | OUTPATIENT
Start: 2021-07-08

## 2021-07-08 RX ORDER — TIOTROPIUM BROMIDE AND OLODATEROL 3.124; 2.736 UG/1; UG/1
2 SPRAY, METERED RESPIRATORY (INHALATION) DAILY
COMMUNITY

## 2021-07-08 RX ORDER — PREGABALIN 100 MG/1
200 CAPSULE ORAL 3 TIMES DAILY
Status: DISCONTINUED | OUTPATIENT
Start: 2021-07-08 | End: 2021-07-12 | Stop reason: HOSPADM

## 2021-07-08 RX ORDER — SODIUM CHLORIDE 0.9 % (FLUSH) 0.9 %
5-40 SYRINGE (ML) INJECTION EVERY 12 HOURS SCHEDULED
Status: DISCONTINUED | OUTPATIENT
Start: 2021-07-08 | End: 2021-07-12 | Stop reason: HOSPADM

## 2021-07-08 RX ORDER — FAMOTIDINE 20 MG/1
20 TABLET, FILM COATED ORAL 2 TIMES DAILY
Status: DISCONTINUED | OUTPATIENT
Start: 2021-07-08 | End: 2021-07-12 | Stop reason: HOSPADM

## 2021-07-08 RX ORDER — DOXYCYCLINE 100 MG/1
100 CAPSULE ORAL ONCE
Status: COMPLETED | OUTPATIENT
Start: 2021-07-08 | End: 2021-07-08

## 2021-07-08 RX ORDER — SODIUM CHLORIDE 0.9 % (FLUSH) 0.9 %
5-40 SYRINGE (ML) INJECTION PRN
Status: DISCONTINUED | OUTPATIENT
Start: 2021-07-08 | End: 2021-07-12 | Stop reason: HOSPADM

## 2021-07-08 RX ORDER — FUROSEMIDE 40 MG/1
40 TABLET ORAL DAILY
Status: DISCONTINUED | OUTPATIENT
Start: 2021-07-08 | End: 2021-07-12 | Stop reason: HOSPADM

## 2021-07-08 RX ORDER — METHYLPREDNISOLONE SODIUM SUCCINATE 40 MG/ML
40 INJECTION, POWDER, LYOPHILIZED, FOR SOLUTION INTRAMUSCULAR; INTRAVENOUS EVERY 6 HOURS
Status: DISCONTINUED | OUTPATIENT
Start: 2021-07-08 | End: 2021-07-10

## 2021-07-08 RX ORDER — ACETAMINOPHEN 325 MG/1
650 TABLET ORAL EVERY 4 HOURS PRN
Status: DISCONTINUED | OUTPATIENT
Start: 2021-07-08 | End: 2021-07-08

## 2021-07-08 RX ORDER — OXYCODONE HYDROCHLORIDE AND ACETAMINOPHEN 5; 325 MG/1; MG/1
1 TABLET ORAL 2 TIMES DAILY
Status: DISCONTINUED | OUTPATIENT
Start: 2021-07-08 | End: 2021-07-08

## 2021-07-08 RX ORDER — LISINOPRIL 10 MG/1
10 TABLET ORAL DAILY
Status: DISCONTINUED | OUTPATIENT
Start: 2021-07-08 | End: 2021-07-12

## 2021-07-08 RX ORDER — ONDANSETRON 2 MG/ML
4 INJECTION INTRAMUSCULAR; INTRAVENOUS EVERY 6 HOURS PRN
Status: DISCONTINUED | OUTPATIENT
Start: 2021-07-08 | End: 2021-07-08

## 2021-07-08 RX ORDER — NICOTINE 21 MG/24HR
1 PATCH, TRANSDERMAL 24 HOURS TRANSDERMAL DAILY
Status: DISCONTINUED | OUTPATIENT
Start: 2021-07-08 | End: 2021-07-12 | Stop reason: HOSPADM

## 2021-07-08 RX ORDER — ACETAMINOPHEN 650 MG/1
650 SUPPOSITORY RECTAL EVERY 6 HOURS PRN
Status: DISCONTINUED | OUTPATIENT
Start: 2021-07-08 | End: 2021-07-12 | Stop reason: HOSPADM

## 2021-07-08 RX ORDER — BUDESONIDE AND FORMOTEROL FUMARATE DIHYDRATE 160; 4.5 UG/1; UG/1
2 AEROSOL RESPIRATORY (INHALATION) 2 TIMES DAILY
Status: DISCONTINUED | OUTPATIENT
Start: 2021-07-08 | End: 2021-07-12 | Stop reason: HOSPADM

## 2021-07-08 RX ADMIN — IPRATROPIUM BROMIDE AND ALBUTEROL SULFATE 1 AMPULE: .5; 3 SOLUTION RESPIRATORY (INHALATION) at 13:11

## 2021-07-08 RX ADMIN — ENOXAPARIN SODIUM 30 MG: 30 INJECTION SUBCUTANEOUS at 20:37

## 2021-07-08 RX ADMIN — SODIUM CHLORIDE, PRESERVATIVE FREE 10 ML: 5 INJECTION INTRAVENOUS at 20:37

## 2021-07-08 RX ADMIN — PREGABALIN 200 MG: 100 CAPSULE ORAL at 20:36

## 2021-07-08 RX ADMIN — OXYCODONE HYDROCHLORIDE AND ACETAMINOPHEN 1 TABLET: 5; 325 TABLET ORAL at 16:34

## 2021-07-08 RX ADMIN — BUDESONIDE AND FORMOTEROL FUMARATE DIHYDRATE 2 PUFF: 160; 4.5 AEROSOL RESPIRATORY (INHALATION) at 20:36

## 2021-07-08 RX ADMIN — METHYLPREDNISOLONE SODIUM SUCCINATE 40 MG: 40 INJECTION, POWDER, FOR SOLUTION INTRAMUSCULAR; INTRAVENOUS at 15:59

## 2021-07-08 RX ADMIN — METHYLPREDNISOLONE SODIUM SUCCINATE 125 MG: 125 INJECTION, POWDER, FOR SOLUTION INTRAMUSCULAR; INTRAVENOUS at 12:39

## 2021-07-08 RX ADMIN — DOXYCYCLINE 100 MG: 100 CAPSULE ORAL at 12:39

## 2021-07-08 RX ADMIN — IPRATROPIUM BROMIDE AND ALBUTEROL SULFATE 1 AMPULE: .5; 3 SOLUTION RESPIRATORY (INHALATION) at 19:24

## 2021-07-08 RX ADMIN — IPRATROPIUM BROMIDE AND ALBUTEROL SULFATE 1 AMPULE: .5; 3 SOLUTION RESPIRATORY (INHALATION) at 15:37

## 2021-07-08 RX ADMIN — PREGABALIN 200 MG: 100 CAPSULE ORAL at 16:00

## 2021-07-08 RX ADMIN — PRAVASTATIN SODIUM 80 MG: 40 TABLET ORAL at 16:34

## 2021-07-08 RX ADMIN — METHYLPREDNISOLONE SODIUM SUCCINATE 40 MG: 40 INJECTION, POWDER, FOR SOLUTION INTRAMUSCULAR; INTRAVENOUS at 20:36

## 2021-07-08 RX ADMIN — FAMOTIDINE 20 MG: 20 TABLET, FILM COATED ORAL at 20:37

## 2021-07-08 ASSESSMENT — PAIN - FUNCTIONAL ASSESSMENT: PAIN_FUNCTIONAL_ASSESSMENT: PREVENTS OR INTERFERES SOME ACTIVE ACTIVITIES AND ADLS

## 2021-07-08 ASSESSMENT — ENCOUNTER SYMPTOMS
ABDOMINAL PAIN: 0
RHINORRHEA: 0
BACK PAIN: 0
DIARRHEA: 0
SORE THROAT: 0
WHEEZING: 1
FACIAL SWELLING: 0
SHORTNESS OF BREATH: 1
NAUSEA: 0
COLOR CHANGE: 0
CHEST TIGHTNESS: 0
COUGH: 0
VOMITING: 0
ABDOMINAL DISTENTION: 0
COUGH: 1
EYE PAIN: 0
EYE REDNESS: 0

## 2021-07-08 ASSESSMENT — PAIN SCALES - GENERAL
PAINLEVEL_OUTOF10: 6
PAINLEVEL_OUTOF10: 8
PAINLEVEL_OUTOF10: 5

## 2021-07-08 ASSESSMENT — PAIN DESCRIPTION - DESCRIPTORS: DESCRIPTORS: ACHING

## 2021-07-08 ASSESSMENT — PAIN DESCRIPTION - PAIN TYPE: TYPE: CHRONIC PAIN

## 2021-07-08 ASSESSMENT — PAIN DESCRIPTION - LOCATION: LOCATION: SHOULDER

## 2021-07-08 ASSESSMENT — PAIN DESCRIPTION - ORIENTATION: ORIENTATION: LEFT

## 2021-07-08 ASSESSMENT — PAIN DESCRIPTION - FREQUENCY: FREQUENCY: CONTINUOUS

## 2021-07-08 ASSESSMENT — PAIN DESCRIPTION - PROGRESSION: CLINICAL_PROGRESSION: GRADUALLY WORSENING

## 2021-07-08 ASSESSMENT — PAIN DESCRIPTION - ONSET: ONSET: ON-GOING

## 2021-07-08 NOTE — FLOWSHEET NOTE
Patient admitted to [facility] 2092 from ER. Assessment completed. POC discussed. Patient oriented to room. Call light within reach.

## 2021-07-08 NOTE — ED NOTES
Ambulatory pulse ox taken. Patient oxygen saturation dropped to 77% while ambulating. [place] RN notified.      Harlie Heimlich, RN  07/08/21 [id number]

## 2021-07-08 NOTE — PROGRESS NOTES
Medication History completed:    New medications: Stiolto    Medications discontinued: Symbicort, ondansetron, Duoneb, Folbee    Changes to dosing:   Percocet changed to 5-325 mg tablets taking 1 tablet twice daily as needed    Stated allergies: As listed    Other pertinent information: Medications confirmed with [employer] and [facility].      Thank you,  Salo Campos, PharmD, BCPS  [phone]

## 2021-07-08 NOTE — ED NOTES
Bed: D  Expected date:   Expected time:   Means of arrival:   Comments:   [facility] Cristóbal Horn RN  07/08/21 1000

## 2021-07-08 NOTE — ED PROVIDER NOTES
EMERGENCY DEPARTMENT ENCOUNTER    Pt Name: Rodrigue Valdez  MRN: [id number]  [place] 1955  Date of evaluation: 7/8/21  CHIEF COMPLAINT       Chief Complaint   Patient presents with    Shortness of Breath     Ongoing since July 4th; pt reports a hx of COPD; CPAP use at night; at home O2 use as needed.  Cough     Green sputum.  Generalized Body Aches     HISTORY OF PRESENT ILLNESS   HPI  70-year-old male history of COPD presents for evaluation of shortness of breath. Patient's wife is here with similar symptoms. Patient's symptoms started 2 to 3 days ago. His wife symptoms started about a week ago. Patient describes some exertional shortness of breath and productive cough and chills. Has some associated generalized aches. No nausea or vomiting. Several episodes of diarrhea. Is bringing up greenish sputum with this. No other recent hospitalization. History of similar symptoms with bronchitis in the past.  Symptoms are moderate and progressive. No lower extremity swelling. No chest pain. Shortness of breath is worsened with walking around. REVIEW OF SYSTEMS     Review of Systems   Constitutional: Negative for chills and fatigue. HENT: Negative for facial swelling, nosebleeds, rhinorrhea and sore throat. Eyes: Negative for pain and redness. Respiratory: Positive for cough and shortness of breath. Cardiovascular: Negative for chest pain and leg swelling. Gastrointestinal: Negative for abdominal pain, diarrhea, nausea and vomiting. Genitourinary: Negative for flank pain and hematuria. Musculoskeletal: Negative for arthralgias and back pain. Skin: Negative for color change and rash. Neurological: Negative for dizziness, tremors, facial asymmetry, speech difficulty, weakness and numbness.      PASTMEDICAL HISTORY     Past Medical History:   Diagnosis Date    Acid reflux     Anemia, blood loss 10/7/2018    Arthritis     C. difficile colitis 3/19/2020    Chronic bilateral low back pain with bilateral sciatica 11/3/2016    Chronic diastolic heart failure ([address] 2/25/2021    Complete tear of left rotator cuff 7/18/2018    COPD (chronic obstructive pulmonary disease) ([address]     Coronary artery disease involving native coronary artery of native heart without angina pectoris 2/2/2020    Degenerative disc disease, cervical 7/28/2019    GI bleed 3/19/2020    Gout     H/O cardiac catheterization     yrs ago   no stents    History of blood transfusion     Hyperlipidemia     Hyperlipidemia with target LDL less than 100 1/20/2016    Hyperparathyroidism ([address] 1/17/2020    Hypertension     Knee pain, chronic     left    Liver disease     MDRO (multiple drug resistant organisms) resistance     Melena 3/19/2020    Memory loss     Moderate episode of recurrent major depressive disorder ([address] 1/20/2016    Obesity, Class III, BMI 40-49.9 (morbid obesity) ([address] 1/20/2016    REYNALDO on CPAP 5/6/2017    Osteoarthritis     Peripheral arterial occlusive disease ([address] 3/25/2017    Pneumonia 3/9/2020    Polypharmacy 3/25/2017    Positive FIT (fecal immunochemical test) 4/11/2017    Prolonged emergence from general anesthesia 01/05/2017    Patient \"on life support for 3 days\" after back surgery due to being given succinylcholine    Prostate CA ([address] 1/20/2016    Prostate cancer ([address] 10/2013    finished radiation tx 5/2014    Pseudocholinesterase deficiency 03/25/2017    Pt.  \"on life support\" for 3 days after back surgery 1/5/17 after being given succinylcholine    Severe obesity (BMI 35.0-35.9 with comorbidity) ([address] 2/2/2020    Short of breath on exertion     Sleep apnea     uses CPAP machine nightly    Status post lumbar laminectomy 3/25/2017    Stenosis of left carotid artery 10/7/2018    Syncope and collapse 3/19/2020    Tubular adenoma of colon 4/11/17 5/13/2017    Type 2 diabetes mellitus with hyperglycemia, without long-term current use of insulin ([address] 3/25/2017    Lab Results Component Value Date  LABA1C 7.1 (H) 03/23/2017     Unintended weight loss 10/7/2018    Vitamin D deficiency 3/25/2017    Wears glasses      Past Problem List  Patient Active Problem List   Diagnosis Code    Essential hypertension I10    Gout M10.9    Hyperlipidemia with target LDL less than 70 E78.5    Osteoporosis M81.0    Prostate cancer ([facility] C61    Gastroesophageal reflux disease without esophagitis K21.9    DDD (degenerative disc disease), lumbar M51.36    Chronic bilateral low back pain with bilateral sciatica M54.42, M54.41, G89.29    Osteoarthritis of spine with radiculopathy, lumbar region M47.26    Type 2 diabetes mellitus with diabetic polyneuropathy, without long-term current use of insulin (HCC) E11.42    PVD (peripheral vascular disease) with claudication ([facility]) I73.9    Peripheral neuropathic pain M79.2    Tinea pedis of both feet B35.3    Nonspecific reactive hepatitis K75.2    COPD mixed type ([facility] J44.9    Status post lumbar laminectomy Z98.890    Serum cholinesterase defect (HCC) E88.89    Vitamin D deficiency E55.9    Hypertriglyceridemia E78.1    REYNALDO on CPAP G47.33, Z99.89    Tubular adenoma of colon 4/11/17 D12.6    Psychophysiological insomnia F51.04    S/P reverse total shoulder arthroplasty, left Z96.612    Recurrent major depressive disorder, in full remission (HCC) F33.42    Actinic keratosis L57.0    Photodermatitis due to sun L56.8    Seborrheic keratosis L82.1    Senile hyperkeratosis L57.0    Solar degeneration L57.8    Abdominal aortic aneurysm (HCC) I71.4    Family history of cancer Z80.9    Stenosis of left carotid artery I65.22    Poor balance R26.89    Foot drop, right M21.371    Difficulty rising from chair R68.89    Degenerative disc disease, cervical M50.30    At high risk for falls Z91.81    Pseudocholinesterase deficiency E88.09    Hyperparathyroidism (HCC) E21.3    Coronary artery disease involving native coronary artery of native heart without angina pectoris I25.10    Severe obesity (BMI 35.0-35.9 with comorbidity) ([facility]) E66.01, Z68.35    Status post total shoulder arthroplasty, left Z96.612    Personal history of smoking Z87.891    Chronic hypoxemic respiratory failure ([facility]) J96.11    History of prostate cancer Z85.46    Left inguinal hernia K40.90    Bilateral leg edema R60.0    Type 2 diabetes mellitus with diabetic peripheral angiopathy without gangrene, without long-term current use of insulin ([facility]) E11.51    Bilateral tinnitus H93.13    Difficulty in walking R26.2    Chronic diastolic heart failure ([facility]) I50.32    Bronchitis J40     SURGICAL HISTORY       Past Surgical History:   Procedure Laterality Date    BACK SURGERY      x 2     BACK SURGERY  01/05/2017    lumbar laminectomy L2, L3, L4    BRONCHOSCOPY N/A 3/13/2020    BRONCHOSCOPY performed by Bossman Blankenship MD at [address] [address]    no stents    CAROTID ENDARTERECTOMY Right 12/16/2019    Dr. Lanette Grady Bilateral     CHOLECYSTECTOMY, LAPAROSCOPIC N/A 2/16/2021    CHOLECYSTECTOMY LAPAROSCOPIC ROBOTIC XI performed by Jonathan Francis MD at [address] [facility], COLON, DIAGNOSTIC     [address] [facility] Left 11/18/2020    HERNIA INGUINAL REPAIR [address] OPEN performed by Jonathan Francis MD at [address] [facility] Left     LUMBAR LAMINECTOMY  01/05/2017    L2-L4    MO CLOSED RX SHLDR DISLOC,ANESTHESIA Left 8/1/2018    SHOULDER CLOSED REDUCTION WITH C-ARM VISUALIZATION performed by Pk Torres MD at [address] [facility] W/BIOPSY SINGLE/MULTIPLE N/A 5/9/2017    COLONOSCOPY WITH BIOPSY performed by Carl Plascencia DO at [address] [facility] IMPLANT Left 7/18/2018    SHOULDER TOTAL ARTHROPLASTY REVERSE LEFT DJO & BICEP TENDON TRANSFER performed by Pk Torres MD at [address] HUMERAL/GLENOID COMPNT Left 8/3/2018    SHOULDER TOTAL REVERSE  ARTHROPLASTY REVISION performed by Tatyana Zurita MD at [address] rotator cuff repair    SHOULDER SURGERY Left 10/15/2020    SHOULDER ARTHROSCOPY W/INTEROP CULTURES performed by Tatyana Zurita MD at [address] [facility]      ear, forehead    TONSILLECTOMY AND ADENOIDECTOMY      UPPER GASTROINTESTINAL ENDOSCOPY N/A 3/19/2020    EGD BIOPSY performed by Murali Alas MD at [address] [facility]       Current Discharge Medication List      CONTINUE these medications which have NOT CHANGED    Details   tiotropium-olodaterol (STIOLTO RESPIMAT) 2.5-2.5 MCG/ACT AERS Inhale 2 puffs into the lungs daily      oxyCODONE-acetaminophen (PERCOCET) 5-325 MG per tablet Take 1 tablet by mouth 2 times daily as needed. pregabalin (LYRICA) 200 MG capsule Take 1 capsule by mouth 3 times daily for 90 days. Qty: 270 capsule, Refills: 0    Associated Diagnoses: Type 2 diabetes mellitus with diabetic polyneuropathy, without long-term current use of insulin ([facility] Type 2 diabetes mellitus with diabetic peripheral angiopathy without gangrene, without long-term current use of insulin ([facility])      ammonium lactate (AMLACTIN) 12 % cream APPLY TOPICALLY TO LEGS ONE TO TWO TIMES A DAY AS NEEDED  Qty: 1 Bottle, Refills: 3    Associated Diagnoses: Pruritic dermatitis      furosemide (LASIX) 40 MG tablet Take 1 tablet by mouth daily Dose increased 1/12/2021  Qty: 90 tablet, Refills: 3    Associated Diagnoses: Bilateral leg edema      lisinopril (PRINIVIL;ZESTRIL) 10 MG tablet Take 1 tablet by mouth daily  Qty: 90 tablet, Refills: 3    Associated Diagnoses: Essential hypertension      amLODIPine (NORVASC) 10 MG tablet Take 1 tablet by mouth daily Dose increased 10/16/2020 due to high BP.  Correct dosage  Qty: 90 tablet, Refills: 3    Associated Diagnoses: Essential hypertension      albuterol sulfate HFA (PROVENTIL HFA) 108 (90 Base) MCG/ACT inhaler Inhale 2 puffs into the lungs every 6 hours as needed for Wheezing or Shortness of Breath  Qty: 3 Inhaler, Refills: 1      pravastatin (PRAVACHOL) 80 MG tablet Take 1 tablet by mouth every evening Dose increased  9/24/2019  Qty: 90 tablet, Refills: 3    Associated Diagnoses: Hyperlipidemia with target LDL less than 70      omeprazole (PRILOSEC) 40 MG delayed release capsule Take 1 capsule by mouth every morning (before breakfast)  Qty: 90 capsule, Refills: 3    Associated Diagnoses: Gastroesophageal reflux disease without esophagitis      aspirin EC 81 MG EC tablet Take 1 tablet by mouth daily  Qty: 90 tablet, Refills: 0    Associated Diagnoses: Type 2 diabetes mellitus with diabetic polyneuropathy, without long-term current use of insulin ([facility])      glucose monitoring kit (FREESTYLE) monitoring kit 1 kit by Does not apply route daily Please supply Patient with a glucose monitoring kit that insurance will cover. Qty: 1 kit, Refills: 0    Associated Diagnoses: Type 2 diabetes mellitus with diabetic polyneuropathy, without long-term current use of insulin ([facility] Type 2 diabetes mellitus with diabetic peripheral angiopathy without gangrene, without long-term current use of insulin ([facility])      Lancets 30G MISC Testing once a day. Please dispense according to patients insurance. Qty: 100 each, Refills: 3    Associated Diagnoses: Type 2 diabetes mellitus with diabetic polyneuropathy, without long-term current use of insulin ([facility] Type 2 diabetes mellitus with diabetic peripheral angiopathy without gangrene, without long-term current use of insulin ([facility])      blood glucose monitor strips Testing once a day. Please dispense according to patients insurance. Qty: 100 strip, Refills: 3    Associated Diagnoses: Type 2 diabetes mellitus with diabetic polyneuropathy, without long-term current use of insulin ([facility]  Type 2 diabetes mellitus with diabetic peripheral angiopathy without gangrene, without long-term current use of insulin ([facility]      Alcohol Swabs PADS Please dispense according to patients insurance/device. Testing once a day  Qty: 100 each, Refills: 3    Associated Diagnoses: Type 2 diabetes mellitus with diabetic polyneuropathy, without long-term current use of insulin ([address] Type 2 diabetes mellitus with diabetic peripheral angiopathy without gangrene, without long-term current use of insulin (HCC)      naloxone 4 MG/0.1ML LIQD nasal spray 1 spray by Nasal route as needed for Opioid Reversal Patient needs counseling  Qty: 1 each, Refills: 3    Associated Diagnoses: High risk medication use; COPD mixed type ([address]      OXYGEN With CPAP at night      Respiratory Therapy Supplies (ADULT MASK) MISC Needs to use mask daily due to coronavirus pandemic. Qty: 30 each, Refills: 5    Associated Diagnoses: Type 2 diabetes mellitus with diabetic polyneuropathy, without long-term current use of insulin ([facility]      Lift Chair MISC by Does not apply route Patient has difficulty getting up from usual chair  Qty: 1 each, Refills: 0    Associated Diagnoses: Chronic bilateral low back pain with bilateral sciatica; Status post lumbar laminectomy; Poor balance; Foot drop, right; Peripheral neuropathic pain; Difficulty rising from chair; Type 2 diabetes mellitus with diabetic polyneuropathy, without long-term current use of insulin (HCC)           ALLERGIES     is allergic to succinylcholine chloride and cymbalta [duloxetine hcl]. FAMILY HISTORY     He indicated that his mother is alive. He indicated that his father is .      SOCIAL HISTORY       Social History     Tobacco Use    Smoking status: Current Every Day Smoker     Packs/day: 1.50     Years: 35.00     Pack years: 52.50     Types: Cigarettes    Smokeless tobacco: Never Used    Tobacco comment: hx of 1-1.5 PPD, currently 0.5 ppd   Vaping Use    Vaping Use: Never used   Substance Use Topics    Alcohol use: Not Currently     Alcohol/week: 0.0 standard drinks    Drug use: No     PHYSICAL EXAM     INITIAL VITALS: BP (!) 145/66   Pulse 58   Temp 98.1 °F (36.7 °C) (Oral)   Resp 18   Ht 6' (1.829 m)   Wt 280 lb (127 kg)   SpO2 94%   BMI 37.97 kg/m²    Physical Exam  Vitals and nursing note reviewed. Constitutional:       Appearance: Normal appearance. HENT:      Head: Normocephalic and atraumatic. Nose: Nose normal.   Eyes:      Extraocular Movements: Extraocular movements intact. Pupils: Pupils are equal, round, and reactive to light. Cardiovascular:      Rate and Rhythm: Normal rate and regular rhythm. Pulmonary:      Effort: Pulmonary effort is normal. No respiratory distress. Breath sounds: Normal breath sounds. Comments: Diffuse wheezing left greater than right  Abdominal:      General: Abdomen is flat. There is no distension. Palpations: Abdomen is soft. There is no mass. Musculoskeletal:         General: No swelling. Normal range of motion. Cervical back: Normal range of motion. No rigidity. Skin:     General: Skin is warm and dry. Neurological:      General: No focal deficit present. Mental Status: He is alert. Mental status is at baseline. Cranial Nerves: No cranial nerve deficit. MEDICAL DECISION MAKIN-year-old male presents for evaluation of shortness of breath. Patient's wife is here with similar symptoms. Suspect infectious etiology. Basic labs are unremarkable. Covid swab negative. Patient is immunized. No acute changes on x-ray. Reassessed patient he was still having shortness of breath and he desatted into the low 80s while ambulating. Initial troponin unremarkable.   Will start some p.o. antibiotics and steroids, nebulizer and bring patient in to the hospital.          CRITICAL CARE:       PROCEDURES:    Procedures    DIAGNOSTIC RESULTS   EKG:All EKG's are interpreted by the Emergency Department Physician who either signs or Co-signs this chart in the absence of a cardiologist.    Sinus rhythm rate of 71 normal axis normal intervals no concerning ST or T wave changes    RADIOLOGY:All plain film, CT, MRI, and formal ultrasound images (except ED bedside ultrasound) are read by the radiologist, see reports below, unless otherwisenoted in MDM or here. XR CHEST PORTABLE   Final Result   Unchanged appearance of the chest without acute airspace disease identified. LABS: All lab results were reviewed by myself, and all abnormals are listed below. Labs Reviewed   CBC WITH AUTO DIFFERENTIAL - Abnormal; Notable for the following components:       Result Value    RDW 15.0 (*)     Lymphocytes 22 (*)     Monocytes 8 (*)     All other components within normal limits   COMPREHENSIVE METABOLIC PANEL W/ REFLEX TO MG FOR LOW K - Abnormal; Notable for the following components:    Glucose 112 (*)     BUN 7 (*)     CREATININE 0.55 (*)     All other components within normal limits   TROPONIN - Abnormal; Notable for the following components:    Troponin, High Sensitivity 23 (*)     All other components within normal limits   COVID-19, RAPID   TROPONIN   PROCALCITONIN       EMERGENCY DEPARTMENTCOURSE:         Vitals:    Vitals:    07/08/21 1311 07/08/21 1445 07/08/21 1510 07/08/21 1537   BP:   (!) 145/66    Pulse:  75 58    Resp: 18 21 20 18   Temp:   98.1 °F (36.7 °C)    TempSrc:   Oral    SpO2: (!) 88% 96% 98% 94%   Weight:       Height:           The patient was given the following medications while in the emergency department:  Orders Placed This Encounter   Medications    methylPREDNISolone sodium (SOLU-MEDROL) injection 125 mg    doxycycline monohydrate (MONODOX) capsule 100 mg    ipratropium-albuterol (DUONEB) nebulizer solution 1 ampule     Order Specific Question:   Initiate RT Bronchodilator Protocol     Answer: Yes    albuterol sulfate  (90 Base) MCG/ACT inhaler 2 puff     Order Specific Question:   Initiate RT Bronchodilator Protocol     Answer:    Yes    amLODIPine (NORVASC) tablet 10 mg    aspirin EC tablet 81 mg    furosemide (LASIX) tablet 40 mg    lisinopril (PRINIVIL;ZESTRIL) tablet 10 mg    pravastatin (PRAVACHOL) tablet 80 mg    pregabalin (LYRICA) capsule 200 mg    budesonide-formoterol (SYMBICORT) 160-4.5 MCG/ACT inhaler 2 puff    sodium chloride flush 0.9 % injection 5-40 mL    sodium chloride flush 0.9 % injection 5-40 mL    0.9 % sodium chloride infusion    DISCONTD: enoxaparin (LOVENOX) injection 40 mg    DISCONTD: acetaminophen (TYLENOL) tablet 650 mg    OR Linked Order Group     ondansetron (ZOFRAN-ODT) disintegrating tablet 4 mg     ondansetron (ZOFRAN) injection 4 mg    sodium chloride flush 0.9 % injection 5-40 mL    sodium chloride flush 0.9 % injection 5-40 mL    0.9 % sodium chloride infusion    DISCONTD: ondansetron (ZOFRAN-ODT) disintegrating tablet 4 mg    DISCONTD: ondansetron (ZOFRAN) injection 4 mg    polyethylene glycol (GLYCOLAX) packet 17 g    enoxaparin (LOVENOX) injection 30 mg    OR Linked Order Group     acetaminophen (TYLENOL) tablet 650 mg     acetaminophen (TYLENOL) suppository 650 mg    famotidine (PEPCID) tablet 20 mg    nicotine (NICODERM CQ) 21 MG/24HR 1 patch    ipratropium-albuterol (DUONEB) nebulizer solution 1 ampule     Order Specific Question:   Initiate RT Bronchodilator Protocol     Answer: Yes    albuterol (PROVENTIL) nebulizer solution 2.5 mg     Order Specific Question:   Initiate RT Bronchodilator Protocol     Answer: Yes    methylPREDNISolone sodium (SOLU-MEDROL) injection 40 mg    DISCONTD: oxyCODONE-acetaminophen (PERCOCET) 5-325 MG per tablet 1 tablet    oxyCODONE-acetaminophen (PERCOCET) 5-325 MG per tablet 1 tablet     CONSULTS:  None    FINAL IMPRESSION      1. Bronchitis    2. COPD exacerbation ([facility]          DISPOSITION/PLAN   DISPOSITION Admitted 07/08/2021 02:13:00 PM      PATIENT REFERRED TO:  No follow-up provider specified.   DISCHARGE MEDICATIONS:  Current Discharge Medication List Yany Fernandez MD  Attending Emergency Physician                    Yany Fernandez MD  07/08/21 8743
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
## 2021-07-09 LAB
ABSOLUTE EOS #: 0 K/UL (ref 0–0.4)
ABSOLUTE IMMATURE GRANULOCYTE: ABNORMAL K/UL (ref 0–0.3)
ABSOLUTE LYMPH #: 1.2 K/UL (ref 1–4.8)
ABSOLUTE MONO #: 0.2 K/UL (ref 0.1–1.3)
ALLEN TEST: NORMAL
ANION GAP SERPL CALCULATED.3IONS-SCNC: 14 MMOL/L (ref 9–17)
BASOPHILS # BLD: 0 % (ref 0–2)
BASOPHILS ABSOLUTE: 0 K/UL (ref 0–0.2)
BUN BLDV-MCNC: 13 MG/DL (ref 8–23)
BUN/CREAT BLD: ABNORMAL (ref 9–20)
CALCIUM SERPL-MCNC: 9.9 MG/DL (ref 8.6–10.4)
CARBOXYHEMOGLOBIN: 3.2 % (ref 0–5)
CHLORIDE BLD-SCNC: 100 MMOL/L (ref 98–107)
CO2: 26 MMOL/L (ref 20–31)
CREAT SERPL-MCNC: 0.71 MG/DL (ref 0.7–1.2)
D-DIMER QUANTITATIVE: 0.52 MG/L FEU (ref 0–0.59)
DIFFERENTIAL TYPE: ABNORMAL
EOSINOPHILS RELATIVE PERCENT: 0 % (ref 0–4)
FIO2: NORMAL
GFR AFRICAN AMERICAN: >60 ML/MIN
GFR NON-AFRICAN AMERICAN: >60 ML/MIN
GFR SERPL CREATININE-BSD FRML MDRD: ABNORMAL ML/MIN/{1.73_M2}
GFR SERPL CREATININE-BSD FRML MDRD: ABNORMAL ML/MIN/{1.73_M2}
GLUCOSE BLD-MCNC: 149 MG/DL (ref 70–99)
HCO3 VENOUS: 26.4 MMOL/L (ref 24–30)
HCT VFR BLD CALC: 44 % (ref 41–53)
HEMOGLOBIN: 14.9 G/DL (ref 13.5–17.5)
IMMATURE GRANULOCYTES: ABNORMAL %
LYMPHOCYTES # BLD: 16 % (ref 24–44)
MCH RBC QN AUTO: 29.3 PG (ref 26–34)
MCHC RBC AUTO-ENTMCNC: 33.8 G/DL (ref 31–37)
MCV RBC AUTO: 86.7 FL (ref 80–100)
METHEMOGLOBIN: 0.9 % (ref 0–1.9)
MODE: NORMAL
MONOCYTES # BLD: 3 % (ref 1–7)
NEGATIVE BASE EXCESS, VEN: NORMAL MMOL/L (ref 0–2)
NOTIFICATION TIME: NORMAL
NOTIFICATION: NORMAL
NRBC AUTOMATED: ABNORMAL PER 100 WBC
O2 DEVICE/FLOW/%: NORMAL
O2 SAT, VEN: 72.8 % (ref 60–85)
OXYHEMOGLOBIN: NORMAL % (ref 95–98)
PATIENT TEMP: 37
PCO2, VEN, TEMP ADJ: NORMAL MMHG (ref 39–55)
PCO2, VEN: 49.6 (ref 39–55)
PDW BLD-RTO: 15 % (ref 11.5–14.9)
PEEP/CPAP: NORMAL
PH VENOUS: 7.33 (ref 7.32–7.42)
PH, VEN, TEMP ADJ: NORMAL (ref 7.32–7.42)
PLATELET # BLD: 176 K/UL (ref 150–450)
PLATELET ESTIMATE: ABNORMAL
PMV BLD AUTO: 8.5 FL (ref 6–12)
PO2, VEN, TEMP ADJ: NORMAL MMHG (ref 30–50)
PO2, VEN: 41.5 (ref 30–50)
POSITIVE BASE EXCESS, VEN: 0.5 MMOL/L (ref 0–2)
POTASSIUM SERPL-SCNC: 5.1 MMOL/L (ref 3.7–5.3)
PSV: NORMAL
PT. POSITION: NORMAL
RBC # BLD: 5.08 M/UL (ref 4.5–5.9)
RBC # BLD: ABNORMAL 10*6/UL
RESPIRATORY RATE: NORMAL
SAMPLE SITE: NORMAL
SEG NEUTROPHILS: 81 % (ref 36–66)
SEGMENTED NEUTROPHILS ABSOLUTE COUNT: 5.9 K/UL (ref 1.3–9.1)
SET RATE: NORMAL
SODIUM BLD-SCNC: 140 MMOL/L (ref 135–144)
TEXT FOR RESPIRATORY: NORMAL
TOTAL HB: NORMAL G/DL (ref 12–16)
TOTAL RATE: NORMAL
VT: NORMAL
WBC # BLD: 7.4 K/UL (ref 3.5–11)
WBC # BLD: ABNORMAL 10*3/UL

## 2021-07-09 PROCEDURE — 36415 COLL VENOUS BLD VENIPUNCTURE: CPT

## 2021-07-09 PROCEDURE — 94640 AIRWAY INHALATION TREATMENT: CPT

## 2021-07-09 PROCEDURE — 82800 BLOOD PH: CPT

## 2021-07-09 PROCEDURE — 82805 BLOOD GASES W/O2 SATURATION: CPT

## 2021-07-09 PROCEDURE — 2060000000 HC ICU INTERMEDIATE R&B

## 2021-07-09 PROCEDURE — 2580000003 HC RX 258

## 2021-07-09 PROCEDURE — 85025 COMPLETE CBC W/AUTO DIFF WBC: CPT

## 2021-07-09 PROCEDURE — 6370000000 HC RX 637 (ALT 250 FOR IP)

## 2021-07-09 PROCEDURE — 94761 N-INVAS EAR/PLS OXIMETRY MLT: CPT

## 2021-07-09 PROCEDURE — 6360000002 HC RX W HCPCS

## 2021-07-09 PROCEDURE — 85379 FIBRIN DEGRADATION QUANT: CPT

## 2021-07-09 PROCEDURE — 2700000000 HC OXYGEN THERAPY PER DAY

## 2021-07-09 PROCEDURE — 2580000003 HC RX 258: Performed by: INTERNAL MEDICINE

## 2021-07-09 PROCEDURE — 80048 BASIC METABOLIC PNL TOTAL CA: CPT

## 2021-07-09 PROCEDURE — 99291 CRITICAL CARE FIRST HOUR: CPT | Performed by: INTERNAL MEDICINE

## 2021-07-09 RX ADMIN — OXYCODONE HYDROCHLORIDE AND ACETAMINOPHEN 1 TABLET: 5; 325 TABLET ORAL at 10:20

## 2021-07-09 RX ADMIN — FAMOTIDINE 20 MG: 20 TABLET, FILM COATED ORAL at 10:19

## 2021-07-09 RX ADMIN — IPRATROPIUM BROMIDE AND ALBUTEROL SULFATE 1 AMPULE: .5; 3 SOLUTION RESPIRATORY (INHALATION) at 07:00

## 2021-07-09 RX ADMIN — LISINOPRIL 10 MG: 10 TABLET ORAL at 10:20

## 2021-07-09 RX ADMIN — SODIUM CHLORIDE, PRESERVATIVE FREE 10 ML: 5 INJECTION INTRAVENOUS at 21:04

## 2021-07-09 RX ADMIN — PRAVASTATIN SODIUM 80 MG: 40 TABLET ORAL at 19:10

## 2021-07-09 RX ADMIN — IPRATROPIUM BROMIDE AND ALBUTEROL SULFATE 1 AMPULE: .5; 3 SOLUTION RESPIRATORY (INHALATION) at 10:53

## 2021-07-09 RX ADMIN — FUROSEMIDE 40 MG: 40 TABLET ORAL at 10:19

## 2021-07-09 RX ADMIN — METHYLPREDNISOLONE SODIUM SUCCINATE 40 MG: 40 INJECTION, POWDER, FOR SOLUTION INTRAMUSCULAR; INTRAVENOUS at 03:16

## 2021-07-09 RX ADMIN — FAMOTIDINE 20 MG: 20 TABLET, FILM COATED ORAL at 21:03

## 2021-07-09 RX ADMIN — BUDESONIDE AND FORMOTEROL FUMARATE DIHYDRATE 2 PUFF: 160; 4.5 AEROSOL RESPIRATORY (INHALATION) at 10:20

## 2021-07-09 RX ADMIN — BUDESONIDE AND FORMOTEROL FUMARATE DIHYDRATE 2 PUFF: 160; 4.5 AEROSOL RESPIRATORY (INHALATION) at 21:02

## 2021-07-09 RX ADMIN — ENOXAPARIN SODIUM 30 MG: 30 INJECTION SUBCUTANEOUS at 10:21

## 2021-07-09 RX ADMIN — OXYCODONE HYDROCHLORIDE AND ACETAMINOPHEN 1 TABLET: 5; 325 TABLET ORAL at 19:12

## 2021-07-09 RX ADMIN — ENOXAPARIN SODIUM 30 MG: 30 INJECTION SUBCUTANEOUS at 21:03

## 2021-07-09 RX ADMIN — PREGABALIN 200 MG: 100 CAPSULE ORAL at 10:19

## 2021-07-09 RX ADMIN — PREGABALIN 200 MG: 100 CAPSULE ORAL at 15:23

## 2021-07-09 RX ADMIN — SODIUM CHLORIDE, PRESERVATIVE FREE 10 ML: 5 INJECTION INTRAVENOUS at 10:22

## 2021-07-09 RX ADMIN — ASPIRIN 81 MG: 81 TABLET, COATED ORAL at 10:20

## 2021-07-09 RX ADMIN — PREGABALIN 200 MG: 100 CAPSULE ORAL at 21:03

## 2021-07-09 RX ADMIN — IPRATROPIUM BROMIDE AND ALBUTEROL SULFATE 1 AMPULE: .5; 3 SOLUTION RESPIRATORY (INHALATION) at 15:28

## 2021-07-09 RX ADMIN — IPRATROPIUM BROMIDE AND ALBUTEROL SULFATE 1 AMPULE: .5; 3 SOLUTION RESPIRATORY (INHALATION) at 20:47

## 2021-07-09 RX ADMIN — AMLODIPINE BESYLATE 10 MG: 10 TABLET ORAL at 10:19

## 2021-07-09 RX ADMIN — METHYLPREDNISOLONE SODIUM SUCCINATE 40 MG: 40 INJECTION, POWDER, FOR SOLUTION INTRAMUSCULAR; INTRAVENOUS at 21:04

## 2021-07-09 RX ADMIN — SODIUM CHLORIDE, PRESERVATIVE FREE 10 ML: 5 INJECTION INTRAVENOUS at 21:06

## 2021-07-09 RX ADMIN — METHYLPREDNISOLONE SODIUM SUCCINATE 40 MG: 40 INJECTION, POWDER, FOR SOLUTION INTRAMUSCULAR; INTRAVENOUS at 10:20

## 2021-07-09 RX ADMIN — METHYLPREDNISOLONE SODIUM SUCCINATE 40 MG: 40 INJECTION, POWDER, FOR SOLUTION INTRAMUSCULAR; INTRAVENOUS at 15:23

## 2021-07-09 ASSESSMENT — PAIN SCALES - GENERAL
PAINLEVEL_OUTOF10: 5
PAINLEVEL_OUTOF10: 4
PAINLEVEL_OUTOF10: 7

## 2021-07-09 ASSESSMENT — PAIN - FUNCTIONAL ASSESSMENT: PAIN_FUNCTIONAL_ASSESSMENT: PREVENTS OR INTERFERES SOME ACTIVE ACTIVITIES AND ADLS

## 2021-07-09 ASSESSMENT — ENCOUNTER SYMPTOMS
ABDOMINAL DISTENTION: 0
WHEEZING: 1
CHEST TIGHTNESS: 0
EYE PAIN: 0
SINUS PAIN: 0
COUGH: 0
SINUS PRESSURE: 0
ABDOMINAL PAIN: 0
SHORTNESS OF BREATH: 1
EYE REDNESS: 0

## 2021-07-09 ASSESSMENT — PAIN DESCRIPTION - DESCRIPTORS: DESCRIPTORS: ACHING

## 2021-07-09 ASSESSMENT — PAIN DESCRIPTION - FREQUENCY: FREQUENCY: CONTINUOUS

## 2021-07-09 ASSESSMENT — PAIN DESCRIPTION - ORIENTATION: ORIENTATION: LEFT

## 2021-07-09 ASSESSMENT — PAIN DESCRIPTION - PAIN TYPE: TYPE: CHRONIC PAIN

## 2021-07-09 ASSESSMENT — PAIN DESCRIPTION - LOCATION: LOCATION: SHOULDER

## 2021-07-09 ASSESSMENT — PAIN DESCRIPTION - ONSET: ONSET: ON-GOING

## 2021-07-09 ASSESSMENT — PAIN DESCRIPTION - PROGRESSION: CLINICAL_PROGRESSION: NOT CHANGED
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
## 2021-07-09 NOTE — CARE COORDINATION
CASE MANAGEMENT NOTE:    Admission Date:  7/8/2021 Jenise Wynn is a 78 y.o.  male    Admitted for : Bronchitis [J40]    Met with:  Patient    PCP:  Dr. Marco Christie:  [address] [place] [address] Dr. Medicare      Is patient alert and oriented at time of discussion:  Yes    Current Residence/ Living Arrangements:  independently at home             Current Services PTA:  No    Does patient go to outpatient dialysis: No  If yes, location and chair time: N/A    Is patient agreeable to VNS: No    Freedom of choice provided:  Yes    List of [address] provided: No    VNS chosen:  No    DME:  walker    Home Oxygen: Yes @ 2-3L 24/7 - States he wants POC - Will work on this    Nebulizer: Yes    CPAP/BIPAP: No    Supplier: [employer]    Potential Assistance Needed: Yes    SNF needed: No    Freedom of choice and list provided: NA    Pharmacy:  [facility]       Does Patient want to use MEDS to BEDS? No    Is patient currently receiving oral anticoagulation therapy? No    Is the Patient an [facility] with Readmission Risk Score greater than 14%? Yes  If yes, pt needs a follow up appointment made within 7 days. Family Members/Caregivers that pt would like involved in their care:    Yes    If yes, list name here: Wife Demarcus Anderson    Transportation Provider:  Patient             Discharge Plan:  7/9: Alejandrina Blair - From 1-story home with wife. He is independent and drives. DME - walker, nebulizer, home O2 @ 2-3L 24/7 Meridee Senait) - States he wants POC - Need to get script from Dr. Margaret Montgomery. On IV steroids 40Q6, carlitos, pulm consult. ORANGE HEADER - 17% - F/U appt 7/14 @ 1500. Needs IMM signed.  //VINCENT                  Electronically signed by: Sunshine Duff RN on 7/9/2021 at 4:23 PM

## 2021-07-09 NOTE — PROGRESS NOTES
BRONCHOSPASM/BRONCHOCONSTRICTION     []         IMPROVE AERATION/BREATH SOUNDS  []   ADMINISTER BRONCHODILATOR THERAPY AS APPROPRIATE  []   ASSESS BREATH SOUNDS  []   IMPLEMENT AEROSOL/MDI PROTOCOL  []   PATIENT EDUCATION AS NEEDED    BRONCHOSPASM/BRONCHOCONSTRICTION     [x]         IMPROVE AERATION/BREATH SOUNDS  [x]   ADMINISTER BRONCHODILATOR THERAPY AS APPROPRIATE  [x]   ASSESS BREATH SOUNDS  []   IMPLEMENT AEROSOL/MDI PROTOCOL  [x]   PATIENT EDUCATION AS NEEDED    PROVIDE ADEQUATE OXYGENATION WITH ACCEPTABLE SP02/ABG'S    [x]  IDENTIFY APPROPRIATE OXYGEN THERAPY  [x]   MONITOR SP02/ABG'S AS NEEDED   [x]   PATIENT EDUCATION AS NEEDED

## 2021-07-09 NOTE — PLAN OF CARE
Problem: Falls - Risk of:  Goal: Will remain free from falls  Description: Will remain free from falls  Outcome: Ongoing  Goal: Absence of physical injury  Description: Absence of physical injury  Outcome: Ongoing     Problem: Infection:  Goal: Will remain free from infection  Description: Will remain free from infection  Outcome: Ongoing     Problem: Safety:  Goal: Free from accidental physical injury  Description: Free from accidental physical injury  Outcome: Ongoing  Goal: Free from intentional harm  Description: Free from intentional harm  Outcome: Ongoing     Problem: Daily Care:  Goal: Daily care needs are met  Description: Daily care needs are met  7/9/2021 1632 by Tracy Yadav RN  Outcome: Ongoing  7/9/2021 0343 by Jean-Pierre Cantu RN  Outcome: Ongoing  Note: Patient able to perform ADL's per self      Problem: Pain:  Goal: Patient's pain/discomfort is manageable  Description: Patient's pain/discomfort is manageable  7/9/2021 1632 by Tracy Yadav RN  Outcome: Ongoing  7/9/2021 0343 by Jean-Pierre Cantu RN  Outcome: Ongoing  Note: Patient given pain medication as ordered. Problem: Skin Integrity:  Goal: Skin integrity will stabilize  Description: Skin integrity will stabilize  7/9/2021 1632 by Tracy Yadav RN  Outcome: Ongoing  7/9/2021 0343 by Jean-Pierre Cantu RN  Outcome: Ongoing  Note: No new alterations in skin integrity.       Problem: Discharge Planning:  Goal: Patients continuum of care needs are met  Description: Patients continuum of care needs are met  Outcome: Ongoing

## 2021-07-09 NOTE — PLAN OF CARE
Problem: Daily Care:  Goal: Daily care needs are met  Description: Daily care needs are met  Outcome: Ongoing  Note: Patient able to perform ADL's per self      Problem: Pain:  Goal: Patient's pain/discomfort is manageable  Description: Patient's pain/discomfort is manageable  Outcome: Ongoing  Note: Patient given pain medication as ordered. Problem: Skin Integrity:  Goal: Skin integrity will stabilize  Description: Skin integrity will stabilize  Outcome: Ongoing  Note: No new alterations in skin integrity.

## 2021-07-10 LAB
EKG ATRIAL RATE: 71 BPM
EKG P AXIS: 78 DEGREES
EKG P-R INTERVAL: 170 MS
EKG Q-T INTERVAL: 388 MS
EKG QRS DURATION: 94 MS
EKG QTC CALCULATION (BAZETT): 421 MS
EKG R AXIS: 62 DEGREES
EKG T AXIS: 65 DEGREES
EKG VENTRICULAR RATE: 71 BPM

## 2021-07-10 PROCEDURE — 93010 ELECTROCARDIOGRAM REPORT: CPT | Performed by: INTERNAL MEDICINE

## 2021-07-10 PROCEDURE — 6360000002 HC RX W HCPCS: Performed by: NURSE PRACTITIONER

## 2021-07-10 PROCEDURE — 2060000000 HC ICU INTERMEDIATE R&B

## 2021-07-10 PROCEDURE — 2700000000 HC OXYGEN THERAPY PER DAY

## 2021-07-10 PROCEDURE — 6360000002 HC RX W HCPCS

## 2021-07-10 PROCEDURE — 2580000003 HC RX 258: Performed by: INTERNAL MEDICINE

## 2021-07-10 PROCEDURE — 94640 AIRWAY INHALATION TREATMENT: CPT

## 2021-07-10 PROCEDURE — 6370000000 HC RX 637 (ALT 250 FOR IP)

## 2021-07-10 PROCEDURE — 94761 N-INVAS EAR/PLS OXIMETRY MLT: CPT

## 2021-07-10 PROCEDURE — 99232 SBSQ HOSP IP/OBS MODERATE 35: CPT | Performed by: INTERNAL MEDICINE

## 2021-07-10 PROCEDURE — 2580000003 HC RX 258

## 2021-07-10 RX ORDER — METHYLPREDNISOLONE SODIUM SUCCINATE 40 MG/ML
40 INJECTION, POWDER, LYOPHILIZED, FOR SOLUTION INTRAMUSCULAR; INTRAVENOUS EVERY 8 HOURS
Status: DISCONTINUED | OUTPATIENT
Start: 2021-07-10 | End: 2021-07-11

## 2021-07-10 RX ADMIN — PREGABALIN 200 MG: 100 CAPSULE ORAL at 08:01

## 2021-07-10 RX ADMIN — BUDESONIDE AND FORMOTEROL FUMARATE DIHYDRATE 2 PUFF: 160; 4.5 AEROSOL RESPIRATORY (INHALATION) at 07:58

## 2021-07-10 RX ADMIN — BUDESONIDE AND FORMOTEROL FUMARATE DIHYDRATE 2 PUFF: 160; 4.5 AEROSOL RESPIRATORY (INHALATION) at 21:51

## 2021-07-10 RX ADMIN — METHYLPREDNISOLONE SODIUM SUCCINATE 40 MG: 40 INJECTION, POWDER, FOR SOLUTION INTRAMUSCULAR; INTRAVENOUS at 18:21

## 2021-07-10 RX ADMIN — PRAVASTATIN SODIUM 80 MG: 40 TABLET ORAL at 18:17

## 2021-07-10 RX ADMIN — SODIUM CHLORIDE, PRESERVATIVE FREE 10 ML: 5 INJECTION INTRAVENOUS at 21:56

## 2021-07-10 RX ADMIN — METHYLPREDNISOLONE SODIUM SUCCINATE 40 MG: 40 INJECTION, POWDER, FOR SOLUTION INTRAMUSCULAR; INTRAVENOUS at 09:55

## 2021-07-10 RX ADMIN — LISINOPRIL 10 MG: 10 TABLET ORAL at 08:02

## 2021-07-10 RX ADMIN — IPRATROPIUM BROMIDE AND ALBUTEROL SULFATE 1 AMPULE: .5; 3 SOLUTION RESPIRATORY (INHALATION) at 14:39

## 2021-07-10 RX ADMIN — IPRATROPIUM BROMIDE AND ALBUTEROL SULFATE 1 AMPULE: .5; 3 SOLUTION RESPIRATORY (INHALATION) at 18:42

## 2021-07-10 RX ADMIN — FAMOTIDINE 20 MG: 20 TABLET, FILM COATED ORAL at 08:02

## 2021-07-10 RX ADMIN — OXYCODONE HYDROCHLORIDE AND ACETAMINOPHEN 1 TABLET: 5; 325 TABLET ORAL at 18:21

## 2021-07-10 RX ADMIN — FUROSEMIDE 40 MG: 40 TABLET ORAL at 08:01

## 2021-07-10 RX ADMIN — OXYCODONE HYDROCHLORIDE AND ACETAMINOPHEN 1 TABLET: 5; 325 TABLET ORAL at 08:02

## 2021-07-10 RX ADMIN — SODIUM CHLORIDE, PRESERVATIVE FREE 10 ML: 5 INJECTION INTRAVENOUS at 22:42

## 2021-07-10 RX ADMIN — METHYLPREDNISOLONE SODIUM SUCCINATE 40 MG: 40 INJECTION, POWDER, FOR SOLUTION INTRAMUSCULAR; INTRAVENOUS at 02:13

## 2021-07-10 RX ADMIN — IPRATROPIUM BROMIDE AND ALBUTEROL SULFATE 1 AMPULE: .5; 3 SOLUTION RESPIRATORY (INHALATION) at 10:54

## 2021-07-10 RX ADMIN — IPRATROPIUM BROMIDE AND ALBUTEROL SULFATE 1 AMPULE: .5; 3 SOLUTION RESPIRATORY (INHALATION) at 07:19

## 2021-07-10 RX ADMIN — SODIUM CHLORIDE, PRESERVATIVE FREE 10 ML: 5 INJECTION INTRAVENOUS at 18:21

## 2021-07-10 RX ADMIN — ENOXAPARIN SODIUM 30 MG: 30 INJECTION SUBCUTANEOUS at 21:51

## 2021-07-10 RX ADMIN — FAMOTIDINE 20 MG: 20 TABLET, FILM COATED ORAL at 21:51

## 2021-07-10 RX ADMIN — PREGABALIN 200 MG: 100 CAPSULE ORAL at 21:51

## 2021-07-10 RX ADMIN — SODIUM CHLORIDE, PRESERVATIVE FREE 10 ML: 5 INJECTION INTRAVENOUS at 21:50

## 2021-07-10 RX ADMIN — ENOXAPARIN SODIUM 30 MG: 30 INJECTION SUBCUTANEOUS at 07:58

## 2021-07-10 RX ADMIN — ASPIRIN 81 MG: 81 TABLET, COATED ORAL at 08:02

## 2021-07-10 RX ADMIN — AMLODIPINE BESYLATE 10 MG: 10 TABLET ORAL at 08:02

## 2021-07-10 RX ADMIN — SODIUM CHLORIDE, PRESERVATIVE FREE 10 ML: 5 INJECTION INTRAVENOUS at 09:00

## 2021-07-10 RX ADMIN — PREGABALIN 200 MG: 100 CAPSULE ORAL at 15:15

## 2021-07-10 ASSESSMENT — PAIN SCALES - GENERAL
PAINLEVEL_OUTOF10: 5
PAINLEVEL_OUTOF10: 8
PAINLEVEL_OUTOF10: 9
PAINLEVEL_OUTOF10: 8
PAINLEVEL_OUTOF10: 0

## 2021-07-10 ASSESSMENT — ENCOUNTER SYMPTOMS
SINUS PRESSURE: 0
EYE PAIN: 0
SINUS PAIN: 0
WHEEZING: 1
ABDOMINAL PAIN: 0
ABDOMINAL DISTENTION: 0
SHORTNESS OF BREATH: 1
CHEST TIGHTNESS: 0
EYE REDNESS: 0

## 2021-07-10 ASSESSMENT — PAIN DESCRIPTION - PAIN TYPE: TYPE: CHRONIC PAIN

## 2021-07-10 ASSESSMENT — PAIN DESCRIPTION - LOCATION: LOCATION: SHOULDER

## 2021-07-10 ASSESSMENT — PAIN DESCRIPTION - ORIENTATION: ORIENTATION: LEFT

## 2021-07-10 NOTE — PROGRESS NOTES
Pt was found sitting on his home walker SOB; had just reurtned from bathroom and had removed N/C ; lips dusky, nailbeds pale; however RA pulse ox 96-97%, pulse 77; O2 N/c reapplied for comfort; wants to walk to [employer] to get a pop; writer encouraged pt to rest at this time; writer offered  To get pt his pop but he wants to TAMPERE how I do

## 2021-07-10 NOTE — PLAN OF CARE
Problem: Falls - Risk of:  Goal: Will remain free from falls  Description: Will remain free from falls  Outcome: Met This Shift  Goal: Absence of physical injury  Description: Absence of physical injury  Outcome: Met This Shift  Note: Up in hallway with own walker; some SOB on exertion

## 2021-07-10 NOTE — PROGRESS NOTES
Pulmonary Progress Note  [facility]      Patient - Charisma Dior,  Age - 78 y.o.    - 1955      Room Number - -01   N -  [id number]   [facility]  Date of Admission -  2021 10:00 AM        Consulting Derek Bartlett MD  Primary Care Physician - Chey Marion MD     SUBJECTIVE   Patient remains on his baseline oxygen. Vital signs remained stable. No fevers. Procalcitonin 0.52. Patient had walked 5 feet back from the bathroom and was significantly short of breath and his lips were dusky according to nursing staff. Patient tells me to walk to the bathroom at home is over 60 feet. He does not feel ready for discharge. He still has significant wheezing with any type of exertion. OBJECTIVE   VITALS    height is 6' (1.829 m) and weight is 280 lb (127 kg). His oral temperature is 97.7 °F (36.5 °C). His blood pressure is 107/60 and his pulse is 62. His respiration is 19 and oxygen saturation is 96%. Body mass index is 37.97 kg/m².   Temperature Range: Temp: 97.7 °F (36.5 °C) Temp  Av.6 °F (36.4 °C)  Min: 97.3 °F (36.3 °C)  Max: 97.7 °F (36.5 °C)  BP Range:  Systolic (54LWD), XYP:294 , Min:107 , RZM:850     Diastolic (67XEJ), QYF:28, Min:58, Max:69    Pulse Range: Pulse  Av  Min: 51  Max: 62  Respiration Range: Resp  Av.3  Min: 18  Max: 20  Current Pulse Ox[de-identified]  SpO2: 96 %  24HR Pulse Ox Range:  SpO2  Av.3 %  Min: 93 %  Max: 99 %  Oxygen Amount and Delivery: O2 Flow Rate (L/min): 2 L/min    Wt Readings from Last 3 Encounters:   21 280 lb (127 kg)   21 290 lb (131.5 kg)   21 290 lb 12.8 oz (131.9 kg)       I/O (24 Hours)    Intake/Output Summary (Last 24 hours) at 7/10/2021 1306  Last data filed at 7/10/2021 1216  Gross per 24 hour   Intake 1656 ml   Output 1800 ml   Net -144 ml       EXAM     General Appearance  Awake, alert, oriented, in no acute distress  HEENT - normocephalic, atraumatic   Neck - Supple,  trachea midline   Lungs -currently my exam no distress noted.   Patient does have diffuse expiratory wheezes in posterior lung fields  Cardiovascular - Heart sounds are normal.  Regular rate and rhythm   Abdomen - Soft, nontender, nondistended, no masses or organomegaly  Neurologic - There are no focal motor or sensory deficits  Skin - No bruising or bleeding  Extremities -does have 1-2+ pitting edema to bilateral lower extremities    MEDS      amLODIPine  10 mg Oral Daily    aspirin EC  81 mg Oral Daily    furosemide  40 mg Oral Daily    lisinopril  10 mg Oral Daily    pravastatin  80 mg Oral QPM    pregabalin  200 mg Oral TID    budesonide-formoterol  2 puff Inhalation BID    sodium chloride flush  5-40 mL Intravenous 2 times per day    sodium chloride flush  5-40 mL Intravenous 2 times per day    enoxaparin  30 mg Subcutaneous BID    famotidine  20 mg Oral BID    nicotine  1 patch Transdermal Daily    ipratropium-albuterol  1 ampule Inhalation Q4H WA    methylPREDNISolone  40 mg Intravenous Q6H      sodium chloride      sodium chloride       ipratropium-albuterol, albuterol sulfate HFA, sodium chloride flush, sodium chloride, ondansetron **OR** ondansetron, sodium chloride flush, sodium chloride, polyethylene glycol, acetaminophen **OR** acetaminophen, albuterol, oxyCODONE-acetaminophen    LABS   CBC   Recent Labs     07/09/21  0539   WBC 7.4   HGB 14.9   HCT 44.0   MCV 86.7        BMP:   Lab Results   Component Value Date     07/09/2021    K 5.1 07/09/2021     07/09/2021    CO2 26 07/09/2021    BUN 13 07/09/2021    LABALBU 4.3 07/08/2021    LABALBU 4.1 02/16/2012    CREATININE 0.71 07/09/2021    CALCIUM 9.9 07/09/2021    GFRAA >60 07/09/2021    LABGLOM >60 07/09/2021     ABGs:No results found for: PHART, PO2ART, VQG4UTE   Lab Results   Component Value Date    MODE NOT REPORTED 07/09/2021     Ionized Calcium:  No results found for: IONCA  Magnesium:    Lab Results   Component Value Date    MG 2.0 03/19/2020     Phosphorus:  No results found for: PHOS     LIVER PROFILE   Recent Labs     07/08/21  0955   AST 31   ALT 34   BILITOT 0.34   ALKPHOS 115     INR No results for input(s): INR in the last 72 hours. PTT   Lab Results   Component Value Date    APTT 20.4 (L) 03/19/2020         RADIOLOGY     (See actual reports for details)    ASSESSMENT/PLAN       Acute COPD exacerbation  Chronic hypoxemic respiratory failure he is on 2.5 L  Tobacco abuse  Obstructive sleep apnea on CPAP therapy  Baseline COPD is severe  Morbid obesity  Degenerative disc disease  GERD  Peripheral vascular disease  Type 2 diabetes          Plan:  Start to wean steroids slowly  Continue bronchodilators  Smoking cessation  We will give IV dose of Lasix  Patient tells me he is not ready for discharge. In fact he stated if he goes home going to the bathroom 1 time he will to call 911 and come back to the ER.   I discussed directly with Dr. Margaret Montgomery who agrees with current plan of care    Electronically signed by JAVI Childers CNP on 7/10/2021 at 1:06 PM
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
## 2021-07-10 NOTE — PROGRESS NOTES
Respiratory: Positive for shortness of breath and wheezing. Negative for chest tightness. Cardiovascular: Negative for chest pain and leg swelling. Gastrointestinal: Negative for abdominal distention and abdominal pain. Genitourinary: Negative for difficulty urinating and dysuria. Musculoskeletal: Positive for arthralgias (Bilateral shoulders). Neurological: Negative for seizures, speech difficulty and headaches. Psychiatric/Behavioral: Negative for agitation and behavioral problems. Medications: Allergies:     Allergies   Allergen Reactions    Succinylcholine Chloride Other (See Comments)     PATIENT HAS PSEUDOCHOLINESTERASE DEFICIENCY      Cymbalta [Duloxetine Hcl]      Taste loss         Current Meds:   Scheduled Meds:    amLODIPine  10 mg Oral Daily    aspirin EC  81 mg Oral Daily    furosemide  40 mg Oral Daily    lisinopril  10 mg Oral Daily    pravastatin  80 mg Oral QPM    pregabalin  200 mg Oral TID    budesonide-formoterol  2 puff Inhalation BID    sodium chloride flush  5-40 mL Intravenous 2 times per day    sodium chloride flush  5-40 mL Intravenous 2 times per day    enoxaparin  30 mg Subcutaneous BID    famotidine  20 mg Oral BID    nicotine  1 patch Transdermal Daily    ipratropium-albuterol  1 ampule Inhalation Q4H WA    methylPREDNISolone  40 mg Intravenous Q6H     Continuous Infusions:    sodium chloride      sodium chloride       PRN Meds: ipratropium-albuterol, albuterol sulfate HFA, sodium chloride flush, sodium chloride, ondansetron **OR** ondansetron, sodium chloride flush, sodium chloride, polyethylene glycol, acetaminophen **OR** acetaminophen, albuterol, oxyCODONE-acetaminophen    Data:     Past Medical History:   has a past medical history of Acid reflux, Anemia, blood loss, Arthritis, C. difficile colitis, Chronic bilateral low back pain with bilateral sciatica, Chronic diastolic heart failure (HCC), Complete tear of left rotator cuff, COPD (chronic obstructive pulmonary disease) ([facility] [address] Coronary artery disease involving native coronary artery of native heart without angina pectoris, Degenerative disc disease, cervical, GI bleed, Gout, H/O cardiac catheterization, History of blood transfusion, Hyperlipidemia, Hyperlipidemia with target LDL less than 100, Hyperparathyroidism ([facility] [address] Hypertension, Knee pain, chronic, Liver disease, MDRO (multiple drug resistant organisms) resistance, Melena, Memory loss, Moderate episode of recurrent major depressive disorder ([facility] [address] Obesity, Class III, BMI 40-49.9 (morbid obesity) ([facility] [address] REYNALDO on CPAP, Osteoarthritis, Peripheral arterial occlusive disease ([facility] [address] Pneumonia, Polypharmacy, Positive FIT (fecal immunochemical test), Prolonged emergence from general anesthesia, Prostate CA ([facility] [address] Prostate cancer ([facility] [address] Pseudocholinesterase deficiency, Severe obesity (BMI 35.0-35.9 with comorbidity) ([facility] [address] Short of breath on exertion, Sleep apnea, Status post lumbar laminectomy, Stenosis of left carotid artery, Syncope and collapse, Tubular adenoma of colon 17, Type 2 diabetes mellitus with hyperglycemia, without long-term current use of insulin ([facility] [address] Unintended weight loss, Vitamin D deficiency, and Wears glasses. Social History:   reports that he has been smoking cigarettes. He has a 52.50 pack-year smoking history. He has never used smokeless tobacco. He reports previous alcohol use. He reports that he does not use drugs. Family History:   Family History   Problem Relation Age of Onset    Diabetes Mother     Lung Cancer Brother     Liver Cancer Brother     Cancer Father        Vitals:  BP (!) 155/69   Pulse 51   Temp 97.3 °F (36.3 °C) (Axillary)   Resp 20   Ht 6' (1.829 m)   Wt 280 lb (127 kg)   SpO2 96%   BMI 37.97 kg/m²   Temp (24hrs), Av.6 °F (36.4 °C), Min:97.3 °F (36.3 °C), Max:97.7 °F (36.5 °C)    No results for input(s): POCGLU in the last 72 hours. I/O(24Hr):     Intake/Output Summary (Last 24 hours) at 7/10/2021 0847  Last data filed at 7/10/2021 4122  Gross per 24 hour   Intake 1780 ml   Output 1800 ml   Net -20 ml       Labs:  [unfilled]    Lab Results   Component Value Date/Time    SPECIAL HOLD 17 DAYS TO DETECT C.ACNES 10/15/2020 08:19 AM     Lab Results   Component Value Date/Time    CULTURE NO GROWTH 17 DAYS 10/15/2020 08:19 AM       [unfilled]    Radiology:    XR CHEST PORTABLE    Result Date: 7/8/2021  EXAMINATION: ONE XRAY VIEW OF THE CHEST 7/8/2021 10:37 am COMPARISON: Radiographs 08/18/2020, 03/19/2020. Chest CT 03/15/2021. HISTORY: ORDERING SYSTEM PROVIDED HISTORY: shortness of breath TECHNOLOGIST PROVIDED HISTORY: shortness of breath Reason for Exam: cough and dyspnea x 3 days Acuity: Acute Type of Exam: Initial FINDINGS: The cardiomediastinal silhouette is unchanged in appearance. Increased density in the right cardiophrenic region is a stable chronic finding. There is no consolidation, pneumothorax, or evidence of edema. No effusion is appreciated. The osseous structures are unchanged in appearance. Partially visualized left shoulder arthroplasty. Unchanged appearance of the chest without acute airspace disease identified. Physical Examination:        Physical Exam  Constitutional:       Appearance: Normal appearance. HENT:      Head: Normocephalic and atraumatic. Eyes:      Extraocular Movements: Extraocular movements intact. Conjunctiva/sclera: Conjunctivae normal.   Cardiovascular:      Rate and Rhythm: Normal rate and regular rhythm. Pulses: Normal pulses. Heart sounds: Normal heart sounds. Pulmonary:      Effort: Pulmonary effort is normal. No respiratory distress. Comments: Wheezing present throughout, interval improvement  Abdominal:      General: Abdomen is flat. Palpations: Abdomen is soft. Musculoskeletal:         General: Normal range of motion.    Neurological:      Mental Status: He is alert and oriented to person, place, and time. Mental status is at baseline. Psychiatric:         Mood and Affect: Mood normal.         Behavior: Behavior normal.           Assessment:        Primary Problem  COPD mixed type [facility])    Active Hospital Problems    Diagnosis Date Noted    Bronchitis [J40] 07/08/2021    Type 2 diabetes mellitus with diabetic polyneuropathy, without long-term current use of insulin ([facility] [address] [E11.42] 03/25/2017    COPD mixed type ([facility] [address] [J44.9] 03/25/2017    Essential hypertension [I10] 01/20/2016       Plan:        Acute on Chronic COPD  - Patient given single dose IV solumedrol 125 mg in ED  - CXR in ED unremarkable  - Continue Duoneb q4h while awake  - Continue Solumedrol 40 mg q6h  - Continue home Symbicort  - Proventil PRN  - Procalcitonin 0.08  - Patient brought BiPAP with home settings  - Clinical improvement seen 7/10     Essential Hypertension  - Continue home norvasc 10 mg qD  - Continue home lisinopril 10 mg qD     Hyperlipidemia  - Continue home pravastatin 80 qD     Peripheral Neuropathy s/p back surgery  - Continue home pregabalin     Chronic bilateral shoulder pain  - Continue home percocet BID PRN    Antonia Barry MD  7/10/2021  8:47 AM     Attending Physician Statement  I have discussed the care of [facility] and I have examined the patient myselft and taken ros and hpi , including pertinent history and exam findings,  with the resident. I have reviewed the key elements of all parts of the encounter with the resident. I agree with the assessment, plan and orders as documented by the resident.     79-year-old morbidly obese gentleman BMI is 45 active smoker history of COPD on home oxygen admitted with increased dyspnea wheezing after exposure to the fumes from [place] clinical diagnosis acute exacerbation of COPD still wheezing poor air entry we will continue IV steroids DuoNeb every 4 hours  Electronically signed by Sam Cox MD

## 2021-07-11 PROCEDURE — 2580000003 HC RX 258

## 2021-07-11 PROCEDURE — 1200000000 HC SEMI PRIVATE

## 2021-07-11 PROCEDURE — 94761 N-INVAS EAR/PLS OXIMETRY MLT: CPT

## 2021-07-11 PROCEDURE — 99232 SBSQ HOSP IP/OBS MODERATE 35: CPT | Performed by: INTERNAL MEDICINE

## 2021-07-11 PROCEDURE — 6370000000 HC RX 637 (ALT 250 FOR IP)

## 2021-07-11 PROCEDURE — 6360000002 HC RX W HCPCS

## 2021-07-11 PROCEDURE — 2060000000 HC ICU INTERMEDIATE R&B

## 2021-07-11 PROCEDURE — 2700000000 HC OXYGEN THERAPY PER DAY

## 2021-07-11 PROCEDURE — 94640 AIRWAY INHALATION TREATMENT: CPT

## 2021-07-11 PROCEDURE — 6360000002 HC RX W HCPCS: Performed by: NURSE PRACTITIONER

## 2021-07-11 PROCEDURE — 2580000003 HC RX 258: Performed by: INTERNAL MEDICINE

## 2021-07-11 RX ORDER — METHYLPREDNISOLONE SODIUM SUCCINATE 40 MG/ML
30 INJECTION, POWDER, LYOPHILIZED, FOR SOLUTION INTRAMUSCULAR; INTRAVENOUS EVERY 8 HOURS
Status: DISCONTINUED | OUTPATIENT
Start: 2021-07-12 | End: 2021-07-12 | Stop reason: HOSPADM

## 2021-07-11 RX ADMIN — PRAVASTATIN SODIUM 80 MG: 40 TABLET ORAL at 18:37

## 2021-07-11 RX ADMIN — METHYLPREDNISOLONE SODIUM SUCCINATE 40 MG: 40 INJECTION, POWDER, FOR SOLUTION INTRAMUSCULAR; INTRAVENOUS at 01:57

## 2021-07-11 RX ADMIN — ENOXAPARIN SODIUM 30 MG: 30 INJECTION SUBCUTANEOUS at 21:53

## 2021-07-11 RX ADMIN — FUROSEMIDE 40 MG: 40 TABLET ORAL at 07:53

## 2021-07-11 RX ADMIN — BUDESONIDE AND FORMOTEROL FUMARATE DIHYDRATE 2 PUFF: 160; 4.5 AEROSOL RESPIRATORY (INHALATION) at 07:51

## 2021-07-11 RX ADMIN — METHYLPREDNISOLONE SODIUM SUCCINATE 40 MG: 40 INJECTION, POWDER, FOR SOLUTION INTRAMUSCULAR; INTRAVENOUS at 15:08

## 2021-07-11 RX ADMIN — SODIUM CHLORIDE, PRESERVATIVE FREE 10 ML: 5 INJECTION INTRAVENOUS at 21:54

## 2021-07-11 RX ADMIN — OXYCODONE HYDROCHLORIDE AND ACETAMINOPHEN 1 TABLET: 5; 325 TABLET ORAL at 07:51

## 2021-07-11 RX ADMIN — FAMOTIDINE 20 MG: 20 TABLET, FILM COATED ORAL at 21:54

## 2021-07-11 RX ADMIN — AMLODIPINE BESYLATE 10 MG: 10 TABLET ORAL at 07:52

## 2021-07-11 RX ADMIN — IPRATROPIUM BROMIDE AND ALBUTEROL SULFATE 1 AMPULE: .5; 3 SOLUTION RESPIRATORY (INHALATION) at 15:15

## 2021-07-11 RX ADMIN — IPRATROPIUM BROMIDE AND ALBUTEROL SULFATE 1 AMPULE: .5; 3 SOLUTION RESPIRATORY (INHALATION) at 08:07

## 2021-07-11 RX ADMIN — ASPIRIN 81 MG: 81 TABLET, COATED ORAL at 07:51

## 2021-07-11 RX ADMIN — SODIUM CHLORIDE, PRESERVATIVE FREE 10 ML: 5 INJECTION INTRAVENOUS at 21:58

## 2021-07-11 RX ADMIN — PREGABALIN 200 MG: 100 CAPSULE ORAL at 07:51

## 2021-07-11 RX ADMIN — PREGABALIN 200 MG: 100 CAPSULE ORAL at 21:54

## 2021-07-11 RX ADMIN — IPRATROPIUM BROMIDE AND ALBUTEROL SULFATE 1 AMPULE: .5; 3 SOLUTION RESPIRATORY (INHALATION) at 12:02

## 2021-07-11 RX ADMIN — ENOXAPARIN SODIUM 30 MG: 30 INJECTION SUBCUTANEOUS at 07:53

## 2021-07-11 RX ADMIN — PREGABALIN 200 MG: 100 CAPSULE ORAL at 15:09

## 2021-07-11 RX ADMIN — BUDESONIDE AND FORMOTEROL FUMARATE DIHYDRATE 2 PUFF: 160; 4.5 AEROSOL RESPIRATORY (INHALATION) at 21:55

## 2021-07-11 RX ADMIN — SODIUM CHLORIDE, PRESERVATIVE FREE 10 ML: 5 INJECTION INTRAVENOUS at 09:00

## 2021-07-11 RX ADMIN — IPRATROPIUM BROMIDE AND ALBUTEROL SULFATE 1 AMPULE: .5; 3 SOLUTION RESPIRATORY (INHALATION) at 20:22

## 2021-07-11 RX ADMIN — FAMOTIDINE 20 MG: 20 TABLET, FILM COATED ORAL at 07:52

## 2021-07-11 RX ADMIN — LISINOPRIL 10 MG: 10 TABLET ORAL at 07:53

## 2021-07-11 RX ADMIN — OXYCODONE HYDROCHLORIDE AND ACETAMINOPHEN 1 TABLET: 5; 325 TABLET ORAL at 18:37

## 2021-07-11 ASSESSMENT — ENCOUNTER SYMPTOMS
ABDOMINAL PAIN: 0
SINUS PRESSURE: 0
SINUS PAIN: 0
CHEST TIGHTNESS: 0
ABDOMINAL DISTENTION: 0
SHORTNESS OF BREATH: 1
WHEEZING: 1
EYE PAIN: 0
EYE REDNESS: 0

## 2021-07-11 ASSESSMENT — PAIN SCALES - GENERAL
PAINLEVEL_OUTOF10: 0
PAINLEVEL_OUTOF10: 4
PAINLEVEL_OUTOF10: 0
PAINLEVEL_OUTOF10: 7
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
## 2021-07-11 NOTE — PROGRESS NOTES
Pt was awakened for assessment; states slept well; got up to bathroom and SOB almost immediately on exertion;needed to sit and \"rest\" on his way from bathroom to the other  pt does have diminished breath sounds with tight expiratory wheezes; Writer did offer to call RT for trx but he needed to return to the bathroom; pt was instructed to call writer when he returns to his bed

## 2021-07-11 NOTE — PROGRESS NOTES
Pulmonary Progress Note  [facility]      Patient - Pepper Rosario,  Age - 78 y.o.    - 1955      Room Number - -01   N -  [id number]   [facility]  Date of Admission -  2021 10:00 AM        Consulting Maki Goodwin MD  Primary Care Physician - Debbie West MD     SUBJECTIVE   Patient appears to be in better spirits with his breathing. He claims he is less wheezing he claims he is able to do more    OBJECTIVE   VITALS    height is 6' (1.829 m) and weight is 280 lb (127 kg). His oral temperature is 97.5 °F (36.4 °C). His blood pressure is 135/75 and his pulse is 101. His respiration is 18 and oxygen saturation is 99%. Body mass index is 37.97 kg/m². Temperature Range: Temp: 97.5 °F (36.4 °C) Temp  Av.7 °F (36.5 °C)  Min: 97.5 °F (36.4 °C)  Max: 98.3 °F (36.8 °C)  BP Range:  Systolic (28IZB), JEJ:303 , Min:104 , TVD:413     Diastolic (16JOS), GONZALEZ:15, Min:63, Max:77    Pulse Range: Pulse  Av.3  Min: 53  Max: 118  Respiration Range: Resp  Av.1  Min: 17  Max: 20  Current Pulse Ox[de-identified]  SpO2: 99 %  24HR Pulse Ox Range:  SpO2  Av.1 %  Min: 93 %  Max: 99 %  Oxygen Amount and Delivery: O2 Flow Rate (L/min): 3 L/min    Wt Readings from Last 3 Encounters:   21 280 lb (127 kg)   21 290 lb (131.5 kg)   21 290 lb 12.8 oz (131.9 kg)       I/O (24 Hours)  No intake or output data in the 24 hours ending 21 1837    EXAM     General Appearance  Awake, alert, oriented, in no acute distress  HEENT - normocephalic, atraumatic. Neck - Supple,  trachea midline   Lungs -I do not hear any rhonchi or wheezes  Heart Exam:PMI normal. No lifts, heaves, or thrills. RRR. No murmurs, clicks, gallops, or rubs  Abdomen Exam: Abdomen soft, non-tender.  BS normal. No masses,   Extremity Exam: No signs of cyanosis    MEDS      [START ON 2021] methylPREDNISolone  30 mg Intravenous Q8H    amLODIPine 10 mg Oral Daily    aspirin EC  81 mg Oral Daily    furosemide  40 mg Oral Daily    lisinopril  10 mg Oral Daily    pravastatin  80 mg Oral QPM    pregabalin  200 mg Oral TID    budesonide-formoterol  2 puff Inhalation BID    sodium chloride flush  5-40 mL Intravenous 2 times per day    sodium chloride flush  5-40 mL Intravenous 2 times per day    enoxaparin  30 mg Subcutaneous BID    famotidine  20 mg Oral BID    nicotine  1 patch Transdermal Daily    ipratropium-albuterol  1 ampule Inhalation Q4H WA      sodium chloride      sodium chloride       ipratropium-albuterol, albuterol sulfate HFA, sodium chloride flush, sodium chloride, ondansetron **OR** ondansetron, sodium chloride flush, sodium chloride, polyethylene glycol, acetaminophen **OR** acetaminophen, albuterol, oxyCODONE-acetaminophen    LABS   CBC   Recent Labs     07/09/21  0539   WBC 7.4   HGB 14.9   HCT 44.0   MCV 86.7        BMP:   Lab Results   Component Value Date     07/09/2021    K 5.1 07/09/2021     07/09/2021    CO2 26 07/09/2021    BUN 13 07/09/2021    LABALBU 4.3 07/08/2021    LABALBU 4.1 02/16/2012    CREATININE 0.71 07/09/2021    CALCIUM 9.9 07/09/2021    GFRAA >60 07/09/2021    LABGLOM >60 07/09/2021     ABGs:No results found for: PHART, PO2ART, VFA7HVQ   Lab Results   Component Value Date    MODE NOT REPORTED 07/09/2021     Ionized Calcium:  No results found for: IONCA  Magnesium:    Lab Results   Component Value Date    MG 2.0 03/19/2020     Phosphorus:  No results found for: PHOS     LIVER PROFILE No results for input(s): AST, ALT, LIPASE, BILIDIR, BILITOT, ALKPHOS in the last 72 hours. Invalid input(s): AMYLASE,  ALB  INR No results for input(s): INR in the last 72 hours.   PTT   Lab Results   Component Value Date    APTT 20.4 (L) 03/19/2020         RADIOLOGY     (See actual reports for details)    ASSESSMENT/PLAN     Patient Active Problem List   Diagnosis    Essential hypertension    Gout    Hyperlipidemia with target LDL less than 70    Osteoporosis    Prostate cancer ([address]    Gastroesophageal reflux disease without esophagitis    DDD (degenerative disc disease), lumbar    Chronic bilateral low back pain with bilateral sciatica    Osteoarthritis of spine with radiculopathy, lumbar region    Type 2 diabetes mellitus with diabetic polyneuropathy, without long-term current use of insulin (HCC)    PVD (peripheral vascular disease) with claudication (HCC)    Peripheral neuropathic pain    Tinea pedis of both feet    Nonspecific reactive hepatitis    COPD mixed type ([address]    Status post lumbar laminectomy    Serum cholinesterase defect ([address]    Vitamin D deficiency    Hypertriglyceridemia    REYNALDO on CPAP    Tubular adenoma of colon 4/11/17    Psychophysiological insomnia    S/P reverse total shoulder arthroplasty, left    Recurrent major depressive disorder, in full remission ([address]    Actinic keratosis    Photodermatitis due to sun    Seborrheic keratosis    Senile hyperkeratosis    Solar degeneration    Abdominal aortic aneurysm ([address]    Family history of cancer    Stenosis of left carotid artery    Poor balance    Foot drop, right    Difficulty rising from chair    Degenerative disc disease, cervical    At high risk for falls    Pseudocholinesterase deficiency    Hyperparathyroidism ([address]    Coronary artery disease involving native coronary artery of native heart without angina pectoris    Severe obesity (BMI 35.0-35.9 with comorbidity) ([address]    Status post total shoulder arthroplasty, left    Personal history of smoking    Chronic hypoxemic respiratory failure ([address]    History of prostate cancer    Left inguinal hernia    Bilateral leg edema    Type 2 diabetes mellitus with diabetic peripheral angiopathy without gangrene, without long-term current use of insulin (HCC)    Bilateral tinnitus    Difficulty in walking    Chronic diastolic heart failure ([address]    Bronchitis     Acute COPD exacerbation  Chronic hypoxemic respiratory failure he is on 2.5 L  Tobacco abuse  Obstructive sleep apnea on CPAP therapy  Baseline COPD is severe  Morbid obesity  Degenerative disc disease  GERD  Peripheral vascular disease  Type 2 diabetes     Decrease Solu-Medrol to 30 mg every 8 hours. On DuoNeb treatments   Symbicort  On Lovenox for DVT prophylaxis    Patient did tell me that he believes that may be his heart is the issue why he is having problems with dyspnea upon exertion. He tells me that [facility] cardiology follows his heart issues. He requested to me if his heart doctors can see him while in hospital.  I relayed this information to the bedside nurse.         Electronically signed by Rolando De La Cruz MD on 7/11/2021 at 6:37 PM

## 2021-07-11 NOTE — PROGRESS NOTES
PROVIDE ADEQUATE OXYGENATION WITH ACCEPTABLE SP02/ABG'S    [x]  IDENTIFY APPROPRIATE OXYGEN THERAPY  [x]   MONITOR SP02/ABG'S AS NEEDED   [x]   PATIENT EDUCATION AS NEEDED    BRONCHOSPASM/BRONCHOCONSTRICTION     [x]         IMPROVE AERATION/BREATH SOUNDS  [x]   ADMINISTER BRONCHODILATOR THERAPY AS APPROPRIATE  [x]   ASSESS BREATH SOUNDS  []   IMPLEMENT AEROSOL/MDI PROTOCOL  [x]   PATIENT EDUCATION AS NEEDED    Pt currently off home CPAP machine, pt on 3LNC SpO2 93%.  Pt has diminished expiratory wheezes through out

## 2021-07-11 NOTE — PLAN OF CARE
Problem: Safety:  Goal: Free from accidental physical injury  Description: Free from accidental physical injury  7/11/2021 0321 by Raquel Smith RN  Outcome: Ongoing  Note: Pt assessed as a fall risk this shift. Remains free from falls and accidental injury at this time. Fall precautions in place, including falling star sign and fall risk band on pt. Floor free from obstacles, and bed is locked and in lowest position. Adequate lighting provided. Will continue to monitor needs during hourly rounding, and reinforce education on use of call light. Problem: Safety:  Goal: Free from intentional harm  Description: Free from intentional harm  7/11/2021 0321 by Raquel Smith RN  Outcome: Ongoing     Problem: Safety:  Goal: Free from intentional harm  Description: Free from intentional harm  7/11/2021 0321 by Raquel Smith RN  Outcome: Ongoing     Problem: Pain:  Goal: Pain level will decrease  Description: Pain level will decrease  Outcome: Ongoing  Note: No pain at this time. 0/10 pain scale. Problem: Pain:  Goal: Pain level will decrease  Description: Pain level will decrease  Outcome: Ongoing  Note: No pain at this time. 0/10 pain scale. Problem: Skin Integrity:  Goal: Skin integrity will stabilize  Description: Skin integrity will stabilize  Outcome: Ongoing  Note: Skin assessment complete. Waffle mattress in place. Sensicare applied PRN. Turned and repositioned per self. Area kept free from moisture. Proper nourishment and fluids encouraged, as appropriate. Will continue to monitor for additional needs and changes in skin breakdown.        Problem: Discharge Planning:  Goal: Patients continuum of care needs are met  Description: Patients continuum of care needs are met  Outcome: Ongoing

## 2021-07-11 NOTE — PLAN OF CARE
Problem: Falls - Risk of:  Goal: Will remain free from falls  Description: Will remain free from falls  Outcome: Met This Shift  Goal: Absence of physical injury  Description: Absence of physical injury  Outcome: Met This Shift  Note: Up and about in hallway with roller walker; noted SOB on exertion

## 2021-07-11 NOTE — CARE COORDINATION
DISCHARGE PLANNING NOTE:    Prescription for POC, facesheet and face to face note was faxed to [address] [place] [address] at [phone].      Electronically signed by Kelli Cheung RN on 7/11/2021 at 1:27 PM

## 2021-07-11 NOTE — DISCHARGE INSTR - COC
Continuity of Care Form    Patient Name: Harmony Leal   :  1955  MRN:  Ann Marie Dodson date:  2021  Discharge date:  ***    Code Status Order: Full Code   Advance Directives:      Admitting Physician:  Lamberto Arevalo MD  PCP: Precious Infante MD    Discharging Nurse: [facility] Unit/Room#: 2092/2092-01  Discharging Unit Phone Number: ***    Emergency Contact:   Extended Emergency Contact Information  Primary Emergency Contact: [facility]  Address: [facility] Phone: [phone]  Work Phone: [phone]  Mobile Phone: [phone]  Relation: Spouse  Hearing or visual needs: None  Other needs: None  Preferred language: English   needed?  No    Past Surgical History:  Past Surgical History:   Procedure Laterality Date    BACK SURGERY      x 2     BACK SURGERY  2017    lumbar laminectomy L2, L3, L4    BRONCHOSCOPY N/A 3/13/2020    BRONCHOSCOPY performed by Susan Osborn MD at [address] [address]    no stents    CAROTID ENDARTERECTOMY Right 2019    Dr. Juan Luis Sevilla Bilateral     CHOLECYSTECTOMY, LAPAROSCOPIC N/A 2021    CHOLECYSTECTOMY LAPAROSCOPIC ROBOTIC XI performed by Mariano Strickland MD at [facility], COLON, DIAGNOSTIC      HERNIA REPAIR Left 2020    HERNIA INGUINAL REPAIR [address] OPEN performed by Mariano Strickland MD at [address] Left     LUMBAR LAMINECTOMY  2017    L2-L4    GA CLOSED RX SHLDR DISLOC,ANESTHESIA Left 2018    SHOULDER CLOSED REDUCTION WITH C-ARM VISUALIZATION performed by Kandice Perez MD at [facility] COLONOSCOPY W/BIOPSY SINGLE/MULTIPLE N/A 2017    COLONOSCOPY WITH BIOPSY performed by Ana Hogan DO at [address] [place] [address] Rd Left 2018    SHOULDER TOTAL ARTHROPLASTY REVERSE LEFT DJO & BICEP TENDON TRANSFER performed by Shawn Fitzgerald MD at [address] HUMERAL/GLENOID COMPNT Left 8/3/2018    SHOULDER TOTAL REVERSE  ARTHROPLASTY REVISION performed by Shawn Fitzgerald MD at [address]     rotator cuff repair    SHOULDER SURGERY Left 10/15/2020    SHOULDER ARTHROSCOPY W/INTEROP CULTURES performed by Shawn Fitzgerald MD at [address] [facility]      ear, forehead    TONSILLECTOMY AND ADENOIDECTOMY      UPPER GASTROINTESTINAL ENDOSCOPY N/A 3/19/2020    EGD BIOPSY performed by Meg Gonzalez MD at [facility]       Immunization History:   Immunization History   Administered Date(s) Administered    COVID-19, Moderna, PF, 100mcg/0.5mL 03/23/2021, 04/20/2021    Influenza Virus Vaccine 12/30/2014, 12/28/2015, 10/27/2016, 10/02/2017, 09/04/2018, 10/01/2018    Influenza, High-dose, Jimmy Starch, 65 yrs +, IM (Fluzone) 12/28/2020    Influenza, Intradermal, Quadrivalent, Preservative Free 10/27/2016    Influenza, Quadv, IM, (6 mo and older Fluzone, Flulaval, Fluarix and 3 yrs and older Afluria) 10/02/2017    Influenza, Quadv, IM, PF (6 mo and older Fluzone, Flulaval, Fluarix, and 3 yrs and older Afluria) 09/04/2018    Influenza, Triv, inactivated, subunit, adjuvanted, IM (Fluad 65 yrs and older) 01/28/2020    Pneumococcal Conjugate 13-valent (Melanie Samantha) 04/21/2016, 07/23/2019    Pneumococcal Polysaccharide (Roaidcymj18) 12/30/2014, 04/11/2017    Tdap (Boostrix, Adacel) 03/01/2018    Zoster Live (Zostavax) 01/30/2016    Zoster Recombinant (Shingrix) 02/22/2020, 06/04/2020       Active Problems:  Patient Active Problem List   Diagnosis Code    Essential hypertension I10    Gout M10.9    Hyperlipidemia with target LDL less than 70 E78.5    Osteoporosis M81.0    Prostate cancer ([facility] C61    Gastroesophageal reflux disease without esophagitis K21.9    DDD (degenerative disc disease), lumbar M51.36    Chronic bilateral low back pain with bilateral sciatica M54.42, M54.41, G89.29    Osteoarthritis of spine with radiculopathy, lumbar region M47.26    Type 2 diabetes mellitus with diabetic polyneuropathy, without long-term current use of insulin ([facility]) E11.42    PVD (peripheral vascular disease) with claudication ([facility]) I73.9    Peripheral neuropathic pain M79.2    Tinea pedis of both feet B35.3    Nonspecific reactive hepatitis K75.2    COPD mixed type ([facility]) J44.9    Status post lumbar laminectomy Z98.890    Serum cholinesterase defect ([facility]) E88.89    Vitamin D deficiency E55.9    Hypertriglyceridemia E78.1    REYNALDO on CPAP G47.33, Z99.89    Tubular adenoma of colon 4/11/17 D12.6    Psychophysiological insomnia F51.04    S/P reverse total shoulder arthroplasty, left Z96.612    Recurrent major depressive disorder, in full remission ([facility] F33.42    Actinic keratosis L57.0    Photodermatitis due to sun L56.8    Seborrheic keratosis L82.1    Senile hyperkeratosis L57.0    Solar degeneration L57.8    Abdominal aortic aneurysm ([facility]) I71.4    Family history of cancer Z80.9    Stenosis of left carotid artery I65.22    Poor balance R26.89    Foot drop, right M21.371    Difficulty rising from chair R68.89    Degenerative disc disease, cervical M50.30    At high risk for falls Z91.81    Pseudocholinesterase deficiency E88.09    Hyperparathyroidism ([facility] E21.3    Coronary artery disease involving native coronary artery of native heart without angina pectoris I25.10    Severe obesity (BMI 35.0-35.9 with comorbidity) ([facility]) E66.01, Z68.35    Status post total shoulder arthroplasty, left K77.101    Personal history of smoking Z87.891    Chronic hypoxemic respiratory failure ([facility]) J96.11    History of prostate cancer Z85.46    Left inguinal hernia K40.90    Bilateral leg edema R60.0    Type 2 diabetes mellitus with diabetic peripheral angiopathy without gangrene, without long-term current use of insulin ([facility]) E11.51    Bilateral tinnitus H93.13    Difficulty in walking R26.2    Chronic diastolic heart failure (HCC) I50.32    Bronchitis J40       Isolation/Infection:   Isolation            No Isolation          Patient Infection Status       Infection Onset Added Last Indicated Last Indicated By Review Planned Expiration Resolved Resolved By    None active    Resolved    COVID-19 Rule Out 07/08/21 07/08/21 07/08/21 SARS-CoV-2 NAAT (Rapid) (Ordered)   07/08/21 Rule-Out Test Resulted    COVID-19 Rule Out 02/12/21 02/12/21 02/12/21 COVID-19 (Ordered)   02/13/21 Rule-Out Test Resulted    COVID-19 Rule Out 11/14/20 11/14/20 11/14/20 Covid-19 Ambulatory (Ordered)   11/16/20 Rule-Out Test Resulted    COVID-19 Rule Out 10/11/20 10/11/20 10/11/20 COVID-19 (Ordered)   10/12/20 Rule-Out Test Resulted    C-diff Rule Out 03/18/20 03/18/20 03/18/20 Clostridium Difficile Toxin/Antigen (Ordered)   03/18/20 Rule-Out Test Resulted            Nurse Assessment:  Last Vital Signs: /63   Pulse 118   Temp 97.5 °F (36.4 °C) (Oral)   Resp 18   Ht 6' (1.829 m)   Wt 280 lb (127 kg)   SpO2 99%   BMI 37.97 kg/m²     Last documented pain score (0-10 scale): Pain Level: 4  Last Weight:   Wt Readings from Last 1 Encounters:   07/08/21 280 lb (127 kg)     Mental Status:  {IP PT MENTAL STATUS:20030:::0}    IV Access:  { KATHIE IV ACCESS:478224138:::0}    Nursing Mobility/ADLs:  Walking   {CHP DME ADLs:902517154:::0}  Transfer  {CHP DME ADLs:501615541:::0}  Bathing  {CHP DME ADLs:421749221:::0}  Dressing  {CHP DME ADLs:198116327:::0}  Toileting  {CHP DME ADLs:825644289:::0}  Feeding  {CHP DME ADLs:427812835:::0}  Med Admin  {P DME ADLs:174301133:::0}  Med Delivery   { KATHIE MED Delivery:233755496:::0}    Wound Care Documentation and Therapy:        Elimination:  Continence:    Bowel: {YES / QW:34400}  Bladder: {YES / QV:02349}  Urinary Catheter: {Urinary Catheter:987324063:::0}   Colostomy/Ileostomy/Ileal Conduit: {YES / EF:11599}       Date of Last BM: ***    Intake/Output Summary (Last 24 hours) at 7/11/2021 1402  Last data filed at 7/10/2021 1726  Gross per 24 hour   Intake 400 ml   Output --   Net 400 ml     I/O last 3 completed shifts:   In: 1 [P.O.:636]  Out: -     Safety Concerns:     [address] Safety Concerns:552989901:::0}    Impairments/Disabilities:      [address] Impairments/Disabilities:531642770:::0}    Nutrition Therapy:  Current Nutrition Therapy:   [address] Diet List:813248207:::0}    Routes of Feeding: {[facility] DME Other Feedings:493312688:::0}  Liquids: {Slp liquid thickness:04617}  Daily Fluid Restriction: {CHP DME Yes amt example:116778302:::0}  Last Modified Barium Swallow with Video (Video Swallowing Test): {Done Not Done UCUU:492908402:::5}    Treatments at the Time of Hospital Discharge:   Respiratory Treatments: ***  Oxygen Therapy:  {Therapy; copd oxygen:08413:::0}  Ventilator:    {[facility] Vent List:421572178:::0}    Rehab Therapies: {THERAPEUTIC INTERVENTION:8397514280}  Weight Bearing Status/Restrictions: {[facility] Weight Bearin:::0}  Other Medical Equipment (for information only, NOT a DME order):  {EQUIPMENT:011288944}  Other Treatments: ***    Patient's personal belongings (please select all that are sent with patient):  {[facility] DME Belongings:553266396:::0}    RN SIGNATURE:  {Esignature:907559146:::0}    CASE MANAGEMENT/SOCIAL WORK SECTION    Inpatient Status Date: ***    Readmission Risk Assessment Score:  Readmission Risk              Risk of Unplanned Readmission:  17           Discharging to Facility/ Agency   Name:   Address:  Phone:  Fax:    Dialysis Facility (if applicable)   Name:  Address:  Dialysis Schedule:  Phone:  Fax:    / signature: {Esignature:983162949:::0}    PHYSICIAN SECTION    Prognosis: {Prognosis:6157772867:::0}    Condition at Discharge: [address] Patient Condition:615592207:::0}    Rehab Potential (if transferring to Rehab): {Prognosis:8674133156:::0}    Recommended Labs or Other Treatments After Discharge: ***    Physician Certification: I certify the above information and transfer of Emmett Lombard  is necessary for the continuing treatment of the diagnosis listed and that he requires {Admit to Appropriate Level of Care:52565:::0} for {GREATER/LESS:034141784} 30 days.      Update Admission H&P: {CHP DME Changes in HandP:776065957:::0}    PHYSICIAN SIGNATURE:  Electronically signed by Shubham Barba MD on 7/11/21 at 2:02 PM EDT

## 2021-07-12 VITALS
SYSTOLIC BLOOD PRESSURE: 136 MMHG | BODY MASS INDEX: 38.43 KG/M2 | OXYGEN SATURATION: 96 % | HEIGHT: 72 IN | DIASTOLIC BLOOD PRESSURE: 76 MMHG | TEMPERATURE: 97.6 F | WEIGHT: 283.73 LBS | RESPIRATION RATE: 18 BRPM | HEART RATE: 76 BPM

## 2021-07-12 PROCEDURE — 99239 HOSP IP/OBS DSCHRG MGMT >30: CPT | Performed by: INTERNAL MEDICINE

## 2021-07-12 PROCEDURE — 94640 AIRWAY INHALATION TREATMENT: CPT

## 2021-07-12 PROCEDURE — 2580000003 HC RX 258

## 2021-07-12 PROCEDURE — 6370000000 HC RX 637 (ALT 250 FOR IP)

## 2021-07-12 PROCEDURE — 6360000002 HC RX W HCPCS

## 2021-07-12 PROCEDURE — 2580000003 HC RX 258: Performed by: INTERNAL MEDICINE

## 2021-07-12 PROCEDURE — 94761 N-INVAS EAR/PLS OXIMETRY MLT: CPT

## 2021-07-12 PROCEDURE — 6360000002 HC RX W HCPCS: Performed by: INTERNAL MEDICINE

## 2021-07-12 PROCEDURE — 2700000000 HC OXYGEN THERAPY PER DAY

## 2021-07-12 RX ORDER — LISINOPRIL 20 MG/1
20 TABLET ORAL DAILY
Status: DISCONTINUED | OUTPATIENT
Start: 2021-07-13 | End: 2021-07-12 | Stop reason: HOSPADM

## 2021-07-12 RX ORDER — PREDNISONE 20 MG/1
20 TABLET ORAL DAILY
Qty: 5 TABLET | Refills: 0 | Status: SHIPPED | OUTPATIENT
Start: 2021-07-12 | End: 2021-07-17

## 2021-07-12 RX ORDER — BUDESONIDE AND FORMOTEROL FUMARATE DIHYDRATE 160; 4.5 UG/1; UG/1
2 AEROSOL RESPIRATORY (INHALATION) 2 TIMES DAILY
Qty: 1 INHALER | Refills: 3 | Status: CANCELLED | OUTPATIENT
Start: 2021-07-12

## 2021-07-12 RX ORDER — LISINOPRIL 20 MG/1
20 TABLET ORAL DAILY
Qty: 90 TABLET | Refills: 0 | Status: SHIPPED | OUTPATIENT
Start: 2021-07-12 | End: 2022-02-08 | Stop reason: SDUPTHER

## 2021-07-12 RX ORDER — FOLIC ACID/VIT B COMPLEX AND C 5 MG
1 TABLET ORAL DAILY
Qty: 90 TABLET | Refills: 3 | Status: SHIPPED | OUTPATIENT
Start: 2021-07-12 | End: 2022-09-23

## 2021-07-12 RX ORDER — IPRATROPIUM BROMIDE AND ALBUTEROL SULFATE 2.5; .5 MG/3ML; MG/3ML
3 SOLUTION RESPIRATORY (INHALATION) 4 TIMES DAILY
Qty: 360 ML | Refills: 0 | Status: CANCELLED | OUTPATIENT
Start: 2021-07-12

## 2021-07-12 RX ADMIN — METHYLPREDNISOLONE SODIUM SUCCINATE 30 MG: 40 INJECTION, POWDER, FOR SOLUTION INTRAMUSCULAR; INTRAVENOUS at 00:40

## 2021-07-12 RX ADMIN — FAMOTIDINE 20 MG: 20 TABLET, FILM COATED ORAL at 08:20

## 2021-07-12 RX ADMIN — PREGABALIN 200 MG: 100 CAPSULE ORAL at 13:53

## 2021-07-12 RX ADMIN — IPRATROPIUM BROMIDE AND ALBUTEROL SULFATE 1 AMPULE: .5; 3 SOLUTION RESPIRATORY (INHALATION) at 14:44

## 2021-07-12 RX ADMIN — IPRATROPIUM BROMIDE AND ALBUTEROL SULFATE 1 AMPULE: .5; 3 SOLUTION RESPIRATORY (INHALATION) at 08:04

## 2021-07-12 RX ADMIN — IPRATROPIUM BROMIDE AND ALBUTEROL SULFATE 1 AMPULE: .5; 3 SOLUTION RESPIRATORY (INHALATION) at 11:06

## 2021-07-12 RX ADMIN — OXYCODONE HYDROCHLORIDE AND ACETAMINOPHEN 1 TABLET: 5; 325 TABLET ORAL at 08:20

## 2021-07-12 RX ADMIN — METHYLPREDNISOLONE SODIUM SUCCINATE 30 MG: 40 INJECTION, POWDER, FOR SOLUTION INTRAMUSCULAR; INTRAVENOUS at 08:20

## 2021-07-12 RX ADMIN — SODIUM CHLORIDE, PRESERVATIVE FREE 10 ML: 5 INJECTION INTRAVENOUS at 08:28

## 2021-07-12 RX ADMIN — AMLODIPINE BESYLATE 10 MG: 10 TABLET ORAL at 08:20

## 2021-07-12 RX ADMIN — PREGABALIN 200 MG: 100 CAPSULE ORAL at 08:19

## 2021-07-12 RX ADMIN — BUDESONIDE AND FORMOTEROL FUMARATE DIHYDRATE 2 PUFF: 160; 4.5 AEROSOL RESPIRATORY (INHALATION) at 10:41

## 2021-07-12 RX ADMIN — ASPIRIN 81 MG: 81 TABLET, COATED ORAL at 08:20

## 2021-07-12 RX ADMIN — SODIUM CHLORIDE, PRESERVATIVE FREE 10 ML: 5 INJECTION INTRAVENOUS at 08:21

## 2021-07-12 RX ADMIN — ENOXAPARIN SODIUM 30 MG: 30 INJECTION SUBCUTANEOUS at 08:20

## 2021-07-12 RX ADMIN — FUROSEMIDE 40 MG: 40 TABLET ORAL at 08:20

## 2021-07-12 RX ADMIN — LISINOPRIL 10 MG: 10 TABLET ORAL at 08:20

## 2021-07-12 ASSESSMENT — ENCOUNTER SYMPTOMS
SHORTNESS OF BREATH: 1
SINUS PAIN: 0
EYE REDNESS: 0
WHEEZING: 1
ABDOMINAL PAIN: 0
ABDOMINAL DISTENTION: 0
EYE PAIN: 0
SINUS PRESSURE: 0
CHEST TIGHTNESS: 0

## 2021-07-12 ASSESSMENT — PAIN SCALES - GENERAL
PAINLEVEL_OUTOF10: 6
PAINLEVEL_OUTOF10: 0

## 2021-07-12 NOTE — CARE COORDINATION
ONGOING DISCHARGE PLAN:    Patient is alert and oriented x4. Spoke with patient regarding discharge plan and patient confirms that plan is still to discharge to home with no needs  Script sent to [facility] for approval for a POC   On iv steroids   cxr today   May need a stress and echo   On lasix   Possible discharge to home soon     Will continue to follow for additional discharge needs.     Electronically signed by Hobert Sever, RN on 7/12/2021 at 1:35 PM

## 2021-07-12 NOTE — PLAN OF CARE
Problem: Infection:  Goal: Will remain free from infection  Description: Will remain free from infection  7/12/2021 0324 by Sandhya Huerta RN  Note: Patient remains afebrile;  no signs of erythema, edema, or warmth. Will continue to monitor for signs/symptoms of infection. Problem: Falls - Risk of:  Goal: Will remain free from falls  Description: Will remain free from falls  7/12/2021 0324 by Sandhya Huerta RN  Outcome: Ongoing  Note: No falls noted this shift. Patient ambulates with x1 staff assistance without difficulty. Bed kept in low position. Safe environment maintained. Bedside table & call light in reach. Uses call light appropriately when needing assistance. Problem: Safety:  Goal: Free from accidental physical injury  Description: Free from accidental physical injury  7/12/2021 0324 by Sandhya Huerta RN  Outcome: Ongoing  Note: Skin assessment complete. Sensicare applied PRN. Turned and repositioned every two hours per self. Area kept free from moisture. Proper nourishment and fluids encouraged, as appropriate. Will continue to monitor for additional needs and changes in skin breakdown. Problem: Pain:  Goal: Patient's pain/discomfort is manageable  Description: Patient's pain/discomfort is manageable  7/12/2021 0324 by Sandhya Huerta RN  Outcome: Ongoing  Note: Pt medicated with pain medication prn. Assessed all pain characteristics including level, type, location, frequency, and onset. Non-pharmacologic interventions offered to pt as well. Pt states pain is tolerable at this time. Will continue to monitor.        Problem: Pain:  Goal: Control of acute pain  Description: Control of acute pain  7/12/2021 0324 by Sandhya Huerta RN  Outcome: Ongoing

## 2021-07-12 NOTE — PROGRESS NOTES
Pulmonary Progress Note  [facility]      Patient - Reno Ribeiro,  Age - 78 y.o.    - 1955      Room Number - -01   N -  [id number]   Acct # - [de-identified]  Date of Admission -  2021 10:00 AM    Consulting Trip Pena MD  Primary Care Physician - Rosa Dominique MD     SUBJECTIVE   Patient resting quietly. He denies any wheezing denies any current chest pain no shortness of breath    OBJECTIVE   VITALS    height is 6' (1.829 m) and weight is 283 lb 11.7 oz (128.7 kg). His axillary temperature is 97.6 °F (36.4 °C). His blood pressure is 136/76 and his pulse is 76. His respiration is 18 and oxygen saturation is 97%. Body mass index is 38.48 kg/m². Temperature Range: Temp: 97.6 °F (36.4 °C) Temp  Av.7 °F (36.5 °C)  Min: 97.5 °F (36.4 °C)  Max: 98.1 °F (36.7 °C)  BP Range:  Systolic (71OGR), KFB:161 , Min:127 , LYRIC:484     Diastolic (58JAD), VQV:28, Min:66, Max:77    Pulse Range: Pulse  Av.6  Min: 47  Max: 101  Respiration Range: Resp  Av.1  Min: 16  Max: 20  Current Pulse Ox[de-identified]  SpO2: 97 %  24HR Pulse Ox Range:  SpO2  Av.7 %  Min: 87 %  Max: 99 %  Oxygen Amount and Delivery: O2 Flow Rate (L/min): 0 L/min    Wt Readings from Last 3 Encounters:   21 283 lb 11.7 oz (128.7 kg)   21 290 lb (131.5 kg)   21 290 lb 12.8 oz (131.9 kg)       I/O (24 Hours)    Intake/Output Summary (Last 24 hours) at 2021 1407  Last data filed at 2021 1311  Gross per 24 hour   Intake 960 ml   Output 1675 ml   Net -715 ml       EXAM     General Appearance  Awake, alert, oriented, in no acute distress  HEENT - normocephalic, atraumatic. Neck - Supple,  trachea midline   Lungs -clear no crackles rales or wheezes  Heart Exam:PMI normal. No lifts, heaves, or thrills. RRR. No murmurs, clicks, gallops, or rubs  Abdomen Exam: Abdomen soft, non-tender.  BS normal. No masses,    Extremity Exam: No signs of cyanosis    MEDS      methylPREDNISolone  30 mg Intravenous Q8H    amLODIPine  10 mg Oral Daily    aspirin EC  81 mg Oral Daily    furosemide  40 mg Oral Daily    lisinopril  10 mg Oral Daily    pravastatin  80 mg Oral QPM    pregabalin  200 mg Oral TID    budesonide-formoterol  2 puff Inhalation BID    sodium chloride flush  5-40 mL Intravenous 2 times per day    sodium chloride flush  5-40 mL Intravenous 2 times per day    enoxaparin  30 mg Subcutaneous BID    famotidine  20 mg Oral BID    nicotine  1 patch Transdermal Daily    ipratropium-albuterol  1 ampule Inhalation Q4H WA      sodium chloride      sodium chloride       ipratropium-albuterol, albuterol sulfate HFA, sodium chloride flush, sodium chloride, ondansetron **OR** ondansetron, sodium chloride flush, sodium chloride, polyethylene glycol, acetaminophen **OR** acetaminophen, albuterol, oxyCODONE-acetaminophen    LABS   CBC No results for input(s): WBC, HGB, HCT, MCV, PLT in the last 72 hours. BMP:   Lab Results   Component Value Date     07/09/2021    K 5.1 07/09/2021     07/09/2021    CO2 26 07/09/2021    BUN 13 07/09/2021    LABALBU 4.3 07/08/2021    LABALBU 4.1 02/16/2012    CREATININE 0.71 07/09/2021    CALCIUM 9.9 07/09/2021    GFRAA >60 07/09/2021    LABGLOM >60 07/09/2021     ABGs:No results found for: PHART, PO2ART, SRP3UPO   Lab Results   Component Value Date    MODE NOT REPORTED 07/09/2021     Ionized Calcium:  No results found for: IONCA  Magnesium:    Lab Results   Component Value Date    MG 2.0 03/19/2020     Phosphorus:  No results found for: PHOS     LIVER PROFILE No results for input(s): AST, ALT, LIPASE, BILIDIR, BILITOT, ALKPHOS in the last 72 hours. Invalid input(s): AMYLASE,  ALB  INR No results for input(s): INR in the last 72 hours.   PTT   Lab Results   Component Value Date    APTT 20.4 (L) 03/19/2020         RADIOLOGY     (See actual reports for details)    ASSESSMENT/PLAN     Patient Active Problem List   Diagnosis    Essential hypertension    Gout    Hyperlipidemia with target LDL less than 70    Osteoporosis    Prostate cancer ([address]    Gastroesophageal reflux disease without esophagitis    DDD (degenerative disc disease), lumbar    Chronic bilateral low back pain with bilateral sciatica    Osteoarthritis of spine with radiculopathy, lumbar region    Type 2 diabetes mellitus with diabetic polyneuropathy, without long-term current use of insulin (HCC)    PVD (peripheral vascular disease) with claudication (HCC)    Peripheral neuropathic pain    Tinea pedis of both feet    Nonspecific reactive hepatitis    COPD mixed type ([address]    Status post lumbar laminectomy    Serum cholinesterase defect ([address]    Vitamin D deficiency    Hypertriglyceridemia    REYNALDO on CPAP    Tubular adenoma of colon 4/11/17    Psychophysiological insomnia    S/P reverse total shoulder arthroplasty, left    Recurrent major depressive disorder, in full remission ([address]    Actinic keratosis    Photodermatitis due to sun    Seborrheic keratosis    Senile hyperkeratosis    Solar degeneration    Abdominal aortic aneurysm ([address]    Family history of cancer    Stenosis of left carotid artery    Poor balance    Foot drop, right    Difficulty rising from chair    Degenerative disc disease, cervical    At high risk for falls    Pseudocholinesterase deficiency    Hyperparathyroidism ([address]    Coronary artery disease involving native coronary artery of native heart without angina pectoris    Severe obesity (BMI 35.0-35.9 with comorbidity) ([address]    Status post total shoulder arthroplasty, left    Personal history of smoking    Chronic hypoxemic respiratory failure ([address]    History of prostate cancer    Left inguinal hernia    Bilateral leg edema    Type 2 diabetes mellitus with diabetic peripheral angiopathy without gangrene, without long-term current use of insulin (HCC)    Bilateral tinnitus    Difficulty in walking    Chronic diastolic heart failure (HCC)    Bronchitis     Patient has chronic respiratory failure with hypoxemia  Very severe COPD  Resolved bronchospasm  Tobacco abuse. Patient is willing to go home, he claims he is ready go home and anxious to go home. He voiced understand to call if worse.   I have asked that he see me in office in August 2021      Electronically signed by Eva Moore MD on 7/12/2021 at 2:07 PM
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
## 2021-07-12 NOTE — FLOWSHEET NOTE
Patient discharged to home per orders. IV discontinued intact. Discharge instructions reviewed written and verbal.  Patient states all belongings are present. Taken per wheelchair to awaiting vehicle.
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
## 2021-07-12 NOTE — CONSULTS
monitor shows NSR with frequent PVCs. PMH:   has a past medical history of Acid reflux, Anemia, blood loss, Arthritis, C. difficile colitis, Chronic bilateral low back pain with bilateral sciatica, Chronic diastolic heart failure (HCC), Complete tear of left rotator cuff, COPD (chronic obstructive pulmonary disease) ([address] Coronary artery disease involving native coronary artery of native heart without angina pectoris, Degenerative disc disease, cervical, GI bleed, Gout, H/O cardiac catheterization, History of blood transfusion, Hyperlipidemia, Hyperlipidemia with target LDL less than 100, Hyperparathyroidism ([address] Hypertension, Knee pain, chronic, Liver disease, MDRO (multiple drug resistant organisms) resistance, Melena, Memory loss, Moderate episode of recurrent major depressive disorder ([address] Obesity, Class III, BMI 40-49.9 (morbid obesity) ([address] REYNALDO on CPAP, Osteoarthritis, Peripheral arterial occlusive disease ([address] Pneumonia, Polypharmacy, Positive FIT (fecal immunochemical test), Prolonged emergence from general anesthesia, Prostate CA ([address] Prostate cancer ([address] Pseudocholinesterase deficiency, Severe obesity (BMI 35.0-35.9 with comorbidity) ([address] Short of breath on exertion, Sleep apnea, Status post lumbar laminectomy, Stenosis of left carotid artery, Syncope and collapse, Tubular adenoma of colon 4/11/17, Type 2 diabetes mellitus with hyperglycemia, without long-term current use of insulin ([address] Unintended weight loss, Vitamin D deficiency, and Wears glasses. PSH:   has a past surgical history that includes knee surgery (Left); lumbar laminectomy (01/05/2017); Tonsillectomy and adenoidectomy; pr colonoscopy w/biopsy single/multiple (N/A, 5/9/2017); shoulder surgery (Right, 1989?);  Carpal tunnel release (Bilateral); pr reconstr total shoulder implant (Left, 7/18/2018); pr closed rx shldr disloc,anesthesia (Left, 8/1/2018); pr taryn shoulder arthrplsty humeral/glenoid compnt (Left, 8/3/2018); Carotid endarterectomy (Right, 12/16/2019); bronchoscopy (N/A, 3/13/2020); Upper gastrointestinal endoscopy (N/A, 3/19/2020); Cardiac catheterization (2007); Colonoscopy; skin biopsy; shoulder surgery (Left, 10/15/2020); Endoscopy, colon, diagnostic; joint replacement; back surgery; back surgery (01/05/2017); hernia repair (Left, 11/18/2020); and Cholecystectomy, laparoscopic (N/A, 2/16/2021). Allergies: Allergies   Allergen Reactions    Succinylcholine Chloride Other (See Comments)     PATIENT HAS PSEUDOCHOLINESTERASE DEFICIENCY      Cymbalta [Duloxetine Hcl]      Taste loss          Home Meds:    Prior to Admission medications    Medication Sig Start Date End Date Taking? Authorizing Provider   tiotropium-olodaterol (STIOLTO RESPIMAT) 2.5-2.5 MCG/ACT AERS Inhale 2 puffs into the lungs daily   Yes Historical Provider, MD   oxyCODONE-acetaminophen (PERCOCET) 5-325 MG per tablet Take 1 tablet by mouth 2 times daily as needed. Yes Historical Provider, MD   pregabalin (LYRICA) 200 MG capsule Take 1 capsule by mouth 3 times daily for 90 days. 4/23/21 7/22/21 Yes John Chavez MD   ammonium lactate (AMLACTIN) 12 % cream APPLY TOPICALLY TO LEGS ONE TO TWO TIMES A DAY AS NEEDED 3/1/21  Yes John Chavez MD   furosemide (LASIX) 40 MG tablet Take 1 tablet by mouth daily Dose increased 1/12/2021 1/12/21  Yes John Chavez MD   lisinopril (PRINIVIL;ZESTRIL) 10 MG tablet Take 1 tablet by mouth daily 1/12/21  Yes John Chavez MD   amLODIPine (NORVASC) 10 MG tablet Take 1 tablet by mouth daily Dose increased 10/16/2020 due to high BP.  Correct dosage 10/16/20  Yes John Chavez MD   albuterol sulfate HFA (PROVENTIL HFA) 108 (90 Base) MCG/ACT inhaler Inhale 2 puffs into the lungs every 6 hours as needed for Wheezing or Shortness of Breath 10/13/20  Yes John Chavez MD   pravastatin (PRAVACHOL) 80 MG tablet Take 1 tablet by mouth every evening Dose increased  9/24/2019 Oral BID Shalom Jimenez MD   20 mg at 07/12/21 0820    nicotine (NICODERM CQ) 21 MG/24HR 1 patch  1 patch Transdermal Daily Shalom Jimenez MD   1 patch at 07/11/21 0900    ipratropium-albuterol (DUONEB) nebulizer solution 1 ampule  1 ampule Inhalation Q4H WA Shalom Jimenez MD   1 ampule at 07/12/21 0804    albuterol (PROVENTIL) nebulizer solution 2.5 mg  2.5 mg Nebulization Q2H PRN Shalom Jimenez MD        oxyCODONE-acetaminophen (PERCOCET) 5-325 MG per tablet 1 tablet  1 tablet Oral BID PRN Shalom Jimenez MD   1 tablet at 07/12/21 0820       Social History:       TOBACCO:   reports that he has been smoking cigarettes. He has a 52.50 pack-year smoking history. He has never used smokeless tobacco.  ETOH:   reports previous alcohol use. DRUGS:  reports no history of drug use. OCCUPATION:          Family Histroy:         Problem Relation Age of Onset    Diabetes Mother     Lung Cancer Brother     Liver Cancer Brother     Cancer Father            Review of Systems:   · Constitutional: there has been no unanticipated weight loss. There's been no change in energy level, sleep pattern, or activity level. · Eyes: No visual changes or diplopia. No scleral icterus. · ENT: No Headaches, hearing loss or vertigo. No mouth sores or sore throat. · Cardiovascular: No chest pain, dyspnea on exertion, palpitations or loss of consciousness. No cough, hemoptysis, pleuritic pain, or phlebitis. · Respiratory: No cough or wheezing, no sputum production. No hematemesis. · Gastrointestinal: No abdominal pain, appetite loss, blood in stools. No change in bowel or bladder habits. · Genitourinary: No dysuria, trouble voiding, or hematuria. · Musculoskeletal:  No gait disturbance, weakness or joint complaints. · Integumentary: No rash or pruritis. · Neurological: No headache, diplopia, change in muscle strength, numbness or tingling.  No change in gait, balance, coordination, mood, affect, memory, mentation, behavior. · Psychiatric: No anxiety, or depression. · Endocrine: No temperature intolerance. No excessive thirst, fluid intake, or urination. No tremor. · Hematologic/Lymphatic: No abnormal bruising or bleeding, blood clots or swollen lymph nodes. · Allergic/Immunologic: No nasal congestion or hives. Physical Exam    Vital Signs: /75   Pulse 75   Temp 98.1 °F (36.7 °C) (Oral)   Resp 16   Ht 6' (1.829 m)   Wt 283 lb 11.7 oz (128.7 kg)   SpO2 (!) 87%   BMI 38.48 kg/m²  O2 Flow Rate (L/min): 0 L/min     Admission Weight: 280 lb (127 kg)     General appearance: Awake, Alert Cooperative    Head: Normocephalic, without obvious abnormality, atraumatic    Eyes: Conjunctivae/corneas clear. PERRL, EOM's intact. Fundi benign    Neck: no adenopathy, no carotid bruit, no JVD, supple, symmetrical, trachea midline and thyroid: not enlarged, symmetric, no tenderness/mass/nodules    Lungs: diminished throughout     Heart: regular rate and rhythm, S1, S2 normal, no murmur, click, rub or gallop    Abdomen: Soft, non-tender. Bowel sounds normal. No masses,  no organomegaly    Extremities: extremities normal, atraumatic, no cyanosis or edema    Skin: Skin color, texture, turgor normal. No rashes or lesions    Neurologic: Grossly normal        MEDICAL DECISION MAKING/TESTING    Cardiac Cath:  2012 -  Nonobstructive CAD 40% RCA    Echo/Stress:     stress test 2019  IMPRESSION:  *  No reversible myocardial perfusion defects are evident. *  LVEF is 70%. *  Low risk for significant cardiovascular event by imaging criteria. *  EKG interpreted separately. echo 2019    Left Ventricle: Systolic function is normal with an ejection fraction of 60-65%.   Left Ventricle: There is mild concentric increased wall thickness/hypertrophy.   Tricuspid Valve: There is mild regurgitation. There is no evidence of tricuspid valve stenosis.   Tricuspid Valve:  The right ventricular systolic pressure is mildly elevated. RVSP calculated at 40 mmHg. RVSP is based on RA pressure of 3 mmHg. EKG:        CXR: 7/8/21  Unchanged appearance of the chest without acute airspace disease identified. Labs:      CBC: No results for input(s): WBC, HGB, HCT, MCV, PLT in the last 72 hours. BMP: No results for input(s): NA, K, CL, CO2, PHOS, BUN, CREATININE in the last 72 hours. Invalid input(s): CA  PT/INR: No results for input(s): PROTIME, INR in the last 72 hours. APTT: No results for input(s): APTT in the last 72 hours. MAG: No results for input(s): MG in the last 72 hours. D Dimer:   Recent Labs     07/09/21  1820   DDIMER 0.52     Troponin T No results for input(s): TROPONINT in the last 72 hours. ProBNP Invalid input(s): PRO-BNP          Diagnosis:  Principal Problem:    COPD mixed type ([facility])  Active Problems:    Essential hypertension    Type 2 diabetes mellitus with diabetic polyneuropathy, without long-term current use of insulin ([facility])    Bronchitis  Resolved Problems:    * No resolved hospital problems. *        Plan:    Acute on chronic COPD exacerbation   - pulmonary following     Chronic heart failure with preserved ejection fraction, EF 60-65% per echocardiogram 2019  - does not appear volume overloaded on exam      ASCVD  -  Cardiac catheterization 2012 with nonobstructive coronary disease, 40% RCA  -  Stress test 2019 with no reversible ischemia     essential hypertension     hyperlipidemia     carotid artery disease status post endarterectomy      Symptoms are currently resolved and patient is planning to discharge later today.    He will need to follow up in the office in 7-10 days - will need to look at possibly repeat stress test and echo if symptoms persist.

## 2021-07-12 NOTE — FLOWSHEET NOTE
07/12/21 1612   Encounter Summary   Services provided to: Patient   Referral/Consult From: [facility]   (7-12-21)   Complexity of Encounter Moderate   Length of Encounter 15 minutes   Spiritual Assessment Completed Yes   Spiritual/Yarsani   Type Spiritual support   Assessment Calm; Approachable; Hopeful;Coping   Intervention Active listening;Explored feelings, thoughts, concerns;Prayer;Sustaining presence/ Ministry of presence   Outcome Expressed gratitude;Comfort;Engaged in conversation;Coping; Hopeful;Receptive

## 2021-07-13 ENCOUNTER — CARE COORDINATION (OUTPATIENT)
Dept: CASE MANAGEMENT | Age: 66
End: 2021-07-13

## 2021-07-13 NOTE — CARE COORDINATION
Jesus 45 Transitions Initial Follow Up Call    Call within 2 business days of discharge: Yes    Patient: Mike Dillard Patient : 1955   MRN: [id number]  Reason for Admission: sob  Discharge Date: 21 RARS: Readmission Risk Score: 18      Last Discharge [facility]       Complaint Diagnosis Description Type Department Provider    21 Shortness of Breath; Cough; Generalized Body Aches Bronchitis . .. ED to Hosp-Admission (Discharged) (ADMITTED) Hardik Street MD; Fabi Armenta...            # 1 attempt-unable to reach patient, left vm message with name and call back information, requested call back//JU    Facility: [facility]    Non-face-to-face services provided:      Care Transitions 24 Hour Call    Care Transitions Interventions         Follow Up  Future Appointments   Date Time Provider Keri English   2021  3:00 PM Elizabeth Mccauley, PhD [facility]   2021 10:20 AM Lenita Phoenix, MD Neuro Spec [facility]   2021  9:50 AM Modesta Moore MD [address] [place] [address] St Hyun Chin RN

## 2021-07-14 ENCOUNTER — CARE COORDINATION (OUTPATIENT)
Dept: CASE MANAGEMENT | Age: 66
End: 2021-07-14

## 2021-07-14 NOTE — CARE COORDINATION
Jesus 45 Transitions Initial Follow Up Call    Call within 2 business days of discharge: Yes    Patient: Anmol Gustafson Patient : 1955   MRN: [id number]  Reason for Admission:   Discharge Date: 21 RARS: Readmission Risk Score: 18      Last Discharge [address] [address]       Complaint Diagnosis Description Type Department Provider    21 Shortness of Breath; Cough; Generalized Body Aches Bronchitis . .. ED to Hosp-Admission (Discharged) (ADMITTED) Demario Clarke MD; Becky Khalil...            #2 attempt- unable to reach patient, unable to leave a message, # not accept calls, 2 unsuccessful attempts to reach patient, care transitions completed/JU    Facility: [facility]    Non-face-to-face services provided:      Care Transitions 24 Hour Call    Care Transitions Interventions         Follow Up  Future Appointments   Date Time Provider Keri English   2021 10:20 AM Kaela Choi MD Neuro Spec [facility]   2021  9:50 AM Stephany Carranza MD [address] [place] [address] St Carlo Sterling RN

## 2021-07-17 NOTE — PROGRESS NOTES
Physician Progress Note      PATIENT:               Clover Neves  CSN #:                  [id number]  :                       1955  ADMIT DATE:       2021 10:00 AM  [address] [place] DATE:        2021 3:39 PM  RESPONDING  PROVIDER #:        Kory Abdi MD          QUERY TEXT:    Patient admitted with Acute COPD exacerbation. Noted documentation of acute   on chronic diastolic CHF on IM progress note dated  and chronic diastolic   CHF per cardiology consult note dated . If possible, please document in progress notes and discharge summary if you   are evaluating and /or treating any of the following: The medical record reflects the following:  Risk Factors: Nonobstructive CAD, DM type 2, HTN  Clinical Indicators: lungs with wheezes, nursing noted 1+ BLE pitting edema on   21, CXR on  \"without acute airspace disease\", per Cardiology consult   note \"I do not think his symptoms are heart related\" and \"he seemed to be   euvolemic\"  Treatment: Lasix 40 mg PO daily, lisinopril per home dose and no running IV    Thank you,  Celena Chan RN CDI Supervisor  Options provided:  -- Acute on chronic diastolic CHF confirmed  -- Acute on chronic diastolic CHF ruled out, chronic diastolic CHF only  -- Other - I will add my own diagnosis  -- Disagree - Not applicable / Not valid  -- Disagree - Clinically unable to determine / Unknown  -- Refer to Clinical Documentation Reviewer    PROVIDER RESPONSE TEXT:    After study, acute on chronic diastolic CHF confirmed.     Query created by: Zarina Carrillo on 2021 7:24 AM      Electronically signed by:  Kory Abdi MD 2021 12:34 PM

## 2021-07-20 ENCOUNTER — OFFICE VISIT (OUTPATIENT)
Dept: FAMILY MEDICINE CLINIC | Age: 66
End: 2021-07-20
Payer: MEDICARE

## 2021-07-20 VITALS
DIASTOLIC BLOOD PRESSURE: 74 MMHG | TEMPERATURE: 97.8 F | BODY MASS INDEX: 38.33 KG/M2 | SYSTOLIC BLOOD PRESSURE: 130 MMHG | OXYGEN SATURATION: 96 % | WEIGHT: 283 LBS | HEART RATE: 61 BPM | HEIGHT: 72 IN

## 2021-07-20 DIAGNOSIS — J96.11 CHRONIC HYPOXEMIC RESPIRATORY FAILURE (HCC): ICD-10-CM

## 2021-07-20 DIAGNOSIS — E66.01 SEVERE OBESITY (BMI 35.0-35.9 WITH COMORBIDITY) (HCC): ICD-10-CM

## 2021-07-20 DIAGNOSIS — I50.32 CHRONIC DIASTOLIC HEART FAILURE (HCC): ICD-10-CM

## 2021-07-20 DIAGNOSIS — E78.5 HYPERLIPIDEMIA WITH TARGET LDL LESS THAN 70: ICD-10-CM

## 2021-07-20 DIAGNOSIS — J44.9 COPD MIXED TYPE (HCC): Primary | ICD-10-CM

## 2021-07-20 DIAGNOSIS — E11.42 TYPE 2 DIABETES MELLITUS WITH DIABETIC POLYNEUROPATHY, WITHOUT LONG-TERM CURRENT USE OF INSULIN (HCC): ICD-10-CM

## 2021-07-20 DIAGNOSIS — I10 ESSENTIAL HYPERTENSION: ICD-10-CM

## 2021-07-20 DIAGNOSIS — K21.9 GASTROESOPHAGEAL REFLUX DISEASE WITHOUT ESOPHAGITIS: ICD-10-CM

## 2021-07-20 DIAGNOSIS — Z51.81 MEDICATION MONITORING ENCOUNTER: ICD-10-CM

## 2021-07-20 LAB
AVERAGE GLUCOSE: NORMAL
HBA1C MFR BLD: 5.9 %

## 2021-07-20 PROCEDURE — 99495 TRANSJ CARE MGMT MOD F2F 14D: CPT | Performed by: FAMILY MEDICINE

## 2021-07-20 PROCEDURE — 1111F DSCHRG MED/CURRENT MED MERGE: CPT | Performed by: FAMILY MEDICINE

## 2021-07-20 RX ORDER — AMLODIPINE BESYLATE 10 MG/1
10 TABLET ORAL DAILY
Qty: 90 TABLET | Refills: 3 | Status: SHIPPED | OUTPATIENT
Start: 2021-07-20 | End: 2022-06-03 | Stop reason: SDUPTHER

## 2021-07-20 RX ORDER — PRAVASTATIN SODIUM 80 MG/1
80 TABLET ORAL EVERY EVENING
Qty: 90 TABLET | Refills: 3 | Status: ON HOLD | OUTPATIENT
Start: 2021-07-20 | End: 2022-05-06 | Stop reason: HOSPADM

## 2021-07-20 RX ORDER — ALBUTEROL SULFATE 2.5 MG/3ML
2.5 SOLUTION RESPIRATORY (INHALATION) EVERY 6 HOURS PRN
Qty: 375 VIAL | Refills: 0 | Status: SHIPPED | OUTPATIENT
Start: 2021-07-20 | End: 2021-08-05 | Stop reason: SDUPTHER

## 2021-07-20 RX ORDER — GABAPENTIN 800 MG/1
800 TABLET ORAL 3 TIMES DAILY
Qty: 270 TABLET | Refills: 0 | Status: SHIPPED | OUTPATIENT
Start: 2021-07-20 | End: 2021-10-18 | Stop reason: SDUPTHER

## 2021-07-20 RX ORDER — PREGABALIN 200 MG/1
200 CAPSULE ORAL 3 TIMES DAILY
Qty: 270 CAPSULE | Refills: 0 | Status: CANCELLED | OUTPATIENT
Start: 2021-07-20 | End: 2021-10-18

## 2021-07-20 RX ORDER — OMEPRAZOLE 20 MG/1
20 CAPSULE, DELAYED RELEASE ORAL
Qty: 90 CAPSULE | Refills: 3 | Status: SHIPPED | OUTPATIENT
Start: 2021-07-20 | End: 2022-06-27

## 2021-07-20 SDOH — ECONOMIC STABILITY: TRANSPORTATION INSECURITY
IN THE PAST 12 MONTHS, HAS THE LACK OF TRANSPORTATION KEPT YOU FROM MEDICAL APPOINTMENTS OR FROM GETTING MEDICATIONS?: NO

## 2021-07-20 SDOH — ECONOMIC STABILITY: FOOD INSECURITY: WITHIN THE PAST 12 MONTHS, YOU WORRIED THAT YOUR FOOD WOULD RUN OUT BEFORE YOU GOT MONEY TO BUY MORE.: NEVER TRUE

## 2021-07-20 SDOH — ECONOMIC STABILITY: TRANSPORTATION INSECURITY
IN THE PAST 12 MONTHS, HAS LACK OF TRANSPORTATION KEPT YOU FROM MEETINGS, WORK, OR FROM GETTING THINGS NEEDED FOR DAILY LIVING?: NO

## 2021-07-20 SDOH — ECONOMIC STABILITY: FOOD INSECURITY: WITHIN THE PAST 12 MONTHS, THE FOOD YOU BOUGHT JUST DIDN'T LAST AND YOU DIDN'T HAVE MONEY TO GET MORE.: NEVER TRUE

## 2021-07-20 SDOH — ECONOMIC STABILITY: HOUSING INSECURITY
IN THE LAST 12 MONTHS, WAS THERE A TIME WHEN YOU DID NOT HAVE A STEADY PLACE TO SLEEP OR SLEPT IN A SHELTER (INCLUDING NOW)?: NO

## 2021-07-20 SDOH — ECONOMIC STABILITY: INCOME INSECURITY: IN THE LAST 12 MONTHS, WAS THERE A TIME WHEN YOU WERE NOT ABLE TO PAY THE MORTGAGE OR RENT ON TIME?: NO

## 2021-07-20 ASSESSMENT — ENCOUNTER SYMPTOMS
BACK PAIN: 1
ABDOMINAL PAIN: 0
VOMITING: 0
SHORTNESS OF BREATH: 1
CONSTIPATION: 0
COUGH: 1
CHEST TIGHTNESS: 0
DIARRHEA: 0
ABDOMINAL DISTENTION: 0
WHEEZING: 0
NAUSEA: 0

## 2021-07-20 ASSESSMENT — SOCIAL DETERMINANTS OF HEALTH (SDOH): HOW HARD IS IT FOR YOU TO PAY FOR THE VERY BASICS LIKE FOOD, HOUSING, MEDICAL CARE, AND HEATING?: NOT HARD AT ALL

## 2021-07-20 NOTE — PATIENT INSTRUCTIONS
are trying to quit smoking. · Consider signing up for a smoking cessation program, such as the American Lung Association's Freedom from Smoking program.  · Get text messaging support. Go to the website at www.smokefree. gov to sign up for the [facility] program.  · Set a quit date. Pick your date carefully so that it is not right in the middle of a big deadline or stressful time. Once you quit, do not even take a puff. Get rid of all ashtrays and lighters after your last cigarette. Clean your house and your clothes so that they do not smell of smoke. · Learn how to be a nonsmoker. Think about ways you can avoid those things that make you reach for a cigarette. ? Avoid situations that put you at greatest risk for smoking. For some people, it is hard to have a drink with friends without smoking. For others, they might skip a coffee break with coworkers who smoke. ? Change your daily routine. Take a different route to work or eat a meal in a different place. · Cut down on stress. Calm yourself or release tension by doing an activity you enjoy, such as reading a book, taking a hot bath, or gardening. · Talk to your doctor or pharmacist about nicotine replacement therapy, which replaces the nicotine in your body. You still get nicotine but you do not use tobacco. Nicotine replacement products help you slowly reduce the amount of nicotine you need. These products come in several forms, many of them available over-the-counter:  ? Nicotine patches  ? Nicotine gum and lozenges  ? Nicotine inhaler  · Ask your doctor about bupropion (Wellbutrin) or varenicline (Chantix), which are prescription medicines. They do not contain nicotine. They help you by reducing withdrawal symptoms, such as stress and anxiety. · Some people find hypnosis, acupuncture, and massage helpful for ending the smoking habit. · Eat a healthy diet and get regular exercise.  Having healthy habits will help your body move past its craving for nicotine. · Be prepared to keep trying. Most people are not successful the first few times they try to quit. Do not get mad at yourself if you smoke again. Make a list of things you learned and think about when you want to try again, such as next week, next month, or next year. Where can you learn more? Go to https://chpepiceweb.Fanzila. org and sign in to your [employer] account. Enter O068 in the SwingTime box to learn more about \"Stopping Smoking: Care Instructions. \"     If you do not have an account, please click on the \"Sign Up Now\" link. Current as of: February 11, 2021               Content Version: 12.9  © 2006-2021 [employer]. Care instructions adapted under license by [facility] ([facility] [place]). If you have questions about a medical condition or this instruction, always ask your healthcare professional. [address] [address] any warranty or liability for your use of this information. Patient Education        Heart Failure: Care Instructions  Your Care Instructions     Heart failure occurs when your heart does not pump as much blood as the body needs. Failure does not mean that the heart has stopped pumping but rather that it is not pumping as well as it should. Over time, this causes fluid buildup in your lungs and other parts of your body. Fluid buildup can cause shortness of breath, fatigue, swollen ankles, and other problems. By taking medicines regularly, reducing sodium (salt) in your diet, checking your weight every day, and making lifestyle changes, you can feel better and live longer. Follow-up care is a key part of your treatment and safety. Be sure to make and go to all appointments, and call your doctor if you are having problems. It's also a good idea to know your test results and keep a list of the medicines you take. How can you care for yourself at home? Medicines    · Be safe with medicines. Take your medicines exactly as prescribed.  Call your doctor if you think you are having a problem with your medicine.     · Do not take any vitamins, over-the-counter medicine, or herbal products without talking to your doctor first. Orly Soler not take ibuprofen (Advil or Motrin) and naproxen (Aleve) without talking to your doctor first. They could make your heart failure worse.     · You may take some of the following medicine. ? Angiotensin-converting enzyme inhibitors (ACEIs) or angiotensin II receptor blockers (ARBs) reduce the heart's workload, lower blood pressure, and reduce swelling. Taking an ACEI or ARB may lower your chance of needing to be hospitalized. ? Beta-blockers can slow heart rate, decrease blood pressure, and improve your condition. Taking a beta-blocker may lower your chance of needing to be hospitalized. ? Diuretics, also called water pills, reduce swelling. You will get more details on the specific medicines your doctor prescribes. Diet    · Your doctor may suggest that you limit sodium. Your doctor can tell you how much sodium is right for you. An example is less than 3,000 mg a day. This includes all the salt you eat in cooking or in packaged foods. People get most of their sodium from processed foods. Fast food and restaurant meals also tend to be very high in sodium.     · Ask your doctor how much liquid you can drink each day. You may have to limit liquids. Weight    · Weigh yourself without clothing at the same time each day. Record your weight. Call your doctor if you have a sudden weight gain, such as more than 2 to 3 pounds in a day or 5 pounds in a week. (Your doctor may suggest a different range of weight gain.) A sudden weight gain may mean that your heart failure is getting worse. Activity level    · Start light exercise (if your doctor says it is okay). Even if you can only do a small amount, exercise will help you get stronger, have more energy, and manage your weight and your stress.  Walking is an easy way to get exercise. Start out by walking a little more than you did before. Bit by bit, increase the amount you walk.     · When you exercise, watch for signs that your heart is working too hard. You are pushing yourself too hard if you cannot talk while you are exercising. If you become short of breath or dizzy or have chest pain, stop, sit down, and rest.     · If you feel \"wiped out\" the day after you exercise, walk slower or for a shorter distance until you can work up to a better pace.     · Get enough rest at night. Sleeping with 1 or 2 pillows under your upper body and head may help you breathe easier. Lifestyle changes    · Do not smoke. Smoking can make a heart condition worse. If you need help quitting, talk to your doctor about stop-smoking programs and medicines. These can increase your chances of quitting for good. Quitting smoking may be the most important step you can take to protect your heart.     · Limit alcohol to 2 drinks a day for men and 1 drink a day for women. Too much alcohol can cause health problems.     · Avoid getting sick from colds and the flu. Get a pneumococcal vaccine shot. If you have had one before, ask your doctor whether you need another dose. Get a flu shot each year. If you must be around people with colds or the flu, wash your hands often. When should you call for help? Call 911  if you have symptoms of sudden heart failure such as:    · You have severe trouble breathing.     · You cough up pink, foamy mucus.     · You have a new irregular or rapid heartbeat. Call your doctor now or seek immediate medical care if:    · You have new or increased shortness of breath.     · You are dizzy or lightheaded, or you feel like you may faint.     · You have sudden weight gain, such as more than 2 to 3 pounds in a day or 5 pounds in a week.  (Your doctor may suggest a different range of weight gain.)     · You have increased swelling in your legs, ankles, or feet.     · You are suddenly so tired or weak that you cannot do your usual activities. Watch closely for changes in your health, and be sure to contact your doctor if you develop new symptoms. Where can you learn more? Go to https://ImpactGamespemirtaeb.[employer]ial Biotech. org and sign in to your [employer] account. Enter F957 in the [employer] box to learn more about \"Heart Failure: Care Instructions. \"     If you do not have an account, please click on the \"Sign Up Now\" link. Current as of: August 31, 2020               Content Version: 12.9  © 8347-5496 Healthwise, Incorporated. Care instructions adapted under license by [facility] ([facility]). If you have questions about a medical condition or this instruction, always ask your healthcare professional. [address] any warranty or liability for your use of this information.
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
## 2021-07-20 NOTE — PROGRESS NOTES
Visit Information    Have you changed or started any medications since your last visit including any over-the-counter medicines, vitamins, or herbal medicines? no   Are you having any side effects from any of your medications? -  no  Have you stopped taking any of your medications? Is so, why? -  no    Have you seen any other physician or provider since your last visit? No  Have you had any other diagnostic tests since your last visit? Yes - Records Obtained  Have you been seen in the emergency room and/or had an admission to a hospital since we last saw you? Yes - Records Obtained  Have you had your routine dental cleaning in the past 6 months? no    Have you activated your [employer] account? If not, what are your barriers?  Yes     Patient Care Team:  Radha Montoya MD as PCP - General (Family Medicine)  Radha Montoya MD as PCP - [facility]  Liz Goodwin MD as Consulting Physician (Neurosurgery)  Carlos Rueda MD as Consulting Physician (Cardiology)  Zahra Elliott MD as Consulting Physician (Urology)  Nico Cotter MD as Surgeon (Vascular Surgery)  Eva Moore MD as Consulting Physician (Pulmonology)  Karina Alicea MD as Consulting Physician (Orthopedic Surgery)  Lamont De La Cruz OD (Ophthalmology)  Jeannie Ferris MD as Consulting Physician (Endocrinology)  Cody Maynard MD as Consulting Physician (Orthopedic Surgery)  Jody Jimenez MD as Consulting Physician (Gastroenterology)  Candance Silvius, MD as Consulting Physician (Orthopedic Surgery)  Elias Staley RN as Nurse Jcarlos Santos MD as Surgeon (General Surgery)  Michelle Domingo, PhD (Psychology)  Nancy Cai RN as Care Transitions Nurse    Medical History Review  Past Medical, Family, and Social History reviewed and does contribute to the patient presenting condition    Health Maintenance   Topic Date Due    Annual Wellness Visit (AWV)  Never done    Diabetic retinal exam 11/07/2020    Lipid screen  07/09/2021    Flu vaccine (1) 09/01/2021    Diabetic microalbuminuria test  10/13/2021    Low dose CT lung screening  03/15/2022    Pneumococcal 65+ years Vaccine (2 of 2 - PPSV23) 04/11/2022    Diabetic foot exam  04/23/2022    A1C test (Diabetic or Prediabetic)  04/23/2022    Colon cancer screen colonoscopy  05/09/2022    PSA counseling  05/19/2022    Potassium monitoring  07/09/2022    Creatinine monitoring  07/09/2022    DTaP/Tdap/Td vaccine (2 - Td or Tdap) 03/01/2028    Shingles Vaccine  Completed    COVID-19 Vaccine  Completed    Hepatitis C screen  Completed    Hepatitis A vaccine  Aged Out    Hib vaccine  Aged Out    Meningococcal (ACWY) vaccine  Aged Out

## 2021-07-20 NOTE — RESULT ENCOUNTER NOTE
Addressed during office visit today, A1c 5.9, stable, continue treatment recommended during the office visit

## 2021-07-29 ENCOUNTER — OFFICE VISIT (OUTPATIENT)
Dept: NEUROLOGY | Age: 66
End: 2021-07-29
Payer: MEDICARE

## 2021-07-29 VITALS
SYSTOLIC BLOOD PRESSURE: 129 MMHG | HEART RATE: 71 BPM | BODY MASS INDEX: 37.11 KG/M2 | DIASTOLIC BLOOD PRESSURE: 69 MMHG | HEIGHT: 72 IN | WEIGHT: 274 LBS

## 2021-07-29 DIAGNOSIS — G62.9 SENSORY MOTOR NEUROPATHY: Primary | ICD-10-CM

## 2021-07-29 DIAGNOSIS — G89.29 CHRONIC LOW BACK PAIN WITH SCIATICA, SCIATICA LATERALITY UNSPECIFIED, UNSPECIFIED BACK PAIN LATERALITY: ICD-10-CM

## 2021-07-29 DIAGNOSIS — M54.40 CHRONIC LOW BACK PAIN WITH SCIATICA, SCIATICA LATERALITY UNSPECIFIED, UNSPECIFIED BACK PAIN LATERALITY: ICD-10-CM

## 2021-07-29 DIAGNOSIS — M54.16 LUMBAR RADICULOPATHY: ICD-10-CM

## 2021-07-29 DIAGNOSIS — M54.2 NECK PAIN: ICD-10-CM

## 2021-07-29 PROCEDURE — 99204 OFFICE O/P NEW MOD 45 MIN: CPT | Performed by: PSYCHIATRY & NEUROLOGY

## 2021-07-29 RX ORDER — AMITRIPTYLINE HYDROCHLORIDE 25 MG/1
25 TABLET, FILM COATED ORAL NIGHTLY
Qty: 30 TABLET | Refills: 3 | Status: SHIPPED | OUTPATIENT
Start: 2021-07-29 | End: 2022-02-11 | Stop reason: CLARIF

## 2021-07-29 NOTE — PROGRESS NOTES
79 yo wm with leg pain and numbness. He has been having leg pain for 2 to 3 years . There is also leg numbness affecting feet . This began initially in toes ascending to ankle level . There is also numbness of outer 3 fingers . 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 There are no bowel or bladder disturbance . Right foot has been numb for 10 years with left foot for the past last 6 months . He has throbbing in legs calves and feet all the time with baseline pain grade 5 over 10 worse at night with some interference with his sleep . She was switched from lyrica 200 mg po tid to neurontin 800 mg po tid this week do to leg swelling  reporting that pain in legs now is more pronounced. There is chronic low back pain in mid low back stabbing pain nonradiating down at grade 8 over 10 seeing pain clinic on percocet as needed averaging one to 2 per day to get radiofrequency having had 3 low back surgeries . There is pain in neck area of aching quality of grade 5 over 10 going into shoulders . He has had left shoudler replacement  and right rotator cuff tear surgery having bilateral trouble with abduction at shoulder left more than right . He has history of type 2 diabetes on no medication.  He has imbalance walking with walker with prior falling      Past Medical History:   Diagnosis Date    Acid reflux     Anemia, blood loss 10/7/2018    Arthritis     C. difficile colitis 3/19/2020    Chronic bilateral low back pain with bilateral sciatica 11/3/2016    Chronic diastolic heart failure ([address] 2/25/2021    Complete tear of left rotator cuff 7/18/2018    COPD (chronic obstructive pulmonary disease) ([address]     Coronary artery disease involving native coronary artery of native heart without angina pectoris 2/2/2020    Degenerative disc disease, cervical 7/28/2019    GI bleed 3/19/2020    Gout     H/O cardiac catheterization yrs ago   no stents    History of blood transfusion     Hyperlipidemia     Hyperlipidemia with target LDL less than 100 1/20/2016    Hyperparathyroidism ([address] 1/17/2020    Hypertension     Knee pain, chronic     left    Liver disease     MDRO (multiple drug resistant organisms) resistance     Melena 3/19/2020    Memory loss     Moderate episode of recurrent major depressive disorder ([address] 1/20/2016    Obesity, Class III, BMI 40-49.9 (morbid obesity) ([address] 1/20/2016    REYNALDO on CPAP 5/6/2017    Osteoarthritis     Peripheral arterial occlusive disease ([address] 3/25/2017    Pneumonia 3/9/2020    Polypharmacy 3/25/2017    Positive FIT (fecal immunochemical test) 4/11/2017    Prolonged emergence from general anesthesia 01/05/2017    Patient \"on life support for 3 days\" after back surgery due to being given succinylcholine    Prostate CA ([address] 1/20/2016    Prostate cancer ([address] 10/2013    finished radiation tx 5/2014    Pseudocholinesterase deficiency 03/25/2017    Pt.  \"on life support\" for 3 days after back surgery 1/5/17 after being given succinylcholine    Severe obesity (BMI 35.0-35.9 with comorbidity) ([address] 2/2/2020    Short of breath on exertion     Sleep apnea     uses CPAP machine nightly    Status post lumbar laminectomy 3/25/2017    Stenosis of left carotid artery 10/7/2018    Syncope and collapse 3/19/2020    Tubular adenoma of colon 4/11/17 5/13/2017    Type 2 diabetes mellitus with hyperglycemia, without long-term current use of insulin ([address] 3/25/2017    Lab Results Component Value Date  LABA1C 7.1 (H) 03/23/2017     Unintended weight loss 10/7/2018    Vitamin D deficiency 3/25/2017    Wears glasses        Past Surgical History:   Procedure Laterality Date    BACK SURGERY      x 2     BACK SURGERY  01/05/2017    lumbar laminectomy L2, L3, L4    BRONCHOSCOPY N/A 3/13/2020    BRONCHOSCOPY performed by Jess Willis MD at [address] [place] [address]    no stents    CAROTID ENDARTERECTOMY Right 12/16/2019    Dr. Isamar Hudson, LAPAROSCOPIC N/A 2/16/2021    CHOLECYSTECTOMY LAPAROSCOPIC ROBOTIC XI performed by Natali العلي MD at [address] [facility], COLON, DIAGNOSTIC      HERNIA REPAIR Left 11/18/2020    HERNIA INGUINAL REPAIR [address] OPEN performed by Natali العلي MD at [facility] Left     LUMBAR LAMINECTOMY  01/05/2017    L2-L4    MA CLOSED RX SHLDR DISLOC,ANESTHESIA Left 8/1/2018    SHOULDER CLOSED REDUCTION WITH C-ARM VISUALIZATION performed by [facility]/BIOPSY SINGLE/MULTIPLE N/A 5/9/2017    COLONOSCOPY WITH BIOPSY performed by Fabi Sauer DO at [facility] IMPLANT Left 7/18/2018    SHOULDER TOTAL ARTHROPLASTY REVERSE LEFT DJO & BICEP TENDON TRANSFER performed by Ofelia Johnson MD at [address] HUMERAL/GLENOID COMPNT Left 8/3/2018    SHOULDER TOTAL REVERSE  ARTHROPLASTY REVISION performed by Ofelia Johnson MD at [address]     rotator cuff repair    SHOULDER SURGERY Left 10/15/2020    SHOULDER ARTHROSCOPY W/INTEROP CULTURES performed by Ofelia Johnson MD at [facility]      ear, forehead    TONSILLECTOMY AND ADENOIDECTOMY      UPPER GASTROINTESTINAL ENDOSCOPY N/A 3/19/2020    EGD BIOPSY performed by Andres Smalls MD at [facility]       Family History   Problem Relation Age of Onset    Diabetes Mother     Lung Cancer Brother     Liver Cancer Brother     Cancer Father        Social History     Socioeconomic History    Marital status:      Spouse name: None    Number of children: None    Years of education: None    Highest education level: None   Occupational History    None   Tobacco Use    Smoking status: Current Every Day Smoker     Packs/day: 0.50     Years: 35.00 Pack years: 17.50     Types: Cigarettes     Start date: 7/8/2021    Smokeless tobacco: Never Used    Tobacco comment: History of 1 to 1.5 packs/day   Vaping Use    Vaping Use: Never used   Substance and Sexual Activity    Alcohol use: Not Currently     Alcohol/week: 0.0 standard drinks    Drug use: No    Sexual activity: Not Currently     Partners: Female   Other Topics Concern    None   Social History Narrative    None     Social Determinants of Health     Financial Resource Strain: Low Risk     Difficulty of Paying Living Expenses: Not hard at all   Food Insecurity: No Food Insecurity    Worried About Running Out of Food in the Last Year: Never true    Colleen of Food in the Last Year: Never true   Transportation Needs: No Transportation Needs    Lack of Transportation (Medical): No    Lack of Transportation (Non-Medical): No   Physical Activity:     Days of Exercise per Week:     Minutes of Exercise per Session:    Stress:     Feeling of Stress :    Social Connections:     Frequency of Communication with Friends and Family:     Frequency of Social Gatherings with Friends and Family:     Attends Judaism Services:     Active Member of Clubs or Organizations:     Attends Club or Organization Meetings:     Marital Status:    Intimate Partner Violence:     Fear of Current or Ex-Partner:     Emotionally Abused:     Physically Abused:     Sexually Abused:        Current Outpatient Medications   Medication Sig Dispense Refill    amitriptyline (ELAVIL) 25 MG tablet Take 1 tablet by mouth nightly 30 tablet 3    omeprazole (PRILOSEC) 20 MG delayed release capsule Take 1 capsule by mouth every morning (before breakfast) Dose decreased 7/20/2021 90 capsule 3    pravastatin (PRAVACHOL) 80 MG tablet Take 1 tablet by mouth every evening Dose increased  9/24/2019 90 tablet 3    amLODIPine (NORVASC) 10 MG tablet Take 1 tablet by mouth daily Dose increased 10/16/2020 due to high BP.  Correct dosage 90 tablet 3    albuterol (PROVENTIL) (2.5 MG/3ML) 0.083% nebulizer solution Take 3 mLs by nebulization every 6 hours as needed for Wheezing or Shortness of Breath (cough) 375 vial 0    gabapentin (NEURONTIN) 800 MG tablet Take 1 tablet by mouth 3 times daily for 90 days. Stop lyrica 270 tablet 0    folbee plus (FOLBEE PLUS) TABS Take 1 tablet by mouth daily 90 tablet 3    lisinopril (PRINIVIL;ZESTRIL) 20 MG tablet Take 1 tablet by mouth daily 90 tablet 0    tiotropium-olodaterol (STIOLTO RESPIMAT) 2.5-2.5 MCG/ACT AERS Inhale 2 puffs into the lungs daily      oxyCODONE-acetaminophen (PERCOCET) 5-325 MG per tablet Take 1 tablet by mouth 2 times daily as needed.  glucose monitoring kit (FREESTYLE) monitoring kit 1 kit by Does not apply route daily Please supply Patient with a glucose monitoring kit that insurance will cover. 1 kit 0    Lancets 30G MISC Testing once a day. Please dispense according to patients insurance. 100 each 3    blood glucose monitor strips Testing once a day. Please dispense according to patients insurance. 100 strip 3    Alcohol Swabs PADS Please dispense according to patients insurance/device. Testing once a day 100 each 3    ammonium lactate (AMLACTIN) 12 % cream APPLY TOPICALLY TO LEGS ONE TO TWO TIMES A DAY AS NEEDED 1 Bottle 3    furosemide (LASIX) 40 MG tablet Take 1 tablet by mouth daily Dose increased 1/12/2021 90 tablet 3    naloxone 4 MG/0.1ML LIQD nasal spray 1 spray by Nasal route as needed for Opioid Reversal Patient needs counseling 1 each 3    OXYGEN With CPAP at night      albuterol sulfate HFA (PROVENTIL HFA) 108 (90 Base) MCG/ACT inhaler Inhale 2 puffs into the lungs every 6 hours as needed for Wheezing or Shortness of Breath 3 Inhaler 1    Respiratory Therapy Supplies (ADULT MASK) MISC Needs to use mask daily due to coronavirus pandemic.  30 each 5    Lift Chair MISC by Does not apply route Patient has difficulty getting up from usual chair 1 each 0    aspirin EC 81 MG EC tablet Take 1 tablet by mouth daily 90 tablet 0     No current facility-administered medications for this visit. Allergies   Allergen Reactions    Succinylcholine Chloride Other (See Comments)     PATIENT HAS PSEUDOCHOLINESTERASE DEFICIENCY      Cymbalta [Duloxetine Hcl]      Taste loss           Review of Systems     Vitals:    07/29/21 1011   BP: 129/69   Pulse: 71     weight: 274 lb (124.3 kg)      Review of Systems   Constitutional: Negative. HENT: Negative. Eyes: Negative. Respiratory: Negative. Cardiovascular: Negative. Gastrointestinal: Negative. Endocrine: Negative. Genitourinary: Negative. Musculoskeletal: Positive for back pain and gait problem. Skin: Negative. Allergic/Immunologic: Negative. Neurological: Positive for weakness and numbness. Hematological: Negative. Psychiatric/Behavioral: Negative. Neurological Examination  Constitutional .General exam well groomed   Head/Ears /Nose/Throat: external ear . Normal exam  Neck and thyroid . Normal size. No bruits  Respiratory . Breathsounds clear bilaterally  Cardiovascular: Auscultation of heart with regular rate and rhythm  Musculoskeletal. Muscle bulk and tone normal                                                           Muscle strength bilateral giveway with shoulder abduction . Right AE, EHL. ADF 2/5 otherwise 5/5 strength throughout                                                                               No dysmetria or dysdiadokinesis  No tremor   Normal fine motor  Gait right steppage gait  Orientation Alert and oriented x 3   Attention and concentration normal  Short term memory normal  Language process and speech normal . No aphasia   Cranial nerve 2 normal acuety and visual fields  Cranial nerve 3, 4 and 6 . Extraocular muscles are intact . Pupils are equal and reactive   Cranial nerve 5 . Normal strength of masseter and temporalis . Intact corneal reflex.  Normal facial sensation  Cranial nerve 7 normal exam   Cranial nerve 8. Grossly intact hearing   Cranial nerve 9 and 10. Symmetric palate elevation   Cranial nerve 11 , 5 out of 5 strength   Cranial Nerve 12 midline tongue . No atrophy  Sensation . Decreased pinprick and light touch at ankle level . Decreased 5th and medial 4th digit bilaterally   Deep Tendon Reflexes absent lower extremities   Plantar response flexor bilaterally      ASSESSMENT/PLAN      Diagnosis Orders   1. Sensory motor neuropathy  EMG    Sedimentation rate, automated    Electrophoresis Protein, Serum without Reflex to Immunofixation    Vitamin B12    Folate    URBANO    Miscellaneous Sendout 1    Miscellaneous Sendout 1   2. Chronic low back pain with sciatica, sciatica laterality unspecified, unspecified back pain laterality  EMG   3. Lumbar radiculopathy  EMG   4. Neck pain  EMG   Patient has sensory neuropathy but also right leg radicular weakness from prior back surgery with foot drop with chronic low back pain . Will have patient undergo EMG of legs , neuropathic bloodwork adding elavil for neuropathic pain     Orders Placed This Encounter   Procedures    Sedimentation rate, automated     Standing Status:   Future     Standing Expiration Date:   7/29/2022    Electrophoresis Protein, Serum without Reflex to Immunofixation     Standing Status:   Future     Standing Expiration Date:   7/29/2022    Vitamin B12     Standing Status:   Future     Standing Expiration Date:   7/29/2022    Folate     Standing Status:   Future     Standing Expiration Date:   7/29/2022    URBANO     Standing Status:   Future     Standing Expiration Date:   7/29/2022    Miscellaneous Sendout 1     Standing Status:   Future     Standing Expiration Date:   7/29/2022     Order Specific Question:   Specify Req.  Test (1 Test/Order)     Answer:   Hga1c    Miscellaneous Sendout 1     Standing Status:   Future     Standing Expiration Date:   7/29/2022     Order Specific Question:   Specify Req. Test (1 Test/Order)     Answer:   TSH    EMG     Standing Status:   Future     Standing Expiration Date:   9/27/2021     Order Specific Question:   Which body part?      Answer:   bilateral lower and upper extremities

## 2021-07-30 ENCOUNTER — HOSPITAL ENCOUNTER (OUTPATIENT)
Age: 66
Setting detail: SPECIMEN
Discharge: HOME | End: 2021-07-30
Payer: MEDICARE

## 2021-07-30 DIAGNOSIS — E78.5 HYPERLIPIDEMIA WITH TARGET LDL LESS THAN 70: ICD-10-CM

## 2021-07-30 DIAGNOSIS — I50.32 CHRONIC DIASTOLIC HEART FAILURE (HCC): ICD-10-CM

## 2021-07-30 DIAGNOSIS — Z51.81 MEDICATION MONITORING ENCOUNTER: ICD-10-CM

## 2021-07-30 DIAGNOSIS — I10 ESSENTIAL HYPERTENSION: ICD-10-CM

## 2021-07-30 DIAGNOSIS — E11.42 TYPE 2 DIABETES MELLITUS WITH DIABETIC POLYNEUROPATHY, WITHOUT LONG-TERM CURRENT USE OF INSULIN (HCC): ICD-10-CM

## 2021-07-30 LAB
ALBUMIN SERPL-MCNC: 3.9 G/DL (ref 3.5–5.2)
ALBUMIN/GLOBULIN RATIO: 1.1 (ref 1–2.5)
ALP BLD-CCNC: 98 U/L (ref 40–129)
ALT SERPL-CCNC: 34 U/L (ref 5–41)
ANION GAP SERPL CALCULATED.3IONS-SCNC: 13 MMOL/L (ref 9–17)
AST SERPL-CCNC: 24 U/L
BILIRUB SERPL-MCNC: 0.32 MG/DL (ref 0.3–1.2)
BILIRUBIN DIRECT: <0.08 MG/DL
BILIRUBIN, INDIRECT: NORMAL MG/DL (ref 0–1)
BNP INTERPRETATION: NORMAL
BUN BLDV-MCNC: 12 MG/DL (ref 8–23)
BUN/CREAT BLD: ABNORMAL (ref 9–20)
CALCIUM SERPL-MCNC: 9.6 MG/DL (ref 8.6–10.4)
CHLORIDE BLD-SCNC: 104 MMOL/L (ref 98–107)
CHOLESTEROL, FASTING: 155 MG/DL
CHOLESTEROL, FASTING: 155 MG/DL
CHOLESTEROL/HDL RATIO: 4.4
CHOLESTEROL/HDL RATIO: 4.4
CO2: 25 MMOL/L (ref 20–31)
CREAT SERPL-MCNC: 0.78 MG/DL (ref 0.7–1.2)
FOLATE: >20 NG/ML
GFR AFRICAN AMERICAN: >60 ML/MIN
GFR NON-AFRICAN AMERICAN: >60 ML/MIN
GFR SERPL CREATININE-BSD FRML MDRD: ABNORMAL ML/MIN/{1.73_M2}
GFR SERPL CREATININE-BSD FRML MDRD: ABNORMAL ML/MIN/{1.73_M2}
GLOBULIN: NORMAL G/DL (ref 1.5–3.8)
GLUCOSE BLD-MCNC: 105 MG/DL (ref 70–99)
HDLC SERPL-MCNC: 35 MG/DL
HDLC SERPL-MCNC: 35 MG/DL
LDL CHOLESTEROL: 93 MG/DL (ref 0–130)
LDL CHOLESTEROL: 93 MG/DL (ref 0–130)
POTASSIUM SERPL-SCNC: 4.3 MMOL/L (ref 3.7–5.3)
PRO-BNP: <20 PG/ML
SODIUM BLD-SCNC: 142 MMOL/L (ref 135–144)
TOTAL PROTEIN: 7.3 G/DL (ref 6.4–8.3)
TRIGLYCERIDE, FASTING: 136 MG/DL
TRIGLYCERIDE, FASTING: 136 MG/DL
VITAMIN B-12: >2000 PG/ML (ref 232–1245)
VLDLC SERPL CALC-MCNC: ABNORMAL MG/DL (ref 1–30)
VLDLC SERPL CALC-MCNC: ABNORMAL MG/DL (ref 1–30)

## 2021-07-30 ASSESSMENT — ENCOUNTER SYMPTOMS
ALLERGIC/IMMUNOLOGIC NEGATIVE: 1
RESPIRATORY NEGATIVE: 1
EYES NEGATIVE: 1
BACK PAIN: 1
GASTROINTESTINAL NEGATIVE: 1

## 2021-07-30 NOTE — RESULT ENCOUNTER NOTE
Please notify patient, normal labs, blood glucose 105 well-controlled, continue current treatment    Future Appointments  10/12/2021 10:00 AM   [facility] EMG              [facility]            [facility]  11/12/2021 9:20 AM    Padmini Morrell MD [facility] NEURO          [facility]  11/29/2021 9:50 AM    Rob Nicholas MD         [facility]           [facility]  12/14/2021 10:30 AM   Michelle Douglas MD     [facility]

## 2021-08-01 LAB
6-ACETYLMORPHINE, UR: NOT DETECTED
7-AMINOCLONAZEPAM, URINE: NOT DETECTED
ALPHA-OH-ALPRAZ, URINE: NOT DETECTED
ALPHA-OH-MIDAZOLAM, URINE: NOT DETECTED
ALPRAZOLAM, URINE: NOT DETECTED
AMPHETAMINES, URINE: NOT DETECTED
BARBITURATES, URINE: NOT DETECTED
BENZOYLECGONINE, UR: NOT DETECTED
BUPRENORPHINE URINE: NOT DETECTED
CARISOPRODOL, UR: NOT DETECTED
CLONAZEPAM, URINE: NOT DETECTED
CODEINE, URINE: NOT DETECTED
CREATININE URINE: 20.8 MG/DL (ref 20–400)
DIAZEPAM, URINE: NOT DETECTED
DRUGS EXPECTED, UR: NORMAL
EER HI RES INTERP UR: NORMAL
ETHYL GLUCURONIDE UR: NOT DETECTED
FENTANYL URINE: NOT DETECTED
GABAPENTIN: PRESENT
HYDROCODONE, URINE: NOT DETECTED
HYDROMORPHONE, URINE: NOT DETECTED
LORAZEPAM, URINE: NOT DETECTED
MARIJUANA METAB, UR: NOT DETECTED
MDA, UR: NOT DETECTED
MDEA, EVE, UR: NOT DETECTED
MDMA URINE: NOT DETECTED
MEPERIDINE METAB, UR: NOT DETECTED
METHADONE, URINE: NOT DETECTED
METHAMPHETAMINE, URINE: NOT DETECTED
METHYLPHENIDATE: NOT DETECTED
MIDAZOLAM, URINE: NOT DETECTED
MORPHINE URINE: NOT DETECTED
NALOXONE URINE: NOT DETECTED
NORBUPRENORPHINE, URINE: NOT DETECTED
NORDIAZEPAM, URINE: NOT DETECTED
NORFENTANYL, URINE: NOT DETECTED
NORHYDROCODONE, URINE: NOT DETECTED
NOROXYCODONE, URINE: NOT DETECTED
NOROXYMORPHONE, URINE: NOT DETECTED
OXAZEPAM, URINE: NOT DETECTED
OXYCODONE URINE: PRESENT
OXYMORPHONE, URINE: PRESENT
PAIN MANAGEMENT DRUG PANEL INTERP, URINE: NORMAL
PAIN MGT DRUG PANEL, HI RES, UR: NORMAL
PCP,URINE: NOT DETECTED
PHENTERMINE, UR: NOT DETECTED
PREGABALIN: NOT DETECTED
TAPENTADOL, URINE: NOT DETECTED
TAPENTADOL-O-SULFATE, URINE: NOT DETECTED
TEMAZEPAM, URINE: NOT DETECTED
TRAMADOL, URINE: NOT DETECTED
ZOLPIDEM METABOLITE (ZCA), URINE: NOT DETECTED
ZOLPIDEM, URINE: NOT DETECTED

## 2021-08-02 NOTE — RESULT ENCOUNTER NOTE
Noted, urine drug test consistent with treatment, taking Percocet for pain management, and gabapentin per us, Lyrica was changed to gabapentin  Future Appointments  10/12/2021 10:00 AM   [facility] EMG              [facility] EMG            [facility]  2021 9:20 AM    Coretta Feldman MD [facility] NEURO          [facility]  2021 9:50 AM    Lou Jaimes MD         [facility]           [facility]  2021 10:30 AM   Lynann Apgar, MD     [facility]

## 2021-08-05 DIAGNOSIS — J44.9 COPD MIXED TYPE (HCC): ICD-10-CM

## 2021-08-05 DIAGNOSIS — B37.0 THRUSH: Primary | ICD-10-CM

## 2021-08-05 RX ORDER — ALBUTEROL SULFATE 2.5 MG/3ML
2.5 SOLUTION RESPIRATORY (INHALATION) EVERY 6 HOURS PRN
Qty: 375 VIAL | Refills: 0 | Status: SHIPPED | OUTPATIENT
Start: 2021-08-05 | End: 2022-03-21

## 2021-08-05 NOTE — TELEPHONE ENCOUNTER
Please Approve or Refuse. Send to Pharmacy per Pt's Request:   [facility] so he wants it sent to [facility] [place].      Next Visit Date:  12/14/2021   Last Visit Date: 7/20/2021    Hemoglobin A1C (%)   Date Value   07/20/2021 5.9   04/23/2021 5.9   10/13/2020 5.5             ( goal A1C is < 7)   BP Readings from Last 3 Encounters:   07/29/21 129/69   07/20/21 130/74   07/12/21 136/76          (goal 120/80)  BUN   Date Value Ref Range Status   07/30/2021 12 8 - 23 mg/dL Final     CREATININE   Date Value Ref Range Status   07/30/2021 0.78 0.70 - 1.20 mg/dL Final     Potassium   Date Value Ref Range Status   07/30/2021 4.3 3.7 - 5.3 mmol/L Final

## 2021-08-05 NOTE — TELEPHONE ENCOUNTER
Pt called back and stated that he has had a burning sensation on his tongue after taking the gabapentin, pt reports its been going on for about two weeks but all the swelling in his legs is gone

## 2021-08-06 ENCOUNTER — TELEPHONE (OUTPATIENT)
Dept: FAMILY MEDICINE CLINIC | Age: 66
End: 2021-08-06

## 2021-08-06 DIAGNOSIS — R19.7 DIARRHEA OF PRESUMED INFECTIOUS ORIGIN: Primary | ICD-10-CM

## 2021-08-06 NOTE — TELEPHONE ENCOUNTER
Pt states he is still having diarrhea.  It has been going on for about a week and only getting worse

## 2021-08-06 NOTE — TELEPHONE ENCOUNTER
Due to recent admission, and antibiotics use, and persistent and worsening diarrhea, he needs to do a C. difficile test, I will order it           Diagnosis Orders   1. Diarrhea of presumed infectious origin  Clostridium Difficile Toxin/Antigen        Future Appointments   Date Time Provider Keri English   10/12/2021 10:00 AM [facility] EMG  [facility] EMG [place]   10/12/2021 10:00 AM Hannah Peterson MD [facility]   11/12/2021  9:20 AM Jeffrey Sterling MD [facility]   11/29/2021  9:50 AM Clara Sheffield MD [facility]   12/14/2021 10:30 AM Chris Fall MD [facility] [address], MATechnicianSigned  8:18 AM     Addend             Pt states he is still having diarrhea.  It has been going on for about a week and only getting worse

## 2021-08-12 ENCOUNTER — HOSPITAL ENCOUNTER (OUTPATIENT)
Age: 66
Setting detail: SPECIMEN
Discharge: HOME | End: 2021-08-12
Payer: MEDICARE

## 2021-08-12 DIAGNOSIS — G62.9 SENSORY MOTOR NEUROPATHY: ICD-10-CM

## 2021-08-12 LAB
FOLATE: >20 NG/ML
SEDIMENTATION RATE, ERYTHROCYTE: 8 MM (ref 0–20)
TSH SERPL DL<=0.05 MIU/L-ACNC: 0.81 MIU/L (ref 0.3–5)
VITAMIN B-12: 1410 PG/ML (ref 232–1245)

## 2021-08-13 LAB
ALBUMIN (CALCULATED): 3.9 G/DL (ref 3.2–5.2)
ALBUMIN PERCENT: 60 % (ref 45–65)
ALPHA 1 PERCENT: 3 % (ref 3–6)
ALPHA 2 PERCENT: 14 % (ref 6–13)
ALPHA-1-GLOBULIN: 0.2 G/DL (ref 0.1–0.4)
ALPHA-2-GLOBULIN: 0.9 G/DL (ref 0.5–0.9)
BETA GLOBULIN: 0.8 G/DL (ref 0.5–1.1)
BETA PERCENT: 12 % (ref 11–19)
ESTIMATED AVERAGE GLUCOSE: 134 MG/DL
GAMMA GLOBULIN %: 11 % (ref 9–20)
GAMMA GLOBULIN: 0.7 G/DL (ref 0.5–1.5)
HBA1C MFR BLD: 6.3 % (ref 4–6)
PATHOLOGIST: ABNORMAL
PROTEIN ELECTROPHORESIS, SERUM: ABNORMAL
TOTAL PROT. SUM,%: 100 % (ref 98–102)
TOTAL PROT. SUM: 6.5 G/DL (ref 6.3–8.2)
TOTAL PROTEIN: 6.5 G/DL (ref 6.4–8.3)

## 2021-08-14 LAB
ANTI DNA DOUBLE STRANDED: <0.5 IU/ML
ANTI-NUCLEAR ANTIBODY (ANA): NEGATIVE
ENA ANTIBODIES SCREEN: 0.2 U/ML

## 2021-09-02 ENCOUNTER — TELEPHONE (OUTPATIENT)
Dept: NEUROLOGY | Age: 66
End: 2021-09-02

## 2021-09-02 NOTE — TELEPHONE ENCOUNTER
----- Message from Iban Banda MD sent at 9/2/2021 12:30 PM EDT -----  Let pt know bloodwork concordant with diabetes mellitus Hga1c 6.2 .  Have him FU with PMD for further workup

## 2021-09-02 NOTE — TELEPHONE ENCOUNTER
Call placed to the patient and recording stated that the [employer] uses Smart call - call blocker and will not accept a call from this number. Will send this information through [address]

## 2021-09-13 ENCOUNTER — HOSPITAL ENCOUNTER (OUTPATIENT)
Age: 66
Setting detail: SPECIMEN
Discharge: HOME | End: 2021-09-13
Payer: MEDICARE

## 2021-09-13 LAB
ALBUMIN SERPL-MCNC: 4.2 G/DL (ref 3.5–5.2)
ALBUMIN/GLOBULIN RATIO: NORMAL (ref 1–2.5)
ALP BLD-CCNC: 87 U/L (ref 40–129)
ALT SERPL-CCNC: 33 U/L (ref 5–41)
AST SERPL-CCNC: 20 U/L
BILIRUB SERPL-MCNC: 0.36 MG/DL (ref 0.3–1.2)
BILIRUBIN DIRECT: 0.1 MG/DL
BILIRUBIN, INDIRECT: 0.26 MG/DL (ref 0–1)
CALCIUM IONIZED: 1.31 MMOL/L (ref 1.13–1.33)
CALCIUM SERPL-MCNC: 10 MG/DL (ref 8.6–10.4)
GLOBULIN: NORMAL G/DL (ref 1.5–3.8)
PTH INTACT: 40.75 PG/ML (ref 15–65)
TOTAL PROTEIN: 7.1 G/DL (ref 6.4–8.3)

## 2021-09-13 PROCEDURE — 80076 HEPATIC FUNCTION PANEL: CPT

## 2021-09-13 PROCEDURE — 82330 ASSAY OF CALCIUM: CPT

## 2021-09-13 PROCEDURE — 82310 ASSAY OF CALCIUM: CPT

## 2021-09-13 PROCEDURE — 36415 COLL VENOUS BLD VENIPUNCTURE: CPT

## 2021-09-13 PROCEDURE — 83970 ASSAY OF PARATHORMONE: CPT

## 2021-09-13 PROCEDURE — 82306 VITAMIN D 25 HYDROXY: CPT

## 2021-09-14 LAB — VITAMIN D 25-HYDROXY: 64.8 NG/ML (ref 30–100)

## 2021-10-12 ENCOUNTER — HOSPITAL ENCOUNTER (OUTPATIENT)
Dept: NEUROLOGY | Age: 66
Discharge: HOME | End: 2021-10-12
Payer: MEDICARE

## 2021-10-12 PROCEDURE — 95913 NRV CNDJ TEST 13/> STUDIES: CPT | Performed by: PHYSICAL MEDICINE & REHABILITATION

## 2021-10-12 PROCEDURE — 95886 MUSC TEST DONE W/N TEST COMP: CPT | Performed by: PHYSICAL MEDICINE & REHABILITATION

## 2021-10-14 DIAGNOSIS — M54.2 NECK PAIN: ICD-10-CM

## 2021-10-14 DIAGNOSIS — G89.29 CHRONIC LOW BACK PAIN WITH SCIATICA, SCIATICA LATERALITY UNSPECIFIED, UNSPECIFIED BACK PAIN LATERALITY: ICD-10-CM

## 2021-10-14 DIAGNOSIS — M54.40 CHRONIC LOW BACK PAIN WITH SCIATICA, SCIATICA LATERALITY UNSPECIFIED, UNSPECIFIED BACK PAIN LATERALITY: ICD-10-CM

## 2021-10-14 DIAGNOSIS — M54.16 LUMBAR RADICULOPATHY: ICD-10-CM

## 2021-10-14 DIAGNOSIS — G62.9 SENSORY MOTOR NEUROPATHY: ICD-10-CM

## 2021-10-18 DIAGNOSIS — E11.42 TYPE 2 DIABETES MELLITUS WITH DIABETIC POLYNEUROPATHY, WITHOUT LONG-TERM CURRENT USE OF INSULIN (HCC): ICD-10-CM

## 2021-10-18 RX ORDER — GABAPENTIN 800 MG/1
800 TABLET ORAL 3 TIMES DAILY
Qty: 270 TABLET | Refills: 0 | Status: SHIPPED | OUTPATIENT
Start: 2021-10-18 | End: 2022-02-11 | Stop reason: CLARIF

## 2021-10-18 NOTE — TELEPHONE ENCOUNTER
Please Approve or Refuse.   Send to Pharmacy per Pt's Request:      Next Visit Date:  11/5/2021   Last Visit Date: 7/20/2021    Hemoglobin A1C (%)   Date Value   08/12/2021 6.3 (H)   07/20/2021 5.9   04/23/2021 5.9             ( goal A1C is < 7)   BP Readings from Last 3 Encounters:   07/29/21 129/69   07/20/21 130/74   07/12/21 136/76          (goal 120/80)  BUN   Date Value Ref Range Status   07/30/2021 12 8 - 23 mg/dL Final     CREATININE   Date Value Ref Range Status   07/30/2021 0.78 0.70 - 1.20 mg/dL Final     Potassium   Date Value Ref Range Status   07/30/2021 4.3 3.7 - 5.3 mmol/L Final

## 2021-11-03 ENCOUNTER — TELEPHONE (OUTPATIENT)
Dept: NEUROLOGY | Age: 66
End: 2021-11-03

## 2021-11-03 DIAGNOSIS — M54.16 BILATERAL LUMBAR RADICULOPATHY: Primary | ICD-10-CM

## 2021-11-04 NOTE — PROGRESS NOTES
Visit Information    Have you changed or started any medications since your last visit including any over-the-counter medicines, vitamins, or herbal medicines? no   Have you stopped taking any of your medications? Is so, why? -  no  Are you having any side effects from any of your medications? - no    Have you seen any other physician or provider since your last visit? yes -    Have you had any other diagnostic tests since your last visit?  no   Have you been seen in the emergency room and/or had an admission in a hospital since we last saw you?  no   Have you had your routine dental cleaning in the past 6 months?  yes -      Do you have an active MyChart account? If no, what is the barrier?   Yes    Patient Care Team:  Samanta Schaffer MD as PCP - General (Family Medicine)  Samanta Schaffer MD as PCP - [facility]  Alfonso Pruett MD as Consulting Physician (Neurosurgery)  Susana Whitney MD as Consulting Physician (Cardiology)  Chantel Valenzuela MD as Consulting Physician (Urology)  Nati Perdue MD as Surgeon (Vascular Surgery)  Frank Chandler MD as Consulting Physician (Pulmonology)  Carol Sanabria MD as Consulting Physician (Orthopedic Surgery)  Sam Nicole OD (Ophthalmology)  Sandeep Ewing MD as Consulting Physician (Endocrinology)  Piper Cabrera MD as Consulting Physician (Orthopedic Surgery)  Stephen Cotter MD as Consulting Physician (Gastroenterology)  Jeff Neville MD as Consulting Physician (Orthopedic Surgery)  Muriel Bryant RN as Nurse Tonette Curling, MD as Surgeon (General Surgery)  Danna Mojica, PhD (Psychology)  Kimberly Falcon RN as Care Transitions Nurse    Medical History Review  Past Medical, Family, and Social History reviewed and does contribute to the patient presenting condition    Health Maintenance   Topic Date Due    Diabetic retinal exam  11/07/2020    Annual Wellness Visit (AWV)  07/08/2021    Flu vaccine (1) 09/01/2021    Diabetic microalbuminuria test  10/13/2021    Pneumococcal 65+ years Vaccine (2 of 2 - PPSV23) 04/11/2022    Diabetic foot exam  04/23/2022    Colon cancer screen colonoscopy  05/09/2022    PSA counseling  05/19/2022    Lipid screen  07/30/2022    Potassium monitoring  07/30/2022    Creatinine monitoring  07/30/2022    A1C test (Diabetic or Prediabetic)  08/12/2022    DTaP/Tdap/Td vaccine (2 - Td or Tdap) 03/01/2028    Shingles Vaccine  Completed    COVID-19 Vaccine  Completed    Hepatitis C screen  Completed    Hepatitis A vaccine  Aged Out    Hib vaccine  Aged Out    Meningococcal (ACWY) vaccine  Aged Out

## 2021-11-05 ENCOUNTER — HOSPITAL ENCOUNTER (OUTPATIENT)
Dept: GENERAL RADIOLOGY | Age: 66
Discharge: HOME | End: 2021-11-07
Payer: MEDICARE

## 2021-11-05 ENCOUNTER — OFFICE VISIT (OUTPATIENT)
Dept: FAMILY MEDICINE CLINIC | Age: 66
End: 2021-11-05
Payer: MEDICARE

## 2021-11-05 ENCOUNTER — HOSPITAL ENCOUNTER (OUTPATIENT)
Age: 66
Discharge: HOME | End: 2021-11-07
Payer: MEDICARE

## 2021-11-05 ENCOUNTER — HOSPITAL ENCOUNTER (OUTPATIENT)
Age: 66
Discharge: HOME | End: 2021-11-05
Payer: MEDICARE

## 2021-11-05 ENCOUNTER — HOSPITAL ENCOUNTER (OUTPATIENT)
Age: 66
Setting detail: SPECIMEN
Discharge: HOME | End: 2021-11-05
Payer: MEDICARE

## 2021-11-05 VITALS
DIASTOLIC BLOOD PRESSURE: 68 MMHG | HEART RATE: 71 BPM | SYSTOLIC BLOOD PRESSURE: 134 MMHG | OXYGEN SATURATION: 98 % | WEIGHT: 269.8 LBS | TEMPERATURE: 98.2 F | HEIGHT: 72 IN | BODY MASS INDEX: 36.54 KG/M2

## 2021-11-05 DIAGNOSIS — G89.29 CHRONIC BILATERAL LOW BACK PAIN WITH BILATERAL SCIATICA: ICD-10-CM

## 2021-11-05 DIAGNOSIS — R06.09 DOE (DYSPNEA ON EXERTION): ICD-10-CM

## 2021-11-05 DIAGNOSIS — I10 ESSENTIAL HYPERTENSION: ICD-10-CM

## 2021-11-05 DIAGNOSIS — M54.41 CHRONIC BILATERAL LOW BACK PAIN WITH BILATERAL SCIATICA: ICD-10-CM

## 2021-11-05 DIAGNOSIS — M25.511 CHRONIC PAIN OF BOTH SHOULDERS: ICD-10-CM

## 2021-11-05 DIAGNOSIS — J20.9 ACUTE BRONCHITIS DUE TO INFECTION: ICD-10-CM

## 2021-11-05 DIAGNOSIS — M25.512 CHRONIC PAIN OF BOTH SHOULDERS: ICD-10-CM

## 2021-11-05 DIAGNOSIS — Z23 NEED FOR INFLUENZA VACCINATION: ICD-10-CM

## 2021-11-05 DIAGNOSIS — E11.42 TYPE 2 DIABETES MELLITUS WITH DIABETIC POLYNEUROPATHY, WITHOUT LONG-TERM CURRENT USE OF INSULIN (HCC): ICD-10-CM

## 2021-11-05 DIAGNOSIS — Z00.00 ROUTINE GENERAL MEDICAL EXAMINATION AT A HEALTH CARE FACILITY: Primary | ICD-10-CM

## 2021-11-05 DIAGNOSIS — I50.32 CHRONIC DIASTOLIC HEART FAILURE (HCC): ICD-10-CM

## 2021-11-05 DIAGNOSIS — G89.29 CHRONIC PAIN OF BOTH SHOULDERS: ICD-10-CM

## 2021-11-05 DIAGNOSIS — M54.42 CHRONIC BILATERAL LOW BACK PAIN WITH BILATERAL SCIATICA: ICD-10-CM

## 2021-11-05 LAB
ABSOLUTE EOS #: 0.1 K/UL (ref 0–0.4)
ABSOLUTE IMMATURE GRANULOCYTE: ABNORMAL K/UL (ref 0–0.3)
ABSOLUTE LYMPH #: 2.7 K/UL (ref 1–4.8)
ABSOLUTE MONO #: 0.7 K/UL (ref 0.1–1.3)
ALBUMIN SERPL-MCNC: 4.4 G/DL (ref 3.5–5.2)
ALBUMIN/GLOBULIN RATIO: ABNORMAL (ref 1–2.5)
ALP BLD-CCNC: 83 U/L (ref 40–129)
ALT SERPL-CCNC: 28 U/L (ref 5–41)
ANION GAP SERPL CALCULATED.3IONS-SCNC: 11 MMOL/L (ref 9–17)
AST SERPL-CCNC: 19 U/L
BASOPHILS # BLD: 1 % (ref 0–2)
BASOPHILS ABSOLUTE: 0.1 K/UL (ref 0–0.2)
BILIRUB SERPL-MCNC: 0.26 MG/DL (ref 0.3–1.2)
BNP INTERPRETATION: NORMAL
BUN BLDV-MCNC: 14 MG/DL (ref 8–23)
BUN/CREAT BLD: ABNORMAL (ref 9–20)
CALCIUM SERPL-MCNC: 10.1 MG/DL (ref 8.6–10.4)
CHLORIDE BLD-SCNC: 102 MMOL/L (ref 98–107)
CO2: 26 MMOL/L (ref 20–31)
CREAT SERPL-MCNC: 0.75 MG/DL (ref 0.7–1.2)
CREATININE URINE: 64.3 MG/DL (ref 39–259)
DIFFERENTIAL TYPE: ABNORMAL
EOSINOPHILS RELATIVE PERCENT: 1 % (ref 0–4)
GFR AFRICAN AMERICAN: >60 ML/MIN
GFR NON-AFRICAN AMERICAN: >60 ML/MIN
GFR SERPL CREATININE-BSD FRML MDRD: ABNORMAL ML/MIN/{1.73_M2}
GFR SERPL CREATININE-BSD FRML MDRD: ABNORMAL ML/MIN/{1.73_M2}
GLUCOSE BLD-MCNC: 110 MG/DL (ref 70–99)
HCT VFR BLD CALC: 42.3 % (ref 41–53)
HEMOGLOBIN: 14.3 G/DL (ref 13.5–17.5)
IMMATURE GRANULOCYTES: ABNORMAL %
LYMPHOCYTES # BLD: 25 % (ref 24–44)
MCH RBC QN AUTO: 30.7 PG (ref 26–34)
MCHC RBC AUTO-ENTMCNC: 33.8 G/DL (ref 31–37)
MCV RBC AUTO: 90.9 FL (ref 80–100)
MICROALBUMIN/CREAT 24H UR: <12 MG/L
MICROALBUMIN/CREAT UR-RTO: NORMAL MCG/MG CREAT
MONOCYTES # BLD: 7 % (ref 1–7)
NRBC AUTOMATED: ABNORMAL PER 100 WBC
PDW BLD-RTO: 15.3 % (ref 11.5–14.9)
PLATELET # BLD: 248 K/UL (ref 150–450)
PLATELET ESTIMATE: ABNORMAL
PMV BLD AUTO: 8.7 FL (ref 6–12)
POTASSIUM SERPL-SCNC: 4.7 MMOL/L (ref 3.7–5.3)
PRO-BNP: 26 PG/ML
RBC # BLD: 4.65 M/UL (ref 4.5–5.9)
RBC # BLD: ABNORMAL 10*6/UL
SEG NEUTROPHILS: 66 % (ref 36–66)
SEGMENTED NEUTROPHILS ABSOLUTE COUNT: 7 K/UL (ref 1.3–9.1)
SODIUM BLD-SCNC: 139 MMOL/L (ref 135–144)
TOTAL PROTEIN: 7.5 G/DL (ref 6.4–8.3)
WBC # BLD: 10.5 K/UL (ref 3.5–11)
WBC # BLD: ABNORMAL 10*3/UL

## 2021-11-05 PROCEDURE — 36415 COLL VENOUS BLD VENIPUNCTURE: CPT

## 2021-11-05 PROCEDURE — G0008 ADMIN INFLUENZA VIRUS VAC: HCPCS | Performed by: FAMILY MEDICINE

## 2021-11-05 PROCEDURE — 80053 COMPREHEN METABOLIC PANEL: CPT

## 2021-11-05 PROCEDURE — G0439 PPPS, SUBSEQ VISIT: HCPCS | Performed by: FAMILY MEDICINE

## 2021-11-05 PROCEDURE — 71046 X-RAY EXAM CHEST 2 VIEWS: CPT

## 2021-11-05 PROCEDURE — 90694 VACC AIIV4 NO PRSRV 0.5ML IM: CPT | Performed by: FAMILY MEDICINE

## 2021-11-05 PROCEDURE — 83880 ASSAY OF NATRIURETIC PEPTIDE: CPT

## 2021-11-05 PROCEDURE — 85025 COMPLETE CBC W/AUTO DIFF WBC: CPT

## 2021-11-05 PROCEDURE — 93005 ELECTROCARDIOGRAM TRACING: CPT

## 2021-11-05 PROCEDURE — 82043 UR ALBUMIN QUANTITATIVE: CPT

## 2021-11-05 PROCEDURE — 82570 ASSAY OF URINE CREATININE: CPT

## 2021-11-05 RX ORDER — FUROSEMIDE 40 MG/1
40 TABLET ORAL DAILY
Qty: 90 TABLET | Refills: 3 | Status: SHIPPED | OUTPATIENT
Start: 2021-11-05 | End: 2022-05-12 | Stop reason: SDUPTHER

## 2021-11-05 RX ORDER — AZITHROMYCIN 250 MG/1
TABLET, FILM COATED ORAL
Qty: 6 TABLET | Refills: 0 | Status: SHIPPED | OUTPATIENT
Start: 2021-11-05 | End: 2021-11-10

## 2021-11-05 ASSESSMENT — PATIENT HEALTH QUESTIONNAIRE - PHQ9
SUM OF ALL RESPONSES TO PHQ QUESTIONS 1-9: 2
1. LITTLE INTEREST OR PLEASURE IN DOING THINGS: 1
2. FEELING DOWN, DEPRESSED OR HOPELESS: 1
SUM OF ALL RESPONSES TO PHQ QUESTIONS 1-9: 2
SUM OF ALL RESPONSES TO PHQ QUESTIONS 1-9: 2
SUM OF ALL RESPONSES TO PHQ9 QUESTIONS 1 & 2: 2

## 2021-11-05 ASSESSMENT — VISUAL ACUITY
OS_CC: 20/30
OD_CC: 20/25

## 2021-11-05 ASSESSMENT — LIFESTYLE VARIABLES: HOW OFTEN DO YOU HAVE A DRINK CONTAINING ALCOHOL: 0

## 2021-11-05 NOTE — RESULT ENCOUNTER NOTE
Noted, preliminary result, chronic changes stable, MyChart message sent to the patient      Future Appointments  11/12/2021 9:20 AM    Ami Lakhani MD [facility] NEURO          [facility]  11/29/2021 9:50 AM    Vasiliy Laguna MD         [facility]  3/9/2022   10:30 AM   [facility] Mitchell [facility]     [facility]

## 2021-11-05 NOTE — RESULT ENCOUNTER NOTE
Please notify patient: Blood glucose well controlled 110  Otherwise labs within normal limits  There are no signs of infection in the blood  continue current treatment    Future Appointments  11/12/2021 9:20 AM    Bessie Mcintyre  [place]  11/29/2021 9:50 AM    Van Iglesias MD         [address]  3/9/2022   10:30 AM   Shabnam Tijerina MD     fp [facility]

## 2021-11-05 NOTE — PROGRESS NOTES
Medicare Annual Wellness Visit  Name: Svetlana Yin Date: 2021   MRN: [id number] Sex: Male   Age: 78 y.o. Ethnicity: Non- / Non    : 1955 Race: White (non-)      Сергей Holm is here for Medicare AWV    Screenings for behavioral, psychosocial and functional/safety risks, and cognitive dysfunction are all negative except as indicated below. These results, as well as other patient data from the [address] [facility] [address] form, are documented in Flowsheets linked to this Encounter. Allergies   Allergen Reactions    Succinylcholine Chloride Other (See Comments)     PATIENT HAS PSEUDOCHOLINESTERASE DEFICIENCY      Cymbalta [Duloxetine Hcl]      Taste loss         Prior to Visit Medications    Medication Sig Taking? Authorizing Provider   furosemide (LASIX) 40 MG tablet Take 1 tablet by mouth daily Dose increased 2021 Yes Caro James MD   gabapentin (NEURONTIN) 800 MG tablet Take 1 tablet by mouth 3 times daily for 90 days. Stop lyrica Yes Caro James MD   albuterol (PROVENTIL) (2.5 MG/3ML) 0.083% nebulizer solution Take 3 mLs by nebulization every 6 hours as needed for Wheezing or Shortness of Breath (cough) Yes Asmita Hebert MD   nystatin (MYCOSTATIN) 700844 UNIT/ML suspension Take 5 mLs by mouth 4 times daily Swish and swallow Yes Caro James MD   amitriptyline (ELAVIL) 25 MG tablet Take 1 tablet by mouth nightly Yes Raj Humphrey MD   omeprazole (PRILOSEC) 20 MG delayed release capsule Take 1 capsule by mouth every morning (before breakfast) Dose decreased 2021 Yes Caro James MD   pravastatin (PRAVACHOL) 80 MG tablet Take 1 tablet by mouth every evening Dose increased  2019 Yes Caro James MD   amLODIPine (NORVASC) 10 MG tablet Take 1 tablet by mouth daily Dose increased 10/16/2020 due to high BP.  Correct dosage Yes Caro James MD   folbee plus (FOLBEE PLUS) TABS Take 1 tablet by mouth daily Yes Ning Fonseca MD   lisinopril (PRINIVIL;ZESTRIL) 20 MG tablet Take 1 tablet by mouth daily Yes King Malia MD   tiotropium-olodaterol (STIOLTO RESPIMAT) 2.5-2.5 MCG/ACT AERS Inhale 2 puffs into the lungs daily Yes Historical Provider, MD   oxyCODONE-acetaminophen (PERCOCET) 5-325 MG per tablet Take 1 tablet by mouth 2 times daily as needed. Yes Historical Provider, MD   glucose monitoring kit (FREESTYLE) monitoring kit 1 kit by Does not apply route daily Please supply Patient with a glucose monitoring kit that insurance will cover. Yes Myron Parikh MD   Lancets 30G MISC Testing once a day. Please dispense according to patients insurance. Yes Myron Parikh MD   blood glucose monitor strips Testing once a day. Please dispense according to patients insurance. Yes Myron Parikh MD   Alcohol Swabs PADS Please dispense according to patients insurance/device. Testing once a day Yes Myron Parikh MD   ammonium lactate (AMLACTIN) 12 % cream APPLY TOPICALLY TO LEGS ONE TO TWO TIMES A DAY AS NEEDED Yes Myron Parikh MD   naloxone 4 MG/0.1ML LIQD nasal spray 1 spray by Nasal route as needed for Opioid Reversal Patient needs counseling Yes Myron Parikh MD   OXYGEN With CPAP at night Yes Historical Provider, MD   albuterol sulfate HFA (PROVENTIL HFA) 108 (90 Base) MCG/ACT inhaler Inhale 2 puffs into the lungs every 6 hours as needed for Wheezing or Shortness of Breath Yes Myron Parikh MD   Respiratory Therapy Supplies (ADULT MASK) MISC Needs to use mask daily due to coronavirus pandemic.  Yes Myron Parikh MD   Lift Chair MISC by Does not apply route Patient has difficulty getting up from usual chair Yes Myron Parikh MD   aspirin EC 81 MG EC tablet Take 1 tablet by mouth daily Yes Myron Parikh MD         Past Medical History:   Diagnosis Date    Acid reflux     Anemia, blood loss 10/7/2018    Arthritis     C. difficile colitis 3/19/2020    Chronic bilateral low back pain with bilateral sciatica 11/3/2016    Chronic diastolic heart failure ([address] 2/25/2021    Complete tear of left rotator cuff 7/18/2018    COPD (chronic obstructive pulmonary disease) ([address]     Coronary artery disease involving native coronary artery of native heart without angina pectoris 2/2/2020    Degenerative disc disease, cervical 7/28/2019    GI bleed 3/19/2020    Gout     H/O cardiac catheterization     yrs ago   no stents    History of blood transfusion     Hyperlipidemia     Hyperlipidemia with target LDL less than 100 1/20/2016    Hyperparathyroidism ([address] 1/17/2020    Hypertension     Knee pain, chronic     left    Liver disease     MDRO (multiple drug resistant organisms) resistance     Melena 3/19/2020    Memory loss     Moderate episode of recurrent major depressive disorder ([address] 1/20/2016    Obesity, Class III, BMI 40-49.9 (morbid obesity) ([address] 1/20/2016    REYNALDO on CPAP 5/6/2017    Osteoarthritis     Peripheral arterial occlusive disease ([address] 3/25/2017    Pneumonia 3/9/2020    Polypharmacy 3/25/2017    Positive FIT (fecal immunochemical test) 4/11/2017    Prolonged emergence from general anesthesia 01/05/2017    Patient \"on life support for 3 days\" after back surgery due to being given succinylcholine    Prostate CA ([address] 1/20/2016    Prostate cancer ([address] 10/2013    finished radiation tx 5/2014    Pseudocholinesterase deficiency 03/25/2017    Pt.  \"on life support\" for 3 days after back surgery 1/5/17 after being given succinylcholine    Severe obesity (BMI 35.0-35.9 with comorbidity) ([address] 2/2/2020    Short of breath on exertion     Sleep apnea     uses CPAP machine nightly    Status post lumbar laminectomy 3/25/2017    Stenosis of left carotid artery 10/7/2018    Syncope and collapse 3/19/2020    Tubular adenoma of colon 4/11/17 5/13/2017    Type 2 diabetes mellitus with hyperglycemia, without long-term current use of insulin ([facility] 3/25/2017    Lab Results Component Value Date  LABA1C 7.1 (H) 03/23/2017     Unintended weight loss 10/7/2018    Vitamin D deficiency 3/25/2017    Wears glasses        Past Surgical History:   Procedure Laterality Date    BACK SURGERY      x 2     BACK SURGERY  01/05/2017    lumbar laminectomy L2, L3, L4    BRONCHOSCOPY N/A 3/13/2020    BRONCHOSCOPY performed by Christin Parks MD at [address] [address]    no stents    CAROTID ENDARTERECTOMY Right 12/16/2019    Dr. Claudean Castellani, LAPAROSCOPIC N/A 2/16/2021    CHOLECYSTECTOMY LAPAROSCOPIC ROBOTIC XI performed by Rod Live MD at [address] [facility], COLON, DIAGNOSTIC     [address] [facility] Left 11/18/2020    HERNIA INGUINAL REPAIR [address] OPEN performed by Rod Live MD at [address] [facility] Left     LUMBAR LAMINECTOMY  01/05/2017    L2-L4    LA CLOSED RX SHLDR DISLOC,ANESTHESIA Left 8/1/2018    SHOULDER CLOSED REDUCTION WITH C-ARM VISUALIZATION performed by Rio Garcia MD at [facility] N/A 5/9/2017    COLONOSCOPY WITH BIOPSY performed by Brionna Gordon DO at [address] IMPLANT Left 7/18/2018    SHOULDER TOTAL ARTHROPLASTY REVERSE LEFT DJO & BICEP TENDON TRANSFER performed by Rio Garcia MD at [address] [facility] HUMERAL/GLENOID COMPNT Left 8/3/2018    SHOULDER TOTAL REVERSE  ARTHROPLASTY REVISION performed by Rio Garcia MD at [address]     rotator cuff repair    SHOULDER SURGERY Left 10/15/2020    SHOULDER ARTHROSCOPY W/INTEROP CULTURES performed by Rio Garcia MD at [address] [facility] [address]      ear, forehead    TONSILLECTOMY AND ADENOIDECTOMY      UPPER GASTROINTESTINAL ENDOSCOPY N/A 3/19/2020    EGD BIOPSY performed by Terrie Vallejo MD at [facility] History   Problem Relation Age of Onset    Diabetes Mother     Lung Cancer Brother     Liver Cancer Brother     Cancer Father        CareTeam (Including outside providers/suppliers regularly involved in providing care):   Patient Care Team:  Donya Crockett MD as PCP - General (Family Medicine)  Donya Crockett MD as PCP - [facility] Empaneled Provider  Pipo Jean MD as Consulting Physician (Neurosurgery)  Christie Yip MD as Consulting Physician (Cardiology)  Jigna Marrufo MD as Consulting Physician (Urology)  Jennifer Negron MD as Surgeon (Vascular Surgery)  Venu Steve MD as Consulting Physician (Pulmonology)  Agatha Leon MD as Consulting Physician (Orthopedic Surgery)  Tommy Carr OD (Ophthalmology)  Joselyn Keane MD as Consulting Physician (Endocrinology)  Scar Sumner MD as Consulting Physician (Orthopedic Surgery)  Francheska Santizo MD as Consulting Physician (Gastroenterology)  Rosina Salazar MD as Consulting Physician (Orthopedic Surgery)  Johnney Seip, RN as Nurse Clara Kang MD as Surgeon (General Surgery)  Edison Vyas, PhD (Psychology)  Stefano Jolley RN as Care Transitions Nurse      Vital signs within normal limits except severe obesity per BMI  Wt Readings from Last 3 Encounters:   11/05/21 269 lb 12.8 oz (122.4 kg)   07/29/21 274 lb (124.3 kg)   07/20/21 283 lb (128.4 kg)     Vitals:    11/05/21 0920   BP: 134/68   Pulse: 71   Temp: 98.2 °F (36.8 °C)   SpO2: 98%   Weight: 269 lb 12.8 oz (122.4 kg)   Height: 6' (1.829 m)     Body mass index is 36.59 kg/m². Based upon direct observation of the patient, evaluation of cognition reveals recent and remote memory intact.       Patient reports chronic lumbar back pain for several years, shooting down to both legs, not getting better, affecting his daily activities, sometimes even falls  He received 4-5 steroid shots didn't help, had 3 back surgeries  Had MISSAEL also did not help  Took extra pills after he fell in the garage, but still not feeling better  Patient reports he currently follows with pain management but he is not happy with them, as the back pain is not getting better, he does have upcoming appointment, he would like to transfer his care to another pain management. Intensity of pain 7 out of 10 worse with activities, interferes with sleep. He ambulates with walker with seat, he is still driving, he feels he is getting weaker. Right ankle rolled over and fell 1 week ago  Missed appointment with pain management due to fall, but had to reschedule  Patient tells me they want to put \"back implant\" patient says he does not want anyone to mess up his back anymore. HAS appointment next Wednesday with pain management. Patient also reports bilateral shoulder pain, worse in the right shoulder  Cannot sleep much, pain wakes him up  Right shoulder has been hurting more because he is babying the left upper extremity  Left shoulder cannot lift , had 2 surgeries, cannot use much.  right upper extremity getting tired, hurting more. Had EMG and burned and activated his nerves in his legs for a few days, with flareup of neuropathy. He does have an order for MRI lumbar to do, by neurologist, he was not aware. Patient says his neuropathy is getting worse, his feet feel like plastic. I advised him to consider giving up driving. He takes gabapentin and Folbee, tolerating well, denies side effects. His diabetes is slightly worse but very well controlled    Lab Results   Component Value Date    LABA1C 6.3 (H) 08/12/2021    LABA1C 5.9 07/20/2021    LABA1C 5.9 04/23/2021         Patient has known COPD. He continues to smoke, but he is cutting down. Patient reports worsening cough, productive cough for a few weeks with yellow mucus in increased amount, dyspnea on exertion slightly worse than before.   Using nebulizer 2 times a day, Stiolto, on Oxygen at 3 l/ min at nighttime  Advised to increase nebulizer to 3-4 times a day, and could use the oxygen at daytime as well. He does have appointment with pulmonologist    Patient does have hypertension and congestive heart failure  Patient also reports episodes of getting dizzy and lightheaded , HR fast at home when checking his blood pressure. Physical exam  General Appearance: alert and oriented to person, place and time, well-developed and well-nourished, in no acute distress  Pulmonary/Chest: decreased breath sounds noted- bilateral    Cardiovascular: normal rate, regular rhythm and distant heart sounds    Abdomen: soft, non-tender, non-distended, normal bowel sounds, no masses or organomegaly and obese    Extremities: no peripheral edema, wears compression stockings    Musculoskeletal: bilateral shoulders tenderness, decreased range of motion worse on the left side, unable to lift up the arm above the head. Lumbar spine tenderness midline and on paraspinal the sides, decreased range of motion. Ambulates with walker with seat      Patient's complete Health Risk Assessment and screening values have been reviewed and are found in Flowsheets. The following problems were reviewed today and where indicated follow up appointments were made and/or referrals ordered.     Positive Risk Factor Screenings with Interventions:         Substance History:  Social History     Tobacco History     Smoking Status  Current Every Day Smoker Smoking Start Date  7/8/2021 Smoking Frequency  0.5 packs/day for 35 years (17.5 pk yrs) Smoking Tobacco Type  Cigarettes    Smokeless Tobacco Use  Never Used    Tobacco Comment  History of 1 to 1.5 packs/day          Alcohol History     Alcohol Use Status  Not Currently Drinks/Week  0 Standard drinks or equivalent per week Amount  0.0 standard drinks of alcohol/wk          Drug Use     Drug Use Status  No          Sexual Activity     Sexually Active  Not Currently Partners  Female               Alcohol banking/finances, shopping, telephone use, food preparation, transportation, or taking medications?: None  ADL Interventions:  · Patient declines any further evaluation/treatment for this issue  · referred to PT, has shower bench, wife helps him if he needs help  ·     Personalized Preventive Plan   Current Health Maintenance Status  Immunization History   Administered Date(s) Administered    COVID-19, Carrie Klein, Primary or Immunocompromised, PF, 100mcg/0.5mL 03/23/2021, 04/20/2021    Influenza Virus Vaccine 12/30/2014, 12/28/2015, 10/27/2016, 10/02/2017, 09/04/2018, 10/01/2018    Influenza, High-dose, Russo Deal, 65 yrs +, IM (Fluzone) 12/28/2020    Influenza, Intradermal, Quadrivalent, Preservative Free 10/27/2016    Influenza, Quadv, IM, (6 mo and older Fluzone, Flulaval, Fluarix and 3 yrs and older Afluria) 10/02/2017    Influenza, Quadv, IM, PF (6 mo and older Fluzone, Flulaval, Fluarix, and 3 yrs and older Afluria) 09/04/2018    Influenza, Quadv, adjuvanted, 65 yrs +, IM, PF (Fluad) 11/05/2021    Influenza, Triv, inactivated, subunit, adjuvanted, IM (Fluad 65 yrs and older) 01/28/2020    Pneumococcal Conjugate 13-valent ([id number]) 04/21/2016, 07/23/2019    Pneumococcal Polysaccharide ([id number]) 12/30/2014, 04/11/2017    Tdap (Boostrix, Adacel) 03/01/2018    Zoster Live (Zostavax) 01/30/2016    Zoster Recombinant (Shingrix) 02/22/2020, 06/04/2020        Health Maintenance   Topic Date Due    Diabetic retinal exam  11/07/2020    Annual Wellness Visit (AWV)  07/08/2021    Diabetic microalbuminuria test  10/13/2021    Pneumococcal 65+ years Vaccine (2 of 2 - PPSV23) 04/11/2022    Diabetic foot exam  04/23/2022    Colon cancer screen colonoscopy  05/09/2022    PSA counseling  05/19/2022    Lipid screen  07/30/2022    Potassium monitoring  07/30/2022    Creatinine monitoring  07/30/2022    A1C test (Diabetic or Prediabetic)  08/12/2022    DTaP/Tdap/Td vaccine (2 - Td or Tdap) 03/01/2028    Flu Marc Vazquez MD, Pain Management, [facility]    Type 2 diabetes mellitus with diabetic polyneuropathy, without long-term current use of insulin (HCC)  Worsening, but Well controlled. Continue current treatment. Will recheck labs. Lab Results   Component Value Date    LABA1C 6.3 (H) 08/12/2021    LABA1C 5.9 07/20/2021    LABA1C 5.9 04/23/2021       -     Microalbumin / Creatinine Urine Ratio; Future  -     CBC Auto Differential; Future  -     Comprehensive Metabolic Panel; Future    Chronic pain of both shoulders  Failing to change as expected. -     [facility] - [facility]  PLASCENCIA (dyspnea on exertion)  worsening  Increase nebulizer treatments to 3-4 times a day, treat bronchitis, increase oxygen use  And start using it throughout the day, follow-up with cardiologist and pulmonologist, today to do the chest x-ray, BNP and EKG to rule out acute disease  -     XR CHEST (2 VW); Future  -     Brain Natriuretic Peptide; Future  -     EKG 12 Lead; Future                   Controlled Substance Monitoring:    Acute and Chronic Pain Monitoring:   RX Monitoring 11/5/2021   Attestation -   Periodic Controlled Substance Monitoring Possible medication side effects, risk of tolerance/dependence & alternative treatments discussed. ;No signs of potential drug abuse or diversion identified. ;Assessed functional status.    Chronic Pain > 50 MEDD -   Chronic Pain > 80 MEDD -         Future Appointments   Date Time Provider Keri English   11/12/2021  9:20 AM Juan Griffin MD [facility]UR[facility]   11/29/2021  9:50 AM Perez Thomas MD [address] [place] [address]   3/9/2022 10:30 AM Myron Parikh MD [facility]

## 2021-11-05 NOTE — PATIENT INSTRUCTIONS
Personalized Preventive Plan for Сергей Holm - 11/5/2021  Medicare offers a range of preventive health benefits. Some of the tests and screenings are paid in full while other may be subject to a deductible, co-insurance, and/or copay. Some of these benefits include a comprehensive review of your medical history including lifestyle, illnesses that may run in your family, and various assessments and screenings as appropriate. After reviewing your medical record and screening and assessments performed today your provider may have ordered immunizations, labs, imaging, and/or referrals for you. A list of these orders (if applicable) as well as your Preventive Care list are included within your After Visit Summary for your review. Other Preventive Recommendations:    · A preventive eye exam performed by an eye specialist is recommended every 1-2 years to screen for glaucoma; cataracts, macular degeneration, and other eye disorders. · A preventive dental visit is recommended every 6 months. · Try to get at least 150 minutes of exercise per week or 10,000 steps per day on a pedometer . · Order or download the FREE \"Exercise & Physical Activity: Your Everyday Guide\" from [employer]. Call 8-659.944.8297 or search [employer] online. · You need 2104-7059 mg of calcium and 1714-5824 IU of vitamin D per day. It is possible to meet your calcium requirement with diet alone, but a vitamin D supplement is usually necessary to meet this goal.  · When exposed to the sun, use a sunscreen that protects against both UVA and UVB radiation with an SPF of 30 or greater. Reapply every 2 to 3 hours or after sweating, drying off with a towel, or swimming. · Always wear a seat belt when traveling in a car. Always wear a helmet when riding a bicycle or motorcycle. Heart-Healthy Diet   Sodium, Fat, and Cholesterol Controlled Diet       What Is a Heart Healthy Diet?    A heart-healthy diet is one that limits sodium , certain types of fat , and cholesterol . This type of diet is recommended for:   People with any form of cardiovascular disease (eg, coronary heart disease , peripheral vascular disease , previous heart attack , previous stroke )   People with risk factors for cardiovascular disease, such as high blood pressure , high cholesterol , or diabetes   Anyone who wants to lower their risk of developing cardiovascular disease   Sodium    Sodium is a mineral found in many foods. In general, most people consume much more sodium than they need. Diets high in sodium can increase blood pressure and lead to edema (water retention). On a heart-healthy diet, you should consume no more than 2,300 mg (milligrams) of sodium per dayabout the amount in one teaspoon of table salt. The foods highest in sodium include table salt (about 50% sodium), processed foods, convenience foods, and preserved foods. Cholesterol    Cholesterol is a fat-like, waxy substance in your blood. Our bodies make some cholesterol. It is also found in animal products, with the highest amounts in fatty meat, egg yolks, whole milk, cheese, shellfish, and organ meats. On a heart-healthy diet, you should limit your cholesterol intake to less than 200 mg per day. It is normal and important to have some cholesterol in your bloodstream. But too much cholesterol can cause plaque to build up within your arteries, which can eventually lead to a heart attack or stroke. The two types of cholesterol that are most commonly referred to are:   Low-density lipoprotein (LDL) cholesterol  Also known as bad cholesterol, this is the cholesterol that tends to build up along your arteries. Bad cholesterol levels are increased by eating fats that are saturated or hydrogenated. Optimal level of this cholesterol is less than 100. Over 130 starts to get risky for heart disease.    High-density lipoprotein (HDL) cholesterol  Also known as good cholesterol, this type of cholesterol actually carries cholesterol away from your arteries and may, therefore, help lower your risk of having a heart attack. You want this level to be high (ideally greater than 60). It is a risk to have a level less than 40. You can raise this good cholesterol by eating olive oil, canola oil, avocados, or nuts. Exercise raises this level, too. Fat    Fat is calorie dense and packs a lot of calories into a small amount of food. Even though fats should be limited due to their high calorie content, not all fats are bad. In fact, some fats are quite healthful. Fat can be broken down into four main types. The good-for-you fats are:   Monounsaturated fat  found in oils such as olive and canola, avocados, and nuts and natural nut butters; can decrease cholesterol levels, while keeping levels of HDL cholesterol high   Polyunsaturated fat  found in oils such as safflower, sunflower, soybean, corn, and sesame; can decrease total cholesterol and LDL cholesterol   Omega-3 fatty acids  particularly those found in fatty fish (such as salmon, trout, tuna, mackerel, herring, and sardines); can decrease risk of arrhythmias, decrease triglyceride levels, and slightly lower blood pressure   The fats that you want to limit are:   Saturated fat  found in animal products, many fast foods, and a few vegetables; increases total blood cholesterol, including LDL levels   Animal fats that are saturated include: butter, lard, whole-milk dairy products, meat fat, and poultry skin   Vegetable fats that are saturated include: hydrogenated shortening, palm oil, coconut oil, cocoa butter   Hydrogenated or trans fat  found in margarine and vegetable shortening, most shelf stable snack foods, and fried foods; increases LDL and decreases HDL     It is generally recommended that you limit your total fat for the day to less than 30% of your total calories.  If you follow an 1800-calorie heart healthy diet, for example, this would mean 60 grams of fat or less per day. Saturated fat and trans fat in your diet raises your blood cholesterol the most, much more than dietary cholesterol does. For this reason, on a heart-healthy diet, less than 7% of your calories should come from saturated fat and ideally 0% from trans fat. On an 1800-calorie diet, this translates into less than 14 grams of saturated fat per day, leaving 46 grams of fat to come from mono- and polyunsaturated fats.    Food Choices on a Heart Healthy Diet   Food Category   Foods Recommended   Foods to Avoid   Grains   Breads and rolls without salted tops Most dry and cooked cereals Unsalted crackers and breadsticks Low-sodium or homemade breadcrumbs or stuffing All rice and pastas   Breads, rolls, and crackers with salted tops High-fat baked goods (eg, muffins, donuts, pastries) Quick breads, self-rising flour, and biscuit mixes Regular bread crumbs Instant hot cereals Commercially prepared rice, pasta, or stuffing mixes   Vegetables   Most fresh, frozen, and low-sodium canned vegetables Low-sodium and salt-free vegetable juices Canned vegetables if unsalted or rinsed   Regular canned vegetables and juices, including sauerkraut and pickled vegetables Frozen vegetables with sauces Commercially prepared potato and vegetable mixes   Fruits   Most fresh, frozen, and canned fruits All fruit juices   Fruits processed with salt or sodium   Milk   Nonfat or low-fat (1%) milk Nonfat or low-fat yogurt Cottage cheese, low-fat ricotta, cheeses labeled as low-fat and low-sodium   Whole milk Reduced-fat (2%) milk Malted and chocolate milk Full fat yogurt Most cheeses (unless low-fat and low salt) Buttermilk (no more than 1 cup per week)   Meats and Beans   Lean cuts of fresh or frozen beef, veal, lamb, or pork (look for the word loin) Fresh or frozen poultry without the skin Fresh or frozen fish and some shellfish Egg whites and egg substitutes (Limit whole eggs to three per week) Tofu Nuts or seeds (unsalted, dry-roasted), low-sodium peanut butter Dried peas, beans, and lentils   Any smoked, cured, salted, or canned meat, fish, or poultry (including recinos, chipped beef, cold cuts, hot dogs, sausages, sardines, and anchovies) Poultry skins Breaded and/or fried fish or meats Canned peas, beans, and lentils Salted nuts   Fats and Oils   Olive oil and canola oil Low-sodium, low-fat salad dressings and mayonnaise   Butter, margarine, coconut and palm oils, recinos fat   Snacks, Sweets, and Condiments   Low-sodium or unsalted versions of broths, soups, soy sauce, and condiments Pepper, herbs, and spices; vinegar, lemon, or lime juice Low-fat frozen desserts (yogurt, sherbet, fruit bars) Sugar, cocoa powder, honey, syrup, jam, and preserves Low-fat, trans-fat free cookies, cakes, and pies Arnoldo and animal crackers, fig bars, rosi snaps   High-fat desserts Broth, soups, gravies, and sauces, made from instant mixes or other high-sodium ingredients Salted snack foods Canned olives Meat tenderizers, seasoning salt, and most flavored vinegars   Beverages   Low-sodium carbonated beverages Tea and coffee in moderation Soy milk   Commercially softened water   Suggestions   Make whole grains, fruits, and vegetables the base of your diet. Choose heart-healthy fats such as canola, olive, and flaxseed oil, and foods high in heart-healthy fats, such as nuts, seeds, soybeans, tofu, and fish. Eat fish at least twice per week; the fish highest in omega-3 fatty acids and lowest in mercury include salmon, herring, mackerel, sardines, and canned chunk light tuna. If you eat fish less than twice per week or have high triglycerides, talk to your doctor about taking fish oil supplements. Read food labels.    For products low in fat and cholesterol, look for fat free, low-fat, cholesterol free, saturated fat free, and trans fat freeAlso scan the Nutrition Facts Label, which lists saturated fat, trans fat, and cholesterol amounts. For products low in sodium, look for sodium free, very low sodium, low sodium, no added salt, and unsalted   Skip the salt when cooking or at the table; if food needs more flavor, get creative and try out different herbs and spices. Garlic and onion also add substantial flavor to foods. Trim any visible fat off meat and poultry before cooking, and drain the fat off after beckwith. Use cooking methods that require little or no added fat, such as grilling, boiling, baking, poaching, broiling, roasting, steaming, stir-frying, and sauting. Avoid fast food and convenience food. They tend to be high in saturated and trans fat and have a lot of added salt. Talk to a registered dietitian for individualized diet advice. Last Reviewed: March 2011 Alexey Rivas MS, MPH, RD   Updated: 3/29/2011   ·     Heart-Healthy Diet   Sodium, Fat, and Cholesterol Controlled Diet       What Is a Heart Healthy Diet? A heart-healthy diet is one that limits sodium , certain types of fat , and cholesterol . This type of diet is recommended for:   People with any form of cardiovascular disease (eg, coronary heart disease , peripheral vascular disease , previous heart attack , previous stroke )   People with risk factors for cardiovascular disease, such as high blood pressure , high cholesterol , or diabetes   Anyone who wants to lower their risk of developing cardiovascular disease   Sodium    Sodium is a mineral found in many foods. In general, most people consume much more sodium than they need. Diets high in sodium can increase blood pressure and lead to edema (water retention). On a heart-healthy diet, you should consume no more than 2,300 mg (milligrams) of sodium per dayabout the amount in one teaspoon of table salt. The foods highest in sodium include table salt (about 50% sodium), processed foods, convenience foods, and preserved foods.    Cholesterol    Cholesterol is a fat-like, waxy substance in your blood. Our bodies make some cholesterol. It is also found in animal products, with the highest amounts in fatty meat, egg yolks, whole milk, cheese, shellfish, and organ meats. On a heart-healthy diet, you should limit your cholesterol intake to less than 200 mg per day. It is normal and important to have some cholesterol in your bloodstream. But too much cholesterol can cause plaque to build up within your arteries, which can eventually lead to a heart attack or stroke. The two types of cholesterol that are most commonly referred to are:   Low-density lipoprotein (LDL) cholesterol  Also known as bad cholesterol, this is the cholesterol that tends to build up along your arteries. Bad cholesterol levels are increased by eating fats that are saturated or hydrogenated. Optimal level of this cholesterol is less than 100. Over 130 starts to get risky for heart disease. High-density lipoprotein (HDL) cholesterol  Also known as good cholesterol, this type of cholesterol actually carries cholesterol away from your arteries and may, therefore, help lower your risk of having a heart attack. You want this level to be high (ideally greater than 60). It is a risk to have a level less than 40. You can raise this good cholesterol by eating olive oil, canola oil, avocados, or nuts. Exercise raises this level, too. Fat    Fat is calorie dense and packs a lot of calories into a small amount of food. Even though fats should be limited due to their high calorie content, not all fats are bad. In fact, some fats are quite healthful. Fat can be broken down into four main types.    The good-for-you fats are:   Monounsaturated fat  found in oils such as olive and canola, avocados, and nuts and natural nut butters; can decrease cholesterol levels, while keeping levels of HDL cholesterol high   Polyunsaturated fat  found in oils such as safflower, sunflower, soybean, corn, and sesame; can decrease total cholesterol and LDL cholesterol   Omega-3 fatty acids  particularly those found in fatty fish (such as salmon, trout, tuna, mackerel, herring, and sardines); can decrease risk of arrhythmias, decrease triglyceride levels, and slightly lower blood pressure   The fats that you want to limit are:   Saturated fat  found in animal products, many fast foods, and a few vegetables; increases total blood cholesterol, including LDL levels   Animal fats that are saturated include: butter, lard, whole-milk dairy products, meat fat, and poultry skin   Vegetable fats that are saturated include: hydrogenated shortening, palm oil, coconut oil, cocoa butter   Hydrogenated or trans fat  found in margarine and vegetable shortening, most shelf stable snack foods, and fried foods; increases LDL and decreases HDL     It is generally recommended that you limit your total fat for the day to less than 30% of your total calories. If you follow an 1800-calorie heart healthy diet, for example, this would mean 60 grams of fat or less per day. Saturated fat and trans fat in your diet raises your blood cholesterol the most, much more than dietary cholesterol does. For this reason, on a heart-healthy diet, less than 7% of your calories should come from saturated fat and ideally 0% from trans fat. On an 1800-calorie diet, this translates into less than 14 grams of saturated fat per day, leaving 46 grams of fat to come from mono- and polyunsaturated fats.    Food Choices on a Heart Healthy Diet   Food Category   Foods Recommended   Foods to Avoid   Grains   Breads and rolls without salted tops Most dry and cooked cereals Unsalted crackers and breadsticks Low-sodium or homemade breadcrumbs or stuffing All rice and pastas   Breads, rolls, and crackers with salted tops High-fat baked goods (eg, muffins, donuts, pastries) Quick breads, self-rising flour, and biscuit mixes Regular bread crumbs Instant hot cereals Commercially prepared rice, pasta, or stuffing mixes Vegetables   Most fresh, frozen, and low-sodium canned vegetables Low-sodium and salt-free vegetable juices Canned vegetables if unsalted or rinsed   Regular canned vegetables and juices, including sauerkraut and pickled vegetables Frozen vegetables with sauces Commercially prepared potato and vegetable mixes   Fruits   Most fresh, frozen, and canned fruits All fruit juices   Fruits processed with salt or sodium   Milk   Nonfat or low-fat (1%) milk Nonfat or low-fat yogurt Cottage cheese, low-fat ricotta, cheeses labeled as low-fat and low-sodium   Whole milk Reduced-fat (2%) milk Malted and chocolate milk Full fat yogurt Most cheeses (unless low-fat and low salt) Buttermilk (no more than 1 cup per week)   Meats and Beans   Lean cuts of fresh or frozen beef, veal, lamb, or pork (look for the word loin) Fresh or frozen poultry without the skin Fresh or frozen fish and some shellfish Egg whites and egg substitutes (Limit whole eggs to three per week) Tofu Nuts or seeds (unsalted, dry-roasted), low-sodium peanut butter Dried peas, beans, and lentils   Any smoked, cured, salted, or canned meat, fish, or poultry (including recinos, chipped beef, cold cuts, hot dogs, sausages, sardines, and anchovies) Poultry skins Breaded and/or fried fish or meats Canned peas, beans, and lentils Salted nuts   Fats and Oils   Olive oil and canola oil Low-sodium, low-fat salad dressings and mayonnaise   Butter, margarine, coconut and palm oils, recinos fat   Snacks, Sweets, and Condiments   Low-sodium or unsalted versions of broths, soups, soy sauce, and condiments Pepper, herbs, and spices; vinegar, lemon, or lime juice Low-fat frozen desserts (yogurt, sherbet, fruit bars) Sugar, cocoa powder, honey, syrup, jam, and preserves Low-fat, trans-fat free cookies, cakes, and pies Arnoldo and animal crackers, fig bars, rosi snaps   High-fat desserts Broth, soups, gravies, and sauces, made from instant mixes or other high-sodium ingredients Salted snack foods Canned olives Meat tenderizers, seasoning salt, and most flavored vinegars   Beverages   Low-sodium carbonated beverages Tea and coffee in moderation Soy milk   Commercially softened water   Suggestions   Make whole grains, fruits, and vegetables the base of your diet. Choose heart-healthy fats such as canola, olive, and flaxseed oil, and foods high in heart-healthy fats, such as nuts, seeds, soybeans, tofu, and fish. Eat fish at least twice per week; the fish highest in omega-3 fatty acids and lowest in mercury include salmon, herring, mackerel, sardines, and canned chunk light tuna. If you eat fish less than twice per week or have high triglycerides, talk to your doctor about taking fish oil supplements. Read food labels. For products low in fat and cholesterol, look for fat free, low-fat, cholesterol free, saturated fat free, and trans fat freeAlso scan the Nutrition Facts Label, which lists saturated fat, trans fat, and cholesterol amounts. For products low in sodium, look for sodium free, very low sodium, low sodium, no added salt, and unsalted   Skip the salt when cooking or at the table; if food needs more flavor, get creative and try out different herbs and spices. Garlic and onion also add substantial flavor to foods. Trim any visible fat off meat and poultry before cooking, and drain the fat off after beckwith. Use cooking methods that require little or no added fat, such as grilling, boiling, baking, poaching, broiling, roasting, steaming, stir-frying, and sauting. Avoid fast food and convenience food. They tend to be high in saturated and trans fat and have a lot of added salt. Talk to a registered dietitian for individualized diet advice. Last Reviewed: March 2011 Ina Gray MS, MPH, RD   Updated: 3/29/2011   ·     Heart-Healthy Diet   Sodium, Fat, and Cholesterol Controlled Diet       What Is a Heart Healthy Diet?    A heart-healthy diet is one that limits sodium , certain types of fat , and cholesterol . This type of diet is recommended for:   People with any form of cardiovascular disease (eg, coronary heart disease , peripheral vascular disease , previous heart attack , previous stroke )   People with risk factors for cardiovascular disease, such as high blood pressure , high cholesterol , or diabetes   Anyone who wants to lower their risk of developing cardiovascular disease   Sodium    Sodium is a mineral found in many foods. In general, most people consume much more sodium than they need. Diets high in sodium can increase blood pressure and lead to edema (water retention). On a heart-healthy diet, you should consume no more than 2,300 mg (milligrams) of sodium per dayabout the amount in one teaspoon of table salt. The foods highest in sodium include table salt (about 50% sodium), processed foods, convenience foods, and preserved foods. Cholesterol    Cholesterol is a fat-like, waxy substance in your blood. Our bodies make some cholesterol. It is also found in animal products, with the highest amounts in fatty meat, egg yolks, whole milk, cheese, shellfish, and organ meats. On a heart-healthy diet, you should limit your cholesterol intake to less than 200 mg per day. It is normal and important to have some cholesterol in your bloodstream. But too much cholesterol can cause plaque to build up within your arteries, which can eventually lead to a heart attack or stroke. The two types of cholesterol that are most commonly referred to are:   Low-density lipoprotein (LDL) cholesterol  Also known as bad cholesterol, this is the cholesterol that tends to build up along your arteries. Bad cholesterol levels are increased by eating fats that are saturated or hydrogenated. Optimal level of this cholesterol is less than 100. Over 130 starts to get risky for heart disease.    High-density lipoprotein (HDL) cholesterol  Also known as good cholesterol, this type of cholesterol actually carries cholesterol away from your arteries and may, therefore, help lower your risk of having a heart attack. You want this level to be high (ideally greater than 60). It is a risk to have a level less than 40. You can raise this good cholesterol by eating olive oil, canola oil, avocados, or nuts. Exercise raises this level, too. Fat    Fat is calorie dense and packs a lot of calories into a small amount of food. Even though fats should be limited due to their high calorie content, not all fats are bad. In fact, some fats are quite healthful. Fat can be broken down into four main types. The good-for-you fats are:   Monounsaturated fat  found in oils such as olive and canola, avocados, and nuts and natural nut butters; can decrease cholesterol levels, while keeping levels of HDL cholesterol high   Polyunsaturated fat  found in oils such as safflower, sunflower, soybean, corn, and sesame; can decrease total cholesterol and LDL cholesterol   Omega-3 fatty acids  particularly those found in fatty fish (such as salmon, trout, tuna, mackerel, herring, and sardines); can decrease risk of arrhythmias, decrease triglyceride levels, and slightly lower blood pressure   The fats that you want to limit are:   Saturated fat  found in animal products, many fast foods, and a few vegetables; increases total blood cholesterol, including LDL levels   Animal fats that are saturated include: butter, lard, whole-milk dairy products, meat fat, and poultry skin   Vegetable fats that are saturated include: hydrogenated shortening, palm oil, coconut oil, cocoa butter   Hydrogenated or trans fat  found in margarine and vegetable shortening, most shelf stable snack foods, and fried foods; increases LDL and decreases HDL     It is generally recommended that you limit your total fat for the day to less than 30% of your total calories.  If you follow an 1800-calorie heart healthy diet, for example, this would mean 60 grams of fat or less per day. Saturated fat and trans fat in your diet raises your blood cholesterol the most, much more than dietary cholesterol does. For this reason, on a heart-healthy diet, less than 7% of your calories should come from saturated fat and ideally 0% from trans fat. On an 1800-calorie diet, this translates into less than 14 grams of saturated fat per day, leaving 46 grams of fat to come from mono- and polyunsaturated fats.    Food Choices on a Heart Healthy Diet   Food Category   Foods Recommended   Foods to Avoid   Grains   Breads and rolls without salted tops Most dry and cooked cereals Unsalted crackers and breadsticks Low-sodium or homemade breadcrumbs or stuffing All rice and pastas   Breads, rolls, and crackers with salted tops High-fat baked goods (eg, muffins, donuts, pastries) Quick breads, self-rising flour, and biscuit mixes Regular bread crumbs Instant hot cereals Commercially prepared rice, pasta, or stuffing mixes   Vegetables   Most fresh, frozen, and low-sodium canned vegetables Low-sodium and salt-free vegetable juices Canned vegetables if unsalted or rinsed   Regular canned vegetables and juices, including sauerkraut and pickled vegetables Frozen vegetables with sauces Commercially prepared potato and vegetable mixes   Fruits   Most fresh, frozen, and canned fruits All fruit juices   Fruits processed with salt or sodium   Milk   Nonfat or low-fat (1%) milk Nonfat or low-fat yogurt Cottage cheese, low-fat ricotta, cheeses labeled as low-fat and low-sodium   Whole milk Reduced-fat (2%) milk Malted and chocolate milk Full fat yogurt Most cheeses (unless low-fat and low salt) Buttermilk (no more than 1 cup per week)   Meats and Beans   Lean cuts of fresh or frozen beef, veal, lamb, or pork (look for the word loin) Fresh or frozen poultry without the skin Fresh or frozen fish and some shellfish Egg whites and egg substitutes (Limit whole eggs to three per week) Tofu Nuts or seeds (unsalted, dry-roasted), low-sodium peanut butter Dried peas, beans, and lentils   Any smoked, cured, salted, or canned meat, fish, or poultry (including recinos, chipped beef, cold cuts, hot dogs, sausages, sardines, and anchovies) Poultry skins Breaded and/or fried fish or meats Canned peas, beans, and lentils Salted nuts   Fats and Oils   Olive oil and canola oil Low-sodium, low-fat salad dressings and mayonnaise   Butter, margarine, coconut and palm oils, recinos fat   Snacks, Sweets, and Condiments   Low-sodium or unsalted versions of broths, soups, soy sauce, and condiments Pepper, herbs, and spices; vinegar, lemon, or lime juice Low-fat frozen desserts (yogurt, sherbet, fruit bars) Sugar, cocoa powder, honey, syrup, jam, and preserves Low-fat, trans-fat free cookies, cakes, and pies Arnoldo and animal crackers, fig bars, rosi snaps   High-fat desserts Broth, soups, gravies, and sauces, made from instant mixes or other high-sodium ingredients Salted snack foods Canned olives Meat tenderizers, seasoning salt, and most flavored vinegars   Beverages   Low-sodium carbonated beverages Tea and coffee in moderation Soy milk   Commercially softened water   Suggestions   Make whole grains, fruits, and vegetables the base of your diet. Choose heart-healthy fats such as canola, olive, and flaxseed oil, and foods high in heart-healthy fats, such as nuts, seeds, soybeans, tofu, and fish. Eat fish at least twice per week; the fish highest in omega-3 fatty acids and lowest in mercury include salmon, herring, mackerel, sardines, and canned chunk light tuna. If you eat fish less than twice per week or have high triglycerides, talk to your doctor about taking fish oil supplements. Read food labels. For products low in fat and cholesterol, look for fat free, low-fat, cholesterol free, saturated fat free, and trans fat freeAlso scan the Nutrition Facts Label, which lists saturated fat, trans fat, and cholesterol amounts. For products low in sodium, look for sodium free, very low sodium, low sodium, no added salt, and unsalted   Skip the salt when cooking or at the table; if food needs more flavor, get creative and try out different herbs and spices. Garlic and onion also add substantial flavor to foods. Trim any visible fat off meat and poultry before cooking, and drain the fat off after beckwith. Use cooking methods that require little or no added fat, such as grilling, boiling, baking, poaching, broiling, roasting, steaming, stir-frying, and sauting. Avoid fast food and convenience food. They tend to be high in saturated and trans fat and have a lot of added salt. Talk to a registered dietitian for individualized diet advice. Last Reviewed: March 2011 Cristian Son MS, MPH, RD   Updated: 3/29/2011   ·     Preventing Osteoporosis: After Your Visit  Your Care Instructions  Osteoporosis means the bones are weak and thin enough that they can break easily. The older you are, the more likely you are to get osteoporosis. But with plenty of calcium, vitamin D, and exercise, you can help prevent osteoporosis. The preteen and teen years are a key time for bone building. With the help of calcium, vitamin D, and exercise in those early years and beyond, the bones reach their peak density and strength by age 27. After age 27, your bones naturally start to thin and weaken. The stronger your bones are at around age 27, the lower your risk for osteoporosis. But no matter what your age and risk are, your bones still need calcium, vitamin D, and exercise to stay strong. Also avoid smoking, and limit alcohol. Smoking and heavy alcohol use can make your bones thinner. Talk to your doctor about any special risks you might have, such as having a close relative with osteoporosis or taking a medicine that can weaken bones. Your doctor can tell you the best ways to protect your bones from thinning.   Follow-up care is a key part of your treatment and safety. Be sure to make and go to all appointments, and call your doctor if you are having problems. It's also a good idea to know your test results and keep a list of the medicines you take. How can you care for yourself at home? Get enough calcium and vitamin D. The [employer] recommends adults younger than age 46 need 1,000 mg of calcium and 600 IU of vitamin D each day. Women ages 46 to 79 need 1,200 mg of calcium and 600 IU of vitamin D each day. Men ages 46 to 79 need 1,000 mg of calcium and 600 IU of vitamin D each day. Adults 71 and older need 1,200 mg of calcium and 800 IU of vitamin D each day. Eat foods rich in calcium, like yogurt, cheese, milk, and dark green vegetables. Eat foods rich in vitamin D, like eggs, fatty fish, cereal, and fortified milk. Get some sunshine. Your body uses sunshine to make its own vitamin D. The safest time to be out in the sun is before 10 a.m. or after 3 p.m. Avoid getting sunburned. Sunburn can increase your risk of skin cancer. Talk to your doctor about taking a calcium plus vitamin D supplement. Ask about what type of calcium is right for you, and how much to take at a time. Adults ages 23 to 48 should not get more than 2,500 mg of calcium and 4,000 IU of vitamin D each day, whether it is from supplements and/or food. Adults ages 46 and older should not get more than 2,000 mg of calcium and 4,000 IU of vitamin D each day from supplements and/or food. Get regular bone-building exercise. Weight-bearing and resistance exercises keep bones healthy by working the muscles and bones against gravity. Start out at an exercise level that feels right for you. Add a little at a time until you can do the following:  Do 30 minutes of weight-bearing exercise on most days of the week. Walking, jogging, stair climbing, and dancing are good choices. Do resistance exercises with weights or elastic bands 2 to 3 days a week. Limit alcohol.  Drink no more than 1 alcohol drink a day if you are a woman. Drink no more than 2 alcohol drinks a day if you are a man. Do not smoke. Smoking can make bones thin faster. If you need help quitting, talk to your doctor about stop-smoking programs and medicines. These can increase your chances of quitting for good. When should you call for help? Watch closely for changes in your health, and be sure to contact your doctor if:  You need help with a healthy eating plan. You need help with an exercise plan    © 2006-2012 [employer]. Care instructions adapted under license by [facility]. This care instruction is for use with your licensed healthcare professional. If you have questions about a medical condition or this instruction, always ask your healthcare professional. [address] any warranty or liability for your use of this information. Content Version: 9.4.34066; Last Revised: June 20, 2011              ·     DASH Diet: After Your Visit  Your Care Instructions  The DASH diet is an eating plan that can help lower your blood pressure. DASH stands for Dietary Approaches to Stop Hypertension. Hypertension is high blood pressure. The DASH diet focuses on eating foods that are high in calcium, potassium, and magnesium. These nutrients can lower blood pressure. The foods that are highest in these nutrients are fruits, vegetables, low-fat dairy products, nuts, seeds, and legumes. But taking calcium, potassium, and magnesium supplements instead of eating foods that are high in those nutrients does not have the same effect. The DASH diet also includes whole grains, fish, and poultry. The DASH diet is one of several lifestyle changes your doctor may recommend to lower your high blood pressure. Your doctor may also want you to decrease the amount of sodium in your diet.  Lowering sodium while following the DASH diet can lower blood pressure even further than just the DASH diet alone.  Follow-up care is a key part of your treatment and safety. Be sure to make and go to all appointments, and call your doctor if you are having problems. Its also a good idea to know your test results and keep a list of the medicines you take. How can you care for yourself at home? Following the DASH diet  Eat 4 to 5 servings of fruit each day. A serving is 1 medium-sized piece of fruit, ½ cup chopped or canned fruit, 1/4 cup dried fruit, or 4 ounces (½ cup) of fruit juice. Choose fruit more often than fruit juice. Eat 4 to 5 servings of vegetables each day. A serving is 1 cup of lettuce or raw leafy vegetables, ½ cup of chopped or cooked vegetables, or 4 ounces (½ cup) of vegetable juice. Choose vegetables more often than vegetable juice. Get 2 to 3 servings of low-fat and fat-free dairy each day. A serving is 8 ounces of milk, 1 cup of yogurt, or 1 ½ ounces of cheese. Eat 7 to 8 servings of grains each day. A serving is 1 slice of bread, 1 ounce of dry cereal, or ½ cup of cooked rice, pasta, or cooked cereal. Try to choose whole-grain products as much as possible. Limit lean meat, poultry, and fish to 6 ounces each day. Six ounces is about the size of two decks of cards. Eat 4 to 5 servings of nuts, seeds, and legumes (cooked dried beans, lentils, and split peas) each week. A serving is 1/3 cup of nuts, 2 tablespoons of seeds, or ½ cup cooked dried beans or peas. Limit sweets and added sugars to 5 servings or less a week. A serving is 1 tablespoon jelly or jam, ½ cup sorbet, or 1 cup of lemonade. Tips for success  Start small. Do not try to make dramatic changes to your diet all at once. You might feel that you are missing out on your favorite foods and then be more likely to not follow the plan. Make small changes, and stick with them. Once those changes become habit, add a few more changes. Try some of the following:  Make it a goal to eat a fruit or vegetable at every meal and at snacks.  This will make it easy to get the recommended amount of fruits and vegetables each day. Try yogurt topped with fruit and nuts for a snack or healthy dessert. Add lettuce, tomato, cucumber, and onion to sandwiches. Combine a ready-made pizza crust with low-fat mozzarella cheese and lots of vegetable toppings. Try using tomatoes, squash, spinach, broccoli, carrots, cauliflower, and onions. Have a variety of cut-up vegetables with a low-fat dip as an appetizer instead of chips and dip. Sprinkle sunflower seeds or chopped almonds over salads. Or try adding chopped walnuts or almonds to cooked vegetables. Try some vegetarian meals using beans and peas. Add garbanzo or kidney beans to salads. Make burritos and tacos with mashed castillo beans or black beans    © 2491-8132 [employer]. Care instructions adapted under license by [facility]. This care instruction is for use with your licensed healthcare professional. If you have questions about a medical condition or this instruction, always ask your healthcare professional. [place] [address] any warranty or liability for your use of this information. Content Version: 9.4.98071; Last Revised: March 15, 2012              ·     Alcohol Abuse and Alcoholism   (Alcohol Dependence; Alcohol Use Disorder)       Definition   Alcohol abuse is excessive or problematic alcohol consumption. It can progress to alcoholism. Alcoholism is chronic alcohol abuse that results in a physical dependence on alcohol (withdrawal symptoms) and an inability to stop or limit drinking. Causes   Several factors can contribute to alcohol abuse and alcoholism, including:   Genes   Brain chemicals that may be different than normal   Social pressure   Emotional stress   Pain   Depression and other mental health problems   Problem drinking behaviors learned from family or friends   Risk Factors   These factors increase your chance of developing alcoholism.  Tell your doctor if you have any of these risk factors:   Sex: male   Family members who abuse alcohol (especially men whose fathers or brothers are alcoholic)   Starting to use alcohol at an early age (younger than 15)   Using illicit drugs or non-medical use of prescription drugs   Peer pressure   Easy access to alcoholic beverages   Psychiatric disorders, such as depression or anxiety   Smoking   Symptoms   It is common to deny an alcohol problem. Alcohol abuse can occur without physical dependence. Alcohol abuse symptoms include:   Repeated work, school, or home problems due to drinking   Risking physical safety   Recurring trouble with the law, often including drinking and driving   Continuing to drink despite alcohol-related difficulties   Symptoms of alcoholism include:   Craving a drink   Unable to stop or limit drinking   Needing greater amounts of alcohol to feel the same effect   Giving up activities in order to drink or recover from alcohol   Drinking that continues even when it causes or worsens health problems   Wanting to stop or reduce drinking, but not being able   Withdrawal symptoms if alcohol is stopped include:   Nausea   Sweating   Shaking   Anxiety   Increased blood pressure   Seizures ( delirium tremens [DTs])   The brain, nervous system, heart, liver, stomach, gastrointestinal tract, and pancreas can all be damaged by alcoholism. Some of the Organs Damaged in Alcohol Abuse        2011 [address] [place] [address].   [facility]   Doctors ask a series of questions to assess possible alcohol-related problems, including:   Have you tried to reduce your drinking? Have you felt bad about drinking? Have you been annoyed by another person's criticism of your drinking? Do you drink in the morning to steady your nerves or cure a hangover? Do you have problems with a job, your family, or the law? Do you drive under the influence of alcohol?    Blood tests may be done to:   Look at the size of your red blood cells and to check for a substance called carbohydrate-deficient transferrin   Check for alcohol-related liver disease and other health problems   Treatment   Treatment for alcohol abuse or dependence is aimed at teaching patients how to manage the disease. Most professionals believe that this means giving up alcohol completely and permanently. The first and most important step is recognizing a problem exists. Successful treatment depends on your desire to change. Your doctor can help you withdraw from alcohol safely. This could require hospitalization in a detoxification center. They will carefully monitor you for side effects. You may need medication while you are undergoing detoxification. Treatments include:   Medications    Drugs can help relieve some of the symptoms of withdrawal and help prevent relapse. The doctor may prescribe medication to reduce cravings for alcohol. Medications used to treat alcoholism and to try to prevent drinking include:   Naltrexone (ReVia, Vivitrol)blocks the high that makes you crave alcohol   Disulfiram (Antabuse)makes you very sick if you drink alcohol   Acamprosate (Campral)reduces your craving for alcohol   A study showed that an anticonvulsant drug, topiramate (Topamax), may reduce alcohol dependence. Education and Counseling    Therapy helps you to recognize alcohol's dangers. Education raises awareness of underlying issues and lifestyles that promote drinking. In therapy, you work to improve coping skills and learn other ways of dealing with stress or pain. Mentoring and Community Help    Alcoholics Anonymous (AA) helps many people to stop drinking and stay sober. Members meet regularly and support each other. Your family members may also benefit from attending meetings of [employer]. Living with an alcoholic can be a painful, stressful situation.    Here are some general statistics on treatment outcomes of individuals one year after attempting to stop drinkin/3 remained abstinent   1/3 resumed drinking but at a lower level   1/3 relapsed completely   If you are diagnosed with alcohol abuse or alcoholism, follow your doctor's instructions . Prevention   Realizing that alcohol causes problems helps some people avoid it. Suggestions to decrease the risk of alcohol abuse and dependence include:   Socialize without alcohol. Avoid going to bars. Do not keep alcohol in your home. Avoid situations and people that encourage drinking. Make new nondrinking friends. Do fun things that do not involve alcohol. Avoid reaching for a drink when stressed or upset. Limit your alcohol intake to a moderate level. Moderate is two or fewer drinks per day for men and one or fewer for women and older adults   A 12-ounce bottle of beer, a five-ounce glass of wine, or 1.5 ounces of liquor is considered one drink   If you are a parent, having a good relationship with your children may reduce their risk of alcohol abuse. Most professionals who treat alcohol abuse and dependence believe that complete abstinence is the only effective prevention. Last Reviewed: 2010 Claudia Montoya MD, PhD, MPH   Updated: 2010   ·     High-Fiber Diet     What Is Fiber? Dietary fiber is a form of carbohydrate found in plants that cannot be digested by humans. All plants contain fiber, including fruits, vegetables, grains, and legumes. Fiber is often classified into two categories: soluble and insoluble. Soluble fiber draws water into the bowel and can help slow digestion. Examples of foods that are high in soluble fiber include oatmeal, oat bran, barley, legumes (eg, beans and peas), apples, and strawberries. Insoluble fiber speeds digestion and can add bulk to the stool. Examples of foods that are high in insoluble fiber include whole-wheat products, wheat bran, cauliflower, green beans, and potatoes. Why Follow a High-Fiber Diet?    A high-fiber diet is often recommended to prevent and treat constipation , hemorrhoids , diverticulitis , and irritable bowel syndrome . Eating a high-fiber diet can also help improve your cholesterol levels, lower your risk of coronary heart disease , reduce your risk of type 2 diabetes , and lower your weight. For people with type 1 or 2 diabetes, a high-fiber diet can also help stabilize blood sugar levels. How Much Fiber Should I Eat? A high-fiber diet should contain  20-35 grams  of fiber a day. This is actually the amount recommended for the general adult population; however, most Americans eat only 15 grams of fiber per day. Digestion of Fiber   Eating a higher fiber diet than usual can take some getting used to by your body's digestive system. To avoid the side effects of sudden increases in dietary fiber (eg, gas, cramping, bloating, and diarrhea), increase fiber gradually and be sure to drink plenty of fluids every day. Tips for Increasing Fiber Intake   Whenever possible, choose whole grains over refined grains (eg, brown rice instead of white rice, whole-wheat bread instead of white bread). Include a variety of grains in your diet, such as wheat, rye, barley, oats, quinoa, and bulgur. Eat more vegetarian-based meals. Here are some ideas: black bean burgers, eggplant lasagna, and veggie tofu stir-jerome. Choose high-fiber snacks, such as fruits, popcorn, whole-grain crackers, and nuts. Make whole-grain cereal or whole-grain toast part of your daily breakfast regime. When eating out, whether ordering a sandwich or dinner, ask for extra vegetables. When baking, replace part of the white flour with whole-wheat flour. Whole-wheat flour is particularly easy to incorporate into a recipe.     High-Fiber Diet Eating Guide   Food Category   Foods Recommended   Notes   Grains   Whole-grain breads, muffins, bagels, or janneth bread Rye bread Whole-wheat crackers or crisp breads Whole-grain or bran cereals Oatmeal, oat bran, or grits Wheat germ Whole-wheat pasta and brown rice   Read the ingredients list on food labels. Look for products that list \"whole\" as the first ingredient (eg, whole-wheat, whole oats). Choose cereals with at least 2 grams of fiber per serving. Vegetables   All vegetables, especially asparagus, bean sprouts, broccoli, [place] sprouts, cabbage, carrots, cauliflower, celery, corn, greens, green beans, green pepper, onions, peas, potatoes (with skin), snow peas, spinach, squash, sweet potatoes, tomatoes, zucchini   For maximum fiber intake, eat the peels of fruits and vegetablesjust be sure to wash them well first.   Fruits   All fruits, especially apples, berries, grapefruits, mangoes, nectarines, oranges, peaches, pears, dried fruits (figs, dates, prunes, raisins)   Choose raw fruits and vegetables over juice, cooked, or cannedraw fruit has more fiber. Dried fruit is also a good source of fiber. Milk   With the exception of yogurt containing inulin (a type of fiber), dairy foods provide little fiber. Add more fiber by topping your yogurt or cottage cheese with fresh fruit, whole grain or bran cereals, nuts, or seeds. Meats and Beans   All beans and peas, especially Garbanzo beans, kidney beans, lentils, lima beans, split peas, and castillo beans All nuts and seeds, especially almonds, peanuts, [place] nuts, cashews, peanut butter, walnuts, sesame and sunflower seeds All meat, poultry, fish, and eggs   Increase fiber in meat dishes by adding castillo beans, kidney beans, black-eyed peas, bran, or oatmeal. If you are following a low-fat diet, use nuts and seeds only in moderation. Fats and Oils   All in moderation   Fats and oils do not provide fiber   Snacks, Sweets, and Condiments   Fruit Nuts Popcorn, whole-wheat pretzels, or trail mix made with dried fruits, nuts, and seeds Cakes, breads, and cookies made with oatmeal or whole-wheat flour   Most snack foods do not provide much fiber.  Choose snacks with at least 2 grams of fiber per serving. Last Reviewed: March 2011 Leia Nieves MS, MPH, RD   Updated: 3/29/2011   ·     Safer Sex: After Your Visit  Your Care Instructions  Safer sex is a way to reduce your risk of getting an infection spread through sex. It can also help prevent pregnancy. Most infections that are spread through sex, also called sexually transmitted infections or STIs, can be cured. But some can decrease your chances of getting pregnant if they are not treated early. Others, such as herpes, have no cure. And some, such as HIV, can be deadly. Several products can help you practice safer sex and reduce your chance of STIs. One of the best is a condom. There are condoms for men and for women. The female condom is a tube of soft plastic with a closed end that is placed deep into the vagina. You can use a special rubber sheet (dental dam) for protection during oral sex. Latex gloves can keep your hands from touching blood, semen, or other body fluids that can carry infections. Remember that birth control methods such as diaphragms, IUDs, foams, and birth control pills do not stop you from getting STIs. Follow-up care is a key part of your treatment and safety. Be sure to make and go to all appointments, and call your doctor if you are having problems. Its also a good idea to know your test results and keep a list of the medicines you take. How can you care for yourself at home? Think about getting shots to prevent hepatitis A and hepatitis B. These two diseases can be spread through sex. You also can get hepatitis A if you eat infected food. Use condoms or female condoms each time and every time you have sex. Learn the right way to use a male condom:  Condoms come in several sizes. Make sure you use the right size. A condom that is too small can break easily. A condom that is too big can slip off during sex. Use a new condom each time you have sex.   Be careful not to poke a hole in the condom when you open the wrapper. Squeeze the tip of the condom to keep out air. Pull down the loose skin (foreskin) from the head of an uncircumcised penis. While squeezing the tip of the condom, unroll it all the way down to the base of the firm penis. Never use petroleum jelly (such as Vaseline), grease, hand lotion, baby oil, or anything with oil in it. These products can make holes in the condom. After sex, hold the condom on your penis as you remove your penis from your partner. This will keep semen from spilling out of the condom. Learn to use a female condom:  You can put in a female condom up to 8 hours before sex. Squeeze the smaller ring at the closed end and insert it deep into the vagina. The larger ring at the open end should stay outside the vagina. During sex, make sure the penis goes into the condom. After the penis is removed, close the open end of the condom by twisting it. Remove the condom. Do not use a female condom and male condom at the same time. Do not have sex with anyone who has symptoms of an STI, such as sores on the genitals or mouth. The herpes virus that causes cold sores can spread to and from the penis and vagina. Do not drink a lot of alcohol or use drugs before sex. This can cause you to let down your guard and not practice safer sex. Having one sex partner (who does not have STIs and does not have sex with anyone else) is a sure way to avoid STIs. Talk to your partner before you have sex. Find out if he or she has or is at risk for any STI. Keep in mind that a person may be able to spread an STI even if he or she does not have symptoms. You and your partner may want to get an HIV test. You should get tested again 6 months later. © 3608-1569 Healthwise, Incorporated. Care instructions adapted under license by [facility].  This care instruction is for use with your licensed healthcare professional. If you have questions about a medical condition or this a Healthy Life   Many actions that will keep your body strong will do the same for your mind. For example:   Talk to Your Doctor About Herbs and Supplements    Malnutrition and vitamin deficiencies can impair your mental function. For example, vitamin B12 deficiency can cause a range of symptoms, including confusion. But, what if your nutritional needs are being met? Can herbs and supplements still offer a benefit? Researchers have investigated a range of natural remedies, such as ginkgo , ginseng , and the supplement phosphatidylserine (PS). So far, though, the evidence is inconsistent as to whether these products can improve memory or thinking. If you are interested in taking herbs and supplements, talk to your doctor first because they may interact with other medicines that you are taking. Exercise Regularly    Among the many benefits of regular exercise are increased blood flow to the brain and decreased risk of certain diseases that can interfere with memory function. One study found that even moderate exercise has a beneficial effect. Examples of \"moderate\" exercise include:   Playing 18 holes of golf once a week, without a cart   Playing tennis twice a week   Walking one mile per day   Manage Stress    It can be tough to remember what is important when your mind is cluttered. Make time for relaxation. Choose activities that calm you down, and make it routine. Manage Chronic Conditions    Side effects of high blood pressure , diabetes, and heart disease can interfere with mental function. Many of the lifestyle steps discussed here can help manage these conditions. Strive to eat a healthy diet, exercise regularly, get stress under control, and follow your doctor's advice for your condition. Minimize Medications    Talk to your doctor about the medicines that you take. Some may be unnecessary. Also, healthy lifestyle habits may lower the need for certain drugs.      Last Reviewed: April 2010 Patti Esteves Camryn Mcfarland MD   Updated: 4/13/2010   ·   Smoking Cessation  This document explains the best ways for you to quit smoking and new treatments to help. It lists new medicines that can double or triple your chances of quitting and quitting for good. It also considers ways to avoid relapses and concerns you may have about quitting, including weight gain. NICOTINE: A POWERFUL ADDICTION  If you have tried to quit smoking, you know how hard it can be. It is hard because nicotine is a very addictive drug. For some people, it can be as addictive as heroin or cocaine. Usually, people make 2 or 3 tries, or more, before finally being able to quit. Each time you try to quit, you can learn about what helps and what hurts. Quitting takes hard work and a lot of effort, but you can quit smoking. QUITTING SMOKING IS ONE OF THE MOST IMPORTANT THINGS YOU WILL EVER DO. You will live longer, feel better, and live better. The impact on your body of quitting smoking is felt almost immediately:  Within 20 minutes, blood pressure decreases. Pulse returns to its normal level. After 8 hours, carbon monoxide levels in the blood return to normal. Oxygen level increases. After 24 hours, chance of heart attack starts to decrease. Breath, hair, and body stop smelling like smoke. After 48 hours, damaged nerve endings begin to recover. Sense of taste and smell improve. After 72 hours, the body is virtually free of nicotine. Bronchial tubes relax and breathing becomes easier. After 2 to 12 weeks, lungs can hold more air. Exercise becomes easier and circulation improves. Quitting will reduce your risk of having a heart attack, stroke, cancer, or lung disease:  After 1 year, the risk of coronary heart disease is cut in half. After 5 years, the risk of stroke falls to the same as a nonsmoker. After 10 years, the risk of lung cancer is cut in half and the risk of other cancers decreases significantly.   After 15 years, the risk of coronary heart disease drops, usually to the level of a nonsmoker. If you are pregnant, quitting smoking will improve your chances of having a healthy baby. The people you live with, especially your children, will be healthier. You will have extra money to spend on things other than cigarettes. FIVE KEYS TO QUITTING  Studies have shown that these 5 steps will help you quit smoking and quit for good. You have the best chances of quitting if you use them together:  Get ready. Get support and encouragement. Learn new skills and behaviors. Get medicine to reduce your nicotine addiction and use it correctly. Be prepared for relapse or difficult situations. Be determined to continue trying to quit, even if you do not succeed at first.  1. GET READY  Set a quit date. Change your environment. Get rid of ALL cigarettes, ashtrays, matches, and lighters in your home, car, and place of work. Do not let people smoke in your home. Review your past attempts to quit. Think about what worked and what did not. Once you quit, do not smoke. NOT EVEN A PUFF! 2. GET SUPPORT AND ENCOURAGEMENT  Studies have shown that you have a better chance of being successful if you have help. You can get support in many ways. Tell your family, friends, and coworkers that you are going to quit and need their support. Ask them not to smoke around you. Talk to your caregivers (doctor, dentist, nurse, pharmacist, psychologist, and/or smoking counselor). Get individual, group, or telephone counseling and support. The more counseling you have, the better your chances are of quitting. Programs are available at [facility]. Call your local health department for information about programs in your area. Spiritual beliefs and practices may help some smokers quit. Quit meters are small computer programs online or downloadable that keep track of quit statistics, such as amount of \"quit-time,\" cigarettes not smoked, and money saved.   Many smokers find one or more of the many self-help books available useful in helping them quit and stay off tobacco.  3. LEARN [address]  Try to distract yourself from urges to smoke. Talk to someone, go for a walk, or occupy your time with a task. When you first try to quit, change your routine. Take a different route to work. Drink tea instead of coffee. Eat breakfast in a different place. Do something to reduce your stress. Take a hot bath, exercise, or read a book. Plan something enjoyable to do every day. Reward yourself for not smoking. Explore interactive web-based programs that specialize in helping you quit. 4. GET MEDICINE AND USE IT CORRECTLY  Medicines can help you stop smoking and decrease the urge to smoke. Combining medicine with the above behavioral methods and support can quadruple your chances of successfully quitting smoking. The [place] Food and Drug Administration (FDA) has approved 7 medicines to help you quit smoking. These medicines fall into 3 categories. Nicotine replacement therapy (delivers nicotine to your body without the negative effects and risks of smoking):  Nicotine gum: Available over-the-counter. Nicotine lozenges: Available over-the-counter. Nicotine inhaler: Available by prescription. Nicotine nasal spray: Available by prescription. Nicotine skin patches (transdermal): Available by prescription and over-the-counter. Antidepressant medicine (helps people abstain from smoking, but how this works is unknown): Bupropion sustained-release (SR) tablets: Available by prescription. Nicotinic receptor partial agonist (simulates the effect of nicotine in your brain):  Varenicline tartrate tablets: Available by prescription. Ask your caregiver for advice about which medicines to use and how to use them. Carefully read the information on the package. Everyone who is trying to quit may benefit from using a medicine.  If you are pregnant or trying to become pregnant, nursing an infant, you are under age 25, or you smoke fewer than 10 cigarettes per day, talk to your caregiver before taking any nicotine replacement medicines. You should stop using a nicotine replacement product and call your caregiver if you experience nausea, dizziness, weakness, vomiting, fast or irregular heartbeat, mouth problems with the lozenge or gum, or redness or swelling of the skin around the patch that does not go away. Do not use any other product containing nicotine while using a nicotine replacement product. Talk to your caregiver before using these products if you have diabetes, heart disease, asthma, stomach ulcers, you had a recent heart attack, you have high blood pressure that is not controlled with medicine, a history of irregular heartbeat, or you have been prescribed medicine to help you quit smoking. 5. BE PREPARED FOR RELAPSE OR DIFFICULT SITUATIONS  Most relapses occur within the first 3 months after quitting. Do not be discouraged if you start smoking again. Remember, most people try several times before they finally quit. You may have symptoms of withdrawal because your body is used to nicotine. You may crave cigarettes, be irritable, feel very hungry, cough often, get headaches, or have difficulty concentrating. The withdrawal symptoms are only temporary. They are strongest when you first quit, but they will go away within 10 to 14 days. Here are some difficult situations to watch for:  Alcohol. Avoid drinking alcohol. Drinking lowers your chances of successfully quitting. Caffeine. Try to reduce the amount of caffeine you consume. It also lowers your chances of successfully quitting. Other smokers. Being around smoking can make you want to smoke. Avoid smokers. Weight gain. Many smokers will gain weight when they quit, usually less than 10 pounds. Eat a healthy diet and stay active. Do not let weight gain distract you from your main goal, quitting smoking.  Some medicines that help you quit smoking may also help delay weight gain. You can always lose the weight gained after you quit. Bad mood or depression. There are a lot of ways to improve your mood other than smoking. If you are having problems with any of these situations, talk to your caregiver. SPECIAL SITUATIONS AND CONDITIONS  Studies suggest that everyone can quit smoking. Your situation or condition can give you a special reason to quit. Pregnant women/new mothers: By quitting, you protect your baby's health and your own. Hospitalized patients: By quitting, you reduce health problems and help healing. Heart attack patients: By quitting, you reduce your risk of a second heart attack. Lung, head, and neck cancer patients: By quitting, you reduce your chance of a second cancer. Parents of children and adolescents: By quitting, you protect your children from illnesses caused by secondhand smoke. QUESTIONS TO THINK ABOUT  Think about the following questions before you try to stop smoking. You may want to talk about your answers with your caregiver. Why do you want to quit? If you tried to quit in the past, what helped and what did not? What will be the most difficult situations for you after you quit? How will you plan to handle them? Who can help you through the tough times? Your family? Friends? Caregiver? What pleasures do you get from smoking? What ways can you still get pleasure if you quit? Here are some questions to ask your caregiver: How can you help me to be successful at quitting? What medicine do you think would be best for me and how should I take it? What should I do if I need more help? What is smoking withdrawal like? How can I get information on withdrawal?  Quitting takes hard work and a lot of effort, but you can quit smoking. FOR MORE INFORMATION   Smokefree. gov (PortableGrid.se) provides free, accurate, evidence-based information and professional assistance to help support the immediate and long-term needs of people trying to quit smoking. Document Released: 12/12/2002 Document Revised: 12/06/2012 Document Reviewed: 10/04/2010  [facility] Patient Information ©2012 [employer] Aguillon. ·     Keeping Home a [facility]       As we get older, changes in balance, gait, strength, vision, hearing, and cognition make even the most youthful senior more prone to accidents. Falls are one of the leading health risks for older people. This increased risk of falling is related to:   Aging process (eg, decreased muscle strength, slowed reflexes)   Higher incidence of chronic health problems (eg, arthritis, diabetes) that may limit mobility, agility or sensory awareness   Side effects of medicine (eg, dizziness, blurred vision)especially medicines like prescription pain medicines and drugs used to treat mental health conditions   Depending on the brittleness of your bones, the consequences of a fall can be serious and long lasting. Home Life   Research by the Association of Aging [facility]) shows that some home accidents among older adults can be prevented by making simple lifestyle changes and basic modifications and repairs to the home environment. Here are some lifestyle changes that experts recommend:   Have your hearing and vision checked regularly. Be sure to wear prescription glasses that are right for you. Speak to your doctor or pharmacist about the possible side effects of your medicines. A number of medicines can cause dizziness. If you have problems with sleep, talk to your doctor. Limit your intake of alcohol. If necessary, use a cane or walker to help maintain your balance. Wear supportive, rubber-soled shoes, even at home. If you live in a region that gets wintry weather, you may want to put special cleats on your shoes to prevent you from slipping on the snow and ice. Exercise regularly to help maintain muscle tone, agility, and balance.    Always hold the banister when going up or off before you go out or go to sleep. Keep pots on the back burners, turn handles away from the front of the stove, and keep stove clean and free of grease build-up. Kitchen ventilation systems and range exhausts should be working properly. Keep flammable objects such as towels and pot holders away from the cooking area except when in use. Make sure kitchen curtains are tied back. Move cords and appliances away from the sink and hot surfaces. If extension cords are needed, install wiring guides so they do not hang over the sink, range, or working areas. Look for coffee pots, kettles and toaster ovens with automatic shut-offs. Keep a mop handy in the kitchen so you can wipe up spills instantly. You should also have a small fire extinguisher. Arrange your kitchen with frequently used items on lower shelves to avoid the need to stand on a stepstool to reach them. Make sure countertops are well-lit to avoid injuries while cutting and preparing food. In the Bathroom    Use a non-slip mat or decals in the tub and shower, since wet, soapy tile or porcelain surfaces are extremely slippery. Make sure bathroom rugs are non-skid or tape them firmly to the floor. Bathtubs should have at least one, preferably two, grab bars, firmly attached to structural supports in the wall. (Do not use built-in soap holders or glass shower doors as grab bars.)    Tub seats fitted with non-slip material on the legs allow you to wash sitting down. For people with limited mobility, bathtub transfer benches allow you to slide safely into the tub. Raised toilet seats and toilet safety rails are helpful for those with knee or hip problems. In the [facility]    Make sure you use a nightlight and that the area around your bed is clear of potential obstacles. Be careful with electric blankets and never go to sleep with a heating pad, which can cause serious burns even if on a low setting.     Use fire-resistant information about products that improve your quality of life and insure your safety. Last Reviewed: November 2009 Viviane Ortega MD   Updated: 3/7/2011     ·        Advance Care Planning: Care Instructions  Your Care Instructions     It can be hard to live with an illness that cannot be cured. But if your health is getting worse, you may want to make decisions about end-of-life care. Planning for the end of your life does not mean that you are giving up. It is a way to make sure that your wishes are met. Clearly stating your wishes can make it easier for your loved ones. Making plans while you are still able may also ease your mind and make your final days less stressful and more meaningful. Follow-up care is a key part of your treatment and safety. Be sure to make and go to all appointments, and call your doctor if you are having problems. It's also a good idea to know your test results and keep a list of the medicines you take. What can you do to plan for the end of life? You can bring these issues up with your doctor. You do not need to wait until your doctor starts the conversation. You might start with, \"What makes life worth living for me is. Lawernce Roughen Lawernce Roughen \" And then follow it with, \"I would not be willing to live with . Lawernce Roughen Lawernce Roughen Lawernce Roughen \" When you complete this sentence it helps your doctor understand your wishes. Talk openly and honestly with your doctor. This is the best way to understand the decisions you will need to make as your health changes. Know that you can always change your mind. Ask your doctor about commonly used life-support measures. These include tube feedings, breathing machines, and fluids given through a vein (IV). Understanding these treatments will help you decide whether you want them. You may choose to have these life-supporting treatments for a limited time. This allows a trial period to see whether they will help you.  You may also decide that you want your doctor to take only certain measures to keep you alive. It may help to think about the big picture, like what makes life worth living for you or what your values and goals are. Talk to your doctor about how long you are likely to live. Your doctor may be able to give you an idea of what usually happens with your specific illness. Think about preparing papers that state your wishes. These papers are called advance directives. If you do this early and review them often, there will not be any confusion about what you want. You can change your instructions at any time. Which papers should you prepare? Advance directives are legal papers that tell doctors how you want to be cared for at the end of your life. You do not need a  to write these papers. Ask your doctor or your state health department for information on how to write your advance directives. They may have the forms for each of these types of papers. Make sure your doctor has a copy of these on file, and give a copy to a family member or close friend. Consider a do-not-resuscitate order (DNR). This order asks that no extra treatments be done if your heart stops or you stop breathing. Extra treatments may include cardiopulmonary resuscitation (CPR), electrical shock to restart your heart, or a machine to breathe for you. If you decide to have a DNR order, ask your doctor to explain and write it. Place the order in your home where everyone can easily see it. Consider a living will. A living will explains your wishes about life support and other treatments at the end of your life if you become unable to speak for yourself. Living li tell doctors to use or not use treatments that would keep you alive. You must have one or two witnesses or a notary present when you sign this form. A living will may be called something else in your state. Consider a medical power of . This form allows you to name a person to make decisions about your care if you are not able to.  Most people ask a close friend or family member. Talk to this person about the kinds of treatments you want and those that you do not want. Make sure this person understands your wishes. A medical power of  may be called something else in your state. These legal papers are simple to change. Tell your doctor what you want to change, and ask him or her to make a note in your medical file. Give your family updated copies of the papers. Where can you learn more? Go to [email or web address] and sign in to your [employer] account. Enter P184 in the [employer] box to learn more about \"Advance Care Planning: Care Instructions. \"     If you do not have an account, please click on the \"Sign Up Now\" link. Current as of: March 17, 2021               Content Version: 13.0  © 9882-2110 Healthwise, Incorporated. Care instructions adapted under license by [facility] ([facility]). If you have questions about a medical condition or this instruction, always ask your healthcare professional. [place] [address] any warranty or liability for your use of this information.     ·

## 2021-11-05 NOTE — RESULT ENCOUNTER NOTE
Please notify patient: No pneumonia on chest x-ray, just hyperinflation regarding to COPD, smoking cessation for at least 7 days while taking the antibiotic is strongly encouraged  Continue inhalers  Future Appointments  11/12/2021 9:20 AM    Winifred Calderón  [place]  11/29/2021 9:50 AM    Dhiraj Arteaga MD         [address]  3/9/2022   10:30 AM   Madison Hatchet, MD     fp [facility]

## 2021-11-06 LAB
EKG ATRIAL RATE: 73 BPM
EKG P AXIS: 83 DEGREES
EKG P-R INTERVAL: 168 MS
EKG Q-T INTERVAL: 358 MS
EKG QRS DURATION: 90 MS
EKG QTC CALCULATION (BAZETT): 394 MS
EKG R AXIS: 73 DEGREES
EKG T AXIS: 72 DEGREES
EKG VENTRICULAR RATE: 73 BPM

## 2021-11-06 PROCEDURE — 93010 ELECTROCARDIOGRAM REPORT: CPT | Performed by: INTERNAL MEDICINE

## 2021-11-06 NOTE — RESULT ENCOUNTER NOTE
Please notify patient: Chronic changes in the EKG, follow-up with cardiologist as scheduled    Future Appointments  11/12/2021 9:20 AM    Ranjith Anna  [place]  11/29/2021 9:50 AM    Fanny Lowe MD         [address]  3/9/2022   10:30 AM   Mario Holcomb MD     [facility]               [facility]

## 2021-11-07 PROBLEM — R06.09 DOE (DYSPNEA ON EXERTION): Status: ACTIVE | Noted: 2021-11-07

## 2021-11-07 PROBLEM — M25.511 CHRONIC PAIN OF BOTH SHOULDERS: Status: ACTIVE | Noted: 2021-11-07

## 2021-11-07 PROBLEM — G89.29 CHRONIC PAIN OF BOTH SHOULDERS: Status: ACTIVE | Noted: 2021-11-07

## 2021-11-07 PROBLEM — M25.512 CHRONIC PAIN OF BOTH SHOULDERS: Status: ACTIVE | Noted: 2021-11-07

## 2021-11-12 ENCOUNTER — OFFICE VISIT (OUTPATIENT)
Dept: NEUROLOGY | Age: 66
End: 2021-11-12
Payer: MEDICARE

## 2021-11-12 VITALS
BODY MASS INDEX: 36.57 KG/M2 | SYSTOLIC BLOOD PRESSURE: 136 MMHG | WEIGHT: 270 LBS | HEART RATE: 79 BPM | DIASTOLIC BLOOD PRESSURE: 58 MMHG | HEIGHT: 72 IN

## 2021-11-12 DIAGNOSIS — M54.40 CHRONIC LOW BACK PAIN WITH SCIATICA, SCIATICA LATERALITY UNSPECIFIED, UNSPECIFIED BACK PAIN LATERALITY: ICD-10-CM

## 2021-11-12 DIAGNOSIS — M54.16 BILATERAL LUMBAR RADICULOPATHY: ICD-10-CM

## 2021-11-12 DIAGNOSIS — G62.9 SENSORY MOTOR NEUROPATHY: Primary | ICD-10-CM

## 2021-11-12 DIAGNOSIS — G89.29 CHRONIC LOW BACK PAIN WITH SCIATICA, SCIATICA LATERALITY UNSPECIFIED, UNSPECIFIED BACK PAIN LATERALITY: ICD-10-CM

## 2021-11-12 PROCEDURE — 99214 OFFICE O/P EST MOD 30 MIN: CPT | Performed by: PSYCHIATRY & NEUROLOGY

## 2021-11-12 RX ORDER — ERGOCALCIFEROL (VITAMIN D2) 1250 MCG
50000 CAPSULE ORAL WEEKLY
COMMUNITY
End: 2022-04-27 | Stop reason: SDUPTHER

## 2021-11-12 ASSESSMENT — ENCOUNTER SYMPTOMS
GASTROINTESTINAL NEGATIVE: 1
RESPIRATORY NEGATIVE: 1
ALLERGIC/IMMUNOLOGIC NEGATIVE: 1
BACK PAIN: 1
EYES NEGATIVE: 1

## 2021-11-12 NOTE — PROGRESS NOTES
Active problem sensory neuropathy but also right leg radicular weakness from prior back surgery with foot drop with chronic low back pain . Will have patient undergo EMG of legs , neuropathic bloodwork adding elavil for neuropathic pain . The condition is  He is on neurontin 800 mg po tid . He repots that elavil made him groggy stopping this after three days. There is burning in legs distal to ankles grade 7 to 8 over 10 . Numbness in right leg is to the knee and left leg to ankle level  . Hga1c 6.2 . He has discussed medication with PMD having been on medication for diabetes in the past not wanting to be on additional medication  EMG/NCV lower extremities sensorimotor peripheral polyneuropathy axonal and demyelinating . Chronic bilateral L4-5 and S1 radiculopathy and S1 . Cannot rule out right peroneal neuropathy . Chronic bilateral C5-6 radiculopathy . Bilateral ulnar neuropathy proximal to innervation of  FCU. 77 yo wm with leg pain and numbness. He has been having leg pain for 2 to 3 years . There is also leg numbness affecting feet . This began initially in toes ascending to ankle level . There is also numbness of outer 3 fingers . 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 There are no bowel or bladder disturbance . Right foot has been numb for 10 years with left foot for the past last 6 months . He has throbbing in legs calves and feet all the time with baseline pain grade 5 over 10 worse at night with some interference with his sleep . She was switched from lyrica 200 mg po tid to neurontin 800 mg po tid this week do to leg swelling  reporting that pain in legs now is more pronounced. There is chronic low back pain in mid low back stabbing pain nonradiating down at grade 8 over 10 seeing pain clinic on percocet as needed averaging one to 2 per day to get radiofrequency having had 3 low back surgeries .  There is pain in neck area of aching quality of grade 5 over 10 going into shoulders . He has had left shoudler replacement  and right rotator cuff tear surgery having bilateral trouble with abduction at shoulder left more than right . He has history of type 2 diabetes on no medication. He has imbalance walking with walker with prior falling . Testing EMG/NCV lower extremities sensorimotor peripheral polyneuropathy axonal and demyelinating . Chronic bilateral L4-5 and S1 radiculopathy and S1 . Cannot rule out right peroneal neuropathy . Chronic bilateral C5-6 radiculopathy . Bilateral ulnar neuropathy proximal to innervation of  FCU .  Hga1c 6.3 , TSH normal , B12 1410, foic acid 4.8 (>20), SPEP normal , URBANO negative      Past Medical History:   Diagnosis Date    Acid reflux     Anemia, blood loss 10/7/2018    Arthritis     C. difficile colitis 3/19/2020    Chronic bilateral low back pain with bilateral sciatica 11/3/2016    Chronic diastolic heart failure ([address] 2/25/2021    Complete tear of left rotator cuff 7/18/2018    COPD (chronic obstructive pulmonary disease) ([address]     Coronary artery disease involving native coronary artery of native heart without angina pectoris 2/2/2020    Degenerative disc disease, cervical 7/28/2019    GI bleed 3/19/2020    Gout     H/O cardiac catheterization     yrs ago   no stents    History of blood transfusion     Hyperlipidemia     Hyperlipidemia with target LDL less than 100 1/20/2016    Hyperparathyroidism ([address] 1/17/2020    Hypertension     Knee pain, chronic     left    Liver disease     MDRO (multiple drug resistant organisms) resistance     Melena 3/19/2020    Memory loss     Moderate episode of recurrent major depressive disorder ([address] 1/20/2016    Obesity, Class III, BMI 40-49.9 (morbid obesity) ([address] 1/20/2016    REYNALDO on CPAP 5/6/2017    Osteoarthritis     Peripheral arterial occlusive disease ([address] 3/25/2017    Pneumonia 3/9/2020    Polypharmacy 3/25/2017    Positive FIT (fecal immunochemical test) 4/11/2017    Prolonged emergence from general anesthesia 01/05/2017    Patient \"on life support for 3 days\" after back surgery due to being given succinylcholine    Prostate CA ([address] 1/20/2016    Prostate cancer ([address] 10/2013    finished radiation tx 5/2014    Pseudocholinesterase deficiency 03/25/2017    Pt.  \"on life support\" for 3 days after back surgery 1/5/17 after being given succinylcholine    Severe obesity (BMI 35.0-35.9 with comorbidity) ([address] 2/2/2020    Short of breath on exertion     Sleep apnea     uses CPAP machine nightly    Status post lumbar laminectomy 3/25/2017    Stenosis of left carotid artery 10/7/2018    Syncope and collapse 3/19/2020    Tubular adenoma of colon 4/11/17 5/13/2017    Type 2 diabetes mellitus with hyperglycemia, without long-term current use of insulin ([address] 3/25/2017    Lab Results Component Value Date  LABA1C 7.1 (H) 03/23/2017     Unintended weight loss 10/7/2018    Vitamin D deficiency 3/25/2017    Wears glasses        Past Surgical History:   Procedure Laterality Date    BACK SURGERY      x 2     BACK SURGERY  01/05/2017    lumbar laminectomy L2, L3, L4    BRONCHOSCOPY N/A 3/13/2020    BRONCHOSCOPY performed by Rolando Stevenson MD at [facility]  2007    no stents    CAROTID ENDARTERECTOMY Right 12/16/2019    Dr. Giovana Ackerman Bilateral     CHOLECYSTECTOMY, LAPAROSCOPIC N/A 2/16/2021    CHOLECYSTECTOMY LAPAROSCOPIC ROBOTIC XI performed by Wilfrido Quinonez MD at [address] [facility], COLON, DIAGNOSTIC      HERNIA REPAIR Left 11/18/2020    HERNIA INGUINAL REPAIR [address] OPEN performed by Wilfrido Quinonez MD at [facility] Left     LUMBAR LAMINECTOMY  01/05/2017    L2-L4    IN CLOSED RX SHLDR DISLOC,ANESTHESIA Left 8/1/2018    SHOULDER CLOSED REDUCTION WITH C-ARM VISUALIZATION performed by Alma Rosas MD [place] at [facility] W/BIOPSY SINGLE/MULTIPLE N/A 5/9/2017    COLONOSCOPY WITH BIOPSY performed by Todd Enriquez DO at [address] IMPLANT Left 7/18/2018    SHOULDER TOTAL ARTHROPLASTY REVERSE LEFT DJO & BICEP TENDON TRANSFER performed by Bernice Darling MD at [address] HUMERAL/GLENOID COMPNT Left 8/3/2018    SHOULDER TOTAL REVERSE  ARTHROPLASTY REVISION performed by Bernice Darling MD at [address]     rotator cuff repair    SHOULDER SURGERY Left 10/15/2020    SHOULDER ARTHROSCOPY W/INTEROP CULTURES performed by Bernice Darling MD at [facility]      ear, forehead    TONSILLECTOMY AND ADENOIDECTOMY      UPPER GASTROINTESTINAL ENDOSCOPY N/A 3/19/2020    EGD BIOPSY performed by Megan Mills MD at [facility]       Family History   Problem Relation Age of Onset    Diabetes Mother     Lung Cancer Brother     Liver Cancer Brother     Cancer Father        Social History     Socioeconomic History    Marital status:      Spouse name: None    Number of children: None    Years of education: None    Highest education level: None   Occupational History    None   Tobacco Use    Smoking status: Current Every Day Smoker     Packs/day: 1.00     Years: 35.00     Pack years: 35.00     Types: Cigarettes     Start date: 7/8/2021    Smokeless tobacco: Never Used   Vaping Use    Vaping Use: Never used   Substance and Sexual Activity    Alcohol use: Not Currently     Alcohol/week: 0.0 standard drinks    Drug use: No    Sexual activity: Not Currently     Partners: Female   Other Topics Concern    None   Social History Narrative    None     Social Determinants of Health     Financial Resource Strain: Low Risk     Difficulty of Paying Living Expenses: Not hard at all   Food Insecurity: No Food Insecurity    Worried About Running Out of Food in the Last Year: Never true    Colleen of [employer] in the Last Year: Never true   Transportation Needs: No Transportation Needs    Lack of Transportation (Medical): No    Lack of Transportation (Non-Medical): No   Physical Activity:     Days of Exercise per Week: Not on file    Minutes of Exercise per Session: Not on file   Stress:     Feeling of Stress : Not on file   Social Connections:     Frequency of Communication with Friends and Family: Not on file    Frequency of Social Gatherings with Friends and Family: Not on file    Attends Latter-day Services: Not on file    Active Member of [address] [place] [address] or Organizations: Not on file    Attends Club or Organization Meetings: Not on file    Marital Status: Not on file   Intimate Partner Violence:     Fear of Current or Ex-Partner: Not on file    Emotionally Abused: Not on file    Physically Abused: Not on file    Sexually Abused: Not on file   Housing Stability: Unknown    Unable to Pay for Housing in the Last Year: No    Number of [place] in the Last Year: Not on file    Unstable Housing in the Last Year: No       Current Outpatient Medications   Medication Sig Dispense Refill    ergocalciferol (ERGOCALCIFEROL) 1.25 MG (04552 UT) capsule Take 50,000 Units by mouth once a week      furosemide (LASIX) 40 MG tablet Take 1 tablet by mouth daily Dose increased 1/12/2021 90 tablet 3    gabapentin (NEURONTIN) 800 MG tablet Take 1 tablet by mouth 3 times daily for 90 days.  Stop lyrica 270 tablet 0    albuterol (PROVENTIL) (2.5 MG/3ML) 0.083% nebulizer solution Take 3 mLs by nebulization every 6 hours as needed for Wheezing or Shortness of Breath (cough) 375 vial 0    omeprazole (PRILOSEC) 20 MG delayed release capsule Take 1 capsule by mouth every morning (before breakfast) Dose decreased 7/20/2021 90 capsule 3    pravastatin (PRAVACHOL) 80 MG tablet Take 1 tablet by mouth every evening Dose increased  9/24/2019 90 tablet 3    amLODIPine (NORVASC) 10 MG tablet Take 1 tablet by mouth daily Dose increased 10/16/2020 due to high BP. Correct dosage 90 tablet 3    folbee plus (FOLBEE PLUS) TABS Take 1 tablet by mouth daily 90 tablet 3    lisinopril (PRINIVIL;ZESTRIL) 20 MG tablet Take 1 tablet by mouth daily (Patient taking differently: Take 10 mg by mouth daily ) 90 tablet 0    tiotropium-olodaterol (STIOLTO RESPIMAT) 2.5-2.5 MCG/ACT AERS Inhale 2 puffs into the lungs daily      glucose monitoring kit (FREESTYLE) monitoring kit 1 kit by Does not apply route daily Please supply Patient with a glucose monitoring kit that insurance will cover. 1 kit 0    Lancets 30G MISC Testing once a day. Please dispense according to patients insurance. 100 each 3    blood glucose monitor strips Testing once a day. Please dispense according to patients insurance. 100 strip 3    Alcohol Swabs PADS Please dispense according to patients insurance/device. Testing once a day 100 each 3    OXYGEN With CPAP at night      albuterol sulfate HFA (PROVENTIL HFA) 108 (90 Base) MCG/ACT inhaler Inhale 2 puffs into the lungs every 6 hours as needed for Wheezing or Shortness of Breath 3 Inhaler 1    Respiratory Therapy Supplies (ADULT MASK) MISC Needs to use mask daily due to coronavirus pandemic. 30 each 5    Lift Chair MISC by Does not apply route Patient has difficulty getting up from usual chair 1 each 0    aspirin EC 81 MG EC tablet Take 1 tablet by mouth daily 90 tablet 0    nystatin (MYCOSTATIN) 316764 UNIT/ML suspension Take 5 mLs by mouth 4 times daily Swish and swallow (Patient not taking: Reported on 11/12/2021) 240 mL 0    amitriptyline (ELAVIL) 25 MG tablet Take 1 tablet by mouth nightly (Patient not taking: Reported on 11/12/2021) 30 tablet 3    oxyCODONE-acetaminophen (PERCOCET) 5-325 MG per tablet Take 1 tablet by mouth 2 times daily as needed.   (Patient not taking: Reported on 11/12/2021)      ammonium lactate (AMLACTIN) 12 % cream APPLY TOPICALLY TO LEGS ONE TO TWO TIMES A DAY AS NEEDED (Patient not taking: Reported on 11/12/2021) 1 Bottle 3    naloxone 4 MG/0.1ML LIQD nasal spray 1 spray by Nasal route as needed for Opioid Reversal Patient needs counseling (Patient not taking: Reported on 11/12/2021) 1 each 3     No current facility-administered medications for this visit. Allergies   Allergen Reactions    Succinylcholine Chloride Other (See Comments)     PATIENT HAS PSEUDOCHOLINESTERASE DEFICIENCY      Cymbalta [Duloxetine Hcl]      Taste loss           Review of Systems     Vitals:    11/12/21 0920   BP: (!) 136/58   Pulse: 79     weight: 270 lb (122.5 kg)      Review of Systems   Constitutional: Negative. HENT: Negative. Eyes: Negative. Respiratory: Negative. Cardiovascular: Negative. Gastrointestinal: Negative. Endocrine: Negative. Genitourinary: Negative. Musculoskeletal: Positive for back pain and gait problem. Skin: Negative. Allergic/Immunologic: Negative. Neurological: Positive for weakness and numbness. Hematological: Negative. Psychiatric/Behavioral: Negative. Neurological Examination  Constitutional .General exam well groomed   Head/Ears /Nose/Throat: external ear . Normal exam  Neck and thyroid . Normal size. No bruits  Respiratory . Breathsounds clear bilaterally  Cardiovascular: Auscultation of heart with regular rate and rhythm  Musculoskeletal. Muscle bulk and tone normal                                                           Muscle strength bilateral giveway with shoulder abduction . Right AE, EHL. ADF 2/5 otherwise 5/5 strength throughout                                                                               No dysmetria or dysdiadokinesis  No tremor   Normal fine motor  Gait right steppage gait  Orientation Alert and oriented x 3   Attention and concentration normal  Short term memory normal  Language process and speech normal . No aphasia   Cranial nerve 2 normal acuety and visual fields  Cranial nerve 3, 4 and 6 . Extraocular muscles are intact . Pupils are equal and reactive   Cranial nerve 5 . Normal strength of masseter and temporalis . Intact corneal reflex. Normal facial sensation  Cranial nerve 7 normal exam   Cranial nerve 8. Grossly intact hearing   Cranial nerve 9 and 10. Symmetric palate elevation   Cranial nerve 11 , 5 out of 5 strength   Cranial Nerve 12 midline tongue . No atrophy  Sensation . Decreased pinprick and light touch at ankle level . Decreased 5th and medial 4th digit bilaterally   Deep Tendon Reflexes absent lower extremities   Plantar response flexor bilaterally      ASSESSMENT/PLAN      Diagnosis Orders   1. Sensory motor neuropathy     2. Bilateral lumbar radiculopathy     3. Chronic low back pain with sciatica, sciatica laterality unspecified, unspecified back pain laterality     Sensory neuropathy is felt to be diabetic being agreeable on going on oral hypoglycemic . Bilateral arm numbness is from bilateral ulnar neuropathy not opertaive with b no clear compression at elbow .  He is to continue neurontin regimen     As above

## 2021-11-18 DIAGNOSIS — J44.9 COPD MIXED TYPE (HCC): Primary | ICD-10-CM

## 2021-11-18 RX ORDER — ALBUTEROL SULFATE 90 UG/1
AEROSOL, METERED RESPIRATORY (INHALATION)
Qty: 24 G | Refills: 3 | Status: SHIPPED | OUTPATIENT
Start: 2021-11-18

## 2021-11-18 NOTE — TELEPHONE ENCOUNTER
Please Approve or Refuse.   Send to Pharmacy per Pt's Request:     Next Visit Date:  3/9/2022   Last Visit Date: 11/5/2021    Hemoglobin A1C (%)   Date Value   08/12/2021 6.3 (H)   07/20/2021 5.9   04/23/2021 5.9             ( goal A1C is < 7)   BP Readings from Last 3 Encounters:   11/12/21 (!) 136/58   11/05/21 134/68   07/29/21 129/69          (goal 120/80)  BUN   Date Value Ref Range Status   11/05/2021 14 8 - 23 mg/dL Final     CREATININE   Date Value Ref Range Status   11/05/2021 0.75 0.70 - 1.20 mg/dL Final     Potassium   Date Value Ref Range Status   11/05/2021 4.7 3.7 - 5.3 mmol/L Final

## 2021-11-19 ENCOUNTER — HOSPITAL ENCOUNTER (OUTPATIENT)
Age: 66
Setting detail: SPECIMEN
Discharge: HOME | End: 2021-11-19
Payer: MEDICARE

## 2021-11-19 DIAGNOSIS — C61 PROSTATE CANCER (HCC): ICD-10-CM

## 2021-11-19 LAB — PROSTATE SPECIFIC ANTIGEN: 1.09 UG/L

## 2021-11-19 PROCEDURE — 36415 COLL VENOUS BLD VENIPUNCTURE: CPT

## 2021-11-19 PROCEDURE — 84153 ASSAY OF PSA TOTAL: CPT

## 2021-11-29 ENCOUNTER — OFFICE VISIT (OUTPATIENT)
Dept: UROLOGY | Age: 66
End: 2021-11-29
Payer: MEDICARE

## 2021-11-29 VITALS
WEIGHT: 270 LBS | HEART RATE: 65 BPM | RESPIRATION RATE: 16 BRPM | DIASTOLIC BLOOD PRESSURE: 76 MMHG | TEMPERATURE: 97.4 F | SYSTOLIC BLOOD PRESSURE: 133 MMHG | HEIGHT: 72 IN | BODY MASS INDEX: 36.57 KG/M2

## 2021-11-29 DIAGNOSIS — C61 PROSTATE CANCER (HCC): Primary | ICD-10-CM

## 2021-11-29 DIAGNOSIS — Z79.818 ANDROGEN DEPRIVATION THERAPY: ICD-10-CM

## 2021-11-29 DIAGNOSIS — R23.2 HOT FLASHES: ICD-10-CM

## 2021-11-29 PROCEDURE — 96402 CHEMO HORMON ANTINEOPL SQ/IM: CPT | Performed by: UROLOGY

## 2021-11-29 PROCEDURE — 99214 OFFICE O/P EST MOD 30 MIN: CPT | Performed by: UROLOGY

## 2021-11-29 RX ORDER — CHOLECALCIFEROL (VITAMIN D3) 1250 MCG
CAPSULE ORAL
COMMUNITY
End: 2022-06-10 | Stop reason: ALTCHOICE

## 2021-11-29 RX ORDER — MULTIVIT WITH MINERALS/LUTEIN
250 TABLET ORAL DAILY
COMMUNITY
End: 2022-04-27 | Stop reason: ALTCHOICE

## 2021-11-29 RX ORDER — ENZALUTAMIDE 40 MG/1
160 CAPSULE ORAL DAILY
Qty: 120 CAPSULE | Refills: 5 | Status: SHIPPED | OUTPATIENT
Start: 2021-11-29

## 2021-11-29 ASSESSMENT — ENCOUNTER SYMPTOMS
EYE PAIN: 0
WHEEZING: 0
SHORTNESS OF BREATH: 0
COUGH: 0
NAUSEA: 0
BACK PAIN: 0
ABDOMINAL PAIN: 0
VOMITING: 0
DIARRHEA: 0
CONSTIPATION: 0

## 2021-11-29 NOTE — PROGRESS NOTES
[address] [address]  Dept: [id number] Urology Office Note - Established    Patient:  Ilya Escalera  YOB: 1955  Date: 11/29/2021    The patient is a 78 y.o. male who presents todayfor evaluation of the following problems:   Chief Complaint   Patient presents with    Follow-up     olga zhao psa       HPI  Viky Christensen is a 65-year-old gentleman with advanced prostate cancer. He has been on Eligard and his PSA continues to rise. He has been on Lorrayne Kazakh in the past and responded well to this. He thinks he may have memory related issues with this but is willing to reinitiate therapy. Summary of old records: N/A    Additional History: N/A    Procedures Today: N/A    Urinalysis today:  No results found for this visit on 11/29/21. Last several PSA's:  Lab Results   Component Value Date    PSA 1.09 11/19/2021    PSA 0.17 05/19/2021    PSA 0.03 11/16/2020     Last total testosterone:  Lab Results   Component Value Date    TESTOSTERONE 11 (L) 05/19/2021       AUA Symptom Score (11/29/2021):   INCOMPLETE EMPTYING: How often have you had the sensation of not emptying your bladder?: Not at all  FREQUENCY: How often do you have to urinate less than every two hours?: Not at all  INTERMITTENCY: How often have you found you stopped and started again several times when you urinated?: Not at all  URGENCY: How often have you found it difficult to postpone urination?: Not at all  WEAK STREAM: How often have you had a weak urinary stream?: Not at all  STRAINING: How often have you had to strain to start  urination?: Not at all  NOCTURIA: How many times did you typically get up at night to uriniate?: NONE  TOTAL I-PSS SCORE[de-identified] 0       Last BUN and creatinine:  Lab Results   Component Value Date    BUN 14 11/05/2021     Lab Results   Component Value Date    CREATININE 0.75 11/05/2021       Additional Lab/Culture (NORVASC) 10 MG tablet Take 1 tablet by mouth daily Dose increased 10/16/2020 due to high BP. Correct dosage 90 tablet 3    folbee plus (FOLBEE PLUS) TABS Take 1 tablet by mouth daily 90 tablet 3    lisinopril (PRINIVIL;ZESTRIL) 20 MG tablet Take 1 tablet by mouth daily (Patient taking differently: Take 10 mg by mouth daily ) 90 tablet 0    tiotropium-olodaterol (STIOLTO RESPIMAT) 2.5-2.5 MCG/ACT AERS Inhale 2 puffs into the lungs daily      oxyCODONE-acetaminophen (PERCOCET) 5-325 MG per tablet Take 1 tablet by mouth 2 times daily as needed.  glucose monitoring kit (FREESTYLE) monitoring kit 1 kit by Does not apply route daily Please supply Patient with a glucose monitoring kit that insurance will cover. 1 kit 0    Lancets 30G MISC Testing once a day. Please dispense according to patients insurance. 100 each 3    blood glucose monitor strips Testing once a day. Please dispense according to patients insurance. 100 strip 3    Alcohol Swabs PADS Please dispense according to patients insurance/device. Testing once a day 100 each 3    ammonium lactate (AMLACTIN) 12 % cream APPLY TOPICALLY TO LEGS ONE TO TWO TIMES A DAY AS NEEDED 1 Bottle 3    naloxone 4 MG/0.1ML LIQD nasal spray 1 spray by Nasal route as needed for Opioid Reversal Patient needs counseling 1 each 3    OXYGEN With CPAP at night      Respiratory Therapy Supplies (ADULT MASK) MISC Needs to use mask daily due to coronavirus pandemic.  27 each 5    Lift Chair MISC by Does not apply route Patient has difficulty getting up from usual chair 1 each 0    aspirin EC 81 MG EC tablet Take 1 tablet by mouth daily 90 tablet 0       Succinylcholine chloride and Cymbalta [duloxetine hcl]  Social History     Tobacco Use   Smoking Status Current Every Day Smoker    Packs/day: 1.00    Years: 35.00    Pack years: 35.00    Types: Cigarettes    Start date: 7/8/2021   Smokeless Tobacco Never Used     (Ifpatient a smoker, smoking cessation counseling offered)    Social History     Substance and Sexual Activity   Alcohol Use Not Currently    Alcohol/week: 0.0 standard drinks       REVIEW OF SYSTEMS:  Review of Systems    Physical Exam:      Vitals:    11/29/21 1005   BP: 133/76   Pulse: 65   Resp: 16   Temp: 97.4 °F (36.3 °C)     Body mass index is 36.62 kg/m². Patient is a 78 y.o. male in no acute distress and alert and oriented to person, place and time. Physical Exam  Constitutional: Patient in no acute distress. Neuro: Alert and oriented to person, place and time. Assessment and Plan      1. Prostate cancer ([facility]    2. Androgen deprivation therapy    3. Hot flashes           Plan:   Ct a/p and bone scan  eligard today  xtandi (pt has taken before)      Return in about 6 weeks (around 1/10/2022) for ct and bone scan. Prescriptions Ordered:  Orders Placed This Encounter   Medications    enzalutamide (XTANDI) 40 MG capsule     Sig: Take 4 capsules by mouth daily     Dispense:  120 capsule     Refill:  5     Orders Placed:  Orders Placed This Encounter   Procedures    CT ABDOMEN PELVIS W IV CONTRAST Additional Contrast? None     Standing Status:   Future     Standing Expiration Date:   11/29/2022     Order Specific Question:   Additional Contrast?     Answer:   None     Order Specific Question:   Reason for exam:     Answer:   prostate cancer    NM BONE SCAN WHOLE BODY     Standing Status:   Future     Standing Expiration Date:   11/30/2022     Order Specific Question:   Reason for exam:     Answer:   prostate cancer           Bulmaro Venegas MD    Agree with the ROS entered by the MA.

## 2021-11-29 NOTE — PROGRESS NOTES
After obtaining consent, and per orders of Dr. Laurita Carlson, injection of Eligard 45 mg given in Right arm by Izzy Connell MA. Patient instructed to remain in clinic for 20 minutes afterwards, and to report any adverse reaction to me immediately.

## 2021-12-03 ENCOUNTER — TELEPHONE (OUTPATIENT)
Dept: FAMILY MEDICINE CLINIC | Age: 66
End: 2021-12-03

## 2021-12-03 DIAGNOSIS — E11.42 TYPE 2 DIABETES MELLITUS WITH DIABETIC POLYNEUROPATHY, WITHOUT LONG-TERM CURRENT USE OF INSULIN (HCC): Primary | ICD-10-CM

## 2021-12-03 DIAGNOSIS — M47.26 OSTEOARTHRITIS OF SPINE WITH RADICULOPATHY, LUMBAR REGION: ICD-10-CM

## 2021-12-03 RX ORDER — PREGABALIN 200 MG/1
200 CAPSULE ORAL 3 TIMES DAILY
Qty: 270 CAPSULE | Refills: 0 | Status: SHIPPED | OUTPATIENT
Start: 2021-12-03 | End: 2022-02-07 | Stop reason: SDUPTHER

## 2021-12-20 ENCOUNTER — HOSPITAL ENCOUNTER (OUTPATIENT)
Dept: CT IMAGING | Age: 66
Discharge: HOME | End: 2021-12-22
Payer: MEDICARE

## 2021-12-20 ENCOUNTER — HOSPITAL ENCOUNTER (OUTPATIENT)
Dept: NUCLEAR MEDICINE | Age: 66
Discharge: HOME | End: 2021-12-22
Payer: MEDICARE

## 2021-12-20 DIAGNOSIS — C61 PROSTATE CANCER (HCC): ICD-10-CM

## 2021-12-20 LAB
GFR NON-AFRICAN AMERICAN: >60 ML/MIN
GFR SERPL CREATININE-BSD FRML MDRD: >60 ML/MIN
GFR SERPL CREATININE-BSD FRML MDRD: NORMAL ML/MIN/{1.73_M2}
POC CREATININE: 0.75 MG/DL (ref 0.51–1.19)

## 2021-12-20 PROCEDURE — 2580000003 HC RX 258: Performed by: UROLOGY

## 2021-12-20 PROCEDURE — A9503 TC99M MEDRONATE: HCPCS | Performed by: UROLOGY

## 2021-12-20 PROCEDURE — 82565 ASSAY OF CREATININE: CPT

## 2021-12-20 PROCEDURE — 74177 CT ABD & PELVIS W/CONTRAST: CPT

## 2021-12-20 PROCEDURE — 78306 BONE IMAGING WHOLE BODY: CPT

## 2021-12-20 PROCEDURE — 3430000000 HC RX DIAGNOSTIC RADIOPHARMACEUTICAL: Performed by: UROLOGY

## 2021-12-20 PROCEDURE — 6360000004 HC RX CONTRAST MEDICATION: Performed by: UROLOGY

## 2021-12-20 RX ORDER — TC 99M MEDRONATE 20 MG/10ML
25 INJECTION, POWDER, LYOPHILIZED, FOR SOLUTION INTRAVENOUS
Status: COMPLETED | OUTPATIENT
Start: 2021-12-20 | End: 2021-12-20

## 2021-12-20 RX ORDER — SODIUM CHLORIDE 0.9 % (FLUSH) 0.9 %
10 SYRINGE (ML) INJECTION PRN
Status: DISCONTINUED | OUTPATIENT
Start: 2021-12-20 | End: 2021-12-23 | Stop reason: HOSPADM

## 2021-12-20 RX ORDER — 0.9 % SODIUM CHLORIDE 0.9 %
80 INTRAVENOUS SOLUTION INTRAVENOUS ONCE
Status: COMPLETED | OUTPATIENT
Start: 2021-12-20 | End: 2021-12-20

## 2021-12-20 RX ADMIN — SODIUM CHLORIDE, PRESERVATIVE FREE 10 ML: 5 INJECTION INTRAVENOUS at 08:32

## 2021-12-20 RX ADMIN — SODIUM CHLORIDE 80 ML: 9 INJECTION, SOLUTION INTRAVENOUS at 08:25

## 2021-12-20 RX ADMIN — Medication 10 ML: at 08:13

## 2021-12-20 RX ADMIN — TC 99M MEDRONATE 29.7 MILLICURIE: 20 INJECTION, POWDER, LYOPHILIZED, FOR SOLUTION INTRAVENOUS at 08:13

## 2021-12-20 RX ADMIN — IOPAMIDOL 100 ML: 755 INJECTION, SOLUTION INTRAVENOUS at 08:25

## 2022-01-05 ENCOUNTER — HOSPITAL ENCOUNTER (OUTPATIENT)
Age: 67
Setting detail: SPECIMEN
Discharge: HOME | End: 2022-01-05

## 2022-01-05 DIAGNOSIS — C61 PROSTATE CANCER (HCC): ICD-10-CM

## 2022-01-06 LAB — PROSTATE SPECIFIC ANTIGEN: 1.44 UG/L

## 2022-01-10 ENCOUNTER — TELEPHONE (OUTPATIENT)
Dept: UROLOGY | Age: 67
End: 2022-01-10

## 2022-01-10 ENCOUNTER — OFFICE VISIT (OUTPATIENT)
Dept: UROLOGY | Age: 67
End: 2022-01-10
Payer: MEDICARE

## 2022-01-10 VITALS
BODY MASS INDEX: 36.57 KG/M2 | WEIGHT: 270 LBS | TEMPERATURE: 97.8 F | SYSTOLIC BLOOD PRESSURE: 128 MMHG | DIASTOLIC BLOOD PRESSURE: 74 MMHG | HEART RATE: 68 BPM | HEIGHT: 72 IN

## 2022-01-10 DIAGNOSIS — R23.2 HOT FLASHES: ICD-10-CM

## 2022-01-10 DIAGNOSIS — C61 PROSTATE CANCER (HCC): Primary | ICD-10-CM

## 2022-01-10 DIAGNOSIS — Z79.818 ANDROGEN DEPRIVATION THERAPY: ICD-10-CM

## 2022-01-10 DIAGNOSIS — E66.01 SEVERE OBESITY (BMI 35.0-35.9 WITH COMORBIDITY) (HCC): ICD-10-CM

## 2022-01-10 PROCEDURE — 99214 OFFICE O/P EST MOD 30 MIN: CPT | Performed by: UROLOGY

## 2022-01-10 ASSESSMENT — ENCOUNTER SYMPTOMS
EYE PAIN: 0
NAUSEA: 0
CONSTIPATION: 0
DIARRHEA: 0
SHORTNESS OF BREATH: 0
VOMITING: 0
COUGH: 0
GASTROINTESTINAL NEGATIVE: 1
BACK PAIN: 0
WHEEZING: 0
EYES NEGATIVE: 1
RESPIRATORY NEGATIVE: 1
ABDOMINAL PAIN: 0
EYE REDNESS: 0

## 2022-01-10 NOTE — PROGRESS NOTES
[address] [facility] [address]  Dept: 92 Margie Handley [facility] Urology Office Note - Established    Patient:  Sarai Viera  YOB: 1955  Date: 1/10/2022    The patient is a 78 y.o. male who presents todayfor evaluation of the following problems:   Chief Complaint   Patient presents with    Follow-up     6 week follow up CT and bone scan       HPI  This is a very pleasant 70-year-old gentleman with advanced prostate cancer. He was prescribed Hannah Juliano but has not yet started it. His CT scan does show some mildly enlarged lymph nodes and his bone scan shows no bony metastases. Summary of old records: N/A    Additional History: N/A    Procedures Today: N/A    Urinalysis today:  No results found for this visit on 01/10/22. Last several PSA's:  Lab Results   Component Value Date    PSA 1.44 01/05/2022    PSA 1.09 11/19/2021    PSA 0.17 05/19/2021     Last total testosterone:  Lab Results   Component Value Date    TESTOSTERONE 11 (L) 05/19/2021       AUA Symptom Score (1/10/2022):                                Last BUN and creatinine:  Lab Results   Component Value Date    BUN 14 11/05/2021     Lab Results   Component Value Date    CREATININE 0.75 12/20/2021       Additional Lab/Culture results: none    Imaging Reviewed during this Office Visit: none  (results were independently reviewed by physician and radiology report verified)    PAST MEDICAL, FAMILY AND SOCIAL HISTORY UPDATE:  Past Medical History:   Diagnosis Date    Acid reflux     Anemia, blood loss 10/7/2018    Arthritis     C. difficile colitis 3/19/2020    Chronic bilateral low back pain with bilateral sciatica 11/3/2016    Chronic diastolic heart failure ([address] 2/25/2021    Complete tear of left rotator cuff 7/18/2018    COPD (chronic obstructive pulmonary disease) ([address]     Coronary artery disease involving native 01/05/2017    lumbar laminectomy L2, L3, L4    BRONCHOSCOPY N/A 3/13/2020    BRONCHOSCOPY performed by Honey Maya MD at [address] [facility]  2007    no stents    CAROTID ENDARTERECTOMY Right 12/16/2019    Dr. Mariah Valle, LAPAROSCOPIC N/A 2/16/2021    CHOLECYSTECTOMY LAPAROSCOPIC ROBOTIC XI performed by Roz Ribeiro MD at [address] [facility], COLON, DIAGNOSTIC     [address] Left 11/18/2020    HERNIA INGUINAL REPAIR [address] OPEN performed by Roz Ribeiro MD at [address] [facility] Left     LUMBAR LAMINECTOMY  01/05/2017    L2-L4    AR CLOSED RX SHLDR DISLOC,ANESTHESIA Left 8/1/2018    SHOULDER CLOSED REDUCTION WITH C-ARM VISUALIZATION performed by Vanessa Beck MD at [facility] N/A 5/9/2017    COLONOSCOPY WITH BIOPSY performed by Ajith Garsia DO at [address] [facility] [address] IMPLANT Left 7/18/2018    SHOULDER TOTAL ARTHROPLASTY REVERSE LEFT DJO & BICEP TENDON TRANSFER performed by Vanessa Beck MD at [address] HUMERAL/GLENOID COMPNT Left 8/3/2018    SHOULDER TOTAL REVERSE  ARTHROPLASTY REVISION performed by Vanessa Beck MD at [address]     rotator cuff repair    SHOULDER SURGERY Left 10/15/2020    SHOULDER ARTHROSCOPY W/INTEROP CULTURES performed by Vanessa Beck MD at [address] [facility]      ear, forehead    TONSILLECTOMY AND ADENOIDECTOMY      UPPER GASTROINTESTINAL ENDOSCOPY N/A 3/19/2020    EGD BIOPSY performed by Yogesh Mtz MD at [address] [facility]     Family History   Problem Relation Age of Onset    Diabetes Mother     Lung Cancer Brother     Liver Cancer Brother     Cancer Father      Outpatient Medications Marked as Taking for the 1/10/22 encounter (Office Visit) with Alix Robledo MD   Medication Sig Dispense Refill   Cherelle Leon pregabalin (LYRICA) 200 MG capsule Take 1 capsule by mouth 3 times daily for 90 days. Please ignore prior refill for gabapentin 800 mg, will switch back to Lyrica 270 capsule 0    Ascorbic Acid (VITAMIN C) 250 MG tablet Take 250 mg by mouth daily      Cholecalciferol (VITAMIN D3) 1.25 MG (22396 UT) CAPS Take by mouth      enzalutamide (XTANDI) 40 MG capsule Take 4 capsules by mouth daily 120 capsule 5    albuterol sulfate  (90 Base) MCG/ACT inhaler USE 2 INHALATIONS EVERY 6 HOURS AS NEEDED FOR WHEEZING OR SHORTNESS OF BREATH 24 g 3    ergocalciferol (ERGOCALCIFEROL) 1.25 MG (12075 UT) capsule Take 50,000 Units by mouth once a week      furosemide (LASIX) 40 MG tablet Take 1 tablet by mouth daily Dose increased 1/12/2021 90 tablet 3    gabapentin (NEURONTIN) 800 MG tablet Take 1 tablet by mouth 3 times daily for 90 days. Stop lyrica 270 tablet 0    albuterol (PROVENTIL) (2.5 MG/3ML) 0.083% nebulizer solution Take 3 mLs by nebulization every 6 hours as needed for Wheezing or Shortness of Breath (cough) 375 vial 0    nystatin (MYCOSTATIN) 750625 UNIT/ML suspension Take 5 mLs by mouth 4 times daily Swish and swallow 240 mL 0    amitriptyline (ELAVIL) 25 MG tablet Take 1 tablet by mouth nightly 30 tablet 3    omeprazole (PRILOSEC) 20 MG delayed release capsule Take 1 capsule by mouth every morning (before breakfast) Dose decreased 7/20/2021 90 capsule 3    pravastatin (PRAVACHOL) 80 MG tablet Take 1 tablet by mouth every evening Dose increased  9/24/2019 90 tablet 3    amLODIPine (NORVASC) 10 MG tablet Take 1 tablet by mouth daily Dose increased 10/16/2020 due to high BP.  Correct dosage 90 tablet 3    folbee plus (FOLBEE PLUS) TABS Take 1 tablet by mouth daily 90 tablet 3    lisinopril (PRINIVIL;ZESTRIL) 20 MG tablet Take 1 tablet by mouth daily (Patient taking differently: Take 10 mg by mouth daily ) 90 tablet 0    tiotropium-olodaterol (STIOLTO RESPIMAT) 2.5-2.5 MCG/ACT AERS Inhale 2 puffs into the lungs daily      oxyCODONE-acetaminophen (PERCOCET) 5-325 MG per tablet Take 1 tablet by mouth 2 times daily as needed.  glucose monitoring kit (FREESTYLE) monitoring kit 1 kit by Does not apply route daily Please supply Patient with a glucose monitoring kit that insurance will cover. 1 kit 0    Lancets 30G MISC Testing once a day. Please dispense according to patients insurance. 100 each 3    blood glucose monitor strips Testing once a day. Please dispense according to patients insurance. 100 strip 3    Alcohol Swabs PADS Please dispense according to patients insurance/device. Testing once a day 100 each 3    ammonium lactate (AMLACTIN) 12 % cream APPLY TOPICALLY TO LEGS ONE TO TWO TIMES A DAY AS NEEDED 1 Bottle 3    naloxone 4 MG/0.1ML LIQD nasal spray 1 spray by Nasal route as needed for Opioid Reversal Patient needs counseling 1 each 3    OXYGEN With CPAP at night      Respiratory Therapy Supplies (ADULT MASK) MISC Needs to use mask daily due to coronavirus pandemic. 27 each 5    Lift Chair MISC by Does not apply route Patient has difficulty getting up from usual chair 1 each 0    aspirin EC 81 MG EC tablet Take 1 tablet by mouth daily 90 tablet 0       Succinylcholine chloride and Cymbalta [duloxetine hcl]  Social History     Tobacco Use   Smoking Status Current Every Day Smoker    Packs/day: 1.00    Years: 35.00    Pack years: 35.00    Types: Cigarettes    Start date: 7/8/2021   Smokeless Tobacco Never Used     (Ifpatient a smoker, smoking cessation counseling offered)    Social History     Substance and Sexual Activity   Alcohol Use Not Currently    Alcohol/week: 0.0 standard drinks       REVIEW OF SYSTEMS:  Review of Systems    Physical Exam:      Vitals:    01/10/22 0946   BP: 128/74   Pulse: 68   Temp: 97.8 °F (36.6 °C)     Body mass index is 36.62 kg/m². Patient is a 78 y.o. male in no acute distress and alert and oriented to person, place and time.   Physical Exam  Constitutional: Patient in no acute distress. Neuro: Alert and oriented to person, place and time. Psych: Mood normal, affect normal  Skin: No rash noted      Assessment and Plan      1. Prostate cancer ([facility]    2. Androgen deprivation therapy    3. Hot flashes    4. Severe obesity (BMI 35.0-35.9 with comorbidity) ([facility]           Plan:   Start xtandi  F/u 2 mo psa and T      Return in about 2 months (around 3/10/2022) for psa and T.    Prescriptions Ordered:  No orders of the defined types were placed in this encounter. Orders Placed:  Orders Placed This Encounter   Procedures    PSA, Diagnostic     Standing Status:   Future     Standing Expiration Date:   1/5/2023    Testosterone     Standing Status:   Future     Standing Expiration Date:   1/5/2023           Isabella King MD    Agree with the ROS entered by the MA.

## 2022-01-10 NOTE — TELEPHONE ENCOUNTER
Patient was to start [facility]. Patient has not as of yet heard from [facility] and per Dr. Yvrose Hutchinson, patient needs to start ASAP. Informed patient that a message will be sent to MA and MA will follow up once able to get some information in regards to update on Xtandi.

## 2022-02-07 DIAGNOSIS — E11.42 TYPE 2 DIABETES MELLITUS WITH DIABETIC POLYNEUROPATHY, WITHOUT LONG-TERM CURRENT USE OF INSULIN (HCC): ICD-10-CM

## 2022-02-07 DIAGNOSIS — M47.26 OSTEOARTHRITIS OF SPINE WITH RADICULOPATHY, LUMBAR REGION: ICD-10-CM

## 2022-02-07 DIAGNOSIS — I10 ESSENTIAL HYPERTENSION: Primary | ICD-10-CM

## 2022-02-07 RX ORDER — LISINOPRIL 20 MG/1
10 TABLET ORAL DAILY
Qty: 90 TABLET | Refills: 3 | OUTPATIENT
Start: 2022-02-07

## 2022-02-07 RX ORDER — PREGABALIN 200 MG/1
200 CAPSULE ORAL 3 TIMES DAILY
Qty: 270 CAPSULE | Refills: 0 | Status: SHIPPED | OUTPATIENT
Start: 2022-02-07 | End: 2022-06-10 | Stop reason: SDUPTHER

## 2022-02-07 NOTE — TELEPHONE ENCOUNTER
Please verify if patient takes lisinopril 10 mg oral?    FYI  Sig: Take 1 tablet by mouth daily   Patient taking differently: Take 10 mg by mouth daily          Start Date: 07/12/21 End Date: --   Written Date: 07/12/21 Expiration Date: 07/12/22     Providers    Ordering and Authorizing Provider: Gilford Mccune, MD NPI: [id number]   Ordering User:  Sandeep Pratt RN

## 2022-02-07 NOTE — TELEPHONE ENCOUNTER
Please Approve or Refuse.    Send to Pharmacy per Pt's Request:      Next Visit Date:  3/9/2022   Last Visit Date: 11/5/2021    Hemoglobin A1C (%)   Date Value   08/12/2021 6.3 (H)   07/20/2021 5.9   04/23/2021 5.9             ( goal A1C is < 7)   BP Readings from Last 3 Encounters:   01/10/22 128/74   11/29/21 133/76   11/12/21 (!) 136/58          (goal 120/80)  BUN   Date Value Ref Range Status   11/05/2021 14 8 - 23 mg/dL Final     CREATININE   Date Value Ref Range Status   11/05/2021 0.75 0.70 - 1.20 mg/dL Final     POC Creatinine   Date Value Ref Range Status   12/20/2021 0.75 0.51 - 1.19 mg/dL Final     Potassium   Date Value Ref Range Status   11/05/2021 4.7 3.7 - 5.3 mmol/L Final

## 2022-02-07 NOTE — TELEPHONE ENCOUNTER
Current Outpatient Medications on File Prior to Visit   Medication Sig Dispense Refill    pregabalin (LYRICA) 200 MG capsule Take 1 capsule by mouth 3 times daily for 90 days. Please ignore prior refill for gabapentin 800 mg, will switch back to Lyrica 270 capsule 0    Ascorbic Acid (VITAMIN C) 250 MG tablet Take 250 mg by mouth daily      Cholecalciferol (VITAMIN D3) 1.25 MG (59654 UT) CAPS Take by mouth      enzalutamide (XTANDI) 40 MG capsule Take 4 capsules by mouth daily 120 capsule 5    albuterol sulfate  (90 Base) MCG/ACT inhaler USE 2 INHALATIONS EVERY 6 HOURS AS NEEDED FOR WHEEZING OR SHORTNESS OF BREATH 24 g 3    ergocalciferol (ERGOCALCIFEROL) 1.25 MG (06673 UT) capsule Take 50,000 Units by mouth once a week      furosemide (LASIX) 40 MG tablet Take 1 tablet by mouth daily Dose increased 1/12/2021 90 tablet 3    gabapentin (NEURONTIN) 800 MG tablet Take 1 tablet by mouth 3 times daily for 90 days. Stop lyrica 270 tablet 0    albuterol (PROVENTIL) (2.5 MG/3ML) 0.083% nebulizer solution Take 3 mLs by nebulization every 6 hours as needed for Wheezing or Shortness of Breath (cough) 375 vial 0    nystatin (MYCOSTATIN) 663405 UNIT/ML suspension Take 5 mLs by mouth 4 times daily Swish and swallow 240 mL 0    amitriptyline (ELAVIL) 25 MG tablet Take 1 tablet by mouth nightly 30 tablet 3    omeprazole (PRILOSEC) 20 MG delayed release capsule Take 1 capsule by mouth every morning (before breakfast) Dose decreased 7/20/2021 90 capsule 3    pravastatin (PRAVACHOL) 80 MG tablet Take 1 tablet by mouth every evening Dose increased  9/24/2019 90 tablet 3    amLODIPine (NORVASC) 10 MG tablet Take 1 tablet by mouth daily Dose increased 10/16/2020 due to high BP.  Correct dosage 90 tablet 3    folbee plus (FOLBEE PLUS) TABS Take 1 tablet by mouth daily 90 tablet 3    lisinopril (PRINIVIL;ZESTRIL) 20 MG tablet Take 1 tablet by mouth daily (Patient taking differently: Take 10 mg by mouth daily ) 90 tablet 0    tiotropium-olodaterol (STIOLTO RESPIMAT) 2.5-2.5 MCG/ACT AERS Inhale 2 puffs into the lungs daily      oxyCODONE-acetaminophen (PERCOCET) 5-325 MG per tablet Take 1 tablet by mouth 2 times daily as needed.  glucose monitoring kit (FREESTYLE) monitoring kit 1 kit by Does not apply route daily Please supply Patient with a glucose monitoring kit that insurance will cover. 1 kit 0    Lancets 30G MISC Testing once a day. Please dispense according to patients insurance. 100 each 3    blood glucose monitor strips Testing once a day. Please dispense according to patients insurance. 100 strip 3    Alcohol Swabs PADS Please dispense according to patients insurance/device. Testing once a day 100 each 3    ammonium lactate (AMLACTIN) 12 % cream APPLY TOPICALLY TO LEGS ONE TO TWO TIMES A DAY AS NEEDED 1 Bottle 3    naloxone 4 MG/0.1ML LIQD nasal spray 1 spray by Nasal route as needed for Opioid Reversal Patient needs counseling 1 each 3    OXYGEN With CPAP at night      Respiratory Therapy Supplies (ADULT MASK) MISC Needs to use mask daily due to coronavirus pandemic. 30 each 5    Lift Chair MISC by Does not apply route Patient has difficulty getting up from usual chair 1 each 0    aspirin EC 81 MG EC tablet Take 1 tablet by mouth daily 90 tablet 0     No current facility-administered medications on file prior to visit.

## 2022-02-08 RX ORDER — LISINOPRIL 10 MG/1
10 TABLET ORAL DAILY
Qty: 90 TABLET | Refills: 3 | Status: SHIPPED | OUTPATIENT
Start: 2022-02-08 | End: 2022-05-12 | Stop reason: ALTCHOICE

## 2022-02-11 ENCOUNTER — OFFICE VISIT (OUTPATIENT)
Dept: NEUROLOGY | Age: 67
End: 2022-02-11
Payer: MEDICARE

## 2022-02-11 VITALS
SYSTOLIC BLOOD PRESSURE: 167 MMHG | WEIGHT: 277 LBS | TEMPERATURE: 97 F | HEIGHT: 72 IN | HEART RATE: 72 BPM | DIASTOLIC BLOOD PRESSURE: 65 MMHG | BODY MASS INDEX: 37.52 KG/M2

## 2022-02-11 DIAGNOSIS — M54.2 NECK PAIN: ICD-10-CM

## 2022-02-11 DIAGNOSIS — M54.16 BILATERAL LUMBAR RADICULOPATHY: ICD-10-CM

## 2022-02-11 DIAGNOSIS — M54.40 CHRONIC LOW BACK PAIN WITH SCIATICA, SCIATICA LATERALITY UNSPECIFIED, UNSPECIFIED BACK PAIN LATERALITY: ICD-10-CM

## 2022-02-11 DIAGNOSIS — G62.9 SENSORY MOTOR NEUROPATHY: Primary | ICD-10-CM

## 2022-02-11 DIAGNOSIS — G89.29 CHRONIC LOW BACK PAIN WITH SCIATICA, SCIATICA LATERALITY UNSPECIFIED, UNSPECIFIED BACK PAIN LATERALITY: ICD-10-CM

## 2022-02-11 PROCEDURE — 99214 OFFICE O/P EST MOD 30 MIN: CPT | Performed by: PSYCHIATRY & NEUROLOGY

## 2022-02-11 RX ORDER — DULOXETIN HYDROCHLORIDE 30 MG/1
30 CAPSULE, DELAYED RELEASE ORAL DAILY
Qty: 30 CAPSULE | Refills: 3 | Status: SHIPPED
Start: 2022-02-11 | End: 2022-03-01

## 2022-02-11 ASSESSMENT — ENCOUNTER SYMPTOMS
GASTROINTESTINAL NEGATIVE: 1
EYES NEGATIVE: 1
ALLERGIC/IMMUNOLOGIC NEGATIVE: 1
RESPIRATORY NEGATIVE: 1
BACK PAIN: 1

## 2022-02-11 NOTE — PROGRESS NOTES
7/28/2019    GI bleed 3/19/2020    Gout     H/O cardiac catheterization     yrs ago   no stents    History of blood transfusion     Hyperlipidemia     Hyperlipidemia with target LDL less than 100 1/20/2016    Hyperparathyroidism ([address] 1/17/2020    Hypertension     Knee pain, chronic     left    Liver disease     MDRO (multiple drug resistant organisms) resistance     Melena 3/19/2020    Memory loss     Moderate episode of recurrent major depressive disorder ([address] 1/20/2016    Obesity, Class III, BMI 40-49.9 (morbid obesity) ([address] 1/20/2016    REYNALDO on CPAP 5/6/2017    Osteoarthritis     Peripheral arterial occlusive disease ([address] 3/25/2017    Pneumonia 3/9/2020    Polypharmacy 3/25/2017    Positive FIT (fecal immunochemical test) 4/11/2017    Prolonged emergence from general anesthesia 01/05/2017    Patient \"on life support for 3 days\" after back surgery due to being given succinylcholine    Prostate CA ([address] 1/20/2016    Prostate cancer ([address] 10/2013    finished radiation tx 5/2014    Pseudocholinesterase deficiency 03/25/2017    Pt.  \"on life support\" for 3 days after back surgery 1/5/17 after being given succinylcholine    Severe obesity (BMI 35.0-35.9 with comorbidity) ([address] 2/2/2020    Short of breath on exertion     Sleep apnea     uses CPAP machine nightly    Status post lumbar laminectomy 3/25/2017    Stenosis of left carotid artery 10/7/2018    Syncope and collapse 3/19/2020    Tubular adenoma of colon 4/11/17 5/13/2017    Type 2 diabetes mellitus with hyperglycemia, without long-term current use of insulin ([address] 3/25/2017    Lab Results Component Value Date  LABA1C 7.1 (H) 03/23/2017     Unintended weight loss 10/7/2018    Vitamin D deficiency 3/25/2017    Wears glasses        Past Surgical History:   Procedure Laterality Date    BACK SURGERY      x 2     BACK SURGERY  01/05/2017    lumbar laminectomy L2, L3, L4    BRONCHOSCOPY N/A 3/13/2020    BRONCHOSCOPY performed by Caridad Diggs MD at [address] [facility]  2007    no stents    CAROTID ENDARTERECTOMY Right 12/16/2019    Dr. Marcella Acevedo, LAPAROSCOPIC N/A 2/16/2021    CHOLECYSTECTOMY LAPAROSCOPIC ROBOTIC XI performed by Sophy Matos MD at [address] [facility], COLON, DIAGNOSTIC     [address] Left 11/18/2020    HERNIA INGUINAL REPAIR [address] OPEN performed by Sophy Matos MD at [address] [facility] Left     LUMBAR LAMINECTOMY  01/05/2017    L2-L4    NM CLOSED RX SHLDR DISLOC,ANESTHESIA Left 8/1/2018    SHOULDER CLOSED REDUCTION WITH C-ARM VISUALIZATION performed by Lela Roland MD at [facility] N/A 5/9/2017    COLONOSCOPY WITH BIOPSY performed by Frances Hanna DO at [address] [facility] [address] IMPLANT Left 7/18/2018    SHOULDER TOTAL ARTHROPLASTY REVERSE LEFT DJO & BICEP TENDON TRANSFER performed by Lela Roland MD at [address] HUMERAL/GLENOID COMPNT Left 8/3/2018    SHOULDER TOTAL REVERSE  ARTHROPLASTY REVISION performed by Lela Roland MD at [address]     rotator cuff repair    SHOULDER SURGERY Left 10/15/2020    SHOULDER ARTHROSCOPY W/INTEROP CULTURES performed by Lela Roland MD at [address] [facility]      ear, forehead    TONSILLECTOMY AND ADENOIDECTOMY      UPPER GASTROINTESTINAL ENDOSCOPY N/A 3/19/2020    EGD BIOPSY performed by Luzma Conde MD at [facility]       Family History   Problem Relation Age of Onset    Diabetes Mother     Lung Cancer Brother     Liver Cancer Brother     Cancer Father        Social History     Socioeconomic History    Marital status:      Spouse name: None    Number of children: None    Years of education: None    Highest education level: None   Occupational History    None   Tobacco Use    Smoking status: Current Every Day Smoker     Packs/day: 1.00     Years: 35.00     Pack years: 35.00     Types: Cigarettes     Start date: 7/8/2021    Smokeless tobacco: Never Used   Vaping Use    Vaping Use: Never used   Substance and Sexual Activity    Alcohol use: Not Currently     Alcohol/week: 0.0 standard drinks    Drug use: No    Sexual activity: Not Currently     Partners: Female   Other Topics Concern    None   Social History Narrative    None     Social Determinants of Health     Financial Resource Strain: Low Risk     Difficulty of Paying Living Expenses: Not hard at all   Food Insecurity: No Food Insecurity    Worried About Running Out of Food in the Last Year: Never true    Colleen of Food in the Last Year: Never true   Transportation Needs: No Transportation Needs    Lack of Transportation (Medical): No    Lack of Transportation (Non-Medical):  No   Physical Activity:     Days of Exercise per Week: Not on file    Minutes of Exercise per Session: Not on file   Stress:     Feeling of Stress : Not on file   Social Connections:     Frequency of Communication with Friends and Family: Not on file    Frequency of Social Gatherings with Friends and Family: Not on file    Attends Anabaptist Services: Not on file    Active Member of [address] [place] [address] [employer] or Organizations: Not on file    Attends Club or Organization Meetings: Not on file    Marital Status: Not on file   Intimate Partner Violence:     Fear of Current or Ex-Partner: Not on file    Emotionally Abused: Not on file    Physically Abused: Not on file    Sexually Abused: Not on file   Housing Stability: Unknown    Unable to Pay for Housing in the Last Year: No    Number of [place] in the Last Year: Not on file    Unstable Housing in the Last Year: No       Current Outpatient Medications   Medication Sig Dispense Refill    DULoxetine (CYMBALTA) 30 MG extended release capsule Take 1 capsule by mouth daily 30 capsule 3    lisinopril (PRINIVIL;ZESTRIL) 10 MG tablet Take 1 tablet by mouth daily Dose decreased by cardiologist 90 tablet 3    pregabalin (LYRICA) 200 MG capsule Take 1 capsule by mouth 3 times daily for 90 days. Please ignore prior refill for gabapentin 800 mg, will switch back to Lyrica 270 capsule 0    Ascorbic Acid (VITAMIN C) 250 MG tablet Take 250 mg by mouth daily      Cholecalciferol (VITAMIN D3) 1.25 MG (24816 UT) CAPS Take by mouth      albuterol sulfate  (90 Base) MCG/ACT inhaler USE 2 INHALATIONS EVERY 6 HOURS AS NEEDED FOR WHEEZING OR SHORTNESS OF BREATH 24 g 3    furosemide (LASIX) 40 MG tablet Take 1 tablet by mouth daily Dose increased 1/12/2021 90 tablet 3    albuterol (PROVENTIL) (2.5 MG/3ML) 0.083% nebulizer solution Take 3 mLs by nebulization every 6 hours as needed for Wheezing or Shortness of Breath (cough) 375 vial 0    nystatin (MYCOSTATIN) 631796 UNIT/ML suspension Take 5 mLs by mouth 4 times daily Swish and swallow 240 mL 0    omeprazole (PRILOSEC) 20 MG delayed release capsule Take 1 capsule by mouth every morning (before breakfast) Dose decreased 7/20/2021 90 capsule 3    pravastatin (PRAVACHOL) 80 MG tablet Take 1 tablet by mouth every evening Dose increased  9/24/2019 90 tablet 3    amLODIPine (NORVASC) 10 MG tablet Take 1 tablet by mouth daily Dose increased 10/16/2020 due to high BP. Correct dosage 90 tablet 3    folbee plus (FOLBEE PLUS) TABS Take 1 tablet by mouth daily 90 tablet 3    tiotropium-olodaterol (STIOLTO RESPIMAT) 2.5-2.5 MCG/ACT AERS Inhale 2 puffs into the lungs daily      oxyCODONE-acetaminophen (PERCOCET) 5-325 MG per tablet Take 1 tablet by mouth 2 times daily as needed.        ammonium lactate (AMLACTIN) 12 % cream APPLY TOPICALLY TO LEGS ONE TO TWO TIMES A DAY AS NEEDED 1 Bottle 3    aspirin EC 81 MG EC tablet Take 1 tablet by mouth daily 90 tablet 0    enzalutamide (XTANDI) 40 MG capsule Take 4 capsules by mouth daily (Patient not taking: Reported on 2/11/2022) 120 capsule 5    ergocalciferol (ERGOCALCIFEROL) 1.25 MG (44377 UT) capsule Take 50,000 Units by mouth once a week (Patient not taking: Reported on 2/11/2022)      glucose monitoring kit (FREESTYLE) monitoring kit 1 kit by Does not apply route daily Please supply Patient with a glucose monitoring kit that insurance will cover. 1 kit 0    Lancets 30G MISC Testing once a day. Please dispense according to patients insurance. 100 each 3    blood glucose monitor strips Testing once a day. Please dispense according to patients insurance. 100 strip 3    Alcohol Swabs PADS Please dispense according to patients insurance/device. Testing once a day 100 each 3    naloxone 4 MG/0.1ML LIQD nasal spray 1 spray by Nasal route as needed for Opioid Reversal Patient needs counseling (Patient not taking: Reported on 2/11/2022) 1 each 3    OXYGEN With CPAP at night      Respiratory Therapy Supplies (ADULT MASK) MISC Needs to use mask daily due to coronavirus pandemic. 30 each 5    [address] [facility] by Does not apply route Patient has difficulty getting up from usual chair 1 each 0     No current facility-administered medications for this visit. Allergies   Allergen Reactions    Succinylcholine Chloride Other (See Comments)     PATIENT HAS PSEUDOCHOLINESTERASE DEFICIENCY      Cymbalta [Duloxetine Hcl]      Taste loss           Review of Systems     Vitals:    02/11/22 1011   BP: (!) 167/65   Pulse: 72   Temp:      weight: 277 lb (125.6 kg)      Review of Systems   Constitutional: Negative. HENT: Negative. Eyes: Negative. Respiratory: Negative. Cardiovascular: Negative. Gastrointestinal: Negative. Endocrine: Negative. Genitourinary: Negative. Musculoskeletal: Positive for back pain and gait problem. Skin: Negative. Allergic/Immunologic: Negative. Neurological: Positive for weakness and numbness. Hematological: Negative. Psychiatric/Behavioral: Negative.          Neurological Examination  Constitutional .General exam well groomed   Head/Ears /Nose/Throat: external ear . Normal exam  Neck and thyroid . Normal size. No bruits  Respiratory . Breathsounds clear bilaterally  Cardiovascular: Auscultation of heart with regular rate and rhythm  Musculoskeletal. Muscle bulk and tone normal                                                           Muscle strength bilateral giveway with shoulder abduction . Right AE, EHL. ADF 2/5 otherwise 5/5 strength throughout                                                                               No dysmetria or dysdiadokinesis  No tremor   Normal fine motor  Gait right steppage gait  Orientation Alert and oriented x 3   Attention and concentration normal  Short term memory normal  Language process and speech normal . No aphasia   Cranial nerve 2 normal acuety and visual fields  Cranial nerve 3, 4 and 6 . Extraocular muscles are intact . Pupils are equal and reactive   Cranial nerve 5 . Normal strength of masseter and temporalis . Intact corneal reflex. Normal facial sensation  Cranial nerve 7 normal exam   Cranial nerve 8. Grossly intact hearing   Cranial nerve 9 and 10. Symmetric palate elevation   Cranial nerve 11 , 5 out of 5 strength   Cranial Nerve 12 midline tongue . No atrophy  Sensation . Decreased pinprick and light touch at ankle level . Decreased 5th and medial 4th digit bilaterally   Deep Tendon Reflexes absent lower extremities   Plantar response flexor bilaterally      ASSESSMENT/PLAN      Diagnosis Orders   1. Sensory motor neuropathy     2. Bilateral lumbar radiculopathy  MRI LUMBAR SPINE WO CONTRAST   3. Chronic low back pain with sciatica, sciatica laterality unspecified, unspecified back pain laterality  MRI LUMBAR SPINE WO CONTRAST   4.  Neck pain     Will add cymbalta to lyrica for neuropathic pain to proceed with MRI lumbar spine for low back pain     Orders Placed This Encounter   Procedures    MRI [address] ANNI Neal Status:   Future     Standing Expiration Date:   2/11/2023

## 2022-03-01 ENCOUNTER — HOSPITAL ENCOUNTER (OUTPATIENT)
Dept: GENERAL RADIOLOGY | Age: 67
Discharge: HOME | End: 2022-03-03
Payer: MEDICARE

## 2022-03-01 ENCOUNTER — OFFICE VISIT (OUTPATIENT)
Dept: FAMILY MEDICINE CLINIC | Age: 67
End: 2022-03-01
Payer: MEDICARE

## 2022-03-01 ENCOUNTER — HOSPITAL ENCOUNTER (OUTPATIENT)
Age: 67
Discharge: HOME | End: 2022-03-03
Payer: MEDICARE

## 2022-03-01 VITALS
BODY MASS INDEX: 37.52 KG/M2 | HEART RATE: 82 BPM | SYSTOLIC BLOOD PRESSURE: 110 MMHG | HEIGHT: 72 IN | OXYGEN SATURATION: 96 % | WEIGHT: 277 LBS | TEMPERATURE: 98.2 F | DIASTOLIC BLOOD PRESSURE: 80 MMHG

## 2022-03-01 DIAGNOSIS — L03.113 CELLULITIS OF RIGHT ELBOW: Primary | ICD-10-CM

## 2022-03-01 DIAGNOSIS — F41.8 ANXIETY ABOUT HEALTH: ICD-10-CM

## 2022-03-01 DIAGNOSIS — L03.113 CELLULITIS OF RIGHT ELBOW: ICD-10-CM

## 2022-03-01 PROBLEM — C61 CARCINOMA OF PROSTATE (HCC): Status: ACTIVE | Noted: 2021-12-21

## 2022-03-01 PROCEDURE — 99213 OFFICE O/P EST LOW 20 MIN: CPT | Performed by: FAMILY MEDICINE

## 2022-03-01 PROCEDURE — 73080 X-RAY EXAM OF ELBOW: CPT

## 2022-03-01 PROCEDURE — 96372 THER/PROPH/DIAG INJ SC/IM: CPT | Performed by: FAMILY MEDICINE

## 2022-03-01 RX ORDER — CEFTRIAXONE 1 G/1
1000 INJECTION, POWDER, FOR SOLUTION INTRAMUSCULAR; INTRAVENOUS ONCE
Status: CANCELLED | OUTPATIENT
Start: 2022-03-01 | End: 2022-03-01

## 2022-03-01 RX ORDER — SULFAMETHOXAZOLE AND TRIMETHOPRIM 800; 160 MG/1; MG/1
1 TABLET ORAL 2 TIMES DAILY
Qty: 14 TABLET | Refills: 0 | Status: SHIPPED | OUTPATIENT
Start: 2022-03-01 | End: 2022-03-08

## 2022-03-01 RX ORDER — CEFTRIAXONE 1 G/1
1000 INJECTION, POWDER, FOR SOLUTION INTRAMUSCULAR; INTRAVENOUS ONCE
Status: COMPLETED | OUTPATIENT
Start: 2022-03-01 | End: 2022-03-01

## 2022-03-01 RX ORDER — PREDNISONE 10 MG/1
TABLET ORAL
Qty: 30 TABLET | Refills: 0 | Status: SHIPPED | OUTPATIENT
Start: 2022-03-01 | End: 2022-03-09 | Stop reason: SDUPTHER

## 2022-03-01 RX ADMIN — CEFTRIAXONE 1000 MG: 1 INJECTION, POWDER, FOR SOLUTION INTRAMUSCULAR; INTRAVENOUS at 13:19

## 2022-03-01 ASSESSMENT — ENCOUNTER SYMPTOMS
RESPIRATORY NEGATIVE: 1
ABDOMINAL PAIN: 0
SHORTNESS OF BREATH: 0

## 2022-03-01 NOTE — PROGRESS NOTES
actr   [address]  [address]  [address] [address]  Dept: Via Kevin Orellana (:  1955) is a 78 y.o. male. Patient is here for evaluation of the following chief complaint(s):  Chief Complaint   Patient presents with    Elbow Pain     REDNESS right side no injury         SUBJECTIVE/OBJECTIVE:  ESTER Claire is a 78 y.o. male patient. Patient is an established patient of Dr. Savana Dorantes . Patient came in complaining of a right elbow pain. Patient states that he woke up with severe right elbow pain. He noticed that yesterday he felt some sensation of a slight scratchy sensation on the lateral side of the elbow no trauma or other inciting events prior to this. No history of gout either. Patient presented with severe inflammation and redness with warmth sensation all the way to the arm and L5. Forearm. No fever chills complains of pain some range of motion deficits. This is the first time this happened to him. Patient has a known history of chronic pain and is currently on Percocet/ Lyrica however, he still reports that he has not had any for pain relief    DEPRESSION SCREENING: Negative  PHQ Scores 2021 2021 2021 2020 3/18/2020 2020 2019   PHQ2 Score 2 0 0 0 0 6 0   PHQ9 Score 2 0 0 0 0 17 0     VITAL SIGNS:  Vitals:    22 1255   BP: 110/80   Pulse: 82   Temp: 98.2 °F (36.8 °C)   SpO2: 96%   Weight: 277 lb (125.6 kg)   Height: 6' (1.829 m)   Estimated body mass index is 37.57 kg/m² as calculated from the following:    Height as of this encounter: 6' (1.829 m). Weight as of this encounter: 277 lb (125.6 kg). Review of Systems   Constitutional: Positive for activity change. Negative for chills and fever. HENT: Negative. Respiratory: Negative. Negative for shortness of breath. Cardiovascular: Negative. Negative for chest pain.    Gastrointestinal: Negative for abdominal pain.   Endocrine: Negative. Musculoskeletal: Positive for arthralgias, gait problem, joint swelling and myalgias. Neurological: Negative for headaches. Psychiatric/Behavioral: Negative for sleep disturbance. The patient is nervous/anxious. Physical Exam  Vitals and nursing note reviewed. HENT:      Head: Normocephalic. Nose: Nose normal.   Cardiovascular:      Rate and Rhythm: Normal rate and regular rhythm. Pulmonary:      Effort: Pulmonary effort is normal.   Abdominal:      General: Abdomen is protuberant. There is no distension. Tenderness: There is no abdominal tenderness. Musculoskeletal:      Right elbow: Swelling and effusion present. Decreased range of motion. Tenderness present in radial head, medial epicondyle, lateral epicondyle and olecranon process. Skin:     General: Skin is warm and dry. Neurological:      Mental Status: He is alert. Psychiatric:         Mood and Affect: Mood is anxious. Thought Content: Thought content does not include suicidal ideation.          MEDICAL HISTORY      Diagnosis Date    Acid reflux     Anemia, blood loss 10/7/2018    Arthritis     C. difficile colitis 3/19/2020    Chronic bilateral low back pain with bilateral sciatica 11/3/2016    Chronic diastolic heart failure ([address] 2/25/2021    Complete tear of left rotator cuff 7/18/2018    COPD (chronic obstructive pulmonary disease) ([address]     Coronary artery disease involving native coronary artery of native heart without angina pectoris 2/2/2020    Degenerative disc disease, cervical 7/28/2019    GI bleed 3/19/2020    Gout     H/O cardiac catheterization     yrs ago   no stents    History of blood transfusion     Hyperlipidemia     Hyperlipidemia with target LDL less than 100 1/20/2016    Hyperparathyroidism ([address] 1/17/2020    Hypertension     Knee pain, chronic     left    Liver disease     MDRO (multiple drug resistant organisms) resistance     Melena 3/19/2020    Memory loss     Moderate episode of recurrent major depressive disorder ([facility] 1/20/2016    Obesity, Class III, BMI 40-49.9 (morbid obesity) ([facility] 1/20/2016    REYNALDO on CPAP 5/6/2017    Osteoarthritis     Peripheral arterial occlusive disease ([address] 3/25/2017    Pneumonia 3/9/2020    Polypharmacy 3/25/2017    Positive FIT (fecal immunochemical test) 4/11/2017    Prolonged emergence from general anesthesia 01/05/2017    Patient \"on life support for 3 days\" after back surgery due to being given succinylcholine    Prostate CA ([facility] 1/20/2016    Prostate cancer ([facility] 10/2013    finished radiation tx 5/2014    Pseudocholinesterase deficiency 03/25/2017    Pt. \"on life support\" for 3 days after back surgery 1/5/17 after being given succinylcholine    Severe obesity (BMI 35.0-35.9 with comorbidity) ([facility] 2/2/2020    Short of breath on exertion     Sleep apnea     uses CPAP machine nightly    Status post lumbar laminectomy 3/25/2017    Stenosis of left carotid artery 10/7/2018    Syncope and collapse 3/19/2020    Tubular adenoma of colon 4/11/17 5/13/2017    Type 2 diabetes mellitus with hyperglycemia, without long-term current use of insulin ([facility] 3/25/2017    Lab Results Component Value Date  LABA1C 7.1 (H) 03/23/2017     Unintended weight loss 10/7/2018    Vitamin D deficiency 3/25/2017    Wears glasses       MEDICATIONS  Prior to Visit Medications    Medication Sig Taking?  Authorizing Provider   predniSONE (DELTASONE) 10 MG tablet Take 4 tabs X 3 days, then 3 tabs X 3 days, then 2 tabs X 3 days, then 1 tab X 3 days Yes JAVI Gastelum - CNP   sulfamethoxazole-trimethoprim (BACTRIM DS;SEPTRA DS) 800-160 MG per tablet Take 1 tablet by mouth 2 times daily for 7 days Yes JAVI Gastelum - CNP   lisinopril (PRINIVIL;ZESTRIL) 10 MG tablet Take 1 tablet by mouth daily Dose decreased by cardiologist Yes Jm Willis MD   pregabalin (LYRICA) 200 MG capsule Take 1 capsule by mouth 3 times daily for 90 days. Please ignore prior refill for gabapentin 800 mg, will switch back to Lyrica Yes Jm Willis MD   Ascorbic Acid (VITAMIN C) 250 MG tablet Take 250 mg by mouth daily Yes Historical Provider, MD   Cholecalciferol (VITAMIN D3) 1.25 MG (05769 UT) CAPS Take by mouth Yes Historical Provider, MD   enzalutamide (XTANDI) 40 MG capsule Take 4 capsules by mouth daily Yes Audi Sanchez MD   albuterol sulfate  (90 Base) MCG/ACT inhaler USE 2 INHALATIONS EVERY 6 HOURS AS NEEDED FOR WHEEZING OR SHORTNESS OF BREATH Yes Jm Willis MD   ergocalciferol (ERGOCALCIFEROL) 1.25 MG (04674 UT) capsule Take 50,000 Units by mouth once a week  Yes Historical Provider, MD   furosemide (LASIX) 40 MG tablet Take 1 tablet by mouth daily Dose increased 1/12/2021 Yes Jm Willis MD   albuterol (PROVENTIL) (2.5 MG/3ML) 0.083% nebulizer solution Take 3 mLs by nebulization every 6 hours as needed for Wheezing or Shortness of Breath (cough) Yes Jm Willis MD   nystatin (MYCOSTATIN) 599172 UNIT/ML suspension Take 5 mLs by mouth 4 times daily Swish and swallow Yes mJ Willis MD   omeprazole (PRILOSEC) 20 MG delayed release capsule Take 1 capsule by mouth every morning (before breakfast) Dose decreased 7/20/2021 Yes Jm Willis MD   pravastatin (PRAVACHOL) 80 MG tablet Take 1 tablet by mouth every evening Dose increased  9/24/2019 Yes Jm Willis MD   amLODIPine (NORVASC) 10 MG tablet Take 1 tablet by mouth daily Dose increased 10/16/2020 due to high BP. Correct dosage Yes Jm Willis MD   folbee plus (FOLBEE PLUS) TABS Take 1 tablet by mouth daily Yes Paola Samuels MD   tiotropium-olodaterol (STIOLTO RESPIMAT) 2.5-2.5 MCG/ACT AERS Inhale 2 puffs into the lungs daily Yes Historical Provider, MD   oxyCODONE-acetaminophen (PERCOCET) 5-325 MG per tablet Take 1 tablet by mouth 2 times daily as needed.   Yes Historical Provider, MD   glucose monitoring kit (FREESTYLE) monitoring kit 1 kit by Does not apply route daily Please supply Patient with a glucose monitoring kit that insurance will cover. Yes Coco Juarez MD   Lancets 30G MISC Testing once a day. Please dispense according to patients insurance. Yes Coco Juarez MD   blood glucose monitor strips Testing once a day. Please dispense according to patients insurance. Yes Coco Juarez MD   Alcohol Swabs PADS Please dispense according to patients insurance/device. Testing once a day Yes Coco Juarez MD   ammonium lactate (AMLACTIN) 12 % cream APPLY TOPICALLY TO LEGS ONE TO TWO TIMES A DAY AS NEEDED Yes Coco Juarez MD   naloxone 4 MG/0.1ML LIQD nasal spray 1 spray by Nasal route as needed for Opioid Reversal Patient needs counseling Yes Coco Juarez MD   OXYGEN With CPAP at night Yes Historical MD Markie   Respiratory Therapy Supplies (ADULT MASK) MISC Needs to use mask daily due to coronavirus pandemic. Yes Coco Juarez MD   Lift Chair MISC by Does not apply route Patient has difficulty getting up from usual chair Yes Coco Juarez MD   aspirin EC 81 MG EC tablet Take 1 tablet by mouth daily Yes Coco Juarez MD     Controlled Substance Monitoring:  Acute and Chronic Pain Monitoring:   RX Monitoring 12/3/2021   Attestation -   Periodic Controlled Substance Monitoring No signs of potential drug abuse or diversion identified. Chronic Pain > 50 MEDD -   Chronic Pain > 80 MEDD -     ASSESSMENT/PLAN:  1. Cellulitis of right elbow  Worsening  XR done  No effusion  Ace wrap applied  Treated with bactrim  After ceftriaxone    Advised to Keep site clean and dry. DISCUSSED AND ADVISED TO:  Apply antibiotic ointment sparingly to site for the next few days. Keep open to air as much as possible. Apply dressing if needed. No pool, lakes, hot tubs until fully healed.    Call for worsening redness, streaking, swelling, drainage, pain, and fever/chills. - XR ELBOW RIGHT (MIN 3 VIEWS); Future  - cefTRIAXone (ROCEPHIN) injection 1,000 mg  - predniSONE (DELTASONE) 10 MG tablet; Take 4 tabs X 3 days, then 3 tabs X 3 days, then 2 tabs X 3 days, then 1 tab X 3 days  Dispense: 30 tablet; Refill: 0  - sulfamethoxazole-trimethoprim (BACTRIM DS;SEPTRA DS) 800-160 MG per tablet; Take 1 tablet by mouth 2 times daily for 7 days  Dispense: 14 tablet; Refill: 0    2. Anxiety about health  Stable  Discussed how to recognize anxiety. Advised to relieve tension with exercise or a massage. Advised to get enough rest.  Advised to avoid alcohol, caffeine, nicotine, and illegal drugs. Which can increase anxiety level and cause sleep problems. On this date 3/1/2022 I have spent 30 minutes reviewing previous notes, test results and face to face with the patient discussing the diagnosis and importance of compliance with the treatment plan as well as documenting on the day of the visit. Return for Keep Prev Appt, Appt w/ Dr. Azalia Plasencia, Chronic conditions. This note was completed by using the assistance of a speech-recognition program. However, inadvertent computerized transcription errors may be present. Although every effort was made to ensure accuracy, no guarantees can be provided that every mistake has been identified and corrected by editing.   Electronically signed by JAVI Pfeiffer CNP on 3/1/22 at 12:32 PM EST     --JAVI Gastelum CNP

## 2022-03-02 NOTE — PATIENT INSTRUCTIONS
Patient Education        Cellulitis: Care Instructions  Your Care Instructions     Cellulitis is a skin infection caused by bacteria, most often strep or staph. It often occurs after a break in the skin from a scrape, cut, bite, or puncture, or after a rash. Cellulitis may be treated without doing tests to find out what caused it. But your doctor may do tests, if needed, to look for a specific bacteria, like methicillin-resistant Staphylococcus aureus (MRSA). The doctor has checked you carefully, but problems can develop later. If you notice any problems or new symptoms, get medical treatment right away. Follow-up care is a key part of your treatment and safety. Be sure to make and go to all appointments, and call your doctor if you are having problems. It's also a good idea to know your test results and keep a list of the medicines you take. How can you care for yourself at home? · Take your antibiotics as directed. Do not stop taking them just because you feel better. You need to take the full course of antibiotics. · Prop up the infected area on pillows to reduce pain and swelling. Try to keep the area above the level of your heart as often as you can. · If your doctor told you how to care for your wound, follow your doctor's instructions. If you did not get instructions, follow this general advice:  ? Wash the wound with clean water 2 times a day. Don't use hydrogen peroxide or alcohol, which can slow healing. ? You may cover the wound with a thin layer of petroleum jelly, such as Vaseline, and a nonstick bandage. ? Apply more petroleum jelly and replace the bandage as needed. · Be safe with medicines. Take pain medicines exactly as directed. ? If the doctor gave you a prescription medicine for pain, take it as prescribed. ? If you are not taking a prescription pain medicine, ask your doctor if you can take an over-the-counter medicine.   To prevent cellulitis in the future  · Try to prevent cuts, scrapes, or other injuries to your skin. Cellulitis most often occurs where there is a break in the skin. · If you get a scrape, cut, mild burn, or bite, wash the wound with clean water as soon as you can to help avoid infection. Don't use hydrogen peroxide or alcohol, which can slow healing. · If you have swelling in your legs (edema), support stockings and good skin care may help prevent leg sores and cellulitis. · Take care of your feet, especially if you have diabetes or other conditions that increase the risk of infection. Wear shoes and socks. Do not go barefoot. If you have athlete's foot or other skin problems on your feet, talk to your doctor about how to treat them. When should you call for help? Call your doctor now or seek immediate medical care if:    · You have signs that your infection is getting worse, such as:  ? Increased pain, swelling, warmth, or redness. ? Red streaks leading from the area. ? Pus draining from the area. ? A fever.     · You get a rash. Watch closely for changes in your health, and be sure to contact your doctor if:    · You do not get better as expected. Where can you learn more? Go to https://[employer]. org and sign in to your [employer] account. Enter H485 in the [facility] box to learn more about \"Cellulitis: Care Instructions. \"     If you do not have an account, please click on the \"Sign Up Now\" link. Current as of: March 3, 2021               Content Version: 13.1  © 2006-2021 [employer]. Care instructions adapted under license by [facility] ([facility]). If you have questions about a medical condition or this instruction, always ask your healthcare professional. [place] [address] any warranty or liability for your use of this information.

## 2022-03-09 ENCOUNTER — HOSPITAL ENCOUNTER (OUTPATIENT)
Dept: MRI IMAGING | Facility: CLINIC | Age: 67
Discharge: HOME | DRG: 191 | End: 2022-03-11
Payer: MEDICARE

## 2022-03-09 ENCOUNTER — OFFICE VISIT (OUTPATIENT)
Dept: FAMILY MEDICINE CLINIC | Age: 67
End: 2022-03-09
Payer: MEDICARE

## 2022-03-09 ENCOUNTER — HOSPITAL ENCOUNTER (OUTPATIENT)
Age: 67
Setting detail: SPECIMEN
Discharge: HOME | End: 2022-03-09

## 2022-03-09 VITALS
SYSTOLIC BLOOD PRESSURE: 132 MMHG | BODY MASS INDEX: 37.57 KG/M2 | HEART RATE: 97 BPM | HEIGHT: 72 IN | TEMPERATURE: 97.9 F | OXYGEN SATURATION: 95 % | WEIGHT: 277.4 LBS | DIASTOLIC BLOOD PRESSURE: 70 MMHG

## 2022-03-09 DIAGNOSIS — M21.371 FOOT DROP, RIGHT: ICD-10-CM

## 2022-03-09 DIAGNOSIS — I50.32 CHRONIC DIASTOLIC HEART FAILURE (HCC): ICD-10-CM

## 2022-03-09 DIAGNOSIS — H93.13 TINNITUS AURIUM, BILATERAL: ICD-10-CM

## 2022-03-09 DIAGNOSIS — R26.89 POOR BALANCE: ICD-10-CM

## 2022-03-09 DIAGNOSIS — Z91.81 AT HIGH RISK FOR FALLS: ICD-10-CM

## 2022-03-09 DIAGNOSIS — Z98.890 STATUS POST LUMBAR LAMINECTOMY: ICD-10-CM

## 2022-03-09 DIAGNOSIS — J44.1 COPD EXACERBATION (HCC): Primary | ICD-10-CM

## 2022-03-09 DIAGNOSIS — M54.42 CHRONIC BILATERAL LOW BACK PAIN WITH BILATERAL SCIATICA: ICD-10-CM

## 2022-03-09 DIAGNOSIS — E88.89: ICD-10-CM

## 2022-03-09 DIAGNOSIS — M54.41 CHRONIC BILATERAL LOW BACK PAIN WITH BILATERAL SCIATICA: ICD-10-CM

## 2022-03-09 DIAGNOSIS — I10 ESSENTIAL HYPERTENSION: ICD-10-CM

## 2022-03-09 DIAGNOSIS — J20.9 ACUTE BRONCHITIS DUE TO INFECTION: ICD-10-CM

## 2022-03-09 DIAGNOSIS — M54.40 CHRONIC LOW BACK PAIN WITH SCIATICA, SCIATICA LATERALITY UNSPECIFIED, UNSPECIFIED BACK PAIN LATERALITY: ICD-10-CM

## 2022-03-09 DIAGNOSIS — C61 PROSTATE CANCER (HCC): ICD-10-CM

## 2022-03-09 DIAGNOSIS — G89.29 CHRONIC LOW BACK PAIN WITH SCIATICA, SCIATICA LATERALITY UNSPECIFIED, UNSPECIFIED BACK PAIN LATERALITY: ICD-10-CM

## 2022-03-09 DIAGNOSIS — J96.11 CHRONIC HYPOXEMIC RESPIRATORY FAILURE (HCC): ICD-10-CM

## 2022-03-09 DIAGNOSIS — M54.16 BILATERAL LUMBAR RADICULOPATHY: ICD-10-CM

## 2022-03-09 DIAGNOSIS — F33.0 MILD EPISODE OF RECURRENT MAJOR DEPRESSIVE DISORDER (HCC): ICD-10-CM

## 2022-03-09 DIAGNOSIS — I73.9 PVD (PERIPHERAL VASCULAR DISEASE) WITH CLAUDICATION (HCC): ICD-10-CM

## 2022-03-09 DIAGNOSIS — E11.42 TYPE 2 DIABETES MELLITUS WITH DIABETIC POLYNEUROPATHY, WITHOUT LONG-TERM CURRENT USE OF INSULIN (HCC): ICD-10-CM

## 2022-03-09 DIAGNOSIS — G89.29 CHRONIC BILATERAL LOW BACK PAIN WITH BILATERAL SCIATICA: ICD-10-CM

## 2022-03-09 LAB — HBA1C MFR BLD: 5.8 %

## 2022-03-09 PROCEDURE — 3288F FALL RISK ASSESSMENT DOCD: CPT | Performed by: FAMILY MEDICINE

## 2022-03-09 PROCEDURE — 72148 MRI LUMBAR SPINE W/O DYE: CPT

## 2022-03-09 PROCEDURE — 99215 OFFICE O/P EST HI 40 MIN: CPT | Performed by: FAMILY MEDICINE

## 2022-03-09 PROCEDURE — 83036 HEMOGLOBIN GLYCOSYLATED A1C: CPT | Performed by: FAMILY MEDICINE

## 2022-03-09 RX ORDER — PREDNISONE 10 MG/1
50 TABLET ORAL DAILY
Qty: 35 TABLET | Refills: 0 | Status: ON HOLD
Start: 2022-03-09 | End: 2022-03-15 | Stop reason: HOSPADM

## 2022-03-09 RX ORDER — AZITHROMYCIN 250 MG/1
TABLET, FILM COATED ORAL
Qty: 6 TABLET | Refills: 0 | Status: ON HOLD
Start: 2022-03-09 | End: 2022-03-15 | Stop reason: HOSPADM

## 2022-03-09 ASSESSMENT — ENCOUNTER SYMPTOMS
WHEEZING: 1
SHORTNESS OF BREATH: 1
RHINORRHEA: 0
CHEST TIGHTNESS: 1
ABDOMINAL DISTENTION: 0
NAUSEA: 0
SINUS PRESSURE: 0
CONSTIPATION: 0
DIARRHEA: 0
COUGH: 1
BACK PAIN: 1
SINUS PAIN: 0
VOMITING: 0
ABDOMINAL PAIN: 0

## 2022-03-09 ASSESSMENT — PATIENT HEALTH QUESTIONNAIRE - PHQ9
SUM OF ALL RESPONSES TO PHQ QUESTIONS 1-9: 2
2. FEELING DOWN, DEPRESSED OR HOPELESS: 0
SUM OF ALL RESPONSES TO PHQ QUESTIONS 1-9: 2
SUM OF ALL RESPONSES TO PHQ9 QUESTIONS 1 & 2: 2
SUM OF ALL RESPONSES TO PHQ QUESTIONS 1-9: 2
1. LITTLE INTEREST OR PLEASURE IN DOING THINGS: 2
SUM OF ALL RESPONSES TO PHQ QUESTIONS 1-9: 2

## 2022-03-09 NOTE — PROGRESS NOTES
Raul Snell (:  1955) is a 78 y.o. male,Established patient, here for evaluation of the following chief complaint(s): Hypertension, COPD (having trouble breathing the past couple days. ), Diabetes, Chronic Pain, Back Pain, and Fatigue      ASSESSMENT/PLAN:    1. COPD exacerbation ([facility] [address]  Worsening    -     DME Order for Erik Bernabe as OP  -     DME Order for Cane as OP  -     XR CHEST (2 VW); Future  -     CBC with Auto Differential; Future  -   Start   azithromycin (ZITHROMAX) 250 MG tablet; 500 mg orally on day one followed by 250 mg daily on days two through five, Disp-6 tablet, R-0Normal  -  start   predniSONE (DELTASONE) 10 MG tablet; Take 5 tablets by mouth daily for 7 days, Disp-35 tablet, R-0Normal    Quit smoking for 7 days when sick, the patient verbalizes understanding and agrees with the plan to at least try it. Continue Stiolto, start nebulizer every 6 hrs    Careful review of urgent symptoms and need for immediate medical attention if condition worsens. Patient expressed understanding. Advised to go to ED if severe symptoms develop. Patient expressed understanding. to stop Bactrim    To quit smoking, patient says he will quit smoking soon, his shortness of breath is getting worse. Pulmonary function test done at the pulmonologist seen by Dr. Ata Coyle in November per patient      2. Acute bronchitis due to infection  worsening    -     XR CHEST (2 VW); Future  -     azithromycin (ZITHROMAX) 250 MG tablet; 500 mg orally on day one followed by 250 mg daily on days two through five, Disp-6 tablet, R-0Normal  -     predniSONE (DELTASONE) 10 MG tablet; Take 5 tablets by mouth daily for 7 days, Disp-35 tablet, R-0Normal  3.  Type 2 diabetes mellitus with diabetic polyneuropathy, without long-term current use of insulin (HCC)  improving  Lab Results   Component Value Date    LABA1C 5.8 2022    LABA1C 6.3 (H) 2021    LABA1C 5.9 2021       -     POCT glycosylated hemoglobin (Hb Future  7. Mild episode of recurrent major depressive disorder (HCC)  Stable  We will continue to monitor  He is not on antidepressant and he declines any, he reports poor sleep due to tinnitus  8. PVD (peripheral vascular disease) with claudication (HCC)  Stable  Last PVR was done 6/12/2020, follows with Dr. Colletta Pollen, moderate abnormal PVR on the right side with KRISTIN 0.31, left mild abnormal PVR with KRISTIN 0.86    Strongly advised to quit smoking, continue baby aspirin and pravastatin    Jone Allen MD - 06/12/2020   Formatting of this note might be different from the original.   Right: Moderately abnormal PVR waveform contour at the ankle level. Monophasic PT and DP artery CW Doppler waveforms. PT KRISTIN is .31; DP KRISTIN is . 52. Left: Mildly abnormal PVR waveform contour at the ankle level. Hyperemic PT and DP artery CW Doppler waveforms. PT KRISTIN is .86; DP KRISTIN is .82.     -     DME Order for Walker as OP  -     DME Order for [facility] as OP    9. Serum cholinesterase defect (HCC)  Stable  We will continue to monitor, allergies are updated, during the surgical intervention no succinylcholine can be given to the patient  10. Tinnitus aurium, bilateral  Worsening  Will refer for second opinion to ENT, I believe he might be benefiting from hearing aids  -     External Referral To ENT  11. Chronic bilateral low back pain with bilateral sciatica  Failing to improve, following with pain management, had back surgery, taking Percocet as needed, he understands the risks, he says he takes it only as needed. He also has naloxone at home. -     DME Order for [facility] as OP    Kellie Church requires a cane small quad due to impaired ambulation and for increased stability in order to participate in or complete daily living tasks of: ambulating. The patient is able to safely use the cane and the functional mobility deficit can be sufficiently resolved.     - DME Order for Mark Webb as OP  Caity Jolley Kera was evaluated today and a DME order was entered for a wheeled walker with seat because he requires this to successfully complete daily living tasks of ambulating. A wheeled walker with seat is necessary due to the patient's unsteady gait, inability to  and ambulation device, ambulating only short distances by pushing a walker, and the need to sit for a short time before resuming ambulation. These tasks cannot be completed with a lesser ambulation device such as crutch, or standard walker. The need for this equipment was discussed with the patient and he understands and is in agreement. -     [address]  12. Status post lumbar laminectomy  -     DME Order for Lige Fling as OP  -     DME Order for [employer] as OP  -     [address]  13. Foot drop, right  -     DME Order for Lige Fling as OP  -     DME Order for [employer] as OP  -     [address]  14. Poor balance  worsening    -     DME Order for Lige Fling as OP  -     DME Order for [employer] as OP  -     [address]  15. At high risk for falls  -     DME Order for Lige Fling as OP  -     DME Order for [employer] as [address]  On the basis of positive falls risk screening, assessment and plan is as follows: in-office gait and balance testing performed using The Timed Up and Go Test was positive for increased falls risk, home safety tips provided, referral to physical therapy provided for strength and balance training, continue assistive devices, has been having difficulty getting in and out his walker, which he personally bought, she wants 1 through his insurance. Has been loosing his canes and he would like canes through his insurance. He did contact his insurance and she was told that they would cover. Continue follow-up with pain management.     Seen by cardiologist a few months ago by Dr. Marisa Encarnacion seeing vascular surgeon at this time      Controlled Substance Monitoring:    Acute and Chronic Pain Monitoring:   RX Monitoring 3/9/2022   Attestation -   Periodic Controlled Substance Monitoring Possible medication side effects, risk of tolerance/dependence & alternative treatments discussed. ;No signs of potential drug abuse or diversion identified. ;Assessed functional status. ;Obtaining appropriate analgesic effect of treatment. Chronic Pain > 50 MEDD -   Chronic Pain > 80 MEDD -           Bernardino received counseling on the following healthy behaviors: nutrition, exercise, medication adherence, tobacco cessation and weight loss, falls precautions    Reviewed prior labs and health maintenance  Discussed use, benefit, and side effects of prescribed medications. Barriers to medication compliance addressed. Patient given educational materials - see patient instructions  Was a self-tracking handout given in paper form or via [employer]? Yes  All patient questions answered. Patient voiced understanding. The patient's past medical,surgical, social, and family history as well as his current medications and allergies were reviewed as documented in today's encounter. Medications, labs, diagnostic studies, consultations and follow-up as documented in this encounter. Return in about 3 months (around 6/9/2022) for ALWAYS NEEDS 30 MIN. APPT, **POC A1C, DM2, HTN, HLP, COPD. Will phone [phone]     Future Appointments   Date Time Provider Keri Greggi   3/14/2022 10:00 AM Mary Pierce MD [facility]   4/29/2022  8:20 AM Galina Quinones MD [facility] [address] [place] [address]   6/10/2022  2:00 PM Fanny Lepe MD [facility]         SUBJECTIVE/OBJECTIVE:      COPD and chronic respiratory failure worsening:  Current treatment includes short-acting beta agonist inhaler, home 02-at 3.5 L/min at night and sometimes throughout the day  Reports compliance which Stiolto, duo nebs has been somewhat effective.      Residual symptoms: chronic dyspnea and cough productive of white sputum in moderate amounts white and yellowish, also with wheezing since yesterday, chest tightness, not getting better. Patient reports since yesterday he has been using the nebulizer treatments once a day, advised to increase to 4 times a day    He denies chest pain, fever, sore throat. Nicotine dependence. Smoker, counseling given to quit smoking. 1 PPD  Ready to quit: No he says he is contemplating to quit  Counseling given: Yes    Pulse ox is borderline low  SpO2 Readings from Last 4 Encounters:   03/09/22 95%   03/01/22 96%   11/05/21 98%   07/20/21 96%         CXR 11.5.21--hyperinflation consistent with COPD    FINDINGS:   Heart appears normal in size.  Lungs are hyperinflated without focal   consolidation, pneumothorax, pleural effusion or free air.  Osseous   structures demonstrate degenerative change.         Patient has chronic low back pain, shooting down both legs, following with pain management, has history of back surgery. Currently he is following with pain management but does not feel the pain is well controlled. He was told that he might need pain pump which he does not want for pain stimulator, which he does not want to either because that would mean someone to cut his back again which he declines     told him they will cover Walker seat  and 2 cane and to have an order for rest.  Has walker which he bought it himself and it is hard to get it in and out of the car, and to lift it up  Lost multiple canes,. And now he needs new cane, she was told that 2 canes will be covered by his insurance    Patient reports his balance is getting worse  Right foot drop for several years, which he relates to prior back surgery. Saw Dr. Tex Sterling and had MRI this morning, report is not available  \"I'm weak I cannot do anything, declines PT\", but then he finally agrees . Shaky hands, never did that before, advised to follow-up with neurology.   Cannot move the shoulders, had prior surgeries in his shoulders. Knees hurting  right upper extremity cannot use at all  Getting more shortness of breath, and he needs to rest when walking    Patient reports tinnitus worsening, cannot fall asleep due to noise in his ears. had hearing test , but told it is OK      Patient has known peripheral vascular disease, for many years, didn't see Dr. Kristie Simeon last year, but planning to see him. Patient reports he cannot walk much and he walks small distances a few steps and then he needs to rest, does not get cramps in his legs. Diabetes Mellitus Type II, Follow-up:    Current symptoms/problems include hyperglycemia, paresthesia of the feet and visual disturbances. Symptoms have been present for several years. Reports neuropathy in his legs is worsening , but Lyrica makes it manageble. Known diabetic complications: peripheral neuropathy, impotence, cardiovascular disease and peripheral vascular disease  Cardiovascular risk factors: advanced age (older than 54 for men, 72 for women), diabetes mellitus, dyslipidemia, hypertension, male gender, obesity (BMI >= 30 kg/m2), sedentary lifestyle and smoking/ tobacco exposure      Current diabetic medications include none. Eye exam current (within one year): yes  Current diet: in general, a \"healthy\" diet  , diabetic, low salt    Barriers: impairment:  physical: Chronic back pain, always in pain, tinnitus, unable to sleep due to tinnitus, shortness of breath, and mental health: Depression. Current monitoring regimen: home blood tests - 2-3 times weekly  Home blood sugar records: fasting range: 90, 105  Any episodes of hypoglycemia? no    Is He on ACE inhibitor or angiotensin II receptor blocker? No      reports that he has been smoking cigarettes. He started smoking about 8 months ago. He has a 35.00 pack-year smoking history.  He has never used smokeless tobacco.     Ready to quit: No  Counseling given: Yes      Daily Aspirin? Yes      A1c is improved, 5.8, praise given. Lab Results   Component Value Date    LABA1C 5.8 03/09/2022    LABA1C 6.3 (H) 08/12/2021    LABA1C 5.9 07/20/2021       Urine microabumin is Normal.  Lab Results   Component Value Date    LABMICR CANNOT BE CALCULATED 11/05/2021        Hypertension, congestive heart failure, peripheral vascular disease:   Pablo See  is not  exercising and is adherent to low salt diet. Blood pressure is well controlled at home. Cardiac symptoms  dyspnea and fatigue. Patient denies  chest pain, chest pressure/discomfort, claudication, exertional chest pressure/discomfort, irregular heart beat, lower extremity edema, near-syncope, orthopnea, palpitations, paroxysmal nocturnal dyspnea, syncope and tachypnea. Use of agents associated with hypertension: none. History of target organ damage: congestive heart failure, peripheral vascular disease and coronary artery disease   Saw Dr. Ernst Ledesma in November    BP controlled. Pablo See reports compliance with BP medications, and tolerates them well, denies side effects. BP Readings from Last 3 Encounters:   03/09/22 132/70   03/01/22 110/80   02/11/22 (!) 167/65        Pulse is Normal.    Pulse Readings from Last 3 Encounters:   03/09/22 97   03/01/22 82   02/11/22 72     Lab Results   Component Value Date    LVEF 60 12/05/2019    LVEFMODE Echo 12/05/2019       Depression    Has been watching his grandkids, he has a supportive wife   Patient reports being in pain all the time makes him depressed. Also his PSA went up and his prostate cancer is active again due to being off medication, now recently restarted, follows with urologist  PHQ-2 Over the past 2 weeks, how often have you been bothered by any of the following problems?   Little interest or pleasure in doing things: More than half the days  Feeling down, depressed, or hopeless: Not at all  PHQ-2 Score: 2  PHQ-9 Over the past 2 weeks, how often have you been bothered by any of the following problems? PHQ-9 Total Score: 2  PHQ-9 Total Score: 2    Lab Results   Component Value Date    PSA 1.44 01/05/2022    PSA 1.09 11/19/2021       PHQ Scores 3/9/2022 11/5/2021 4/23/2021 1/11/2021 7/7/2020 3/18/2020 1/28/2020   PHQ2 Score 2 2 0 0 0 0 6   PHQ9 Score 2 2 0 0 0 0 17       Patient was found to have cholinesterase deficiency, he had difficulty coming out of intubation, she was \" on life support\" for 3 days after intubation with succinyl choline. Prior to Visit Medications    Medication Sig Taking? Authorizing Provider   predniSONE (DELTASONE) 10 MG tablet Take 4 tabs X 3 days, then 3 tabs X 3 days, then 2 tabs X 3 days, then 1 tab X 3 days Yes Cassandra Gasca, JAVI - CNP   lisinopril (PRINIVIL;ZESTRIL) 10 MG tablet Take 1 tablet by mouth daily Dose decreased by cardiologist Yes Coco Juarez MD   pregabalin (LYRICA) 200 MG capsule Take 1 capsule by mouth 3 times daily for 90 days.  Please ignore prior refill for gabapentin 800 mg, will switch back to Lyrica Yes Coco Juarez MD   Ascorbic Acid (VITAMIN C) 250 MG tablet Take 250 mg by mouth daily Yes Historical Provider, MD   Cholecalciferol (VITAMIN D3) 1.25 MG (84975 UT) CAPS Take by mouth Yes Historical Provider, MD   enzalutamide (XTANDI) 40 MG capsule Take 4 capsules by mouth daily Yes Moise Marlow MD   albuterol sulfate  (90 Base) MCG/ACT inhaler USE 2 INHALATIONS EVERY 6 HOURS AS NEEDED FOR WHEEZING OR SHORTNESS OF BREATH Yes Coco Juarez MD   ergocalciferol (ERGOCALCIFEROL) 1.25 MG (98541 UT) capsule Take 50,000 Units by mouth once a week  Yes Historical Provider, MD   furosemide (LASIX) 40 MG tablet Take 1 tablet by mouth daily Dose increased 1/12/2021 Yes Coco Juarez MD   albuterol (PROVENTIL) (2.5 MG/3ML) 0.083% nebulizer solution Take 3 mLs by nebulization every 6 hours as needed for Wheezing or Shortness of Breath (cough) Yes Coco Juarez MD   omeprazole (PRILOSEC) 20 MG delayed release capsule Take 1 capsule by mouth every morning (before breakfast) Dose decreased 7/20/2021 Yes Saba Pryor MD   pravastatin (PRAVACHOL) 80 MG tablet Take 1 tablet by mouth every evening Dose increased  9/24/2019 Yes Saba Pryor MD   amLODIPine (NORVASC) 10 MG tablet Take 1 tablet by mouth daily Dose increased 10/16/2020 due to high BP. Correct dosage Yes Saba Pryor MD   folbee plus (FOLBEE PLUS) TABS Take 1 tablet by mouth daily Yes Macho Apodaca MD   tiotropium-olodaterol (STIOLTO RESPIMAT) 2.5-2.5 MCG/ACT AERS Inhale 2 puffs into the lungs daily Yes Historical Provider, MD   oxyCODONE-acetaminophen (PERCOCET) 5-325 MG per tablet Take 1 tablet by mouth 2 times daily as needed. Yes Historical Provider, MD   glucose monitoring kit (FREESTYLE) monitoring kit 1 kit by Does not apply route daily Please supply Patient with a glucose monitoring kit that insurance will cover. Yes Saba Pryor MD   Lancets 30G MISC Testing once a day. Please dispense according to patients insurance. Yes Saba Pryor MD   blood glucose monitor strips Testing once a day. Please dispense according to patients insurance. Yes Saba Pryor MD   Alcohol Swabs PADS Please dispense according to patients insurance/device. Testing once a day Yes Saba Pryor MD   ammonium lactate (AMLACTIN) 12 % cream APPLY TOPICALLY TO LEGS ONE TO TWO TIMES A DAY AS NEEDED Yes Saba Pryor MD   naloxone 4 MG/0.1ML LIQD nasal spray 1 spray by Nasal route as needed for Opioid Reversal Patient needs counseling Yes Saba Pryor MD   OXYGEN With CPAP at night Yes Historical Provider, MD   Respiratory Therapy Supplies (ADULT MASK) MISC Needs to use mask daily due to coronavirus pandemic.  Yes Saba Pryor MD   aspirin EC 81 MG EC tablet Take 1 tablet by mouth daily Yes Saba Pryor MD   nystatin (MYCOSTATIN) 697232 UNIT/ML suspension Take 5 mLs by mouth 4 times daily Swish and swallow  Patient not taking: Reported on 3/9/2022  Patricia Dias MD   Lift Chair MISC by Does not apply route Patient has difficulty getting up from usual chair  Patient not taking: Reported on 3/9/2022  Patricia Dias MD            Social History     Tobacco Use    Smoking status: Current Every Day Smoker     Packs/day: 1.00     Years: 35.00     Pack years: 35.00     Types: Cigarettes     Start date: 7/8/2021    Smokeless tobacco: Never Used   Vaping Use    Vaping Use: Never used   Substance Use Topics    Alcohol use: Not Currently     Alcohol/week: 0.0 standard drinks    Drug use: No         Review of Systems   Constitutional: Positive for fatigue. Negative for activity change, appetite change, chills, diaphoresis, fever and unexpected weight change. HENT: Positive for tinnitus. Negative for congestion, hearing loss, postnasal drip, rhinorrhea, sinus pressure and sinus pain. Eyes: Positive for visual disturbance. Respiratory: Positive for cough, chest tightness, shortness of breath and wheezing. On oxygen at nighttime 3.5 l/min and sometimes during daytime as needed. Seen by Dr. Beto Srinivasan in November    Cardiovascular: Negative for chest pain, palpitations and leg swelling. Has compression stockings    Gastrointestinal: Negative for abdominal distention, abdominal pain, constipation, diarrhea, nausea and vomiting. Endocrine: Positive for heat intolerance (from meds for prostate cancer, restarted medication for prostate cancer, due to worsening PSA). Negative for cold intolerance, polydipsia, polyphagia and polyuria. Musculoskeletal: Positive for arthralgias, back pain, gait problem and myalgias (legs). Ambulates with walker with seat    Allergic/Immunologic: Positive for immunocompromised state (prostate cancer, ongoing).    Neurological: Positive for tremors (hands, seeing neurology, pending MRI he has done this morning), weakness (legs, same as before, right foot drop) and numbness (hands and feet). Hematological: Does not bruise/bleed easily. Psychiatric/Behavioral: Positive for dysphoric mood and sleep disturbance. Negative for self-injury and suicidal ideas. The patient is nervous/anxious.          -vital signs stable and within normal limits except severe obesity per BMI and borderline low oxygenBP 132/70   Pulse 97   Temp 97.9 °F (36.6 °C)   Ht 6' (1.829 m)   Wt 277 lb 6.4 oz (125.8 kg)   SpO2 95%   BMI 37.62 kg/m²         Physical Exam  Vitals and nursing note reviewed. Constitutional:       General: He is not in acute distress. Appearance: Normal appearance. He is well-developed. He is obese. He is not diaphoretic. HENT:      Head: Normocephalic and atraumatic. Right Ear: External ear normal.      Left Ear: External ear normal.      Mouth/Throat:      Comments: I did not examine the mouth due to coronavirus pandemic and wearing masks. Eyes:      General: Lids are normal. No scleral icterus. Right eye: No discharge. Left eye: No discharge. Extraocular Movements: Extraocular movements intact. Conjunctiva/sclera: Conjunctivae normal.   Neck:      Thyroid: No thyromegaly. Cardiovascular:      Rate and Rhythm: Normal rate and regular rhythm. Pulses:           Dorsalis pedis pulses are 1+ on the right side and 1+ on the left side. Posterior tibial pulses are 1+ on the right side and 1+ on the left side. Heart sounds: Heart sounds are distant. No murmur heard. Pulmonary:      Effort: Pulmonary effort is normal. No respiratory distress. Breath sounds: Examination of the right-middle field reveals decreased breath sounds and wheezing. Examination of the left-middle field reveals decreased breath sounds and wheezing. Examination of the right-lower field reveals decreased breath sounds. Examination of the left-lower field reveals decreased breath sounds. Decreased breath sounds and wheezing present.  No rales.   Chest:      Chest wall: No tenderness. Abdominal:      General: Bowel sounds are normal. There is no distension. Palpations: Abdomen is soft. There is no hepatomegaly or splenomegaly. Tenderness: There is no abdominal tenderness. Comments: Obese abdomen   Musculoskeletal:      Right shoulder: Tenderness and bony tenderness present. Decreased range of motion. Left shoulder: Tenderness and bony tenderness present. Decreased range of motion. Right elbow: No swelling, deformity or effusion. Normal range of motion. Tenderness present. Cervical back: Normal range of motion and neck supple. Lumbar back: Tenderness and bony tenderness present. Decreased range of motion. Positive right straight leg raise test and positive left straight leg raise test.      Right lower leg: No edema. Left lower leg: No edema. Comments: Ambulates with walker with seat   Lymphadenopathy:      Cervical: No cervical adenopathy. Skin:     General: Skin is warm and dry. Capillary Refill: Capillary refill takes less than 2 seconds. Findings: No rash. Neurological:      Mental Status: He is alert and oriented to person, place, and time. Sensory: Sensory deficit present. Gait: Gait abnormal.      Deep Tendon Reflexes: Reflexes are normal and symmetric. Comments: Right foot drop, Up and go test more than 12 seconds. High risk for falls. Ambulating with walker with seat slowly with wide-based. Decreased sensation both feet and lower legs, up to the knees, very tender sensitive legs   Psychiatric:         Mood and Affect: Mood normal.         Behavior: Behavior normal.         Thought Content: Thought content normal.         Judgment: Judgment normal.           I personally reviewed testing with patient.   Hyperglycemia  Hyperlipidemia and high triglycerides improved  Otherwise labs within normal limits          Lab Results   Component Value Date    WBC 10.5 11/05/2021 HGB 14.3 2021    HCT 42.3 2021    MCV 90.9 2021     2021       Lab Results   Component Value Date     2021    K 4.7 2021     2021    CO2 26 2021    BUN 14 2021    CREATININE 0.75 2021    CREATININE 0.75 2021    GLUCOSE 110 2021    CALCIUM 10.1 2021        Lab Results   Component Value Date    ALT 28 2021    AST 19 2021    ALKPHOS 83 2021    BILITOT 0.26 (L) 2021       Lab Results   Component Value Date    TSH 0.81 2021       Lab Results   Component Value Date    CHOL 138 2020    CHOL 160 2020    CHOL 201 (H) 2019     Lab Results   Component Value Date    TRIG 203 (H) 2020    TRIG 182 (H) 2020    TRIG 247 (H) 2019     Lab Results   Component Value Date    HDL 35 (L) 2021    HDL 35 (L) 2021    HDL 32 (L) 2020     Lab Results   Component Value Date    LDLCHOLESTEROL 93 2021    LDLCHOLESTEROL 93 2021    LDLCHOLESTEROL 65 2020     Lab Results   Component Value Date    CHOLHDLRATIO 4.4 2021    CHOLHDLRATIO 4.4 2021    CHOLHDLRATIO 4.3 2020         Lab Results   Component Value Date    [id number] 1410 (H) 2021     Lab Results   Component Value Date    FOLATE >20.0 2021     Lab Results   Component Value Date    VITD25 64.8 2021         Orders Placed This Encounter   Medications    azithromycin (ZITHROMAX) 250 MG tablet     Si mg orally on day one followed by 250 mg daily on days two through five     Dispense:  6 tablet     Refill:  0    predniSONE (DELTASONE) 10 MG tablet     Sig: Take 5 tablets by mouth daily for 7 days     Dispense:  35 tablet     Refill:  0       Orders Placed This Encounter   Procedures    XR CHEST (2 VW)     Standing Status:   Future     Standing Expiration Date:   3/9/2023     Order Specific Question:   Reason for exam:     Answer:   PLASCENCIA    Brain Natriuretic Peptide     Standing Status:   Future     Standing Expiration Date:   3/9/2023    CBC with Auto Differential     Standing Status:   Future     Standing Expiration Date:   3/9/2023    Comprehensive Metabolic Panel     Standing Status:   Future     Standing Expiration Date:   3/9/2023    Magnesium     Standing Status:   Future     Standing Expiration Date:   3/9/2023    TSH     Standing Status:   Future     Standing Expiration Date:   3/9/2023    Uric Acid     Standing Status:   Future     Standing Expiration Date:   3/9/2023    Vitamin B12 & Folate     Standing Status:   Future     Standing Expiration Date:   4/29/2022    External Referral To ENT     Referral Priority:   Routine     Referral Type:   Eval and Treat     Referral Reason:   Specialty Services Required     Referred to Provider:   Carlos Pereyra DO     Requested Specialty:   Otolaryngology     Number of Visits Requested:   [address]     Referral Priority:   Routine     Referral Type:   Eval and Treat     Referral Reason:   Specialty Services Required     Requested Specialty:   Physical Therapy     Number of Visits Requested:   1    POCT glycosylated hemoglobin (Hb A1C)    DME Order for Henrique Ackerman as OP     You must complete the order parameters below and add the medical necessity documentation for this DME in a separate note.     Folding Walker with Wheels and Seat    Current patient weight: Weight: 277 lb 6.4 oz (125.8 kg)  Current patient height: Height: 6' (182.9 cm)  Diagnosis: Chronic hypoxemic respiratory failure (HCC)  COPD mixed type (HCC)  Chronic diastolic heart failure (HCC)  Recurrent major depressive disorder, in full remission ([facility].)  PVD (peripheral vascular disease) with claudication [facility])    Duration: Purchase    NPI: [id number]  [address] [facility] [address] [facility]  [place] [address]  Dept: [phone]  Dept Fax: [phone]    DME Order for [facility] as OP     You must complete the order parameters below and add the medical necessity documentation for this DME in a separate note. Small base Quad Cane    Diagnosis:   Chronic hypoxemic respiratory failure (HCC)  COPD mixed type (HCC)  Chronic diastolic heart failure (HCC)  PVD (peripheral vascular disease) with claudication [facility])      Duration: Purchase    NPI: [id number]    [address] [facility] [address] [facility] [address] [facility] [address]  Dept: [phone]  Dept Fax: [phone]       Medications Discontinued During This Encounter   Medication Reason    predniSONE (DELTASONE) 10 MG tablet REORDER    nystatin (MYCOSTATIN) 046975 UNIT/ML suspension LIST CLEANUP    Lift Chair MISC LIST CLEANUP         On this date 3/9/2022 I have spent 45 minutes reviewing previous notes, test results and face to face with the patient discussing the diagnosis and importance of compliance with the treatment plan as well as documenting on the day of the visit. This note was completed by using the assistance of a speech-recognition program. However, inadvertent computerized transcription errors may be present. Although every effort was made to ensure accuracy, no guarantees can be provided that every mistake has been identified and corrected by editing . An electronic signature was used to authenticate this note.   Electronically signed by Lulú Wilcox MD on 3/10/2022 at 7:50 PM

## 2022-03-09 NOTE — RESULT ENCOUNTER NOTE
Addressed during office visit today, A1c 5.8, improved diabetes, continue treatment recommended during the office visit.

## 2022-03-09 NOTE — PROGRESS NOTES
Visit Information    Have you changed or started any medications since your last visit including any over-the-counter medicines, vitamins, or herbal medicines? Yes - per dr's orders  Have you stopped taking any of your medications? Is so, why? -  no  Are you having any side effects from any of your medications? - yes - hard time breathing     Have you seen any other physician or provider since your last visit? yes    Have you had any other diagnostic tests since your last visit? yes    Have you been seen in the emergency room and/or had an admission in a hospital since we last saw you?  no   Have you had your routine dental cleaning in the past 6 months?  yes -      Do you have an active MyChart account? If no, what is the barrier?   Yes    Patient Care Team:  Devi Stoner MD as PCP - General (Family Medicine)  Devi Stoner MD as PCP - [facility]  David Lopez MD as Consulting Physician (Neurosurgery)  Silviano Barbosa MD as Consulting Physician (Cardiology)  Nga Spears MD as Consulting Physician (Urology)  Cait Meraz MD as Surgeon (Vascular Surgery)  Santos Bledsoe MD as Consulting Physician (Pulmonology)  Adam Grijalva MD as Consulting Physician (Orthopedic Surgery)  Gatha Sever, OD (Ophthalmology)  Jane Robert MD as Consulting Physician (Endocrinology)  Shane Pantoja MD as Consulting Physician (Orthopedic Surgery)  Laine Grande MD as Consulting Physician (Gastroenterology)  Ivonne Saenz MD as Consulting Physician (Orthopedic Surgery)  Agata Mcfadden RN as Nurse Corrina Garcia MD as Surgeon (General Surgery)  Casey Smart, PhD (Psychology)  Lazaro Riggs RN as Care Transitions Nurse  Jennifer Finley MD as Consulting Physician (Neurology)    Medical History Review  Past Medical, Family, and Social History reviewed and does contribute to the patient presenting condition    Health Maintenance   Topic Date Due  Diabetic retinal exam  11/07/2020    Low dose CT lung screening  03/15/2022    Pneumococcal 65+ years Vaccine (2 of 2 - PPSV23) 04/11/2022    Diabetic foot exam  04/23/2022    Colorectal Cancer Screen  05/09/2022    Lipid screen  07/30/2022    A1C test (Diabetic or Prediabetic)  08/12/2022    Diabetic microalbuminuria test  11/05/2022    Depression Monitoring  11/05/2022    Potassium monitoring  11/05/2022    Annual Wellness Visit (AWV)  11/06/2022    Creatinine monitoring  12/20/2022    PSA counseling  01/05/2023    DTaP/Tdap/Td vaccine (2 - Td or Tdap) 03/01/2028    Flu vaccine  Completed    Shingles Vaccine  Completed    COVID-19 Vaccine  Completed    Hepatitis C screen  Completed    Hepatitis A vaccine  Aged Out    Hib vaccine  Aged Out    Meningococcal (ACWY) vaccine  Aged Out

## 2022-03-10 ENCOUNTER — HOSPITAL ENCOUNTER (INPATIENT)
Age: 67
LOS: 4 days | Discharge: HOME | DRG: 191 | End: 2022-03-15
Attending: STUDENT IN AN ORGANIZED HEALTH CARE EDUCATION/TRAINING PROGRAM | Admitting: INTERNAL MEDICINE
Payer: MEDICARE

## 2022-03-10 ENCOUNTER — TELEPHONE (OUTPATIENT)
Dept: FAMILY MEDICINE CLINIC | Age: 67
End: 2022-03-10

## 2022-03-10 DIAGNOSIS — J44.1 COPD EXACERBATION (HCC): Primary | ICD-10-CM

## 2022-03-10 LAB
PROSTATE SPECIFIC ANTIGEN: 0.69 UG/L
TESTOSTERONE TOTAL: <3 NG/DL (ref 220–1000)

## 2022-03-10 PROCEDURE — 80048 BASIC METABOLIC PNL TOTAL CA: CPT

## 2022-03-10 PROCEDURE — 85025 COMPLETE CBC W/AUTO DIFF WBC: CPT

## 2022-03-10 PROCEDURE — 99285 EMERGENCY DEPT VISIT HI MDM: CPT

## 2022-03-10 PROCEDURE — 83880 ASSAY OF NATRIURETIC PEPTIDE: CPT

## 2022-03-10 PROCEDURE — 36415 COLL VENOUS BLD VENIPUNCTURE: CPT

## 2022-03-10 PROCEDURE — 84484 ASSAY OF TROPONIN QUANT: CPT

## 2022-03-10 PROCEDURE — 93005 ELECTROCARDIOGRAM TRACING: CPT | Performed by: HEALTH CARE PROVIDER

## 2022-03-10 RX ORDER — IPRATROPIUM BROMIDE AND ALBUTEROL SULFATE 2.5; .5 MG/3ML; MG/3ML
1 SOLUTION RESPIRATORY (INHALATION)
Status: DISCONTINUED | OUTPATIENT
Start: 2022-03-11 | End: 2022-03-11

## 2022-03-11 ENCOUNTER — TELEPHONE (OUTPATIENT)
Dept: FAMILY MEDICINE CLINIC | Age: 67
End: 2022-03-11

## 2022-03-11 ENCOUNTER — APPOINTMENT (OUTPATIENT)
Dept: GENERAL RADIOLOGY | Age: 67
DRG: 191 | End: 2022-03-11
Payer: MEDICARE

## 2022-03-11 PROBLEM — J44.1 COPD EXACERBATION (HCC): Status: ACTIVE | Noted: 2022-03-11

## 2022-03-11 LAB
ABSOLUTE EOS #: 0 K/UL (ref 0–0.4)
ABSOLUTE LYMPH #: 1.4 K/UL (ref 1–4.8)
ABSOLUTE MONO #: 0.5 K/UL (ref 0.1–1.3)
ANION GAP SERPL CALCULATED.3IONS-SCNC: 11 MMOL/L (ref 9–17)
BASOPHILS # BLD: 1 % (ref 0–2)
BASOPHILS ABSOLUTE: 0.1 K/UL (ref 0–0.2)
BUN BLDV-MCNC: 19 MG/DL (ref 8–23)
CALCIUM SERPL-MCNC: 9.6 MG/DL (ref 8.6–10.4)
CARBOXYHEMOGLOBIN: 1.8 % (ref 0–5)
CHLORIDE BLD-SCNC: 100 MMOL/L (ref 98–107)
CO2: 24 MMOL/L (ref 20–31)
CREAT SERPL-MCNC: 0.69 MG/DL (ref 0.7–1.2)
EKG ATRIAL RATE: 62 BPM
EKG Q-T INTERVAL: 382 MS
EKG QRS DURATION: 88 MS
EKG QTC CALCULATION (BAZETT): 387 MS
EKG R AXIS: 33 DEGREES
EKG T AXIS: 57 DEGREES
EKG VENTRICULAR RATE: 62 BPM
EOSINOPHILS RELATIVE PERCENT: 0 % (ref 0–4)
GFR AFRICAN AMERICAN: >60 ML/MIN
GFR NON-AFRICAN AMERICAN: >60 ML/MIN
GFR SERPL CREATININE-BSD FRML MDRD: ABNORMAL ML/MIN/{1.73_M2}
GLUCOSE BLD-MCNC: 139 MG/DL (ref 70–99)
HCO3 VENOUS: 27.3 MMOL/L (ref 24–30)
HCT VFR BLD CALC: 42.6 % (ref 41–53)
HEMOGLOBIN: 14.2 G/DL (ref 13.5–17.5)
INFLUENZA A: NOT DETECTED
INFLUENZA B: NOT DETECTED
LYMPHOCYTES # BLD: 14 % (ref 24–44)
MCH RBC QN AUTO: 30.1 PG (ref 26–34)
MCHC RBC AUTO-ENTMCNC: 33.4 G/DL (ref 31–37)
MCV RBC AUTO: 89.9 FL (ref 80–100)
METHEMOGLOBIN: 0.6 % (ref 0–1.9)
MONOCYTES # BLD: 5 % (ref 1–7)
O2 SAT, VEN: 76.1 % (ref 60–85)
PATIENT TEMP: 37
PCO2, VEN: 48.5 (ref 39–55)
PDW BLD-RTO: 14.4 % (ref 11.5–14.9)
PH VENOUS: 7.36 (ref 7.32–7.42)
PLATELET # BLD: 266 K/UL (ref 150–450)
PMV BLD AUTO: 8.1 FL (ref 6–12)
PO2, VEN: 42.1 (ref 30–50)
POSITIVE BASE EXCESS, VEN: 1.8 MMOL/L (ref 0–2)
POTASSIUM SERPL-SCNC: 4.9 MMOL/L (ref 3.7–5.3)
PRO-BNP: 63 PG/ML
RBC # BLD: 4.74 M/UL (ref 4.5–5.9)
SARS-COV-2 RNA, RT PCR: NOT DETECTED
SEG NEUTROPHILS: 80 % (ref 36–66)
SEGMENTED NEUTROPHILS ABSOLUTE COUNT: 7.9 K/UL (ref 1.3–9.1)
SODIUM BLD-SCNC: 135 MMOL/L (ref 135–144)
SOURCE: NORMAL
SPECIMEN DESCRIPTION: NORMAL
TROPONIN, HIGH SENSITIVITY: 13 NG/L (ref 0–22)
WBC # BLD: 10 K/UL (ref 3.5–11)

## 2022-03-11 PROCEDURE — 2580000003 HC RX 258: Performed by: HEALTH CARE PROVIDER

## 2022-03-11 PROCEDURE — 94660 CPAP INITIATION&MGMT: CPT

## 2022-03-11 PROCEDURE — 94664 DEMO&/EVAL PT USE INHALER: CPT

## 2022-03-11 PROCEDURE — 6370000000 HC RX 637 (ALT 250 FOR IP): Performed by: HEALTH CARE PROVIDER

## 2022-03-11 PROCEDURE — 6360000002 HC RX W HCPCS: Performed by: INTERNAL MEDICINE

## 2022-03-11 PROCEDURE — 2580000003 HC RX 258: Performed by: INTERNAL MEDICINE

## 2022-03-11 PROCEDURE — 6360000002 HC RX W HCPCS: Performed by: HEALTH CARE PROVIDER

## 2022-03-11 PROCEDURE — 2060000000 HC ICU INTERMEDIATE R&B

## 2022-03-11 PROCEDURE — 71045 X-RAY EXAM CHEST 1 VIEW: CPT

## 2022-03-11 PROCEDURE — 94761 N-INVAS EAR/PLS OXIMETRY MLT: CPT

## 2022-03-11 PROCEDURE — 82805 BLOOD GASES W/O2 SATURATION: CPT

## 2022-03-11 PROCEDURE — 2700000000 HC OXYGEN THERAPY PER DAY

## 2022-03-11 PROCEDURE — 93010 ELECTROCARDIOGRAM REPORT: CPT | Performed by: INTERNAL MEDICINE

## 2022-03-11 PROCEDURE — 87636 SARSCOV2 & INF A&B AMP PRB: CPT

## 2022-03-11 PROCEDURE — 99223 1ST HOSP IP/OBS HIGH 75: CPT | Performed by: INTERNAL MEDICINE

## 2022-03-11 PROCEDURE — 82800 BLOOD PH: CPT

## 2022-03-11 PROCEDURE — 6370000000 HC RX 637 (ALT 250 FOR IP): Performed by: INTERNAL MEDICINE

## 2022-03-11 PROCEDURE — 94640 AIRWAY INHALATION TREATMENT: CPT

## 2022-03-11 RX ORDER — ONDANSETRON 2 MG/ML
4 INJECTION INTRAMUSCULAR; INTRAVENOUS EVERY 6 HOURS PRN
Status: DISCONTINUED | OUTPATIENT
Start: 2022-03-11 | End: 2022-03-15 | Stop reason: HOSPADM

## 2022-03-11 RX ORDER — ACETAMINOPHEN 325 MG/1
650 TABLET ORAL EVERY 4 HOURS PRN
Status: DISCONTINUED | OUTPATIENT
Start: 2022-03-11 | End: 2022-03-15 | Stop reason: HOSPADM

## 2022-03-11 RX ORDER — ASPIRIN 81 MG/1
81 TABLET ORAL DAILY
Status: DISCONTINUED | OUTPATIENT
Start: 2022-03-11 | End: 2022-03-15 | Stop reason: HOSPADM

## 2022-03-11 RX ORDER — PANTOPRAZOLE SODIUM 40 MG/1
40 TABLET, DELAYED RELEASE ORAL
Status: DISCONTINUED | OUTPATIENT
Start: 2022-03-12 | End: 2022-03-15 | Stop reason: HOSPADM

## 2022-03-11 RX ORDER — FUROSEMIDE 40 MG/1
40 TABLET ORAL DAILY
Status: DISCONTINUED | OUTPATIENT
Start: 2022-03-11 | End: 2022-03-15 | Stop reason: HOSPADM

## 2022-03-11 RX ORDER — VITAMIN C
1 TAB ORAL DAILY
Status: DISCONTINUED | OUTPATIENT
Start: 2022-03-11 | End: 2022-03-15 | Stop reason: HOSPADM

## 2022-03-11 RX ORDER — SODIUM CHLORIDE 9 MG/ML
25 INJECTION, SOLUTION INTRAVENOUS PRN
Status: DISCONTINUED | OUTPATIENT
Start: 2022-03-11 | End: 2022-03-15 | Stop reason: HOSPADM

## 2022-03-11 RX ORDER — METHYLPREDNISOLONE SODIUM SUCCINATE 40 MG/ML
40 INJECTION, POWDER, LYOPHILIZED, FOR SOLUTION INTRAMUSCULAR; INTRAVENOUS EVERY 12 HOURS
Status: DISCONTINUED | OUTPATIENT
Start: 2022-03-11 | End: 2022-03-11

## 2022-03-11 RX ORDER — LISINOPRIL 10 MG/1
10 TABLET ORAL DAILY
Status: DISCONTINUED | OUTPATIENT
Start: 2022-03-11 | End: 2022-03-15 | Stop reason: HOSPADM

## 2022-03-11 RX ORDER — DOXYCYCLINE 100 MG/1
100 CAPSULE ORAL EVERY 12 HOURS SCHEDULED
Status: DISCONTINUED | OUTPATIENT
Start: 2022-03-11 | End: 2022-03-15 | Stop reason: HOSPADM

## 2022-03-11 RX ORDER — IPRATROPIUM BROMIDE AND ALBUTEROL SULFATE 2.5; .5 MG/3ML; MG/3ML
1 SOLUTION RESPIRATORY (INHALATION)
Status: DISCONTINUED | OUTPATIENT
Start: 2022-03-11 | End: 2022-03-12

## 2022-03-11 RX ORDER — AMLODIPINE BESYLATE 10 MG/1
10 TABLET ORAL DAILY
Status: DISCONTINUED | OUTPATIENT
Start: 2022-03-11 | End: 2022-03-15 | Stop reason: HOSPADM

## 2022-03-11 RX ORDER — PRAVASTATIN SODIUM 40 MG
80 TABLET ORAL EVERY EVENING
Status: DISCONTINUED | OUTPATIENT
Start: 2022-03-11 | End: 2022-03-15 | Stop reason: HOSPADM

## 2022-03-11 RX ORDER — METHYLPREDNISOLONE SODIUM SUCCINATE 40 MG/ML
40 INJECTION, POWDER, LYOPHILIZED, FOR SOLUTION INTRAMUSCULAR; INTRAVENOUS EVERY 8 HOURS
Status: DISCONTINUED | OUTPATIENT
Start: 2022-03-11 | End: 2022-03-13

## 2022-03-11 RX ORDER — OXYCODONE HYDROCHLORIDE AND ACETAMINOPHEN 5; 325 MG/1; MG/1
1 TABLET ORAL EVERY 8 HOURS PRN
Status: DISCONTINUED | OUTPATIENT
Start: 2022-03-11 | End: 2022-03-15 | Stop reason: HOSPADM

## 2022-03-11 RX ORDER — ONDANSETRON 4 MG/1
4 TABLET, ORALLY DISINTEGRATING ORAL EVERY 8 HOURS PRN
Status: DISCONTINUED | OUTPATIENT
Start: 2022-03-11 | End: 2022-03-15 | Stop reason: HOSPADM

## 2022-03-11 RX ORDER — PREGABALIN 100 MG/1
200 CAPSULE ORAL 3 TIMES DAILY
Status: DISCONTINUED | OUTPATIENT
Start: 2022-03-11 | End: 2022-03-15 | Stop reason: HOSPADM

## 2022-03-11 RX ORDER — SODIUM CHLORIDE 0.9 % (FLUSH) 0.9 %
5-40 SYRINGE (ML) INJECTION EVERY 12 HOURS SCHEDULED
Status: DISCONTINUED | OUTPATIENT
Start: 2022-03-11 | End: 2022-03-15 | Stop reason: HOSPADM

## 2022-03-11 RX ORDER — DOXYCYCLINE 100 MG/1
100 CAPSULE ORAL ONCE
Status: COMPLETED | OUTPATIENT
Start: 2022-03-11 | End: 2022-03-11

## 2022-03-11 RX ORDER — SODIUM CHLORIDE 0.9 % (FLUSH) 0.9 %
5-40 SYRINGE (ML) INJECTION PRN
Status: DISCONTINUED | OUTPATIENT
Start: 2022-03-11 | End: 2022-03-15 | Stop reason: HOSPADM

## 2022-03-11 RX ADMIN — IPRATROPIUM BROMIDE AND ALBUTEROL SULFATE 1 AMPULE: .5; 3 SOLUTION RESPIRATORY (INHALATION) at 15:02

## 2022-03-11 RX ADMIN — DOXYCYCLINE 100 MG: 100 CAPSULE ORAL at 15:45

## 2022-03-11 RX ADMIN — IPRATROPIUM BROMIDE AND ALBUTEROL SULFATE 1 AMPULE: .5; 3 SOLUTION RESPIRATORY (INHALATION) at 00:49

## 2022-03-11 RX ADMIN — OXYCODONE HYDROCHLORIDE AND ACETAMINOPHEN 1 TABLET: 5; 325 TABLET ORAL at 21:08

## 2022-03-11 RX ADMIN — DOXYCYCLINE 100 MG: 100 CAPSULE ORAL at 01:02

## 2022-03-11 RX ADMIN — SODIUM CHLORIDE, PRESERVATIVE FREE 10 ML: 5 INJECTION INTRAVENOUS at 21:09

## 2022-03-11 RX ADMIN — ENOXAPARIN SODIUM 30 MG: 100 INJECTION SUBCUTANEOUS at 21:09

## 2022-03-11 RX ADMIN — LISINOPRIL 10 MG: 10 TABLET ORAL at 09:56

## 2022-03-11 RX ADMIN — IPRATROPIUM BROMIDE AND ALBUTEROL SULFATE 1 AMPULE: .5; 3 SOLUTION RESPIRATORY (INHALATION) at 19:19

## 2022-03-11 RX ADMIN — METHYLPREDNISOLONE SODIUM SUCCINATE 40 MG: 40 INJECTION, POWDER, FOR SOLUTION INTRAMUSCULAR; INTRAVENOUS at 17:56

## 2022-03-11 RX ADMIN — AMLODIPINE BESYLATE 10 MG: 10 TABLET ORAL at 09:56

## 2022-03-11 RX ADMIN — SODIUM CHLORIDE, PRESERVATIVE FREE 10 ML: 5 INJECTION INTRAVENOUS at 09:57

## 2022-03-11 RX ADMIN — MAGNESIUM SULFATE HEPTAHYDRATE 2000 MG: 500 INJECTION, SOLUTION INTRAMUSCULAR; INTRAVENOUS at 07:42

## 2022-03-11 RX ADMIN — PRAVASTATIN SODIUM 80 MG: 40 TABLET ORAL at 17:55

## 2022-03-11 RX ADMIN — OXYCODONE HYDROCHLORIDE AND ACETAMINOPHEN 1 TABLET: 5; 325 TABLET ORAL at 13:01

## 2022-03-11 RX ADMIN — PREGABALIN 200 MG: 100 CAPSULE ORAL at 09:56

## 2022-03-11 RX ADMIN — PREGABALIN 200 MG: 100 CAPSULE ORAL at 21:08

## 2022-03-11 RX ADMIN — FUROSEMIDE 40 MG: 40 TABLET ORAL at 09:56

## 2022-03-11 RX ADMIN — ENOXAPARIN SODIUM 30 MG: 100 INJECTION SUBCUTANEOUS at 09:56

## 2022-03-11 RX ADMIN — PREGABALIN 200 MG: 100 CAPSULE ORAL at 13:01

## 2022-03-11 RX ADMIN — ASPIRIN 81 MG: 81 TABLET, COATED ORAL at 09:56

## 2022-03-11 RX ADMIN — METHYLPREDNISOLONE SODIUM SUCCINATE 40 MG: 40 INJECTION, POWDER, FOR SOLUTION INTRAMUSCULAR; INTRAVENOUS at 09:56

## 2022-03-11 ASSESSMENT — PAIN SCALES - GENERAL
PAINLEVEL_OUTOF10: 8
PAINLEVEL_OUTOF10: 0
PAINLEVEL_OUTOF10: 7
PAINLEVEL_OUTOF10: 4
PAINLEVEL_OUTOF10: 0

## 2022-03-11 ASSESSMENT — ENCOUNTER SYMPTOMS
COUGH: 1
BACK PAIN: 0
VOMITING: 0
ABDOMINAL PAIN: 0
NAUSEA: 0
WHEEZING: 1
SHORTNESS OF BREATH: 1

## 2022-03-11 NOTE — TELEPHONE ENCOUNTER
Note completed, I reprinted the orders for walker and cane, please print my note and fax together, patient just provided me with the phone number, given to him by his insurance    Please call [phone]  and obtain the fax number then let the patient know that we have done that and whom to call to pick them up

## 2022-03-11 NOTE — TELEPHONE ENCOUNTER
Noted. Thank you! I received documentation when he was admitted, I did start him on medications for COPD exacerbation at the last appointment.   I am proud of him that he quit smoking  Future Appointments   Date Time Provider Keri Tameka   3/14/2022 10:00 AM Raven Leyva MD [facility] T[facility]P   4/29/2022  8:20 AM Maxime Lang MD [facility]   6/10/2022  2:00 PM [facility]

## 2022-03-11 NOTE — ED PROVIDER NOTES
EMERGENCY DEPARTMENT ENCOUNTER   ATTENDING ATTESTATION     Pt Name: Waqar Alexander  MRN: [id number]  [place] 1955  Date of evaluation: 3/11/22       Waqar Alexander is a 78 y.o. male who presents with Shortness of Breath      MDM:   40-year-old male history of COPD presented for evaluation of worsening shortness of breath over the past several days. Requiring several liters nasal cannula. Work-up consistent with COPD exacerbation. Will admit with p.o. doxycycline, steroids, duo nebs. Vitals:   Vitals:    03/11/22 0032 03/11/22 0049 03/11/22 0119 03/11/22 0145   BP: 131/80  139/68 (!) 152/72   Pulse: 54  60 62   Resp: 10 15 13 16   Temp:    97.9 °F (36.6 °C)   TempSrc:    Oral   SpO2: 96% 96% 97% 97%   Weight:    287 lb 7.7 oz (130.4 kg)   Height:    6' (1.829 m)         I personally saw and examined the patient. I have reviewed and agree with the resident's findings, including all diagnostic interpretations and treatment plan as written. I was present for the key portions of any procedures performed and the inclusive time noted for any critical care statement. The care is provided during an unprecedented national emergency due to the novel coronavirus, COVID 19.   Lefty Miller MD  Attending Emergency Physician            Lefty Miller MD  03/11/22 3851

## 2022-03-11 NOTE — PROGRESS NOTES
03/11/22 0052   RT Protocol   History Pulmonary Disease 2   Respiratory pattern 0   Breath sounds 4   Cough 0   Indications for Bronchodilator Therapy Decreased or absent breath sounds; Wheezing associated with pulm disorder; On home bronchodilators   Bronchodilator Assessment Score 6

## 2022-03-11 NOTE — TELEPHONE ENCOUNTER
----- Message from Doc Dias sent at 3/11/2022  2:41 PM EST -----  Subject: Message to Provider    QUESTIONS  Information for Provider? PATIENT SAID HE QUIT SMOKING WEDNESDAY AND HE IS   IN [facility] AND \"HE IS SICKER THAN A DOG\".  ---------------------------------------------------------------------------  --------------  CALL BACK INFO  What is the best way for the office to contact you? OK to respond with   electronic message via spotflux portal (only for patients who have   registered spotflux account)  Preferred Call Back Phone Number? [phone]  ---------------------------------------------------------------------------  --------------  SCRIPT ANSWERS  Relationship to Patient?  Self

## 2022-03-11 NOTE — ED TRIAGE NOTES
BIB via EMS for c/o increased SOB x2-3 days. Seen by PCP, prescribed Prednisone and Zpak, first doses this a.m. second EMS call to home. Pt took RT treatment at home with no relief. Wears 3L NC at HS. En route EMS admin IVP SoluMedrol  And Mg+. 91% on RA, 3L NC reapplied. (-) COVID test today.

## 2022-03-11 NOTE — PLAN OF CARE
Problem: Safety:  Goal: Free from accidental physical injury  Description: Free from accidental physical injury  Outcome: Met This Shift  Goal: Free from intentional harm  Description: Free from intentional harm  Outcome: Met This Shift     Problem: Breathing Pattern - Ineffective:  Goal: Ability to achieve and maintain a regular respiratory rate will improve  Description: Ability to achieve and maintain a regular respiratory rate will improve  Outcome: Ongoing     Problem: Infection:  Goal: Will remain free from infection  Description: Will remain free from infection  Outcome: Ongoing     Problem: Gas Exchange - Impaired:  Goal: Levels of oxygenation will improve  Description: Levels of oxygenation will improve  Outcome: Ongoing

## 2022-03-11 NOTE — H&P
History and Physical Service  [facility] Internal Medicine    HISTORY AND PHYSICAL EXAMINATION            Date:   3/11/2022  Patient name:  Frances Panchal  MRN:   [id number]  Account:  [de-identified]  YOB: 1955  PCP:    Coco Juarez MD  Code Status:    Full Code    Chief Complaint:     Chief Complaint   Patient presents with    Shortness of Breath         History Obtained From:     Patient, EMR, nursing staff  His  HPI   tory Obtained From:  Past MediPast Medical History:macho History: This patient is a 78 y.o. Non- / non  malewho presents with  Congestion and shortness of breath  Patient has history of COPD intermittently uses 3 L oxygen with CPAP at home  Reports 1 week history of cough and congestion  Has been gradually worsening  Cough associated with thick white sputum  Denies fevers chills chest pain nausea vomiting or diarrhea  Had similar episode of COPD exacerbation last year  Reports compliance with home inhalers  Symptoms improved with DuoNeb at home, also reported some improvement with IV steroids given since presentation to ER no aggravating factors      Other medical history includes chronic diastolic heart failure, REYNALDO on CPAP, prostate cancer, type 2 diabetes controlled    Review of Systems:     Positive for cough and shortness of breath  Denies chest pain or palpitations  Denies abdominal pain, diarrhea vomiting  Denies any new numbness tremors or weakness. A 10 point review of systems was performed and and negative except as mentioned in HPI  Positive and Negative as described in HPI.       Past Medical History:     Past Medical History:   Diagnosis Date    Acid reflux     Anemia, blood loss 10/7/2018    Arthritis     C. difficile colitis 3/19/2020    Chronic bilateral low back pain with bilateral sciatica 11/3/2016    Chronic diastolic heart failure ([address] 2/25/2021    Complete tear of left rotator cuff 7/18/2018    COPD (chronic obstructive pulmonary disease) ([address]     Coronary artery disease involving native coronary artery of native heart without angina pectoris 2/2/2020    Degenerative disc disease, cervical 7/28/2019    GI bleed 3/19/2020    Gout     H/O cardiac catheterization     yrs ago   no stents    History of blood transfusion     Hyperlipidemia     Hyperlipidemia with target LDL less than 100 1/20/2016    Hyperparathyroidism ([address] 1/17/2020    Hypertension     Knee pain, chronic     left    Liver disease     MDRO (multiple drug resistant organisms) resistance     Melena 3/19/2020    Memory loss     Moderate episode of recurrent major depressive disorder ([address] 1/20/2016    Obesity, Class III, BMI 40-49.9 (morbid obesity) ([address] 1/20/2016    REYNALDO on CPAP 5/6/2017    Osteoarthritis     Peripheral arterial occlusive disease ([address] 3/25/2017    Pneumonia 3/9/2020    Polypharmacy 3/25/2017    Positive FIT (fecal immunochemical test) 4/11/2017    Prolonged emergence from general anesthesia 01/05/2017    Patient \"on life support for 3 days\" after back surgery due to being given succinylcholine    Prostate CA ([address] 1/20/2016    Prostate cancer ([address] 10/2013    finished radiation tx 5/2014    Pseudocholinesterase deficiency 03/25/2017    Pt.  \"on life support\" for 3 days after back surgery 1/5/17 after being given succinylcholine    Severe obesity (BMI 35.0-35.9 with comorbidity) ([address] 2/2/2020    Short of breath on exertion     Sleep apnea     uses CPAP machine nightly    Status post lumbar laminectomy 3/25/2017    Stenosis of left carotid artery 10/7/2018    Syncope and collapse 3/19/2020    Tubular adenoma of colon 4/11/17 5/13/2017    Type 2 diabetes mellitus with hyperglycemia, without long-term current use of insulin ([address] 3/25/2017    Lab Results Component Value Date  LABA1C 7.1 (H) 03/23/2017     Unintended weight loss 10/7/2018    Vitamin D deficiency 3/25/2017    Wears glasses         Past Surgical History:     Past Surgical History:   Procedure Laterality Date    BACK SURGERY      x 2     BACK SURGERY  01/05/2017    lumbar laminectomy L2, L3, L4    BRONCHOSCOPY N/A 3/13/2020    BRONCHOSCOPY performed by Vick Byers MD at [facility]  2007    no stents    CAROTID ENDARTERECTOMY Right 12/16/2019    Dr. Aldair Carter, LAPAROSCOPIC N/A 2/16/2021    CHOLECYSTECTOMY LAPAROSCOPIC ROBOTIC XI performed by Tony Victor MD at [address] [facility], COLON, DIAGNOSTIC     [address] [facility] Left 11/18/2020    HERNIA INGUINAL REPAIR [address] OPEN performed by Tony Victor MD at [address] [facility] Left     LUMBAR LAMINECTOMY  01/05/2017    L2-L4    MO CLOSED RX SHLDR DISLOC,ANESTHESIA Left 8/1/2018    SHOULDER CLOSED REDUCTION WITH C-ARM VISUALIZATION performed by Alisson Ramos MD at [facility] N/A 5/9/2017    COLONOSCOPY WITH BIOPSY performed by Osvaldo Michael DO at [address] IMPLANT Left 7/18/2018    SHOULDER TOTAL ARTHROPLASTY REVERSE LEFT DJO & BICEP TENDON TRANSFER performed by Alisson Ramos MD at [address] HUMERAL/GLENOID COMPNT Left 8/3/2018    SHOULDER TOTAL REVERSE  ARTHROPLASTY REVISION performed by Alisson Ramos MD at [address]? rotator cuff repair    SHOULDER SURGERY Left 10/15/2020    SHOULDER ARTHROSCOPY W/INTEROP CULTURES performed by Alisson Ramos MD at [facility]      ear, forehead    TONSILLECTOMY AND ADENOIDECTOMY      UPPER GASTROINTESTINAL ENDOSCOPY N/A 3/19/2020    EGD BIOPSY performed by Pattie Costa MD at [facility]        Medications Prior to Admission:     Prior to Admission medications    Medication Sig Start Date End Date Taking?  Authorizing Provider   [facility]) 250 MG tablet 500 mg orally on day one followed by 250 mg daily on days two through five 3/9/22 3/14/22 Yes Bear Beltran MD   predniSONE (DELTASONE) 10 MG tablet Take 5 tablets by mouth daily for 7 days 3/9/22 3/16/22 Yes Bear Beltran MD   lisinopril (PRINIVIL;ZESTRIL) 10 MG tablet Take 1 tablet by mouth daily Dose decreased by cardiologist 2/8/22  Yes Bear Beltran MD   pregabalin (LYRICA) 200 MG capsule Take 1 capsule by mouth 3 times daily for 90 days. Please ignore prior refill for gabapentin 800 mg, will switch back to Lyrica 2/7/22 5/8/22 Yes Bear Beltran MD   Cholecalciferol (VITAMIN D3) 1.25 MG (09737 UT) CAPS Take by mouth   Yes Historical Provider, MD   enzalutamide Franci Collier) 40 MG capsule Take 4 capsules by mouth daily 11/29/21  Yes Isabella King MD   furosemide (LASIX) 40 MG tablet Take 1 tablet by mouth daily Dose increased 1/12/2021 11/5/21  Yes Bear Beltran MD   omeprazole (PRILOSEC) 20 MG delayed release capsule Take 1 capsule by mouth every morning (before breakfast) Dose decreased 7/20/2021 7/20/21  Yes Bear Beltran MD   pravastatin (PRAVACHOL) 80 MG tablet Take 1 tablet by mouth every evening Dose increased  9/24/2019 7/20/21  Yes Bear Beltran MD   amLODIPine (NORVASC) 10 MG tablet Take 1 tablet by mouth daily Dose increased 10/16/2020 due to high BP. Correct dosage 7/20/21  Yes MD inna Larosebee plus (FOLBEE PLUS) TABS Take 1 tablet by mouth daily 7/12/21  Yes Germán Orona MD   oxyCODONE-acetaminophen (PERCOCET) 5-325 MG per tablet Take 1 tablet by mouth 2 times daily as needed.     Yes Historical Provider, MD   aspirin EC 81 MG EC tablet Take 1 tablet by mouth daily 9/4/18  Yes Bear Beltran MD   Ascorbic Acid (VITAMIN C) 250 MG tablet Take 250 mg by mouth daily    Historical Provider, MD   albuterol sulfate  (90 Base) MCG/ACT inhaler USE 2 INHALATIONS EVERY 6 HOURS AS NEEDED FOR WHEEZING OR SHORTNESS OF BREATH 11/18/21   Mary Ann Jones MD   ergocalciferol (ERGOCALCIFEROL) 1.25 MG (38452 UT) capsule Take 50,000 Units by mouth once a week     Historical Provider, MD   albuterol (PROVENTIL) (2.5 MG/3ML) 0.083% nebulizer solution Take 3 mLs by nebulization every 6 hours as needed for Wheezing or Shortness of Breath (cough) 8/5/21   Mary Ann Jones MD   tiotropium-olodaterol (STIOLTO RESPIMAT) 2.5-2.5 MCG/ACT AERS Inhale 2 puffs into the lungs daily    Historical Provider, MD   glucose monitoring kit (FREESTYLE) monitoring kit 1 kit by Does not apply route daily Please supply Patient with a glucose monitoring kit that insurance will cover. 4/23/21 4/23/22  Mary Ann Jones MD   Lancets 30G MISC Testing once a day. Please dispense according to patients insurance. 4/23/21   Mary Ann Jones MD   blood glucose monitor strips Testing once a day. Please dispense according to patients insurance. 4/23/21   Mary Ann Jones MD   Alcohol Swabs PADS Please dispense according to patients insurance/device. Testing once a day 4/23/21   Mary Ann Jones MD   ammonium lactate (AMLACTIN) 12 % cream APPLY TOPICALLY TO LEGS ONE TO TWO TIMES A DAY AS NEEDED 3/1/21   Mary Ann Jones MD   naloxone 4 MG/0.1ML LIQD nasal spray 1 spray by Nasal route as needed for Opioid Reversal Patient needs counseling 10/29/20   Mary Ann Jones MD   OXYGEN With CPAP at night 8/25/20   Historical Provider, MD   Respiratory Therapy Supplies (ADULT MASK) MISC Needs to use mask daily due to coronavirus pandemic. 7/7/20   Mary Ann Jones MD        Allergies:     Succinylcholine chloride and Cymbalta [duloxetine hcl]    Social History:     Tobacco:    reports that he has been smoking cigarettes. He started smoking about 8 months ago. He has a 35.00 pack-year smoking history. He has never used smokeless tobacco.  Alcohol:      reports previous alcohol use. Drug Use:  reports no history of drug use.     Family History:     Family History Problem Relation Age of Onset    Diabetes Mother     Lung Cancer Brother     Liver Cancer Brother     Cancer Father            Physical Exam:   BP (!) 133/56   Pulse 73   Temp 97.6 °F (36.4 °C) (Oral)   Resp 18   Ht 6' (1.829 m)   Wt 284 lb 6.3 oz (129 kg)   SpO2 94%   BMI 38.57 kg/m²   No results for input(s): POCGLU in the last 72 hours. General Appearance:  alert, well appearing, and in no acute distress  Mental status: oriented to person, place, and time with normal affect  Head:  normocephalic, atraumatic. Eye: no icterus, redness, pupils equal and reactive, extraocular eye movements intact, conjunctiva clear  Ear: normal external ear, no discharge, hearing intact  Nose:  no drainage noted  Mouth: mucous membranes moist  Neck: supple, no carotid bruits, thyroid not palpable  Lungs: Bilateral wheeze,, no, rales or rhonchi, normal effort  Cardiovascular: normal rate, irrgular rhythm, , no murmur, gallop, rub.   Abdomen: Soft, nontender, nondistended, normal bowel sounds, no hepatomegaly or splenomegaly  Neurologic: There are no new focal motor or sensory deficits, normal muscle tone and bulk, no abnormal sensation, normal speech, cranial nerves II through XII grossly intact  Skin: No gross lesions, rashes, bruising or bleeding on exposed skin area  Extremities:  peripheral pulses palpable, no pedal edema or calf pain with palpation  Psych: normal affect     Investigations:      Laboratory Testing:  Recent Results (from the past 24 hour(s))   EKG 12 Lead    Collection Time: 03/10/22 11:59 PM   Result Value Ref Range    Ventricular Rate 62 BPM    Atrial Rate 62 BPM    QRS Duration 88 ms    Q-T Interval 382 ms    QTc Calculation (Bazett) 387 ms    R Axis 33 degrees    T Axis 57 degrees   Blood Gas, Venous    Collection Time: 03/11/22 12:05 AM   Result Value Ref Range    pH, Shahbaz 7.359 7.320 - 7.420    pCO2, Shahbaz 48.5 39.0 - 55.0    pO2, Shahbaz 42.1 30.0 - 50.0    HCO3, Venous 27.3 24.0 - 30.0 mmol/L Positive Base Excess, Shahbaz 1.8 0.0 - 2.0 mmol/L    O2 Sat, Shahbaz 76.1 60.0 - 85.0 %    Carboxyhemoglobin 1.8 0 - 5 %    Methemoglobin 0.6 0.0 - 1.9 %    Pt Temp 37.0    COVID-19 & Influenza Combo    Collection Time: 03/11/22 12:08 AM    Specimen: Nasopharyngeal Swab   Result Value Ref Range    Specimen Description . NASOPHARYNGEAL SWAB     Source . NASOPHARYNGEAL SWAB     SARS-CoV-2 RNA, RT PCR Not Detected Not Detected    INFLUENZA A Not Detected Not Detected    INFLUENZA B Not Detected Not Detected       Recent Labs     03/10/22  0008   HGB 14.2   HCT 42.6   WBC 10.0   MCV 89.9      K 4.9      CO2 24   BUN 19   CREATININE 0.69*   GLUCOSE 139*       Hematology:  Recent Labs     03/10/22  0008   WBC 10.0   RBC 4.74   HGB 14.2   HCT 42.6   MCV 89.9   MCH 30.1   MCHC 33.4   RDW 14.4      MPV 8.1     Chemistry:  Recent Labs     03/09/22  0930 03/10/22  0008   NA  --  135   K  --  4.9   CL  --  100   CO2  --  24   GLUCOSE  --  139*   BUN  --  19   CREATININE  --  0.69*   ANIONGAP  --  11   LABGLOM  --  >60   GFRAA  --  >60   CALCIUM  --  9.6   PSA 0.69  --    PROBNP  --  63     Recent Labs     03/09/22  1041   LABA1C 5.8       Imaging/Diagnostics:       XR ELBOW RIGHT (MIN 3 VIEWS)    Result Date: 3/1/2022  EXAMINATION: THREE XRAY VIEWS OF THE RIGHT ELBOW 3/1/2022 2:28 pm COMPARISON: None. HISTORY: ORDERING SYSTEM PROVIDED HISTORY: Cellulitis of right elbow TECHNOLOGIST PROVIDED HISTORY: elbow pain Reason for Exam: right elbow pain X 1 day, NKI FINDINGS: Alignment is anatomic. No visible fracture or appreciable joint effusion. The radial head aligns normally with the capitellum. Soft tissue swelling along the dorsum of the elbow. 1. No acute osseous abnormality or evidence of soft tissue gas. 2. Soft tissue swelling along the dorsum of the elbow.      MRI LUMBAR SPINE WO CONTRAST    Result Date: 3/9/2022  EXAMINATION: MRI OF THE LUMBAR SPINE WITHOUT CONTRAST, 3/9/2022 8:43 am TECHNIQUE: Multiplanar multisequence MRI of the lumbar spine was performed without the administration of intravenous contrast. COMPARISON: 09/21/2016 HISTORY: ORDERING SYSTEM PROVIDED HISTORY: Bilateral lumbar radiculopathy TECHNOLOGIST PROVIDED HISTORY: Reason for Exam: Pt states low back pain fell through a floor in 2016 injuring low back and left knee pain FINDINGS: BONES/ALIGNMENT: Mild retrolisthesis is identified at L1-L2, L2-L3, and L3-L4. The vertebral body heights are maintained. The bone marrow signal appears unremarkable. No acute fracture is detected. Laminectomy is identified from L2 through L5. SPINAL CORD: The conus terminates normally. SOFT TISSUES: No paraspinal mass identified. L1-L2: Moderate central canal stenosis identified due to shallow broad disc osteophyte complex. L2-L3: Laminectomy is appreciated. Shallow broad disc osteophyte complex flattens the thecal sac. L3-L4: Laminectomy is again identified. Broad disc osteophyte complex flattens the thecal sac and encroaches on both exit foramina which are moderately stenotic. Mild facet arthropathy is noted. L4-L5: Laminectomy is appreciated without evidence of focal disc protrusion. Shallow broad disc osteophyte complex flattens the thecal sac and encroaches upon both exit foramina resulting in moderate right foraminal stenosis and mild left foraminal stenosis. L5-S1: Broad disc osteophyte complex and facet arthropathy results in mild left foraminal stenosis. Stable laminectomy from L2 through L5. Mild retrolisthesis from L1 through L4 not significantly changed. Stable moderate central canal stenosis at L1-L2. Moderate multilevel bilateral neural foraminal stenosis is not significantly changed.      XR CHEST PORTABLE    Result Date: 3/11/2022  EXAMINATION: ONE XRAY VIEW OF THE CHEST 3/11/2022 12:18 am COMPARISON: 11/05/2020 HISTORY: ORDERING SYSTEM PROVIDED HISTORY: shortness of breath FINDINGS: The cardiomediastinal silhouette is stable in size and configuration. Bibasilar atelectasis without focal consolidation. No sizable pleural effusion or pneumothorax. No acute osseous findings. Upper abdomen is grossly unremarkable. Bibasilar atelectasis without focal consolidation.         Current Facility-Administered Medications   Medication Dose Route Frequency Provider Last Rate Last Admin    ipratropium-albuterol (DUONEB) nebulizer solution 1 ampule  1 ampule Inhalation Q4H LUCERO Miller MD   1 ampule at 03/11/22 0049    sodium chloride flush 0.9 % injection 5-40 mL  5-40 mL IntraVENous 2 times per day Anna Castrejon MD   10 mL at 03/11/22 0957    sodium chloride flush 0.9 % injection 5-40 mL  5-40 mL IntraVENous PRN Anna Castrejon MD        0.9 % sodium chloride infusion  25 mL IntraVENous PRN Anna Castrejon MD        enoxaparin (LOVENOX) injection 30 mg  30 mg SubCUTAneous BID Anna Castrejon MD   30 mg at 03/11/22 0956    acetaminophen (TYLENOL) tablet 650 mg  650 mg Oral Q4H PRN Anna Castrejon MD        ondansetron (ZOFRAN-ODT) disintegrating tablet 4 mg  4 mg Oral Q8H PRN Anna Castrejon MD        Or    ondansetron [facility]) injection 4 mg  4 mg IntraVENous Q6H PRN Anna Castrejon MD        amLODIPine (NORVASC) tablet 10 mg  10 mg Oral Daily Anna Castrejon MD   10 mg at 03/11/22 0956    aspirin EC tablet 81 mg  81 mg Oral Daily Anna Castrejon MD   81 mg at 03/11/22 8346    enzalutamide (XTANDI) capsule 160 mg  160 mg Oral Daily Anna Castrejon MD        vitamin B and C (TOTAL B-C) 1 tablet  1 tablet Oral Daily Anna Castrejon MD        furosemide (LASIX) tablet 40 mg  40 mg Oral Daily Anna Castrejon MD   40 mg at 03/11/22 0956    lisinopril (PRINIVIL;ZESTRIL) tablet 10 mg  10 mg Oral Daily Anna Castrejon MD   10 mg at 03/11/22 0956    [START ON 3/12/2022] pantoprazole (PROTONIX) tablet 40 mg  40 mg Oral QAM AC Anna Castrejon MD        pravastatin (PRAVACHOL) tablet 80 mg  80 mg Oral QPM Anna Castrejon MD        pregabalin (LYRICA) capsule 200 mg  200 mg Oral TID Eliel Ma MD   200 mg at 03/11/22 1301    methylPREDNISolone sodium (SOLU-MEDROL) injection 40 mg  40 mg IntraVENous Q12H Eliel Ma MD   40 mg at 03/11/22 0956    oxyCODONE-acetaminophen (PERCOCET) 5-325 MG per tablet 1 tablet  1 tablet Oral Q8H PRN Eliel Ma MD   1 tablet at 03/11/22 1301       Impressions :     1. Principal Problem:    COPD exacerbation ([facility]  Resolved Problems:    * No resolved hospital problems.  *        2.  has a past medical history of Acid reflux, Anemia, blood loss (10/7/2018), Arthritis, C. difficile colitis (3/19/2020), Chronic bilateral low back pain with bilateral sciatica (11/3/2016), Chronic diastolic heart failure ([facility] (2/25/2021), Complete tear of left rotator cuff (7/18/2018), COPD (chronic obstructive pulmonary disease) ([facility] Coronary artery disease involving native coronary artery of native heart without angina pectoris (2/2/2020), Degenerative disc disease, cervical (7/28/2019), GI bleed (3/19/2020), Gout, H/O cardiac catheterization, History of blood transfusion, Hyperlipidemia, Hyperlipidemia with target LDL less than 100 (1/20/2016), Hyperparathyroidism ([facility] (1/17/2020), Hypertension, Knee pain, chronic, Liver disease, MDRO (multiple drug resistant organisms) resistance, Melena (3/19/2020), Memory loss, Moderate episode of recurrent major depressive disorder ([facility] (1/20/2016), Obesity, Class III, BMI 40-49.9 (morbid obesity) ([facility] (1/20/2016), REYNALDO on CPAP (5/6/2017), Osteoarthritis, Peripheral arterial occlusive disease ([facility] (3/25/2017), Pneumonia (3/9/2020), Polypharmacy (3/25/2017), Positive FIT (fecal immunochemical test) (4/11/2017), Prolonged emergence from general anesthesia (01/05/2017), Prostate CA ([facility] (1/20/2016), Prostate cancer ([facility] (10/2013), Pseudocholinesterase deficiency (03/25/2017), Severe obesity (BMI 35.0-35.9 with comorbidity) ([facility] (2/2/2020), Short of breath on exertion, Sleep apnea, Status post lumbar laminectomy (3/25/2017), Stenosis of left carotid artery (10/7/2018), Syncope and collapse (3/19/2020), Tubular adenoma of colon 4/11/17 (5/13/2017), Type 2 diabetes mellitus with hyperglycemia, without long-term current use of insulin ([facility] (3/25/2017), Unintended weight loss (10/7/2018), Vitamin D deficiency (3/25/2017), and Wears glasses.      Plans:     70-year-old male history of COPD on intermittent home oxygen, REYNALDO uses home CPAP presenting with 1 week history of shortness of breath cough and congestion treated for COPD exacerbation  No chest pain no fever troponin negative BNP normal no leg swelling EKG sinus rhythm with PACs  Acute exacerbation of COPD-IV steroids, mucolytic, scheduled bronchodilators  Chronic diastolic heart failure-compensated continue lisinopril, Lasix  Current smoker-reports quit 4 days ago  Type 2 diabetes controlled  Hypertension-Norvasc, lisinopril  Prostate cancer-Xtandi    DVT pplx-Lovenox  Antibiotics started/continued-oral doxycycline  Code status    Radha Godoy MD  3/11/2022  2:00 PM

## 2022-03-11 NOTE — PROGRESS NOTES
03/11/22 0220   RT Protocol   History Pulmonary Disease 2   Respiratory pattern 0   Breath sounds 4   Cough 0   Indications for Bronchodilator Therapy Decreased or absent breath sounds; On home bronchodilators; Wheezing associated with pulm disorder   Bronchodilator Assessment Score 6

## 2022-03-11 NOTE — PROGRESS NOTES
Patient arrived to floor via stretcher and ambulated independently to the bed. Heart monitor with patches applied, vital signs taken, orientation to room given, bed in lowest position with side rails up x2, call light and personal belongings within reach and current plan of care discussed with patient. RT to bring BiPAP to patient room for nighttime use. All questions and concerns addressed with patient at this time; patient resting comfortably without signs of distress. RN will continue to monitor. Electronically signed by Anabel Denver, RN.

## 2022-03-11 NOTE — PLAN OF CARE
Problem: Breathing Pattern - Ineffective:  Goal: Ability to achieve and maintain a regular respiratory rate will improve  Description: Ability to achieve and maintain a regular respiratory rate will improve  Outcome: Ongoing     Problem: Infection:  Goal: Will remain free from infection  Description: Will remain free from infection  Outcome: Ongoing     Problem: Safety:  Goal: Free from accidental physical injury  Description: Free from accidental physical injury  Outcome: Ongoing  Goal: Free from intentional harm  Description: Free from intentional harm  Outcome: Ongoing     Problem: Daily Care:  Goal: Daily care needs are met  Description: Daily care needs are met  Outcome: Ongoing     Problem: Pain:  Goal: Patient's pain/discomfort is manageable  Description: Patient's pain/discomfort is manageable  Outcome: Ongoing     Problem: Skin Integrity:  Goal: Skin integrity will stabilize  Description: Skin integrity will stabilize  Outcome: Ongoing     Problem: Discharge Planning:  Goal: Patients continuum of care needs are met  Description: Patients continuum of care needs are met  Outcome: Ongoing     Problem: Gas Exchange - Impaired:  Goal: Levels of oxygenation will improve  Description: Levels of oxygenation will improve  Outcome: Ongoing

## 2022-03-11 NOTE — ED PROVIDER NOTES
[facility]  Emergency Department Encounter  Emergency Medicine Resident     Pt Name: Sarai Viera  MRN: [id number]  [place] 1955  Date of evaluation: 3/10/22  PCP:  Nabeel Chavez MD    CHIEF COMPLAINT       Chief Complaint   Patient presents with    Shortness of Breath       HISTORY OFPRESENT ILLNESS  (Location/Symptom, Timing/Onset, Context/Setting, Quality, Duration, Modifying Factors,Severity.)      Sarai Viera is a 78 y.o. male history of COPD who presents with dyspnea. Symptoms started yesterday. Been getting progressively worse over that time. Patient does take long and short acting medications at home, but states they have been unhelpful. He does intermittently have 3 L of oxygen that he uses in combination with CPAP, but does not regularly needed. Starting this morning, patient had worsening shortness of breath and began to use supplemental oxygen to assist with his symptoms. Patient called EMS this evening due to worsening symptoms. Also complaining of nasal congestion, postnasal drip and dry cough. States symptoms are consistent with the last time he had pneumonia. Patient called EMS and received 1 to 25 mg of Solu-Medrol, 2 g of magnesium sulfate and duo nebs in route. Did report some improvement in his symptoms thereafter. Denies any fever, chills, headache, dizziness, chest pain, abdominal pain, nausea, vomiting, diarrhea, or weakness. He is vaccinated and boosted against COVID-19, as well as vaccinated against influenza.     PAST MEDICAL / SURGICAL / SOCIAL / FAMILY HISTORY      has a past medical history of Acid reflux, Anemia, blood loss, Arthritis, C. difficile colitis, Chronic bilateral low back pain with bilateral sciatica, Chronic diastolic heart failure (HCC), Complete tear of left rotator cuff, COPD (chronic obstructive pulmonary disease) ([facility] [address] Coronary artery disease involving native coronary artery of native heart without angina pectoris, Degenerative disc disease, cervical, GI bleed, Gout, H/O cardiac catheterization, History of blood transfusion, Hyperlipidemia, Hyperlipidemia with target LDL less than 100, Hyperparathyroidism ([address] Hypertension, Knee pain, chronic, Liver disease, MDRO (multiple drug resistant organisms) resistance, Melena, Memory loss, Moderate episode of recurrent major depressive disorder ([address] Obesity, Class III, BMI 40-49.9 (morbid obesity) ([address] REYNALDO on CPAP, Osteoarthritis, Peripheral arterial occlusive disease ([address] Pneumonia, Polypharmacy, Positive FIT (fecal immunochemical test), Prolonged emergence from general anesthesia, Prostate CA ([address] Prostate cancer ([address] Pseudocholinesterase deficiency, Severe obesity (BMI 35.0-35.9 with comorbidity) ([address] Short of breath on exertion, Sleep apnea, Status post lumbar laminectomy, Stenosis of left carotid artery, Syncope and collapse, Tubular adenoma of colon 4/11/17, Type 2 diabetes mellitus with hyperglycemia, without long-term current use of insulin ([address] Unintended weight loss, Vitamin D deficiency, and Wears glasses. has a past surgical history that includes knee surgery (Left); lumbar laminectomy (01/05/2017); Tonsillectomy and adenoidectomy; pr colonoscopy w/biopsy single/multiple (N/A, 5/9/2017); shoulder surgery (Right, 1989?); Carpal tunnel release (Bilateral); pr reconstr total shoulder implant (Left, 7/18/2018); pr closed rx shldr disloc,anesthesia (Left, 8/1/2018); pr taryn shoulder arthrplsty humeral/glenoid compnt (Left, 8/3/2018); Carotid endarterectomy (Right, 12/16/2019); bronchoscopy (N/A, 3/13/2020); Upper gastrointestinal endoscopy (N/A, 3/19/2020); Cardiac catheterization (2007); Colonoscopy; skin biopsy; shoulder surgery (Left, 10/15/2020); Endoscopy, colon, diagnostic; joint replacement; back surgery; back surgery (01/05/2017); hernia repair (Left, 11/18/2020); and Cholecystectomy, laparoscopic (N/A, 2/16/2021).      Social History Socioeconomic History    Marital status:      Spouse name: Not on file    Number of children: Not on file    Years of education: Not on file    Highest education level: Not on file   Occupational History    Not on file   Tobacco Use    Smoking status: Current Every Day Smoker     Packs/day: 1.00     Years: 35.00     Pack years: 35.00     Types: Cigarettes     Start date: 7/8/2021    Smokeless tobacco: Never Used   Vaping Use    Vaping Use: Never used   Substance and Sexual Activity    Alcohol use: Not Currently     Alcohol/week: 0.0 standard drinks    Drug use: No    Sexual activity: Not Currently     Partners: Female   Other Topics Concern    Not on file   Social History Narrative    Not on file     Social Determinants of Health     Financial Resource Strain: Low Risk     Difficulty of Paying Living Expenses: Not hard at all   Food Insecurity: No Food Insecurity    Worried About Running Out of Food in the Last Year: Never true    Colleen of Food in the Last Year: Never true   Transportation Needs: No Transportation Needs    Lack of Transportation (Medical): No    Lack of Transportation (Non-Medical):  No   Physical Activity:     Days of Exercise per Week: Not on file    Minutes of Exercise per Session: Not on file   Stress:     Feeling of Stress : Not on file   Social Connections:     Frequency of Communication with Friends and Family: Not on file    Frequency of Social Gatherings with Friends and Family: Not on file    Attends Islam Services: Not on file    Active Member of Clubs or Organizations: Not on file    Attends Club or Organization Meetings: Not on file    Marital Status: Not on file   Intimate Partner Violence:     Fear of Current or Ex-Partner: Not on file    Emotionally Abused: Not on file    Physically Abused: Not on file    Sexually Abused: Not on file   Housing Stability: Unknown    Unable to Pay for Housing in the Last Year: No    Number of Places Lived in the Last Year: Not on file    Unstable Housing in the Last Year: No       Family History   Problem Relation Age of Onset    Diabetes Mother     Lung Cancer Brother     Liver Cancer Brother     Cancer Father         Allergies:  Succinylcholine chloride and Cymbalta [duloxetine hcl]    Home Medications:  Prior to Admission medications    Medication Sig Start Date End Date Taking? Authorizing Provider   azithromycin (ZITHROMAX) 250 MG tablet 500 mg orally on day one followed by 250 mg daily on days two through five 3/9/22 3/14/22  Chapo Heller MD   predniSONE (DELTASONE) 10 MG tablet Take 5 tablets by mouth daily for 7 days 3/9/22 3/16/22  Chapo Heller MD   lisinopril (PRINIVIL;ZESTRIL) 10 MG tablet Take 1 tablet by mouth daily Dose decreased by cardiologist 2/8/22   Chapo Heller MD   pregabalin (LYRICA) 200 MG capsule Take 1 capsule by mouth 3 times daily for 90 days.  Please ignore prior refill for gabapentin 800 mg, will switch back to Lyrica 2/7/22 5/8/22  Chapo Heller MD   Ascorbic Acid (VITAMIN C) 250 MG tablet Take 250 mg by mouth daily    Historical Provider, MD   Cholecalciferol (VITAMIN D3) 1.25 MG (49793 UT) CAPS Take by mouth    Historical Provider, MD   enzalutamide Francoise Moffett) 40 MG capsule Take 4 capsules by mouth daily 11/29/21   Lorrie Sanon MD   albuterol sulfate  (90 Base) MCG/ACT inhaler USE 2 INHALATIONS EVERY 6 HOURS AS NEEDED FOR WHEEZING OR SHORTNESS OF BREATH 11/18/21   Chapo Heller MD   ergocalciferol (ERGOCALCIFEROL) 1.25 MG (26683 UT) capsule Take 50,000 Units by mouth once a week     Historical Provider, MD   furosemide (LASIX) 40 MG tablet Take 1 tablet by mouth daily Dose increased 1/12/2021 11/5/21   Chapo Heller MD   albuterol (PROVENTIL) (2.5 MG/3ML) 0.083% nebulizer solution Take 3 mLs by nebulization every 6 hours as needed for Wheezing or Shortness of Breath (cough) 8/5/21   Chapo Heller MD   omeprazole ([address]) 20 MG delayed release capsule Take 1 capsule by mouth every morning (before breakfast) Dose decreased 7/20/2021 7/20/21   Simeon Vaz MD   pravastatin (PRAVACHOL) 80 MG tablet Take 1 tablet by mouth every evening Dose increased  9/24/2019 7/20/21   Simeon Vaz MD   amLODIPine (NORVASC) 10 MG tablet Take 1 tablet by mouth daily Dose increased 10/16/2020 due to high BP. Correct dosage 7/20/21   Simeon Vaz MD   folbee plus (FOLBEE PLUS) TABS Take 1 tablet by mouth daily 7/12/21   Betty Mclean MD   tiotropium-olodaterol (STIOLTO RESPIMAT) 2.5-2.5 MCG/ACT AERS Inhale 2 puffs into the lungs daily    Historical Provider, MD   oxyCODONE-acetaminophen (PERCOCET) 5-325 MG per tablet Take 1 tablet by mouth 2 times daily as needed. Historical Provider, MD   glucose monitoring kit (FREESTYLE) monitoring kit 1 kit by Does not apply route daily Please supply Patient with a glucose monitoring kit that insurance will cover. 4/23/21 4/23/22  Simeon Vaz MD   Lancets 30G MISC Testing once a day. Please dispense according to patients insurance. 4/23/21   Simeon Vaz MD   blood glucose monitor strips Testing once a day. Please dispense according to patients insurance. 4/23/21   Simeon Vaz MD   Alcohol Swabs PADS Please dispense according to patients insurance/device. Testing once a day 4/23/21   Simeon Vaz MD   ammonium lactate (AMLACTIN) 12 % cream APPLY TOPICALLY TO LEGS ONE TO TWO TIMES A DAY AS NEEDED 3/1/21   Simeon Vaz MD   naloxone 4 MG/0.1ML LIQD nasal spray 1 spray by Nasal route as needed for Opioid Reversal Patient needs counseling 10/29/20   Simeon Vaz MD   OXYGEN With CPAP at night 8/25/20   Historical Provider, MD   Respiratory Therapy Supplies (ADULT MASK) MISC Needs to use mask daily due to coronavirus pandemic.  7/7/20   Simeon Vaz MD   aspirin EC 81 MG EC tablet Take 1 tablet by mouth daily 9/4/18   Simeon Vaz MD REVIEW OFSYSTEMS    (2-9 systems for level 4, 10 or more for level 5)      Review of Systems   Constitutional: Negative for chills and fever. HENT: Positive for congestion and postnasal drip. Negative for tinnitus. Eyes: Negative for visual disturbance. Respiratory: Positive for cough, shortness of breath and wheezing. Cardiovascular: Negative for chest pain. Gastrointestinal: Negative for abdominal pain, nausea and vomiting. Genitourinary: Negative for dysuria. Musculoskeletal: Negative for back pain, neck pain and neck stiffness. Allergic/Immunologic: Negative for immunocompromised state. Neurological: Negative for dizziness, light-headedness and headaches. Hematological: Does not bruise/bleed easily. PHYSICAL EXAM   (up to 7 for level 4, 8 or more forlevel 5)      INITIAL VITALS:   ED Triage Vitals [03/10/22 2319]   BP Temp Temp Source Pulse Resp SpO2 Height Weight   (!) 142/74 98 °F (36.7 °C) Oral 64 30 91 % 6' (1.829 m) 277 lb (125.6 kg)       Physical Exam  Vitals reviewed. Constitutional:       General: He is in acute distress. Appearance: Normal appearance. He is not ill-appearing. HENT:      Head: Normocephalic and atraumatic. Right Ear: Tympanic membrane, ear canal and external ear normal.      Left Ear: Tympanic membrane, ear canal and external ear normal.      Nose: Nose normal. No rhinorrhea. Mouth/Throat:      Mouth: Mucous membranes are moist.      Pharynx: Oropharynx is clear. Eyes:      Conjunctiva/sclera: Conjunctivae normal.      Pupils: Pupils are equal, round, and reactive to light. Cardiovascular:      Rate and Rhythm: Normal rate and regular rhythm. Pulses: Normal pulses. Heart sounds: Normal heart sounds. Pulmonary:      Effort: Pulmonary effort is normal.      Breath sounds: Examination of the right-upper field reveals decreased breath sounds and wheezing.  Examination of the left-upper field reveals decreased breath sounds and wheezing. Examination of the right-middle field reveals decreased breath sounds and wheezing. Examination of the left-middle field reveals decreased breath sounds and wheezing. Examination of the right-lower field reveals decreased breath sounds and wheezing. Examination of the left-lower field reveals decreased breath sounds and wheezing. Decreased breath sounds and wheezing present. No rhonchi or rales. Abdominal:      General: There is no distension. Palpations: Abdomen is soft. Tenderness: There is no abdominal tenderness. Musculoskeletal:      Cervical back: Normal range of motion and neck supple. Right lower leg: No edema. Left lower leg: No edema. Skin:     General: Skin is warm and dry. Capillary Refill: Capillary refill takes less than 2 seconds. Neurological:      General: No focal deficit present. Mental Status: He is alert and oriented to person, place, and time. Psychiatric:         Mood and Affect: Mood normal.         Behavior: Behavior normal.         DIFFERENTIAL  DIAGNOSIS     PLAN (LABS / IMAGING / EKG):  Orders Placed This Encounter   Procedures    COVID-19 & Influenza Combo    XR CHEST PORTABLE    CBC with Auto Differential    Basic Metabolic Panel w/ Reflex to MG    Brain Natriuretic Peptide    Troponin    Blood Gas, Venous    Inpatient consult to Internal Medicine    Respiratory care evaluation only    Adult NIV/Positive Airway Pressure    EKG 12 Lead    ADMIT TO INPATIENT    ADMIT TO INPATIENT       MEDICATIONS ORDERED:  Orders Placed This Encounter   Medications    DISCONTD: ipratropium-albuterol (DUONEB) nebulizer solution 1 ampule     Order Specific Question:   Initiate RT Bronchodilator Protocol     Answer:    Yes    ipratropium-albuterol (DUONEB) nebulizer solution 1 ampule     Order Specific Question:   Initiate RT Bronchodilator Protocol     Answer:   No    doxycycline monohydrate (MONODOX) capsule 100 mg     Order Specific Question:   Antimicrobial Indications     Answer:   Pneumonia (CAP)    DISCONTD: magnesium sulfate 2,000 mg in dextrose 5 % 100 mL IVPB       DDX: COPD exacerbation, ACS, pneumonia, Covid, influenza    Initial MDM/Plan: 78 y.o. male who presents with COPD exacerbation. Underlying cause is unclear at this time. Will obtain cardiac work-up, as well as swabs for COVID-19 and influenza and chest x-ray. Will give additional DuoNeb's and consult RT. DIAGNOSTIC RESULTS / EMERGENCYDEPARTMENT COURSE / MDM     LABS:  Labs Reviewed   CBC WITH AUTO DIFFERENTIAL - Abnormal; Notable for the following components:       Result Value    Seg Neutrophils 80 (*)     Lymphocytes 14 (*)     All other components within normal limits   BASIC METABOLIC PANEL W/ REFLEX TO MG FOR LOW K - Abnormal; Notable for the following components:    Glucose 139 (*)     CREATININE 0.69 (*)     All other components within normal limits   COVID-19 & INFLUENZA COMBO   TROPONIN   BLOOD GAS, VENOUS   BRAIN NATRIURETIC PEPTIDE         RADIOLOGY:  XR CHEST PORTABLE    Result Date: 3/11/2022  EXAMINATION: ONE XRAY VIEW OF THE CHEST 3/11/2022 12:18 am COMPARISON: 11/05/2020 HISTORY: ORDERING SYSTEM PROVIDED HISTORY: shortness of breath FINDINGS: The cardiomediastinal silhouette is stable in size and configuration. Bibasilar atelectasis without focal consolidation. No sizable pleural effusion or pneumothorax. No acute osseous findings. Upper abdomen is grossly unremarkable. Bibasilar atelectasis without focal consolidation.          EKG    EKG Interpretation    Interpreted by me    Rhythm: normal sinus   Rate: normal  Axis: normal  Ectopy: none  Conduction: normal  ST Segments: no acute change  T Waves: no acute change  Q Waves: none    Clinical Impression: no acute changes and normal EKG    All EKG's are interpreted by the Emergency Department Physicianwho either signs or Co-signs this chart in the absence of a cardiologist.    Brisa HUTTON [address] COURSE:  ED Course as of 03/11/22 0139   Fri Mar 11, 2022   0136 Laboratory work-up and chest x-ray are grossly unremarkable. Patient still requiring 4 L of supplemental O2 to maintain saturations and has moderately increased work of breathing. Will start on doxycycline and admit for additional treatments. [GG]      ED Course User Index  [GG] Euna Cabot, MD          PROCEDURES:  None    CONSULTS:  IP CONSULT TO INTERNAL MEDICINE    CRITICAL CARE:  Please see attending note    FINAL IMPRESSION      1. COPD exacerbation ([facility] [address]          DISPOSITION / PLAN     DISPOSITION Admitted 03/11/2022 12:55:39 AM      PATIENT REFERRED TO:  No follow-up provider specified.     DISCHARGE MEDICATIONS:  New Prescriptions    No medications on file       Euna Cabot, MD  Emergency Medicine Resident    (Please note that portions of this note were completed with a voice recognition program.Efforts were made to edit the dictations but occasionally words are mis-transcribed.)        Euna Cabot, MD  Resident  03/11/22 5353

## 2022-03-11 NOTE — FLOWSHEET NOTE
03/11/22 0254   Treatment Team Notification   Reason for Communication Evaluate   Team Member Name Dr. Gisell Tyson Team Role Attending Provider   Method of Communication Call   Response No new orders   Notification Time [phone]     This RN called Dr. Olivia Castillo to notify that patient has reached the unit and is resting comfortably in bed with no signs of distress. No new orders at this time and patient will be seen during rounds this morning. RN will continue to monitor. Electronically signed by Paul Lazaro RN.

## 2022-03-11 NOTE — CARE COORDINATION
CASE MANAGEMENT NOTE:    Admission Date:  3/10/2022 Marlene Whitfield is a 78 y.o.  male    Admitted for : COPD exacerbation ([facility] [address] [J44.1]    Met with:  Patient    PCP:  [address]:  [employer]      Is patient alert and oriented at time of discussion:  Yes    Current Residence/ Living Arrangements:  independently at home w/ Wife            Current Services PTA:  No    Does patient go to outpatient dialysis: No  If yes, location and chair time: NA    Is patient agreeable to VNS: No    Freedom of choice provided:  Yes    List of [address] provided: No    VNS chosen:  No,Denies the need    DME:  straight cane,Shower Bench, Rollator    Home Oxygen: Yes, wears 3.5 LNC, PRN & HS    Nebulizer: No    CPAP/BIPAP: Yes,CPAP    Supplier: [employer]    Potential Assistance Needed: No    SNF needed: No    Freedom of choice and list provided: NA    Pharmacy:  [facility]       Does Patient want to use MEDS to BEDS? No    Is patient currently receiving oral anticoagulation therapy? No    Is the Patient an [facility] with Readmission Risk Score greater than 14%? No  If yes, pt needs a follow up appointment made within 7 days. Family Members/Caregivers that pt would like involved in their care:    Yes    If yes, list name here:  Wife, Maliha Guzman    Transportation Provider:  Patient             Discharge Plan:  3/11/22 [employer] Pt. Lives in 1 Story home w/ Wife. DME-  Rollator, Shower Bench, Cane, Oxygen, wears 3.5 LNC, HS/PRN, CPAP, [employer], Denies VNS. Currently sating, 94% on 2LNC. IV steroids, 40 Q12, Orange Header 11%. Will follow//KB                  Electronically signed by: Claudeen Scheuermann, RN on 3/11/2022 at 1:37 PM

## 2022-03-12 PROCEDURE — 2060000000 HC ICU INTERMEDIATE R&B

## 2022-03-12 PROCEDURE — 94761 N-INVAS EAR/PLS OXIMETRY MLT: CPT

## 2022-03-12 PROCEDURE — 6370000000 HC RX 637 (ALT 250 FOR IP): Performed by: INTERNAL MEDICINE

## 2022-03-12 PROCEDURE — 2700000000 HC OXYGEN THERAPY PER DAY

## 2022-03-12 PROCEDURE — 2580000003 HC RX 258: Performed by: INTERNAL MEDICINE

## 2022-03-12 PROCEDURE — 6360000002 HC RX W HCPCS: Performed by: INTERNAL MEDICINE

## 2022-03-12 PROCEDURE — 6370000000 HC RX 637 (ALT 250 FOR IP): Performed by: HEALTH CARE PROVIDER

## 2022-03-12 PROCEDURE — 94667 MNPJ CHEST WALL 1ST: CPT

## 2022-03-12 PROCEDURE — 99232 SBSQ HOSP IP/OBS MODERATE 35: CPT | Performed by: INTERNAL MEDICINE

## 2022-03-12 PROCEDURE — 94640 AIRWAY INHALATION TREATMENT: CPT

## 2022-03-12 RX ORDER — BENZONATATE 200 MG/1
200 CAPSULE ORAL 3 TIMES DAILY
Status: DISCONTINUED | OUTPATIENT
Start: 2022-03-12 | End: 2022-03-15 | Stop reason: HOSPADM

## 2022-03-12 RX ORDER — ALBUTEROL SULFATE 2.5 MG/3ML
2.5 SOLUTION RESPIRATORY (INHALATION) EVERY 4 HOURS
Status: DISCONTINUED | OUTPATIENT
Start: 2022-03-12 | End: 2022-03-15 | Stop reason: HOSPADM

## 2022-03-12 RX ORDER — DEXTROMETHORPHAN POLISTIREX 30 MG/5ML
60 SUSPENSION ORAL EVERY 12 HOURS SCHEDULED
Status: DISCONTINUED | OUTPATIENT
Start: 2022-03-12 | End: 2022-03-15 | Stop reason: HOSPADM

## 2022-03-12 RX ADMIN — OXYCODONE HYDROCHLORIDE AND ACETAMINOPHEN 1 TABLET: 5; 325 TABLET ORAL at 09:40

## 2022-03-12 RX ADMIN — SODIUM CHLORIDE, PRESERVATIVE FREE 10 ML: 5 INJECTION INTRAVENOUS at 23:13

## 2022-03-12 RX ADMIN — ACETAMINOPHEN 650 MG: 325 TABLET, FILM COATED ORAL at 14:52

## 2022-03-12 RX ADMIN — SALINE NASAL SPRAY 2 SPRAY: 1.5 SOLUTION NASAL at 17:47

## 2022-03-12 RX ADMIN — ASPIRIN 81 MG: 81 TABLET, COATED ORAL at 09:26

## 2022-03-12 RX ADMIN — METHYLPREDNISOLONE SODIUM SUCCINATE 40 MG: 40 INJECTION, POWDER, FOR SOLUTION INTRAMUSCULAR; INTRAVENOUS at 02:16

## 2022-03-12 RX ADMIN — ALBUTEROL SULFATE 2.5 MG: 2.5 SOLUTION RESPIRATORY (INHALATION) at 10:40

## 2022-03-12 RX ADMIN — METHYLPREDNISOLONE SODIUM SUCCINATE 40 MG: 40 INJECTION, POWDER, FOR SOLUTION INTRAMUSCULAR; INTRAVENOUS at 09:27

## 2022-03-12 RX ADMIN — Medication 60 MG: at 23:13

## 2022-03-12 RX ADMIN — DOXYCYCLINE 100 MG: 100 CAPSULE ORAL at 22:55

## 2022-03-12 RX ADMIN — Medication 1 TABLET: at 11:48

## 2022-03-12 RX ADMIN — ACETAMINOPHEN 650 MG: 325 TABLET, FILM COATED ORAL at 22:55

## 2022-03-12 RX ADMIN — PREGABALIN 200 MG: 100 CAPSULE ORAL at 22:55

## 2022-03-12 RX ADMIN — ENOXAPARIN SODIUM 30 MG: 100 INJECTION SUBCUTANEOUS at 22:56

## 2022-03-12 RX ADMIN — DOXYCYCLINE 100 MG: 100 CAPSULE ORAL at 09:25

## 2022-03-12 RX ADMIN — PRAVASTATIN SODIUM 80 MG: 40 TABLET ORAL at 17:46

## 2022-03-12 RX ADMIN — ALBUTEROL SULFATE 2.5 MG: 2.5 SOLUTION RESPIRATORY (INHALATION) at 15:24

## 2022-03-12 RX ADMIN — BENZONATATE 200 MG: 200 CAPSULE ORAL at 22:55

## 2022-03-12 RX ADMIN — SODIUM CHLORIDE, PRESERVATIVE FREE 10 ML: 5 INJECTION INTRAVENOUS at 09:28

## 2022-03-12 RX ADMIN — ENOXAPARIN SODIUM 30 MG: 100 INJECTION SUBCUTANEOUS at 09:26

## 2022-03-12 RX ADMIN — Medication 60 MG: at 11:55

## 2022-03-12 RX ADMIN — SALINE NASAL SPRAY 2 SPRAY: 1.5 SOLUTION NASAL at 12:51

## 2022-03-12 RX ADMIN — AMLODIPINE BESYLATE 10 MG: 10 TABLET ORAL at 12:51

## 2022-03-12 RX ADMIN — OXYCODONE HYDROCHLORIDE AND ACETAMINOPHEN 1 TABLET: 5; 325 TABLET ORAL at 17:46

## 2022-03-12 RX ADMIN — PANTOPRAZOLE SODIUM 40 MG: 40 TABLET, DELAYED RELEASE ORAL at 06:12

## 2022-03-12 RX ADMIN — TIOTROPIUM BROMIDE AND OLODATEROL 2 PUFF: 3.124; 2.736 SPRAY, METERED RESPIRATORY (INHALATION) at 10:41

## 2022-03-12 RX ADMIN — ALBUTEROL SULFATE 2.5 MG: 2.5 SOLUTION RESPIRATORY (INHALATION) at 23:27

## 2022-03-12 RX ADMIN — IPRATROPIUM BROMIDE AND ALBUTEROL SULFATE 1 AMPULE: .5; 3 SOLUTION RESPIRATORY (INHALATION) at 06:57

## 2022-03-12 RX ADMIN — BENZONATATE 200 MG: 200 CAPSULE ORAL at 14:29

## 2022-03-12 RX ADMIN — PREGABALIN 200 MG: 100 CAPSULE ORAL at 09:25

## 2022-03-12 RX ADMIN — ALBUTEROL SULFATE 2.5 MG: 2.5 SOLUTION RESPIRATORY (INHALATION) at 20:15

## 2022-03-12 RX ADMIN — FUROSEMIDE 40 MG: 40 TABLET ORAL at 09:26

## 2022-03-12 RX ADMIN — PREGABALIN 200 MG: 100 CAPSULE ORAL at 14:29

## 2022-03-12 RX ADMIN — LISINOPRIL 10 MG: 10 TABLET ORAL at 09:25

## 2022-03-12 RX ADMIN — METHYLPREDNISOLONE SODIUM SUCCINATE 40 MG: 40 INJECTION, POWDER, FOR SOLUTION INTRAMUSCULAR; INTRAVENOUS at 17:46

## 2022-03-12 ASSESSMENT — PAIN SCALES - GENERAL
PAINLEVEL_OUTOF10: 8
PAINLEVEL_OUTOF10: 9
PAINLEVEL_OUTOF10: 7
PAINLEVEL_OUTOF10: 5
PAINLEVEL_OUTOF10: 8
PAINLEVEL_OUTOF10: 5

## 2022-03-12 NOTE — PLAN OF CARE
Problem: Breathing Pattern - Ineffective:  Goal: Ability to achieve and maintain a regular respiratory rate will improve  Description: Ability to achieve and maintain a regular respiratory rate will improve  3/12/2022 0520 by Jose Antonio Jovel RN  Outcome: Ongoing  3/11/2022 1839 by Micah Navarro RN  Outcome: Ongoing     Problem: Infection:  Goal: Will remain free from infection  Description: Will remain free from infection  3/12/2022 0520 by Jose Antonio Jovel RN  Outcome: Ongoing  3/11/2022 1839 by Micah Navarro RN  Outcome: Ongoing     Problem: Safety:  Goal: Free from accidental physical injury  Description: Free from accidental physical injury  3/12/2022 0520 by Jose Antonio Jovel RN  Outcome: Ongoing  3/11/2022 1839 by Micah Navarro RN  Outcome: Ongoing  Goal: Free from intentional harm  Description: Free from intentional harm  3/12/2022 0520 by Jose Antonio Jovel RN  Outcome: Ongoing  3/11/2022 1839 by Micah Navarro RN  Outcome: Ongoing     Problem: Daily Care:  Goal: Daily care needs are met  Description: Daily care needs are met  3/12/2022 0520 by Jose Antonio Jovel RN  Outcome: Ongoing  3/11/2022 1839 by Micah Navarro RN  Outcome: Ongoing     Problem: Pain:  Goal: Patient's pain/discomfort is manageable  Description: Patient's pain/discomfort is manageable  3/12/2022 0520 by Jose Antonio Jovel RN  Outcome: Ongoing  3/11/2022 1839 by Micah Navarro RN  Outcome: Ongoing  Goal: Pain level will decrease  Description: Pain level will decrease  Outcome: Ongoing  Goal: Control of acute pain  Description: Control of acute pain  Outcome: Ongoing  Goal: Control of chronic pain  Description: Control of chronic pain  Outcome: Ongoing     Problem: Skin Integrity:  Goal: Skin integrity will stabilize  Description: Skin integrity will stabilize  3/12/2022 0520 by Jose Antonio Jovel RN  Outcome: Ongoing  3/11/2022 1839 by Micah Navarro RN  Outcome: Ongoing     Problem: Discharge Planning:  Goal: Patients continuum of care needs are met  Description: Patients continuum of care needs are met  3/12/2022 0520 by Soo Stallworth RN  Outcome: Ongoing  3/11/2022 1839 by Ana Aaron RN  Outcome: Ongoing     Problem: Gas Exchange - Impaired:  Goal: Levels of oxygenation will improve  Description: Levels of oxygenation will improve  3/12/2022 0520 by Soo Stallworth RN  Outcome: Ongoing  3/11/2022 1839 by Ana Aaron RN  Outcome: Ongoing     Problem: Falls - Risk of:  Goal: Will remain free from falls  Description: Will remain free from falls  Outcome: Ongoing  Goal: Absence of physical injury  Description: Absence of physical injury  Outcome: Ongoing

## 2022-03-12 NOTE — CARE COORDINATION
ONGOING DISCHARGE PLAN:    Plan remains for Pt. To return to home w/Wife.    VNS, has been denied. Remains on IV Steroids, 40MG, Q8, & Po Doxy. Currently sating 93% on 4LNC. Wears 3.5 at home. Pulmonary following. Will continue to follow for additional discharge needs.     Electronically signed by Tani Ross RN on 3/12/2022 at 1:51 PM

## 2022-03-12 NOTE — PROGRESS NOTES
History and Physical Service  [facility] Internal Medicine    Progress note            Date:   3/12/2022  Patient name:  Rachelle Fernandez  MRN:   [id number]  Account:  [de-identified]  YOB: 1955  PCP:    Mary Ibarra MD  Code Status:    Full Code    Chief Complaint:     Chief Complaint   Patient presents with    Shortness of Breath         History Obtained From:     Patient, EMR, nursing staff  His  HPI   tory Obtained From:  Past MediPast Medical History:macho History: This patient is a 78 y.o. Non- / non  malewho presents with  Congestion and shortness of breath  Patient has history of COPD intermittently uses 3 L oxygen with CPAP at home  Reports 1 week history of cough and congestion  Has been gradually worsening  Cough associated with thick white sputum  Denies fevers chills chest pain nausea vomiting or diarrhea  Had similar episode of COPD exacerbation last year  Reports compliance with home inhalers  Symptoms improved with DuoNeb at home, also reported some improvement with IV steroids given since presentation to ER no aggravating factors      Other medical history includes chronic diastolic heart failure, REYNALDO on CPAP, prostate cancer, type 2 diabetes controlled    Review of Systems:     Positive for cough and shortness of breath  Denies chest pain or palpitations  Denies abdominal pain, diarrhea vomiting  Denies any new numbness tremors or weakness. A 10 point review of systems was performed and and negative except as mentioned in HPI  Positive and Negative as described in HPI.       Past Medical History:     Past Medical History:   Diagnosis Date    Acid reflux     Anemia, blood loss 10/7/2018    Arthritis     C. difficile colitis 3/19/2020    Chronic bilateral low back pain with bilateral sciatica 11/3/2016    Chronic diastolic heart failure ([address] 2/25/2021    Complete tear of left rotator cuff 7/18/2018    COPD (chronic obstructive pulmonary disease) ([address]     Coronary artery disease involving native coronary artery of native heart without angina pectoris 2/2/2020    Degenerative disc disease, cervical 7/28/2019    GI bleed 3/19/2020    Gout     H/O cardiac catheterization     yrs ago   no stents    History of blood transfusion     Hyperlipidemia     Hyperlipidemia with target LDL less than 100 1/20/2016    Hyperparathyroidism ([address] 1/17/2020    Hypertension     Knee pain, chronic     left    Liver disease     MDRO (multiple drug resistant organisms) resistance     Melena 3/19/2020    Memory loss     Moderate episode of recurrent major depressive disorder ([address] 1/20/2016    Obesity, Class III, BMI 40-49.9 (morbid obesity) ([address] 1/20/2016    REYNALDO on CPAP 5/6/2017    Osteoarthritis     Peripheral arterial occlusive disease ([address] 3/25/2017    Pneumonia 3/9/2020    Polypharmacy 3/25/2017    Positive FIT (fecal immunochemical test) 4/11/2017    Prolonged emergence from general anesthesia 01/05/2017    Patient \"on life support for 3 days\" after back surgery due to being given succinylcholine    Prostate CA ([address] 1/20/2016    Prostate cancer ([address] 10/2013    finished radiation tx 5/2014    Pseudocholinesterase deficiency 03/25/2017    Pt.  \"on life support\" for 3 days after back surgery 1/5/17 after being given succinylcholine    Severe obesity (BMI 35.0-35.9 with comorbidity) ([address] 2/2/2020    Short of breath on exertion     Sleep apnea     uses CPAP machine nightly    Status post lumbar laminectomy 3/25/2017    Stenosis of left carotid artery 10/7/2018    Syncope and collapse 3/19/2020    Tubular adenoma of colon 4/11/17 5/13/2017    Type 2 diabetes mellitus with hyperglycemia, without long-term current use of insulin ([address] 3/25/2017    Lab Results Component Value Date  LABA1C 7.1 (H) 03/23/2017     Unintended weight loss 10/7/2018    Vitamin D deficiency 3/25/2017    Wears glasses         Past Surgical History:     Past Surgical History:   Procedure Laterality Date    BACK SURGERY      x 2     BACK SURGERY  01/05/2017    lumbar laminectomy L2, L3, L4    BRONCHOSCOPY N/A 3/13/2020    BRONCHOSCOPY performed by Alvin Simental MD at [address] [facility]  2007    no stents    CAROTID ENDARTERECTOMY Right 12/16/2019    Dr. Jaylen Henley, LAPAROSCOPIC N/A 2/16/2021    CHOLECYSTECTOMY LAPAROSCOPIC ROBOTIC XI performed by Shania Dangelo MD at [address] [facility], COLON, DIAGNOSTIC     [address] Left 11/18/2020    HERNIA INGUINAL REPAIR [address] OPEN performed by Shania Dangelo MD at [address] [facility] Left     LUMBAR LAMINECTOMY  01/05/2017    L2-L4    MO CLOSED RX SHLDR DISLOC,ANESTHESIA Left 8/1/2018    SHOULDER CLOSED REDUCTION WITH C-ARM VISUALIZATION performed by Alva Moscoso MD at [facility] N/A 5/9/2017    COLONOSCOPY WITH BIOPSY performed by Dona Marshall DO at [address] IMPLANT Left 7/18/2018    SHOULDER TOTAL ARTHROPLASTY REVERSE LEFT DJO & BICEP TENDON TRANSFER performed by Alva Moscoso MD at [address] HUMERAL/GLENOID COMPNT Left 8/3/2018    SHOULDER TOTAL REVERSE  ARTHROPLASTY REVISION performed by Alva Moscoso MD at [address]? rotator cuff repair    SHOULDER SURGERY Left 10/15/2020    SHOULDER ARTHROSCOPY W/INTEROP CULTURES performed by Alva Moscoso MD at [address] [facility]      ear, forehead    TONSILLECTOMY AND ADENOIDECTOMY      UPPER GASTROINTESTINAL ENDOSCOPY N/A 3/19/2020    EGD BIOPSY performed by General Karla MD at [address] [place] [address]        Medications Prior to Admission:     Prior to Admission medications    Medication Sig Start Date End Date Taking?  Authorizing Provider   azithromycin (ZITHROMAX) 250 MG tablet 500 mg orally on day one followed by 250 mg daily on days two through five 3/9/22 3/14/22 Yes Kayla Zimmerman MD   predniSONE (DELTASONE) 10 MG tablet Take 5 tablets by mouth daily for 7 days 3/9/22 3/16/22 Yes Kayla Zimmerman MD   lisinopril (PRINIVIL;ZESTRIL) 10 MG tablet Take 1 tablet by mouth daily Dose decreased by cardiologist 2/8/22  Yes Kayla Zimmerman MD   pregabalin (LYRICA) 200 MG capsule Take 1 capsule by mouth 3 times daily for 90 days. Please ignore prior refill for gabapentin 800 mg, will switch back to Lyrica 2/7/22 5/8/22 Yes Kayla Zimmerman MD   Cholecalciferol (VITAMIN D3) 1.25 MG (33649 UT) CAPS Take by mouth   Yes Historical Provider, MD   enzalutamide Percilla Giles) 40 MG capsule Take 4 capsules by mouth daily 11/29/21  Yes Zaida Willams MD   furosemide (LASIX) 40 MG tablet Take 1 tablet by mouth daily Dose increased 1/12/2021 11/5/21  Yes Kayla Zimmerman MD   omeprazole (PRILOSEC) 20 MG delayed release capsule Take 1 capsule by mouth every morning (before breakfast) Dose decreased 7/20/2021 7/20/21  Yes Kayla Zimmerman MD   pravastatin (PRAVACHOL) 80 MG tablet Take 1 tablet by mouth every evening Dose increased  9/24/2019 7/20/21  Yes Kayla Zimmerman MD   amLODIPine (NORVASC) 10 MG tablet Take 1 tablet by mouth daily Dose increased 10/16/2020 due to high BP. Correct dosage 7/20/21  Yes Kayla Zimmerman MD   folbee plus (FOLBEE PLUS) TABS Take 1 tablet by mouth daily 7/12/21  Yes Malina Redding MD   oxyCODONE-acetaminophen (PERCOCET) 5-325 MG per tablet Take 1 tablet by mouth 2 times daily as needed.     Yes Historical Provider, MD   aspirin EC 81 MG EC tablet Take 1 tablet by mouth daily 9/4/18  Yes Kayla Zimmerman MD   Ascorbic Acid (VITAMIN C) 250 MG tablet Take 250 mg by mouth daily    Historical Provider, MD   albuterol sulfate  (90 Base) MCG/ACT inhaler USE 2 INHALATIONS EVERY 6 HOURS AS NEEDED FOR WHEEZING OR SHORTNESS OF BREATH 11/18/21   Kayla Zimmerman MD ergocalciferol (ERGOCALCIFEROL) 1.25 MG (24071 UT) capsule Take 50,000 Units by mouth once a week     Historical Provider, MD   albuterol (PROVENTIL) (2.5 MG/3ML) 0.083% nebulizer solution Take 3 mLs by nebulization every 6 hours as needed for Wheezing or Shortness of Breath (cough) 8/5/21   Jf Nix MD   tiotropium-olodaterol (STIOLTO RESPIMAT) 2.5-2.5 MCG/ACT AERS Inhale 2 puffs into the lungs daily    Historical Provider, MD   glucose monitoring kit (FREESTYLE) monitoring kit 1 kit by Does not apply route daily Please supply Patient with a glucose monitoring kit that insurance will cover. 4/23/21 4/23/22  Jf Nix MD   Lancets 30G MISC Testing once a day. Please dispense according to patients insurance. 4/23/21   Jf Nix MD   blood glucose monitor strips Testing once a day. Please dispense according to patients insurance. 4/23/21   Jf Nix MD   Alcohol Swabs PADS Please dispense according to patients insurance/device. Testing once a day 4/23/21   Jf Nix MD   ammonium lactate (AMLACTIN) 12 % cream APPLY TOPICALLY TO LEGS ONE TO TWO TIMES A DAY AS NEEDED 3/1/21   Jf Nix MD   naloxone 4 MG/0.1ML LIQD nasal spray 1 spray by Nasal route as needed for Opioid Reversal Patient needs counseling 10/29/20   Jf Nix MD   OXYGEN With CPAP at night 8/25/20   Historical Provider, MD   Respiratory Therapy Supplies (ADULT MASK) MISC Needs to use mask daily due to coronavirus pandemic. 7/7/20   Jf Nix MD        Allergies:     Succinylcholine chloride and Cymbalta [duloxetine hcl]    Social History:     Tobacco:    reports that he has been smoking cigarettes. He started smoking about 8 months ago. He has a 35.00 pack-year smoking history. He has never used smokeless tobacco.  Alcohol:      reports previous alcohol use. Drug Use:  reports no history of drug use.     Family History:     Family History   Problem Relation Age of Onset  Diabetes Mother     Lung Cancer Brother     Liver Cancer Brother     Cancer Father            Physical Exam:   BP (!) 101/51   Pulse 56   Temp 97.7 °F (36.5 °C) (Oral)   Resp 18   Ht 6' (1.829 m)   Wt 290 lb 5.5 oz (131.7 kg)   SpO2 94%   BMI 39.38 kg/m²   No results for input(s): POCGLU in the last 72 hours. General appearance:  alert, cooperative and no distress  Eyes: Anicteric sclera. Pupils are equally round and reactive to light. Extraocular movements are intact. Lungs: No wheeze heard on exam today, air entry bilaterally equal and improved from yesterday  Heart:  regular rate and rhythm, no murmur  Abdomen:  soft, nontender, nondistended, normal bowel sounds, no masses,   Extremities:  no edema, redness, tenderness in the calves  Skin:  no gross lesions, rashes, induration  Neuro:  Alert, oriented X 3, Gait normal. Non-focal.      Investigations:      Laboratory Testing:  No results found for this or any previous visit (from the past 24 hour(s)). Recent Labs     03/10/22  0008   HGB 14.2   HCT 42.6   WBC 10.0   MCV 89.9      K 4.9      CO2 24   BUN 19   CREATININE 0.69*   GLUCOSE 139*       Hematology:  Recent Labs     03/10/22  0008   WBC 10.0   RBC 4.74   HGB 14.2   HCT 42.6   MCV 89.9   MCH 30.1   MCHC 33.4   RDW 14.4      MPV 8.1     Chemistry:  Recent Labs     03/10/22  0008      K 4.9      CO2 24   GLUCOSE 139*   BUN 19   CREATININE 0.69*   ANIONGAP 11   LABGLOM >60   GFRAA >60   CALCIUM 9.6   PROBNP 63     Recent Labs     03/09/22  1041   LABA1C 5.8       Imaging/Diagnostics:       XR ELBOW RIGHT (MIN 3 VIEWS)    Result Date: 3/1/2022  EXAMINATION: THREE XRAY VIEWS OF THE RIGHT ELBOW 3/1/2022 2:28 pm COMPARISON: None. HISTORY: ORDERING SYSTEM PROVIDED HISTORY: Cellulitis of right elbow TECHNOLOGIST PROVIDED HISTORY: elbow pain Reason for Exam: right elbow pain X 1 day, NKI FINDINGS: Alignment is anatomic.   No visible fracture or appreciable joint effusion. The radial head aligns normally with the capitellum. Soft tissue swelling along the dorsum of the elbow. 1. No acute osseous abnormality or evidence of soft tissue gas. 2. Soft tissue swelling along the dorsum of the elbow. MRI LUMBAR SPINE WO CONTRAST    Result Date: 3/9/2022  EXAMINATION: MRI OF THE LUMBAR SPINE WITHOUT CONTRAST, 3/9/2022 8:43 am TECHNIQUE: Multiplanar multisequence MRI of the lumbar spine was performed without the administration of intravenous contrast. COMPARISON: 09/21/2016 HISTORY: ORDERING SYSTEM PROVIDED HISTORY: Bilateral lumbar radiculopathy TECHNOLOGIST PROVIDED HISTORY: Reason for Exam: Pt states low back pain fell through a floor in 2016 injuring low back and left knee pain FINDINGS: BONES/ALIGNMENT: Mild retrolisthesis is identified at L1-L2, L2-L3, and L3-L4. The vertebral body heights are maintained. The bone marrow signal appears unremarkable. No acute fracture is detected. Laminectomy is identified from L2 through L5. SPINAL CORD: The conus terminates normally. SOFT TISSUES: No paraspinal mass identified. L1-L2: Moderate central canal stenosis identified due to shallow broad disc osteophyte complex. L2-L3: Laminectomy is appreciated. Shallow broad disc osteophyte complex flattens the thecal sac. L3-L4: Laminectomy is again identified. Broad disc osteophyte complex flattens the thecal sac and encroaches on both exit foramina which are moderately stenotic. Mild facet arthropathy is noted. L4-L5: Laminectomy is appreciated without evidence of focal disc protrusion. Shallow broad disc osteophyte complex flattens the thecal sac and encroaches upon both exit foramina resulting in moderate right foraminal stenosis and mild left foraminal stenosis. L5-S1: Broad disc osteophyte complex and facet arthropathy results in mild left foraminal stenosis. Stable laminectomy from L2 through L5. Mild retrolisthesis from L1 through L4 not significantly changed.  Stable moderate central canal stenosis at L1-L2. Moderate multilevel bilateral neural foraminal stenosis is not significantly changed. XR CHEST PORTABLE    Result Date: 3/11/2022  EXAMINATION: ONE XRAY VIEW OF THE CHEST 3/11/2022 12:18 am COMPARISON: 11/05/2020 HISTORY: ORDERING SYSTEM PROVIDED HISTORY: shortness of breath FINDINGS: The cardiomediastinal silhouette is stable in size and configuration. Bibasilar atelectasis without focal consolidation. No sizable pleural effusion or pneumothorax. No acute osseous findings. Upper abdomen is grossly unremarkable. Bibasilar atelectasis without focal consolidation.         Current Facility-Administered Medications   Medication Dose Route Frequency Provider Last Rate Last Admin    ipratropium-albuterol (DUONEB) nebulizer solution 1 ampule  1 ampule Inhalation Q4H WA Alec Mcqueen MD   1 ampule at 03/12/22 0657    sodium chloride flush 0.9 % injection 5-40 mL  5-40 mL IntraVENous 2 times per day Colonel Karrie MD   10 mL at 03/12/22 0928    sodium chloride flush 0.9 % injection 5-40 mL  5-40 mL IntraVENous PRN Colonel Karrie MD        0.9 % sodium chloride infusion  25 mL IntraVENous PRN Colonel Karrie MD        enoxaparin (LOVENOX) injection 30 mg  30 mg SubCUTAneous BID Colonel Karrie MD   30 mg at 03/12/22 0926    acetaminophen (TYLENOL) tablet 650 mg  650 mg Oral Q4H PRN Colonel Karrie MD        ondansetron (ZOFRAN-ODT) disintegrating tablet 4 mg  4 mg Oral Q8H PRN Colonel Karrie MD        Or    ondansetron [facility]) injection 4 mg  4 mg IntraVENous Q6H PRN Colonel Karrie MD        amLODIPine (NORVASC) tablet 10 mg  10 mg Oral Daily Colonel Karrie MD   10 mg at 03/11/22 0956    aspirin EC tablet 81 mg  81 mg Oral Daily Colonel Karrie MD   81 mg at 03/12/22 1169    enzalutamide (XTANDI) capsule 160 mg  160 mg Oral Daily Colonel Karrie MD   160 mg at 03/12/22 1408    vitamin B and C (TOTAL B-C) 1 tablet  1 tablet Oral Daily Colonel Karrie MD  furosemide (LASIX) tablet 40 mg  40 mg Oral Daily Sanchez Gore MD   40 mg at 03/12/22 0926    lisinopril (PRINIVIL;ZESTRIL) tablet 10 mg  10 mg Oral Daily Sanchez Gore MD   10 mg at 03/12/22 0925    pantoprazole (PROTONIX) tablet 40 mg  40 mg Oral QAM AC Sanchez Gore MD   40 mg at 03/12/22 0612    pravastatin (PRAVACHOL) tablet 80 mg  80 mg Oral QPM Sanchez Gore MD   80 mg at 03/11/22 1755    pregabalin (LYRICA) capsule 200 mg  200 mg Oral TID Sanchez Gore MD   200 mg at 03/12/22 0925    oxyCODONE-acetaminophen (PERCOCET) 5-325 MG per tablet 1 tablet  1 tablet Oral Q8H PRN Sanchez Gore MD   1 tablet at 03/12/22 0940    doxycycline monohydrate (MONODOX) capsule 100 mg  100 mg Oral 2 times per day Sanchez Gore MD   100 mg at 03/12/22 0925    methylPREDNISolone sodium (SOLU-MEDROL) injection 40 mg  40 mg IntraVENous Q8H Sanchez Gore MD   40 mg at 03/12/22 0354       Impressions :     1. Principal Problem:    COPD exacerbation ([facility]  Active Problems:    REYNALDO on CPAP    PVD (peripheral vascular disease) with claudication (HCC)    Foot drop, right  Resolved Problems:    * No resolved hospital problems.  *        2.  has a past medical history of Acid reflux, Anemia, blood loss (10/7/2018), Arthritis, C. difficile colitis (3/19/2020), Chronic bilateral low back pain with bilateral sciatica (11/3/2016), Chronic diastolic heart failure ([address] (2/25/2021), Complete tear of left rotator cuff (7/18/2018), COPD (chronic obstructive pulmonary disease) ([facility] Coronary artery disease involving native coronary artery of native heart without angina pectoris (2/2/2020), Degenerative disc disease, cervical (7/28/2019), GI bleed (3/19/2020), Gout, H/O cardiac catheterization, History of blood transfusion, Hyperlipidemia, Hyperlipidemia with target LDL less than 100 (1/20/2016), Hyperparathyroidism ([facility] (1/17/2020), Hypertension, Knee pain, chronic, Liver disease, MDRO (multiple drug resistant organisms) resistance, Melena (3/19/2020), Memory loss, Moderate episode of recurrent major depressive disorder ([facility] (1/20/2016), Obesity, Class III, BMI 40-49.9 (morbid obesity) ([facility] [address] [facility] (1/20/2016), REYNALDO on CPAP (5/6/2017), Osteoarthritis, Peripheral arterial occlusive disease ([address] (3/25/2017), Pneumonia (3/9/2020), Polypharmacy (3/25/2017), Positive FIT (fecal immunochemical test) (4/11/2017), Prolonged emergence from general anesthesia (01/05/2017), Prostate CA ([facility] [address] (1/20/2016), Prostate cancer ([facility] (10/2013), Pseudocholinesterase deficiency (03/25/2017), Severe obesity (BMI 35.0-35.9 with comorbidity) ([facility] (2/2/2020), Short of breath on exertion, Sleep apnea, Status post lumbar laminectomy (3/25/2017), Stenosis of left carotid artery (10/7/2018), Syncope and collapse (3/19/2020), Tubular adenoma of colon 4/11/17 (5/13/2017), Type 2 diabetes mellitus with hyperglycemia, without long-term current use of insulin ([facility] (3/25/2017), Unintended weight loss (10/7/2018), Vitamin D deficiency (3/25/2017), and Wears glasses.      Plans:     40-year-old male history of COPD on intermittent home oxygen, REYNALDO uses home CPAP presenting with 1 week history of shortness of breath cough and congestion treated for COPD exacerbation  No chest pain no fever troponin negative BNP normal no leg swelling EKG sinus rhythm with PACs  Acute exacerbation of COPD-IV steroids, mucolytic, scheduled bronchodilators  Chronic diastolic heart failure-compensated continue lisinopril, Lasix  Current smoker-reports quit 4 days ago  Type 2 diabetes controlled  Hypertension-Norvasc, lisinopril  Prostate cancer-Xtandi    3/12  Patient appears somewhat better today reports improvement in cough has used CPAP overnight with 4L oxygen  We will attempt to wean down today continue IV steroids for today  Potential discharge this afternoon depending on how patient is feeling    DVT pplx-Lovenox  Antibiotics started/continued-oral doxycycline  Code status    [facility] Toña Wall MD  3/12/2022  9:44 AM

## 2022-03-12 NOTE — CONSULTS
[facility]   CONSULT NOTE:      DATE OF CONSULT 3/12/2022    REASON FOR CONSULTATION:  COPD exacerbation      PCP Bear Beltran MD     CHIEF COMPLAINT: Minimal wheezing    HISTORY OF PRESENT ILLNESS:     Marcio Tobin is a 50-year-old male who sees my partner Dr. Jered Reddy. He has underlying COPD and obstructive sleep apnea and is on CPAP therapy. Apparently he had just seen our nurse practitioner in the office approximately a week ago. Unfortunately, our office EMR is down and we cannot obtain the records. At any rate, the patient stated at that time he was not have any problems breathing. He is supposed to be on Stiolto as well as albuterol via nebulizer but it sounds like he is only taking the albuterol. In the past, he had been on Trelegy. He is also on 3 L oxygen that he has bled through his CPAP machine. At any rate, on Monday started having increasing dyspnea both with exertion and at rest.  He did not call our office. The dyspnea got worse and he came to the emergency room. He denied any fevers but did complain of feeling cold and had some postnasal drip as well as a sore throat. His cough initially was productive but now it is more of a dry cough. Patient vaccinated and boosted (December 21) with Moderna against Yudith. His Covid test yesterday was negative. In March 2020 he had pneumonia and hemoptysis and I did perform a bronchoscopy which did not show any evidence of malignancy but did show mucous plugging      He is on CPAP therapy. He has been compliant in the past.    He is a smoker, 1 pack a day for 25 years quitting 15 years before and then resumed smoking about 11 years ago.     ALLERGIES:  Allergies   Allergen Reactions    Succinylcholine Chloride Other (See Comments)     PATIENT HAS PSEUDOCHOLINESTERASE DEFICIENCY      Cymbalta [Duloxetine Hcl]      Taste loss         HOME MEDICATIONS:  Medications Prior to Admission: azithromycin (ZITHROMAX) 250 MG tablet, 500 mg orally on day one followed by 250 mg daily on days two through five  predniSONE (DELTASONE) 10 MG tablet, Take 5 tablets by mouth daily for 7 days  lisinopril (PRINIVIL;ZESTRIL) 10 MG tablet, Take 1 tablet by mouth daily Dose decreased by cardiologist  pregabalin (LYRICA) 200 MG capsule, Take 1 capsule by mouth 3 times daily for 90 days. Please ignore prior refill for gabapentin 800 mg, will switch back to Lyrica  Cholecalciferol (VITAMIN D3) 1.25 MG (04938 UT) CAPS, Take by mouth  enzalutamide (XTANDI) 40 MG capsule, Take 4 capsules by mouth daily  furosemide (LASIX) 40 MG tablet, Take 1 tablet by mouth daily Dose increased 1/12/2021  omeprazole (PRILOSEC) 20 MG delayed release capsule, Take 1 capsule by mouth every morning (before breakfast) Dose decreased 7/20/2021  pravastatin (PRAVACHOL) 80 MG tablet, Take 1 tablet by mouth every evening Dose increased  9/24/2019  amLODIPine (NORVASC) 10 MG tablet, Take 1 tablet by mouth daily Dose increased 10/16/2020 due to high BP. Correct dosage  folbee plus (FOLBEE PLUS) TABS, Take 1 tablet by mouth daily  oxyCODONE-acetaminophen (PERCOCET) 5-325 MG per tablet, Take 1 tablet by mouth 2 times daily as needed.    aspirin EC 81 MG EC tablet, Take 1 tablet by mouth daily  Ascorbic Acid (VITAMIN C) 250 MG tablet, Take 250 mg by mouth daily  albuterol sulfate  (90 Base) MCG/ACT inhaler, USE 2 INHALATIONS EVERY 6 HOURS AS NEEDED FOR WHEEZING OR SHORTNESS OF BREATH  ergocalciferol (ERGOCALCIFEROL) 1.25 MG (94248 UT) capsule, Take 50,000 Units by mouth once a week   albuterol (PROVENTIL) (2.5 MG/3ML) 0.083% nebulizer solution, Take 3 mLs by nebulization every 6 hours as needed for Wheezing or Shortness of Breath (cough)  tiotropium-olodaterol (STIOLTO RESPIMAT) 2.5-2.5 MCG/ACT AERS, Inhale 2 puffs into the lungs daily  glucose monitoring kit (FREESTYLE) monitoring kit, 1 kit by Does not apply route daily Please supply Patient with a glucose monitoring kit that insurance will cover. Lancets 30G MISC, Testing once a day. Please dispense according to patients insurance. blood glucose monitor strips, Testing once a day. Please dispense according to patients insurance. Alcohol Swabs PADS, Please dispense according to patients insurance/device. Testing once a day  ammonium lactate (AMLACTIN) 12 % cream, APPLY TOPICALLY TO LEGS ONE TO TWO TIMES A DAY AS NEEDED  naloxone 4 MG/0.1ML LIQD nasal spray, 1 spray by Nasal route as needed for Opioid Reversal Patient needs counseling  OXYGEN, With CPAP at night  Respiratory Therapy Supplies (ADULT MASK) MISC, Needs to use mask daily due to coronavirus pandemic.       PAST MEDICAL HISTORY:  Past Medical History:   Diagnosis Date    Acid reflux     Anemia, blood loss 10/7/2018    Arthritis     C. difficile colitis 3/19/2020    Chronic bilateral low back pain with bilateral sciatica 11/3/2016    Chronic diastolic heart failure ([address] 2/25/2021    Complete tear of left rotator cuff 7/18/2018    COPD (chronic obstructive pulmonary disease) ([address]     Coronary artery disease involving native coronary artery of native heart without angina pectoris 2/2/2020    Degenerative disc disease, cervical 7/28/2019    GI bleed 3/19/2020    Gout     H/O cardiac catheterization     yrs ago   no stents    History of blood transfusion     Hyperlipidemia     Hyperlipidemia with target LDL less than 100 1/20/2016    Hyperparathyroidism ([address] 1/17/2020    Hypertension     Knee pain, chronic     left    Liver disease     MDRO (multiple drug resistant organisms) resistance     Melena 3/19/2020    Memory loss     Moderate episode of recurrent major depressive disorder ([address] 1/20/2016    Obesity, Class III, BMI 40-49.9 (morbid obesity) ([address] 1/20/2016    REYNALDO on CPAP 5/6/2017    Osteoarthritis     Peripheral arterial occlusive disease ([address] 3/25/2017    Pneumonia 3/9/2020    Polypharmacy 3/25/2017    Positive FIT (fecal immunochemical test) 4/11/2017    Prolonged emergence from general anesthesia 01/05/2017    Patient \"on life support for 3 days\" after back surgery due to being given succinylcholine    Prostate CA ([address] 1/20/2016    Prostate cancer ([address] 10/2013    finished radiation tx 5/2014    Pseudocholinesterase deficiency 03/25/2017    Pt.  \"on life support\" for 3 days after back surgery 1/5/17 after being given succinylcholine    Severe obesity (BMI 35.0-35.9 with comorbidity) ([address] 2/2/2020    Short of breath on exertion     Sleep apnea     uses CPAP machine nightly    Status post lumbar laminectomy 3/25/2017    Stenosis of left carotid artery 10/7/2018    Syncope and collapse 3/19/2020    Tubular adenoma of colon 4/11/17 5/13/2017    Type 2 diabetes mellitus with hyperglycemia, without long-term current use of insulin ([address] 3/25/2017    Lab Results Component Value Date  LABA1C 7.1 (H) 03/23/2017     Unintended weight loss 10/7/2018    Vitamin D deficiency 3/25/2017    Wears glasses        PAST SURGICAL HISTORY:  Past Surgical History:   Procedure Laterality Date    BACK SURGERY      x 2     BACK SURGERY  01/05/2017    lumbar laminectomy L2, L3, L4    BRONCHOSCOPY N/A 3/13/2020    BRONCHOSCOPY performed by Alba Gr MD at [address] [facility]  2007    no stents    CAROTID ENDARTERECTOMY Right 12/16/2019    Dr. Beto Prasad Bilateral     CHOLECYSTECTOMY, LAPAROSCOPIC N/A 2/16/2021    CHOLECYSTECTOMY LAPAROSCOPIC ROBOTIC XI performed by Dania Anderson MD at [address] [facility], COLON, DIAGNOSTIC     [address] [facility] Left 11/18/2020    HERNIA INGUINAL REPAIR [address] OPEN performed by Dania Anderson MD at [address] Left     LUMBAR LAMINECTOMY  01/05/2017    L2-L4    IN CLOSED RX SHLDR DISLOC,ANESTHESIA Left 8/1/2018    SHOULDER CLOSED REDUCTION WITH C-ARM VISUALIZATION performed by Steph Araya MD at [facility] W/BIOPSY SINGLE/MULTIPLE N/A 5/9/2017    COLONOSCOPY WITH BIOPSY performed by Sarkis Jacobs DO at [address] IMPLANT Left 7/18/2018    SHOULDER TOTAL ARTHROPLASTY REVERSE LEFT DJO & BICEP TENDON TRANSFER performed by Steph Araya MD at [address] HUMERAL/GLENOID COMPNT Left 8/3/2018    SHOULDER TOTAL REVERSE  ARTHROPLASTY REVISION performed by Steph Araya MD at [address]     rotator cuff repair    SHOULDER SURGERY Left 10/15/2020    SHOULDER ARTHROSCOPY W/INTEROP CULTURES performed by Steph Araya MD at [address] [facility]      ear, forehead    TONSILLECTOMY AND ADENOIDECTOMY      UPPER GASTROINTESTINAL ENDOSCOPY N/A 3/19/2020    EGD BIOPSY performed by Norma Ramsay MD at [facility]:  Social History     Socioeconomic History    Marital status:      Spouse name: Not on file    Number of children: Not on file    Years of education: Not on file    Highest education level: Not on file   Occupational History    Not on file   Tobacco Use    Smoking status: Current Every Day Smoker     Packs/day: 1.00     Years: 35.00     Pack years: 35.00     Types: Cigarettes     Start date: 7/8/2021    Smokeless tobacco: Never Used   Vaping Use    Vaping Use: Never used   Substance and Sexual Activity    Alcohol use: Not Currently     Alcohol/week: 0.0 standard drinks    Drug use: No    Sexual activity: Not Currently     Partners: Female   Other Topics Concern    Not on file   Social History Narrative    Not on file     Social Determinants of Health     Financial Resource Strain: Low Risk     Difficulty of Paying Living Expenses: Not hard at all   Food Insecurity: No Food Insecurity    Worried About Running Out of Food in the Last Year: Never true    Colleen of Food in the Last Year: Never true   Transportation Needs: No Transportation Needs    Lack of Transportation (Medical): No    Lack of Transportation (Non-Medical): No   Physical Activity:     Days of Exercise per Week: Not on file    Minutes of Exercise per Session: Not on file   Stress:     Feeling of Stress : Not on file   Social Connections:     Frequency of Communication with Friends and Family: Not on file    Frequency of Social Gatherings with Friends and Family: Not on file    Attends Evangelical Services: Not on file    Active Member of [address] [place] [address] [employer] or Organizations: Not on file    Attends Club or Organization Meetings: Not on file    Marital Status: Not on file   Intimate Partner Violence:     Fear of Current or Ex-Partner: Not on file    Emotionally Abused: Not on file    Physically Abused: Not on file    Sexually Abused: Not on file   Housing Stability: Unknown    Unable to Pay for Housing in the Last Year: No    Number of [place] in the Last Year: Not on file    Unstable Housing in the Last Year: No       FAMILY HISTORY:  Family History   Problem Relation Age of Onset    Diabetes Mother     Lung Cancer Brother     Liver Cancer Brother     Cancer Father        REVIEW OF SYSTEMS:  All other systems reviewed and are negative. PHYSICAL EXAM:  Vital Signs Blood pressure (!) 101/51, pulse 56, temperature 97.7 °F (36.5 °C), temperature source Oral, resp. rate 18, height 6' (1.829 m), weight 290 lb 5.5 oz (131.7 kg), SpO2 94 %. Oxygen Amount and Delivery: O2 Flow Rate (L/min): 4 L/min    Admission Weight Weight: 277 lb (125.6 kg)    General Appearance   Morbidly obese gentleman in no acute respiratory distress but looking fatigued  Head  Normocephalic, without obvious abnormality, atraumatic    Eyes  conjunctivae/corneas clear. PERRL, EOM's intact. Fundi benign.     ENT macroglossia low overhanging soft palate  Neck  no adenopathy, no carotid bruit, no JVD, supple, symmetrical, trachea midline and thyroid not enlarged, symmetric, no tenderness/mass/nodules  Lungs diffuse bilateral wheezes  Heart: regular rate and rhythm, S1, S2 normal, no murmur, click, rub or gallop  Abdomen  soft, non-tender; bowel sounds normal; no masses,  no organomegaly  Extremities  No edema  Skin  Skin color, texture, turgor normal. No rashes or lesions  Neurologic: Alert and oriented X 3, normal strength and tone. Imaging  Chest x-ray fairly clear with some mild volume loss left lower lobe      Lab Review  CBC     Lab Results   Component Value Date    WBC 10.0 03/10/2022    RBC 4.74 03/10/2022    RBC 5.38 09/30/2011    HGB 14.2 03/10/2022    HCT 42.6 03/10/2022     03/10/2022     05/25/2012    MCV 89.9 03/10/2022    MCH 30.1 03/10/2022    MCHC 33.4 03/10/2022    RDW 14.4 03/10/2022    LYMPHOPCT 14 03/10/2022    MONOPCT 5 03/10/2022    BASOPCT 1 03/10/2022    MONOSABS 0.50 03/10/2022    LYMPHSABS 1.40 03/10/2022    EOSABS 0.00 03/10/2022    EOSABS 158 09/30/2011    BASOSABS 0.10 03/10/2022    DIFFTYPE NOT REPORTED 11/05/2021       BMP   Lab Results   Component Value Date     03/10/2022    K 4.9 03/10/2022     03/10/2022    CO2 24 03/10/2022    BUN 19 03/10/2022    CREATININE 0.69 03/10/2022    GLUCOSE 139 03/10/2022    CALCIUM 9.6 03/10/2022       LFTS  Lab Results   Component Value Date    ALKPHOS 83 11/05/2021    ALT 28 11/05/2021    AST 19 11/05/2021    PROT 7.5 11/05/2021    BILITOT 0.26 11/05/2021    BILIDIR 0.10 09/13/2021    IBILI 0.26 09/13/2021    LABALBU 4.4 11/05/2021    LABALBU 4.1 02/16/2012       INR  No results for input(s): PROTIME, INR in the last 72 hours. APTT  No results for input(s): APTT in the last 72 hours.     Lactic Acid  Lab Results   Component Value Date    LACTA 1.9 03/19/2020        PRO-BNP   Recent Labs     03/10/22  0008   PROBNP 63           ABGs: No results found for: PHART, PO2ART, DSI2IBJ    Lab Results   Component Value Date    MODE NOT REPORTED 07/09/2021         Impression    Acute exacerbation COPD  Chronic hypoxic respiratory failure  REYNALDO on CPAP  Active tobacco use  Morbid obesity    Plan:      Agree with bronchodilators but will switch him to albuterol and resume his Stiolto  Continue Solu-Medrol 40 every 8  Add antitussives, Tessalon Perles and Delsym cough syrup  Add Acapella/flutter valve therapy   continue with CPAP therapy  Add nasal saline spray to help his nasal passages which he says are very dry  Smoking cessation advised  Will need reevaluation of his pulmonary function  DVT prophylaxis

## 2022-03-12 NOTE — PROGRESS NOTES
RN updated Dr. Luis Dangelo that patient desat'ed to 80 while ambulating to chair without O2 on.  Patient placed on 2 L NC.

## 2022-03-13 PROBLEM — Z72.0 TOBACCO USE: Status: ACTIVE | Noted: 2020-03-16

## 2022-03-13 PROCEDURE — 6370000000 HC RX 637 (ALT 250 FOR IP): Performed by: INTERNAL MEDICINE

## 2022-03-13 PROCEDURE — 2580000003 HC RX 258: Performed by: INTERNAL MEDICINE

## 2022-03-13 PROCEDURE — 1200000000 HC SEMI PRIVATE

## 2022-03-13 PROCEDURE — 6360000002 HC RX W HCPCS: Performed by: INTERNAL MEDICINE

## 2022-03-13 PROCEDURE — 94640 AIRWAY INHALATION TREATMENT: CPT

## 2022-03-13 PROCEDURE — 2700000000 HC OXYGEN THERAPY PER DAY

## 2022-03-13 PROCEDURE — 94761 N-INVAS EAR/PLS OXIMETRY MLT: CPT

## 2022-03-13 PROCEDURE — 99232 SBSQ HOSP IP/OBS MODERATE 35: CPT | Performed by: INTERNAL MEDICINE

## 2022-03-13 RX ORDER — METHYLPREDNISOLONE SODIUM SUCCINATE 40 MG/ML
40 INJECTION, POWDER, LYOPHILIZED, FOR SOLUTION INTRAMUSCULAR; INTRAVENOUS EVERY 12 HOURS
Status: DISCONTINUED | OUTPATIENT
Start: 2022-03-13 | End: 2022-03-14

## 2022-03-13 RX ADMIN — PRAVASTATIN SODIUM 80 MG: 40 TABLET ORAL at 17:42

## 2022-03-13 RX ADMIN — SALINE NASAL SPRAY 2 SPRAY: 1.5 SOLUTION NASAL at 12:41

## 2022-03-13 RX ADMIN — ALBUTEROL SULFATE 2.5 MG: 2.5 SOLUTION RESPIRATORY (INHALATION) at 19:35

## 2022-03-13 RX ADMIN — PREGABALIN 200 MG: 100 CAPSULE ORAL at 20:44

## 2022-03-13 RX ADMIN — DOXYCYCLINE 100 MG: 100 CAPSULE ORAL at 20:44

## 2022-03-13 RX ADMIN — BENZONATATE 200 MG: 200 CAPSULE ORAL at 20:44

## 2022-03-13 RX ADMIN — ASPIRIN 81 MG: 81 TABLET, COATED ORAL at 08:30

## 2022-03-13 RX ADMIN — FUROSEMIDE 40 MG: 40 TABLET ORAL at 08:35

## 2022-03-13 RX ADMIN — ALBUTEROL SULFATE 2.5 MG: 2.5 SOLUTION RESPIRATORY (INHALATION) at 15:30

## 2022-03-13 RX ADMIN — BENZONATATE 200 MG: 200 CAPSULE ORAL at 08:30

## 2022-03-13 RX ADMIN — LISINOPRIL 10 MG: 10 TABLET ORAL at 08:30

## 2022-03-13 RX ADMIN — SALINE NASAL SPRAY 2 SPRAY: 1.5 SOLUTION NASAL at 17:43

## 2022-03-13 RX ADMIN — OXYCODONE HYDROCHLORIDE AND ACETAMINOPHEN 1 TABLET: 5; 325 TABLET ORAL at 17:42

## 2022-03-13 RX ADMIN — METHYLPREDNISOLONE SODIUM SUCCINATE 40 MG: 40 INJECTION, POWDER, FOR SOLUTION INTRAMUSCULAR; INTRAVENOUS at 08:29

## 2022-03-13 RX ADMIN — SODIUM CHLORIDE, PRESERVATIVE FREE 10 ML: 5 INJECTION INTRAVENOUS at 20:45

## 2022-03-13 RX ADMIN — PREGABALIN 200 MG: 100 CAPSULE ORAL at 14:54

## 2022-03-13 RX ADMIN — ALBUTEROL SULFATE 2.5 MG: 2.5 SOLUTION RESPIRATORY (INHALATION) at 11:19

## 2022-03-13 RX ADMIN — OXYCODONE HYDROCHLORIDE AND ACETAMINOPHEN 1 TABLET: 5; 325 TABLET ORAL at 08:46

## 2022-03-13 RX ADMIN — SODIUM CHLORIDE, PRESERVATIVE FREE 10 ML: 5 INJECTION INTRAVENOUS at 08:36

## 2022-03-13 RX ADMIN — ENOXAPARIN SODIUM 30 MG: 100 INJECTION SUBCUTANEOUS at 08:31

## 2022-03-13 RX ADMIN — Medication 60 MG: at 08:29

## 2022-03-13 RX ADMIN — AMLODIPINE BESYLATE 10 MG: 10 TABLET ORAL at 08:30

## 2022-03-13 RX ADMIN — DOXYCYCLINE 100 MG: 100 CAPSULE ORAL at 08:30

## 2022-03-13 RX ADMIN — PANTOPRAZOLE SODIUM 40 MG: 40 TABLET, DELAYED RELEASE ORAL at 08:30

## 2022-03-13 RX ADMIN — ALBUTEROL SULFATE 2.5 MG: 2.5 SOLUTION RESPIRATORY (INHALATION) at 03:22

## 2022-03-13 RX ADMIN — BENZONATATE 200 MG: 200 CAPSULE ORAL at 14:54

## 2022-03-13 RX ADMIN — Medication 1 TABLET: at 12:40

## 2022-03-13 RX ADMIN — SALINE NASAL SPRAY 2 SPRAY: 1.5 SOLUTION NASAL at 08:36

## 2022-03-13 RX ADMIN — ENOXAPARIN SODIUM 30 MG: 100 INJECTION SUBCUTANEOUS at 20:44

## 2022-03-13 RX ADMIN — SALINE NASAL SPRAY 2 SPRAY: 1.5 SOLUTION NASAL at 20:45

## 2022-03-13 RX ADMIN — TIOTROPIUM BROMIDE AND OLODATEROL 2 PUFF: 3.124; 2.736 SPRAY, METERED RESPIRATORY (INHALATION) at 11:22

## 2022-03-13 RX ADMIN — PREGABALIN 200 MG: 100 CAPSULE ORAL at 08:30

## 2022-03-13 RX ADMIN — Medication 60 MG: at 20:45

## 2022-03-13 RX ADMIN — METHYLPREDNISOLONE SODIUM SUCCINATE 40 MG: 40 INJECTION, POWDER, FOR SOLUTION INTRAMUSCULAR; INTRAVENOUS at 20:44

## 2022-03-13 ASSESSMENT — PAIN DESCRIPTION - ORIENTATION
ORIENTATION: RIGHT;LEFT

## 2022-03-13 ASSESSMENT — PAIN DESCRIPTION - PAIN TYPE: TYPE: CHRONIC PAIN

## 2022-03-13 ASSESSMENT — PAIN SCALES - GENERAL
PAINLEVEL_OUTOF10: 8
PAINLEVEL_OUTOF10: 5
PAINLEVEL_OUTOF10: 7
PAINLEVEL_OUTOF10: 8

## 2022-03-13 ASSESSMENT — PAIN DESCRIPTION - DESCRIPTORS
DESCRIPTORS: OTHER (COMMENT)
DESCRIPTORS: ACHING

## 2022-03-13 ASSESSMENT — PAIN DESCRIPTION - LOCATION
LOCATION: FOOT;BACK
LOCATION: FOOT;HAND
LOCATION: HAND;FOOT;SHOULDER

## 2022-03-13 NOTE — PROGRESS NOTES
BRONCHOSPASM/BRONCHOCONSTRICTION     [x]         IMPROVE AERATION/BREATH SOUNDS  [x]   ADMINISTER BRONCHODILATOR THERAPY AS APPROPRIATE  [x]   ASSESS BREATH SOUNDS  []   IMPLEMENT AEROSOL/MDI PROTOCOL  [x]   PATIENT EDUCATION AS NEEDED    PROVIDE ADEQUATE OXYGENATION WITH ACCEPTABLE SP02/ABG'S    [x]  IDENTIFY APPROPRIATE OXYGEN THERAPY  [x]   MONITOR SP02/ABG'S AS NEEDED   [x]   PATIENT EDUCATION AS NEEDED    Pt currently awake and sitting in chair. Pt on 4lnc SpO2 96% diminished breath sounds through out.  Pt did wear home cpap overnight

## 2022-03-13 NOTE — PLAN OF CARE
Safety maintained, call light is within reach, no s/s or c/o distress, bed is low/locked, side rails are up x2  Problem: Breathing Pattern - Ineffective:  Goal: Ability to achieve and maintain a regular respiratory rate will improve  Description: Ability to achieve and maintain a regular respiratory rate will improve  3/13/2022 0310 by Jenniffer Fountain RN  Outcome: Ongoing  3/12/2022 1919 by Aba Vences RN  Outcome: Ongoing     Problem: Infection:  Goal: Will remain free from infection  Description: Will remain free from infection  3/13/2022 0310 by Jenniffer Fountain RN  Outcome: Ongoing  3/12/2022 1919 by Aba Vences RN  Outcome: Ongoing     Problem: Safety:  Goal: Free from accidental physical injury  Description: Free from accidental physical injury  3/13/2022 0310 by Jenniffer Fountain RN  Outcome: Ongoing  3/12/2022 1919 by Aba Vences RN  Outcome: Ongoing  Goal: Free from intentional harm  Description: Free from intentional harm  3/13/2022 0310 by Jenniffer Fountain RN  Outcome: Ongoing  3/12/2022 1919 by Aba Vences RN  Outcome: Ongoing     Problem: Daily Care:  Goal: Daily care needs are met  Description: Daily care needs are met  3/13/2022 0310 by Jenniffer Fountain RN  Outcome: Ongoing  3/12/2022 1919 by Aba Vences RN  Outcome: Ongoing     Problem: Pain:  Goal: Patient's pain/discomfort is manageable  Description: Patient's pain/discomfort is manageable  3/13/2022 0310 by Jenniffer Fountain RN  Outcome: Ongoing  3/12/2022 1919 by Aba Vences RN  Outcome: Ongoing  Goal: Pain level will decrease  Description: Pain level will decrease  3/13/2022 0310 by Jenniffer Fountain RN  Outcome: Ongoing  3/12/2022 1919 by Aba Vences RN  Outcome: Ongoing  Goal: Control of acute pain  Description: Control of acute pain  3/13/2022 0310 by Jenniffer Fountain RN  Outcome: Ongoing  3/12/2022 1919 by Aba Vences RN  Outcome: Ongoing  Goal: Control of chronic pain  Description: Control of chronic pain  3/13/2022 0310 by Jerzy Baltazar RN  Outcome: Ongoing  3/12/2022 1919 by Lupe Pina RN  Outcome: Ongoing     Problem: Skin Integrity:  Goal: Skin integrity will stabilize  Description: Skin integrity will stabilize  3/13/2022 0310 by Jerzy Baltazar RN  Outcome: Ongoing  3/12/2022 1919 by Lupe Pina RN  Outcome: Ongoing     Problem: Discharge Planning:  Goal: Patients continuum of care needs are met  Description: Patients continuum of care needs are met  3/13/2022 0310 by Jerzy Baltazar RN  Outcome: Ongoing  3/12/2022 1919 by Lupe Pina RN  Outcome: Ongoing     Problem: Gas Exchange - Impaired:  Goal: Levels of oxygenation will improve  Description: Levels of oxygenation will improve  3/13/2022 0310 by Jerzy Baltazar RN  Outcome: Ongoing  3/12/2022 1919 by Lupe Pina RN  Outcome: Ongoing     Problem: Falls - Risk of:  Goal: Will remain free from falls  Description: Will remain free from falls  3/13/2022 0310 by Jerzy Baltazar RN  Outcome: Ongoing  3/12/2022 1919 by Lupe Pina RN  Outcome: Ongoing  Goal: Absence of physical injury  Description: Absence of physical injury  3/13/2022 0310 by Jerzy Baltazar RN  Outcome: Ongoing  3/12/2022 1919 by Lupe Pina RN  Outcome: Ongoing

## 2022-03-13 NOTE — PLAN OF CARE
Problem: Breathing Pattern - Ineffective:  Goal: Ability to achieve and maintain a regular respiratory rate will improve  Description: Ability to achieve and maintain a regular respiratory rate will improve  3/12/2022 1919 by Camilo Gautam RN  Outcome: Ongoing     Problem: Infection:  Goal: Will remain free from infection  Description: Will remain free from infection  3/12/2022 1919 by Camilo Gautam RN  Outcome: Ongoing     Problem: Safety:  Goal: Free from accidental physical injury  Description: Free from accidental physical injury  3/12/2022 1919 by Camilo Gautam RN  Outcome: Ongoing     Problem: Pain:  Goal: Patient's pain/discomfort is manageable  Description: Patient's pain/discomfort is manageable  3/12/2022 1919 by Camilo Gautam RN  Outcome: Ongoing

## 2022-03-13 NOTE — PROGRESS NOTES
History and Physical Service  [facility]    Progress note            Date:   3/13/2022  Patient name:  Marlene Whitfield  MRN:   [id number]  Account:  [de-identified]  YOB: 1955  PCP:    Kam Vargas MD  Code Status:    Full Code    Chief Complaint:     Chief Complaint   Patient presents with    Shortness of Breath         History Obtained From:     Patient, EMR, nursing staff  His  HPI   tory Obtained From:  Past MediPast Medical History:macho History: This patient is a 78 y.o. Non- / non  malewho presents with  Congestion and shortness of breath  Patient has history of COPD intermittently uses 3 L oxygen with CPAP at home  Reports 1 week history of cough and congestion  Has been gradually worsening  Cough associated with thick white sputum  Denies fevers chills chest pain nausea vomiting or diarrhea  Had similar episode of COPD exacerbation last year  Reports compliance with home inhalers  Symptoms improved with DuoNeb at home, also reported some improvement with IV steroids given since presentation to ER no aggravating factors      Other medical history includes chronic diastolic heart failure, REYNALDO on CPAP, prostate cancer, type 2 diabetes controlled    Review of Systems:     Positive for cough and shortness of breath  Denies chest pain or palpitations  Denies abdominal pain, diarrhea vomiting  Denies any new numbness tremors or weakness. A 10 point review of systems was performed and and negative except as mentioned in HPI  Positive and Negative as described in HPI.       Past Medical History:     Past Medical History:   Diagnosis Date    Acid reflux     Anemia, blood loss 10/7/2018    Arthritis     C. difficile colitis 3/19/2020    Chronic bilateral low back pain with bilateral sciatica 11/3/2016    Chronic diastolic heart failure ([address] 2/25/2021    Complete tear of left rotator cuff 7/18/2018    COPD (chronic obstructive pulmonary disease) ([address]     Coronary artery disease involving native coronary artery of native heart without angina pectoris 2/2/2020    Degenerative disc disease, cervical 7/28/2019    GI bleed 3/19/2020    Gout     H/O cardiac catheterization     yrs ago   no stents    History of blood transfusion     Hyperlipidemia     Hyperlipidemia with target LDL less than 100 1/20/2016    Hyperparathyroidism ([address] 1/17/2020    Hypertension     Knee pain, chronic     left    Liver disease     MDRO (multiple drug resistant organisms) resistance     Melena 3/19/2020    Memory loss     Moderate episode of recurrent major depressive disorder ([address] 1/20/2016    Obesity, Class III, BMI 40-49.9 (morbid obesity) ([address] 1/20/2016    REYNALDO on CPAP 5/6/2017    Osteoarthritis     Peripheral arterial occlusive disease ([address] 3/25/2017    Pneumonia 3/9/2020    Polypharmacy 3/25/2017    Positive FIT (fecal immunochemical test) 4/11/2017    Prolonged emergence from general anesthesia 01/05/2017    Patient \"on life support for 3 days\" after back surgery due to being given succinylcholine    Prostate CA ([address] 1/20/2016    Prostate cancer ([address] 10/2013    finished radiation tx 5/2014    Pseudocholinesterase deficiency 03/25/2017    Pt.  \"on life support\" for 3 days after back surgery 1/5/17 after being given succinylcholine    Severe obesity (BMI 35.0-35.9 with comorbidity) ([address] 2/2/2020    Short of breath on exertion     Sleep apnea     uses CPAP machine nightly    Status post lumbar laminectomy 3/25/2017    Stenosis of left carotid artery 10/7/2018    Syncope and collapse 3/19/2020    Tubular adenoma of colon 4/11/17 5/13/2017    Type 2 diabetes mellitus with hyperglycemia, without long-term current use of insulin ([address] 3/25/2017    Lab Results Component Value Date  LABA1C 7.1 (H) 03/23/2017     Unintended weight loss 10/7/2018    Vitamin D deficiency 3/25/2017    Wears glasses         Past Surgical History:     Past Surgical History:   Procedure Laterality Date    BACK SURGERY      x 2     BACK SURGERY  01/05/2017    lumbar laminectomy L2, L3, L4    BRONCHOSCOPY N/A 3/13/2020    BRONCHOSCOPY performed by Jane Mejia MD at [address] [facility]  2007    no stents    CAROTID ENDARTERECTOMY Right 12/16/2019    Dr. Pascal Oms, LAPAROSCOPIC N/A 2/16/2021    CHOLECYSTECTOMY LAPAROSCOPIC ROBOTIC XI performed by Ting Smith MD at [address] [facility], COLON, DIAGNOSTIC     [address] [facility] Left 11/18/2020    HERNIA INGUINAL REPAIR [address] OPEN performed by Ting Smith MD at [address] [facility] Left     LUMBAR LAMINECTOMY  01/05/2017    L2-L4    IN CLOSED RX SHLDR DISLOC,ANESTHESIA Left 8/1/2018    SHOULDER CLOSED REDUCTION WITH C-ARM VISUALIZATION performed by Rich Trejo MD at [facility] N/A 5/9/2017    COLONOSCOPY WITH BIOPSY performed by Leah Rivera DO at [address] [address] [facility] [address] IMPLANT Left 7/18/2018    SHOULDER TOTAL ARTHROPLASTY REVERSE LEFT DJO & BICEP TENDON TRANSFER performed by Rich Trejo MD at [address] HUMERAL/GLENOID COMPNT Left 8/3/2018    SHOULDER TOTAL REVERSE  ARTHROPLASTY REVISION performed by Rich Trejo MD at [address]? rotator cuff repair    SHOULDER SURGERY Left 10/15/2020    SHOULDER ARTHROSCOPY W/INTEROP CULTURES performed by Rich Trejo MD at [address] [facility]      ear, forehead    TONSILLECTOMY AND ADENOIDECTOMY      UPPER GASTROINTESTINAL ENDOSCOPY N/A 3/19/2020    EGD BIOPSY performed by Toshia Alvarado MD at [facility]        Medications Prior to Admission:     Prior to Admission medications    Medication Sig Start Date End Date Taking?  Authorizing Provider   azithromycin (ZITHROMAX) 250 MG tablet 500 mg orally on day one followed by 250 mg daily on days two through five 3/9/22 3/14/22 Yes Patricia Dias MD   predniSONE (DELTASONE) 10 MG tablet Take 5 tablets by mouth daily for 7 days 3/9/22 3/16/22 Yes Patricia Dias MD   lisinopril (PRINIVIL;ZESTRIL) 10 MG tablet Take 1 tablet by mouth daily Dose decreased by cardiologist 2/8/22  Yes Patricia Dias MD   pregabalin (LYRICA) 200 MG capsule Take 1 capsule by mouth 3 times daily for 90 days. Please ignore prior refill for gabapentin 800 mg, will switch back to Lyrica 2/7/22 5/8/22 Yes Patricia Dias MD   Cholecalciferol (VITAMIN D3) 1.25 MG (35211 UT) CAPS Take by mouth   Yes Historical Provider, MD   enzalutamide Margot Fore) 40 MG capsule Take 4 capsules by mouth daily 11/29/21  Yes Ekta Wright MD   furosemide (LASIX) 40 MG tablet Take 1 tablet by mouth daily Dose increased 1/12/2021 11/5/21  Yes Patricia Dias MD   omeprazole (PRILOSEC) 20 MG delayed release capsule Take 1 capsule by mouth every morning (before breakfast) Dose decreased 7/20/2021 7/20/21  Yes Patricia Dias MD   pravastatin (PRAVACHOL) 80 MG tablet Take 1 tablet by mouth every evening Dose increased  9/24/2019 7/20/21  Yes Patricia Dias MD   amLODIPine (NORVASC) 10 MG tablet Take 1 tablet by mouth daily Dose increased 10/16/2020 due to high BP. Correct dosage 7/20/21  Yes Patricia Dias MD   folbee plus (FOLBEE PLUS) TABS Take 1 tablet by mouth daily 7/12/21  Yes Annette Elder MD   oxyCODONE-acetaminophen (PERCOCET) 5-325 MG per tablet Take 1 tablet by mouth 2 times daily as needed.     Yes Historical Provider, MD   aspirin EC 81 MG EC tablet Take 1 tablet by mouth daily 9/4/18  Yes Patricia Dias MD   Ascorbic Acid (VITAMIN C) 250 MG tablet Take 250 mg by mouth daily    Historical Provider, MD   albuterol sulfate  (90 Base) MCG/ACT inhaler USE 2 INHALATIONS EVERY 6 HOURS AS NEEDED FOR WHEEZING OR SHORTNESS OF BREATH 11/18/21   Patricia Dias MD ergocalciferol (ERGOCALCIFEROL) 1.25 MG (83999 UT) capsule Take 50,000 Units by mouth once a week     Historical Provider, MD   albuterol (PROVENTIL) (2.5 MG/3ML) 0.083% nebulizer solution Take 3 mLs by nebulization every 6 hours as needed for Wheezing or Shortness of Breath (cough) 8/5/21   Patricia Dias MD   tiotropium-olodaterol (STIOLTO RESPIMAT) 2.5-2.5 MCG/ACT AERS Inhale 2 puffs into the lungs daily    Historical Provider, MD   glucose monitoring kit (FREESTYLE) monitoring kit 1 kit by Does not apply route daily Please supply Patient with a glucose monitoring kit that insurance will cover. 4/23/21 4/23/22  Patricia Dias MD   Lancets 30G MISC Testing once a day. Please dispense according to patients insurance. 4/23/21   Patricia Dias MD   blood glucose monitor strips Testing once a day. Please dispense according to patients insurance. 4/23/21   Patrciia Dias MD   Alcohol Swabs PADS Please dispense according to patients insurance/device. Testing once a day 4/23/21   Patricia Dias MD   ammonium lactate (AMLACTIN) 12 % cream APPLY TOPICALLY TO LEGS ONE TO TWO TIMES A DAY AS NEEDED 3/1/21   Patricia Dias MD   naloxone 4 MG/0.1ML LIQD nasal spray 1 spray by Nasal route as needed for Opioid Reversal Patient needs counseling 10/29/20   Patricia Dias MD   OXYGEN With CPAP at night 8/25/20   Historical Provider, MD   Respiratory Therapy Supplies (ADULT MASK) MISC Needs to use mask daily due to coronavirus pandemic. 7/7/20   Patricia Dias MD        Allergies:     Succinylcholine chloride and Cymbalta [duloxetine hcl]    Social History:     Tobacco:    reports that he has been smoking cigarettes. He started smoking about 8 months ago. He has a 35.00 pack-year smoking history. He has never used smokeless tobacco.  Alcohol:      reports previous alcohol use. Drug Use:  reports no history of drug use.     Family History:     Family History   Problem Relation Age of Onset  Diabetes Mother     Lung Cancer Brother     Liver Cancer Brother     Cancer Father            Physical Exam:   BP (!) 140/67   Pulse 51   Temp 97.7 °F (36.5 °C) (Oral)   Resp 18   Ht 6' (1.829 m)   Wt 291 lb 0.1 oz (132 kg)   SpO2 96%   BMI 39.47 kg/m²   No results for input(s): POCGLU in the last 72 hours. General appearance:  alert, cooperative and no distress  Eyes: Anicteric sclera. Pupils are equally round and reactive to light. Extraocular movements are intact. Lung-no wheeze heard on exam, air entry normal bilaterally no difficulty breathing  Heart:  regular rate and rhythm, no murmur  Abdomen:  soft, nontender, nondistended, normal bowel sounds, no masses,   Extremities:  no edema, redness, tenderness in the calves  Skin:  no gross lesions, rashes, induration  Neuro:  Alert, oriented X 3, Gait normal. Non-focal.      Investigations:      Laboratory Testing:  No results found for this or any previous visit (from the past 24 hour(s)). Recent Labs     03/10/22  0008   HGB 14.2   HCT 42.6   WBC 10.0   MCV 89.9      K 4.9      CO2 24   BUN 19   CREATININE 0.69*   GLUCOSE 139*       Hematology:  No results for input(s): WBC, RBC, HGB, HCT, MCV, MCH, MCHC, RDW, PLT, MPV, SEDRATE, CRP, INR, DDIMER, [id number], LABABSO in the last 72 hours. Invalid input(s): PT  Chemistry:  No results for input(s): NA, K, CL, CO2, GLUCOSE, BUN, CREATININE, MG, ANIONGAP, LABGLOM, GFRAA, CALCIUM, CAION, PHOS, PSA, PROBNP, TROPONINI, TROPONINT, CKTOTAL, CKMB, CKMBINDEX, MYOGLOBIN, DIGOXIN, LACTACIDWB in the last 72 hours. No results for input(s): PROT, LABALBU, LABA1C, I4YQWDV, U9MLPRD, FT4, TSH, AST, ALT, LDH, GGT, ALKPHOS, LABGGT, BILITOT, BILIDIR, AMMONIA, AMYLASE, LIPASE, LACTATE, CHOL, HDL, LDLCHOLESTEROL, CHOLHDLRATIO, TRIG, VLDL, [id number], PHENYTOIN, PHENYF, URICACID, POCGLU in the last 72 hours.     Imaging/Diagnostics:       XR ELBOW RIGHT (MIN 3 VIEWS)    Result Date: 3/1/2022  EXAMINATION: THREE XRAY VIEWS OF THE RIGHT ELBOW 3/1/2022 2:28 pm COMPARISON: None. HISTORY: ORDERING SYSTEM PROVIDED HISTORY: Cellulitis of right elbow TECHNOLOGIST PROVIDED HISTORY: elbow pain Reason for Exam: right elbow pain X 1 day, NKI FINDINGS: Alignment is anatomic. No visible fracture or appreciable joint effusion. The radial head aligns normally with the capitellum. Soft tissue swelling along the dorsum of the elbow. 1. No acute osseous abnormality or evidence of soft tissue gas. 2. Soft tissue swelling along the dorsum of the elbow. MRI LUMBAR SPINE WO CONTRAST    Result Date: 3/9/2022  EXAMINATION: MRI OF THE LUMBAR SPINE WITHOUT CONTRAST, 3/9/2022 8:43 am TECHNIQUE: Multiplanar multisequence MRI of the lumbar spine was performed without the administration of intravenous contrast. COMPARISON: 09/21/2016 HISTORY: ORDERING SYSTEM PROVIDED HISTORY: Bilateral lumbar radiculopathy TECHNOLOGIST PROVIDED HISTORY: Reason for Exam: Pt states low back pain fell through a floor in 2016 injuring low back and left knee pain FINDINGS: BONES/ALIGNMENT: Mild retrolisthesis is identified at L1-L2, L2-L3, and L3-L4. The vertebral body heights are maintained. The bone marrow signal appears unremarkable. No acute fracture is detected. Laminectomy is identified from L2 through L5. SPINAL CORD: The conus terminates normally. SOFT TISSUES: No paraspinal mass identified. L1-L2: Moderate central canal stenosis identified due to shallow broad disc osteophyte complex. L2-L3: Laminectomy is appreciated. Shallow broad disc osteophyte complex flattens the thecal sac. L3-L4: Laminectomy is again identified. Broad disc osteophyte complex flattens the thecal sac and encroaches on both exit foramina which are moderately stenotic. Mild facet arthropathy is noted. L4-L5: Laminectomy is appreciated without evidence of focal disc protrusion.  Shallow broad disc osteophyte complex flattens the thecal sac and encroaches upon both exit foramina resulting in moderate right foraminal stenosis and mild left foraminal stenosis. L5-S1: Broad disc osteophyte complex and facet arthropathy results in mild left foraminal stenosis. Stable laminectomy from L2 through L5. Mild retrolisthesis from L1 through L4 not significantly changed. Stable moderate central canal stenosis at L1-L2. Moderate multilevel bilateral neural foraminal stenosis is not significantly changed. XR CHEST PORTABLE    Result Date: 3/11/2022  EXAMINATION: ONE XRAY VIEW OF THE CHEST 3/11/2022 12:18 am COMPARISON: 11/05/2020 HISTORY: ORDERING SYSTEM PROVIDED HISTORY: shortness of breath FINDINGS: The cardiomediastinal silhouette is stable in size and configuration. Bibasilar atelectasis without focal consolidation. No sizable pleural effusion or pneumothorax. No acute osseous findings. Upper abdomen is grossly unremarkable. Bibasilar atelectasis without focal consolidation.         Current Facility-Administered Medications   Medication Dose Route Frequency Provider Last Rate Last Admin    methylPREDNISolone sodium (SOLU-MEDROL) injection 40 mg  40 mg IntraVENous Q12H Curtis Castillo MD        albuterol (PROVENTIL) nebulizer solution 2.5 mg  2.5 mg Nebulization Q4H Curtis Castillo MD   2.5 mg at 03/13/22 1119    tiotropium-olodaterol (STIOLTO) 2.5-2.5 MCG/ACT inhaler 2 puff  2 puff Inhalation Daily Curtis Castillo MD   2 puff at 03/13/22 1122    sodium chloride (OCEAN, BABY AYR) 0.65 % nasal spray 2 spray  2 spray Each Nostril 4x Daily Curtis Castillo MD   2 spray at 03/13/22 1241    benzonatate (TESSALON) capsule 200 mg  200 mg Oral TID Curtis Castillo MD   200 mg at 03/13/22 0830    dextromethorphan (DELSYM) 30 MG/5ML extended release liquid 60 mg  60 mg Oral 2 times per day Curtis Castillo MD   60 mg at 03/13/22 0829    sodium chloride flush 0.9 % injection 5-40 mL  5-40 mL IntraVENous 2 times per day Isaias Taylor MD   10 mL at 03/13/22 0836    sodium chloride flush 0.9 % injection 5-40 mL  5-40 mL IntraVENous PRN Chelsey Campuzano MD        0.9 % sodium chloride infusion  25 mL IntraVENous PRN Chelsey Campuzano MD        enoxaparin (LOVENOX) injection 30 mg  30 mg SubCUTAneous BID Chelsey Campuzano MD   30 mg at 03/13/22 0831    acetaminophen (TYLENOL) tablet 650 mg  650 mg Oral Q4H PRN Chelsey Campuzano MD   650 mg at 03/12/22 2255    ondansetron (ZOFRAN-ODT) disintegrating tablet 4 mg  4 mg Oral Q8H PRN Chelsey Campuzano MD        Or    ondansetron [facility]) injection 4 mg  4 mg IntraVENous Q6H PRN Chelsey Campuzaon MD        amLODIPine (NORVASC) tablet 10 mg  10 mg Oral Daily Chelsey Campuzano MD   10 mg at 03/13/22 0830    aspirin EC tablet 81 mg  81 mg Oral Daily Chelsey Campuzano MD   81 mg at 03/13/22 0830    enzalutamide (XTANDI) capsule 160 mg  160 mg Oral Daily Chelsey Campuzano MD   160 mg at 03/13/22 7268    vitamin B and C (TOTAL B-C) 1 tablet  1 tablet Oral Daily Chelsey Campuzano MD   1 tablet at 03/13/22 1240    furosemide (LASIX) tablet 40 mg  40 mg Oral Daily Chelsey Campuzano MD   40 mg at 03/13/22 0835    lisinopril (PRINIVIL;ZESTRIL) tablet 10 mg  10 mg Oral Daily Chelsey Campuzano MD   10 mg at 03/13/22 0830    pantoprazole (PROTONIX) tablet 40 mg  40 mg Oral QAM AC Chelsey Campuzano MD   40 mg at 03/13/22 0830    pravastatin (PRAVACHOL) tablet 80 mg  80 mg Oral QPM Chelsey Campuzano MD   80 mg at 03/12/22 1746    pregabalin (LYRICA) capsule 200 mg  200 mg Oral TID Chelsey Campuzano MD   200 mg at 03/13/22 0830    oxyCODONE-acetaminophen (PERCOCET) 5-325 MG per tablet 1 tablet  1 tablet Oral Q8H PRN Chelsey Campuzano MD   1 tablet at 03/13/22 0846    doxycycline monohydrate (MONODOX) capsule 100 mg  100 mg Oral 2 times per day Chelsey Campuzano MD   100 mg at 03/13/22 0830       Impressions :     1. Principal Problem:    COPD exacerbation ([facility]  Active Problems:    REYNALDO on CPAP    Morbid obesity ([facility]    PVD (peripheral vascular disease) with claudication ([facility]    Foot drop, right    Tobacco use  Resolved Problems:    * No resolved hospital problems. *        2.  has a past medical history of Acid reflux, Anemia, blood loss (10/7/2018), Arthritis, C. difficile colitis (3/19/2020), Chronic bilateral low back pain with bilateral sciatica (11/3/2016), Chronic diastolic heart failure ([address] (2/25/2021), Complete tear of left rotator cuff (7/18/2018), COPD (chronic obstructive pulmonary disease) ([address] Coronary artery disease involving native coronary artery of native heart without angina pectoris (2/2/2020), Degenerative disc disease, cervical (7/28/2019), GI bleed (3/19/2020), Gout, H/O cardiac catheterization, History of blood transfusion, Hyperlipidemia, Hyperlipidemia with target LDL less than 100 (1/20/2016), Hyperparathyroidism ([facility] [address] (1/17/2020), Hypertension, Knee pain, chronic, Liver disease, MDRO (multiple drug resistant organisms) resistance, Melena (3/19/2020), Memory loss, Moderate episode of recurrent major depressive disorder ([address] (1/20/2016), Obesity, Class III, BMI 40-49.9 (morbid obesity) ([facility] (1/20/2016), REYNALDO on CPAP (5/6/2017), Osteoarthritis, Peripheral arterial occlusive disease ([address] (3/25/2017), Pneumonia (3/9/2020), Polypharmacy (3/25/2017), Positive FIT (fecal immunochemical test) (4/11/2017), Prolonged emergence from general anesthesia (01/05/2017), Prostate CA ([address] (1/20/2016), Prostate cancer ([address] (10/2013), Pseudocholinesterase deficiency (03/25/2017), Severe obesity (BMI 35.0-35.9 with comorbidity) ([facility] (2/2/2020), Short of breath on exertion, Sleep apnea, Status post lumbar laminectomy (3/25/2017), Stenosis of left carotid artery (10/7/2018), Syncope and collapse (3/19/2020), Tubular adenoma of colon 4/11/17 (5/13/2017), Type 2 diabetes mellitus with hyperglycemia, without long-term current use of insulin ([facility] (3/25/2017), Unintended weight loss (10/7/2018), Vitamin D deficiency (3/25/2017), and Wears glasses.      Plans:

## 2022-03-13 NOTE — CARE COORDINATION
ONGOING DISCHARGE PLAN:    Patient is alert and oriented x4. Spoke with patient regarding discharge plan and patient confirms that plan is still to return to home w/ Wife. Denies VNS. Remains on IV Steroids, 40 Q12 & Po Doxy. Currently sating 93% on 4LNC. Wears 3. 5LNC, at home. Wants a Rollator, has been using his Wife's, Will need orders. Will continue to follow for additional discharge needs.     Electronically signed by Marcelo West RN on 3/13/2022 at 4:49 PM

## 2022-03-13 NOTE — PLAN OF CARE
Problem: Breathing Pattern - Ineffective:  Goal: Ability to achieve and maintain a regular respiratory rate will improve  Description: Ability to achieve and maintain a regular respiratory rate will improve  3/13/2022 1312 by Mary Chávez RN  Outcome: Ongoing  Note: Patient on room air today.  O2 Sats mid 90's while sitting, Respiratory rate 18     Problem: Infection:  Goal: Will remain free from infection  Description: Will remain free from infection  3/13/2022 1312 by Mary Chávez RN  Outcome: Ongoing  Note: Afebrile today     Problem: Safety:  Goal: Free from accidental physical injury  Description: Free from accidental physical injury  3/13/2022 1312 by Mary Chávez RN  Outcome: Ongoing     Problem: Safety:  Goal: Free from intentional harm  Description: Free from intentional harm  3/13/2022 1312 by Mary Chávez RN  Outcome: Ongoing     Problem: Daily Care:  Goal: Daily care needs are met  Description: Daily care needs are met  3/13/2022 1312 by Mary Chávez RN  Outcome: Ongoing  Note: Able to do self care     Problem: Pain:  Goal: Patient's pain/discomfort is manageable  Description: Patient's pain/discomfort is manageable  3/13/2022 1312 by Mary Chávez RN  Outcome: Ongoing  Note: Chronic pain, sees pain clinic outpatient     Problem: Pain:  Goal: Pain level will decrease  Description: Pain level will decrease  3/13/2022 1312 by Mary Chávez RN  Outcome: Ongoing     Problem: Pain:  Goal: Control of acute pain  Description: Control of acute pain  3/13/2022 1312 by Mary Chávez RN  Outcome: Ongoing     Problem: Pain:  Goal: Control of chronic pain  Description: Control of chronic pain  3/13/2022 1312 by Mary Chávez RN  Outcome: Ongoing     Problem: Skin Integrity:  Goal: Skin integrity will stabilize  Description: Skin integrity will stabilize  3/13/2022 1312 by Mary Chávez RN  Outcome: Ongoing     Problem: Discharge Planning:  Goal: Patients continuum of care needs are met  Description: Patients continuum of care needs are met  3/13/2022 1312 by Darcy Wynn RN  Outcome: Ongoing     Problem: Gas Exchange - Impaired:  Goal: Levels of oxygenation will improve  Description: Levels of oxygenation will improve  3/13/2022 1312 by Darcy Wynn RN  Outcome: Ongoing  Note: C-Pap at night as at home     Problem: Falls - Risk of:  Goal: Will remain free from falls  Description: Will remain free from falls  3/13/2022 1312 by Darcy Wynn RN  Outcome: Ongoing     Problem: Falls - Risk of:  Goal: Absence of physical injury  Description: Absence of physical injury  3/13/2022 1312 by Darcy Wynn RN  Outcome: Ongoing

## 2022-03-13 NOTE — PROGRESS NOTES
Regular rate and rhythm   Abdomen - Soft, nontender, nondistended, no masses or organomegaly  Neurologic - There are no focal motor or sensory deficits  Skin - No bruising or bleeding  Extremities - No clubbing, cyanosis, edema    MEDS      albuterol  2.5 mg Nebulization Q4H    tiotropium-olodaterol  2 puff Inhalation Daily    sodium chloride  2 spray Each Nostril 4x Daily    benzonatate  200 mg Oral TID    dextromethorphan  60 mg Oral 2 times per day    sodium chloride flush  5-40 mL IntraVENous 2 times per day    enoxaparin  30 mg SubCUTAneous BID    amLODIPine  10 mg Oral Daily    aspirin EC  81 mg Oral Daily    enzalutamide  160 mg Oral Daily    vitamin B and C  1 tablet Oral Daily    furosemide  40 mg Oral Daily    lisinopril  10 mg Oral Daily    pantoprazole  40 mg Oral QAM AC    pravastatin  80 mg Oral QPM    pregabalin  200 mg Oral TID    doxycycline monohydrate  100 mg Oral 2 times per day    methylPREDNISolone  40 mg IntraVENous Q8H      sodium chloride       sodium chloride flush, sodium chloride, acetaminophen, ondansetron **OR** ondansetron, oxyCODONE-acetaminophen    LABS   CBC No results for input(s): WBC, HGB, HCT, MCV, PLT in the last 72 hours. BMP:   Lab Results   Component Value Date     03/10/2022    K 4.9 03/10/2022     03/10/2022    CO2 24 03/10/2022    BUN 19 03/10/2022    LABALBU 4.4 11/05/2021    LABALBU 4.1 02/16/2012    CREATININE 0.69 03/10/2022    CALCIUM 9.6 03/10/2022    GFRAA >60 03/10/2022    LABGLOM >60 03/10/2022     ABGs:No results found for: PHART, PO2ART, [id number]   Lab Results   Component Value Date    MODE NOT REPORTED 07/09/2021     Ionized Calcium:  No results found for: IONCA  Magnesium:    Lab Results   Component Value Date    MG 2.0 03/19/2020     Phosphorus:  No results found for: PHOS     LIVER PROFILE No results for input(s): AST, ALT, LIPASE, BILIDIR, BILITOT, ALKPHOS in the last 72 hours. Invalid input(s):   AMYLASE,  ALB  INR No results for input(s): INR in the last 72 hours. PTT   Lab Results   Component Value Date    APTT 20.4 (L) 03/19/2020         RADIOLOGY     (See actual reports for details)    ASSESSMENT/PLAN   Principal Problem:    COPD exacerbation ([address]  Active Problems:    REYNALDO on CPAP    Morbid obesity (HCC)    PVD (peripheral vascular disease) with claudication (HCC)    Foot drop, right    Tobacco use  Resolved Problems:    * No resolved hospital problems.  *    Decrease Solu-Medrol to 40 every 12  Continue antitussives  Continue bronchodilators  Needs mobilization  Transfer to medical surgical floor from my standpoint  Electronically signed by Collin Hook MD on 3/13/2022 at 10:19 AM    Addendum: Feel that patient is not ready for discharge today, will take him on our service discussed with Dr. Abraham Jewell

## 2022-03-14 LAB
ABSOLUTE EOS #: 0 K/UL (ref 0–0.4)
ABSOLUTE LYMPH #: 1.52 K/UL (ref 1–4.8)
ABSOLUTE MONO #: 0.51 K/UL (ref 0.1–1.3)
ANION GAP SERPL CALCULATED.3IONS-SCNC: 10 MMOL/L (ref 9–17)
BASOPHILS # BLD: 0 % (ref 0–2)
BASOPHILS ABSOLUTE: 0 K/UL (ref 0–0.2)
BUN BLDV-MCNC: 30 MG/DL (ref 8–23)
CALCIUM SERPL-MCNC: 9.4 MG/DL (ref 8.6–10.4)
CHLORIDE BLD-SCNC: 102 MMOL/L (ref 98–107)
CO2: 24 MMOL/L (ref 20–31)
CREAT SERPL-MCNC: 0.67 MG/DL (ref 0.7–1.2)
EOSINOPHILS RELATIVE PERCENT: 0 % (ref 0–4)
GFR AFRICAN AMERICAN: >60 ML/MIN
GFR NON-AFRICAN AMERICAN: >60 ML/MIN
GFR SERPL CREATININE-BSD FRML MDRD: ABNORMAL ML/MIN/{1.73_M2}
GLUCOSE BLD-MCNC: 121 MG/DL (ref 70–99)
HCT VFR BLD CALC: 43.5 % (ref 41–53)
HEMOGLOBIN: 14.5 G/DL (ref 13.5–17.5)
LYMPHOCYTES # BLD: 12 % (ref 24–44)
MCH RBC QN AUTO: 30 PG (ref 26–34)
MCHC RBC AUTO-ENTMCNC: 33.3 G/DL (ref 31–37)
MCV RBC AUTO: 89.9 FL (ref 80–100)
MONOCYTES # BLD: 4 % (ref 1–7)
MORPHOLOGY: NORMAL
PDW BLD-RTO: 14.6 % (ref 11.5–14.9)
PLATELET # BLD: 270 K/UL (ref 150–450)
PMV BLD AUTO: 8.4 FL (ref 6–12)
POTASSIUM SERPL-SCNC: 4.4 MMOL/L (ref 3.7–5.3)
RBC # BLD: 4.84 M/UL (ref 4.5–5.9)
SEG NEUTROPHILS: 84 % (ref 36–66)
SEGMENTED NEUTROPHILS ABSOLUTE COUNT: 10.67 K/UL (ref 1.3–9.1)
SODIUM BLD-SCNC: 136 MMOL/L (ref 135–144)
WBC # BLD: 12.7 K/UL (ref 3.5–11)

## 2022-03-14 PROCEDURE — 6370000000 HC RX 637 (ALT 250 FOR IP): Performed by: INTERNAL MEDICINE

## 2022-03-14 PROCEDURE — 2580000003 HC RX 258: Performed by: INTERNAL MEDICINE

## 2022-03-14 PROCEDURE — 99231 SBSQ HOSP IP/OBS SF/LOW 25: CPT | Performed by: INTERNAL MEDICINE

## 2022-03-14 PROCEDURE — 85025 COMPLETE CBC W/AUTO DIFF WBC: CPT

## 2022-03-14 PROCEDURE — 94761 N-INVAS EAR/PLS OXIMETRY MLT: CPT

## 2022-03-14 PROCEDURE — 2700000000 HC OXYGEN THERAPY PER DAY

## 2022-03-14 PROCEDURE — 94640 AIRWAY INHALATION TREATMENT: CPT

## 2022-03-14 PROCEDURE — 6360000002 HC RX W HCPCS: Performed by: INTERNAL MEDICINE

## 2022-03-14 PROCEDURE — 1200000000 HC SEMI PRIVATE

## 2022-03-14 PROCEDURE — 36415 COLL VENOUS BLD VENIPUNCTURE: CPT

## 2022-03-14 PROCEDURE — 6370000000 HC RX 637 (ALT 250 FOR IP): Performed by: NURSE PRACTITIONER

## 2022-03-14 PROCEDURE — 80048 BASIC METABOLIC PNL TOTAL CA: CPT

## 2022-03-14 RX ORDER — PREDNISONE 20 MG/1
40 TABLET ORAL DAILY
Status: DISCONTINUED | OUTPATIENT
Start: 2022-03-14 | End: 2022-03-15 | Stop reason: HOSPADM

## 2022-03-14 RX ADMIN — BENZONATATE 200 MG: 200 CAPSULE ORAL at 21:48

## 2022-03-14 RX ADMIN — PANTOPRAZOLE SODIUM 40 MG: 40 TABLET, DELAYED RELEASE ORAL at 05:22

## 2022-03-14 RX ADMIN — PREGABALIN 200 MG: 100 CAPSULE ORAL at 08:11

## 2022-03-14 RX ADMIN — Medication 60 MG: at 08:11

## 2022-03-14 RX ADMIN — FUROSEMIDE 40 MG: 40 TABLET ORAL at 08:11

## 2022-03-14 RX ADMIN — ALBUTEROL SULFATE 2.5 MG: 2.5 SOLUTION RESPIRATORY (INHALATION) at 23:10

## 2022-03-14 RX ADMIN — ENOXAPARIN SODIUM 30 MG: 100 INJECTION SUBCUTANEOUS at 21:49

## 2022-03-14 RX ADMIN — Medication 1 TABLET: at 08:11

## 2022-03-14 RX ADMIN — METHYLPREDNISOLONE SODIUM SUCCINATE 40 MG: 40 INJECTION, POWDER, FOR SOLUTION INTRAMUSCULAR; INTRAVENOUS at 08:11

## 2022-03-14 RX ADMIN — TIOTROPIUM BROMIDE AND OLODATEROL 2 PUFF: 3.124; 2.736 SPRAY, METERED RESPIRATORY (INHALATION) at 07:06

## 2022-03-14 RX ADMIN — OXYCODONE HYDROCHLORIDE AND ACETAMINOPHEN 1 TABLET: 5; 325 TABLET ORAL at 17:27

## 2022-03-14 RX ADMIN — LISINOPRIL 10 MG: 10 TABLET ORAL at 08:11

## 2022-03-14 RX ADMIN — BENZONATATE 200 MG: 200 CAPSULE ORAL at 08:11

## 2022-03-14 RX ADMIN — BENZONATATE 200 MG: 200 CAPSULE ORAL at 14:15

## 2022-03-14 RX ADMIN — ASPIRIN 81 MG: 81 TABLET, COATED ORAL at 08:10

## 2022-03-14 RX ADMIN — SALINE NASAL SPRAY 2 SPRAY: 1.5 SOLUTION NASAL at 08:57

## 2022-03-14 RX ADMIN — ALBUTEROL SULFATE 2.5 MG: 2.5 SOLUTION RESPIRATORY (INHALATION) at 19:15

## 2022-03-14 RX ADMIN — SALINE NASAL SPRAY 2 SPRAY: 1.5 SOLUTION NASAL at 17:03

## 2022-03-14 RX ADMIN — ENOXAPARIN SODIUM 30 MG: 100 INJECTION SUBCUTANEOUS at 08:11

## 2022-03-14 RX ADMIN — ALBUTEROL SULFATE 2.5 MG: 2.5 SOLUTION RESPIRATORY (INHALATION) at 10:33

## 2022-03-14 RX ADMIN — PRAVASTATIN SODIUM 80 MG: 40 TABLET ORAL at 17:27

## 2022-03-14 RX ADMIN — DOXYCYCLINE 100 MG: 100 CAPSULE ORAL at 21:49

## 2022-03-14 RX ADMIN — OXYCODONE HYDROCHLORIDE AND ACETAMINOPHEN 1 TABLET: 5; 325 TABLET ORAL at 05:22

## 2022-03-14 RX ADMIN — PREDNISONE 40 MG: 20 TABLET ORAL at 14:40

## 2022-03-14 RX ADMIN — SODIUM CHLORIDE, PRESERVATIVE FREE 10 ML: 5 INJECTION INTRAVENOUS at 21:50

## 2022-03-14 RX ADMIN — PREGABALIN 200 MG: 100 CAPSULE ORAL at 21:49

## 2022-03-14 RX ADMIN — Medication 60 MG: at 21:48

## 2022-03-14 RX ADMIN — ALBUTEROL SULFATE 2.5 MG: 2.5 SOLUTION RESPIRATORY (INHALATION) at 14:39

## 2022-03-14 RX ADMIN — SALINE NASAL SPRAY 2 SPRAY: 1.5 SOLUTION NASAL at 21:48

## 2022-03-14 RX ADMIN — PREGABALIN 200 MG: 100 CAPSULE ORAL at 14:16

## 2022-03-14 RX ADMIN — SODIUM CHLORIDE, PRESERVATIVE FREE 10 ML: 5 INJECTION INTRAVENOUS at 08:49

## 2022-03-14 RX ADMIN — DOXYCYCLINE 100 MG: 100 CAPSULE ORAL at 08:11

## 2022-03-14 RX ADMIN — ALBUTEROL SULFATE 2.5 MG: 2.5 SOLUTION RESPIRATORY (INHALATION) at 06:44

## 2022-03-14 RX ADMIN — SALINE NASAL SPRAY 2 SPRAY: 1.5 SOLUTION NASAL at 13:58

## 2022-03-14 ASSESSMENT — PAIN SCALES - GENERAL
PAINLEVEL_OUTOF10: 8
PAINLEVEL_OUTOF10: 8
PAINLEVEL_OUTOF10: 4

## 2022-03-14 ASSESSMENT — PAIN DESCRIPTION - ORIENTATION: ORIENTATION: LEFT;RIGHT

## 2022-03-14 ASSESSMENT — PAIN DESCRIPTION - LOCATION: LOCATION: FOOT;BACK

## 2022-03-14 ASSESSMENT — PAIN DESCRIPTION - PAIN TYPE: TYPE: CHRONIC PAIN

## 2022-03-14 NOTE — CONSULTS
disease) ([address]     Coronary artery disease involving native coronary artery of native heart without angina pectoris 2/2/2020    Degenerative disc disease, cervical 7/28/2019    GI bleed 3/19/2020    Gout     H/O cardiac catheterization     yrs ago   no stents    History of blood transfusion     Hyperlipidemia     Hyperlipidemia with target LDL less than 100 1/20/2016    Hyperparathyroidism ([address] 1/17/2020    Hypertension     Knee pain, chronic     left    Liver disease     MDRO (multiple drug resistant organisms) resistance     Melena 3/19/2020    Memory loss     Moderate episode of recurrent major depressive disorder ([address] 1/20/2016    Obesity, Class III, BMI 40-49.9 (morbid obesity) ([address] 1/20/2016    REYNALDO on CPAP 5/6/2017    Osteoarthritis     Peripheral arterial occlusive disease ([address] 3/25/2017    Pneumonia 3/9/2020    Polypharmacy 3/25/2017    Positive FIT (fecal immunochemical test) 4/11/2017    Prolonged emergence from general anesthesia 01/05/2017    Patient \"on life support for 3 days\" after back surgery due to being given succinylcholine    Prostate CA ([address] 1/20/2016    Prostate cancer ([address] 10/2013    finished radiation tx 5/2014    Pseudocholinesterase deficiency 03/25/2017    Pt.  \"on life support\" for 3 days after back surgery 1/5/17 after being given succinylcholine    Severe obesity (BMI 35.0-35.9 with comorbidity) ([address] 2/2/2020    Short of breath on exertion     Sleep apnea     uses CPAP machine nightly    Status post lumbar laminectomy 3/25/2017    Stenosis of left carotid artery 10/7/2018    Syncope and collapse 3/19/2020    Tubular adenoma of colon 4/11/17 5/13/2017    Type 2 diabetes mellitus with hyperglycemia, without long-term current use of insulin ([address] 3/25/2017    Lab Results Component Value Date  LABA1C 7.1 (H) 03/23/2017     Unintended weight loss 10/7/2018    Vitamin D deficiency 3/25/2017    Wears glasses         Past Surgical History:     Past Surgical History:   Procedure Laterality Date    BACK SURGERY      x 2     BACK SURGERY  01/05/2017    lumbar laminectomy L2, L3, L4    BRONCHOSCOPY N/A 3/13/2020    BRONCHOSCOPY performed by Jonelle Pickens MD at [facility]  2007    no stents    CAROTID ENDARTERECTOMY Right 12/16/2019    Dr. Merino Shown, LAPAROSCOPIC N/A 2/16/2021    CHOLECYSTECTOMY LAPAROSCOPIC ROBOTIC XI performed by Tank Gallo MD at [address] [facility], COLON, DIAGNOSTIC     [address] Left 11/18/2020    HERNIA INGUINAL REPAIR [address] OPEN performed by Tank Gallo MD at [address] Left     LUMBAR LAMINECTOMY  01/05/2017    L2-L4    MN CLOSED RX SHLDR DISLOC,ANESTHESIA Left 8/1/2018    SHOULDER CLOSED REDUCTION WITH C-ARM VISUALIZATION performed by Samina Diehl MD at [facility] N/A 5/9/2017    COLONOSCOPY WITH BIOPSY performed by Makenzie Brooks DO at [address] [facility] IMPLANT Left 7/18/2018    SHOULDER TOTAL ARTHROPLASTY REVERSE LEFT DJO & BICEP TENDON TRANSFER performed by Samina Diehl MD at [address] HUMERAL/GLENOID COMPNT Left 8/3/2018    SHOULDER TOTAL REVERSE  ARTHROPLASTY REVISION performed by Samina Diehl MD at [address]? rotator cuff repair    SHOULDER SURGERY Left 10/15/2020    SHOULDER ARTHROSCOPY W/INTEROP CULTURES performed by Samina Diehl MD at [facility]      ear, forehead    TONSILLECTOMY AND ADENOIDECTOMY      UPPER GASTROINTESTINAL ENDOSCOPY N/A 3/19/2020    EGD BIOPSY performed by Filiberto Sneed MD at [facility]        Medications Prior to Admission:     Prior to Admission medications    Medication Sig Start Date End Date Taking?  Authorizing Provider   azithromycin (ZITHROMAX) 250 MG tablet 500 mg orally on day one followed by 250 mg daily on days two through five 3/9/22 3/14/22 Yes Jf Nix MD   predniSONE (DELTASONE) 10 MG tablet Take 5 tablets by mouth daily for 7 days 3/9/22 3/16/22 Yes Jf Nix MD   lisinopril (PRINIVIL;ZESTRIL) 10 MG tablet Take 1 tablet by mouth daily Dose decreased by cardiologist 2/8/22  Yes Jf Nix MD   pregabalin (LYRICA) 200 MG capsule Take 1 capsule by mouth 3 times daily for 90 days. Please ignore prior refill for gabapentin 800 mg, will switch back to Lyrica 2/7/22 5/8/22 Yes Jf Nix MD   Cholecalciferol (VITAMIN D3) 1.25 MG (12047 UT) CAPS Take by mouth   Yes Historical Provider, MD   enzalutamide Victoria Serum) 40 MG capsule Take 4 capsules by mouth daily 11/29/21  Yes Rigoberto Daniels MD   furosemide (LASIX) 40 MG tablet Take 1 tablet by mouth daily Dose increased 1/12/2021 11/5/21  Yes Jf Nix MD   omeprazole (PRILOSEC) 20 MG delayed release capsule Take 1 capsule by mouth every morning (before breakfast) Dose decreased 7/20/2021 7/20/21  Yes Jf Nix MD   pravastatin (PRAVACHOL) 80 MG tablet Take 1 tablet by mouth every evening Dose increased  9/24/2019 7/20/21  Yes Jf Nix MD   amLODIPine (NORVASC) 10 MG tablet Take 1 tablet by mouth daily Dose increased 10/16/2020 due to high BP. Correct dosage 7/20/21  Yes Jf Nix MD   folbee plus (FOLBEE PLUS) TABS Take 1 tablet by mouth daily 7/12/21  Yes Dee Anguiano MD   oxyCODONE-acetaminophen (PERCOCET) 5-325 MG per tablet Take 1 tablet by mouth 2 times daily as needed.     Yes Historical Provider, MD   aspirin EC 81 MG EC tablet Take 1 tablet by mouth daily 9/4/18  Yes Jf Nix MD   Ascorbic Acid (VITAMIN C) 250 MG tablet Take 250 mg by mouth daily    Historical Provider, MD   albuterol sulfate  (90 Base) MCG/ACT inhaler USE 2 INHALATIONS EVERY 6 HOURS AS NEEDED FOR WHEEZING OR SHORTNESS OF BREATH 11/18/21   Jf Nix MD ergocalciferol (ERGOCALCIFEROL) 1.25 MG (94326 UT) capsule Take 50,000 Units by mouth once a week     Historical Provider, MD   albuterol (PROVENTIL) (2.5 MG/3ML) 0.083% nebulizer solution Take 3 mLs by nebulization every 6 hours as needed for Wheezing or Shortness of Breath (cough) 8/5/21   Jaime Valentine MD   tiotropium-olodaterol (STIOLTO RESPIMAT) 2.5-2.5 MCG/ACT AERS Inhale 2 puffs into the lungs daily    Historical Provider, MD   glucose monitoring kit (FREESTYLE) monitoring kit 1 kit by Does not apply route daily Please supply Patient with a glucose monitoring kit that insurance will cover. 4/23/21 4/23/22  Jiame Valentine MD   Lancets 30G MISC Testing once a day. Please dispense according to patients insurance. 4/23/21   Jaime Valentine MD   blood glucose monitor strips Testing once a day. Please dispense according to patients insurance. 4/23/21   Jaime Valentine MD   Alcohol Swabs PADS Please dispense according to patients insurance/device. Testing once a day 4/23/21   Jaime Valentine MD   ammonium lactate (AMLACTIN) 12 % cream APPLY TOPICALLY TO LEGS ONE TO TWO TIMES A DAY AS NEEDED 3/1/21   Jaime Valentine MD   naloxone 4 MG/0.1ML LIQD nasal spray 1 spray by Nasal route as needed for Opioid Reversal Patient needs counseling 10/29/20   Jaime Valentine MD   OXYGEN With CPAP at night 8/25/20   Historical Provider, MD   Respiratory Therapy Supplies (ADULT MASK) MISC Needs to use mask daily due to coronavirus pandemic. 7/7/20   Jaime Valentine MD        Allergies:     Succinylcholine chloride and Cymbalta [duloxetine hcl]    Social History:     Tobacco:    reports that he has been smoking cigarettes. He started smoking about 8 months ago. He has a 35.00 pack-year smoking history. He has never used smokeless tobacco.  Alcohol:      reports previous alcohol use. Drug Use:  reports no history of drug use.     Family History:     Family History   Problem Relation Age of Onset 5.3 mmol/L    Chloride 102 98 - 107 mmol/L    CO2 24 20 - 31 mmol/L    Anion Gap 10 9 - 17 mmol/L    GFR Non-African American >60 >60 mL/min    GFR African American >60 >60 mL/min    GFR Comment             Recent Labs     03/14/22  0638   HGB 14.5   HCT 43.5   WBC 12.7*   MCV 89.9      K 4.4      CO2 24   BUN 30*   CREATININE 0.67*   GLUCOSE 121*       Hematology:  Recent Labs     03/14/22  0638   WBC 12.7*   RBC 4.84   HGB 14.5   HCT 43.5   MCV 89.9   MCH 30.0   MCHC 33.3   RDW 14.6      MPV 8.4     Chemistry:  Recent Labs     03/14/22  0638      K 4.4      CO2 24   GLUCOSE 121*   BUN 30*   CREATININE 0.67*   ANIONGAP 10   LABGLOM >60   GFRAA >60   CALCIUM 9.4     No results for input(s): PROT, LABALBU, LABA1C, P0KDGES, O6QSDST, FT4, TSH, AST, ALT, LDH, GGT, ALKPHOS, LABGGT, BILITOT, BILIDIR, AMMONIA, AMYLASE, LIPASE, LACTATE, CHOL, HDL, LDLCHOLESTEROL, CHOLHDLRATIO, TRIG, VLDL, HMN88MW, PHENYTOIN, PHENYF, URICACID, POCGLU in the last 72 hours. Imaging/Diagnostics:       XR ELBOW RIGHT (MIN 3 VIEWS)    Result Date: 3/1/2022  EXAMINATION: THREE XRAY VIEWS OF THE RIGHT ELBOW 3/1/2022 2:28 pm COMPARISON: None. HISTORY: ORDERING SYSTEM PROVIDED HISTORY: Cellulitis of right elbow TECHNOLOGIST PROVIDED HISTORY: elbow pain Reason for Exam: right elbow pain X 1 day, NKI FINDINGS: Alignment is anatomic. No visible fracture or appreciable joint effusion. The radial head aligns normally with the capitellum. Soft tissue swelling along the dorsum of the elbow. 1. No acute osseous abnormality or evidence of soft tissue gas. 2. Soft tissue swelling along the dorsum of the elbow.      MRI LUMBAR SPINE WO CONTRAST    Result Date: 3/9/2022  EXAMINATION: MRI OF THE LUMBAR SPINE WITHOUT CONTRAST, 3/9/2022 8:43 am TECHNIQUE: Multiplanar multisequence MRI of the lumbar spine was performed without the administration of intravenous contrast. COMPARISON: 09/21/2016 HISTORY: ORDERING SYSTEM PROVIDED HISTORY: Bilateral lumbar radiculopathy TECHNOLOGIST PROVIDED HISTORY: Reason for Exam: Pt states low back pain fell through a floor in 2016 injuring low back and left knee pain FINDINGS: BONES/ALIGNMENT: Mild retrolisthesis is identified at L1-L2, L2-L3, and L3-L4. The vertebral body heights are maintained. The bone marrow signal appears unremarkable. No acute fracture is detected. Laminectomy is identified from L2 through L5. SPINAL CORD: The conus terminates normally. SOFT TISSUES: No paraspinal mass identified. L1-L2: Moderate central canal stenosis identified due to shallow broad disc osteophyte complex. L2-L3: Laminectomy is appreciated. Shallow broad disc osteophyte complex flattens the thecal sac. L3-L4: Laminectomy is again identified. Broad disc osteophyte complex flattens the thecal sac and encroaches on both exit foramina which are moderately stenotic. Mild facet arthropathy is noted. L4-L5: Laminectomy is appreciated without evidence of focal disc protrusion. Shallow broad disc osteophyte complex flattens the thecal sac and encroaches upon both exit foramina resulting in moderate right foraminal stenosis and mild left foraminal stenosis. L5-S1: Broad disc osteophyte complex and facet arthropathy results in mild left foraminal stenosis. Stable laminectomy from L2 through L5. Mild retrolisthesis from L1 through L4 not significantly changed. Stable moderate central canal stenosis at L1-L2. Moderate multilevel bilateral neural foraminal stenosis is not significantly changed. XR CHEST PORTABLE    Result Date: 3/11/2022  EXAMINATION: ONE XRAY VIEW OF THE CHEST 3/11/2022 12:18 am COMPARISON: 11/05/2020 HISTORY: ORDERING SYSTEM PROVIDED HISTORY: shortness of breath FINDINGS: The cardiomediastinal silhouette is stable in size and configuration. Bibasilar atelectasis without focal consolidation. No sizable pleural effusion or pneumothorax. No acute osseous findings.  Upper abdomen is grossly unremarkable. Bibasilar atelectasis without focal consolidation.         Current Facility-Administered Medications   Medication Dose Route Frequency Provider Last Rate Last Admin    predniSONE (DELTASONE) tablet 40 mg  40 mg Oral Daily JAVI Cervantes - CNP   40 mg at 03/14/22 1440    albuterol (PROVENTIL) nebulizer solution 2.5 mg  2.5 mg Nebulization Q4H Vijay Ambriz MD   2.5 mg at 03/14/22 1439    tiotropium-olodaterol (STIOLTO) 2.5-2.5 MCG/ACT inhaler 2 puff  2 puff Inhalation Daily Vijay Ambriz MD   2 puff at 03/14/22 0706    sodium chloride (OCEAN, BABY AYR) 0.65 % nasal spray 2 spray  2 spray Each Nostril 4x Daily Vijay Ambriz MD   2 spray at 03/14/22 1358    benzonatate (TESSALON) capsule 200 mg  200 mg Oral TID Vijay Ambriz MD   200 mg at 03/14/22 1415    dextromethorphan (DELSYM) 30 MG/5ML extended release liquid 60 mg  60 mg Oral 2 times per day Vijay Ambriz MD   60 mg at 03/14/22 0811    sodium chloride flush 0.9 % injection 5-40 mL  5-40 mL IntraVENous 2 times per day Wily Canchola MD   10 mL at 03/14/22 0849    sodium chloride flush 0.9 % injection 5-40 mL  5-40 mL IntraVENous PRN Wily Canchola MD        0.9 % sodium chloride infusion  25 mL IntraVENous PRN Wily Canchola MD        enoxaparin (LOVENOX) injection 30 mg  30 mg SubCUTAneous BID Wily Canchola MD   30 mg at 03/14/22 0811    acetaminophen (TYLENOL) tablet 650 mg  650 mg Oral Q4H PRN Wily Canchola MD   650 mg at 03/12/22 2255    ondansetron (ZOFRAN-ODT) disintegrating tablet 4 mg  4 mg Oral Q8H PRN Wily Canchola MD        Or    ondansetron [facility]) injection 4 mg  4 mg IntraVENous Q6H PRN Wily Canchola MD        amLODIPine (NORVASC) tablet 10 mg  10 mg Oral Daily Wily Canchola MD   10 mg at 03/13/22 0830    aspirin EC tablet 81 mg  81 mg Oral Daily Wily Canchola MD   81 mg at 03/14/22 0810    enzalutamide (XTANDI) capsule 160 mg  160 mg Oral Daily Wily Canchola MD   160 mg at 03/14/22 1022    vitamin B and C (TOTAL B-C) 1 tablet  1 tablet Oral Daily Cady Still MD   1 tablet at 03/14/22 0811    furosemide (LASIX) tablet 40 mg  40 mg Oral Daily Cady Still MD   40 mg at 03/14/22 0811    lisinopril (PRINIVIL;ZESTRIL) tablet 10 mg  10 mg Oral Daily Cady Still MD   10 mg at 03/14/22 0811    pantoprazole (PROTONIX) tablet 40 mg  40 mg Oral QAM AC Cady Still MD   40 mg at 03/14/22 0522    pravastatin (PRAVACHOL) tablet 80 mg  80 mg Oral QPM Cady Still MD   80 mg at 03/13/22 1742    pregabalin (LYRICA) capsule 200 mg  200 mg Oral TID Cady Still MD   200 mg at 03/14/22 1416    oxyCODONE-acetaminophen (PERCOCET) 5-325 MG per tablet 1 tablet  1 tablet Oral Q8H PRN Cady Still MD   1 tablet at 03/14/22 0522    doxycycline monohydrate (MONODOX) capsule 100 mg  100 mg Oral 2 times per day Cady Still MD   100 mg at 03/14/22 5587       Impressions :     1. Principal Problem:    COPD exacerbation ([facility]  Active Problems:    REYNALDO on CPAP    Morbid obesity ([facility]    PVD (peripheral vascular disease) with claudication ([facility]    Foot drop, right    Tobacco use  Resolved Problems:    * No resolved hospital problems.  *        2.  has a past medical history of Acid reflux, Anemia, blood loss (10/7/2018), Arthritis, C. difficile colitis (3/19/2020), Chronic bilateral low back pain with bilateral sciatica (11/3/2016), Chronic diastolic heart failure ([address] (2/25/2021), Complete tear of left rotator cuff (7/18/2018), COPD (chronic obstructive pulmonary disease) ([facility] Coronary artery disease involving native coronary artery of native heart without angina pectoris (2/2/2020), Degenerative disc disease, cervical (7/28/2019), GI bleed (3/19/2020), Gout, H/O cardiac catheterization, History of blood transfusion, Hyperlipidemia, Hyperlipidemia with target LDL less than 100 (1/20/2016), Hyperparathyroidism ([facility] (1/17/2020), Hypertension, Knee pain, chronic, Liver disease, MDRO (multiple drug resistant organisms) resistance, Melena (3/19/2020), Memory loss, Moderate episode of recurrent major depressive disorder ([facility] (1/20/2016), Obesity, Class III, BMI 40-49.9 (morbid obesity) ([facility] (1/20/2016), REYNALDO on CPAP (5/6/2017), Osteoarthritis, Peripheral arterial occlusive disease ([address] (3/25/2017), Pneumonia (3/9/2020), Polypharmacy (3/25/2017), Positive FIT (fecal immunochemical test) (4/11/2017), Prolonged emergence from general anesthesia (01/05/2017), Prostate CA ([facility] (1/20/2016), Prostate cancer ([facility] (10/2013), Pseudocholinesterase deficiency (03/25/2017), Severe obesity (BMI 35.0-35.9 with comorbidity) ([facility] (2/2/2020), Short of breath on exertion, Sleep apnea, Status post lumbar laminectomy (3/25/2017), Stenosis of left carotid artery (10/7/2018), Syncope and collapse (3/19/2020), Tubular adenoma of colon 4/11/17 (5/13/2017), Type 2 diabetes mellitus with hyperglycemia, without long-term current use of insulin ([facility] (3/25/2017), Unintended weight loss (10/7/2018), Vitamin D deficiency (3/25/2017), and Wears glasses.      Plans:     26-year-old male history of COPD on intermittent home oxygen, REYNALDO uses home CPAP presenting with 1 week history of shortness of breath cough and congestion treated for COPD exacerbation  No chest pain no fever troponin negative BNP normal no leg swelling EKG sinus rhythm with PACs  Acute exacerbation of COPD-IV steroids, mucolytic, scheduled bronchodilators  Chronic diastolic heart failure-compensated continue lisinopril, Lasix  Current smoker-reports quit 4 days ago  Type 2 diabetes controlled  Hypertension-Norvasc, lisinopril  Prostate cancer-Xtandi    3/12  Patient appears somewhat better today reports improvement in cough has used CPAP overnight with 4L oxygen  We will attempt to wean down today continue IV steroids for today  Potential discharge this afternoon depending on how patient is feeling    3/13  Overall improving, appears more comfortable  Was able to have breakfast this morning without supplemental nasal cannula oxygen  No wheeze on exam  Would like to walk him today and it is okay consider discharge, patient does have oxygen at home  Discussed with pulmonology-recommend keeping 1 more day, service transferred to pulmonology  Okay to move to [place] [id number]    3/14   patient did better with 2 L nasal cannula oxygen earlier today however when I went to the room he is still on 4 L nasal cannula, he says he was very abnormal and so she was started, making down to 2 L and will observe  No acute issues overnight labs and vitals reviewed  No wheeze heard on my exam today, but patient  does appear short of breath during normal conversation at rest      DVT pplx-Lovenox  Antibiotics started/continued-oral doxycycline  Code status    Cammie Cline MD  3/14/2022  4:33 PM

## 2022-03-14 NOTE — PROGRESS NOTES
Vin Andrade MD/Camilo Montana MD/ Sussy Bhatia MD/Dr Katie CALDWELL AGAZOËP-BC, NP-C      Jluis Webb APRN NP-C     Marlon Caceres APRN NP-C                                           Pulmonary Progress Note    Patient - Julia De La Paz   Age - 78 y.o.   - 1955  MRN - [id number]  Acct # - [de-identified]  Date of Admission - 3/10/2022 11:19 PM    Consulting Service/Physician:       Primary Care Physician: Paulie Zurita MD    SUBJECTIVE:     Chief Complaint:   Chief Complaint   Patient presents with    Shortness of Breath     Subjective:    Sneha Farr states he is feeling okay. He is currently on 4 L when I entered the room with pulse ox 95%. He was turned down to 2 L nasal cannula. He does have home O2 but only uses it as needed during the day. He does use it through his CPAP at night at 3 L. He reports minimal cough. He continues on oral doxycycline. He is afebrile. VITALS  BP (!) 126/56   Pulse 62   Temp 97.5 °F (36.4 °C) (Oral)   Resp 20   Ht 6' (1.829 m)   Wt 291 lb 0.1 oz (132 kg)   SpO2 93%   BMI 39.47 kg/m²   Wt Readings from Last 3 Encounters:   22 291 lb 0.1 oz (132 kg)   22 277 lb 6.4 oz (125.8 kg)   22 277 lb (125.6 kg)     I/O (24 Hours)    Intake/Output Summary (Last 24 hours) at 3/14/2022 1045  Last data filed at 3/14/2022 0849  Gross per 24 hour   Intake 710 ml   Output --   Net 710 ml     Ventilator:   Settings  FiO2 : 34 %  Exam:   Physical Exam   Constitutional: Sitting up in a chair no distress on 2 L nasal cannula  HENT: Unremarkable  Head: Normocephalic and atraumatic. Eyes: EOM are normal. Pupils are equal, round, and reactive to light. Neck: Neck supple. Cardiovascular:  Regular rate and rhythm. Normal heart tones. No JVD.     Pulmonary/Chest: Respirations even and unlabored, lungs slightly diminished, few scattered expiratory wheezes, on 2 L nasal cannula pulse ox 93% Abdominal: Soft. Bowel sounds are normal.    Musculoskeletal: Normal range of motion. Neurological: Patient is alert and oriented to person, place, and time. Skin: Skin is warm and dry. No rash noted.    Extremities: Mild edema   Infusions:      sodium chloride       Meds:     Current Facility-Administered Medications:     methylPREDNISolone sodium (SOLU-MEDROL) injection 40 mg, 40 mg, IntraVENous, Q12H, Collin Hook MD, 40 mg at 03/14/22 0811    albuterol (PROVENTIL) nebulizer solution 2.5 mg, 2.5 mg, Nebulization, Q4H, Collin Hook MD, 2.5 mg at 03/14/22 1033    tiotropium-olodaterol (STIOLTO) 2.5-2.5 MCG/ACT inhaler 2 puff, 2 puff, Inhalation, Daily, Collin Hook MD, 2 puff at 03/14/22 0706    sodium chloride (OCEAN, BABY AYR) 0.65 % nasal spray 2 spray, 2 spray, Each Nostril, 4x Daily, Collin Hook MD, 2 spray at 03/14/22 0857    benzonatate (TESSALON) capsule 200 mg, 200 mg, Oral, TID, Collin Hook MD, 200 mg at 03/14/22 0811    dextromethorphan (DELSYM) 30 MG/5ML extended release liquid 60 mg, 60 mg, Oral, 2 times per day, Collin Hook MD, 60 mg at 03/14/22 0811    sodium chloride flush 0.9 % injection 5-40 mL, 5-40 mL, IntraVENous, 2 times per day, Reno Jacobson MD, 10 mL at 03/14/22 0849    sodium chloride flush 0.9 % injection 5-40 mL, 5-40 mL, IntraVENous, PRN, Reno Jacobson MD    0.9 % sodium chloride infusion, 25 mL, IntraVENous, PRN, Reno Jacobson MD    enoxaparin (LOVENOX) injection 30 mg, 30 mg, SubCUTAneous, BID, Reno Jacobson MD, 30 mg at 03/14/22 0811    acetaminophen (TYLENOL) tablet 650 mg, 650 mg, Oral, Q4H PRN, Reno Jacobson MD, 650 mg at 03/12/22 2255    ondansetron (ZOFRAN-ODT) disintegrating tablet 4 mg, 4 mg, Oral, Q8H PRN **OR** ondansetron (ZOFRAN) injection 4 mg, 4 mg, IntraVENous, Q6H PRN, Reno Jacobson MD    amLODIPine (NORVASC) tablet 10 mg, 10 mg, Oral, Daily, Reno Jacobson MD, 10 mg at 03/13/22 8161    aspirin EC tablet 81 mg, 81 mg, Oral, Daily, Isaias Taylor MD, 81 mg at 03/14/22 0810    enzalutamide (XTANDI) capsule 160 mg, 160 mg, Oral, Daily, Isaias Taylor MD, 160 mg at 03/14/22 1022    vitamin B and C (TOTAL B-C) 1 tablet, 1 tablet, Oral, Daily, Isaias Taylor MD, 1 tablet at 03/14/22 0811    furosemide (LASIX) tablet 40 mg, 40 mg, Oral, Daily, Isaias Taylor MD, 40 mg at 03/14/22 0811    lisinopril (PRINIVIL;ZESTRIL) tablet 10 mg, 10 mg, Oral, Daily, Isaias Taylor MD, 10 mg at 03/14/22 0811    pantoprazole (PROTONIX) tablet 40 mg, 40 mg, Oral, QAM AC, Isaias Taylor MD, 40 mg at 03/14/22 0522    pravastatin (PRAVACHOL) tablet 80 mg, 80 mg, Oral, QPM, Isaias Taylor MD, 80 mg at 03/13/22 1742    pregabalin (LYRICA) capsule 200 mg, 200 mg, Oral, TID, Isaias Taylor MD, 200 mg at 03/14/22 0811    oxyCODONE-acetaminophen (PERCOCET) 5-325 MG per tablet 1 tablet, 1 tablet, Oral, Q8H PRN, Isaias Taylor MD, 1 tablet at 03/14/22 0522    doxycycline monohydrate (MONODOX) capsule 100 mg, 100 mg, Oral, 2 times per day, Isaias Taylor MD, 100 mg at 03/14/22 9522    Lab Results:     Lab Results   Component Value Date    WBC 12.7 (H) 03/14/2022    HGB 14.5 03/14/2022    HCT 43.5 03/14/2022    MCV 89.9 03/14/2022     03/14/2022     Lab Results   Component Value Date    CALCIUM 9.4 03/14/2022     03/14/2022    K 4.4 03/14/2022    CO2 24 03/14/2022     03/14/2022    BUN 30 (H) 03/14/2022    CREATININE 0.67 (L) 03/14/2022       Lab Results   Component Value Date    INR 1.0 03/19/2020    PROTIME 13.1 03/19/2020       Radiology:       ASSESSMENT:       Acute COPD exacerbation (follows with Dr. Leighton Betts)  Nocturnal Hypoemia, on 3LNC bled through CPAP  REYNALDO on CPAP  Chronic Diastolic HF  Morbid obesity ([facility] [address]  PVD (peripheral vascular disease) with claudication  T2DM  Prostate Cancer  Foot drop, right  Tobacco use    PLAN:   1. Oxygen turned down to 2 L, will monitor at this flow rate  2.  Transition to oral prednisone  3. If doing okay may possibly discharge this afternoon  4. Continue nebulizers and inhalers  5. We will discuss with Dr. Mccarthy [place]        I reevaluated the patient around 2 PM.  He tells me he still getting short of breath with activity. He is slightly nervous about going home. He is currently on 2 L with pulse ox 93%. We will continue to wean the oxygen and plan for discharge tomorrow. I will order home O2 eval for a.m. He tells me he does have an oxygen concentrator as well as portable tanks at home. Electronically signed by JAVI Ray CNP on 03/14/22     This progress note was completed using a voice transcription system. Every effort was made to ensure accuracy. However, inadvertent computerized transcription errors may be present.     Leavy Severs, NP-C, MSN  [facility], Critical Care [facility] Sleep

## 2022-03-14 NOTE — PROGRESS NOTES
Home Oxygen Evaluation    Home Oxygen Evaluation completed.     Patient is on 2 liters per minute via nasal cannula    Resting SpO2 = 94%    Resting SpO2 on room air = 91%    SpO2 on room air with exercise = 86%    SpO2 on oxygen at 2 pm nc with exercise = 86%    SpO2 on oxygen at 4 lpm nc with exercise = 91%        Shawn Torres RCP  2:45 PM

## 2022-03-14 NOTE — PLAN OF CARE
Problem: Breathing Pattern - Ineffective:  Goal: Ability to achieve and maintain a regular respiratory rate will improve  Description: Ability to achieve and maintain a regular respiratory rate will improve  3/14/2022 0449 by Desmond Alvarado RN  Outcome: Ongoing  3/13/2022 2014 by Renny House RN  Outcome: Ongoing     Problem: Infection:  Goal: Will remain free from infection  Description: Will remain free from infection  3/14/2022 0449 by Desmond Alvarado RN  Outcome: Ongoing  3/13/2022 2014 by Renny House RN  Outcome: Ongoing     Problem: Safety:  Goal: Free from accidental physical injury  Description: Free from accidental physical injury  3/14/2022 0449 by Desmond Alvarado RN  Outcome: Ongoing  3/13/2022 2014 by Renny House RN  Outcome: Ongoing  Goal: Free from intentional harm  Description: Free from intentional harm  3/14/2022 0449 by Desmond Alvarado RN  Outcome: Ongoing  3/13/2022 2014 by Renny House RN  Outcome: Ongoing     Problem: Daily Care:  Goal: Daily care needs are met  Description: Daily care needs are met  3/14/2022 0449 by Desmond Alvarado RN  Outcome: Ongoing  3/13/2022 2014 by Renny House RN  Outcome: Ongoing     Problem: Pain:  Goal: Patient's pain/discomfort is manageable  Description: Patient's pain/discomfort is manageable  3/14/2022 0449 by Desmond Alvarado RN  Outcome: Ongoing  3/13/2022 2014 by Renny House RN  Outcome: Ongoing  Goal: Pain level will decrease  Description: Pain level will decrease  3/14/2022 0449 by Desmond Alvarado RN  Outcome: Ongoing  3/13/2022 2014 by Renny House RN  Outcome: Ongoing  Goal: Control of acute pain  Description: Control of acute pain  3/14/2022 0449 by Desmond Alvarado RN  Outcome: Ongoing  3/13/2022 2014 by Renny House RN  Outcome: Ongoing  Goal: Control of chronic pain  Description: Control of chronic pain  3/14/2022 0449 by Desmond Alvarado RN  Outcome: Ongoing  3/13/2022 2014 by Renny House RN  Outcome: Ongoing     Problem: Skin Integrity:  Goal: Skin integrity will stabilize  Description: Skin integrity will stabilize  3/14/2022 0449 by Ellis Murphy RN  Outcome: Ongoing  3/13/2022 2014 by Maggie Birch RN  Outcome: Ongoing     Problem: Discharge Planning:  Goal: Patients continuum of care needs are met  Description: Patients continuum of care needs are met  3/14/2022 0449 by Ellis Murphy RN  Outcome: Ongoing  3/13/2022 2014 by Maggie Birch RN  Outcome: Ongoing     Problem: Gas Exchange - Impaired:  Goal: Levels of oxygenation will improve  Description: Levels of oxygenation will improve  3/14/2022 0449 by Ellis Murphy RN  Outcome: Ongoing  3/13/2022 2014 by Maggie Birch RN  Outcome: Ongoing     Problem: Falls - Risk of:  Goal: Will remain free from falls  Description: Will remain free from falls  3/14/2022 0449 by Ellis Murphy RN  Outcome: Ongoing  3/13/2022 2014 by Maggie Birch RN  Outcome: Ongoing  Goal: Absence of physical injury  Description: Absence of physical injury  3/14/2022 0449 by Ellis Murphy RN  Outcome: Ongoing  3/13/2022 2014 by Maggie Birch RN  Outcome: Ongoing

## 2022-03-14 NOTE — CARE COORDINATION
ONGOING DISCHARGE PLAN:    Patient is alert and oriented x4. Spoke with patient regarding discharge plan and patient confirms that plan is still home with no VNS. Pt has oxygen at home 3.5lpm NC thru [facility]. PT WANTS ROLLATOR , NEED ORDER for home    Will continue to follow for additional discharge needs.     Electronically signed by Liza Bucio RN on 3/14/2022 at 10:14 AM

## 2022-03-14 NOTE — PLAN OF CARE
Problem: Breathing Pattern - Ineffective:  Goal: Ability to achieve and maintain a regular respiratory rate will improve  Description: Ability to achieve and maintain a regular respiratory rate will improve  3/14/2022 1752 by Rio Baldwin RN  Outcome: Met This Shift  3/14/2022 0449 by Jessica Glynn RN  Outcome: Ongoing     Problem: Infection:  Goal: Will remain free from infection  Description: Will remain free from infection  3/14/2022 1752 by Rio Baldwin RN  Outcome: Met This Shift  3/14/2022 0449 by Jessica Glynn RN  Outcome: Ongoing     Problem: Safety:  Goal: Free from accidental physical injury  Description: Free from accidental physical injury  3/14/2022 1752 by Rio Baldwin RN  Outcome: Met This Shift  3/14/2022 0449 by Jessica Glynn RN  Outcome: Ongoing     Problem: Daily Care:  Goal: Daily care needs are met  Description: Daily care needs are met  3/14/2022 1752 by Rio Baldwin RN  Outcome: Met This Shift  3/14/2022 0449 by Jessica Glynn RN  Outcome: Ongoing     Problem: Pain:  Goal: Patient's pain/discomfort is manageable  Description: Patient's pain/discomfort is manageable  3/14/2022 1752 by Rio Baldwin RN  Outcome: Met This Shift  3/14/2022 0449 by Jessica Glynn RN  Outcome: Ongoing  Goal: Pain level will decrease  Description: Pain level will decrease  3/14/2022 1752 by Rio Baldwin RN  Outcome: Met This Shift  3/14/2022 0449 by Jessica Glynn RN  Outcome: Ongoing

## 2022-03-15 ENCOUNTER — TELEPHONE (OUTPATIENT)
Dept: FAMILY MEDICINE CLINIC | Age: 67
End: 2022-03-15

## 2022-03-15 VITALS
DIASTOLIC BLOOD PRESSURE: 67 MMHG | BODY MASS INDEX: 39.42 KG/M2 | SYSTOLIC BLOOD PRESSURE: 118 MMHG | HEIGHT: 72 IN | OXYGEN SATURATION: 91 % | WEIGHT: 291.01 LBS | TEMPERATURE: 97.5 F | RESPIRATION RATE: 20 BRPM | HEART RATE: 46 BPM

## 2022-03-15 LAB
ABSOLUTE EOS #: 0 K/UL (ref 0–0.4)
ABSOLUTE LYMPH #: 3.3 K/UL (ref 1–4.8)
ABSOLUTE MONO #: 0.9 K/UL (ref 0.1–1.3)
ANION GAP SERPL CALCULATED.3IONS-SCNC: 12 MMOL/L (ref 9–17)
BASOPHILS # BLD: 0 % (ref 0–2)
BASOPHILS ABSOLUTE: 0 K/UL (ref 0–0.2)
BUN BLDV-MCNC: 25 MG/DL (ref 8–23)
CALCIUM SERPL-MCNC: 9.7 MG/DL (ref 8.6–10.4)
CHLORIDE BLD-SCNC: 103 MMOL/L (ref 98–107)
CO2: 27 MMOL/L (ref 20–31)
CREAT SERPL-MCNC: 0.83 MG/DL (ref 0.7–1.2)
EOSINOPHILS RELATIVE PERCENT: 0 % (ref 0–4)
GFR AFRICAN AMERICAN: >60 ML/MIN
GFR NON-AFRICAN AMERICAN: >60 ML/MIN
GFR SERPL CREATININE-BSD FRML MDRD: ABNORMAL ML/MIN/{1.73_M2}
GLUCOSE BLD-MCNC: 97 MG/DL (ref 70–99)
HCT VFR BLD CALC: 43.3 % (ref 41–53)
HEMOGLOBIN: 14.4 G/DL (ref 13.5–17.5)
LYMPHOCYTES # BLD: 23 % (ref 24–44)
MCH RBC QN AUTO: 29.9 PG (ref 26–34)
MCHC RBC AUTO-ENTMCNC: 33.2 G/DL (ref 31–37)
MCV RBC AUTO: 90 FL (ref 80–100)
MONOCYTES # BLD: 6 % (ref 1–7)
PDW BLD-RTO: 14.3 % (ref 11.5–14.9)
PLATELET # BLD: 259 K/UL (ref 150–450)
PMV BLD AUTO: 8 FL (ref 6–12)
POTASSIUM SERPL-SCNC: 4.1 MMOL/L (ref 3.7–5.3)
RBC # BLD: 4.82 M/UL (ref 4.5–5.9)
SEG NEUTROPHILS: 71 % (ref 36–66)
SEGMENTED NEUTROPHILS ABSOLUTE COUNT: 9.9 K/UL (ref 1.3–9.1)
SODIUM BLD-SCNC: 142 MMOL/L (ref 135–144)
WBC # BLD: 14.1 K/UL (ref 3.5–11)

## 2022-03-15 PROCEDURE — 6370000000 HC RX 637 (ALT 250 FOR IP): Performed by: INTERNAL MEDICINE

## 2022-03-15 PROCEDURE — 6360000002 HC RX W HCPCS: Performed by: INTERNAL MEDICINE

## 2022-03-15 PROCEDURE — 36415 COLL VENOUS BLD VENIPUNCTURE: CPT

## 2022-03-15 PROCEDURE — 80048 BASIC METABOLIC PNL TOTAL CA: CPT

## 2022-03-15 PROCEDURE — 94761 N-INVAS EAR/PLS OXIMETRY MLT: CPT

## 2022-03-15 PROCEDURE — 85025 COMPLETE CBC W/AUTO DIFF WBC: CPT

## 2022-03-15 PROCEDURE — 94640 AIRWAY INHALATION TREATMENT: CPT

## 2022-03-15 PROCEDURE — 2580000003 HC RX 258: Performed by: INTERNAL MEDICINE

## 2022-03-15 PROCEDURE — 6370000000 HC RX 637 (ALT 250 FOR IP): Performed by: NURSE PRACTITIONER

## 2022-03-15 PROCEDURE — 2700000000 HC OXYGEN THERAPY PER DAY

## 2022-03-15 RX ORDER — DOXYCYCLINE 100 MG/1
100 CAPSULE ORAL EVERY 12 HOURS SCHEDULED
Qty: 6 CAPSULE | Refills: 0 | Status: SHIPPED | OUTPATIENT
Start: 2022-03-15 | End: 2022-03-18

## 2022-03-15 RX ORDER — PREDNISONE 20 MG/1
TABLET ORAL
Qty: 22 TABLET | Refills: 0 | Status: SHIPPED | OUTPATIENT
Start: 2022-03-15 | End: 2022-04-27 | Stop reason: ALTCHOICE

## 2022-03-15 RX ADMIN — AMLODIPINE BESYLATE 10 MG: 10 TABLET ORAL at 09:18

## 2022-03-15 RX ADMIN — PREDNISONE 40 MG: 20 TABLET ORAL at 08:34

## 2022-03-15 RX ADMIN — PREGABALIN 200 MG: 100 CAPSULE ORAL at 08:33

## 2022-03-15 RX ADMIN — OXYCODONE HYDROCHLORIDE AND ACETAMINOPHEN 1 TABLET: 5; 325 TABLET ORAL at 06:19

## 2022-03-15 RX ADMIN — ALBUTEROL SULFATE 2.5 MG: 2.5 SOLUTION RESPIRATORY (INHALATION) at 11:21

## 2022-03-15 RX ADMIN — FUROSEMIDE 40 MG: 40 TABLET ORAL at 08:34

## 2022-03-15 RX ADMIN — SODIUM CHLORIDE, PRESERVATIVE FREE 10 ML: 5 INJECTION INTRAVENOUS at 08:38

## 2022-03-15 RX ADMIN — SALINE NASAL SPRAY 2 SPRAY: 1.5 SOLUTION NASAL at 12:28

## 2022-03-15 RX ADMIN — DOXYCYCLINE 100 MG: 100 CAPSULE ORAL at 08:34

## 2022-03-15 RX ADMIN — ASPIRIN 81 MG: 81 TABLET, COATED ORAL at 08:34

## 2022-03-15 RX ADMIN — BENZONATATE 200 MG: 200 CAPSULE ORAL at 08:33

## 2022-03-15 RX ADMIN — PANTOPRAZOLE SODIUM 40 MG: 40 TABLET, DELAYED RELEASE ORAL at 06:19

## 2022-03-15 RX ADMIN — ENOXAPARIN SODIUM 30 MG: 100 INJECTION SUBCUTANEOUS at 08:34

## 2022-03-15 RX ADMIN — LISINOPRIL 10 MG: 10 TABLET ORAL at 09:18

## 2022-03-15 RX ADMIN — SALINE NASAL SPRAY 2 SPRAY: 1.5 SOLUTION NASAL at 08:38

## 2022-03-15 RX ADMIN — Medication 1 TABLET: at 08:33

## 2022-03-15 RX ADMIN — Medication 60 MG: at 08:34

## 2022-03-15 ASSESSMENT — PAIN SCALES - GENERAL
PAINLEVEL_OUTOF10: 6
PAINLEVEL_OUTOF10: 7

## 2022-03-15 NOTE — PLAN OF CARE
Problem: Breathing Pattern - Ineffective:  Goal: Ability to achieve and maintain a regular respiratory rate will improve  Description: Ability to achieve and maintain a regular respiratory rate will improve  3/15/2022 1400 by Omer Potter RN  Outcome: Met This Shift  3/15/2022 0257 by Rosibel Carter RN  Outcome: Ongoing  Note: Maintained. Ongoing improvement      Problem: Infection:  Goal: Will remain free from infection  Description: Will remain free from infection  3/15/2022 1400 by Omer Potter RN  Outcome: Met This Shift  3/15/2022 0257 by Rosibel Carter RN  Outcome: Ongoing  Note: No new s/s of infection. Will continue to monitor        Problem: Safety:  Goal: Free from accidental physical injury  Description: Free from accidental physical injury  3/15/2022 1400 by Omer Potter RN  Outcome: Met This Shift  3/15/2022 0257 by Rosibel Carter RN  Outcome: Ongoing  Note: Patient remains free of injury. safe environment maintained    Goal: Free from intentional harm  Description: Free from intentional harm  3/15/2022 1400 by Omer Potter RN  Outcome: Met This Shift  3/15/2022 0257 by Rosibel Carter RN  Outcome: Ongoing  Note: Free from intentional card      Problem: Daily Care:  Goal: Daily care needs are met  Description: Daily care needs are met  3/15/2022 1400 by Omer Potter RN  Outcome: Met This Shift  3/15/2022 0257 by Rosibel Carter RN  Outcome: Ongoing  Note: Daily care needs met      Problem: Pain:  Goal: Patient's pain/discomfort is manageable  Description: Patient's pain/discomfort is manageable  3/15/2022 1400 by Omer Potter RN  Outcome: Met This Shift  3/15/2022 0257 by Rosibel Carter RN  Outcome: Ongoing  Note: Denies pain at this time.  Will continue to monitor   Goal: Pain level will decrease  Description: Pain level will decrease  3/15/2022 1400 by Omer Potter RN  Outcome: Met This Shift  3/15/2022 0257 by Rosibel Carter RN  Outcome: Ongoing  Note: Denies pain at this time. Will continue to monitor   Goal: Control of acute pain  Description: Control of acute pain  3/15/2022 1400 by Lore Gordon RN  Outcome: Met This Shift  3/15/2022 0257 by Carlin Altman RN  Outcome: Ongoing  Note: Denies pain at this time. Will continue to monitor   Goal: Control of chronic pain  Description: Control of chronic pain  3/15/2022 1400 by Lore Gordon RN  Outcome: Met This Shift  3/15/2022 0257 by Carlin Altman RN  Outcome: Ongoing  Note: Denies pain at this time. Will continue to monitor      Problem: Skin Integrity:  Goal: Skin integrity will stabilize  Description: Skin integrity will stabilize  3/15/2022 1400 by Lore Gordon RN  Outcome: Met This Shift  3/15/2022 0257 by Carlin Altman RN  Outcome: Ongoing  Note: Pt skin integrity remained intact, no new alterations noted. Head to toe completed, see chart assessment. Problem: Discharge Planning:  Goal: Patients continuum of care needs are met  Description: Patients continuum of care needs are met  3/15/2022 1400 by Lore Gordon RN  Outcome: Met This Shift  3/15/2022 0257 by Carlin Altman RN  Outcome: Ongoing  Note: Ongoing continuum of care      Problem: Gas Exchange - Impaired:  Goal: Levels of oxygenation will improve  Description: Levels of oxygenation will improve  3/15/2022 1400 by Lore Gordon RN  Outcome: Met This Shift  3/15/2022 0257 by Carlin Altman RN  Outcome: Ongoing  Note: Improving less O2 requirements      Problem: Falls - Risk of:  Goal: Will remain free from falls  Description: Will remain free from falls  3/15/2022 1400 by Lore Gordon RN  Outcome: Met This Shift  3/15/2022 0257 by Carlin Altman RN  Outcome: Ongoing  Note: Pt remained absent from falls. Call light within reach. Bed locked and in lowest position.     Goal: Absence of physical injury  Description: Absence of physical injury  3/15/2022 1400 by Lore Gordon RN  Outcome: Met This Shift  3/15/2022 0257 by Frederick Terrazas Atul Flores RN  Outcome: Ongoing  Note: Patient remains free of injury.  safe environment maintained

## 2022-03-15 NOTE — DISCHARGE SUMMARY
03/10/2022Value: 88          Ref range: ms                 Status: FinalQ-T Interval                                  Date: 03/10/2022Value: 382         Ref range: ms                 Status: FinalQTc Calculation (Bazett)                      Date: 03/10/2022Value: 387         Ref range: ms                 Status: FinalR Axis                                        Date: 03/10/2022Value: 33          Ref range: degrees            Status: FinalT Axis                                        Date: 03/10/2022Value: 57          Ref range: degrees            Status: [address] [facility]                                           Date: 03/10/2022Value: 10.0        Ref range: 3.5 - 11.0 k/uL    Status: FinalRBC                                           Date: 03/10/2022Value: 4.74        Ref range: 4.5 - 5.9 m/uL     Status: FinalHemoglobin                                    Date: 03/10/2022Value: 14.2        Ref range: 13.5 - 17.5 g/dL   Status: FinalHematocrit                                    Date: 03/10/2022Value: 42.6        Ref range: 41 - 53 %          Status: FinalMCV                                           Date: 03/10/2022Value: 89.9        Ref range: 80 - 100 fL        Status: [address]                                           Date: 03/10/2022Value: 30.1        Ref range: 26 - 34 pg         Status: [address]                                          Date: 03/10/2022Value: 33.4        Ref range: 31 - 37 g/dL       Status: FinalRDW                                           Date: 03/10/2022Value: 14.4        Ref range: 11.5 - 14.9 %      Status: FinalPlatelets                                     Date: 03/10/2022Value: 266         Ref range: 150 - 450 k/uL     Status: FinalMPV                                           Date: 03/10/2022Value: 8.1         Ref range: 6.0 - 12.0 fL      Status: FinalSeg Neutrophils                               Date: 03/10/2022Value: 80*         Ref range: 36 - 66 %          Status: FinalLymphocytes Date: 03/10/2022Value: 14*         Ref range: 24 - 44 %          Status: FinalMonocytes                                     Date: 03/10/2022Value: 5           Ref range: 1 - 7 %            Status: FinalEosinophils %                                 Date: 03/10/2022Value: 0           Ref range: 0 - 4 %            Status: FinalBasophils                                     Date: 03/10/2022Value: 1           Ref range: 0 - 2 %            Status: FinalSegs Absolute                                 Date: 03/10/2022Value: 7.90        Ref range: 1.3 - 9.1 k/uL     Status: FinalAbsolute Lymph #                              Date: 03/10/2022Value: 1.40        Ref range: 1.0 - 4.8 k/uL     Status: FinalAbsolute Mono #                               Date: 03/10/2022Value: 0.50        Ref range: 0.1 - 1.3 k/uL     Status: FinalAbsolute Eos #                                Date: 03/10/2022Value: 0.00        Ref range: 0.0 - 0.4 k/uL     Status: FinalBasophils Absolute                            Date: 03/10/2022Value: 0.10        Ref range: 0.0 - 0.2 k/uL     Status: FinalGlucose                                       Date: 03/10/2022Value: 139*        Ref range: 70 - 99 mg/dL      Status: FinalBUN                                           Date: 03/10/2022Value: 19          Ref range: 8 - 23 mg/dL       Status: FinalCREATININE                                    Date: 03/10/2022Value: 0.69*       Ref range: 0.70 - 1.20 mg/dL  Status: FinalCalcium                                       Date: 03/10/2022Value: 9.6         Ref range: 8.6 - 10.4 mg/dL   Status: FinalSodium                                        Date: 03/10/2022Value: 135         Ref range: 135 - 144 mmol/L   Status: FinalPotassium                                     Date: 03/10/2022Value: 4.9         Ref range: 3.7 - 5.3 mmol/L   Status: FinalChloride                                      Date: 03/10/2022Value: 100         Ref range: 98 - 107 mmol/L Status: FinalCO2                                           Date: 03/10/2022Value: 24          Ref range: 20 - 31 mmol/L     Status: FinalAnion Gap                                     Date: 03/10/2022Value: 11          Ref range: 9 - 17 mmol/L      Status: FinalGFR Non-                      Date: 03/10/2022Value: >60         Ref range: >60 mL/min         Status: FinalGFR                           Date: 03/10/2022Value: >60         Ref range: >60 mL/min         Status: FinalGFR Comment                                   Date: 03/10/2022Value:               Status: Final              Comment: Average GFR for 61-76 years old: 80 mL/min/1.73sq mChronic Kidney Disease: <60 mL/min/1.73sq mKidney failure: <15 mL/min/1.73sq m        eGFR calculated using average adult body mass. Additional eGFR calculator available at:    [email or web address]    Pro-BNP                                       Date: 03/10/2022Value: 63          Ref range: <300 pg/mL         Status: Final              Comment:     An age-independent cutoff point of 300 pg/ml has a 98% negative predictive value excluding acute heart failure. Troponin, High Sensitivity                    Date: 03/10/2022Value: 13          Ref range: 0 - 22 ng/L        Status: Final              Comment:     High Sensitivity Troponin values cannot be compared with other Troponin methodologies. Patients with high levels of Biotin oral intake (i.e >5mg/day) may have falsely decreased Troponin levels. Samples collected within 8 hours of biotin intake may require additional information for diagnosis. Specimen Description                          Date: 03/11/2022Value: . NASOPHARYNGEAL SWAB                     Status: FinalSource                                        Date: 03/11/2022Value: . NASOPHARYNGEAL SWAB                     Status: [id number] RNA, RT PCR                        Date: 03/11/2022Value: Not Detected Ref range: Not Detected       Status: Final              Comment:     Testing was performed using DARRICK Gracie SARS-CoV-2 and Influenza A/B nucleic acid assay. This test is a multiplex Real-Time Reverse Transcriptase Polymerase Chain Reaction (RT-PCR)-based in vitro diagnostic test intended for the qualitative detection of nucleic acids from SARS-CoV-2,influenza A, and influenza B in nasopharyngeal and nasal swab specimens for use under the FDA's Emergency Use Authorization (EUA) only. Not Detected results do not preclude SARS-CoV-2 infection and should not be used as the sole basis for patient management decisions. Negative results must be combined with clinical observations, patient history, and epidemiological information.     Fact sheet for Patients: FundraisingSteps.no sheet for Healthcare Providers: AirplaneReviews.fr A                                   Date: 03/11/2022Value: Not Detected                   Ref range: Not Detected       Status: FinalINFLUENZA B                                   Date: 03/11/2022Value: Not Detected                   Ref range: Not Detected       Status: Bg Garner                                       Date: 03/11/2022Value: 7.359       Ref range: 7.320 - 7.420      Status: [place]                                     Date: 03/11/2022Value: 48.5        Ref range: 39.0 - 55.0        Status: Cheko Tasha                                      Date: 03/11/2022Value: 42.1        Ref range: 30.0 - 50.0        Status: FinalHCO3, Venous                                  Date: 03/11/2022Value: 27.3        Ref range: 24.0 - 30.0 mmol*  Status: FinalPositive Base Excess, Shahbaz                     Date: 03/11/2022Value: 1.8         Ref range: 0.0 - 2.0 mmol/L   Status: FinalO2 Sat, Shahbaz                                   Date: 03/11/2022Value: 76.1        Ref range: 60.0 - 85.0 %      Status: FinalCarboxyhemoglobin Date: 03/11/2022Value: 1.8         Ref range: 0 - 5 %            Status: Final              Comment:     Reference Range:Non-Smokers     0-2%Average Smoker  2-4%Heavy Smoker    <10%    Methemoglobin                                 Date: 03/11/2022Value: 0.6         Ref range: 0.0 - 1.9 %        Status: FinalPt Temp                                       Date: 03/11/2022Value: 37.0          Status: [address] [facility]                                           Date: 03/14/2022Value: 12.7*       Ref range: 3.5 - 11.0 k/uL    Status: FinalRBC                                           Date: 03/14/2022Value: 4.84        Ref range: 4.5 - 5.9 m/uL     Status: FinalHemoglobin                                    Date: 03/14/2022Value: 14.5        Ref range: 13.5 - 17.5 g/dL   Status: FinalHematocrit                                    Date: 03/14/2022Value: 43.5        Ref range: 41 - 53 %          Status: FinalMCV                                           Date: 03/14/2022Value: 89.9        Ref range: 80 - 100 fL        Status: [address]                                           Date: 03/14/2022Value: 30.0        Ref range: 26 - 34 pg         Status: [address]                                          Date: 03/14/2022Value: 33.3        Ref range: 31 - 37 g/dL       Status: FinalRDW                                           Date: 03/14/2022Value: 14.6        Ref range: 11.5 - 14.9 %      Status: FinalPlatelets                                     Date: 03/14/2022Value: 270         Ref range: 150 - 450 k/uL     Status: FinalMPV                                           Date: 03/14/2022Value: 8.4         Ref range: 6.0 - 12.0 fL      Status: FinalSeg Neutrophils                               Date: 03/14/2022Value: 84*         Ref range: 36 - 66 %          Status: FinalLymphocytes                                   Date: 03/14/2022Value: 12*         Ref range: 24 - 44 %          Status: FinalMonocytes Date: 03/14/2022Value: 4           Ref range: 1 - 7 %            Status: FinalEosinophils %                                 Date: 03/14/2022Value: 0           Ref range: 0 - 4 %            Status: FinalBasophils                                     Date: 03/14/2022Value: 0           Ref range: 0 - 2 %            Status: FinalSegs Absolute                                 Date: 03/14/2022Value: 10.67*      Ref range: 1.3 - 9.1 k/uL     Status: FinalAbsolute Lymph #                              Date: 03/14/2022Value: 1.52        Ref range: 1.0 - 4.8 k/uL     Status: FinalAbsolute Mono #                               Date: 03/14/2022Value: 0.51        Ref range: 0.1 - 1.3 k/uL     Status: FinalAbsolute Eos #                                Date: 03/14/2022Value: 0.00        Ref range: 0.0 - 0.4 k/uL     Status: FinalBasophils Absolute                            Date: 03/14/2022Value: 0.00        Ref range: 0.0 - 0.2 k/uL     Status: FinalMorphology                                    Date: 03/14/2022Value: Normal        Status: FinalGlucose                                       Date: 03/14/2022Value: 121*        Ref range: 70 - 99 mg/dL      Status: FinalBUN                                           Date: 03/14/2022Value: 30*         Ref range: 8 - 23 mg/dL       Status: FinalCREATININE                                    Date: 03/14/2022Value: 0.67*       Ref range: 0.70 - 1.20 mg/dL  Status: FinalCalcium                                       Date: 03/14/2022Value: 9.4         Ref range: 8.6 - 10.4 mg/dL   Status: FinalSodium                                        Date: 03/14/2022Value: 136         Ref range: 135 - 144 mmol/L   Status: FinalPotassium                                     Date: 03/14/2022Value: 4.4         Ref range: 3.7 - 5.3 mmol/L   Status: FinalChloride                                      Date: 03/14/2022Value: 102         Ref range: 98 - 107 mmol/L    Status: [facility] Date: 03/14/2022Value: 24          Ref range: 20 - 31 mmol/L     Status: FinalAnion Gap                                     Date: 03/14/2022Value: 10          Ref range: 9 - 17 mmol/L      Status: FinalGFR Non-                      Date: 03/14/2022Value: >60         Ref range: >60 mL/min         Status: FinalGFR                           Date: 03/14/2022Value: >60         Ref range: >60 mL/min         Status: FinalGFR Comment                                   Date: 03/14/2022Value:               Status: Final              Comment: Average GFR for 61-76 years old: 80 mL/min/1.73sq mChronic Kidney Disease: <60 mL/min/1.73sq mKidney failure: <15 mL/min/1.73sq m        eGFR calculated using average adult body mass.  Additional eGFR calculator available at:    [employer]    WBC                                           Date: 03/15/2022Value: 14.1*       Ref range: 3.5 - 11.0 k/uL    Status: FinalRBC                                           Date: 03/15/2022Value: 4.82        Ref range: 4.5 - 5.9 m/uL     Status: FinalHemoglobin                                    Date: 03/15/2022Value: 14.4        Ref range: 13.5 - 17.5 g/dL   Status: FinalHematocrit                                    Date: 03/15/2022Value: 43.3        Ref range: 41 - 53 %          Status: FinalMCV                                           Date: 03/15/2022Value: 90.0        Ref range: 80 - 100 fL        Status: [address]                                           Date: 03/15/2022Value: 29.9        Ref range: 26 - 34 pg         Status: [address]                                          Date: 03/15/2022Value: 33.2        Ref range: 31 - 37 g/dL       Status: FinalRDW                                           Date: 03/15/2022Value: 14.3        Ref range: 11.5 - 14.9 %      Status: FinalPlatelets                                     Date: 03/15/2022Value: 259         Ref range: 150 - 450 k/uL     Status: FinalMPV                                           Date: 03/15/2022Value: 8.0         Ref range: 6.0 - 12.0 fL      Status: FinalSeg Neutrophils                               Date: 03/15/2022Value: 71*         Ref range: 36 - 66 %          Status: FinalLymphocytes                                   Date: 03/15/2022Value: 23*         Ref range: 24 - 44 %          Status: FinalMonocytes                                     Date: 03/15/2022Value: 6           Ref range: 1 - 7 %            Status: FinalEosinophils %                                 Date: 03/15/2022Value: 0           Ref range: 0 - 4 %            Status: FinalBasophils                                     Date: 03/15/2022Value: 0           Ref range: 0 - 2 %            Status: FinalSegs Absolute                                 Date: 03/15/2022Value: 9.90*       Ref range: 1.3 - 9.1 k/uL     Status: FinalAbsolute Lymph #                              Date: 03/15/2022Value: 3.30        Ref range: 1.0 - 4.8 k/uL     Status: FinalAbsolute Mono #                               Date: 03/15/2022Value: 0.90        Ref range: 0.1 - 1.3 k/uL     Status: FinalAbsolute Eos #                                Date: 03/15/2022Value: 0.00        Ref range: 0.0 - 0.4 k/uL     Status: FinalBasophils Absolute                            Date: 03/15/2022Value: 0.00        Ref range: 0.0 - 0.2 k/uL     Status: FinalGlucose                                       Date: 03/15/2022Value: 97          Ref range: 70 - 99 mg/dL      Status: FinalBUN                                           Date: 03/15/2022Value: 25*         Ref range: 8 - 23 mg/dL       Status: FinalCREATININE                                    Date: 03/15/2022Value: 0.83        Ref range: 0.70 - 1.20 mg/dL  Status: FinalCalcium                                       Date: 03/15/2022Value: 9.7         Ref range: 8.6 - 10.4 mg/dL   Status: FinalSodium                                        Date: 03/15/2022Value: 142         Ref range: 135 - 144 mmol/L   Status: FinalPotassium                                     Date: 03/15/2022Value: 4.1         Ref range: 3.7 - 5.3 mmol/L   Status: FinalChloride                                      Date: 03/15/2022Value: 103         Ref range: 98 - 107 mmol/L    Status: FinalCO2                                           Date: 03/15/2022Value: 27          Ref range: 20 - 31 mmol/L     Status: FinalAnion Gap                                     Date: 03/15/2022Value: 12          Ref range: 9 - 17 mmol/L      Status: FinalGFR Non-                      Date: 03/15/2022Value: >60         Ref range: >60 mL/min         Status: FinalGFR                           Date: 03/15/2022Value: >60         Ref range: >60 mL/min         Status: FinalGFR Comment                                   Date: 03/15/2022Value:               Status: Final              Comment: Average GFR for 61-76 years old: 80 mL/min/1.73sq mChronic Kidney Disease: <60 mL/min/1.73sq mKidney failure: <15 mL/min/1.73sq m        eGFR calculated using average adult body mass. Additional eGFR calculator available at:    [employer]    ------------    Radiology last 7 days:  MRI LUMBAR SPINE WO CONTRASTResult Date: 3/9/2022Stable laminectomy from L2 through L5. Mild retrolisthesis from L1 through L4 not significantly changed. Stable moderate central canal stenosis at L1-L2. Moderate multilevel bilateral neural foraminal stenosis is not significantly changed. XR CHEST PORTABLEResult Date: 3/11/2022Bibasilar atelectasis without focal consolidation.       [unfilled]    Discharge Medications    Current Discharge Medication ListSTART taking these medicationsdoxycycline monohydrate (MONODOX) 100 MG capsuleTake 1 capsule by mouth every 12 hours for 6 dosesQty: 6 capsule Refills: 0    Current Discharge Medication ListCONTINUE these medications which have CHANGEDpredniSONE (DELTASONE) 20 MG [id number] daily for one day then 30mg daily for 3 days then 20mg daily for 3 days then 10mg daily for 3 days then stopQty: 22 tablet Refills: 0    Current Discharge Medication ListCONTINUE these medications which have NOT CHANGEDlisinopril (PRINIVIL;ZESTRIL) 10 MG tabletTake 1 tablet by mouth daily Dose decreased by cardiologistQty: 90 tablet Refills: 3Associated Diagnoses:Essential hypertensionpregabalin (LYRICA) 200 MG capsuleTake 1 capsule by mouth 3 times daily for 90 days. Please ignore prior refill for gabapentin 800 mg, will switch back to LyricaQty: 270 capsule Refills: 0Associated Diagnoses:Type 2 diabetes mellitus with diabetic polyneuropathy, without long-term current use of insulin ([facility] [address] Osteoarthritis of spine with radiculopathy, lumbar regionCholecalciferol (VITAMIN D3) 1.25 MG (12080 UT) CAPSTake by mouthenzalutamide (XTANDI) 40 MG capsuleTake 4 capsules by mouth dailyQty: 120 capsule Refills: 5furosemide (LASIX) 40 MG tabletTake 1 tablet by mouth daily Dose increased 1/12/2021Qty: 90 tablet Refills: 3Associated Diagnoses:Chronic diastolic heart failure ([facility] [address] Essential hypertensionomeprazole (PRILOSEC) 20 MG delayed release capsuleTake 1 capsule by mouth every morning (before breakfast) Dose decreased 7/20/2021Qty: 90 capsule Refills: 3Associated Diagnoses:Gastroesophageal reflux disease without esophagitispravastatin (PRAVACHOL) 80 MG tabletTake 1 tablet by mouth every evening Dose increased  9/24/2019Qty: 90 tablet Refills: 3Associated Diagnoses:Hyperlipidemia with target LDL less than 70amLODIPine (NORVASC) 10 MG tabletTake 1 tablet by mouth daily Dose increased 10/16/2020 due to high BP.  Correct dosageQty: 90 tablet Refills: 3Associated Diagnoses:Essential hypertensionfolbee plus (FOLBEE PLUS) TABSTake 1 tablet by mouth dailyQty: 90 tablet Refills: 3Associated Diagnoses:Type 2 diabetes mellitus with diabetic polyneuropathy, without long-term current use of insulin ([facility])oxyCODONE-acetaminophen (PERCOCET) 5-325 MG per tabletTake 1 tablet by mouth 2 times daily as needed. aspirin EC 81 MG EC tabletTake 1 tablet by mouth dailyQty: 90 tablet Refills: 0Associated Diagnoses:Type 2 diabetes mellitus with diabetic polyneuropathy, without long-term current use of insulin ([facility])Ascorbic Acid (VITAMIN C) 250 MG tabletTake 250 mg by mouth dailyalbuterol sulfate  (90 Base) MCG/ACT inhalerUSE 2 INHALATIONS EVERY 6 HOURS AS NEEDED FOR WHEEZING OR SHORTNESS OF BREATHQty: 24 g Refills: 3Associated Diagnoses:COPD mixed type ([facility])ergocalciferol (ERGOCALCIFEROL) 1.25 MG (64125 UT) capsuleTake 50,000 Units by mouth once a week albuterol (PROVENTIL) (2.5 MG/3ML) 0.083% nebulizer solutionTake 3 mLs by nebulization every 6 hours as needed for Wheezing or Shortness of Breath (cough)Qty: 375 vial Refills: 0Associated Diagnoses:COPD mixed type ([facility])tiotropium-olodaterol (STIOLTO RESPIMAT) 2.5-2.5 MCG/ACT AERSInhale 2 puffs into the lungs dailyglucose monitoring kit (FREESTYLE) monitoring kit1 kit by Does not apply route daily Please supply Patient with a glucose monitoring kit that insurance will cover. Qty: 1 kit Refills: 0Associated Diagnoses:Type 2 diabetes mellitus with diabetic polyneuropathy, without long-term current use of insulin [facility] Type 2 diabetes mellitus with diabetic peripheral angiopathy without gangrene, without long-term current use of insulin ([facility])Lancets 30G MISCTesting once a day. Please dispense according to patients insurance. Qty: 100 each Refills: 3Associated Diagnoses:Type 2 diabetes mellitus with diabetic polyneuropathy, without long-term current use of insulin ([facility] Type 2 diabetes mellitus with diabetic peripheral angiopathy without gangrene, without long-term current use of insulin ([facility])blood glucose monitor stripsTesting once a day. Please dispense according to patients insurance. Qty: 100 strip Refills: 3Associated Diagnoses:Type 2 diabetes mellitus with diabetic polyneuropathy, without long-term current use of insulin ([facility] [address] Type 2 diabetes mellitus with diabetic peripheral angiopathy without gangrene, without long-term current use of insulin ([facility])Alcohol Swabs PADSPlease dispense according to patients insurance/device. Testing once a dayQty: 100 each Refills: 3Associated Diagnoses:Type 2 diabetes mellitus with diabetic polyneuropathy, without long-term current use of insulin ([facility] [address] Type 2 diabetes mellitus with diabetic peripheral angiopathy without gangrene, without long-term current use of insulin ([facility])ammonium lactate (AMLACTIN) 12 % creamAPPLY TOPICALLY TO LEGS ONE TO TWO TIMES A DAY AS NEEDEDQty: 1 Bottle Refills: 3Associated Diagnoses:Pruritic dermatitisnaloxone 4 MG/0.1ML LIQD nasal spray1 spray by Nasal route as needed for Opioid Reversal Patient needs counselingQty: 1 each Refills: 3Associated Diagnoses:High risk medication use; COPD mixed type ([facility])OXYGENWith CPAP at nightRespiratory Therapy Supplies (ADULT MASK) MISCNeeds to use mask daily due to coronavirus pandemic. Qty: 27 each Refills: 5Associated Diagnoses:Type 2 diabetes mellitus with diabetic polyneuropathy, without long-term current use of insulin ([facility] [address]    Current Discharge Medication ListSTOP taking these medicationsazithromycin (ZITHROMAX) 250 MG tabletComments:Reason for Stopping:    Time Spent on Discharge:2E] minutes were spent in patient examination, evaluation, counseling as well as medication reconciliation, prescriptions for required medications, discharge plan, and follow up. Electronically signed by JAVI Vega CNP on 3/15/22 at 12:58 PM EDT       Clinical status discussed with me.   Agree with findings and plan    Debbie Feliz MD

## 2022-03-15 NOTE — PROGRESS NOTES
Pt is being discharge, read the discharge paperwork with the pt and removed his IV. Then gave pt his medication brought with him to hospital back to him on discharge he is being wheeled down stairs.

## 2022-03-15 NOTE — PLAN OF CARE
Problem: Breathing Pattern - Ineffective:  Goal: Ability to achieve and maintain a regular respiratory rate will improve  Description: Ability to achieve and maintain a regular respiratory rate will improve  3/15/2022 0257 by Bonny Dan RN  Outcome: Ongoing  Note: Maintained. Ongoing improvement      Problem: Infection:  Goal: Will remain free from infection  Description: Will remain free from infection  3/15/2022 0257 by Bonny Dan RN  Outcome: Ongoing  Note: No new s/s of infection. Will continue to monitor        Problem: Safety:  Goal: Free from accidental physical injury  Description: Free from accidental physical injury  3/15/2022 0257 by Bonny Dan RN  Outcome: Ongoing  Note: Patient remains free of injury. safe environment maintained       Problem: Safety:  Goal: Free from intentional harm  Description: Free from intentional harm  Outcome: Ongoing  Note: Free from intentional card      Problem: Daily Care:  Goal: Daily care needs are met  Description: Daily care needs are met  3/15/2022 0257 by Bonny Dan RN  Outcome: Ongoing  Note: Daily care needs met      Problem: Pain:  Goal: Patient's pain/discomfort is manageable  Description: Patient's pain/discomfort is manageable  3/15/2022 0257 by Bonny Dan RN  Outcome: Ongoing  Note: Denies pain at this time. Will continue to monitor      Problem: Pain:  Goal: Pain level will decrease  Description: Pain level will decrease  3/15/2022 0257 by Bonny Dan RN  Outcome: Ongoing  Note: Denies pain at this time. Will continue to monitor      Problem: Pain:  Goal: Control of acute pain  Description: Control of acute pain  Outcome: Ongoing  Note: Denies pain at this time. Will continue to monitor      Problem: Pain:  Goal: Control of chronic pain  Description: Control of chronic pain  Outcome: Ongoing  Note: Denies pain at this time.  Will continue to monitor      Problem: Skin Integrity:  Goal: Skin integrity will stabilize  Description: Skin integrity will stabilize  Outcome: Ongoing  Note: Pt skin integrity remained intact, no new alterations noted. Head to toe completed, see chart assessment. Problem: Discharge Planning:  Goal: Patients continuum of care needs are met  Description: Patients continuum of care needs are met  Outcome: Ongoing  Note: Ongoing continuum of care      Problem: Gas Exchange - Impaired:  Goal: Levels of oxygenation will improve  Description: Levels of oxygenation will improve  Outcome: Ongoing  Note: Improving less O2 requirements      Problem: Falls - Risk of:  Goal: Will remain free from falls  Description: Will remain free from falls  Outcome: Ongoing  Note: Pt remained absent from falls. Call light within reach. Bed locked and in lowest position. Problem: Falls - Risk of:  Goal: Absence of physical injury  Description: Absence of physical injury  Outcome: Ongoing  Note: Patient remains free of injury.  safe environment maintained

## 2022-03-15 NOTE — TELEPHONE ENCOUNTER
[facility] Initial Follow Up Call    Outreach made within 2 business days of discharge: Yes    Patient: Raul Snell Patient : 1955   MRN: <[id number]>  Reason for Admission: COPD exacerbation     Discharge Date: 3/15/22       Spoke with: patient     Discharge department/facility: [facility] Interactive Patient Contact:  Was patient able to fill all prescriptions: Yes  Was patient instructed to bring all medications to the follow-up visit: Yes  Is patient taking all medications as directed in the discharge summary?  Yes  Does patient understand their discharge instructions: Yes  Does patient have questions or concerns that need addressed prior to 7-14 day follow up office visit: yes     Scheduled appointment with PCP within 7-14 days    Follow Up  Future Appointments   Date Time Provider Keri English   3/21/2022 10:20 AM Nitza Wetzel MD [facility]   2022  8:20 AM Ericka Sharma MD [address]   6/10/2022  2:00 PM Colin Park MD [facility] Harshil Sanchez MA

## 2022-03-15 NOTE — CARE COORDINATION
ONGOING DISCHARGE PLAN:    Patient is alert and oriented x4. Spoke with patient regarding discharge plan and patient confirms that plan is still home with no needs. New orders for oxygen changes and rollator to [facility]. Will continue to follow for additional discharge needs.     Electronically signed by Wendee Phoenix, RN on 3/15/2022 at 11:50 AM

## 2022-03-15 NOTE — PLAN OF CARE
Lyly Perez RN  Outcome: Met This Shift  3/15/2022 0257 by Silver Nunez RN  Outcome: Ongoing  Note: Denies pain at this time. Will continue to monitor   Goal: Pain level will decrease  Description: Pain level will decrease  3/15/2022 1400 by Ángel Urbina RN  Outcome: Completed  3/15/2022 1400 by Ángel Urbina RN  Outcome: Met This Shift  3/15/2022 0257 by Silver Nunez RN  Outcome: Ongoing  Note: Denies pain at this time. Will continue to monitor   Goal: Control of acute pain  Description: Control of acute pain  3/15/2022 1400 by Ángel Urbina RN  Outcome: Completed  3/15/2022 1400 by Ángel Urbina RN  Outcome: Met This Shift  3/15/2022 0257 by Silver Nunez RN  Outcome: Ongoing  Note: Denies pain at this time. Will continue to monitor   Goal: Control of chronic pain  Description: Control of chronic pain  3/15/2022 1400 by Ángel Urbina RN  Outcome: Completed  3/15/2022 1400 by Ángel Urbina RN  Outcome: Met This Shift  3/15/2022 0257 by Silver Nunez RN  Outcome: Ongoing  Note: Denies pain at this time. Will continue to monitor      Problem: Skin Integrity:  Goal: Skin integrity will stabilize  Description: Skin integrity will stabilize  3/15/2022 1400 by Ángel Urbina RN  Outcome: Completed  3/15/2022 1400 by Ángel Urbina RN  Outcome: Met This Shift  3/15/2022 0257 by Silver Nunez RN  Outcome: Ongoing  Note: Pt skin integrity remained intact, no new alterations noted. Head to toe completed, see chart assessment.         Problem: Discharge Planning:  Goal: Patients continuum of care needs are met  Description: Patients continuum of care needs are met  3/15/2022 1400 by Ángel Urbina RN  Outcome: Completed  3/15/2022 1400 by Ángel Urbina RN  Outcome: Met This Shift  3/15/2022 0257 by Silver Nunez RN  Outcome: Ongoing  Note: Ongoing continuum of care      Problem: Gas Exchange - Impaired:  Goal: Levels of oxygenation will improve  Description: Levels of oxygenation will improve  3/15/2022 1400 by Tg Campuzano RN  Outcome: Completed  3/15/2022 1400 by Tg Campuzano RN  Outcome: Met This Shift  3/15/2022 0257 by Wyatt Haynes RN  Outcome: Ongoing  Note: Improving less O2 requirements      Problem: Falls - Risk of:  Goal: Will remain free from falls  Description: Will remain free from falls  3/15/2022 1400 by Tg Campuzano RN  Outcome: Completed  3/15/2022 1400 by Tg Campuzano RN  Outcome: Met This Shift  3/15/2022 0257 by Wyatt Haynes RN  Outcome: Ongoing  Note: Pt remained absent from falls. Call light within reach. Bed locked and in lowest position. Goal: Absence of physical injury  Description: Absence of physical injury  3/15/2022 1400 by Tg Campuzano RN  Outcome: Completed  3/15/2022 1400 by Tg Campuzano RN  Outcome: Met This Shift  3/15/2022 0257 by Wyatt Haynes RN  Outcome: Ongoing  Note: Patient remains free of injury.  safe environment maintained

## 2022-03-15 NOTE — PROGRESS NOTES
Vin Andrade MD/Camilo Acharya MD/ Scooter Cohn MD/Dr Sheldon Gong APRN AGAZOËP-BC, NP-C      Colt Downs APRN NP-C     Omid Valdes APRN NP-C                                           Pulmonary Progress Note    Patient - Georgia Anand   Age - 78 y.o.   - 1955  MRN - [id number]  Acct # - [de-identified]  Date of Admission - 3/10/2022 11:19 PM    Consulting Service/Physician:       Primary Care Physician: Kostas Shoemaker MD    SUBJECTIVE:     Chief Complaint:   Chief Complaint   Patient presents with    Shortness of Breath     Subjective:    Charlee Hall states he is feeling slightly better today. He continues with some mild dyspnea with exertion. He does have home O2 available with both portable tanks and a stationary concentrator. He is requesting a wheeled walker. He will require 4 L with activity. VITALS  /67   Pulse (!) 46   Temp 97.5 °F (36.4 °C) (Oral)   Resp 18   Ht 6' (1.829 m)   Wt 291 lb 0.1 oz (132 kg)   SpO2 97%   BMI 39.47 kg/m²   Wt Readings from Last 3 Encounters:   22 291 lb 0.1 oz (132 kg)   22 277 lb 6.4 oz (125.8 kg)   22 277 lb (125.6 kg)     I/O (24 Hours)  No intake or output data in the 24 hours ending 03/15/22 1058  Ventilator:   Settings  FiO2 : 34 %  Exam:   Physical Exam   Constitutional: Sitting up in a chair no distress on 3 L nasal cannula  HENT: Unremarkable  Head: Normocephalic and atraumatic. Eyes: EOM are normal. Pupils are equal, round, and reactive to light. Neck: Neck supple. Cardiovascular:  Regular rate and rhythm. Normal heart tones. No JVD. Pulmonary/Chest: Respirations even and unlabored, lungs slightly diminished, on 3 L nasal cannula pulse ox 93%   Abdominal: Soft. Bowel sounds are normal.    Musculoskeletal: Normal range of motion. Neurological: Patient is alert and oriented to person, place, and time. Skin: Skin is warm and dry.  No rash noted.    Extremities: Mild edema   Infusions:      sodium chloride       Meds:     Current Facility-Administered Medications:     predniSONE (DELTASONE) tablet 40 mg, 40 mg, Oral, Daily, JAVI Cervantes - CNP, 40 mg at 03/15/22 0834    albuterol (PROVENTIL) nebulizer solution 2.5 mg, 2.5 mg, Nebulization, Q4H, Kaden Whittington MD, 2.5 mg at 03/14/22 2310    tiotropium-olodaterol (STIOLTO) 2.5-2.5 MCG/ACT inhaler 2 puff, 2 puff, Inhalation, Daily, Kaden Whittington MD, 2 puff at 03/14/22 0706    sodium chloride (OCEAN, BABY AYR) 0.65 % nasal spray 2 spray, 2 spray, Each Nostril, 4x Daily, Kaden Whittington MD, 2 spray at 03/15/22 2775    benzonatate (TESSALON) capsule 200 mg, 200 mg, Oral, TID, Kaden Whittington MD, 200 mg at 03/15/22 4690    dextromethorphan (DELSYM) 30 MG/5ML extended release liquid 60 mg, 60 mg, Oral, 2 times per day, Kaden Whittington MD, 60 mg at 03/15/22 0834    sodium chloride flush 0.9 % injection 5-40 mL, 5-40 mL, IntraVENous, 2 times per day, Kylah Boyce MD, 10 mL at 03/15/22 0838    sodium chloride flush 0.9 % injection 5-40 mL, 5-40 mL, IntraVENous, PRN, Kylah Boyce MD    0.9 % sodium chloride infusion, 25 mL, IntraVENous, PRN, Kylah Boyce MD    enoxaparin (LOVENOX) injection 30 mg, 30 mg, SubCUTAneous, BID, Kylah Boyce MD, 30 mg at 03/15/22 0834    acetaminophen (TYLENOL) tablet 650 mg, 650 mg, Oral, Q4H PRN, Kylah Boyce MD, 650 mg at 03/12/22 2255    ondansetron (ZOFRAN-ODT) disintegrating tablet 4 mg, 4 mg, Oral, Q8H PRN **OR** ondansetron (ZOFRAN) injection 4 mg, 4 mg, IntraVENous, Q6H PRN, Kylah Boyce MD    amLODIPine (NORVASC) tablet 10 mg, 10 mg, Oral, Daily, Kylah Boyce MD, 10 mg at 03/15/22 9572    aspirin EC tablet 81 mg, 81 mg, Oral, Daily, Kylah Boyce MD, 81 mg at 03/15/22 0834    enzalutamide (XTANDI) capsule 160 mg, 160 mg, Oral, Daily, Kylah Boyce MD, 160 mg at 03/15/22 0836    vitamin B and C (TOTAL B-C) 1 tablet, 1 tablet, Oral, Daily, Donnetta Ahumada, MD, 1 tablet at 03/15/22 0833    furosemide (LASIX) tablet 40 mg, 40 mg, Oral, Daily, Donnetta Ahumada, MD, 40 mg at 03/15/22 0834    lisinopril (PRINIVIL;ZESTRIL) tablet 10 mg, 10 mg, Oral, Daily, Donnetta Ahumada, MD, 10 mg at 03/15/22 0918    pantoprazole (PROTONIX) tablet 40 mg, 40 mg, Oral, QAM AC, Donnetta Ahumada, MD, 40 mg at 03/15/22 0089    pravastatin (PRAVACHOL) tablet 80 mg, 80 mg, Oral, QPM, Donnetta Ahumada, MD, 80 mg at 03/14/22 1727    pregabalin (LYRICA) capsule 200 mg, 200 mg, Oral, TID, Donnetta Ahumada, MD, 200 mg at 03/15/22 0833    oxyCODONE-acetaminophen (PERCOCET) 5-325 MG per tablet 1 tablet, 1 tablet, Oral, Q8H PRN, Donnetta Ahumada, MD, 1 tablet at 03/15/22 4963    doxycycline monohydrate (MONODOX) capsule 100 mg, 100 mg, Oral, 2 times per day, Donnetta Ahumada, MD, 100 mg at 03/15/22 0834    Lab Results:     Lab Results   Component Value Date    WBC 14.1 (H) 03/15/2022    HGB 14.4 03/15/2022    HCT 43.3 03/15/2022    MCV 90.0 03/15/2022     03/15/2022     Lab Results   Component Value Date    CALCIUM 9.7 03/15/2022     03/15/2022    K 4.1 03/15/2022    CO2 27 03/15/2022     03/15/2022    BUN 25 (H) 03/15/2022    CREATININE 0.83 03/15/2022       Lab Results   Component Value Date    INR 1.0 03/19/2020    PROTIME 13.1 03/19/2020       Radiology:       ASSESSMENT:       Acute COPD exacerbation (follows with Dr. Bart Navarrete)  Nocturnal Hypoemia, on 3LNC bled through CPAP  REYNALDO on CPAP  Chronic Diastolic HF  Morbid obesity ([address]  PVD (peripheral vascular disease) with claudication  T2DM  Prostate Cancer  Foot drop, right  Tobacco use    PLAN:   1. He qualifies for 4 L with activity. I did discuss with him using up to 2 L at rest and then increasing to 4 L with activity. He does have a pulse ox monitor at home. He does have home oxygen as well as portable tanks. 2. Discharge home on prednisone taper  3.  Discharge home with doxycycline for total of 7-day course  4. Continue home inhalers and nebulizers  5. DME order for walker  6. Okay for discharge home from pulmonary standpoint  7. Close pulmonary follow-up in the office, appointment in 3 to 4 weeks. [phone]        He will benefit from home oxygen and a face-to-face evaluation was completed today. He already uses oxygen at night with his CPAP machine. He will need oxygen with activity and as needed at rest.      Electronically signed by JAVI Orozco CNP on 03/15/22     This progress note was completed using a voice transcription system. Every effort was made to ensure accuracy. However, inadvertent computerized transcription errors may be present.     Howard Travis NP-C, MSN  [facility] Pulmonary, Critical Care & Sleep

## 2022-03-15 NOTE — DISCHARGE INSTR - COC
Continuity of Care Form    Patient Name: Mehnaz Sidhu   :  1955  MRN:  Kelli Saenz date:  3/10/2022  Discharge date:  ***    Code Status Order: Full Code   Advance Directives:      Admitting Physician:  Isaias Taylor MD  PCP: Elsi Luis MD    Discharging Nurse: [facility] Care Unit/Room#:   Discharging Unit Phone Number: ***    Emergency Contact:   Extended Emergency Contact Information  Primary Emergency Contact: [facility]  Address: [facility] [address] Phone: [phone]  Work Phone: [phone]  Mobile Phone: [phone]  Relation: Spouse  Hearing or visual needs: None  Other needs: None  Preferred language: English   needed?  No    Past Surgical History:  Past Surgical History:   Procedure Laterality Date    BACK SURGERY      x 2     BACK SURGERY  2017    lumbar laminectomy L2, L3, L4    BRONCHOSCOPY N/A 3/13/2020    BRONCHOSCOPY performed by Curtis Castillo MD at [address] [address]    no stents    CAROTID ENDARTERECTOMY Right 2019    Dr. Bishop Lee Bilateral     CHOLECYSTECTOMY, LAPAROSCOPIC N/A 2021    CHOLECYSTECTOMY LAPAROSCOPIC ROBOTIC XI performed by Nely Cruz MD at [facility], COLON, DIAGNOSTIC      HERNIA REPAIR Left 2020    HERNIA INGUINAL REPAIR [address] OPEN performed by Nely Cruz MD at [address] [facility] [address] Left     LUMBAR LAMINECTOMY  2017    L2-L4    ID CLOSED RX SHLDR DISLOC,ANESTHESIA Left 2018    SHOULDER CLOSED REDUCTION WITH C-ARM VISUALIZATION performed by Clair Scott MD at [address] [facility] COLONOSCOPY W/BIOPSY SINGLE/MULTIPLE N/A 2017    COLONOSCOPY WITH BIOPSY performed by Harika Pastor DO at [address] [facility] [address] Left 2018    SHOULDER TOTAL ARTHROPLASTY REVERSE LEFT DJO & BICEP TENDON TRANSFER performed by Vanessa Beck MD at [address] [facility] HUMERAL/GLENOID COMPNT Left 8/3/2018    SHOULDER TOTAL REVERSE  ARTHROPLASTY REVISION performed by Vanessa Beck MD at [address]?     rotator cuff repair    SHOULDER SURGERY Left 10/15/2020    SHOULDER ARTHROSCOPY W/INTEROP CULTURES performed by Vanessa Beck MD at [address] [facility]      ear, forehead    TONSILLECTOMY AND ADENOIDECTOMY      UPPER GASTROINTESTINAL ENDOSCOPY N/A 3/19/2020    EGD BIOPSY performed by Yogesh Mtz MD at [address] [facility]       Immunization History:   Immunization History   Administered Date(s) Administered    COVID-19, Foster Rupinder, Primary or Immunocompromised, PF, 100mcg/0.5mL 03/23/2021, 04/20/2021, 12/21/2021    Influenza Virus Vaccine 12/30/2014, 12/28/2015, 10/27/2016, 10/02/2017, 09/04/2018, 10/01/2018    Influenza, High-dose, Loletta Flatten, 65 yrs +, IM (Fluzone) 12/28/2020    Influenza, Intradermal, Quadrivalent, Preservative Free 10/27/2016    Influenza, Quadv, IM, (6 mo and older Fluzone, Flulaval, Fluarix and 3 yrs and older Afluria) 10/02/2017    Influenza, Quadv, IM, PF (6 mo and older Fluzone, Flulaval, Fluarix, and 3 yrs and older Afluria) 09/04/2018    Influenza, Quadv, adjuvanted, 65 yrs +, IM, PF (Fluad) 11/05/2021    Influenza, Triv, inactivated, subunit, adjuvanted, IM (Fluad 65 yrs and older) 01/28/2020    Pneumococcal Conjugate 13-valent (Reinaldo Grain) 04/21/2016, 07/23/2019    Pneumococcal Polysaccharide (Vaevqeild75) 12/30/2014, 04/11/2017    Tdap (Boostrix, Adacel) 03/01/2018    Zoster Live (Zostavax) 01/30/2016    Zoster Recombinant (Shingrix) 02/22/2020, 06/04/2020       Active Problems:  Patient Active Problem List   Diagnosis Code    Essential hypertension I10    Gout M10.9    Hyperlipidemia with target LDL less than 70 E78.5    Osteoporosis M81.0    Prostate cancer ([facility] C61    Gastroesophageal reflux disease without esophagitis K21.9    DDD (degenerative disc disease), lumbar M51.36    Chronic bilateral low back pain with bilateral sciatica M54.42, M54.41, G89.29    Osteoarthritis of spine with radiculopathy, lumbar region M47.26    Type 2 diabetes mellitus with diabetic polyneuropathy, without long-term current use of insulin ([facility]) E11.42    PVD (peripheral vascular disease) with claudication ([facility]) I73.9    Peripheral neuropathic pain M79.2    Tinea pedis of both feet B35.3    Nonspecific reactive hepatitis K75.2    COPD mixed type ([facility]) J44.9    Status post lumbar laminectomy Z98.890    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    Vitamin D deficiency E55.9    Hypertriglyceridemia E78.1    REYNALDO on CPAP G47.33, Z99.89    Tubular adenoma of colon 4/11/17 D12.6    Psychophysiological insomnia F51.04    S/P reverse total shoulder arthroplasty, left Z96.612    Recurrent major depressive disorder, in full remission ([facility] F33.42    Actinic keratosis L57.0    Photodermatitis due to sun L56.8    Seborrheic keratosis L82.1    Senile hyperkeratosis L57.0    Solar degeneration L57.8    Abdominal aortic aneurysm ([facility]) I71.4    Family history of cancer Z80.9    Stenosis of left carotid artery I65.22    Poor balance R26.89    Foot drop, right M21.371    Difficulty rising from chair R68.89    Degenerative disc disease, cervical M50.30    At high risk for falls Z91.81    Pseudocholinesterase deficiency E88.09    Hyperparathyroidism ([facility] E21.3    Coronary artery disease involving native coronary artery of native heart without angina pectoris I25.10    Morbid obesity ([facility]) E66.01    Status post total shoulder arthroplasty, left N39.263    Tobacco use Z72.0    Chronic hypoxemic respiratory failure ([facility]) J96.11    History of prostate cancer Z85.46    Left inguinal hernia K40.90    Bilateral leg edema R60.0    Type 2 diabetes mellitus with diabetic peripheral angiopathy without gangrene, without long-term current use of insulin ([facility]) E11.51    Tinnitus aurium, bilateral H93.13    Difficulty in walking R26.2    Chronic diastolic heart failure (HCC) I50.32    Bronchitis J40    Chronic pain of both shoulders M25.511, G89.29, M25.512    PLASCENCIA (dyspnea on exertion) R06.00    Carcinoma of prostate ([facility] C61    COPD exacerbation (HCC) J44.1       Isolation/Infection:   Isolation            No Isolation          Patient Infection Status       Infection Onset Added Last Indicated Last Indicated By Review Planned Expiration Resolved Resolved By    None active    Resolved    COVID-19 (Rule Out) 03/10/22 03/10/22 03/11/22 COVID-19 & Influenza Combo (Ordered)   03/11/22 Rule-Out Test Resulted    COVID-19 (Rule Out) 07/08/21 07/08/21 07/08/21 SARS-CoV-2 NAAT (Rapid) (Ordered)   07/08/21 Rule-Out Test Resulted    COVID-19 (Rule Out) 02/12/21 02/12/21 02/12/21 COVID-19 (Ordered)   02/13/21 Rule-Out Test Resulted    COVID-19 (Rule Out) 11/14/20 11/14/20 11/14/20 Covid-19 Ambulatory (Ordered)   11/16/20 Rule-Out Test Resulted    COVID-19 (Rule Out) 10/11/20 10/11/20 10/11/20 COVID-19 (Ordered)   10/12/20 Rule-Out Test Resulted    C-diff Rule Out 03/18/20 03/18/20 03/18/20 Clostridium Difficile Toxin/Antigen (Ordered)   03/18/20 Rule-Out Test Resulted            Nurse Assessment:  Last Vital Signs: /67   Pulse (!) 46   Temp 97.5 °F (36.4 °C) (Oral)   Resp 20   Ht 6' (1.829 m)   Wt 291 lb 0.1 oz (132 kg)   SpO2 91%   BMI 39.47 kg/m²     Last documented pain score (0-10 scale): Pain Level: 6  Last Weight:   Wt Readings from Last 1 Encounters:   03/13/22 291 lb 0.1 oz (132 kg)     Mental Status:  {IP PT MENTAL STATUS:81935}    IV Access:  {[facility] IV ACCESS:279969413}    Nursing Mobility/ADLs:  Walking   {[facility] DME SYSQ:618651691}  Transfer  {CHP DME IEFZ:952612544}  Bathing  {CHP DME BPNI:804899163}  Dressing  {CHP DME KFYE:509387201}  Toileting  {CHP DME SFBW:200834127}  Feeding  {CHP DME PDVZ:482865989}  Med Admin  {CHP DME ZSLA:797420142}  Med Delivery   { KATHIE MED Delivery:421262405}    Wound Care Documentation and Therapy:        Elimination:  Continence: Bowel: {YES / RM:56808}  Bladder: {YES / R}  Urinary Catheter: {Urinary Catheter:547496840}   Colostomy/Ileostomy/Ileal Conduit: {YES / NM:09204}       Date of Last BM: ***  No intake or output data in the 24 hours ending 03/15/22 1334  I/O last 3 completed shifts: In: 26 [P.O.:450;  I.V.:10]  Out: -     Safety Concerns:     [address] KATHIE Safety Concerns:935709231}    Impairments/Disabilities:      {[facility] Impairments/Disabilities:179295009}    Nutrition Therapy:  Current Nutrition Therapy:   [address] KATHIE Diet List:611290196}    Routes of Feeding: {CHP DME Other Feedings:617396151}  Liquids: {Slp liquid thickness:22530}  Daily Fluid Restriction: {CHP DME Yes amt example:776510576}  Last Modified Barium Swallow with Video (Video Swallowing Test): {Done Not Done MPJS:912822500}    Treatments at the Time of Hospital Discharge:   Respiratory Treatments: ***  Oxygen Therapy:  {Therapy; copd oxygen:31477}  Ventilator:    { CC Vent GSDD:558986395}    Rehab Therapies: {THERAPEUTIC INTERVENTION:4963070777}  Weight Bearing Status/Restrictions: [address] CC Weight Bearin}  Other Medical Equipment (for information only, NOT a DME order):  {EQUIPMENT:798993296}  Other Treatments: ***    Patient's personal belongings (please select all that are sent with patient):  {[facility] DME Belongings:226352012}    RN SIGNATURE:  {Esignature:331111659}    CASE MANAGEMENT/SOCIAL WORK SECTION    Inpatient Status Date: ***    Readmission Risk Assessment Score:  Readmission Risk              Risk of Unplanned Readmission:  22           Discharging to Facility/ Agency   Name:   Address:  Phone:  Fax:    Dialysis Facility (if applicable)   Name:  Address:  Dialysis Schedule:  Phone:  Fax:    / signature: {Esignature:915618101}    PHYSICIAN SECTION    Prognosis: {Prognosis:2259229909}    Condition at Discharge: [address] Patient Condition:918124639}    Rehab Potential (if transferring to Rehab): {Prognosis:6928971973}    Recommended Labs or Other Treatments After Discharge: ***    Physician Certification: I certify the above information and transfer of Becky Peralta  is necessary for the continuing treatment of the diagnosis listed and that he requires {Admit to Appropriate Level of Care:69727} for {GREATER/LESS:549933598} 30 days.      Update Admission H&P: {CHP DME Changes in VBMZN:293222569}    PHYSICIAN SIGNATURE:  {Esignature:828011058}

## 2022-03-16 ENCOUNTER — CARE COORDINATION (OUTPATIENT)
Dept: CASE MANAGEMENT | Age: 67
End: 2022-03-16

## 2022-03-16 DIAGNOSIS — J44.1 COPD EXACERBATION (HCC): Primary | ICD-10-CM

## 2022-03-16 PROCEDURE — 1111F DSCHRG MED/CURRENT MED MERGE: CPT | Performed by: FAMILY MEDICINE

## 2022-03-16 NOTE — CARE COORDINATION
[facility] Transitions Initial Follow Up Call    Call within 2 business days of discharge: Yes    Patient: Brian Schwartz Patient : 1955   MRN: <[id number]>  Reason for Admission: COPD EXACERBATION  Discharge Date: 3/15/22 RARS: Readmission Risk Score: 12 ( )      Last Discharge [facility]       Complaint Diagnosis Description Type Department Provider    3/10/22 Shortness of Breath COPD exacerbation [facility]) ED to Hosp-Admission (Discharged) (ADMITTED) [facility] Nuvia Mcclellan MD; Nicolas Peña... Spoke with: Transitions of Care Initial Call: Spoke to [employer] the role of Care Transition Nurse and the Transition program, patient is agreeable to follow up calls Post discharge from the [facility] states he continues to have dyspnea with exertion. No chest pain. Uses oxygen continuously 3.5 lpm. Pulse ox is 92-95%. Using nebulizer and inhalers as directed. Appetite is good. 1111F medication reconciliation completed. Pt takes Percocet PRN for arthritic pain. Educated on taking Doxycycline and Prednisone until gone. Reviewed appts with Urology on 3/21/22 and PCP on 3/25/22. Pt verbalized understanding of date and times. He has transportation to appts. Pt declines need for HHC or DME at this time. Will continue to follow. Patient is aware of when to contact MD with any new or worsening symptoms. Advised to contact PCP  with any health concerns for early outpatient intervention in an effort to avoid hospitalization. Report any worsening symptoms to PCP and/or Call 911 and/or GO TO EMERGENCY ROOM if symptoms are severe. Expresses understanding. Transitions of Care Initial Call    Was this an external facility discharge?  No Discharge Facility: n/a    Challenges to be reviewed by the provider   Additional needs identified to be addressed with provider: No  none             Method of communication with provider : none      Advance Care Planning:   Does patient have an Advance Directive: reviewed and current. Was this a readmission? No  Patient stated reason for admission: shortness of breath  Patients top risk factors for readmission: medical condition-COPD    Care Transition Nurse (CTN) contacted the patient by telephone to perform post hospital discharge assessment. Verified name and  with patient as identifiers. Provided introduction to self, and explanation of the CTN role. CTN reviewed discharge instructions, medical action plan and red flags with patient who verbalized understanding. Patient given an opportunity to ask questions and does not have any further questions or concerns at this time. Were discharge instructions available to patient? Yes. Reviewed appropriate site of care based on symptoms and resources available to patient including: PCP, Specialist, When to call 911 and [address]. The patient agrees to contact the PCP office for questions related to their healthcare. Medication reconciliation was performed with patient, who verbalizes understanding of administration of home medications. Advised obtaining a 90-day supply of all daily and as-needed medications. Covid Risk Education     Educated patient about risk for severe COVID-19 due to risk factors according to CDC guidelines. LPN CC reviewed discharge instructions, medical action plan and red flag symptoms with the patient who verbalized understanding. Discussed COVID vaccination status: Yes. Education provided on COVID-19 vaccination as appropriate. Discussed exposure protocols and quarantine with CDC Guidelines. Patient was given an opportunity to verbalize any questions and concerns and agrees to contact LPN CC or health care provider for questions related to their healthcare. Reviewed and educated patient on any new and changed medications related to discharge diagnosis. Was patient discharged with a pulse oximeter?  No Discussed and confirmed pulse oximeter discharge instructions and when to notify provider or seek emergency care. LPN CC provided contact information. Plan for follow-up call in 5-7 days based on severity of symptoms and risk factors. Plan for next call: symptom management-DYSPNEA  self management-wear oxygen as directed and check pulse ox  follow up appointment-with PCP and urology  medication management-take all medications as directed.  complete prednisone and doxycycline        Facility: [address] [place] [address]    Non-face-to-face services provided:  Obtained and reviewed discharge summary and/or continuity of care documents    Care Transitions 24 Hour Call    Care Transitions Interventions         Follow Up  Future Appointments   Date Time Provider Keri English   3/21/2022 10:20 AM Audi Sanchez MD . C URO TOLP   3/25/2022  8:00 AM Jm Willis MD [facility]   4/29/2022  8:20 AM Yuki Sexton MD [facility] NEURO [address] [place] [address]   6/10/2022  2:00 PM Jm Willis MD [facility] [address] LPN Care Transitions Nurse   [phone]

## 2022-03-19 DIAGNOSIS — J44.9 COPD MIXED TYPE (HCC): ICD-10-CM

## 2022-03-21 ENCOUNTER — OFFICE VISIT (OUTPATIENT)
Dept: UROLOGY | Age: 67
End: 2022-03-21
Payer: MEDICARE

## 2022-03-21 VITALS
WEIGHT: 291 LBS | DIASTOLIC BLOOD PRESSURE: 60 MMHG | HEART RATE: 68 BPM | RESPIRATION RATE: 16 BRPM | TEMPERATURE: 98.1 F | SYSTOLIC BLOOD PRESSURE: 93 MMHG | BODY MASS INDEX: 38.57 KG/M2 | HEIGHT: 73 IN

## 2022-03-21 DIAGNOSIS — R23.2 HOT FLASHES: ICD-10-CM

## 2022-03-21 DIAGNOSIS — R35.0 FREQUENCY OF MICTURITION: ICD-10-CM

## 2022-03-21 DIAGNOSIS — Z79.818 ANDROGEN DEPRIVATION THERAPY: ICD-10-CM

## 2022-03-21 DIAGNOSIS — C61 PROSTATE CANCER (HCC): Primary | ICD-10-CM

## 2022-03-21 PROCEDURE — 99214 OFFICE O/P EST MOD 30 MIN: CPT | Performed by: UROLOGY

## 2022-03-21 RX ORDER — ALBUTEROL SULFATE 2.5 MG/3ML
SOLUTION RESPIRATORY (INHALATION)
Qty: 125 EACH | Refills: 3 | Status: ON HOLD
Start: 2022-03-21 | End: 2022-05-06 | Stop reason: HOSPADM

## 2022-03-21 ASSESSMENT — ENCOUNTER SYMPTOMS
ABDOMINAL PAIN: 0
COUGH: 0
BACK PAIN: 0
NAUSEA: 0
SHORTNESS OF BREATH: 0
EYE PAIN: 0
CONSTIPATION: 0
VOMITING: 0
DIARRHEA: 0
WHEEZING: 0

## 2022-03-21 NOTE — PROGRESS NOTES
Review of Systems   Constitutional: Negative for appetite change, chills, fatigue and fever. Eyes: Negative for pain and visual disturbance. Respiratory: Negative for cough, shortness of breath and wheezing. Cardiovascular: Negative for chest pain and leg swelling. Gastrointestinal: Negative for abdominal pain, constipation, diarrhea, nausea and vomiting. Genitourinary: Negative for difficulty urinating, dysuria, hematuria, penile pain and testicular pain. Musculoskeletal: Negative for back pain and myalgias. Neurological: Negative for dizziness, tremors, weakness, light-headedness, numbness and headaches. Hematological: Negative for adenopathy. Does not bruise/bleed easily.    none

## 2022-03-21 NOTE — PROGRESS NOTES
[address] [facility] [address]  Dept: [address] [facility] Urology Office Note - Established    Patient:  Opal Barbosa  YOB: 1955  Date: 3/21/2022    The patient is a 78 y.o. male who presents todayfor evaluation of the following problems:   Chief Complaint   Patient presents with    Follow-up     2 month w/ labs        HPI  This is a very pleasant 76-year-old gentleman with advanced prostate cancer. He has been on [place]. His PSA has started to come down. It is 0.69. He continues to have hot flashes but they are tolerable. His urinary symptoms are otherwise stable and tolerable. Summary of old records: N/A    Additional History: N/A    Procedures Today: N/A    Urinalysis today:  No results found for this visit on 03/21/22. Last several PSA's:  Lab Results   Component Value Date    PSA 0.69 03/09/2022    PSA 1.44 01/05/2022    PSA 1.09 11/19/2021     Last total testosterone:  Lab Results   Component Value Date    TESTOSTERONE <3 (L) 03/09/2022       AUA Symptom Score (3/21/2022):   INCOMPLETE EMPTYING: How often have you had the sensation of not emptying your bladder?: Not at all  FREQUENCY: How often do you have to urinate less than every two hours?: Not at all  INTERMITTENCY: How often have you found you stopped and started again several times when you urinated?: Not at all  URGENCY: How often have you found it difficult to postpone urination?: Not at all  WEAK STREAM: How often have you had a weak urinary stream?: Not at all  STRAINING: How often have you had to strain to start  urination?: Not at all  NOCTURIA: How many times did you typically get up at night to uriniate?: NONE  TOTAL I-PSS SCORE[de-identified] 0       Last BUN and creatinine:  Lab Results   Component Value Date    BUN 25 (H) 03/15/2022     Lab Results   Component Value Date    CREATININE 0.83 03/15/2022       Additional Lab/Culture results: none    Imaging Reviewed during this Office Visit: none  (results were independently reviewed by physician and radiology report verified)    PAST MEDICAL, FAMILY AND SOCIAL HISTORY UPDATE:  Past Medical History:   Diagnosis Date    Acid reflux     Anemia, blood loss 10/7/2018    Arthritis     C. difficile colitis 3/19/2020    Chronic bilateral low back pain with bilateral sciatica 11/3/2016    Chronic diastolic heart failure ([address] 2/25/2021    Complete tear of left rotator cuff 7/18/2018    COPD (chronic obstructive pulmonary disease) ([address]     Coronary artery disease involving native coronary artery of native heart without angina pectoris 2/2/2020    Degenerative disc disease, cervical 7/28/2019    GI bleed 3/19/2020    Gout     H/O cardiac catheterization     yrs ago   no stents    History of blood transfusion     Hyperlipidemia     Hyperlipidemia with target LDL less than 100 1/20/2016    Hyperparathyroidism ([address] 1/17/2020    Hypertension     Knee pain, chronic     left    Liver disease     MDRO (multiple drug resistant organisms) resistance     Melena 3/19/2020    Memory loss     Moderate episode of recurrent major depressive disorder ([address] 1/20/2016    Obesity, Class III, BMI 40-49.9 (morbid obesity) ([address] 1/20/2016    REYNALDO on CPAP 5/6/2017    Osteoarthritis     Peripheral arterial occlusive disease ([address] 3/25/2017    Pneumonia 3/9/2020    Polypharmacy 3/25/2017    Positive FIT (fecal immunochemical test) 4/11/2017    Prolonged emergence from general anesthesia 01/05/2017    Patient \"on life support for 3 days\" after back surgery due to being given succinylcholine    Prostate CA ([address] 1/20/2016    Prostate cancer ([address] 10/2013    finished radiation tx 5/2014    Pseudocholinesterase deficiency 03/25/2017    Pt.  \"on life support\" for 3 days after back surgery 1/5/17 after being given succinylcholine    Severe obesity (BMI 35.0-35.9 with comorbidity) ([facility] 2/2/2020    Short of breath on exertion     Sleep apnea     uses CPAP machine nightly    Status post lumbar laminectomy 3/25/2017    Stenosis of left carotid artery 10/7/2018    Syncope and collapse 3/19/2020    Tubular adenoma of colon 4/11/17 5/13/2017    Type 2 diabetes mellitus with hyperglycemia, without long-term current use of insulin ([address] 3/25/2017    Lab Results Component Value Date  LABA1C 7.1 (H) 03/23/2017     Unintended weight loss 10/7/2018    Vitamin D deficiency 3/25/2017    Wears glasses      Past Surgical History:   Procedure Laterality Date    BACK SURGERY      x 2     BACK SURGERY  01/05/2017    lumbar laminectomy L2, L3, L4    BRONCHOSCOPY N/A 3/13/2020    BRONCHOSCOPY performed by Teri Lambert MD at [facility]  2007    no stents    CAROTID ENDARTERECTOMY Right 12/16/2019    Dr. Felix Kumar Bilateral     CHOLECYSTECTOMY, LAPAROSCOPIC N/A 2/16/2021    CHOLECYSTECTOMY LAPAROSCOPIC ROBOTIC XI performed by Jeremy Justice MD at [address] [facility], COLON, DIAGNOSTIC     [address] [id number] Left 11/18/2020    HERNIA INGUINAL REPAIR [address] OPEN performed by Jeremy Justice MD at [address] [facility] Left     LUMBAR LAMINECTOMY  01/05/2017    L2-L4    ID CLOSED RX SHLDR DISLOC,ANESTHESIA Left 8/1/2018    SHOULDER CLOSED REDUCTION WITH C-ARM VISUALIZATION performed by Amy Gruber MD at [facility] N/A 5/9/2017    COLONOSCOPY WITH BIOPSY performed by Lloyd Martinez DO at [address] IMPLANT Left 7/18/2018    SHOULDER TOTAL ARTHROPLASTY REVERSE LEFT DJO & BICEP TENDON TRANSFER performed by Amy Gruber MD at [address] HUMERAL/GLENOID COMPNT Left 8/3/2018    SHOULDER TOTAL REVERSE  ARTHROPLASTY REVISION performed by Amy Gruber MD at [address] [facility] OR    SHOULDER SURGERY Right 1989? rotator cuff repair    SHOULDER SURGERY Left 10/15/2020    SHOULDER ARTHROSCOPY W/INTEROP CULTURES performed by Diannah Dakin, MD at [address]      ear, forehead    TONSILLECTOMY AND ADENOIDECTOMY      UPPER GASTROINTESTINAL ENDOSCOPY N/A 3/19/2020    EGD BIOPSY performed by Teresa Koch MD at [facility]     Family History   Problem Relation Age of Onset    Diabetes Mother     Lung Cancer Brother     Liver Cancer Brother     Cancer Father      Outpatient Medications Marked as Taking for the 3/21/22 encounter (Office Visit) with Kaela Sierra MD   Medication Sig Dispense Refill    predniSONE (DELTASONE) 20 MG tablet 40mg daily for one day then 30mg daily for 3 days then 20mg daily for 3 days then 10mg daily for 3 days then stop 22 tablet 0    lisinopril (PRINIVIL;ZESTRIL) 10 MG tablet Take 1 tablet by mouth daily Dose decreased by cardiologist 90 tablet 3    pregabalin (LYRICA) 200 MG capsule Take 1 capsule by mouth 3 times daily for 90 days.  Please ignore prior refill for gabapentin 800 mg, will switch back to Lyrica 270 capsule 0    Ascorbic Acid (VITAMIN C) 250 MG tablet Take 250 mg by mouth daily      Cholecalciferol (VITAMIN D3) 1.25 MG (88964 UT) CAPS Take by mouth      enzalutamide (XTANDI) 40 MG capsule Take 4 capsules by mouth daily 120 capsule 5    albuterol sulfate  (90 Base) MCG/ACT inhaler USE 2 INHALATIONS EVERY 6 HOURS AS NEEDED FOR WHEEZING OR SHORTNESS OF BREATH 24 g 3    ergocalciferol (ERGOCALCIFEROL) 1.25 MG (87311 UT) capsule Take 50,000 Units by mouth once a week       furosemide (LASIX) 40 MG tablet Take 1 tablet by mouth daily Dose increased 1/12/2021 90 tablet 3    [DISCONTINUED] albuterol (PROVENTIL) (2.5 MG/3ML) 0.083% nebulizer solution Take 3 mLs by nebulization every 6 hours as needed for Wheezing or Shortness of Breath (cough) 375 vial 0    omeprazole (PRILOSEC) 20 MG delayed release capsule Take 1 capsule by mouth every morning (before breakfast) Dose decreased 7/20/2021 90 capsule 3    pravastatin (PRAVACHOL) 80 MG tablet Take 1 tablet by mouth every evening Dose increased  9/24/2019 90 tablet 3    amLODIPine (NORVASC) 10 MG tablet Take 1 tablet by mouth daily Dose increased 10/16/2020 due to high BP. Correct dosage 90 tablet 3    folbee plus (FOLBEE PLUS) TABS Take 1 tablet by mouth daily 90 tablet 3    tiotropium-olodaterol (STIOLTO RESPIMAT) 2.5-2.5 MCG/ACT AERS Inhale 2 puffs into the lungs daily      oxyCODONE-acetaminophen (PERCOCET) 5-325 MG per tablet Take 1 tablet by mouth 2 times daily as needed.  Lancets 30G MISC Testing once a day. Please dispense according to patients insurance. 100 each 3    ammonium lactate (AMLACTIN) 12 % cream APPLY TOPICALLY TO LEGS ONE TO TWO TIMES A DAY AS NEEDED 1 Bottle 3    OXYGEN With CPAP at night      aspirin EC 81 MG EC tablet Take 1 tablet by mouth daily 90 tablet 0       Succinylcholine chloride and Cymbalta [duloxetine hcl]  Social History     Tobacco Use   Smoking Status Current Every Day Smoker    Packs/day: 1.00    Years: 35.00    Pack years: 35.00    Types: Cigarettes    Start date: 7/8/2021   Smokeless Tobacco Never Used     (Ifpatient a smoker, smoking cessation counseling offered)    Social History     Substance and Sexual Activity   Alcohol Use Not Currently    Alcohol/week: 0.0 standard drinks       REVIEW OF SYSTEMS:  Review of Systems    Physical Exam:      Vitals:    03/21/22 1009   BP: 93/60   Pulse: 68   Resp: 16   Temp: 98.1 °F (36.7 °C)     Body mass index is 38.39 kg/m². Patient is a 78 y.o. male in no acute distress and alert and oriented to person, place and time. Physical Exam  Constitutional: Patient in no acute distress. Neuro: Alert and oriented to person, place and time. Assessment and Plan      1. Prostate cancer ([address]    2. Hot flashes    3. Frequency of micturition    4.  Androgen deprivation therapy

## 2022-03-21 NOTE — TELEPHONE ENCOUNTER
Please Approve or Refuse.   Send to Pharmacy per Pt's Request: [facility]     Next Visit Date:  3/25/2022   Last Visit Date: 3/9/2022    Hemoglobin A1C (%)   Date Value   03/09/2022 5.8   08/12/2021 6.3 (H)   07/20/2021 5.9             ( goal A1C is < 7)   BP Readings from Last 3 Encounters:   03/15/22 118/67   03/09/22 132/70   03/01/22 110/80          (goal 120/80)  BUN   Date Value Ref Range Status   03/15/2022 25 (H) 8 - 23 mg/dL Final     CREATININE   Date Value Ref Range Status   03/15/2022 0.83 0.70 - 1.20 mg/dL Final     Potassium   Date Value Ref Range Status   03/15/2022 4.1 3.7 - 5.3 mmol/L Final

## 2022-03-22 ENCOUNTER — TELEPHONE (OUTPATIENT)
Dept: ONCOLOGY | Age: 67
End: 2022-03-22

## 2022-03-22 DIAGNOSIS — Z87.891 PERSONAL HISTORY OF NICOTINE DEPENDENCE: Primary | ICD-10-CM

## 2022-03-22 NOTE — TELEPHONE ENCOUNTER
Diagnosis Orders   1.  Personal history of nicotine dependence  CT lung screen [Initial/Annual]        Future Appointments   Date Time Provider Keri English   3/25/2022  8:00 AM Soni Moreira MD [facility]   4/29/2022  8:20 AM [facility]   5/23/2022 10:40 AM [facility]   6/10/2022  2:00 PM Soni Moreira MD [facility]

## 2022-03-22 NOTE — TELEPHONE ENCOUNTER
Our records indicate that your patient is due for their annual lung cancer screening follow up testing. For your convenience, we have pended the order for the scan for you. If you do not agree with the need for the test, please cancel the order and let us know. Sincerely,    [address] Screening Program    Auto printed reminder letter sent to patient.

## 2022-03-23 ENCOUNTER — CARE COORDINATION (OUTPATIENT)
Dept: CASE MANAGEMENT | Age: 67
End: 2022-03-23

## 2022-03-23 NOTE — CARE COORDINATION
[facility] Transitions Follow Up Call    3/23/2022    Patient: Marcellus Bueno  Patient : 1955   MRN: <[id number]>  Reason for Admission: COPD exacerbation  Discharge Date: 3/15/22 RARS: Readmission Risk Score: 12 ( )         Spoke with: Wife Audie Belle    Wife stated pt is not available at this time. Explained writer is calling to check in on patient, see if there are any needs or concerns. Wife requested call back another time. Care Transitions Subsequent and Final Call    Subsequent and Final Calls  Care Transitions Interventions  Other Interventions:            Follow Up  Future Appointments   Date Time Provider Keri English   3/25/2022  8:00 AM [facility]   2022  8:20 AM John Jo MD [facility]   2022 10:40 AM Junaid Vázquez MD [facility]   6/10/2022  2:00 PM MD mehnaz Colon RN

## 2022-03-25 ENCOUNTER — OFFICE VISIT (OUTPATIENT)
Dept: FAMILY MEDICINE CLINIC | Age: 67
End: 2022-03-25
Payer: MEDICARE

## 2022-03-25 ENCOUNTER — CARE COORDINATION (OUTPATIENT)
Dept: CARE COORDINATION | Age: 67
End: 2022-03-25

## 2022-03-25 ENCOUNTER — HOSPITAL ENCOUNTER (OUTPATIENT)
Age: 67
Setting detail: SPECIMEN
Discharge: HOME | End: 2022-03-25
Payer: MEDICARE

## 2022-03-25 VITALS
DIASTOLIC BLOOD PRESSURE: 78 MMHG | TEMPERATURE: 97.4 F | WEIGHT: 277.4 LBS | OXYGEN SATURATION: 97 % | BODY MASS INDEX: 37.57 KG/M2 | HEART RATE: 74 BPM | HEIGHT: 72 IN | SYSTOLIC BLOOD PRESSURE: 124 MMHG

## 2022-03-25 DIAGNOSIS — I12.9 BENIGN HYPERTENSION WITH CKD (CHRONIC KIDNEY DISEASE), STAGE II: ICD-10-CM

## 2022-03-25 DIAGNOSIS — G47.33 OSA ON CPAP: ICD-10-CM

## 2022-03-25 DIAGNOSIS — E11.51 TYPE 2 DIABETES MELLITUS WITH DIABETIC PERIPHERAL ANGIOPATHY WITHOUT GANGRENE, WITHOUT LONG-TERM CURRENT USE OF INSULIN (HCC): ICD-10-CM

## 2022-03-25 DIAGNOSIS — J96.11 CHRONIC HYPOXEMIC RESPIRATORY FAILURE (HCC): ICD-10-CM

## 2022-03-25 DIAGNOSIS — J44.1 COPD EXACERBATION (HCC): Primary | ICD-10-CM

## 2022-03-25 DIAGNOSIS — B37.0 THRUSH: ICD-10-CM

## 2022-03-25 DIAGNOSIS — I50.32 CHRONIC DIASTOLIC HEART FAILURE (HCC): ICD-10-CM

## 2022-03-25 DIAGNOSIS — N18.2 BENIGN HYPERTENSION WITH CKD (CHRONIC KIDNEY DISEASE), STAGE II: ICD-10-CM

## 2022-03-25 DIAGNOSIS — Z99.89 OSA ON CPAP: ICD-10-CM

## 2022-03-25 LAB
ABSOLUTE EOS #: 0.1 K/UL (ref 0–0.4)
ABSOLUTE LYMPH #: 4.9 K/UL (ref 1–4.8)
ABSOLUTE MONO #: 0.8 K/UL (ref 0.1–1.3)
ANION GAP SERPL CALCULATED.3IONS-SCNC: 12 MMOL/L (ref 9–17)
BASOPHILS # BLD: 1 % (ref 0–2)
BASOPHILS ABSOLUTE: 0.1 K/UL (ref 0–0.2)
BUN BLDV-MCNC: 15 MG/DL (ref 8–23)
CALCIUM SERPL-MCNC: 9.5 MG/DL (ref 8.6–10.4)
CHLORIDE BLD-SCNC: 103 MMOL/L (ref 98–107)
CO2: 27 MMOL/L (ref 20–31)
CREAT SERPL-MCNC: 0.86 MG/DL (ref 0.7–1.2)
EOSINOPHILS RELATIVE PERCENT: 1 % (ref 0–4)
GFR AFRICAN AMERICAN: >60 ML/MIN
GFR NON-AFRICAN AMERICAN: >60 ML/MIN
GFR SERPL CREATININE-BSD FRML MDRD: NORMAL ML/MIN/{1.73_M2}
GLUCOSE BLD-MCNC: 86 MG/DL (ref 70–99)
HCT VFR BLD CALC: 43.2 % (ref 41–53)
HEMOGLOBIN: 14.2 G/DL (ref 13.5–17.5)
LYMPHOCYTES # BLD: 37 % (ref 24–44)
MAGNESIUM: 1.7 MG/DL (ref 1.6–2.6)
MCH RBC QN AUTO: 29.6 PG (ref 26–34)
MCHC RBC AUTO-ENTMCNC: 32.9 G/DL (ref 31–37)
MCV RBC AUTO: 90 FL (ref 80–100)
MONOCYTES # BLD: 6 % (ref 1–7)
PDW BLD-RTO: 14.7 % (ref 11.5–14.9)
PHOSPHORUS: 3.4 MG/DL (ref 2.5–4.5)
PLATELET # BLD: 191 K/UL (ref 150–450)
PMV BLD AUTO: 8.6 FL (ref 6–12)
POTASSIUM SERPL-SCNC: 4.2 MMOL/L (ref 3.7–5.3)
PRO-BNP: 94 PG/ML
RBC # BLD: 4.8 M/UL (ref 4.5–5.9)
SEG NEUTROPHILS: 55 % (ref 36–66)
SEGMENTED NEUTROPHILS ABSOLUTE COUNT: 7.5 K/UL (ref 1.3–9.1)
SODIUM BLD-SCNC: 142 MMOL/L (ref 135–144)
WBC # BLD: 13.4 K/UL (ref 3.5–11)

## 2022-03-25 PROCEDURE — 85025 COMPLETE CBC W/AUTO DIFF WBC: CPT

## 2022-03-25 PROCEDURE — 1111F DSCHRG MED/CURRENT MED MERGE: CPT | Performed by: FAMILY MEDICINE

## 2022-03-25 PROCEDURE — 83880 ASSAY OF NATRIURETIC PEPTIDE: CPT

## 2022-03-25 PROCEDURE — 99495 TRANSJ CARE MGMT MOD F2F 14D: CPT | Performed by: FAMILY MEDICINE

## 2022-03-25 PROCEDURE — 80048 BASIC METABOLIC PNL TOTAL CA: CPT

## 2022-03-25 PROCEDURE — 84100 ASSAY OF PHOSPHORUS: CPT

## 2022-03-25 PROCEDURE — 3044F HG A1C LEVEL LT 7.0%: CPT | Performed by: FAMILY MEDICINE

## 2022-03-25 PROCEDURE — 36415 COLL VENOUS BLD VENIPUNCTURE: CPT

## 2022-03-25 PROCEDURE — 83735 ASSAY OF MAGNESIUM: CPT

## 2022-03-25 ASSESSMENT — ENCOUNTER SYMPTOMS
SINUS PAIN: 0
NAUSEA: 0
VOMITING: 0
RHINORRHEA: 0
COUGH: 1
SINUS PRESSURE: 0
SHORTNESS OF BREATH: 1
BACK PAIN: 1
CONSTIPATION: 0
DIARRHEA: 0
WHEEZING: 1
ABDOMINAL PAIN: 0
ABDOMINAL DISTENTION: 0

## 2022-03-25 NOTE — CARE COORDINATION
[facility] Transitions Follow Up Call    3/25/2022    Patient: Erick Montes  Patient : 1955   MRN: [id number]  Reason for Admission: COPD exacerbation  Discharge Date: 3/15/22 RARS: Readmission Risk Score: 12 ( )    Attempted to contact patient today 3/25/22 for TCM/hospital discharge follow up sub call for COPD exacerbation. Unable to make contact with patient at home/mobile number as phone keeps ringing busy on multiple attempts. Noted patient completed HFU appt today with Dr. Cal Burton (PCP). CTN will continue to follow for Care Transition.       JAVI Rhoades

## 2022-03-25 NOTE — PATIENT INSTRUCTIONS
Patient Education        Heart Failure: Care Instructions  Your Care Instructions     Heart failure occurs when your heart does not pump as much blood as the body needs. Failure does not mean that the heart has stopped pumping but rather that it is not pumping as well as it should. Over time, this causes fluid buildup in your lungs and other parts of your body. Fluid buildup can cause shortness of breath, fatigue, swollen ankles, and other problems. By taking medicines regularly, reducing sodium (salt) in your diet, checking your weight every day, and making lifestyle changes, you can feel better and live longer. Follow-up care is a key part of your treatment and safety. Be sure to make and go to all appointments, and call your doctor if you are having problems. It's also a good idea to know your test results and keep a list of the medicines you take. How can you care for yourself at home? Medicines    · Be safe with medicines. Take your medicines exactly as prescribed. Call your doctor if you think you are having a problem with your medicine.     · Do not take any vitamins, over-the-counter medicine, or herbal products without talking to your doctor first. Verla Smoke not take ibuprofen (Advil or Motrin) and naproxen (Aleve) without talking to your doctor first. They could make your heart failure worse.     · You may take some of the following medicine. ? Angiotensin-converting enzyme inhibitors (ACEIs) or angiotensin II receptor blockers (ARBs) reduce the heart's workload, lower blood pressure, and reduce swelling. Taking an ACEI or ARB may lower your chance of needing to be hospitalized. ? Beta-blockers can slow heart rate, decrease blood pressure, and improve your condition. Taking a beta-blocker may lower your chance of needing to be hospitalized. ? Diuretics, also called water pills, reduce swelling. You will get more details on the specific medicines your doctor prescribes.   Diet    · Your doctor may suggest that you limit sodium. Your doctor can tell you how much sodium is right for you. An example is less than 3,000 mg a day. This includes all the salt you eat in cooking or in packaged foods. People get most of their sodium from processed foods. Fast food and restaurant meals also tend to be very high in sodium.     · Ask your doctor how much liquid you can drink each day. You may have to limit liquids. Weight    · Weigh yourself without clothing at the same time each day. Record your weight. Call your doctor if you have a sudden weight gain, such as more than 2 to 3 pounds in a day or 5 pounds in a week. (Your doctor may suggest a different range of weight gain.) A sudden weight gain may mean that your heart failure is getting worse. Activity level    · Start light exercise (if your doctor says it is okay). Even if you can only do a small amount, exercise will help you get stronger, have more energy, and manage your weight and your stress. Walking is an easy way to get exercise. Start out by walking a little more than you did before. Bit by bit, increase the amount you walk.     · When you exercise, watch for signs that your heart is working too hard. You are pushing yourself too hard if you cannot talk while you are exercising. If you become short of breath or dizzy or have chest pain, stop, sit down, and rest.     · If you feel \"wiped out\" the day after you exercise, walk slower or for a shorter distance until you can work up to a better pace.     · Get enough rest at night. Sleeping with 1 or 2 pillows under your upper body and head may help you breathe easier. Lifestyle changes    · Do not smoke. Smoking can make a heart condition worse. If you need help quitting, talk to your doctor about stop-smoking programs and medicines. These can increase your chances of quitting for good.  Quitting smoking may be the most important step you can take to protect your heart.     · Limit alcohol to 2 drinks a day for men and 1 drink a day for women. Too much alcohol can cause health problems.     · Avoid getting sick from colds and the flu. Get a pneumococcal vaccine shot. If you have had one before, ask your doctor whether you need another dose. Get a flu shot each year. If you must be around people with colds or the flu, wash your hands often. When should you call for help? Call 911  if you have symptoms of sudden heart failure such as:    · You have severe trouble breathing.     · You cough up pink, foamy mucus.     · You have a new irregular or rapid heartbeat. Call your doctor now or seek immediate medical care if:    · You have new or increased shortness of breath.     · You are dizzy or lightheaded, or you feel like you may faint.     · You have sudden weight gain, such as more than 2 to 3 pounds in a day or 5 pounds in a week. (Your doctor may suggest a different range of weight gain.)     · You have increased swelling in your legs, ankles, or feet.     · You are suddenly so tired or weak that you cannot do your usual activities. Watch closely for changes in your health, and be sure to contact your doctor if you develop new symptoms. Where can you learn more? Go to https://[employer]. org and sign in to your [employer] account. Enter U674 in the [facility] box to learn more about \"Heart Failure: Care Instructions. \"     If you do not have an account, please click on the \"Sign Up Now\" link. Current as of: April 29, 2021               Content Version: 13.1  © 2006-2021 [employer]wise[employer] Inc[employer]. Care instructions adapted under license by [facility] ([facility]). If you have questions about a medical condition or this instruction, always ask your healthcare professional. [place] [address] any warranty or liability for your use of this information.

## 2022-03-25 NOTE — RESULT ENCOUNTER NOTE
Please notify patient:   white blood cell count improving  Mild Hypomagnesemia      Otherwise labs within normal limits        Future Appointments  4/29/2022  8:20 AM    Madonna Bloch, MD [facility]          [facility]  5/23/2022  10:40 AM   Ryan Cespedes MD         [facility]           [facility]  6/10/2022  2:00 PM    Bladimir Bowens [facility]

## 2022-03-25 NOTE — PROGRESS NOTES
Post-Discharge Transitional Care Follow Up      Lexii Sanchez   YOB: 1955    Date of Office Visit:  3/25/2022  Date of Hospital Admission: 3/10/22  Date of Hospital Discharge: 3/15/22  Readmission Risk Score (high >=14%. Medium >=10%):Readmission Risk Score: 12 ( )      Care management risk score Rising risk (score 2-5) and Complex Care (Scores >=6): 7     Non face to face  following discharge, date last encounter closed (first attempt may have been earlier): 3/16/2022 10:06 AM     Call initiated 2 business days of discharge: Yes     COPD exacerbation ([address]  Improving exacerbation  Advised to rinse mouth after using inhalers, looks like he got thrush-     -to start nystatin (MYCOSTATIN) 536196 UNIT/ML suspension;  Take 5 mLs by mouth 4 times daily Swish and swallow, Oral, 4 TIMES DAILY Starting Fri 3/25/2022, Disp-473 mL, R-0, Normal  Advised to increase nebulizer treatments to every 6 hours, currently using it at every 8 hours  Continue Stiolto 2 puffs daily  Advised to start using the oxygen throughout the day at 2 L/min and continue oxygen at 3.5L/min at nighttime  To make appointment with pulmonologist  Strongly advised to continue to completely abstain from smoking  Does not need another burst of steroids at this time based on physical exam today  -     MA DISCHARGE MEDS RECONCILED W/ CURRENT OUTPATIENT MED LIST  Chronic hypoxemic respiratory failure ([facility] [address]  Improving   Advised to start using the oxygen throughout the day at 2 L/min and continue oxygen at 3.5L/min at nighttime  To make appointment with pulmonologist  Strongly advised to continue to completely abstain from smoking  Does not need another burst of steroids at this time based on physical exam today    -     MA DISCHARGE MEDS RECONCILED W/ CURRENT OUTPATIENT MED LIST  Chronic diastolic heart failure ([facility] [address]  Stable  Lab Results   Component Value Date    LVEF 60 12/05/2019    LVEFMODE Echo 12/05/2019   Will check labs  We will do echo 2D at the next appointment, if cardio doesn't do it  To continue current medications lisinopril 10 mg, furosemide 40 mg, amlodipine 10 mg  He is not on beta-blocker per his cardiologist.      -     IN DISCHARGE MEDS RECONCILED W/ CURRENT OUTPATIENT MED LIST  -     Basic Metabolic Panel; Future  -     Brain Natriuretic Peptide; Future  -     CBC with Auto Differential; Future  Type 2 diabetes mellitus with diabetic peripheral angiopathy without gangrene, without long-term current use of insulin (HCC)  Improving, very well controlled diabetes  Low-carb diabetic diet controlled  Lab Results   Component Value Date    LABA1C 5.8 03/09/2022    LABA1C 6.3 (H) 08/12/2021    LABA1C 5.9 07/20/2021     Recheck labs  Continue aspirin 81 mg, Pravastatin 80 mg  -     IN DISCHARGE MEDS RECONCILED W/ CURRENT OUTPATIENT MED LIST  -     Basic Metabolic Panel; Future  -     CBC with Auto Differential; Future  Benign hypertension with CKD (chronic kidney disease), stage II  Stable chronic kidney disease stage II  Well-controlled hypertension  Recheck labs  Continue current blood pressure medications lisinopril 10 mg, furosemide 40 mg, amlodipine 10 mg    -     IN DISCHARGE MEDS RECONCILED W/ CURRENT OUTPATIENT MED LIST  -     Basic Metabolic Panel; Future  -     CBC with Auto Differential; Future  -     Magnesium; Future  -     Phosphorus; Future  Thrush  Worsening   -     nystatin (MYCOSTATIN) 334741 UNIT/ML suspension; Take 5 mLs by mouth 4 times daily Swish and swallow, Oral, 4 TIMES DAILY Starting Fri 3/25/2022, Disp-473 mL, R-0, Normal  -     IN DISCHARGE MEDS RECONCILED W/ CURRENT OUTPATIENT MED LIST    REYNALDO on CPAP  Improved with CPAP    - IN DISCHARGE MEDS RECONCILED W/ CURRENT OUTPATIENT MED LIST  Benefits from CPAP      Medical Decision Making: moderate complexity  Return for KEEP APPT.     On this date 3/25/2022 I have spent 45 minutes reviewing previous notes, test results and face to face with the patient discussing the diagnosis and importance of compliance with the treatment plan as well as documenting on the day of the visit. Addendum made on 4/4/2022 at 12:40 PM  --Simeon Vaz MD on 4/4/2022 at 12:40 PM  Bernardino requesting BP machine, his machine is not working well, based on  telephone encounter from 4/4/2022,  he would benefit from BP machine     Diagnosis Orders   1. COPD exacerbation (HCC)  nystatin (MYCOSTATIN) [id number] UNIT/ML suspension    NM DISCHARGE MEDS RECONCILED W/ CURRENT OUTPATIENT MED LIST   2. Chronic hypoxemic respiratory failure (HCC)  NM DISCHARGE MEDS RECONCILED W/ CURRENT OUTPATIENT MED LIST   3. Chronic diastolic heart failure (HCC)  NM DISCHARGE MEDS RECONCILED W/ CURRENT OUTPATIENT MED LIST    Basic Metabolic Panel    CBC with Auto Differential    Blood Pressure KIT   4. Type 2 diabetes mellitus with diabetic peripheral angiopathy without gangrene, without long-term current use of insulin (HCC)  NM DISCHARGE MEDS RECONCILED W/ CURRENT OUTPATIENT MED LIST    Basic Metabolic Panel    CBC with Auto Differential    Blood Pressure KIT   5. Benign hypertension with CKD (chronic kidney disease), stage II  NM DISCHARGE MEDS RECONCILED W/ CURRENT OUTPATIENT MED LIST    Basic Metabolic Panel    CBC with Auto Differential    Magnesium    Phosphorus    Blood Pressure KIT   6. Thrush  nystatin (MYCOSTATIN) 655473 UNIT/ML suspension    NM DISCHARGE MEDS RECONCILED W/ CURRENT OUTPATIENT MED LIST   7. REYNALDO on CPAP  NM DISCHARGE MEDS RECONCILED W/ CURRENT OUTPATIENT MED LIST        Orders Placed This Encounter   Medications    nystatin (MYCOSTATIN) 135469 UNIT/ML suspension     Sig: Take 5 mLs by mouth 4 times daily Swish and swallow     Dispense:  473 mL     Refill:  0    Blood Pressure KIT     Sig: Diagnosis: HTN. Needs to check blood pressure 1-2 times a day until stable, then once a day. Goal blood pressure less than 135/85, and above 110/60.      Dispense:  1 kit     Refill:  0           An electronic signature was used to authenticate this note. Electronically signed by Coco Juarez MD on 4/4/2022 at 12:40 PM      Subjective:   HPI    Inpatient course: Discharge summary reviewed- see chart. Patient was admitted 3/10/2022 through 3/15/22 at [facility] due to COPD exacerbation and failure to improve after outpatient treatment given by myself at the last appointment. He was treated with IV steroids, nebulizer treatments and antibiotics, he was discharged on oral doxycycline and prednisone. Azithromycin given by myself previously was stopped. Pulmonologist was consulted. Testing done in the hospital reviewed with patient:  CXR no pneumonia on 3/9/2022  BUN mildly increased 25 on 3/15/2022, consistent with chronic kidney disease stage II, but improved from prior of 30 on 3/14/2022. White blood cells increased, 12.7 on 3/14/2022, 14.1 on 3/15/2022. COVID-19 and influenza testing negative  Troponins within normal limits 13 on 3/10/2022. BNP 63 on 3/10/2022 normal    Interval history/Current status: improving     Patient has known COPD and chronic respiratory failure. Patient says currently dyspnea on exertion is improved, but still short of breath when going from room to room, felt like the shortness of breath got a bit worse when finished the steroids. He reports mild wheezing at nighttime  He reports that the cough is mostly dry. Completed the steroids and Doxycycline   Have been using nebulizer every 3 times a day. Has been using oxygen 3.5 l/min at nighttime only. Cracking tongue and burning tongue from the inhalers and antibiotics, he thinks he has thrush. Using stiolto as prescribed. Denies fever, chills, night sweats. Eating ok, reports good appetite. He says he quit smoking when he was admitted    Oxygen improved  SpO2 Readings from Last 3 Encounters:   03/25/22 97%   03/15/22 91%   03/09/22 95%       Sleep Apnea:  Current treatment: cPAP. Compliance: compliant all of the time. Residual symptoms include: morning fatigue and daytime fatigue. Hypertension, CHF :    he  is not exercising and is adherent to low salt diet. Blood pressure is well controlled at home. Cardiac symptoms dyspnea and fatigue. Patient denies chest pain, chest pressure/discomfort, claudication, exertional chest pressure/discomfort, irregular heart beat, lower extremity edema, near-syncope, orthopnea, palpitations, paroxysmal nocturnal dyspnea, syncope and tachypnea. Cardiovascular risk factors: advanced age (older than 54 for men, 72 for women), diabetes mellitus, dyslipidemia, hypertension, obesity (BMI >= 30 kg/m2), sedentary lifestyle and smoking/ tobacco exposure. Use of agents associated with hypertension: none. History of target organ damage: chronic kidney disease and heart failure, peripheral vascular disease, and coronary artery disease. Sees Dr. Nicolás Victor.       Blood pressure is Normal.    BP Readings from Last 3 Encounters:   03/25/22 124/78   03/21/22 93/60   03/15/22 118/67        Pulse is Normal.    Pulse Readings from Last 3 Encounters:   03/25/22 74   03/21/22 68   03/15/22 (!) 46       Weight is stable   Wt Readings from Last 3 Encounters:   03/25/22 277 lb 6.4 oz (125.8 kg)   03/21/22 291 lb (132 kg)   03/13/22 291 lb 0.1 oz (132 kg)     3/9/2022 Wt 277 lb 6.4 oz (125.8 kg)       Improving PSA    Lab Results   Component Value Date    PSA 0.69 03/09/2022    PSA 1.44 01/05/2022    PSA 1.09 11/19/2021       Has known diabetes mellitus type 2  Not checking Blood Glucose, but will restart  Checking Blood Glucose a few times a week, low 100's      Eating low carb diet    Lab Results   Component Value Date    LABA1C 5.8 03/09/2022    LABA1C 6.3 (H) 08/12/2021    LABA1C 5.9 07/20/2021             Patient Active Problem List   Diagnosis    Essential hypertension    Gout    Hyperlipidemia with target LDL less than 70    Osteoporosis    Prostate cancer ([facility] [address]    Gastroesophageal reflux disease without esophagitis    DDD (degenerative disc disease), lumbar    Chronic bilateral low back pain with bilateral sciatica    Osteoarthritis of spine with radiculopathy, lumbar region    Type 2 diabetes mellitus with diabetic polyneuropathy, without long-term current use of insulin (HCC)    PVD (peripheral vascular disease) with claudication (HCC)    Peripheral neuropathic pain    Tinea pedis of both feet    Nonspecific reactive hepatitis    COPD mixed type ([address]    Status post lumbar laminectomy    Serum cholinesterase defect ([address]    Vitamin D deficiency    Hypertriglyceridemia    REYNALDO on CPAP    Tubular adenoma of colon 4/11/17    Psychophysiological insomnia    S/P reverse total shoulder arthroplasty, left    Recurrent major depressive disorder, in full remission ([address]    Actinic keratosis    Photodermatitis due to sun    Seborrheic keratosis    Senile hyperkeratosis    Solar degeneration    Abdominal aortic aneurysm ([address]    Family history of cancer    Stenosis of left carotid artery    Poor balance    Foot drop, right    Difficulty rising from chair    Degenerative disc disease, cervical    At high risk for falls    Pseudocholinesterase deficiency    Hyperparathyroidism ([address]    Coronary artery disease involving native coronary artery of native heart without angina pectoris    Morbid obesity ([address]    Status post total shoulder arthroplasty, left    Tobacco use    Chronic hypoxemic respiratory failure (HCC)    History of prostate cancer    Left inguinal hernia    Bilateral leg edema    Type 2 diabetes mellitus with diabetic peripheral angiopathy without gangrene, without long-term current use of insulin (HCC)    Tinnitus aurium, bilateral    Difficulty in walking    Chronic diastolic heart failure (HCC)    Bronchitis    Chronic pain of both shoulders    PLASCENCIA (dyspnea on exertion)    Carcinoma of prostate ([address]    COPD exacerbation ([facility] [address]       Medications listed as ordered at the time of discharge from hospital        Medication List at Discharge       Albuterol Sulfate        2.5 mg Nebulization EVERY 6 HOURS PRN     108 (90 Base) MCG/ACT Aers, USE 2 INHALATIONS EVERY 6 HOURS AS NEEDED FOR WHEEZING OR SHORTNESS OF BREATH       amLODIPine Besylate 10 mg Oral DAILY, Dose increased 10/16/2020 due to high BP. Correct dosage     Ammonium Lactate 12 % APPLY TOPICALLY TO LEGS ONE TO TWO TIMES A DAY AS NEEDED     Ascorbic Acid 250 mg Oral DAILY     Aspirin 81 mg Oral DAILY     B Complex-C-Folic Acid 1 tablet Oral DAILY     Cholecalciferol 1.25 MG (51019 UT) Oral     Doxycycline Monohydrate 100 mg Oral EVERY 12 HOURS SCHEDULED     Enzalutamide 160 mg Oral DAILY     Ergocalciferol 50,000 Units Oral WEEKLY     Furosemide 40 mg Oral DAILY, Dose increased 1/12/2021     Lancets Testing once a day.  Please dispense according to patients insurance.      Lisinopril 10 mg Oral DAILY, Dose decreased by cardiologist     Omeprazole 20 mg Oral DAILY BEFORE BREAKFAST, Dose decreased 7/20/2021     oxyCODONE-Acetaminophen 5-325 MG 1 tablet Oral 2 TIMES DAILY PRN     OXYGEN With CPAP at night     Pravastatin Sodium 80 mg Oral EVERY EVENING, Dose increased  9/24/2019     predniSONE 20 MG 40mg daily for one day then 30mg daily for 3 days then 20mg daily for 3 days then 10mg daily for 3 days then stop     Pregabalin 200 mg Oral 3 TIMES DAILY, Please ignore prior refill for gabapentin 800 mg, will switch back to Lyrica     Tiotropium Bromide-Olodaterol 2.5-2.5 MCG/ACT 2 puffs Inhalation DAILY      Medications marked \"taking\" at this time  Outpatient Medications Marked as Taking for the 3/25/22 encounter (Office Visit) with Kam Vargas MD   Medication Sig Dispense Refill    nystatin (MYCOSTATIN) 430392 UNIT/ML suspension Take 5 mLs by mouth 4 times daily Swish and swallow 473 mL 0    albuterol (PROVENTIL) (2.5 MG/3ML) 0.083% nebulizer solution USE THREE MILLILITERS (1 VIAL) VIA NEBULIZATION BY MOUTH EVERY 6 HOURS AS NEEDED FOR SHORTNESS OF BREATH OR COUGH 125 each 3    predniSONE (DELTASONE) 20 MG tablet 40mg daily for one day then 30mg daily for 3 days then 20mg daily for 3 days then 10mg daily for 3 days then stop 22 tablet 0    lisinopril (PRINIVIL;ZESTRIL) 10 MG tablet Take 1 tablet by mouth daily Dose decreased by cardiologist 90 tablet 3    pregabalin (LYRICA) 200 MG capsule Take 1 capsule by mouth 3 times daily for 90 days. Please ignore prior refill for gabapentin 800 mg, will switch back to Lyrica 270 capsule 0    Ascorbic Acid (VITAMIN C) 250 MG tablet Take 250 mg by mouth daily      Cholecalciferol (VITAMIN D3) 1.25 MG (55318 UT) CAPS Take by mouth      enzalutamide (XTANDI) 40 MG capsule Take 4 capsules by mouth daily 120 capsule 5    albuterol sulfate  (90 Base) MCG/ACT inhaler USE 2 INHALATIONS EVERY 6 HOURS AS NEEDED FOR WHEEZING OR SHORTNESS OF BREATH 24 g 3    ergocalciferol (ERGOCALCIFEROL) 1.25 MG (84418 UT) capsule Take 50,000 Units by mouth once a week       furosemide (LASIX) 40 MG tablet Take 1 tablet by mouth daily Dose increased 1/12/2021 90 tablet 3    omeprazole (PRILOSEC) 20 MG delayed release capsule Take 1 capsule by mouth every morning (before breakfast) Dose decreased 7/20/2021 90 capsule 3    pravastatin (PRAVACHOL) 80 MG tablet Take 1 tablet by mouth every evening Dose increased  9/24/2019 90 tablet 3    amLODIPine (NORVASC) 10 MG tablet Take 1 tablet by mouth daily Dose increased 10/16/2020 due to high BP. Correct dosage 90 tablet 3    folbee plus (FOLBEE PLUS) TABS Take 1 tablet by mouth daily 90 tablet 3    tiotropium-olodaterol (STIOLTO RESPIMAT) 2.5-2.5 MCG/ACT AERS Inhale 2 puffs into the lungs daily      oxyCODONE-acetaminophen (PERCOCET) 5-325 MG per tablet Take 1 tablet by mouth 2 times daily as needed.  Lancets 30G MISC Testing once a day. Please dispense according to patients insurance.  100 each 3    ammonium lactate (AMLACTIN) 12 % cream APPLY TOPICALLY TO LEGS ONE TO TWO TIMES A DAY AS NEEDED 1 Bottle 3    OXYGEN With CPAP at night      aspirin EC 81 MG EC tablet Take 1 tablet by mouth daily 90 tablet 0        Medications patient taking as of now reconciled against medications ordered at time of hospital discharge: Yes    Review of Systems   Constitutional: Positive for fatigue. Negative for activity change, appetite change, chills, diaphoresis, fever and unexpected weight change. HENT: Positive for mouth sores and tinnitus (chronic). Negative for congestion, postnasal drip, rhinorrhea, sinus pressure and sinus pain. Eyes: Positive for visual disturbance (stable, chronic). Respiratory: Positive for apnea (on CPAP), cough, shortness of breath (PLASCENCIA when going from room to room) and wheezing (at nighttime). Negative for chest tightness. On oxygen at nighttime 3.5 l/min and sometimes during daytime as needed. Seen by Dr. Julia Potter in November    Cardiovascular: Negative for chest pain, palpitations and leg swelling. Has compression stockings    Gastrointestinal: Negative for abdominal distention, abdominal pain, blood in stool, constipation, diarrhea, nausea and vomiting. Endocrine: Positive for heat intolerance (from meds for prostate cancer, restarted medication for prostate cancer, due to worsening PSA). Negative for cold intolerance, polydipsia, polyphagia and polyuria. Musculoskeletal: Positive for arthralgias, back pain, gait problem and myalgias (legs). Ambulates with walker with seat    Allergic/Immunologic: Positive for immunocompromised state (prostate cancer, ongoing). Neurological: Positive for tremors (hands, seeing neurology, pending MRI he has done this morning), weakness (legs, same as before, right foot drop) and numbness (hands and feet). Hematological: Does not bruise/bleed easily.    Psychiatric/Behavioral: Positive for dysphoric mood and sleep disturbance. Negative for self-injury and suicidal ideas. The patient is nervous/anxious. Objective:    /78   Pulse 74   Temp 97.4 °F (36.3 °C)   Ht 6' (1.829 m)   Wt 277 lb 6.4 oz (125.8 kg)   SpO2 97%   BMI 37.62 kg/m²      Vitals within normal limits except severe obesity      Physical Exam  Vitals and nursing note reviewed. Constitutional:       General: He is not in acute distress. Appearance: Normal appearance. He is well-developed. He is obese. He is not diaphoretic. HENT:      Head: Normocephalic and atraumatic. Right Ear: External ear normal.      Left Ear: External ear normal.      Mouth/Throat:      Comments: I did not examine the mouth due to coronavirus pandemic and wearing masks. Eyes:      General: Lids are normal. No scleral icterus. Right eye: No discharge. Left eye: No discharge. Extraocular Movements: Extraocular movements intact. Conjunctiva/sclera: Conjunctivae normal.   Neck:      Thyroid: No thyromegaly. Cardiovascular:      Rate and Rhythm: Normal rate and regular rhythm. Pulses:           Dorsalis pedis pulses are 1+ on the right side and 1+ on the left side. Posterior tibial pulses are 1+ on the right side and 1+ on the left side. Heart sounds: Heart sounds are distant. No murmur heard. Pulmonary:      Effort: Pulmonary effort is normal. No respiratory distress. Breath sounds: Examination of the right-middle field reveals decreased breath sounds and wheezing. Examination of the left-middle field reveals decreased breath sounds and wheezing. Examination of the right-lower field reveals decreased breath sounds. Examination of the left-lower field reveals decreased breath sounds. Decreased breath sounds and wheezing present. No rales. Comments: Good air entry, mildly diinished  Chest:      Chest wall: No tenderness. Abdominal:      General: Bowel sounds are normal. There is no distension. Palpations: Abdomen is soft. There is no hepatomegaly or splenomegaly. Tenderness: There is no abdominal tenderness. Comments: Obese abdomen   Musculoskeletal:      Right shoulder: Tenderness and bony tenderness present. Decreased range of motion. Left shoulder: Tenderness and bony tenderness present. Decreased range of motion. Right elbow: No swelling, deformity or effusion. Normal range of motion. Tenderness present. Cervical back: Normal range of motion and neck supple. Lumbar back: Tenderness and bony tenderness present. Decreased range of motion. Positive right straight leg raise test and positive left straight leg raise test.      Right lower leg: No edema. Left lower leg: No edema. Comments: Ambulates with walker with seat   Lymphadenopathy:      Cervical: No cervical adenopathy. Skin:     General: Skin is warm and dry. Capillary Refill: Capillary refill takes less than 2 seconds. Findings: No rash. Neurological:      Mental Status: He is alert and oriented to person, place, and time. Sensory: Sensory deficit present. Gait: Gait abnormal.      Deep Tendon Reflexes: Reflexes are normal and symmetric. Comments: Ambulates with walker with seat   Psychiatric:         Mood and Affect: Mood normal.         Behavior: Behavior normal.         Thought Content: Thought content normal.         Judgment: Judgment normal.         An electronic signature was used to authenticate this note.   --Kati Cuevas MD

## 2022-03-25 NOTE — PROGRESS NOTES
Visit Information    Have you changed or started any medications since your last visit including any over-the-counter medicines, vitamins, or herbal medicines? no   Have you stopped taking any of your medications? Is so, why? -  no  Are you having any side effects from any of your medications? - no    Have you seen any other physician or provider since your last visit? yes -    Have you had any other diagnostic tests since your last visit? yes -    Have you been seen in the emergency room and/or had an admission in a hospital since we last saw you?  yes -    Have you had your routine dental cleaning in the past 6 months?  no     Do you have an active MyChart account? If no, what is the barrier?   Yes    Patient Care Team:  Jm Willis MD as PCP - General (Family Medicine)  Jm Willis MD as PCP - [facility]  Ana Francis MD as Consulting Physician (Neurosurgery)  Roc Cuba MD as Consulting Physician (Cardiology)  Audi Sanchez MD as Consulting Physician (Urology)  Brandee Bobo MD as Surgeon (Vascular Surgery)  Olga Lidia Bermudez MD as Consulting Physician (Pulmonology)  Jamari Valles MD as Consulting Physician (Orthopedic Surgery)  Alissa Buckner OD (Ophthalmology)  Annika Miller MD as Consulting Physician (Endocrinology)  Doug Vazquez MD as Consulting Physician (Orthopedic Surgery)  Tamara Tovar MD as Consulting Physician (Gastroenterology)  Joaquín Jasmine MD as Consulting Physician (Orthopedic Surgery)  Baldemar Nuñez RN as Nurse Santa Olivares MD as Surgeon (General Surgery)  Rohini Novoa, PhD (Psychology)  Yuki Sexton MD as Consulting Physician (Neurology)  Coleman Perera LPN as Care Transitions Nurse    Medical History Review  Past Medical, Family, and Social History reviewed and does contribute to the patient presenting condition    Health Maintenance   Topic Date Due    Diabetic retinal exam 11/07/2020    Low dose CT lung screening  03/15/2022    Pneumococcal 65+ years Vaccine (2 of 2 - PPSV23) 04/11/2022    Diabetic foot exam  04/23/2022    Colorectal Cancer Screen  05/09/2022    Lipid screen  07/30/2022    Diabetic microalbuminuria test  11/05/2022    Annual Wellness Visit (AWV)  11/06/2022    A1C test (Diabetic or Prediabetic)  03/09/2023    Depression Monitoring  03/09/2023    PSA counseling  03/09/2023    Potassium monitoring  03/15/2023    Creatinine monitoring  03/15/2023    DTaP/Tdap/Td vaccine (2 - Td or Tdap) 03/01/2028    Flu vaccine  Completed    Shingles Vaccine  Completed    COVID-19 Vaccine  Completed    Hepatitis C screen  Completed    Hepatitis A vaccine  Aged Out    Hib vaccine  Aged Out    Meningococcal (ACWY) vaccine  Aged Out

## 2022-03-27 ASSESSMENT — ENCOUNTER SYMPTOMS
APNEA: 1
BLOOD IN STOOL: 0
CHEST TIGHTNESS: 0

## 2022-04-04 ENCOUNTER — TELEPHONE (OUTPATIENT)
Dept: FAMILY MEDICINE CLINIC | Age: 67
End: 2022-04-04

## 2022-04-04 RX ORDER — BLOOD PRESSURE TEST KIT
KIT MISCELLANEOUS
Qty: 1 KIT | Refills: 0 | Status: SHIPPED | OUTPATIENT
Start: 2022-04-04

## 2022-04-04 NOTE — TELEPHONE ENCOUNTER
I'll addend the last note, 3/25/22    BP Readings from Last 3 Encounters:   03/25/22 124/78   03/21/22 93/60   03/15/22 118/67

## 2022-04-18 ENCOUNTER — HOSPITAL ENCOUNTER (OUTPATIENT)
Dept: CT IMAGING | Age: 67
Discharge: HOME | End: 2022-04-20
Payer: MEDICARE

## 2022-04-18 DIAGNOSIS — Z87.891 PERSONAL HISTORY OF NICOTINE DEPENDENCE: ICD-10-CM

## 2022-04-18 PROCEDURE — 71271 CT THORAX LUNG CANCER SCR C-: CPT

## 2022-04-19 NOTE — RESULT ENCOUNTER NOTE
Please notify patient: Stable 2 mm lung nodule. Moderate to severe emphysema, no smoking advised. Stable right kidney cyst.  Coronary artery disease follow-up with cardiologist.  Repeat in 1 year.   Future Appointments  5/23/2022  10:40 AM   Jostin Branham MD         .  URO           [facility]  6/10/2022  2:00 PM    Debbie West MD     [facility]  7/1/2022   10:20 AM   Clarence Aquino MD [address] [place] [address]

## 2022-04-25 ENCOUNTER — NURSE TRIAGE (OUTPATIENT)
Dept: OTHER | Facility: CLINIC | Age: 67
End: 2022-04-25

## 2022-04-25 NOTE — TELEPHONE ENCOUNTER
Received call from Janna Guthrie at [facility] with [employer]. Subjective: Caller states \"My shoulders and legs are weak, I can't do anything. I'm having bad pains worse at night. \"     Current Symptoms: Weakness in bilateral shoulders/legs, pain in bilateral legs     Onset: 4-5 days ago; worsening    Associated Symptoms: reduced activity    Pain Severity: 8/10; spasm; constant    Temperature: Denies fever and feeling feverish/chills     What has been tried: Percocet     LMP: NA Pregnant: NA    Recommended disposition: Go to [facility]    Care advice provided, patient verbalizes understanding; denies any other questions or concerns; instructed to call back for any new or worsening symptoms. Patient/Caller agrees with recommended disposition; writer provided warm transfer to CHIKIS MARINO at [facility] for appointment scheduling. Attention Provider: Thank you for allowing me to participate in the care of your patient. The patient was connected to triage in response to information provided to the ECC/PSC. Please do not respond through this encounter as the response is not directed to a shared pool.       Reason for Disposition   MODERATE weakness (i.e., interferes with work, school, normal activities) and cause unknown (Exceptions: weakness with acute minor illness, or weakness from poor fluid intake)    Protocols used: WEAKNESS (GENERALIZED) AND FATIGUE-ADULT-[place]

## 2022-04-25 NOTE — TELEPHONE ENCOUNTER
Future Appointments   Date Time Provider Keri Tameka   4/27/2022 10:00 AM [facility]   5/23/2022 10:40 AM Jostin Branham MD . [facility]   6/10/2022  2:00 PM [facility]   7/1/2022 10:20 AM Clarence Aquino MD [address] [place] [address]

## 2022-04-27 ENCOUNTER — HOSPITAL ENCOUNTER (OUTPATIENT)
Age: 67
Setting detail: SPECIMEN
Discharge: HOME | End: 2022-04-27
Payer: MEDICARE

## 2022-04-27 ENCOUNTER — OFFICE VISIT (OUTPATIENT)
Dept: FAMILY MEDICINE CLINIC | Age: 67
End: 2022-04-27
Payer: MEDICARE

## 2022-04-27 VITALS
WEIGHT: 276 LBS | HEIGHT: 72 IN | SYSTOLIC BLOOD PRESSURE: 126 MMHG | BODY MASS INDEX: 37.38 KG/M2 | OXYGEN SATURATION: 98 % | DIASTOLIC BLOOD PRESSURE: 70 MMHG | TEMPERATURE: 97.7 F | HEART RATE: 70 BPM

## 2022-04-27 DIAGNOSIS — M25.511 ACUTE PAIN OF BOTH SHOULDERS: ICD-10-CM

## 2022-04-27 DIAGNOSIS — R61 EXCESSIVE SWEATING: ICD-10-CM

## 2022-04-27 DIAGNOSIS — M62.81 MUSCULAR WEAKNESS: ICD-10-CM

## 2022-04-27 DIAGNOSIS — E55.9 VITAMIN D DEFICIENCY: ICD-10-CM

## 2022-04-27 DIAGNOSIS — E11.42 TYPE 2 DIABETES MELLITUS WITH DIABETIC POLYNEUROPATHY, WITHOUT LONG-TERM CURRENT USE OF INSULIN (HCC): ICD-10-CM

## 2022-04-27 DIAGNOSIS — M62.81 MUSCULAR WEAKNESS: Primary | ICD-10-CM

## 2022-04-27 DIAGNOSIS — B37.0 THRUSH: ICD-10-CM

## 2022-04-27 DIAGNOSIS — M54.50 ACUTE EXACERBATION OF CHRONIC LOW BACK PAIN: ICD-10-CM

## 2022-04-27 DIAGNOSIS — M25.512 ACUTE PAIN OF BOTH SHOULDERS: ICD-10-CM

## 2022-04-27 DIAGNOSIS — G89.29 ACUTE EXACERBATION OF CHRONIC LOW BACK PAIN: ICD-10-CM

## 2022-04-27 DIAGNOSIS — M1A.09X0 CHRONIC GOUT OF MULTIPLE SITES, UNSPECIFIED CAUSE: ICD-10-CM

## 2022-04-27 LAB
ABSOLUTE EOS #: 0.1 K/UL (ref 0–0.4)
ABSOLUTE LYMPH #: 1.1 K/UL (ref 1–4.8)
ABSOLUTE MONO #: 0.6 K/UL (ref 0.1–1.3)
ALBUMIN SERPL-MCNC: 4.1 G/DL (ref 3.5–5.2)
ALP BLD-CCNC: 85 U/L (ref 40–129)
ALT SERPL-CCNC: 24 U/L (ref 5–41)
ANION GAP SERPL CALCULATED.3IONS-SCNC: 10 MMOL/L (ref 9–17)
AST SERPL-CCNC: 25 U/L
BASOPHILS # BLD: 1 % (ref 0–2)
BASOPHILS ABSOLUTE: 0 K/UL (ref 0–0.2)
BILIRUB SERPL-MCNC: 0.28 MG/DL (ref 0.3–1.2)
BUN BLDV-MCNC: 11 MG/DL (ref 8–23)
C-REACTIVE PROTEIN: 12.6 MG/L (ref 0–5)
CALCIUM SERPL-MCNC: 10.2 MG/DL (ref 8.6–10.4)
CHLORIDE BLD-SCNC: 105 MMOL/L (ref 98–107)
CO2: 27 MMOL/L (ref 20–31)
CREAT SERPL-MCNC: 0.7 MG/DL (ref 0.7–1.2)
EOSINOPHILS RELATIVE PERCENT: 1 % (ref 0–4)
FOLATE: >20 NG/ML
GFR AFRICAN AMERICAN: >60 ML/MIN
GFR NON-AFRICAN AMERICAN: >60 ML/MIN
GFR SERPL CREATININE-BSD FRML MDRD: ABNORMAL ML/MIN/{1.73_M2}
GLUCOSE BLD-MCNC: 144 MG/DL (ref 70–99)
HCT VFR BLD CALC: 41.2 % (ref 41–53)
HEMOGLOBIN: 13.9 G/DL (ref 13.5–17.5)
LYMPHOCYTES # BLD: 21 % (ref 24–44)
MAGNESIUM: 1.8 MG/DL (ref 1.6–2.6)
MCH RBC QN AUTO: 30.2 PG (ref 26–34)
MCHC RBC AUTO-ENTMCNC: 33.6 G/DL (ref 31–37)
MCV RBC AUTO: 89.8 FL (ref 80–100)
MONOCYTES # BLD: 11 % (ref 1–7)
PDW BLD-RTO: 16.1 % (ref 11.5–14.9)
PLATELET # BLD: 205 K/UL (ref 150–450)
PMV BLD AUTO: 9.2 FL (ref 6–12)
POTASSIUM SERPL-SCNC: 4.9 MMOL/L (ref 3.7–5.3)
RBC # BLD: 4.59 M/UL (ref 4.5–5.9)
SEDIMENTATION RATE, ERYTHROCYTE: 15 MM/HR (ref 0–20)
SEG NEUTROPHILS: 66 % (ref 36–66)
SEGMENTED NEUTROPHILS ABSOLUTE COUNT: 3.5 K/UL (ref 1.3–9.1)
SODIUM BLD-SCNC: 142 MMOL/L (ref 135–144)
TOTAL CK: 91 U/L (ref 39–308)
TOTAL PROTEIN: 6.4 G/DL (ref 6.4–8.3)
TSH SERPL DL<=0.05 MIU/L-ACNC: 1.13 UIU/ML (ref 0.3–5)
URIC ACID: 9.3 MG/DL (ref 3.4–7)
VITAMIN B-12: 1478 PG/ML (ref 232–1245)
VITAMIN D 25-HYDROXY: 54.3 NG/ML
WBC # BLD: 5.2 K/UL (ref 3.5–11)

## 2022-04-27 PROCEDURE — 82746 ASSAY OF FOLIC ACID SERUM: CPT

## 2022-04-27 PROCEDURE — 82085 ASSAY OF ALDOLASE: CPT

## 2022-04-27 PROCEDURE — 82306 VITAMIN D 25 HYDROXY: CPT

## 2022-04-27 PROCEDURE — 84550 ASSAY OF BLOOD/URIC ACID: CPT

## 2022-04-27 PROCEDURE — 84443 ASSAY THYROID STIM HORMONE: CPT

## 2022-04-27 PROCEDURE — 82550 ASSAY OF CK (CPK): CPT

## 2022-04-27 PROCEDURE — 85652 RBC SED RATE AUTOMATED: CPT

## 2022-04-27 PROCEDURE — 36415 COLL VENOUS BLD VENIPUNCTURE: CPT

## 2022-04-27 PROCEDURE — 86140 C-REACTIVE PROTEIN: CPT

## 2022-04-27 PROCEDURE — 80053 COMPREHEN METABOLIC PANEL: CPT

## 2022-04-27 PROCEDURE — 82607 VITAMIN B-12: CPT

## 2022-04-27 PROCEDURE — 99214 OFFICE O/P EST MOD 30 MIN: CPT | Performed by: FAMILY MEDICINE

## 2022-04-27 PROCEDURE — 3044F HG A1C LEVEL LT 7.0%: CPT | Performed by: FAMILY MEDICINE

## 2022-04-27 PROCEDURE — 83735 ASSAY OF MAGNESIUM: CPT

## 2022-04-27 PROCEDURE — 85025 COMPLETE CBC W/AUTO DIFF WBC: CPT

## 2022-04-27 RX ORDER — METHYLPREDNISOLONE 4 MG/1
TABLET ORAL
Qty: 1 KIT | Refills: 0 | Status: ON HOLD
Start: 2022-04-27 | End: 2022-05-06 | Stop reason: HOSPADM

## 2022-04-27 RX ORDER — FLUCONAZOLE 150 MG/1
150 TABLET ORAL
Qty: 3 TABLET | Refills: 0 | Status: ON HOLD | OUTPATIENT
Start: 2022-04-27 | End: 2022-05-06 | Stop reason: HOSPADM

## 2022-04-27 RX ORDER — GLUCOSAMINE HCL/CHONDROITIN SU 500-400 MG
CAPSULE ORAL
Qty: 100 STRIP | Refills: 3 | Status: SHIPPED | OUTPATIENT
Start: 2022-04-27

## 2022-04-27 ASSESSMENT — ENCOUNTER SYMPTOMS
ABDOMINAL DISTENTION: 0
CONSTIPATION: 0
BLOOD IN STOOL: 0
ABDOMINAL PAIN: 0
VOMITING: 0
SHORTNESS OF BREATH: 1
COUGH: 0
NAUSEA: 0
DIARRHEA: 0
APNEA: 1
CHEST TIGHTNESS: 0
WHEEZING: 0
BACK PAIN: 1

## 2022-04-27 NOTE — PROGRESS NOTES
Visit Information    Have you changed or started any medications since your last visit including any over-the-counter medicines, vitamins, or herbal medicines? no   Are you having any side effects from any of your medications? -  no  Have you stopped taking any of your medications? Is so, why? -  no    Have you seen any other physician or provider since your last visit? No  Have you had any other diagnostic tests since your last visit? Yes - Records Obtained  Have you been seen in the emergency room and/or had an admission to a hospital since we last saw you? Yes - Records Obtained  Have you had your routine dental cleaning in the past 6 months? yes     Have you activated your [employer] account? If not, what are your barriers?  Yes     Patient Care Team:  Chance Parmar MD as PCP - General (Family Medicine)  Chance Parmar MD as PCP - [facility]  Brisa Healy MD as Consulting Physician (Neurosurgery)  Marycruz Zaragoza MD as Consulting Physician (Cardiology)  Efrem Hair MD as Consulting Physician (Urology)  Markel Sy MD as Surgeon (Vascular Surgery)  Roxanna Darden MD as Consulting Physician (Pulmonology)  Cheryl Cat MD as Consulting Physician (Orthopedic Surgery)  Aicha Servin OD (Ophthalmology)  Brenden Moreno MD as Consulting Physician (Endocrinology)  Tirso García MD as Consulting Physician (Orthopedic Surgery)  Chico Ortiz MD as Consulting Physician (Gastroenterology)  Franko Bains MD as Consulting Physician (Orthopedic Surgery)  Deann Ann RN as Nurse Koby White MD as Surgeon (General Surgery)  Nasrin Jaffe, PhD (Psychology)  Tiffanie Fernandez MD as Consulting Physician (Neurology)    Medical History Review  Past Medical, Family, and Social History reviewed and does contribute to the patient presenting condition    Health Maintenance   Topic Date Due    Diabetic retinal exam  11/07/2020    Pneumococcal 65+ years Vaccine (3 - PPSV23 or PCV20) 04/11/2022    Diabetic foot exam  04/23/2022    Colorectal Cancer Screen  05/09/2022    Lipids  07/30/2022    Diabetic microalbuminuria test  11/05/2022    Annual Wellness Visit (AWV)  11/06/2022    A1C test (Diabetic or Prediabetic)  03/09/2023    Depression Monitoring  03/09/2023    Prostate Specific Antigen (PSA) Screening or Monitoring  03/09/2023    Potassium  03/25/2023    Creatinine  03/25/2023    Low dose CT lung screening  04/18/2023    DTaP/Tdap/Td vaccine (2 - Td or Tdap) 03/01/2028    Flu vaccine  Completed    Shingles Vaccine  Completed    COVID-19 Vaccine  Completed    Hepatitis C screen  Completed    Hepatitis A vaccine  Aged Out    Hib vaccine  Aged Out    Meningococcal (ACWY) vaccine  Aged Out

## 2022-04-27 NOTE — PROGRESS NOTES
Mallory Betts (:  1955) is a 78 y.o. male,Established patient, here for evaluation of the following chief complaint(s): Shoulder Pain (AND WEAKNESS - BILATERAL x 1 week )      ASSESSMENT/PLAN:    1. Muscular weakness  Worsening   Will do basic labs to rule out certain common medical conditions like rhabdo, inflammatory conditions, electrolyte imbalance, gout and inflammatory arthritis, muscular disease, thyroid imbalance  -     CK; Future  -     TSH; Future  -     Uric Acid; Future  -     Magnesium; Future  -     Sedimentation Rate; Future  -     C-Reactive Protein; Future  -     Aldolase; Future  -Start  methylPREDNISolone (MEDROL, ROSALIE,) 4 MG tablet; Take by mouth, with food. Keep low carb, low salt diet while taking it, Disp-1 kit, R-0Normal    OK to stop statin for 1 week    2. Acute pain of both shoulders  Worsening  Will do basic labs to rule out certain common medical conditions like rhabdo, inflammatory conditions, electrolyte imbalance, gout and inflammatory arthritis, muscular disease, thyroid imbalance  We will give a round of steroids to prevent frozen shoulders due to coexistent diabetes and decreased range of motion in his shoulders  -     Sedimentation Rate; Future  -     C-Reactive Protein; Future  -     Aldolase; Future  -     methylPREDNISolone (MEDROL, ROSALIE,) 4 MG tablet; Take by mouth, with food. Keep low carb, low salt diet while taking it, Disp-1 kit, R-0Normal      3. Acute exacerbation of chronic low back pain  Failing to improve  Stretching, repositioning, heating pad, falls precautions discussed  He is already on Percocet per pain management and Lyrica for polyneuropathy but not helping  -     methylPREDNISolone (MEDROL, ROSALIE,) 4 MG tablet; Take by mouth, with food. Keep low carb, low salt diet while taking it, Disp-1 kit, R-0Normal    4.  Excessive sweating  Failing to improve  Could be a side effect from [facility] which he takes for prostate cancer  Will do basic labs to rule out certain common medical conditions: hematologic, renal, hepatic, electrolyte imbalances, inflammatory arthritis, muscular disease, sepsis  -     Comprehensive Metabolic Panel; Future  -     CBC with Auto Differential; Future  -     Sedimentation Rate; Future  -     C-Reactive Protein; Future  -     Aldolase; Future    5. Type 2 diabetes mellitus with diabetic polyneuropathy, without long-term current use of insulin (HCC)  Improved diabetes, well-controlled  Lab Results   Component Value Date    LABA1C 5.8 03/09/2022    LABA1C 6.3 (H) 08/12/2021    LABA1C 5.9 07/20/2021   Polyneuropathy is worsening, continue Lyrica  We will check labs  -     Vitamin B12 & Folate; Future  -     TSH; Future  -     Uric Acid; Future  -     Magnesium; Future  -     Comprehensive Metabolic Panel; Future  -     CBC with Auto Differential; Future  -     blood glucose monitor strips; Testing once a day. Needs ONE TOUCH VERIO REFLECT, Disp-100 strip, R-3, Normal    6. Vitamin D deficiency    Unsure if improving or too much supplementation. Will recheck level  Continue supplementation until the results are available    -     Vitamin D 25 Hydroxy; Future    7. Chronic gout of multiple sites, unspecified cause  Newly found very high uric acid which can lead to inflammatory arthritis in his joints related to gout  Lab Results   Component Value Date    URICACID 9.3 (H) 04/27/2022       -   start  allopurinol (ZYLOPRIM) 100 MG tablet; Take 1 tablet by mouth daily, Disp-90 tablet, R-1Normal    8. Thrush  Failing to resolve  -     nystatin (MYCOSTATIN) 390232 UNIT/ML suspension; Take 5 mLs by mouth 4 times daily Swish and swallow. For 3 months, Oral, 4 TIMES DAILY Starting Wed 4/27/2022, Disp-1419 mL, R-0, Normal  -     fluconazole (DIFLUCAN) 150 MG tablet;  Take 1 tablet by mouth every 72 hours, Disp-3 tablet, R-0Normal (Patient not taking: Reported on 5/2/2022)      Edu Alexander received counseling on the following healthy behaviors: nutrition, exercise, medication adherence, tobacco cessation and weight loss    Reviewed prior labs and health maintenance  Discussed use, benefit, and side effects of prescribed medications. Barriers to medication compliance addressed. Patient given educational materials - see patient instructions  All patient questions answered. Patient voiced understanding. The patient's past medical,surgical, social, and family history as well as his current medications and allergies were reviewed as documented in today's encounter. Medications, labs, diagnostic studies, consultations and follow-up as documented in this encounter. Return for KEEP APPT. Data Unavailable    Future Appointments   Date Time Provider Keri English   5/23/2022 10:40 AM David Oshea MD 10 \[facility]\""   6/10/2022  2:00 PM Andreia Portillo MD [facility]   7/1/2022 10:20 AM Kusum Kearney 27:    Edu Alexander complains of generalized weakness for 1 week. Cannot raise arms, cannot do anything since it started. Reports pain anterior shoulders for 1 week. Left shoulder had surgery on it. He denies any injury, he denies any change in his diet. Reports also flare up of the lower back pain, when standing up, shooting down the legs, he says he is barely able to walk with walker with seat. Getting stiff. He does have history of prior back surgeries. Intensity of pain is 10/10, constant. Legs gets tired and has difficulty walking. \"I cannot bath myself\". He is on pain medications Percocet, but does not help. Bernardino complains of thrush persistent, improved, but not gone away. Using Stiolto now.   Has some trouble  swallowing.      worsening uric acid   Lab Results   Component Value Date    URICACID 5.5 03/23/2017     Lab Results   Component Value Date    URICACID 9.3 (H) 04/27/2022     Has diabetes mellitus type 2 with Polyneuropathy   Started to smoke again  counseling given to stop smoking    Needs strips    Lyrica help with polyneuropathy numbness, tingling and burning in his feet and legs, but is getting worse. Home Blood Glucose 130, 114, 123, 109, 177, 121, 154, 116  A1c improving  Lab Results   Component Value Date    LABA1C 5.8 03/09/2022    LABA1C 6.3 (H) 08/12/2021    LABA1C 5.9 07/20/2021     Prior NA normal  Lab Results   Component Value Date    URBANO NEGATIVE 08/12/2021     Patient does have prostate cancer, he is on Xtandi and he reports excessive sweating and hot flashes whenever he is on it. He denies fever or chills. He reports poor appetite  Previously, he did stop taking the medication, but recently restarted due to increasing PSA  PSA is now improving  Lab Results   Component Value Date    PSA 0.69 03/09/2022    PSA 1.44 01/05/2022    PSA 1.09 11/19/2021       Lab Results   Component Value Date    SEDRATE 8 08/12/2021     Lab Results   Component Value Date    CRP 10.0 (H) 02/07/2020     Eleonora Hinton has Vitamin D deficiency. Eleonora Hinton  is  taking Vitamin D supplementation   he feels tired. Prior to Visit Medications    Medication Sig Taking? Authorizing Provider   Blood Pressure KIT Diagnosis: HTN. Needs to check blood pressure 1-2 times a day until stable, then once a day. Goal blood pressure less than 135/85, and above 110/60. Yes Abbie Jack MD   nystatin (MYCOSTATIN) 995361 UNIT/ML suspension Take 5 mLs by mouth 4 times daily Swish and swallow Yes Abbie Jack MD   albuterol (PROVENTIL) (2.5 MG/3ML) 0.083% nebulizer solution USE THREE MILLILITERS (1 VIAL) VIA NEBULIZATION BY MOUTH EVERY 6 HOURS AS NEEDED FOR SHORTNESS OF BREATH OR COUGH Yes Abbie Jack MD   lisinopril (PRINIVIL;ZESTRIL) 10 MG tablet Take 1 tablet by mouth daily Dose decreased by cardiologist Yes Abbie Jack MD   pregabalin (LYRICA) 200 MG capsule Take 1 capsule by mouth 3 times daily for 90 days.  Please ignore prior refill for gabapentin 800 mg, will switch back to Lyrica Yes Megan Pires MD   Cholecalciferol (VITAMIN D3) 1.25 MG (39013 UT) CAPS Take by mouth Yes Historical Provider, MD   albuterol sulfate  (90 Base) MCG/ACT inhaler USE 2 INHALATIONS EVERY 6 HOURS AS NEEDED FOR WHEEZING OR SHORTNESS OF BREATH Yes Megan Pires MD   ergocalciferol (ERGOCALCIFEROL) 1.25 MG (91626 UT) capsule Take 50,000 Units by mouth once a week  Yes Historical Provider, MD   furosemide (LASIX) 40 MG tablet Take 1 tablet by mouth daily Dose increased 1/12/2021 Yes Megan Pires MD   omeprazole (PRILOSEC) 20 MG delayed release capsule Take 1 capsule by mouth every morning (before breakfast) Dose decreased 7/20/2021 Yes Megan Pires MD   pravastatin (PRAVACHOL) 80 MG tablet Take 1 tablet by mouth every evening Dose increased  9/24/2019 Yes Megan Pires MD   amLODIPine (NORVASC) 10 MG tablet Take 1 tablet by mouth daily Dose increased 10/16/2020 due to high BP. Correct dosage Yes Megan Pires MD   folbee plus (FOLBEE PLUS) TABS Take 1 tablet by mouth daily Yes Daniel Arzate MD   tiotropium-olodaterol (STIOLTO RESPIMAT) 2.5-2.5 MCG/ACT AERS Inhale 2 puffs into the lungs daily Yes Historical Provider, MD   oxyCODONE-acetaminophen (PERCOCET) 5-325 MG per tablet Take 1 tablet by mouth 2 times daily as needed. Yes Historical Provider, MD   Lancets 30G MISC Testing once a day. Please dispense according to patients insurance.  Yes Megan Pires MD   ammonium lactate (AMLACTIN) 12 % cream APPLY TOPICALLY TO LEGS ONE TO TWO TIMES A DAY AS NEEDED Yes Megan Pires MD   OXYGEN With CPAP at night Yes Historical Provider, MD   aspirin EC 81 MG EC tablet Take 1 tablet by mouth daily Yes Megan Pires MD   predniSONE (DELTASONE) 20 MG tablet 40mg daily for one day then 30mg daily for 3 days then 20mg daily for 3 days then 10mg daily for 3 days then stop  Patient not taking: Reported on 4/27/2022  Libby Lynch, APRN - CNP   Ascorbic Acid (VITAMIN C) 250 MG tablet Take 250 mg by mouth daily  Patient not taking: Reported on 2022  Historical Provider, MD   enzalutamide Mehran Johnson) 40 MG capsule Take 4 capsules by mouth daily  Keke Mercedes MD       Social History     Tobacco Use    Smoking status: Former Smoker     Packs/day: 1.00     Years: 35.00     Pack years: 35.00     Types: Cigarettes     Start date: 2021     Quit date: 3/9/2022     Years since quittin.1    Smokeless tobacco: Never Used   Vaping Use    Vaping Use: Never used   Substance Use Topics    Alcohol use: Not Currently     Alcohol/week: 0.0 standard drinks    Drug use: No       Review of Systems   Constitutional: Positive for appetite change, diaphoresis and fatigue. Negative for activity change, chills and fever. HENT: Positive for mouth sores and tinnitus (chronic). Eyes: Positive for visual disturbance (stable, chronic). Respiratory: Positive for apnea (on CPAP) and shortness of breath (PLASCENCIA at baseline). Negative for cough, chest tightness and wheezing. On oxygen at nighttime 3.5 l/min and sometimes during daytime as needed. Sees Dr. Gamaliel Snowden. Cardiovascular: Negative for chest pain, palpitations and leg swelling. Has compression stockings    Gastrointestinal: Negative for abdominal distention, abdominal pain, blood in stool, constipation, diarrhea, nausea and vomiting. Endocrine: Positive for heat intolerance (from meds for prostate cancer, restarted medication for prostate cancer, due to worsening PSA). Negative for cold intolerance, polydipsia, polyphagia and polyuria. Musculoskeletal: Positive for arthralgias, back pain, gait problem and myalgias. Ambulates with walker with seat    Allergic/Immunologic: Positive for immunocompromised state (prostate cancer, ongoing). Neurological: Positive for tremors (hands, seeing neurology), weakness and numbness (hands and feet). Hematological: Does not bruise/bleed easily.    Psychiatric/Behavioral: Positive for sleep disturbance. The patient is nervous/anxious.          -vital signs stable and within normal limits except severe obesity per BMI    /70   Pulse 70   Temp 97.7 °F (36.5 °C)   Ht 6' (1.829 m)   Wt 276 lb (125.2 kg)   SpO2 98%   BMI 37.43 kg/m²        Physical Exam  Vitals and nursing note reviewed. Constitutional:       General: He is not in acute distress. Appearance: Normal appearance. He is well-developed. He is obese. He is not diaphoretic. HENT:      Head: Normocephalic and atraumatic. Right Ear: External ear normal.      Left Ear: External ear normal.      Mouth/Throat:      Comments: I did not examine the mouth due to coronavirus pandemic and wearing masks. Eyes:      General: Lids are normal. No scleral icterus. Right eye: No discharge. Left eye: No discharge. Extraocular Movements: Extraocular movements intact. Conjunctiva/sclera: Conjunctivae normal.   Neck:      Thyroid: No thyromegaly. Cardiovascular:      Rate and Rhythm: Normal rate and regular rhythm. Heart sounds: Normal heart sounds. No murmur heard. Pulmonary:      Effort: Pulmonary effort is normal. No respiratory distress. Breath sounds: Examination of the right-lower field reveals decreased breath sounds. Examination of the left-lower field reveals decreased breath sounds. Decreased breath sounds present. No wheezing or rales. Comments: I strongly counseled him to quit smoking right away due to recent admission for COPD exacerbation. Chest:      Chest wall: No tenderness. Abdominal:      General: Bowel sounds are normal. There is no distension. Palpations: Abdomen is soft. There is no hepatomegaly or splenomegaly. Tenderness: There is no abdominal tenderness. Comments: Obese abdomen. Musculoskeletal:      Right shoulder: Tenderness and bony tenderness present. Decreased range of motion. Decreased strength.       Left shoulder: Tenderness and bony tenderness present. Decreased range of motion. Decreased strength. Cervical back: Normal range of motion and neck supple. Lumbar back: Tenderness and bony tenderness present. Decreased range of motion. Positive right straight leg raise test and positive left straight leg raise test.      Right lower leg: No edema. Left lower leg: No edema. Comments: Tenderness bilateral deltoids and forearms, bilateral legs generalized   Lymphadenopathy:      Cervical: No cervical adenopathy. Skin:     General: Skin is warm and dry. Capillary Refill: Capillary refill takes less than 2 seconds. Findings: No rash. Neurological:      Mental Status: He is alert and oriented to person, place, and time. Motor: Weakness present. Gait: Gait abnormal.      Deep Tendon Reflexes: Reflexes are normal and symmetric. Comments: bilateral upper extremities weakness 3/5  strength 4/5 bilateral legs   Ambulates with walker with seat. Has difficulty getting up from sitting position and initiating gait. Looks stiff when walking. Psychiatric:         Mood and Affect: Mood is anxious. Affect is tearful. Behavior: Behavior normal.         Thought Content: Thought content normal.         Judgment: Judgment normal.             I personally reviewed testing with patient. Leukocytosis, he was previously on steroids could be related to steroids.   Vitamin D deficiency has improved    Otherwise labs within normal limits    Lab Results   Component Value Date    WBC 13.4 (H) 03/25/2022    HGB 14.2 03/25/2022    HCT 43.2 03/25/2022    MCV 90.0 03/25/2022     03/25/2022       Lab Results   Component Value Date     03/25/2022    K 4.2 03/25/2022     03/25/2022    CO2 27 03/25/2022    BUN 15 03/25/2022    CREATININE 0.86 03/25/2022    GLUCOSE 86 03/25/2022    CALCIUM 9.5 03/25/2022        Lab Results   Component Value Date    ALT 28 11/05/2021    AST 19 11/05/2021    ALKPHOS 83 11/05/2021    BILITOT 0.26 (L) 11/05/2021       Lab Results   Component Value Date    TSH 0.81 08/12/2021       Lab Results   Component Value Date    CHOL 138 07/09/2020    CHOL 160 02/11/2020    CHOL 201 (H) 09/16/2019     Lab Results   Component Value Date    TRIG 203 (H) 07/09/2020    TRIG 182 (H) 02/11/2020    TRIG 247 (H) 09/16/2019     Lab Results   Component Value Date    HDL 35 (L) 07/30/2021    HDL 35 (L) 07/30/2021    HDL 32 (L) 07/09/2020     Lab Results   Component Value Date    LDLCHOLESTEROL 93 07/30/2021    LDLCHOLESTEROL 93 07/30/2021    LDLCHOLESTEROL 65 07/09/2020     Lab Results   Component Value Date    CHOLHDLRATIO 4.4 07/30/2021    CHOLHDLRATIO 4.4 07/30/2021    CHOLHDLRATIO 4.3 07/09/2020       Lab Results   Component Value Date    LABA1C 5.8 03/09/2022       Lab Results   Component Value Date    UDXCCJBR42 1410 (H) 08/12/2021       Lab Results   Component Value Date    FOLATE >20.0 08/12/2021       Lab Results   Component Value Date    VITD25 64.8 09/13/2021         Orders Placed This Encounter   Procedures    CK     Standing Status:   Future     Standing Expiration Date:   4/27/2023    Vitamin B12 & Folate     Standing Status:   Future     Standing Expiration Date:   6/24/2022    Vitamin D 25 Hydroxy     Standing Status:   Future     Standing Expiration Date:   4/27/2023    TSH     Standing Status:   Future     Standing Expiration Date:   4/27/2023    Uric Acid     Standing Status:   Future     Standing Expiration Date:   4/27/2023    Magnesium     Standing Status:   Future     Standing Expiration Date:   4/27/2023    Comprehensive Metabolic Panel     Standing Status:   Future     Standing Expiration Date:   6/24/2022    CBC with Auto Differential     Standing Status:   Future     Standing Expiration Date:   4/27/2023    Sedimentation Rate     Standing Status:   Future     Standing Expiration Date:   4/27/2023    C-Reactive Protein     Standing Status:   Future     Standing Expiration Date:   4/28/2023    Aldolase     Standing Status:   Future     Standing Expiration Date:   4/27/2023       Orders Placed This Encounter   Medications    nystatin (MYCOSTATIN) 876375 UNIT/ML suspension     Sig: Take 5 mLs by mouth 4 times daily Swish and swallow. For 3 months     Dispense:  1419 mL     Refill:  0    fluconazole (DIFLUCAN) 150 MG tablet     Sig: Take 1 tablet by mouth every 72 hours     Dispense:  3 tablet     Refill:  0    methylPREDNISolone (MEDROL, ROSALIE,) 4 MG tablet     Sig: Take by mouth, with food. Keep low carb, low salt diet while taking it     Dispense:  1 kit     Refill:  0    blood glucose monitor strips     Sig: Testing once a day. Needs ONE TOUCH VERIO REFLECT     Dispense:  100 strip     Refill:  3       Medications Discontinued During This Encounter   Medication Reason    nystatin (MYCOSTATIN) 999813 UNIT/ML suspension REORDER    predniSONE (DELTASONE) 20 MG tablet Therapy completed    ergocalciferol (ERGOCALCIFEROL) 1.25 MG (24482 UT) capsule DUPLICATE    Ascorbic Acid (VITAMIN C) 250 MG tablet Therapy completed           On this date 4/27/2022 I have spent 35 minutes reviewing previous notes, test results and face to face with the patient discussing the diagnosis and importance of compliance with the treatment plan as well as documenting on the day of the visit. This note was completed by using the assistance of a speech-recognition program. However, inadvertent computerized transcription errors may be present. Although every effort was made to ensure accuracy, no guarantees can be provided that every mistake has been identified and corrected by editing. An electronic signature was used to authenticate this note.   Electronically signed by Megan Pires MD on 5/2/2022 at 4:46 PM

## 2022-04-28 RX ORDER — ALLOPURINOL 100 MG/1
100 TABLET ORAL DAILY
Qty: 90 TABLET | Refills: 1 | Status: SHIPPED | OUTPATIENT
Start: 2022-04-28 | End: 2022-09-23 | Stop reason: SDUPTHER

## 2022-04-28 NOTE — RESULT ENCOUNTER NOTE
Please notify patient: Inflammatory markers are negative normal   Uric acid is high which is new, he might have gout, it is very high, I will send allopurinol to the pharmacy which will also help with the pain as gout affects joints  Remind him to stop pravastatin for 7 days and see if his muscle aches subside  Blood glucose 144 mildly high    Please advise him that I still would like him to go do some physical therapy if he agrees, I can make the referral   otherwise labs within normal limits  continue current treatment    Future Appointments  5/23/2022  10:40 AM   Bernardino Mojica MD         [facility]  6/10/2022  2:00 PM    Jaycee Pereira MD     [facility]               [facility]  7/1/2022   10:20 AM   [place] [facility]

## 2022-04-29 LAB — ALDOLASE: 6.6 U/L (ref 1.2–7.6)

## 2022-04-29 NOTE — RESULT ENCOUNTER NOTE
Noted normal aldolase, nothing to worry about  Future Appointments  5/23/2022  10:40 AM   Palmira Jane MD         [facility]           [facility]  6/10/2022  2:00 PM    Remi Beck MD     [facility]  7/1/2022   10:20 AM   Pilar Hansen MD [address] [place] [address]

## 2022-05-01 ENCOUNTER — APPOINTMENT (OUTPATIENT)
Dept: CT IMAGING | Age: 67
DRG: 190 | End: 2022-05-01
Payer: MEDICARE

## 2022-05-01 ENCOUNTER — HOSPITAL ENCOUNTER (INPATIENT)
Age: 67
LOS: 5 days | Discharge: HOME | DRG: 190 | End: 2022-05-06
Attending: EMERGENCY MEDICINE | Admitting: INTERNAL MEDICINE
Payer: MEDICARE

## 2022-05-01 ENCOUNTER — APPOINTMENT (OUTPATIENT)
Dept: GENERAL RADIOLOGY | Age: 67
DRG: 190 | End: 2022-05-01
Payer: MEDICARE

## 2022-05-01 DIAGNOSIS — J44.1 COPD EXACERBATION (HCC): Primary | ICD-10-CM

## 2022-05-01 LAB
ABSOLUTE EOS #: 0 K/UL (ref 0–0.4)
ABSOLUTE LYMPH #: 1 K/UL (ref 1–4.8)
ABSOLUTE MONO #: 0.2 K/UL (ref 0.1–1.3)
ALLEN TEST: ABNORMAL
ANION GAP SERPL CALCULATED.3IONS-SCNC: 11 MMOL/L (ref 9–17)
BASOPHILS # BLD: 1 % (ref 0–2)
BASOPHILS ABSOLUTE: 0 K/UL (ref 0–0.2)
BUN BLDV-MCNC: 11 MG/DL (ref 8–23)
CALCIUM SERPL-MCNC: 10.1 MG/DL (ref 8.6–10.4)
CARBOXYHEMOGLOBIN: 1.2 % (ref 0–5)
CHLORIDE BLD-SCNC: 101 MMOL/L (ref 98–107)
CO2: 26 MMOL/L (ref 20–31)
CREAT SERPL-MCNC: 0.76 MG/DL (ref 0.7–1.2)
D-DIMER QUANTITATIVE: 0.69 MG/L FEU (ref 0–0.59)
EOSINOPHILS RELATIVE PERCENT: 0 % (ref 0–4)
FLU A ANTIGEN: NEGATIVE
FLU B ANTIGEN: NEGATIVE
GFR AFRICAN AMERICAN: >60 ML/MIN
GFR NON-AFRICAN AMERICAN: >60 ML/MIN
GFR SERPL CREATININE-BSD FRML MDRD: ABNORMAL ML/MIN/{1.73_M2}
GLUCOSE BLD-MCNC: 140 MG/DL (ref 70–99)
HCO3 ARTERIAL: 25.5 MMOL/L (ref 22–26)
HCT VFR BLD CALC: 42.3 % (ref 41–53)
HEMOGLOBIN: 14.8 G/DL (ref 13.5–17.5)
LYMPHOCYTES # BLD: 20 % (ref 24–44)
MAGNESIUM: 1.7 MG/DL (ref 1.6–2.6)
MCH RBC QN AUTO: 30.7 PG (ref 26–34)
MCHC RBC AUTO-ENTMCNC: 35 G/DL (ref 31–37)
MCV RBC AUTO: 87.7 FL (ref 80–100)
MODE: ABNORMAL
MONOCYTES # BLD: 4 % (ref 1–7)
O2 DEVICE/FLOW/%: ABNORMAL
O2 SAT, ARTERIAL: 97.3 % (ref 95–98)
PATIENT TEMP: 37
PCO2 ARTERIAL: 39.5 MMHG (ref 35–45)
PDW BLD-RTO: 16 % (ref 11.5–14.9)
PH ARTERIAL: 7.42 (ref 7.35–7.45)
PLATELET # BLD: 174 K/UL (ref 150–450)
PMV BLD AUTO: 9.1 FL (ref 6–12)
PO2 ARTERIAL: 125 MMHG (ref 80–100)
POSITIVE BASE EXCESS, ART: 1 MMOL/L (ref 0–2)
POTASSIUM SERPL-SCNC: 4.5 MMOL/L (ref 3.7–5.3)
PRO-BNP: 152 PG/ML
PT. POSITION: ABNORMAL
RBC # BLD: 4.83 M/UL (ref 4.5–5.9)
RESPIRATORY RATE: 16
SAMPLE SITE: ABNORMAL
SARS-COV-2, RAPID: NOT DETECTED
SEG NEUTROPHILS: 75 % (ref 36–66)
SEGMENTED NEUTROPHILS ABSOLUTE COUNT: 3.7 K/UL (ref 1.3–9.1)
SODIUM BLD-SCNC: 138 MMOL/L (ref 135–144)
SPECIMEN DESCRIPTION: NORMAL
TEXT FOR RESPIRATORY: ABNORMAL
TROPONIN, HIGH SENSITIVITY: 18 NG/L (ref 0–22)
WBC # BLD: 5 K/UL (ref 3.5–11)

## 2022-05-01 PROCEDURE — 36600 WITHDRAWAL OF ARTERIAL BLOOD: CPT

## 2022-05-01 PROCEDURE — 87804 INFLUENZA ASSAY W/OPTIC: CPT

## 2022-05-01 PROCEDURE — 83880 ASSAY OF NATRIURETIC PEPTIDE: CPT

## 2022-05-01 PROCEDURE — 99285 EMERGENCY DEPT VISIT HI MDM: CPT

## 2022-05-01 PROCEDURE — 85025 COMPLETE CBC W/AUTO DIFF WBC: CPT

## 2022-05-01 PROCEDURE — 2000000000 HC ICU R&B

## 2022-05-01 PROCEDURE — 2700000000 HC OXYGEN THERAPY PER DAY

## 2022-05-01 PROCEDURE — 6360000004 HC RX CONTRAST MEDICATION: Performed by: EMERGENCY MEDICINE

## 2022-05-01 PROCEDURE — 80048 BASIC METABOLIC PNL TOTAL CA: CPT

## 2022-05-01 PROCEDURE — 2580000003 HC RX 258: Performed by: EMERGENCY MEDICINE

## 2022-05-01 PROCEDURE — 83735 ASSAY OF MAGNESIUM: CPT

## 2022-05-01 PROCEDURE — 71260 CT THORAX DX C+: CPT

## 2022-05-01 PROCEDURE — 6360000002 HC RX W HCPCS: Performed by: STUDENT IN AN ORGANIZED HEALTH CARE EDUCATION/TRAINING PROGRAM

## 2022-05-01 PROCEDURE — 94640 AIRWAY INHALATION TREATMENT: CPT

## 2022-05-01 PROCEDURE — 93005 ELECTROCARDIOGRAM TRACING: CPT | Performed by: STUDENT IN AN ORGANIZED HEALTH CARE EDUCATION/TRAINING PROGRAM

## 2022-05-01 PROCEDURE — 94660 CPAP INITIATION&MGMT: CPT

## 2022-05-01 PROCEDURE — 85379 FIBRIN DEGRADATION QUANT: CPT

## 2022-05-01 PROCEDURE — 82805 BLOOD GASES W/O2 SATURATION: CPT

## 2022-05-01 PROCEDURE — 71045 X-RAY EXAM CHEST 1 VIEW: CPT

## 2022-05-01 PROCEDURE — 87635 SARS-COV-2 COVID-19 AMP PRB: CPT

## 2022-05-01 PROCEDURE — 96374 THER/PROPH/DIAG INJ IV PUSH: CPT

## 2022-05-01 PROCEDURE — 36415 COLL VENOUS BLD VENIPUNCTURE: CPT

## 2022-05-01 PROCEDURE — 84484 ASSAY OF TROPONIN QUANT: CPT

## 2022-05-01 RX ORDER — METHYLPREDNISOLONE SODIUM SUCCINATE 125 MG/2ML
125 INJECTION, POWDER, LYOPHILIZED, FOR SOLUTION INTRAMUSCULAR; INTRAVENOUS ONCE
Status: COMPLETED | OUTPATIENT
Start: 2022-05-01 | End: 2022-05-01

## 2022-05-01 RX ORDER — ONDANSETRON 4 MG/1
4 TABLET, ORALLY DISINTEGRATING ORAL EVERY 8 HOURS PRN
Status: DISCONTINUED | OUTPATIENT
Start: 2022-05-01 | End: 2022-05-06 | Stop reason: HOSPADM

## 2022-05-01 RX ORDER — PRAVASTATIN SODIUM 40 MG
80 TABLET ORAL NIGHTLY
Status: DISCONTINUED | OUTPATIENT
Start: 2022-05-02 | End: 2022-05-06 | Stop reason: HOSPADM

## 2022-05-01 RX ORDER — IPRATROPIUM BROMIDE AND ALBUTEROL SULFATE 2.5; .5 MG/3ML; MG/3ML
1 SOLUTION RESPIRATORY (INHALATION)
Status: DISCONTINUED | OUTPATIENT
Start: 2022-05-02 | End: 2022-05-02 | Stop reason: SDUPTHER

## 2022-05-01 RX ORDER — SODIUM CHLORIDE 0.9 % (FLUSH) 0.9 %
5-40 SYRINGE (ML) INJECTION EVERY 12 HOURS SCHEDULED
Status: DISCONTINUED | OUTPATIENT
Start: 2022-05-02 | End: 2022-05-06 | Stop reason: HOSPADM

## 2022-05-01 RX ORDER — ENOXAPARIN SODIUM 100 MG/ML
30 INJECTION SUBCUTANEOUS 2 TIMES DAILY
Status: DISCONTINUED | OUTPATIENT
Start: 2022-05-02 | End: 2022-05-06 | Stop reason: HOSPADM

## 2022-05-01 RX ORDER — 0.9 % SODIUM CHLORIDE 0.9 %
80 INTRAVENOUS SOLUTION INTRAVENOUS ONCE
Status: DISCONTINUED | OUTPATIENT
Start: 2022-05-01 | End: 2022-05-05

## 2022-05-01 RX ORDER — ACETAMINOPHEN 325 MG/1
650 TABLET ORAL EVERY 4 HOURS PRN
Status: DISCONTINUED | OUTPATIENT
Start: 2022-05-01 | End: 2022-05-06 | Stop reason: HOSPADM

## 2022-05-01 RX ORDER — SODIUM CHLORIDE 9 MG/ML
INJECTION, SOLUTION INTRAVENOUS CONTINUOUS
Status: DISCONTINUED | OUTPATIENT
Start: 2022-05-02 | End: 2022-05-02

## 2022-05-01 RX ORDER — ALBUTEROL SULFATE 2.5 MG/3ML
2.5 SOLUTION RESPIRATORY (INHALATION) EVERY 4 HOURS PRN
Status: DISCONTINUED | OUTPATIENT
Start: 2022-05-01 | End: 2022-05-06 | Stop reason: HOSPADM

## 2022-05-01 RX ORDER — ONDANSETRON 2 MG/ML
4 INJECTION INTRAMUSCULAR; INTRAVENOUS EVERY 6 HOURS PRN
Status: DISCONTINUED | OUTPATIENT
Start: 2022-05-01 | End: 2022-05-06 | Stop reason: HOSPADM

## 2022-05-01 RX ORDER — SODIUM CHLORIDE 0.9 % (FLUSH) 0.9 %
5-40 SYRINGE (ML) INJECTION PRN
Status: DISCONTINUED | OUTPATIENT
Start: 2022-05-01 | End: 2022-05-06 | Stop reason: HOSPADM

## 2022-05-01 RX ORDER — LISINOPRIL 10 MG/1
10 TABLET ORAL DAILY
Status: DISCONTINUED | OUTPATIENT
Start: 2022-05-02 | End: 2022-05-06 | Stop reason: HOSPADM

## 2022-05-01 RX ORDER — AMLODIPINE BESYLATE 10 MG/1
10 TABLET ORAL DAILY
Status: DISCONTINUED | OUTPATIENT
Start: 2022-05-02 | End: 2022-05-06 | Stop reason: HOSPADM

## 2022-05-01 RX ORDER — SODIUM CHLORIDE 0.9 % (FLUSH) 0.9 %
10 SYRINGE (ML) INJECTION PRN
Status: DISCONTINUED | OUTPATIENT
Start: 2022-05-01 | End: 2022-05-06 | Stop reason: HOSPADM

## 2022-05-01 RX ORDER — ASPIRIN 81 MG/1
81 TABLET ORAL DAILY
Status: DISCONTINUED | OUTPATIENT
Start: 2022-05-02 | End: 2022-05-06 | Stop reason: HOSPADM

## 2022-05-01 RX ORDER — IPRATROPIUM BROMIDE AND ALBUTEROL SULFATE 2.5; .5 MG/3ML; MG/3ML
1 SOLUTION RESPIRATORY (INHALATION)
Status: DISCONTINUED | OUTPATIENT
Start: 2022-05-02 | End: 2022-05-06 | Stop reason: HOSPADM

## 2022-05-01 RX ORDER — FUROSEMIDE 40 MG/1
40 TABLET ORAL DAILY
Status: DISCONTINUED | OUTPATIENT
Start: 2022-05-02 | End: 2022-05-06 | Stop reason: HOSPADM

## 2022-05-01 RX ORDER — SODIUM CHLORIDE 9 MG/ML
INJECTION, SOLUTION INTRAVENOUS PRN
Status: DISCONTINUED | OUTPATIENT
Start: 2022-05-01 | End: 2022-05-06 | Stop reason: HOSPADM

## 2022-05-01 RX ADMIN — METHYLPREDNISOLONE SODIUM SUCCINATE 125 MG: 125 INJECTION, POWDER, FOR SOLUTION INTRAMUSCULAR; INTRAVENOUS at 19:59

## 2022-05-01 RX ADMIN — SODIUM CHLORIDE, PRESERVATIVE FREE 10 ML: 5 INJECTION INTRAVENOUS at 21:47

## 2022-05-01 RX ADMIN — Medication 80 ML: at 21:47

## 2022-05-01 RX ADMIN — ALBUTEROL SULFATE 2.5 MG: 2.5 SOLUTION RESPIRATORY (INHALATION) at 20:29

## 2022-05-01 RX ADMIN — IOPAMIDOL 75 ML: 755 INJECTION, SOLUTION INTRAVENOUS at 21:47

## 2022-05-01 ASSESSMENT — ENCOUNTER SYMPTOMS
NAUSEA: 0
ABDOMINAL PAIN: 0
COUGH: 0
COLOR CHANGE: 0
VOMITING: 0
SHORTNESS OF BREATH: 1

## 2022-05-02 ENCOUNTER — APPOINTMENT (OUTPATIENT)
Dept: NON INVASIVE DIAGNOSTICS | Age: 67
DRG: 190 | End: 2022-05-02
Payer: MEDICARE

## 2022-05-02 PROBLEM — M62.81 MUSCULAR WEAKNESS: Status: ACTIVE | Noted: 2022-05-02

## 2022-05-02 PROBLEM — M25.512 ACUTE PAIN OF BOTH SHOULDERS: Status: ACTIVE | Noted: 2022-05-02

## 2022-05-02 PROBLEM — R61 EXCESSIVE SWEATING: Status: ACTIVE | Noted: 2022-05-02

## 2022-05-02 PROBLEM — M25.511 ACUTE PAIN OF BOTH SHOULDERS: Status: ACTIVE | Noted: 2022-05-02

## 2022-05-02 PROBLEM — J96.01 ACUTE RESPIRATORY FAILURE WITH HYPOXIA (HCC): Status: ACTIVE | Noted: 2022-05-02

## 2022-05-02 LAB
ANION GAP SERPL CALCULATED.3IONS-SCNC: 10 MMOL/L (ref 9–17)
BUN BLDV-MCNC: 13 MG/DL (ref 8–23)
CALCIUM SERPL-MCNC: 9.4 MG/DL (ref 8.6–10.4)
CHLORIDE BLD-SCNC: 106 MMOL/L (ref 98–107)
CO2: 23 MMOL/L (ref 20–31)
CREAT SERPL-MCNC: 0.59 MG/DL (ref 0.7–1.2)
EKG ATRIAL RATE: 68 BPM
EKG P AXIS: 46 DEGREES
EKG P-R INTERVAL: 126 MS
EKG Q-T INTERVAL: 386 MS
EKG QRS DURATION: 82 MS
EKG QTC CALCULATION (BAZETT): 410 MS
EKG R AXIS: 43 DEGREES
EKG T AXIS: 64 DEGREES
EKG VENTRICULAR RATE: 68 BPM
GFR AFRICAN AMERICAN: >60 ML/MIN
GFR NON-AFRICAN AMERICAN: >60 ML/MIN
GFR SERPL CREATININE-BSD FRML MDRD: ABNORMAL ML/MIN/{1.73_M2}
GLUCOSE BLD-MCNC: 107 MG/DL (ref 75–110)
GLUCOSE BLD-MCNC: 122 MG/DL (ref 70–99)
GLUCOSE BLD-MCNC: 123 MG/DL (ref 75–110)
GLUCOSE BLD-MCNC: 136 MG/DL (ref 75–110)
HCT VFR BLD CALC: 40.1 % (ref 41–53)
HEMOGLOBIN: 13.8 G/DL (ref 13.5–17.5)
IGE: 15 IU/ML
LV EF: 60 %
LVEF MODALITY: NORMAL
MCH RBC QN AUTO: 30.4 PG (ref 26–34)
MCHC RBC AUTO-ENTMCNC: 34.3 G/DL (ref 31–37)
MCV RBC AUTO: 88.7 FL (ref 80–100)
PDW BLD-RTO: 16.1 % (ref 11.5–14.9)
PLATELET # BLD: 167 K/UL (ref 150–450)
PMV BLD AUTO: 9.2 FL (ref 6–12)
POTASSIUM SERPL-SCNC: 4.2 MMOL/L (ref 3.7–5.3)
PROCALCITONIN: 0.05 NG/ML
RBC # BLD: 4.52 M/UL (ref 4.5–5.9)
SODIUM BLD-SCNC: 139 MMOL/L (ref 135–144)
WBC # BLD: 4.9 K/UL (ref 3.5–11)

## 2022-05-02 PROCEDURE — 2580000003 HC RX 258: Performed by: STUDENT IN AN ORGANIZED HEALTH CARE EDUCATION/TRAINING PROGRAM

## 2022-05-02 PROCEDURE — 6360000002 HC RX W HCPCS: Performed by: STUDENT IN AN ORGANIZED HEALTH CARE EDUCATION/TRAINING PROGRAM

## 2022-05-02 PROCEDURE — 84145 PROCALCITONIN (PCT): CPT

## 2022-05-02 PROCEDURE — 93010 ELECTROCARDIOGRAM REPORT: CPT | Performed by: INTERNAL MEDICINE

## 2022-05-02 PROCEDURE — 94761 N-INVAS EAR/PLS OXIMETRY MLT: CPT

## 2022-05-02 PROCEDURE — 99223 1ST HOSP IP/OBS HIGH 75: CPT | Performed by: INTERNAL MEDICINE

## 2022-05-02 PROCEDURE — 6370000000 HC RX 637 (ALT 250 FOR IP): Performed by: STUDENT IN AN ORGANIZED HEALTH CARE EDUCATION/TRAINING PROGRAM

## 2022-05-02 PROCEDURE — 82785 ASSAY OF IGE: CPT

## 2022-05-02 PROCEDURE — 2060000000 HC ICU INTERMEDIATE R&B

## 2022-05-02 PROCEDURE — 82947 ASSAY GLUCOSE BLOOD QUANT: CPT

## 2022-05-02 PROCEDURE — 94660 CPAP INITIATION&MGMT: CPT

## 2022-05-02 PROCEDURE — 6370000000 HC RX 637 (ALT 250 FOR IP)

## 2022-05-02 PROCEDURE — 2700000000 HC OXYGEN THERAPY PER DAY

## 2022-05-02 PROCEDURE — 36415 COLL VENOUS BLD VENIPUNCTURE: CPT

## 2022-05-02 PROCEDURE — 85027 COMPLETE CBC AUTOMATED: CPT

## 2022-05-02 PROCEDURE — 93306 TTE W/DOPPLER COMPLETE: CPT

## 2022-05-02 PROCEDURE — 80048 BASIC METABOLIC PNL TOTAL CA: CPT

## 2022-05-02 PROCEDURE — 94640 AIRWAY INHALATION TREATMENT: CPT

## 2022-05-02 RX ORDER — INSULIN LISPRO 100 [IU]/ML
0-3 INJECTION, SOLUTION INTRAVENOUS; SUBCUTANEOUS NIGHTLY
Status: DISCONTINUED | OUTPATIENT
Start: 2022-05-02 | End: 2022-05-06 | Stop reason: HOSPADM

## 2022-05-02 RX ORDER — DEXTROSE MONOHYDRATE 50 MG/ML
100 INJECTION, SOLUTION INTRAVENOUS PRN
Status: DISCONTINUED | OUTPATIENT
Start: 2022-05-02 | End: 2022-05-06 | Stop reason: HOSPADM

## 2022-05-02 RX ORDER — DEXTROSE MONOHYDRATE 25 G/50ML
12.5 INJECTION, SOLUTION INTRAVENOUS PRN
Status: DISCONTINUED | OUTPATIENT
Start: 2022-05-02 | End: 2022-05-06 | Stop reason: HOSPADM

## 2022-05-02 RX ORDER — NICOTINE 21 MG/24HR
1 PATCH, TRANSDERMAL 24 HOURS TRANSDERMAL DAILY
Status: DISCONTINUED | OUTPATIENT
Start: 2022-05-02 | End: 2022-05-06 | Stop reason: HOSPADM

## 2022-05-02 RX ORDER — METHYLPREDNISOLONE SODIUM SUCCINATE 40 MG/ML
40 INJECTION, POWDER, LYOPHILIZED, FOR SOLUTION INTRAMUSCULAR; INTRAVENOUS EVERY 6 HOURS
Status: DISCONTINUED | OUTPATIENT
Start: 2022-05-02 | End: 2022-05-04

## 2022-05-02 RX ORDER — INSULIN LISPRO 100 [IU]/ML
0-6 INJECTION, SOLUTION INTRAVENOUS; SUBCUTANEOUS
Status: DISCONTINUED | OUTPATIENT
Start: 2022-05-02 | End: 2022-05-06 | Stop reason: HOSPADM

## 2022-05-02 RX ORDER — OXYCODONE HYDROCHLORIDE AND ACETAMINOPHEN 5; 325 MG/1; MG/1
1 TABLET ORAL 2 TIMES DAILY PRN
Status: DISCONTINUED | OUTPATIENT
Start: 2022-05-02 | End: 2022-05-06 | Stop reason: HOSPADM

## 2022-05-02 RX ORDER — NICOTINE POLACRILEX 4 MG
15 LOZENGE BUCCAL PRN
Status: DISCONTINUED | OUTPATIENT
Start: 2022-05-02 | End: 2022-05-04 | Stop reason: CLARIF

## 2022-05-02 RX ADMIN — IPRATROPIUM BROMIDE AND ALBUTEROL SULFATE 1 AMPULE: .5; 2.5 SOLUTION RESPIRATORY (INHALATION) at 15:18

## 2022-05-02 RX ADMIN — OXYCODONE HYDROCHLORIDE AND ACETAMINOPHEN 1 TABLET: 5; 325 TABLET ORAL at 12:28

## 2022-05-02 RX ADMIN — SODIUM CHLORIDE, PRESERVATIVE FREE 10 ML: 5 INJECTION INTRAVENOUS at 20:47

## 2022-05-02 RX ADMIN — IPRATROPIUM BROMIDE AND ALBUTEROL SULFATE 1 AMPULE: .5; 2.5 SOLUTION RESPIRATORY (INHALATION) at 20:13

## 2022-05-02 RX ADMIN — ENOXAPARIN SODIUM 30 MG: 100 INJECTION SUBCUTANEOUS at 20:47

## 2022-05-02 RX ADMIN — ASPIRIN 81 MG: 81 TABLET, COATED ORAL at 08:10

## 2022-05-02 RX ADMIN — IPRATROPIUM BROMIDE AND ALBUTEROL SULFATE 1 AMPULE: .5; 2.5 SOLUTION RESPIRATORY (INHALATION) at 10:47

## 2022-05-02 RX ADMIN — LISINOPRIL 10 MG: 20 TABLET ORAL at 08:10

## 2022-05-02 RX ADMIN — FUROSEMIDE 40 MG: 40 TABLET ORAL at 08:10

## 2022-05-02 RX ADMIN — METHYLPREDNISOLONE SODIUM SUCCINATE 40 MG: 40 INJECTION, POWDER, FOR SOLUTION INTRAMUSCULAR; INTRAVENOUS at 09:32

## 2022-05-02 RX ADMIN — AMLODIPINE BESYLATE 10 MG: 10 TABLET ORAL at 08:10

## 2022-05-02 RX ADMIN — IPRATROPIUM BROMIDE AND ALBUTEROL SULFATE 1 AMPULE: .5; 2.5 SOLUTION RESPIRATORY (INHALATION) at 07:10

## 2022-05-02 RX ADMIN — ACETAMINOPHEN 650 MG: 325 TABLET ORAL at 14:27

## 2022-05-02 RX ADMIN — ENOXAPARIN SODIUM 30 MG: 100 INJECTION SUBCUTANEOUS at 02:06

## 2022-05-02 RX ADMIN — METHYLPREDNISOLONE SODIUM SUCCINATE 40 MG: 40 INJECTION, POWDER, FOR SOLUTION INTRAMUSCULAR; INTRAVENOUS at 14:24

## 2022-05-02 RX ADMIN — SODIUM CHLORIDE: 9 INJECTION, SOLUTION INTRAVENOUS at 02:05

## 2022-05-02 RX ADMIN — PRAVASTATIN SODIUM 80 MG: 40 TABLET ORAL at 20:46

## 2022-05-02 RX ADMIN — METHYLPREDNISOLONE SODIUM SUCCINATE 40 MG: 40 INJECTION, POWDER, FOR SOLUTION INTRAMUSCULAR; INTRAVENOUS at 20:47

## 2022-05-02 RX ADMIN — ENOXAPARIN SODIUM 30 MG: 100 INJECTION SUBCUTANEOUS at 08:10

## 2022-05-02 RX ADMIN — SODIUM CHLORIDE, PRESERVATIVE FREE 10 ML: 5 INJECTION INTRAVENOUS at 08:11

## 2022-05-02 RX ADMIN — ONDANSETRON 4 MG: 4 TABLET, ORALLY DISINTEGRATING ORAL at 13:16

## 2022-05-02 ASSESSMENT — ENCOUNTER SYMPTOMS
WHEEZING: 0
COUGH: 0
FACIAL SWELLING: 0
CONSTIPATION: 0
CHEST TIGHTNESS: 1
SHORTNESS OF BREATH: 1
NAUSEA: 0
ABDOMINAL PAIN: 0
VOMITING: 0
BACK PAIN: 0
DIARRHEA: 0

## 2022-05-02 ASSESSMENT — PAIN SCALES - GENERAL
PAINLEVEL_OUTOF10: 0
PAINLEVEL_OUTOF10: 0
PAINLEVEL_OUTOF10: 9

## 2022-05-02 ASSESSMENT — PAIN SCALES - WONG BAKER: WONGBAKER_NUMERICALRESPONSE: 0

## 2022-05-02 ASSESSMENT — PAIN DESCRIPTION - DESCRIPTORS: DESCRIPTORS: ACHING;PINS AND NEEDLES

## 2022-05-02 NOTE — PLAN OF CARE
Problem: Discharge Planning  Goal: Discharge to home or other facility with appropriate resources  Outcome: Progressing     Problem: Safety - Adult  Goal: Free from fall injury  Outcome: Progressing     Problem: ABCDS Injury Assessment  Goal: Absence of physical injury  Outcome: Progressing     Problem: Chronic Conditions and Co-morbidities  Goal: Patient's chronic conditions and co-morbidity symptoms are monitored and maintained or improved  Outcome: Progressing

## 2022-05-02 NOTE — ACP (ADVANCE CARE PLANNING)
Advance Care Planning     Advance Care Planning Activator (Inpatient)  Conversation Note      Date of ACP Conversation: 5/2/2022     Conversation Conducted with: Patient with Decision Making Capacity    ACP Activator: Audrey Rubio RN        Health Care Decision Maker:     Current Designated Health Care Decision Maker:     Primary Decision Maker: Edward Munoz - [phone]  Click here to complete Healthcare Decision Makers including section of the Healthcare Decision Maker Relationship (ie \"Primary\")  Today we documented Decision Maker(s) consistent with Legal Next of Kin hierarchy. Care Preferences    Ventilation: \"If you were in your present state of health and suddenly became very ill and were unable to breathe on your own, what would your preference be about the use of a ventilator (breathing machine) if it were available to you? \"      Would the patient desire the use of ventilator (breathing machine)?: yes    \"If your health worsens and it becomes clear that your chance of recovery is unlikely, what would your preference be about the use of a ventilator (breathing machine) if it were available to you? \"     Would the patient desire the use of ventilator (breathing machine)?: Yes      Resuscitation  \"CPR works best to restart the heart when there is a sudden event, like a heart attack, in someone who is otherwise healthy. Unfortunately, CPR does not typically restart the heart for people who have serious health conditions or who are very sick. \"    \"In the event your heart stopped as a result of an underlying serious health condition, would you want attempts to be made to restart your heart (answer \"yes\" for attempt to resuscitate) or would you prefer a natural death (answer \"no\" for do not attempt to resuscitate)? \" yes       [x] Yes   [] No   Educated Patient / Milton Cook regarding differences between Advance Directives and portable DNR orders.     Length of ACP Conversation in minutes: Conversation Outcomes:  [x] ACP discussion completed  [] Existing advance directive reviewed with patient; no changes to patient's previously recorded wishes  [] New Advance Directive completed  [] Portable Do Not Rescitate prepared for Provider review and signature  [] POLST/POST/MOLST/MOST prepared for Provider review and signature      Follow-up plan:    [] Schedule follow-up conversation to continue planning  [] Referred individual to Provider for additional questions/concerns   [] Advised patient/agent/surrogate to review completed ACP document and update if needed with changes in condition, patient preferences or care setting    [] This note routed to one or more involved healthcare providers

## 2022-05-02 NOTE — ED NOTES
Mode of arrival (squad #, walk in, police, etc) : EMS        Chief complaint(s): SOB        Arrival Note (brief scenario, treatment PTA, etc). : Pt arrived to ED complaining of SOB that has been ongoing x1 week. Pt c/o of SOB with exertion and dyspnea. Pt 89% on RA and placed 3L SpO2 at 95%. Pt states he usually wears oxygen at home at 2L. EMS gave a duoneb tx PTA and 20mg of prednisone PO.        C= \"Have you ever felt that you should Cut down on your drinking? \"  No  A= \"Have people Annoyed you by criticizing your drinking? \"  No  G= \"Have you ever felt bad or Guilty about your drinking? \"  No  E= \"Have you ever had a drink as an Eye-opener first thing in the morning to steady your nerves or to help a hangover? \"  No      Deferred []      Reason for deferring: N/A    *If yes to two or more: probable alcohol abuse. Allison Purdy RN  05/01/22 2013

## 2022-05-02 NOTE — CARE COORDINATION
CASE MANAGEMENT NOTE:    Admission Date:  5/1/2022 Yvrose Walker is a 78 y.o.  male    Admitted for : COPD exacerbation ([facility] [address] [J44.1]    Met with:  Patient    PCP:  Stacey Velasco:  Emani Horton      Is patient alert and oriented at time of discussion:  Yes    Current Residence/ Living Arrangements:  independently at home with spouse in 1 story home with ramp to get in front door. Current Services PTA:  No    Does patient go to outpatient dialysis: No  If yes, location and chair time: NA    Is patient agreeable to VNS: No    Freedom of choice provided:  No    List of [address] provided: No    VNS chosen:  No    DME:  straight cane, tub transfer bench, rollator, glucometer and supplies, BP machine, pulse ox. Ramp to front door. Home Oxygen: Yes- 3.5lpm NC PRN and HS from [address] Pradeep: No    CPAP/BIPAP: Yes    Supplier: [employer]    Potential Assistance Needed: No    SNF needed: No    Freedom of choice and list provided: NA    Pharmacy:  [facility]       Is patient currently receiving oral anticoagulation therapy? No    Is the Patient an JOIE BROWN [facility] with Readmission Risk Score greater than 14%? Yes  If yes, pt needs a follow up appointment made within 7 days. Family Members/Caregivers that pt would like involved in their care:    Yes    If yes, list name here:  [address]    Transportation Provider:  Family             Discharge Plan:  5/2/22 Emani Horton From home with spouse in one story home with ramp. DME: cpap, O2 3/5lpm NC PRN and HS thru [employer], rollator, glucometer, cane , shower bench,ramp,  BP machine and pulse ox. VNS: none Pt Denies needs for discharge. Pt to return home with wife. PO Lasix, IV solumedrol 40 mg Q, aerosols, new consult to Pulm.  Following for needs//JF                Electronically signed by: Jacquelin Hendrickson RN on 5/2/2022 at 9:51 AM

## 2022-05-02 NOTE — PROGRESS NOTES
05/01/22 2006   Respiratory   Respiratory Pattern Regular   Respiratory Depth Normal   Respiratory Quality/Effort Dyspnea with exertion   Chest Assessment Chest expansion symmetrical   L Breath Sounds Diminished;Clear   R Breath Sounds Clear;Diminished   Level of Activity/Mobility 0   Cough/Sputum   Sputum How Obtained Cough on request;Spontaneous cough   Cough Non-productive   Frequency Frequent   Sputum Amount None   Modified Cassidy Scale   Modified Cassidy Scale 2   Additional Respiratory Assessments   Pulse 78   Resp 16   SpO2 96 %   Position Sitting

## 2022-05-02 NOTE — PROGRESS NOTES
05/01/22 2004   RT Protocol   History Pulmonary Disease 2   Respiratory pattern 2   Breath sounds 0   Cough 0   Indications for Bronchodilator Therapy On home bronchodilators   Bronchodilator Assessment Score 4

## 2022-05-02 NOTE — PROGRESS NOTES
Bedside echo attempted. PT currently unavailable; out of room for other testing/procedure. Echo will be attempted again tomorrow AM. Any questions, please call [phone].     Electronically signed by Emanuel Clemente on 5/2/2022 at 4:12 PM

## 2022-05-02 NOTE — ED PROVIDER NOTES
EMERGENCY DEPARTMENT ENCOUNTER   ATTENDING ATTESTATION     Pt Name: Emmett Lombard  MRN: [id number]  [place] 1955  Date of evaluation: 5/1/22       Emmett Lombard is a 78 y.o. male who presents with Shortness of Breath      MDM:     This is a 79-year-old male with a history of COPD and heart failure comes in today with difficulty in breathing the patient is auto peeping only able to speak in 4 word sentences struggling to breathe he was placed on BiPAP and immediately appears better. He was given Solu-Medrol for COPD exacerbation D-dimer elevated will obtain CT PE    10:13 PM EDT  CT pulmonary embolism exam negative for PE. Patient will be admitted for COPD exacerbation. CRITICAL CARE: There was a high probability of clinically significant/life threatening deterioration in this patient's condition which required my urgent intervention. Total critical care time was 30 minutes. This excludes any time for separately reportable procedures. Vitals:   Vitals:    05/01/22 2033 05/01/22 2100 05/01/22 2108 05/01/22 2150   BP:       Pulse:       Resp: 11 10 9    Temp:       TempSrc:       SpO2: 100% 99%  100%   Weight:       Height:             I personally saw and examined the patient. I have reviewed and agree with the resident's findings, including all diagnostic interpretations and treatment plan as written. I was present for the key portions of any procedures performed and the inclusive time noted for any critical care statement. The care is provided during an unprecedented national emergency due to the novel coronavirus, COVID 19.   Abilio Lorenz MD  Attending Emergency Physician            Abilio Lorenz MD  05/01/22 9132

## 2022-05-02 NOTE — ED NOTES
Report given to [facility], RN from ICU. Report method in person   The following was reviewed with receiving RN:   Current vital signs:  BP (!) 148/70   Pulse 58   Temp 98.1 °F (36.7 °C) (Oral)   Resp 12   Ht 6' (1.829 m)   Wt 276 lb (125.2 kg)   SpO2 97%   BMI 37.43 kg/m²                MEWS Score: 1     Any medication or safety alerts were reviewed. Any pending diagnostics and notifications were also reviewed, as well as any safety concerns or issues, abnormal labs, abnormal imaging, and abnormal assessment findings. Questions were answered.             Jessica, Pending sale to [facility]  05/01/22 9668

## 2022-05-02 NOTE — CODE DOCUMENTATION
Discussed with the patient about the code status, per patient he wants to be Full code but no mechanical ventilation and intubation. Explained to the patient about full code, but is adamant that he does not want to be mechanically ventilated in case of cardiopulmonary arrest. Dr. Mckenna Morse also confirmed this with the patient. Explained the risks of CPR and patient voiced understanding.     Electronically signed by Martinez Davis MD on 5/2/2022 at 4:53 PM

## 2022-05-02 NOTE — CONSULTS
[address]                 [address] [facility], [address]                                  CONSULTATION    PATIENT NAME: Reddy Cohn                   :        1955  MED REC NO:   [id number]                              ROOM:       2004  ACCOUNT NO:   [de-identified]                           ADMIT DATE: 2022  PROVIDER:     Meghan Portillo    CONSULT DATE:  2022    REASON FOR CONSULTATION:  COPD exacerbation. HISTORY OF PRESENT ILLNESS:  The patient is a 18-year-old male.  _____  he has got progressively worsening shortness of breath, wheezing and  felt scared. No fever, chills, sweats. No sputum production. The  patient arrived to the ED. Evaluation included lab work BUN and  creatinine 11/0.76, white count 5, hemoglobin 14.8. Manual diff  included 75 segs, 20 lymphs. Arterial blood gas, 7.41, pCO2 35, pO2  125. CT scan was performed because of a D-dimer slightly elevated at  0.69. CT scan revealed no pulmonary embolism. There was no obvious  infiltrate appreciated. The patient is having some nausea and some  stomach upset, but currently doing okay this afternoon. His wife is  present at bedside. PAST MEDICAL HISTORY:  Notable for:  1. Diagnosis of COPD. The patient has a diagnosis of stage III COPD. The patient's FEV1 is 61% predicted with DLCO 31.9% predicted. 2.  History of sleep apnea compliant with positive pressure therapy. His wife did bring his home machine. 3.  Morbid obesity. 4.  Diagnosis of pseudocholinesterase deficiency. 5.  History of prostate cancer. 6.  History GI bleed. ALLERGIES:  CYMBALTA with loss of taste and SUCCINYLCHOLINE where he  reportedly has pseudocholinesterase deficiency. MEDICATIONS:  Reportedly, he is on albuterol nebulizer treatments. He  is also on Stiolto. SOCIAL HISTORY:  He just quit heavy smoking history quitting two weeks  ago.     REVIEW OF SYSTEMS:  As per HPI, otherwise systems reviewed and negative. PHYSICAL EXAMINATION:  GENERAL:  The patient is a 57-year-old resting quietly. VITAL SIGNS:  Blood pressure 140/58, heart rate 72, respirations 14,  temperature max 98.5, O2 saturations 93 to 98% on 3 liters. His BMI is  37.5. HEENT:  No supraclavicular lymph node enlargement. LUNGS:  Some rhonchi, but no wheezes appreciated. CARDIOVASCULAR:  Regular rhythm. ABDOMEN:  Soft. EXTREMITIES:  No edema. LABORATORY RESULTS:  As above. IMPRESSION:  1. COPD exacerbation. 2.  Personal history of tobacco use quitting 2 weeks ago. 3.  Bronchospasm. 4.  Doubt the presence of pneumonia and/or pulmonary embolism given  negative CT scan. 5.  Pseudocholinesterase deficiency. 6.  Sleep apnea ____ compliant with BiPAP therapy. 7.  Anxiety and depression. PLAN:  1. Continue with steroids at Solu-Medrol 40 every 6.  2.  Okay for progressive. 3.  We will continue with DuoNeb treatments. 4.  Check IgA level. 5.  Lovenox for DVT prophylaxis. We will follow the patient while in-house and thank you Dr. Jerrell Baker for  the consult.         Lorin Pugh    D: 05/02/2022 13:38:04       T: 05/02/2022 14:51:38     [email or web address]  Job#: [id number]     Doc#: [id number]    CC:

## 2022-05-02 NOTE — ED PROVIDER NOTES
[facility] ED  Emergency Department Encounter  Emergency Medicine Resident     Pt Name: Charisma Dior  MRN: [id number]  [place] 1955  Date of evaluation: 5/1/22  PCP:  Chey Marion MD    CHIEF COMPLAINT       Chief Complaint   Patient presents with    Shortness of Breath       HISTORY OFPRESENT ILLNESS  (Location/Symptom, Timing/Onset, Context/Setting, Quality, Duration, Modifying Mardee Skipper.)      Charisma Dior is a 78 y.o. male with past medical history of hyperlipidemia, hypertension, obstructive sleep apnea, diastolic CHF, pseudocholinesterase deficiency, COPD who presents with complaints of shortness of breath. Patient states symptoms have been ongoing for the last 4 days however have worsened today. Patient states he feels a sensation of \"tightness in his chest \"like he cannot take a deep breath. Patient does have inhalers at home which he has been taking without relief in his symptoms. Patient called EMS for worsening symptoms. Patient did receive 20 mg of prednisone on way to the hospital.  Upon initial evaluation patient is hypoxic on room air at 89%, speaking in few word sentences and displaying pursed lip breathing. Patient was placed on 3 L nasal cannula with improvement of oxygen saturation to the mid 90s however still appeared to be tachypneic as well as short of breath so respiratory therapy was called to place patient on BiPAP.     PAST MEDICAL / SURGICAL / SOCIAL / FAMILY HISTORY      has a past medical history of Acid reflux, Anemia, blood loss, Arthritis, C. difficile colitis, Chronic bilateral low back pain with bilateral sciatica, Chronic diastolic heart failure (HCC), Complete tear of left rotator cuff, COPD (chronic obstructive pulmonary disease) ([facility] [address] Coronary artery disease involving native coronary artery of native heart without angina pectoris, Degenerative disc disease, cervical, GI bleed, Gout, H/O cardiac catheterization, History of blood transfusion, Hyperlipidemia, Hyperlipidemia with target LDL less than 100, Hyperparathyroidism ([address] Hypertension, Knee pain, chronic, Liver disease, MDRO (multiple drug resistant organisms) resistance, Melena, Memory loss, Moderate episode of recurrent major depressive disorder ([address] Obesity, Class III, BMI 40-49.9 (morbid obesity) ([address] REYNALDO on CPAP, Osteoarthritis, Peripheral arterial occlusive disease ([address] Pneumonia, Polypharmacy, Positive FIT (fecal immunochemical test), Prolonged emergence from general anesthesia, Prostate CA ([address] Prostate cancer ([address] Pseudocholinesterase deficiency, Severe obesity (BMI 35.0-35.9 with comorbidity) ([address] Short of breath on exertion, Sleep apnea, Status post lumbar laminectomy, Stenosis of left carotid artery, Syncope and collapse, Tubular adenoma of colon 4/11/17, Type 2 diabetes mellitus with hyperglycemia, without long-term current use of insulin ([address] Unintended weight loss, Vitamin D deficiency, and Wears glasses. has a past surgical history that includes knee surgery (Left); lumbar laminectomy (01/05/2017); Tonsillectomy and adenoidectomy; pr colonoscopy w/biopsy single/multiple (N/A, 5/9/2017); shoulder surgery (Right, 1989?); Carpal tunnel release (Bilateral); pr reconstr total shoulder implant (Left, 7/18/2018); pr closed rx shldr disloc,anesthesia (Left, 8/1/2018); pr taryn shoulder arthrplsty humeral/glenoid compnt (Left, 8/3/2018); Carotid endarterectomy (Right, 12/16/2019); bronchoscopy (N/A, 3/13/2020); Upper gastrointestinal endoscopy (N/A, 3/19/2020); Cardiac catheterization (2007); Colonoscopy; skin biopsy; shoulder surgery (Left, 10/15/2020); Endoscopy, colon, diagnostic; joint replacement; back surgery; back surgery (01/05/2017); hernia repair (Left, 11/18/2020); and Cholecystectomy, laparoscopic (N/A, 2/16/2021).     Social History     Socioeconomic History    Marital status:      Spouse name: Not on file    Number of children: Not on file    Years of education: Not on file    Highest education level: Not on file   Occupational History    Not on file   Tobacco Use    Smoking status: Former Smoker     Packs/day: 1.00     Years: 35.00     Pack years: 35.00     Types: Cigarettes     Start date: 2021     Quit date: 3/9/2022     Years since quittin.1    Smokeless tobacco: Never Used   Vaping Use    Vaping Use: Never used   Substance and Sexual Activity    Alcohol use: Not Currently     Alcohol/week: 0.0 standard drinks    Drug use: No    Sexual activity: Not Currently     Partners: Female   Other Topics Concern    Not on file   Social History Narrative    Not on file     Social Determinants of Health     Financial Resource Strain: Low Risk     Difficulty of Paying Living Expenses: Not hard at all   Food Insecurity: No Food Insecurity    Worried About Running Out of Food in the Last Year: Never true    Colleen of Food in the Last Year: Never true   Transportation Needs: No Transportation Needs    Lack of Transportation (Medical): No    Lack of Transportation (Non-Medical):  No   Physical Activity:     Days of Exercise per Week: Not on file    Minutes of Exercise per Session: Not on file   Stress:     Feeling of Stress : Not on file   Social Connections:     Frequency of Communication with Friends and Family: Not on file    Frequency of Social Gatherings with Friends and Family: Not on file    Attends [employer] Services: Not on file    Active Member of Clubs or Organizations: Not on file    Attends Club or Organization Meetings: Not on file    Marital Status: Not on file   Intimate Partner Violence:     Fear of Current or Ex-Partner: Not on file    Emotionally Abused: Not on file    Physically Abused: Not on file    Sexually Abused: Not on file   Housing Stability: Unknown    Unable to Pay for Housing in the Last Year: No    Number of [place] in the Last Year: Not on file    Unstable Housing in the Last Year: No       Family History   Problem Relation Age of Onset    Diabetes Mother     Lung Cancer Brother     Liver Cancer Brother     Cancer Father        Allergies:  Succinylcholine chloride and Cymbalta [duloxetine hcl]    Home Medications:  Prior to Admission medications    Medication Sig Start Date End Date Taking? Authorizing Provider   allopurinol (ZYLOPRIM) 100 MG tablet Take 1 tablet by mouth daily 4/28/22   Lubna Gore MD   nystatin (MYCOSTATIN) 464301 UNIT/ML suspension Take 5 mLs by mouth 4 times daily Swish and swallow. For 3 months 4/27/22   Lubna Gore MD   fluconazole (DIFLUCAN) 150 MG tablet Take 1 tablet by mouth every 72 hours 4/27/22   Lubna Gore MD   methylPREDNISolone (MEDROL, ROSALIE,) 4 MG tablet Take by mouth, with food. Keep low carb, low salt diet while taking it 4/27/22   Lubna Gore MD   blood glucose monitor strips Testing once a day. Needs ONE TOUCH VERIO REFLECT 4/27/22   Lubna Gore MD   Blood Pressure KIT Diagnosis: HTN. Needs to check blood pressure 1-2 times a day until stable, then once a day. Goal blood pressure less than 135/85, and above 110/60. 4/4/22   Asmita Hebert MD   albuterol (PROVENTIL) (2.5 MG/3ML) 0.083% nebulizer solution USE THREE MILLILITERS (1 VIAL) VIA NEBULIZATION BY MOUTH EVERY 6 HOURS AS NEEDED FOR SHORTNESS OF BREATH OR COUGH 3/21/22   Lubna Gore MD   lisinopril (PRINIVIL;ZESTRIL) 10 MG tablet Take 1 tablet by mouth daily Dose decreased by cardiologist 2/8/22   Lubna Gore MD   pregabalin (LYRICA) 200 MG capsule Take 1 capsule by mouth 3 times daily for 90 days.  Please ignore prior refill for gabapentin 800 mg, will switch back to Lyrica 2/7/22 5/8/22  Lubna Gore MD   Cholecalciferol (VITAMIN D3) 1.25 MG (82001 UT) CAPS Take by mouth    Historical Provider, MD   enzalutamide Caro Listen) 40 MG capsule Take 4 capsules by mouth daily 11/29/21   Nathan Pinon MD   albuterol sulfate  (90 Base) MCG/ACT inhaler USE 2 INHALATIONS EVERY 6 HOURS AS NEEDED FOR WHEEZING OR SHORTNESS OF BREATH 11/18/21   Rosie Sanchez MD   furosemide (LASIX) 40 MG tablet Take 1 tablet by mouth daily Dose increased 1/12/2021 11/5/21   Rosie Sanchez MD   omeprazole (PRILOSEC) 20 MG delayed release capsule Take 1 capsule by mouth every morning (before breakfast) Dose decreased 7/20/2021 7/20/21   Rosie Sanchez MD   pravastatin (PRAVACHOL) 80 MG tablet Take 1 tablet by mouth every evening Dose increased  9/24/2019 7/20/21   Rosie Sanchez MD   amLODIPine (NORVASC) 10 MG tablet Take 1 tablet by mouth daily Dose increased 10/16/2020 due to high BP. Correct dosage 7/20/21   Rosie Sanchez MD   folbee plus (FOLBEE PLUS) TABS Take 1 tablet by mouth daily 7/12/21   Margaret Montalvo MD   tiotropium-olodaterol (STIOLTO RESPIMAT) 2.5-2.5 MCG/ACT AERS Inhale 2 puffs into the lungs daily    Historical Provider, MD   oxyCODONE-acetaminophen (PERCOCET) 5-325 MG per tablet Take 1 tablet by mouth 2 times daily as needed. Historical Provider, MD   Lancets 30G MISC Testing once a day. Please dispense according to patients insurance. 4/23/21   Asmita Hebert MD   ammonium lactate (AMLACTIN) 12 % cream APPLY TOPICALLY TO LEGS ONE TO TWO TIMES A DAY AS NEEDED 3/1/21   Rosie Sanchez MD   OXYGEN With CPAP at night 8/25/20   Historical Provider, MD   aspirin EC 81 MG EC tablet Take 1 tablet by mouth daily 9/4/18   Rosie Sanchez MD       REVIEW OF SYSTEMS    (2-9 systems for level 4, 10 or more for level 5)      Review of Systems   Constitutional: Negative for fatigue and fever. HENT: Negative for congestion. Respiratory: Positive for shortness of breath. Negative for cough. Cardiovascular: Positive for chest pain and leg swelling. Gastrointestinal: Negative for abdominal pain, nausea and vomiting. Musculoskeletal: Negative for myalgias. Skin: Negative for color change and rash. Neurological: Positive for weakness (generalized). Negative for headaches. PHYSICAL EXAM   (up to 7 for level 4, 8 or more for level 5)     INITIAL VITALS:    height is 6' (1.829 m) and weight is 276 lb (125.2 kg). His oral temperature is 98.1 °F (36.7 °C). His blood pressure is 148/70 (abnormal) and his pulse is 58. His respiration is 12 and oxygen saturation is 97%. Physical Exam  Constitutional:       Comments: Patient is awake, alert, he does appear to be in moderate respiratory distress, able to speak in few word sentences, displaying pursed lip breathing, he is tachypneic   Cardiovascular:      Rate and Rhythm: Normal rate and regular rhythm. Heart sounds: No murmur heard. No friction rub. No gallop. Pulmonary:      Comments: Displaying pursed lip breathing, tachypneic, speaking in 3-4 word sentences, displaying abdominal musculature respirations. Breath sounds are diffusely diminished along with bilateral end expiratory wheezes heard faintly in the upper lobes  Abdominal:      Palpations: Abdomen is soft. Tenderness: There is no abdominal tenderness. There is no rebound. Musculoskeletal:      Comments: Lower extremities with bilateral 2+ pitting edema   Skin:     General: Skin is warm and dry. Findings: No rash. Neurological:      General: No focal deficit present. Mental Status: He is oriented to person, place, and time. Psychiatric:         Mood and Affect: Mood is anxious.          DIFFERENTIAL  DIAGNOSIS     PLAN (LABS / IMAGING / EKG):  Orders Placed This Encounter   Procedures    COVID-19, Rapid    RAPID INFLUENZA A/B ANTIGENS    XR CHEST PORTABLE    CT CHEST PULMONARY EMBOLISM W CONTRAST    CBC with Auto Differential    Basic Metabolic Panel    Brain Natriuretic Peptide    D-Dimer, Quantitative    Magnesium    Troponin    BLOOD GAS, ARTERIAL    Inpatient consult to Internal Medicine    Inpatient consult to Pulmonology    Respiratory Care Evaluation and Treat    Adult NIV/Positive Airway Pressure    EKG 12 Lead    ADMIT TO INPATIENT       MEDICATIONS ORDERED:  Orders Placed This Encounter   Medications    methylPREDNISolone sodium (SOLU-MEDROL) injection 125 mg    ipratropium-albuterol (DUONEB) nebulizer solution 1 ampule     Order Specific Question:   Initiate RT Bronchodilator Protocol     Answer: Yes    albuterol (PROVENTIL) nebulizer solution 2.5 mg     Order Specific Question:   Initiate RT Bronchodilator Protocol     Answer: Yes    iopamidol (ISOVUE-370) 76 % injection 75 mL    sodium chloride flush 0.9 % injection 10 mL    0.9 % sodium chloride bolus       DDX: Acute on chronic COPD exacerbation, acute on chronic CHF exacerbation, bacterial pneumonia, COVID-19, flu, pulmonary embolism, ACS    Initial MDM/Plan: 78 y.o. male who presents with 4 days of shortness of breath which has acutely worsened today. Patient seen and examined. Patient is awake, moderate respiratory distress, speaking in 3-4 word sentences, tachypneic displaying pursed lip breathing as well as abdominal musculature respirations. Lungs diffusely diminished bilaterally with bilateral upper lobe and expiratory faint wheezes, bilateral lower extremities with 2+ pitting edema. Initially placed on nasal cannula with improvement of saturation however no improvement in work of breathing so respiratory therapy was called to place patient on BiPAP. Will obtain viral testing, cardiac labs, D-dimer, chest x-ray, give Solu-Medrol and breathing treatments, dissipate admission.     DIAGNOSTIC RESULTS / EMERGENCY DEPARTMENT COURSE / MDM     LABS:  Labs Reviewed   CBC WITH AUTO DIFFERENTIAL - Abnormal; Notable for the following components:       Result Value    RDW 16.0 (*)     Seg Neutrophils 75 (*)     Lymphocytes 20 (*)     All other components within normal limits   BASIC METABOLIC PANEL - Abnormal; Notable for the following components:    Glucose 140 (*)     All other components within normal limits   D-DIMER, QUANTITATIVE - Abnormal; Notable for the following components:    D-Dimer, Quant 0.69 (*)     All other components within normal limits   BLOOD GAS, ARTERIAL - Abnormal; Notable for the following components:    pO2, Arterial 125.0 (*)     All other components within normal limits   COVID-19, RAPID   RAPID INFLUENZA A/B ANTIGENS   BRAIN NATRIURETIC PEPTIDE   MAGNESIUM   TROPONIN         RADIOLOGY:  XR CHEST PORTABLE    Result Date: 5/1/2022  EXAMINATION: ONE XRAY VIEW OF THE CHEST 5/1/2022 8:19 pm COMPARISON: March 11, 2022 HISTORY: ORDERING SYSTEM PROVIDED HISTORY: sob, hypoxic TECHNOLOGIST PROVIDED HISTORY: sob, hypoxic Reason for Exam: sob, hypoxic FINDINGS: Left shoulder arthroplasty. The lungs are without acute focal process. There is no effusion or pneumothorax. The cardiomediastinal silhouette is without acute process. The osseous structures are without acute process. No acute process. CT CHEST PULMONARY EMBOLISM W CONTRAST    Result Date: 5/1/2022  EXAMINATION: CTA OF THE CHEST 5/1/2022 9:41 pm TECHNIQUE: CTA of the chest was performed after the administration of intravenous contrast.  Multiplanar reformatted images are provided for review. MIP images are provided for review. Dose modulation, iterative reconstruction, and/or weight based adjustment of the mA/kV was utilized to reduce the radiation dose to as low as reasonably achievable. COMPARISON: 04/18/2022 HISTORY: ORDERING SYSTEM PROVIDED HISTORY: positive d dimer TECHNOLOGIST PROVIDED HISTORY: positive d dimer Decision Support Exception - unselect if not a suspected or confirmed emergency medical condition->Emergency Medical Condition (MA) Reason for Exam: positive d dimer, sob FINDINGS: Pulmonary Arteries: Pulmonary arteries are adequately opacified for evaluation. No evidence of intraluminal filling defect to suggest pulmonary embolism. Main pulmonary artery is normal in caliber.  Mediastinum: Stable mildly prominent nonspecific mediastinal and right hilar lymph nodes. The heart size is normal.  Thoracic aorta is normal in caliber with no evidence of aneurysm or dissection. There is calcified atherosclerotic plaque. Calcific coronary artery disease. Lungs/pleura: There is apical predominant emphysema. The lungs are without acute process. No focal consolidation or pulmonary edema. No evidence of pleural effusion or pneumothorax. Upper Abdomen: Stable bilateral adrenal gland hypertrophy. Soft Tissues/Bones: No acute bone or soft tissue abnormality. No evidence of pulmonary embolism or acute pulmonary abnormality. Stable mildly prominent nonspecific mediastinal and right hilar lymph nodes. Calcific coronary artery disease. Apical predominant emphysema. RECOMMENDATIONS: Unavailable       EKG  EKG Interpretation    Interpreted by me    Rhythm: normal sinus with PAC  Rate: normal  Axis: normal  Ectopy: none  Conduction: normal  ST Segments: no acute change  T Waves: no acute change  Q Waves: none    Clinical Impression: Normal sinus rhythm with occasional PAC, normal axis, normal intervals, no acute ischemia    All EKG's are interpreted by the Emergency Department Physician who either signs or Co-signs this chart in the absence of a cardiologist.    EMERGENCY DEPARTMENT COURSE:     Troponin under patient's baseline, BNP is not elevated, chest x-ray without edema or pleural effusion. D-dimer was elevated so CT PE study was ordered. Patient given Solu-Medrol and breathing treatment. Electrolytes unremarkable, flu and COVID test are negative. After 30 minutes of BiPAP ABG was ordered which demonstrated normal CO2, increased PaO2 so FiO2 was decreased from 45% to 35%. CT pulm embolism study was largely unremarkable with emphysema but no signs of PE or lobar consolidative process.   Impression is acute on chronic COPD exacerbation, no indication for antibiotics given patient states he has not had a cough, no change in sputum and no consolidation seen on imaging. Patient reassessed after being placed on BiPAP and did report improvement in his symptoms, he did appear to be more comfortable, able to speak in full sentences and nontachypneic however he stated he still felt tired out so he was left on BiPAP. Pulmonology was paged for consultation given need for ICU admission while on BiPAP, recommended q. 4 breathing treatments medicine was paged for admission, they accepted admission. Patient was updated on plan of care and was agreeable. While on BiPAP I did ask the patient if his condition deteriorated if he would be okay with intubation, patient stated he would be okay with intubation. PROCEDURES:  None    CONSULTS:  IP CONSULT TO INTERNAL MEDICINE  IP CONSULT TO PULMONOLOGY    CRITICAL CARE:  Please see attending note    FINAL IMPRESSION      1. COPD exacerbation ([facility] [address]          DISPOSITION / PLAN     DISPOSITION Admitted 05/01/2022 10:47:13 PM        PATIENTREFERRED TO:  No follow-up provider specified.     DISCHARGE MEDICATIONS:  New Prescriptions    No medications on file       Maureen Alfred DO  EmergencyMedicine Resident    (Please note that portions of this note were completed with a voice recognition program.  Efforts were made to edit the dictations but occasionally words are mis-transcribed.)       Maureen Alfred DO  Resident  05/01/22 [id number]

## 2022-05-02 NOTE — H&P
[facility]     HISTORY AND PHYSICAL EXAMINATION            Date:   5/2/2022  Patient name:  Mike Dilladr  Date of admission:  5/1/2022  7:34 PM  MRN:   [id number]  Account:  [de-identified]  YOB: 1955  PCP:    Josefina Magallanes MD  Room:   2004/2004-01  Code Status:    Full Code    Chief Complaint:     Chief Complaint   Patient presents with    Shortness of Breath       History Obtained From:     patient    History of Present Illness: The patient is a 78 y.o. Non- / non  male who presents withShortness of Breath. He has a past medical history of hyperlipidemia, HTN, REYNALDO on CPAP, CHF, COPD.   and he is admitted to the hospital for the management of  COPD exacerbation. Patient is an exsmoker and quit about a month ago and used to smoke 1Pack/day  Patient states that since last 4 days, he has been having worsening shortness of breath, denies chest pain, cough, N/V. But endorses chest tightness, unable to catch breath. Patient tried respiratory treatments to no avail. Then patient was brought to the ER by EMS after worsening of symptoms. Received Prednisone 20 mg on his way to the hospital, Patient was tachypneic     Initially placed on 3L NC with no improvement in respiratory efforts and then was placed on BiPAP saturating in 98. Patient received IV solumedrol 125 mg  D-Dimer, CT chest, EKG unremarkable for PE.            Past Medical History:     Past Medical History:   Diagnosis Date    Acid reflux     Anemia, blood loss 10/7/2018    Arthritis     C. difficile colitis 3/19/2020    Chronic bilateral low back pain with bilateral sciatica 11/3/2016    Chronic diastolic heart failure ([address] 2/25/2021    Complete tear of left rotator cuff 7/18/2018    COPD (chronic obstructive pulmonary disease) ([address]     Coronary artery disease involving native coronary artery of native heart without angina pectoris 2/2/2020    Degenerative disc disease, cervical 7/28/2019    GI bleed 3/19/2020    Gout     H/O cardiac catheterization     yrs ago   no stents    History of blood transfusion     Hyperlipidemia     Hyperlipidemia with target LDL less than 100 1/20/2016    Hyperparathyroidism ([address] 1/17/2020    Hypertension     Knee pain, chronic     left    Liver disease     MDRO (multiple drug resistant organisms) resistance     Melena 3/19/2020    Memory loss     Moderate episode of recurrent major depressive disorder ([address] 1/20/2016    Obesity, Class III, BMI 40-49.9 (morbid obesity) ([address] 1/20/2016    REYNALDO on CPAP 5/6/2017    Osteoarthritis     Peripheral arterial occlusive disease ([address] 3/25/2017    Pneumonia 3/9/2020    Polypharmacy 3/25/2017    Positive FIT (fecal immunochemical test) 4/11/2017    Prolonged emergence from general anesthesia 01/05/2017    Patient \"on life support for 3 days\" after back surgery due to being given succinylcholine    Prostate CA ([address] 1/20/2016    Prostate cancer ([address] 10/2013    finished radiation tx 5/2014    Pseudocholinesterase deficiency 03/25/2017    Pt.  \"on life support\" for 3 days after back surgery 1/5/17 after being given succinylcholine    Severe obesity (BMI 35.0-35.9 with comorbidity) ([address] 2/2/2020    Short of breath on exertion     Sleep apnea     uses CPAP machine nightly    Status post lumbar laminectomy 3/25/2017    Stenosis of left carotid artery 10/7/2018    Syncope and collapse 3/19/2020    Tubular adenoma of colon 4/11/17 5/13/2017    Type 2 diabetes mellitus with hyperglycemia, without long-term current use of insulin ([address] 3/25/2017    Lab Results Component Value Date  LABA1C 7.1 (H) 03/23/2017     Unintended weight loss 10/7/2018    Vitamin D deficiency 3/25/2017    Wears glasses         Past SurgicalHistory:     Past Surgical History: Procedure Laterality Date    BACK SURGERY      x 2     BACK SURGERY  01/05/2017    lumbar laminectomy L2, L3, L4    BRONCHOSCOPY N/A 3/13/2020    BRONCHOSCOPY performed by Fransisca Thorpe MD at [facility]  2007    no stents    CAROTID ENDARTERECTOMY Right 12/16/2019    Dr. Luis Lugo, LAPAROSCOPIC N/A 2/16/2021    CHOLECYSTECTOMY LAPAROSCOPIC ROBOTIC XI performed by Meliton Stallworth MD at [address] [facility], COLON, DIAGNOSTIC     [address] [id number] Left 11/18/2020    HERNIA INGUINAL REPAIR [address] OPEN performed by Meliton Stallworth MD at [address] [facility] Left     LUMBAR LAMINECTOMY  01/05/2017    L2-L4    [place] CLOSED RX SHLDR DISLOC,ANESTHESIA Left 8/1/2018    SHOULDER CLOSED REDUCTION WITH C-ARM VISUALIZATION performed by Shawn Fitzgerald MD at [facility] N/A 5/9/2017    COLONOSCOPY WITH BIOPSY performed by Prabhu Gleason DO at [address] IMPLANT Left 7/18/2018    SHOULDER TOTAL ARTHROPLASTY REVERSE LEFT DJO & BICEP TENDON TRANSFER performed by Shawn Fitzgerald MD at [address] HUMERAL/GLENOID COMPNT Left 8/3/2018    SHOULDER TOTAL REVERSE  ARTHROPLASTY REVISION performed by Shawn Fitzgerald MD at [address]? rotator cuff repair    SHOULDER SURGERY Left 10/15/2020    SHOULDER ARTHROSCOPY W/INTEROP CULTURES performed by Shawn Fitzgerald MD at [address] [facility] [address]      ear, forehead    TONSILLECTOMY AND ADENOIDECTOMY      UPPER GASTROINTESTINAL ENDOSCOPY N/A 3/19/2020    EGD BIOPSY performed by Meg Gonzalez MD at [facility]        Medications Prior to Admission:        Prior to Admission medications    Medication Sig Start Date End Date Taking?  Authorizing Provider   allopurinol (ZYLOPRIM) 100 MG tablet Take 1 tablet by mouth daily 4/28/22   Asmita Hebert MD   nystatin (MYCOSTATIN) 082960 UNIT/ML suspension Take 5 mLs by mouth 4 times daily Swish and swallow. For 3 months 4/27/22   Rolanda Penny MD   fluconazole (DIFLUCAN) 150 MG tablet Take 1 tablet by mouth every 72 hours  Patient not taking: Reported on 5/2/2022 4/27/22   Rolanda Penny MD   methylPREDNISolone (MEDROL, ROSALIE,) 4 MG tablet Take by mouth, with food. Keep low carb, low salt diet while taking it 4/27/22   Rolanda Penny MD   blood glucose monitor strips Testing once a day. Needs ONE TOUCH VERIO REFLECT 4/27/22   Rolanda Penny MD   Blood Pressure KIT Diagnosis: HTN. Needs to check blood pressure 1-2 times a day until stable, then once a day. Goal blood pressure less than 135/85, and above 110/60. 4/4/22   Asmita Hebert MD   albuterol (PROVENTIL) (2.5 MG/3ML) 0.083% nebulizer solution USE THREE MILLILITERS (1 VIAL) VIA NEBULIZATION BY MOUTH EVERY 6 HOURS AS NEEDED FOR SHORTNESS OF BREATH OR COUGH 3/21/22   Rolanda Penny MD   lisinopril (PRINIVIL;ZESTRIL) 10 MG tablet Take 1 tablet by mouth daily Dose decreased by cardiologist 2/8/22   Rolanda Penny MD   pregabalin (LYRICA) 200 MG capsule Take 1 capsule by mouth 3 times daily for 90 days.  Please ignore prior refill for gabapentin 800 mg, will switch back to Lyrica 2/7/22 5/8/22  Rolanda Penny MD   Cholecalciferol (VITAMIN D3) 1.25 MG (96490 UT) CAPS Take by mouth every 7 days     Historical Provider, MD   enzalutamide Ladona Lade) 40 MG capsule Take 4 capsules by mouth daily 11/29/21   Olga Lidia Hoskins MD   albuterol sulfate  (90 Base) MCG/ACT inhaler USE 2 INHALATIONS EVERY 6 HOURS AS NEEDED FOR WHEEZING OR SHORTNESS OF BREATH 11/18/21   Rolanda Penny MD   furosemide (LASIX) 40 MG tablet Take 1 tablet by mouth daily Dose increased 1/12/2021 11/5/21   Rolanda Penny MD   omeprazole (PRILOSEC) 20 MG delayed release capsule Take 1 capsule by mouth every morning (before breakfast) Dose decreased 7/20/2021 7/20/21   John Chavez MD   pravastatin (PRAVACHOL) 80 MG tablet Take 1 tablet by mouth every evening Dose increased  9/24/2019  Patient not taking: Reported on 5/2/2022 7/20/21   John Chavez MD   amLODIPine (NORVASC) 10 MG tablet Take 1 tablet by mouth daily Dose increased 10/16/2020 due to high BP. Correct dosage 7/20/21   John Chavez MD   folbee plus (FOLBEE PLUS) TABS Take 1 tablet by mouth daily 7/12/21   Robin Ferris MD   tiotropium-olodaterol (STIOLTO RESPIMAT) 2.5-2.5 MCG/ACT AERS Inhale 2 puffs into the lungs daily    Historical Provider, MD   oxyCODONE-acetaminophen (PERCOCET) 5-325 MG per tablet Take 1 tablet by mouth 2 times daily as needed. Historical Provider, MD   Lancets 30G MISC Testing once a day. Please dispense according to patients insurance. 4/23/21   John Chavez MD   ammonium lactate (AMLACTIN) 12 % cream APPLY TOPICALLY TO LEGS ONE TO TWO TIMES A DAY AS NEEDED 3/1/21   John Chavez MD   OXYGEN With CPAP at night 8/25/20   Historical Provider, MD   aspirin EC 81 MG EC tablet Take 1 tablet by mouth daily 9/4/18   John Chavez MD        Allergies:     Succinylcholine chloride and Cymbalta [duloxetine hcl]    Social History:     Tobacco:    reports that he quit smoking about 7 weeks ago. His smoking use included cigarettes. He started smoking about 9 months ago. He has a 35.00 pack-year smoking history. He has never used smokeless tobacco.  Alcohol:      reports previous alcohol use. Drug Use:  reports no history of drug use. Family History:     Family History   Problem Relation Age of Onset    Diabetes Mother     Lung Cancer Brother     Liver Cancer Brother     Cancer Father        Review of Systems:     Positive and Negative as described in HPI. Review of Systems   Constitutional: Negative for activity change, appetite change, chills, fatigue and fever. HENT: Negative for facial swelling. Respiratory: Positive for chest tightness and shortness of breath. Negative for cough and wheezing. Cardiovascular: Negative for chest pain and palpitations. Gastrointestinal: Negative for abdominal pain, constipation, diarrhea, nausea and vomiting. Genitourinary: Negative for frequency. Musculoskeletal: Negative for arthralgias and back pain. Neurological: Negative for dizziness, seizures, weakness and numbness. Psychiatric/Behavioral: Negative for agitation and confusion. Physical Exam:   BP (!) 140/58   Pulse 71   Temp 97.7 °F (36.5 °C) (Axillary)   Resp 10   Ht 6' (1.829 m)   Wt 276 lb (125.2 kg)   SpO2 92%   BMI 37.43 kg/m²   Temp (24hrs), Av.2 °F (36.8 °C), Min:97.7 °F (36.5 °C), Max:98.5 °F (36.9 °C)    No results for input(s): POCGLU in the last 72 hours. Intake/Output Summary (Last 24 hours) at 2022 0952  Last data filed at 2022 0649  Gross per 24 hour   Intake 450 ml   Output --   Net 450 ml       Physical Exam  HENT:      Mouth/Throat:      Mouth: Mucous membranes are moist.   Cardiovascular:      Rate and Rhythm: Normal rate and regular rhythm. Pulmonary:      Breath sounds: Decreased breath sounds and wheezing present. Comments: On 3L NC  Abdominal:      General: There is no distension. Palpations: Abdomen is soft. There is no mass. Musculoskeletal:         General: Normal range of motion. Cervical back: Normal range of motion. Skin:     General: Skin is warm. Neurological:      General: No focal deficit present. Mental Status: He is alert and oriented to person, place, and time.    Psychiatric:         Mood and Affect: Mood normal.         Behavior: Behavior normal.         Investigations:     Laboratory Testing:  Recent Results (from the past 24 hour(s))   CBC with Auto Differential    Collection Time: 22  7:57 PM   Result Value Ref Range    WBC 5.0 3.5 - 11.0 k/uL    RBC 4.83 4.5 - 5.9 m/uL    Hemoglobin 14.8 13.5 - 17.5 g/dL Hematocrit 42.3 41 - 53 %    MCV 87.7 80 - 100 fL    MCH 30.7 26 - 34 pg    MCHC 35.0 31 - 37 g/dL    RDW 16.0 (H) 11.5 - 14.9 %    Platelets 649 672 - 927 k/uL    MPV 9.1 6.0 - 12.0 fL    Seg Neutrophils 75 (H) 36 - 66 %    Lymphocytes 20 (L) 24 - 44 %    Monocytes 4 1 - 7 %    Eosinophils % 0 0 - 4 %    Basophils 1 0 - 2 %    Segs Absolute 3.70 1.3 - 9.1 k/uL    Absolute Lymph # 1.00 1.0 - 4.8 k/uL    Absolute Mono # 0.20 0.1 - 1.3 k/uL    Absolute Eos # 0.00 0.0 - 0.4 k/uL    Basophils Absolute 0.00 0.0 - 0.2 k/uL   Basic Metabolic Panel    Collection Time: 05/01/22  7:57 PM   Result Value Ref Range    Glucose 140 (H) 70 - 99 mg/dL    BUN 11 8 - 23 mg/dL    CREATININE 0.76 0.70 - 1.20 mg/dL    Calcium 10.1 8.6 - 10.4 mg/dL    Sodium 138 135 - 144 mmol/L    Potassium 4.5 3.7 - 5.3 mmol/L    Chloride 101 98 - 107 mmol/L    CO2 26 20 - 31 mmol/L    Anion Gap 11 9 - 17 mmol/L    GFR Non-African American >60 >60 mL/min    GFR African American >60 >60 mL/min    GFR Comment         Brain Natriuretic Peptide    Collection Time: 05/01/22  7:57 PM   Result Value Ref Range    Pro- <300 pg/mL   D-Dimer, Quantitative    Collection Time: 05/01/22  7:57 PM   Result Value Ref Range    D-Dimer, Quant 0.69 (H) 0.00 - 0.59 mg/L FEU   Magnesium    Collection Time: 05/01/22  7:57 PM   Result Value Ref Range    Magnesium 1.7 1.6 - 2.6 mg/dL   Troponin    Collection Time: 05/01/22  7:57 PM   Result Value Ref Range    Troponin, High Sensitivity 18 0 - 22 ng/L   COVID-19, Rapid    Collection Time: 05/01/22  8:35 PM    Specimen: Nasopharyngeal Swab   Result Value Ref Range    Specimen Description . NASOPHARYNGEAL SWAB     SARS-CoV-2, Rapid Not Detected Not Detected   RAPID INFLUENZA A/B ANTIGENS    Collection Time: 05/01/22  8:35 PM    Specimen: Nasopharyngeal   Result Value Ref Range    Flu A Antigen NEGATIVE NEGATIVE    Flu B Antigen NEGATIVE NEGATIVE   EKG 12 Lead    Collection Time: 05/01/22  8:45 PM   Result Value Ref Range Ventricular Rate 68 BPM    Atrial Rate 68 BPM    P-R Interval 126 ms    QRS Duration 82 ms    Q-T Interval 386 ms    QTc Calculation (Bazett) 410 ms    P Axis 46 degrees    R Axis 43 degrees    T Axis 64 degrees   BLOOD GAS, ARTERIAL    Collection Time: 05/01/22  9:00 PM   Result Value Ref Range    pH, Arterial 7.418 7.350 - 7.450    pCO2, Arterial 39.5 35.0 - 45.0 mmHg    pO2, Arterial 125.0 (H) 80.0 - 100.0 mmHg    HCO3, Arterial 25.5 22.0 - 26.0 mmol/L    Positive Base Excess, Art 1.0 0.0 - 2.0 mmol/L    O2 Sat, Arterial 97.3 95 - 98 %    Carboxyhemoglobin 1.2 0 - 5 %    Pt Temp 37.0     O2 Device/Flow/% BIPAP     Respiratory Rate 16     Moses Test PASS     Sample Site Right Radial Artery     Pt.  Position SEMI-FOWLERS     Mode BIPAP     Text for Respiratory RESULTS TO MD    Basic Metabolic Panel w/ Reflex to MG    Collection Time: 05/02/22  6:48 AM   Result Value Ref Range    Glucose 122 (H) 70 - 99 mg/dL    BUN 13 8 - 23 mg/dL    CREATININE 0.59 (L) 0.70 - 1.20 mg/dL    Calcium 9.4 8.6 - 10.4 mg/dL    Sodium 139 135 - 144 mmol/L    Potassium 4.2 3.7 - 5.3 mmol/L    Chloride 106 98 - 107 mmol/L    CO2 23 20 - 31 mmol/L    Anion Gap 10 9 - 17 mmol/L    GFR Non-African American >60 >60 mL/min    GFR African American >60 >60 mL/min    GFR Comment         CBC    Collection Time: 05/02/22  6:48 AM   Result Value Ref Range    WBC 4.9 3.5 - 11.0 k/uL    RBC 4.52 4.5 - 5.9 m/uL    Hemoglobin 13.8 13.5 - 17.5 g/dL    Hematocrit 40.1 (L) 41 - 53 %    MCV 88.7 80 - 100 fL    MCH 30.4 26 - 34 pg    MCHC 34.3 31 - 37 g/dL    RDW 16.1 (H) 11.5 - 14.9 %    Platelets 699 750 - 815 k/uL    MPV 9.2 6.0 - 12.0 fL       Imaging/Diagnostics:  XR CHEST PORTABLE    Result Date: 5/1/2022  EXAMINATION: ONE XRAY VIEW OF THE CHEST 5/1/2022 8:19 pm COMPARISON: March 11, 2022 HISTORY: ORDERING SYSTEM PROVIDED HISTORY: sob, hypoxic TECHNOLOGIST PROVIDED HISTORY: sob, hypoxic Reason for Exam: sob, hypoxic FINDINGS: Left shoulder arthroplasty. The lungs are without acute focal process. There is no effusion or pneumothorax. The cardiomediastinal silhouette is without acute process. The osseous structures are without acute process. No acute process. CT CHEST PULMONARY EMBOLISM W CONTRAST    Result Date: 5/1/2022  EXAMINATION: CTA OF THE CHEST 5/1/2022 9:41 pm TECHNIQUE: CTA of the chest was performed after the administration of intravenous contrast.  Multiplanar reformatted images are provided for review. MIP images are provided for review. Dose modulation, iterative reconstruction, and/or weight based adjustment of the mA/kV was utilized to reduce the radiation dose to as low as reasonably achievable. COMPARISON: 04/18/2022 HISTORY: ORDERING SYSTEM PROVIDED HISTORY: positive d dimer TECHNOLOGIST PROVIDED HISTORY: positive d dimer Decision Support Exception - unselect if not a suspected or confirmed emergency medical condition->Emergency Medical Condition (MA) Reason for Exam: positive d dimer, sob FINDINGS: Pulmonary Arteries: Pulmonary arteries are adequately opacified for evaluation. No evidence of intraluminal filling defect to suggest pulmonary embolism. Main pulmonary artery is normal in caliber. Mediastinum: Stable mildly prominent nonspecific mediastinal and right hilar lymph nodes. The heart size is normal.  Thoracic aorta is normal in caliber with no evidence of aneurysm or dissection. There is calcified atherosclerotic plaque. Calcific coronary artery disease. Lungs/pleura: There is apical predominant emphysema. The lungs are without acute process. No focal consolidation or pulmonary edema. No evidence of pleural effusion or pneumothorax. Upper Abdomen: Stable bilateral adrenal gland hypertrophy. Soft Tissues/Bones: No acute bone or soft tissue abnormality. No evidence of pulmonary embolism or acute pulmonary abnormality. Stable mildly prominent nonspecific mediastinal and right hilar lymph nodes.  Calcific coronary artery disease. Apical predominant emphysema. RECOMMENDATIONS: Unavailable     CT lung screen [Initial/Annual]    Result Date: 2022  EXAMINATION: LOW DOSE SCREENING CT OF THE CHEST WITHOUT CONTRAST 2022 10:18 am TECHNIQUE: Low dose lung cancer screening CT of the chest was performed without the administration of intravenous contrast.  Multiplanar reformatted images are provided for review. Dose modulation, iterative reconstruction, and/or weight based adjustment of the mA/kV was utilized to reduce the radiation dose to as low as reasonably achievable. Field-of-view: 32 cm Dose Length Product: 105.61 mGy CTDlvol: 2.71 mGy COMPARISON: 03/15/2021 HISTORY: Screening. Patient Age: 79 y/o Number of pack years of smokin pack years If no longer smoking, number of years since cessation:  Current, everyday smoker Other symptoms:  None. Additional history: ORDERING SYSTEM PROVIDED HISTORY: Personal history of nicotine dependence TECHNOLOGIST PROVIDED HISTORY: Age: 78 y.o. Smoking History: Social History Tobacco Use Smoking status: Current Every Day Smoker Packs/day: 1.00 Years: 35.00 Pack years: 28 Types: Cigarettes Start date: 2021 Smokeless tobacco: Never Used Vaping Use Vaping Use: Never used Alcohol use: Not Currently Alcohol/week: 0.0 standard drinks Drug use: No Pack years: 35 Last CT lung screen: 3/15/2021 Is there documentation of shared decision making? ->Yes Does the patient show any signs or symptoms of lung cancer? ->No Is this the first (baseline) CT or an annual exam?->Annual Is this a low dose CT or a routine CT?->Low Dose CT Smoking Status?->Current Every Day Smoker Smoking packs per day?->1 Years smoking?->35 Reason for Exam: current smoker, 1 pack per day x 35 years, hx of COPD,HTN,diabetes FINDINGS: Mediastinum:  Visualized thyroid gland grossly unremarkable in appearance. Atherosclerotic calcification of the aorta and branch vasculature. Stable prominent mediastinal lymph nodes. Coronary artery disease. No hilar or axillary lymphadenopathy. No periaortic or mediastinal hemorrhage. No pericardial or pleural effusions. Lungs/Pleura:  Trachea and proximal central airways appear patent. Moderate to severe emphysema. Respiratory motion throughout the lungs. Mild dependent atelectasis within the lungs. No pneumothorax. No lobar airspace consolidation. 2 mm right upper lobe pulmonary nodule on image 22, series 4, unchanged from 03/12/2020. 2 mm left upper lobe pulmonary nodule on image 62, series 4, stable. Upper Abdomen:  Atherosclerotic calcification of the upper abdominal aorta and branch vasculature. Probable stable tiny renal cyst at the upper pole right kidney on image 82, series 602, unchanged from 03/12/2020. Soft Tissues/Bones: Reverse left shoulder arthroplasty hardware. Mild diffuse degenerative changes throughout the spine. Multilevel Schmorl's node deformities. 1. Stable 2 mm upper lobe pulmonary nodules, unchanged from 03/12/2020. Moderate to severe emphysema. Respiratory motion. Mild dependent atelectasis. 2. Stable probable upper pole tiny right renal cyst unchanged from 03/12/2020. 3. Atherosclerotic calcification of the aorta and branch vasculature. Coronary artery disease. 4. Stable prominent mediastinal lymph nodes. LUNG RADS: Per ACR Lung-RADS Version 1.1 Category 2, Benign appearance or behavior. Management:  Continue annual lung screening with LDCT in 12 months. RECOMMENDATIONS: If you would like to register your patient with the [facility] Nodule/Lung Cancer Screening Program, please contact the Nurse Navigator at [phone]).        Assessment :      Primary Problem  COPD exacerbation [facility])    Active Hospital Problems    Diagnosis Date Noted    REYNALDO on CPAP [G47.33, Z99.89] 05/06/2017     Priority: High    Acute respiratory failure with hypoxia ([address] [J96.01] 05/02/2022     Priority: Medium    COPD exacerbation (HCC) [J44.1] 03/11/2022    Type 2 diabetes mellitus with diabetic polyneuropathy, without long-term current use of insulin [address] [E11.42] 03/25/2017    Essential hypertension [I10] 01/20/2016       Plan:     Patient status Admit as inpatient in the  Medical ICU    Acute hypoxic respiratory failure 2/2 COPD Exacerbation  -Patient saturating 89 in the ER, initially placed on NC, but work of breathing did not improve so placed on BiPAP  -ABGs pH and pCO2 wnl  -D-dimer and CT PE unremarkable, CXR no acute process  -Received IV solumderol 125 mg in the ER  -Started IV solumedrol 40 mg every 6 hours, will wean if breathing improves  -Currently on 3LNC  -Duoneb  -Resp care and eval  -Pulm on board    Essential HTN  -Continue home dose Norvasc and lisinopril  -Patient has REYNALDO and on CPAP at night    T2DM  -Last A1c 5.8  -Patient states he used to take metformin but stopped using it  -On low dose sliding scale  -Hypoglycemia protocol  -POCT x4    Chronic Diastolic HF   -Lasix 40 mg daily  -Echo in 2019, EF 60-65%  -Does not appear fluid overloaded  -Repeat Echo-pending    ASCVD  -Cardiac catheterization 2012 with nonobstructive coronary disease, 40% RCA  -Stress test 2019 with no reversible ischemia  -Asprin 81 mg      Consultations:   IP CONSULT TO INTERNAL MEDICINE  IP CONSULT TO PULMONOLOGY    Patient is admitted as inpatient status because of co-morbiditieslisted above, severity of signs and symptoms as outlined, requirement for current medical therapies and most importantly because of direct risk to patient if care not provided in a hospital setting. Jenna Tidwell MD  5/2/2022  9:52 AM    Copy sent to Dr. Chris Fall MD  Attending Physician Statement  I have discussed the care of Malia Nunez and I have examined the patient myselft and taken ros and hpi , including pertinent history and exam findings,  with the resident. I have reviewed the key elements of all parts of the encounter with the resident.   I agree with the assessment, plan and orders as documented by the resident.   25-year-old gentleman class II obese BMI 37.43 weighs 276 pounds history of chronic respiratory failure secondary to COPD on home oxygen nebulizer and CPAP for obstructive sleep apnea admitted with increasing dyspnea did not respond initially to conservative treatment required BiPAP support and ICU admission  IV steroids DuoNeb  Pulmonary consult    Electronically signed by Mary Whitten MD

## 2022-05-03 LAB
ANION GAP SERPL CALCULATED.3IONS-SCNC: 11 MMOL/L (ref 9–17)
BUN BLDV-MCNC: 15 MG/DL (ref 8–23)
CALCIUM SERPL-MCNC: 9.9 MG/DL (ref 8.6–10.4)
CHLORIDE BLD-SCNC: 102 MMOL/L (ref 98–107)
CO2: 26 MMOL/L (ref 20–31)
CREAT SERPL-MCNC: 0.64 MG/DL (ref 0.7–1.2)
GFR AFRICAN AMERICAN: >60 ML/MIN
GFR NON-AFRICAN AMERICAN: >60 ML/MIN
GFR SERPL CREATININE-BSD FRML MDRD: ABNORMAL ML/MIN/{1.73_M2}
GLUCOSE BLD-MCNC: 120 MG/DL (ref 75–110)
GLUCOSE BLD-MCNC: 126 MG/DL (ref 75–110)
GLUCOSE BLD-MCNC: 128 MG/DL (ref 75–110)
GLUCOSE BLD-MCNC: 134 MG/DL (ref 70–99)
GLUCOSE BLD-MCNC: 140 MG/DL (ref 75–110)
HCT VFR BLD CALC: 44.5 % (ref 41–53)
HEMOGLOBIN: 15.1 G/DL (ref 13.5–17.5)
MCH RBC QN AUTO: 30.1 PG (ref 26–34)
MCHC RBC AUTO-ENTMCNC: 33.8 G/DL (ref 31–37)
MCV RBC AUTO: 88.8 FL (ref 80–100)
PDW BLD-RTO: 16.2 % (ref 11.5–14.9)
PLATELET # BLD: 178 K/UL (ref 150–450)
PMV BLD AUTO: 8.7 FL (ref 6–12)
POTASSIUM SERPL-SCNC: 5.1 MMOL/L (ref 3.7–5.3)
RBC # BLD: 5.01 M/UL (ref 4.5–5.9)
SODIUM BLD-SCNC: 139 MMOL/L (ref 135–144)
WBC # BLD: 6.4 K/UL (ref 3.5–11)

## 2022-05-03 PROCEDURE — 85027 COMPLETE CBC AUTOMATED: CPT

## 2022-05-03 PROCEDURE — 6370000000 HC RX 637 (ALT 250 FOR IP): Performed by: INTERNAL MEDICINE

## 2022-05-03 PROCEDURE — 6370000000 HC RX 637 (ALT 250 FOR IP): Performed by: STUDENT IN AN ORGANIZED HEALTH CARE EDUCATION/TRAINING PROGRAM

## 2022-05-03 PROCEDURE — 6360000002 HC RX W HCPCS: Performed by: STUDENT IN AN ORGANIZED HEALTH CARE EDUCATION/TRAINING PROGRAM

## 2022-05-03 PROCEDURE — 94761 N-INVAS EAR/PLS OXIMETRY MLT: CPT

## 2022-05-03 PROCEDURE — 2700000000 HC OXYGEN THERAPY PER DAY

## 2022-05-03 PROCEDURE — 2580000003 HC RX 258: Performed by: STUDENT IN AN ORGANIZED HEALTH CARE EDUCATION/TRAINING PROGRAM

## 2022-05-03 PROCEDURE — 82947 ASSAY GLUCOSE BLOOD QUANT: CPT

## 2022-05-03 PROCEDURE — 36415 COLL VENOUS BLD VENIPUNCTURE: CPT

## 2022-05-03 PROCEDURE — 80048 BASIC METABOLIC PNL TOTAL CA: CPT

## 2022-05-03 PROCEDURE — 6370000000 HC RX 637 (ALT 250 FOR IP)

## 2022-05-03 PROCEDURE — 90792 PSYCH DIAG EVAL W/MED SRVCS: CPT | Performed by: PSYCHIATRY & NEUROLOGY

## 2022-05-03 PROCEDURE — 94640 AIRWAY INHALATION TREATMENT: CPT

## 2022-05-03 PROCEDURE — 2060000000 HC ICU INTERMEDIATE R&B

## 2022-05-03 PROCEDURE — 99233 SBSQ HOSP IP/OBS HIGH 50: CPT | Performed by: INTERNAL MEDICINE

## 2022-05-03 PROCEDURE — 94664 DEMO&/EVAL PT USE INHALER: CPT

## 2022-05-03 RX ADMIN — LISINOPRIL 10 MG: 20 TABLET ORAL at 09:37

## 2022-05-03 RX ADMIN — ASPIRIN 81 MG: 81 TABLET, COATED ORAL at 09:36

## 2022-05-03 RX ADMIN — METHYLPREDNISOLONE SODIUM SUCCINATE 40 MG: 40 INJECTION, POWDER, FOR SOLUTION INTRAMUSCULAR; INTRAVENOUS at 14:31

## 2022-05-03 RX ADMIN — METHYLPREDNISOLONE SODIUM SUCCINATE 40 MG: 40 INJECTION, POWDER, FOR SOLUTION INTRAMUSCULAR; INTRAVENOUS at 09:38

## 2022-05-03 RX ADMIN — OXYCODONE HYDROCHLORIDE AND ACETAMINOPHEN 1 TABLET: 5; 325 TABLET ORAL at 12:14

## 2022-05-03 RX ADMIN — IPRATROPIUM BROMIDE AND ALBUTEROL SULFATE 1 AMPULE: .5; 2.5 SOLUTION RESPIRATORY (INHALATION) at 14:27

## 2022-05-03 RX ADMIN — SERTRALINE HYDROCHLORIDE 50 MG: 50 TABLET ORAL at 18:19

## 2022-05-03 RX ADMIN — IPRATROPIUM BROMIDE AND ALBUTEROL SULFATE 1 AMPULE: .5; 2.5 SOLUTION RESPIRATORY (INHALATION) at 08:36

## 2022-05-03 RX ADMIN — SODIUM CHLORIDE, PRESERVATIVE FREE 10 ML: 5 INJECTION INTRAVENOUS at 09:44

## 2022-05-03 RX ADMIN — PRAVASTATIN SODIUM 80 MG: 40 TABLET ORAL at 20:37

## 2022-05-03 RX ADMIN — AMLODIPINE BESYLATE 10 MG: 10 TABLET ORAL at 09:37

## 2022-05-03 RX ADMIN — IPRATROPIUM BROMIDE AND ALBUTEROL SULFATE 1 AMPULE: .5; 2.5 SOLUTION RESPIRATORY (INHALATION) at 11:29

## 2022-05-03 RX ADMIN — SODIUM CHLORIDE, PRESERVATIVE FREE 10 ML: 5 INJECTION INTRAVENOUS at 20:45

## 2022-05-03 RX ADMIN — METHYLPREDNISOLONE SODIUM SUCCINATE 40 MG: 40 INJECTION, POWDER, FOR SOLUTION INTRAMUSCULAR; INTRAVENOUS at 20:37

## 2022-05-03 RX ADMIN — ENOXAPARIN SODIUM 30 MG: 100 INJECTION SUBCUTANEOUS at 20:37

## 2022-05-03 RX ADMIN — ENOXAPARIN SODIUM 30 MG: 100 INJECTION SUBCUTANEOUS at 09:37

## 2022-05-03 RX ADMIN — FUROSEMIDE 40 MG: 40 TABLET ORAL at 09:37

## 2022-05-03 RX ADMIN — IPRATROPIUM BROMIDE AND ALBUTEROL SULFATE 1 AMPULE: .5; 2.5 SOLUTION RESPIRATORY (INHALATION) at 19:18

## 2022-05-03 RX ADMIN — METHYLPREDNISOLONE SODIUM SUCCINATE 40 MG: 40 INJECTION, POWDER, FOR SOLUTION INTRAMUSCULAR; INTRAVENOUS at 03:11

## 2022-05-03 ASSESSMENT — PAIN - FUNCTIONAL ASSESSMENT: PAIN_FUNCTIONAL_ASSESSMENT: PREVENTS OR INTERFERES SOME ACTIVE ACTIVITIES AND ADLS

## 2022-05-03 ASSESSMENT — PAIN SCALES - GENERAL: PAINLEVEL_OUTOF10: 9

## 2022-05-03 NOTE — PROGRESS NOTES
BRONCHOSPASM/BRONCHOCONSTRICTION     [x]         IMPROVE AERATION/BREATH SOUNDS  [x]   ADMINISTER BRONCHODILATOR THERAPY AS APPROPRIATE  [x]   ASSESS BREATH SOUNDS  []   IMPLEMENT AEROSOL/MDI PROTOCOL  [x]   PATIENT EDUCATION AS NEEDED    PROVIDE ADEQUATE OXYGENATION WITH ACCEPTABLE SP02/ABG'S    [x]  IDENTIFY APPROPRIATE OXYGEN THERAPY  [x]   MONITOR SP02/ABG'S AS NEEDED   [x]   PATIENT EDUCATION AS NEEDED    NONINVASIVE VENTILATION    PROVIDE OPTIMAL VENTILATION/ACCEPTABLE SPO2   IMPLEMENT NONINVASIVE VENTILATION PROTOCOL   MAINTAIN ACCEPTABLE SPO2   ASSESS SKIN INTEGRITY/BREAKDOWN SCORE   PATIENT EDUCATION AS NEEDED   BIPAP AS NEEDED        Home unit is set up at bedside

## 2022-05-03 NOTE — PROGRESS NOTES
Vin Andrade MD/Karl Vicki Cowing MD Spurgeon Harada MD/ Kiya Junior MD/Ryan Duanne Moritz MD Tyra Shields APRN AGACNP-BC, NP-C      Layne Ronquillo APRN NP-C     Shanda Moore APRN NP-C                                           Pulmonary Progress Note    Patient - Bear Larios   Age - 78 y.o.   - 1955  MRN - [id number]  Acct # - [de-identified]  Date of Admission - 2022  7:34 PM    Consulting Service/Physician:       Primary Care Physician: Maria De Jesus Craig MD    SUBJECTIVE:     Chief Complaint:   Chief Complaint   Patient presents with    Shortness of Breath     Subjective:    He states he feels worse today, he does not appear to be in any distress, he is sitting on the edge of bed, still with some audible wheezing. He tells me he is depressed, he doesn't want to talk to any one and he doesn't want to eat, he states he did not feel like that prior to admission. He does have a cough, not bringing up any phlegm, he denies any chest pain or fevers. VITALS  BP (!) 155/78   Pulse 70   Temp 97.3 °F (36.3 °C) (Axillary)   Resp 18   Ht 6' (1.829 m)   Wt 276 lb 0.3 oz (125.2 kg)   SpO2 95%   BMI 37.43 kg/m²   Wt Readings from Last 3 Encounters:   22 276 lb 0.3 oz (125.2 kg)   22 276 lb (125.2 kg)   22 277 lb 6.4 oz (125.8 kg)     I/O (24 Hours)    Intake/Output Summary (Last 24 hours) at 5/3/2022 3992  Last data filed at 2022 1449  Gross per 24 hour   Intake --   Output 1000 ml   Net -1000 ml     Ventilator:   Settings  FiO2 : 32 %  Insp Rise Time (%): 2 %  Exam:   Physical Exam   Constitutional:  Oriented to person, place, and time. Appears well-developed and well-nourished. Sitting up at edge of bed in no distress  HENT: Unremarkable  Head: Normocephalic and atraumatic. Eyes: EOM are normal. Pupils are equal, round, and reactive to light. Neck: Neck supple. Cardiovascular:  Regular rate and rhythm. Normal heart tones.   No JVD.    Pulmonary/Chest: Effort normal and breath sounds diminished with expiratory wheezes, respirations easy and nonlabored at rest  Abdominal: Soft. Bowel sounds are normal. There is no tenderness. Musculoskeletal: Normal range of motion. Neurological:patient is alert and oriented to person, place, and time. Skin: Skin is warm and dry.     Extremities: No edema   Infusions:      dextrose      sodium chloride       Meds:     Current Facility-Administered Medications:     methylPREDNISolone sodium (SOLU-MEDROL) injection 40 mg, 40 mg, IntraVENous, Q6H, Rosario Morse MD, 40 mg at 05/03/22 0311    glucose (GLUTOSE) 40 % oral gel 15 g, 15 g, Oral, PRN, Rosario Morse MD    dextrose 50 % IV solution, 12.5 g, IntraVENous, PRN, Rosario Morse MD    glucagon (rDNA) injection 1 mg, 1 mg, IntraMUSCular, PRN, Rosario Morse MD    dextrose 5 % solution, 100 mL/hr, IntraVENous, PRN, Rosario Morse MD    insulin lispro (HUMALOG) injection vial 0-6 Units, 0-6 Units, SubCUTAneous, TID WC, Rosario Morse MD    insulin lispro (HUMALOG) injection vial 0-3 Units, 0-3 Units, SubCUTAneous, Nightly, Rosario Morse MD    nicotine (NICODERM CQ) 21 MG/24HR 1 patch, 1 patch, TransDERmal, Daily, Rosario Morse MD    oxyCODONE-acetaminophen (PERCOCET) 5-325 MG per tablet 1 tablet, 1 tablet, Oral, BID PRN, wyatt Barrett MD, 1 tablet at 05/02/22 1228    perflutren lipid microspheres (DEFINITY) injection 1.65 mg, 1.5 mL, IntraVENous, ONCE PRN, Nicolas Headley MD    albuterol (PROVENTIL) nebulizer solution 2.5 mg, 2.5 mg, Nebulization, Q4H PRN, Kiesha Engle DO, 2.5 mg at 05/01/22 2029    sodium chloride flush 0.9 % injection 10 mL, 10 mL, IntraVENous, PRN, Mendel Bridgeman, MD, 10 mL at 05/01/22 2147    0.9 % sodium chloride bolus, 80 mL, IntraVENous, Once, Mendel Bridgeman, MD    sodium chloride flush 0.9 % injection 5-40 mL, 5-40 mL, IntraVENous, 2 times per day, Kiesha Engle DO, 10 mL at 05/02/22 2047    sodium chloride flush 0.9 % injection 5-40 mL, 5-40 mL, IntraVENous, PRN, Aleatha Blizzard, DO    0.9 % sodium chloride infusion, , IntraVENous, PRN, Aleatha Blizzard, DO    enoxaparin Sodium (LOVENOX) injection 30 mg, 30 mg, SubCUTAneous, BID, Aleatha Blizzard, DO, 30 mg at 05/02/22 2047    acetaminophen (TYLENOL) tablet 650 mg, 650 mg, Oral, Q4H PRN, Aleatha Blizzard, DO, 650 mg at 05/02/22 1427    ondansetron (ZOFRAN-ODT) disintegrating tablet 4 mg, 4 mg, Oral, Q8H PRN, 4 mg at 05/02/22 1316 **OR** ondansetron (ZOFRAN) injection 4 mg, 4 mg, IntraVENous, Q6H PRN, Aleatha Blizzard, DO    ipratropium-albuterol (DUONEB) nebulizer solution 1 ampule, 1 ampule, Inhalation, Q4H WA, Aleatha Blizzard, DO, 1 ampule at 05/03/22 0836    amLODIPine (NORVASC) tablet 10 mg, 10 mg, Oral, Daily, Aleatha Blizzard, DO, 10 mg at 05/02/22 0810    aspirin EC tablet 81 mg, 81 mg, Oral, Daily, Aleatha Blizzard, DO, 81 mg at 05/02/22 0810    furosemide (LASIX) tablet 40 mg, 40 mg, Oral, Daily, Aleatha Blizzard, DO, 40 mg at 05/02/22 0810    lisinopril (PRINIVIL;ZESTRIL) tablet 10 mg, 10 mg, Oral, Daily, Aleatha Blizzard, DO, 10 mg at 05/02/22 0810    pravastatin (PRAVACHOL) tablet 80 mg, 80 mg, Oral, Nightly, Aleatha Blizzard, DO, 80 mg at 05/02/22 2046    Lab Results:     Lab Results   Component Value Date    WBC 6.4 05/03/2022    HGB 15.1 05/03/2022    HCT 44.5 05/03/2022    MCV 88.8 05/03/2022     05/03/2022     Lab Results   Component Value Date    CALCIUM 9.9 05/03/2022     05/03/2022    K 5.1 05/03/2022    CO2 26 05/03/2022     05/03/2022    BUN 15 05/03/2022    CREATININE 0.64 (L) 05/03/2022       Lab Results   Component Value Date    INR 1.0 03/19/2020    PROTIME 13.1 03/19/2020     2D echo shows EF 60%, trivial MR with RVSP of 25  Radiology:   No recent chest imaging    ASSESSMENT:       1. Acute exacerbation COPD, stage III, FEV1 61% predicted with diffusion capacity of 32%  2. Acute bronchospasm  3.  Elevated D-dimer, negative CT angiogram of the chest for pulmonary embolism  4. Obstructive sleep apnea on CPAP therapy at home  5. Chronic diastolic heart failure  6. Tobacco use, quitting only 2 weeks ago  7. Pseudocholinesterase deficiency  8. Obesity  9. Full code  PLAN:   1. Continue with steroids  2. Encourage smoking cessation  3. Cont cpap with 3L at hs  4. Ambulate/increase activity as tolerated  5. DVT proph  6. Enc po intake  7. May benefit from psych eval - defer to primary  8. Not quite ready for discharge, likely needs a few more days      Electronically signed by JAVI Fox - CNP on 05/03/22     This progress note was completed using a voice transcription system. Every effort was made to ensure accuracy. However, inadvertent computerized transcription errors may be present.     JASMIN ELIAS-BC, NP-C  [facility]monary, Critical Care [facility] Sleep

## 2022-05-03 NOTE — PROGRESS NOTES
Transfer Note     Patient with a history of HTN, REYNALDO on CPAP, CHF, COPD here for COPD exacerbation. Came in with worsening SOB, denies chest pain and had chest tightness. Patient received IV solumderol in the ER and was placed on BiPAP and then NC and moved to the ICU, patient's breathing improved on IV solumderol 40mg q6h, and moved to progressive.     Electronically signed by Dana Ospina MD on 5/3/2022 at 7:17 AM

## 2022-05-03 NOTE — PROGRESS NOTES
Pending sale to [facility]    Progress Note     5/3/2022    2:56 PM    Name:   Luz Elena Galindo  MRN:     Giovani Boron:      [de-identified]   Room:   2083/2083-01   Day:  2  Admit Date:  5/1/2022  7:34 PM    PCP:   Malia Sanz MD  Code Status:  Full Code    Subjective:     C/C:   Chief Complaint   Patient presents with    Shortness of Breath     Principal Problem:    COPD exacerbation ([facility] [address]  Active Problems:    REYNALDO on CPAP    Acute respiratory failure with hypoxia ([facility] [address]    Essential hypertension    Type 2 diabetes mellitus with diabetic polyneuropathy, without long-term current use of insulin ([facility] [address]  Resolved Problems:    * No resolved hospital problems. *       c/o sob  Depression          on admission  The patient is a 78 y.o. Non- / non  male who presents withShortness of Breath. He has a past medical history of hyperlipidemia, HTN, REYNALDO on CPAP, CHF, COPD.   and he is admitted to the hospital for the management of  COPD exacerbation. Patient is an exsmoker and quit about a month ago and used to smoke 1Pack/day  Patient states that since last 4 days, he has been having worsening shortness of breath, denies chest pain, cough, N/V. But endorses chest tightness, unable to catch breath. Patient tried respiratory treatments to no avail. Then patient was brought to the ER by EMS after worsening of symptoms.   Received Prednisone 20 mg on his way to the hospital, Patient was tachypneic      Initially placed on 3L NC with no improvement in respiratory efforts and then was placed on BiPAP saturating in 98.     Patient received IV solumedrol 125 mg  D-Dimer, CT chest, EKG unremarkable for PE.           Significant last 24 hr data reviewed ;   Vitals:    05/03/22 0836 05/03/22 1129 05/03/22 1245 05/03/22 1427   BP:   (!) 146/75    Pulse:   82    Resp: 18  22    Temp:   98 °F (36.7 °C)    TempSrc:   Oral    SpO2: 95% 95% 98% 97%   Weight:       Height: Recent Results (from the past 24 hour(s))   POC Glucose Fingerstick    Collection Time: 05/02/22  4:35 PM   Result Value Ref Range    POC Glucose 123 (H) 75 - 110 mg/dL   POC Glucose Fingerstick    Collection Time: 05/02/22  8:07 PM   Result Value Ref Range    POC Glucose 136 (H) 75 - 110 mg/dL   Basic Metabolic Panel w/ Reflex to MG    Collection Time: 05/03/22  5:42 AM   Result Value Ref Range    Glucose 134 (H) 70 - 99 mg/dL    BUN 15 8 - 23 mg/dL    CREATININE 0.64 (L) 0.70 - 1.20 mg/dL    Calcium 9.9 8.6 - 10.4 mg/dL    Sodium 139 135 - 144 mmol/L    Potassium 5.1 3.7 - 5.3 mmol/L    Chloride 102 98 - 107 mmol/L    CO2 26 20 - 31 mmol/L    Anion Gap 11 9 - 17 mmol/L    GFR Non-African American >60 >60 mL/min    GFR African American >60 >60 mL/min    GFR Comment         CBC    Collection Time: 05/03/22  5:42 AM   Result Value Ref Range    WBC 6.4 3.5 - 11.0 k/uL    RBC 5.01 4.5 - 5.9 m/uL    Hemoglobin 15.1 13.5 - 17.5 g/dL    Hematocrit 44.5 41 - 53 %    MCV 88.8 80 - 100 fL    MCH 30.1 26 - 34 pg    MCHC 33.8 31 - 37 g/dL    RDW 16.2 (H) 11.5 - 14.9 %    Platelets 437 019 - 302 k/uL    MPV 8.7 6.0 - 12.0 fL   POC Glucose Fingerstick    Collection Time: 05/03/22  6:03 AM   Result Value Ref Range    POC Glucose 128 (H) 75 - 110 mg/dL   POC Glucose Fingerstick    Collection Time: 05/03/22 12:26 PM   Result Value Ref Range    POC Glucose 120 (H) 75 - 110 mg/dL     Recent Labs     05/02/22  1114 05/02/22  1635 05/02/22  2007 05/03/22  0603 05/03/22  1226   POCGLU 107 123* 136* 128* 120*        ECHO Complete 2D W Doppler W Color    Result Date: 5/2/2022  1604 [facility] Transthoracic Echocardiography Report (TTE)  Patient Name [facility]     Date of Study           05/02/2022               Ayde Mayra   Date of      1955  Gender                  Male  Birth   Age          78 year(s)  Race                       Room Number  2083        Height:                 72 inch, 182.88 cm   Corporate ID [id number]    Weight:                 276 pounds, 125.2 kg  #   Patient Acct [de-identified]   BSA:        2.44 m^2    BMI:        37.43 kg/m^2  #   MR #         [id number]      Sonographer             Suzan Olsen   Accession #  [id number]  Interpreting Physician  [address]   Fellow                   Referring Nurse                           Practitioner   Interpreting             Referring Physician     [address]  Fellow                                           Mio  Type of Study   TTE procedure:2D Echocardiogram, M-Mode, Doppler, Color Doppler. Procedure Date Date: 05/02/2022 Start: 04:16 PM Study Location: [address] [place] [address] Technical Quality: Fair visualization Indications:Congestive heart failure, diastolic dysfunction. Patient Status: Inpatient Height: 72 inches Weight: 276.01 pounds BSA: 2.44 m^2 BMI: 37.43 kg/m^2 Rhythm: Within normal limits HR: 89 bpm BP: 153/72 mmHg Allergies   - *No Known Allergies. CONCLUSIONS Summary Left ventricle is normal in size and wall thickness. Global left ventricular systolic function is normal. Calculated EF via Lazaro's method is 60 %. No obvious wall motion abnormality seen. Left atrium is normal in size. Right atrium is mildly dilated . Normal right ventricular size and function. Mild tricuspid regurgitation. No pulmonary hypertension. Estimated right ventricular systolic pressure is 25SVGS. Signature ----------------------------------------------------------------------------  Electronically signed by Suzan Olsen(Sonographer) on 05/02/2022 04:43  PM ---------------------------------------------------------------------------- ----------------------------------------------------------------------------  Electronically signed by Horacio Dodd physician) on 05/02/2022  06:20 PM ---------------------------------------------------------------------------- FINDINGS Left Atrium Left atrium is normal in size.  Left Ventricle Left ventricle is normal in size and wall thickness. Global left ventricular systolic function is normal. Calculated EF via Lazaro's method is 60 %. No obvious wall motion abnormality seen. Right Atrium Right atrium is mildly dilated . Right Ventricle Normal right ventricular size and function. Mitral Valve No obvious valvular abnormality seen. Trivial mitral regurgitation. Aortic Valve No obvious valvular abnormality seen. No evidence of aortic insufficiency or stenosis. Tricuspid Valve No obvious valvular abnormality. Mild tricuspid regurgitation. No pulmonary hypertension. Estimated right ventricular systolic pressure is 88JCQL. Pulmonic Valve Pulmonic valve was not well visualized. No evidence of pulmonic insufficiency or stenosis. Pericardial Effusion No significant pericardial effusion is seen. Pleural Effusion No pleural effusion seen. Miscellaneous Normal aortic root dimension.  E/E' average = 9.7. M-mode / 2D Measurements & Calculations:   LVIDd:5.02 cm(3.7 - 5.6 cm)      Diastolic FCZILB:50 ml  IJPAV:2.43 cm(2.2 - 4.0 cm)      Systolic HMUOQT:62 ml  KJFM:8.34 cm(0.6 - 1.1 cm)       Aortic Root:3.6 cm(2.0 - 3.7 cm)  LVPWd:0.95 cm(0.6 - 1.1 cm)      LA Dimension: 3.7 cm(1.9 - 4.0 cm)  Fractional Shortenin.28 %    LA volume/Index: 46.8 ml /19m^2  Calculated LVEF (%): 67.19 %     LVOT:1.9 cm   Mitral:                               Aortic   Peak E-Wave: 0.99 m/s                 Peak Velocity: 1.49 m/s  Peak A-Wave: 0.80 m/s                 Mean Velocity: 1.05 m/s  E/A Ratio: 1.24                       Peak Gradient: 8.88 mmHg  Peak Gradient: 3.88 mmHg              Mean Gradient: 5 mmHg  Deceleration Time: 313 msec                                         Area (continuity): 2.15 cm^2                                        AV VTI: 32.1 cm   Tricuspid:                            Pulmonic:   Estimated RVSP: 25 mmHg  Peak TR Velocity: 2.35 m/s  Peak TR Gradient: 22.09 mmHg  Estimated RA Pressure: 3 mmHg Estimated PASP: 25.09 mmHg  Septal Wall E' velocity:0.10 m/s Lateral Wall E' velocity:0.10 m/s    XR CHEST PORTABLE    Result Date: 5/1/2022  EXAMINATION: ONE XRAY VIEW OF THE CHEST 5/1/2022 8:19 pm COMPARISON: March 11, 2022 HISTORY: ORDERING SYSTEM PROVIDED HISTORY: sob, hypoxic TECHNOLOGIST PROVIDED HISTORY: sob, hypoxic Reason for Exam: sob, hypoxic FINDINGS: Left shoulder arthroplasty. The lungs are without acute focal process. There is no effusion or pneumothorax. The cardiomediastinal silhouette is without acute process. The osseous structures are without acute process. No acute process. CT CHEST PULMONARY EMBOLISM W CONTRAST    Result Date: 5/1/2022  EXAMINATION: CTA OF THE CHEST 5/1/2022 9:41 pm TECHNIQUE: CTA of the chest was performed after the administration of intravenous contrast.  Multiplanar reformatted images are provided for review. MIP images are provided for review. Dose modulation, iterative reconstruction, and/or weight based adjustment of the mA/kV was utilized to reduce the radiation dose to as low as reasonably achievable. COMPARISON: 04/18/2022 HISTORY: ORDERING SYSTEM PROVIDED HISTORY: positive d dimer TECHNOLOGIST PROVIDED HISTORY: positive d dimer Decision Support Exception - unselect if not a suspected or confirmed emergency medical condition->Emergency Medical Condition (MA) Reason for Exam: positive d dimer, sob FINDINGS: Pulmonary Arteries: Pulmonary arteries are adequately opacified for evaluation. No evidence of intraluminal filling defect to suggest pulmonary embolism. Main pulmonary artery is normal in caliber. Mediastinum: Stable mildly prominent nonspecific mediastinal and right hilar lymph nodes. The heart size is normal.  Thoracic aorta is normal in caliber with no evidence of aneurysm or dissection. There is calcified atherosclerotic plaque. Calcific coronary artery disease. Lungs/pleura: There is apical predominant emphysema.   The lungs are without acute process. No focal consolidation or pulmonary edema. No evidence of pleural effusion or pneumothorax. Upper Abdomen: Stable bilateral adrenal gland hypertrophy. Soft Tissues/Bones: No acute bone or soft tissue abnormality. No evidence of pulmonary embolism or acute pulmonary abnormality. Stable mildly prominent nonspecific mediastinal and right hilar lymph nodes. Calcific coronary artery disease. Apical predominant emphysema. RECOMMENDATIONS: Unavailable             On admission         Review of Systems:     Constitutional:  negative for chills, fevers, sweats  Respiratory:  negative for cough, dyspnea on exertion, hemoptysis, shortness of breath, wheezing  Cardiovascular:  negative for chest pain, chest pressure/discomfort, lower extremity edema, palpitations  Gastrointestinal:  negative for abdominal pain, constipation, diarrhea, nausea, vomiting  Neurological:  negative for dizziness, headache  Data:     Past Medical History:  no change     Social History:  no change    Family History: @no change    Vitals:      I/O (24Hr): Intake/Output Summary (Last 24 hours) at 5/3/2022 1456  Last data filed at 5/3/2022 1344  Gross per 24 hour   Intake 0 ml   Output --   Net 0 ml       Labs:    Radiology:    Medications:      Allergies:      Current Meds:   Scheduled Meds:    sertraline  50 mg Oral Dinner    methylPREDNISolone  40 mg IntraVENous Q6H    insulin lispro  0-6 Units SubCUTAneous TID WC    insulin lispro  0-3 Units SubCUTAneous Nightly    nicotine  1 patch TransDERmal Daily    sodium chloride  80 mL IntraVENous Once    sodium chloride flush  5-40 mL IntraVENous 2 times per day    enoxaparin  30 mg SubCUTAneous BID    ipratropium-albuterol  1 ampule Inhalation Q4H WA    amLODIPine  10 mg Oral Daily    aspirin EC  81 mg Oral Daily    furosemide  40 mg Oral Daily    lisinopril  10 mg Oral Daily    pravastatin  80 mg Oral Nightly     Continuous Infusions:    dextrose      sodium chloride PRN Meds: glucose, dextrose, glucagon (rDNA), dextrose, oxyCODONE-acetaminophen, perflutren lipid microspheres, albuterol, sodium chloride flush, sodium chloride flush, sodium chloride, acetaminophen, ondansetron **OR** ondansetron      Physical Examination:        BP (!) 146/75   Pulse 82   Temp 98 °F (36.7 °C) (Oral)   Resp 22   Ht 6' (1.829 m)   Wt 276 lb 0.3 oz (125.2 kg)   SpO2 97%   BMI 37.43 kg/m²   Temp (24hrs), Av.9 °F (36.6 °C), Min:97.3 °F (36.3 °C), Max:98.4 °F (36.9 °C)    Recent Labs     22  1635 22  0603 22  1226   POCGLU 123* 136* 128* 120*       Intake/Output Summary (Last 24 hours) at 5/3/2022 1456  Last data filed at 5/3/2022 1344  Gross per 24 hour   Intake 0 ml   Output --   Net 0 ml       General Appearance:  alert, well appearing, and in no acute distress  Mental status:   Head:  normocephalic, atraumatic. Eye: no icterus, redness, pupils equal and reactive, extraocular eye movements intact, conjunctiva clear  Ear: normal external ear, no discharge, hearing intact  Nose:  no drainage noted  Mouth: mucous membranes moist  Neck: supple, no carotid bruits, thyroid not palpable  Lungs: Bilateral equal air entry, clear to ausculation, no wheezing, rales or rhonchi, normal effort  Cardiovascular: normal rate, regular rhythm, no murmur, gallop, rub.   Abdomen: Soft, nontender, nondistended, normal bowel sounds, no hepatomegaly or splenomegaly  Neurologic: There are no new focal motor or sensory deficits,   Skin: No gross lesions, rashes, bruising or bleeding on exposed skin area  Extremities:  peripheral pulses palpable, no pedal edema or calf pain with palpation  Psych:             Assessment:        Primary Problem  COPD exacerbation (HCC)    Principal Problem:    COPD exacerbation ([address]  Active Problems:    REYNALDO on CPAP    Acute respiratory failure with hypoxia ([facility] [address]    Essential hypertension    Type 2 diabetes mellitus with diabetic polyneuropathy, without long-term current use of insulin ([address]  Resolved Problems:    * No resolved hospital problems. *       Plan:          5/3/22    Full code  Consult psych   Start zoloft   . Hospital Problems           Last Modified POA    * (Principal) COPD exacerbation ([address] 5/1/2022 Yes    REYNALDO on CPAP 5/2/2022 Yes    Acute respiratory failure with hypoxia ([facility] [address] 5/2/2022 Yes    Essential hypertension 5/2/2022 Yes    Type 2 diabetes mellitus with diabetic polyneuropathy, without long-term current use of insulin ([address] 5/2/2022 Yes    Overview Signed 3/25/2017  7:20 AM by Nicanor Rodriguez MD     Lab Results   Component Value Date    LABA1C 7.1 (H) 03/23/2017                                 Thanks for consulting us . Will monitor vitals and clinical course , and  Optimize therapy  as needed .            Frances Swanson MD

## 2022-05-03 NOTE — FLOWSHEET NOTE
provided listening presence, words of comfort and prayer.       05/03/22 1049   Encounter Summary   Encounter Overview/Reason  Spiritual/Emotional Needs   Service Provided For: Patient   Referral/Consult From: [facility]   Support System Unknown   Last Encounter    (5/3/2022)   Complexity of Encounter Moderate   Spiritual/Emotional needs   Type Spiritual Distress   Assessment/Intervention/Outcome   Assessment Sad;Despair   Intervention Active listening;Nurtured Hope;Prayer (assurance of)/Lerona   Outcome Comfort

## 2022-05-03 NOTE — PROGRESS NOTES
[facility] ([facility] [address] [J44.1] . Vitals:    05/03/22 1129   BP:    Pulse:    Resp:    Temp:    SpO2: 95%   . Patients home meds are   Prior to Admission medications    Medication Sig Start Date End Date Taking? Authorizing Provider   allopurinol (ZYLOPRIM) 100 MG tablet Take 1 tablet by mouth daily 4/28/22   Malia Sanz MD   nystatin (MYCOSTATIN) 276793 UNIT/ML suspension Take 5 mLs by mouth 4 times daily Swish and swallow. For 3 months 4/27/22   Malia Sanz MD   fluconazole (DIFLUCAN) 150 MG tablet Take 1 tablet by mouth every 72 hours  Patient not taking: Reported on 5/2/2022 4/27/22   Malia Sanz MD   methylPREDNISolone (MEDROL, ROSALIE,) 4 MG tablet Take by mouth, with food. Keep low carb, low salt diet while taking it 4/27/22   Malia Sanz MD   blood glucose monitor strips Testing once a day. Needs ONE TOUCH VERIO REFLECT 4/27/22   Malia Sanz MD   Blood Pressure KIT Diagnosis: HTN. Needs to check blood pressure 1-2 times a day until stable, then once a day. Goal blood pressure less than 135/85, and above 110/60. 4/4/22   Asmita Hebert MD   albuterol (PROVENTIL) (2.5 MG/3ML) 0.083% nebulizer solution USE THREE MILLILITERS (1 VIAL) VIA NEBULIZATION BY MOUTH EVERY 6 HOURS AS NEEDED FOR SHORTNESS OF BREATH OR COUGH 3/21/22   Malia Sanz MD   lisinopril (PRINIVIL;ZESTRIL) 10 MG tablet Take 1 tablet by mouth daily Dose decreased by cardiologist 2/8/22   Malia Sanz MD   pregabalin (LYRICA) 200 MG capsule Take 1 capsule by mouth 3 times daily for 90 days.  Please ignore prior refill for gabapentin 800 mg, will switch back to Lyrica 2/7/22 5/8/22  Malia Sanz MD   Cholecalciferol (VITAMIN D3) 1.25 MG (27314 UT) CAPS Take by mouth every 7 days     Historical Provider, MD   enzalutamide Kwong Beans) 40 MG capsule Take 4 capsules by mouth daily 11/29/21   Pedro Luis Cruz MD   albuterol sulfate  (90 Base) MCG/ACT inhaler USE 2 INHALATIONS EVERY 6 HOURS AS NEEDED FOR WHEEZING OR SHORTNESS OF BREATH 11/18/21   Lennox Oregon, MD   furosemide (LASIX) 40 MG tablet Take 1 tablet by mouth daily Dose increased 1/12/2021 11/5/21   Lennox Oregon, MD   omeprazole (PRILOSEC) 20 MG delayed release capsule Take 1 capsule by mouth every morning (before breakfast) Dose decreased 7/20/2021 7/20/21   Lennox Oregon, MD   pravastatin (PRAVACHOL) 80 MG tablet Take 1 tablet by mouth every evening Dose increased  9/24/2019  Patient not taking: Reported on 5/2/2022 7/20/21   Lennox Oregon, MD   amLODIPine (NORVASC) 10 MG tablet Take 1 tablet by mouth daily Dose increased 10/16/2020 due to high BP. Correct dosage 7/20/21   Lennox Oregon, MD   folbee plus (FOLBEE PLUS) TABS Take 1 tablet by mouth daily 7/12/21   Robert Ruff MD   tiotropium-olodaterol (STIOLTO RESPIMAT) 2.5-2.5 MCG/ACT AERS Inhale 2 puffs into the lungs daily    Historical Provider, MD   oxyCODONE-acetaminophen (PERCOCET) 5-325 MG per tablet Take 1 tablet by mouth 2 times daily as needed. Historical Provider, MD   Lancets 30G MISC Testing once a day. Please dispense according to patients insurance.  4/23/21   Asmita Hebert MD   ammonium lactate (AMLACTIN) 12 % cream APPLY TOPICALLY TO LEGS ONE TO TWO TIMES A DAY AS NEEDED 3/1/21   Lennox Oregon, MD   OXYGEN With CPAP at night 8/25/20   Historical Provider, MD   aspirin EC 81 MG EC tablet Take 1 tablet by mouth daily 9/4/18   Lennox Oregon, MD       Assessment:       05/03/22 1131   RT Protocol   History Pulmonary Disease 2   Respiratory pattern 4   Breath sounds 2   Cough 2   Indications for Bronchodilator Therapy Decreased or absent breath sounds   Bronchodilator Assessment Score 10         HR - 62  RR - 20  SpO2 - 95% (3L NC)  Breath Sounds: Clear; Diminished      Bronchodilator assessment at level  3  Hyperinflation assessment at level 1  Secretion Management assessment at level  1    [x]    Bronchodilator Assessment  BRONCHODILATOR ASSESSMENT SCORE  Score 0 1 2 3 4 5   Breath Sounds   []  Patient Baseline []  No Wheeze good aeration []  Faint, scattered wheezing, good aeration [x]  Expiratory Wheezing and or moderately diminished []  Insp/Exp wheeze and/or very diminished []  Insp/Exp and/ or marked distress   Respiratory Rate   []  Patient Baseline []  Less than 20 [x]  Less than 20 [x]  20-25 []  Greater than 25 []  Greater than 25   Peak flow % of Pred or PB []  NA   []  Greater than 90%  []  81-90% []  71-80% []  Less than or equal to 70%  or unable to perform []  Unable due to Respiratory Distress   Dyspnea re []  Patient Baseline []  No SOB []  No SOB [x]  SOB on exertion []  SOB min activity []  At rest/acute   e FEV% Predicted       [x]  NA []  Above 69%  []  Unable []  Above 60-69%  []  Unable []  Above 50-59%  []  Unable []  Above 35-49%  []  Unable []  Less than 35%  []  Unable                 [x]  Hyperinflation Assessment  Score 1 2 3   CXR and Breath Sounds   []  Clear []  No atelectasis  Basilar aeration []  Atelectasis or absent basilar breath sounds   Incentive Spirometry Volume  (Per IBW)   []  Greater than or equal to 15ml/Kg []  less than 15ml/Kg []  less than 15ml/Kg   Surgery within last 2 weeks [x]  None or general   []  Abdominal or thoracic surgery  []  Abdominal or thoracic   Chronic Pulmonary Historyre []  No [x]  Yes []  Yes     [x]  Secretion Management Assessment  Score 1 2 3   Bilateral Breath Sounds   []  Occasional Rhonchi []  Scattered Rhonchi []  Course Rhonchi and/or poor aeration   Sputum    []  Small amount of thin secretions []  Moderate amount of viscous secretions []  Copius, Viscious Yellow/ Secretions   CXR as reported by physician []  clear  []  Unavailable [x]  Infiltrates and/or consolidation  []  Unavailable []  Mucus Plugging and or lobar consolidation  []  Unavailable   Cough [x]  Strong, productive cough [x]  Weak productive cough []  No cough or weak non-productive cough   Junior Godinez RCP  11:32 AM

## 2022-05-03 NOTE — CARE COORDINATION
ONGOING DISCHARGE PLAN:    Patient is alert and oriented x4. Spoke with patient regarding discharge plan and patient confirms that plan is still home with spouse. Psych saw today for new onset depression,new orders for Zoloft nightly. Pulm rec few more days, using cpap with 3lpm O2at hs.     IV solumedrol 40 mg Q6, PO lasix, aerosols. Will continue to follow for additional discharge needs.     Electronically signed by Earnestine Guzman RN on 5/3/2022 at 1:46 PM

## 2022-05-03 NOTE — PLAN OF CARE
Problem: Safety - Adult  Goal: Free from fall injury  5/3/2022 0253 by Elvira Jaquez RN  Outcome: Progressing  Note: Pt free from falls     Problem: ABCDS Injury Assessment  Goal: Absence of physical injury  5/3/2022 0253 by Elvira Jaquez RN  Outcome: Progressing  Note: Pt absent of any physical injury     Problem: Skin/Tissue Integrity  Goal: Absence of new skin breakdown  Description: 1. Monitor for areas of redness and/or skin breakdown  2. Assess vascular access sites hourly  3. Every 4-6 hours minimum:  Change oxygen saturation probe site  4. Every 4-6 hours:  If on nasal continuous positive airway pressure, respiratory therapy assess nares and determine need for appliance change or resting period.   Outcome: Progressing  Note: Pt absent from any new skin breakdown

## 2022-05-03 NOTE — CONSULTS
Department of Psychiatry  Behavioral Health Consult    REASON FOR CONSULT: depression    CONSULTING PHYSICIAN: Dr. Jacinto Funk    History obtained from: patient and chart. HISTORY OF PRESENT ILLNESS:    The patient is a 78 y.o. male with significant past psychiatric history of depression and a medical history significant for prostate cancer, COPD, coronary artery disease and chronic diastolic heart failure who presents with shortness of breath. The patient has been treated with steroids and oxygen. He was subsequently placed on BiPAP. The patient is prescribed Zoloft 50 mg at nighttime during this admission    The patient's wife was present at bedside. She left during the interview. The patient reports that he has been feeling depressed for a long time. The patient has been feeling low at least for the last several months. The patient has not been treated for depression in the past..  The patient states that she has gone from being able to watch television to being unmotivated to do anything. He has no energy. He is lying in bed most of the time and not caring for himself. The patient has not been sleeping well but this is because of waking up to use the bathroom. The patient denies suicidal thoughts. He has not been losing weight. The patient denies any problems with his memory. He was oriented to time place and person and was able to give a reasonable account of his medical problems. The patient has no evidence of cognitive difficulties. Patient denies any auditory or visual hallucinations. He denies any psychotic phenomena. The patient has not had any memory problems. He is oriented to time place and person. He appeared to have no cognitive difficulties. His anxiety is generally manageable    The patient is currently receiving care for the above psychiatric illness.       Psychiatric Review of Systems        ·    Obsessions and Compulsions: Denies    ·    Radha or Hypomania: Denies  · Hallucinations: Denies  ·    Panic Attacks:  Denies  ·    Delusions:  Denies  ·    Phobias:  Denies  ·    Trauma: Denies      Substance Abuse History:       Past Psychiatric History:  Prior Diagnosis: none    Hospitalization: no  Hx of Suicidal Attempts: no  Hx of violence:  no  Patient has no prior history of medication treatment for depression    Personal History: The patient was born and raised in the [facility] area. He grew up with 2 alcoholic parents. He said he did not see it as abnormal at that time. At the moment he is living with his wife.   He has 4 children all of whom are local.    Past Medical History:        Diagnosis Date    Acid reflux     Anemia, blood loss 10/7/2018    Arthritis     C. difficile colitis 3/19/2020    Chronic bilateral low back pain with bilateral sciatica 11/3/2016    Chronic diastolic heart failure ([address] 2/25/2021    Complete tear of left rotator cuff 7/18/2018    COPD (chronic obstructive pulmonary disease) ([address]     Coronary artery disease involving native coronary artery of native heart without angina pectoris 2/2/2020    Degenerative disc disease, cervical 7/28/2019    GI bleed 3/19/2020    Gout     H/O cardiac catheterization     yrs ago   no stents    History of blood transfusion     Hyperlipidemia     Hyperlipidemia with target LDL less than 100 1/20/2016    Hyperparathyroidism ([address] 1/17/2020    Hypertension     Knee pain, chronic     left    Liver disease     MDRO (multiple drug resistant organisms) resistance     Melena 3/19/2020    Memory loss     Moderate episode of recurrent major depressive disorder ([address] 1/20/2016    Obesity, Class III, BMI 40-49.9 (morbid obesity) ([address] 1/20/2016    REYNALDO on CPAP 5/6/2017    Osteoarthritis     Peripheral arterial occlusive disease ([address] 3/25/2017    Pneumonia 3/9/2020    Polypharmacy 3/25/2017    Positive FIT (fecal immunochemical test) 4/11/2017    Prolonged emergence from general anesthesia 01/05/2017 Patient \"on life support for 3 days\" after back surgery due to being given succinylcholine    Prostate CA ([address] 1/20/2016    Prostate cancer ([address] 10/2013    finished radiation tx 5/2014    Pseudocholinesterase deficiency 03/25/2017    Pt.  \"on life support\" for 3 days after back surgery 1/5/17 after being given succinylcholine    Severe obesity (BMI 35.0-35.9 with comorbidity) ([address] 2/2/2020    Short of breath on exertion     Sleep apnea     uses CPAP machine nightly    Status post lumbar laminectomy 3/25/2017    Stenosis of left carotid artery 10/7/2018    Syncope and collapse 3/19/2020    Tubular adenoma of colon 4/11/17 5/13/2017    Type 2 diabetes mellitus with hyperglycemia, without long-term current use of insulin ([address] 3/25/2017    Lab Results Component Value Date  LABA1C 7.1 (H) 03/23/2017     Unintended weight loss 10/7/2018    Vitamin D deficiency 3/25/2017    Wears glasses        Past Surgical History:        Procedure Laterality Date    BACK SURGERY      x 2     BACK SURGERY  01/05/2017    lumbar laminectomy L2, L3, L4    BRONCHOSCOPY N/A 3/13/2020    BRONCHOSCOPY performed by Shankar Kolb MD at [facility]  2007    no stents    CAROTID ENDARTERECTOMY Right 12/16/2019    Dr. Hayley Santacruz Bilateral     CHOLECYSTECTOMY, LAPAROSCOPIC N/A 2/16/2021    CHOLECYSTECTOMY LAPAROSCOPIC ROBOTIC XI performed by Frandy Pino MD at [address] [facility], COLON, DIAGNOSTIC      HERNIA REPAIR Left 11/18/2020    HERNIA INGUINAL REPAIR W/MESH OPEN performed by Frandy Pino MD at [address] [place] [address] Left     LUMBAR LAMINECTOMY  01/05/2017    L2-L4    CA CLOSED RX SHLDR DISLOC,ANESTHESIA Left 8/1/2018    SHOULDER CLOSED REDUCTION WITH C-ARM VISUALIZATION performed by Ean Vidal MD at [facility] W/BIOPSY SINGLE/MULTIPLE N/A 5/9/2017    COLONOSCOPY WITH BIOPSY performed by Jefry Natarajan DO at [address] IMPLANT Left 7/18/2018    SHOULDER TOTAL ARTHROPLASTY REVERSE LEFT DJO & BICEP TENDON TRANSFER performed by Maryhelen Bosworth, MD at [address] HUMERAL/GLENOID COMPNT Left 8/3/2018    SHOULDER TOTAL REVERSE  ARTHROPLASTY REVISION performed by Maryhelen Bosworth, MD at [address]? rotator cuff repair    SHOULDER SURGERY Left 10/15/2020    SHOULDER ARTHROSCOPY W/INTEROP CULTURES performed by Maryhelen Bosworth, MD at [address]      ear, forehead    TONSILLECTOMY AND ADENOIDECTOMY      UPPER GASTROINTESTINAL ENDOSCOPY N/A 3/19/2020    EGD BIOPSY performed by Kely Yi MD at [facility]         Medications Prior to Admission:   Medications Prior to Admission: allopurinol (ZYLOPRIM) 100 MG tablet, Take 1 tablet by mouth daily  nystatin (MYCOSTATIN) 748763 UNIT/ML suspension, Take 5 mLs by mouth 4 times daily Swish and swallow. For 3 months  fluconazole (DIFLUCAN) 150 MG tablet, Take 1 tablet by mouth every 72 hours (Patient not taking: Reported on 5/2/2022)  methylPREDNISolone (MEDROL, ROSALIE,) 4 MG tablet, Take by mouth, with food. Keep low carb, low salt diet while taking it  blood glucose monitor strips, Testing once a day. Needs ONE TOUCH VERIO REFLECT  Blood Pressure KIT, Diagnosis: HTN. Needs to check blood pressure 1-2 times a day until stable, then once a day. Goal blood pressure less than 135/85, and above 110/60.  albuterol (PROVENTIL) (2.5 MG/3ML) 0.083% nebulizer solution, USE THREE MILLILITERS (1 VIAL) VIA NEBULIZATION BY MOUTH EVERY 6 HOURS AS NEEDED FOR SHORTNESS OF BREATH OR COUGH  lisinopril (PRINIVIL;ZESTRIL) 10 MG tablet, Take 1 tablet by mouth daily Dose decreased by cardiologist  pregabalin (LYRICA) 200 MG capsule, Take 1 capsule by mouth 3 times daily for 90 days.  Please ignore prior refill for gabapentin 800 mg, will switch back to Lyrica  Cholecalciferol (VITAMIN D3) 1.25 MG (16444 UT) CAPS, Take by mouth every 7 days   enzalutamide (XTANDI) 40 MG capsule, Take 4 capsules by mouth daily  albuterol sulfate  (90 Base) MCG/ACT inhaler, USE 2 INHALATIONS EVERY 6 HOURS AS NEEDED FOR WHEEZING OR SHORTNESS OF BREATH  furosemide (LASIX) 40 MG tablet, Take 1 tablet by mouth daily Dose increased 1/12/2021  omeprazole (PRILOSEC) 20 MG delayed release capsule, Take 1 capsule by mouth every morning (before breakfast) Dose decreased 7/20/2021  pravastatin (PRAVACHOL) 80 MG tablet, Take 1 tablet by mouth every evening Dose increased  9/24/2019 (Patient not taking: Reported on 5/2/2022)  amLODIPine (NORVASC) 10 MG tablet, Take 1 tablet by mouth daily Dose increased 10/16/2020 due to high BP. Correct dosage  folbee plus (FOLBEE PLUS) TABS, Take 1 tablet by mouth daily  tiotropium-olodaterol (STIOLTO RESPIMAT) 2.5-2.5 MCG/ACT AERS, Inhale 2 puffs into the lungs daily  oxyCODONE-acetaminophen (PERCOCET) 5-325 MG per tablet, Take 1 tablet by mouth 2 times daily as needed. Lancets 30G MISC, Testing once a day. Please dispense according to patients insurance.   ammonium lactate (AMLACTIN) 12 % cream, APPLY TOPICALLY TO LEGS ONE TO TWO TIMES A DAY AS NEEDED  OXYGEN, With CPAP at night  aspirin EC 81 MG EC tablet, Take 1 tablet by mouth daily    Allergies:  Succinylcholine chloride and Cymbalta [duloxetine hcl]    FAMILY/SOCIAL HISTORY:  Family History   Problem Relation Age of Onset    Diabetes Mother     Lung Cancer Brother     Liver Cancer Brother     Cancer Father      Social History     Socioeconomic History    Marital status:      Spouse name: Not on file    Number of children: Not on file    Years of education: Not on file    Highest education level: Not on file   Occupational History    Not on file   Tobacco Use    Smoking status: Former Smoker     Packs/day: 1.00     Years: 35.00     Pack years: 35.00     Types: Cigarettes Start date: 2021     Quit date: 3/9/2022     Years since quittin.1    Smokeless tobacco: Never Used   Vaping Use    Vaping Use: Never used   Substance and Sexual Activity    Alcohol use: Not Currently     Alcohol/week: 0.0 standard drinks    Drug use: No    Sexual activity: Not Currently     Partners: Female   Other Topics Concern    Not on file   Social History Narrative    Not on file     Social Determinants of Health     Financial Resource Strain: Low Risk     Difficulty of Paying Living Expenses: Not hard at all   Food Insecurity: No Food Insecurity    Worried About Running Out of Food in the Last Year: Never true    Colleen of Food in the Last Year: Never true   Transportation Needs: No Transportation Needs    Lack of Transportation (Medical): No    Lack of Transportation (Non-Medical):  No   Physical Activity:     Days of Exercise per Week: Not on file    Minutes of Exercise per Session: Not on file   Stress:     Feeling of Stress : Not on file   Social Connections:     Frequency of Communication with Friends and Family: Not on file    Frequency of Social Gatherings with Friends and Family: Not on file    Attends Mandaeism Services: Not on file    Active Member of Clubs or Organizations: Not on file    Attends Club or Organization Meetings: Not on file    Marital Status: Not on file   Intimate Partner Violence:     Fear of Current or Ex-Partner: Not on file    Emotionally Abused: Not on file    Physically Abused: Not on file    Sexually Abused: Not on file   Housing Stability: Unknown    Unable to Pay for Housing in the Last Year: No    Number of [place] in the Last Year: Not on file    Unstable Housing in the Last Year: No       REVIEW OF SYSTEMS    Constitutional: [] fever  [] chills  [] weight loss  []weakness [] Other:  Eyes:  [] photophobia  [] discharge [] acuity change   [] Diplopia   [] Other:  HENT:  [] sore throat  [] ear pain [] Tinnitus   [] Other  Respiratory:  [] Cough  [] Shortness of breath   [] Sputum   [] Other:   Cardiac: []Chest pain   []Palpitations []Edema  []PND  [] Other:  GI:  []Abdominal pain   []Nausea  []Vomiting  []Diarrhea  [] Other:  :  [] Dysuria   []Frequency  []Hematuria  []Discharge  [] Other:  Possible Pregnancy: []Yes   []No   LMP:   Musculoskeletal:  []Back pain  []Neck pain  []Recent Injury   Skin:  []Rash  [] Itching  [] Other:  Neurologic:  [] Headache  [] Focal weakness  [] Sensory changes []Other:  Endocrine:  [] Polyuria  [] Polydipsia  [] Hair Loss  [] Other:  Lymphatic:   [] Swollen glands   Psychiatric:  As per HPI      All other systems negative except as marked or mentioned/indicated in the HPI. Ethan Graves      PHYSICAL EXAM:  Vitals:  BP (!) 155/78   Pulse 70   Temp 97.3 °F (36.3 °C) (Axillary)   Resp 18   Ht 6' (1.829 m)   Wt 276 lb 0.3 oz (125.2 kg)   SpO2 95%   BMI 37.43 kg/m²      Neuro Exam:     Involuntary Movements: No    Mental Status Examination:    Level of consciousness:  within normal limits   Appearance:  hospital attire  Behavior/Motor:  no abnormalities noted  Attitude toward examiner:  cooperative and attentive  Speech:  spontaneous, normal rate and normal volume   Mood: depressed and labile  Affect:  mood congruent  Thought processes:  linear, goal directed and coherent   Thought content:  Suicidal Ideation:  denies suicidal ideation  Delusions:  no evidence of delusions  Perceptual Disturbance:  denies any perceptual disturbance  Cognition:  oriented to person, place, and time   Concentration intact  Memory intact  Insight good   Judgement good   Fund of Knowledge adequate        LABS: REVIEWED TODAY:  Recent Labs     05/01/22 1957 05/02/22  0648 05/03/22  0542   WBC 5.0 4.9 6.4   HGB 14.8 13.8 15.1    167 178     Recent Labs     05/01/22 1957 05/02/22  0648 05/03/22  0542    139 139   K 4.5 4.2 5.1    106 102   CO2 26 23 26   BUN 11 13 15   CREATININE 0.76 0.59* 0.64* GLUCOSE 140* 122* 134*     No results for input(s): BILITOT, ALKPHOS, AST, ALT in the last 72 hours. No results found for: [address], BARBSCNU, LABBENZ, CANNAB, COCAINESCRN, LABMETH, [address], PHENCYCLIDINESCREENURINE, PPXUR, ETOH  Lab Results   Component Value Date    TSH 1.13 04/27/2022     No results found for: LITHIUM  No results found for: VALPROATE, CBMZ  No results found for: LITHIUM, VALPROATE    FURTHER LABS ORDERED :      Radiology   ECHO Complete 2D W Doppler W Color    Result Date: 5/2/2022  [facility] Transthoracic Echocardiography Report (TTE)  Patient Name [facility]     Date of Study           05/02/2022               Shivani Dubois   Date of      1955  Gender                  Male  Birth   Age          78 year(s)  Race                       Room Number  2083        Height:                 72 inch, 182.88 cm   Corporate ID [id number]    Weight:                 276 pounds, 125.2 kg  #   Patient Acct [de-identified]   BSA:        2.44 m^2    BMI:        37.43 kg/m^2  #   MR #         [id number]      Sonographer             Suzan Olsen   Accession #  [id number]  Interpreting Physician  [address]   Fellow                   Referring Nurse                           Practitioner   Interpreting             Referring Physician     [facility]  Fellow                                           Mio  Type of Study   TTE procedure:2D Echocardiogram, M-Mode, Doppler, Color Doppler. Procedure Date Date: 05/02/2022 Start: 04:16 PM Study Location: [address] [place] [address] Technical Quality: Fair visualization Indications:Congestive heart failure, diastolic dysfunction. Patient Status: Inpatient Height: 72 inches Weight: 276.01 pounds BSA: 2.44 m^2 BMI: 37.43 kg/m^2 Rhythm: Within normal limits HR: 89 bpm BP: 153/72 mmHg Allergies   - *No Known Allergies. CONCLUSIONS Summary Left ventricle is normal in size and wall thickness.  Global left ventricular systolic function is normal. Calculated EF via Lazaro's method is 60 %. No obvious wall motion abnormality seen. Left atrium is normal in size. Right atrium is mildly dilated . Normal right ventricular size and function. Mild tricuspid regurgitation. No pulmonary hypertension. Estimated right ventricular systolic pressure is [id number]. Signature ----------------------------------------------------------------------------  Electronically signed by Suzan Olsen(Sonographer) on 05/02/2022 04:43  PM ---------------------------------------------------------------------------- ----------------------------------------------------------------------------  Electronically signed by Abraham Dodd(Interpreting physician) on 05/02/2022  06:20 PM ---------------------------------------------------------------------------- FINDINGS Left Atrium Left atrium is normal in size. Left Ventricle Left ventricle is normal in size and wall thickness. Global left ventricular systolic function is normal. Calculated EF via Lazaro's method is 60 %. No obvious wall motion abnormality seen. Right Atrium Right atrium is mildly dilated . Right Ventricle Normal right ventricular size and function. Mitral Valve No obvious valvular abnormality seen. Trivial mitral regurgitation. Aortic Valve No obvious valvular abnormality seen. No evidence of aortic insufficiency or stenosis. Tricuspid Valve No obvious valvular abnormality. Mild tricuspid regurgitation. No pulmonary hypertension. Estimated right ventricular systolic pressure is 82VVPI. Pulmonic Valve Pulmonic valve was not well visualized. No evidence of pulmonic insufficiency or stenosis. Pericardial Effusion No significant pericardial effusion is seen. Pleural Effusion No pleural effusion seen. Miscellaneous Normal aortic root dimension.  E/E' average = 9.7. M-mode / 2D Measurements & Calculations:   LVIDd:5.02 cm(3.7 - 5.6 cm)      Diastolic AHTOQO:21 ml  IOFWZ:8.88 cm(2.2 - 4.0 cm)      Systolic RLUYVH:83 ml  AKPZ:1.95 cm(0.6 - 1.1 cm)       Aortic Root:3.6 cm(2.0 - 3.7 cm)  LVPWd:0.95 cm(0.6 - 1.1 cm)      LA Dimension: 3.7 cm(1.9 - 4.0 cm)  Fractional Shortenin.28 %    LA volume/Index: 46.8 ml /19m^2  Calculated LVEF (%): 67.19 %     LVOT:1.9 cm   Mitral:                               Aortic   Peak E-Wave: 0.99 m/s                 Peak Velocity: 1.49 m/s  Peak A-Wave: 0.80 m/s                 Mean Velocity: 1.05 m/s  E/A Ratio: 1.24                       Peak Gradient: 8.88 mmHg  Peak Gradient: 3.88 mmHg              Mean Gradient: 5 mmHg  Deceleration Time: 313 msec                                         Area (continuity): 2.15 cm^2                                        AV VTI: 32.1 cm   Tricuspid:                            Pulmonic:   Estimated RVSP: 25 mmHg  Peak TR Velocity: 2.35 m/s  Peak TR Gradient: 22.09 mmHg  Estimated RA Pressure: 3 mmHg                                        Estimated PASP: 25.09 mmHg  Septal Wall E' velocity:0.10 m/s Lateral Wall E' velocity:0.10 m/s    XR CHEST PORTABLE    Result Date: 2022  EXAMINATION: ONE XRAY VIEW OF THE CHEST 2022 8:19 pm COMPARISON: 2022 HISTORY: ORDERING SYSTEM PROVIDED HISTORY: sob, hypoxic TECHNOLOGIST PROVIDED HISTORY: sob, hypoxic Reason for Exam: sob, hypoxic FINDINGS: Left shoulder arthroplasty. The lungs are without acute focal process. There is no effusion or pneumothorax. The cardiomediastinal silhouette is without acute process. The osseous structures are without acute process. No acute process. CT CHEST PULMONARY EMBOLISM W CONTRAST    Result Date: 2022  EXAMINATION: CTA OF THE CHEST 2022 9:41 pm TECHNIQUE: CTA of the chest was performed after the administration of intravenous contrast.  Multiplanar reformatted images are provided for review. MIP images are provided for review.  Dose modulation, iterative reconstruction, and/or weight based adjustment of the mA/kV was utilized to reduce the radiation dose to as low as reasonably achievable. COMPARISON: 04/18/2022 HISTORY: ORDERING SYSTEM PROVIDED HISTORY: positive d dimer TECHNOLOGIST PROVIDED HISTORY: positive d dimer Decision Support Exception - unselect if not a suspected or confirmed emergency medical condition->Emergency Medical Condition (MA) Reason for Exam: positive d dimer, sob FINDINGS: Pulmonary Arteries: Pulmonary arteries are adequately opacified for evaluation. No evidence of intraluminal filling defect to suggest pulmonary embolism. Main pulmonary artery is normal in caliber. Mediastinum: Stable mildly prominent nonspecific mediastinal and right hilar lymph nodes. The heart size is normal.  Thoracic aorta is normal in caliber with no evidence of aneurysm or dissection. There is calcified atherosclerotic plaque. Calcific coronary artery disease. Lungs/pleura: There is apical predominant emphysema. The lungs are without acute process. No focal consolidation or pulmonary edema. No evidence of pleural effusion or pneumothorax. Upper Abdomen: Stable bilateral adrenal gland hypertrophy. Soft Tissues/Bones: No acute bone or soft tissue abnormality. No evidence of pulmonary embolism or acute pulmonary abnormality. Stable mildly prominent nonspecific mediastinal and right hilar lymph nodes. Calcific coronary artery disease. Apical predominant emphysema. RECOMMENDATIONS: Unavailable     CT lung screen [Initial/Annual]    Result Date: 4/19/2022  EXAMINATION: LOW DOSE SCREENING CT OF THE CHEST WITHOUT CONTRAST 4/18/2022 10:18 am TECHNIQUE: Low dose lung cancer screening CT of the chest was performed without the administration of intravenous contrast.  Multiplanar reformatted images are provided for review. Dose modulation, iterative reconstruction, and/or weight based adjustment of the mA/kV was utilized to reduce the radiation dose to as low as reasonably achievable.  Field-of-view: 32 cm Dose Length Product: 105.61 mGy CTDlvol: 2.71 mGy COMPARISON: 03/15/2021 HISTORY: Screening. Patient Age: 77 y/o Number of pack years of smokin pack years If no longer smoking, number of years since cessation:  Current, everyday smoker Other symptoms:  None. Additional history: ORDERING SYSTEM PROVIDED HISTORY: Personal history of nicotine dependence TECHNOLOGIST PROVIDED HISTORY: Age: 78 y.o. Smoking History: Social History Tobacco Use Smoking status: Current Every Day Smoker Packs/day: 1.00 Years: 35.00 Pack years: 28 Types: Cigarettes Start date: 2021 Smokeless tobacco: Never Used Vaping Use Vaping Use: Never used Alcohol use: Not Currently Alcohol/week: 0.0 standard drinks Drug use: No Pack years: 35 Last CT lung screen: 3/15/2021 Is there documentation of shared decision making? ->Yes Does the patient show any signs or symptoms of lung cancer? ->No Is this the first (baseline) CT or an annual exam?->Annual Is this a low dose CT or a routine CT?->Low Dose CT Smoking Status?->Current Every Day Smoker Smoking packs per day?->1 Years smoking?->35 Reason for Exam: current smoker, 1 pack per day x 35 years, hx of COPD,HTN,diabetes FINDINGS: Mediastinum:  Visualized thyroid gland grossly unremarkable in appearance. Atherosclerotic calcification of the aorta and branch vasculature. Stable prominent mediastinal lymph nodes. Coronary artery disease. No hilar or axillary lymphadenopathy. No periaortic or mediastinal hemorrhage. No pericardial or pleural effusions. Lungs/Pleura:  Trachea and proximal central airways appear patent. Moderate to severe emphysema. Respiratory motion throughout the lungs. Mild dependent atelectasis within the lungs. No pneumothorax. No lobar airspace consolidation. 2 mm right upper lobe pulmonary nodule on image 22, series 4, unchanged from 2020. 2 mm left upper lobe pulmonary nodule on image 62, series 4, stable. Upper Abdomen:  Atherosclerotic calcification of the upper abdominal aorta and branch vasculature.   Probable stable tiny renal cyst at the upper pole right kidney on image 82, series 602, unchanged from 03/12/2020. Soft Tissues/Bones: Reverse left shoulder arthroplasty hardware. Mild diffuse degenerative changes throughout the spine. Multilevel Schmorl's node deformities. 1. Stable 2 mm upper lobe pulmonary nodules, unchanged from 03/12/2020. Moderate to severe emphysema. Respiratory motion. Mild dependent atelectasis. 2. Stable probable upper pole tiny right renal cyst unchanged from 03/12/2020. 3. Atherosclerotic calcification of the aorta and branch vasculature. Coronary artery disease. 4. Stable prominent mediastinal lymph nodes. LUNG RADS: Per ACR Lung-RADS Version 1.1 Category 2, Benign appearance or behavior. Management:  Continue annual lung screening with LDCT in 12 months. RECOMMENDATIONS: If you would like to register your patient with the [facility]od[facility]/L[facility] Cancer Screening Program, please contact the Nurse Navigator at [phone]). EKG: normal qtc. DIAGNOSIS:    MDD, single episode. Severe        RISK ASSESSMENT: low risk of suicide or harm to others        RECOMMENDATIONS-no indication for admission to psychiatry or sitter    Risk Management:  routine:  no special precautions necessary    Medications: Zoloft 50 mg nightly has been ordered. Encourage outpatient follow-up  Discussed with the treating physician/ team about the patient and treatment plan  Reviewed the chart    Discussed with the patient risk, benefit, alternative and common side effects for the  proposed medication treatment. Patient is consenting to the treatment. Thanks for the consult. Please call me if needed. Electronically signed by Cammy Cabrera MD on 5/3/2022 at 11:59 AM    Please note that this chart was generated using voice recognition Dragon dictation software. Although every effort was made to ensure the accuracy of this automated transcription, some errors in transcription may have occurred.

## 2022-05-03 NOTE — PLAN OF CARE
Problem: Discharge Planning  Goal: Discharge to home or other facility with appropriate resources  Outcome: Progressing  Note: Plan is to return home with wife. Problem: Safety - Adult  Goal: Free from fall injury  Outcome: Progressing     Problem: ABCDS Injury Assessment  Goal: Absence of physical injury  Outcome: Progressing     Problem: Chronic Conditions and Co-morbidities  Goal: Patient's chronic conditions and co-morbidity symptoms are monitored and maintained or improved  Outcome: Progressing     Problem: Pain  Goal: Verbalizes/displays adequate comfort level or baseline comfort level  Outcome: Progressing  Note: Pain improves with PRN percocet. Problem: Skin/Tissue Integrity  Goal: Absence of new skin breakdown  Description: 1. Monitor for areas of redness and/or skin breakdown  2. Assess vascular access sites hourly  3. Every 4-6 hours minimum:  Change oxygen saturation probe site  4. Every 4-6 hours:  If on nasal continuous positive airway pressure, respiratory therapy assess nares and determine need for appliance change or resting period. Outcome: Progressing  Note: No skin breakdown noted this shift.

## 2022-05-04 ENCOUNTER — APPOINTMENT (OUTPATIENT)
Dept: GENERAL RADIOLOGY | Age: 67
DRG: 190 | End: 2022-05-04
Payer: MEDICARE

## 2022-05-04 PROBLEM — F41.8 OTHER SPECIFIED ANXIETY DISORDERS: Status: ACTIVE | Noted: 2022-05-04

## 2022-05-04 LAB
ANION GAP SERPL CALCULATED.3IONS-SCNC: 14 MMOL/L (ref 9–17)
BUN BLDV-MCNC: 24 MG/DL (ref 8–23)
CALCIUM SERPL-MCNC: 10 MG/DL (ref 8.6–10.4)
CHLORIDE BLD-SCNC: 102 MMOL/L (ref 98–107)
CO2: 24 MMOL/L (ref 20–31)
CREAT SERPL-MCNC: 0.73 MG/DL (ref 0.7–1.2)
GFR AFRICAN AMERICAN: >60 ML/MIN
GFR NON-AFRICAN AMERICAN: >60 ML/MIN
GFR SERPL CREATININE-BSD FRML MDRD: ABNORMAL ML/MIN/{1.73_M2}
GLUCOSE BLD-MCNC: 112 MG/DL (ref 75–110)
GLUCOSE BLD-MCNC: 127 MG/DL (ref 75–110)
GLUCOSE BLD-MCNC: 130 MG/DL (ref 75–110)
GLUCOSE BLD-MCNC: 135 MG/DL (ref 70–99)
GLUCOSE BLD-MCNC: 148 MG/DL (ref 75–110)
HCT VFR BLD CALC: 45.5 % (ref 41–53)
HEMOGLOBIN: 15.7 G/DL (ref 13.5–17.5)
MCH RBC QN AUTO: 30.3 PG (ref 26–34)
MCHC RBC AUTO-ENTMCNC: 34.4 G/DL (ref 31–37)
MCV RBC AUTO: 88.1 FL (ref 80–100)
PDW BLD-RTO: 16.3 % (ref 11.5–14.9)
PLATELET # BLD: 196 K/UL (ref 150–450)
PMV BLD AUTO: 8.9 FL (ref 6–12)
POTASSIUM SERPL-SCNC: 4.1 MMOL/L (ref 3.7–5.3)
PRO-BNP: 167 PG/ML
RBC # BLD: 5.17 M/UL (ref 4.5–5.9)
SODIUM BLD-SCNC: 140 MMOL/L (ref 135–144)
WBC # BLD: 8 K/UL (ref 3.5–11)

## 2022-05-04 PROCEDURE — 6370000000 HC RX 637 (ALT 250 FOR IP): Performed by: PSYCHIATRY & NEUROLOGY

## 2022-05-04 PROCEDURE — 6370000000 HC RX 637 (ALT 250 FOR IP): Performed by: STUDENT IN AN ORGANIZED HEALTH CARE EDUCATION/TRAINING PROGRAM

## 2022-05-04 PROCEDURE — 2580000003 HC RX 258: Performed by: EMERGENCY MEDICINE

## 2022-05-04 PROCEDURE — 36415 COLL VENOUS BLD VENIPUNCTURE: CPT

## 2022-05-04 PROCEDURE — 94640 AIRWAY INHALATION TREATMENT: CPT

## 2022-05-04 PROCEDURE — 2060000000 HC ICU INTERMEDIATE R&B

## 2022-05-04 PROCEDURE — 6360000002 HC RX W HCPCS: Performed by: STUDENT IN AN ORGANIZED HEALTH CARE EDUCATION/TRAINING PROGRAM

## 2022-05-04 PROCEDURE — 6370000000 HC RX 637 (ALT 250 FOR IP): Performed by: NURSE PRACTITIONER

## 2022-05-04 PROCEDURE — 94664 DEMO&/EVAL PT USE INHALER: CPT

## 2022-05-04 PROCEDURE — 6370000000 HC RX 637 (ALT 250 FOR IP): Performed by: INTERNAL MEDICINE

## 2022-05-04 PROCEDURE — 85027 COMPLETE CBC AUTOMATED: CPT

## 2022-05-04 PROCEDURE — 94669 MECHANICAL CHEST WALL OSCILL: CPT

## 2022-05-04 PROCEDURE — 94761 N-INVAS EAR/PLS OXIMETRY MLT: CPT

## 2022-05-04 PROCEDURE — 83880 ASSAY OF NATRIURETIC PEPTIDE: CPT

## 2022-05-04 PROCEDURE — 99232 SBSQ HOSP IP/OBS MODERATE 35: CPT | Performed by: PSYCHIATRY & NEUROLOGY

## 2022-05-04 PROCEDURE — 2700000000 HC OXYGEN THERAPY PER DAY

## 2022-05-04 PROCEDURE — 82947 ASSAY GLUCOSE BLOOD QUANT: CPT

## 2022-05-04 PROCEDURE — 2580000003 HC RX 258: Performed by: STUDENT IN AN ORGANIZED HEALTH CARE EDUCATION/TRAINING PROGRAM

## 2022-05-04 PROCEDURE — 71046 X-RAY EXAM CHEST 2 VIEWS: CPT

## 2022-05-04 PROCEDURE — 80048 BASIC METABOLIC PNL TOTAL CA: CPT

## 2022-05-04 PROCEDURE — 99232 SBSQ HOSP IP/OBS MODERATE 35: CPT | Performed by: INTERNAL MEDICINE

## 2022-05-04 RX ORDER — RISPERIDONE 1 MG/1
0.5 TABLET, FILM COATED ORAL 2 TIMES DAILY
Status: DISCONTINUED | OUTPATIENT
Start: 2022-05-04 | End: 2022-05-06 | Stop reason: HOSPADM

## 2022-05-04 RX ORDER — LORAZEPAM 0.5 MG/1
0.5 TABLET ORAL EVERY 6 HOURS PRN
Status: DISCONTINUED | OUTPATIENT
Start: 2022-05-04 | End: 2022-05-06 | Stop reason: HOSPADM

## 2022-05-04 RX ORDER — PREDNISONE 20 MG/1
40 TABLET ORAL DAILY
Status: DISCONTINUED | OUTPATIENT
Start: 2022-05-04 | End: 2022-05-06 | Stop reason: HOSPADM

## 2022-05-04 RX ORDER — GUAIFENESIN 600 MG/1
600 TABLET, EXTENDED RELEASE ORAL 2 TIMES DAILY
Status: DISCONTINUED | OUTPATIENT
Start: 2022-05-04 | End: 2022-05-06 | Stop reason: HOSPADM

## 2022-05-04 RX ADMIN — SODIUM CHLORIDE, PRESERVATIVE FREE 10 ML: 5 INJECTION INTRAVENOUS at 02:57

## 2022-05-04 RX ADMIN — PREDNISONE 40 MG: 20 TABLET ORAL at 16:00

## 2022-05-04 RX ADMIN — METHYLPREDNISOLONE SODIUM SUCCINATE 40 MG: 40 INJECTION, POWDER, FOR SOLUTION INTRAMUSCULAR; INTRAVENOUS at 08:31

## 2022-05-04 RX ADMIN — GUAIFENESIN 600 MG: 600 TABLET, EXTENDED RELEASE ORAL at 16:00

## 2022-05-04 RX ADMIN — FUROSEMIDE 40 MG: 40 TABLET ORAL at 08:31

## 2022-05-04 RX ADMIN — GUAIFENESIN 600 MG: 600 TABLET, EXTENDED RELEASE ORAL at 21:22

## 2022-05-04 RX ADMIN — AMLODIPINE BESYLATE 10 MG: 10 TABLET ORAL at 08:31

## 2022-05-04 RX ADMIN — IPRATROPIUM BROMIDE AND ALBUTEROL SULFATE 1 AMPULE: .5; 2.5 SOLUTION RESPIRATORY (INHALATION) at 15:13

## 2022-05-04 RX ADMIN — METHYLPREDNISOLONE SODIUM SUCCINATE 40 MG: 40 INJECTION, POWDER, FOR SOLUTION INTRAMUSCULAR; INTRAVENOUS at 02:57

## 2022-05-04 RX ADMIN — LISINOPRIL 10 MG: 20 TABLET ORAL at 08:31

## 2022-05-04 RX ADMIN — LORAZEPAM 0.5 MG: 0.5 TABLET ORAL at 14:00

## 2022-05-04 RX ADMIN — PRAVASTATIN SODIUM 80 MG: 40 TABLET ORAL at 21:22

## 2022-05-04 RX ADMIN — ENOXAPARIN SODIUM 30 MG: 100 INJECTION SUBCUTANEOUS at 21:23

## 2022-05-04 RX ADMIN — SODIUM CHLORIDE, PRESERVATIVE FREE 10 ML: 5 INJECTION INTRAVENOUS at 08:31

## 2022-05-04 RX ADMIN — IPRATROPIUM BROMIDE AND ALBUTEROL SULFATE 1 AMPULE: .5; 2.5 SOLUTION RESPIRATORY (INHALATION) at 07:14

## 2022-05-04 RX ADMIN — IPRATROPIUM BROMIDE AND ALBUTEROL SULFATE 1 AMPULE: .5; 2.5 SOLUTION RESPIRATORY (INHALATION) at 10:44

## 2022-05-04 RX ADMIN — SODIUM CHLORIDE, PRESERVATIVE FREE 10 ML: 5 INJECTION INTRAVENOUS at 21:23

## 2022-05-04 RX ADMIN — ASPIRIN 81 MG: 81 TABLET, COATED ORAL at 08:31

## 2022-05-04 RX ADMIN — RISPERIDONE 0.5 MG: 1 TABLET ORAL at 21:22

## 2022-05-04 NOTE — PLAN OF CARE
Problem: Discharge Planning  Goal: Discharge to home or other facility with appropriate resources  5/4/2022 1651 by Asher San RN  Outcome: Progressing  Flowsheets (Taken 5/4/2022 0736)  Discharge to home or other facility with appropriate resources: Identify barriers to discharge with patient and caregiver  5/4/2022 0357 by David Rodriguez RN  Outcome: Progressing     Problem: Safety - Adult  Goal: Free from fall injury  5/4/2022 1651 by Asher San RN  Outcome: Progressing  5/4/2022 0357 by David Rodriguez RN  Outcome: Progressing     Problem: ABCDS Injury Assessment  Goal: Absence of physical injury  5/4/2022 1651 by Asher San RN  Outcome: Progressing  5/4/2022 [phone] by David Rodriguez RN  Outcome: Progressing     Problem: Chronic Conditions and Co-morbidities  Goal: Patient's chronic conditions and co-morbidity symptoms are monitored and maintained or improved  5/4/2022 1651 by Asher San RN  Outcome: Progressing  Flowsheets (Taken 5/4/2022 0736)  Care Plan - Patient's Chronic Conditions and Co-Morbidity Symptoms are Monitored and Maintained or Improved: Monitor and assess patient's chronic conditions and comorbid symptoms for stability, deterioration, or improvement  5/4/2022 0357 by David Rodriguez RN  Outcome: Progressing     Problem: Pain  Goal: Verbalizes/displays adequate comfort level or baseline comfort level  5/4/2022 1651 by Asher San RN  Outcome: Progressing  5/4/2022 0357 by David Rodriguez RN  Outcome: Progressing     Problem: Skin/Tissue Integrity  Goal: Absence of new skin breakdown  Description: 1. Monitor for areas of redness and/or skin breakdown  2. Assess vascular access sites hourly  3. Every 4-6 hours minimum:  Change oxygen saturation probe site  4. Every 4-6 hours:  If on nasal continuous positive airway pressure, respiratory therapy assess nares and determine need for appliance change or resting period.   Outcome: Progressing

## 2022-05-04 NOTE — PROGRESS NOTES
Department of Psychiatry  Behavioral Health Consult    REASON FOR CONSULT: depression    CONSULTING PHYSICIAN: Dr. Manuel Bryan    History obtained from: patient and chart. Interim history. Patient has reported feeling numb all over and not feeling himself. The patient is suspected to neuropsychological sequelae of steroid treatment. He has received Ativan on a PRN basis. He is wearing bipap. He is somnolent. He says he is feeling a little better. The patient's wife was present at bedside. She describes that he was writhing with distress. HISTORY OF PRESENT ILLNESS:    The patient is a 78 y.o. male with significant past psychiatric history of depression and a medical history significant for prostate cancer, COPD, coronary artery disease and chronic diastolic heart failure who presents with shortness of breath. The patient has been treated with steroids and oxygen. He was subsequently placed on BiPAP. The patient is prescribed Zoloft 50 mg at nighttime during this admission    The patient's wife was present at bedside. She left during the interview. The patient reports that he has been feeling depressed for a long time. The patient has been feeling low at least for the last several months. The patient has not been treated for depression in the past..  The patient states that she has gone from being able to watch television to being unmotivated to do anything. He has no energy. He is lying in bed most of the time and not caring for himself. The patient has not been sleeping well but this is because of waking up to use the bathroom. The patient denies suicidal thoughts. He has not been losing weight. The patient denies any problems with his memory. He was oriented to time place and person and was able to give a reasonable account of his medical problems. The patient has no evidence of cognitive difficulties. Patient denies any auditory or visual hallucinations. He denies any psychotic phenomena. The patient has not had any memory problems. He is oriented to time place and person. He appeared to have no cognitive difficulties. His anxiety is generally manageable    The patient is currently receiving care for the above psychiatric illness. Psychiatric Review of Systems        ·    Obsessions and Compulsions: Denies    ·    Radha or Hypomania: Denies  ·    Hallucinations: Denies  ·    Panic Attacks:  Denies  ·    Delusions:  Denies  ·    Phobias:  Denies  ·    Trauma: Denies      Substance Abuse History:       Past Psychiatric History:  Prior Diagnosis: none    Hospitalization: no  Hx of Suicidal Attempts: no  Hx of violence:  no  Patient has no prior history of medication treatment for depression    Personal History: The patient was born and raised in the [facility]. He grew up with 2 alcoholic parents. He said he did not see it as abnormal at that time. At the moment he is living with his wife.   He has 4 children all of whom are local.    Past Medical History:        Diagnosis Date    Acid reflux     Anemia, blood loss 10/7/2018    Arthritis     C. difficile colitis 3/19/2020    Chronic bilateral low back pain with bilateral sciatica 11/3/2016    Chronic diastolic heart failure ([address] 2/25/2021    Complete tear of left rotator cuff 7/18/2018    COPD (chronic obstructive pulmonary disease) ([address]     Coronary artery disease involving native coronary artery of native heart without angina pectoris 2/2/2020    Degenerative disc disease, cervical 7/28/2019    GI bleed 3/19/2020    Gout     H/O cardiac catheterization     yrs ago   no stents    History of blood transfusion     Hyperlipidemia     Hyperlipidemia with target LDL less than 100 1/20/2016    Hyperparathyroidism ([address] 1/17/2020    Hypertension     Knee pain, chronic     left    Liver disease     MDRO (multiple drug resistant organisms) resistance     Melena 3/19/2020    Memory loss     Moderate episode of recurrent major depressive disorder ([address] 1/20/2016    Obesity, Class III, BMI 40-49.9 (morbid obesity) ([address] 1/20/2016    REYNALDO on CPAP 5/6/2017    Osteoarthritis     Peripheral arterial occlusive disease ([address] 3/25/2017    Pneumonia 3/9/2020    Polypharmacy 3/25/2017    Positive FIT (fecal immunochemical test) 4/11/2017    Prolonged emergence from general anesthesia 01/05/2017    Patient \"on life support for 3 days\" after back surgery due to being given succinylcholine    Prostate CA ([address] 1/20/2016    Prostate cancer ([address] 10/2013    finished radiation tx 5/2014    Pseudocholinesterase deficiency 03/25/2017    Pt.  \"on life support\" for 3 days after back surgery 1/5/17 after being given succinylcholine    Severe obesity (BMI 35.0-35.9 with comorbidity) ([address] 2/2/2020    Short of breath on exertion     Sleep apnea     uses CPAP machine nightly    Status post lumbar laminectomy 3/25/2017    Stenosis of left carotid artery 10/7/2018    Syncope and collapse 3/19/2020    Tubular adenoma of colon 4/11/17 5/13/2017    Type 2 diabetes mellitus with hyperglycemia, without long-term current use of insulin ([address] 3/25/2017    Lab Results Component Value Date  LABA1C 7.1 (H) 03/23/2017     Unintended weight loss 10/7/2018    Vitamin D deficiency 3/25/2017    Wears glasses        Past Surgical History:        Procedure Laterality Date    BACK SURGERY      x 2     BACK SURGERY  01/05/2017    lumbar laminectomy L2, L3, L4    BRONCHOSCOPY N/A 3/13/2020    BRONCHOSCOPY performed by Feli Reddy MD at [address] [facility]  2007    no stents    CAROTID ENDARTERECTOMY Right 12/16/2019    Dr. Anup Arenas Bilateral     CHOLECYSTECTOMY, LAPAROSCOPIC N/A 2/16/2021    CHOLECYSTECTOMY LAPAROSCOPIC ROBOTIC XI performed by Jeny Gasca MD at [address] [facility], COLON, DIAGNOSTIC      HERNIA REPAIR Left 11/18/2020    HERNIA INGUINAL REPAIR W/MESH OPEN performed by Jonathan Francis MD at [address] Left     LUMBAR LAMINECTOMY  01/05/2017    L2-L4    VT CLOSED RX SHLDR DISLOC,ANESTHESIA Left 8/1/2018    SHOULDER CLOSED REDUCTION WITH C-ARM VISUALIZATION performed by Pk Torres MD at [address] [facility] W/BIOPSY SINGLE/MULTIPLE N/A 5/9/2017    COLONOSCOPY WITH BIOPSY performed by Carl Plascencia DO at [address] [facility] IMPLANT Left 7/18/2018    SHOULDER TOTAL ARTHROPLASTY REVERSE LEFT DJO & BICEP TENDON TRANSFER performed by Pk Torres MD at [address] HUMERAL/GLENOID COMPNT Left 8/3/2018    SHOULDER TOTAL REVERSE  ARTHROPLASTY REVISION performed by Pk Torres MD at [address]? rotator cuff repair    SHOULDER SURGERY Left 10/15/2020    SHOULDER ARTHROSCOPY W/INTEROP CULTURES performed by Pk Torres MD at [facility]      ear, forehead    TONSILLECTOMY AND ADENOIDECTOMY      UPPER GASTROINTESTINAL ENDOSCOPY N/A 3/19/2020    EGD BIOPSY performed by Eugenio Wells MD at [address] [facility]         Medications Prior to Admission:   Medications Prior to Admission: allopurinol (ZYLOPRIM) 100 MG tablet, Take 1 tablet by mouth daily  nystatin (MYCOSTATIN) 829020 UNIT/ML suspension, Take 5 mLs by mouth 4 times daily Swish and swallow. For 3 months  fluconazole (DIFLUCAN) 150 MG tablet, Take 1 tablet by mouth every 72 hours (Patient not taking: Reported on 5/2/2022)  methylPREDNISolone (MEDROL, ROSALIE,) 4 MG tablet, Take by mouth, with food. Keep low carb, low salt diet while taking it  blood glucose monitor strips, Testing once a day. Needs ONE TOUCH VERIO REFLECT  Blood Pressure KIT, Diagnosis: HTN. Needs to check blood pressure 1-2 times a day until stable, then once a day.  Goal blood pressure less than 135/85, and above 110/60.  albuterol (PROVENTIL) (2.5 MG/3ML) 0.083% nebulizer solution, USE THREE MILLILITERS (1 VIAL)  Marital status:      Spouse name: Not on file    Number of children: Not on file    Years of education: Not on file    Highest education level: Not on file   Occupational History    Not on file   Tobacco Use    Smoking status: Former Smoker     Packs/day: 1.00     Years: 35.00     Pack years: 35.00     Types: Cigarettes     Start date: 2021     Quit date: 3/9/2022     Years since quittin.1    Smokeless tobacco: Never Used   Vaping Use    Vaping Use: Never used   Substance and Sexual Activity    Alcohol use: Not Currently     Alcohol/week: 0.0 standard drinks    Drug use: No    Sexual activity: Not Currently     Partners: Female   Other Topics Concern    Not on file   Social History Narrative    Not on file     Social Determinants of Health     Financial Resource Strain: Low Risk     Difficulty of Paying Living Expenses: Not hard at all   Food Insecurity: No Food Insecurity    Worried About [address] [place] [address] in the Last Year: Never true    Colleen of Food in the Last Year: Never true   Transportation Needs: No Transportation Needs    Lack of Transportation (Medical): No    Lack of Transportation (Non-Medical):  No   Physical Activity:     Days of Exercise per Week: Not on file    Minutes of Exercise per Session: Not on file   Stress:     Feeling of Stress : Not on file   Social Connections:     Frequency of Communication with Friends and Family: Not on file    Frequency of Social Gatherings with Friends and Family: Not on file    Attends [employer] Services: Not on file    Active Member of Clubs or Organizations: Not on file    Attends Club or Organization Meetings: Not on file    Marital Status: Not on file   Intimate Partner Violence:     Fear of Current or Ex-Partner: Not on file    Emotionally Abused: Not on file    Physically Abused: Not on file    Sexually Abused: Not on file   Housing Stability: Unknown    Unable to Pay for Housing in the Last Year: No    Number of Places Lived in the Last Year: Not on file    Unstable Housing in the Last Year: No       REVIEW OF SYSTEMS    Constitutional: [] fever  [] chills  [] weight loss  []weakness [] Other:  Eyes:  [] photophobia  [] discharge [] acuity change   [] Diplopia   [] Other:  HENT:  [] sore throat  [] ear pain [] Tinnitus   [] Other  Respiratory:  [] Cough  [] Shortness of breath   [] Sputum   [] Other:   Cardiac: []Chest pain   []Palpitations []Edema  []PND  [] Other:  GI:  []Abdominal pain   []Nausea  []Vomiting  []Diarrhea  [] Other:  :  [] Dysuria   []Frequency  []Hematuria  []Discharge  [] Other:  Possible Pregnancy: []Yes   []No   LMP:   Musculoskeletal:  []Back pain  []Neck pain  []Recent Injury   Skin:  []Rash  [] Itching  [] Other:  Neurologic:  [] Headache  [] Focal weakness  [] Sensory changes []Other:  Endocrine:  [] Polyuria  [] Polydipsia  [] Hair Loss  [] Other:  Lymphatic:   [] Swollen glands   Psychiatric:  As per HPI      All other systems negative except as marked or mentioned/indicated in the HPI. Jeaneth Birch      PHYSICAL EXAM:  Vitals:  /70   Pulse 98   Temp 98.1 °F (36.7 °C) (Oral)   Resp 16   Ht 6' (1.829 m)   Wt 276 lb 0.3 oz (125.2 kg)   SpO2 95%   BMI 37.43 kg/m²      Neuro Exam:     Involuntary Movements: No    Mental Status Examination:    Level of consciousness:  somnolent  Appearance:  hospital attire  Behavior/Motor:  no abnormalities noted  Attitude toward examiner:  cooperative and attentive  Speech:  Slow and low volum   Mood: depressed and constricted  Affect:  mood congruent  Thought processes:  linear, goal directed and coherent   Thought content:  Suicidal Ideation:  denies suicidal ideation  Delusions:  no evidence of delusions  Perceptual Disturbance:  denies any perceptual disturbance  Cognition:  oriented to person, place, and time   Concentration intact  Memory intact  Insight good   Judgement good   Fund of Knowledge adequate        LABS: REVIEWED TODAY:  Recent Labs     05/02/22  0648 05/03/22  0542 05/04/22  0524   WBC 4.9 6.4 8.0   HGB 13.8 15.1 15.7    178 196     Recent Labs     05/02/22  0648 05/03/22  0542 05/04/22  0524    139 140   K 4.2 5.1 4.1    102 102   CO2 23 26 24   BUN 13 15 24*   CREATININE 0.59* 0.64* 0.73   GLUCOSE 122* 134* 135*     No results for input(s): BILITOT, ALKPHOS, AST, ALT in the last 72 hours. No results found for: [address], BARBSCNU, LABBENZ, CANNAB, COCAINESCRN, LABMETH, [address], PHENCYCLIDINESCREENURINE, PPXUR, ETOH  Lab Results   Component Value Date    TSH 1.13 04/27/2022     No results found for: LITHIUM  No results found for: VALPROATE, CBMZ  No results found for: LITHIUM, VALPROATE    FURTHER LABS ORDERED :      Radiology   ECHO Complete 2D W Doppler W Color    Result Date: 5/2/2022  [address] [facility] Transthoracic Echocardiography Report (TTE)  Patient Name [facility]     Date of Study           05/02/2022               Rita Conn   Date of      1955  Gender                  Male  Birth   Age          78 year(s)  Race                       Room Number  2083        Height:                 72 inch, 182.88 cm   Corporate ID [id number]    Weight:                 276 pounds, 125.2 kg  #   Patient Acct [de-identified]   BSA:        2.44 m^2    BMI:        37.43 kg/m^2  #   MR #         [id number]      Sonographer             Suzan Olsen   Accession #  [id number]  Interpreting Physician  [address]   Fellow                   Referring Nurse                           Practitioner   Interpreting             Referring Physician     [address]  Fellow                                           [place]  Type of Study   TTE procedure:2D Echocardiogram, M-Mode, Doppler, Color Doppler. Procedure Date Date: 05/02/2022 Start: 04:16 PM Study Location: [facility] Technical Quality: Fair visualization Indications:Congestive heart failure, diastolic dysfunction.  Patient Status: Inpatient Height: 72 inches Weight: 276.01 pounds BSA: 2.44 m^2 BMI: 37.43 kg/m^2 Rhythm: Within normal limits HR: 89 bpm BP: 153/72 mmHg Allergies   - *No Known Allergies. CONCLUSIONS Summary Left ventricle is normal in size and wall thickness. Global left ventricular systolic function is normal. Calculated EF via Lazaro's method is 60 %. No obvious wall motion abnormality seen. Left atrium is normal in size. Right atrium is mildly dilated . Normal right ventricular size and function. Mild tricuspid regurgitation. No pulmonary hypertension. Estimated right ventricular systolic pressure is [id number]. Signature ----------------------------------------------------------------------------  Electronically signed by Suzan Olsen(Sonographer) on 05/02/2022 04:43  PM ---------------------------------------------------------------------------- ----------------------------------------------------------------------------  Electronically signed by Abraham Dodd(Interpreting physician) on 05/02/2022  06:20 PM ---------------------------------------------------------------------------- FINDINGS Left Atrium Left atrium is normal in size. Left Ventricle Left ventricle is normal in size and wall thickness. Global left ventricular systolic function is normal. Calculated EF via Lazaro's method is 60 %. No obvious wall motion abnormality seen. Right Atrium Right atrium is mildly dilated . Right Ventricle Normal right ventricular size and function. Mitral Valve No obvious valvular abnormality seen. Trivial mitral regurgitation. Aortic Valve No obvious valvular abnormality seen. No evidence of aortic insufficiency or stenosis. Tricuspid Valve No obvious valvular abnormality. Mild tricuspid regurgitation. No pulmonary hypertension. Estimated right ventricular systolic pressure is 12XACK. Pulmonic Valve Pulmonic valve was not well visualized. No evidence of pulmonic insufficiency or stenosis.  Pericardial Effusion No significant pericardial effusion is seen. Pleural Effusion No pleural effusion seen. Miscellaneous Normal aortic root dimension. E/E' average = 9.7. M-mode / 2D Measurements & Calculations:   LVIDd:5.02 cm(3.7 - 5.6 cm)      Diastolic FTFGSY:51 ml  NZQFS:9.26 cm(2.2 - 4.0 cm)      Systolic NLPMET:96 ml  SWKX:8.70 cm(0.6 - 1.1 cm)       Aortic Root:3.6 cm(2.0 - 3.7 cm)  LVPWd:0.95 cm(0.6 - 1.1 cm)      LA Dimension: 3.7 cm(1.9 - 4.0 cm)  Fractional Shortenin.28 %    LA volume/Index: 46.8 ml /19m^2  Calculated LVEF (%): 67.19 %     LVOT:1.9 cm   Mitral:                               Aortic   Peak E-Wave: 0.99 m/s                 Peak Velocity: 1.49 m/s  Peak A-Wave: 0.80 m/s                 Mean Velocity: 1.05 m/s  E/A Ratio: 1.24                       Peak Gradient: 8.88 mmHg  Peak Gradient: 3.88 mmHg              Mean Gradient: 5 mmHg  Deceleration Time: 313 msec                                         Area (continuity): 2.15 cm^2                                        AV VTI: 32.1 cm   Tricuspid:                            Pulmonic:   Estimated RVSP: 25 mmHg  Peak TR Velocity: 2.35 m/s  Peak TR Gradient: 22.09 mmHg  Estimated RA Pressure: 3 mmHg                                        Estimated PASP: 25.09 mmHg  Septal Wall E' velocity:0.10 m/s Lateral Wall E' velocity:0.10 m/s    XR CHEST PORTABLE    Result Date: 2022  EXAMINATION: ONE XRAY VIEW OF THE CHEST 2022 8:19 pm COMPARISON: 2022 HISTORY: ORDERING SYSTEM PROVIDED HISTORY: sob, hypoxic TECHNOLOGIST PROVIDED HISTORY: sob, hypoxic Reason for Exam: sob, hypoxic FINDINGS: Left shoulder arthroplasty. The lungs are without acute focal process. There is no effusion or pneumothorax. The cardiomediastinal silhouette is without acute process. The osseous structures are without acute process. No acute process.      CT CHEST PULMONARY EMBOLISM W CONTRAST    Result Date: 2022  EXAMINATION: CTA OF THE CHEST 2022 9:41 pm TECHNIQUE: CTA of the chest was performed after the administration of intravenous contrast.  Multiplanar reformatted images are provided for review. MIP images are provided for review. Dose modulation, iterative reconstruction, and/or weight based adjustment of the mA/kV was utilized to reduce the radiation dose to as low as reasonably achievable. COMPARISON: 04/18/2022 HISTORY: ORDERING SYSTEM PROVIDED HISTORY: positive d dimer TECHNOLOGIST PROVIDED HISTORY: positive d dimer Decision Support Exception - unselect if not a suspected or confirmed emergency medical condition->Emergency Medical Condition (MA) Reason for Exam: positive d dimer, sob FINDINGS: Pulmonary Arteries: Pulmonary arteries are adequately opacified for evaluation. No evidence of intraluminal filling defect to suggest pulmonary embolism. Main pulmonary artery is normal in caliber. Mediastinum: Stable mildly prominent nonspecific mediastinal and right hilar lymph nodes. The heart size is normal.  Thoracic aorta is normal in caliber with no evidence of aneurysm or dissection. There is calcified atherosclerotic plaque. Calcific coronary artery disease. Lungs/pleura: There is apical predominant emphysema. The lungs are without acute process. No focal consolidation or pulmonary edema. No evidence of pleural effusion or pneumothorax. Upper Abdomen: Stable bilateral adrenal gland hypertrophy. Soft Tissues/Bones: No acute bone or soft tissue abnormality. No evidence of pulmonary embolism or acute pulmonary abnormality. Stable mildly prominent nonspecific mediastinal and right hilar lymph nodes. Calcific coronary artery disease. Apical predominant emphysema.  RECOMMENDATIONS: Unavailable     CT lung screen [Initial/Annual]    Result Date: 4/19/2022  EXAMINATION: LOW DOSE SCREENING CT OF THE CHEST WITHOUT CONTRAST 4/18/2022 10:18 am TECHNIQUE: Low dose lung cancer screening CT of the chest was performed without the administration of intravenous contrast.  Multiplanar reformatted images are provided for review. Dose modulation, iterative reconstruction, and/or weight based adjustment of the mA/kV was utilized to reduce the radiation dose to as low as reasonably achievable. Field-of-view: 32 cm Dose Length Product: 105.61 mGy CTDlvol: 2.71 mGy COMPARISON: 03/15/2021 HISTORY: Screening. Patient Age: 77 y/o Number of pack years of smokin pack years If no longer smoking, number of years since cessation:  Current, everyday smoker Other symptoms:  None. Additional history: ORDERING SYSTEM PROVIDED HISTORY: Personal history of nicotine dependence TECHNOLOGIST PROVIDED HISTORY: Age: 78 y.o. Smoking History: Social History Tobacco Use Smoking status: Current Every Day Smoker Packs/day: 1.00 Years: 35.00 Pack years: 28 Types: Cigarettes Start date: 2021 Smokeless tobacco: Never Used Vaping Use Vaping Use: Never used Alcohol use: Not Currently Alcohol/week: 0.0 standard drinks Drug use: No Pack years: 35 Last CT lung screen: 3/15/2021 Is there documentation of shared decision making? ->Yes Does the patient show any signs or symptoms of lung cancer? ->No Is this the first (baseline) CT or an annual exam?->Annual Is this a low dose CT or a routine CT?->Low Dose CT Smoking Status?->Current Every Day Smoker Smoking packs per day?->1 Years smoking?->35 Reason for Exam: current smoker, 1 pack per day x 35 years, hx of COPD,HTN,diabetes FINDINGS: Mediastinum:  Visualized thyroid gland grossly unremarkable in appearance. Atherosclerotic calcification of the aorta and branch vasculature. Stable prominent mediastinal lymph nodes. Coronary artery disease. No hilar or axillary lymphadenopathy. No periaortic or mediastinal hemorrhage. No pericardial or pleural effusions. Lungs/Pleura:  Trachea and proximal central airways appear patent. Moderate to severe emphysema. Respiratory motion throughout the lungs. Mild dependent atelectasis within the lungs. No pneumothorax. No lobar airspace consolidation.  2 mm right upper lobe pulmonary nodule on image 22, series 4, unchanged from 03/12/2020. 2 mm left upper lobe pulmonary nodule on image 62, series 4, stable. Upper Abdomen:  Atherosclerotic calcification of the upper abdominal aorta and branch vasculature. Probable stable tiny renal cyst at the upper pole right kidney on image 82, series 602, unchanged from 03/12/2020. Soft Tissues/Bones: Reverse left shoulder arthroplasty hardware. Mild diffuse degenerative changes throughout the spine. Multilevel Schmorl's node deformities. 1. Stable 2 mm upper lobe pulmonary nodules, unchanged from 03/12/2020. Moderate to severe emphysema. Respiratory motion. Mild dependent atelectasis. 2. Stable probable upper pole tiny right renal cyst unchanged from 03/12/2020. 3. Atherosclerotic calcification of the aorta and branch vasculature. Coronary artery disease. 4. Stable prominent mediastinal lymph nodes. LUNG RADS: Per ACR Lung-RADS Version 1.1 Category 2, Benign appearance or behavior. Management:  Continue annual lung screening with LDCT in 12 months. RECOMMENDATIONS: If you would like to register your patient with the [facility] Nodule/Lung Cancer Screening Program, please contact the Nurse Navigator at [phone]). EKG: normal qtc. DIAGNOSIS:    MDD, single episode. Severe        RISK ASSESSMENT: low risk of suicide or harm to others        RECOMMENDATIONS-no indication for admission to psychiatry or sitter    Risk Management:  routine:  no special precautions necessary    Medications: Zoloft 50 mg nightly has been continued. Risperidone 0.5mg bid added. Will likely d/c before discharge. Encourage outpatient follow-up  Discussed with the treating physician/ team about the patient and treatment plan  Reviewed the chart    Discussed with the patient risk, benefit, alternative and common side effects for the  proposed medication treatment. Patient is consenting to the treatment. Thanks for the consult.  Please call me if needed. Electronically signed by Tru Trujillo MD on 5/4/2022 at 7:38 PM    Please note that this chart was generated using voice recognition Dragon dictation software. Although every effort was made to ensure the accuracy of this automated transcription, some errors in transcription may have occurred.

## 2022-05-04 NOTE — CARE COORDINATION
ONGOING DISCHARGE PLAN:    Plan remains for patient to be discharged home without needs. Declined VNS. Active order for PO Lasix and PO steroids. CXR ordered for today. Will continue to follow for additional discharge needs.     Electronically signed by Laine Martínez RN on 5/4/2022 at 2:29 PM

## 2022-05-04 NOTE — PLAN OF CARE
Problem: Discharge Planning  Goal: Discharge to home or other facility with appropriate resources  5/4/2022 0357 by Rachel Mo RN  Outcome: Progressing     Problem: Safety - Adult  Goal: Free from fall injury  5/4/2022 0357 by Rachel Mo RN  Outcome: Progressing     Problem: ABCDS Injury Assessment  Goal: Absence of physical injury  5/4/2022 0357 by Rachel Mo RN  Outcome: Progressing     Problem: Chronic Conditions and Co-morbidities  Goal: Patient's chronic conditions and co-morbidity symptoms are monitored and maintained or improved  5/4/2022 0357 by Rachel Mo RN  Outcome: Progressing

## 2022-05-04 NOTE — PROGRESS NOTES
Vin Andrade MD/Karl Elmer File MD Unknown Saint MD/ Coco Marroquin MD/Dr Melanie CALDWELL AGAZOËP-BC, NP-C      Johnathon CALDWELL NP-C     Pita CALDWELL NP-C                                           Pulmonary Progress Note    Patient - Clearance Lunch   Age - 78 y.o.   - 1955  MRN - [id number]  Acct # - [de-identified]  Date of Admission - 2022  7:34 PM    Consulting Service/Physician:       Primary Care Physician: Lennox Oregon, MD    SUBJECTIVE:     Chief Complaint:   Chief Complaint   Patient presents with    Shortness of Breath     Subjective:    Thuy Rizo tells me he feels like he is having more trouble breathing today. He is currently on his home CPAP laying in bed. He does have 3 L nasal cannula bled into his CPAP. His pulse ox is 96%. He reports he feels like he has a lot of congestion he is unable to bring up. He reports cough without sputum. He is afebrile. He was transitioned to oral prednisone today. He continues on oral Lasix daily. He continues on DuoNeb aerosols every 4 hours. VITALS  BP 98/79   Pulse 97   Temp 97.4 °F (36.3 °C) (Oral)   Resp 20   Ht 6' (1.829 m)   Wt 276 lb 0.3 oz (125.2 kg)   SpO2 96%   BMI 37.43 kg/m²   Wt Readings from Last 3 Encounters:   22 276 lb 0.3 oz (125.2 kg)   22 276 lb (125.2 kg)   22 277 lb 6.4 oz (125.8 kg)     I/O (24 Hours)    Intake/Output Summary (Last 24 hours) at 2022 1310  Last data filed at 2022 0747  Gross per 24 hour   Intake 240 ml   Output --   Net 240 ml     Ventilator:   Settings  FiO2 : 32 %  Insp Rise Time (%): 2 %  Exam:   Physical Exam   Constitutional: Obese male laying in bed on his home CPAP in no acute distress HENT: Unremarkable  Head: Normocephalic and atraumatic. Eyes: EOM are normal. Pupils are equal, round, and reactive to light. Neck: Neck supple. Cardiovascular:  Regular rate and rhythm. Normal heart tones.   No JVD.    Pulmonary/Chest: Few scattered expiratory wheezes with some rales in the bases, on home CPAP with 3 L bled in with pulse ox 96%. Respirations even and unlabored. Abdominal: Soft. Bowel sounds are normal.    Musculoskeletal: Normal range of motion. Neurological: Patient is alert and oriented to person, place, and time. Skin: Skin is warm and dry. No rash noted.    Extremities: No edema or discoloration  Infusions:      dextrose      sodium chloride       Meds:     Current Facility-Administered Medications:     glucose chewable tablet 16 g, 4 tablet, Oral, PRN, Saúl Case MD    sertraline (ZOLOFT) tablet 50 mg, 50 mg, Oral, Amisha De La Rosa MD, 50 mg at 05/03/22 1819    methylPREDNISolone sodium (SOLU-MEDROL) injection 40 mg, 40 mg, IntraVENous, Q6H, Saúl Case MD, 40 mg at 05/04/22 0831    dextrose 50 % IV solution, 12.5 g, IntraVENous, PRN, Saúl Case MD    glucagon (rDNA) injection 1 mg, 1 mg, IntraMUSCular, PRN, Saúl Case MD    dextrose 5 % solution, 100 mL/hr, IntraVENous, PRN, Saúl Case MD    insulin lispro (HUMALOG) injection vial 0-6 Units, 0-6 Units, SubCUTAneous, TID WC, Saúl Case MD    insulin lispro (HUMALOG) injection vial 0-3 Units, 0-3 Units, SubCUTAneous, Nightly, Saúl Case MD    nicotine (NICODERM CQ) 21 MG/24HR 1 patch, 1 patch, TransDERmal, Daily, Saúl Case MD    oxyCODONE-acetaminophen (PERCOCET) 5-325 MG per tablet 1 tablet, 1 tablet, Oral, BID PRN, [facility] Juan Diego Castanon MD, 1 tablet at 05/03/22 1214    perflutren lipid microspheres (DEFINITY) injection 1.65 mg, 1.5 mL, IntraVENous, ONCE PRN, Baudilio Jain MD    albuterol (PROVENTIL) nebulizer solution 2.5 mg, 2.5 mg, Nebulization, Q4H PRN, Sixto Tate DO, 2.5 mg at 05/01/22 2029    sodium chloride flush 0.9 % injection 10 mL, 10 mL, IntraVENous, PRN, Marj Mcclellan MD, 10 mL at 05/04/22 0257    0.9 % sodium chloride bolus, 80 mL, IntraVENous, Once, Marj Mcclellan MD    sodium chloride flush 0.9 % injection 5-40 mL, 5-40 mL, IntraVENous, 2 times per day, Sixto Tate DO, 10 mL at 05/04/22 0831    sodium chloride flush 0.9 % injection 5-40 mL, 5-40 mL, IntraVENous, PRN, Sixto Tate DO    0.9 % sodium chloride infusion, , IntraVENous, PRN, Sixto Tate DO    enoxaparin Sodium (LOVENOX) injection 30 mg, 30 mg, SubCUTAneous, BID, Sixto Tate DO, 30 mg at 05/03/22 2037    acetaminophen (TYLENOL) tablet 650 mg, 650 mg, Oral, Q4H PRN, Sixto Tate DO, 650 mg at 05/02/22 1427    ondansetron (ZOFRAN-ODT) disintegrating tablet 4 mg, 4 mg, Oral, Q8H PRN, 4 mg at 05/02/22 1316 **OR** ondansetron (ZOFRAN) injection 4 mg, 4 mg, IntraVENous, Q6H PRN, Sixto Tate DO    ipratropium-albuterol (DUONEB) nebulizer solution 1 ampule, 1 ampule, Inhalation, Q4H WA, Sixto Tate DO, 1 ampule at 05/04/22 1044    amLODIPine (NORVASC) tablet 10 mg, 10 mg, Oral, Daily, Sixto Tate DO, 10 mg at 05/04/22 0831    aspirin EC tablet 81 mg, 81 mg, Oral, Daily, Sixto Tate DO, 81 mg at 05/04/22 0831    furosemide (LASIX) tablet 40 mg, 40 mg, Oral, Daily, Sixto Tate DO, 40 mg at 05/04/22 0831    lisinopril (PRINIVIL;ZESTRIL) tablet 10 mg, 10 mg, Oral, Daily, Sixto Tate DO, 10 mg at 05/04/22 0831    pravastatin (PRAVACHOL) tablet 80 mg, 80 mg, Oral, Nightly, Sixto Tate DO, 80 mg at 05/03/22 2037    Lab Results:     Lab Results   Component Value Date    WBC 8.0 05/04/2022    HGB 15.7 05/04/2022    HCT 45.5 05/04/2022    MCV 88.1 05/04/2022     05/04/2022     Lab Results   Component Value Date    CALCIUM 10.0 05/04/2022     05/04/2022    K 4.1 05/04/2022    CO2 24 05/04/2022     05/04/2022    BUN 24 (H) 05/04/2022    CREATININE 0.73 05/04/2022       Lab Results   Component Value Date    INR 1.0 03/19/2020    PROTIME 13.1 03/19/2020       Radiology:     ASSESSMENT:       1.  Acute exacerbation COPD, stage III, FEV1 61% predicted with diffusion capacity of 32%, maintained on Stiolto  2. Acute bronchospasm  3. Elevated D-dimer, negative CT angiogram of the chest for pulmonary embolism  4. Obstructive sleep apnea on CPAP therapy at home, auto CPAP 10 to 18 cm of water with an EPR of 3 with 3 L bled in  5. Chronic diastolic heart failure  6. Tobacco use, quitting only 2 weeks ago  7. Pseudocholinesterase deficiency  8. Obesity  9. Full code    PLAN:   1. Will check chest x-ray today  2. We will add on BNP  3. We will add on Mucinex and flutter valve therapy  4. Continue nebulizer treatments  5. Was transitioned to oral prednisone today per primary service  6. Increase activity as tolerated      Electronically signed by JAVI Cardenas CNP on 05/04/22     This progress note was completed using a voice transcription system. Every effort was made to ensure accuracy. However, inadvertent computerized transcription errors may be present.     Torsten Church, NP-C, MSN  [facility]

## 2022-05-04 NOTE — PROGRESS NOTES
[facility]    Progress Note     5/4/2022    1:24 PM    Name:   Perez Gandhi  MRN:     Harshad Russell:      [de-identified]   Room:   2083/2083-01   Day:  3  Admit Date:  5/1/2022  7:34 PM    PCP:   Madonna Cabrera MD  Code Status:  Full Code    Subjective:     C/C:   Chief Complaint   Patient presents with    Shortness of Breath     Principal Problem:    COPD exacerbation ([address]  Active Problems:    REYNALDO on CPAP    Acute respiratory failure with hypoxia ([address]    Other specified anxiety disorders    Essential hypertension    Type 2 diabetes mellitus with diabetic polyneuropathy, without long-term current use of insulin ([address]  Resolved Problems:    * No resolved hospital problems. *       c/o sob  Depression          on admission  The patient is a 78 y.o. Non- / non  male who presents withShortness of Breath. He has a past medical history of hyperlipidemia, HTN, REYNALDO on CPAP, CHF, COPD.   and he is admitted to the hospital for the management of  COPD exacerbation. Patient is an exsmoker and quit about a month ago and used to smoke 1Pack/day  Patient states that since last 4 days, he has been having worsening shortness of breath, denies chest pain, cough, N/V. But endorses chest tightness, unable to catch breath. Patient tried respiratory treatments to no avail. Then patient was brought to the ER by EMS after worsening of symptoms.   Received Prednisone 20 mg on his way to the hospital, Patient was tachypneic      Initially placed on 3L NC with no improvement in respiratory efforts and then was placed on BiPAP saturating in 98.     Patient received IV solumedrol 125 mg  D-Dimer, CT chest, EKG unremarkable for PE.           Significant last 24 hr data reviewed ;   Vitals:    05/04/22 0714 05/04/22 0745 05/04/22 1044 05/04/22 1230   BP:  (!) 160/74  98/79   Pulse:  60  97   Resp: 22 17 20 20   Temp:  98 °F (36.7 °C)  97.4 °F (36.3 °C) TempSrc:  Oral  Oral   SpO2: 92% 97% 97% 96%   Weight:       Height:          Recent Results (from the past 24 hour(s))   POC Glucose Fingerstick    Collection Time: 05/03/22  4:20 PM   Result Value Ref Range    POC Glucose 126 (H) 75 - 110 mg/dL   POC Glucose Fingerstick    Collection Time: 05/03/22  7:48 PM   Result Value Ref Range    POC Glucose 140 (H) 75 - 110 mg/dL   Basic Metabolic Panel w/ Reflex to MG    Collection Time: 05/04/22  5:24 AM   Result Value Ref Range    Glucose 135 (H) 70 - 99 mg/dL    BUN 24 (H) 8 - 23 mg/dL    CREATININE 0.73 0.70 - 1.20 mg/dL    Calcium 10.0 8.6 - 10.4 mg/dL    Sodium 140 135 - 144 mmol/L    Potassium 4.1 3.7 - 5.3 mmol/L    Chloride 102 98 - 107 mmol/L    CO2 24 20 - 31 mmol/L    Anion Gap 14 9 - 17 mmol/L    GFR Non-African American >60 >60 mL/min    GFR African American >60 >60 mL/min    GFR Comment         CBC    Collection Time: 05/04/22  5:24 AM   Result Value Ref Range    WBC 8.0 3.5 - 11.0 k/uL    RBC 5.17 4.5 - 5.9 m/uL    Hemoglobin 15.7 13.5 - 17.5 g/dL    Hematocrit 45.5 41 - 53 %    MCV 88.1 80 - 100 fL    MCH 30.3 26 - 34 pg    MCHC 34.4 31 - 37 g/dL    RDW 16.3 (H) 11.5 - 14.9 %    Platelets 045 981 - 483 k/uL    MPV 8.9 6.0 - 12.0 fL   POC Glucose Fingerstick    Collection Time: 05/04/22  6:09 AM   Result Value Ref Range    POC Glucose 127 (H) 75 - 110 mg/dL   POC Glucose Fingerstick    Collection Time: 05/04/22 11:09 AM   Result Value Ref Range    POC Glucose 148 (H) 75 - 110 mg/dL     Recent Labs     05/03/22  1226 05/03/22  1620 05/03/22  1948 05/04/22  0609 05/04/22  1109   POCGLU 120* 126* 140* 127* 148*        ECHO Complete 2D W Doppler W Color    Result Date: 5/2/2022  1604 [facility] Transthoracic Echocardiography Report (TTE)  Patient Name [facility]     Date of Study           05/02/2022               Chloe Stock   Date of      1955  Gender                  Male  Birth   Age          78 year(s)  Race                       Room Number  2083        Height:                 72 inch, 182.88 cm   Corporate ID [id number]    Weight:                 276 pounds, 125.2 kg  #   Patient Acct [de-identified]   BSA:        2.44 m^2    BMI:        37.43 kg/m^2  #   MR #         [id number]      Sonographer             Suzan Olsen   Accession #  [id number]  Interpreting Physician  [address]   Fellow                   Referring Nurse                           Practitioner   Interpreting             Referring Physician     [id number] [facility]  Fellow                                           Mio  Type of Study   TTE procedure:2D Echocardiogram, M-Mode, Doppler, Color Doppler. Procedure Date Date: 05/02/2022 Start: 04:16 PM Study Location: [address] [place] [address] Technical Quality: Fair visualization Indications:Congestive heart failure, diastolic dysfunction. Patient Status: Inpatient Height: 72 inches Weight: 276.01 pounds BSA: 2.44 m^2 BMI: 37.43 kg/m^2 Rhythm: Within normal limits HR: 89 bpm BP: 153/72 mmHg Allergies   - *No Known Allergies. CONCLUSIONS Summary Left ventricle is normal in size and wall thickness. Global left ventricular systolic function is normal. Calculated EF via Lazaro's method is 60 %. No obvious wall motion abnormality seen. Left atrium is normal in size. Right atrium is mildly dilated . Normal right ventricular size and function. Mild tricuspid regurgitation. No pulmonary hypertension. Estimated right ventricular systolic pressure is [id number].  Signature ----------------------------------------------------------------------------  Electronically signed by Suzan Olsen(Sonographer) on 05/02/2022 04:43  PM ---------------------------------------------------------------------------- ----------------------------------------------------------------------------  Electronically signed by Abraham Dodd([facility] physician) on 05/02/2022  06:20 PM ---------------------------------------------------------------------------- FINDINGS Left Atrium Left atrium is normal in size. Left Ventricle Left ventricle is normal in size and wall thickness. Global left ventricular systolic function is normal. Calculated EF via Lazaro's method is 60 %. No obvious wall motion abnormality seen. Right Atrium Right atrium is mildly dilated . Right Ventricle Normal right ventricular size and function. Mitral Valve No obvious valvular abnormality seen. Trivial mitral regurgitation. Aortic Valve No obvious valvular abnormality seen. No evidence of aortic insufficiency or stenosis. Tricuspid Valve No obvious valvular abnormality. Mild tricuspid regurgitation. No pulmonary hypertension. Estimated right ventricular systolic pressure is [id number]. Pulmonic Valve Pulmonic valve was not well visualized. No evidence of pulmonic insufficiency or stenosis. Pericardial Effusion No significant pericardial effusion is seen. Pleural Effusion No pleural effusion seen. Miscellaneous Normal aortic root dimension.  E/E' average = 9.7. M-mode / 2D Measurements & Calculations:   LVIDd:5.02 cm(3.7 - 5.6 cm)      Diastolic FFJXDO:51 ml  LGYVX:9.67 cm(2.2 - 4.0 cm)      Systolic ODAIP ml  VETL:9.05 cm(0.6 - 1.1 cm)       Aortic Root:3.6 cm(2.0 - 3.7 cm)  LVPWd:0.95 cm(0.6 - 1.1 cm)      LA Dimension: 3.7 cm(1.9 - 4.0 cm)  Fractional Shortenin.28 %    LA volume/Index: 46.8 ml /19m^2  Calculated LVEF (%): 67.19 %     LVOT:1.9 cm   Mitral:                               Aortic   Peak E-Wave: 0.99 m/s                 Peak Velocity: 1.49 m/s  Peak A-Wave: 0.80 m/s                 Mean Velocity: 1.05 m/s  E/A Ratio: 1.24                       Peak Gradient: 8.88 mmHg  Peak Gradient: 3.88 mmHg              Mean Gradient: 5 mmHg  Deceleration Time: 313 msec                                         Area (continuity): 2.15 cm^2                                        AV VTI: 32.1 cm   Tricuspid:                            Pulmonic:   Estimated RVSP: 25 mmHg  Peak TR Velocity: 2.35 m/s  Peak TR Gradient: 22.09 mmHg  Estimated RA Pressure: 3 mmHg                                        Estimated PASP: 25.09 mmHg  Septal Wall E' velocity:0.10 m/s Lateral Wall E' velocity:0.10 m/s    XR CHEST PORTABLE    Result Date: 5/1/2022  EXAMINATION: ONE XRAY VIEW OF THE CHEST 5/1/2022 8:19 pm COMPARISON: March 11, 2022 HISTORY: ORDERING SYSTEM PROVIDED HISTORY: sob, hypoxic TECHNOLOGIST PROVIDED HISTORY: sob, hypoxic Reason for Exam: sob, hypoxic FINDINGS: Left shoulder arthroplasty. The lungs are without acute focal process. There is no effusion or pneumothorax. The cardiomediastinal silhouette is without acute process. The osseous structures are without acute process. No acute process. CT CHEST PULMONARY EMBOLISM W CONTRAST    Result Date: 5/1/2022  EXAMINATION: CTA OF THE CHEST 5/1/2022 9:41 pm TECHNIQUE: CTA of the chest was performed after the administration of intravenous contrast.  Multiplanar reformatted images are provided for review. MIP images are provided for review. Dose modulation, iterative reconstruction, and/or weight based adjustment of the mA/kV was utilized to reduce the radiation dose to as low as reasonably achievable. COMPARISON: 04/18/2022 HISTORY: ORDERING SYSTEM PROVIDED HISTORY: positive d dimer TECHNOLOGIST PROVIDED HISTORY: positive d dimer Decision Support Exception - unselect if not a suspected or confirmed emergency medical condition->Emergency Medical Condition (MA) Reason for Exam: positive d dimer, sob FINDINGS: Pulmonary Arteries: Pulmonary arteries are adequately opacified for evaluation. No evidence of intraluminal filling defect to suggest pulmonary embolism. Main pulmonary artery is normal in caliber. Mediastinum: Stable mildly prominent nonspecific mediastinal and right hilar lymph nodes. The heart size is normal.  Thoracic aorta is normal in caliber with no evidence of aneurysm or dissection. There is calcified atherosclerotic plaque.   Calcific coronary artery disease. Lungs/pleura: There is apical predominant emphysema. The lungs are without acute process. No focal consolidation or pulmonary edema. No evidence of pleural effusion or pneumothorax. Upper Abdomen: Stable bilateral adrenal gland hypertrophy. Soft Tissues/Bones: No acute bone or soft tissue abnormality. No evidence of pulmonary embolism or acute pulmonary abnormality. Stable mildly prominent nonspecific mediastinal and right hilar lymph nodes. Calcific coronary artery disease. Apical predominant emphysema. RECOMMENDATIONS: Unavailable             On admission         Review of Systems:     Constitutional:  negative for chills, fevers, sweats  Respiratory:  negative for cough, dyspnea on exertion, hemoptysis, shortness of breath, wheezing  Cardiovascular:  negative for chest pain, chest pressure/discomfort, lower extremity edema, palpitations  Gastrointestinal:  negative for abdominal pain, constipation, diarrhea, nausea, vomiting  Neurological:  negative for dizziness, headache  Data:     Past Medical History:  no change     Social History:  no change    Family History: @no change    Vitals:      I/O (24Hr): Intake/Output Summary (Last 24 hours) at 5/4/2022 1324  Last data filed at [email or web address]  Gross per 24 hour   Intake 240 ml   Output --   Net 240 ml       Labs:    Radiology:    Medications:      Allergies:      Current Meds:   Scheduled Meds:    sertraline  50 mg Oral Dinner    methylPREDNISolone  40 mg IntraVENous Q6H    insulin lispro  0-6 Units SubCUTAneous TID WC    insulin lispro  0-3 Units SubCUTAneous Nightly    nicotine  1 patch TransDERmal Daily    sodium chloride  80 mL IntraVENous Once    sodium chloride flush  5-40 mL IntraVENous 2 times per day    enoxaparin  30 mg SubCUTAneous BID    ipratropium-albuterol  1 ampule Inhalation Q4H WA    amLODIPine  10 mg Oral Daily    aspirin EC  81 mg Oral Daily    furosemide  40 mg Oral Daily    lisinopril  10 mg Oral Daily    pravastatin  80 mg Oral Nightly     Continuous Infusions:    dextrose      sodium chloride       PRN Meds: glucose, LORazepam, dextrose, glucagon (rDNA), dextrose, oxyCODONE-acetaminophen, perflutren lipid microspheres, albuterol, sodium chloride flush, sodium chloride flush, sodium chloride, acetaminophen, ondansetron **OR** ondansetron      Physical Examination:        BP 98/79   Pulse 97   Temp 97.4 °F (36.3 °C) (Oral)   Resp 20   Ht 6' (1.829 m)   Wt 276 lb 0.3 oz (125.2 kg)   SpO2 96%   BMI 37.43 kg/m²   Temp (24hrs), Av.7 °F (36.5 °C), Min:97.4 °F (36.3 °C), Max:98.1 °F (36.7 °C)    Recent Labs     22  1620 22  1948 22  0609 22  1109   POCGLU 126* 140* 127* 148*       Intake/Output Summary (Last 24 hours) at 2022 1324  Last data filed at [email or web address]  Gross per 24 hour   Intake 240 ml   Output --   Net 240 ml       General Appearance:  alert, well appearing, and in no acute distress  Mental status:   Head:  normocephalic, atraumatic. Eye: no icterus, redness, pupils equal and reactive, extraocular eye movements intact, conjunctiva clear  Ear: normal external ear, no discharge, hearing intact  Nose:  no drainage noted  Mouth: mucous membranes moist  Neck: supple, no carotid bruits, thyroid not palpable  Lungs: Bilateral equal air entry, clear to ausculation, no wheezing, rales or rhonchi, normal effort  Cardiovascular: normal rate, regular rhythm, no murmur, gallop, rub.   Abdomen: Soft, nontender, nondistended, normal bowel sounds, no hepatomegaly or splenomegaly  Neurologic: There are no new focal motor or sensory deficits,   Skin: No gross lesions, rashes, bruising or bleeding on exposed skin area  Extremities:  peripheral pulses palpable, no pedal edema or calf pain with palpation  Psych:             Assessment:        Primary Problem  COPD exacerbation (HCC)    Principal Problem:    COPD exacerbation (HCC)  Active Problems:    REYNALDO on CPAP    Acute respiratory failure with hypoxia (HCC)    Other specified anxiety disorders    Essential hypertension    Type 2 diabetes mellitus with diabetic polyneuropathy, without long-term current use of insulin (HCC)  Resolved Problems:    * No resolved hospital problems. *       Plan:          5/3/22    Full code  Consult psych   Start zoloft   . Hospital Problems           Last Modified POA    * (Principal) COPD exacerbation ([facility] 5/1/2022 Yes    REYNALDO on CPAP 5/2/2022 Yes    Acute respiratory failure with hypoxia ([address] 5/2/2022 Yes    Other specified anxiety disorders 5/4/2022 Yes    Essential hypertension 5/2/2022 Yes    Type 2 diabetes mellitus with diabetic polyneuropathy, without long-term current use of insulin ([address] 5/2/2022 Yes    Overview Signed 3/25/2017  7:20 AM by Lennox Oregon, MD     Lab Results   Component Value Date    LABA1C 7.1 (H) 03/23/2017 5/4/22    Has been having anxiety episodes with paresthesias face and arms                                                order lorazepam prn for anxiety . Psych input noted . Steroids could be contributing to anxiety        Discontinue I V steroids   Steroids may be contributing to neuropsychiatric symptoms . Change to prednisone 40 mg daily . Thanks for consulting us . Will monitor vitals and clinical course , and  Optimize therapy  as needed .            Robert Ruff MD

## 2022-05-05 ENCOUNTER — TELEPHONE (OUTPATIENT)
Dept: FAMILY MEDICINE CLINIC | Age: 67
End: 2022-05-05

## 2022-05-05 LAB
ANION GAP SERPL CALCULATED.3IONS-SCNC: 10 MMOL/L (ref 9–17)
BUN BLDV-MCNC: 32 MG/DL (ref 8–23)
CALCIUM SERPL-MCNC: 10.3 MG/DL (ref 8.6–10.4)
CHLORIDE BLD-SCNC: 103 MMOL/L (ref 98–107)
CO2: 28 MMOL/L (ref 20–31)
CREAT SERPL-MCNC: 0.91 MG/DL (ref 0.7–1.2)
GFR AFRICAN AMERICAN: >60 ML/MIN
GFR NON-AFRICAN AMERICAN: >60 ML/MIN
GFR SERPL CREATININE-BSD FRML MDRD: ABNORMAL ML/MIN/{1.73_M2}
GLUCOSE BLD-MCNC: 103 MG/DL (ref 75–110)
GLUCOSE BLD-MCNC: 105 MG/DL (ref 70–99)
GLUCOSE BLD-MCNC: 106 MG/DL (ref 75–110)
GLUCOSE BLD-MCNC: 125 MG/DL (ref 75–110)
GLUCOSE BLD-MCNC: 136 MG/DL (ref 75–110)
HCT VFR BLD CALC: 45.5 % (ref 41–53)
HEMOGLOBIN: 15.6 G/DL (ref 13.5–17.5)
MCH RBC QN AUTO: 30.4 PG (ref 26–34)
MCHC RBC AUTO-ENTMCNC: 34.3 G/DL (ref 31–37)
MCV RBC AUTO: 88.6 FL (ref 80–100)
PDW BLD-RTO: 15.9 % (ref 11.5–14.9)
PLATELET # BLD: 194 K/UL (ref 150–450)
PMV BLD AUTO: 8.7 FL (ref 6–12)
POTASSIUM SERPL-SCNC: 3.6 MMOL/L (ref 3.7–5.3)
RBC # BLD: 5.14 M/UL (ref 4.5–5.9)
SODIUM BLD-SCNC: 141 MMOL/L (ref 135–144)
WBC # BLD: 9.8 K/UL (ref 3.5–11)

## 2022-05-05 PROCEDURE — 6370000000 HC RX 637 (ALT 250 FOR IP): Performed by: INTERNAL MEDICINE

## 2022-05-05 PROCEDURE — 82947 ASSAY GLUCOSE BLOOD QUANT: CPT

## 2022-05-05 PROCEDURE — 94761 N-INVAS EAR/PLS OXIMETRY MLT: CPT

## 2022-05-05 PROCEDURE — 85027 COMPLETE CBC AUTOMATED: CPT

## 2022-05-05 PROCEDURE — 6370000000 HC RX 637 (ALT 250 FOR IP): Performed by: STUDENT IN AN ORGANIZED HEALTH CARE EDUCATION/TRAINING PROGRAM

## 2022-05-05 PROCEDURE — 2700000000 HC OXYGEN THERAPY PER DAY

## 2022-05-05 PROCEDURE — 99232 SBSQ HOSP IP/OBS MODERATE 35: CPT | Performed by: PSYCHIATRY & NEUROLOGY

## 2022-05-05 PROCEDURE — 6370000000 HC RX 637 (ALT 250 FOR IP): Performed by: NURSE PRACTITIONER

## 2022-05-05 PROCEDURE — 2580000003 HC RX 258: Performed by: STUDENT IN AN ORGANIZED HEALTH CARE EDUCATION/TRAINING PROGRAM

## 2022-05-05 PROCEDURE — 94640 AIRWAY INHALATION TREATMENT: CPT

## 2022-05-05 PROCEDURE — 80048 BASIC METABOLIC PNL TOTAL CA: CPT

## 2022-05-05 PROCEDURE — 36415 COLL VENOUS BLD VENIPUNCTURE: CPT

## 2022-05-05 PROCEDURE — 99232 SBSQ HOSP IP/OBS MODERATE 35: CPT | Performed by: INTERNAL MEDICINE

## 2022-05-05 PROCEDURE — 6360000002 HC RX W HCPCS: Performed by: STUDENT IN AN ORGANIZED HEALTH CARE EDUCATION/TRAINING PROGRAM

## 2022-05-05 PROCEDURE — 6370000000 HC RX 637 (ALT 250 FOR IP)

## 2022-05-05 PROCEDURE — 2060000000 HC ICU INTERMEDIATE R&B

## 2022-05-05 PROCEDURE — 6370000000 HC RX 637 (ALT 250 FOR IP): Performed by: PSYCHIATRY & NEUROLOGY

## 2022-05-05 RX ORDER — CALCIUM CARBONATE 200(500)MG
500 TABLET,CHEWABLE ORAL 3 TIMES DAILY PRN
Status: DISCONTINUED | OUTPATIENT
Start: 2022-05-05 | End: 2022-05-06 | Stop reason: HOSPADM

## 2022-05-05 RX ADMIN — ENOXAPARIN SODIUM 30 MG: 100 INJECTION SUBCUTANEOUS at 23:36

## 2022-05-05 RX ADMIN — SODIUM CHLORIDE, PRESERVATIVE FREE 10 ML: 5 INJECTION INTRAVENOUS at 08:54

## 2022-05-05 RX ADMIN — IPRATROPIUM BROMIDE AND ALBUTEROL SULFATE 1 AMPULE: .5; 2.5 SOLUTION RESPIRATORY (INHALATION) at 07:27

## 2022-05-05 RX ADMIN — ASPIRIN 81 MG: 81 TABLET, COATED ORAL at 08:47

## 2022-05-05 RX ADMIN — PREDNISONE 40 MG: 20 TABLET ORAL at 08:47

## 2022-05-05 RX ADMIN — RISPERIDONE 0.5 MG: 1 TABLET ORAL at 22:53

## 2022-05-05 RX ADMIN — ENOXAPARIN SODIUM 30 MG: 100 INJECTION SUBCUTANEOUS at 08:47

## 2022-05-05 RX ADMIN — FUROSEMIDE 40 MG: 40 TABLET ORAL at 08:47

## 2022-05-05 RX ADMIN — GUAIFENESIN 600 MG: 600 TABLET, EXTENDED RELEASE ORAL at 22:53

## 2022-05-05 RX ADMIN — SODIUM CHLORIDE, PRESERVATIVE FREE 10 ML: 5 INJECTION INTRAVENOUS at 22:58

## 2022-05-05 RX ADMIN — PRAVASTATIN SODIUM 80 MG: 40 TABLET ORAL at 22:53

## 2022-05-05 RX ADMIN — ANTACID TABLETS 500 MG: 500 TABLET, CHEWABLE ORAL at 22:53

## 2022-05-05 RX ADMIN — SERTRALINE HYDROCHLORIDE 50 MG: 50 TABLET ORAL at 16:57

## 2022-05-05 RX ADMIN — IPRATROPIUM BROMIDE AND ALBUTEROL SULFATE 1 AMPULE: .5; 2.5 SOLUTION RESPIRATORY (INHALATION) at 19:27

## 2022-05-05 RX ADMIN — RISPERIDONE 0.5 MG: 1 TABLET ORAL at 08:48

## 2022-05-05 RX ADMIN — GUAIFENESIN 600 MG: 600 TABLET, EXTENDED RELEASE ORAL at 08:48

## 2022-05-05 RX ADMIN — LISINOPRIL 10 MG: 20 TABLET ORAL at 08:48

## 2022-05-05 RX ADMIN — IPRATROPIUM BROMIDE AND ALBUTEROL SULFATE 1 AMPULE: .5; 2.5 SOLUTION RESPIRATORY (INHALATION) at 15:20

## 2022-05-05 RX ADMIN — AMLODIPINE BESYLATE 10 MG: 10 TABLET ORAL at 08:48

## 2022-05-05 RX ADMIN — IPRATROPIUM BROMIDE AND ALBUTEROL SULFATE 1 AMPULE: .5; 2.5 SOLUTION RESPIRATORY (INHALATION) at 11:31

## 2022-05-05 RX ADMIN — OXYCODONE HYDROCHLORIDE AND ACETAMINOPHEN 1 TABLET: 5; 325 TABLET ORAL at 08:57

## 2022-05-05 ASSESSMENT — PAIN DESCRIPTION - DESCRIPTORS: DESCRIPTORS: ACHING

## 2022-05-05 ASSESSMENT — PAIN DESCRIPTION - LOCATION
LOCATION: SHOULDER
LOCATION: SHOULDER;BACK

## 2022-05-05 ASSESSMENT — PAIN SCALES - GENERAL
PAINLEVEL_OUTOF10: 7
PAINLEVEL_OUTOF10: 8

## 2022-05-05 ASSESSMENT — PAIN DESCRIPTION - ORIENTATION: ORIENTATION: LEFT

## 2022-05-05 NOTE — PROGRESS NOTES
PROVIDE ADEQUATE OXYGENATION WITH ACCEPTABLE SP02/ABG'S    [x]  IDENTIFY APPROPRIATE OXYGEN THERAPY  [x]   MONITOR SP02/ABG'S AS NEEDED   [x]   PATIENT EDUCATION AS NEEDED    BRONCHOSPASM/BRONCHOCONSTRICTION     [x]         IMPROVE AERATION/BREATH SOUNDS  [x]   ADMINISTER BRONCHODILATOR THERAPY AS APPROPRIATE  [x]   ASSESS BREATH SOUNDS  [x]   IMPLEMENT AEROSOL/MDI PROTOCOL  [x]   PATIENT EDUCATION AS NEEDED    NONINVASIVE VENTILATION    PROVIDE OPTIMAL VENTILATION/ACCEPTABLE SPO2   IMPLEMENT NONINVASIVE VENTILATION PROTOCOL   MAINTAIN ACCEPTABLE SPO2   ASSESS SKIN INTEGRITY/BREAKDOWN SCORE   PATIENT EDUCATION AS NEEDED   BIPAP AS NEEDED        PT resting on home BIPAP unit.   Without distress

## 2022-05-05 NOTE — CONSULTS
Department of Psychiatry  Behavioral Health Consult    REASON FOR CONSULT: depression    CONSULTING PHYSICIAN: Dr. Marisel Cormier    History obtained from: patient and chart. Interim history. The patient was seen at bedside. His wife was also present. He reports an improvement in his mood. He is not tearful. He denies any suicidal ideation. The patient has been wearing his BiPAP. The patient has been taking risperidone without any problem. He denies any auditory or visual hallucinations or psychotic phenomena. HISTORY OF PRESENT ILLNESS:    The patient is a 78 y.o. male with significant past psychiatric history of depression and a medical history significant for prostate cancer, COPD, coronary artery disease and chronic diastolic heart failure who presents with shortness of breath. The patient has been treated with steroids and oxygen. He was subsequently placed on BiPAP. The patient is prescribed Zoloft 50 mg at nighttime during this admission    The patient's wife was present at bedside. She left during the interview. The patient reports that he has been feeling depressed for a long time. The patient has been feeling low at least for the last several months. The patient has not been treated for depression in the past..  The patient states that she has gone from being able to watch television to being unmotivated to do anything. He has no energy. He is lying in bed most of the time and not caring for himself. The patient has not been sleeping well but this is because of waking up to use the bathroom. The patient denies suicidal thoughts. He has not been losing weight. The patient denies any problems with his memory. He was oriented to time place and person and was able to give a reasonable account of his medical problems. The patient has no evidence of cognitive difficulties. Patient denies any auditory or visual hallucinations. He denies any psychotic phenomena.   The patient has not had any memory problems. He is oriented to time place and person. He appeared to have no cognitive difficulties. His anxiety is generally manageable    The patient is currently receiving care for the above psychiatric illness. Psychiatric Review of Systems        ·    Obsessions and Compulsions: Denies    ·    Radha or Hypomania: Denies  ·    Hallucinations: Denies  ·    Panic Attacks:  Denies  ·    Delusions:  Denies  ·    Phobias:  Denies  ·    Trauma: Denies      Substance Abuse History:       Past Psychiatric History:  Prior Diagnosis: none    Hospitalization: no  Hx of Suicidal Attempts: no  Hx of violence:  no  Patient has no prior history of medication treatment for depression    Personal History: The patient was born and raised in the [facility]. He grew up with 2 alcoholic parents. He said he did not see it as abnormal at that time. At the moment he is living with his wife.   He has 4 children all of whom are local.    Past Medical History:        Diagnosis Date    Acid reflux     Anemia, blood loss 10/7/2018    Arthritis     C. difficile colitis 3/19/2020    Chronic bilateral low back pain with bilateral sciatica 11/3/2016    Chronic diastolic heart failure ([address] 2/25/2021    Complete tear of left rotator cuff 7/18/2018    COPD (chronic obstructive pulmonary disease) ([address]     Coronary artery disease involving native coronary artery of native heart without angina pectoris 2/2/2020    Degenerative disc disease, cervical 7/28/2019    GI bleed 3/19/2020    Gout     H/O cardiac catheterization     yrs ago   no stents    History of blood transfusion     Hyperlipidemia     Hyperlipidemia with target LDL less than 100 1/20/2016    Hyperparathyroidism ([address] 1/17/2020    Hypertension     Knee pain, chronic     left    Liver disease     MDRO (multiple drug resistant organisms) resistance     Melena 3/19/2020    Memory loss     Moderate episode of recurrent major depressive disorder ([address] 1/20/2016    Obesity, Class III, BMI 40-49.9 (morbid obesity) ([address] 1/20/2016    REYNALDO on CPAP 5/6/2017    Osteoarthritis     Peripheral arterial occlusive disease ([address] 3/25/2017    Pneumonia 3/9/2020    Polypharmacy 3/25/2017    Positive FIT (fecal immunochemical test) 4/11/2017    Prolonged emergence from general anesthesia 01/05/2017    Patient \"on life support for 3 days\" after back surgery due to being given succinylcholine    Prostate CA ([address] 1/20/2016    Prostate cancer ([address] 10/2013    finished radiation tx 5/2014    Pseudocholinesterase deficiency 03/25/2017    Pt.  \"on life support\" for 3 days after back surgery 1/5/17 after being given succinylcholine    Severe obesity (BMI 35.0-35.9 with comorbidity) ([address] 2/2/2020    Short of breath on exertion     Sleep apnea     uses CPAP machine nightly    Status post lumbar laminectomy 3/25/2017    Stenosis of left carotid artery 10/7/2018    Syncope and collapse 3/19/2020    Tubular adenoma of colon 4/11/17 5/13/2017    Type 2 diabetes mellitus with hyperglycemia, without long-term current use of insulin ([address] 3/25/2017    Lab Results Component Value Date  LABA1C 7.1 (H) 03/23/2017     Unintended weight loss 10/7/2018    Vitamin D deficiency 3/25/2017    Wears glasses        Past Surgical History:        Procedure Laterality Date    BACK SURGERY      x 2     BACK SURGERY  01/05/2017    lumbar laminectomy L2, L3, L4    BRONCHOSCOPY N/A 3/13/2020    BRONCHOSCOPY performed by Nito Domínguez MD at [facility]  2007    no stents    CAR[facility]     CHOLECYSTECTOMY, LAPAROSCOPIC N/A 2/16/2021    CHOLECYSTECTOMY LAPAROSCOPIC ROBOTIC XI performed by Herlinda De La Rosa MD at [address] [facility], COLON, DIAGNOSTIC     [address] [facility] Left 11/18/2020    HERNIA INGUINAL REPAIR [address] OPEN performed by Herlinda De La Rosa MD at [facility]    JOINT REPLACEMENT      SHOULDER    KNEE SURGERY Left     LUMBAR LAMINECTOMY  01/05/2017    L2-L4    MA CLOSED RX SHLDR DISLOC,ANESTHESIA Left 8/1/2018    SHOULDER CLOSED REDUCTION WITH C-ARM VISUALIZATION performed by Ofelia Johnson MD at [facility] W/BIOPSY SINGLE/MULTIPLE N/A 5/9/2017    COLONOSCOPY WITH BIOPSY performed by Fabi Sauer DO at [address] [facility] [address] IMPLANT Left 7/18/2018    SHOULDER TOTAL ARTHROPLASTY REVERSE LEFT DJO & BICEP TENDON TRANSFER performed by Ofelia Johnson MD at [address] HUMERAL/GLENOID COMPNT Left 8/3/2018    SHOULDER TOTAL REVERSE  ARTHROPLASTY REVISION performed by Ofelia Johnson MD at [address]? rotator cuff repair    SHOULDER SURGERY Left 10/15/2020    SHOULDER ARTHROSCOPY W/INTEROP CULTURES performed by Ofelia Johnson MD at [facility]      ear, forehead    TONSILLECTOMY AND ADENOIDECTOMY      UPPER GASTROINTESTINAL ENDOSCOPY N/A 3/19/2020    EGD BIOPSY performed by Andres Smalls MD at [facility]         Medications Prior to Admission:   Medications Prior to Admission: allopurinol (ZYLOPRIM) 100 MG tablet, Take 1 tablet by mouth daily  nystatin (MYCOSTATIN) 545386 UNIT/ML suspension, Take 5 mLs by mouth 4 times daily Swish and swallow. For 3 months  fluconazole (DIFLUCAN) 150 MG tablet, Take 1 tablet by mouth every 72 hours (Patient not taking: Reported on 5/2/2022)  methylPREDNISolone (MEDROL, ROSALIE,) 4 MG tablet, Take by mouth, with food. Keep low carb, low salt diet while taking it  blood glucose monitor strips, Testing once a day. Needs ONE TOUCH VERIO REFLECT  Blood Pressure KIT, Diagnosis: HTN. Needs to check blood pressure 1-2 times a day until stable, then once a day.  Goal blood pressure less than 135/85, and above 110/60.  albuterol (PROVENTIL) (2.5 MG/3ML) 0.083% nebulizer solution, USE THREE MILLILITERS (1 VIAL) VIA NEBULIZATION BY MOUTH EVERY 6 HOURS AS NEEDED FOR SHORTNESS OF BREATH OR COUGH  lisinopril (PRINIVIL;ZESTRIL) 10 MG tablet, Take 1 tablet by mouth daily Dose decreased by cardiologist  pregabalin (LYRICA) 200 MG capsule, Take 1 capsule by mouth 3 times daily for 90 days. Please ignore prior refill for gabapentin 800 mg, will switch back to Lyrica  Cholecalciferol (VITAMIN D3) 1.25 MG (56834 UT) CAPS, Take by mouth every 7 days   enzalutamide (XTANDI) 40 MG capsule, Take 4 capsules by mouth daily  albuterol sulfate  (90 Base) MCG/ACT inhaler, USE 2 INHALATIONS EVERY 6 HOURS AS NEEDED FOR WHEEZING OR SHORTNESS OF BREATH  furosemide (LASIX) 40 MG tablet, Take 1 tablet by mouth daily Dose increased 1/12/2021  omeprazole (PRILOSEC) 20 MG delayed release capsule, Take 1 capsule by mouth every morning (before breakfast) Dose decreased 7/20/2021  pravastatin (PRAVACHOL) 80 MG tablet, Take 1 tablet by mouth every evening Dose increased  9/24/2019 (Patient not taking: Reported on 5/2/2022)  amLODIPine (NORVASC) 10 MG tablet, Take 1 tablet by mouth daily Dose increased 10/16/2020 due to high BP. Correct dosage  folbee plus (FOLBEE PLUS) TABS, Take 1 tablet by mouth daily  tiotropium-olodaterol (STIOLTO RESPIMAT) 2.5-2.5 MCG/ACT AERS, Inhale 2 puffs into the lungs daily  oxyCODONE-acetaminophen (PERCOCET) 5-325 MG per tablet, Take 1 tablet by mouth 2 times daily as needed. Lancets 30G MISC, Testing once a day. Please dispense according to patients insurance.   ammonium lactate (AMLACTIN) 12 % cream, APPLY TOPICALLY TO LEGS ONE TO TWO TIMES A DAY AS NEEDED  OXYGEN, With CPAP at night  aspirin EC 81 MG EC tablet, Take 1 tablet by mouth daily    Allergies:  Succinylcholine chloride and Cymbalta [duloxetine hcl]    FAMILY/SOCIAL HISTORY:  Family History   Problem Relation Age of Onset    Diabetes Mother     Lung Cancer Brother     Liver Cancer Brother     Cancer Father      Social History     Socioeconomic History    Marital status:      Spouse name: Not on file    Number of children: Not on file    Years of education: Not on file    Highest education level: Not on file   Occupational History    Not on file   Tobacco Use    Smoking status: Former Smoker     Packs/day: 1.00     Years: 35.00     Pack years: 35.00     Types: Cigarettes     Start date: 2021     Quit date: 3/9/2022     Years since quittin.1    Smokeless tobacco: Never Used   Vaping Use    Vaping Use: Never used   Substance and Sexual Activity    Alcohol use: Not Currently     Alcohol/week: 0.0 standard drinks    Drug use: No    Sexual activity: Not Currently     Partners: Female   Other Topics Concern    Not on file   Social History Narrative    Not on file     Social Determinants of Health     Financial Resource Strain: Low Risk     Difficulty of Paying Living Expenses: Not hard at all   Food Insecurity: No Food Insecurity    Worried About [address] [place] [address] in the Last Year: Never true    Colleen of Food in the Last Year: Never true   Transportation Needs: No Transportation Needs    Lack of Transportation (Medical): No    Lack of Transportation (Non-Medical):  No   Physical Activity:     Days of Exercise per Week: Not on file    Minutes of Exercise per Session: Not on file   Stress:     Feeling of Stress : Not on file   Social Connections:     Frequency of Communication with Friends and Family: Not on file    Frequency of Social Gatherings with Friends and Family: Not on file    Attends [employer] Services: Not on file    Active Member of Clubs or Organizations: Not on file    Attends Club or Organization Meetings: Not on file    Marital Status: Not on file   Intimate Partner Violence:     Fear of Current or Ex-Partner: Not on file    Emotionally Abused: Not on file    Physically Abused: Not on file    Sexually Abused: Not on file   Housing Stability: Unknown    Unable to Pay for Housing in the Last Year: No    Number of [place] in the Last Year: Not on file    Unstable Housing in the Last Year: No       REVIEW OF SYSTEMS    Constitutional: [] fever  [] chills  [] weight loss  []weakness [] Other:  Eyes:  [] photophobia  [] discharge [] acuity change   [] Diplopia   [] Other:  HENT:  [] sore throat  [] ear pain [] Tinnitus   [] Other  Respiratory:  [] Cough  [] Shortness of breath   [] Sputum   [] Other:   Cardiac: []Chest pain   []Palpitations []Edema  []PND  [] Other:  GI:  []Abdominal pain   []Nausea  []Vomiting  []Diarrhea  [] Other:  :  [] Dysuria   []Frequency  []Hematuria  []Discharge  [] Other:  Possible Pregnancy: []Yes   []No   LMP:   Musculoskeletal:  []Back pain  []Neck pain  []Recent Injury   Skin:  []Rash  [] Itching  [] Other:  Neurologic:  [] Headache  [] Focal weakness  [] Sensory changes []Other:  Endocrine:  [] Polyuria  [] Polydipsia  [] Hair Loss  [] Other:  Lymphatic:   [] Swollen glands   Psychiatric:  As per HPI      All other systems negative except as marked or mentioned/indicated in the HPI. Rosalinda Paz      PHYSICAL EXAM:  Vitals:  /61   Pulse 103   Temp 97.5 °F (36.4 °C) (Oral)   Resp 20   Ht 6' (1.829 m)   Wt 276 lb 0.3 oz (125.2 kg)   SpO2 97%   BMI 37.43 kg/m²      Neuro Exam:     Involuntary Movements: No    Mental Status Examination:    Level of consciousness: Fatigued  Appearance:  hospital attire  Behavior/Motor:  no abnormalities noted  Attitude toward examiner:  cooperative and attentive  Speech:  Slow and low volume  Mood: Slightly depressed and improved from before  Affect:  mood congruent  Thought processes:  linear, goal directed and coherent   Thought content:  Suicidal Ideation:  denies suicidal ideation  Delusions:  no evidence of delusions  Perceptual Disturbance:  denies any perceptual disturbance  Cognition:  oriented to person, place, and time   Concentration intact  Memory intact  Insight good   Judgement good   Fund of Knowledge adequate        LABS: REVIEWED TODAY:  Recent Labs 05/03/22 0542 05/04/22 0524 05/05/22 0542   WBC 6.4 8.0 9.8   HGB 15.1 15.7 15.6    196 194     Recent Labs     05/03/22 0542 05/04/22 0524 05/05/22 0542    140 141   K 5.1 4.1 3.6*    102 103   CO2 26 24 28   BUN 15 24* 32*   CREATININE 0.64* 0.73 0.91   GLUCOSE 134* 135* 105*     No results for input(s): BILITOT, ALKPHOS, AST, ALT in the last 72 hours. No results found for: [address], BARBSCNU, LABBENZ, CANNAB, COCAINESCRN, LABMETH, [address], PHENCYCLIDINESCREENURINE, PPXUR, ETOH  Lab Results   Component Value Date    TSH 1.13 04/27/2022     No results found for: LITHIUM  No results found for: VALPROATE, CBMZ  No results found for: LITHIUM, VALPROATE    FURTHER LABS ORDERED :      Radiology   ECHO Complete 2D W Doppler W Color    Result Date: 5/2/2022  [facility] Transthoracic Echocardiography Report (TTE)  Patient Name [facility]     Date of Study           05/02/2022               Jai Spence   Date of      1955  Gender                  Male  Birth   Age          78 year(s)  Race                       Room Number  2083        Height:                 72 inch, 182.88 cm   Corporate ID [id number]    Weight:                 276 pounds, 125.2 kg  #   Patient Acct [de-identified]   BSA:        2.44 m^2    BMI:        37.43 kg/m^2  #   MR #         [id number]      Sonographer             Suzan Olsen   Accession #  [id number]  Interpreting Physician  [address]   Fellow                   Referring Nurse                           Practitioner   Interpreting             Referring Physician     [facility]  Fellow                                           Mio  Type of Study   TTE procedure:2D Echocardiogram, M-Mode, Doppler, Color Doppler. Procedure Date Date: 05/02/2022 Start: 04:16 PM Study Location: [address] [place] [address] Technical Quality: Fair visualization Indications:Congestive heart failure, diastolic dysfunction.  Patient Status: Inpatient Height: 72 inches Weight: 276.01 pounds BSA: 2.44 m^2 BMI: 37.43 kg/m^2 Rhythm: Within normal limits HR: 89 bpm BP: 153/72 mmHg Allergies   - *No Known Allergies. CONCLUSIONS Summary Left ventricle is normal in size and wall thickness. Global left ventricular systolic function is normal. Calculated EF via Lazaro's method is 60 %. No obvious wall motion abnormality seen. Left atrium is normal in size. Right atrium is mildly dilated . Normal right ventricular size and function. Mild tricuspid regurgitation. No pulmonary hypertension. Estimated right ventricular systolic pressure is 25WUMZ. Signature ----------------------------------------------------------------------------  Electronically signed by Suzan Olsen(Sonographer) on 05/02/2022 04:43  PM ---------------------------------------------------------------------------- ----------------------------------------------------------------------------  Electronically signed by Abraham Dodd(Interpreting physician) on 05/02/2022  06:20 PM ---------------------------------------------------------------------------- FINDINGS Left Atrium Left atrium is normal in size. Left Ventricle Left ventricle is normal in size and wall thickness. Global left ventricular systolic function is normal. Calculated EF via Lazaro's method is 60 %. No obvious wall motion abnormality seen. Right Atrium Right atrium is mildly dilated . Right Ventricle Normal right ventricular size and function. Mitral Valve No obvious valvular abnormality seen. Trivial mitral regurgitation. Aortic Valve No obvious valvular abnormality seen. No evidence of aortic insufficiency or stenosis. Tricuspid Valve No obvious valvular abnormality. Mild tricuspid regurgitation. No pulmonary hypertension. Estimated right ventricular systolic pressure is 45ESCC. Pulmonic Valve Pulmonic valve was not well visualized. No evidence of pulmonic insufficiency or stenosis.  Pericardial Effusion No significant pericardial effusion is seen. Pleural Effusion No pleural effusion seen. Miscellaneous Normal aortic root dimension. E/E' average = 9.7. M-mode / 2D Measurements & Calculations:   LVIDd:5.02 cm(3.7 - 5.6 cm)      Diastolic RZSYXR:09 ml  DJTJO:8.67 cm(2.2 - 4.0 cm)      Systolic KWJGXI:30 ml  XJFF:5.16 cm(0.6 - 1.1 cm)       Aortic Root:3.6 cm(2.0 - 3.7 cm)  LVPWd:0.95 cm(0.6 - 1.1 cm)      LA Dimension: 3.7 cm(1.9 - 4.0 cm)  Fractional Shortenin.28 %    LA volume/Index: 46.8 ml /19m^2  Calculated LVEF (%): 67.19 %     LVOT:1.9 cm   Mitral:                               Aortic   Peak E-Wave: 0.99 m/s                 Peak Velocity: 1.49 m/s  Peak A-Wave: 0.80 m/s                 Mean Velocity: 1.05 m/s  E/A Ratio: 1.24                       Peak Gradient: 8.88 mmHg  Peak Gradient: 3.88 mmHg              Mean Gradient: 5 mmHg  Deceleration Time: 313 msec                                         Area (continuity): 2.15 cm^2                                        AV VTI: 32.1 cm   Tricuspid:                            Pulmonic:   Estimated RVSP: 25 mmHg  Peak TR Velocity: 2.35 m/s  Peak TR Gradient: 22.09 mmHg  Estimated RA Pressure: 3 mmHg                                        Estimated PASP: 25.09 mmHg  Septal Wall E' velocity:0.10 m/s Lateral Wall E' velocity:0.10 m/s    XR CHEST PORTABLE    Result Date: 2022  EXAMINATION: ONE XRAY VIEW OF THE CHEST 2022 8:19 pm COMPARISON: 2022 HISTORY: ORDERING SYSTEM PROVIDED HISTORY: sob, hypoxic TECHNOLOGIST PROVIDED HISTORY: sob, hypoxic Reason for Exam: sob, hypoxic FINDINGS: Left shoulder arthroplasty. The lungs are without acute focal process. There is no effusion or pneumothorax. The cardiomediastinal silhouette is without acute process. The osseous structures are without acute process. No acute process.      CT CHEST PULMONARY EMBOLISM W CONTRAST    Result Date: 2022  EXAMINATION: CTA OF THE CHEST 2022 9:41 pm TECHNIQUE: CTA of the chest was performed after the administration of intravenous contrast.  Multiplanar reformatted images are provided for review. MIP images are provided for review. Dose modulation, iterative reconstruction, and/or weight based adjustment of the mA/kV was utilized to reduce the radiation dose to as low as reasonably achievable. COMPARISON: 04/18/2022 HISTORY: ORDERING SYSTEM PROVIDED HISTORY: positive d dimer TECHNOLOGIST PROVIDED HISTORY: positive d dimer Decision Support Exception - unselect if not a suspected or confirmed emergency medical condition->Emergency Medical Condition (MA) Reason for Exam: positive d dimer, sob FINDINGS: Pulmonary Arteries: Pulmonary arteries are adequately opacified for evaluation. No evidence of intraluminal filling defect to suggest pulmonary embolism. Main pulmonary artery is normal in caliber. Mediastinum: Stable mildly prominent nonspecific mediastinal and right hilar lymph nodes. The heart size is normal.  Thoracic aorta is normal in caliber with no evidence of aneurysm or dissection. There is calcified atherosclerotic plaque. Calcific coronary artery disease. Lungs/pleura: There is apical predominant emphysema. The lungs are without acute process. No focal consolidation or pulmonary edema. No evidence of pleural effusion or pneumothorax. Upper Abdomen: Stable bilateral adrenal gland hypertrophy. Soft Tissues/Bones: No acute bone or soft tissue abnormality. No evidence of pulmonary embolism or acute pulmonary abnormality. Stable mildly prominent nonspecific mediastinal and right hilar lymph nodes. Calcific coronary artery disease. Apical predominant emphysema.  RECOMMENDATIONS: Unavailable     CT lung screen [Initial/Annual]    Result Date: 4/19/2022  EXAMINATION: LOW DOSE SCREENING CT OF THE CHEST WITHOUT CONTRAST 4/18/2022 10:18 am TECHNIQUE: Low dose lung cancer screening CT of the chest was performed without the administration of intravenous contrast.  Multiplanar reformatted images are provided for review. Dose modulation, iterative reconstruction, and/or weight based adjustment of the mA/kV was utilized to reduce the radiation dose to as low as reasonably achievable. Field-of-view: 32 cm Dose Length Product: 105.61 mGy CTDlvol: 2.71 mGy COMPARISON: 03/15/2021 HISTORY: Screening. Patient Age: 77 y/o Number of pack years of smokin pack years If no longer smoking, number of years since cessation:  Current, everyday smoker Other symptoms:  None. Additional history: ORDERING SYSTEM PROVIDED HISTORY: Personal history of nicotine dependence TECHNOLOGIST PROVIDED HISTORY: Age: 78 y.o. Smoking History: Social History Tobacco Use Smoking status: Current Every Day Smoker Packs/day: 1.00 Years: 35.00 Pack years: 28 Types: Cigarettes Start date: 2021 Smokeless tobacco: Never Used Vaping Use Vaping Use: Never used Alcohol use: Not Currently Alcohol/week: 0.0 standard drinks Drug use: No Pack years: 35 Last CT lung screen: 3/15/2021 Is there documentation of shared decision making? ->Yes Does the patient show any signs or symptoms of lung cancer? ->No Is this the first (baseline) CT or an annual exam?->Annual Is this a low dose CT or a routine CT?->Low Dose CT Smoking Status?->Current Every Day Smoker Smoking packs per day?->1 Years smoking?->35 Reason for Exam: current smoker, 1 pack per day x 35 years, hx of COPD,HTN,diabetes FINDINGS: Mediastinum:  Visualized thyroid gland grossly unremarkable in appearance. Atherosclerotic calcification of the aorta and branch vasculature. Stable prominent mediastinal lymph nodes. Coronary artery disease. No hilar or axillary lymphadenopathy. No periaortic or mediastinal hemorrhage. No pericardial or pleural effusions. Lungs/Pleura:  Trachea and proximal central airways appear patent. Moderate to severe emphysema. Respiratory motion throughout the lungs. Mild dependent atelectasis within the lungs. No pneumothorax. No lobar airspace consolidation.  2 mm right upper lobe pulmonary nodule on image 22, series 4, unchanged from 03/12/2020. 2 mm left upper lobe pulmonary nodule on image 62, series 4, stable. Upper Abdomen:  Atherosclerotic calcification of the upper abdominal aorta and branch vasculature. Probable stable tiny renal cyst at the upper pole right kidney on image 82, series 602, unchanged from 03/12/2020. Soft Tissues/Bones: Reverse left shoulder arthroplasty hardware. Mild diffuse degenerative changes throughout the spine. Multilevel Schmorl's node deformities. 1. Stable 2 mm upper lobe pulmonary nodules, unchanged from 03/12/2020. Moderate to severe emphysema. Respiratory motion. Mild dependent atelectasis. 2. Stable probable upper pole tiny right renal cyst unchanged from 03/12/2020. 3. Atherosclerotic calcification of the aorta and branch vasculature. Coronary artery disease. 4. Stable prominent mediastinal lymph nodes. LUNG RADS: Per ACR Lung-RADS Version 1.1 Category 2, Benign appearance or behavior. Management:  Continue annual lung screening with LDCT in 12 months. RECOMMENDATIONS: If you would like to register your patient with the [facility] Nodule/Lung Cancer Screening Program, please contact the Nurse Navigator at [phone]). EKG: normal qtc. DIAGNOSIS:    MDD, single episode. Severe        RISK ASSESSMENT: low risk of suicide or harm to others        RECOMMENDATIONS-no indication for admission to psychiatry or sitter    Risk Management:  routine:  no special precautions necessary    Medications: Continue Zoloft and risperidone as ordered. Risperidone could be discontinued at discharge    Encourage outpatient follow-up  Discussed with the treating physician/ team about the patient and treatment plan  Reviewed the chart    Discussed with the patient risk, benefit, alternative and common side effects for the  proposed medication treatment. Patient is consenting to the treatment. Thanks for the consult.  Please call me if needed. Electronically signed by Juan Diego Perez MD on 5/5/2022 at 1:36 PM    Please note that this chart was generated using voice recognition Dragon dictation software. Although every effort was made to ensure the accuracy of this automated transcription, some errors in transcription may have occurred.

## 2022-05-05 NOTE — TELEPHONE ENCOUNTER
FOLLOW UP FROM [facility] COPD. PT IS BEING DISCHARGED TODAY. DUE TO AVAILABILITY OF SCHEDULE ONLY ABLE TO SCHEDULE FOR 05/12 VV AT 7:45 AM . PLEASE ADVISE IF HE CAN BE MOVED SOONER IN OFFICE OR VV, ELSEWHERE.

## 2022-05-05 NOTE — PLAN OF CARE
Problem: Discharge Planning  Goal: Discharge to home or other facility with appropriate resources  5/5/2022 0359 by Danielle Garcia RN  Outcome: Progressing     Problem: Safety - Adult  Goal: Free from fall injury  5/5/2022 0359 by Danielle Garcia RN  Outcome: Progressing     Problem: ABCDS Injury Assessment  Goal: Absence of physical injury  5/5/2022 0359 by Danielle Garcia RN  Outcome: Progressing

## 2022-05-05 NOTE — PROGRESS NOTES
Home Oxygen Evaluation    Home Oxygen Evaluation completed. Patient is on 3 liters per minute via nasal cannula.     Resting SpO2 = 96%    Resting SpO2 on room air = 93%    SpO2 on room air with exercise = 91%         Clemencia Robbins, [id number]

## 2022-05-05 NOTE — FLOWSHEET NOTE
05/05/22 1726   Encounter Summary   Encounter Overview/Reason  Palliative Care   Service Provided For: Patient   Referral/Consult From: Palliative Care   Last Encounter  05/05/22   Complexity of Encounter Low   Palliative Care   Type Palliative Care, Follow-up   Assessment/Intervention/Outcome   Assessment   (sleeping)   Intervention Prayer (assurance of)/[place]

## 2022-05-05 NOTE — TELEPHONE ENCOUNTER
If she needs any refills or any needs, we can do that ahead of the appointment otherwise just keep it on 5/12/2022    .   Future Appointments   Date Time Provider Keri English   5/12/2022  7:45 AM Lennox Oregon, MD fp [facility]   5/23/2022 10:40 AM Demian Sheikh MD . [facility]   6/10/2022  2:00 PM Lennox [place], MD [facility]   7/1/2022 10:20 AM Harris Zhu MD [address] [place] [address]

## 2022-05-05 NOTE — CARE COORDINATION
ONGOING DISCHARGE PLAN:    Patient is alert and oriented x4. Spoke with patient's Wife, [facility], regarding discharge plan and she confirms that plan is still to DC to home. Denies VNS. Per Toshia, Pt. Ivan, 3-5LNC, PRN/HS from [place]. Home O2 Eval has been ordered, to see if pt. Needs w/ Activity. Remains on Po Prednisone/Lasix. Pulmonary following. Hospital F/U apt on 5/12/22 at 7:45 am, for a Virtual Apt & Per the Office, they will call him at home if they can get him in sooner, to the office. Will continue to follow for additional discharge needs.     Electronically signed by Marixa Torres RN on 5/5/2022 at 11:21 AM

## 2022-05-05 NOTE — PROGRESS NOTES
[place] [address] Internal Medicine    Progress Note     5/5/2022    1:46 PM    Name:   Patric Smith  MRN:     Joss Kingme:      [de-identified]   Room:   2083/2083-01   Day:  4  Admit Date:  5/1/2022  7:34 PM    PCP:   Nicanor Rodriguez MD  Code Status:  Full Code    Subjective:     C/C:   Chief Complaint   Patient presents with    Shortness of Breath     Principal Problem:    COPD exacerbation ([facility]  Active Problems:    REYNALDO on CPAP    Acute respiratory failure with hypoxia ([facility]    Other specified anxiety disorders    Essential hypertension    Type 2 diabetes mellitus with diabetic polyneuropathy, without long-term current use of insulin ([facility]  Resolved Problems:    * No resolved hospital problems. *       c/o sob  Depression          on admission  The patient is a 78 y.o. Non- / non  male who presents withShortness of Breath. He has a past medical history of hyperlipidemia, HTN, REYNALDO on CPAP, CHF, COPD.   and he is admitted to the hospital for the management of  COPD exacerbation. Patient is an exsmoker and quit about a month ago and used to smoke 1Pack/day  Patient states that since last 4 days, he has been having worsening shortness of breath, denies chest pain, cough, N/V. But endorses chest tightness, unable to catch breath. Patient tried respiratory treatments to no avail. Then patient was brought to the ER by EMS after worsening of symptoms.   Received Prednisone 20 mg on his way to the hospital, Patient was tachypneic      Initially placed on 3L NC with no improvement in respiratory efforts and then was placed on BiPAP saturating in 98.     Patient received IV solumedrol 125 mg  D-Dimer, CT chest, EKG unremarkable for PE.           Significant last 24 hr data reviewed ;   Vitals:    05/05/22 0715 05/05/22 0727 05/05/22 1131 05/05/22 1230   BP: (!) 134/92   102/61   Pulse: 75   103   Resp: 17 20 20 20   Temp: 97.3 °F (36.3 °C)   97.5 °F (36.4 °C) TempSrc: Oral   Oral   SpO2: 97% 97% 97% 97%   Weight:       Height:          Recent Results (from the past 24 hour(s))   POC Glucose Fingerstick    Collection Time: 05/04/22  4:47 PM   Result Value Ref Range    POC Glucose 112 (H) 75 - 110 mg/dL   POC Glucose Fingerstick    Collection Time: 05/04/22  7:44 PM   Result Value Ref Range    POC Glucose 130 (H) 75 - 110 mg/dL   Basic Metabolic Panel w/ Reflex to MG    Collection Time: 05/05/22  5:42 AM   Result Value Ref Range    Glucose 105 (H) 70 - 99 mg/dL    BUN 32 (H) 8 - 23 mg/dL    CREATININE 0.91 0.70 - 1.20 mg/dL    Calcium 10.3 8.6 - 10.4 mg/dL    Sodium 141 135 - 144 mmol/L    Potassium 3.6 (L) 3.7 - 5.3 mmol/L    Chloride 103 98 - 107 mmol/L    CO2 28 20 - 31 mmol/L    Anion Gap 10 9 - 17 mmol/L    GFR Non-African American >60 >60 mL/min    GFR African American >60 >60 mL/min    GFR Comment         CBC    Collection Time: 05/05/22  5:42 AM   Result Value Ref Range    WBC 9.8 3.5 - 11.0 k/uL    RBC 5.14 4.5 - 5.9 m/uL    Hemoglobin 15.6 13.5 - 17.5 g/dL    Hematocrit 45.5 41 - 53 %    MCV 88.6 80 - 100 fL    MCH 30.4 26 - 34 pg    MCHC 34.3 31 - 37 g/dL    RDW 15.9 (H) 11.5 - 14.9 %    Platelets 243 022 - 845 k/uL    MPV 8.7 6.0 - 12.0 fL   POC Glucose Fingerstick    Collection Time: 05/05/22  6:14 AM   Result Value Ref Range    POC Glucose 103 75 - 110 mg/dL   POC Glucose Fingerstick    Collection Time: 05/05/22 10:54 AM   Result Value Ref Range    POC Glucose 125 (H) 75 - 110 mg/dL     Recent Labs     05/04/22  1109 05/04/22  1647 05/04/22  1944 05/05/22  0614 05/05/22  1054   POCGLU 148* 112* 130* 103 125*        ECHO Complete 2D W Doppler W Color    Result Date: 5/2/2022  [facility] Transthoracic Echocardiography Report (TTE)  Patient Name [facility]     Date of Study           05/02/2022               Amy Ashford   Date of      1955  Gender                  Male  Birth   Age          78 year(s)  Race                       Room Number  2083        Height:                 72 inch, 182.88 cm   Corporate ID [id number]    Weight:                 276 pounds, 125.2 kg  #   Patient Acct [de-identified]   BSA:        2.44 m^2    BMI:        37.43 kg/m^2  #   MR #         [id number]      Sonographer             Suzan Olsen   Accession #  [id number]  Interpreting Physician  [address]   Fellow                   Referring Nurse                           Practitioner   Interpreting             Referring Physician     [address]  Fellow                                           Mio  Type of Study   TTE procedure:2D Echocardiogram, M-Mode, Doppler, Color Doppler. Procedure Date Date: 05/02/2022 Start: 04:16 PM Study Location: [address] [place] [address] Technical Quality: Fair visualization Indications:Congestive heart failure, diastolic dysfunction. Patient Status: Inpatient Height: 72 inches Weight: 276.01 pounds BSA: 2.44 m^2 BMI: 37.43 kg/m^2 Rhythm: Within normal limits HR: 89 bpm BP: 153/72 mmHg Allergies   - *No Known Allergies. CONCLUSIONS Summary Left ventricle is normal in size and wall thickness. Global left ventricular systolic function is normal. Calculated EF via Lazaro's method is 60 %. No obvious wall motion abnormality seen. Left atrium is normal in size. Right atrium is mildly dilated . Normal right ventricular size and function. Mild tricuspid regurgitation. No pulmonary hypertension. Estimated right ventricular systolic pressure is 50NHQO.  Signature ----------------------------------------------------------------------------  Electronically signed by Suzan Olsen(Sonographer) on 05/02/2022 04:43  PM ---------------------------------------------------------------------------- ----------------------------------------------------------------------------  Electronically signed by Abraham Dodd([facility] physician) on 05/02/2022  06:20 PM ---------------------------------------------------------------------------- FINDINGS Left Atrium Left atrium is normal in size. Left Ventricle Left ventricle is normal in size and wall thickness. Global left ventricular systolic function is normal. Calculated EF via Lazaro's method is 60 %. No obvious wall motion abnormality seen. Right Atrium Right atrium is mildly dilated . Right Ventricle Normal right ventricular size and function. Mitral Valve No obvious valvular abnormality seen. Trivial mitral regurgitation. Aortic Valve No obvious valvular abnormality seen. No evidence of aortic insufficiency or stenosis. Tricuspid Valve No obvious valvular abnormality. Mild tricuspid regurgitation. No pulmonary hypertension. Estimated right ventricular systolic pressure is 44IKOT. Pulmonic Valve Pulmonic valve was not well visualized. No evidence of pulmonic insufficiency or stenosis. Pericardial Effusion No significant pericardial effusion is seen. Pleural Effusion No pleural effusion seen. Miscellaneous Normal aortic root dimension.  E/E' average = 9.7. M-mode / 2D Measurements & Calculations:   LVIDd:5.02 cm(3.7 - 5.6 cm)      Diastolic TCKLKU:43 ml  UCSGK:6.68 cm(2.2 - 4.0 cm)      Systolic PNLXBO:10 ml  WRLA:5.63 cm(0.6 - 1.1 cm)       Aortic Root:3.6 cm(2.0 - 3.7 cm)  LVPWd:0.95 cm(0.6 - 1.1 cm)      LA Dimension: 3.7 cm(1.9 - 4.0 cm)  Fractional Shortenin.28 %    LA volume/Index: 46.8 ml /19m^2  Calculated LVEF (%): 67.19 %     LVOT:1.9 cm   Mitral:                               Aortic   Peak E-Wave: 0.99 m/s                 Peak Velocity: 1.49 m/s  Peak A-Wave: 0.80 m/s                 Mean Velocity: 1.05 m/s  E/A Ratio: 1.24                       Peak Gradient: 8.88 mmHg  Peak Gradient: 3.88 mmHg              Mean Gradient: 5 mmHg  Deceleration Time: 313 msec                                         Area (continuity): 2.15 cm^2                                        AV VTI: 32.1 cm   Tricuspid:                            Pulmonic:   Estimated RVSP: 25 mmHg  Peak TR Velocity: 2.35 m/s  Peak TR Gradient: 22.09 mmHg  Estimated RA Pressure: 3 mmHg                                        Estimated PASP: 25.09 mmHg  Septal Wall E' velocity:0.10 m/s Lateral Wall E' velocity:0.10 m/s    XR CHEST PORTABLE    Result Date: 5/1/2022  EXAMINATION: ONE XRAY VIEW OF THE CHEST 5/1/2022 8:19 pm COMPARISON: March 11, 2022 HISTORY: ORDERING SYSTEM PROVIDED HISTORY: sob, hypoxic TECHNOLOGIST PROVIDED HISTORY: sob, hypoxic Reason for Exam: sob, hypoxic FINDINGS: Left shoulder arthroplasty. The lungs are without acute focal process. There is no effusion or pneumothorax. The cardiomediastinal silhouette is without acute process. The osseous structures are without acute process. No acute process. CT CHEST PULMONARY EMBOLISM W CONTRAST    Result Date: 5/1/2022  EXAMINATION: CTA OF THE CHEST 5/1/2022 9:41 pm TECHNIQUE: CTA of the chest was performed after the administration of intravenous contrast.  Multiplanar reformatted images are provided for review. MIP images are provided for review. Dose modulation, iterative reconstruction, and/or weight based adjustment of the mA/kV was utilized to reduce the radiation dose to as low as reasonably achievable. COMPARISON: 04/18/2022 HISTORY: ORDERING SYSTEM PROVIDED HISTORY: positive d dimer TECHNOLOGIST PROVIDED HISTORY: positive d dimer Decision Support Exception - unselect if not a suspected or confirmed emergency medical condition->Emergency Medical Condition (MA) Reason for Exam: positive d dimer, sob FINDINGS: Pulmonary Arteries: Pulmonary arteries are adequately opacified for evaluation. No evidence of intraluminal filling defect to suggest pulmonary embolism. Main pulmonary artery is normal in caliber. Mediastinum: Stable mildly prominent nonspecific mediastinal and right hilar lymph nodes. The heart size is normal.  Thoracic aorta is normal in caliber with no evidence of aneurysm or dissection. There is calcified atherosclerotic plaque.   Calcific coronary artery disease. Lungs/pleura: There is apical predominant emphysema. The lungs are without acute process. No focal consolidation or pulmonary edema. No evidence of pleural effusion or pneumothorax. Upper Abdomen: Stable bilateral adrenal gland hypertrophy. Soft Tissues/Bones: No acute bone or soft tissue abnormality. No evidence of pulmonary embolism or acute pulmonary abnormality. Stable mildly prominent nonspecific mediastinal and right hilar lymph nodes. Calcific coronary artery disease. Apical predominant emphysema. RECOMMENDATIONS: Unavailable             On admission         Review of Systems:     Constitutional:  negative for chills, fevers, sweats  Respiratory:  negative for cough, dyspnea on exertion, hemoptysis, shortness of breath, wheezing  Cardiovascular:  negative for chest pain, chest pressure/discomfort, lower extremity edema, palpitations  Gastrointestinal:  negative for abdominal pain, constipation, diarrhea, nausea, vomiting  Neurological:  negative for dizziness, headache  Data:     Past Medical History:  no change     Social History:  no change    Family History: @no change    Vitals:      I/O (24Hr): Intake/Output Summary (Last 24 hours) at 5/5/2022 1346  Last data filed at 5/5/2022 1007  Gross per 24 hour   Intake 355 ml   Output 500 ml   Net -145 ml       Labs:    Radiology:    Medications:      Allergies:      Current Meds:   Scheduled Meds:    predniSONE  40 mg Oral Daily    guaiFENesin  600 mg Oral BID    risperiDONE  0.5 mg Oral BID    sertraline  50 mg Oral Dinner    insulin lispro  0-6 Units SubCUTAneous TID     insulin lispro  0-3 Units SubCUTAneous Nightly    nicotine  1 patch TransDERmal Daily    sodium chloride  80 mL IntraVENous Once    sodium chloride flush  5-40 mL IntraVENous 2 times per day    enoxaparin  30 mg SubCUTAneous BID    ipratropium-albuterol  1 ampule Inhalation Q4H WA    amLODIPine  10 mg Oral Daily    aspirin EC  81 mg Oral Daily    furosemide  40 mg Oral Daily    lisinopril  10 mg Oral Daily    pravastatin  80 mg Oral Nightly     Continuous Infusions:    dextrose      sodium chloride       PRN Meds: glucose, LORazepam, dextrose, glucagon (rDNA), dextrose, oxyCODONE-acetaminophen, perflutren lipid microspheres, albuterol, sodium chloride flush, sodium chloride flush, sodium chloride, acetaminophen, ondansetron **OR** ondansetron      Physical Examination:        /61   Pulse 103   Temp 97.5 °F (36.4 °C) (Oral)   Resp 20   Ht 6' (1.829 m)   Wt 276 lb 0.3 oz (125.2 kg)   SpO2 97%   BMI 37.43 kg/m²   Temp (24hrs), Av.8 °F (36.6 °C), Min:97.3 °F (36.3 °C), Max:98.1 °F (36.7 °C)    Recent Labs     22  1647 22  1944 22  0614 22  1054   POCGLU 112* 130* 103 125*       Intake/Output Summary (Last 24 hours) at 2022 1346  Last data filed at 2022 1007  Gross per 24 hour   Intake 355 ml   Output 500 ml   Net -145 ml       General Appearance:  alert, well appearing, and in no acute distress  Mental status:   Head:  normocephalic, atraumatic. Eye: no icterus, redness, pupils equal and reactive, extraocular eye movements intact, conjunctiva clear  Ear: normal external ear, no discharge, hearing intact  Nose:  no drainage noted  Mouth: mucous membranes moist  Neck: supple, no carotid bruits, thyroid not palpable  Lungs: Bilateral equal air entry, clear to ausculation, no wheezing, rales or rhonchi, normal effort  Cardiovascular: normal rate, regular rhythm, no murmur, gallop, rub.   Abdomen: Soft, nontender, nondistended, normal bowel sounds, no hepatomegaly or splenomegaly  Neurologic: There are no new focal motor or sensory deficits,   Skin: No gross lesions, rashes, bruising or bleeding on exposed skin area  Extremities:  peripheral pulses palpable, no pedal edema or calf pain with palpation  Psych:             Assessment:        Primary Problem  COPD exacerbation (HCC)    Principal Problem:    COPD exacerbation ([address]  Active Problems:    REYNALDO on CPAP    Acute respiratory failure with hypoxia (HCC)    Other specified anxiety disorders    Essential hypertension    Type 2 diabetes mellitus with diabetic polyneuropathy, without long-term current use of insulin ([address]  Resolved Problems:    * No resolved hospital problems. *       Plan:          5/3/22    Full code  Consult psych   Start zoloft   . Hospital Problems           Last Modified POA    * (Principal) COPD exacerbation ([address] 5/1/2022 Yes    REYNALDO on CPAP 5/2/2022 Yes    Acute respiratory failure with hypoxia ([address] 5/2/2022 Yes    Other specified anxiety disorders 5/4/2022 Yes    Essential hypertension 5/2/2022 Yes    Type 2 diabetes mellitus with diabetic polyneuropathy, without long-term current use of insulin ([address] 5/2/2022 Yes    Overview Signed 3/25/2017  7:20 AM by Chase Real MD     Lab Results   Component Value Date    LABA1C 7.1 (H) 03/23/2017 5/5/22    Has been having anxiety episodes with paresthesias face and arms                                                order lorazepam prn for anxiety . Psych input noted . Steroids could be contributing to anxiety   Psych input     DIAGNOSIS:     MDD, single episode. Severe     Discontinue I V steroids   Steroids may be contributing to neuropsychiatric symptoms . Change to prednisone 40 mg daily . Thanks for consulting us . Will monitor vitals and clinical course , and  Optimize therapy  as needed .            Elizabeth Castrejon MD

## 2022-05-05 NOTE — PROGRESS NOTES
PROVIDE ADEQUATE OXYGENATION WITH ACCEPTABLE SP02/ABG'S    [x]  IDENTIFY APPROPRIATE OXYGEN THERAPY  [x]   MONITOR SP02/ABG'S AS NEEDED   [x]   PATIENT EDUCATION AS NEEDED    BRONCHOSPASM/BRONCHOCONSTRICTION     [x]         IMPROVE AERATION/BREATH SOUNDS  [x]   ADMINISTER BRONCHODILATOR THERAPY AS APPROPRIATE  [x]   ASSESS BREATH SOUNDS  [x]   IMPLEMENT AEROSOL/MDI PROTOCOL  [x]   PATIENT EDUCATION AS NEEDED    NONINVASIVE VENTILATION    PROVIDE OPTIMAL VENTILATION/ACCEPTABLE SPO2   IMPLEMENT NONINVASIVE VENTILATION PROTOCOL   MAINTAIN ACCEPTABLE SPO2   ASSESS SKIN INTEGRITY/BREAKDOWN SCORE   PATIENT EDUCATION AS NEEDED   BIPAP AS NEEDED      Pt using home BIPAP unit while sleeping. States it is working ok. Advised to call with any questions and will continue to monitor.

## 2022-05-05 NOTE — PROGRESS NOTES
Vin Andrade MD/Camilo Kelly MD/ Jessie Fall MD/Vel Walter APRN AGAZOËP-BC, NP-C      Victor Manuel Holloway APRN NP-C     Eduardo Skinner APRN NP-C                                           Pulmonary Progress Note    Patient - David Cormier   Age - 78 y.o.   - 1955  MRN - [id number]  Acct # - [de-identified]  Date of Admission - 2022  7:34 PM    Consulting Service/Physician:       Primary Care Physician: Joy Otero MD    SUBJECTIVE:     Chief Complaint:   Chief Complaint   Patient presents with    Shortness of Breath     Subjective:    He is doing better overall, he still is more short of breath than normal with activity. He is using his CPAP at night. He was on 3 L this morning, but his pulse ox was 97%. He does have oxygen at home to use as needed during the day, but normally only wears it at night with the CPAP. He does still have some mild wheezing but much improved, still has a cough, not bringing up any phlegm. He has not had any fevers or chills. He has been stable per nursing. VITALS  BP (!) 134/92   Pulse 75   Temp 97.3 °F (36.3 °C) (Oral)   Resp 20   Ht 6' (1.829 m)   Wt 276 lb 0.3 oz (125.2 kg)   SpO2 97%   BMI 37.43 kg/m²   Wt Readings from Last 3 Encounters:   22 276 lb 0.3 oz (125.2 kg)   22 276 lb (125.2 kg)   22 277 lb 6.4 oz (125.8 kg)     I/O (24 Hours)    Intake/Output Summary (Last 24 hours) at 2022 0850  Last data filed at 2022 2414  Gross per 24 hour   Intake --   Output 500 ml   Net -500 ml     Ventilator:   Settings  FiO2 : 32 %  Insp Rise Time (%): 2 %  Exam:   Physical Exam   Constitutional:  Oriented to person, place, and time. Appears well-developed and well-nourished. Sitting up at edge of bed in no distress  HENT: Unremarkable nasal cannula in place  Head: Normocephalic and atraumatic.    Eyes: EOM are normal. Pupils are equal, round, and reactive to light. Neck: Neck supple. Cardiovascular:  Regular rate and rhythm. Normal heart tones. No JVD. Pulmonary/Chest: Effort normal and breath sounds diminished with improved aeration, few faint expiratory wheezes present, respirations easy nonlabored at rest on 3 L, pulse ox 97%  Abdominal: Soft. Bowel sounds are normal. There is no tenderness. Musculoskeletal: Normal range of motion. Neurological:patient is alert and oriented to person, place, and time. Skin: Skin is warm and dry.     Extremities: No edema   Infusions:      dextrose      sodium chloride       Meds:     Current Facility-Administered Medications:     glucose chewable tablet 16 g, 4 tablet, Oral, PRN, Melissa Ochoa MD    LORazepam (ATIVAN) tablet 0.5 mg, 0.5 mg, Oral, Q6H PRN, Ana Grady MD, 0.5 mg at 05/04/22 1400    predniSONE (DELTASONE) tablet 40 mg, 40 mg, Oral, Daily, Ana Grady MD, 40 mg at 05/04/22 1600    guaiFENesin (MUCINEX) extended release tablet 600 mg, 600 mg, Oral, BID, JAVI Cervantes - CNP, 600 mg at 05/04/22 2122    risperiDONE (RISPERDAL) tablet 0.5 mg, 0.5 mg, Oral, BID, Marleni Ye MD, 0.5 mg at 05/04/22 2122    sertraline (ZOLOFT) tablet 50 mg, 50 mg, Oral, Adriel Patrick MD, 50 mg at 05/03/22 1819    dextrose 50 % IV solution, 12.5 g, IntraVENous, PRN, Melisas Ochoa MD    glucagon (rDNA) injection 1 mg, 1 mg, IntraMUSCular, PRN, Melissa Ochoa MD    dextrose 5 % solution, 100 mL/hr, IntraVENous, PRN, Melissa Ochoa MD    insulin lispro (HUMALOG) injection vial 0-6 Units, 0-6 Units, SubCUTAneous, TID WC, Melissa Ochoa MD    insulin lispro (HUMALOG) injection vial 0-3 Units, 0-3 Units, SubCUTAneous, Nightly, Melissa Ochoa MD    nicotine (NICODERM CQ) 21 MG/24HR 1 patch, 1 patch, TransDERmal, Daily, Melissa Ochoa MD    oxyCODONE-acetaminophen (PERCOCET) 5-325 MG per tablet 1 tablet, 1 tablet, Oral, BID PRN, Christie Pineda MD, 1 tablet at 05/03/22 1210   perflutren lipid microspheres (DEFINITY) injection 1.65 mg, 1.5 mL, IntraVENous, ONCE PRN, Reggie Ledesma MD    albuterol (PROVENTIL) nebulizer solution 2.5 mg, 2.5 mg, Nebulization, Q4H PRN, Marcellina Muck, DO, 2.5 mg at 05/01/22 2029    sodium chloride flush 0.9 % injection 10 mL, 10 mL, IntraVENous, PRN, Cruz Irving MD, 10 mL at 05/04/22 0257    0.9 % sodium chloride bolus, 80 mL, IntraVENous, Once, Cruz Irving MD    sodium chloride flush 0.9 % injection 5-40 mL, 5-40 mL, IntraVENous, 2 times per day, Marcellina Muck, DO, 10 mL at 05/04/22 2123    sodium chloride flush 0.9 % injection 5-40 mL, 5-40 mL, IntraVENous, PRN, Marcellina Muck, DO    0.9 % sodium chloride infusion, , IntraVENous, PRN, Marcellina Muck, DO    enoxaparin Sodium (LOVENOX) injection 30 mg, 30 mg, SubCUTAneous, BID, Marcellina Muck, DO, 30 mg at 05/04/22 2123    acetaminophen (TYLENOL) tablet 650 mg, 650 mg, Oral, Q4H PRN, Marcellina Muck, DO, 650 mg at 05/02/22 1427    ondansetron (ZOFRAN-ODT) disintegrating tablet 4 mg, 4 mg, Oral, Q8H PRN, 4 mg at 05/02/22 1316 **OR** ondansetron (ZOFRAN) injection 4 mg, 4 mg, IntraVENous, Q6H PRN, Marcellina Muck, DO    ipratropium-albuterol (DUONEB) nebulizer solution 1 ampule, 1 ampule, Inhalation, Q4H WA, Marcellina Muck, DO, 1 ampule at 05/05/22 0727    amLODIPine (NORVASC) tablet 10 mg, 10 mg, Oral, Daily, Marcellina Muck, DO, 10 mg at 05/04/22 0831    aspirin EC tablet 81 mg, 81 mg, Oral, Daily, Marcellina Muck, DO, 81 mg at 05/04/22 0831    furosemide (LASIX) tablet 40 mg, 40 mg, Oral, Daily, Marcellina Muck, DO, 40 mg at 05/04/22 0831    lisinopril (PRINIVIL;ZESTRIL) tablet 10 mg, 10 mg, Oral, Daily, Marcellina Muck, DO, 10 mg at 05/04/22 0831    pravastatin (PRAVACHOL) tablet 80 mg, 80 mg, Oral, Nightly, Marcellina Muck, DO, 80 mg at 05/04/22 2122    Lab Results:     Lab Results   Component Value Date    WBC 9.8 05/05/2022    HGB 15.6 05/05/2022    HCT 45.5 05/05/2022    MCV 88.6 05/05/2022     05/05/2022     Lab Results   Component Value Date    CALCIUM 10.3 05/05/2022     05/05/2022    K 3.6 (L) 05/05/2022    CO2 28 05/05/2022     05/05/2022    BUN 32 (H) 05/05/2022    CREATININE 0.91 05/05/2022       Lab Results   Component Value Date    INR 1.0 03/19/2020    PROTIME 13.1 03/19/2020     2D echo shows EF 60%, trivial MR with RVSP of 25  Radiology:     5/4/22 5/1/22  CXR shows improvement in aeration, no pneumonia    ASSESSMENT:       1. Acute exacerbation COPD, stage III, FEV1 61% predicted with diffusion capacity of 32%  2. Acute bronchospasm  3. Elevated D-dimer, negative CT angiogram of the chest for pulmonary embolism  4. Obstructive sleep apnea on CPAP therapy at home  5. Chronic diastolic heart failure  6. Tobacco use, quitting only 2 weeks ago  7. Pseudocholinesterase deficiency  8. Obesity  9. Full code  PLAN:   1. pred taper upon discharge  2. Encourage smoking cessation  3. Cont cpap with 3L at hs  4. Ambulate/increase activity as tolerated  5. DVT proph  6. Home O2 eval just to see if he needs oxygen with activity, he does have portable oxygen at home  7. [address] [address] for d/c to home from our standpoint  8. Can f/u in our office in 2-4 weeks, [phone]  9. Discussed with RN  10. Discussed with Dr. Akosua Aleman, who agrees to current plan of care      Electronically signed by JAVI Chris CNP on 05/05/22     This progress note was completed using a voice transcription system. Every effort was made to ensure accuracy. However, inadvertent computerized transcription errors may be present.     JASMIN CALDWELL AGAZOËP-BC, NP-C  [facility] Pulmonary, Critical Care & Sleep

## 2022-05-05 NOTE — PROGRESS NOTES
Palliative Care: Went to patient's room for consultation meeting x 2. Pt sleeping x1 and in rest room x1. I will round back tomorrow.       [address] [facility] Coordinator  Kiya MARIAN, [facility]  [address] [address] [place] [address] [phone]

## 2022-05-06 VITALS
HEIGHT: 72 IN | HEART RATE: 98 BPM | OXYGEN SATURATION: 94 % | SYSTOLIC BLOOD PRESSURE: 112 MMHG | RESPIRATION RATE: 20 BRPM | BODY MASS INDEX: 35.86 KG/M2 | TEMPERATURE: 97 F | WEIGHT: 264.77 LBS | DIASTOLIC BLOOD PRESSURE: 79 MMHG

## 2022-05-06 LAB
ANION GAP SERPL CALCULATED.3IONS-SCNC: 12 MMOL/L (ref 9–17)
BUN BLDV-MCNC: 50 MG/DL (ref 8–23)
CALCIUM SERPL-MCNC: 10.1 MG/DL (ref 8.6–10.4)
CHLORIDE BLD-SCNC: 104 MMOL/L (ref 98–107)
CO2: 26 MMOL/L (ref 20–31)
CREAT SERPL-MCNC: 1.38 MG/DL (ref 0.7–1.2)
GFR AFRICAN AMERICAN: >60 ML/MIN
GFR NON-AFRICAN AMERICAN: 51 ML/MIN
GFR SERPL CREATININE-BSD FRML MDRD: ABNORMAL ML/MIN/{1.73_M2}
GLUCOSE BLD-MCNC: 117 MG/DL (ref 75–110)
GLUCOSE BLD-MCNC: 120 MG/DL (ref 70–99)
GLUCOSE BLD-MCNC: 124 MG/DL (ref 75–110)
GLUCOSE BLD-MCNC: 170 MG/DL (ref 75–110)
HCT VFR BLD CALC: 45.4 % (ref 41–53)
HEMOGLOBIN: 15.4 G/DL (ref 13.5–17.5)
MCH RBC QN AUTO: 30.1 PG (ref 26–34)
MCHC RBC AUTO-ENTMCNC: 34 G/DL (ref 31–37)
MCV RBC AUTO: 88.5 FL (ref 80–100)
PDW BLD-RTO: 16.1 % (ref 11.5–14.9)
PLATELET # BLD: 192 K/UL (ref 150–450)
PMV BLD AUTO: 9.3 FL (ref 6–12)
POTASSIUM SERPL-SCNC: 3.7 MMOL/L (ref 3.7–5.3)
RBC # BLD: 5.13 M/UL (ref 4.5–5.9)
SODIUM BLD-SCNC: 142 MMOL/L (ref 135–144)
WBC # BLD: 9.3 K/UL (ref 3.5–11)

## 2022-05-06 PROCEDURE — 99239 HOSP IP/OBS DSCHRG MGMT >30: CPT | Performed by: INTERNAL MEDICINE

## 2022-05-06 PROCEDURE — 36415 COLL VENOUS BLD VENIPUNCTURE: CPT

## 2022-05-06 PROCEDURE — 94640 AIRWAY INHALATION TREATMENT: CPT

## 2022-05-06 PROCEDURE — 2700000000 HC OXYGEN THERAPY PER DAY

## 2022-05-06 PROCEDURE — 94761 N-INVAS EAR/PLS OXIMETRY MLT: CPT

## 2022-05-06 PROCEDURE — 80048 BASIC METABOLIC PNL TOTAL CA: CPT

## 2022-05-06 PROCEDURE — 6370000000 HC RX 637 (ALT 250 FOR IP): Performed by: PSYCHIATRY & NEUROLOGY

## 2022-05-06 PROCEDURE — 6370000000 HC RX 637 (ALT 250 FOR IP): Performed by: STUDENT IN AN ORGANIZED HEALTH CARE EDUCATION/TRAINING PROGRAM

## 2022-05-06 PROCEDURE — 85027 COMPLETE CBC AUTOMATED: CPT

## 2022-05-06 PROCEDURE — 6370000000 HC RX 637 (ALT 250 FOR IP): Performed by: NURSE PRACTITIONER

## 2022-05-06 PROCEDURE — 6370000000 HC RX 637 (ALT 250 FOR IP): Performed by: INTERNAL MEDICINE

## 2022-05-06 PROCEDURE — 82947 ASSAY GLUCOSE BLOOD QUANT: CPT

## 2022-05-06 PROCEDURE — 2580000003 HC RX 258: Performed by: STUDENT IN AN ORGANIZED HEALTH CARE EDUCATION/TRAINING PROGRAM

## 2022-05-06 PROCEDURE — 6360000002 HC RX W HCPCS: Performed by: STUDENT IN AN ORGANIZED HEALTH CARE EDUCATION/TRAINING PROGRAM

## 2022-05-06 RX ORDER — RISPERIDONE 0.5 MG/1
0.5 TABLET, FILM COATED ORAL 2 TIMES DAILY
Qty: 60 TABLET | Refills: 3 | Status: SHIPPED
Start: 2022-05-06 | End: 2022-05-12

## 2022-05-06 RX ORDER — ALBUTEROL SULFATE 2.5 MG/3ML
2.5 SOLUTION RESPIRATORY (INHALATION) EVERY 4 HOURS PRN
Qty: 120 EACH | Refills: 3 | Status: SHIPPED | OUTPATIENT
Start: 2022-05-06

## 2022-05-06 RX ORDER — PREDNISONE 20 MG/1
40 TABLET ORAL DAILY
Qty: 20 TABLET | Refills: 0 | Status: SHIPPED | OUTPATIENT
Start: 2022-05-07 | End: 2022-05-17

## 2022-05-06 RX ADMIN — AMLODIPINE BESYLATE 10 MG: 10 TABLET ORAL at 08:24

## 2022-05-06 RX ADMIN — SODIUM CHLORIDE, PRESERVATIVE FREE 10 ML: 5 INJECTION INTRAVENOUS at 08:25

## 2022-05-06 RX ADMIN — IPRATROPIUM BROMIDE AND ALBUTEROL SULFATE 1 AMPULE: .5; 2.5 SOLUTION RESPIRATORY (INHALATION) at 11:09

## 2022-05-06 RX ADMIN — PREDNISONE 40 MG: 20 TABLET ORAL at 08:24

## 2022-05-06 RX ADMIN — LISINOPRIL 10 MG: 20 TABLET ORAL at 08:24

## 2022-05-06 RX ADMIN — INSULIN LISPRO 1 UNITS: 100 INJECTION, SOLUTION INTRAVENOUS; SUBCUTANEOUS at 11:57

## 2022-05-06 RX ADMIN — IPRATROPIUM BROMIDE AND ALBUTEROL SULFATE 1 AMPULE: .5; 2.5 SOLUTION RESPIRATORY (INHALATION) at 07:29

## 2022-05-06 RX ADMIN — GUAIFENESIN 600 MG: 600 TABLET, EXTENDED RELEASE ORAL at 08:24

## 2022-05-06 RX ADMIN — ASPIRIN 81 MG: 81 TABLET, COATED ORAL at 08:24

## 2022-05-06 RX ADMIN — ENOXAPARIN SODIUM 30 MG: 100 INJECTION SUBCUTANEOUS at 08:24

## 2022-05-06 RX ADMIN — RISPERIDONE 0.5 MG: 1 TABLET ORAL at 08:24

## 2022-05-06 RX ADMIN — FUROSEMIDE 40 MG: 40 TABLET ORAL at 08:24

## 2022-05-06 NOTE — DISCHARGE INSTR - COC
Continuity of Care Form    Patient Name: Azam Bush   :  1955  MRN:  Willian Cancer date:  2022  Discharge date:  ***    Code Status Order: Full Code   Advance Directives:      Admitting Physician:  Daniel Arzate MD  PCP: Megan Pires MD    Discharging Nurse: [facility] Unit/Room#: 2083/2083-01  Discharging Unit Phone Number: ***    Emergency Contact:   Extended Emergency Contact Information  Primary Emergency Contact: [facility]  Address: [facility] [address] Phone: [phone]  Work Phone: [phone]  Mobile Phone: [phone]  Relation: Spouse  Hearing or visual needs: None  Other needs: None  Preferred language: English   needed?  No    Past Surgical History:  Past Surgical History:   Procedure Laterality Date    BACK SURGERY      x 2     BACK SURGERY  2017    lumbar laminectomy L2, L3, L4    BRONCHOSCOPY N/A 3/13/2020    BRONCHOSCOPY performed by Walt Habermann, MD at [address] [address]    no stents    CAROTID ENDARTERECTOMY Right 2019    Dr. Aron Miller Bilateral     CHOLECYSTECTOMY, LAPAROSCOPIC N/A 2021    CHOLECYSTECTOMY LAPAROSCOPIC ROBOTIC XI performed by Kassidy Fernandes MD at [address], COLON, DIAGNOSTIC      HERNIA REPAIR Left 2020    HERNIA INGUINAL REPAIR [address] OPEN performed by Kassidy Fernandes MD at [address] [facility] [address] Left     LUMBAR LAMINECTOMY  2017    L2-L4    LA CLOSED RX SHLDR DISLOC,ANESTHESIA Left 2018    SHOULDER CLOSED REDUCTION WITH C-ARM VISUALIZATION performed by Luis Ramirez MD at [facility] COLONOSCOPY W/BIOPSY SINGLE/MULTIPLE N/A 2017    COLONOSCOPY WITH BIOPSY performed by Kelli Bloom DO at [address] [facility] [address] Left 2018    SHOULDER TOTAL ARTHROPLASTY REVERSE LEFT DJO & BICEP TENDON TRANSFER performed by Ean Vidal MD at [address] [facility] HUMERAL/GLENOID COMPNT Left 8/3/2018    SHOULDER TOTAL REVERSE  ARTHROPLASTY REVISION performed by Ean Vidal MD at [address]?     rotator cuff repair    SHOULDER SURGERY Left 10/15/2020    SHOULDER ARTHROSCOPY W/INTEROP CULTURES performed by Ean Vidal MD at [address] [facility]      ear, forehead    TONSILLECTOMY AND ADENOIDECTOMY      UPPER GASTROINTESTINAL ENDOSCOPY N/A 3/19/2020    EGD BIOPSY performed by Kassie Argueta MD at [facility]       Immunization History:   Immunization History   Administered Date(s) Administered    COVID-19, [place] Public, Primary or Immunocompromised, PF, 100mcg/0.5mL 03/23/2021, 04/20/2021, 12/21/2021    Influenza Virus Vaccine 12/30/2014, 12/28/2015, 10/27/2016, 10/02/2017, 09/04/2018, 10/01/2018    Influenza, High-dose, Gali Stroud, 65 yrs +, IM (Fluzone) 12/28/2020    Influenza, Intradermal, Quadrivalent, Preservative Free 10/27/2016    Influenza, Quadv, IM, (6 mo and older Fluzone, Flulaval, Fluarix and 3 yrs and older Afluria) 10/02/2017    Influenza, Quadv, IM, PF (6 mo and older Fluzone, Flulaval, Fluarix, and 3 yrs and older Afluria) 09/04/2018    Influenza, Quadv, adjuvanted, 65 yrs +, IM, PF (Fluad) 11/05/2021    Influenza, Triv, inactivated, subunit, adjuvanted, IM (Fluad 65 yrs and older) 01/28/2020    Pneumococcal Conjugate 13-valent (Ephriam Hefty) 04/21/2016, 07/23/2019    Pneumococcal Polysaccharide ([id number]) 12/30/2014, 04/11/2017    Tdap (Boostrix, Adacel) 03/01/2018    Zoster Live (Zostavax) 01/30/2016    Zoster Recombinant (Shingrix) 02/22/2020, 06/04/2020       Active Problems:  Patient Active Problem List   Diagnosis Code    Essential hypertension I10    Gout M10.9    Hyperlipidemia with target LDL less than 70 E78.5    Osteoporosis M81.0    Prostate cancer ([facility] [address] C61    Gastroesophageal reflux disease without esophagitis K21.9    DDD (degenerative disc disease), lumbar M51.36    Chronic bilateral low back pain with bilateral sciatica M54.42, M54.41, G89.29    Osteoarthritis of spine with radiculopathy, lumbar region M47.26    Type 2 diabetes mellitus with diabetic polyneuropathy, without long-term current use of insulin ([facility]) E11.42    PVD (peripheral vascular disease) with claudication ([facility]) I73.9    Peripheral neuropathic pain M79.2    Tinea pedis of both feet B35.3    Nonspecific reactive hepatitis K75.2    COPD mixed type ([facility]) J44.9    Status post lumbar laminectomy Z98.890    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    Vitamin D deficiency E55.9    Hypertriglyceridemia E78.1    REYNALDO on CPAP G47.33, Z99.89    Tubular adenoma of colon 4/11/17 D12.6    Psychophysiological insomnia F51.04    S/P reverse total shoulder arthroplasty, left Z96.612    Recurrent major depressive disorder, in full remission ([facility] [address] F33.42    Actinic keratosis L57.0    Photodermatitis due to sun L56.8    Seborrheic keratosis L82.1    Senile hyperkeratosis L57.0    Solar degeneration L57.8    Abdominal aortic aneurysm ([facility]) I71.4    Family history of cancer Z80.9    Stenosis of left carotid artery I65.22    Poor balance R26.89    Foot drop, right M21.371    Difficulty rising from chair R68.89    Degenerative disc disease, cervical M50.30    At high risk for falls Z91.81    Pseudocholinesterase deficiency E88.09    Hyperparathyroidism ([facility] [address] [facility] E21.3    Coronary artery disease involving native coronary artery of native heart without angina pectoris I25.10    Morbid obesity ([facility]) E66.01    Status post total shoulder arthroplasty, left M72.016    Tobacco use Z72.0    Chronic hypoxemic respiratory failure ([facility]) J96.11    History of prostate cancer Z85.46    Left inguinal hernia K40.90    Bilateral leg edema R60.0    Type 2 diabetes mellitus with diabetic peripheral angiopathy without gangrene, without long-term current use of insulin ([facility]) E11.51    Tinnitus aurium, bilateral H93.13    Difficulty in walking R26.2    Chronic diastolic heart failure (HCC) I50.32    Bronchitis J40    Chronic pain of both shoulders M25.511, G89.29, M25.512    PLASCENCIA (dyspnea on exertion) R06.00    Carcinoma of prostate ([facility] [address] C61    COPD exacerbation (HCC) J44.1    Acute respiratory failure with hypoxia ([facility]) J96.01    Muscular weakness M62.81    Acute pain of both shoulders M25.511, M25.512    Excessive sweating R61    Other specified anxiety disorders F41.8       Isolation/Infection:   Isolation            No Isolation          Patient Infection Status       Infection Onset Added Last Indicated Last Indicated By Review Planned Expiration Resolved Resolved By    None active    Resolved    COVID-19 (Rule Out) 05/01/22 05/01/22 05/01/22 COVID-19, Rapid (Ordered)   05/01/22 Rule-Out Test Resulted    COVID-19 (Rule Out) 03/10/22 03/10/22 03/11/22 COVID-19 & Influenza Combo (Ordered)   03/11/22 Rule-Out Test Resulted    COVID-19 (Rule Out) 07/08/21 07/08/21 07/08/21 SARS-CoV-2 NAAT (Rapid) (Ordered)   07/08/21 Rule-Out Test Resulted    COVID-19 (Rule Out) 02/12/21 02/12/21 02/12/21 COVID-19 (Ordered)   02/13/21 Rule-Out Test Resulted    COVID-19 (Rule Out) 11/14/20 11/14/20 11/14/20 Covid-19 Ambulatory (Ordered)   11/16/20 Rule-Out Test Resulted    COVID-19 (Rule Out) 10/11/20 10/11/20 10/11/20 COVID-19 (Ordered)   10/12/20 Rule-Out Test Resulted    C-diff Rule Out 03/18/20 03/18/20 03/18/20 Clostridium Difficile Toxin/Antigen (Ordered)   03/18/20 Rule-Out Test Resulted            Nurse Assessment:  Last Vital Signs: /79   Pulse 98   Temp 97 °F (36.1 °C) (Oral)   Resp 20   Ht 6' (1.829 m)   Wt 264 lb 12.4 oz (120.1 kg)   SpO2 94%   BMI 35.91 kg/m²     Last documented pain score (0-10 scale): Pain Level: 8  Last Weight:   Wt Readings from Last 1 Encounters:   05/06/22 264 lb 12.4 oz (120.1 kg)     Mental Status:  {IP PT MENTAL STATUS:20030}    IV Access:  { KATHIE IV ACCESS:475018070}    Nursing Mobility/ADLs:  Walking   {CHP DME KJPH:593377416}  Transfer  {CHP DME XSSM:367984889}  Bathing  {CHP DME LTXW:326178874}  Dressing  {CHP DME LTCR:299463948}  Toileting  {CHP DME JHOE:623119292}  Feeding  {CHP DME ESKC:218635688}  Med Admin  {P DME KENB:870398785}  Med Delivery   {[facility] MED Delivery:108204649}    Wound Care Documentation and Therapy:        Elimination:  Continence: Bowel: {YES / JY:86763}  Bladder: {YES / OL:00615}  Urinary Catheter: {Urinary Catheter:789953501}   Colostomy/Ileostomy/Ileal Conduit: {YES / AN:14328}       Date of Last BM: ***    Intake/Output Summary (Last 24 hours) at 2022 1138  Last data filed at 2022 1230  Gross per 24 hour   Intake 240 ml   Output --   Net 240 ml     I/O last 3 completed shifts:   In: 595 [P.O.:595]  Out: 500 [Urine:500]    Safety Concerns:     [address] [employer] Safety Concerns:891806707}    Impairments/Disabilities:      [employer] Impairments/Disabilities:348308572}    Nutrition Therapy:  Current Nutrition Therapy:   [employer] Diet List:753497036}    Routes of Feeding: {[facility] DME Other Feedings:462037767}  Liquids: {Slp liquid thickness:51359}  Daily Fluid Restriction: {[facility] DME Yes amt example:498234196}  Last Modified Barium Swallow with Video (Video Swallowing Test): {Done Not Done ANXU:554374764}    Treatments at the Time of Hospital Discharge:   Respiratory Treatments: ***  Oxygen Therapy:  {Therapy; copd oxygen:18642}  Ventilator:    {[facility] Vent KAOZ:892999813}    Rehab Therapies: {THERAPEUTIC INTERVENTION:4556691247}  Weight Bearing Status/Restrictions: [address] [employer] Weight Bearin}  Other Medical Equipment (for information only, NOT a DME order):  {EQUIPMENT:831123117}  Other Treatments: ***    Patient's personal belongings (please select all that are sent with patient):  {[facility] DME Belongings:134337718}    RN SIGNATURE:  {Esignature:777874605}    CASE MANAGEMENT/SOCIAL WORK SECTION    Inpatient Status Date: ***    Readmission Risk Assessment Score:  Readmission Risk              Risk of Unplanned Readmission:  32           Discharging to Facility/ Agency   Name:   Address:  Phone:  Fax:    Dialysis Facility (if applicable)   Name:  Address:  Dialysis Schedule:  Phone:  Fax:    / signature: {Esignature:423636814}    PHYSICIAN SECTION    Prognosis: {Prognosis:0359092933}    Condition at Discharge: Whitney Fernández Patient Condition:357820811}    Rehab Potential (if transferring to Rehab): {Prognosis:6808160207}    Recommended Labs or Other Treatments After Discharge: ***    Physician Certification: I certify the above information and transfer of Paris Hess  is necessary for the continuing treatment of the diagnosis listed and that he requires {Admit to Appropriate Level of Care:37496} for {GREATER/LESS:414005316} 30 days.      Update Admission H&P: {CHP DME Changes in RCZJL:141786932}    PHYSICIAN SIGNATURE:  Electronically signed by Albina Lozano MD on 5/6/22 at 11:38 AM EDT

## 2022-05-06 NOTE — PROGRESS NOTES
Vin Andrade MD/Camilo Strickland MD/ Jovan Mercado MD/Vel Loera MD, Ivonne Child APRN AGACNP-BC, NP-C      Keke Jimmy APRN NP-C     Jordan Pineda APRN NP-C                                           Pulmonary Progress Note    Patient - Harmony Leal   Age - 78 y.o.   - 1955  MRN - [id number]  Acct # - [de-identified]  Date of Admission - 2022  7:34 PM    Consulting Service/Physician:       Primary Care Physician: Precious Infante MD    SUBJECTIVE:     Chief Complaint:   Chief Complaint   Patient presents with    Shortness of Breath     Subjective:    He still feels more short of breath with activity, but doing okay. He has less wheezing overall. He states he is not able to bring up any phlegm, he does have a mild congested cough. He uses a CPAP throughout the day at times when he sleeps, as well as at night. No acute issues or concerns per nursing. VITALS  /79   Pulse 98   Temp 97 °F (36.1 °C) (Oral)   Resp 20   Ht 6' (1.829 m)   Wt 264 lb 12.4 oz (120.1 kg)   SpO2 94%   BMI 35.91 kg/m²   Wt Readings from Last 3 Encounters:   22 264 lb 12.4 oz (120.1 kg)   22 276 lb (125.2 kg)   22 277 lb 6.4 oz (125.8 kg)     I/O (24 Hours)    Intake/Output Summary (Last 24 hours) at 2022 1006  Last data filed at 2022 1230  Gross per 24 hour   Intake 595 ml   Output --   Net 595 ml     Ventilator:   Settings  FiO2 : 32 %  Insp Rise Time (%): 2 %  Exam:   Physical Exam   Constitutional:  Oriented to person, place, and time. Appears well-developed and well-nourished. Sitting up at edge of bed in no distress  HENT: Unremarkable nasal cannula in place  Head: Normocephalic and atraumatic. Eyes: EOM are normal. Pupils are equal, round, and reactive to light. Neck: Neck supple. Cardiovascular:  Regular rate and rhythm. Normal heart tones. No JVD.     Pulmonary/Chest: Effort normal and breath sounds diminished with improved aeration, less wheezing today, respirations easy, he does have a mild congested nonproductive cough, not in any distress  Abdominal: Soft. Bowel sounds are normal. There is no tenderness. Musculoskeletal: Normal range of motion. Neurological:patient is alert and oriented to person, place, and time. Skin: Skin is warm and dry.     Extremities: No edema   Infusions:      dextrose      sodium chloride       Meds:     Current Facility-Administered Medications:     calcium carbonate (TUMS) chewable tablet 500 mg, 500 mg, Oral, TID PRN, Airam Hui MD, 500 mg at 05/05/22 2253    glucose chewable tablet 16 g, 4 tablet, Oral, PRN, Jadyn Guevara MD    LORazepam (ATIVAN) tablet 0.5 mg, 0.5 mg, Oral, Q6H PRN, Melissa Vizcaino MD, 0.5 mg at 05/04/22 1400    predniSONE (DELTASONE) tablet 40 mg, 40 mg, Oral, Daily, Melissa Vizcaino MD, 40 mg at 05/06/22 0824    guaiFENesin (MUCINEX) extended release tablet 600 mg, 600 mg, Oral, BID, Libby Lynch APRN - CNP, 600 mg at 05/06/22 0824    risperiDONE (RISPERDAL) tablet 0.5 mg, 0.5 mg, Oral, BID, Salena Johnson MD, 0.5 mg at 05/06/22 0824    sertraline (ZOLOFT) tablet 50 mg, 50 mg, Oral, Erla Homans, MD, 50 mg at 05/05/22 1657    dextrose 50 % IV solution, 12.5 g, IntraVENous, PRN, Jadyn Guevara MD    glucagon (rDNA) injection 1 mg, 1 mg, IntraMUSCular, PRN, Jadyn Guevara MD    dextrose 5 % solution, 100 mL/hr, IntraVENous, PRN, Jadyn Guevara MD    insulin lispro (HUMALOG) injection vial 0-6 Units, 0-6 Units, SubCUTAneous, TID WC, Jadyn Guevara MD    insulin lispro (HUMALOG) injection vial 0-3 Units, 0-3 Units, SubCUTAneous, Nightly, Jadyn Guevara MD    nicotine (NICODERM CQ) 21 MG/24HR 1 patch, 1 patch, TransDERmal, Daily, Jadyn Guevara MD    oxyCODONE-acetaminophen (PERCOCET) 5-325 MG per tablet 1 tablet, 1 tablet, Oral, BID PRN, Christie Cisneros MD, 1 tablet at 05/05/22 0857    perflutren lipid microspheres (DEFINITY) injection 1.65 mg, 1.5 mL, IntraVENous, ONCE PRN, Pallavi Lopes MD    albuterol (PROVENTIL) nebulizer solution 2.5 mg, 2.5 mg, Nebulization, Q4H PRN, Balbina Justin, DO, 2.5 mg at 05/01/22 2029    sodium chloride flush 0.9 % injection 10 mL, 10 mL, IntraVENous, PRN, Jaiden Wynne MD, 10 mL at 05/04/22 0257    sodium chloride flush 0.9 % injection 5-40 mL, 5-40 mL, IntraVENous, 2 times per day, Balbina Justin, DO, 10 mL at 05/06/22 0825    sodium chloride flush 0.9 % injection 5-40 mL, 5-40 mL, IntraVENous, PRN, Balbina Justin, DO    0.9 % sodium chloride infusion, , IntraVENous, PRN, Balbina Justin, DO    enoxaparin Sodium (LOVENOX) injection 30 mg, 30 mg, SubCUTAneous, BID, Balbina Justin, DO, 30 mg at 05/06/22 9010    acetaminophen (TYLENOL) tablet 650 mg, 650 mg, Oral, Q4H PRN, Balbina Justin, DO, 650 mg at 05/02/22 1427    ondansetron (ZOFRAN-ODT) disintegrating tablet 4 mg, 4 mg, Oral, Q8H PRN, 4 mg at 05/02/22 1316 **OR** ondansetron (ZOFRAN) injection 4 mg, 4 mg, IntraVENous, Q6H PRN, Balbina Justin, DO    ipratropium-albuterol (DUONEB) nebulizer solution 1 ampule, 1 ampule, Inhalation, Q4H WA, Balbina Justin, DO, 1 ampule at 05/06/22 0729    amLODIPine (NORVASC) tablet 10 mg, 10 mg, Oral, Daily, Balbina Justin, DO, 10 mg at 05/06/22 0065    aspirin EC tablet 81 mg, 81 mg, Oral, Daily, Balbina Justin, DO, 81 mg at 05/06/22 9897    furosemide (LASIX) tablet 40 mg, 40 mg, Oral, Daily, Balbina Justin, DO, 40 mg at 05/06/22 0824    lisinopril (PRINIVIL;ZESTRIL) tablet 10 mg, 10 mg, Oral, Daily, Balbina Justin, DO, 10 mg at 05/06/22 5844    pravastatin (PRAVACHOL) tablet 80 mg, 80 mg, Oral, Nightly, Balbina Justin, DO, 80 mg at 05/05/22 6473    Lab Results:     Lab Results   Component Value Date    WBC 9.3 05/06/2022    HGB 15.4 05/06/2022    HCT 45.4 05/06/2022    MCV 88.5 05/06/2022     05/06/2022     Lab Results   Component Value Date    CALCIUM 10.1 05/06/2022     05/06/2022    K 3.7 05/06/2022    CO2 26 05/06/2022     05/06/2022    BUN 50 (H) 05/06/2022    CREATININE 1.38 (H) 05/06/2022       Lab Results   Component Value Date    INR 1.0 03/19/2020    PROTIME 13.1 03/19/2020     2D echo shows EF 60%, trivial MR with RVSP of 25  Radiology:     5/4/22 5/1/22  CXR shows improvement in aeration, no pneumonia      Home Oxygen Evaluation     Home Oxygen Evaluation completed.     Patient is on 3 liters per minute via nasal cannula.     Resting SpO2 = 96%     Resting SpO2 on room air = 93%     SpO2 on room air with exercise = 91%           Mavis Bell, [id number]  ASSESSMENT:       1. Acute exacerbation COPD, stage III, FEV1 61% predicted with diffusion capacity of 32%  2. Acute bronchospasm  3. Elevated D-dimer, negative CT angiogram of the chest for pulmonary embolism  4. Obstructive sleep apnea on CPAP therapy at home  5. Chronic diastolic heart failure  6. Tobacco use, quitting only 2 weeks ago  7. Pseudocholinesterase deficiency  8. Obesity  9. Full code  PLAN:   1. pred taper upon discharge  2. Encourage smoking cessation  3. Cont cpap with 3L at hs  4. Home O2 eval was completed, he maintain pulse ox greater than 91% with activity on room air, does not require oxygen at rest or with activity, he does have oxygen at 3 L with a CPAP at home  5. Ambulate/increase activity as tolerated  6. DVT proph  7. [id number] [address] for d/c to home from our standpoint  8. Can f/u in our office in 2-4 weeks, [phone]  9. Discussed with RN        Electronically signed by JAVI Moreno CNP on 05/06/22     This progress note was completed using a voice transcription system. Every effort was made to ensure accuracy. However, inadvertent computerized transcription errors may be present.     JASMIN ELIAS-BC, NP-C  [facility], [facility]

## 2022-05-06 NOTE — PROGRESS NOTES
[facility]    Progress Note     5/6/2022    11:35 AM    Name:   Shirley Whitlock  MRN:     Jf Ndiayey:      [de-identified]   Room:   2083/2083-01   Day:  5  Admit Date:  5/1/2022  7:34 PM    PCP:   Abel Whitman MD  Code Status:  Full Code    Subjective:     C/C:   Chief Complaint   Patient presents with    Shortness of Breath     Principal Problem:    COPD exacerbation ([facility]  Active Problems:    REYNALDO on CPAP    Acute respiratory failure with hypoxia ([facility]    Other specified anxiety disorders    Essential hypertension    Type 2 diabetes mellitus with diabetic polyneuropathy, without long-term current use of insulin ([facility]  Resolved Problems:    * No resolved hospital problems. *       c/o sob  Depression          on admission  The patient is a 78 y.o. Non- / non  male who presents withShortness of Breath. He has a past medical history of hyperlipidemia, HTN, REYNALDO on CPAP, CHF, COPD.   and he is admitted to the hospital for the management of  COPD exacerbation. Patient is an exsmoker and quit about a month ago and used to smoke 1Pack/day  Patient states that since last 4 days, he has been having worsening shortness of breath, denies chest pain, cough, N/V. But endorses chest tightness, unable to catch breath. Patient tried respiratory treatments to no avail. Then patient was brought to the ER by EMS after worsening of symptoms.   Received Prednisone 20 mg on his way to the hospital, Patient was tachypneic      Initially placed on 3L NC with no improvement in respiratory efforts and then was placed on BiPAP saturating in 98.     Patient received IV solumedrol 125 mg  D-Dimer, CT chest, EKG unremarkable for PE.           Significant last 24 hr data reviewed ;   Vitals:    05/06/22 0130 05/06/22 0530 05/06/22 0732 05/06/22 0815   BP: 120/61   112/79   Pulse: 84   98   Resp: 20   20   Temp: 98.6 °F (37 °C)   97 °F (36.1 °C)   TempSrc: Axillary   Oral   SpO2: 95%  94% 94%   Weight:  264 lb 12.4 oz (120.1 kg)     Height:          Recent Results (from the past 24 hour(s))   POC Glucose Fingerstick    Collection Time: 05/05/22  4:24 PM   Result Value Ref Range    POC Glucose 136 (H) 75 - 110 mg/dL   POC Glucose Fingerstick    Collection Time: 05/05/22  7:26 PM   Result Value Ref Range    POC Glucose 106 75 - 110 mg/dL   POC Glucose Fingerstick    Collection Time: 05/06/22  1:38 AM   Result Value Ref Range    POC Glucose 117 (H) 75 - 110 mg/dL   POC Glucose Fingerstick    Collection Time: 05/06/22  6:35 AM   Result Value Ref Range    POC Glucose 124 (H) 75 - 110 mg/dL   Basic Metabolic Panel w/ Reflex to MG    Collection Time: 05/06/22  6:53 AM   Result Value Ref Range    Glucose 120 (H) 70 - 99 mg/dL    BUN 50 (H) 8 - 23 mg/dL    CREATININE 1.38 (H) 0.70 - 1.20 mg/dL    Calcium 10.1 8.6 - 10.4 mg/dL    Sodium 142 135 - 144 mmol/L    Potassium 3.7 3.7 - 5.3 mmol/L    Chloride 104 98 - 107 mmol/L    CO2 26 20 - 31 mmol/L    Anion Gap 12 9 - 17 mmol/L    GFR Non-African American 51 (L) >60 mL/min    GFR African American >60 >60 mL/min    GFR Comment         CBC    Collection Time: 05/06/22  6:53 AM   Result Value Ref Range    WBC 9.3 3.5 - 11.0 k/uL    RBC 5.13 4.5 - 5.9 m/uL    Hemoglobin 15.4 13.5 - 17.5 g/dL    Hematocrit 45.4 41 - 53 %    MCV 88.5 80 - 100 fL    MCH 30.1 26 - 34 pg    MCHC 34.0 31 - 37 g/dL    RDW 16.1 (H) 11.5 - 14.9 %    Platelets 988 435 - 326 k/uL    MPV 9.3 6.0 - 12.0 fL     Recent Labs     05/05/22  1054 05/05/22  1624 05/05/22  1926 05/06/22  0138 05/06/22  0635   POCGLU 125* 136* 106 117* 124*        ECHO Complete 2D W Doppler W Color    Result Date: 5/2/2022  [facility] Transthoracic Echocardiography Report (TTE)  Patient Name [facility]     Date of Study           05/02/2022               Yaneli Hawthorne   Date of      1955  Gender                  Male  Birth   Age          78 year(s)  Race    Room Number  2083        Height:                 72 inch, 182.88 cm   Corporate ID [id number]    Weight:                 276 pounds, 125.2 kg  #   Patient Acct [de-identified]   BSA:        2.44 m^2    BMI:        37.43 kg/m^2  #   MR #         [id number]      Sonographer             Suzan Olsen   Accession #  [id number]  Interpreting Physician  [address]   Fellow                   Referring Nurse                           Practitioner   Interpreting             Referring Physician     [id number] [facility]  Fellow                                           Mio  Type of Study   TTE procedure:2D Echocardiogram, M-Mode, Doppler, Color Doppler. Procedure Date Date: 05/02/2022 Start: 04:16 PM Study Location: [address] [place] [address] Technical Quality: Fair visualization Indications:Congestive heart failure, diastolic dysfunction. Patient Status: Inpatient Height: 72 inches Weight: 276.01 pounds BSA: 2.44 m^2 BMI: 37.43 kg/m^2 Rhythm: Within normal limits HR: 89 bpm BP: 153/72 mmHg Allergies   - *No Known Allergies. CONCLUSIONS Summary Left ventricle is normal in size and wall thickness. Global left ventricular systolic function is normal. Calculated EF via Lazaro's method is 60 %. No obvious wall motion abnormality seen. Left atrium is normal in size. Right atrium is mildly dilated . Normal right ventricular size and function. Mild tricuspid regurgitation. No pulmonary hypertension. Estimated right ventricular systolic pressure is 23ADDA.  Signature ----------------------------------------------------------------------------  Electronically signed by Suzan Olsen(Sonographer) on 05/02/2022 04:43  PM ---------------------------------------------------------------------------- ----------------------------------------------------------------------------  Electronically signed by Abraham Dodd(Interpreting physician) on 05/02/2022  06:20 PM ---------------------------------------------------------------------------- FINDINGS Left Atrium Left atrium is normal in size. Left Ventricle Left ventricle is normal in size and wall thickness. Global left ventricular systolic function is normal. Calculated EF via Lazaro's method is 60 %. No obvious wall motion abnormality seen. Right Atrium Right atrium is mildly dilated . Right Ventricle Normal right ventricular size and function. Mitral Valve No obvious valvular abnormality seen. Trivial mitral regurgitation. Aortic Valve No obvious valvular abnormality seen. No evidence of aortic insufficiency or stenosis. Tricuspid Valve No obvious valvular abnormality. Mild tricuspid regurgitation. No pulmonary hypertension. Estimated right ventricular systolic pressure is 08OYHF. Pulmonic Valve Pulmonic valve was not well visualized. No evidence of pulmonic insufficiency or stenosis. Pericardial Effusion No significant pericardial effusion is seen. Pleural Effusion No pleural effusion seen. Miscellaneous Normal aortic root dimension.  E/E' average = 9.7. M-mode / 2D Measurements & Calculations:   LVIDd:5.02 cm(3.7 - 5.6 cm)      Diastolic BIWZEC:97 ml  IPACM:6.64 cm(2.2 - 4.0 cm)      Systolic XJIAQF:24 ml  YTWB:2.52 cm(0.6 - 1.1 cm)       Aortic Root:3.6 cm(2.0 - 3.7 cm)  LVPWd:0.95 cm(0.6 - 1.1 cm)      LA Dimension: 3.7 cm(1.9 - 4.0 cm)  Fractional Shortenin.28 %    LA volume/Index: 46.8 ml /19m^2  Calculated LVEF (%): 67.19 %     LVOT:1.9 cm   Mitral:                               Aortic   Peak E-Wave: 0.99 m/s                 Peak Velocity: 1.49 m/s  Peak A-Wave: 0.80 m/s                 Mean Velocity: 1.05 m/s  E/A Ratio: 1.24                       Peak Gradient: 8.88 mmHg  Peak Gradient: 3.88 mmHg              Mean Gradient: 5 mmHg  Deceleration Time: 313 msec                                         Area (continuity): 2.15 cm^2                                        AV VTI: 32.1 cm   Tricuspid: Pulmonic:   Estimated RVSP: 25 mmHg  Peak TR Velocity: 2.35 m/s  Peak TR Gradient: 22.09 mmHg  Estimated RA Pressure: 3 mmHg                                        Estimated PASP: 25.09 mmHg  Septal Wall E' velocity:0.10 m/s Lateral Wall E' velocity:0.10 m/s    XR CHEST PORTABLE    Result Date: 5/1/2022  EXAMINATION: ONE XRAY VIEW OF THE CHEST 5/1/2022 8:19 pm COMPARISON: March 11, 2022 HISTORY: ORDERING SYSTEM PROVIDED HISTORY: sob, hypoxic TECHNOLOGIST PROVIDED HISTORY: sob, hypoxic Reason for Exam: sob, hypoxic FINDINGS: Left shoulder arthroplasty. The lungs are without acute focal process. There is no effusion or pneumothorax. The cardiomediastinal silhouette is without acute process. The osseous structures are without acute process. No acute process. CT CHEST PULMONARY EMBOLISM W CONTRAST    Result Date: 5/1/2022  EXAMINATION: CTA OF THE CHEST 5/1/2022 9:41 pm TECHNIQUE: CTA of the chest was performed after the administration of intravenous contrast.  Multiplanar reformatted images are provided for review. MIP images are provided for review. Dose modulation, iterative reconstruction, and/or weight based adjustment of the mA/kV was utilized to reduce the radiation dose to as low as reasonably achievable. COMPARISON: 04/18/2022 HISTORY: ORDERING SYSTEM PROVIDED HISTORY: positive d dimer TECHNOLOGIST PROVIDED HISTORY: positive d dimer Decision Support Exception - unselect if not a suspected or confirmed emergency medical condition->Emergency Medical Condition (MA) Reason for Exam: positive d dimer, sob FINDINGS: Pulmonary Arteries: Pulmonary arteries are adequately opacified for evaluation. No evidence of intraluminal filling defect to suggest pulmonary embolism. Main pulmonary artery is normal in caliber. Mediastinum: Stable mildly prominent nonspecific mediastinal and right hilar lymph nodes.   The heart size is normal.  Thoracic aorta is normal in caliber with no evidence of aneurysm or dissection. There is calcified atherosclerotic plaque. Calcific coronary artery disease. Lungs/pleura: There is apical predominant emphysema. The lungs are without acute process. No focal consolidation or pulmonary edema. No evidence of pleural effusion or pneumothorax. Upper Abdomen: Stable bilateral adrenal gland hypertrophy. Soft Tissues/Bones: No acute bone or soft tissue abnormality. No evidence of pulmonary embolism or acute pulmonary abnormality. Stable mildly prominent nonspecific mediastinal and right hilar lymph nodes. Calcific coronary artery disease. Apical predominant emphysema. RECOMMENDATIONS: Unavailable             On admission         Review of Systems:     Constitutional:  negative for chills, fevers, sweats  Respiratory:  negative for cough, dyspnea on exertion, hemoptysis, shortness of breath, wheezing  Cardiovascular:  negative for chest pain, chest pressure/discomfort, lower extremity edema, palpitations  Gastrointestinal:  negative for abdominal pain, constipation, diarrhea, nausea, vomiting  Neurological:  negative for dizziness, headache  Data:     Past Medical History:  no change     Social History:  no change    Family History: @no change    Vitals:      I/O (24Hr): Intake/Output Summary (Last 24 hours) at 5/6/2022 1135  Last data filed at 5/5/2022 1230  Gross per 24 hour   Intake 240 ml   Output --   Net 240 ml       Labs:    Radiology:    Medications:      Allergies:      Current Meds:   Scheduled Meds:    predniSONE  40 mg Oral Daily    guaiFENesin  600 mg Oral BID    risperiDONE  0.5 mg Oral BID    sertraline  50 mg Oral Dinner    insulin lispro  0-6 Units SubCUTAneous TID WC    insulin lispro  0-3 Units SubCUTAneous Nightly    nicotine  1 patch TransDERmal Daily    sodium chloride flush  5-40 mL IntraVENous 2 times per day    enoxaparin  30 mg SubCUTAneous BID    ipratropium-albuterol  1 ampule Inhalation Q4H WA    amLODIPine  10 mg Oral Daily    aspirin EC  81 mg Oral Daily    furosemide  40 mg Oral Daily    lisinopril  10 mg Oral Daily    pravastatin  80 mg Oral Nightly     Continuous Infusions:    dextrose      sodium chloride       PRN Meds: calcium carbonate, glucose, LORazepam, dextrose, glucagon (rDNA), dextrose, oxyCODONE-acetaminophen, perflutren lipid microspheres, albuterol, sodium chloride flush, sodium chloride flush, sodium chloride, acetaminophen, ondansetron **OR** ondansetron      Physical Examination:        /79   Pulse 98   Temp 97 °F (36.1 °C) (Oral)   Resp 20   Ht 6' (1.829 m)   Wt 264 lb 12.4 oz (120.1 kg)   SpO2 94%   BMI 35.91 kg/m²   Temp (24hrs), Av.7 °F (36.5 °C), Min:97 °F (36.1 °C), Max:98.6 °F (37 °C)    Recent Labs     22  1624 22  1926 22  0138 22  0635   POCGLU 136* 106 117* 124*       Intake/Output Summary (Last 24 hours) at 2022 1135  Last data filed at 2022 1230  Gross per 24 hour   Intake 240 ml   Output --   Net 240 ml       General Appearance:  alert, well appearing, and in no acute distress  Mental status:   Head:  normocephalic, atraumatic. Eye: no icterus, redness, pupils equal and reactive, extraocular eye movements intact, conjunctiva clear  Ear: normal external ear, no discharge, hearing intact  Nose:  no drainage noted  Mouth: mucous membranes moist  Neck: supple, no carotid bruits, thyroid not palpable  Lungs: Bilateral equal air entry, clear to ausculation, no wheezing, rales or rhonchi, normal effort  Cardiovascular: normal rate, regular rhythm, no murmur, gallop, rub.   Abdomen: Soft, nontender, nondistended, normal bowel sounds, no hepatomegaly or splenomegaly  Neurologic: There are no new focal motor or sensory deficits,   Skin: No gross lesions, rashes, bruising or bleeding on exposed skin area  Extremities:  peripheral pulses palpable, no pedal edema or calf pain with palpation  Psych:             Assessment:        Primary Problem  COPD exacerbation [facility])    Principal Problem:    COPD exacerbation ([address]  Active Problems:    REYNALDO on CPAP    Acute respiratory failure with hypoxia (HCC)    Other specified anxiety disorders    Essential hypertension    Type 2 diabetes mellitus with diabetic polyneuropathy, without long-term current use of insulin ([address]  Resolved Problems:    * No resolved hospital problems. *       Plan:          5/3/22    Full code  Consult psych   Start zoloft   . Hospital Problems           Last Modified POA    * (Principal) COPD exacerbation ([address] 5/1/2022 Yes    REYNALDO on CPAP 5/2/2022 Yes    Acute respiratory failure with hypoxia ([address] 5/2/2022 Yes    Other specified anxiety disorders 5/4/2022 Yes    Essential hypertension 5/2/2022 Yes    Type 2 diabetes mellitus with diabetic polyneuropathy, without long-term current use of insulin ([address] 5/2/2022 Yes    Overview Signed 3/25/2017  7:20 AM by Abel Whitman MD     Lab Results   Component Value Date    LABA1C 7.1 (H) 03/23/2017 5/5/22    Has been having anxiety episodes with paresthesias face and arms                                                order lorazepam prn for anxiety . Psych input noted . Steroids could be contributing to anxiety   Psych input     DIAGNOSIS:     MDD, single episode. Severe     Discontinue I V steroids   Steroids may be contributing to neuropsychiatric symptoms . Change to prednisone 40 mg daily . 5/6/22    Home today   Reduce prednisone                 Thanks for consulting us . Will monitor vitals and clinical course , and  Optimize therapy  as needed .            Brandy Newton MD

## 2022-05-06 NOTE — DISCHARGE SUMMARY
[place] [address] Internal Medicine    Discharge Summary     Patient ID: Kat Morris  :  1955   MRN: [id number]     ACCOUNT:  [de-identified]   Patient's PCP: Patricia Garcia MD  Admit Date: 2022   Discharge Date: 2022    Length of Stay: 5  Code Status:  Full Code  Admitting Physician: Lacie Ahumada MD  Discharge Physician: Lacie Ahumada MD     Active Discharge Diagnoses:     Primary Problem  COPD exacerbation [facility])      [facility]    Diagnosis Date Noted    REYNALDO on CPAP [G47.33, Z99.89] 2017     Priority: High    Other specified anxiety disorders [F41.8] 2022     Priority: Medium    Acute respiratory failure with hypoxia ([address] [J96.01] 2022     Priority: Medium    COPD exacerbation ([facility] [J44.1] 2022    Type 2 diabetes mellitus with diabetic polyneuropathy, without long-term current use of insulin ([facility] [E11.42] 2017    Essential hypertension [I10] 2016       Admission Condition:  fair     Discharged Condition: fair    Hospital Stay:     Hospital Course:  Kat Morris is a 78 y.o. male who was admitted for the management of COPD exacerbation ([facility] , presented to ER with Shortness of Breath        on admission  The patient is E 89 y.o.  Non- / non  male who presents withShortness of Breath. He has a past medical history of hyperlipidemia, HTN, REYNALDO on CPAP, CHF, COPD.   and he is admitted to the hospital for the management of  COPD exacerbation. Patient is an exsmoker and quit about a month ago and used to smoke 1Pack/day  Patient states that since last 4 days, he has been having worsening shortness of breath, denies chest pain, cough, N/V. But endorses chest tightness, unable to catch breath. Patient tried respiratory treatments to no avail. Then patient was brought to the ER by EMS after worsening of symptoms.   Received Prednisone 20 mg on his way to the hospital, Patient was tachypneic       pulm       1. Acute exacerbation COPD, stage III, FEV1 61% predicted with diffusion capacity of 32%  2. Acute bronchospasm  3. Elevated D-dimer, negative CT angiogram of the chest for pulmonary embolism  4. Obstructive sleep apnea on CPAP therapy at home  5. Chronic diastolic heart failure  6. Tobacco use, quitting only 2 weeks ago  7. Pseudocholinesterase deficiency  8. Obesity  9. Full code  PLAN: 1.   pred taper upon discharge  2. Encourage smoking cessation  3. Cont cpap with 3L at hs  4. Ambulate/increase activity as tolerated  5. DVT proph  6. Home O2 eval just to see if he needs oxygen with activity, he does have portable oxygen at home                        Significant therapeutic interventions:     Significant Diagnostic Studies:   Labs / Micro:        ,     Radiology:    ECHO Complete 2D W Doppler W Color    Result Date: 5/2/2022  [facility] Transthoracic Echocardiography Report (TTE)  Patient Name [facility]     Date of Study           05/02/2022               [facility]   Date of      1955  Gender                  Male  Birth   Age          78 year(s)  Race                       Room Number  2083        Height:                 72 inch, 182.88 cm   Corporate ID [id number]    Weight:                 276 pounds, 125.2 kg  #   Patient Acct [de-identified]   BSA:        2.44 m^2    BMI:        37.43 kg/m^2  #   MR #         [id number]      Sonographer             Suzan Olsen   Accession #  [id number]  Interpreting Physician  [address]   Fellow                   Referring Nurse                           Practitioner   Interpreting             Referring Physician     [address] [facility]  Fellow                                           Mio  Type of Study   TTE procedure:2D Echocardiogram, M-Mode, Doppler, Color Doppler.   Procedure Date Date: 05/02/2022 Start: 04:16 PM Study Location: [address] [place] [address] Technical Quality: Fair visualization Indications:Congestive heart failure, diastolic dysfunction. Patient Status: Inpatient Height: 72 inches Weight: 276.01 pounds BSA: 2.44 m^2 BMI: 37.43 kg/m^2 Rhythm: Within normal limits HR: 89 bpm BP: 153/72 mmHg Allergies   - *No Known Allergies. CONCLUSIONS Summary Left ventricle is normal in size and wall thickness. Global left ventricular systolic function is normal. Calculated EF via Lazaro's method is 60 %. No obvious wall motion abnormality seen. Left atrium is normal in size. Right atrium is mildly dilated . Normal right ventricular size and function. Mild tricuspid regurgitation. No pulmonary hypertension. Estimated right ventricular systolic pressure is 22EKQP. Signature ----------------------------------------------------------------------------  Electronically signed by Suzan Olsen(Sonographer) on 05/02/2022 04:43  PM ---------------------------------------------------------------------------- ----------------------------------------------------------------------------  Electronically signed by Abraham Dodd(Interpreting physician) on 05/02/2022  06:20 PM ---------------------------------------------------------------------------- FINDINGS Left Atrium Left atrium is normal in size. Left Ventricle Left ventricle is normal in size and wall thickness. Global left ventricular systolic function is normal. Calculated EF via Lazaro's method is 60 %. No obvious wall motion abnormality seen. Right Atrium Right atrium is mildly dilated . Right Ventricle Normal right ventricular size and function. Mitral Valve No obvious valvular abnormality seen. Trivial mitral regurgitation. Aortic Valve No obvious valvular abnormality seen. No evidence of aortic insufficiency or stenosis. Tricuspid Valve No obvious valvular abnormality. Mild tricuspid regurgitation. No pulmonary hypertension. Estimated right ventricular systolic pressure is 74AKVQ. Pulmonic Valve Pulmonic valve was not well visualized.  No evidence of pulmonic insufficiency or stenosis. Pericardial Effusion No significant pericardial effusion is seen. Pleural Effusion No pleural effusion seen. Miscellaneous Normal aortic root dimension. E/E' average = 9.7. M-mode / 2D Measurements & Calculations:   LVIDd:5.02 cm(3.7 - 5.6 cm)      Diastolic QZKDKP:61 ml  ZBAYC:0.23 cm(2.2 - 4.0 cm)      Systolic LJHCYB:71 ml  JIG.59 cm(0.6 - 1.1 cm)       Aortic Root:3.6 cm(2.0 - 3.7 cm)  LVPWd:0.95 cm(0.6 - 1.1 cm)      LA Dimension: 3.7 cm(1.9 - 4.0 cm)  Fractional Shortenin.28 %    LA volume/Index: 46.8 ml /19m^2  Calculated LVEF (%): 67.19 %     LVOT:1.9 cm   Mitral:                               Aortic   Peak E-Wave: 0.99 m/s                 Peak Velocity: 1.49 m/s  Peak A-Wave: 0.80 m/s                 Mean Velocity: 1.05 m/s  E/A Ratio: 1.24                       Peak Gradient: 8.88 mmHg  Peak Gradient: 3.88 mmHg              Mean Gradient: 5 mmHg  Deceleration Time: 313 msec                                         Area (continuity): 2.15 cm^2                                        AV VTI: 32.1 cm   Tricuspid:                            Pulmonic:   Estimated RVSP: 25 mmHg  Peak TR Velocity: 2.35 m/s  Peak TR Gradient: 22.09 mmHg  Estimated RA Pressure: 3 mmHg                                        Estimated PASP: 25.09 mmHg  Septal Wall E' velocity:0.10 m/s Lateral Wall E' velocity:0.10 m/s    XR CHEST (2 VW)    Result Date: 2022  EXAMINATION: TWO XRAY VIEWS OF THE CHEST 2022 4:18 pm COMPARISON: Chest x-ray dated 1 May 2020 HISTORY: ORDERING SYSTEM PROVIDED HISTORY: Follow-up dyspnea TECHNOLOGIST PROVIDED HISTORY: Follow-up dyspnea Reason for Exam: SOB, COPD FINDINGS: Stable cardiomediastinal silhouette. No pneumothorax or pleural effusion. No acute airspace infiltrates. No acute cardiopulmonary disease.      XR CHEST PORTABLE    Result Date: 2022  EXAMINATION: ONE XRAY VIEW OF THE CHEST 2022 8:19 pm COMPARISON: 2022 HISTORY: ORDERING SYSTEM PROVIDED HISTORY: sob, hypoxic TECHNOLOGIST PROVIDED HISTORY: sob, hypoxic Reason for Exam: sob, hypoxic FINDINGS: Left shoulder arthroplasty. The lungs are without acute focal process. There is no effusion or pneumothorax. The cardiomediastinal silhouette is without acute process. The osseous structures are without acute process. No acute process. CT CHEST PULMONARY EMBOLISM W CONTRAST    Result Date: 5/1/2022  EXAMINATION: CTA OF THE CHEST 5/1/2022 9:41 pm TECHNIQUE: CTA of the chest was performed after the administration of intravenous contrast.  Multiplanar reformatted images are provided for review. MIP images are provided for review. Dose modulation, iterative reconstruction, and/or weight based adjustment of the mA/kV was utilized to reduce the radiation dose to as low as reasonably achievable. COMPARISON: 04/18/2022 HISTORY: ORDERING SYSTEM PROVIDED HISTORY: positive d dimer TECHNOLOGIST PROVIDED HISTORY: positive d dimer Decision Support Exception - unselect if not a suspected or confirmed emergency medical condition->Emergency Medical Condition (MA) Reason for Exam: positive d dimer, sob FINDINGS: Pulmonary Arteries: Pulmonary arteries are adequately opacified for evaluation. No evidence of intraluminal filling defect to suggest pulmonary embolism. Main pulmonary artery is normal in caliber. Mediastinum: Stable mildly prominent nonspecific mediastinal and right hilar lymph nodes. The heart size is normal.  Thoracic aorta is normal in caliber with no evidence of aneurysm or dissection. There is calcified atherosclerotic plaque. Calcific coronary artery disease. Lungs/pleura: There is apical predominant emphysema. The lungs are without acute process. No focal consolidation or pulmonary edema. No evidence of pleural effusion or pneumothorax. Upper Abdomen: Stable bilateral adrenal gland hypertrophy. Soft Tissues/Bones: No acute bone or soft tissue abnormality.      No evidence of pulmonary embolism or acute pulmonary abnormality. Stable mildly prominent nonspecific mediastinal and right hilar lymph nodes. Calcific coronary artery disease. Apical predominant emphysema. RECOMMENDATIONS: Unavailable     CT lung screen [Initial/Annual]    Result Date: 2022  EXAMINATION: LOW DOSE SCREENING CT OF THE CHEST WITHOUT CONTRAST 2022 10:18 am TECHNIQUE: Low dose lung cancer screening CT of the chest was performed without the administration of intravenous contrast.  Multiplanar reformatted images are provided for review. Dose modulation, iterative reconstruction, and/or weight based adjustment of the mA/kV was utilized to reduce the radiation dose to as low as reasonably achievable. Field-of-view: 32 cm Dose Length Product: 105.61 mGy CTDlvol: 2.71 mGy COMPARISON: 03/15/2021 HISTORY: Screening. Patient Age: 77 y/o Number of pack years of smokin pack years If no longer smoking, number of years since cessation:  Current, everyday smoker Other symptoms:  None. Additional history: ORDERING SYSTEM PROVIDED HISTORY: Personal history of nicotine dependence TECHNOLOGIST PROVIDED HISTORY: Age: 78 y.o. Smoking History: Social History Tobacco Use Smoking status: Current Every Day Smoker Packs/day: 1.00 Years: 35.00 Pack years: 28 Types: Cigarettes Start date: 2021 Smokeless tobacco: Never Used Vaping Use Vaping Use: Never used Alcohol use: Not Currently Alcohol/week: 0.0 standard drinks Drug use: No Pack years: 35 Last CT lung screen: 3/15/2021 Is there documentation of shared decision making? ->Yes Does the patient show any signs or symptoms of lung cancer? ->No Is this the first (baseline) CT or an annual exam?->Annual Is this a low dose CT or a routine CT?->Low Dose CT Smoking Status?->Current Every Day Smoker Smoking packs per day?->1 Years smoking?->35 Reason for Exam: current smoker, 1 pack per day x 35 years, hx of COPD,HTN,diabetes FINDINGS: Mediastinum:  Visualized thyroid gland grossly unremarkable in appearance. Atherosclerotic calcification of the aorta and branch vasculature. Stable prominent mediastinal lymph nodes. Coronary artery disease. No hilar or axillary lymphadenopathy. No periaortic or mediastinal hemorrhage. No pericardial or pleural effusions. Lungs/Pleura:  Trachea and proximal central airways appear patent. Moderate to severe emphysema. Respiratory motion throughout the lungs. Mild dependent atelectasis within the lungs. No pneumothorax. No lobar airspace consolidation. 2 mm right upper lobe pulmonary nodule on image 22, series 4, unchanged from 03/12/2020. 2 mm left upper lobe pulmonary nodule on image 62, series 4, stable. Upper Abdomen:  Atherosclerotic calcification of the upper abdominal aorta and branch vasculature. Probable stable tiny renal cyst at the upper pole right kidney on image 82, series 602, unchanged from 03/12/2020. Soft Tissues/Bones: Reverse left shoulder arthroplasty hardware. Mild diffuse degenerative changes throughout the spine. Multilevel Schmorl's node deformities. 1. Stable 2 mm upper lobe pulmonary nodules, unchanged from 03/12/2020. Moderate to severe emphysema. Respiratory motion. Mild dependent atelectasis. 2. Stable probable upper pole tiny right renal cyst unchanged from 03/12/2020. 3. Atherosclerotic calcification of the aorta and branch vasculature. Coronary artery disease. 4. Stable prominent mediastinal lymph nodes. LUNG RADS: Per ACR Lung-RADS Version 1.1 Category 2, Benign appearance or behavior. Management:  Continue annual lung screening with LDCT in 12 months. RECOMMENDATIONS: If you would like to register your patient with the [facility] Nodule/Lung Cancer Screening Program, please contact the Nurse Navigator at [phone]).          Consultations:    Consults:     Final Specialist Recommendations/Findings:   IP CONSULT TO INTERNAL MEDICINE  IP CONSULT TO PULMONOLOGY  IP CONSULT TO PALLIATIVE CARE  IP CONSULT TO PSYCHIATRY      The patient was seen and examined on day of discharge and this discharge summary is in conjunction with any daily progress note from day of discharge. Discharge plan:     Disposition: Home    Physician Follow Up:     Michelle Douglas MD  [address] [facility]  [address] [facility] [address]  [place] [address] Follow up. Virtual apt for 5/12/22 at 7:45 am, The office will call you if they can get you into the office any sooner. Requiring Further Evaluation/Follow Up POST HOSPITALIZATION/Incidental Findings:    Diet: cardiac diet    Activity: As tolerated    Instructions to Patient:     Discharge Medications:      Medication List      CONTINUE taking these medications    Blood Pressure Kit  Diagnosis: HTN. Needs to check blood pressure 1-2 times a day until stable, then once a day. Goal blood pressure less than 135/85, and above 110/60. Lancets 30G Misc  Testing once a day. Please dispense according to patients insurance. ASK your doctor about these medications    * albuterol sulfate  (90 Base) MCG/ACT inhaler  USE 2 INHALATIONS EVERY 6 HOURS AS NEEDED FOR WHEEZING OR SHORTNESS OF BREATH     * albuterol (2.5 MG/3ML) 0.083% nebulizer solution  Commonly known as: PROVENTIL  USE THREE MILLILITERS (1 VIAL) VIA NEBULIZATION BY MOUTH EVERY 6 HOURS AS NEEDED FOR SHORTNESS OF BREATH OR COUGH     allopurinol 100 MG tablet  Commonly known as: ZYLOPRIM  Take 1 tablet by mouth daily     amLODIPine 10 MG tablet  Commonly known as: NORVASC  Take 1 tablet by mouth daily Dose increased 10/16/2020 due to high BP. Correct dosage     ammonium lactate 12 % cream  Commonly known as: AMLACTIN  APPLY TOPICALLY TO LEGS ONE TO TWO TIMES A DAY AS NEEDED     aspirin EC 81 MG EC tablet  Take 1 tablet by mouth daily     blood glucose test strips  Testing once a day.  Needs ONE TOUCH VERIO REFLECT     fluconazole 150 MG tablet  Commonly known as: Diflucan  Take 1 tablet by mouth every 72 hours folbee plus Tabs  Take 1 tablet by mouth daily     furosemide 40 MG tablet  Commonly known as: LASIX  Take 1 tablet by mouth daily Dose increased 1/12/2021     lisinopril 10 MG tablet  Commonly known as: PRINIVIL;ZESTRIL  Take 1 tablet by mouth daily Dose decreased by cardiologist     methylPREDNISolone 4 MG tablet  Commonly known as: MEDROL (ROSALIE)  Take by mouth, with food. Keep low carb, low salt diet while taking it     nystatin 159574 UNIT/ML suspension  Commonly known as: MYCOSTATIN  Take 5 mLs by mouth 4 times daily Swish and swallow. For 3 months     omeprazole 20 MG delayed release capsule  Commonly known as: PRILOSEC  Take 1 capsule by mouth every morning (before breakfast) Dose decreased 7/20/2021     OXYGEN     Percocet 5-325 MG per tablet  Generic drug: oxyCODONE-acetaminophen     pravastatin 80 MG tablet  Commonly known as: PRAVACHOL  Take 1 tablet by mouth every evening Dose increased  9/24/2019     pregabalin 200 MG capsule  Commonly known as: Lyrica  Take 1 capsule by mouth 3 times daily for 90 days. Please ignore prior refill for gabapentin 800 mg, will switch back to Lyrica     Stiolto Respimat 2.5-2.5 MCG/ACT Aers  Generic drug: tiotropium-olodaterol     Vitamin D3 1.25 MG (14813 UT) Caps     Xtandi 40 MG capsule  Generic drug: enzalutamide  Take 4 capsules by mouth daily         * This list has 2 medication(s) that are the same as other medications prescribed for you. Read the directions carefully, and ask your doctor or other care provider to review them with you. Time Spent on discharge is  35 mins in patient examination, evaluation, counseling as well as medication reconciliation, prescriptions for required medications, discharge plan and follow up. Electronically signed by   Jordan Win MD  5/6/2022  11:36 AM      Thank you Dr. Andreia Portillo MD for the opportunity to be involved in this patient's care.

## 2022-05-06 NOTE — CARE COORDINATION
ONGOING DISCHARGE PLAN:    Patient is alert and oriented x4. Spoke with patient regarding discharge plan and patient confirms that plan is still to return to home w/ Wife, Sultana Peña. Denies VNS. Pt. Had Home O2 Eval, did not qualify. Per Testing, was 91% on RA w/ Exercise. Writer spoke to Keri Frye, from [facility], she informed me, that Pt. Had Oxygen testing in March, needed 4LNC, was sating 91% & needed Continuous Oxygen. Keri Frye, states \"no New orders/testing, would be needed\" . She will continue to follow. Remains on Po Prednisone/Lasix.     Pulmonary following.      Hospital F/U apt on 5/12/22 at 7:45 am, for a Virtual Apt & Per the Office, they will call him at home if they can get him in sooner, to the office. Anticipate DC today. Will continue to follow for additional discharge needs.     Electronically signed by Sally Mitchell RN on 5/6/2022 at 10:45 AM

## 2022-05-06 NOTE — PROGRESS NOTES
Hugo Phoenix, NP notified of lack of urine output overnight. Waiting for a response. Addendum: Orders placed for a bladder scan.

## 2022-05-06 NOTE — PLAN OF CARE
Problem: Discharge Planning  Goal: Discharge to home or other facility with appropriate resources  Outcome: Progressing     Problem: Safety - Adult  Goal: Free from fall injury  Outcome: Progressing     Problem: ABCDS Injury Assessment  Goal: Absence of physical injury  Outcome: Progressing     Problem: Chronic Conditions and Co-morbidities  Goal: Patient's chronic conditions and co-morbidity symptoms are monitored and maintained or improved  Outcome: Progressing     Problem: Pain  Goal: Verbalizes/displays adequate comfort level or baseline comfort level  Outcome: Progressing     Problem: Skin/Tissue Integrity  Goal: Absence of new skin breakdown  Description: 1. Monitor for areas of redness and/or skin breakdown  2. Assess vascular access sites hourly  3. Every 4-6 hours minimum:  Change oxygen saturation probe site  4. Every 4-6 hours:  If on nasal continuous positive airway pressure, respiratory therapy assess nares and determine need for appliance change or resting period.   Outcome: Progressing

## 2022-05-06 NOTE — PROGRESS NOTES
Patient educated on discharge instructions which include: medication schedule, reasons for new medications and some side effects and need to follow-up. Patient given new prescriptions during teaching. Patient verbalizes understanding of discharge and states readiness for discharge. All belongings were gathered by patient and in patient's possession. No distress noted at this time.      Current vital signs:  /79   Pulse 98   Temp 97 °F (36.1 °C) (Oral)   Resp 20   Ht 6' (1.829 m)   Wt 264 lb 12.4 oz (120.1 kg)   SpO2 94%   BMI 35.91 kg/m²                MEWS Score: 1

## 2022-05-09 ENCOUNTER — CARE COORDINATION (OUTPATIENT)
Dept: CASE MANAGEMENT | Age: 67
End: 2022-05-09

## 2022-05-09 NOTE — CARE COORDINATION
[facility] - spoke with spouse -she informs me that patient is now admitted to [facility] because of fever >102, AMS, green sputum on . She did confirm that patient started & was taking all of his new medication at discharge from [facility] on . CT episode resolved since patient is now hospitalized out of network. Spouse is aware that patient does have a VV with PCP on  if discharged by then. Also plans to f/u with his pulmonologist, Dr Margaret Montgomery. Call within 2 business days of discharge: Yes    Patient: Jenise Wynn   Patient : 1955   MRN: [id number]    Reason for Admission: exacerbation COPD  Discharge Date: 22   RARS: Readmission Risk Score: 20 ( )      Last Discharge [facility]       Complaint Diagnosis Description Type Department Provider    22 Shortness of Breath COPD exacerbation ([address] ED to Hosp-Admission (Discharged) (ADMITTED) Patrice Asif MD; Zhang Jaime. ..              Facility: [facility]    Non-face-to-face services provided:  Scheduled appointment with PCP-PCP   Obtained and reviewed discharge summary and/or continuity of care documents      Follow Up  Future Appointments   Date Time Provider Keri English   2022  7:45 AM [facility]   2022 10:40 AM Edwin Bajwa MD [facility]   6/10/2022  2:00 PM [facility]   2022 10:20 AM Danay Morris MD [facility] Haleigh Lagunas RN

## 2022-05-12 ENCOUNTER — TELEMEDICINE (OUTPATIENT)
Dept: FAMILY MEDICINE CLINIC | Age: 67
End: 2022-05-12
Payer: MEDICARE

## 2022-05-12 DIAGNOSIS — Z09 HOSPITAL DISCHARGE FOLLOW-UP: ICD-10-CM

## 2022-05-12 DIAGNOSIS — J44.1 COPD EXACERBATION (HCC): Primary | ICD-10-CM

## 2022-05-12 DIAGNOSIS — N17.9 ACUTE KIDNEY INJURY SUPERIMPOSED ON CHRONIC KIDNEY DISEASE (HCC): ICD-10-CM

## 2022-05-12 DIAGNOSIS — J96.11 CHRONIC HYPOXEMIC RESPIRATORY FAILURE (HCC): ICD-10-CM

## 2022-05-12 DIAGNOSIS — I50.32 CHRONIC DIASTOLIC HEART FAILURE (HCC): ICD-10-CM

## 2022-05-12 DIAGNOSIS — N18.30 BENIGN HYPERTENSIVE HEART AND CKD, STAGE 3 (GFR 30-59), W CHF (HCC): ICD-10-CM

## 2022-05-12 DIAGNOSIS — E27.8 ADRENAL HYPERTROPHY (HCC): ICD-10-CM

## 2022-05-12 DIAGNOSIS — N18.9 ACUTE KIDNEY INJURY SUPERIMPOSED ON CHRONIC KIDNEY DISEASE (HCC): ICD-10-CM

## 2022-05-12 DIAGNOSIS — Z87.898 HISTORY OF URINARY RETENTION: ICD-10-CM

## 2022-05-12 DIAGNOSIS — I13.0 BENIGN HYPERTENSIVE HEART AND CKD, STAGE 3 (GFR 30-59), W CHF (HCC): ICD-10-CM

## 2022-05-12 PROCEDURE — 1111F DSCHRG MED/CURRENT MED MERGE: CPT | Performed by: FAMILY MEDICINE

## 2022-05-12 PROCEDURE — 99215 OFFICE O/P EST HI 40 MIN: CPT | Performed by: FAMILY MEDICINE

## 2022-05-12 RX ORDER — FUROSEMIDE 40 MG/1
40 TABLET ORAL DAILY
Qty: 90 TABLET | Refills: 3 | Status: SHIPPED | OUTPATIENT
Start: 2022-05-12 | End: 2022-05-12 | Stop reason: SDUPTHER

## 2022-05-12 RX ORDER — FUROSEMIDE 20 MG/1
20 TABLET ORAL DAILY
Qty: 90 TABLET | Refills: 3 | Status: SHIPPED | OUTPATIENT
Start: 2022-05-12 | End: 2022-06-10 | Stop reason: SDUPTHER

## 2022-05-12 RX ORDER — DOXYCYCLINE HYCLATE 100 MG/1
100 CAPSULE ORAL 2 TIMES DAILY
COMMUNITY
Start: 2022-05-11 | End: 2022-05-16

## 2022-05-12 ASSESSMENT — ENCOUNTER SYMPTOMS
BACK PAIN: 1
COUGH: 1
WHEEZING: 0
CHEST TIGHTNESS: 0
VOMITING: 0
DIARRHEA: 0
BLOOD IN STOOL: 0
CONSTIPATION: 0
APNEA: 1
ABDOMINAL DISTENTION: 0
NAUSEA: 0
SHORTNESS OF BREATH: 1
ABDOMINAL PAIN: 0

## 2022-05-12 NOTE — PROGRESS NOTES
Mike Dillard    Mobile Phone      Send Link Via Text    No text has been sent  Last text sent05/12/2022 8:06 AM  To:[phone]  Email      Send Link Via Email    No email has been sent  Last email sent05/12/2022 8:06 AM  [email or web address]    Call to patient, spoke with wife, who wanted to make him an appointment , but he does have appointment now    Mike Dillard    Mobile Phone      Send Link Via Text    No text has been sent  Last text sent05/12/2022 8:15 AM  To:[phone]        TELEHEALTH EVALUATION -- Audio/Visual (During IFPLT-54 public health emergency) using doxy. me          Post-Discharge Transitional Care Follow Up      Mike Dillard   YOB: 1955    Date of Office Visit:  5/12/2022  Date of Hospital Admission: 5/1/22  Date of Hospital Discharge: 5/6/22  Readmission Risk Score(high >=14%.  Medium >=10%):Readmission Risk Score: 20 ( )    Patient then readmitted 5/8/2022 through 5/11/2022 due to sepsis, COPD exacerbation, acute on chronic kidney disease    [address] [facility]  Floor    Encounter Details      Date Type Department Care Team Description   05/08/2022 - 05/11/2022 Hospital Encounter [id number] [address] 97, DO    [address]   [phone] (Work)    [phone] (Prasanna Araujo MD    [address]   [phone] David Calderon   [phone] (Kaden Maravilla MD    [facility], [address] [place] [address]   [phone] David Calderon   [phone] (Fax)   Sepsis (CMS-HCC) (Primary Dx)         Care management risk score Rising risk (score 2-5) and Complex Care (Scores >=6): 12     Non face to face  following discharge, date last encounter closed (first attempt may have been earlier): 5/9/2022 11:12 AM     Call initiated 2 business days of discharge: Yes     Below is the assessment and plan developed based on review of pertinent history, physical exam, labs, studies, and medications. COPD exacerbation ([facility]  Improving  To follow-up with pulmonologist  Continue prednisone, doxycycline, nebulizer treatments, Stiolto Respimat and oxygen at 3 L/min  -     [address]  -     [address]  Chronic hypoxemic respiratory failure ([facility]  Stable  Continue home oxygen at 3 L/min continuously and absolutely quit smoking, Kip , his wife smoking, I advised her not to smoke either  -     CA DISCHARGE MEDS RECONCILED W/ CURRENT OUTPATIENT MED LIST  -     [address]  Chronic diastolic heart failure ([facility]  Stable  Lab Results   Component Value Date    LVEF 60 12/05/2019    LVEFMODE Echo 12/05/2019   Recheck labs  Lisinopril was stopped in the hospital, decrease dosage of Lasix, continue amlodipine 10 mg  -     CA DISCHARGE MEDS RECONCILED W/ CURRENT OUTPATIENT MED LIST  -     CBC; Future  -     Comprehensive Metabolic Panel; Future  -     Brain Natriuretic Peptide; Future  -     Magnesium; Future  -     Phosphorus; Future  -     [address]  -     furosemide (LASIX) 20 MG tablet; Take 1 tablet by mouth daily Correct. Dose decreased 5/12/2022 , post hospital, Disp-90 tablet, R-3Normal  Benign hypertensive heart and CKD, stage 3 (GFR 30-59), w CHF ([facility]  Improved kidney function  Referral to the nephrologist  Had episode of urinary retention resolved with Taylor catheter, he does follow with urologist  Stopped lisinopril due to acute kidney injury  Continue amlodipine 10 mg which he has at home from us, in the hospital it was 5 mg, however he has been having rebound high blood pressure  Furosemide was changed to 20 mg, he has 40 mg at home advised to cut down      -     CBC;  Future  -     Comprehensive Metabolic Panel; Future  -     Magnesium; Future  -     Phosphorus; Future  -     [employer]  -     furosemide (LASIX) 20 MG tablet; Take 1 tablet by mouth daily Correct. Dose decreased 5/12/2022 , post hospital, Disp-90 tablet, R-3Normal  Hospital discharge follow-up  -     [address] MED LIST  -     [address] [employer]  Adrenal hypertrophy [facility])  New, ongoing  We will leave it up to the nephrologist for work-up  -     OR DISCHARGE MEDS RECONCILED W/ CURRENT OUTPATIENT MED LIST  -     [address] [employer]  History of urinary retention  Resolved  Follow-up with urology as scheduled  -     OR DISCHARGE MEDS RECONCILED W/ CURRENT OUTPATIENT MED LIST  -     [employer]  Acute kidney injury superimposed on chronic kidney disease ([facility]  Improved  Referral to the nephrologist  Had episode of urinary retention resolved with Taylor catheter, he does follow with urologist  Stopped lisinopril due to acute kidney injury  Continue amlodipine 10 mg which he has at home from us, in the hospital it was 5 mg, however he has been having rebound high blood pressure  Furosemide was changed to 20 mg, he has 40 mg at home advised to cut down      -     OR DISCHARGE MEDS RECONCILED W/ CURRENT OUTPATIENT MED LIST  -     CBC; Future  -     Comprehensive Metabolic Panel; Future  -     Magnesium; Future  -     Phosphorus; Future  -     [facility] - Roman Briggs MD, Nephrology, [place]        He does not want to take either Risperdal or Zoloft and he has self discontinued them already    Medical Decision Making: high complexity  Return for KEEP APPT.     On this date 5/12/2022 I have spent 45 minutes reviewing previous notes, test results and face to face with the patient discussing the diagnosis and importance of compliance with the treatment plan as well as documenting on the day of the visit for hospital follow-up and care coordination with his wife. Due to readmission, it will not be a transition of care code      Future Appointments   Date Time Provider Keri English   5/23/2022 10:40 AM Tracie Sorto MD [address] [place] [address]   6/10/2022  2:00 PM Madonna Cabrera MD [facility] [address] [facility]   7/1/2022 10:20 AM Joi Nieves MD [facility] NEURO MHTOLPP       Subjective:   HPI    Inpatient course: Discharge summary reviewed- see chart. Patient was admitted at [facility] 5/1/2022 through 5/6/2022 due to COPD exacerbation, acute respiratory failure with hypoxia. He was discharged on prednisone taper      Was readmitted at [facility] 5/8/2022 through 5/11/2022 for Sepsis, COPD exacerbation, acute kidney failure, found to have urinary retention, also his medications were adjusted. Hypoxia related to COPD exacerbation. Had consults pulmonologist, Dr. Osito Boyd, neurology [facility] and cardiology  Records reviewed through Candie with patient including the testing done in [facility]    [facility]  \"Impression:  Acute kidney injury, nonoliguric likely causing factor includes urinary retention versus prerenal azotemia versus ATN-use of Lasix, lisinopril, hypotension  Serum creatinine peaked 2.8 mg/dL, serum creatinine improved back to baseline serum creatinine 0.9 mg/dL  Episode of hypotension  Hypoxia-secondary to COPD exacerbation  Recommendations:  Restart Lasix at a lower dose of 20 mg dly due to edema  DC IV fluids  Strict I&Os   Continue To hold lisinopril  Avoid hypotension. Avoid NSAIDs and nephrotoxic drugs  Sharonda Lassiter MD FASN\"      Interval history/Current status: improved      Currently, he says he came from the hospital last night and he forgot about the appointment.     He is present today with his wife, Andrew Berkowitz. Patient has known severe COPD and chronic respiratory failure on continuous oxygen at 3 l/min, same as before . Patient says he completely stopped smoking again. He reports dyspnea on exertion is better   Coughing is productive of brownish yellow mucus and moderate amount. Taking Doxycycline and prednisone as given from the hospital.  Has nystatin swish and swallow . His tongues is still cracked. His wife confirms that his tongue is still cracked, he has been using the nystatin swish and swallow. He reports he has been compliant with nebulizer treatments every 4-6 hrs, and Stiolto. He says he feels better, he is not taking zoloft and risperdal      Hypertension chronic kidney disease, and congestive heart failure   he  is not exercising and is adherent to low salt diet. Blood pressure is not well controlled at home. When he wakes up, the blood pressure is high      cardiac symptoms dyspnea, lower extremity edema and orthopnea. Patient denies chest pain, chest pressure/discomfort, claudication, exertional chest pressure/discomfort, irregular heart beat, near-syncope, orthopnea, palpitations, paroxysmal nocturnal dyspnea, syncope and tachypnea. Cardiovascular risk factors: advanced age (older than 54 for men, 72 for women), diabetes mellitus, dyslipidemia, hypertension, male gender, obesity (BMI >= 30 kg/m2), sedentary lifestyle and smoking/ tobacco exposure. Use of agents associated with hypertension: none. History of target organ damage: chronic kidney disease and heart failure. Per instructions from the hospital, he stopped Lisinopril, and he assures me he is not taking it anymore. He is still taking Lasix 40 mg, he does not have a new prescription.     blood pressure is Normal.  112/79 mmHg    BP Readings from Last 3 Encounters:   05/06/22 112/79   04/27/22 126/70   03/25/22 124/78        Pulse is Normal.    Pulse Readings from Last 3 Encounters:   05/06/22 98   04/27/22 70 03/25/22 74   Adrenal hypertrophy on the imaging testing found at [employer] which is new. He denies difficulty urinating, or urinary retention he does have history of prostate cancer and he follows with urology, he does have appointment.     Patient Active Problem List   Diagnosis    Essential hypertension    Gout    Hyperlipidemia with target LDL less than 70    Osteoporosis    Prostate cancer ([address]    Gastroesophageal reflux disease without esophagitis    DDD (degenerative disc disease), lumbar    Chronic bilateral low back pain with bilateral sciatica    Osteoarthritis of spine with radiculopathy, lumbar region    Type 2 diabetes mellitus with diabetic polyneuropathy, without long-term current use of insulin (HCC)    PVD (peripheral vascular disease) with claudication ([facility])    Peripheral neuropathic pain    Tinea pedis of both feet    Nonspecific reactive hepatitis    COPD mixed type ([address]    Status post lumbar laminectomy    Serum cholinesterase defect ([address]    Vitamin D deficiency    Hypertriglyceridemia    REYNALDO on CPAP    Tubular adenoma of colon 4/11/17    Psychophysiological insomnia    S/P reverse total shoulder arthroplasty, left    Recurrent major depressive disorder, in full remission ([address]    Actinic keratosis    Photodermatitis due to sun    Seborrheic keratosis    Senile hyperkeratosis    Solar degeneration    Abdominal aortic aneurysm ([address]    Family history of cancer    Stenosis of left carotid artery    Poor balance    Foot drop, right    Difficulty rising from chair    Degenerative disc disease, cervical    At high risk for falls    Pseudocholinesterase deficiency    Hyperparathyroidism ([address]    Coronary artery disease involving native coronary artery of native heart without angina pectoris    Morbid obesity ([address]    Status post total shoulder arthroplasty, left    Tobacco use    Chronic hypoxemic respiratory failure ([address]    History of prostate cancer    Left inguinal hernia    Bilateral leg edema    Type 2 diabetes mellitus with diabetic peripheral angiopathy without gangrene, without long-term current use of insulin (HCC)    Tinnitus aurium, bilateral    Difficulty in walking    Chronic diastolic heart failure (HCC)    Bronchitis    Chronic pain of both shoulders    PLASCENCIA (dyspnea on exertion)    Carcinoma of prostate (HCC)    COPD exacerbation (HCC)    Acute respiratory failure with hypoxia (HCC)    Muscular weakness    Acute pain of both shoulders    Excessive sweating    Other specified anxiety disorders    Chronic renal disease, stage III ([facility]) [247782]    Adrenal hypertrophy (HCC)    Acute kidney injury superimposed on chronic kidney disease ([facility]    History of urinary retention    Benign hypertensive heart and CKD, stage 3 (GFR 30-59), w CHF ([facility])       Medications listed as started at the time of discharge from hospital  Cannot display discharge medications since this is not an admission. Encounter Details    He does state he does not take the Zoloft and Risperdal, he is still taking amlodipine 10 mg and furosemide 40 mg  Doesn't take Symbicort    [address] [facility]  [address]      Medications at Time of Discharge  - documented as of this encounter    Medication Sig Dispensed Refills Start Date End Date   albuterol (PROVENTIL HFA;VENTOLIN HFA) 90 mcg/actuation inhaler   Inhale 2 puffs as needed for wheezing or shortness of breath.    0 03/23/2017     albuterol (PROVENTIL,VENTOLIN) 2.5 mg /3 mL (0.083 %) nebulizer solution   Inhale 2.5 mg by nebulization as needed for wheezing or shortness of breath.    0 03/02/2017     amLODIPine (NORVASC) 5 mg tablet   Take 1 tablet (5 mg total) by mouth daily. 90 tablet   3 08/18/2020     aspirin 81 mg   Take 81 mg by mouth daily.   0       budesonide-formoterol (SYMBICORT) 160-4.5 mcg/actuation inhaler   Inhale 2 puffs as needed (shortness of breath, wheezing).    0 04/09/2019   ergocalciferol (DRISDOL) 1,250 mcg (50,000 unit) capsule   Take 50,000 Units by mouth once a week.   0       FOLBEE 2.5-25-1 mg tablet   Take 1 tablet by mouth daily.    0 05/16/2017     ipratropium-albuteroL (DUO-NEB) 0.5 mg-3 mg(2.5 mg base)/3 mL nebulizer   Inhale 1 vial.   0       omeprazole (PriLOSEC) 40 mg capsule   Take 20 mg by mouth daily.    0       oxyCODONE-acetaminophen (PERCOCET) 5-325 mg per tablet   Take 1 tablet by mouth daily.   0       risperiDONE (RisperDAL) 0.5 mg tablet   Take 0.5 mg by mouth in the morning and 0.5 mg before bedtime.   0       sertraline (ZOLOFT) 50 mg tablet   Take 50 mg by mouth in the morning.   0       tiotropium-olodateroL (STIOLTO RESPIMAT) 2.5-2.5 mcg/actuation mist   Inhale 2 puffs daily.   0       colchicine (COLCRYS) 0.6 mg tablet   Take 0.6 mg by mouth.    0       doxycycline (VIBRAMYCIN) 100 mg capsule   Take 1 capsule (100 mg total) by mouth in the morning and 1 capsule (100 mg total) before bedtime. Do all this for 5 days. 10 capsule   0 05/11/2022 05/16/2022   furosemide (LASIX) 20 mg tablet   Take 1 tablet (20 mg total) by mouth daily for 30 days. 30 tablet   0 05/12/2022 06/11/2022   pravastatin (PRAVACHOL) 80 mg tablet   Take 80 mg by mouth daily.    0 01/21/2019     predniSONE (DELTASONE) 10 mg tablet   Take 3 tablets (30 mg total) by mouth in the morning for 3 days. 9 tablet   0 05/13/2022 05/16/2022   predniSONE (DELTASONE) 10 mg tablet   Take 1 tablet (10 mg total) by mouth in the morning for 7 days. 7 tablet   0 05/18/2022 05/25/2022   predniSONE (DELTASONE) 20 mg tablet   Take 2 tablets (40 mg total) by mouth in the morning for 3 days. 6 tablet   0 05/11/2022 05/14/2022   predniSONE (DELTASONE) 20 mg tablet   Take 1 tablet (20 mg total) by mouth in the morning for 3 days.  3 tablet   0 05/15/2022 05/18/2022   pregabalin (LYRICA) 200 mg capsule   Take 200 mg by mouth in the morning and 200 mg before bedtime.   0         Ordered Prescriptions  - documented in this encounter    Prescription Sig Dispensed Refills Start Date End Date   doxycycline (VIBRAMYCIN) 100 mg capsule   Take 1 capsule (100 mg total) by mouth in the morning and 1 capsule (100 mg total) before bedtime. Do all this for 5 days. 10 capsule   0 05/11/2022 05/16/2022   furosemide (LASIX) 20 mg tablet   Take 1 tablet (20 mg total) by mouth daily for 30 days. 30 tablet   0 05/12/2022 06/11/2022   predniSONE (DELTASONE) 10 mg tablet   Take 1 tablet (10 mg total) by mouth in the morning for 7 days. 7 tablet   0 05/18/2022 05/25/2022   predniSONE (DELTASONE) 20 mg tablet   Take 1 tablet (20 mg total) by mouth in the morning for 3 days. 3 tablet   0 05/15/2022 05/18/2022   predniSONE (DELTASONE) 10 mg tablet   Take 3 tablets (30 mg total) by mouth in the morning for 3 days. 9 tablet   0 05/13/2022 05/16/2022   predniSONE (DELTASONE) 20 mg tablet   Take 2 tablets (40 mg total) by mouth in the morning for 3 days. 6 tablet   0 05/11/2022 05/14/2022             Medications marked \"taking\" at this time  Outpatient Medications Marked as Taking for the 5/12/22 encounter (Telemedicine) with Maria De Jesus Craig MD   Medication Sig Dispense Refill    doxycycline hyclate (VIBRAMYCIN) 100 MG capsule Take 100 mg by mouth 2 times daily      albuterol (PROVENTIL) (2.5 MG/3ML) 0.083% nebulizer solution Take 3 mLs by nebulization every 4 hours as needed for Wheezing or Shortness of Breath 120 each 3    sertraline (ZOLOFT) 50 MG tablet Take 1 tablet by mouth Daily with supper 30 tablet 3    risperiDONE (RISPERDAL) 0.5 MG tablet Take 1 tablet by mouth 2 times daily 60 tablet 3    predniSONE (DELTASONE) 20 MG tablet Take 2 tablets by mouth daily for 10 days 20 tablet 0    allopurinol (ZYLOPRIM) 100 MG tablet Take 1 tablet by mouth daily 90 tablet 1    nystatin (MYCOSTATIN) 944738 UNIT/ML suspension Take 5 mLs by mouth 4 times daily Swish and swallow.  For 3 months 1419 mL 0    blood glucose monitor strips Testing once a day. Needs ONE TOUCH VERIO REFLECT 100 strip 3    Blood Pressure KIT Diagnosis: HTN. Needs to check blood pressure 1-2 times a day until stable, then once a day. Goal blood pressure less than 135/85, and above 110/60. 1 kit 0    lisinopril (PRINIVIL;ZESTRIL) 10 MG tablet Take 1 tablet by mouth daily Dose decreased by cardiologist 90 tablet 3    Cholecalciferol (VITAMIN D3) 1.25 MG (81448 UT) CAPS Take by mouth every 7 days       enzalutamide (XTANDI) 40 MG capsule Take 4 capsules by mouth daily 120 capsule 5    albuterol sulfate  (90 Base) MCG/ACT inhaler USE 2 INHALATIONS EVERY 6 HOURS AS NEEDED FOR WHEEZING OR SHORTNESS OF BREATH 24 g 3    furosemide (LASIX) 40 MG tablet Take 1 tablet by mouth daily Dose increased 1/12/2021 90 tablet 3    omeprazole (PRILOSEC) 20 MG delayed release capsule Take 1 capsule by mouth every morning (before breakfast) Dose decreased 7/20/2021 90 capsule 3    amLODIPine (NORVASC) 10 MG tablet Take 1 tablet by mouth daily Dose increased 10/16/2020 due to high BP. Correct dosage 90 tablet 3    folbee plus (FOLBEE PLUS) TABS Take 1 tablet by mouth daily 90 tablet 3    tiotropium-olodaterol (STIOLTO RESPIMAT) 2.5-2.5 MCG/ACT AERS Inhale 2 puffs into the lungs daily      oxyCODONE-acetaminophen (PERCOCET) 5-325 MG per tablet Take 1 tablet by mouth 2 times daily as needed.  Lancets 30G MISC Testing once a day. Please dispense according to patients insurance. 100 each 3    ammonium lactate (AMLACTIN) 12 % cream APPLY TOPICALLY TO LEGS ONE TO TWO TIMES A DAY AS NEEDED 1 Bottle 3    OXYGEN With CPAP at night      aspirin EC 81 MG EC tablet Take 1 tablet by mouth daily 90 tablet 0        Medications patient taking as of now reconciled against medications ordered at time of hospital discharge: Yes    Review of Systems   Constitutional: Positive for appetite change, fatigue and unexpected weight change.  Negative for activity change, chills, diaphoresis and fever.   HENT: Positive for mouth sores (cracked tongue) and tinnitus (chronic). Eyes: Positive for visual disturbance (stable, chronic). Respiratory: Positive for apnea (on CPAP), cough and shortness of breath (PLASCENCIA at baseline). Negative for chest tightness and wheezing. On oxygen at nighttime 3 l/min and sometimes during daytime as needed. Sees Dr. Jessica Baez. Cardiovascular: Negative for chest pain, palpitations and leg swelling. Has compression stockings    Gastrointestinal: Negative for abdominal distention, abdominal pain, blood in stool, constipation, diarrhea, nausea and vomiting. Endocrine: Positive for heat intolerance (from meds for prostate cancer, restarted medication for prostate cancer, due to worsening PSA). Negative for cold intolerance, polydipsia, polyphagia and polyuria. Genitourinary: Negative for difficulty urinating, flank pain, frequency and urgency. Musculoskeletal: Positive for arthralgias, back pain, gait problem and myalgias. Ambulates with walker with seat    Allergic/Immunologic: Positive for immunocompromised state (prostate cancer, ongoing). Neurological: Positive for tremors (hands, seeing neurology), weakness and numbness (hands and feet). Hematological: Does not bruise/bleed easily. Psychiatric/Behavioral: Positive for sleep disturbance. Negative for dysphoric mood, self-injury and suicidal ideas. The patient is nervous/anxious. Objective:    Patient-Reported Vitals  Patient-Reported Systolic (Top): 798 mmHg  Patient-Reported Diastolic (Bottom): 79 mmHg  BP Observations: No, remote/electronic monitoring device was not used or able to be verified  Patient-Reported Pulse: 41  Patient-Reported Weight: 264 lbs  Patient-Reported Height: 6 ft  Patient-Reported Pulse Oximetry: 87 % at rest without oxygen. PHYSICAL EXAMINATION:    Vital Signs:  Within normal limits except hypoxia and severe obesity per BMI, BMI 35.91 kg/M2    Intensity of pain is 5 out of 10 lower back      Constitutional: [x] Appears well-developed and well-nourished [x] No apparent distress      [x] Abnormal-obese, laying in bed, just woke up, getting confused, needing help from his wife, his oxygen was low, his wife took his vitals, then he put the oxygen on and felt better. He is confused about his medications, we reviewed his meds and he wrote down    Mental status  [x] Alert and awake  [x] Oriented to person/place/time [x]Able to follow commands      Eyes:  EOM    [x]  Normal  [] Abnormal-  Sclera  [x]  Normal  [] Abnormal -         Discharge [x]  None visible  [] Abnormal -    HENT:   [x] Normocephalic, atraumatic. [] Abnormal   [x] Mouth/Throat: Mucous membranes are moist.     External Ears [x] Normal  [] Abnormal-     Neck: [x] No visualized mass     Pulmonary/Chest: [x] Respiratory effort normal.  [x] No visualized signs of difficulty breathing or respiratory distress        [x] Abnormal on continuous oxygen per nasal cannula       Musculoskeletal:   [x] Normal gait with no signs of ataxia         [x] Normal range of motion of neck        [] Abnormal-       Neurological:        [x] No Facial Asymmetry (Cranial nerve 7 motor function) (limited exam to video visit)          [x] No gaze palsy        [] Abnormal-            Skin:        [x] No significant exanthematous lesions or discoloration noted on facial skin         [] Abnormal-            Psychiatric:      [x] No Hallucinations       []Mood is normal      [x]Behavior is normal      [x]Judgment is normal      [x]Thought content is normal       [x] Abnormal-mildly anxious    Other pertinent observable physical exam findings-none  Due to this being a TeleHealth encounter, evaluation of the following organ systems is limited: Vitals/Constitutional/EENT/Resp/CV/GI//MS/Neuro/Skin/Heme-Lymph-Imm.     Medications Discontinued During This Encounter   Medication Reason    lisinopril (PRINIVIL;ZESTRIL) 10 MG tablet DISCONTINUED BY ANOTHER CLINICIAN    sertraline (ZOLOFT) 50 MG tablet Patient Choice    risperiDONE (RISPERDAL) 0.5 MG tablet Patient Choice    furosemide (LASIX) 40 MG tablet REORDER    furosemide (LASIX) 40 MG tablet REORDER     Orders Placed This Encounter   Medications    DISCONTD: furosemide (LASIX) 40 MG tablet     Sig: Take 1 tablet by mouth daily Dose decreased 5/12/2022 , post hospital     Dispense:  90 tablet     Refill:  3    furosemide (LASIX) 20 MG tablet     Sig: Take 1 tablet by mouth daily Correct. Dose decreased 5/12/2022 , post hospital     Dispense:  90 tablet     Refill:  3             Azam Bush, was evaluated through a synchronous (real-time) audio-video encounter. The patient (or guardian if applicable) is aware that this is a billable service, which includes applicable co-pays. This Virtual Visit was conducted with patient's (and/or legal guardian's) consent. The visit was conducted pursuant to the emergency declaration under the [address] [place] [address] authority and the [employer] General Act. Patient identification was verified, and a caregiver was present when appropriate. The patient was located at home in a state where the provider was licensed to provide care. An electronic signature was used to authenticate this note.     Electronically signed by Megan Pires MD on 5/12/22 at 11:14 PM EDT

## 2022-05-16 ENCOUNTER — TELEPHONE (OUTPATIENT)
Dept: FAMILY MEDICINE CLINIC | Age: 67
End: 2022-05-16

## 2022-05-16 NOTE — TELEPHONE ENCOUNTER
Called pt to see how his blood sugars were since they were running high from the steroids he did state they are much better running at 105,134,100  Blood pressure is 121/80 pulse was 77 and o2 was 96

## 2022-05-16 NOTE — TELEPHONE ENCOUNTER
Noted. Thank you!     Future Appointments   Date Time Provider Keri Tameka   5/23/2022 10:40 AM Amy Ferrera MD [facility]   6/10/2022  2:00 PM [facility]   7/1/2022 10:20 AM Mey Brown MD [address] [place] [address]

## 2022-05-17 ENCOUNTER — HOSPITAL ENCOUNTER (OUTPATIENT)
Age: 67
Setting detail: SPECIMEN
Discharge: HOME | End: 2022-05-17
Payer: MEDICARE

## 2022-05-17 DIAGNOSIS — C61 PROSTATE CANCER (HCC): ICD-10-CM

## 2022-05-17 LAB
PROSTATE SPECIFIC ANTIGEN: 0.22 NG/ML
TESTOSTERONE TOTAL: 15 NG/DL (ref 220–1000)

## 2022-05-17 PROCEDURE — 36415 COLL VENOUS BLD VENIPUNCTURE: CPT

## 2022-05-17 PROCEDURE — 84403 ASSAY OF TOTAL TESTOSTERONE: CPT

## 2022-05-17 PROCEDURE — 84153 ASSAY OF PSA TOTAL: CPT

## 2022-05-23 ENCOUNTER — TELEPHONE (OUTPATIENT)
Dept: UROLOGY | Age: 67
End: 2022-05-23

## 2022-05-23 ENCOUNTER — OFFICE VISIT (OUTPATIENT)
Dept: UROLOGY | Age: 67
End: 2022-05-23
Payer: MEDICARE

## 2022-05-23 VITALS
TEMPERATURE: 97.4 F | HEIGHT: 72 IN | BODY MASS INDEX: 35.76 KG/M2 | SYSTOLIC BLOOD PRESSURE: 130 MMHG | HEART RATE: 78 BPM | OXYGEN SATURATION: 96 % | DIASTOLIC BLOOD PRESSURE: 70 MMHG | WEIGHT: 264 LBS

## 2022-05-23 DIAGNOSIS — R23.2 HOT FLASHES: ICD-10-CM

## 2022-05-23 DIAGNOSIS — C61 PROSTATE CANCER (HCC): Primary | ICD-10-CM

## 2022-05-23 PROCEDURE — 99214 OFFICE O/P EST MOD 30 MIN: CPT | Performed by: UROLOGY

## 2022-05-23 PROCEDURE — 96402 CHEMO HORMON ANTINEOPL SQ/IM: CPT | Performed by: UROLOGY

## 2022-05-23 ASSESSMENT — ENCOUNTER SYMPTOMS
SHORTNESS OF BREATH: 1
GASTROINTESTINAL NEGATIVE: 1

## 2022-05-23 NOTE — PROGRESS NOTES
[address] [address]  Dept: 92 Margie Handley [facility] Urology Office Note - Established    Patient:  Clearance Lunch  YOB: 1955  Date: 5/23/2022    The patient is a 78 y.o. male who presents todayfor evaluation of the following problems:   Chief Complaint   Patient presents with    Follow-up     2 month follow up with labs and eligard        HPI  This is a very pleasant 21-year-old gentleman with a history of advanced prostate cancer. He has been on Cameron Rico. His PSA has been slowly coming down. It has recently come down to 0.22. He was recently hospitalized with a COPD exacerbation. He voices no difficulties with urination and has no bone pain. Summary of old records: N/A    Additional History: N/A    Procedures Today: N/A    Urinalysis today:  No results found for this visit on 05/23/22. Last several PSA's:  Lab Results   Component Value Date    PSA 0.22 05/17/2022    PSA 0.69 03/09/2022    PSA 1.44 01/05/2022     Last total testosterone:  Lab Results   Component Value Date    TESTOSTERONE 15 (L) 05/17/2022       AUA Symptom Score (5/23/2022):                                Last BUN and creatinine:  Lab Results   Component Value Date    BUN 50 (H) 05/06/2022     Lab Results   Component Value Date    CREATININE 1.38 (H) 05/06/2022       Additional Lab/Culture results: none    Imaging Reviewed during this Office Visit: none  (results were independently reviewed by physician and radiology report verified)    PAST MEDICAL, FAMILY AND SOCIAL HISTORY UPDATE:  Past Medical History:   Diagnosis Date    Acid reflux     Anemia, blood loss 10/7/2018    Arthritis     C. difficile colitis 3/19/2020    Chronic bilateral low back pain with bilateral sciatica 11/3/2016    Chronic diastolic heart failure ([address] 2/25/2021    Complete tear of left rotator cuff 7/18/2018    COPD (chronic obstructive pulmonary disease) ([address]     Coronary artery disease involving native coronary artery of native heart without angina pectoris 2/2/2020    Degenerative disc disease, cervical 7/28/2019    GI bleed 3/19/2020    Gout     H/O cardiac catheterization     yrs ago   no stents    History of blood transfusion     Hyperlipidemia     Hyperlipidemia with target LDL less than 100 1/20/2016    Hyperparathyroidism ([address] 1/17/2020    Hypertension     Knee pain, chronic     left    Liver disease     MDRO (multiple drug resistant organisms) resistance     Melena 3/19/2020    Memory loss     Moderate episode of recurrent major depressive disorder ([address] 1/20/2016    Obesity, Class III, BMI 40-49.9 (morbid obesity) ([address] 1/20/2016    REYNALDO on CPAP 5/6/2017    Osteoarthritis     Peripheral arterial occlusive disease ([address] 3/25/2017    Pneumonia 3/9/2020    Polypharmacy 3/25/2017    Positive FIT (fecal immunochemical test) 4/11/2017    Prolonged emergence from general anesthesia 01/05/2017    Patient \"on life support for 3 days\" after back surgery due to being given succinylcholine    Prostate CA ([address] 1/20/2016    Prostate cancer ([address] 10/2013    finished radiation tx 5/2014    Pseudocholinesterase deficiency 03/25/2017    Pt.  \"on life support\" for 3 days after back surgery 1/5/17 after being given succinylcholine    Severe obesity (BMI 35.0-35.9 with comorbidity) ([address] 2/2/2020    Short of breath on exertion     Sleep apnea     uses CPAP machine nightly    Status post lumbar laminectomy 3/25/2017    Stenosis of left carotid artery 10/7/2018    Syncope and collapse 3/19/2020    Tubular adenoma of colon 4/11/17 5/13/2017    Type 2 diabetes mellitus with hyperglycemia, without long-term current use of insulin ([address] 3/25/2017    Lab Results Component Value Date  LABA1C 7.1 (H) 03/23/2017     Unintended weight loss 10/7/2018    Vitamin D deficiency 3/25/2017    Wears glasses      Past Surgical History:   Procedure Laterality Date    BACK SURGERY      x 2     BACK SURGERY  01/05/2017    lumbar laminectomy L2, L3, L4    BRONCHOSCOPY N/A 3/13/2020    BRONCHOSCOPY performed by Susan Osborn MD at [address] [address]    no stents    CAROTID ENDARTERECTOMY Right 12/16/2019    Dr. Bradford Ashton, LAPAROSCOPIC N/A 2/16/2021    CHOLECYSTECTOMY LAPAROSCOPIC ROBOTIC XI performed by Mariano Strickland MD at [address] [facility], COLON, DIAGNOSTIC     [address] [facility] Left 11/18/2020    HERNIA INGUINAL REPAIR [address] OPEN performed by Mariano Strickland MD at [address] Left     LUMBAR LAMINECTOMY  01/05/2017    L2-L4    ID CLOSED RX SHLDR DISLOC,ANESTHESIA Left 8/1/2018    SHOULDER CLOSED REDUCTION WITH C-ARM VISUALIZATION performed by Kandice Perez MD at [facility] N/A 5/9/2017    COLONOSCOPY WITH BIOPSY performed by Ana Hogan DO at [address] [facility] [address] IMPLANT Left 7/18/2018    SHOULDER TOTAL ARTHROPLASTY REVERSE LEFT DJO & BICEP TENDON TRANSFER performed by Kandice Perez MD at [address] HUMERAL/GLENOID COMPNT Left 8/3/2018    SHOULDER TOTAL REVERSE  ARTHROPLASTY REVISION performed by Kandice Perez MD at [address]     rotator cuff repair    SHOULDER SURGERY Left 10/15/2020    SHOULDER ARTHROSCOPY W/INTEROP CULTURES performed by Kandice Perez MD at [facility]      ear, forehead    TONSILLECTOMY AND ADENOIDECTOMY      UPPER GASTROINTESTINAL ENDOSCOPY N/A 3/19/2020    EGD BIOPSY performed by Melissa Daily MD at [facility]     Family History   Problem Relation Age of Onset    Diabetes Mother     Lung Cancer Brother     Liver Cancer Brother     Cancer Father      Outpatient Medications Marked as Taking for the 5/23/22 encounter (Office Visit) with Jostin Branham MD   Medication Sig Dispense Refill    furosemide (LASIX) 20 MG tablet Take 1 tablet by mouth daily Correct. Dose decreased 5/12/2022 , post hospital 90 tablet 3    albuterol (PROVENTIL) (2.5 MG/3ML) 0.083% nebulizer solution Take 3 mLs by nebulization every 4 hours as needed for Wheezing or Shortness of Breath 120 each 3    allopurinol (ZYLOPRIM) 100 MG tablet Take 1 tablet by mouth daily 90 tablet 1    nystatin (MYCOSTATIN) 505723 UNIT/ML suspension Take 5 mLs by mouth 4 times daily Swish and swallow. For 3 months 1419 mL 0    blood glucose monitor strips Testing once a day. Needs ONE TOUCH VERIO REFLECT 100 strip 3    Blood Pressure KIT Diagnosis: HTN. Needs to check blood pressure 1-2 times a day until stable, then once a day. Goal blood pressure less than 135/85, and above 110/60. 1 kit 0    Cholecalciferol (VITAMIN D3) 1.25 MG (11367 UT) CAPS Take by mouth every 7 days       enzalutamide (XTANDI) 40 MG capsule Take 4 capsules by mouth daily 120 capsule 5    albuterol sulfate  (90 Base) MCG/ACT inhaler USE 2 INHALATIONS EVERY 6 HOURS AS NEEDED FOR WHEEZING OR SHORTNESS OF BREATH 24 g 3    omeprazole (PRILOSEC) 20 MG delayed release capsule Take 1 capsule by mouth every morning (before breakfast) Dose decreased 7/20/2021 90 capsule 3    amLODIPine (NORVASC) 10 MG tablet Take 1 tablet by mouth daily Dose increased 10/16/2020 due to high BP. Correct dosage 90 tablet 3    folbee plus (FOLBEE PLUS) TABS Take 1 tablet by mouth daily 90 tablet 3    tiotropium-olodaterol (STIOLTO RESPIMAT) 2.5-2.5 MCG/ACT AERS Inhale 2 puffs into the lungs daily      oxyCODONE-acetaminophen (PERCOCET) 5-325 MG per tablet Take 1 tablet by mouth 2 times daily as needed.  Lancets 30G MISC Testing once a day. Please dispense according to patients insurance.  100 each 3    ammonium lactate (AMLACTIN) 12 % cream APPLY TOPICALLY TO LEGS ONE TO TWO TIMES A DAY AS NEEDED 1 Bottle 3    OXYGEN With CPAP at night      aspirin EC 81 MG EC tablet Take 1 tablet by mouth daily 90 tablet 0       Succinylcholine chloride and Cymbalta [duloxetine hcl]  Social History     Tobacco Use   Smoking Status Former Smoker    Packs/day: 1.00    Years: 35.00    Pack years: 35.00    Types: Cigarettes    Start date: 2021   Shanice Martinez Quit date: 3/9/2022    Years since quittin.2   Smokeless Tobacco Never Used     (Ifpatient a smoker, smoking cessation counseling offered)    Social History     Substance and Sexual Activity   Alcohol Use Not Currently    Alcohol/week: 0.0 standard drinks       REVIEW OF SYSTEMS:  Review of Systems    Physical Exam:      Vitals:    22 1052   BP: 130/70   Pulse: 78   Temp: 97.4 °F (36.3 °C)   SpO2: 96%     Body mass index is 35.8 kg/m². Patient is a 78 y.o. male in no acute distress and alert and oriented to person, place and time. Physical Exam  Constitutional: Patient in no acute distress. Neuro: Alert and oriented to person, place and time. Psych: Mood normal, affect normal  Skin: No rash noted      Assessment and Plan      1. Prostate cancer ([facility]    2. Hot flashes           Plan: cont xtandi  eligard today  F/u 3 mo psa/T      Return in about 3 months (around 2022) for psa and T.    Prescriptions Ordered:  No orders of the defined types were placed in this encounter. Orders Placed:  Orders Placed This Encounter   Procedures    PSA, Diagnostic     Standing Status:   Future     Standing Expiration Date:   2023    Testosterone     Standing Status:   Future     Standing Expiration Date:   2023           Jazmyn aMx MD    Agree with the ROS entered by the MA.

## 2022-05-23 NOTE — PROGRESS NOTES
After obtaining consent, and per orders of Dr. Siva Barry, injection of ELigard 45 mg given in Left lower quad. abdomen by Wallace Osgood, MA. Patient instructed to remain in clinic for 20 minutes afterwards, and to report any adverse reaction to me immediately.

## 2022-05-23 NOTE — PROGRESS NOTES
Review of Systems   Constitutional: Negative. HENT: Negative. Respiratory: Positive for shortness of breath. Cardiovascular: Negative. Gastrointestinal: Negative. Genitourinary: Positive for frequency and urgency.

## 2022-06-03 DIAGNOSIS — I10 ESSENTIAL HYPERTENSION: ICD-10-CM

## 2022-06-03 RX ORDER — AMLODIPINE BESYLATE 10 MG/1
10 TABLET ORAL DAILY
Qty: 90 TABLET | Refills: 3 | Status: SHIPPED | OUTPATIENT
Start: 2022-06-03

## 2022-06-03 NOTE — PROGRESS NOTES
basic metabolic panel  Lab Results   Component Value Date     05/06/2022    K 3.7 05/06/2022     05/06/2022    CO2 26 05/06/2022    BUN 50 05/06/2022    CREATININE 1.38 05/06/2022    GLUCOSE 120 05/06/2022    CALCIUM 10.1 05/06/2022

## 2022-06-08 ENCOUNTER — HOSPITAL ENCOUNTER (OUTPATIENT)
Age: 67
Setting detail: SPECIMEN
Discharge: HOME | End: 2022-06-08
Payer: MEDICARE

## 2022-06-08 LAB
ABSOLUTE EOS #: 0.1 K/UL (ref 0–0.4)
ABSOLUTE LYMPH #: 1.6 K/UL (ref 1–4.8)
ABSOLUTE MONO #: 0.6 K/UL (ref 0.1–1.3)
ALBUMIN SERPL-MCNC: 3.7 G/DL (ref 3.5–5.2)
ALP BLD-CCNC: 77 U/L (ref 40–129)
ALT SERPL-CCNC: 13 U/L (ref 5–41)
ANION GAP SERPL CALCULATED.3IONS-SCNC: 12 MMOL/L (ref 9–17)
AST SERPL-CCNC: 13 U/L
BASOPHILS # BLD: 1 % (ref 0–2)
BASOPHILS ABSOLUTE: 0.1 K/UL (ref 0–0.2)
BILIRUB SERPL-MCNC: 0.23 MG/DL (ref 0.3–1.2)
BUN BLDV-MCNC: 9 MG/DL (ref 8–23)
CALCIUM SERPL-MCNC: 10 MG/DL (ref 8.6–10.4)
CHLORIDE BLD-SCNC: 106 MMOL/L (ref 98–107)
CO2: 26 MMOL/L (ref 20–31)
CREAT SERPL-MCNC: 0.62 MG/DL (ref 0.7–1.2)
EOSINOPHILS RELATIVE PERCENT: 1 % (ref 0–4)
FOLATE: >20 NG/ML
GFR AFRICAN AMERICAN: >60 ML/MIN
GFR NON-AFRICAN AMERICAN: >60 ML/MIN
GFR SERPL CREATININE-BSD FRML MDRD: ABNORMAL ML/MIN/{1.73_M2}
GLUCOSE BLD-MCNC: 117 MG/DL (ref 70–99)
HCT VFR BLD CALC: 36.7 % (ref 41–53)
HEMOGLOBIN: 12.1 G/DL (ref 13.5–17.5)
LYMPHOCYTES # BLD: 17 % (ref 24–44)
MAGNESIUM: 1.6 MG/DL (ref 1.6–2.6)
MCH RBC QN AUTO: 30.3 PG (ref 26–34)
MCHC RBC AUTO-ENTMCNC: 33.1 G/DL (ref 31–37)
MCV RBC AUTO: 91.7 FL (ref 80–100)
MONOCYTES # BLD: 7 % (ref 1–7)
PDW BLD-RTO: 16.1 % (ref 11.5–14.9)
PLATELET # BLD: 312 K/UL (ref 150–450)
PMV BLD AUTO: 8.3 FL (ref 6–12)
POTASSIUM SERPL-SCNC: 4.2 MMOL/L (ref 3.7–5.3)
PRO-BNP: 71 PG/ML
RBC # BLD: 4 M/UL (ref 4.5–5.9)
SEG NEUTROPHILS: 74 % (ref 36–66)
SEGMENTED NEUTROPHILS ABSOLUTE COUNT: 7.2 K/UL (ref 1.3–9.1)
SODIUM BLD-SCNC: 144 MMOL/L (ref 135–144)
TOTAL PROTEIN: 7.5 G/DL (ref 6.4–8.3)
TSH SERPL DL<=0.05 MIU/L-ACNC: 1.43 UIU/ML (ref 0.3–5)
URIC ACID: 6.5 MG/DL (ref 3.4–7)
VITAMIN B-12: 1407 PG/ML (ref 232–1245)
WBC # BLD: 9.5 K/UL (ref 3.5–11)

## 2022-06-08 PROCEDURE — 84443 ASSAY THYROID STIM HORMONE: CPT

## 2022-06-08 PROCEDURE — 84550 ASSAY OF BLOOD/URIC ACID: CPT

## 2022-06-08 PROCEDURE — 36415 COLL VENOUS BLD VENIPUNCTURE: CPT

## 2022-06-08 PROCEDURE — 83735 ASSAY OF MAGNESIUM: CPT

## 2022-06-08 PROCEDURE — 83880 ASSAY OF NATRIURETIC PEPTIDE: CPT

## 2022-06-08 PROCEDURE — 82746 ASSAY OF FOLIC ACID SERUM: CPT

## 2022-06-08 PROCEDURE — 82607 VITAMIN B-12: CPT

## 2022-06-08 PROCEDURE — 80053 COMPREHEN METABOLIC PANEL: CPT

## 2022-06-08 PROCEDURE — 85025 COMPLETE CBC W/AUTO DIFF WBC: CPT

## 2022-06-08 NOTE — RESULT ENCOUNTER NOTE
Please notify patient: Blood glucose well controlled 117, mildly low magnesium 1.6  Anemia is  new from prior, any blood in the stool or any black stools?   Last colonoscopy done 2017, might need a new colonoscopy, to let me know if he agrees for referral to Dr. Claudia De La Vega        Otherwise labs within normal limits  continue current treatment    Future Appointments  6/10/2022  2:00 PM    [facility]     [facility]  7/1/2022   10:20 AM   Meena Moraes  [place]  8/10/2022  9:00 AM    Melody Ramirez MD     [facility]        [facility]  8/22/2022  10:50 AM   Zahra Elliott MD         [address] [place] [address]

## 2022-06-09 NOTE — PROGRESS NOTES
Visit Information    Have you changed or started any medications since your last visit including any over-the-counter medicines, vitamins, or herbal medicines? no   Have you stopped taking any of your medications? Is so, why? -  no  Are you having any side effects from any of your medications? - no    Have you seen any other physician or provider since your last visit? yes -    Have you had any other diagnostic tests since your last visit? yes -    Have you been seen in the emergency room and/or had an admission in a hospital since we last saw you?  yes -    Have you had your routine dental cleaning in the past 6 months?  no     Do you have an active MyChart account? If no, what is the barrier?   Yes    Patient Care Team:  Rolanad Penny MD as PCP - General (Family Medicine)  Rolanda Penny MD as PCP - [facility]  Olivia Wei MD as Consulting Physician (Neurosurgery)  Alexandria Dance, MD as Consulting Physician (Cardiology)  Olga Lidia Hoskins MD as Consulting Physician (Urology)  Meliton Smith MD as Surgeon (Vascular Surgery)  Sonia Ortiz MD as Consulting Physician (Pulmonology)  Favio Portillo MD as Consulting Physician (Orthopedic Surgery)  Darrion Oseguera OD (Ophthalmology)  Michael Fowler MD as Consulting Physician (Endocrinology)  Pk Torres MD as Consulting Physician (Orthopedic Surgery)  Eugenio Wells MD as Consulting Physician (Gastroenterology)  Shelli Hill MD as Consulting Physician (Orthopedic Surgery)  Jaclynn Dubin, RN as Nurse Portillo Mckeon MD as Surgeon (General Surgery)  Tariq Caba, PhD (Psychology)  Sabiha Myers MD as Consulting Physician (Neurology)  Eugenio Wells RN as Ambulatory Care Manager    Medical History Review  Past Medical, Family, and Social History reviewed and does contribute to the patient presenting condition    Health Maintenance   Topic Date Due    Diabetic retinal exam 11/07/2020    Pneumococcal 65+ years Vaccine (3 - PPSV23 or PCV20) 04/11/2022    Diabetic foot exam  04/23/2022    Colorectal Cancer Screen  05/09/2022    Lipids  07/30/2022    Annual Wellness Visit (AWV)  11/06/2022    A1C test (Diabetic or Prediabetic)  03/09/2023    Depression Monitoring  03/09/2023    Low dose CT lung screening  04/18/2023    Diabetic microalbuminuria test  05/09/2023    Prostate Specific Antigen (PSA) Screening or Monitoring  05/17/2023    DTaP/Tdap/Td vaccine (2 - Td or Tdap) 03/01/2028    Flu vaccine  Completed    Shingles vaccine  Completed    COVID-19 Vaccine  Completed    Hepatitis C screen  Completed    Hepatitis A vaccine  Aged Out    Hib vaccine  Aged Out    Meningococcal (ACWY) vaccine  Aged Out

## 2022-06-09 NOTE — RESULT ENCOUNTER NOTE
Noted  Future Appointments  6/10/2022  2:00 PM    Lubna Gore MD     [facility]               [facility]  7/1/2022   10:20 AM   Simpson Lundborg, MD [facility] NEURO          [facility]  8/10/2022  9:00 AM    Kiara Rowe MD     [facility]        [facility]  8/22/2022  10:50 AM   Nathan Pinon MD         [address] [place] [address] [employer] [facility]

## 2022-06-10 ENCOUNTER — OFFICE VISIT (OUTPATIENT)
Dept: FAMILY MEDICINE CLINIC | Age: 67
End: 2022-06-10
Payer: MEDICARE

## 2022-06-10 VITALS
OXYGEN SATURATION: 94 % | SYSTOLIC BLOOD PRESSURE: 138 MMHG | TEMPERATURE: 97.8 F | HEART RATE: 73 BPM | DIASTOLIC BLOOD PRESSURE: 78 MMHG | BODY MASS INDEX: 37.14 KG/M2 | WEIGHT: 274.2 LBS | HEIGHT: 72 IN

## 2022-06-10 DIAGNOSIS — N18.30 BENIGN HYPERTENSIVE HEART AND CKD, STAGE 3 (GFR 30-59), W CHF (HCC): ICD-10-CM

## 2022-06-10 DIAGNOSIS — I50.32 CHRONIC DIASTOLIC HEART FAILURE (HCC): ICD-10-CM

## 2022-06-10 DIAGNOSIS — E78.5 HYPERLIPIDEMIA WITH TARGET LDL LESS THAN 70: ICD-10-CM

## 2022-06-10 DIAGNOSIS — I25.10 CORONARY ARTERY DISEASE INVOLVING NATIVE CORONARY ARTERY OF NATIVE HEART WITHOUT ANGINA PECTORIS: ICD-10-CM

## 2022-06-10 DIAGNOSIS — I13.0 BENIGN HYPERTENSIVE HEART AND CKD, STAGE 3 (GFR 30-59), W CHF (HCC): ICD-10-CM

## 2022-06-10 DIAGNOSIS — Z23 ENCOUNTER FOR IMMUNIZATION: ICD-10-CM

## 2022-06-10 DIAGNOSIS — Z99.89 OSA ON CPAP: ICD-10-CM

## 2022-06-10 DIAGNOSIS — D64.9 ANEMIA, UNSPECIFIED TYPE: ICD-10-CM

## 2022-06-10 DIAGNOSIS — G47.33 OSA ON CPAP: ICD-10-CM

## 2022-06-10 DIAGNOSIS — J44.9 COPD MIXED TYPE (HCC): ICD-10-CM

## 2022-06-10 DIAGNOSIS — J96.11 CHRONIC HYPOXEMIC RESPIRATORY FAILURE (HCC): ICD-10-CM

## 2022-06-10 DIAGNOSIS — Z86.010 HISTORY OF COLON POLYPS: ICD-10-CM

## 2022-06-10 DIAGNOSIS — E11.42 TYPE 2 DIABETES MELLITUS WITH DIABETIC POLYNEUROPATHY, WITHOUT LONG-TERM CURRENT USE OF INSULIN (HCC): Primary | ICD-10-CM

## 2022-06-10 PROBLEM — I25.84 CORONARY ARTERY CALCIFICATION: Status: ACTIVE | Noted: 2022-06-10

## 2022-06-10 PROBLEM — N17.0 ACUTE RENAL FAILURE WITH ACUTE TUBULAR NECROSIS SUPERIMPOSED ON STAGE 4 CHRONIC KIDNEY DISEASE (HCC): Status: ACTIVE | Noted: 2022-05-09

## 2022-06-10 PROBLEM — N18.4 ACUTE RENAL FAILURE WITH ACUTE TUBULAR NECROSIS SUPERIMPOSED ON STAGE 4 CHRONIC KIDNEY DISEASE (HCC): Status: ACTIVE | Noted: 2022-05-09

## 2022-06-10 PROBLEM — Z86.0100 HISTORY OF COLON POLYPS: Status: ACTIVE | Noted: 2022-06-10

## 2022-06-10 LAB — HBA1C MFR BLD: 5.6 %

## 2022-06-10 PROCEDURE — 3044F HG A1C LEVEL LT 7.0%: CPT | Performed by: FAMILY MEDICINE

## 2022-06-10 PROCEDURE — 1123F ACP DISCUSS/DSCN MKR DOCD: CPT | Performed by: FAMILY MEDICINE

## 2022-06-10 PROCEDURE — 99214 OFFICE O/P EST MOD 30 MIN: CPT | Performed by: FAMILY MEDICINE

## 2022-06-10 PROCEDURE — 83036 HEMOGLOBIN GLYCOSYLATED A1C: CPT | Performed by: FAMILY MEDICINE

## 2022-06-10 RX ORDER — PREGABALIN 200 MG/1
200 CAPSULE ORAL 3 TIMES DAILY
Qty: 270 CAPSULE | Refills: 0 | Status: SHIPPED | OUTPATIENT
Start: 2022-06-10 | End: 2022-09-23 | Stop reason: SDUPTHER

## 2022-06-10 RX ORDER — LISINOPRIL 10 MG/1
10 TABLET ORAL DAILY
Qty: 90 TABLET | Refills: 1
Start: 2022-06-10

## 2022-06-10 RX ORDER — PRAVASTATIN SODIUM 80 MG/1
80 TABLET ORAL EVERY EVENING
Qty: 90 TABLET | Refills: 3
Start: 2022-06-10 | End: 2022-09-23 | Stop reason: SDUPTHER

## 2022-06-10 RX ORDER — FUROSEMIDE 20 MG/1
20 TABLET ORAL DAILY
Qty: 90 TABLET | Refills: 3 | Status: SHIPPED | OUTPATIENT
Start: 2022-06-10 | End: 2022-09-23 | Stop reason: SDUPTHER

## 2022-06-10 ASSESSMENT — ENCOUNTER SYMPTOMS
NAUSEA: 0
VOMITING: 0
WHEEZING: 0
ABDOMINAL PAIN: 0
BACK PAIN: 1
COUGH: 1
DIARRHEA: 0
SHORTNESS OF BREATH: 1
CHEST TIGHTNESS: 0
CONSTIPATION: 0
ABDOMINAL DISTENTION: 0
APNEA: 1

## 2022-06-10 NOTE — PROGRESS NOTES
Harmony Leal (:  1955) is a 78 y.o. male,Established patient, here for evaluation of the following chief complaint(s): Diabetes, Congestive Heart Failure, Hypertension, Hyperlipidemia, and COPD      ASSESSMENT/PLAN:    1. Type 2 diabetes mellitus with diabetic polyneuropathy, without long-term current use of insulin (HCC)  improving diabetes, polyneuropathy improved with Lyrica and Folbee  -     POCT glycosylated hemoglobin (Hb A1C) 5.6    Lab Results   Component Value Date    LABA1C 5.6 06/10/2022    LABA1C 5.8 2022    LABA1C 6.3 (H) 2021       -     pregabalin (LYRICA) 200 MG capsule; Take 1 capsule by mouth 3 times daily for 90 days. , Disp-270 capsule, R-0Normal    -advised home blood glucose testing daily for at least a few times a week    -daily feet exam, Foot care: advised to wash feet daily, pat dry and apply lotion at night, avoiding between toes. Need to look at feet daily and report to a physician any signs of inflammation or skin damage  -annual dilated eye exam  -Low carb, low fat diet, increase fruits and vegetables, and exercise 4-5 times a day 30-40 minutes a day discussed  -continue on diet control only  -continue Aspirin 81 mg  Continue with lisinopril and pravastatin    2. Chronic diastolic heart failure (HCC)  Stable   lisinopril was stopped during the last hospitalization in May 2022 due to acute kidney injury, but he self restarted it already, kidney function is good, with GFR over 60 on 2022    Lab Results   Component Value Date    LVEF 60 2022    LVEFMODE Echo 2019       -     furosemide (LASIX) 20 MG tablet; Take 1 tablet by mouth daily Dose decreased 2022 , post hospital, Disp-90 tablet, R-3Normal  -     lisinopril (PRINIVIL;ZESTRIL) 10 MG tablet; Take 1 tablet by mouth daily, Disp-90 tablet, R-1NO PRINT  -     AFL - Francie Tipton DO, Cardiology, [place]  3.  Benign hypertensive heart and CKD, stage 3 (GFR 30-59), w CHF (HCC)  Improved Chronic kidney disease stage 3, now GFR over 60, he has appointment with nephrologist  Well controlled HTN. Stable congestive heart failure    Continue current treatment. -     furosemide (LASIX) 20 MG tablet; Take 1 tablet by mouth daily Dose decreased 5/12/2022 , post hospital, Disp-90 tablet, R-3Normal  -     lisinopril (PRINIVIL;ZESTRIL) 10 MG tablet; Take 1 tablet by mouth daily, Disp-90 tablet, R-1NO PRINT  Continue Amlodipine 10 mg  -     AFL (Epic) - Teodoro Dela Cruz MD, Nephrology, [place]  4. Hyperlipidemia with target LDL less than 70  Inadequately controlled    Lab Results   Component Value Date    LDLCHOLESTEROL 93 07/30/2021    LDLCHOLESTEROL 93 07/30/2021       -   To restart, has it at home  pravastatin (PRAVACHOL) 80 MG tablet; Take 1 tablet by mouth every evening, Disp-90 tablet, R-3NO PRINT  5. Chronic hypoxemic respiratory failure (HCC)  Improved, on 2.5 L/min at nighttime and sometimes as needed throughout the day  Continue to abstain from smoking  6. COPD mixed type ([address]  Worsening  With multiple episodes of recent COPD exacerbations requiring admissions  Strongly counseled not to restart smoking  Continue Stiolto 2 puffs daily, and increase nebulizer treatment to 3 times a day  Oxygen at 2.5 L/min at nighttime and as needed throughout the day  Follow-up with pulmonologist as scheduled  Advised he can try Mucinex from over-the-counter to try to have the nebulizer treatments 3 times a day every day    7. REYNALDO on CPAP  Improved with CPAP, compliance with CPAP discussed  He benefits from using CPAP  8. Anemia, unspecified type  Persistent  Possible anemia of chronic disease/chronic kidney disease  Also will need GI work-up due to prior history of colon polyps  -     AFL (Epic) - Teodoro Dela Cruz MD, Nephrology, Estuardo Lennon MD, Gastroenterology, [place]  9. History of colon polyps  -     Harshil Maria MD, Gastroenterology, [place]  10.  Coronary artery disease involving native coronary artery of native heart without angina pectoris  Likely worsening, multiple risk factors, he wants to change cardiologist  Very high risk cardiovascular  Continue baby aspirin, pravastatin, lisinopril, and smoking abstinence    The 10-year ASCVD risk score (Obi Barnes., et al., 2013) is: 34.7%    Values used to calculate the score:      Age: 78 years      Sex: Male      Is Non- : No      Diabetic: Yes      Tobacco smoker: No      Systolic Blood Pressure: 092 mmHg      Is BP treated: Yes      HDL Cholesterol: 35 mg/dL      Total Cholesterol: 155 mg/dL    -     AFL - Saeed, DO Francie, Cardiology, [place]  11. Encounter for immunization  -     pneumococcal 20-valent conjugat (PREVNAR) 0.5 ML SANDRA inj; Inject 0.5 mLs into the muscle once for 1 dose, Disp-0.5 mL, R-0Normal    Controlled Substance Monitoring:    Acute and Chronic Pain Monitoring:   RX Monitoring 6/10/2022   Attestation -   Periodic Controlled Substance Monitoring Possible medication side effects, risk of tolerance/dependence & alternative treatments discussed. ;No signs of potential drug abuse or diversion identified. ;Assessed functional status. Chronic Pain > 50 MEDD -   Chronic Pain > 80 MEDD -         Bernardino received counseling on the following healthy behaviors: nutrition, exercise, medication adherence, tobacco cessation and abstinenceand weight loss  Reviewed prior labs and health maintenance  Discussed use, benefit, and side effects of prescribed medications. Barriers to medication compliance addressed. Patient given educational materials - see patient instructions  Was a self-tracking handout given in paper form or via [email or web address]? Yes  All patient questions answered. Patient voiced understanding. The patient's past medical,surgical, social, and family history as well as his current medications and allergies were reviewed as documented in today's encounter.     Medications, labs, diagnostic studies, consultations and follow-up as documented in this encounter. Return in about 3 months (around 9/10/2022) for Visit type diabetes, **POC A1C, COPD, DM2, HTN, HLP. Data Unavailable    Future Appointments   Date Time Provider Keri English   7/1/2022 10:20 AM Kendal Fields MD [facility]   8/10/2022  9:00 AM Sujata Mahajan MD [facility]   8/22/2022 10:50 AM Rojas Roach MD [facility]   9/23/2022  1:30 PM Chey Marion MD [facility]         SUBJECTIVE/OBJECTIVE:    Diabetes Mellitus Type II, Follow-up:    Current symptoms/problems include hyperglycemia, paresthesia of the feet and visual disturbances. Symptoms have been present for several years. Known diabetic complications: peripheral neuropathy, impotence, cardiovascular disease, peripheral vascular disease and CKD  Cardiovascular risk factors: advanced age (older than 54 for men, 72 for women), diabetes mellitus, dyslipidemia, hypertension, male gender, obesity (BMI >= 30 kg/m2), sedentary lifestyle, smoking/ tobacco exposure and CKD stage 3  Current diabetic medications include none. Eye exam current (within one year): no, advised to schedule appointment with ophthalmologist  Current diet: diabetic, low fat/ cholesterol, low salt    Barriers: impairment:  physical: Multiple medical conditions, chronic back pain, severe COPD, congestive heart failure, chronic respiratory failure, peripheral vascular disease, ongoing prostate cancer and mental health: Depression    Current monitoring regimen: home blood tests - 3-4 times weekly  Home blood sugar records: fasting range: Low 100's-95  Any episodes of hypoglycemia? no    Is He on ACE inhibitor or angiotensin II receptor blocker? Yes      reports that he quit smoking about 3 months ago. His smoking use included cigarettes. He started smoking about 11 months ago. He has a 35.00 pack-year smoking history.  He has never used smokeless tobacco.     Counseling given: Yes      Daily Aspirin? Yes      A1c is improving. Lab Results   Component Value Date    LABA1C 5.6 06/10/2022    LABA1C 5.8 03/09/2022    LABA1C 6.3 (H) 08/12/2021               Hypertension, Chronic kidney disease stage 3, congestive heart failure , coronary artery disease, peripheral vascular disease  :   Samantha Gu  is not  exercising and is adherent to low salt diet. Blood pressure is well controlled at home. Cardiac symptoms  dyspnea and fatigue. Patient denies  chest pain, chest pressure/discomfort, claudication, exertional chest pressure/discomfort, irregular heart beat, lower extremity edema, near-syncope, orthopnea, palpitations, paroxysmal nocturnal dyspnea, syncope and tachypnea. Use of agents associated with hypertension: none. History of target organ damage: chronic kidney disease, heart failure, peripheral artery disease and Coronary artery disease. Has no Stents    He wants to change PCP  He already restarted lisinopril 10, also taking amlodipine 10 mg, lasix    BP controlled. Samantha Gu reports compliance with BP medications, and tolerates them well, denies side effects. BP Readings from Last 3 Encounters:   06/10/22 138/78   05/23/22 130/70   05/06/22 112/79        Pulse is Normal.    Pulse Readings from Last 3 Encounters:   06/10/22 73   05/23/22 78   05/06/22 98     Lab Results   Component Value Date    LVEF 60 05/02/2022    LVEFMODE Echo 12/05/2019             Hyperlipidemia:  No new myalgias or GI upset on pravastatin (Pravachol). Medication compliance: noncompliant: : He is not sure if he is to take it, he does have it at home, advised to restart taking it  Patient is  following a low fat, low cholesterol diet. LDL is Elevated.     Lab Results   Component Value Date    LDLCHOLESTEROL 93 07/30/2021    LDLCHOLESTEROL 93 07/30/2021       COPD and chronic respiratory failure:  Current treatment includes short-acting beta agonist inhaler, nebulized beta agonist, combined beta agonist/antichoinergic inhaler, home 02-  2.5 l/min at night only, and sometimes throughout the day if he needed, which has been somewhat effective. Residual symptoms: chronic dyspnea and cough productive of white and green sputum in moderate amounts, then white again. He denies wheezing. Last antibiotics mid may Doxycycline     Patient says he has been using nebulizer 2-3 times a day, but sometimes forgets to use it the second time. He does have difficulty expectorating. Advised Mucinex from over-the-counter. Has appointment with Dr. Army Felipe    At home pulse ox is 94-95%-96%  SpO2 Readings from Last 4 Encounters:   06/10/22 94%   05/23/22 96%   05/06/22 94%   04/27/22 98%     Nicotine dependence. Quit in March  Patient says also his wife quit smoking. Has been staying busy watching his grandkid. Smoker, counseling given to quit smoking. Counseling given: Yes    Sleep Apnea:  Current treatment: cPAP. Compliance: compliant all of the time. Residual symptoms include: morning fatigue and daytime fatigue. Anemia which is new  He does have history of colon polyps  He does not see blood in the stool denies constipation, he denies black stools too. He denies abdominal pain    She also had admission 5/8/2022 due to sepsis and COPD exacerbation, hypertension, acute on chronic kidney disease. He was started to see nephrologist.  Lisinopril was stopped at that time. His GFR at admission on 5/8/2022 was 22, stage IV kidney disease. Lab Results   Component Value Date    WBC 9.5 06/08/2022    HGB 12.1 (L) 06/08/2022    HCT 36.7 (L) 06/08/2022    MCV 91.7 06/08/2022     06/08/2022               Prior to Visit Medications    Medication Sig Taking? Authorizing Provider   amLODIPine (NORVASC) 10 MG tablet Take 1 tablet by mouth daily Dose increased 10/16/2020 due to high BP.  Correct dosage Yes Chey Marion MD   OXYGEN Inhale into the lungs continuous Yes Joanne Alvarez MD   OXYGEN With CPAP at night Yes Historical Provider, MD   aspirin EC 81 MG EC tablet Take 1 tablet by mouth daily Yes Rosie Sanchez MD   pregabalin (LYRICA) 200 MG capsule Take 1 capsule by mouth 3 times daily for 90 days. Please ignore prior refill for gabapentin 800 mg, will switch back to Herbert Ch MD            Social History     Tobacco Use    Smoking status: Former Smoker     Packs/day: 1.00     Years: 35.00     Pack years: 35.00     Types: Cigarettes     Start date: 2021     Quit date: 3/9/2022     Years since quittin.2    Smokeless tobacco: Never Used   Vaping Use    Vaping Use: Never used   Substance Use Topics    Alcohol use: Not Currently     Alcohol/week: 0.0 standard drinks    Drug use: No         Review of Systems   Constitutional: Positive for fatigue and unexpected weight change. Negative for activity change, appetite change, chills, diaphoresis and fever. Respiratory: Positive for apnea (on CPAP), cough and shortness of breath. Negative for chest tightness and wheezing. On oxygen prn during daytime and at 2.5 L/min at nighttime. Cardiovascular: Negative for chest pain, palpitations and leg swelling. Gastrointestinal: Negative for abdominal distention, abdominal pain, constipation, diarrhea, nausea and vomiting. Endocrine: Positive for heat intolerance (from prostate cancer medication ). Negative for cold intolerance, polydipsia, polyphagia and polyuria. Musculoskeletal: Positive for arthralgias, back pain and gait problem. Ambulates with walker with seat   Allergic/Immunologic: Positive for immunocompromised state (Due to ongoing treatment for prostate cancer). Neurological: Positive for tremors (hands) and numbness (feet, lower legs, hands).    Psychiatric/Behavioral: The patient is nervous/anxious.          -vital signs stable and within normal limits except severe obesity per BMI, and mild hypoxia    /78   Pulse 73   Temp 97.8 °F (36.6 °C)   Ht 6' (1.829 m)   Wt 274 lb 3.2 oz (124.4 kg)   SpO2 94%   BMI 37.19 kg/m²         Physical Exam  Vitals and nursing note reviewed. Constitutional:       General: He is not in acute distress. Appearance: Normal appearance. He is well-developed. He is obese. He is not diaphoretic. HENT:      Head: Normocephalic and atraumatic. Right Ear: External ear normal.      Left Ear: External ear normal.      Mouth/Throat:      Comments: I did not examine the mouth due to coronavirus pandemic and wearing masks. Eyes:      General: Lids are normal. No scleral icterus. Right eye: No discharge. Left eye: No discharge. Extraocular Movements: Extraocular movements intact. Conjunctiva/sclera: Conjunctivae normal.   Neck:      Thyroid: No thyromegaly. Cardiovascular:      Rate and Rhythm: Normal rate and regular rhythm. Heart sounds: Normal heart sounds. No murmur heard. Pulmonary:      Effort: Pulmonary effort is normal. No respiratory distress. Breath sounds: Examination of the right-lower field reveals decreased breath sounds. Examination of the left-lower field reveals decreased breath sounds. Decreased breath sounds present. No wheezing or rales. Comments: Very little decreased breath sounds, greatly improved from prior, coughing throughout the visit  Chest:      Chest wall: No tenderness. Abdominal:      General: Bowel sounds are normal. There is no distension. Palpations: Abdomen is soft. There is no hepatomegaly or splenomegaly. Tenderness: There is no abdominal tenderness. Comments: Obese abdomen. Musculoskeletal:      Right shoulder: Tenderness and bony tenderness present. Decreased range of motion. Decreased strength. Left shoulder: Tenderness and bony tenderness present. Decreased range of motion. Decreased strength.       Cervical back: Normal range of motion and neck supple. Lumbar back: Tenderness and bony tenderness present. Decreased range of motion. Positive right straight leg raise test and positive left straight leg raise test.      Right lower leg: No edema. Left lower leg: No edema. Lymphadenopathy:      Cervical: No cervical adenopathy. Skin:     General: Skin is warm and dry. Capillary Refill: Capillary refill takes less than 2 seconds. Findings: No rash. Neurological:      Mental Status: He is alert and oriented to person, place, and time. Motor: Weakness present. Gait: Gait abnormal.      Deep Tendon Reflexes: Reflexes are normal and symmetric. Comments: bilateral upper extremities weakness 3/5  strength 4/5 bilateral legs   Ambulates with walker with seat. Has difficulty getting up from sitting position and initiating gait. Psychiatric:         Mood and Affect: Mood is anxious and depressed. Behavior: Behavior normal.         Thought Content: Thought content normal.         Judgment: Judgment normal.           I personally reviewed testing with patient. Anemia  Improved chronic kidney disease stage III  Hyperglycemia well controlled  Hyperlipidemia  Otherwise labs within normal limits    Hospital Outpatient Visit on 06/08/2022   Component Date Value Ref Range Status    Vitamin B-12 06/08/2022 1407* 232 - 1245 pg/mL Final    Folate 06/08/2022 >20.0  >4.8 ng/mL Final    Pro-BNP 06/08/2022 71  <300 pg/mL Final    Comment:       An age-independent cutoff point of 300 pg/ml has a 98% negative predictive value excluding   acute heart failure.       WBC 06/08/2022 9.5  3.5 - 11.0 k/uL Final    RBC 06/08/2022 4.00* 4.5 - 5.9 m/uL Final    Hemoglobin 06/08/2022 12.1* 13.5 - 17.5 g/dL Final    Hematocrit 06/08/2022 36.7* 41 - 53 % Final    MCV 06/08/2022 91.7  80 - 100 fL Final    MCH 06/08/2022 30.3  26 - 34 pg Final    MCHC 06/08/2022 33.1  31 - 37 g/dL Final    RDW 06/08/2022 16.1* 11.5 - 14.9 % Final    Platelets 59/06/2753 312  150 - 450 k/uL Final    MPV 06/08/2022 8.3  6.0 - 12.0 fL Final    Seg Neutrophils 06/08/2022 74* 36 - 66 % Final    Lymphocytes 06/08/2022 17* 24 - 44 % Final    Monocytes 06/08/2022 7  1 - 7 % Final    Eosinophils % 06/08/2022 1  0 - 4 % Final    Basophils 06/08/2022 1  0 - 2 % Final    Segs Absolute 06/08/2022 7.20  1.3 - 9.1 k/uL Final    Absolute Lymph # 06/08/2022 1.60  1.0 - 4.8 k/uL Final    Absolute Mono # 06/08/2022 0.60  0.1 - 1.3 k/uL Final    Absolute Eos # 06/08/2022 0.10  0.0 - 0.4 k/uL Final    Basophils Absolute 06/08/2022 0.10  0.0 - 0.2 k/uL Final    Glucose 06/08/2022 117* 70 - 99 mg/dL Final    BUN 06/08/2022 9  8 - 23 mg/dL Final    CREATININE 06/08/2022 0.62* 0.70 - 1.20 mg/dL Final    Calcium 06/08/2022 10.0  8.6 - 10.4 mg/dL Final    Sodium 06/08/2022 144  135 - 144 mmol/L Final    Potassium 06/08/2022 4.2  3.7 - 5.3 mmol/L Final    Chloride 06/08/2022 106  98 - 107 mmol/L Final    CO2 06/08/2022 26  20 - 31 mmol/L Final    Anion Gap 06/08/2022 12  9 - 17 mmol/L Final    Alkaline Phosphatase 06/08/2022 77  40 - 129 U/L Final    ALT 06/08/2022 13  5 - 41 U/L Final    AST 06/08/2022 13  <40 U/L Final    Total Bilirubin 06/08/2022 0.23* 0.3 - 1.2 mg/dL Final    Total Protein 06/08/2022 7.5  6.4 - 8.3 g/dL Final    Albumin 06/08/2022 3.7  3.5 - 5.2 g/dL Final    GFR Non- 06/08/2022 >60  >60 mL/min Final    GFR  06/08/2022 >60  >60 mL/min Final    GFR Comment 06/08/2022        Final    Comment: Average GFR for 61-76 years old:   80 mL/min/1.73sq m  Chronic Kidney Disease:   <60 mL/min/1.73sq m  Kidney failure:   <15 mL/min/1.73sq m              eGFR calculated using average adult body mass.  Additional eGFR calculator available at:        [email or web address]            Magnesium 06/08/2022 1.6  1.6 - 2.6 mg/dL Final    TSH 06/08/2022 1.43  0.30 - 5.00 uIU/mL Final    Uric Acid 06/08/2022 6.5  3.4 - 7.0 mg/dL Final         Lab Results   Component Value Date    CHOL 138 07/09/2020    CHOL 160 02/11/2020    CHOL 201 (H) 09/16/2019     Lab Results   Component Value Date    TRIG 203 (H) 07/09/2020    TRIG 182 (H) 02/11/2020    TRIG 247 (H) 09/16/2019     Lab Results   Component Value Date    HDL 35 (L) 07/30/2021    HDL 35 (L) 07/30/2021    HDL 32 (L) 07/09/2020     Lab Results   Component Value Date    LDLCHOLESTEROL 93 07/30/2021    LDLCHOLESTEROL 93 07/30/2021    LDLCHOLESTEROL 65 07/09/2020     Lab Results   Component Value Date    CHOLHDLRATIO 4.4 07/30/2021    CHOLHDLRATIO 4.4 07/30/2021    CHOLHDLRATIO 4.3 07/09/2020           Orders Placed This Encounter   Medications    furosemide (LASIX) 20 MG tablet     Sig: Take 1 tablet by mouth daily Dose decreased 5/12/2022 , post hospital     Dispense:  90 tablet     Refill:  3    lisinopril (PRINIVIL;ZESTRIL) 10 MG tablet     Sig: Take 1 tablet by mouth daily     Dispense:  90 tablet     Refill:  1    pravastatin (PRAVACHOL) 80 MG tablet     Sig: Take 1 tablet by mouth every evening     Dispense:  90 tablet     Refill:  3    pregabalin (LYRICA) 200 MG capsule     Sig: Take 1 capsule by mouth 3 times daily for 90 days.      Dispense:  270 capsule     Refill:  0    pneumococcal 20-valent conjugat (PREVNAR) 0.5 ML SANDRA inj     Sig: Inject 0.5 mLs into the muscle once for 1 dose     Dispense:  0.5 mL     Refill:  0       Orders Placed This Encounter   Procedures    [facility] ([facility]) - Alexey Vazquez MD, Nephrology, [place]     Referral Priority:   Routine     Referral Type:   Eval and Treat     Referral Reason:   Specialty Services Required     Referred to Provider:   Dragan Ochoa MD     Requested Specialty:   Nephrology     Number of Visits Requested:   Silvestre Hodgson MD, Gastroenterology, [place]     Referral Priority:   Routine     Referral Type:   Eval and Treat     Referral Reason:   Specialty Services Required Referred to Provider:   Kj Chávez MD     Requested Specialty:   Gastroenterology     Number of Visits Requested:   Poli Pérez, Ata S Towson DO [place], Cardiology, [facility]     Referral Priority:   Routine     Referral Type:   Eval and Treat     Referral Reason:   Specialty Services Required     Referred to Provider:   Lavern Miranda DO     Requested Specialty:   Cardiology     Number of Visits Requested:   1    POCT glycosylated hemoglobin (Hb A1C)       Medications Discontinued During This Encounter   Medication Reason    nystatin (MYCOSTATIN) 479017 UNIT/ML suspension Therapy completed    Cholecalciferol (VITAMIN D3) 1.25 MG (03922 UT) CAPS Therapy completed    OXYGEN DUPLICATE    furosemide (LASIX) 20 MG tablet REORDER    pregabalin (LYRICA) 200 MG capsule REORDER         On this date 6/10/2022 I have spent 39 minutes reviewing previous notes, test results and face to face with the patient discussing the diagnosis and importance of compliance with the treatment plan as well as documenting on the day of the visit. This note was completed by using the assistance of a speech-recognition program. However, inadvertent computerized transcription errors may be present. Although every effort was made to ensure accuracy, no guarantees can be provided that every mistake has been identified and corrected by editing . An electronic signature was used to authenticate this note.   Electronically signed by John Chavez MD on 6/12/2022 at 11:23 AM

## 2022-06-10 NOTE — RESULT ENCOUNTER NOTE
Addressed during office visit today, A1c 5.6, improved diabetes, and very well controlled diabetes, diet controlled only

## 2022-06-10 NOTE — PATIENT INSTRUCTIONS
New Updates for [facility] MyChart/ [employer] ([facility]) RUBY    Thank you for choosing US to give you the best care! [employer] ([facility]) is always trying to think of new ways to help their patients. We are asking all patients to try out the new digital registration that is now available through your [facility] account or the new RUBY, [employer] ([facility]). Via the ruby you're now able to update your personal and registration information prior to your upcoming appointment. This will save you time once you arrive at the office to check-in, not to mention your information remains safe!! Many other perks come from signing up for an account, such as:   Requesting refills   Scheduling an appointment   Completing an [address] Sending a message to the office/provider   Having access to your medication list   Paying your bill/copay prior to your appointment   Scheduling your yearly mammogram   Review your test results    If you are not familiar with [facility] or the [facility] RUBY, please ask one of us and we will be happy to answer any questions or help you set-up your account. Your [facility] office,  [facility]  Patient Education        Learning About Carbohydrate (Carb) Counting and Eating Out When You Have Diabetes  Why plan your meals? Meal planning can be a key part of managing diabetes. Planning meals and snacks with the right balance of carbohydrate, protein, and fat can help you keep yourblood sugar at the target level you set with your doctor. You don't have to eat special foods. You can eat what your family eats, including sweets once in a while. But you do have to pay attention to how oftenyou eat and how much you eat of certain foods. You may want to work with a dietitian or a diabetes educator. They can give you tips and meal ideas and can answer your questions about meal planning.  This health professional can also help you reach a healthy weight if that is one ofyour goals.  What should you know about eating carbs? Managing the amount of carbohydrate (carbs) you eat is an important part ofhealthy meals when you have diabetes. Carbohydrate is found in many foods.  Learn which foods have carbs. And learn the amounts of carbs in different foods. ? Bread, cereal, pasta, and rice have about 15 grams of carbs in a serving. A serving is 1 slice of bread (1 ounce), ½ cup of cooked cereal, or 1/3 cup of cooked pasta or rice. ? Fruits have 15 grams of carbs in a serving. A serving is 1 small fresh fruit, such as an apple or orange; ½ of a banana; ½ cup of cooked or canned fruit; ½ cup of fruit juice; 1 cup of melon or raspberries; or 2 tablespoons of dried fruit. ? Milk and no-sugar-added yogurt have 15 grams of carbs in a serving. A serving is 1 cup of milk or 3/4 cup (6 oz) of no-sugar-added yogurt. ? Starchy vegetables have 15 grams of carbs in a serving. A serving is ½ cup of mashed potatoes or sweet potato; 1 cup winter squash; ½ of a small baked potato; ½ cup of cooked beans; or ½ cup cooked corn or green peas.  Learn how much carbs to eat each day and at each meal. A dietitian or certified diabetes educator can teach you how to keep track of the amount of carbs you eat. This is called carbohydrate counting.  If you are not sure how to count carbohydrate grams, use the plate method to plan meals. It is a quick way to make sure that you have a balanced meal. It also can help you manage the amount of carbohydrate you eat at meals. ? Divide your plate by types of foods. Put non-starchy vegetables on half the plate, meat or other protein food on one-quarter of the plate, and a grain or starchy vegetable in the final quarter of the plate.  To this you can add a small piece of fruit and 1 cup of milk or yogurt, depending on how many carbs you are supposed to eat at a meal.   Try to eat about the same amount of carbs at each meal. Do not \"save up\" your daily allowance of carbs to eat at one meal.   Proteins have very little or no carbs. Examples of proteins are beef, chicken, turkey, fish, eggs, tofu, cheese, cottage cheese, and peanut butter. How can you eat out and still eat healthy?  Learn to estimate the serving sizes of foods that have carbohydrate. If you measure food at home, it will be easier to estimate the amount in a serving of restaurant food.  If the meal you order has too much carbohydrate (such as potatoes, corn, or baked beans), ask to have a low-carbohydrate food instead. Ask for a salad or non-starchy vegetables like broccoli, cauliflower, green beans, or peppers.  If you eat more carbohydrate at a meal than you had planned, take a walk or do other exercise. This will help lower your blood sugar. What are some tips for eating healthy?  Limit saturated fat, such as the fat from meat and dairy products. This is a healthy choice because people who have diabetes are at higher risk of heart disease. So choose lean cuts of meat and nonfat or low-fat dairy products. Use olive or canola oil instead of butter or shortening when cooking.  Don't skip meals. Your blood sugar may drop too low if you skip meals and take insulin or certain medicines for diabetes.  Check with your doctor before you drink alcohol. Alcohol can cause your blood sugar to drop too low. Alcohol can also cause a bad reaction if you take certain diabetes medicines. Follow-up care is a key part of your treatment and safety. Be sure to make and go to all appointments, and call your doctor if you are having problems. It's also a good idea to know your test results and keep alist of the medicines you take. Where can you learn more? Go to [email or web address]://chjocy.[employer]. org and sign in to your [employer] account. Enter T848 in the [facility] box to learn more about \"Learning About Carbohydrate (Carb) Counting and Eating Out When You Have Diabetes. \"     If you do not have an account, please click on the \"Sign Up Now\" link. Current as of: September 8, 2021               Content Version: 13.2  © 2006-2022 Healthwise, Incorporated. Care instructions adapted under license by [facility] ([facility]). If you have questions about a medical condition or this instruction, always ask your healthcare professional. [address] any warranty or liability for your use of this information.

## 2022-06-15 PROBLEM — N18.1 CKD (CHRONIC KIDNEY DISEASE), STAGE I: Status: ACTIVE | Noted: 2022-06-15

## 2022-06-20 ENCOUNTER — TELEPHONE (OUTPATIENT)
Dept: FAMILY MEDICINE CLINIC | Age: 67
End: 2022-06-20

## 2022-06-20 NOTE — TELEPHONE ENCOUNTER
Please let the patient know that I have received notification from his insurance regarding significant and high interaction between Lyrica and Percocet, regarding depression of breathing, slowing down breathing and even death through the respiratory arrest, they recommended to stay with only one of them. Can I try to cut down Lyrica? Could also ask the pain management to take over Lyrica to be better monitored. Or can he stop taking Percocet? I would suggest naloxone to the pharmacy for him, what pharmacy? Naloxone is used to prevent opiate reaction and reversal of the respiratory depression, which can happen even if he is on small dose due to his comorbidities and high risk over 72years old. I can send naloxone to [facility], please let me know what he decides regarding Lyrica and Percocet and naloxone    & Happy birthday!

## 2022-06-20 NOTE — TELEPHONE ENCOUNTER
SPOKE WITH PATIENT REGARDING MEDICATION STATES HE DOES AGREE TO STOPPING ONE. WOULD LIKE TO STOP PERCOCET PLEASE ADVISE

## 2022-06-20 NOTE — TELEPHONE ENCOUNTER
I agree with stopping Percocet, which has more side effects, should discuss with pain management, maybe they can do some procedures to help with his chronic pain, instead

## 2022-06-21 NOTE — TELEPHONE ENCOUNTER
PATIENT CALLED BACK STATES HE DOES HAVE APPOINTMENT WITH PAIN MANAGEMENT ON 07/08/2023 AND HE WILL DISCUSS THIS WITH THEM

## 2022-06-27 DIAGNOSIS — K21.9 GASTROESOPHAGEAL REFLUX DISEASE WITHOUT ESOPHAGITIS: ICD-10-CM

## 2022-06-27 RX ORDER — OMEPRAZOLE 20 MG/1
CAPSULE, DELAYED RELEASE ORAL
Qty: 90 CAPSULE | Refills: 3 | Status: SHIPPED | OUTPATIENT
Start: 2022-06-27

## 2022-08-10 ENCOUNTER — HOSPITAL ENCOUNTER (OUTPATIENT)
Age: 67
Setting detail: SPECIMEN
Discharge: HOME | End: 2022-08-10
Payer: MEDICARE

## 2022-08-10 DIAGNOSIS — C61 PROSTATE CANCER (HCC): ICD-10-CM

## 2022-08-10 LAB
PROSTATE SPECIFIC ANTIGEN: 0.04 NG/ML
TESTOSTERONE TOTAL: 7 NG/DL (ref 220–1000)

## 2022-08-10 PROCEDURE — 36415 COLL VENOUS BLD VENIPUNCTURE: CPT

## 2022-08-10 PROCEDURE — 84153 ASSAY OF PSA TOTAL: CPT

## 2022-08-10 PROCEDURE — 84403 ASSAY OF TOTAL TESTOSTERONE: CPT

## 2022-08-16 ENCOUNTER — TELEPHONE (OUTPATIENT)
Dept: FAMILY MEDICINE CLINIC | Age: 67
End: 2022-08-16

## 2022-08-16 ENCOUNTER — HOSPITAL ENCOUNTER (OUTPATIENT)
Dept: ULTRASOUND IMAGING | Age: 67
Discharge: HOME | End: 2022-08-18
Payer: MEDICARE

## 2022-08-16 DIAGNOSIS — Z98.890 STATUS POST LUMBAR LAMINECTOMY: ICD-10-CM

## 2022-08-16 DIAGNOSIS — N18.1 BENIGN HYPERTENSION WITH CHRONIC KIDNEY DISEASE, STAGE I: ICD-10-CM

## 2022-08-16 DIAGNOSIS — G89.29 CHRONIC BILATERAL LOW BACK PAIN WITH BILATERAL SCIATICA: Primary | ICD-10-CM

## 2022-08-16 DIAGNOSIS — J44.9 COPD MIXED TYPE (HCC): ICD-10-CM

## 2022-08-16 DIAGNOSIS — D63.1 ANEMIA IN STAGE 1 CHRONIC KIDNEY DISEASE: ICD-10-CM

## 2022-08-16 DIAGNOSIS — N18.1 CKD (CHRONIC KIDNEY DISEASE), STAGE I: ICD-10-CM

## 2022-08-16 DIAGNOSIS — I12.9 BENIGN HYPERTENSION WITH CHRONIC KIDNEY DISEASE, STAGE I: ICD-10-CM

## 2022-08-16 DIAGNOSIS — M54.42 CHRONIC BILATERAL LOW BACK PAIN WITH BILATERAL SCIATICA: Primary | ICD-10-CM

## 2022-08-16 DIAGNOSIS — I73.9 PVD (PERIPHERAL VASCULAR DISEASE) WITH CLAUDICATION (HCC): ICD-10-CM

## 2022-08-16 DIAGNOSIS — M54.41 CHRONIC BILATERAL LOW BACK PAIN WITH BILATERAL SCIATICA: Primary | ICD-10-CM

## 2022-08-16 DIAGNOSIS — N18.1 ANEMIA IN STAGE 1 CHRONIC KIDNEY DISEASE: ICD-10-CM

## 2022-08-16 PROCEDURE — 76770 US EXAM ABDO BACK WALL COMP: CPT

## 2022-08-16 NOTE — TELEPHONE ENCOUNTER
Patient called and requested, to get 2 scripts for handicap placard, he has 2 vehicles. Please advise. Will come  when scripts are placed.

## 2022-08-22 ENCOUNTER — OFFICE VISIT (OUTPATIENT)
Dept: UROLOGY | Age: 67
End: 2022-08-22
Payer: MEDICARE

## 2022-08-22 VITALS
HEIGHT: 72 IN | TEMPERATURE: 97.2 F | DIASTOLIC BLOOD PRESSURE: 78 MMHG | HEART RATE: 68 BPM | BODY MASS INDEX: 38.19 KG/M2 | SYSTOLIC BLOOD PRESSURE: 122 MMHG | OXYGEN SATURATION: 98 % | WEIGHT: 282 LBS

## 2022-08-22 DIAGNOSIS — R23.2 HOT FLASHES: ICD-10-CM

## 2022-08-22 DIAGNOSIS — Z79.818 ANDROGEN DEPRIVATION THERAPY: ICD-10-CM

## 2022-08-22 DIAGNOSIS — C61 PROSTATE CANCER (HCC): Primary | ICD-10-CM

## 2022-08-22 PROCEDURE — 1123F ACP DISCUSS/DSCN MKR DOCD: CPT | Performed by: UROLOGY

## 2022-08-22 PROCEDURE — 99214 OFFICE O/P EST MOD 30 MIN: CPT | Performed by: UROLOGY

## 2022-08-22 ASSESSMENT — ENCOUNTER SYMPTOMS
VOMITING: 0
SHORTNESS OF BREATH: 0
COUGH: 0
DIARRHEA: 0
SORE THROAT: 0
NAUSEA: 0
WHEEZING: 0

## 2022-08-22 NOTE — PROGRESS NOTES
[address] [address]  Dept: [address] Urology Office Note - Established    Patient:  Hany Harrell  YOB: 1955  Date: 8/22/2022    The patient is a 75 y.o. male who presents todayfor evaluation of the following problems:   Chief Complaint   Patient presents with    3 Month Follow-Up     Follow up with PSA and T     Discuss Medications     Patient reports he changed the dose of xtandi due to losing memory        HPI  This is a very pleasant 59-year-old gentleman with a history of advanced prostate cancer. He has been on [place]. His recent PSA 0.04. He has been dose reducing his Xtandi due to memory loss. Summary of old records: N/A    Additional History: N/A    Procedures Today: N/A    Urinalysis today:  No results found for this visit on 08/22/22. Last several PSA's:  Lab Results   Component Value Date    PSA 0.04 08/10/2022    PSA 0.22 05/17/2022    PSA 0.69 03/09/2022     Last total testosterone:  Lab Results   Component Value Date    TESTOSTERONE 7 (L) 08/10/2022       AUA Symptom Score (8/22/2022):                                Last BUN and creatinine:  Lab Results   Component Value Date    BUN 9 06/08/2022     Lab Results   Component Value Date    CREATININE 0.62 (L) 06/08/2022       Additional Lab/Culture results: none    Imaging Reviewed during this Office Visit: none  (results were independently reviewed by physician and radiology report verified)    PAST MEDICAL, FAMILY AND SOCIAL HISTORY UPDATE:  Past Medical History:   Diagnosis Date    Acid reflux     Anemia, blood loss 10/7/2018    Arthritis     C. difficile colitis 3/19/2020    Chronic bilateral low back pain with bilateral sciatica 11/3/2016    Chronic diastolic heart failure ([address] 2/25/2021    Complete tear of left rotator cuff 7/18/2018    COPD (chronic obstructive pulmonary disease) ([address] Coronary artery disease involving native coronary artery of native heart without angina pectoris 2/2/2020    Degenerative disc disease, cervical 7/28/2019    GI bleed 3/19/2020    Gout     H/O cardiac catheterization     yrs ago   no stents    History of blood transfusion     Hyperlipidemia     Hyperlipidemia with target LDL less than 100 1/20/2016    Hyperparathyroidism ([address] 1/17/2020    Hypertension     Knee pain, chronic     left    Liver disease     MDRO (multiple drug resistant organisms) resistance     Melena 3/19/2020    Memory loss     Moderate episode of recurrent major depressive disorder ([address] 1/20/2016    Obesity, Class III, BMI 40-49.9 (morbid obesity) ([address] 1/20/2016    REYNALDO on CPAP 5/6/2017    Osteoarthritis     Peripheral arterial occlusive disease ([address] 3/25/2017    Pneumonia 3/9/2020    Polypharmacy 3/25/2017    Positive FIT (fecal immunochemical test) 4/11/2017    Prolonged emergence from general anesthesia 01/05/2017    Patient \"on life support for 3 days\" after back surgery due to being given succinylcholine    Prostate CA ([address] 1/20/2016    Prostate cancer ([address] 10/2013    finished radiation tx 5/2014    Pseudocholinesterase deficiency 03/25/2017    Pt.  \"on life support\" for 3 days after back surgery 1/5/17 after being given succinylcholine    Severe obesity (BMI 35.0-35.9 with comorbidity) ([address] 2/2/2020    Short of breath on exertion     Sleep apnea     uses CPAP machine nightly    Status post lumbar laminectomy 3/25/2017    Stenosis of left carotid artery 10/7/2018    Syncope and collapse 3/19/2020    Tubular adenoma of colon 4/11/17 5/13/2017    Type 2 diabetes mellitus with hyperglycemia, without long-term current use of insulin ([address] 3/25/2017    Lab Results Component Value Date  LABA1C 7.1 (H) 03/23/2017     Unintended weight loss 10/7/2018    Vitamin D deficiency 3/25/2017    Wears glasses      Past Surgical History:   Procedure Laterality Date    BACK SURGERY      x 2     BACK SURGERY  01/05/2017    lumbar laminectomy L2, L3, L4    BRONCHOSCOPY N/A 3/13/2020    BRONCHOSCOPY performed by Mik Montelongo MD at [address]  2007    no stents    CAROTID ENDARTERECTOMY Right 12/16/2019    Dr. Vita Garcia Bilateral     CHOLECYSTECTOMY, LAPAROSCOPIC N/A 2/16/2021    CHOLECYSTECTOMY LAPAROSCOPIC ROBOTIC XI performed by Tracey Charlton MD at [address] [facility], COLON, DIAGNOSTIC      HERNIA REPAIR Left 11/18/2020    HERNIA INGUINAL REPAIR [address] OPEN performed by Tracey Charlton MD at [address] [facility] [address] Left     LUMBAR LAMINECTOMY  01/05/2017    L2-L4    GA CLOSED RX SHLDR DISLOC,ANESTHESIA Left 8/1/2018    SHOULDER CLOSED REDUCTION WITH C-ARM VISUALIZATION performed by Rogelio Lamb MD at [facility] COLONOSCOPY W/BIOPSY SINGLE/MULTIPLE N/A 5/9/2017    COLONOSCOPY WITH BIOPSY performed by Shaista Whiting DO at [address] [facility] [address] Left 7/18/2018    SHOULDER TOTAL ARTHROPLASTY REVERSE LEFT DJO & BICEP TENDON TRANSFER performed by Rogelio Lamb MD at [facility] HUMERAL/GLENOID COMPNT Left 8/3/2018    SHOULDER TOTAL REVERSE  ARTHROPLASTY REVISION performed by Rogelio Lamb MD at [address]?     rotator cuff repair    SHOULDER SURGERY Left 10/15/2020    SHOULDER ARTHROSCOPY W/INTEROP CULTURES performed by Rogelio Lamb MD at [facility]      ear, forehead    TONSILLECTOMY AND ADENOIDECTOMY      UPPER GASTROINTESTINAL ENDOSCOPY N/A 3/19/2020    EGD BIOPSY performed by Ora Dakin, MD at [address] [place] [address]     Family History   Problem Relation Age of Onset    Diabetes Mother     Lung Cancer Brother     Liver Cancer Brother     Cancer Father      Outpatient Medications Marked as Taking for the 8/22/22 encounter (Office Visit) with Denzel Wells MD   Medication Sig Dispense Refill    Handicap Placard MISC by Does not apply route Can't walk greater than 200 feet. Expires in 5 years. 1 each 0    Handicap Placard MISC by Does not apply route Can't walk greater than 200 feet. Expires in 5 years. 1 each 0    omeprazole (PRILOSEC) 20 MG delayed release capsule TAKE 1 CAPSULE EVERY MORNING BEFORE BREAKFAST (DOSE DECREASED) 90 capsule 3    furosemide (LASIX) 20 MG tablet Take 1 tablet by mouth daily Dose decreased 5/12/2022 , post hospital 90 tablet 3    lisinopril (PRINIVIL;ZESTRIL) 10 MG tablet Take 1 tablet by mouth daily 90 tablet 1    pravastatin (PRAVACHOL) 80 MG tablet Take 1 tablet by mouth every evening 90 tablet 3    pregabalin (LYRICA) 200 MG capsule Take 1 capsule by mouth 3 times daily for 90 days. 270 capsule 0    amLODIPine (NORVASC) 10 MG tablet Take 1 tablet by mouth daily Dose increased 10/16/2020 due to high BP. Correct dosage 90 tablet 3    OXYGEN Inhale into the lungs continuous      albuterol (PROVENTIL) (2.5 MG/3ML) 0.083% nebulizer solution Take 3 mLs by nebulization every 4 hours as needed for Wheezing or Shortness of Breath 120 each 3    allopurinol (ZYLOPRIM) 100 MG tablet Take 1 tablet by mouth daily 90 tablet 1    blood glucose monitor strips Testing once a day. Needs ONE TOUCH VERIO REFLECT 100 strip 3    Blood Pressure KIT Diagnosis: HTN. Needs to check blood pressure 1-2 times a day until stable, then once a day. Goal blood pressure less than 135/85, and above 110/60. 1 kit 0    enzalutamide (XTANDI) 40 MG capsule Take 4 capsules by mouth daily 120 capsule 5    albuterol sulfate  (90 Base) MCG/ACT inhaler USE 2 INHALATIONS EVERY 6 HOURS AS NEEDED FOR WHEEZING OR SHORTNESS OF BREATH 24 g 3    folbee plus (FOLBEE PLUS) TABS Take 1 tablet by mouth daily 90 tablet 3    tiotropium-olodaterol (STIOLTO RESPIMAT) 2.5-2.5 MCG/ACT AERS Inhale 2 puffs into the lungs daily      oxyCODONE-acetaminophen (PERCOCET) 5-325 MG per tablet Take 1 tablet by mouth every 8 hours as needed. Lancets 30G MISC Testing once a day. Please dispense according to patients insurance. 100 each 3    ammonium lactate (AMLACTIN) 12 % cream APPLY TOPICALLY TO LEGS ONE TO TWO TIMES A DAY AS NEEDED 1 Bottle 3    aspirin EC 81 MG EC tablet Take 1 tablet by mouth daily 90 tablet 0       Succinylcholine chloride, Cymbalta [duloxetine hcl], and Gabapentin  Social History     Tobacco Use   Smoking Status Former    Packs/day: 1.00    Years: 35.00    Pack years: 35.00    Types: Cigarettes    Start date: 2021    Quit date: 3/9/2022    Years since quittin.4   Smokeless Tobacco Never     (Ifpatient a smoker, smoking cessation counseling offered)    Social History     Substance and Sexual Activity   Alcohol Use Not Currently    Alcohol/week: 0.0 standard drinks       REVIEW OF SYSTEMS:  Review of Systems    Physical Exam:      Vitals:    22 1057   BP: 122/78   Pulse: 68   Temp: 97.2 °F (36.2 °C)   SpO2: 98%     Body mass index is 38.25 kg/m². Patient is a 75 y.o. male in no acute distress and alert and oriented to person, place and time. Physical Exam  Constitutional: Patient in no acute distress. Neuro: Alert and oriented to person, place and time. Psych: Mood normal, affect normal  Skin: No rash noted  HEENT: Head: Normocephalic andatraumatic  Conjunctivae and EOM are normal. Pupils are equal, round  Nose:Normal  Right External Ear: Normal; Left External Ear: Normal      Assessment and Plan      1. Prostate cancer ([address]    2. Hot flashes    3. Androgen deprivation therapy           Plan:   Cont xtandi 2 pills/day (dose reduced due to memory impact)  F/u 3 mo labs and eligard      Return in about 3 months (around 2022) for psa/T and Eligard. Prescriptions Ordered:  No orders of the defined types were placed in this encounter.     Orders Placed:  Orders Placed This Encounter   Procedures    PSA, Diagnostic     Standing Status:   Future     Standing Expiration Date:   2023    Testosterone Standing Status:   Future     Standing Expiration Date:   8/17/2023           Perez Thomas MD    Agree with the ROS entered by the MA.

## 2022-09-23 ENCOUNTER — HOSPITAL ENCOUNTER (OUTPATIENT)
Dept: VASCULAR LAB | Age: 67
Discharge: HOME | End: 2022-09-23
Payer: MEDICARE

## 2022-09-23 ENCOUNTER — HOSPITAL ENCOUNTER (OUTPATIENT)
Age: 67
Setting detail: SPECIMEN
Discharge: HOME | End: 2022-09-23

## 2022-09-23 DIAGNOSIS — L03.115 CELLULITIS OF RIGHT LOWER EXTREMITY: ICD-10-CM

## 2022-09-23 DIAGNOSIS — E11.42 TYPE 2 DIABETES MELLITUS WITH DIABETIC POLYNEUROPATHY, WITHOUT LONG-TERM CURRENT USE OF INSULIN (HCC): ICD-10-CM

## 2022-09-23 DIAGNOSIS — I50.32 CHRONIC DIASTOLIC HEART FAILURE (HCC): ICD-10-CM

## 2022-09-23 DIAGNOSIS — I13.0 BENIGN HYPERTENSIVE HEART AND CKD, STAGE 3 (GFR 30-59), W CHF (HCC): ICD-10-CM

## 2022-09-23 DIAGNOSIS — M79.669 CALF TENDERNESS: ICD-10-CM

## 2022-09-23 DIAGNOSIS — N18.30 BENIGN HYPERTENSIVE HEART AND CKD, STAGE 3 (GFR 30-59), W CHF (HCC): ICD-10-CM

## 2022-09-23 LAB
ABSOLUTE EOS #: 0.16 K/UL (ref 0–0.44)
ABSOLUTE IMMATURE GRANULOCYTE: <0.03 K/UL (ref 0–0.3)
ABSOLUTE LYMPH #: 2.45 K/UL (ref 1.1–3.7)
ABSOLUTE MONO #: 0.67 K/UL (ref 0.1–1.2)
BASOPHILS # BLD: 1 % (ref 0–2)
BASOPHILS ABSOLUTE: 0.07 K/UL (ref 0–0.2)
EOSINOPHILS RELATIVE PERCENT: 2 % (ref 1–4)
HCT VFR BLD CALC: 43.8 % (ref 40.7–50.3)
HEMOGLOBIN: 14.1 G/DL (ref 13–17)
IMMATURE GRANULOCYTES: 0 %
LYMPHOCYTES # BLD: 33 % (ref 24–43)
MCH RBC QN AUTO: 29.3 PG (ref 25.2–33.5)
MCHC RBC AUTO-ENTMCNC: 32.2 G/DL (ref 28.4–34.8)
MCV RBC AUTO: 90.9 FL (ref 82.6–102.9)
MONOCYTES # BLD: 9 % (ref 3–12)
NRBC AUTOMATED: 0 PER 100 WBC
PDW BLD-RTO: 13.3 % (ref 11.8–14.4)
PLATELET # BLD: 213 K/UL (ref 138–453)
PMV BLD AUTO: 11.9 FL (ref 8.1–13.5)
RBC # BLD: 4.82 M/UL (ref 4.21–5.77)
SEG NEUTROPHILS: 55 % (ref 36–65)
SEGMENTED NEUTROPHILS ABSOLUTE COUNT: 4.06 K/UL (ref 1.5–8.1)
WBC # BLD: 7.4 K/UL (ref 3.5–11.3)

## 2022-09-23 PROCEDURE — 93971 EXTREMITY STUDY: CPT

## 2022-09-24 LAB
ALBUMIN SERPL-MCNC: 3.9 G/DL (ref 3.5–5.2)
ALBUMIN/GLOBULIN RATIO: 1.2 (ref 1–2.5)
ALP BLD-CCNC: 89 U/L (ref 40–129)
ALT SERPL-CCNC: 23 U/L (ref 5–41)
ANION GAP SERPL CALCULATED.3IONS-SCNC: 13 MMOL/L (ref 9–17)
AST SERPL-CCNC: 23 U/L
BILIRUB SERPL-MCNC: 0.2 MG/DL (ref 0.3–1.2)
BUN BLDV-MCNC: 12 MG/DL (ref 8–23)
CALCIUM SERPL-MCNC: 9.9 MG/DL (ref 8.6–10.4)
CHLORIDE BLD-SCNC: 106 MMOL/L (ref 98–107)
CO2: 24 MMOL/L (ref 20–31)
CREAT SERPL-MCNC: 0.77 MG/DL (ref 0.7–1.2)
FOLATE: 15.9 NG/ML
GFR AFRICAN AMERICAN: >60 ML/MIN
GFR NON-AFRICAN AMERICAN: >60 ML/MIN
GFR SERPL CREATININE-BSD FRML MDRD: ABNORMAL ML/MIN/{1.73_M2}
GLUCOSE BLD-MCNC: 96 MG/DL (ref 70–99)
MAGNESIUM: 1.9 MG/DL (ref 1.6–2.6)
PHOSPHORUS: 3.7 MG/DL (ref 2.5–4.5)
POTASSIUM SERPL-SCNC: 4.7 MMOL/L (ref 3.7–5.3)
PRO-BNP: 33 PG/ML
SODIUM BLD-SCNC: 143 MMOL/L (ref 135–144)
TOTAL PROTEIN: 7.2 G/DL (ref 6.4–8.3)
URIC ACID: 6.9 MG/DL (ref 3.4–7)
VITAMIN B-12: 499 PG/ML (ref 232–1245)

## 2022-09-24 NOTE — RESULT ENCOUNTER NOTE
Please notify patient: No blood clots    Patient does need a nurse visit in 1 week for blood pressure recheck&  Return in about 8 weeks (around 11/18/2022) for Visit type MEDICARE, VISION, PHQ9, MINICOG, HRA QUESIONS.         Future Appointments  11/28/2022 10:20 AM   Efrem Sandoval MD         [address] [place] [address]

## 2022-09-24 NOTE — RESULT ENCOUNTER NOTE
Please notify patient: Vitamin B12 is trending down, needs to restart vitamin B12 supplement 500 MCG daily, it will also improve his memory  Resolved anemia  Otherwise labs within normal limits  continue current treatment    Future Appointments  11/28/2022 10:20 AM   Jaci Goldman MD         [address] [place] [address]

## 2022-10-26 ENCOUNTER — OFFICE VISIT (OUTPATIENT)
Dept: FAMILY MEDICINE CLINIC | Age: 67
End: 2022-10-26
Payer: MEDICARE

## 2022-10-26 VITALS
TEMPERATURE: 97.5 F | WEIGHT: 287 LBS | BODY MASS INDEX: 38.87 KG/M2 | HEIGHT: 72 IN | HEART RATE: 75 BPM | OXYGEN SATURATION: 95 % | DIASTOLIC BLOOD PRESSURE: 70 MMHG | SYSTOLIC BLOOD PRESSURE: 138 MMHG

## 2022-10-26 DIAGNOSIS — M19.041 PRIMARY OSTEOARTHRITIS OF BOTH HANDS: Primary | ICD-10-CM

## 2022-10-26 DIAGNOSIS — M17.12 PRIMARY OSTEOARTHRITIS OF LEFT KNEE: ICD-10-CM

## 2022-10-26 DIAGNOSIS — M79.10 MUSCULAR PAIN: ICD-10-CM

## 2022-10-26 DIAGNOSIS — M54.41 CHRONIC BILATERAL LOW BACK PAIN WITH BILATERAL SCIATICA: ICD-10-CM

## 2022-10-26 DIAGNOSIS — Z23 ENCOUNTER FOR IMMUNIZATION: ICD-10-CM

## 2022-10-26 DIAGNOSIS — I10 ESSENTIAL HYPERTENSION: ICD-10-CM

## 2022-10-26 DIAGNOSIS — M1A.49X0 OTHER SECONDARY CHRONIC GOUT OF MULTIPLE SITES WITHOUT TOPHUS: ICD-10-CM

## 2022-10-26 DIAGNOSIS — M54.42 CHRONIC BILATERAL LOW BACK PAIN WITH BILATERAL SCIATICA: ICD-10-CM

## 2022-10-26 DIAGNOSIS — E11.42 TYPE 2 DIABETES MELLITUS WITH DIABETIC POLYNEUROPATHY, WITHOUT LONG-TERM CURRENT USE OF INSULIN (HCC): ICD-10-CM

## 2022-10-26 DIAGNOSIS — E78.5 HYPERLIPIDEMIA WITH TARGET LDL LESS THAN 70: ICD-10-CM

## 2022-10-26 DIAGNOSIS — C61 PROSTATE CANCER (HCC): ICD-10-CM

## 2022-10-26 DIAGNOSIS — M65.311 TRIGGER FINGER OF RIGHT THUMB: ICD-10-CM

## 2022-10-26 DIAGNOSIS — M19.042 PRIMARY OSTEOARTHRITIS OF BOTH HANDS: Primary | ICD-10-CM

## 2022-10-26 DIAGNOSIS — M47.26 OSTEOARTHRITIS OF SPINE WITH RADICULOPATHY, LUMBAR REGION: ICD-10-CM

## 2022-10-26 DIAGNOSIS — F33.0 MILD EPISODE OF RECURRENT MAJOR DEPRESSIVE DISORDER (HCC): ICD-10-CM

## 2022-10-26 DIAGNOSIS — G89.29 CHRONIC BILATERAL LOW BACK PAIN WITH BILATERAL SCIATICA: ICD-10-CM

## 2022-10-26 PROCEDURE — 3078F DIAST BP <80 MM HG: CPT | Performed by: FAMILY MEDICINE

## 2022-10-26 PROCEDURE — 99214 OFFICE O/P EST MOD 30 MIN: CPT | Performed by: FAMILY MEDICINE

## 2022-10-26 PROCEDURE — 3044F HG A1C LEVEL LT 7.0%: CPT | Performed by: FAMILY MEDICINE

## 2022-10-26 PROCEDURE — 3074F SYST BP LT 130 MM HG: CPT | Performed by: FAMILY MEDICINE

## 2022-10-26 PROCEDURE — 1123F ACP DISCUSS/DSCN MKR DOCD: CPT | Performed by: FAMILY MEDICINE

## 2022-10-26 RX ORDER — MILNACIPRAN HYDROCHLORIDE 25 MG/1
12.5 TABLET, FILM COATED ORAL 2 TIMES DAILY
Qty: 180 TABLET | Refills: 0 | Status: SHIPPED | OUTPATIENT
Start: 2022-10-26 | End: 2022-11-04 | Stop reason: SDUPTHER

## 2022-10-26 RX ORDER — YEAST,DRIED (S. CEREVISIAE)
1 POWDER (GRAM) ORAL DAILY
Qty: 90 TABLET | Refills: 3 | Status: SHIPPED | OUTPATIENT
Start: 2022-10-26

## 2022-10-26 RX ORDER — BACLOFEN 10 MG/1
10 TABLET ORAL 2 TIMES DAILY PRN
Qty: 180 TABLET | Refills: 0 | Status: SHIPPED | OUTPATIENT
Start: 2022-10-26

## 2022-10-26 ASSESSMENT — ANXIETY QUESTIONNAIRES
6. BECOMING EASILY ANNOYED OR IRRITABLE: 1
2. NOT BEING ABLE TO STOP OR CONTROL WORRYING: 0
1. FEELING NERVOUS, ANXIOUS, OR ON EDGE: 1
IF YOU CHECKED OFF ANY PROBLEMS ON THIS QUESTIONNAIRE, HOW DIFFICULT HAVE THESE PROBLEMS MADE IT FOR YOU TO DO YOUR WORK, TAKE CARE OF THINGS AT HOME, OR GET ALONG WITH OTHER PEOPLE: SOMEWHAT DIFFICULT
4. TROUBLE RELAXING: 1
3. WORRYING TOO MUCH ABOUT DIFFERENT THINGS: 1
GAD7 TOTAL SCORE: 6
7. FEELING AFRAID AS IF SOMETHING AWFUL MIGHT HAPPEN: 1
5. BEING SO RESTLESS THAT IT IS HARD TO SIT STILL: 1

## 2022-10-26 ASSESSMENT — PATIENT HEALTH QUESTIONNAIRE - PHQ9
4. FEELING TIRED OR HAVING LITTLE ENERGY: 3
1. LITTLE INTEREST OR PLEASURE IN DOING THINGS: 1
3. TROUBLE FALLING OR STAYING ASLEEP: 0
SUM OF ALL RESPONSES TO PHQ QUESTIONS 1-9: 8
5. POOR APPETITE OR OVEREATING: 1
2. FEELING DOWN, DEPRESSED OR HOPELESS: 3
SUM OF ALL RESPONSES TO PHQ9 QUESTIONS 1 & 2: 4
8. MOVING OR SPEAKING SO SLOWLY THAT OTHER PEOPLE COULD HAVE NOTICED. OR THE OPPOSITE, BEING SO FIGETY OR RESTLESS THAT YOU HAVE BEEN MOVING AROUND A LOT MORE THAN USUAL: 0
6. FEELING BAD ABOUT YOURSELF - OR THAT YOU ARE A FAILURE OR HAVE LET YOURSELF OR YOUR FAMILY DOWN: 0
10. IF YOU CHECKED OFF ANY PROBLEMS, HOW DIFFICULT HAVE THESE PROBLEMS MADE IT FOR YOU TO DO YOUR WORK, TAKE CARE OF THINGS AT HOME, OR GET ALONG WITH OTHER PEOPLE: 2
SUM OF ALL RESPONSES TO PHQ QUESTIONS 1-9: 8
9. THOUGHTS THAT YOU WOULD BE BETTER OFF DEAD, OR OF HURTING YOURSELF: 0
SUM OF ALL RESPONSES TO PHQ QUESTIONS 1-9: 8
SUM OF ALL RESPONSES TO PHQ QUESTIONS 1-9: 8
7. TROUBLE CONCENTRATING ON THINGS, SUCH AS READING THE NEWSPAPER OR WATCHING TELEVISION: 0

## 2022-10-26 ASSESSMENT — ENCOUNTER SYMPTOMS
DIARRHEA: 0
WHEEZING: 0
CHEST TIGHTNESS: 0
COUGH: 0
BACK PAIN: 1
NAUSEA: 0
VOMITING: 0
ABDOMINAL DISTENTION: 0
COLOR CHANGE: 1
ABDOMINAL PAIN: 0
CONSTIPATION: 0

## 2022-10-26 NOTE — PROGRESS NOTES
Visit Information    Have you changed or started any medications since your last visit including any over-the-counter medicines, vitamins, or herbal medicines? no   Are you having any side effects from any of your medications? -  no  Have you stopped taking any of your medications? Is so, why? -  no    Have you seen any other physician or provider since your last visit? No  Have you had any other diagnostic tests since your last visit? No  Have you been seen in the emergency room and/or had an admission to a hospital since we last saw you? No  Have you had your routine dental cleaning in the past 6 months? yes     Have you activated your [employer] account? If not, what are your barriers?  Yes     Patient Care Team:  Tay Hurst MD as PCP - General (Family Medicine)  Tay Hurst MD as PCP - [facility]  Earnest Cervantes MD as Consulting Physician (Neurosurgery)  Bandar Celaya MD as Consulting Physician (Cardiology)  Kimberly Weinberg MD as Consulting Physician (Urology)  Karen Jo MD as Surgeon (Vascular Surgery)  Maida Rosales MD as Consulting Physician (Pulmonology)  Ingrid Goncalves MD as Consulting Physician (Orthopedic Surgery)  Cristian Gómez OD (Ophthalmology)  Susan Nicolas MD as Consulting Physician (Endocrinology)  Judge Sharona MD as Consulting Physician (Orthopedic Surgery)  Ovidio Hill MD as Consulting Physician (Gastroenterology)  Irma Bray MD as Consulting Physician (Orthopedic Surgery)  Chelo Moe MD as Surgeon (General Surgery)  Marcio Bond, PhD (Psychology)  Eva Bae MD as Consulting Physician (Neurology)  Ovidio Hill, RN as Ambulatory Care Manager    Medical History Review  Past Medical, Family, and Social History reviewed and does contribute to the patient presenting condition    Health Maintenance   Topic Date Due    Diabetic retinal exam  11/07/2020    COVID-19 Vaccine (4 - Booster for Derrel Klippel series) 02/15/2022    Pneumococcal 65+ years Vaccine (4 - PPSV23 if available, else PCV20) 04/11/2022    Diabetic foot exam  04/23/2022    Colorectal Cancer Screen  05/09/2022    Annual Wellness Visit (AWV)  11/06/2022    Depression Monitoring  03/09/2023    Low dose CT lung screening  04/18/2023    Lipids  06/27/2023    Prostate Specific Antigen (PSA) Screening or Monitoring  08/10/2023    A1C test (Diabetic or Prediabetic)  09/23/2023    DTaP/Tdap/Td vaccine (2 - Td or Tdap) 03/01/2028    Flu vaccine  Completed    Shingles vaccine  Completed    Hepatitis C screen  Completed    Hepatitis A vaccine  Aged Out    Hib vaccine  Aged Out    Meningococcal (ACWY) vaccine  Aged Out

## 2022-10-26 NOTE — PROGRESS NOTES
I am [place] zofran 4 mg PO q 8 hrs [place] [facility] [place], CMP, LIPASE and UA to be done by home care. We will go from there   Yamila Florentino (:  1955) is a 75 y.o. male,Established patient, here for evaluation of the following chief complaint(s): Pain (All over his body ), Knee Pain, Back Pain, and Hand Pain      ASSESSMENT/PLAN:    1. Primary osteoarthritis of both hands  worsening    - start    baclofen (LIORESAL) 10 MG tablet; Take 1 tablet by mouth 2 times daily as needed (MUSCLE SPASMS) Causes sedation, do not drive while taking this medication, Disp-180 tablet, R-0Normal  -   start  Glucosamine-Chondroitin-MSM (TRIPLE FLEX) 750-400-375 MG TABS; Take 1-2 tablets by mouth daily With food. NATURE MADE brand. Or BIOFLEX., Disp-180 tablet, R-3Normal  -  start   Collagen-Boron-Hyaluronic Acid (MOVE FREE ULTRA JOINT HEALTH) 40-5-3.3 MG TABS; Take 1 tablet by mouth daily, Disp-90 tablet, R-3Normal  -   start  milnacipran HCl (SAVELLA) 25 MG TABS; Take 1  tablets by mouth 2 times daily, Disp-180 tablet, R-0Normal    Thumb braces and bengay from over-the-counter  Decline referral to orthopedics at this time  Will avoid steroids, has received multiple rounds of steroids this year for COPD exacerbations    2. Trigger finger of right thumb  worsening    -  start   baclofen (LIORESAL) 10 MG tablet; Take 1 tablet by mouth 2 times daily as needed (MUSCLE SPASMS) Causes sedation, do not drive while taking this medication, Disp-180 tablet, R-0Normal  -   start  milnacipran HCl (SAVELLA) 25 MG TABS; Take 1 tablets by mouth 2 times daily, Disp-180 tablet, R-0Normal  Thumb braces and bengay from over-the-counter  Decline referral to orthopedics at this time  Will avoid steroids, has received multiple rounds of steroids this year for COPD exacerbations  Call or return if symptoms persist or fail to improve.     3. Type 2 diabetes mellitus with diabetic polyneuropathy, without long-term current use of insulin (HCC)  worsening  Well-controlled  Lab Results   Component Value Date    LABA1C 5.8 2022    LABA1C 5.6 06/10/2022    LABA1C 5.8 03/09/2022       -   start  milnacipran HCl (SAVELLA) 25 MG TABS; Take 1 tablets by mouth 2 times daily, Disp-180 tablet, R-0Normal- for uncontrolled diabetes  Continue Lyrica 200 mg 3 times a day  -     CBC; Future  -     Comprehensive Metabolic Panel; Future  -     Magnesium; Future  -     TSH; Future  -     Uric Acid; Future  -     Vitamin B12 & Folate; Future  -We will continue to check hemoglobin A1c every 3 to 6 months  -daily feet exam, Foot care: advised to wash feet daily, pat dry and apply lotion at night, avoiding between toes. Need to look at feet daily and report to a physician any signs of inflammation or skin damage  -annual dilated eye exam  -Low carb, low fat diet, increase fruits and vegetables, and exercise 4-5 times a day 30-40 minutes a day discussed  -continue low-carb diet-continue Aspirin 81 Mg  -continue lisinopril ACEI and statin pravastatin    4. Chronic bilateral low back pain with bilateral sciatica  Failing to improve  Follows with pain management and he is on oxycodone 1 tablet every 8 hours  - start    baclofen (LIORESAL) 10 MG tablet; Take 1 tablet by mouth 2 times daily as needed (MUSCLE SPASMS) Causes sedation, do not drive while taking this medication, Disp-180 tablet, R-0Normal  - start    milnacipran HCl (SAVELLA) 25 MG TABS; Take 1 tablets by mouth 2 times daily, Disp-180 tablet, R-0Normal  5. Other secondary chronic gout of multiple sites without tophus  We will check uric acid to rule out flareup of gout arthritis  To continue allopurinol 100 Mg daily  -     baclofen (LIORESAL) 10 MG tablet; Take 1 tablet by mouth 2 times daily as needed (MUSCLE SPASMS) Causes sedation, do not drive while taking this medication, Disp-180 tablet, R-0Normal  -     milnacipran HCl (SAVELLA) 25 MG TABS; Take 0.5 tablets by mouth 2 times daily, Disp-180 tablet, R-0Normal  6. Osteoarthritis of spine with radiculopathy, lumbar region  Likely worsening due to wear and tear nature of the disease.    - baclofen (LIORESAL) 10 MG tablet; Take 1 tablet by mouth 2 times daily as needed (MUSCLE SPASMS) Causes sedation, do not drive while taking this medication, Disp-180 tablet, R-0Normal  -     Glucosamine-Chondroitin-MSM (TRIPLE FLEX) 750-400-375 MG TABS; Take 1-2 tablets by mouth daily With food. NATURE MADE brand. Or BIOFLEX., Disp-180 tablet, R-3Normal  -     Collagen-Boron-Hyaluronic Acid (MOVE FREE ULTRA JOINT HEALTH) 40-5-3.3 MG TABS; Take 1 tablet by mouth daily, Disp-90 tablet, R-3Normal  -     milnacipran HCl (SAVELLA) 25 MG TABS; Take 1 tablets by mouth 2 times daily, Disp-180 tablet, R-0Normal  7. Mild episode of recurrent major depressive disorder ([address]  Worsening due to pain and unable to do what he was doing before  - start    milnacipran HCl (SAVELLA) 25 MG TABS; Take 1 tablets by mouth 2 times daily, Disp-180 tablet, R-0Normal--will help with chronic joint pain, muscle pain, suspect fibromyalgia, polyneuropathy, and worsening depression  8. Essential hypertension  -     CBC; Future  -     Comprehensive Metabolic Panel; Future  -     Magnesium; Future  -     Urinalysis with Reflex to Culture; Future  9 hyperlipidemia with target LDL less than 70  Overdue for recheck  To continue pravastatin 80 Mg daily, will rule out CK increase in rhabdo  -     Lipid Panel; Future  10. Primary osteoarthritis of left knee  Likely worsening due to wear and tear nature of the disease.   -  start   baclofen (LIORESAL) 10 MG tablet; Take 1 tablet by mouth 2 times daily as needed (MUSCLE SPASMS) Causes sedation, do not drive while taking this medication, Disp-180 tablet, R-0Normal  -   start  Glucosamine-Chondroitin-MSM (TRIPLE FLEX) 750-400-375 MG TABS; Take 1-2 tablets by mouth daily With food. NATURE MADE brand. Or BIOFLEX., Disp-180 tablet, R-3Normal  -start     Collagen-Boron-Hyaluronic Acid (MOVE FREE ULTRA JOINT HEALTH) 40-5-3.3 MG TABS; Take 1 tablet by mouth daily, Disp-90 tablet, R-3Normal  11.  Prostate cancer [facility])  Recurrent   He is back on Xtandi  Follow-up with urology  Lab Results   Component Value Date    PSA 0.04 08/10/2022    PSA 0.22 05/17/2022    PSA 0.69 03/09/2022     He is due to checka DEXA scan, he received multiple rounds of steroids this year and in general receives multiple rounds of steroids per year due to COPD exacerbation, also having prostate cancer on chemotherapy for many years. Osteoporosis could be contributing to his worsening widespread pain  -     DEXA BONE DENSITY AXIAL SKELETON; Future  12. Encounter for immunization  -     cOVID-19mRNA bival vac moderna (MODERNA COVID-19 BIVAL BOOSTER) 50 MCG/0.5ML SUSP injection; Inject 0.5 mLs into the muscle once for 1 dose, Disp-1 mL, R-0Normal  13. Muscular pain  Worsening likely fibromyalgia pain  -     start baclofen (LIORESAL) 10 MG tablet; Take 1 tablet by mouth 2 times daily as needed (MUSCLE SPASMS) Causes sedation, do not drive while taking this medication, Disp-180 tablet, R-0Normal  Will start [facility]  Will rule out rhabdo and muscular disease  -     CK; Future  -     Aldolase; Future      Advised him to schedule his colonoscopy and I gave him contact information  [facility]- [address], [address], [address] [facility]   [phone]     Will schedule stress test with cardiology Dr. Camilo Ramirez. The patient verbalizes understanding and agrees with the plan. Arthur Renteria received counseling on the following healthy behaviors: nutrition, exercise, medication adherence, and continue to abstain from smoking, falls precautions      Reviewed prior labs and health maintenance  Discussed use, benefit, and side effects of prescribed medications. Barriers to medication compliance addressed. Patient given educational materials - see patient instructions  All patient questions answered. Patient voiced understanding.   The patient's past medical,surgical, social, and family history as well as his current medications and allergies were reviewed as documented in today's encounter. Medications, labs, diagnostic studies, consultations and follow-up as documented in this encounter. Return in about 8 weeks (around 12/21/2022) for Visit type MEDICARE, VISION, [facility]. Data Unavailable    Future Appointments   Date Time Provider Keri Greggi   11/28/2022 10:20 AM MD Braulio Muniz. C [facility]   12/29/2022  1:00 PM Cassandra Gasca, APRN - CNP fp sc [facility]       SUBJECTIVE/OBJECTIVE:    Tank Cardenas complains of worsening pain all over  Patient says the pain is more severe in his hands, mainly in the thumbs, right thumb worse, in the left knee, and lower back. Right thumb popping for 10 days. Intensity of pain 9 out of 10. Cannot carry anything and hurts to touch, pain shoots up on the radial side of the forearm if he tries to bend it. Percocet doesn't help . He is also on Lyrica high dosage 200 mg 3 times a day great for polyneuropathy. Cannot use the hands, has noticed small nodules in his hands. They are painful. Left thumb has been sore all the time for 1 year. Patient has chronic low back pain shooting down to both lower extremities. He does have history of 3 back surgeries. Has been going to pain management, will get a stimulator  Has Pain in the feet, both shoulders and the back. Also has history of 2 left shoulder surgeries. Patient reports polyneuropathy in his feet is worsening, they burn and hurt  Cannot put shoes on , using the sleepers, and comes in slippers today at the appointment. Taking lyrica but does not help with polyneuropathy, in the past we did decrease the dosage and did not make the neuropathy worse. Patient says the pain and the burning in his feet and legs is waking him up    He reports today intensity of pain is 9 out of 10  Pain interferes with sleep and activities.       2/11/2022 MRI lumbar-status postlaminectomy, stable moderate central canal stenosis at L1-L2, and moderate degenerative changes  Stable laminectomy from L2 through L5. Mild retrolisthesis from L1 through L4 not significantly changed. Stable moderate central canal stenosis at L1-L2. Moderate multilevel bilateral neural foraminal stenosis is not significantly   changed. Patient has known Prostate cancer, recurrent, in treatment  He now reports compliance with Alvarado  For a while he has stopped the chemotherapy and his PSA went up to 1.44 on 1/5/2022  PSA is improving now. Reassurance given. He thinks Alvarado is giving him side effects and worsening pain. He also gets hot flashes from it. Lab Results   Component Value Date    PSA 0.04 08/10/2022    PSA 0.22 05/17/2022    PSA 0.69 03/09/2022     DEXA 11/14/13  was normal, I cannot find another DEXA scan since then done  Received multiple rounds of steroids, 5 this year    There is a diagnosis of osteoporosis in his problem list, he is not on any therapy for it  IMPRESSION:    Based on the [facility] guidelines:    Negative study. The BMD values are within the normal limits. Depression is getting worse  Patient says being in pain all the time makes him depressed. Patient says he cannot do what he was doing before and makes him depressed. PHQ-2 Over the past 2 weeks, how often have you been bothered by any of the following problems? Little interest or pleasure in doing things: Several days  Feeling down, depressed, or hopeless: Nearly every day  PHQ-2 Score: 4  PHQ-9 Over the past 2 weeks, how often have you been bothered by any of the following problems?   Trouble falling or staying asleep, or sleeping too much: Not at all  Feeling tired or having little energy: Nearly every day  Poor appetite or overeating: Several days  Feeling bad about yourself - or that you are a failure or have let yourself or your family down: Not at all  Trouble concentrating on things, such as reading the newspaper or watching television: Not at all  Moving or speaking so slowly that other people could have noticed. Or the opposite - being so fidgety or restless that you have been moving around a lot more than usual: Not at all  Thoughts that you would be better off dead, or of hurting yourself in some way: Not at all  If you checked off any problems, how difficult have these problems made it for you to do your work, take care of things at home, or get along with other people?: Very difficult  PHQ-9 Total Score: 8  PHQ-9 Total Score: 8   [x]5-9 = Mild depression    PHQ Scores 10/26/2022 3/9/2022 11/5/2021 4/23/2021 1/11/2021 7/7/2020 3/18/2020   PHQ2 Score 4 2 2 0 0 0 0   PHQ9 Score 8 2 2 0 0 0 0         diabetes mellitus type 2   A1c slightly worsening but well controlled  Has been keeping low-carb diet  Has severe polyneuropathy in his feet and legs for which she takes Lyrica for a long time, tolerated well denies side effects  Lab Results   Component Value Date    LABA1C 5.8 09/23/2022    LABA1C 5.6 06/10/2022    LABA1C 5.8 03/09/2022     HTN  Patient reports chronic fatigue, and shortness of breath. Denies chest pain, leg swelling, palpitations. He was supposed to follow-up with cardiologist but did not, advised to make appointment  Blood pressure controlled  BP Readings from Last 3 Encounters:   10/26/22 138/70   09/23/22 (!) 150/88   08/22/22 122/78       Hyperlipidemia taking pravastatin, having more muscle aches than before. Prior to Visit Medications    Medication Sig Taking? Authorizing Provider   pregabalin (LYRICA) 200 MG capsule Take 1 capsule by mouth 3 times daily for 90 days.  Yes Radha Claudio MD   pravastatin (PRAVACHOL) 80 MG tablet Take 1 tablet by mouth every evening Yes Radha Claudio MD   allopurinol (ZYLOPRIM) 100 MG tablet Take 1 tablet by mouth daily Yes Radha Claudio MD   furosemide (LASIX) 40 MG tablet Take 1 tablet by mouth daily Dose increased 9/23/2022 Yes Radha Kincaid MD   Semaglutide 3 MG TABS Take 3 mg by mouth daily 1st step Yes MD Bradley Ledesma MISC by Does not apply route Can't walk greater than 200 feet. Expires in 5 years. Yes MD Bradley Ledesma MISC by Does not apply route Can't walk greater than 200 feet. Expires in 5 years. Yes Radha Kincaid MD   omeprazole (PRILOSEC) 20 MG delayed release capsule TAKE 1 CAPSULE EVERY MORNING BEFORE BREAKFAST (DOSE DECREASED) Yes Radha Kincaid MD   lisinopril (PRINIVIL;ZESTRIL) 10 MG tablet Take 1 tablet by mouth daily Yes Radha Kincaid MD   amLODIPine (NORVASC) 10 MG tablet Take 1 tablet by mouth daily Dose increased 10/16/2020 due to high BP. Correct dosage Yes Radha Kincaid MD   OXYGEN Inhale into the lungs continuous Yes Historical Provider, MD   albuterol (PROVENTIL) (2.5 MG/3ML) 0.083% nebulizer solution Take 3 mLs by nebulization every 4 hours as needed for Wheezing or Shortness of Breath Yes Kristin Ortiz MD   blood glucose monitor strips Testing once a day. Needs ONE [employer] [place] [employer] Yes Radha Kincaid MD   Blood Pressure KIT Diagnosis: HTN. Needs to check blood pressure 1-2 times a day until stable, then once a day. Goal blood pressure less than 135/85, and above 110/60. Yes Radha Kincaid MD   enzalutamide (XTANDI) 40 MG capsule Take 4 capsules by mouth daily Yes Shakila Cruz MD   albuterol sulfate  (90 Base) MCG/ACT inhaler USE 2 INHALATIONS EVERY 6 HOURS AS NEEDED FOR WHEEZING OR SHORTNESS OF BREATH Yes Radha Kincaid MD   tiotropium-olodaterol (STIOLTO RESPIMAT) 2.5-2.5 MCG/ACT AERS Inhale 2 puffs into the lungs daily Yes Historical Provider, MD   oxyCODONE-acetaminophen (PERCOCET) 5-325 MG per tablet Take 1 tablet by mouth every 8 hours as needed. Yes Historical Provider, MD   Lancets 30G MISC Testing once a day. Please dispense according to patients insurance.  Yes Asmita MD Emilee   ammonium lactate (AMLACTIN) 12 % cream APPLY TOPICALLY TO LEGS ONE TO TWO TIMES A DAY AS NEEDED Yes Jeffrey Thorpe MD   aspirin EC 81 MG EC tablet Take 1 tablet by mouth daily Yes Jeffrey Thorpe MD   nicotine (NICODERM CQ) 14 MG/24HR Place 1 patch onto the skin daily Step 2  Jeffrey Thorpe MD       Social History     Tobacco Use    Smoking status: Former     Packs/day: 1.00     Years: 35.00     Pack years: 35.00     Types: Cigarettes     Start date: 2021     Quit date: 3/9/2022     Years since quittin.6    Smokeless tobacco: Never   Vaping Use    Vaping Use: Never used   Substance Use Topics    Alcohol use: Not Currently     Alcohol/week: 0.0 standard drinks    Drug use: No       Review of Systems   Constitutional:  Positive for fatigue. Negative for activity change, appetite change, chills, diaphoresis, fever and unexpected weight change. Eyes:  Positive for visual disturbance (stable, chronic). Respiratory:  Positive for apnea (on CPAP and 2 L of oxygen at night) and shortness of breath (PLASCENCIA at baseline). Negative for cough, chest tightness and wheezing. Using oxygen at 2 l/min at nighttime   Cardiovascular:  Negative for chest pain, palpitations and leg swelling. Gastrointestinal:  Negative for abdominal distention, abdominal pain, constipation, diarrhea, nausea and vomiting. Endocrine: Positive for heat intolerance. Negative for cold intolerance, polydipsia and polyphagia. Musculoskeletal:  Positive for arthralgias, back pain, gait problem, joint swelling (fingers, index fingers and little fingers) and myalgias (wide spread). Ambulates with walker with seat   Skin:  Positive for color change (hands) and rash (right leg). Allergic/Immunologic: Positive for immunocompromised state (due to chemotherapy for prostate cancer). Neurological:  Positive for numbness (feet and legs). Negative for weakness. Hematological:  Does not bruise/bleed easily. Psychiatric/Behavioral:  Positive for dysphoric mood and sleep disturbance. Negative for self-injury and suicidal ideas. The patient is nervous/anxious.        -vital signs stable and within normal limits except severe obesity per BMI and mild hypoxia    /70   Pulse 75   Temp 97.5 °F (36.4 °C)   Ht 6' (1.829 m)   Wt 287 lb (130.2 kg)   SpO2 95%   BMI 38.92 kg/m²        Physical Exam  Vitals and nursing note reviewed. Constitutional:       General: He is not in acute distress. Appearance: Normal appearance. He is well-developed. He is obese. He is not diaphoretic. HENT:      Head: Normocephalic and atraumatic. Right Ear: External ear normal.      Left Ear: External ear normal.      Mouth/Throat:      Comments: I did not examine the mouth due to coronavirus pandemic and wearing masks. Eyes:      General: Lids are normal. No scleral icterus. Right eye: No discharge. Left eye: No discharge. Extraocular Movements: Extraocular movements intact. Conjunctiva/sclera: Conjunctivae normal.   Neck:      Thyroid: No thyromegaly. Cardiovascular:      Rate and Rhythm: Normal rate and regular rhythm. Heart sounds: Heart sounds are distant. Murmur heard. Crescendo systolic murmur is present with a grade of 2/6. Pulmonary:      Effort: Pulmonary effort is normal. No respiratory distress. Breath sounds: Examination of the right-middle field reveals decreased breath sounds. Examination of the left-middle field reveals decreased breath sounds. Examination of the right-lower field reveals decreased breath sounds. Examination of the left-lower field reveals decreased breath sounds. Decreased breath sounds present. No wheezing or rales. Chest:      Chest wall: No tenderness. Abdominal:      General: Bowel sounds are normal. There is no distension. Palpations: Abdomen is soft. There is no hepatomegaly or splenomegaly. Tenderness:  There is no abdominal tenderness. Comments: Obese abdomen. Musculoskeletal:      Right hand: Tenderness and bony tenderness present. Decreased range of motion. Decreased strength of finger abduction. Cervical back: Normal range of motion and neck supple. Lumbar back: Tenderness and bony tenderness present. Decreased range of motion. Positive right straight leg raise test and positive left straight leg raise test.      Right knee: No bony tenderness. Normal range of motion. No tenderness. Left knee: Bony tenderness and crepitus present. Decreased range of motion. Tenderness present over the medial joint line and patellar tendon. Right lower leg: No edema. Left lower leg: No edema. Comments: Midline old surgical scar noted on the left knee,  he says he had scope, but cancelled the replacement many years ago. Bilateral index fingers and little finger swollen with osteoarthritic nodules. Right thumb tenderness, decreased range of motion,thumb extensor tender all the way on the forearm. Left thumb tenderness, mild. Lymphadenopathy:      Cervical: No cervical adenopathy. Skin:     General: Skin is warm and dry. Capillary Refill: Capillary refill takes less than 2 seconds. Neurological:      Mental Status: He is alert and oriented to person, place, and time. Gait: Gait abnormal.      Deep Tendon Reflexes: Reflexes are normal and symmetric. Comments: Ambulates with walker with seat, Antalgic gait noted. Psychiatric:         Mood and Affect: Mood is anxious. Speech: Speech is rapid and pressured. Behavior: Behavior normal.         Thought Content: Thought content normal.         Judgment: Judgment normal.           I personally reviewed testing with patient.   High triglycerides  Otherwise labs within normal limits      Lab Results   Component Value Date    URBANO NEGATIVE 08/12/2021         Lab Results   Component Value Date    WBC 7.4 09/23/2022    HGB 14.1 09/23/2022    HCT 43.8 09/23/2022    MCV 90.9 09/23/2022     09/23/2022       Lab Results   Component Value Date/Time     09/23/2022 03:22 PM    K 4.7 09/23/2022 03:22 PM     09/23/2022 03:22 PM    CO2 24 09/23/2022 03:22 PM    BUN 12 09/23/2022 03:22 PM    CREATININE 0.77 09/23/2022 03:22 PM    GLUCOSE 96 09/23/2022 03:22 PM    CALCIUM 9.9 09/23/2022 03:22 PM        Lab Results   Component Value Date    ALT 23 09/23/2022    AST 23 09/23/2022    ALKPHOS 89 09/23/2022    BILITOT 0.2 (L) 09/23/2022       Lab Results   Component Value Date    TSH 1.43 06/08/2022       Lab Results   Component Value Date    CHOL 138 07/09/2020    CHOL 160 02/11/2020    CHOL 201 (H) 09/16/2019     Lab Results   Component Value Date    TRIG 203 (H) 07/09/2020    TRIG 182 (H) 02/11/2020    TRIG 247 (H) 09/16/2019     Lab Results   Component Value Date    HDL 35 (L) 07/30/2021    HDL 35 (L) 07/30/2021    HDL 32 (L) 07/09/2020     Lab Results   Component Value Date    LDLCHOLESTEROL 93 07/30/2021    LDLCHOLESTEROL 93 07/30/2021    LDLCHOLESTEROL 65 07/09/2020     Lab Results   Component Value Date    CHOLHDLRATIO 4.4 07/30/2021    CHOLHDLRATIO 4.4 07/30/2021    CHOLHDLRATIO 4.3 07/09/2020       Lab Results   Component Value Date    LABA1C 5.8 09/23/2022       Lab Results   Component Value Date    SIOTDITS32 499 09/23/2022       Lab Results   Component Value Date    FOLATE 15.9 09/23/2022       Lab Results   Component Value Date    VITD25 54.3 04/27/2022         Orders Placed This Encounter   Procedures    DEXA BONE DENSITY AXIAL SKELETON     Standing Status:   Future     Standing Expiration Date:   10/26/2023    CK     Standing Status:   Future     Standing Expiration Date:   10/26/2023    Aldolase     Standing Status:   Future     Standing Expiration Date:   10/26/2023    CBC     Standing Status:   Future     Standing Expiration Date:   10/26/2023    Comprehensive Metabolic Panel     Standing Status:   Future     Standing Expiration Date:   12/23/2022    Magnesium     Standing Status:   Future     Standing Expiration Date:   10/26/2023    TSH     Standing Status:   Future     Standing Expiration Date:   10/26/2023    Uric Acid     Standing Status:   Future     Standing Expiration Date:   10/26/2023    Urinalysis with Reflex to Culture     Standing Status:   Future     Standing Expiration Date:   10/26/2023     Order Specific Question:   SPECIFY(EX-CATH,MIDSTREAM,CYSTO,ETC)? Answer:   MIDSTREAM    Vitamin B12 & Folate     Standing Status:   Future     Standing Expiration Date:   12/23/2022    Lipid Panel     Standing Status:   Future     Standing Expiration Date:   10/26/2023     Order Specific Question:   Is Patient Fasting?/# of Hours     Answer:   8-10 Hours, water ok to drink       Orders Placed This Encounter   Medications    baclofen (LIORESAL) 10 MG tablet     Sig: Take 1 tablet by mouth 2 times daily as needed (MUSCLE SPASMS) Causes sedation, do not drive while taking this medication     Dispense:  180 tablet     Refill:  0    Glucosamine-Chondroitin-MSM (TRIPLE FLEX) 750-400-375 MG TABS     Sig: Take 1-2 tablets by mouth daily With food. NATURE MADE brand. Or BIOFLEX.      Dispense:  180 tablet     Refill:  3    Collagen-Boron-Hyaluronic Acid (MOVE FREE ULTRA JOINT HEALTH) 40-5-3.3 MG TABS     Sig: Take 1 tablet by mouth daily     Dispense:  90 tablet     Refill:  3    DISCONTD: milnacipran HCl (SAVELLA) 25 MG TABS     Sig: Take 0.5 tablets by mouth 2 times daily     Dispense:  180 tablet     Refill:  0    cOVID-19mRNA bival vac moderna (MODERNA COVID-19 BIVAL BOOSTER) 50 MCG/0.5ML SUSP injection     Sig: Inject 0.5 mLs into the muscle once for 1 dose     Dispense:  1 mL     Refill:  0    milnacipran HCl (SAVELLA) 25 MG TABS     Sig: Take 1 tablet by mouth 2 times daily     Dispense:  180 tablet     Refill:  0       Medications Discontinued During This Encounter   Medication Reason    milnacipran HCl (SAVELLA) 25 MG TABS REORDER    Handicap Placard MISC DUPLICATE           On this date 10/26/2022 I have spent 35  minutes reviewing previous notes, test results and face to face with the patient discussing the diagnosis and importance of compliance with the treatment plan as well as documenting on the day of the visit. This note was completed by using the assistance of a speech-recognition program. However, inadvertent computerized transcription errors may be present. Although every effort was made to ensure accuracy, no guarantees can be provided that every mistake has been identified and corrected by editing. An electronic signature was used to authenticate this note.   Electronically signed by Rayo Lacey MD on 11/4/2022 at 6:36 PM

## 2022-11-01 ENCOUNTER — TELEPHONE (OUTPATIENT)
Dept: FAMILY MEDICINE CLINIC | Age: 67
End: 2022-11-01

## 2022-11-01 DIAGNOSIS — M17.12 PRIMARY OSTEOARTHRITIS OF LEFT KNEE: ICD-10-CM

## 2022-11-01 DIAGNOSIS — M47.26 OSTEOARTHRITIS OF SPINE WITH RADICULOPATHY, LUMBAR REGION: ICD-10-CM

## 2022-11-01 DIAGNOSIS — M19.041 PRIMARY OSTEOARTHRITIS OF BOTH HANDS: Primary | ICD-10-CM

## 2022-11-01 DIAGNOSIS — M19.042 PRIMARY OSTEOARTHRITIS OF BOTH HANDS: Primary | ICD-10-CM

## 2022-11-01 RX ORDER — LANOLIN ALCOHOL/MO/W.PET/CERES
1 CREAM (GRAM) TOPICAL 3 TIMES DAILY
Qty: 270 TABLET | Refills: 3 | Status: SHIPPED | OUTPATIENT
Start: 2022-11-01

## 2022-11-01 NOTE — TELEPHONE ENCOUNTER
Diagnosis Orders   1. Primary osteoarthritis of both hands  glucosamine-chondroitin 500-400 MG tablet      2. Primary osteoarthritis of left knee  glucosamine-chondroitin 500-400 MG tablet      3.  Osteoarthritis of spine with radiculopathy, lumbar region  glucosamine-chondroitin 500-400 MG tablet           Future Appointments   Date Time Provider Keri English   11/28/2022 10:20 AM MD José Saeed [facility]   12/29/2022  1:00 PM Cassandra Gasca, APRN - CNP [facility]

## 2022-11-01 NOTE — TELEPHONE ENCOUNTER
Pharmacy is reaching out due to Triple flex has been discontinued by manufacture and is asking if something can be called in to re-place it

## 2022-11-02 ENCOUNTER — HOSPITAL ENCOUNTER (OUTPATIENT)
Age: 67
Setting detail: SPECIMEN
Discharge: HOME | End: 2022-11-02
Payer: MEDICARE

## 2022-11-02 DIAGNOSIS — E11.42 TYPE 2 DIABETES MELLITUS WITH DIABETIC POLYNEUROPATHY, WITHOUT LONG-TERM CURRENT USE OF INSULIN (HCC): ICD-10-CM

## 2022-11-02 DIAGNOSIS — M79.10 MUSCULAR PAIN: ICD-10-CM

## 2022-11-02 DIAGNOSIS — I10 ESSENTIAL HYPERTENSION: ICD-10-CM

## 2022-11-02 DIAGNOSIS — E78.5 HYPERLIPIDEMIA WITH TARGET LDL LESS THAN 70: ICD-10-CM

## 2022-11-02 DIAGNOSIS — C61 PROSTATE CANCER (HCC): Primary | ICD-10-CM

## 2022-11-02 LAB
ALBUMIN SERPL-MCNC: 3.8 G/DL (ref 3.5–5.2)
ALP BLD-CCNC: 103 U/L (ref 40–129)
ALT SERPL-CCNC: 21 U/L (ref 5–41)
ANION GAP SERPL CALCULATED.3IONS-SCNC: 11 MMOL/L (ref 9–17)
AST SERPL-CCNC: 17 U/L
BILIRUB SERPL-MCNC: 0.3 MG/DL (ref 0.3–1.2)
BILIRUBIN URINE: NEGATIVE
BUN BLDV-MCNC: 11 MG/DL (ref 8–23)
CALCIUM SERPL-MCNC: 9.9 MG/DL (ref 8.6–10.4)
CHLORIDE BLD-SCNC: 107 MMOL/L (ref 98–107)
CHOLESTEROL/HDL RATIO: 3.4
CHOLESTEROL: 133 MG/DL
CO2: 27 MMOL/L (ref 20–31)
COLOR: YELLOW
COMMENT UA: NORMAL
CREAT SERPL-MCNC: 0.68 MG/DL (ref 0.7–1.2)
FOLATE: 11.4 NG/ML
GFR SERPL CREATININE-BSD FRML MDRD: >60 ML/MIN/1.73M2
GLUCOSE BLD-MCNC: 88 MG/DL (ref 70–99)
GLUCOSE URINE: NEGATIVE
HCT VFR BLD CALC: 42 % (ref 41–53)
HDLC SERPL-MCNC: 39 MG/DL
HEMOGLOBIN: 14.3 G/DL (ref 13.5–17.5)
KETONES, URINE: NEGATIVE
LDL CHOLESTEROL: 44 MG/DL (ref 0–130)
LEUKOCYTE ESTERASE, URINE: NEGATIVE
MAGNESIUM: 1.8 MG/DL (ref 1.6–2.6)
MCH RBC QN AUTO: 28.7 PG (ref 26–34)
MCHC RBC AUTO-ENTMCNC: 34.1 G/DL (ref 31–37)
MCV RBC AUTO: 84.1 FL (ref 80–100)
NITRITE, URINE: NEGATIVE
PDW BLD-RTO: 13.7 % (ref 11.5–14.9)
PH UA: 5 (ref 5–8)
PLATELET # BLD: 200 K/UL (ref 150–450)
PMV BLD AUTO: 9.1 FL (ref 6–12)
POTASSIUM SERPL-SCNC: 4.5 MMOL/L (ref 3.7–5.3)
PROTEIN UA: NEGATIVE
RBC # BLD: 4.99 M/UL (ref 4.5–5.9)
SODIUM BLD-SCNC: 145 MMOL/L (ref 135–144)
SPECIFIC GRAVITY UA: 1.02 (ref 1–1.03)
TOTAL CK: 59 U/L (ref 39–308)
TOTAL PROTEIN: 6.8 G/DL (ref 6.4–8.3)
TRIGL SERPL-MCNC: 250 MG/DL
TSH SERPL DL<=0.05 MIU/L-ACNC: 2.03 UIU/ML (ref 0.3–5)
TURBIDITY: CLEAR
URIC ACID: 6.2 MG/DL (ref 3.4–7)
URINE HGB: NEGATIVE
UROBILINOGEN, URINE: NORMAL
VITAMIN B-12: 624 PG/ML (ref 232–1245)
WBC # BLD: 7 K/UL (ref 3.5–11)

## 2022-11-02 PROCEDURE — 80061 LIPID PANEL: CPT

## 2022-11-02 PROCEDURE — 85027 COMPLETE CBC AUTOMATED: CPT

## 2022-11-02 PROCEDURE — 83735 ASSAY OF MAGNESIUM: CPT

## 2022-11-02 PROCEDURE — 36415 COLL VENOUS BLD VENIPUNCTURE: CPT

## 2022-11-02 PROCEDURE — 84550 ASSAY OF BLOOD/URIC ACID: CPT

## 2022-11-02 PROCEDURE — 82746 ASSAY OF FOLIC ACID SERUM: CPT

## 2022-11-02 PROCEDURE — 82550 ASSAY OF CK (CPK): CPT

## 2022-11-02 PROCEDURE — 80053 COMPREHEN METABOLIC PANEL: CPT

## 2022-11-02 PROCEDURE — 81003 URINALYSIS AUTO W/O SCOPE: CPT

## 2022-11-02 PROCEDURE — 82607 VITAMIN B-12: CPT

## 2022-11-02 PROCEDURE — 84443 ASSAY THYROID STIM HORMONE: CPT

## 2022-11-02 PROCEDURE — 82085 ASSAY OF ALDOLASE: CPT

## 2022-11-02 NOTE — RESULT ENCOUNTER NOTE
Please notify patient: Please advise the patient triglyceride is slightly high, cut down sweets, juice and pop  Magnesium is slightly lower which can cause him to have muscle cramps  Sodium is high, he needs to drink 8 x 8 ounce glasses of water every day  Otherwise labs within normal limits  continue current treatment    Please tell the patient I resent glucosamine to [facility]  Did he receive baclofen and move free, and Iftikhar  from the [address]?     The pain persists to let me know and we will send him to orthopedics for some steroid injections    Future Appointments  11/28/2022 10:20 AM   MD Braulio Parker. C [facility]           [facility]  12/29/2022 1:00 PM    JAVI Gastelum -* [facility]

## 2022-11-04 PROBLEM — M19.042 PRIMARY OSTEOARTHRITIS OF BOTH HANDS: Status: ACTIVE | Noted: 2022-11-04

## 2022-11-04 PROBLEM — M17.12 PRIMARY OSTEOARTHRITIS OF LEFT KNEE: Status: ACTIVE | Noted: 2022-11-04

## 2022-11-04 PROBLEM — M65.311 TRIGGER FINGER OF RIGHT THUMB: Status: ACTIVE | Noted: 2022-11-04

## 2022-11-04 PROBLEM — M19.041 PRIMARY OSTEOARTHRITIS OF BOTH HANDS: Status: ACTIVE | Noted: 2022-11-04

## 2022-11-04 LAB — ALDOLASE: 3.3 U/L (ref 1.2–7.6)

## 2022-11-04 RX ORDER — MILNACIPRAN HYDROCHLORIDE 25 MG/1
25 TABLET, FILM COATED ORAL 2 TIMES DAILY
Qty: 180 TABLET | Refills: 0 | Status: SHIPPED | OUTPATIENT
Start: 2022-11-04

## 2022-11-04 ASSESSMENT — ENCOUNTER SYMPTOMS
APNEA: 1
SHORTNESS OF BREATH: 1

## 2022-11-04 NOTE — RESULT ENCOUNTER NOTE
Noted  Future Appointments  11/28/2022 10:20 AM   Quique Maxwell MD         [facility]           [facility]  12/29/2022 1:00 PM    [facility]

## 2022-11-07 RX ORDER — ENZALUTAMIDE 40 MG/1
160 CAPSULE ORAL DAILY
Qty: 120 CAPSULE | Refills: 5 | Status: SHIPPED | OUTPATIENT
Start: 2022-11-07 | End: 2022-11-21

## 2022-11-11 ENCOUNTER — CARE COORDINATION (OUTPATIENT)
Dept: CARE COORDINATION | Age: 67
End: 2022-11-11

## 2022-11-11 NOTE — LETTER
11/11/2022    [address]    Dear Alicia Sims,    I enjoyed speaking with you and wanted to send some additional information. Juan C Baxter MD and I will work together to ensure your needs are met and help you achieve your health goals. We are committed to walk with you on this journey and look forward to working with you. Please feel free to contact me with any questions or concerns. I am available by phone. You can reach me at [phone].       In good health,     Cinthya Sales RN

## 2022-11-11 NOTE — CARE COORDINATION
Ambulatory Care Coordination Note  11/11/2022    ACC: Terrell Reece RN  Patient referred by his insurance for ACM. Called and introduced self to patient and wife. Explained reason for call. 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 He was agreeable to a phone call next Monday or Tuesday. Offered patient enrollment in the Remote Patient Monitoring (RPM) program for in-home monitoring: NA. Lab Results       None                 Goals Addressed    None         Prior to Admission medications    Medication Sig Start Date End Date Taking? Authorizing Provider   enzalutamide [place]) 40 MG capsule Take 4 capsules by mouth daily 11/7/22   Kj Maria MD   milnacipran HCl (SAVELLA) 25 MG TABS Take 1 tablet by mouth 2 times daily 11/4/22   Adryan Alonso MD   glucosamine-chondroitin 500-400 MG tablet Take 1 tablet by mouth 3 times daily 11/1/22   Adryan Alonso MD   baclofen (LIORESAL) 10 MG tablet Take 1 tablet by mouth 2 times daily as needed (MUSCLE SPASMS) Causes sedation, do not drive while taking this medication 10/26/22   Adryan Alonso MD   Glucosamine-Chondroitin-MSM (TRIPLE FLEX) 121-957-365 MG TABS Take 1-2 tablets by mouth daily With food. NATURE MADE brand. Or BIOFLEX. 10/26/22   Adryan Alonso MD   Collagen-Boron-Hyaluronic Acid (MOVE FREE [employer] JOINT HEALTH) 40-5-3.3 MG TABS Take 1 tablet by mouth daily 10/26/22   Adryan Alonso MD   pregabalin (LYRICA) 200 MG capsule Take 1 capsule by mouth 3 times daily for 90 days.  9/23/22 12/22/22  Adryan Alonso MD   pravastatin (PRAVACHOL) 80 MG tablet Take 1 tablet by mouth every evening 9/23/22   Adryan Alonso MD   allopurinol (ZYLOPRIM) 100 MG tablet Take 1 tablet by mouth daily 9/23/22   Adryan Alonso MD   nicotine (NICODERM CQ) 14 MG/24HR Place 1 patch onto the skin daily Step 2 9/23/22 10/23/22  Adryan Alonso MD   furosemide (LASIX) 40 MG tablet Take 1 tablet by mouth daily Dose increased 9/23/2022 9/23/22   Shraddha Hall MD   Semaglutide 3 MG TABS Take 3 mg by mouth daily 1st step 9/23/22   Shraddha Hall MD   [place] MISC by Does not apply route Can't walk greater than 200 feet. Expires in 5 years. 8/16/22   Shraddha Hall MD   omeprazole (PRILOSEC) 20 MG delayed release capsule TAKE 1 CAPSULE EVERY MORNING BEFORE BREAKFAST (DOSE DECREASED) 6/27/22   Shraddha Hall MD   lisinopril (PRINIVIL;ZESTRIL) 10 MG tablet Take 1 tablet by mouth daily 6/10/22   Shraddha Hall MD   amLODIPine (NORVASC) 10 MG tablet Take 1 tablet by mouth daily Dose increased 10/16/2020 due to high BP. Correct dosage 6/3/22   Shraddha Hall MD   OXYGEN Inhale into the lungs continuous    Historical Provider, MD   albuterol (PROVENTIL) (2.5 MG/3ML) 0.083% nebulizer solution Take 3 mLs by nebulization every 4 hours as needed for Wheezing or Shortness of Breath 5/6/22   Yohannes Mccall MD   blood glucose monitor strips Testing once a day. Needs ONE TOUCH VERIO REFLECT 4/27/22   Shraddha Hall MD   Blood Pressure KIT Diagnosis: HTN. Needs to check blood pressure 1-2 times a day until stable, then once a day. Goal blood pressure less than 135/85, and above 110/60. 4/4/22   Asmita Hebert MD   albuterol sulfate  (90 Base) MCG/ACT inhaler USE 2 INHALATIONS EVERY 6 HOURS AS NEEDED FOR WHEEZING OR SHORTNESS OF BREATH 11/18/21   Shraddha Hall MD   tiotropium-olodaterol (STIOLTO RESPIMAT) 2.5-2.5 MCG/ACT AERS Inhale 2 puffs into the lungs daily    Historical Provider, MD   oxyCODONE-acetaminophen (PERCOCET) 5-325 MG per tablet Take 1 tablet by mouth every 8 hours as needed. Historical Provider, MD   Lancets 30G MISC Testing once a day. Please dispense according to patients insurance.  4/23/21   Shraddha Hall MD   ammonium lactate (AMLACTIN) 12 % cream APPLY TOPICALLY TO LEGS ONE TO TWO TIMES A DAY AS NEEDED 3/1/21   Shraddha Hall MD   aspirin EC 81 MG EC tablet Take 1 tablet by mouth daily 9/4/18   Shraddha Hall MD       Future Appointments   Date Time Provider [facility]   12/5/2022 11:20 AM Wing Courtney MD [facility]   12/29/2022  1:00 PM [facility]

## 2022-11-14 ENCOUNTER — CARE COORDINATION (OUTPATIENT)
Dept: CARE COORDINATION | Age: 67
End: 2022-11-14

## 2022-11-21 DIAGNOSIS — C61 PROSTATE CANCER (HCC): Primary | ICD-10-CM

## 2022-11-21 RX ORDER — ENZALUTAMIDE 40 MG/1
80 CAPSULE ORAL DAILY
Qty: 60 CAPSULE | Refills: 1 | Status: SHIPPED | OUTPATIENT
Start: 2022-11-21 | End: 2022-12-21

## 2022-11-21 NOTE — TELEPHONE ENCOUNTER
Patient is requesting refills be sent to specialty pharmacy.  [facility].  They are not trying to fill medication for the patient

## 2022-11-29 ENCOUNTER — TELEPHONE (OUTPATIENT)
Dept: UROLOGY | Age: 67
End: 2022-11-29

## 2022-11-29 NOTE — TELEPHONE ENCOUNTER
Writer contacted [employer] to get eligard approved.     Per Mckinley Number @ [employer] ward approved from 11/29/22-05/29/22  Reference# [id number]

## 2022-11-30 ENCOUNTER — HOSPITAL ENCOUNTER (OUTPATIENT)
Age: 67
Setting detail: SPECIMEN
Discharge: HOME | End: 2022-11-30
Payer: MEDICARE

## 2022-11-30 DIAGNOSIS — C61 PROSTATE CANCER (HCC): ICD-10-CM

## 2022-11-30 LAB — TESTOSTERONE TOTAL: <3 NG/DL (ref 220–1000)

## 2022-11-30 PROCEDURE — 84153 ASSAY OF PSA TOTAL: CPT

## 2022-11-30 PROCEDURE — 36415 COLL VENOUS BLD VENIPUNCTURE: CPT

## 2022-11-30 PROCEDURE — 84403 ASSAY OF TOTAL TESTOSTERONE: CPT

## 2022-12-01 LAB — PROSTATE SPECIFIC ANTIGEN: 0.11 NG/ML

## 2022-12-05 ENCOUNTER — OFFICE VISIT (OUTPATIENT)
Dept: UROLOGY | Age: 67
End: 2022-12-05
Payer: MEDICARE

## 2022-12-05 VITALS — SYSTOLIC BLOOD PRESSURE: 128 MMHG | HEART RATE: 82 BPM | DIASTOLIC BLOOD PRESSURE: 79 MMHG | TEMPERATURE: 96.7 F

## 2022-12-05 DIAGNOSIS — C61 PROSTATE CANCER (HCC): Primary | ICD-10-CM

## 2022-12-05 DIAGNOSIS — Z79.818 ANDROGEN DEPRIVATION THERAPY: ICD-10-CM

## 2022-12-05 DIAGNOSIS — R23.2 HOT FLASHES: ICD-10-CM

## 2022-12-05 PROCEDURE — 99214 OFFICE O/P EST MOD 30 MIN: CPT | Performed by: UROLOGY

## 2022-12-05 PROCEDURE — 96402 CHEMO HORMON ANTINEOPL SQ/IM: CPT | Performed by: UROLOGY

## 2022-12-05 PROCEDURE — 3078F DIAST BP <80 MM HG: CPT | Performed by: UROLOGY

## 2022-12-05 PROCEDURE — 3074F SYST BP LT 130 MM HG: CPT | Performed by: UROLOGY

## 2022-12-05 PROCEDURE — 1123F ACP DISCUSS/DSCN MKR DOCD: CPT | Performed by: UROLOGY

## 2022-12-05 ASSESSMENT — ENCOUNTER SYMPTOMS
ABDOMINAL PAIN: 0
EYE PAIN: 0
COUGH: 0
BACK PAIN: 1
VOMITING: 0
SHORTNESS OF BREATH: 0
WHEEZING: 0
NAUSEA: 0
COLOR CHANGE: 0
EYE REDNESS: 0

## 2022-12-05 NOTE — PROGRESS NOTES
Review of Systems   Constitutional:  Negative for activity change, chills and fever. Eyes:  Negative for pain, redness and visual disturbance. Respiratory:  Negative for cough, shortness of breath and wheezing. Cardiovascular:  Negative for chest pain and leg swelling. Gastrointestinal:  Negative for abdominal pain, nausea and vomiting. Genitourinary:  Negative for difficulty urinating, dysuria, flank pain, frequency, hematuria, penile discharge, scrotal swelling and testicular pain. Musculoskeletal:  Positive for back pain, gait problem, joint swelling and myalgias. Skin:  Negative for color change and rash. Neurological:  Negative for dizziness, tremors and numbness. Hematological:  Negative for adenopathy. Bruises/bleeds easily.

## 2022-12-05 NOTE — PROGRESS NOTES
[address] [address]  Dept:  Margie Handley [facility] Urology Office Note - Established    Patient:  Cuco Britt  YOB: 1955  Date: 12/5/2022    The patient is a 75 y.o. male who presents todayfor evaluation of the following problems:   Chief Complaint   Patient presents with    Prostate Cancer     3 month PSA - DUE FOR ELIGARD       HPI  This is a very pleasant 58-year-old gentleman who has a history of advanced prostate cancer. He continues to take [place] and Eligard. His PSA has remained stable. He is having some hot flashes but they are tolerable. Summary of old records: N/A    Additional History: N/A    Procedures Today: N/A    Urinalysis today:  No results found for this visit on 12/05/22. Last several PSA's:  Lab Results   Component Value Date    PSA 0.11 11/30/2022    PSA 0.04 08/10/2022    PSA 0.22 05/17/2022     Last total testosterone:  Lab Results   Component Value Date    TESTOSTERONE <3 (L) 11/30/2022       AUA Symptom Score (12/5/2022):                                Last BUN and creatinine:  Lab Results   Component Value Date    BUN 11 11/02/2022     Lab Results   Component Value Date    CREATININE 0.68 (L) 11/02/2022       Additional Lab/Culture results: none    Imaging Reviewed during this Office Visit: none  (results were independently reviewed by physician and radiology report verified)    PAST MEDICAL, FAMILY AND SOCIAL HISTORY UPDATE:  Past Medical History:   Diagnosis Date    Acid reflux     Anemia, blood loss 10/7/2018    Arthritis     C. difficile colitis 3/19/2020    Chronic bilateral low back pain with bilateral sciatica 11/3/2016    Chronic diastolic heart failure ([address] 2/25/2021    Complete tear of left rotator cuff 7/18/2018    COPD (chronic obstructive pulmonary disease) ([address]     Coronary artery disease involving native coronary artery of native heart without angina pectoris 2/2/2020    Degenerative disc disease, cervical 7/28/2019    GI bleed 3/19/2020    Gout     H/O cardiac catheterization     yrs ago   no stents    History of blood transfusion     Hyperlipidemia     Hyperlipidemia with target LDL less than 100 1/20/2016    Hyperparathyroidism ([address] 1/17/2020    Hypertension     Knee pain, chronic     left    Liver disease     MDRO (multiple drug resistant organisms) resistance     Melena 3/19/2020    Memory loss     Moderate episode of recurrent major depressive disorder ([address] 1/20/2016    Obesity, Class III, BMI 40-49.9 (morbid obesity) ([address] 1/20/2016    REYNALDO on CPAP 5/6/2017    Osteoarthritis     Peripheral arterial occlusive disease ([address] 3/25/2017    Pneumonia 3/9/2020    Polypharmacy 3/25/2017    Positive FIT (fecal immunochemical test) 4/11/2017    Prolonged emergence from general anesthesia 01/05/2017    Patient \"on life support for 3 days\" after back surgery due to being given succinylcholine    Prostate CA ([address] 1/20/2016    Prostate cancer ([address] 10/2013    finished radiation tx 5/2014    Pseudocholinesterase deficiency 03/25/2017    Pt.  \"on life support\" for 3 days after back surgery 1/5/17 after being given succinylcholine    Severe obesity (BMI 35.0-35.9 with comorbidity) ([address] 2/2/2020    Short of breath on exertion     Sleep apnea     uses CPAP machine nightly    Status post lumbar laminectomy 3/25/2017    Stenosis of left carotid artery 10/7/2018    Syncope and collapse 3/19/2020    Tubular adenoma of colon 4/11/17 5/13/2017    Type 2 diabetes mellitus with hyperglycemia, without long-term current use of insulin ([address] 3/25/2017    Lab Results Component Value Date  LABA1C 7.1 (H) 03/23/2017     Unintended weight loss 10/7/2018    Vitamin D deficiency 3/25/2017    Wears glasses      Past Surgical History:   Procedure Laterality Date    BACK SURGERY      x 2     BACK SURGERY  01/05/2017    lumbar laminectomy L2, L3, L4    BRONCHOSCOPY N/A 3/13/2020    BRONCHOSCOPY performed by Kathye Dance, MD at [address] [facility]  2007    no stents    CAROTID ENDARTERECTOMY Right 12/16/2019    Dr. Sourav Galo Bilateral     CHOLECYSTECTOMY, LAPAROSCOPIC N/A 2/16/2021    CHOLECYSTECTOMY LAPAROSCOPIC ROBOTIC XI performed by Sidney Menendez MD at [address] [facility], COLON, DIAGNOSTIC      HERNIA REPAIR Left 11/18/2020    HERNIA INGUINAL REPAIR [address] OPEN performed by Sidney Menendez MD at [address] [facility] [address] Left     LUMBAR LAMINECTOMY  01/05/2017    L2-L4    MI CLOSED RX SHLDR DISLOC,ANESTHESIA Left 8/1/2018    SHOULDER CLOSED REDUCTION WITH C-ARM VISUALIZATION performed by Inocencia Segundo MD at [address] COLONOSCOPY W/BIOPSY SINGLE/MULTIPLE N/A 5/9/2017    COLONOSCOPY WITH BIOPSY performed by Darleen Hernandez DO at [address] Left 7/18/2018    SHOULDER TOTAL ARTHROPLASTY REVERSE LEFT DJO & BICEP TENDON TRANSFER performed by Inocencia Segundo MD at [address] [facility] HUMERAL/GLENOID COMPNT Left 8/3/2018    SHOULDER TOTAL REVERSE  ARTHROPLASTY REVISION performed by Inocencia Segundo MD at [address]?     rotator cuff repair    SHOULDER SURGERY Left 10/15/2020    SHOULDER ARTHROSCOPY W/INTEROP CULTURES performed by Inocencia Segundo MD at [address] [facility]      ear, forehead    TONSILLECTOMY AND ADENOIDECTOMY      UPPER GASTROINTESTINAL ENDOSCOPY N/A 3/19/2020    EGD BIOPSY performed by Cliff Rutledge MD at [facility]     Family History   Problem Relation Age of Onset    Diabetes Mother     Lung Cancer Brother     Liver Cancer Brother     Cancer Father      Outpatient Medications Marked as Taking for the 12/5/22 encounter (Office Visit) with Arturo Quiles MD   Medication Sig Dispense Refill    enzalutamide (XTANDI) 40 MG capsule Take 2 capsules by mouth daily 60 capsule 1 milnacipran HCl (SAVELLA) 25 MG TABS Take 1 tablet by mouth 2 times daily 180 tablet 0    glucosamine-chondroitin 500-400 MG tablet Take 1 tablet by mouth 3 times daily 270 tablet 3    baclofen (LIORESAL) 10 MG tablet Take 1 tablet by mouth 2 times daily as needed (MUSCLE SPASMS) Causes sedation, do not drive while taking this medication 180 tablet 0    Glucosamine-Chondroitin-MSM (TRIPLE FLEX) 750-400-375 MG TABS Take 1-2 tablets by mouth daily With food. NATURE MADE brand. Or BIOFLEX. 180 tablet 3    Collagen-Boron-Hyaluronic Acid (MOVE FREE ULTRA JOINT HEALTH) 40-5-3.3 MG TABS Take 1 tablet by mouth daily 90 tablet 3    pregabalin (LYRICA) 200 MG capsule Take 1 capsule by mouth 3 times daily for 90 days. 270 capsule 0    pravastatin (PRAVACHOL) 80 MG tablet Take 1 tablet by mouth every evening 90 tablet 3    allopurinol (ZYLOPRIM) 100 MG tablet Take 1 tablet by mouth daily 90 tablet 1    furosemide (LASIX) 40 MG tablet Take 1 tablet by mouth daily Dose increased 9/23/2022 90 tablet 3    Semaglutide 3 MG TABS Take 3 mg by mouth daily 1st step 30 tablet 0    Handicap Placard MISC by Does not apply route Can't walk greater than 200 feet. Expires in 5 years. 1 each 0    omeprazole (PRILOSEC) 20 MG delayed release capsule TAKE 1 CAPSULE EVERY MORNING BEFORE BREAKFAST (DOSE DECREASED) 90 capsule 3    lisinopril (PRINIVIL;ZESTRIL) 10 MG tablet Take 1 tablet by mouth daily 90 tablet 1    amLODIPine (NORVASC) 10 MG tablet Take 1 tablet by mouth daily Dose increased 10/16/2020 due to high BP. Correct dosage 90 tablet 3    OXYGEN Inhale into the lungs continuous      albuterol (PROVENTIL) (2.5 MG/3ML) 0.083% nebulizer solution Take 3 mLs by nebulization every 4 hours as needed for Wheezing or Shortness of Breath 120 each 3    blood glucose monitor strips Testing once a day. Needs ONE TOUCH VERIO REFLECT 100 strip 3    Blood Pressure KIT Diagnosis: HTN.  Needs to check blood pressure 1-2 times a day until stable, then once a defined types were placed in this encounter. Orders Placed:  Orders Placed This Encounter   Procedures    PSA, Diagnostic     Standing Status:   Future     Standing Expiration Date:   12/5/2023             Veronica Dale MD    Agree with the ROS entered by the MA.

## 2022-12-05 NOTE — PROGRESS NOTES
After obtaining consent, and per orders of Dr. Carlos Espinosa, injection of Eligard 45mg given in Right lower quad. abdomne by [facility] Eulalio. Patient instructed to remain in clinic for 20 minutes afterwards, and to report any adverse reaction to me immediately.

## 2022-12-26 NOTE — PROGRESS NOTES
Medicare Annual Wellness Visit    Alfonzo Stone is here for Medicare AWV, Other (Tender breasts started a month ago right side started a week ago ), and Skin Tag (Right side under arm )    Chief Complaint   Patient presents with    Medicare AWV    Other     Tender breasts started a month ago right side started a week ago     Skin Tag     Right side under arm      Due for colonoscopy-- Dr Kristan Huynh call to schedule    Switch cardiology- Dr Oscar Swanson-  switched to another one. Scheduled for stress test,     Insurance will change in January. Alfonzo Stone is a 75 y.o. male patient complains of left chest wall muscle strain. Patient states that he was kicked by his grandson the other day complains of some mild pain and achiness when he moves. Pain is not worse when he takes a big breath. No crepitus on assessment. HYPERTENSION  Alfonzo Stone has a well controlled hypertension. he is currently on lisinopril, amlodipine. Patient's most recent BP in the office was stable. he reports stable BP readings at home. Patient denies any adverse reaction to this therapy. he denies any CP, SOB, HA, or palpitations. Patient admits to exercising and adheres to low salt diet. No history of organ damage due to condition noted. Lab Results   Component Value Date/Time    CREATININE 0.68 (L) 11/02/2022 10:15 AM     DIABETES MELLITUS/ POLYNEUROPATHY  Patient has a  stable Diabetes Mellitus. Current therapy includes semaglutide. Patient is responding well with this therapy. Patient reports home glucose monitoring as Stable readings. Patient denieshypoglycemia episodes such as{symptoms. Patient neither vomiting nor diarrheadenies . Pregabalin. Lab Results   Component Value Date/Time    LABA1C 5.8 09/23/2022 02:13 PM    LABA1C 5.6 06/10/2022 02:10 PM      HYPERLIPIDEMIA  Alfonzo Stone is currently on pravastatin (Pravachol). Patient denies adverse reaction to this medication.  Compliance with treatment thus far has been good. The patient is known to have coexisting coronary artery disease. The CVD Risk score (D'Agostino, et al., 2008) failed to calculate for the following reasons: The patient has a prior MI, stroke, CHF, or peripheral vascular disease diagnosis  Lab Results   Component Value Date/Time    CHOLFAST 155 07/30/2021 07:45 AM    CHOLFAST 155 07/30/2021 07:45 AM    CHOL 133 11/02/2022 10:15 AM    HDL 39 (L) 11/02/2022 10:15 AM    LDLCHOLESTEROL 44 11/02/2022 10:15 AM    TRIGLYCFAST 136 07/30/2021 07:45 AM    TRIGLYCFAST 136 07/30/2021 07:45 AM    CHOLHDLRATIO 3.4 11/02/2022 10:15 AM    TRIG 250 (H) 11/02/2022 10:15 AM    VLDL NOT REPORTED 07/30/2021 07:45 AM    VLDL NOT REPORTED 07/30/2021 07:45 AM     COPD  Ame Cantu has a known history of COPD. Patient is currently on spiriva, stiolto, albuterol. Patient reports fair success with this therapy. Patient denies any adverse reaction to current therapy. Patient is under the care of [facility] - seen regularly. LOW BACK PAIN  Magalys Calhoun is a 75 y.o. male patient. Patient currently on  lyrica oxycodone, baclofen, . Patient is an established patient of  Dr Geo Nguyễn. Patient reports some relief had injections. Was scheduled for a RFA scheduled, was unable to get them done. Have had three surgeries. Dr Ida Maldonado. GOUT- no outbreaks  Patient known gout  Lab Results   Component Value Date/Time    URICACID 6.2 11/02/2022 10:15 AM    URICACID 6.9 09/23/2022 03:22 PM      MULTIPLE ARTHRITIS  Patient complains of multiple arthritis including trigger finger, hands, knees and hips. Patient walks with a walker. Patient has been treated by pain management established with . Also being treated with oxycodone, Lyrica, some relief but not much. Patient was recently started on baclofen. Patient states that this is helping. However, he is aware that he is a fall risk long discussion about being careful. No falls reported.     DEPRESSION AND ANXIETY- some relief but not much Magalys Calhoun reported some ongoing issues with depression and anxiety. S Current therapy includes Joeline Bernice will increase, which is working well for him. he denies adverse reaction to current therapy. he also denies suicidal/homicidal ideation, plan or intent. PHQ-2 Over the past 2 weeks, how often have you been bothered by any of the following problems? Little interest or pleasure in doing things: Not at all  Feeling down, depressed, or hopeless: Nearly every day  PHQ-2 Score: 3  PHQ-9 Over the past 2 weeks, how often have you been bothered by any of the following problems? Trouble falling or staying asleep, or sleeping too much: Not at all  Feeling tired or having little energy: Several days  Poor appetite or overeating: Not at all  Feeling bad about yourself - or that you are a failure or have let yourself or your family down: Several days  Trouble concentrating on things, such as reading the newspaper or watching television: Several days  Moving or speaking so slowly that other people could have noticed.  Or the opposite - being so fidgety or restless that you have been moving around a lot more than usual: Not at all  Thoughts that you would be better off dead, or of hurting yourself in some way: Not at all  If you checked off any problems, how difficult have these problems made it for you to do your work, take care of things at home, or get along with other people?: Not difficult at all  PHQ-9 Total Score: 6  PHQ-9 Total Score: 6   SANDRA-7 SCREENING 10/26/2022   Feeling nervous, anxious, or on edge Several days   Not being able to stop or control worrying Not at all   Worrying too much about different things Several days   Trouble relaxing Several days   Being so restless that it is hard to sit still Several days   Becoming easily annoyed or irritable Several days   Feeling afraid as if something awful might happen Several days   SANDRA-7 Total Score 6   How difficult have these problems made it for you to do your work, take care of things at home, or get along with other people? Somewhat difficult        WEIGHT  Patient's BMI is Body mass index is 38.92 kg/m². kg/m2. BMI is stable. Patient understands that this condition increases the patient's risk for chronic conditions. Wt Readings from Last 3 Encounters:   12/29/22 287 lb (130.2 kg)   10/26/22 287 lb (130.2 kg)   09/23/22 289 lb (131.1 kg)      Amb Fall Risk Assessment and TUG Test 12/29/2022   Do you feel unsteady or are you worried about falling?  no   2 or more falls in past year? no   Fall with injury in past year? no   Timed Up and Go Test > 12 seconds? -     Amb Fall Risk Assessment and TUG Test 12/29/2022   Do you feel unsteady or are you worried about falling?  no   2 or more falls in past year? no   Fall with injury in past year? no   Timed Up and Go Test > 12 seconds? -     Controlled Substance Monitoring:    Acute and Chronic Pain Monitoring:   RX Monitoring 12/29/2022   Attestation -   Periodic Controlled Substance Monitoring No signs of potential drug abuse or diversion identified. Chronic Pain > 50 MEDD -   Chronic Pain > 80 MEDD -     Review of Systems   Constitutional:  Positive for activity change and unexpected weight change. Negative for chills and fever. HENT: Negative. Respiratory: Negative. Negative for chest tightness, shortness of breath and wheezing. Cardiovascular: Negative. Negative for chest pain and palpitations. Gastrointestinal:  Negative for abdominal pain. Endocrine: Negative. Musculoskeletal:  Positive for arthralgias, back pain, gait problem, joint swelling and myalgias. Negative for neck pain and neck stiffness. Neurological:  Positive for numbness. Negative for headaches. Hematological:  Bruises/bleeds easily. Psychiatric/Behavioral:  Positive for dysphoric mood. Negative for sleep disturbance and suicidal ideas. The patient is nervous/anxious. Altames[place] Degree is due for pneumonia vaccine.   he indication is age/risk  he  denies side effects from prior immunizations. Assessment & Plan   Medicare annual wellness visit, subsequent  Stable  DISCUSSED AND ADVISED TO:   Don't smoke. Eat heart-healthy foods. Be active. Talk to your doctor about what type and level of exercise is safe for you. Stay at a healthy weight. Lose weight if you need to. Avoid alcohol if it triggers symptoms. Manage other health problems such as high blood pressure, high cholesterol, and diabetes. Avoid getting sick from the flu. Get a flu shot every year. Manage stress, sleep at least 7 hours every night. -     Pneumococcal, PPSV23, PNEUMOVAX 23, (age 2 yrs+), SC/IM  -     Basic Metabolic Panel; Future  -     SC Behavior  obesity (8-15 min) []  -     [place] Intensive Behavior Counseling for Cardiovascular Diseases, 8-15 minutes []  Muscle strain of chest wall, initial encounter  Stable  Continue current therapy. DISCUSSED and ADVISED TO:  Stay at a healthy weight. Continue exercises/PT  Stretch to help prevent stiffness and to prevent injury before exercise. Gentle forms of yoga help keep joints and muscles flexible. Walk instead of jog, ride a bike, swim, and water exercise. Lift weights as tolerated. strong muscles help reduce stress on joints. Take pain medicines exactly as directed and only as needed. -     diclofenac sodium (VOLTAREN) 1 % GEL; Apply 2 g topically 2 times daily, Topical, 2 TIMES DAILY Starting Thu 12/29/2022, Disp-350 g, R-0, Normal  Essential hypertension  Stable  Continue current therapy. DISCUSSED AND ADVISED TO:  Cut down on your salt intake. Cut down on caffeinated drinks, sports drinks. Instructed to check BP at home regularly. Report for any chest pains, shortness of breath, headaches, and lightheadedness. Call the office if your blood pressure continue to be higher than 140/90 or 90/50. -     Basic Metabolic Panel;  Future  -     SC Behavior  obesity (8-15 min) []  -     [place] Intensive Behavior Counseling for Cardiovascular Diseases, 8-15 minutes []  Type 2 diabetes mellitus with diabetic polyneuropathy, without long-term current use of insulin (HCC)  Stable  Continue therapy. We will continue to monitor Hemoglobin A1c   DISCUSSED and ADVISED TO:  Continue to check Glucose levels at home. Report increased and low levels as discussed. Decrease carbohydrates, sugary drinks, desserts in your diet. Exercise regularly, as tolerated. Try to lose weight. -     Basic Metabolic Panel; Future  -     pregabalin (LYRICA) 200 MG capsule; Take 1 capsule by mouth 3 times daily for 90 days. , Disp-270 capsule, R-0Normal  -     MN Behavior  obesity (8-15 min) []  -     [place] Intensive Behavior Counseling for Cardiovascular Diseases, 8-15 minutes []  -     POCT glycosylated hemoglobin (Hb A1C); Future  Hyperlipidemia with target LDL less than 70  Stable  Continue therapy. Advised to decrease the consumption of red meats, fried foods, trans fats, sweets, sugary beverages. Advised to increase fish, vegetables, and fruits consumption. Advised to add fiber or OTC supplements in diet. Discussed weight loss which will result in improvement of lipids levels. Advised to increase daily physical activities and add regular exercises. -     Basic Metabolic Panel; Future  -     MN Behavior  obesity (8-15 min) []  -     [place] Intensive Behavior Counseling for Cardiovascular Diseases, 8-15 minutes []  COPD mixed type ([address]  Stable  Current treatment plan is effective, no change in therapy. Reviewed use, techniques, schedule and side effects of all inhaled medications  Critical need for compliance with treatment plan to achieve optimal results  Very strongly urged to quit smoking to reduce pulmonary and CVD risk. Other secondary chronic gout of multiple sites without tophus  Stable  Continue current therapy.   DISCUSSED AND ADVISED TO:  Limit or avoid Foods High In Purine- list given  Such as Beer and other alcoholic beverages. Also gravies and sauces made with meat. Anchovies, sardines, caviar, herring, mussels, tuna, Codfish, Trout, [address], [place],   [employer], (such as liver or kidneys), tripe, sweet bread, wild game, goose,   High fructose corn syrup in foods and drinks    -     baclofen (LIORESAL) 10 MG tablet; Take 1 tablet by mouth 2 times daily as needed (MUSCLE SPASMS) Causes sedation, do not drive while taking this medication, Disp-180 tablet, R-0Normal  Trigger finger of right thumb  Stable  Continue to monitor    -     baclofen (LIORESAL) 10 MG tablet; Take 1 tablet by mouth 2 times daily as needed (MUSCLE SPASMS) Causes sedation, do not drive while taking this medication, Disp-180 tablet, R-0Normal  Primary osteoarthritis of both hands  Stable  Continue to monitor    -     baclofen (LIORESAL) 10 MG tablet; Take 1 tablet by mouth 2 times daily as needed (MUSCLE SPASMS) Causes sedation, do not drive while taking this medication, Disp-180 tablet, R-0Normal  Osteoarthritis of spine with radiculopathy, lumbar region  Stable  Continue current therapy. DISCUSSED AND ADVISED TO:  Use heat packs 15 to 20 mins every 2-3 hours. Do some back stretches as tolerated. Refer to hand out for instructions. Call for worsening, numbness, weakness.    -     baclofen (LIORESAL) 10 MG tablet; Take 1 tablet by mouth 2 times daily as needed (MUSCLE SPASMS) Causes sedation, do not drive while taking this medication, Disp-180 tablet, R-0Normal  Primary osteoarthritis of left knee  -     baclofen (LIORESAL) 10 MG tablet; Take 1 tablet by mouth 2 times daily as needed (MUSCLE SPASMS) Causes sedation, do not drive while taking this medication, Disp-180 tablet, R-0Normal  Mild episode of recurrent major depressive disorder ([address]  Stable  Continue current therapy. DISCUSSED and ADVISED TO:  Not stopping medication suddenly.   See the specialist as discussed. Report for feelings of SI, HI, and hallucinations. Go to the ER for increasing urge to hurt yourself. -     milnacipran HCl (SAVELLA) 50 MG TABS; Take 1 tablet by mouth daily, Disp-60 tablet, R-1Normal  Class 2 severe obesity due to excess calories with serious comorbidity and body mass index (BMI) of 38.0 to 38.9 in adult (HCC)  Stable  BMI increasing  DISCUSSED AND ADVISED TO:  Eat a low-fat and low carbohydrates diet. Avoid fried foods especially fast food. Choose healthier options for snacks. Have 5-6 servings of fruits and vegetables per day. Cut down on eating processed food. Add 30 minutes to 1 hour aerobic exercise for 3-4 days a week. -     SC Behavior  obesity (8-15 min) []  At high risk for falls  Stable  DISCUSSED AND ADVISED TO:  Exercise to improve muscle strength and balance  Have a regular vision and hearing tests  Know the side effects of medications. Limit alcohol intake  Report numbness to your feet/legs  Wear well fitting shoes. Report for worsening symptoms    Need for pneumococcal vaccination  Stable  Recommended by CDC. No current infection. Denies previous adverse reaction to vaccination.    -     Pneumococcal, PPSV23, PNEUMOVAX 23, (age 2 yrs+), SC/IM      Recommendations for Preventive Services Due: see orders and patient instructions/AVS.  Recommended screening schedule for the next 5-10 years is provided to the patient in written form: see Patient Instructions/AVS.     Return in 4 months (on 4/29/2023) for Medicare Annual Wellness Visit in 1 year, Chronic conditions, Appt w/ Dr. Susana Felton. Subjective   The following acute and/or chronic problems were also addressed today:  above  Physical Exam  Vitals and nursing note reviewed. Constitutional:       General: He is not in acute distress. Appearance: Normal appearance. He is well-developed. He is obese. He is not diaphoretic. HENT:      Head: Normocephalic and atraumatic.       Right Ear: External ear normal.      Left Ear: External ear normal.      Mouth/Throat:      Comments: I did not examine the mouth due to coronavirus pandemic and wearing masks. Eyes:      General: Lids are normal. No scleral icterus. Right eye: No discharge. Left eye: No discharge. Extraocular Movements: Extraocular movements intact. Conjunctiva/sclera: Conjunctivae normal.   Neck:      Thyroid: No thyromegaly. Cardiovascular:      Rate and Rhythm: Normal rate and regular rhythm. Heart sounds: Heart sounds are distant. Murmur heard. Crescendo systolic murmur is present with a grade of 2/6. Pulmonary:      Effort: Pulmonary effort is normal. No respiratory distress. Breath sounds: Examination of the right-middle field reveals decreased breath sounds. Examination of the left-middle field reveals decreased breath sounds. Examination of the right-lower field reveals decreased breath sounds. Examination of the left-lower field reveals decreased breath sounds. Decreased breath sounds present. No wheezing or rales. Chest:      Chest wall: No tenderness. Abdominal:      General: Bowel sounds are normal. There is no distension. Palpations: Abdomen is soft. There is no hepatomegaly or splenomegaly. Comments: Obese abdomen. Musculoskeletal:      Right hand: Tenderness and bony tenderness present. Decreased range of motion. Decreased strength of finger abduction. Cervical back: Normal range of motion and neck supple. Lumbar back: Tenderness and bony tenderness present. Decreased range of motion. Positive right straight leg raise test and positive left straight leg raise test.      Right knee: No bony tenderness. Normal range of motion. No tenderness. Left knee: Bony tenderness and crepitus present. Decreased range of motion. Tenderness present over the medial joint line and patellar tendon. Right lower leg: No edema. Left lower leg: No edema. Comments: Midline old surgical scar noted on the left knee,  he says he had scope, but cancelled the replacement many years ago. Bilateral index fingers and little finger swollen with osteoarthritic nodules. Right thumb tenderness, decreased range of motion,thumb extensor tender all the way on the forearm. Left thumb tenderness, mild. Lymphadenopathy:      Cervical: No cervical adenopathy. Skin:     General: Skin is warm and dry. Capillary Refill: Capillary refill takes less than 2 seconds. Neurological:      Mental Status: He is alert and oriented to person, place, and time. Gait: Gait abnormal.      Deep Tendon Reflexes: Reflexes are normal and symmetric. Comments: Ambulates with walker with seat, Antalgic gait noted. Psychiatric:         Mood and Affect: Mood is anxious. Speech: Speech is rapid and pressured. Behavior: Behavior normal.         Thought Content: Thought content does not include suicidal ideation. Judgment: Judgment normal.       Patient's complete Health Risk Assessment and screening values have been reviewed and are found in Flowsheets. The following problems were reviewed today and where indicated follow up appointments were made and/or referrals ordered. Positive Risk Factor Screenings with Interventions:        Depression:  PHQ-2 Score: 3  PHQ-9 Total Score: 6    Interpretation:   1-4 = minimal  5-9 = mild  10-14 = moderate  15-19 = moderately severe  20-27 = severe  Interventions:  Patient declines any further evaluation or treatment           Opioid Risk: (Low risk score <55) Opioid risk score: 46    Patient is low risk for opioid use disorder or overdose.   Last PDMP David Holder as Reviewed:  Review User Review Instant Review Result   PRICILLA Salmon 12/29/2022  1:14 PM     Reviewed PDMP [1]     Last Controlled Substance Monitoring Documentation      [id number] [facility] Office Visit from 12/29/2022 in [facility]   Periodic Controlled Substance Monitoring No signs of potential drug abuse or diversion identified. filed at 12/29/2022 0882             Self-assessment of health: In general, how would you say your health is?: (!) Poor    Interventions:  Patient declines any further evaluation or treatment      Weight and Activity:  Physical Activity: Inactive    Days of Exercise per Week: 0 days    Minutes of Exercise per Session: 0 min     On average, how many days per week do you engage in moderate to strenuous exercise (like a brisk walk)?: 0 days  Have you lost any weight without trying in the past 3 months?: No  Body mass index: (!) 38.92      Inactivity Interventions:  Patient declined any further interventions or treatment  Obesity Interventions:  Patient declines any further evaluation or treatment    Obesity Counseling: Patient was asked about his current diet and exercise habits, and personalized advice was provided regarding recommended lifestyle changes. Patient's comorbid health conditions associated with elevated BMI were discussed, as well as the likely benefits of weight loss. Hearing Screen:  Do you or your family notice any trouble with your hearing that hasn't been managed with hearing aids?: (!) Yes    Interventions:  Patient declines any further evaluation or treatment      ADL's:   Patient reports needing help with:  Select all that apply: (!) Dressing, Bathing  Select all that apply: [employer], Housekeeping  Interventions:  Patient declined any further interventions or treatment              Objective   Vitals:    12/29/22 1258   BP: 110/84   Pulse: 93   Temp: 97.8 °F (36.6 °C)   SpO2: 95%   Weight: 287 lb (130.2 kg)   Height: 6' (1.829 m)      Body mass index is 38.92 kg/m².                Allergies   Allergen Reactions    Succinylcholine Chloride Other (See Comments)     PATIENT HAS PSEUDOCHOLINESTERASE DEFICIENCY      Cymbalta [Duloxetine Hcl]      Taste loss      Gabapentin      Taste loss     Prior to Visit Medications    Medication Sig Taking? Authorizing Provider   Enzalutamide (XTANDI) 40 MG TABS Take 1 tablet by mouth daily Yes Historical Provider, MD   baclofen (LIORESAL) 10 MG tablet Take 1 tablet by mouth 2 times daily as needed (MUSCLE SPASMS) Causes sedation, do not drive while taking this medication Yes JAVI Gastelum - CNP   pregabalin (LYRICA) 200 MG capsule Take 1 capsule by mouth 3 times daily for 90 days. Yes JAVI Gastelum - CNP   milnacipran HCl (SAVELLA) 50 MG TABS Take 1 tablet by mouth daily Yes JAVI Gastelum - CNP   diclofenac sodium (VOLTAREN) 1 % GEL Apply 2 g topically 2 times daily Yes JAVI Gastelum - CNP   glucosamine-chondroitin 500-400 MG tablet Take 1 tablet by mouth 3 times daily Yes Asmita Hebert MD   Collagen-Boron-Hyaluronic Acid (MOVE FREE ULTRA JOINT HEALTH) 40-5-3.3 MG TABS Take 1 tablet by mouth daily Yes Christy Dupont MD   pravastatin (PRAVACHOL) 80 MG tablet Take 1 tablet by mouth every evening Yes Christy Dupont MD   allopurinol (ZYLOPRIM) 100 MG tablet Take 1 tablet by mouth daily Yes Christy Dupont MD   furosemide (LASIX) 40 MG tablet Take 1 tablet by mouth daily Dose increased 9/23/2022 Yes Christy Dupont MD   Semaglutide 3 MG TABS Take 3 mg by mouth daily 1st step Yes Christy Dupont MD   Handicap Placard MISC by Does not apply route Can't walk greater than 200 feet. Expires in 5 years. Yes Christy Dupont MD   omeprazole (PRILOSEC) 20 MG delayed release capsule TAKE 1 CAPSULE EVERY MORNING BEFORE BREAKFAST (DOSE DECREASED) Yes Christy Dupont MD   lisinopril (PRINIVIL;ZESTRIL) 10 MG tablet Take 1 tablet by mouth daily Yes Christy Dupont MD   amLODIPine (NORVASC) 10 MG tablet Take 1 tablet by mouth daily Dose increased 10/16/2020 due to high BP.  Correct dosage Yes Christy Dupont MD   OXYGEN Inhale into the lungs continuous Yes Historical Provider, MD   albuterol (PROVENTIL) (2.5 MG/3ML) 0.083% nebulizer solution Take 3 mLs by nebulization every 4 hours as needed for Wheezing or Shortness of Breath Yes Arthur Giron MD   blood glucose monitor strips Testing once a day. Needs ONE Sil Patel Yes Cindy Garcia MD   Blood Pressure KIT Diagnosis: HTN. Needs to check blood pressure 1-2 times a day until stable, then once a day. Goal blood pressure less than 135/85, and above 110/60. Yes Cindy Garcia MD   albuterol sulfate  (90 Base) MCG/ACT inhaler USE 2 INHALATIONS EVERY 6 HOURS AS NEEDED FOR WHEEZING OR SHORTNESS OF BREATH Yes Cindy Garcia MD   tiotropium-olodaterol (STIOLTO RESPIMAT) 2.5-2.5 MCG/ACT AERS Inhale 2 puffs into the lungs daily Yes Historical Provider, MD   oxyCODONE-acetaminophen (PERCOCET) 5-325 MG per tablet Take 1 tablet by mouth every 8 hours as needed. Yes Historical Provider, MD   Lancets 30G MISC Testing once a day. Please dispense according to patients insurance.  Yes Cindy Garcia MD   ammonium lactate (AMLACTIN) 12 % cream APPLY TOPICALLY TO LEGS ONE TO TWO TIMES A DAY AS NEEDED Yes Cindy Garcia MD   aspirin EC 81 MG EC tablet Take 1 tablet by mouth daily Yes Cindy Garcia MD   nicotine (NICODERM CQ) 14 MG/24HR Place 1 patch onto the skin daily Step 2  Cindy Garcia MD       CareTeam (Including outside providers/suppliers regularly involved in providing care):   Patient Care Team:  Cindy Garcia MD as PCP - General (Family Medicine)  Cindy Garcia MD as PCP - [facility]  Raphael Escalante MD as Consulting Physician (Neurosurgery)  Ugo Velarde MD as Consulting Physician (Cardiology)  Ethel Heimlich, MD as Consulting Physician (Urology)  Ely Strauss MD as Surgeon (Vascular Surgery)  Vidal Rangel MD as Consulting Physician (Pulmonology)  Siva Lucero MD as Consulting Physician (Orthopedic Surgery)  Carrol Winslow OD (Ophthalmology)  Dana Branham MD as Consulting Physician (Endocrinology)  Gil Butts MD as Consulting Physician (Orthopedic Surgery)  Bernabe Wilder MD as Consulting Physician (Gastroenterology)  Jered Sy MD as Consulting Physician (Orthopedic Surgery)  Willian Frankel MD as Surgeon (General Surgery)  Thierno Winter, PhD (Psychology)  Jared Arevalo MD as Consulting Physician (Neurology)  Mariya Adhikari RN as Ambulatory Care Manager     Reviewed and updated this visit:  Tobacco  Allergies  Meds  Problems  Med Hx  Surg Hx  Soc Hx  Fam Hx               This note was completed by using the assistance of a speech-recognition program. However, inadvertent computerized transcription errors may be present. Although every effort was made to ensure accuracy, no guarantees can be provided that every mistake has been identified and corrected by editing.   Electronically signed by JAVI Escobedo CNP on 12/29/22 at 4:15 PM EST

## 2022-12-29 ENCOUNTER — OFFICE VISIT (OUTPATIENT)
Dept: FAMILY MEDICINE CLINIC | Age: 67
End: 2022-12-29

## 2022-12-29 VITALS
DIASTOLIC BLOOD PRESSURE: 84 MMHG | HEIGHT: 72 IN | BODY MASS INDEX: 38.87 KG/M2 | WEIGHT: 287 LBS | SYSTOLIC BLOOD PRESSURE: 110 MMHG | TEMPERATURE: 97.8 F | HEART RATE: 93 BPM | OXYGEN SATURATION: 95 %

## 2022-12-29 DIAGNOSIS — F33.0 MILD EPISODE OF RECURRENT MAJOR DEPRESSIVE DISORDER (HCC): ICD-10-CM

## 2022-12-29 DIAGNOSIS — M19.041 PRIMARY OSTEOARTHRITIS OF BOTH HANDS: ICD-10-CM

## 2022-12-29 DIAGNOSIS — M19.042 PRIMARY OSTEOARTHRITIS OF BOTH HANDS: ICD-10-CM

## 2022-12-29 DIAGNOSIS — E11.42 TYPE 2 DIABETES MELLITUS WITH DIABETIC POLYNEUROPATHY, WITHOUT LONG-TERM CURRENT USE OF INSULIN (HCC): ICD-10-CM

## 2022-12-29 DIAGNOSIS — Z00.00 MEDICARE ANNUAL WELLNESS VISIT, SUBSEQUENT: Primary | ICD-10-CM

## 2022-12-29 DIAGNOSIS — S29.011A MUSCLE STRAIN OF CHEST WALL, INITIAL ENCOUNTER: ICD-10-CM

## 2022-12-29 DIAGNOSIS — E78.5 HYPERLIPIDEMIA WITH TARGET LDL LESS THAN 70: ICD-10-CM

## 2022-12-29 DIAGNOSIS — M47.26 OSTEOARTHRITIS OF SPINE WITH RADICULOPATHY, LUMBAR REGION: ICD-10-CM

## 2022-12-29 DIAGNOSIS — I10 ESSENTIAL HYPERTENSION: ICD-10-CM

## 2022-12-29 DIAGNOSIS — M17.12 PRIMARY OSTEOARTHRITIS OF LEFT KNEE: ICD-10-CM

## 2022-12-29 DIAGNOSIS — J44.9 COPD MIXED TYPE (HCC): ICD-10-CM

## 2022-12-29 DIAGNOSIS — Z23 NEED FOR PNEUMOCOCCAL VACCINATION: ICD-10-CM

## 2022-12-29 DIAGNOSIS — Z91.81 AT HIGH RISK FOR FALLS: ICD-10-CM

## 2022-12-29 DIAGNOSIS — M65.311 TRIGGER FINGER OF RIGHT THUMB: ICD-10-CM

## 2022-12-29 DIAGNOSIS — M1A.49X0 OTHER SECONDARY CHRONIC GOUT OF MULTIPLE SITES WITHOUT TOPHUS: ICD-10-CM

## 2022-12-29 DIAGNOSIS — E66.01 CLASS 2 SEVERE OBESITY DUE TO EXCESS CALORIES WITH SERIOUS COMORBIDITY AND BODY MASS INDEX (BMI) OF 38.0 TO 38.9 IN ADULT (HCC): ICD-10-CM

## 2022-12-29 LAB — HBA1C MFR BLD: 5.8 %

## 2022-12-29 RX ORDER — PREGABALIN 200 MG/1
200 CAPSULE ORAL 3 TIMES DAILY
Qty: 270 CAPSULE | Refills: 0 | Status: SHIPPED | OUTPATIENT
Start: 2022-12-29 | End: 2023-03-29

## 2022-12-29 RX ORDER — ENZALUTAMIDE 40 MG/1
1 TABLET ORAL DAILY
COMMUNITY

## 2022-12-29 RX ORDER — BACLOFEN 10 MG/1
10 TABLET ORAL 2 TIMES DAILY PRN
Qty: 180 TABLET | Refills: 0 | Status: SHIPPED | OUTPATIENT
Start: 2022-12-29

## 2022-12-29 RX ORDER — MILNACIPRAN HYDROCHLORIDE 50 MG/1
50 TABLET, FILM COATED ORAL DAILY
Qty: 60 TABLET | Refills: 1 | Status: SHIPPED | OUTPATIENT
Start: 2022-12-29

## 2022-12-29 ASSESSMENT — PATIENT HEALTH QUESTIONNAIRE - PHQ9
1. LITTLE INTEREST OR PLEASURE IN DOING THINGS: 0
SUM OF ALL RESPONSES TO PHQ QUESTIONS 1-9: 6
8. MOVING OR SPEAKING SO SLOWLY THAT OTHER PEOPLE COULD HAVE NOTICED. OR THE OPPOSITE, BEING SO FIGETY OR RESTLESS THAT YOU HAVE BEEN MOVING AROUND A LOT MORE THAN USUAL: 0
2. FEELING DOWN, DEPRESSED OR HOPELESS: 3
3. TROUBLE FALLING OR STAYING ASLEEP: 0
SUM OF ALL RESPONSES TO PHQ9 QUESTIONS 1 & 2: 3
6. FEELING BAD ABOUT YOURSELF - OR THAT YOU ARE A FAILURE OR HAVE LET YOURSELF OR YOUR FAMILY DOWN: 1
SUM OF ALL RESPONSES TO PHQ QUESTIONS 1-9: 6
5. POOR APPETITE OR OVEREATING: 0
4. FEELING TIRED OR HAVING LITTLE ENERGY: 1
9. THOUGHTS THAT YOU WOULD BE BETTER OFF DEAD, OR OF HURTING YOURSELF: 0
SUM OF ALL RESPONSES TO PHQ QUESTIONS 1-9: 6
10. IF YOU CHECKED OFF ANY PROBLEMS, HOW DIFFICULT HAVE THESE PROBLEMS MADE IT FOR YOU TO DO YOUR WORK, TAKE CARE OF THINGS AT HOME, OR GET ALONG WITH OTHER PEOPLE: 0
SUM OF ALL RESPONSES TO PHQ QUESTIONS 1-9: 6
7. TROUBLE CONCENTRATING ON THINGS, SUCH AS READING THE NEWSPAPER OR WATCHING TELEVISION: 1

## 2022-12-29 ASSESSMENT — LIFESTYLE VARIABLES: HOW MANY STANDARD DRINKS CONTAINING ALCOHOL DO YOU HAVE ON A TYPICAL DAY: PATIENT DOES NOT DRINK

## 2022-12-29 ASSESSMENT — ENCOUNTER SYMPTOMS
WHEEZING: 0
ABDOMINAL PAIN: 0
RESPIRATORY NEGATIVE: 1
CHEST TIGHTNESS: 0
BACK PAIN: 1
SHORTNESS OF BREATH: 0

## 2022-12-29 NOTE — PROGRESS NOTES
Visit Information    Have you changed or started any medications since your last visit including any over-the-counter medicines, vitamins, or herbal medicines? no   Are you having any side effects from any of your medications? -  no  Have you stopped taking any of your medications? Is so, why? -  no    Have you seen any other physician or provider since your last visit? No  Have you had any other diagnostic tests since your last visit? No  Have you been seen in the emergency room and/or had an admission to a hospital since we last saw you? No  Have you had your routine dental cleaning in the past 6 months? yes     Have you activated your [employer] account? If not, what are your barriers?  Yes     Patient Care Team:  Latoya Garcia MD as PCP - General (Family Medicine)  Latoya Garcia MD as PCP - [facility]  Reina Goetz MD as Consulting Physician (Neurosurgery)  Shelly Khan MD as Consulting Physician (Cardiology)  Sebastian Palomino MD as Consulting Physician (Urology)  Darrell Villarreal MD as Surgeon (Vascular Surgery)  Santiago Garcia MD as Consulting Physician (Pulmonology)  Joan Donato MD as Consulting Physician (Orthopedic Surgery)  Jered Hernandez OD (Ophthalmology)  Johana Garland MD as Consulting Physician (Endocrinology)  Mary Elizabeth MD as Consulting Physician (Orthopedic Surgery)  Kathryn Vergara MD as Consulting Physician (Gastroenterology)  Mone Orozco MD as Consulting Physician (Orthopedic Surgery)  Shahnaz Uribe MD as Surgeon (General Surgery)  Magnus Pastrana, PhD (Psychology)  Rogelio Adamson MD as Consulting Physician (Neurology)  Lorenzo Freedman RN as Ambulatory Care Manager    Medical History Review  Past Medical, Family, and Social History reviewed and does contribute to the patient presenting condition    Health Maintenance   Topic Date Due    Pneumococcal 65+ years Vaccine (4 - PPSV23 if available, else PCV20) 04/11/2022 Diabetic foot exam  04/23/2022    Colorectal Cancer Screen  05/09/2022    Annual Wellness Visit (AWV)  11/06/2022    A1C test (Diabetic or Prediabetic)  12/23/2022    Diabetic retinal exam  01/19/2023    Low dose CT lung screening  04/18/2023    Diabetic Alb to Cr ratio (uACR) test  05/09/2023    Depression Monitoring  10/26/2023    Lipids  11/02/2023    GFR test (Diabetes, CKD 3-4, OR last GFR 15-59)  11/02/2023    Prostate Specific Antigen (PSA) Screening or Monitoring  11/30/2023    DTaP/Tdap/Td vaccine (2 - Td or Tdap) 03/01/2028    Flu vaccine  Completed    Shingles vaccine  Completed    COVID-19 Vaccine  Completed    Hepatitis C screen  Completed    Hepatitis A vaccine  Aged Out    Hib vaccine  Aged Out    Meningococcal (ACWY) vaccine  Aged Out

## 2022-12-29 NOTE — PATIENT INSTRUCTIONS
Learning About Mindfulness for Stress  What are mindfulness and stress? Stress is what you feel when you have to handle more than you are used to. A lot of things can cause stress. You may feel stress when you go on a job interview, take a test, or run a race. This kind of short-term stress is normal and even useful. It can help you if you need to work hard or react quickly. Stress also can last a long time. Long-term stress is caused by stressful situations or events. Examples of long-term stress include long-term health problems, ongoing problems at work, and conflicts in your family. Long-term stress can harm your health. Mindfulness is a focus only on things happening in the present moment. It's a process of purposefully paying attention to and being aware of your surroundings, your emotions, your thoughts, and how your body feels. You are aware of these things, but you aren't judging these experiences as \"good\" or \"bad. \" Mindfulness can help you learn to calm your mind and body to help you cope with illness, pain, and stress. How does mindfulness help to relieve stress? Mindfulness can help quiet your mind and relax your body. Studies show that it can help some people sleep better, feel less anxious, and bring their blood pressure down. And it's been shown to help some people live and cope better with certain health problems like heart disease, depression, chronic pain, and cancer. How do you practice mindfulness? To be mindful is to pay attention, to be present, and to be accepting. When you're mindful, you do just one thing and you pay close attention to that one thing. For example, you may sit quietly and notice your emotions or how your food tastes and smells. When you're present, you focus on the things that are happening right now. You let go of your thoughts about the past and the future. When you dwell on the past or the future, you miss moments that can heal and strengthen you.  You may miss moments like hearing a child laugh or seeing a friendly face when you think you're all alone. When you're accepting, you don't  the present moment. Instead you accept your thoughts and feelings as they come. You can practice anytime, anywhere, and in any way you choose. You can practice in many ways. Here are a few ideas:  While doing your chores, like washing the dishes, let your mind focus on what's in your hand. What does the dish feel like? Is the water warm or cold? Go outside and take a few deep breaths. What is the air like? Is it warm or cold? When you can, take some time at the start of your day to sit alone and think. Take a slow walk by yourself. Count your steps while you breathe in and out. Try yoga breathing exercises, stretches, and poses to strengthen and relax your muscles. At work, if you can, try to stop for a few moments each hour. Note how your body feels. Let yourself regroup and let your mind settle before you return to what you were doing. If you struggle with anxiety or \"worry thoughts,\" imagine your mind as a blue aliza and your worry thoughts as clouds. Now imagine those worry thoughts floating across your mind's aliza. Just let them pass by as you watch. Follow-up care is a key part of your treatment and safety. Be sure to make and go to all appointments, and call your doctor if you are having problems. It's also a good idea to know your test results and keep a list of the medicines you take. Where can you learn more? Go to http://www.ortiz.com/ and enter M676 to learn more about \"Learning About Mindfulness for Stress. \"  Current as of: February 9, 2022               Content Version: 13.5  © 2006-2022 Healthwise, Incorporated. Care instructions adapted under license by [facility] [employer] [facility] ([facility]).  If you have questions about a medical condition or this instruction, always ask your healthcare professional. [address] any warranty or liability for your use of this information. Learning About Emotional Support  When do you need emotional support? You might find getting support from others helpful when you have a long-term health problem. Often people feel alone, confused, or scared when coping with an illness. But you aren't alone. Other people are going through the same thing you are and know how you feel. Talking with others about your feelings can help you feel better. Your family and friends can give you support. So can your doctor, a support group, or a [employer]. If you have a support network, you will not feel as alone. You will learn new ways to deal with your situation, and you may try harder to overcome it. Where you can get support  Family and friends: They can help you cope by giving you comfort and encouragement. Counseling: Professional counseling can help you cope with situations that interfere with your life and cause stress. Counseling can help you understand and deal with your illness. Your doctor: Find a doctor you trust and feel comfortable with. Be open and honest about your fears and concerns. Your doctor can help you get the right medical treatments, including counseling. Spiritual or Tenriism groups: They can provide comfort and may be able to help you find counseling or other social support services. Social groups: They can help you meet new people and get involved in activities you enjoy. Community support groups: In a support group, you can talk to others who have dealt with the same problems or illness as you. You can encourage one another and learn ways to cope with tough emotions. How to find a support group  Ask your doctor, counselor, or other health professional for suggestions. Contact your local Advent, Hinduism, Faith, or other Tenriism group. Ask your family and friends. Ask people who have the same health concerns. Go online.  Forums and blogs let you read messages from others and leave your own messages. You can exchange stories, vent your frustrations, and ask and answer questions. Contact a city, state, or national group that provides support for your health concerns. Your library or community center may have a list of these groups. Or you can look for information online. Look for a support group that works for you. Ask yourself if you prefer structure and would like a , or if you would like a less formal group. Do you prefer face-to-face meetings? Or do you feel more secure in online chat rooms or forums? Supportive relationships  A supportive relationship includes emotional support such as love, trust, and understanding, as well as advice and concrete help, such as help managing your time. Reach out to others  Family and friends can help you. Ask them to:  Listen to you and give you encouragement. This can keep you from feeling hopeless or alone. Help with small daily tasks or with bigger problems. A helping hand can keep you from feeling overwhelmed. Help you manage a health problem. For example, ask them to go to doctor visits with you. Your loved ones can offer support by being involved in your medical care. Respect your relationships  A good relationship is also a two-way street. You count on help from others, but they also count on you. Know your friends' limits. You don't have to see or call your friends every day. If you are going through a rough patch, ask friends if you can contact them outside of the usual boundaries. Don't always complain or talk about yourself. Know when it's time to stop talking and listen or just enjoy your friend's company. Know that good friends can be a bad influence. For example, if a friend encourages you to drink when you know it will harm you, you may want to end the friendship. Where can you learn more? Go to http://www.woods.com/ and enter G092 to learn more about \"Learning About Emotional Support. \"  Current as of: February 9, 2022               Content Version: 13.5  © 9857-3655 Healthwise, [employer]. Care instructions adapted under license by [facility] ([facility]). If you have questions about a medical condition or this instruction, always ask your healthcare professional. [address] any warranty or liability for your use of this information. Learning About Emotional Support  When do you need emotional support? You might find getting support from others helpful when you have a long-term health problem. Often people feel alone, confused, or scared when coping with an illness. But you aren't alone. Other people are going through the same thing you are and know how you feel. Talking with others about your feelings can help you feel better. Your family and friends can give you support. So can your doctor, a support group, or a Jehovah's witness. If you have a support network, you will not feel as alone. You will learn new ways to deal with your situation, and you may try harder to overcome it. Where you can get support  Family and friends: They can help you cope by giving you comfort and encouragement. Counseling: Professional counseling can help you cope with situations that interfere with your life and cause stress. Counseling can help you understand and deal with your illness. Your doctor: Find a doctor you trust and feel comfortable with. Be open and honest about your fears and concerns. Your doctor can help you get the right medical treatments, including counseling. Spiritual or Sabianist groups: They can provide comfort and may be able to help you find counseling or other social support services. Social groups: They can help you meet new people and get involved in activities you enjoy. Community support groups: In a support group, you can talk to others who have dealt with the same problems or illness as you. You can encourage one another and learn ways to cope with tough emotions.   How to find a support group  Ask your doctor, counselor, or other health professional for suggestions. Contact your local Worship, Amish, Mosque, or other Tenriism group. Ask your family and friends. Ask people who have the same health concerns. Go online. Forums and blogs let you read messages from others and leave your own messages. You can exchange stories, vent your frustrations, and ask and answer questions. Contact a city, state, or national group that provides support for your health concerns. Your library or [place] center may have a list of these groups. Or you can look for information online. Look for a support group that works for you. Ask yourself if you prefer structure and would like a , or if you would like a less formal group. Do you prefer face-to-face meetings? Or do you feel more secure in online chat rooms or forums? Supportive relationships  A supportive relationship includes emotional support such as love, trust, and understanding, as well as advice and concrete help, such as help managing your time. Reach out to others  Family and friends can help you. Ask them to:  Listen to you and give you encouragement. This can keep you from feeling hopeless or alone. Help with small daily tasks or with bigger problems. A helping hand can keep you from feeling overwhelmed. Help you manage a health problem. For example, ask them to go to doctor visits with you. Your loved ones can offer support by being involved in your medical care. Respect your relationships  A good relationship is also a two-way street. You count on help from others, but they also count on you. Know your friends' limits. You don't have to see or call your friends every day. If you are going through a rough patch, ask friends if you can contact them outside of the usual boundaries. Don't always complain or talk about yourself. Know when it's time to stop talking and listen or just enjoy your friend's company.   Know that good friends can be a bad influence. For example, if a friend encourages you to drink when you know it will harm you, you may want to end the friendship. Where can you learn more? Go to http://www.woods.com/ and enter G092 to learn more about \"Learning About Emotional Support. \"  Current as of: February 9, 2022               Content Version: 13.5  © 2006-2022 [employer]. Care instructions adapted under license by [facility] ([facility]). If you have questions about a medical condition or this instruction, always ask your healthcare professional. [place] [address] any warranty or liability for your use of this information. Learning About Being Active as an Older Adult  Why is being active important as you get older? Being active is one of the best things you can do for your health. And it's never too late to start. Being active--or getting active, if you aren't already--has definite benefits. It can:  Give you more energy,  Keep your mind sharp. Improve balance to reduce your risk of falls. Help you manage chronic illness with fewer medicines. No matter how old you are, how fit you are, or what health problems you have, there is a form of activity that will work for you. And the more physical activity you can do, the better your overall health will be. What kinds of activity can help you stay healthy? Being more active will make your daily activities easier. Physical activity includes planned exercise and things you do in daily life. There are four types of activity:  Aerobic. Doing aerobic activity makes your heart and lungs strong. Includes walking, dancing, and gardening. Aim for at least 2½ hours spread throughout the week. It improves your energy and can help you sleep better. Muscle-strengthening. This type of activity can help maintain muscle and strengthen bones.   Includes climbing stairs, using resistance bands, and lifting or carrying heavy loads. Aim for at least twice a week. It can help protect the knees and other joints. Stretching. Stretching gives you better range of motion in joints and muscles. Includes upper arm stretches, calf stretches, and gentle yoga. Aim for at least twice a week, preferably after your muscles are warmed up from other activities. It can help you function better in daily life. Balancing. This helps you stay coordinated and have good posture. Includes heel-to-toe walking, nel chi, and certain types of yoga. Aim for at least 3 days a week. It can reduce your risk of falling. Even if you have a hard time meeting the recommendations, it's better to be more active than less active. All activity done in each category counts toward your weekly total. You'd be surprised how daily things like carrying groceries, keeping up with grandchildren, and taking the stairs can add up. What keeps you from being active? If you've had a hard time being more active, you're not alone. Maybe you remember being able to do more. Or maybe you've never thought of yourself as being active. It's frustrating when you can't do the things you want. Being more active can help. What's holding you back? Getting started. Have a goal, but break it into easy tasks. Small steps build into big accomplishments. Staying motivated. If you feel like skipping your activity, remember your goal. Maybe you want to move better and stay independent. Every activity gets you one step closer. Not feeling your best.  Start with 5 minutes of an activity you enjoy. Prove to yourself you can do it. As you get comfortable, increase your time. You may not be where you want to be. But you're in the process of getting there. Everyone starts somewhere. How can you find safe ways to stay active? Talk with your doctor about any physical challenges you're facing.  Make a plan with your doctor if you have a health problem or aren't sure how to get started with activity. If you're already active, ask your doctor if there is anything you should change to stay safe as your body and health change. If you tend to feel dizzy after you take medicine, avoid activity at that time. Try being active before you take your medicine. This will reduce your risk of falls. If you plan to be active at home, make sure to clear your space before you get started. Remove things like TV cords, coffee tables, and throw rugs. It's safest to have plenty of space to move freely. The key to getting more active is to take it slow and steady. Try to improve only a little bit at a time. Pick just one area to improve on at first. And if an activity hurts, stop and talk to your doctor. Where can you learn more? Go to http://www.ortiz.com/ and enter P600 to learn more about \"Learning About Being Active as an Older Adult. \"  Current as of: October 10, 2022               Content Version: 13.5  © 2006-2022 [employer]. Care instructions adapted under license by [facility] ([facility]). If you have questions about a medical condition or this instruction, always ask your healthcare professional. [address] any warranty or liability for your use of this information. Learning About Activities of Daily Living  What are activities of daily living? Activities of daily living (ADLs) are the basic self-care tasks you do every day. As you age, and if you have health problems, you may find that it's harder to do these things for yourself. That's when you may need some help. Your doctor uses ADLs to measure how much help you need. Knowing what you can and can't do for yourself is an important first step to getting help. And when you have the help you need, you can stay as independent as possible. Your doctor will want to know if you are able to do tasks such as: Take a bath or shower without help. Go to the bathroom by yourself.   Dress and undress without help.  Shave, comb your hair, and brush teeth on your own. Get in and out of bed or a chair without help. Feed yourself without help. If you are having trouble doing basic self-care tasks, talk with your doctor. You may want to bring a caregiver or family member who can help the doctor understand your needs and abilities. How will a doctor assess your ADLs? Asking about ADLs is part of a routine health checkup your doctor will likely do as you age. Your health check might be done in a doctor's office, in your home, or at a hospital. The goal is to find out if you are having any problems that could make your health problems worse or that make it unsafe for you to be on your own. To measure your ADLs, your doctor will ask how hard it is for you to do routine tasks. He or she may also want to know if you have changed the way you do a task because of a health problem. He or she may watch how you:  Walk back and forth. Keep your balance while you stand or walk. Move from sitting to standing or from a bed to a chair. Button or unbutton a shirt or sweater. Remove and put on your shoes. It's normal to feel a little worried or anxious if you find you can't do all the things you used to be able to do. Talking with your doctor about ADLs isn't a test that you either pass or fail. It's just a way to get more information about your health and safety. Follow-up care is a key part of your treatment and safety. Be sure to make and go to all appointments, and call your doctor if you are having problems. It's also a good idea to know your test results and keep a list of the medicines you take. Current as of: October 6, 2021               Content Version: 13.5  © 2006-2022 Healthwise, Incorporated. Care instructions adapted under license by [facility] ([facility]).  If you have questions about a medical condition or this instruction, always ask your healthcare professional. Augusto Barr disclaims any warranty or liability for your use of this information. Advance Directives: Care Instructions  Overview  An advance directive is a legal way to state your wishes at the end of your life. It tells your family and your doctor what to do if you can't say what you want. There are two main types of advance directives. You can change them any time your wishes change. Living will. This form tells your family and your doctor your wishes about life support and other treatment. The form is also called a declaration. Medical power of . This form lets you name a person to make treatment decisions for you when you can't speak for yourself. This person is called a health care agent (health care proxy, health care surrogate). The form is also called a durable power of  for health care. If you do not have an advance directive, decisions about your medical care may be made by a family member, or by a doctor or a  who doesn't know you. It may help to think of an advance directive as a gift to the people who care for you. If you have one, they won't have to make tough decisions by themselves. For more information, including forms for your state, see the [address] website (www.caringinfo.org/planning/advance-directives/). Follow-up care is a key part of your treatment and safety. Be sure to make and go to all appointments, and call your doctor if you are having problems. It's also a good idea to know your test results and keep a list of the medicines you take. What should you include in an advance directive? Many states have a unique advance directive form. (It may ask you to address specific issues.) Or you might use a universal form that's approved by many states. If your form doesn't tell you what to address, it may be hard to know what to include in your advance directive. Use the questions below to help you get started.   Who do you want to make decisions about your medical care if you are not able to?  What life-support measures do you want if you have a serious illness that gets worse over time or can't be cured? What are you most afraid of that might happen? (Maybe you're afraid of having pain, losing your independence, or being kept alive by machines.)  Where would you prefer to die? (Your home? A hospital? A nursing home?)  Do you want to donate your organs when you die? Do you want certain Tenriism practices performed before you die? When should you call for help? Be sure to contact your doctor if you have any questions. Where can you learn more? Go to http://www.ortiz.com/ and enter R264 to learn more about \"Advance Directives: Care Instructions. \"  Current as of: June 16, 2022               Content Version: 13.5  © 6570-2198 Healthwise, Incorporated. Care instructions adapted under license by [facility] ([facility]). If you have questions about a medical condition or this instruction, always ask your healthcare professional. [address] any warranty or liability for your use of this information. Body Mass Index: Care Instructions  Your Care Instructions     Body mass index (BMI) can help you see if your weight is raising your risk for health problems. It uses a formula to compare how much you weigh with how tall you are. A BMI lower than 18.5 is considered underweight. A BMI between 18.5 and 24.9 is considered healthy. A BMI between 25 and 29.9 is considered overweight. A BMI of 30 or higher is considered obese. If your BMI is in the normal range, it means that you have a lower risk for weight-related health problems. If your BMI is in the overweight or obese range, you may be at increased risk for weight-related health problems, such as high blood pressure, heart disease, stroke, arthritis or joint pain, and diabetes.  If your BMI is in the underweight range, you may be at increased risk for health problems such as fatigue, lower protection (immunity) against illness, muscle loss, bone loss, hair loss, and hormone problems. BMI is just one measure of your risk for weight-related health problems. You may be at higher risk for health problems if you are not active, you eat an unhealthy diet, or you drink too much alcohol or use tobacco products. Follow-up care is a key part of your treatment and safety. Be sure to make and go to all appointments, and call your doctor if you are having problems. It's also a good idea to know your test results and keep a list of the medicines you take. How can you care for yourself at home? Practice healthy eating habits. This includes eating plenty of fruits, vegetables, whole grains, lean protein, and low-fat dairy. If your doctor recommends it, get more exercise. Walking is a good choice. Bit by bit, increase the amount you walk every day. Try for at least 30 minutes on most days of the week. Do not smoke. Smoking can increase your risk for health problems. If you need help quitting, talk to your doctor about stop-smoking programs and medicines. These can increase your chances of quitting for good. Limit alcohol to 2 drinks a day for men and 1 drink a day for women. Too much alcohol can cause health problems. If you have a BMI higher than 25  Your doctor may do other tests to check your risk for weight-related health problems. This may include measuring the distance around your waist. A waist measurement of more than 40 inches in men or 35 inches in women can increase the risk of weight-related health problems. Talk with your doctor about steps you can take to stay healthy or improve your health. You may need to make lifestyle changes to lose weight and stay healthy, such as changing your diet and getting regular exercise. If you have a BMI lower than 18.5  Your doctor may do other tests to check your risk for health problems. Talk with your doctor about steps you can take to stay healthy or improve your health.  You may need to make lifestyle changes to gain or maintain weight and stay healthy, such as getting more healthy foods in your diet and doing exercises to build muscle. Where can you learn more? Go to http://www.woods.com/ and enter S176 to learn more about \"Body Mass Index: Care Instructions. \"  Current as of: August 25, 2022               Content Version: 13.5  © 2006-2022 [employer]. Care instructions adapted under license by [facility] ([facility]). If you have questions about a medical condition or this instruction, always ask your healthcare professional. [address] any warranty or liability for your use of this information. DASH Diet: Care Instructions  Your Care Instructions     The DASH diet is an eating plan that can help lower your blood pressure. DASH stands for Dietary Approaches to Stop Hypertension. Hypertension is high blood pressure. The DASH diet focuses on eating foods that are high in calcium, potassium, and magnesium. These nutrients can lower blood pressure. The foods that are highest in these nutrients are fruits, vegetables, low-fat dairy products, nuts, seeds, and legumes. But taking calcium, potassium, and magnesium supplements instead of eating foods that are high in those nutrients does not have the same effect. The DASH diet also includes whole grains, fish, and poultry. The DASH diet is one of several lifestyle changes your doctor may recommend to lower your high blood pressure. Your doctor may also want you to decrease the amount of sodium in your diet. Lowering sodium while following the DASH diet can lower blood pressure even further than just the DASH diet alone. Follow-up care is a key part of your treatment and safety. Be sure to make and go to all appointments, and call your doctor if you are having problems. It's also a good idea to know your test results and keep a list of the medicines you take.   How can you care for yourself at home? Following the DASH diet  Eat 4 to 5 servings of fruit each day. A serving is 1 medium-sized piece of fruit, ½ cup chopped or canned fruit, 1/4 cup dried fruit, or 4 ounces (½ cup) of fruit juice. Choose fruit more often than fruit juice. Eat 4 to 5 servings of vegetables each day. A serving is 1 cup of lettuce or raw leafy vegetables, ½ cup of chopped or cooked vegetables, or 4 ounces (½ cup) of vegetable juice. Choose vegetables more often than vegetable juice. Get 2 to 3 servings of low-fat and fat-free dairy each day. A serving is 8 ounces of milk, 1 cup of yogurt, or 1 ½ ounces of cheese. Eat 6 to 8 servings of grains each day. A serving is 1 slice of bread, 1 ounce of dry cereal, or ½ cup of cooked rice, pasta, or cooked cereal. Try to choose whole-grain products as much as possible. Limit lean meat, poultry, and fish to 2 servings each day. A serving is 3 ounces, about the size of a deck of cards. Eat 4 to 5 servings of nuts, seeds, and legumes (cooked dried beans, lentils, and split peas) each week. A serving is 1/3 cup of nuts, 2 tablespoons of seeds, or ½ cup of cooked beans or peas. Limit fats and oils to 2 to 3 servings each day. A serving is 1 teaspoon of vegetable oil or 2 tablespoons of salad dressing. Limit sweets and added sugars to 5 servings or less a week. A serving is 1 tablespoon jelly or jam, ½ cup sorbet, or 1 cup of lemonade. Eat less than 2,300 milligrams (mg) of sodium a day. If you limit your sodium to 1,500 mg a day, you can lower your blood pressure even more. Be aware that all of these are the suggested number of servings for people who eat 1,800 to 2,000 calories a day. Your recommended number of servings may be different if you need more or fewer calories. Tips for success  Start small. Do not try to make dramatic changes to your diet all at once. You might feel that you are missing out on your favorite foods and then be more likely to not follow the plan.  Make small changes, and stick with them. Once those changes become habit, add a few more changes. Try some of the following:  Make it a goal to eat a fruit or vegetable at every meal and at snacks. This will make it easy to get the recommended amount of fruits and vegetables each day. Try yogurt topped with fruit and nuts for a snack or healthy dessert. Add lettuce, tomato, cucumber, and onion to sandwiches. Combine a ready-made pizza crust with low-fat mozzarella cheese and lots of vegetable toppings. Try using tomatoes, squash, spinach, broccoli, carrots, cauliflower, and onions. Have a variety of cut-up vegetables with a low-fat dip as an appetizer instead of chips and dip. Sprinkle sunflower seeds or chopped almonds over salads. Or try adding chopped walnuts or almonds to cooked vegetables. Try some vegetarian meals using beans and peas. Add garbanzo or kidney beans to salads. Make burritos and tacos with mashed castillo beans or black beans. Where can you learn more? Go to http://www.woods.com/ and enter H967 to learn more about \"DASH Diet: Care Instructions. \"  Current as of: September 7, 2022               Content Version: 13.5  © 0124-2281 [employer]. Care instructions adapted under license by [facility] ([facility]). If you have questions about a medical condition or this instruction, always ask your healthcare professional. [place] [address] any warranty or liability for your use of this information. Learning About Cutting Calories  How do calories affect your weight? Food gives your body energy. Energy from the food you eat is measured in calories. This energy keeps your heart beating, your brain active, and your muscles working. Your body needs a certain number of calories each day. After your body uses the calories it needs, it stores extra calories as fat. To lose weight safely, you have to eat fewer calories while eating in a healthy way.   How many cook with little or no fat, such as broiling, steaming, or grilling. Use cooking spray instead of oil. If you use oil, use a monounsaturated oil, such as canola or olive oil. Trim fat from meats before you cook them. Drain off fat after you brown the meat or while you roast it. Chill soups and stews after you cook them. Then skim the fat off the top after it hardens. Eating out  Order foods that are broiled or poached rather than fried or breaded. Cut back on the amount of butter or margarine that you use on bread. Order sauces, gravies, and salad dressings on the side, and use only a little. When you order pasta, choose tomato sauce rather than cream sauce. Ask for salsa with your baked potato instead of sour cream, butter, cheese, or recinos. Order meals in a small size instead of upgrading to a large. Share an entree, or take part of your food home to eat as another meal.  Share appetizers and desserts. Where can you learn more? Go to http://www.woods.com/ and enter V914 to learn more about \"Learning About Cutting Calories. \"  Current as of: May 9, 2022               Content Version: 13.5  © 2006-2022 Healthwise, Incorporated. Care instructions adapted under license by [facility] ([facility]). If you have questions about a medical condition or this instruction, always ask your healthcare professional. [place] [address] any warranty or liability for your use of this information. Learning About Healthy Weight  What is a healthy weight? A healthy weight is the weight at which you feel good about yourself and have energy for work and play. It's also one that lowers your risk for health problems. What can you do to stay at a healthy weight? It can be hard to stay at a healthy weight, especially when fast food, vending-machine snacks, and processed foods are so easy to find. And with your busy lifestyle, activity may be low on your list of things to do.  But staying at a healthy weight may be easier than you think. Here are some dos and don'ts for staying at a healthy weight. Do eat healthy foods  The kinds of foods you eat have a big impact on both your weight and your health. Reaching and staying at a healthy weight is not about going on a diet. It's about making healthier food choices every day and changing your diet for good. Healthy eating means eating a variety of foods so that you get all the nutrients you need. Your body needs protein, carbohydrate, and fats for energy. They keep your heart beating, your brain active, and your muscles working. On most days, try to eat from each food group. This means eating a variety of: Whole grains, such as whole wheat breads and pastas. Fruits and vegetables. Dairy products, such as low-fat milk, yogurt, and cheese. Lean proteins, such as all types of fish, chicken without the skin, and beans. Don't have too much or too little of one thing. All foods, if eaten in moderation, can be part of healthy eating. Even sweets can be okay. If your favorite foods are high in fat, salt, sugar, or calories, limit how often you eat them. Eat smaller servings, or look for healthy substitutes. Do watch what you eat  Many people eat more than their bodies need. Part of staying at a healthy weight means learning how much food you really need from day to day and not eating more than that. Even with healthy foods, eating too much can make you gain weight. Having a well-balanced diet means that you eat enough, but not too much, and that your food gives you the nutrients you need to stay healthy. So listen to your body. Eat when you're hungry. Stop when you feel satisfied. It's a good idea to have healthy snacks ready for when you get hungry. Keep healthy snacks with you at work, in your car, and at home. If you have a healthy snack easily available, you'll be less likely to pick a candy bar or bag of chips from a vending machine instead.   Some healthy snacks you might want to keep on hand are fruit, low-fat yogurt, string cheese, low-fat microwave popcorn, raisins and other dried fruit, nuts, whole wheat crackers, pretzels, carrots, celery sticks, and broccoli. Do some physical activity  A big part of reaching and staying at a healthy weight is being active. When you're active, you burn calories. This makes it easier to reach and stay at a healthy weight. When you're active on a regular basis, your body burns more calories, even when you're at rest. Being active helps you lose fat and build lean muscle. Try to be active for at least 1 hour every day. This may sound like a lot, but it's okay to be active in smaller blocks of time that add up to 1 hour a day. Any activity that makes your heart beat faster and keeps it there for a while counts. A brisk walk, run, or swim will get your heart beating faster. So will climbing stairs, shooting baskets, or cycling. Even some household chores like vacuuming and mowing the lawn will get your heart rate up. Pick activities that you enjoy--ones that make your heart beat faster, your muscles stronger, and your muscles and joints more flexible. If you find more than one thing you like doing, do them all. You don't have to do the same thing every day. Don't diet  Diets don't work. Diets are temporary. Because you give up so much when you diet, you may be hungry and think about food all the time. And after you stop dieting, you also may overeat to make up for what you missed. Most people who diet end up gaining back the pounds they lost--and more. Remember that healthy bodies come in lots of shapes and sizes. Everyone can get healthier by eating better and being more active. Where can you learn more? Go to http://www.woods.com/ and enter B909 to learn more about \"Learning About Healthy Weight. \"  Current as of: August 25, 2022               Content Version: 13.5  © 4764-5223 Healthwise Incorporated. Care instructions adapted under license by [facility] . If you have questions about a medical condition or this instruction, always ask your healthcare professional. [address] any warranty or liability for your use of this information. Learning About Low-Carbohydrate Diets  What is a low-carbohydrate diet? A low-carbohydrate (or \"low-carb\") diet limits foods and drinks that have carbohydrates. This includes grains, fruits, milk and yogurt, and starchy vegetables like potatoes, beans, and corn. It also avoids foods and drinks that have added sugar. Instead, low-carb diets include foods that are high in protein and fat. Why might you follow a low-carb diet? Low-carb diets may be used for a variety of reasons, such as for weight loss. People who have diabetes may use a low-carb diet to help manage their blood sugar levels. What should you do before you start the diet? Talk to your doctor before you try any diet. This is even more important if you have health problems like kidney disease, heart disease, or diabetes. Your doctor may suggest that you meet with a registered dietitian. A dietitian can help you make an eating plan that works for you. What foods do you eat on a low-carb diet? On a low-carb diet, you choose foods that are high in protein and fat. Examples of these are:  Meat, poultry, and fish. Eggs. Nuts, such as walnuts, pecans, almonds, and peanuts. Peanut butter and other nut butters. Tofu. Avocado. Frann . Non-starchy vegetables like broccoli, cauliflower, green beans, mushrooms, peppers, lettuce, and spinach. Unsweetened non-dairy milks like almond milk and coconut milk. Cheese, cottage cheese, and cream cheese. Where can you learn more? Go to http://www.woods.com/ and enter C335 to learn more about \"Learning About Low-Carbohydrate Diets. \"  Current as of:  May 9, 2022               Content Version: 13.5  © 6660-9474 Healthwise, Incorporated. Care instructions adapted under license by [facility] ([facility]). If you have questions about a medical condition or this instruction, always ask your healthcare professional. [address] any warranty or liability for your use of this information. Eating Healthy Foods: Care Instructions  Your Care Instructions     Eating healthy foods can help lower your risk for disease. Healthy food gives you energy and keeps your heart strong, your brain active, your muscles working, and your bones strong. A healthy diet includes a variety of foods from the basic food groups: grains, vegetables, fruits, milk and milk products, and meat and beans. Some people may eat more of their favorite foods from only one food group and, as a result, miss getting the nutrients they need. So, it is important to pay attention not only to what you eat but also to what you are missing from your diet. You can eat a healthy, balanced diet by making a few small changes. Follow-up care is a key part of your treatment and safety. Be sure to make and go to all appointments, and call your doctor if you are having problems. It's also a good idea to know your test results and keep a list of the medicines you take. How can you care for yourself at home? Look at what you eat  Keep a food diary for a week or two and record everything you eat or drink. Track the number of servings you eat from each food group. For a balanced diet every day, eat a variety of:  6 or more ounce-equivalents of grains, such as cereals, breads, crackers, rice, or pasta, every day. An ounce-equivalent is 1 slice of bread, 1 cup of ready-to-eat cereal, or ½ cup of cooked rice, cooked pasta, or cooked cereal.  2½ cups of vegetables, especially:  Dark-green vegetables such as broccoli and spinach. Orange vegetables such as carrots and sweet potatoes. Dry beans (such as castillo and kidney beans) and peas (such as lentils).   2 cups of fresh, frozen, or canned fruit. A small apple or 1 banana or orange equals 1 cup.  3 cups of nonfat or low-fat milk, yogurt, or other milk products. 5½ ounces of meat and beans, such as chicken, fish, lean meat, beans, nuts, and seeds. One egg, 1 tablespoon of peanut butter, ½ ounce nuts or seeds, or ¼ cup of cooked beans equals 1 ounce of meat. Learn how to read food labels for serving sizes and ingredients. Fast-food and convenience-food meals often contain few or no fruits or vegetables. Make sure you eat some fruits and vegetables to make the meal more nutritious. Look at your food diary. For each food group, add up what you have eaten and then divide the total by the number of days. This will give you an idea of how much you are eating from each food group. See if you can find some ways to change your diet to make it more healthy. Start small  Do not try to make dramatic changes to your diet all at once. You might feel that you are missing out on your favorite foods and then be more likely to fail. Start slowly, and gradually change your habits. Try some of the following:  Use whole wheat bread instead of white bread. Use nonfat or low-fat milk instead of whole milk. Eat brown rice instead of white rice, and eat whole wheat pasta instead of white-flour pasta. Try low-fat cheeses and low-fat yogurt. Add more fruits and vegetables to meals and have them for snacks. Add lettuce, tomato, cucumber, and onion to sandwiches. Add fruit to yogurt and cereal.  Enjoy food  You can still eat your favorite foods. You just may need to eat less of them. If your favorite foods are high in fat, salt, and sugar, limit how often you eat them, but do not cut them out entirely. Eat a wide variety of foods. Make healthy choices when eating out  The type of restaurant you choose can help you make healthy choices. Even fast-food chains are now offering more low-fat or healthier choices on the menu.   Choose smaller portions, or take half of your meal home. When eating out, try:  A veggie pizza with a whole wheat crust or grilled chicken (instead of sausage or pepperoni). Pasta with roasted vegetables, grilled chicken, or marinara sauce instead of cream sauce. A vegetable wrap or grilled chicken wrap. Broiled or poached food instead of fried or breaded items. Make healthy choices easy  Buy packaged, prewashed, ready-to-eat fresh vegetables and fruits, such as baby carrots, salad mixes, and chopped or shredded broccoli and cauliflower. Buy packaged, presliced fruits, such as melon or pineapple. Choose 100% fruit or vegetable juice instead of soda. Limit juice intake to 4 to 6 oz (½ to ¾ cup) a day. Blend low-fat yogurt, fruit juice, and canned or frozen fruit to make a smoothie for breakfast or a snack. Where can you learn more? Go to http://www.woods.com/ and enter T756 to learn more about \"Eating Healthy Foods: Care Instructions. \"  Current as of: May 9, 2022               Content Version: 13.5  © 8634-4870 [employer]. Care instructions adapted under license by [facility] ([facility]). If you have questions about a medical condition or this instruction, always ask your healthcare professional. [place] [address] any warranty or liability for your use of this information. Abnormal Weight Gain: Care Instructions  Your Care Instructions     There are two types of weight gain--normal and abnormal. Normal weight gain is usually caused by eating too much or exercising too little. It can also happen as you get older. But abnormal weight gain has other causes. It can be caused by a problem with your thyroid gland, called hypothyroidism. Or it can be caused by a problem with your adrenal glands, called Cushing's syndrome. Or your body could be holding too much fluid because of kidney, liver, or heart problems. In some cases, a medicine you take can cause you to gain weight.   You can work with your doctor to find out the cause of your weight gain. You will probably need tests to do this. Follow-up care is a key part of your treatment and safety. Be sure to make and go to all appointments, and call your doctor if you are having problems. It's also a good idea to know your test results and keep a list of the medicines you take. How can you care for yourself at home? Weigh yourself at the same time every day. It's best to do it first thing in the morning after you empty your bladder. Be sure to always wear the same amount of clothing. Write down any changes in your weight and the possible causes. Discuss these with your doctor. Your doctor may want you to change your diet and exercise habits. A good way to lose weight is to reduce calories and increase exercise. Walking is an easy way to get exercise. Try to walk a little longer every day. You also may want to swim, bike, or do other activities. Ask your doctor if you should see a dietitian. This is a person who can help you plan meals that work best for your lifestyle. If your doctor prescribed medicines, take them exactly as prescribed. Call your doctor if you think you are having a problem with your medicine. You will get more details on the specific medicines your doctor prescribes. When should you call for help? Watch closely for changes in your health, and be sure to contact your doctor if:    You do not get better as expected.     You continue to gain weight. Where can you learn more? Go to http://www.woods.com/ and enter A175 to learn more about \"Abnormal Weight Gain: Care Instructions. \"  Current as of: April 13, 2022               Content Version: 13.5  © 0323-6467 Healthwise, Incorporated. Care instructions adapted under license by [facility] ([facility]).  If you have questions about a medical condition or this instruction, always ask your healthcare professional. Tyesha Ortiz disclaims any warranty or liability for your use of this information. A Healthy Heart: Care Instructions  Your Care Instructions     Coronary artery disease, also called heart disease, occurs when a substance called plaque builds up in the vessels that supply oxygen-rich blood to your heart muscle. This can narrow the blood vessels and reduce blood flow. A heart attack happens when blood flow is completely blocked. A high-fat diet, smoking, and other factors increase the risk of heart disease. Your doctor has found that you have a chance of having heart disease. You can do lots of things to keep your heart healthy. It may not be easy, but you can change your diet, exercise more, and quit smoking. These steps really work to lower your chance of heart disease. Follow-up care is a key part of your treatment and safety. Be sure to make and go to all appointments, and call your doctor if you are having problems. It's also a good idea to know your test results and keep a list of the medicines you take. How can you care for yourself at home? Diet    Use less salt when you cook and eat. This helps lower your blood pressure. Taste food before salting. Add only a little salt when you think you need it. With time, your taste buds will adjust to less salt.     Eat fewer snack items, fast foods, canned soups, and other high-salt, high-fat, processed foods.     Read food labels and try to avoid saturated and trans fats. They increase your risk of heart disease by raising cholesterol levels.     Limit the amount of solid fat-butter, margarine, and shortening-you eat. Use olive, peanut, or canola oil when you cook. Bake, broil, and steam foods instead of frying them.     Eat a variety of fruit and vegetables every day. Dark green, deep orange, red, or yellow fruits and vegetables are especially good for you.  Examples include spinach, carrots, peaches, and berries.     Foods high in fiber can reduce your cholesterol and provide important vitamins and minerals. High-fiber foods include whole-grain cereals and breads, oatmeal, beans, brown rice, citrus fruits, and apples.     Eat lean proteins. Heart-healthy proteins include seafood, lean meats and poultry, eggs, beans, peas, nuts, seeds, and soy products.     Limit drinks and foods with added sugar. These include candy, desserts, and soda pop. Lifestyle changes    If your doctor recommends it, get more exercise. Walking is a good choice. Bit by bit, increase the amount you walk every day. Try for at least 30 minutes on most days of the week. You also may want to swim, bike, or do other activities.     Do not smoke. If you need help quitting, talk to your doctor about stop-smoking programs and medicines. These can increase your chances of quitting for good. Quitting smoking may be the most important step you can take to protect your heart. It is never too late to quit.     Limit alcohol to 2 drinks a day for men and 1 drink a day for women. Too much alcohol can cause health problems.     Manage other health problems such as diabetes, high blood pressure, and high cholesterol. If you think you may have a problem with alcohol or drug use, talk to your doctor. Medicines    Take your medicines exactly as prescribed. Call your doctor if you think you are having a problem with your medicine.     If your doctor recommends aspirin, take the amount directed each day. Make sure you take aspirin and not another kind of pain reliever, such as acetaminophen (Tylenol). When should you call for help? Call 911 if you have symptoms of a heart attack. These may include:    Chest pain or pressure, or a strange feeling in the chest.     Sweating.     Shortness of breath.     Pain, pressure, or a strange feeling in the back, neck, jaw, or upper belly or in one or both shoulders or arms.     Lightheadedness or sudden weakness.     A fast or irregular heartbeat.    After you call 911, the  may tell you to chew 1 adult-strength or 2 to 4 low-dose aspirin. Wait for an ambulance. Do not try to drive yourself. Watch closely for changes in your health, and be sure to contact your doctor if you have any problems. Where can you learn more? Go to http://www.ortiz.com/ and enter F075 to learn more about \"A Healthy Heart: Care Instructions. \"  Current as of: September 7, 2022               Content Version: 13.5  © 7941-3121 Healthwise, [place]. Care instructions adapted under license by [employer] [facility] [employer] [facility] ([facility]). If you have questions about a medical condition or this instruction, always ask your healthcare professional. [place] [address] any warranty or liability for your use of this information. Personalized Preventive Plan for [facility] - 12/29/2022  Medicare offers a range of preventive health benefits. Some of the tests and screenings are paid in full while other may be subject to a deductible, co-insurance, and/or copay. Some of these benefits include a comprehensive review of your medical history including lifestyle, illnesses that may run in your family, and various assessments and screenings as appropriate. After reviewing your medical record and screening and assessments performed today your provider may have ordered immunizations, labs, imaging, and/or referrals for you. A list of these orders (if applicable) as well as your Preventive Care list are included within your After Visit Summary for your review. Other Preventive Recommendations:    A preventive eye exam performed by an eye specialist is recommended every 1-2 years to screen for glaucoma; cataracts, macular degeneration, and other eye disorders. A preventive dental visit is recommended every 6 months. Try to get at least 150 minutes of exercise per week or 10,000 steps per day on a pedometer .   Order or download the FREE \"Exercise & Physical Activity: Your Everyday Guide\" from [employer]. Call 5-894.915.5183 or search [employer] online. You need 6410-4939 mg of calcium and 7201-3800 IU of vitamin D per day. It is possible to meet your calcium requirement with diet alone, but a vitamin D supplement is usually necessary to meet this goal.  When exposed to the sun, use a sunscreen that protects against both UVA and UVB radiation with an SPF of 30 or greater. Reapply every 2 to 3 hours or after sweating, drying off with a towel, or swimming. Always wear a seat belt when traveling in a car. Always wear a helmet when riding a bicycle or motorcycle.

## 2022-12-30 ENCOUNTER — TELEPHONE (OUTPATIENT)
Dept: FAMILY MEDICINE CLINIC | Age: 67
End: 2022-12-30

## 2022-12-30 NOTE — TELEPHONE ENCOUNTER
CK level 11/2/2022 normal 59, CK level normal 4/27/2022    Continue the same treatment, patient has diabetes  FYI    Drug Safety Consideration: PRAVASTATIN SODIUM and SKELETAL MUSCLE EFFECTS Your patient is receiving PRAVASTATIN SODIUM and may have experienced SKELETAL MUSCLE EFFECTS based on claims records. Immediate drug discontinuation is advised if myopathy is suspected or marked creatine kinase elevation occurs. Myopathy and rhabdomyolysis have been reported with statin use; higher doses, advanced age, uncontrolled hypothyroidism, renal impairment, and use of agents that inhibit statin metabolism increase the risk.   Images  Patient Demographics

## 2023-01-19 ENCOUNTER — TELEPHONE (OUTPATIENT)
Dept: FAMILY MEDICINE CLINIC | Age: 68
End: 2023-01-19

## 2023-01-19 NOTE — TELEPHONE ENCOUNTER
To put some lidocaine 3 times a day, or ice    Future Appointments   Date Time Provider Keri Greggi   3/13/2023 11:10 AM Chantel Valenzuela MD .  [facility]   3/23/2023  1:00 PM Natasha Doss MD [facility]   4/7/2023  2:30 PM [facility]

## 2023-01-19 NOTE — TELEPHONE ENCOUNTER
Pt called in stating great grandson is staying with them and he has been running and jumping on him and on the left side his elbow is hitting him right in his chest.    Right side of breast states there is a sharp pain on the nipple and hurts to touch, no discoloration. Haven't injured himself in anyway recently either. Pt was adv to go to walk in clinic for same day evaluation with no openings available at this time.

## 2023-01-27 ENCOUNTER — TELEPHONE (OUTPATIENT)
Dept: UROLOGY | Age: 68
End: 2023-01-27

## 2023-01-27 NOTE — TELEPHONE ENCOUNTER
Patient called in and stated \"I have been receiving paperwork from [employer]. They were needing a signature from me. I have been sending it back in but, they send the same form back. I don't know what to do. I have been out of the medication for three weeks now. \"    Lashmeetrochelle Santamaria is awaiting a call back from [employer] rep

## 2023-02-07 NOTE — TELEPHONE ENCOUNTER
Patient states he has been out of the medication for three weeks now. Patient's re-enrollment has . New enrollment form is needed.

## 2023-02-10 ENCOUNTER — TELEPHONE (OUTPATIENT)
Dept: FAMILY MEDICINE CLINIC | Age: 68
End: 2023-02-10

## 2023-02-10 ENCOUNTER — CARE COORDINATION (OUTPATIENT)
Dept: CARE COORDINATION | Age: 68
End: 2023-02-10

## 2023-02-10 DIAGNOSIS — E11.42 TYPE 2 DIABETES MELLITUS WITH DIABETIC POLYNEUROPATHY, WITHOUT LONG-TERM CURRENT USE OF INSULIN (HCC): ICD-10-CM

## 2023-02-10 DIAGNOSIS — M1A.09X0 CHRONIC GOUT OF MULTIPLE SITES, UNSPECIFIED CAUSE: ICD-10-CM

## 2023-02-10 RX ORDER — PREGABALIN 200 MG/1
200 CAPSULE ORAL 3 TIMES DAILY
Qty: 270 CAPSULE | Refills: 0 | Status: SHIPPED | OUTPATIENT
Start: 2023-02-10 | End: 2023-05-11

## 2023-02-10 RX ORDER — ALLOPURINOL 100 MG/1
100 TABLET ORAL DAILY
Qty: 90 TABLET | Refills: 1 | Status: SHIPPED | OUTPATIENT
Start: 2023-02-10

## 2023-02-10 NOTE — TELEPHONE ENCOUNTER
Catherine Mackay, Mary Jane Muñoz MD  [facility],     Refill requests from [facility] were mistakenly sent to the [facility]. The patient requests refills for Lyrica capsules and allopurinol tablets. Thanks,   Catherine Mackay, [facility],PharmD, BCACP   2/10/2023   11:20 AM      Diagnosis Orders   1. Type 2 diabetes mellitus with diabetic polyneuropathy, without long-term current use of insulin ([facility])  pregabalin (LYRICA) 200 MG capsule      2.  Chronic gout of multiple sites, unspecified cause  allopurinol (ZYLOPRIM) 100 MG tablet           Future Appointments   Date Time Provider Keri Tameka   3/13/2023 11:10 AM Brad Schultz MD [facility]   3/23/2023  1:00 PM Mukul Campos MD [facility]   4/7/2023  2:30 PM Bereket Pacheco MD  [facility]

## 2023-02-13 ENCOUNTER — TELEPHONE (OUTPATIENT)
Dept: UROLOGY | Age: 68
End: 2023-02-13

## 2023-02-13 NOTE — CARE COORDINATION
Rolando Cuello for follow up. He stated that he was watching his 2 grandchildren and asked that [facility] call back on Monday morning.

## 2023-02-15 ASSESSMENT — ENCOUNTER SYMPTOMS: DYSPNEA ASSOCIATED WITH: EXERTION

## 2023-02-15 NOTE — CARE COORDINATION
Ambulatory Care Coordination Note  2/15/2023    Patient Current Location:  Home: [address] [facility]     ACM contacted the patient by telephone. Verified name and  with patient as identifiers. Provided introduction to self, and explanation of the ACM role. Challenges to be reviewed by the provider   Additional needs identified to be addressed with provider: No  none               Method of communication with provider: none. ACM: Desmond Hannon RN  Summary  Date Care Coordination Episode Started:  2022    Reason for Call Today:     CC Follow Up    Reason patient is in Care Coordination:     Payer Referral  RAEV 57%  COPD  CHF  DM    Topics Discussed Today:     COPD- called Erica Banda for follow up. He stated that he was coming down with a cold. They took their grandchildren to a hotel over the weekend. He said after being in the pool area and staying in the hotel he started to lose his voice. He has been using his oxygen more since coming home from the hotel. He is using 2 1/2-3L per cannula. He didn't take the oxygen with him because the tanks are too big and heavy. He did take his CPAP machine. He follows with Dr. Roxanne Choudhary, pulmonology. He has an appt with him on 3/20. He said they were trying to get him a POC. His pulse ox is 98% with the oxygen on. He got down to 91-92% with it off. He has an appt with vascular- Dr. Ihsan Avina, on 3/2/23; urology on 3/13; renal on 3/23; cardiology  on 3/9; and his PCP in April. He states he has some back pain; N/T and burning in his feet and hands. He follows with pain management. He's had injections. Taking Lyrica currently.      Interventions completed today:    Assessments completed today:     Fall risk  Initial assessment- in progress  Medication reconciliation  SDOH- in progress  Diabetes, CHF, COPD    Care Coordinator plan of care:     Erica Banda will take his medications as prescribed by his providers  Monitor weight, BP, pulse oximetry and blood sugar daily and record  Continue with oxygen as ordered  Keep upcoming appts with providers  Will call next week to assess symptoms    Offered patient enrollment in the Remote Patient Monitoring (RPM) program for in-home monitoring: Patient declined. Lab Results       None            Care Coordination Interventions    Referral from Primary Care Provider: No  Suggested Interventions and Community Resources  Pulmonary Rehab: Not Started  Zone Management Tools: In Process          Goals Addressed                   This Visit's Progress     Conditions and Symptoms        I will schedule office visits, as directed by my provider. I will keep my appointment or reschedule if I have to cancel. I will notify my provider of any barriers to my plan of care. I will follow my Zone Management tool to seek urgent or emergent care. I will notify my provider of any symptoms that indicate a worsening of my condition. Barriers: lack of education  Plan for overcoming my barriers: Care Management   Confidence: 8/10  Anticipated Goal Completion Date: 5/31/2023         Medication Management        I will take my medication as directed. I will notify my provider of any problems with medications, like adverse effects or side effects. I will notify my provider/Care Coordinator if I am unable to afford my medications. I will notify my provider for advice before I stop taking any of my medication.     Barriers: lack of education  Plan for overcoming my barriers: Care Management   Confidence: 9/10  Anticipated Goal Completion Date: 5/31/2023              Future Appointments   Date Time Provider Keri English   3/13/2023 11:10 AM Lefty Fernandes MD [facility]   3/23/2023  1:00 PM Lucy King MD [facility]   4/7/2023  2:30 PM Dione Smith MD [facility]   ,   Diabetes Assessment      Meal Planning: Avoidance of concentrated sweets   How often do you test your blood sugar?: Daily   Do you have barriers with adherence to non-pharmacologic self-management interventions?  (Nutrition/Exercise/Self-Monitoring): Yes   Have you ever had to go to the ED for symptoms of low blood sugar?: No       No patient-reported symptoms        ,   Congestive Heart Failure Assessment       Shortness of breath (worse than baseline)      Symptoms:  CHF associated dyspnea on exertion: Pos      Symptom course: worsening  Weight trend: stable  Salt intake watch compared to last visit: stable     , and   COPD Assessment    Does the patient understand envrionmental exposure?: Yes  Is the patient able to verbalize Rescue vs. Long Acting medications?: Yes  Does the patient have a nebulizer?: Yes     Shortness of breath (worse than baseline), Other symptoms causing concern         Symptoms:  COPD associated increased fatigue: Pos      Symptom course: stable  Breathlessness: exertion  Increase use of rapid acting/rescue inhaled medications?: No  Change in chronic cough?: Increased  Change in sputum?: No/At Baseline  Self Monitoring - SaO2: Yes  Have you had a recent diagnosis of pneumonia either by PCP or at a hospital?: No

## 2023-02-27 ENCOUNTER — TELEPHONE (OUTPATIENT)
Dept: UROLOGY | Age: 68
End: 2023-02-27

## 2023-02-27 NOTE — TELEPHONE ENCOUNTER
Writer did contact [employer]. Application is still under benefit investigation. As of right now nothing is needed from the office or patient. They will call with an update if anything is needed. They did apologize because, there is a delay in processing applications. Line was busy, unable to leave a voicemail notifying patient.

## 2023-02-27 NOTE — TELEPHONE ENCOUNTER
Patient called in checking on status of [employer] re-enrollment. Can you check in on this and call the patient with an update? He has medication to last him the rest of this week and then he will be out again.

## 2023-03-07 ENCOUNTER — HOSPITAL ENCOUNTER (OUTPATIENT)
Age: 68
Setting detail: SPECIMEN
Discharge: HOME | End: 2023-03-07
Payer: COMMERCIAL

## 2023-03-07 DIAGNOSIS — M19.042 PRIMARY OSTEOARTHRITIS OF BOTH HANDS: ICD-10-CM

## 2023-03-07 DIAGNOSIS — M17.12 PRIMARY OSTEOARTHRITIS OF LEFT KNEE: ICD-10-CM

## 2023-03-07 DIAGNOSIS — F33.0 MILD EPISODE OF RECURRENT MAJOR DEPRESSIVE DISORDER (HCC): ICD-10-CM

## 2023-03-07 DIAGNOSIS — J44.9 COPD MIXED TYPE (HCC): ICD-10-CM

## 2023-03-07 DIAGNOSIS — C61 PROSTATE CANCER (HCC): ICD-10-CM

## 2023-03-07 DIAGNOSIS — E11.42 TYPE 2 DIABETES MELLITUS WITH DIABETIC POLYNEUROPATHY, WITHOUT LONG-TERM CURRENT USE OF INSULIN (HCC): ICD-10-CM

## 2023-03-07 DIAGNOSIS — M1A.49X0 OTHER SECONDARY CHRONIC GOUT OF MULTIPLE SITES WITHOUT TOPHUS: ICD-10-CM

## 2023-03-07 DIAGNOSIS — M19.041 PRIMARY OSTEOARTHRITIS OF BOTH HANDS: ICD-10-CM

## 2023-03-07 DIAGNOSIS — M65.311 TRIGGER FINGER OF RIGHT THUMB: ICD-10-CM

## 2023-03-07 DIAGNOSIS — M47.26 OSTEOARTHRITIS OF SPINE WITH RADICULOPATHY, LUMBAR REGION: ICD-10-CM

## 2023-03-07 LAB — PROSTATE SPECIFIC ANTIGEN: 0.24 NG/ML

## 2023-03-07 PROCEDURE — 84153 ASSAY OF PSA TOTAL: CPT

## 2023-03-07 PROCEDURE — 36415 COLL VENOUS BLD VENIPUNCTURE: CPT

## 2023-03-07 RX ORDER — BACLOFEN 10 MG/1
10 TABLET ORAL 2 TIMES DAILY PRN
Qty: 180 TABLET | Refills: 0 | Status: SHIPPED | OUTPATIENT
Start: 2023-03-07

## 2023-03-07 RX ORDER — ASPIRIN 81 MG/1
81 TABLET ORAL DAILY
Qty: 90 TABLET | Refills: 0 | Status: SHIPPED | OUTPATIENT
Start: 2023-03-07

## 2023-03-07 RX ORDER — ALBUTEROL SULFATE 90 UG/1
2 AEROSOL, METERED RESPIRATORY (INHALATION) EVERY 6 HOURS PRN
Qty: 24 G | Refills: 3 | Status: SHIPPED | OUTPATIENT
Start: 2023-03-07

## 2023-03-07 RX ORDER — ALBUTEROL SULFATE 2.5 MG/3ML
2.5 SOLUTION RESPIRATORY (INHALATION) EVERY 4 HOURS PRN
Qty: 120 EACH | Refills: 3 | Status: SHIPPED | OUTPATIENT
Start: 2023-03-07

## 2023-03-07 RX ORDER — MILNACIPRAN HYDROCHLORIDE 50 MG/1
50 TABLET, FILM COATED ORAL DAILY
Qty: 60 TABLET | Refills: 1 | Status: SHIPPED | OUTPATIENT
Start: 2023-03-07 | End: 2023-03-07 | Stop reason: SDUPTHER

## 2023-03-07 RX ORDER — MILNACIPRAN HYDROCHLORIDE 50 MG/1
50 TABLET, FILM COATED ORAL DAILY
Qty: 90 TABLET | Refills: 1 | Status: SHIPPED | OUTPATIENT
Start: 2023-03-07

## 2023-03-07 NOTE — TELEPHONE ENCOUNTER
Please Approve or Refuse.   Send to Pharmacy per Pt's Request:      Next Visit Date:  4/7/2023   Last Visit Date: 12/29/2022    Hemoglobin A1C (%)   Date Value   12/29/2022 5.8   09/23/2022 5.8   06/10/2022 5.6             ( goal A1C is < 7)   BP Readings from Last 3 Encounters:   12/29/22 110/84   12/05/22 128/79   10/26/22 138/70          (goal 120/80)  BUN   Date Value Ref Range Status   11/02/2022 11 8 - 23 mg/dL Final     Creatinine   Date Value Ref Range Status   11/02/2022 0.68 (L) 0.70 - 1.20 mg/dL Final     Potassium   Date Value Ref Range Status   11/02/2022 4.5 3.7 - 5.3 mmol/L Final

## 2023-03-13 ENCOUNTER — OFFICE VISIT (OUTPATIENT)
Dept: UROLOGY | Age: 68
End: 2023-03-13
Payer: COMMERCIAL

## 2023-03-13 VITALS
SYSTOLIC BLOOD PRESSURE: 136 MMHG | DIASTOLIC BLOOD PRESSURE: 78 MMHG | BODY MASS INDEX: 38.87 KG/M2 | RESPIRATION RATE: 16 BRPM | HEART RATE: 89 BPM | WEIGHT: 287 LBS | TEMPERATURE: 97.8 F | HEIGHT: 72 IN

## 2023-03-13 DIAGNOSIS — C61 PROSTATE CANCER (HCC): Primary | ICD-10-CM

## 2023-03-13 DIAGNOSIS — R35.0 FREQUENCY OF MICTURITION: ICD-10-CM

## 2023-03-13 DIAGNOSIS — R23.2 HOT FLASHES: ICD-10-CM

## 2023-03-13 PROCEDURE — 99214 OFFICE O/P EST MOD 30 MIN: CPT | Performed by: UROLOGY

## 2023-03-13 PROCEDURE — 1123F ACP DISCUSS/DSCN MKR DOCD: CPT | Performed by: UROLOGY

## 2023-03-13 PROCEDURE — 3078F DIAST BP <80 MM HG: CPT | Performed by: UROLOGY

## 2023-03-13 PROCEDURE — 3075F SYST BP GE 130 - 139MM HG: CPT | Performed by: UROLOGY

## 2023-03-13 ASSESSMENT — ENCOUNTER SYMPTOMS
COUGH: 0
NAUSEA: 0
BACK PAIN: 0
CONSTIPATION: 0
VOMITING: 0
EYE PAIN: 0
SHORTNESS OF BREATH: 0
ABDOMINAL PAIN: 0
DIARRHEA: 0
WHEEZING: 0

## 2023-03-13 NOTE — PROGRESS NOTES
[address] [address]  Dept: 92 Margie [facility] Urology Office Note - Established    Patient:  Kusum Skinner  YOB: 1955  Date: 3/13/2023    The patient is a 75 y.o. male who presents todayfor evaluation of the following problems:   Chief Complaint   Patient presents with    Follow-up     3 MONTH W/ PSA       HPI  This is a very pleasant 68-year-old gentleman with advanced prostate cancer. He has been on Eligard. He was taking Xtandi. Unfortunately, in the process of getting his insurance approval, he has had several interruptions with Xtandi. His PSA has risen to 0.24. He is having hot flashes but does continue to void without significant difficulty. He does have some frequency but this is tolerable. Summary of old records: N/A    Additional History: N/A    Procedures Today: N/A    Urinalysis today:  No results found for this visit on 03/13/23. Last several PSA's:  Lab Results   Component Value Date    PSA 0.24 03/07/2023    PSA 0.11 11/30/2022    PSA 0.04 08/10/2022     Last total testosterone:  Lab Results   Component Value Date    TESTOSTERONE <3 (L) 11/30/2022       AUA Symptom Score (3/13/2023):   INCOMPLETE EMPTYING: How often have you had the sensation of not emptying your bladder?: Not at all  FREQUENCY: How often do you have to urinate less than every two hours?: Not at all  INTERMITTENCY: How often have you found you stopped and started again several times when you urinated?: Not at all  URGENCY: How often have you found it difficult to postpone urination?: Not at all  WEAK STREAM: How often have you had a weak urinary stream?: Not at all  STRAINING: How often have you had to strain to start  urination?: Not at all  NOCTURIA: How many times did you typically get up at night to uriniate?: NONE  TOTAL I-PSS SCORE[de-identified] 0  How would you feel if you were to spend the rest of your life with your urinary condition?: Delighted    Last BUN and creatinine:  Lab Results   Component Value Date    BUN 11 11/02/2022     Lab Results   Component Value Date    CREATININE 0.68 (L) 11/02/2022       Additional Lab/Culture results: none    Imaging Reviewed during this Office Visit: none  (results were independently reviewed by physician and radiology report verified)    PAST MEDICAL, FAMILY AND SOCIAL HISTORY UPDATE:  Past Medical History:   Diagnosis Date    Acid reflux     Anemia, blood loss 10/7/2018    Arthritis     C. difficile colitis 3/19/2020    Chronic bilateral low back pain with bilateral sciatica 11/3/2016    Chronic diastolic heart failure ([address] 2/25/2021    Complete tear of left rotator cuff 7/18/2018    COPD (chronic obstructive pulmonary disease) ([address]     Coronary artery disease involving native coronary artery of native heart without angina pectoris 2/2/2020    Degenerative disc disease, cervical 7/28/2019    GI bleed 3/19/2020    Gout     H/O cardiac catheterization     yrs ago   no stents    History of blood transfusion     Hyperlipidemia     Hyperlipidemia with target LDL less than 100 1/20/2016    Hyperparathyroidism ([address] 1/17/2020    Hypertension     Knee pain, chronic     left    Liver disease     MDRO (multiple drug resistant organisms) resistance     Melena 3/19/2020    Memory loss     Moderate episode of recurrent major depressive disorder ([address] 1/20/2016    Obesity, Class III, BMI 40-49.9 (morbid obesity) ([address] 1/20/2016    REYNALDO on CPAP 5/6/2017    Osteoarthritis     Peripheral arterial occlusive disease ([address] 3/25/2017    Pneumonia 3/9/2020    Polypharmacy 3/25/2017    Positive FIT (fecal immunochemical test) 4/11/2017    Prolonged emergence from general anesthesia 01/05/2017    Patient \"on life support for 3 days\" after back surgery due to being given succinylcholine    Prostate CA ([address] 1/20/2016    Prostate cancer ([address] 10/2013    finished radiation tx 5/2014 Pseudocholinesterase deficiency 03/25/2017    Pt.  \"on life support\" for 3 days after back surgery 1/5/17 after being given succinylcholine    Severe obesity (BMI 35.0-35.9 with comorbidity) ([facility] 2/2/2020    Short of breath on exertion     Sleep apnea     uses CPAP machine nightly    Status post lumbar laminectomy 3/25/2017    Stenosis of left carotid artery 10/7/2018    Syncope and collapse 3/19/2020    Tubular adenoma of colon 4/11/17 5/13/2017    Type 2 diabetes mellitus with hyperglycemia, without long-term current use of insulin ([facility] 3/25/2017    Lab Results Component Value Date  LABA1C 7.1 (H) 03/23/2017     Unintended weight loss 10/7/2018    Vitamin D deficiency 3/25/2017    Wears glasses      Past Surgical History:   Procedure Laterality Date    BACK SURGERY      x 2     BACK SURGERY  01/05/2017    lumbar laminectomy L2, L3, L4    BRONCHOSCOPY N/A 3/13/2020    BRONCHOSCOPY performed by Jayson Beth MD at [facility]  2007    no stents    CAROTID ENDARTERECTOMY Right 12/16/2019    Dr. Angelique Lou Bilateral     CHOLECYSTECTOMY, LAPAROSCOPIC N/A 2/16/2021    CHOLECYSTECTOMY LAPAROSCOPIC ROBOTIC XI performed by Hiren Dias MD at [facility], COLON, DIAGNOSTIC      HERNIA REPAIR Left 11/18/2020    HERNIA INGUINAL REPAIR [address] OPEN performed by Hiren Dias MD at [address] Left     LUMBAR LAMINECTOMY  01/05/2017    L2-L4    VA ARTHROPLASTY GLENOHUMERAL JOINT TOTAL SHOULDER Left 7/18/2018    SHOULDER TOTAL ARTHROPLASTY REVERSE LEFT DJO & BICEP TENDON TRANSFER performed by Favio Barrios MD at [address] [facility] Left 8/1/2018    SHOULDER CLOSED REDUCTION WITH C-ARM VISUALIZATION performed by Favio Barrios MD at [facility] N/A 5/9/2017    COLONOSCOPY WITH BIOPSY performed by Elias Pantoja DO at [facility]    [place] SHOULDER ARTHRPLSTY HUMERAL/GLENOID COMPNT Left 8/3/2018    SHOULDER TOTAL REVERSE  ARTHROPLASTY REVISION performed by Marya Ramirez MD at [address]? rotator cuff repair    SHOULDER SURGERY Left 10/15/2020    SHOULDER ARTHROSCOPY W/INTEROP CULTURES performed by Marya Ramirez MD at [facility]      ear, forehead    TONSILLECTOMY AND ADENOIDECTOMY      UPPER GASTROINTESTINAL ENDOSCOPY N/A 3/19/2020    EGD BIOPSY performed by Tayo Steel MD at [facility]     Family History   Problem Relation Age of Onset    Diabetes Mother     Lung Cancer Brother     Liver Cancer Brother     Cancer Father      Outpatient Medications Marked as Taking for the 3/13/23 encounter (Office Visit) with Antonio Briones MD   Medication Sig Dispense Refill    baclofen (LIORESAL) 10 MG tablet Take 1 tablet by mouth 2 times daily as needed (MUSCLE SPASMS) Causes sedation, do not drive while taking this medication 180 tablet 0    albuterol (PROVENTIL) (2.5 MG/3ML) 0.083% nebulizer solution Take 3 mLs by nebulization every 4 hours as needed for Wheezing or Shortness of Breath 120 each 3    aspirin EC 81 MG EC tablet Take 1 tablet by mouth daily 90 tablet 0    albuterol sulfate HFA (PROVENTIL;VENTOLIN;PROAIR) 108 (90 Base) MCG/ACT inhaler Inhale 2 puffs into the lungs every 6 hours as needed for Wheezing or Shortness of Breath 24 g 3    milnacipran HCl (SAVELLA) 50 MG TABS Take 1 tablet by mouth daily 90 tablet 1    pregabalin (LYRICA) 200 MG capsule Take 1 capsule by mouth 3 times daily for 90 days.  270 capsule 0    allopurinol (ZYLOPRIM) 100 MG tablet Take 1 tablet by mouth daily 90 tablet 1    Enzalutamide (XTANDI) 40 MG TABS Take 1 tablet by mouth daily      diclofenac sodium (VOLTAREN) 1 % GEL Apply 2 g topically 2 times daily 350 g 0    glucosamine-chondroitin 500-400 MG tablet Take 1 tablet by mouth 3 times daily 270 tablet 3    Collagen-Boron-Hyaluronic Acid (MOVE FREE ULTRA Media Armor) 40-5-3.3 MG TABS Take 1 tablet by mouth daily 90 tablet 3    pravastatin (PRAVACHOL) 80 MG tablet Take 1 tablet by mouth every evening 90 tablet 3    furosemide (LASIX) 40 MG tablet Take 1 tablet by mouth daily Dose increased 2022 90 tablet 3    Semaglutide 3 MG TABS Take 3 mg by mouth daily 1st step 30 tablet 0    Handicap Placard MISC by Does not apply route Can't walk greater than 200 feet. Expires in 5 years. 1 each 0    omeprazole (PRILOSEC) 20 MG delayed release capsule TAKE 1 CAPSULE EVERY MORNING BEFORE BREAKFAST (DOSE DECREASED) 90 capsule 3    lisinopril (PRINIVIL;ZESTRIL) 10 MG tablet Take 1 tablet by mouth daily 90 tablet 1    amLODIPine (NORVASC) 10 MG tablet Take 1 tablet by mouth daily Dose increased 10/16/2020 due to high BP. Correct dosage 90 tablet 3    OXYGEN Inhale into the lungs continuous      blood glucose monitor strips Testing once a day. Needs ONE TOUCH VERIO REFLECT 100 strip 3    Blood Pressure KIT Diagnosis: HTN. Needs to check blood pressure 1-2 times a day until stable, then once a day. Goal blood pressure less than 135/85, and above 110/60. 1 kit 0    tiotropium-olodaterol (STIOLTO RESPIMAT) 2.5-2.5 MCG/ACT AERS Inhale 2 puffs into the lungs daily      oxyCODONE-acetaminophen (PERCOCET) 5-325 MG per tablet Take 1 tablet by mouth every 8 hours as needed. Lancets 30G MISC Testing once a day. Please dispense according to patients insurance.  100 each 3    ammonium lactate (AMLACTIN) 12 % cream APPLY TOPICALLY TO LEGS ONE TO TWO TIMES A DAY AS NEEDED 1 Bottle 3       Succinylcholine chloride, Cymbalta [duloxetine hcl], and Gabapentin  Social History     Tobacco Use   Smoking Status Former    Packs/day: 1.00    Years: 35.00    Pack years: 35.00    Types: Cigarettes    Start date: 2021    Quit date: 3/9/2022    Years since quittin.0   Smokeless Tobacco Never     (Ifpatient a smoker, smoking cessation counseling offered)    Social History     Substance and Sexual Activity   Alcohol Use Not Currently    Alcohol/week: 0.0 standard drinks       REVIEW OF SYSTEMS:  Review of Systems    Physical Exam:      Vitals:    03/13/23 1110   BP: 136/78   Pulse: 89   Resp: 16   Temp: 97.8 °F (36.6 °C)     Body mass index is 38.92 kg/m². Patient is a 75 y.o. male in no acute distress and alert and oriented to person, place and time. Physical Exam  Constitutional: Patient in no acute distress. Neuro: Alert and oriented to person, place and time. Psych: Mood normal, affect normal  Skin: No rash noted      Assessment and Plan      1. Prostate cancer ([address]    2. Hot flashes    3. Frequency of micturition           Plan:   Restart xtandi  F/u 3 mo psa and eligard      Return in about 3 months (around 6/13/2023) for psa and eligard. Prescriptions Ordered:  No orders of the defined types were placed in this encounter. Orders Placed:  Orders Placed This Encounter   Procedures    PSA, Diagnostic     Standing Status:   Future     Standing Expiration Date:   3/12/2024             Christopher Tena MD    Agree with the ROS entered by the MA.

## 2023-03-13 NOTE — PROGRESS NOTES
Review of Systems   Constitutional:  Negative for appetite change, chills, fatigue and fever. Eyes:  Negative for pain and visual disturbance. Respiratory:  Negative for cough, shortness of breath and wheezing. Cardiovascular:  Negative for chest pain and leg swelling. Gastrointestinal:  Negative for abdominal pain, constipation, diarrhea, nausea and vomiting. Genitourinary:  Negative for difficulty urinating, dysuria, frequency, hematuria, penile pain and testicular pain. Musculoskeletal:  Negative for back pain and myalgias. Neurological:  Negative for dizziness, tremors, weakness, light-headedness, numbness and headaches. Hematological:  Negative for adenopathy. Does not bruise/bleed easily.  NONE

## 2023-03-16 ENCOUNTER — HOSPITAL ENCOUNTER (OUTPATIENT)
Age: 68
Setting detail: SPECIMEN
Discharge: HOME | End: 2023-03-16
Payer: MEDICARE

## 2023-03-16 DIAGNOSIS — I12.9 BENIGN HYPERTENSION WITH CHRONIC KIDNEY DISEASE, STAGE I: ICD-10-CM

## 2023-03-16 DIAGNOSIS — N18.1 CKD (CHRONIC KIDNEY DISEASE), STAGE I: ICD-10-CM

## 2023-03-16 DIAGNOSIS — N18.1 ANEMIA IN STAGE 1 CHRONIC KIDNEY DISEASE: ICD-10-CM

## 2023-03-16 DIAGNOSIS — N18.1 BENIGN HYPERTENSION WITH CHRONIC KIDNEY DISEASE, STAGE I: ICD-10-CM

## 2023-03-16 DIAGNOSIS — D63.1 ANEMIA IN STAGE 1 CHRONIC KIDNEY DISEASE: ICD-10-CM

## 2023-03-16 LAB
25(OH)D3 SERPL-MCNC: 30.2 NG/ML
ABSOLUTE EOS #: 0.2 K/UL (ref 0–0.4)
ABSOLUTE LYMPH #: 2.6 K/UL (ref 1–4.8)
ABSOLUTE MONO #: 0.6 K/UL (ref 0.1–1.3)
ANION GAP SERPL CALCULATED.3IONS-SCNC: 11 MMOL/L (ref 9–17)
BACTERIA: NORMAL
BASOPHILS # BLD: 1 % (ref 0–2)
BASOPHILS ABSOLUTE: 0.1 K/UL (ref 0–0.2)
BILIRUBIN URINE: NEGATIVE
BUN SERPL-MCNC: 13 MG/DL (ref 8–23)
CALCIUM SERPL-MCNC: 9.7 MG/DL (ref 8.6–10.4)
CASTS UA: NORMAL /LPF
CHLORIDE SERPL-SCNC: 102 MMOL/L (ref 98–107)
CO2 SERPL-SCNC: 28 MMOL/L (ref 20–31)
COLOR: YELLOW
CREAT SERPL-MCNC: 0.67 MG/DL (ref 0.7–1.2)
CREATININE URINE: 35.9 MG/DL (ref 39–259)
EOSINOPHILS RELATIVE PERCENT: 2 % (ref 0–4)
EPITHELIAL CELLS UA: NORMAL /HPF
GFR SERPL CREATININE-BSD FRML MDRD: >60 ML/MIN/1.73M2
GLUCOSE UR STRIP.AUTO-MCNC: NEGATIVE MG/DL
HCT VFR BLD AUTO: 45.1 % (ref 41–53)
HGB BLD-MCNC: 15 G/DL (ref 13.5–17.5)
KETONES UR STRIP.AUTO-MCNC: NEGATIVE MG/DL
LEUKOCYTE ESTERASE UR QL STRIP.AUTO: NEGATIVE
LYMPHOCYTES # BLD: 34 % (ref 24–44)
MAGNESIUM SERPL-MCNC: 1.7 MG/DL (ref 1.6–2.6)
MCH RBC QN AUTO: 28.6 PG (ref 26–34)
MCHC RBC AUTO-ENTMCNC: 33.3 G/DL (ref 31–37)
MCV RBC AUTO: 85.8 FL (ref 80–100)
MICROALBUMIN/CREAT 24H UR: <12 MG/L
MICROALBUMIN/CREAT UR-RTO: ABNORMAL MCG/MG CREAT
MONOCYTES # BLD: 8 % (ref 1–7)
NITRITE UR QL STRIP.AUTO: NEGATIVE
PDW BLD-RTO: 14 % (ref 11.5–14.9)
PHOSPHATE SERPL-MCNC: 2.9 MG/DL (ref 2.5–4.5)
PLATELET # BLD AUTO: 193 K/UL (ref 150–450)
PMV BLD AUTO: 9 FL (ref 6–12)
POTASSIUM SERPL-SCNC: 3.9 MMOL/L (ref 3.7–5.3)
PROT UR STRIP.AUTO-MCNC: 5.5 MG/DL (ref 5–8)
PROT UR STRIP.AUTO-MCNC: NEGATIVE MG/DL
RBC # BLD: 5.25 M/UL (ref 4.5–5.9)
RBC CLUMPS #/AREA URNS AUTO: NORMAL /HPF
SEG NEUTROPHILS: 55 % (ref 36–66)
SEGMENTED NEUTROPHILS ABSOLUTE COUNT: 4.4 K/UL (ref 1.3–9.1)
SODIUM SERPL-SCNC: 141 MMOL/L (ref 135–144)
SPECIFIC GRAVITY UA: 1.01 (ref 1–1.03)
TURBIDITY: CLEAR
URINE HGB: NEGATIVE
UROBILINOGEN, URINE: NORMAL
WBC # BLD AUTO: 7.8 K/UL (ref 3.5–11)
WBC UA: NORMAL /HPF

## 2023-03-16 PROCEDURE — 84520 ASSAY OF UREA NITROGEN: CPT

## 2023-03-16 PROCEDURE — 82570 ASSAY OF URINE CREATININE: CPT

## 2023-03-16 PROCEDURE — 82043 UR ALBUMIN QUANTITATIVE: CPT

## 2023-03-16 PROCEDURE — 85025 COMPLETE CBC W/AUTO DIFF WBC: CPT

## 2023-03-16 PROCEDURE — 36415 COLL VENOUS BLD VENIPUNCTURE: CPT

## 2023-03-16 PROCEDURE — 82310 ASSAY OF CALCIUM: CPT

## 2023-03-16 PROCEDURE — 84100 ASSAY OF PHOSPHORUS: CPT

## 2023-03-16 PROCEDURE — 83735 ASSAY OF MAGNESIUM: CPT

## 2023-03-16 PROCEDURE — 81001 URINALYSIS AUTO W/SCOPE: CPT

## 2023-03-16 PROCEDURE — 80051 ELECTROLYTE PANEL: CPT

## 2023-03-16 PROCEDURE — 82565 ASSAY OF CREATININE: CPT

## 2023-03-16 PROCEDURE — 82306 VITAMIN D 25 HYDROXY: CPT

## 2023-03-20 ENCOUNTER — HOSPITAL ENCOUNTER (OUTPATIENT)
Age: 68
Discharge: HOME | End: 2023-03-22
Payer: MEDICARE

## 2023-03-20 ENCOUNTER — HOSPITAL ENCOUNTER (OUTPATIENT)
Dept: GENERAL RADIOLOGY | Age: 68
Discharge: HOME | End: 2023-03-22
Payer: MEDICARE

## 2023-03-20 DIAGNOSIS — J44.1 COPD WITH ACUTE EXACERBATION (HCC): ICD-10-CM

## 2023-03-20 PROCEDURE — 71046 X-RAY EXAM CHEST 2 VIEWS: CPT

## 2023-03-23 PROBLEM — R09.02 HYPOXIA: Status: ACTIVE | Noted: 2023-02-23

## 2023-04-03 ENCOUNTER — TELEPHONE (OUTPATIENT)
Dept: ONCOLOGY | Age: 68
End: 2023-04-03

## 2023-04-03 DIAGNOSIS — Z87.891 PERSONAL HISTORY OF NICOTINE DEPENDENCE: Primary | ICD-10-CM

## 2023-04-03 NOTE — LETTER
4/3/2023      [address]    Dear Vivi White:       Your good health is important to us therefore, we are sending you this friendly reminder. As always, our goal is to be your partner in life-long wellness. Our records indicate that you need to make an appointment for your annual Lung Cancer Screening Chest CT that is due on approximately 5/1/2023. Please call your physician to obtain an order and schedule this examination at your earliest convenience. If you have had this study done elsewhere, please inform your referring provider so we can update our records. If you have recently scheduled your appointment, kindly disregard this reminder.     Thank you for allowing us to be a part of your health care team.     Sincerely,    [address] [place] [address] Screening Program

## 2023-04-03 NOTE — TELEPHONE ENCOUNTER
Diagnosis Orders   1.  Personal history of nicotine dependence  CT lung screen [Initial/Annual]           Future Appointments   Date Time Provider Keri English   4/7/2023  2:30 PM Art Turcios MD [facility]   6/19/2023 10:50 AM Chanda Li MD [address] [place] [address]

## 2023-04-03 NOTE — TELEPHONE ENCOUNTER
Our records indicate that your patient is coming due for their annual lung cancer screening follow up testing. For your convenience, we have pended the order for the scan for you. If you do not agree with the need for the test, please cancel the order and let us know. Sincerely,    [address] [place] [address] Screening Program    Auto printed reminder letter sent to patient.

## 2023-04-04 ENCOUNTER — CARE COORDINATION (OUTPATIENT)
Dept: CARE COORDINATION | Age: 68
End: 2023-04-04

## 2023-04-04 DIAGNOSIS — E11.42 TYPE 2 DIABETES MELLITUS WITH DIABETIC POLYNEUROPATHY, WITHOUT LONG-TERM CURRENT USE OF INSULIN (HCC): ICD-10-CM

## 2023-04-04 RX ORDER — PREGABALIN 200 MG/1
200 CAPSULE ORAL 3 TIMES DAILY
Qty: 270 CAPSULE | Refills: 0 | Status: SHIPPED | OUTPATIENT
Start: 2023-04-04 | End: 2023-07-03

## 2023-04-04 NOTE — CARE COORDINATION
Attempted to reach [facility] for follow up. Left a message on his voicemail   Will try to reach him next week.

## 2023-04-04 NOTE — TELEPHONE ENCOUNTER
Please Approve or Refuse.   Send to Pharmacy per Pt's Request:      Next Visit Date:  4/7/2023   Last Visit Date: 12/29/2022    Hemoglobin A1C (%)   Date Value   12/29/2022 5.8   09/23/2022 5.8   06/10/2022 5.6             ( goal A1C is < 7)   BP Readings from Last 3 Encounters:   03/23/23 (!) 154/98   03/13/23 136/78   12/29/22 110/84          (goal 120/80)  BUN   Date Value Ref Range Status   03/16/2023 13 8 - 23 mg/dL Final     Creatinine   Date Value Ref Range Status   03/16/2023 0.67 (L) 0.70 - 1.20 mg/dL Final     Potassium   Date Value Ref Range Status   03/16/2023 3.9 3.7 - 5.3 mmol/L Final

## 2023-04-07 ENCOUNTER — OFFICE VISIT (OUTPATIENT)
Dept: FAMILY MEDICINE CLINIC | Age: 68
End: 2023-04-07
Payer: MEDICARE

## 2023-04-07 DIAGNOSIS — M54.41 CHRONIC BILATERAL LOW BACK PAIN WITH BILATERAL SCIATICA: ICD-10-CM

## 2023-04-07 DIAGNOSIS — Z51.81 MEDICATION MONITORING ENCOUNTER: ICD-10-CM

## 2023-04-07 DIAGNOSIS — E88.89: ICD-10-CM

## 2023-04-07 DIAGNOSIS — I73.9 PVD (PERIPHERAL VASCULAR DISEASE) WITH CLAUDICATION (HCC): ICD-10-CM

## 2023-04-07 DIAGNOSIS — J44.9 COPD MIXED TYPE (HCC): ICD-10-CM

## 2023-04-07 DIAGNOSIS — E11.51 TYPE 2 DIABETES MELLITUS WITH DIABETIC PERIPHERAL ANGIOPATHY WITHOUT GANGRENE, WITHOUT LONG-TERM CURRENT USE OF INSULIN (HCC): ICD-10-CM

## 2023-04-07 DIAGNOSIS — N18.2 BENIGN HYPERTENSION WITH CKD (CHRONIC KIDNEY DISEASE), STAGE II: ICD-10-CM

## 2023-04-07 DIAGNOSIS — N64.4 BREAST PAIN IN MALE: ICD-10-CM

## 2023-04-07 DIAGNOSIS — I12.9 BENIGN HYPERTENSION WITH CKD (CHRONIC KIDNEY DISEASE), STAGE II: ICD-10-CM

## 2023-04-07 DIAGNOSIS — M54.42 CHRONIC BILATERAL LOW BACK PAIN WITH BILATERAL SCIATICA: ICD-10-CM

## 2023-04-07 DIAGNOSIS — E66.01 SEVERE OBESITY (BMI 35.0-39.9) WITH COMORBIDITY (HCC): ICD-10-CM

## 2023-04-07 DIAGNOSIS — Z99.89 OSA ON CPAP: ICD-10-CM

## 2023-04-07 DIAGNOSIS — J96.11 CHRONIC HYPOXEMIC RESPIRATORY FAILURE (HCC): ICD-10-CM

## 2023-04-07 DIAGNOSIS — I50.32 CHRONIC DIASTOLIC HEART FAILURE (HCC): ICD-10-CM

## 2023-04-07 DIAGNOSIS — G47.33 OSA ON CPAP: ICD-10-CM

## 2023-04-07 DIAGNOSIS — E11.42 TYPE 2 DIABETES MELLITUS WITH DIABETIC POLYNEUROPATHY, WITHOUT LONG-TERM CURRENT USE OF INSULIN (HCC): Primary | ICD-10-CM

## 2023-04-07 DIAGNOSIS — G89.29 CHRONIC BILATERAL LOW BACK PAIN WITH BILATERAL SCIATICA: ICD-10-CM

## 2023-04-07 LAB — HBA1C MFR BLD: 6.1 %

## 2023-04-07 PROCEDURE — 1123F ACP DISCUSS/DSCN MKR DOCD: CPT | Performed by: FAMILY MEDICINE

## 2023-04-07 PROCEDURE — 99214 OFFICE O/P EST MOD 30 MIN: CPT | Performed by: FAMILY MEDICINE

## 2023-04-07 PROCEDURE — 83036 HEMOGLOBIN GLYCOSYLATED A1C: CPT | Performed by: FAMILY MEDICINE

## 2023-04-07 PROCEDURE — 2022F DILAT RTA XM EVC RTNOPTHY: CPT | Performed by: FAMILY MEDICINE

## 2023-04-07 PROCEDURE — G8427 DOCREV CUR MEDS BY ELIG CLIN: HCPCS | Performed by: FAMILY MEDICINE

## 2023-04-07 PROCEDURE — 1036F TOBACCO NON-USER: CPT | Performed by: FAMILY MEDICINE

## 2023-04-07 PROCEDURE — 3017F COLORECTAL CA SCREEN DOC REV: CPT | Performed by: FAMILY MEDICINE

## 2023-04-07 PROCEDURE — 3023F SPIROM DOC REV: CPT | Performed by: FAMILY MEDICINE

## 2023-04-07 PROCEDURE — G8417 CALC BMI ABV UP PARAM F/U: HCPCS | Performed by: FAMILY MEDICINE

## 2023-04-07 PROCEDURE — 3044F HG A1C LEVEL LT 7.0%: CPT | Performed by: FAMILY MEDICINE

## 2023-04-07 RX ORDER — LIDOCAINE 40 MG/G
CREAM TOPICAL
Qty: 120 G | Refills: 3
Start: 2023-04-07

## 2023-04-07 RX ORDER — ALBUTEROL SULFATE 90 UG/1
2 AEROSOL, METERED RESPIRATORY (INHALATION) EVERY 6 HOURS PRN
Qty: 24 G | Refills: 3 | Status: SHIPPED | OUTPATIENT
Start: 2023-04-07

## 2023-04-07 RX ORDER — BACLOFEN 10 MG/1
10 TABLET ORAL 2 TIMES DAILY PRN
Qty: 180 TABLET | Refills: 3 | Status: SHIPPED | OUTPATIENT
Start: 2023-04-07

## 2023-04-07 RX ORDER — BUDESONIDE AND FORMOTEROL FUMARATE DIHYDRATE 160; 4.5 UG/1; UG/1
2 AEROSOL RESPIRATORY (INHALATION) 2 TIMES DAILY
COMMUNITY

## 2023-04-07 RX ORDER — LISINOPRIL 20 MG/1
20 TABLET ORAL DAILY
Qty: 90 TABLET | Refills: 3 | Status: SHIPPED | OUTPATIENT
Start: 2023-04-07

## 2023-04-07 RX ORDER — TIRZEPATIDE 2.5 MG/.5ML
2.5 INJECTION, SOLUTION SUBCUTANEOUS WEEKLY
Qty: 2 ML | Refills: 0 | Status: SHIPPED | OUTPATIENT
Start: 2023-04-07

## 2023-04-07 SDOH — ECONOMIC STABILITY: INCOME INSECURITY: HOW HARD IS IT FOR YOU TO PAY FOR THE VERY BASICS LIKE FOOD, HOUSING, MEDICAL CARE, AND HEATING?: NOT HARD AT ALL

## 2023-04-07 SDOH — ECONOMIC STABILITY: FOOD INSECURITY: WITHIN THE PAST 12 MONTHS, YOU WORRIED THAT YOUR FOOD WOULD RUN OUT BEFORE YOU GOT MONEY TO BUY MORE.: NEVER TRUE

## 2023-04-07 SDOH — ECONOMIC STABILITY: FOOD INSECURITY: WITHIN THE PAST 12 MONTHS, THE FOOD YOU BOUGHT JUST DIDN'T LAST AND YOU DIDN'T HAVE MONEY TO GET MORE.: NEVER TRUE

## 2023-04-07 ASSESSMENT — ENCOUNTER SYMPTOMS
ABDOMINAL PAIN: 0
WHEEZING: 0
DIARRHEA: 0
APNEA: 1
COUGH: 0
SHORTNESS OF BREATH: 1
ABDOMINAL DISTENTION: 0
CONSTIPATION: 0
COLOR CHANGE: 1
BACK PAIN: 1
VOMITING: 0
NAUSEA: 0
CHEST TIGHTNESS: 0

## 2023-04-07 ASSESSMENT — PATIENT HEALTH QUESTIONNAIRE - PHQ9
SUM OF ALL RESPONSES TO PHQ QUESTIONS 1-9: 6
2. FEELING DOWN, DEPRESSED OR HOPELESS: 3
3. TROUBLE FALLING OR STAYING ASLEEP: 0
SUM OF ALL RESPONSES TO PHQ QUESTIONS 1-9: 6
9. THOUGHTS THAT YOU WOULD BE BETTER OFF DEAD, OR OF HURTING YOURSELF: 0
SUM OF ALL RESPONSES TO PHQ9 QUESTIONS 1 & 2: 3
SUM OF ALL RESPONSES TO PHQ QUESTIONS 1-9: 6
SUM OF ALL RESPONSES TO PHQ QUESTIONS 1-9: 6
5. POOR APPETITE OR OVEREATING: 0
7. TROUBLE CONCENTRATING ON THINGS, SUCH AS READING THE NEWSPAPER OR WATCHING TELEVISION: 3
6. FEELING BAD ABOUT YOURSELF - OR THAT YOU ARE A FAILURE OR HAVE LET YOURSELF OR YOUR FAMILY DOWN: 0
10. IF YOU CHECKED OFF ANY PROBLEMS, HOW DIFFICULT HAVE THESE PROBLEMS MADE IT FOR YOU TO DO YOUR WORK, TAKE CARE OF THINGS AT HOME, OR GET ALONG WITH OTHER PEOPLE: 2
4. FEELING TIRED OR HAVING LITTLE ENERGY: 0
1. LITTLE INTEREST OR PLEASURE IN DOING THINGS: 0
8. MOVING OR SPEAKING SO SLOWLY THAT OTHER PEOPLE COULD HAVE NOTICED. OR THE OPPOSITE, BEING SO FIGETY OR RESTLESS THAT YOU HAVE BEEN MOVING AROUND A LOT MORE THAN USUAL: 0

## 2023-04-07 NOTE — PROGRESS NOTES
Visit Information    Have you changed or started any medications since your last visit including any over-the-counter medicines, vitamins, or herbal medicines? no   Are you having any side effects from any of your medications? -  no  Have you stopped taking any of your medications? Is so, why? -  no    Have you seen any other physician or provider since your last visit? No  Have you had any other diagnostic tests since your last visit? Yes - Records Obtained  Have you been seen in the emergency room and/or had an admission to a hospital since we last saw you? Yes - Records Obtained  Have you had your routine dental cleaning in the past 6 months? Have you activated your [employer] account? If not, what are your barriers?  Yes     Patient Care Team:  Jerry Horvath MD as PCP - General (Family Medicine)  Jerry Horvath MD as PCP - Empaneled Provider  Daria Garner MD as Consulting Physician (Neurosurgery)  Chancy Lanes, MD as Consulting Physician (Cardiology)  Eliot Martinez MD as Consulting Physician (Urology)  Sherly Leslie MD as Surgeon (Vascular Surgery)  Samantha Leblanc MD as Consulting Physician (Pulmonology)  Mariah Khanna MD as Consulting Physician (Orthopedic Surgery)  Daivd Augustin OD (Ophthalmology)  Yuko Thompson MD as Consulting Physician (Endocrinology)  Judy Douglas MD as Consulting Physician (Orthopedic Surgery)  Arron Turcios MD as Consulting Physician (Gastroenterology)  Rossi Malcolm MD as Consulting Physician (Orthopedic Surgery)  Rose Marie Turner MD as Surgeon (General Surgery)  Dalia Castle, PhD (Psychology)  Mirian Carrion MD as Consulting Physician (Neurology)  Uriel Feliz RN as Ambulatory Care Manager    Medical History Review  Past Medical, Family, and Social History reviewed and does contribute to the patient presenting condition    Health Maintenance   Topic Date Due    Diabetic foot exam  04/23/2022    Colorectal
NEGATIVE Final    pH, UA 03/16/2023 5.5  5.0 - 8.0 Final    Protein, UA 03/16/2023 NEGATIVE  NEGATIVE Final    Urobilinogen, Urine 03/16/2023 Normal  Normal Final    Nitrite, Urine 03/16/2023 NEGATIVE  NEGATIVE Final    Leukocyte Esterase, Urine 03/16/2023 NEGATIVE  NEGATIVE Final    WBC, UA 03/16/2023 0 TO 2  /HPF Final    RBC, UA 03/16/2023 0 TO 2  /HPF Final    Casts UA 03/16/2023 0 TO 2  /LPF Final    Epithelial Cells UA 03/16/2023 0 TO 2  /HPF Final    Bacteria, UA 03/16/2023 None  None Final    Microalb, Ur 03/16/2023 <12  <21 mg/L Final    Creatinine, Ur 03/16/2023 35.9 (L)  39.0 - 259.0 mg/dL Final    Microalb/Crt.  Ratio 03/16/2023 Can not be calculated  <17 mcg/mg creat Final            Lab Results   Component Value Date    ALT 21 11/02/2022    AST 17 11/02/2022    ALKPHOS 103 11/02/2022    BILITOT 0.3 11/02/2022       Lab Results   Component Value Date    TSH 2.03 11/02/2022       Lab Results   Component Value Date    CHOL 133 11/02/2022    CHOL 138 07/09/2020    CHOL 160 02/11/2020     Lab Results   Component Value Date    TRIG 250 (H) 11/02/2022    TRIG 203 (H) 07/09/2020    TRIG 182 (H) 02/11/2020     Lab Results   Component Value Date    HDL 39 (L) 11/02/2022    HDL 35 (L) 07/30/2021    HDL 35 (L) 07/30/2021     Lab Results   Component Value Date    LDLCHOLESTEROL 44 11/02/2022    LDLCHOLESTEROL 93 07/30/2021    LDLCHOLESTEROL 93 07/30/2021     Lab Results   Component Value Date    CHOLHDLRATIO 3.4 11/02/2022    CHOLHDLRATIO 4.4 07/30/2021    CHOLHDLRATIO 4.4 07/30/2021         Lab Results   Component Value Date    XMOYTJQD51 624 11/02/2022     Lab Results   Component Value Date    FOLATE 11.4 11/02/2022     Lab Results   Component Value Date    VITD25 30.2 03/16/2023         Orders Placed This Encounter   Medications    baclofen (LIORESAL) 10 MG tablet     Sig: Take 1 tablet by mouth 2 times daily as needed (MUSCLE SPASMS) Causes sedation, do not drive while taking this medication     Dispense:  180

## 2023-04-07 NOTE — RESULT ENCOUNTER NOTE
Addressed during office visit today, A1c 6.1, worsening diabetes but very well controlled, continue treatment recommended during the office visit.

## 2023-04-09 VITALS
DIASTOLIC BLOOD PRESSURE: 84 MMHG | BODY MASS INDEX: 39.14 KG/M2 | WEIGHT: 289 LBS | TEMPERATURE: 97.3 F | SYSTOLIC BLOOD PRESSURE: 126 MMHG | HEART RATE: 88 BPM | HEIGHT: 72 IN | OXYGEN SATURATION: 95 %

## 2023-04-09 PROBLEM — J96.01 ACUTE RESPIRATORY FAILURE WITH HYPOXIA (HCC): Status: RESOLVED | Noted: 2022-05-02 | Resolved: 2023-04-09

## 2023-04-09 PROBLEM — K75.2 NONSPECIFIC REACTIVE HEPATITIS: Status: RESOLVED | Noted: 2017-03-25 | Resolved: 2023-04-09

## 2023-04-09 PROBLEM — N18.30 CHRONIC RENAL DISEASE, STAGE III (HCC): Status: RESOLVED | Noted: 2022-05-12 | Resolved: 2023-04-09

## 2023-04-09 PROBLEM — C61 CARCINOMA OF PROSTATE (HCC): Status: RESOLVED | Noted: 2021-12-21 | Resolved: 2023-04-09

## 2023-04-09 PROBLEM — M25.512 ACUTE PAIN OF BOTH SHOULDERS: Status: RESOLVED | Noted: 2022-05-02 | Resolved: 2023-04-09

## 2023-04-09 PROBLEM — R06.09 DOE (DYSPNEA ON EXERTION): Status: RESOLVED | Noted: 2021-11-07 | Resolved: 2023-04-09

## 2023-04-09 PROBLEM — Z85.46 HISTORY OF PROSTATE CANCER: Status: RESOLVED | Noted: 2020-03-26 | Resolved: 2023-04-09

## 2023-04-09 PROBLEM — N17.0 ACUTE RENAL FAILURE WITH ACUTE TUBULAR NECROSIS SUPERIMPOSED ON STAGE 4 CHRONIC KIDNEY DISEASE (HCC): Status: RESOLVED | Noted: 2022-05-09 | Resolved: 2023-04-09

## 2023-04-09 PROBLEM — B35.3 TINEA PEDIS OF BOTH FEET: Status: RESOLVED | Noted: 2017-03-25 | Resolved: 2023-04-09

## 2023-04-09 PROBLEM — N18.1 CKD (CHRONIC KIDNEY DISEASE), STAGE I: Status: RESOLVED | Noted: 2022-06-15 | Resolved: 2023-04-09

## 2023-04-09 PROBLEM — M25.511 ACUTE PAIN OF BOTH SHOULDERS: Status: RESOLVED | Noted: 2022-05-02 | Resolved: 2023-04-09

## 2023-04-09 PROBLEM — N18.4 ACUTE RENAL FAILURE WITH ACUTE TUBULAR NECROSIS SUPERIMPOSED ON STAGE 4 CHRONIC KIDNEY DISEASE (HCC): Status: RESOLVED | Noted: 2022-05-09 | Resolved: 2023-04-09

## 2023-04-09 PROBLEM — J40 BRONCHITIS: Status: RESOLVED | Noted: 2021-07-08 | Resolved: 2023-04-09

## 2023-04-09 PROBLEM — F33.0 MILD EPISODE OF RECURRENT MAJOR DEPRESSIVE DISORDER (HCC): Status: ACTIVE | Noted: 2018-09-04

## 2023-04-09 PROBLEM — Z72.0 TOBACCO USE: Status: RESOLVED | Noted: 2020-03-16 | Resolved: 2023-04-09

## 2023-04-09 PROBLEM — N18.2 BENIGN HYPERTENSION WITH CKD (CHRONIC KIDNEY DISEASE), STAGE II: Status: ACTIVE | Noted: 2022-05-12

## 2023-04-09 PROBLEM — D12.6 TUBULAR ADENOMA OF COLON: Status: RESOLVED | Noted: 2017-04-11 | Resolved: 2023-04-09

## 2023-04-09 PROBLEM — R60.0 BILATERAL LEG EDEMA: Status: RESOLVED | Noted: 2021-01-18 | Resolved: 2023-04-09

## 2023-04-09 PROBLEM — R09.02 HYPOXIA: Status: RESOLVED | Noted: 2023-02-23 | Resolved: 2023-04-09

## 2023-04-09 PROBLEM — I12.9 BENIGN HYPERTENSION WITH CKD (CHRONIC KIDNEY DISEASE), STAGE II: Status: ACTIVE | Noted: 2022-05-12

## 2023-04-09 PROBLEM — J44.1 COPD EXACERBATION (HCC): Status: RESOLVED | Noted: 2022-03-11 | Resolved: 2023-04-09

## 2023-04-09 ASSESSMENT — ENCOUNTER SYMPTOMS: BLOOD IN STOOL: 0

## 2023-04-19 ENCOUNTER — HOSPITAL ENCOUNTER (OUTPATIENT)
Dept: CARDIAC CATH/INVASIVE PROCEDURES | Age: 68
Discharge: HOME | End: 2023-04-19
Payer: MEDICARE

## 2023-04-19 VITALS
OXYGEN SATURATION: 94 % | DIASTOLIC BLOOD PRESSURE: 81 MMHG | TEMPERATURE: 96.9 F | SYSTOLIC BLOOD PRESSURE: 131 MMHG | HEIGHT: 70 IN | BODY MASS INDEX: 41.37 KG/M2 | WEIGHT: 289 LBS | RESPIRATION RATE: 13 BRPM | HEART RATE: 71 BPM

## 2023-04-19 LAB
EGFR, POC: >60 ML/MIN/1.73M2
GLUCOSE BLD-MCNC: 119 MG/DL (ref 74–100)
PLATELET # BLD AUTO: 202 K/UL (ref 138–453)
POC BUN: 12 MG/DL (ref 8–26)
POC CHLORIDE: 107 MMOL/L (ref 98–107)
POC CREATININE: 0.51 MG/DL (ref 0.51–1.19)
POC HEMATOCRIT: 47 % (ref 41–53)
POC HEMOGLOBIN: 16 G/DL (ref 13.5–17.5)
POC POTASSIUM: 4.1 MMOL/L (ref 3.5–4.5)
POC SODIUM: 143 MMOL/L (ref 138–146)

## 2023-04-19 PROCEDURE — 7100000011 HC PHASE II RECOVERY - ADDTL 15 MIN

## 2023-04-19 PROCEDURE — 6360000002 HC RX W HCPCS

## 2023-04-19 PROCEDURE — 6360000004 HC RX CONTRAST MEDICATION

## 2023-04-19 PROCEDURE — 84132 ASSAY OF SERUM POTASSIUM: CPT

## 2023-04-19 PROCEDURE — 85014 HEMATOCRIT: CPT

## 2023-04-19 PROCEDURE — 82947 ASSAY GLUCOSE BLOOD QUANT: CPT

## 2023-04-19 PROCEDURE — 2709999900 HC NON-CHARGEABLE SUPPLY

## 2023-04-19 PROCEDURE — 84295 ASSAY OF SERUM SODIUM: CPT

## 2023-04-19 PROCEDURE — 84520 ASSAY OF UREA NITROGEN: CPT

## 2023-04-19 PROCEDURE — 82565 ASSAY OF CREATININE: CPT

## 2023-04-19 PROCEDURE — 85049 AUTOMATED PLATELET COUNT: CPT

## 2023-04-19 PROCEDURE — 82435 ASSAY OF BLOOD CHLORIDE: CPT

## 2023-04-19 PROCEDURE — 99152 MOD SED SAME PHYS/QHP 5/>YRS: CPT

## 2023-04-19 PROCEDURE — C1894 INTRO/SHEATH, NON-LASER: HCPCS

## 2023-04-19 PROCEDURE — 2500000003 HC RX 250 WO HCPCS

## 2023-04-19 PROCEDURE — 7100000010 HC PHASE II RECOVERY - FIRST 15 MIN

## 2023-04-19 PROCEDURE — 93458 L HRT ARTERY/VENTRICLE ANGIO: CPT

## 2023-04-19 RX ORDER — SODIUM CHLORIDE 9 MG/ML
INJECTION, SOLUTION INTRAVENOUS PRN
Status: DISCONTINUED | OUTPATIENT
Start: 2023-04-19 | End: 2023-04-20 | Stop reason: HOSPADM

## 2023-04-19 RX ORDER — SODIUM CHLORIDE 0.9 % (FLUSH) 0.9 %
5-40 SYRINGE (ML) INJECTION PRN
Status: DISCONTINUED | OUTPATIENT
Start: 2023-04-19 | End: 2023-04-20 | Stop reason: HOSPADM

## 2023-04-19 RX ORDER — SODIUM CHLORIDE 9 MG/ML
INJECTION, SOLUTION INTRAVENOUS CONTINUOUS
Status: DISCONTINUED | OUTPATIENT
Start: 2023-04-19 | End: 2023-04-20 | Stop reason: HOSPADM

## 2023-04-19 RX ORDER — SODIUM CHLORIDE 0.9 % (FLUSH) 0.9 %
5-40 SYRINGE (ML) INJECTION EVERY 12 HOURS SCHEDULED
Status: DISCONTINUED | OUTPATIENT
Start: 2023-04-19 | End: 2023-04-20 | Stop reason: HOSPADM

## 2023-04-19 RX ORDER — ACETAMINOPHEN 325 MG/1
650 TABLET ORAL EVERY 4 HOURS PRN
Status: DISCONTINUED | OUTPATIENT
Start: 2023-04-19 | End: 2023-04-20 | Stop reason: HOSPADM

## 2023-04-19 RX ADMIN — SODIUM CHLORIDE: 9 INJECTION, SOLUTION INTRAVENOUS at 09:08

## 2023-04-19 NOTE — H&P
mouth daily    Historical Provider, MD   diclofenac sodium (VOLTAREN) 1 % GEL Apply 2 g topically 2 times daily 12/29/22   JAVI Gastelum - CNP   glucosamine-chondroitin 500-400 MG tablet Take 1 tablet by mouth 3 times daily 11/1/22   Marcus Pérez MD   Collagen-Boron-Hyaluronic Acid (MOVE FREE ULTRA JOINT HEALTH) 40-5-3.3 MG TABS Take 1 tablet by mouth daily 10/26/22   Marcus Pérez MD   pravastatin (PRAVACHOL) 80 MG tablet Take 1 tablet by mouth every evening 9/23/22   Marcus Pérez MD   furosemide (LASIX) 40 MG tablet Take 1 tablet by mouth daily Dose increased 9/23/2022 9/23/22   Marcus Pérez MD   Handicap Placard MISC by Does not apply route Can't walk greater than 200 feet. Expires in 5 years. 8/16/22   Marcus Pérez MD   omeprazole (PRILOSEC) 20 MG delayed release capsule TAKE 1 CAPSULE EVERY MORNING BEFORE BREAKFAST (DOSE DECREASED) 6/27/22   Marcus Pérez MD   amLODIPine (NORVASC) 10 MG tablet Take 1 tablet by mouth daily Dose increased 10/16/2020 due to high BP. Correct dosage 6/3/22   Marcus Pérez MD   OXYGEN Inhale into the lungs continuous    Historical Provider, MD   blood glucose monitor strips Testing once a day. Needs ONE TOUCH VERIO REFLECT 4/27/22   Marcus Pérez MD   Blood Pressure KIT Diagnosis: HTN. Needs to check blood pressure 1-2 times a day until stable, then once a day. Goal blood pressure less than 135/85, and above 110/60. 4/4/22   Marcus Pérez MD   tiotropium-olodaterol (STIOLTO RESPIMAT) 2.5-2.5 MCG/ACT AERS Inhale 2 puffs into the lungs daily    Historical Provider, MD   Lancets 30G MISC Testing once a day. Please dispense according to patients insurance. 4/23/21   Marcus Pérez MD   ammonium lactate (AMLACTIN) 12 % cream APPLY TOPICALLY TO LEGS ONE TO TWO TIMES A DAY AS NEEDED 3/1/21   Marcus Pérez MD      No current facility-administered medications for this encounter.     Allergies:  Succinylcholine

## 2023-04-19 NOTE — DISCHARGE INSTRUCTIONS
DISCHARGE INSTRUCTIONS / ARM CARE POST CATHERIZATION        ENCOURAGE FLUIDS    NO STRENUOUS LIFTING WITH AFFECTED ARM FOR 3 DAYS ANYTHING HEAVIER THAN 8 TO 10 POUNDS    REMOVE BAND-AID/PRESSURE DRESSING THE FOLLOWING DAY AND DO NOT APPLY ANY FURTHER BAND-AIDS    KEEP INCISION CLEAN DRY AND OPEN TO THE AIR / NO HAND LOTION NEAR PUNCTURE SITE    WATCH FOR SIGNS OF INFECTION /  REDNESS / SWELLING / DRAINAGE / Richrd Klippel / TEMPERATURE GREATER THAN 101    IF BLEEDING OCCURS HOLD MANUAL PRESSURE DIRECTLY OVER SITE  (YOU WILL FEEL PULSATION OF ARTERY) AND IF BLEEDING DOES NOT STOP AFTER 2 MINUTES CALL 911    OK TO SHOWER THE NEXT DAY, NO TUB BATHING OR HOT TUBS/SWIMMING FOR 7 DAYS    IF AREA BECOMES HARD AND SWOLLEN AND IF YOU ARE AT ALL CONCERNED SEEK HELP IMMEDIATELY    SEEK HELP IMMEDIATELY IF AFFECTED ARM BECOMES COLD / [place] / SEVERE PAIN / NAILBEDS TURN BLUE     IF ON METFORMIN / GLUCOPHAGE DO NOT RESTART MEDICATION FOR 48 HOURS    PLEASE PRACTICE GOOD HAND WASHING AND INCLUDE PUNCTURE SITE ESPECIALLY AFTER USING THE RESTROOM    AVOID USING ALCOHOL BASED HAND SANITIZERS FOR ONE WEEK      CALL 911 if you have symptoms including:   Drooping facial muscles   Changes in vision or speech   Difficulty walking or using your limbs   Change in sensation to affected leg, including numbness, feeling cold, or change in color   Extreme sweating, nausea or vomiting   Dizziness or lightheadedness   Chest pain   Rapid, irregular heartbeat   Palpitations   Cough, shortness of breath, or difficulty breathing   Weakness or fainting   If you think you have an emergency, CALL 911 . SEDATION / ANALGESIA INFORMATION / Akshat Avina [address] have received the sedation/analgesia medication during your visit    Sedation/analgesia is used during short medical procedures under controlled supervision. The medication will produce a strong relaxation.  You will be able to hear, speak and follow instructions, but your memory and alertness will be

## 2023-04-19 NOTE — OP NOTE
[facility]    CARDIAC CATHETERIZATION    Date:   4/19/2023  Patient name:  Winnie Dill  Date of admission:  4/19/2023  8:12 AM  MRN:   [id number]  YOB: 1955    Operators:  Primary:   Fredderick Habermann, MD (Attending Physician)    Assistant/CV fellow:  Onur Becker MD      Procedure performed:       [x] Left Heart Catheterization. [] Graft Angiography.  [] Left Ventriculography. [] Right Heart Catheterization. [x] Coronary Angiography. [] Aortic Valve Studies. [] PCI:      [] Other:       Pre Procedure Conscious Sedation Data:  ASA Class:    [] I [x] II [] III [] IV    Mallampati Class:  [] I [] II [x] III [] IV      Indication:  [] STEMI      [x] + Stress test  [] ACS      [] + EKG Changes  [] Non Q MI       [] Significant Risk Factors  [] Recurrent Angina             [] Diabetes Mellitus    [] New LBBB      [] Uncontrolled HTN. [] CHF / Low EF changes     [] Abnormal CTA / Ca Score      Procedure:  Access:  [] Femoral  [x] Radial  artery        [x]Right  [] Left    Procedure: After informed consent was obtained with explanation of the risks and benefits, patient was brought to the cath lab. The access area was prepped and draped in sterile fashion. 1% lidocaine was used for local block. The artery was cannulated with 6  Fr sheath with brisk arterial blood return. The side port was frequently flushed and aspirated with normal saline. Findings:    LMCA: Mild irregularities 10-20%. LAD: 40% mid stenosis. LCx: Mild irregularities 10-20%. RCA: 50% mid calcified stenosis. Coronary Tree        Dominance: Right     Estimated Blood Loss: Less than 20 mL    Conclusions:    Mild-Moderate nonobstructive CAD.        Recommendations:  Post-cath protocol  Continue optimal medical therapy  Risk factor modification      Onur Becker MD       Cardiovascular Fellow

## 2023-05-01 DIAGNOSIS — C61 PROSTATE CANCER (HCC): ICD-10-CM

## 2023-05-01 RX ORDER — ENZALUTAMIDE 40 MG/1
160 CAPSULE ORAL DAILY
Qty: 120 CAPSULE | Refills: 11 | Status: SHIPPED | OUTPATIENT
Start: 2023-05-01 | End: 2023-05-31

## 2023-05-03 DIAGNOSIS — E11.42 TYPE 2 DIABETES MELLITUS WITH DIABETIC POLYNEUROPATHY, WITHOUT LONG-TERM CURRENT USE OF INSULIN (HCC): ICD-10-CM

## 2023-05-03 DIAGNOSIS — E11.51 TYPE 2 DIABETES MELLITUS WITH DIABETIC PERIPHERAL ANGIOPATHY WITHOUT GANGRENE, WITHOUT LONG-TERM CURRENT USE OF INSULIN (HCC): ICD-10-CM

## 2023-05-03 RX ORDER — TIRZEPATIDE 2.5 MG/.5ML
INJECTION, SOLUTION SUBCUTANEOUS
Qty: 2 ML | Refills: 1 | Status: SHIPPED | OUTPATIENT
Start: 2023-05-03

## 2023-05-09 DIAGNOSIS — M1A.09X0 CHRONIC GOUT OF MULTIPLE SITES, UNSPECIFIED CAUSE: ICD-10-CM

## 2023-05-09 RX ORDER — ALLOPURINOL 100 MG/1
100 TABLET ORAL DAILY
Qty: 90 TABLET | Refills: 3 | Status: SHIPPED | OUTPATIENT
Start: 2023-05-09

## 2023-05-09 NOTE — TELEPHONE ENCOUNTER
Please Approve or Refuse.   Send to Pharmacy per Pt's Request:      Next Visit Date:  7/28/2023   Last Visit Date: 4/7/2023    Hemoglobin A1C (%)   Date Value   04/07/2023 6.1   12/29/2022 5.8   09/23/2022 5.8             ( goal A1C is < 7)   BP Readings from Last 3 Encounters:   04/19/23 131/81   04/07/23 126/84   03/23/23 (!) 154/98          (goal 120/80)  BUN   Date Value Ref Range Status   03/16/2023 13 8 - 23 mg/dL Final     Creatinine   Date Value Ref Range Status   03/16/2023 0.67 (L) 0.70 - 1.20 mg/dL Final     POC Creatinine   Date Value Ref Range Status   04/19/2023 0.51 0.51 - 1.19 mg/dL Final     Potassium   Date Value Ref Range Status   03/16/2023 3.9 3.7 - 5.3 mmol/L Final

## 2023-05-16 ENCOUNTER — HOSPITAL ENCOUNTER (OUTPATIENT)
Dept: CT IMAGING | Age: 68
Discharge: HOME | End: 2023-05-18
Payer: MEDICARE

## 2023-05-16 ENCOUNTER — HOSPITAL ENCOUNTER (OUTPATIENT)
Age: 68
Setting detail: SPECIMEN
Discharge: HOME | End: 2023-05-16
Payer: MEDICARE

## 2023-05-16 VITALS — WEIGHT: 289 LBS | BODY MASS INDEX: 41.47 KG/M2

## 2023-05-16 DIAGNOSIS — Z87.891 PERSONAL HISTORY OF NICOTINE DEPENDENCE: ICD-10-CM

## 2023-05-16 DIAGNOSIS — Z51.81 MEDICATION MONITORING ENCOUNTER: ICD-10-CM

## 2023-05-16 PROCEDURE — G0481 DRUG TEST DEF 8-14 CLASSES: HCPCS

## 2023-05-16 PROCEDURE — 71271 CT THORAX LUNG CANCER SCR C-: CPT

## 2023-05-16 PROCEDURE — 80307 DRUG TEST PRSMV CHEM ANLYZR: CPT

## 2023-05-19 LAB
6-ACETYLMORPHINE, UR: NOT DETECTED
7-AMINOCLONAZEPAM, URINE: NOT DETECTED
ALPHA-OH-ALPRAZ, URINE: NOT DETECTED
ALPHA-OH-MIDAZOLAM, URINE: NOT DETECTED
ALPRAZOLAM, URINE: NOT DETECTED
AMPHETAMINES, URINE: NOT DETECTED
BARBITURATES, URINE: NOT DETECTED
BENZOYLECGONINE, UR: NOT DETECTED
BUPRENORPHINE URINE: NOT DETECTED
CARISOPRODOL, UR: NOT DETECTED
CLONAZEPAM, URINE: NOT DETECTED
CODEINE, URINE: NOT DETECTED
CREATININE URINE: 44.2 MG/DL (ref 20–400)
DIAZEPAM, URINE: NOT DETECTED
DRUGS EXPECTED, UR: NORMAL
EER HI RES INTERP UR: NORMAL
ETHYL GLUCURONIDE UR: NOT DETECTED
FENTANYL URINE: NOT DETECTED
GABAPENTIN: NOT DETECTED
HYDROCODONE, URINE: NOT DETECTED
HYDROMORPHONE, URINE: NOT DETECTED
LORAZEPAM, URINE: NOT DETECTED
MARIJUANA METAB, UR: NOT DETECTED
MDA, UR: NOT DETECTED
MDEA, EVE, UR: NOT DETECTED
MDMA URINE: NOT DETECTED
MEPERIDINE METAB, UR: NOT DETECTED
METHADONE, URINE: NOT DETECTED
METHAMPHETAMINE, URINE: NOT DETECTED
METHYLPHENIDATE: NOT DETECTED
MIDAZOLAM, URINE: NOT DETECTED
MORPHINE URINE: NOT DETECTED
NALOXONE URINE: NOT DETECTED
NORBUPRENORPHINE, URINE: NOT DETECTED
NORDIAZEPAM, URINE: NOT DETECTED
NORFENTANYL, URINE: NOT DETECTED
NORHYDROCODONE, URINE: NOT DETECTED
NOROXYCODONE, URINE: PRESENT
NOROXYMORPHONE, URINE: PRESENT
OXAZEPAM, URINE: NOT DETECTED
OXYCODONE URINE: PRESENT
OXYMORPHONE, URINE: PRESENT
PAIN MANAGEMENT DRUG PANEL INTERP, URINE: NORMAL
PAIN MGT DRUG PANEL, HI RES, UR: NORMAL
PCP,URINE: NOT DETECTED
PHENTERMINE, UR: NOT DETECTED
PREGABALIN: PRESENT
TAPENTADOL, URINE: NOT DETECTED
TAPENTADOL-O-SULFATE, URINE: NOT DETECTED
TEMAZEPAM, URINE: NOT DETECTED
TRAMADOL, URINE: NOT DETECTED
ZOLPIDEM METABOLITE (ZCA), URINE: NOT DETECTED
ZOLPIDEM, URINE: NOT DETECTED

## 2023-05-20 NOTE — RESULT ENCOUNTER NOTE
Mychart comment sent to patient.   Urine drug test consistent with treatment positive for Lyrica and Percocet      Future Appointments  5/23/2023  9:30 AM    JAVI White* [facility]        AFL [facility] C  6/19/2023  10:50 AM   Jen Castro MD         .  URO           TOP  7/28/2023  2:30 PM    Tristian Peacock MD     [facility]               Dulce Maria Matta

## 2023-06-19 ENCOUNTER — OFFICE VISIT (OUTPATIENT)
Dept: UROLOGY | Age: 68
End: 2023-06-19
Payer: MEDICARE

## 2023-06-19 VITALS — BODY MASS INDEX: 40.8 KG/M2 | HEIGHT: 70 IN | WEIGHT: 285 LBS

## 2023-06-19 DIAGNOSIS — R23.2 HOT FLASHES: ICD-10-CM

## 2023-06-19 DIAGNOSIS — C61 PROSTATE CANCER (HCC): Primary | ICD-10-CM

## 2023-06-19 PROCEDURE — 3017F COLORECTAL CA SCREEN DOC REV: CPT | Performed by: UROLOGY

## 2023-06-19 PROCEDURE — 96402 CHEMO HORMON ANTINEOPL SQ/IM: CPT | Performed by: UROLOGY

## 2023-06-19 PROCEDURE — 1123F ACP DISCUSS/DSCN MKR DOCD: CPT | Performed by: UROLOGY

## 2023-06-19 PROCEDURE — G8427 DOCREV CUR MEDS BY ELIG CLIN: HCPCS | Performed by: UROLOGY

## 2023-06-19 PROCEDURE — 1036F TOBACCO NON-USER: CPT | Performed by: UROLOGY

## 2023-06-19 PROCEDURE — 99214 OFFICE O/P EST MOD 30 MIN: CPT | Performed by: UROLOGY

## 2023-06-19 PROCEDURE — G8417 CALC BMI ABV UP PARAM F/U: HCPCS | Performed by: UROLOGY

## 2023-06-19 ASSESSMENT — ENCOUNTER SYMPTOMS
NAUSEA: 0
CONSTIPATION: 0
EYE PAIN: 0
SHORTNESS OF BREATH: 0
BACK PAIN: 0
COUGH: 0
EYES NEGATIVE: 1
DIARRHEA: 0
RESPIRATORY NEGATIVE: 1
EYE REDNESS: 0
VOMITING: 0
GASTROINTESTINAL NEGATIVE: 1
WHEEZING: 0
ABDOMINAL PAIN: 0

## 2023-06-19 NOTE — PROGRESS NOTES
[address] [address]  Dept:  [facility] Urology Office Note - Established    Patient:  Mario Guzman  YOB: 1955  Date: 6/19/2023    The patient is a 75 y.o. male who presents todayfor evaluation of the following problems:   Chief Complaint   Patient presents with    Prostate Cancer     3 month with PSA for Eligard       HPI  This is a very pleasant 77-year-old gentleman with a history of advanced prostate cancer. He has been on Cameron Rico. His PSA has been stable at 0.23. He is having hot flashes but they are tolerable. Summary of old records: N/A    Additional History: N/A    Procedures Today: N/A    Urinalysis today:  No results found for this visit on 06/19/23. Last several PSA's:  Lab Results   Component Value Date    PSA 0.23 06/15/2023    PSA 0.24 03/07/2023    PSA 0.11 11/30/2022     Last total testosterone:  Lab Results   Component Value Date    TESTOSTERONE <3 (L) 11/30/2022       AUA Symptom Score (6/19/2023):                                Last BUN and creatinine:  Lab Results   Component Value Date    BUN 13 03/16/2023     Lab Results   Component Value Date    CREATININE 0.51 04/19/2023       Additional Lab/Culture results: none    Imaging Reviewed during this Office Visit: none  (results were independently reviewed by physician and radiology report verified)    PAST MEDICAL, FAMILY AND SOCIAL HISTORY UPDATE:  Past Medical History:   Diagnosis Date    Acid reflux     Acute renal failure with acute tubular necrosis superimposed on stage 4 chronic kidney disease ([address] 5/9/2022    Acute respiratory failure with hypoxia ([address] 5/2/2022    Anemia, blood loss 10/7/2018    Arthritis     Benign hypertension with CKD (chronic kidney disease), stage II 5/12/2022    Bilateral leg edema 1/18/2021    C. difficile colitis 3/19/2020    Carcinoma of prostate ([address]

## 2023-06-19 NOTE — PROGRESS NOTES
After obtaining consent, and per orders of Dr. Yunior Valdez, injection of Eligard 45mg given in Left lower quad. abdomen by Izabela Mendez RN. Patient instructed to remain in clinic for 20 minutes afterwards, and to report any adverse reaction to me immediately.

## 2023-06-20 ENCOUNTER — CARE COORDINATION (OUTPATIENT)
Dept: CASE MANAGEMENT | Age: 68
End: 2023-06-20

## 2023-06-20 NOTE — CARE COORDINATION
Request follow up call from Carmita Chambers RN.,      1) how have his blood pressure readings been?   138/78      2) How did the cardiac cath turn out?   alright      3) Is he having any symptoms? Chest pain, cough, swelling, or shortness of breath   SOB  from COPD states it is nothing new, it's about the same, not new symptom      4) how have his blood sugars been?   113. today      5) any concerns? F/u in 1 week, will graduate if stable   Patient not talkative today, did answer questions  yes and no. States he is doing fine.

## 2023-06-23 NOTE — ADDENDUM NOTE
Addended by: Rissa Bentley on: 6/23/2023 08:07 AM     Modules accepted: Orders Controlled Substance Refill Request  Medication Name:   Requested Prescriptions     Pending Prescriptions Disp Refills     dextroamphetamine-amphetamine (ADDERALL) 30 mg Tab 30 tablet 0     Sig: Take 30 mg by mouth daily.     dextroamphetamine-amphetamine (ADDERALL) 5 mg Tab tablet 30 tablet 0     Sig: Take 1 tablet by mouth daily.     Date Last Fill: 01/23/2019  Pharmacy: [facility]      Submit electronically to pharmacy  Controlled Substance Agreement Date Scanned:   Encounter-Level CSA Scan Date - 12/11/2017:    Scan on 12/15/2017  7:21 AM (below)             Encounter-Level CSA Scan Date - 09/22/2016:    Scan on 9/26/2016  1:38 PM (below)             Encounter-Level CSA Scan Date - 11/05/2015:    Scan on 11/9/2015  1:40 PM (below)         Last office visit with prescriber/PCP: 1/17/2019 Pantera Rooney MD OR same dept: 1/17/2019 Pantera Rooney MD OR same specialty: 1/17/2019 Pantera Rooney MD  Last physical: Visit date not found Last MTM visit: Visit date not found

## 2023-07-08 DIAGNOSIS — F33.0 MILD EPISODE OF RECURRENT MAJOR DEPRESSIVE DISORDER (HCC): ICD-10-CM

## 2023-07-09 RX ORDER — MILNACIPRAN HYDROCHLORIDE 50 MG/1
50 TABLET, FILM COATED ORAL DAILY
Qty: 90 TABLET | Refills: 3 | Status: SHIPPED | OUTPATIENT
Start: 2023-07-09

## 2023-07-10 DIAGNOSIS — E11.42 TYPE 2 DIABETES MELLITUS WITH DIABETIC POLYNEUROPATHY, WITHOUT LONG-TERM CURRENT USE OF INSULIN (HCC): ICD-10-CM

## 2023-07-10 RX ORDER — PREGABALIN 200 MG/1
200 CAPSULE ORAL 3 TIMES DAILY
Qty: 270 CAPSULE | Refills: 0 | Status: SHIPPED | OUTPATIENT
Start: 2023-07-10 | End: 2023-10-08

## 2023-07-10 RX ORDER — PREGABALIN 200 MG/1
CAPSULE ORAL
Qty: 270 CAPSULE | OUTPATIENT
Start: 2023-07-10

## 2023-07-10 NOTE — TELEPHONE ENCOUNTER
Please Approve or Refuse.   Send to Pharmacy per Pt's Request:      Next Visit Date:  7/28/2023   Last Visit Date: 4/7/2023    Hemoglobin A1C (%)   Date Value   04/07/2023 6.1   12/29/2022 5.8   09/23/2022 5.8             ( goal A1C is < 7)   BP Readings from Last 3 Encounters:   05/23/23 130/62   04/19/23 131/81   04/07/23 126/84          (goal 120/80)  BUN   Date Value Ref Range Status   03/16/2023 13 8 - 23 mg/dL Final     Creatinine   Date Value Ref Range Status   03/16/2023 0.67 (L) 0.70 - 1.20 mg/dL Final     POC Creatinine   Date Value Ref Range Status   04/19/2023 0.51 0.51 - 1.19 mg/dL Final     Potassium   Date Value Ref Range Status   03/16/2023 3.9 3.7 - 5.3 mmol/L Final

## 2023-07-11 ENCOUNTER — APPOINTMENT (OUTPATIENT)
Dept: GENERAL RADIOLOGY | Age: 68
End: 2023-07-11
Payer: MEDICARE

## 2023-07-11 ENCOUNTER — HOSPITAL ENCOUNTER (EMERGENCY)
Age: 68
Discharge: HOME | End: 2023-07-11
Attending: EMERGENCY MEDICINE
Payer: MEDICARE

## 2023-07-11 ENCOUNTER — APPOINTMENT (OUTPATIENT)
Dept: CT IMAGING | Age: 68
End: 2023-07-11
Payer: MEDICARE

## 2023-07-11 VITALS
RESPIRATION RATE: 18 BRPM | BODY MASS INDEX: 36.57 KG/M2 | OXYGEN SATURATION: 97 % | HEIGHT: 72 IN | TEMPERATURE: 97.5 F | DIASTOLIC BLOOD PRESSURE: 64 MMHG | HEART RATE: 74 BPM | WEIGHT: 270 LBS | SYSTOLIC BLOOD PRESSURE: 105 MMHG

## 2023-07-11 DIAGNOSIS — R55 SYNCOPE AND COLLAPSE: Primary | ICD-10-CM

## 2023-07-11 LAB
ALBUMIN SERPL-MCNC: 3.9 G/DL (ref 3.5–5.2)
ALP SERPL-CCNC: 105 U/L (ref 40–129)
ALT SERPL-CCNC: 22 U/L (ref 5–41)
ANION GAP SERPL CALCULATED.3IONS-SCNC: 11 MMOL/L (ref 9–17)
AST SERPL-CCNC: 24 U/L
BASOPHILS # BLD: 0.1 K/UL (ref 0–0.2)
BASOPHILS NFR BLD: 1 % (ref 0–2)
BILIRUB SERPL-MCNC: 0.3 MG/DL (ref 0.3–1.2)
BILIRUB UR QL STRIP: NEGATIVE
BNP SERPL-MCNC: <36 PG/ML
BUN SERPL-MCNC: 11 MG/DL (ref 8–23)
CALCIUM SERPL-MCNC: 9.7 MG/DL (ref 8.6–10.4)
CHLORIDE SERPL-SCNC: 102 MMOL/L (ref 98–107)
CLARITY UR: CLEAR
CO2 SERPL-SCNC: 27 MMOL/L (ref 20–31)
COLOR UR: YELLOW
COMMENT UA: NORMAL
CREAT SERPL-MCNC: 0.8 MG/DL (ref 0.7–1.2)
EOSINOPHIL # BLD: 0 K/UL (ref 0–0.4)
EOSINOPHILS RELATIVE PERCENT: 0 % (ref 0–4)
ERYTHROCYTE [DISTWIDTH] IN BLOOD BY AUTOMATED COUNT: 13.7 % (ref 11.5–14.9)
GFR SERPL CREATININE-BSD FRML MDRD: >60 ML/MIN/1.73M2
GLUCOSE SERPL-MCNC: 118 MG/DL (ref 70–99)
GLUCOSE UR STRIP-MCNC: NEGATIVE MG/DL
HCT VFR BLD AUTO: 43.6 % (ref 41–53)
HGB BLD-MCNC: 14.2 G/DL (ref 13.5–17.5)
HGB UR QL STRIP.AUTO: NEGATIVE
KETONES UR STRIP-MCNC: NEGATIVE MG/DL
LEUKOCYTE ESTERASE UR QL STRIP: NEGATIVE
LIPASE SERPL-CCNC: 29 U/L (ref 13–60)
LYMPHOCYTES # BLD: 14 % (ref 24–44)
LYMPHOCYTES NFR BLD: 1.7 K/UL (ref 1–4.8)
MCH RBC QN AUTO: 28.3 PG (ref 26–34)
MCHC RBC AUTO-ENTMCNC: 32.6 G/DL (ref 31–37)
MCV RBC AUTO: 86.8 FL (ref 80–100)
MONOCYTES NFR BLD: 0.6 K/UL (ref 0.1–1.3)
MONOCYTES NFR BLD: 5 % (ref 1–7)
NEUTROPHILS NFR BLD: 80 % (ref 36–66)
NEUTS SEG NFR BLD: 9.8 K/UL (ref 1.3–9.1)
NITRITE UR QL STRIP: NEGATIVE
PH UR STRIP: 6.5 [PH] (ref 5–8)
PLATELET # BLD AUTO: 198 K/UL (ref 150–450)
PMV BLD AUTO: 9.3 FL (ref 6–12)
POTASSIUM SERPL-SCNC: 4.7 MMOL/L (ref 3.7–5.3)
PROT SERPL-MCNC: 7.3 G/DL (ref 6.4–8.3)
PROT UR STRIP-MCNC: NEGATIVE MG/DL
RBC # BLD AUTO: 5.02 M/UL (ref 4.5–5.9)
SODIUM SERPL-SCNC: 140 MMOL/L (ref 135–144)
SP GR UR STRIP: 1.02 (ref 1–1.03)
TROPONIN I SERPL HS-MCNC: 14 NG/L (ref 0–22)
TROPONIN I SERPL HS-MCNC: 15 NG/L (ref 0–22)
UROBILINOGEN UR STRIP-ACNC: NORMAL
WBC OTHER # BLD: 12.2 K/UL (ref 3.5–11)

## 2023-07-11 PROCEDURE — 6360000004 HC RX CONTRAST MEDICATION: Performed by: STUDENT IN AN ORGANIZED HEALTH CARE EDUCATION/TRAINING PROGRAM

## 2023-07-11 PROCEDURE — 70450 CT HEAD/BRAIN W/O DYE: CPT

## 2023-07-11 PROCEDURE — 84484 ASSAY OF TROPONIN QUANT: CPT

## 2023-07-11 PROCEDURE — 81003 URINALYSIS AUTO W/O SCOPE: CPT

## 2023-07-11 PROCEDURE — 85027 COMPLETE CBC AUTOMATED: CPT

## 2023-07-11 PROCEDURE — 36415 COLL VENOUS BLD VENIPUNCTURE: CPT

## 2023-07-11 PROCEDURE — 2580000003 HC RX 258: Performed by: STUDENT IN AN ORGANIZED HEALTH CARE EDUCATION/TRAINING PROGRAM

## 2023-07-11 PROCEDURE — 99285 EMERGENCY DEPT VISIT HI MDM: CPT

## 2023-07-11 PROCEDURE — 80053 COMPREHEN METABOLIC PANEL: CPT

## 2023-07-11 PROCEDURE — 83880 ASSAY OF NATRIURETIC PEPTIDE: CPT

## 2023-07-11 PROCEDURE — 83690 ASSAY OF LIPASE: CPT

## 2023-07-11 PROCEDURE — 71045 X-RAY EXAM CHEST 1 VIEW: CPT

## 2023-07-11 PROCEDURE — 74174 CTA ABD&PLVS W/CONTRAST: CPT

## 2023-07-11 RX ORDER — SODIUM CHLORIDE 0.9 % (FLUSH) 0.9 %
10 SYRINGE (ML) INJECTION AS NEEDED
Status: DISCONTINUED | OUTPATIENT
Start: 2023-07-11 | End: 2023-07-12 | Stop reason: HOSPADM

## 2023-07-11 RX ORDER — 0.9 % SODIUM CHLORIDE 0.9 %
100 INTRAVENOUS SOLUTION INTRAVENOUS ONCE
Status: COMPLETED | OUTPATIENT
Start: 2023-07-11 | End: 2023-07-11

## 2023-07-11 RX ORDER — 0.9 % SODIUM CHLORIDE 0.9 %
1000 INTRAVENOUS SOLUTION INTRAVENOUS ONCE
Status: COMPLETED | OUTPATIENT
Start: 2023-07-11 | End: 2023-07-11

## 2023-07-11 RX ADMIN — SODIUM CHLORIDE, PRESERVATIVE FREE 10 ML: 5 INJECTION INTRAVENOUS at 20:48

## 2023-07-11 RX ADMIN — IOPAMIDOL 100 ML: 755 INJECTION, SOLUTION INTRAVENOUS at 20:48

## 2023-07-11 RX ADMIN — SODIUM CHLORIDE 100 ML: 9 INJECTION, SOLUTION INTRAVENOUS at 20:48

## 2023-07-11 RX ADMIN — SODIUM CHLORIDE 1000 ML: 9 INJECTION, SOLUTION INTRAVENOUS at 19:00

## 2023-07-11 ASSESSMENT — LIFESTYLE VARIABLES
HOW MANY STANDARD DRINKS CONTAINING ALCOHOL DO YOU HAVE ON A TYPICAL DAY: PATIENT DOES NOT DRINK
HOW OFTEN DO YOU HAVE A DRINK CONTAINING ALCOHOL: NEVER

## 2023-07-11 ASSESSMENT — PAIN - FUNCTIONAL ASSESSMENT
PAIN_FUNCTIONAL_ASSESSMENT: NONE - DENIES PAIN
PAIN_FUNCTIONAL_ASSESSMENT: 0-10
PAIN_FUNCTIONAL_ASSESSMENT: NONE - DENIES PAIN

## 2023-07-11 ASSESSMENT — PAIN SCALES - GENERAL: PAINLEVEL_OUTOF10: 2

## 2023-07-11 NOTE — ED PROVIDER NOTES
[address] [facility],6Th Floor ED  Emergency Department Encounter  Emergency Medicine Resident     Pt Name:Bernardino Schulz  MRN: [id number]  Birthdate 1955  Date of evaluation: 7/11/23  PCP:  Jerrell Sosa MD  Note Started: 5:45 PM EDT      CHIEF COMPLAINT       Chief Complaint   Patient presents with    Loss of Consciousness       HISTORY OF PRESENT ILLNESS  (Location/Symptom, Timing/Onset, Context/Setting, Quality, Duration, Modifying Factors, Severity.)      Sasha Shukla is a 70 y.o. male who presents with pain, syncopal today prior to patient states that he felt well prior to this and states that he like white went over his eyes and passed out. This was witnessed by his wife states that he did lose consciousness and possibly hit his head. He states he has been having severe abdominal pain prior to this. He has been having regular bowel movements and denies any blood. He is on chemotherapy for history of prostate cancer and takes pills. Denies any chest pain or shortness of breath no chest palpitations. States his abdominal pain is described as a pressure sensation and feels as if he is can have a bowel movement. Denies any fevers or dysuria. States that he did have a fall yesterday when she hit the left side of his head but did not lose any consciousness at that time.     PAST MEDICAL / SURGICAL / SOCIAL / FAMILY HISTORY      has a past medical history of Acid reflux, Acute renal failure with acute tubular necrosis superimposed on stage 4 chronic kidney disease ([address]), Acute respiratory failure with hypoxia ([address]), Anemia, blood loss, Arthritis, Benign hypertension with CKD (chronic kidney disease), stage II, Bilateral leg edema, C. difficile colitis, Carcinoma of prostate (HCC), Chronic bilateral low back pain with bilateral sciatica, Chronic diastolic heart failure ([address]), Chronic renal disease, stage III ([address]) [125298], Complete tear of left rotator cuff, COPD (chronic obstructive pulmonary disease)

## 2023-07-11 NOTE — ED NOTES
Labs obtained. Wife at bedside. VSS. Patient denies pain. Warm blanket provided.      Ceci Johnson RN  07/11/23 1958

## 2023-07-11 NOTE — ED TRIAGE NOTES
Mode of arrival (squad #, walk in, police, etc) : LS 8        Chief complaint(s): Syncope, abdominal pain        Arrival Note (brief scenario, treatment PTA, etc). : Pt arrives to ED via EMS following a syncopal episode. Patient was on his way to the bathroom when he passed out. Patient reports abdominal pain that has been ongoing for a few days. Per EMS patients BP dropped from systolic in the 03'I to a systolic in the 08'C en route to ED. Patient arrives to ED alert and oriented and able to follow all commands.

## 2023-07-12 ENCOUNTER — CARE COORDINATION (OUTPATIENT)
Dept: CARE COORDINATION | Age: 68
End: 2023-07-12

## 2023-07-12 DIAGNOSIS — E11.42 TYPE 2 DIABETES MELLITUS WITH DIABETIC POLYNEUROPATHY, WITHOUT LONG-TERM CURRENT USE OF INSULIN (HCC): ICD-10-CM

## 2023-07-12 RX ORDER — PREGABALIN 200 MG/1
200 CAPSULE ORAL 3 TIMES DAILY
Qty: 270 CAPSULE | Refills: 0 | OUTPATIENT
Start: 2023-07-12 | End: 2023-10-10

## 2023-07-12 ASSESSMENT — ENCOUNTER SYMPTOMS
VOMITING: 0
COUGH: 0
DIARRHEA: 0
BLOOD IN STOOL: 0
COLOR CHANGE: 0
SHORTNESS OF BREATH: 0
CHEST TIGHTNESS: 0
ABDOMINAL PAIN: 1
CONSTIPATION: 0
NAUSEA: 0

## 2023-07-12 NOTE — DISCHARGE INSTRUCTIONS
You were seen in the emergency department after you passed out. We obtained lab work as well as CT head and CT of your abdomen which showed no abnormalities that require treatment here in the emergency department. Your blood pressure was low when you presented to the emergency department and improved after IV fluids. Is important that you continue to drink plenty of fluids and avoid straining when going to the bathroom as this can cause you to pass out. Follow-up with your primary care provider in the next 2 to 3 days. Return emergency department if you have worsening or return of symptoms, chest pain, shortness of breath, abdominal pain, feels if you are in a pass out again, become pale or sweaty.

## 2023-07-12 NOTE — ED NOTES
Discharge instructions reviewed with patient. Encouraged to f/u with PCP or to call in office in a.m. to make aware of ER visit. Pending appt on 7/28. Wife present. Wheelchair provided to private vehicle.      Franco Srinivasan RN  07/11/23 [id number]

## 2023-07-12 NOTE — ED NOTES
Patient standing at bedside with wife utilizing urinal. Able to provide sample. Denies any dizziness with position changes.      Azael Barraza RN  07/11/23 1231

## 2023-07-12 NOTE — CARE COORDINATION
Ambulatory Care Coordination  ED Follow up Call    Reason for ED visit:  Syncope and Collapse   Status:     improved    Did you call your PCP prior to going to the ED? No      Did you receive a discharge instructions from the Emergency Room? Yes  Review of Instructions:     Understands what to report/when to return?:  Yes   Understands discharge instructions?:  Yes   Following discharge instructions?:  Yes   If not why? Are there any new complaints of pain? No  New Pain Meds? N/A    Constipation prophylaxis needed? No    If you have a wound is the dressing clean, dry, and intact? N/A  Understands wound care regimen? N/A    Are there any other complaints/concerns that you wish to tell your provider? no    FU appts/Provider:    Future Appointments   Date Time Provider [address]   7/28/2023  2:30 PM Tomasa Eisenmenger, MD  sc [facility]   9/25/2023 10:10 AM MD Braulio Kelly. C URO [facility]           New Medications?:   No      Medication Reconciliation by phone - No- Kelbywillierony Seen was sleeping; spoke with his wife. Understands Medications? Yes  Taking Medications? Yes  Can you swallow your pills? Yes    Any further needs in the home i.e. Equipment? No    Link to services in community?:  No   Which services:      Spoke with Bernardino's wife, Soham Hernandez. She states he is sleeping. He has had no further episodes of dizziness or any symptoms since returning home from the ED. This episode occurred while he was sitting on the toilet having a BM. She said it has only happened one other time. His BP has been running in the 130's/70's and stable. He also fell the day before. She said he was walking with his walker, and he thinks his hand slipped off the handle and threw him off balance. Denies any injuries. He has an appt with his PCP on 7/28. Encouraged her to call for a sooner appt if any symptoms, concerns, or low BP readings. She verbalized understanding. No other concerns/needs voiced.

## 2023-07-13 RX ORDER — PREGABALIN 200 MG/1
200 CAPSULE ORAL 3 TIMES DAILY
Qty: 270 CAPSULE | Refills: 0 | OUTPATIENT
Start: 2023-07-13 | End: 2023-10-11

## 2023-07-14 DIAGNOSIS — I10 ESSENTIAL HYPERTENSION: ICD-10-CM

## 2023-07-14 DIAGNOSIS — K21.9 GASTROESOPHAGEAL REFLUX DISEASE WITHOUT ESOPHAGITIS: ICD-10-CM

## 2023-07-14 RX ORDER — PREGABALIN 200 MG/1
200 CAPSULE ORAL 3 TIMES DAILY
Qty: 9 CAPSULE | Refills: 0 | Status: SHIPPED | OUTPATIENT
Start: 2023-07-14 | End: 2023-07-17

## 2023-07-14 RX ORDER — OMEPRAZOLE 20 MG/1
CAPSULE, DELAYED RELEASE ORAL
Qty: 90 CAPSULE | Refills: 3 | Status: SHIPPED | OUTPATIENT
Start: 2023-07-14

## 2023-07-14 RX ORDER — AMLODIPINE BESYLATE 10 MG/1
TABLET ORAL
Qty: 90 TABLET | Refills: 3 | Status: SHIPPED | OUTPATIENT
Start: 2023-07-14

## 2023-07-18 DIAGNOSIS — E11.51 TYPE 2 DIABETES MELLITUS WITH DIABETIC PERIPHERAL ANGIOPATHY WITHOUT GANGRENE, WITHOUT LONG-TERM CURRENT USE OF INSULIN (HCC): ICD-10-CM

## 2023-07-18 DIAGNOSIS — E11.42 TYPE 2 DIABETES MELLITUS WITH DIABETIC POLYNEUROPATHY, WITHOUT LONG-TERM CURRENT USE OF INSULIN (HCC): ICD-10-CM

## 2023-07-18 RX ORDER — TIRZEPATIDE 2.5 MG/.5ML
2.5 INJECTION, SOLUTION SUBCUTANEOUS WEEKLY
Qty: 6 ML | Refills: 3 | Status: SHIPPED | OUTPATIENT
Start: 2023-07-18

## 2023-07-18 NOTE — TELEPHONE ENCOUNTER
Please Approve or Refuse.   Send to Pharmacy per Pt's Request:      Next Visit Date:  7/28/2023   Last Visit Date: 4/7/2023    Hemoglobin A1C (%)   Date Value   04/07/2023 6.1   12/29/2022 5.8   09/23/2022 5.8             ( goal A1C is < 7)   BP Readings from Last 3 Encounters:   07/11/23 105/64   05/23/23 130/62   04/19/23 131/81          (goal 120/80)  BUN   Date Value Ref Range Status   07/11/2023 11 8 - 23 mg/dL Final     Creatinine   Date Value Ref Range Status   07/11/2023 0.8 0.7 - 1.2 mg/dL Final     Potassium   Date Value Ref Range Status   07/11/2023 4.7 3.7 - 5.3 mmol/L Final

## 2023-07-28 ENCOUNTER — OFFICE VISIT (OUTPATIENT)
Dept: FAMILY MEDICINE CLINIC | Age: 68
End: 2023-07-28
Payer: MEDICARE

## 2023-07-28 VITALS
DIASTOLIC BLOOD PRESSURE: 74 MMHG | OXYGEN SATURATION: 96 % | TEMPERATURE: 98.2 F | HEIGHT: 72 IN | SYSTOLIC BLOOD PRESSURE: 118 MMHG | WEIGHT: 275 LBS | HEART RATE: 75 BPM | BODY MASS INDEX: 37.25 KG/M2

## 2023-07-28 DIAGNOSIS — E78.5 HYPERLIPIDEMIA WITH TARGET LDL LESS THAN 70: ICD-10-CM

## 2023-07-28 DIAGNOSIS — I50.32 CHRONIC DIASTOLIC HEART FAILURE (HCC): ICD-10-CM

## 2023-07-28 DIAGNOSIS — R19.5 POSITIVE FIT (FECAL IMMUNOCHEMICAL TEST): ICD-10-CM

## 2023-07-28 DIAGNOSIS — J44.9 COPD MIXED TYPE (HCC): ICD-10-CM

## 2023-07-28 DIAGNOSIS — Z87.898 HISTORY OF SYNCOPE: ICD-10-CM

## 2023-07-28 DIAGNOSIS — I25.10 CORONARY ARTERY DISEASE INVOLVING NATIVE CORONARY ARTERY OF NATIVE HEART WITHOUT ANGINA PECTORIS: ICD-10-CM

## 2023-07-28 DIAGNOSIS — E55.9 VITAMIN D DEFICIENCY: ICD-10-CM

## 2023-07-28 DIAGNOSIS — I73.9 PVD (PERIPHERAL VASCULAR DISEASE) WITH CLAUDICATION (HCC): ICD-10-CM

## 2023-07-28 DIAGNOSIS — N18.2 BENIGN HYPERTENSION WITH CKD (CHRONIC KIDNEY DISEASE), STAGE II: ICD-10-CM

## 2023-07-28 DIAGNOSIS — E11.42 TYPE 2 DIABETES MELLITUS WITH DIABETIC POLYNEUROPATHY, WITHOUT LONG-TERM CURRENT USE OF INSULIN (HCC): Primary | ICD-10-CM

## 2023-07-28 DIAGNOSIS — I12.9 BENIGN HYPERTENSION WITH CKD (CHRONIC KIDNEY DISEASE), STAGE II: ICD-10-CM

## 2023-07-28 DIAGNOSIS — F33.0 MILD EPISODE OF RECURRENT MAJOR DEPRESSIVE DISORDER (HCC): ICD-10-CM

## 2023-07-28 LAB — HBA1C MFR BLD: 5.7 %

## 2023-07-28 PROCEDURE — 3044F HG A1C LEVEL LT 7.0%: CPT | Performed by: FAMILY MEDICINE

## 2023-07-28 PROCEDURE — 99214 OFFICE O/P EST MOD 30 MIN: CPT | Performed by: FAMILY MEDICINE

## 2023-07-28 PROCEDURE — 1036F TOBACCO NON-USER: CPT | Performed by: FAMILY MEDICINE

## 2023-07-28 PROCEDURE — 3017F COLORECTAL CA SCREEN DOC REV: CPT | Performed by: FAMILY MEDICINE

## 2023-07-28 PROCEDURE — 3023F SPIROM DOC REV: CPT | Performed by: FAMILY MEDICINE

## 2023-07-28 PROCEDURE — G8427 DOCREV CUR MEDS BY ELIG CLIN: HCPCS | Performed by: FAMILY MEDICINE

## 2023-07-28 PROCEDURE — 1123F ACP DISCUSS/DSCN MKR DOCD: CPT | Performed by: FAMILY MEDICINE

## 2023-07-28 PROCEDURE — 2022F DILAT RTA XM EVC RTNOPTHY: CPT | Performed by: FAMILY MEDICINE

## 2023-07-28 PROCEDURE — 83037 HB GLYCOSYLATED A1C HOME DEV: CPT | Performed by: FAMILY MEDICINE

## 2023-07-28 PROCEDURE — G8417 CALC BMI ABV UP PARAM F/U: HCPCS | Performed by: FAMILY MEDICINE

## 2023-07-28 RX ORDER — FUROSEMIDE 20 MG/1
20 TABLET ORAL DAILY
Qty: 90 TABLET | Refills: 3 | Status: SHIPPED | OUTPATIENT
Start: 2023-07-28

## 2023-07-28 SDOH — ECONOMIC STABILITY: INCOME INSECURITY: HOW HARD IS IT FOR YOU TO PAY FOR THE VERY BASICS LIKE FOOD, HOUSING, MEDICAL CARE, AND HEATING?: NOT HARD AT ALL

## 2023-07-28 SDOH — ECONOMIC STABILITY: FOOD INSECURITY: WITHIN THE PAST 12 MONTHS, THE FOOD YOU BOUGHT JUST DIDN'T LAST AND YOU DIDN'T HAVE MONEY TO GET MORE.: NEVER TRUE

## 2023-07-28 SDOH — ECONOMIC STABILITY: FOOD INSECURITY: WITHIN THE PAST 12 MONTHS, YOU WORRIED THAT YOUR FOOD WOULD RUN OUT BEFORE YOU GOT MONEY TO BUY MORE.: NEVER TRUE

## 2023-07-28 ASSESSMENT — PATIENT HEALTH QUESTIONNAIRE - PHQ9
SUM OF ALL RESPONSES TO PHQ QUESTIONS 1-9: 8
6. FEELING BAD ABOUT YOURSELF - OR THAT YOU ARE A FAILURE OR HAVE LET YOURSELF OR YOUR FAMILY DOWN: 1
7. TROUBLE CONCENTRATING ON THINGS, SUCH AS READING THE NEWSPAPER OR WATCHING TELEVISION: 1
9. THOUGHTS THAT YOU WOULD BE BETTER OFF DEAD, OR OF HURTING YOURSELF: 0
SUM OF ALL RESPONSES TO PHQ QUESTIONS 1-9: 8
8. MOVING OR SPEAKING SO SLOWLY THAT OTHER PEOPLE COULD HAVE NOTICED. OR THE OPPOSITE, BEING SO FIGETY OR RESTLESS THAT YOU HAVE BEEN MOVING AROUND A LOT MORE THAN USUAL: 1
2. FEELING DOWN, DEPRESSED OR HOPELESS: 1
3. TROUBLE FALLING OR STAYING ASLEEP: 0
SUM OF ALL RESPONSES TO PHQ9 QUESTIONS 1 & 2: 1
4. FEELING TIRED OR HAVING LITTLE ENERGY: 3
10. IF YOU CHECKED OFF ANY PROBLEMS, HOW DIFFICULT HAVE THESE PROBLEMS MADE IT FOR YOU TO DO YOUR WORK, TAKE CARE OF THINGS AT HOME, OR GET ALONG WITH OTHER PEOPLE: 1
SUM OF ALL RESPONSES TO PHQ QUESTIONS 1-9: 8
SUM OF ALL RESPONSES TO PHQ QUESTIONS 1-9: 8
1. LITTLE INTEREST OR PLEASURE IN DOING THINGS: 0
5. POOR APPETITE OR OVEREATING: 1

## 2023-07-28 ASSESSMENT — ENCOUNTER SYMPTOMS
NAUSEA: 0
APNEA: 1
BLOOD IN STOOL: 0
DIARRHEA: 0
BACK PAIN: 1
ABDOMINAL PAIN: 0
COLOR CHANGE: 1
CONSTIPATION: 0
CHEST TIGHTNESS: 0
ABDOMINAL DISTENTION: 0
VOMITING: 0
WHEEZING: 0
COUGH: 0

## 2023-07-28 NOTE — PROGRESS NOTES
Visit Information    Have you changed or started any medications since your last visit including any over-the-counter medicines, vitamins, or herbal medicines? no   Have you stopped taking any of your medications? Is so, why? -  no  Are you having any side effects from any of your medications? - no    Have you seen any other physician or provider since your last visit? yes -    Have you had any other diagnostic tests since your last visit? yes -    Have you been seen in the emergency room and/or had an admission in a hospital since we last saw you?  yes -    Have you had your routine dental cleaning in the past 6 months?  no     Do you have an active MyChart account? If no, what is the barrier?   Yes    Patient Care Team:  Fabian Hernandez MD as PCP - General (Family Medicine)  Fabian Hernandez MD [facility]  Richie Santiago MD as Consulting Physician (Neurosurgery)  Berta Ewing MD as Consulting Physician (Cardiology)  Srinath Toure MD as Consulting Physician (Urology)  Carrie Camacho MD as Surgeon (Vascular Surgery)  Monica Burton MD as Consulting Physician (Pulmonology)  Michael Gonzalez MD as Consulting Physician (Orthopedic Surgery)  Manuel Mao OD (Ophthalmology)  Casey Perez MD as Consulting Physician (Endocrinology)  Kandy Benitez MD as Consulting Physician (Orthopedic Surgery)  Marco Meehan MD as Consulting Physician (Gastroenterology)  Balbina Manning MD as Consulting Physician (Orthopedic Surgery)  Tani Box MD as Surgeon (General Surgery)  Kalen Urbina, PhD (Psychology)  Shanita Morin MD as Consulting Physician (Neurology)  Shakira Hall MD as Consulting Physician (Nephrology)  Hung Davis DPM as Surgeon (Podiatry)    Medical History Review  Past Medical, Family, and Social History reviewed and does contribute to the patient presenting condition    Health Maintenance   Topic Date Due    Colorectal Cancer

## 2023-07-28 NOTE — RESULT ENCOUNTER NOTE
Addressed during office visit today, A1c 5.7, improved diabetes, continue treatment recommended during the office visit.

## 2023-07-28 NOTE — PROGRESS NOTES
Juan Pablo Green (:  1955) is a 70 y.o. male,Established patient, here for evaluation of the following chief complaint(s): Diabetes, Hypertension, COPD, and ED Follow-up (Syncope and collapse)      ASSESSMENT/PLAN:    1. Type 2 diabetes mellitus with diabetic polyneuropathy, without long-term current use of insulin (HCC)  Improved diabetes mellitus type 2  Improved polyneuropathy on Lyrica  -     POCT glycosylated hemoglobin (Hb A1C) 5.7    Lab Results   Component Value Date    LABA1C 5.7 2023    LABA1C 6.1 2023    LABA1C 5.8 2022       -     CBC with Auto Differential; Future  -     Basic Metabolic Panel; Future  -     Magnesium; Future  -     TSH; Future  -     Vitamin B12 & Folate; Future  -advised home blood glucose testing  daily  -daily feet exam, Foot care: advised to wash feet daily, pat dry and apply lotion at night, avoiding between toes. Need to look at feet daily and report to a physician any signs of inflammation or skin damage  -annual dilated eye exam  -Low carb, low fat diet, increase fruits and vegetables, and exercise 4-5 times a day 30-40 minutes a day discussed  -continue current treatment Mounjaro 2.5 Mg weekly  -continue Aspirin 81 Mg  -continue lisinopril ACEI and statin pravastatin     2. Coronary artery disease involving native coronary artery of native heart without angina pectoris  Stable  Continue lisinopril 20 Mg daily, amlodipine 10 Mg, pravastatin 80 Mg, aspirin 81 Mg, refer to new cardiologist  -     AFL (Daniel Don) - Herlinda Angeles MD, Cardiology, [place]  3. Chronic diastolic heart failure (HCC)  Stable  Lab Results   Component Value Date    LVEF 60 2022    LVEFMODE Echo 2019     Continue lisinopril 20 Mg daily, will decrease the dosage of furosemide due to improving blood pressure likely related to weight loss, and recent syncope    -     AFL (Epic) - Herlinda Angeles MD, Cardiology, [place]  -     furosemide (LASIX) 20 MG tablet;  Take 1

## 2023-07-30 ASSESSMENT — ENCOUNTER SYMPTOMS: SHORTNESS OF BREATH: 1

## 2023-07-31 ENCOUNTER — TELEPHONE (OUTPATIENT)
Dept: FAMILY MEDICINE CLINIC | Age: 68
End: 2023-07-31

## 2023-07-31 ENCOUNTER — HOSPITAL ENCOUNTER (OUTPATIENT)
Age: 68
Setting detail: SPECIMEN
Discharge: HOME | End: 2023-07-31

## 2023-07-31 DIAGNOSIS — E11.42 TYPE 2 DIABETES MELLITUS WITH DIABETIC POLYNEUROPATHY, WITHOUT LONG-TERM CURRENT USE OF INSULIN (HCC): ICD-10-CM

## 2023-07-31 DIAGNOSIS — N18.2 BENIGN HYPERTENSION WITH CKD (CHRONIC KIDNEY DISEASE), STAGE II: ICD-10-CM

## 2023-07-31 DIAGNOSIS — E55.9 VITAMIN D DEFICIENCY: ICD-10-CM

## 2023-07-31 DIAGNOSIS — I12.9 BENIGN HYPERTENSION WITH CKD (CHRONIC KIDNEY DISEASE), STAGE II: ICD-10-CM

## 2023-07-31 DIAGNOSIS — E78.5 HYPERLIPIDEMIA WITH TARGET LDL LESS THAN 70: ICD-10-CM

## 2023-07-31 DIAGNOSIS — E11.51 TYPE 2 DIABETES MELLITUS WITH DIABETIC PERIPHERAL ANGIOPATHY WITHOUT GANGRENE, WITHOUT LONG-TERM CURRENT USE OF INSULIN (HCC): ICD-10-CM

## 2023-07-31 LAB
25(OH)D3 SERPL-MCNC: 34.7 NG/ML
ANION GAP SERPL CALCULATED.3IONS-SCNC: 17 MMOL/L (ref 9–17)
BASOPHILS # BLD: 0.07 K/UL (ref 0–0.2)
BASOPHILS NFR BLD: 1 % (ref 0–2)
BUN SERPL-MCNC: 10 MG/DL (ref 8–23)
CALCIUM SERPL-MCNC: 10.4 MG/DL (ref 8.6–10.4)
CHLORIDE SERPL-SCNC: 105 MMOL/L (ref 98–107)
CHOLEST SERPL-MCNC: 182 MG/DL
CHOLESTEROL/HDL RATIO: 4.4
CO2 SERPL-SCNC: 22 MMOL/L (ref 20–31)
CREAT SERPL-MCNC: 0.7 MG/DL (ref 0.7–1.2)
EOSINOPHIL # BLD: 0.16 K/UL (ref 0–0.44)
EOSINOPHILS RELATIVE PERCENT: 2 % (ref 1–4)
ERYTHROCYTE [DISTWIDTH] IN BLOOD BY AUTOMATED COUNT: 13.4 % (ref 11.8–14.4)
FOLATE SERPL-MCNC: 13.9 NG/ML
GFR SERPL CREATININE-BSD FRML MDRD: >60 ML/MIN/1.73M2
GLUCOSE SERPL-MCNC: 113 MG/DL (ref 70–99)
HCT VFR BLD AUTO: 45.6 % (ref 40.7–50.3)
HDLC SERPL-MCNC: 41 MG/DL
HGB BLD-MCNC: 15 G/DL (ref 13–17)
IMM GRANULOCYTES # BLD AUTO: <0.03 K/UL (ref 0–0.3)
IMM GRANULOCYTES NFR BLD: 0 %
LDLC SERPL CALC-MCNC: 77 MG/DL (ref 0–130)
LYMPHOCYTES NFR BLD: 3.15 K/UL (ref 1.1–3.7)
LYMPHOCYTES RELATIVE PERCENT: 39 % (ref 24–43)
MAGNESIUM SERPL-MCNC: 1.8 MG/DL (ref 1.6–2.6)
MCH RBC QN AUTO: 29.4 PG (ref 25.2–33.5)
MCHC RBC AUTO-ENTMCNC: 32.9 G/DL (ref 28.4–34.8)
MCV RBC AUTO: 89.4 FL (ref 82.6–102.9)
MONOCYTES NFR BLD: 0.65 K/UL (ref 0.1–1.2)
MONOCYTES NFR BLD: 8 % (ref 3–12)
NEUTROPHILS NFR BLD: 50 % (ref 36–65)
NEUTS SEG NFR BLD: 3.94 K/UL (ref 1.5–8.1)
NRBC BLD-RTO: 0 PER 100 WBC
PLATELET # BLD AUTO: 224 K/UL (ref 138–453)
PMV BLD AUTO: 11.4 FL (ref 8.1–13.5)
POTASSIUM SERPL-SCNC: 5.1 MMOL/L (ref 3.7–5.3)
RBC # BLD AUTO: 5.1 M/UL (ref 4.21–5.77)
SODIUM SERPL-SCNC: 144 MMOL/L (ref 135–144)
TRIGL SERPL-MCNC: 318 MG/DL
TSH SERPL DL<=0.05 MIU/L-ACNC: 1 UIU/ML (ref 0.3–5)
VIT B12 SERPL-MCNC: 831 PG/ML (ref 232–1245)
WBC OTHER # BLD: 8 K/UL (ref 3.5–11.3)

## 2023-07-31 RX ORDER — TIRZEPATIDE 5 MG/.5ML
5 INJECTION, SOLUTION SUBCUTANEOUS WEEKLY
Qty: 6 ML | Refills: 2 | Status: SHIPPED | OUTPATIENT
Start: 2023-07-31

## 2023-07-31 NOTE — TELEPHONE ENCOUNTER
Please let the patient know I sent a higher dosage    The prescription I initially sent was 2.5 mg, it was correct in all numbers and instructions and everything, possibly they wanted me to increase the dosage but we have never received anything just let him know I sent the higher dose and to call the pharmacy see what is the issue for her to send us a fax with any clarification request if they have any  Orders Placed This Encounter   Medications    Tirzepatide [facility]) 5 MG/0.5ML SOPN SC injection     Sig: Inject 0.5 mLs into the skin once a week Dose increased 7/31/2023     Dispense:  6 mL     Refill:  2         [facility] ) - [address] [phone] [facility] [phone]  [facility] [address]  Phone: [phone] Fax: [phone]      No orders of the defined types were placed in this encounter. Future Appointments   Date Time Provider [address] [place] [address]   8/17/2023 11:30 AM Omar Tipton DO [facility]   9/25/2023 10:10 AM Caryl Hoyt MD [facility]   12/28/2023  1:30 PM Consuelo Eagle MD fp sc [facility]       Thank you!

## 2023-07-31 NOTE — TELEPHONE ENCOUNTER
PATIENT STATED HIS MAIL ORDER PHARMACY STATED TO HIM THE MEDICATION SENT OVER WAS NOT CORRECT.  COULD YOU PLEASE EVALUATE AND CHECK TO SEE IF RX: Tirzepatide (MOUNJARO) 2.5 MG/0.5ML SOPN SC injection   PHARMACY WAS NOT SPECIFIC WITH PATIENT ON CONCERN AND THERE IS NOT CALL/NOTIFICATION IN CHART FOR THE MEDICATION

## 2023-07-31 NOTE — RESULT ENCOUNTER NOTE
Please notify patient: Improved lipids, but still high triglycerides, cut down the sweets  Blood glucose well controlled 113--please let him know I did resend the [facility] a higher dosage, previous prescription was correct, not sure why they did not fill that.   Vitamin D borderline low, to make sure he takes vitamin D 2000 units daily with food      Otherwise labs within normal limits  continue current treatment    Future Appointments  8/17/2023  11:30 AM   Omar Tipton DO              AFL [facility]        AFL [facility] C  9/25/2023  10:10 AM   Prema Conde MD         . C URO           [facility]  12/28/2023 1:30 PM    Urmila Richards MD     fp sc               Miracle Garcia

## 2023-08-01 DIAGNOSIS — E11.42 TYPE 2 DIABETES MELLITUS WITH DIABETIC POLYNEUROPATHY, WITHOUT LONG-TERM CURRENT USE OF INSULIN (HCC): ICD-10-CM

## 2023-08-01 RX ORDER — TIRZEPATIDE 2.5 MG/.5ML
INJECTION, SOLUTION SUBCUTANEOUS
Qty: 2 ML | OUTPATIENT
Start: 2023-08-01

## 2023-08-01 RX ORDER — BLOOD-GLUCOSE METER
EACH MISCELLANEOUS
Qty: 100 STRIP | Refills: 3 | Status: SHIPPED | OUTPATIENT
Start: 2023-08-01

## 2023-08-05 DIAGNOSIS — E78.5 HYPERLIPIDEMIA WITH TARGET LDL LESS THAN 70: ICD-10-CM

## 2023-08-07 RX ORDER — PRAVASTATIN SODIUM 80 MG/1
TABLET ORAL
Qty: 90 TABLET | Refills: 3 | Status: SHIPPED | OUTPATIENT
Start: 2023-08-07

## 2023-08-21 ENCOUNTER — TELEPHONE (OUTPATIENT)
Dept: FAMILY MEDICINE CLINIC | Age: 68
End: 2023-08-21

## 2023-08-21 DIAGNOSIS — E11.51 TYPE 2 DIABETES MELLITUS WITH DIABETIC PERIPHERAL ANGIOPATHY WITHOUT GANGRENE, WITHOUT LONG-TERM CURRENT USE OF INSULIN (HCC): ICD-10-CM

## 2023-08-21 DIAGNOSIS — E11.42 TYPE 2 DIABETES MELLITUS WITH DIABETIC POLYNEUROPATHY, WITHOUT LONG-TERM CURRENT USE OF INSULIN (HCC): Primary | ICD-10-CM

## 2023-08-21 RX ORDER — BLOOD-GLUCOSE METER
1 EACH MISCELLANEOUS DAILY
Qty: 1 KIT | Refills: 0 | Status: SHIPPED | OUTPATIENT
Start: 2023-08-21

## 2023-08-21 RX ORDER — GLUCOSAMINE HCL/CHONDROITIN SU 500-400 MG
CAPSULE ORAL
Qty: 100 STRIP | Refills: 3 | Status: SHIPPED | OUTPATIENT
Start: 2023-08-21

## 2023-08-21 NOTE — TELEPHONE ENCOUNTER
Pt navigator from [facility] called in stating pt is requesting a One Touch Verio Flex meter with test strips to be sent to [facility].

## 2023-08-21 NOTE — TELEPHONE ENCOUNTER
Printed in med room, I will sign tomorrow then we will need to fax them to [facility]     Diagnosis Orders   1. Type 2 diabetes mellitus with diabetic polyneuropathy, without long-term current use of insulin ([facility])  Blood Glucose Monitoring Suppl (ONE TOUCH ULTRA 2) w/Device KIT    blood glucose monitor strips      2.  Type 2 diabetes mellitus with diabetic peripheral angiopathy without gangrene, without long-term current use of insulin (HCC)  Blood Glucose Monitoring Suppl (ONE TOUCH ULTRA 2) w/Device KIT    blood glucose monitor strips           Future Appointments   Date Time Provider [address] [place] [address]   9/25/2023 10:10 AM Armando Husain MD [facility]   12/28/2023  1:30 PM Alma Rodriguez MD [facility]

## 2023-09-18 DIAGNOSIS — E11.42 TYPE 2 DIABETES MELLITUS WITH DIABETIC POLYNEUROPATHY, WITHOUT LONG-TERM CURRENT USE OF INSULIN (HCC): ICD-10-CM

## 2023-09-19 RX ORDER — PREGABALIN 200 MG/1
200 CAPSULE ORAL 3 TIMES DAILY
Qty: 270 CAPSULE | Refills: 0 | Status: SHIPPED | OUTPATIENT
Start: 2023-09-19 | End: 2023-12-18

## 2023-09-19 NOTE — TELEPHONE ENCOUNTER
Please Approve or Refuse.   Send to Pharmacy per Pt's Request:      Next Visit Date:  12/28/2023   Last Visit Date: 7/28/2023    Hemoglobin A1C (%)   Date Value   07/28/2023 5.7   04/07/2023 6.1   12/29/2022 5.8             ( goal A1C is < 7)   BP Readings from Last 3 Encounters:   08/29/23 130/72   08/17/23 130/72   07/28/23 118/74          (goal 120/80)  BUN   Date Value Ref Range Status   07/31/2023 10 8 - 23 mg/dL Final     Creatinine   Date Value Ref Range Status   07/31/2023 0.7 0.7 - 1.2 mg/dL Final     Potassium   Date Value Ref Range Status   07/31/2023 5.1 3.7 - 5.3 mmol/L Final

## 2023-09-22 ENCOUNTER — HOSPITAL ENCOUNTER (OUTPATIENT)
Age: 68
Setting detail: SPECIMEN
Discharge: HOME | End: 2023-09-22

## 2023-09-22 DIAGNOSIS — C61 PROSTATE CANCER (HCC): ICD-10-CM

## 2023-09-22 LAB
PSA SERPL-MCNC: 0.23 NG/ML
TESTOST SERPL-MCNC: 12 NG/DL (ref 220–1000)

## 2023-09-25 ENCOUNTER — OFFICE VISIT (OUTPATIENT)
Dept: UROLOGY | Age: 68
End: 2023-09-25
Payer: MEDICARE

## 2023-09-25 VITALS
BODY MASS INDEX: 34.67 KG/M2 | DIASTOLIC BLOOD PRESSURE: 82 MMHG | HEIGHT: 72 IN | WEIGHT: 256 LBS | SYSTOLIC BLOOD PRESSURE: 122 MMHG | HEART RATE: 76 BPM | TEMPERATURE: 97.8 F | RESPIRATION RATE: 16 BRPM | OXYGEN SATURATION: 98 %

## 2023-09-25 DIAGNOSIS — Z79.818 ANDROGEN DEPRIVATION THERAPY: ICD-10-CM

## 2023-09-25 DIAGNOSIS — R23.2 HOT FLASHES: ICD-10-CM

## 2023-09-25 DIAGNOSIS — C61 PROSTATE CANCER (HCC): Primary | ICD-10-CM

## 2023-09-25 PROCEDURE — 1123F ACP DISCUSS/DSCN MKR DOCD: CPT | Performed by: UROLOGY

## 2023-09-25 PROCEDURE — 99213 OFFICE O/P EST LOW 20 MIN: CPT | Performed by: UROLOGY

## 2023-09-25 RX ORDER — BACLOFEN 10 MG/1
TABLET ORAL
COMMUNITY
Start: 2023-08-05

## 2023-09-25 ASSESSMENT — ENCOUNTER SYMPTOMS
EYE REDNESS: 0
ABDOMINAL PAIN: 0
BACK PAIN: 0
CONSTIPATION: 0
WHEEZING: 0
NAUSEA: 0
EYE PAIN: 0
DIARRHEA: 0
COUGH: 0
VOMITING: 0
SHORTNESS OF BREATH: 0

## 2023-10-02 ENCOUNTER — OFFICE VISIT (OUTPATIENT)
Dept: ORTHOPEDIC SURGERY | Age: 68
End: 2023-10-02
Payer: MEDICARE

## 2023-10-02 VITALS — BODY MASS INDEX: 33.86 KG/M2 | HEIGHT: 72 IN | RESPIRATION RATE: 14 BRPM | WEIGHT: 250 LBS

## 2023-10-02 DIAGNOSIS — M25.512 LEFT SHOULDER PAIN, UNSPECIFIED CHRONICITY: Primary | ICD-10-CM

## 2023-10-02 DIAGNOSIS — Z96.619 PAIN IN SHOULDER REGION AFTER SHOULDER REPLACEMENT: ICD-10-CM

## 2023-10-02 DIAGNOSIS — M25.519 PAIN IN SHOULDER REGION AFTER SHOULDER REPLACEMENT: ICD-10-CM

## 2023-10-02 PROCEDURE — 99213 OFFICE O/P EST LOW 20 MIN: CPT | Performed by: ORTHOPAEDIC SURGERY

## 2023-10-02 PROCEDURE — 1123F ACP DISCUSS/DSCN MKR DOCD: CPT | Performed by: ORTHOPAEDIC SURGERY

## 2023-10-06 ENCOUNTER — TELEPHONE (OUTPATIENT)
Dept: ORTHOPEDIC SURGERY | Age: 68
End: 2023-10-06

## 2023-10-06 NOTE — TELEPHONE ENCOUNTER
The patient called in today stating that Dr. Edda Rodriguez wanted him to let him know when when his CT of his left shoulder is scheduled.   It is scheduled for 10/9/23 at 9:30 am

## 2023-10-06 NOTE — TELEPHONE ENCOUNTER
Per Dr. Zana Taylor note- pt needs to schedule a f/u following the date of the CT to go over results

## 2023-10-10 ENCOUNTER — HOSPITAL ENCOUNTER (OUTPATIENT)
Dept: CT IMAGING | Age: 68
Discharge: HOME | End: 2023-10-12
Attending: ORTHOPAEDIC SURGERY
Payer: MEDICARE

## 2023-10-10 ENCOUNTER — HOSPITAL ENCOUNTER (OUTPATIENT)
Age: 68
Discharge: HOME | End: 2023-10-10
Attending: ORTHOPAEDIC SURGERY
Payer: MEDICARE

## 2023-10-10 DIAGNOSIS — Z96.619 PAIN IN SHOULDER REGION AFTER SHOULDER REPLACEMENT: ICD-10-CM

## 2023-10-10 DIAGNOSIS — M25.512 LEFT SHOULDER PAIN, UNSPECIFIED CHRONICITY: ICD-10-CM

## 2023-10-10 DIAGNOSIS — M25.519 PAIN IN SHOULDER REGION AFTER SHOULDER REPLACEMENT: ICD-10-CM

## 2023-10-10 LAB
CRP SERPL HS-MCNC: <3 MG/L (ref 0–5)
ERYTHROCYTE [DISTWIDTH] IN BLOOD BY AUTOMATED COUNT: 13.6 % (ref 11.5–14.9)
ERYTHROCYTE [SEDIMENTATION RATE] IN BLOOD BY PHOTOMETRIC METHOD: 6 MM/HR (ref 0–20)
HCT VFR BLD AUTO: 42 % (ref 41–53)
HGB BLD-MCNC: 14.2 G/DL (ref 13.5–17.5)
MCH RBC QN AUTO: 29.6 PG (ref 26–34)
MCHC RBC AUTO-ENTMCNC: 33.7 G/DL (ref 31–37)
MCV RBC AUTO: 87.6 FL (ref 80–100)
PLATELET # BLD AUTO: 215 K/UL (ref 150–450)
PMV BLD AUTO: 9.2 FL (ref 6–12)
RBC # BLD AUTO: 4.8 M/UL (ref 4.5–5.9)
WBC OTHER # BLD: 8 K/UL (ref 3.5–11)

## 2023-10-10 PROCEDURE — 73200 CT UPPER EXTREMITY W/O DYE: CPT

## 2023-10-10 PROCEDURE — 86140 C-REACTIVE PROTEIN: CPT

## 2023-10-10 PROCEDURE — 36415 COLL VENOUS BLD VENIPUNCTURE: CPT

## 2023-10-10 PROCEDURE — 85027 COMPLETE CBC AUTOMATED: CPT

## 2023-10-10 PROCEDURE — 85652 RBC SED RATE AUTOMATED: CPT

## 2023-10-19 ENCOUNTER — TELEPHONE (OUTPATIENT)
Dept: UROLOGY | Age: 68
End: 2023-10-19

## 2023-10-23 ENCOUNTER — OFFICE VISIT (OUTPATIENT)
Dept: ORTHOPEDIC SURGERY | Age: 68
End: 2023-10-23

## 2023-10-23 VITALS — HEIGHT: 72 IN | WEIGHT: 250 LBS | BODY MASS INDEX: 33.86 KG/M2 | RESPIRATION RATE: 14 BRPM

## 2023-10-23 DIAGNOSIS — M25.512 LEFT SHOULDER PAIN, UNSPECIFIED CHRONICITY: Primary | ICD-10-CM

## 2023-10-23 RX ORDER — LIDOCAINE HYDROCHLORIDE 10 MG/ML
5 INJECTION, SOLUTION INFILTRATION; PERINEURAL ONCE
Status: COMPLETED | OUTPATIENT
Start: 2023-10-23 | End: 2023-10-23

## 2023-10-23 RX ADMIN — LIDOCAINE HYDROCHLORIDE 5 ML: 10 INJECTION, SOLUTION INFILTRATION; PERINEURAL at 15:13

## 2023-10-23 NOTE — PROGRESS NOTES
HPI: Mr. Andreia Vila is a 62-year-old gentleman here today to review the results of his left shoulder CT scan. This was completed on 10/10/2023. I did review the images independently with the patient and demonstrates his reverse shoulder arthroplasty to be in acceptable alignment and well fixed. No obvious fracture, dislocation or subluxation. I did have a discussion with the patient today about the CT scan findings or lack thereof. On exam he is exquisitely tender to palpation at the tip of the coracoid. I had a discussion with the patient today about this and I recommended attempting an injection of a local to the tip of the coracoid/subcoracoid space. This will hopefully help localize the source of his pain. He was amenable to this and so had the procedure performed as outlined below. Evaluation of his shoulder after the injection demonstrate did complete resolution of the sharp pains he was having. At this time I recommended attempting some iontophoresis to see if this will help provide better long-term relief. He was amenable to this plan and so prescription was sent. I will see him back in my clinic as needed but he was encouraged to return or call at anytime with persistent or worsening symptoms or with any questions or concerns. Procedure: left shoulder subacromial space injection  Following an appropriate discussion with the patient regarding the risks and benefits of the procedure he consented to proceed. his left shoulder was prepped using chlorhexadine solution. Using aseptic technique the tip of the coracoid and subcoracoid space were injected with 5 cc of 1% lidocaine without epinephrine. A band aid was applied to the injection site. he tolerated the injection with no immediate adverse reactions.

## 2023-10-31 ENCOUNTER — HOSPITAL ENCOUNTER (OUTPATIENT)
Dept: PHYSICAL THERAPY | Age: 68
Setting detail: THERAPIES SERIES
Discharge: HOME | End: 2023-10-31
Payer: MEDICARE

## 2023-10-31 PROCEDURE — 97161 PT EVAL LOW COMPLEX 20 MIN: CPT

## 2023-10-31 PROCEDURE — 97110 THERAPEUTIC EXERCISES: CPT

## 2023-11-03 ENCOUNTER — HOSPITAL ENCOUNTER (OUTPATIENT)
Dept: PHYSICAL THERAPY | Age: 68
Setting detail: THERAPIES SERIES
Discharge: HOME | End: 2023-11-03
Payer: MEDICARE

## 2023-11-03 PROCEDURE — 97110 THERAPEUTIC EXERCISES: CPT

## 2023-11-03 NOTE — FLOWSHEET NOTE
[x] [facility]) - [facility] & Therapy  [address]  [place]: [phone]   F: [phone]    Physical Therapy Daily Treatment Note      Date:  11/3/2023  Patient Name:  Kellie Price    :  1955  MRN: [id number]  Physician: Fox Davenport MD                                Insurance: [facility] Medicare ([address] [place] [address]) TRACK CAP   Medical Diagnosis: M25.512 (ICD-10-CM) - Left shoulder pain, unspecified chronicity                  Rehab Codes: M25.512, M62.422  Onset Date: 6/10/21                 Next 's appt: TBD   Visit Count:                                 Cancel/No Show: 0/0    Subjective:    Pain:  [x] Yes  [] No Location: L Shoulder  Pain Rating: (0-10 scale) 8-9/10  Pain altered Tx:  [] No  [] Yes  Action:  Comments: Patient reports today that he is more painful through whole body, attributed to flare up of neuropathy symptoms. Did note that pain is worst in L shoulder. Repeorted that he has been working some on HEP, although not consistently. Objective:  Modalities:   Precautions:  Exercises:  Exercise Reps/ Time Weight/ Level Completed  Today Comments   PROM into Flexion, ER 10 min total  x           Supine AAROM Flexion 15x  x    Supine AROM Flexion 10x2  x    Supine Protraction 10x2  x           Seated Scapular Retractions 15x 3\" hold  x    Seated Rows/Extensions 10x2  Red x           Pulleys 20x ea   x Flexion/Scaption   Other:    Specific Instructions for next treatment: work on PROM of left shoulder, progress AAROM as able, postural training/scapular strengthening. Assessment: [x] Progressing toward goals. Initiated session with PROM to improve shoulder mobility. Considerable improvement with continued reps along with patient reporting improved pain following. Continued treatment with AA/AROM flexion, demonstrating comparable motion to PROM (approx 120 degrees).  Incorporated scapular strengthening exercises into treatment today to further improve

## 2023-11-08 ENCOUNTER — HOSPITAL ENCOUNTER (OUTPATIENT)
Dept: PHYSICAL THERAPY | Age: 68
Setting detail: THERAPIES SERIES
Discharge: HOME | End: 2023-11-08
Payer: MEDICARE

## 2023-11-08 PROCEDURE — 97110 THERAPEUTIC EXERCISES: CPT

## 2023-11-08 NOTE — FLOWSHEET NOTE
[] Other:    [x] Patient would continue to benefit from skilled physical therapy services in order to: improve functional mobility and decrease pain for patient to complete ADLs/IADLs with least amount of compensation or restriction. LTG: (to be met in 12 treatments)  ? Pain: to 3/10 at most with movement for patient to don/doff clothing with ease. ? ROM: of left shoulder to at least 90 degrees of overhead motion for patient to bathe without assistance from his wife. ? Strength: of left shoulder to 3/5 for patient to complete tasks at shoulder height to ease completion of ADLs/IADLs. ? Function: with SPADI score to </= 30% impairment to ease completion of ADLs/IADLs. Independent with Home Exercise Programs  Demonstrate Knowledge of fall prevention  STG: (to be met in 6 treatments)  Patient with left shoulder AROM to >60 degrees to ease completion of ADLs/IADLs. Decrease pain to 5/10 at most with movement to progress with left AROM/strength to facilitate functional use of left UE to complete ADLs with minimal assist from his wife. Patient goals: stop the pain    Pt. Education:  [x] Yes  [] No  [x] Reviewed Prior HEP/Ed  Method of Education: [x] Verbal  [] Demo  [] Written  Comprehension of Education:  [x] Verbalizes understanding. [] Demonstrates understanding. [] Needs review. [] Demonstrates/verbalizes HEP/Ed previously given. Plan: [x] Continue per plan of care.    [] Other:      Treatment Charges: Mins Units   []  Modalities     [x]  Ther Exercise 44 3   []  Manual Therapy     []  Ther Activities     []  Aquatics     []  Neuromuscular     [] Vasocompression     [] Gait Training     [] Dry needling        [] 1 or 2 muscles        [] 3 or more muscles     []  Other     Total Treatment time 44 3     Time In: 2:33 pm            Time Out: 3:17 pm     Electronically signed by:  Dileep Nolan PTA

## 2023-11-10 ENCOUNTER — HOSPITAL ENCOUNTER (OUTPATIENT)
Dept: PHYSICAL THERAPY | Age: 68
Setting detail: THERAPIES SERIES
Discharge: HOME | End: 2023-11-10
Payer: MEDICARE

## 2023-11-10 PROCEDURE — 97110 THERAPEUTIC EXERCISES: CPT

## 2023-11-10 NOTE — FLOWSHEET NOTE
[x] [facility]) - [facility] & Therapy  [address]  [place]: [phone]   F: [phone]    Physical Therapy Daily Treatment Note      Date:  11/10/2023  Patient Name:  Melva Garcia    :  1955  MRN: [id number]  Physician: Marcello Madden MD                                Insurance: [facility] Medicare ([address] [place] [address]) TRACK CAP   Medical Diagnosis: M25.512 (ICD-10-CM) - Left shoulder pain, unspecified chronicity                  Rehab Codes: M25.512, M62.422  Onset Date: 6/10/21                 Next 's appt: TBD   Visit Count:                                 Cancel/No Show: 0/0    Subjective:    Pain:  [x] Yes  [] No Location: L Shoulder  Pain Rating: (0-10 scale) 410  Pain altered Tx:  [] No  [x] Yes  Action: No progressions to exercise program  Comments:      Objective:  Modalities:   Precautions:  Exercises:  Exercise Reps/ Time Weight/ Level Completed  Today Comments   PROM into Flexion, ER 10 min total  x           Supine AAROM Flexion 15x  x    Supine AROM Flexion 10x2  x    Supine Protraction 10x2  x    Side-Lying Flexion 10x2  x           Seated Scapular Retractions 15x 3\" hold  x    Seated Rows/Extensions 10x2  Green x    Seated Active ER 10x2  x    Seated AAROM Flexion 8x2  x           Pulleys 20x ea   x Flexion/Scaption   Other:    Specific Instructions for next treatment: work on PROM of left shoulder, progress AAROM as able, postural training/scapular strengthening. Assessment: [x] Progressing toward goals. Continued to work on L shoulder strength and ROM with interventions per chart. Added AROM flexion in side-lying to work on AROM in gravity neutral position with goal of achieve more active motion in against gravity plane. Patient able to achieve active elevation above shoulder height side-lying vs requiring assistance above shoulder height in supine.  Also worked on improving L shoulder AROM within ER with addition of seated ER with patient able to

## 2023-11-17 ENCOUNTER — HOSPITAL ENCOUNTER (OUTPATIENT)
Dept: PHYSICAL THERAPY | Age: 68
Setting detail: THERAPIES SERIES
Discharge: HOME | End: 2023-11-17
Payer: MEDICARE

## 2023-11-17 PROCEDURE — 97110 THERAPEUTIC EXERCISES: CPT

## 2023-11-17 NOTE — FLOWSHEET NOTE
[x] [facility] & Therapy  [address]  [place]: [phone]   F: [phone]    Physical Therapy Daily Treatment Note      Date:  2023  Patient Name:  Ronny Cheung    :  1955  MRN: [id number]  Physician: Claudia Newman MD                                Insurance: [facility] Medicare ([address] [place] [address]) TRACK CAP   Medical Diagnosis: M25.512 (ICD-10-CM) - Left shoulder pain, unspecified chronicity                  Rehab Codes: M25.512, M62.422  Onset Date: 6/10/21                 Next 's appt: TBD   Visit Count:                                 Cancel/No Show: 0/1    Subjective:   Patient reports today that his shoulder is sore. Says he should of taken a pain pill prior to coming in. Missed his last appointment due to an emergency with his great grandson. Decides to take his pain pill at start of treatment session. Says his great grand kids have been staying with him this week and getting too heavy to . He says he aggravated his shoulder picking them up. Report he does his HEP as well.    Pain:  [x] Yes  [] No Location: L Shoulder  Pain Rating: (0-10 scale) 7/10  Pain altered Tx:  [x] No  [] Yes  Action:   Comments:      Objective:  Modalities:   Precautions:  Exercises:  Exercise Reps/ Time Weight/ Level Completed  Today Comments   PROM into Flexion, ER 10 min total  x           Supine AAROM Flexion 15x Wand  x    Supine AROM Flexion 10x2  x    Supine Protraction 10x2  x    Side-Lying Flexion 10x2      Side-lying shoulder abduction AAROM X15   X  Therapist assisted, patient achieves approx 50 degrees before requires assist.    Seated Scapular Retractions 15x 3\" hold  x    Seated Rows/Extensions (standing)  10x2  Green x    Seated Active ER 10x2  x    Seated AAROM Flexion 5x2 Wand  x           Pulleys 2'/2'  x Flexion/Scaption   Other:    Specific Instructions for next treatment: work on PROM of left shoulder, progress AAROM as able, postural training/scapular

## 2023-11-20 ENCOUNTER — HOSPITAL ENCOUNTER (OUTPATIENT)
Dept: PHYSICAL THERAPY | Age: 68
Setting detail: THERAPIES SERIES
Discharge: HOME | End: 2023-11-20
Payer: MEDICARE

## 2023-11-20 PROCEDURE — 97110 THERAPEUTIC EXERCISES: CPT

## 2023-11-20 NOTE — FLOWSHEET NOTE
[x] [address] [facility] Therapy  [address]  [place]: [phone]   F: [phone]    Physical Therapy Daily Treatment Note      Date:  2023  Patient Name:  Micaela Austin    :  1955  MRN: [id number]  Physician: Moose Valente MD                                Insurance: [facility] Medicare ([address] [place] [address]) TRACK CAP   Medical Diagnosis: M25.512 (ICD-10-CM) - Left shoulder pain, unspecified chronicity                  Rehab Codes: M25.512, M62.422  Onset Date: 6/10/21                 Next 's appt: TBD   Visit Count:                                 Cancel/No Show: 0/    Subjective:   Patient reports today his shoulder isn't too bad. He did take a pain med today prior to coming in. Says he had \"all over\" pain prior to taking the pain med at -7/10. Pain:  [x] Yes  [] No Location: L Shoulder  Pain Rating: (0-10 scale) 4/10  Pain altered Tx:  [x] No  [] Yes  Action:   Comments:      Objective:  Modalities:   Precautions:  Exercises:  Exercise Reps/ Time Weight/ Level Completed  Today Comments   PROM all planes  10 min total  x           Supine AAROM Flexion 15x2 Wand  x     Supine AROM Flexion 10x  x    Supine Protraction 10x2  x    Side-Lying Flexion 10x2  x    Side-lying shoulder abduction AAROM X15   X  Therapist assisted, patient achieves approx 60 degrees before requires assist.    Seated Scapular Retractions 15x 3\" hold  x    Rows/Extensions (standing)  10x2  Green x    Seated Active ER 10x2  x    Seated AAROM Flexion 5x2 Wand  x           Pulleys 2'/2'  x Flexion/Scaption   Other: 23- left shoulder AROM 50 degrees (35 degrees on evaluation). Specific Instructions for next treatment:  Trial deltoid program? work on PROM of left shoulder, progress AAROM as able, postural training/scapular strengthening. Assessment: [x] Progressing toward goals. Despite increased pain this date patient does exhibit improved left shoulder ROM.  Added shoulder

## 2023-11-21 ENCOUNTER — OFFICE VISIT (OUTPATIENT)
Dept: FAMILY MEDICINE CLINIC | Age: 68
End: 2023-11-21
Payer: MEDICARE

## 2023-11-21 VITALS
HEART RATE: 78 BPM | HEIGHT: 72 IN | WEIGHT: 250 LBS | BODY MASS INDEX: 33.86 KG/M2 | OXYGEN SATURATION: 97 % | SYSTOLIC BLOOD PRESSURE: 118 MMHG | DIASTOLIC BLOOD PRESSURE: 70 MMHG

## 2023-11-21 DIAGNOSIS — E11.42 TYPE 2 DIABETES MELLITUS WITH DIABETIC POLYNEUROPATHY, WITHOUT LONG-TERM CURRENT USE OF INSULIN (HCC): ICD-10-CM

## 2023-11-21 DIAGNOSIS — Z23 NEED FOR INFLUENZA VACCINATION: ICD-10-CM

## 2023-11-21 DIAGNOSIS — K59.00 CONSTIPATION, UNSPECIFIED CONSTIPATION TYPE: Primary | ICD-10-CM

## 2023-11-21 DIAGNOSIS — Z91.81 AT RISK FOR FALLS: ICD-10-CM

## 2023-11-21 DIAGNOSIS — K63.5 POLYP OF COLON, UNSPECIFIED PART OF COLON, UNSPECIFIED TYPE: ICD-10-CM

## 2023-11-21 DIAGNOSIS — I25.10 CORONARY ARTERY DISEASE INVOLVING NATIVE CORONARY ARTERY OF NATIVE HEART WITHOUT ANGINA PECTORIS: ICD-10-CM

## 2023-11-21 DIAGNOSIS — R26.81 GAIT INSTABILITY: ICD-10-CM

## 2023-11-21 DIAGNOSIS — M47.26 OSTEOARTHRITIS OF SPINE WITH RADICULOPATHY, LUMBAR REGION: ICD-10-CM

## 2023-11-21 PROBLEM — K59.09 OTHER CONSTIPATION: Status: ACTIVE | Noted: 2023-11-21

## 2023-11-21 PROCEDURE — 90694 VACC AIIV4 NO PRSRV 0.5ML IM: CPT | Performed by: FAMILY MEDICINE

## 2023-11-21 PROCEDURE — 99214 OFFICE O/P EST MOD 30 MIN: CPT | Performed by: FAMILY MEDICINE

## 2023-11-21 PROCEDURE — G0008 ADMIN INFLUENZA VIRUS VAC: HCPCS | Performed by: FAMILY MEDICINE

## 2023-11-21 PROCEDURE — 1123F ACP DISCUSS/DSCN MKR DOCD: CPT | Performed by: FAMILY MEDICINE

## 2023-11-21 PROCEDURE — 3044F HG A1C LEVEL LT 7.0%: CPT | Performed by: FAMILY MEDICINE

## 2023-11-21 RX ORDER — SENNOSIDES A AND B 8.6 MG/1
1 TABLET, FILM COATED ORAL 2 TIMES DAILY
Qty: 60 TABLET | Refills: 11 | Status: SHIPPED | OUTPATIENT
Start: 2023-11-21 | End: 2024-11-20

## 2023-11-21 ASSESSMENT — ENCOUNTER SYMPTOMS
CONSTIPATION: 1
SHORTNESS OF BREATH: 1
BACK PAIN: 1
ABDOMINAL PAIN: 0
WHEEZING: 0
CHEST TIGHTNESS: 0
VOMITING: 0
SINUS PRESSURE: 0
COUGH: 0
COLOR CHANGE: 0
TROUBLE SWALLOWING: 0
BLOOD IN STOOL: 0
ABDOMINAL DISTENTION: 1
EYE REDNESS: 0
DIARRHEA: 0
RECTAL PAIN: 0
STRIDOR: 0
RHINORRHEA: 0
NAUSEA: 0
SORE THROAT: 0

## 2023-11-21 NOTE — PROGRESS NOTES
Patient is c/o balance issues for about a month , patient states that he is \"getting twitches\" in his hands, arms, legs and feet. Patient is c/o constipation, patient has been using a stool softener without relief      Visit Information    Have you changed or started any medications since your last visit including any over-the-counter medicines, vitamins, or herbal medicines? no   Have you stopped taking any of your medications? Is so, why? -  no  Are you having any side effects from any of your medications? - no    Have you seen any other physician or provider since your last visit?  no   Have you had any other diagnostic tests since your last visit?  no   Have you been seen in the emergency room and/or had an admission in a hospital since we last saw you?  no   Have you had your routine dental cleaning in the past 6 months?  no     Do you have an active MyChart account? If no, what is the barrier?   Yes    Patient Care Team:  Medardo Tavares MD as PCP - General (Family Medicine)  Medardo Tavares MD as PCP - Empaneled Provider  Ami Aldridge MD as Consulting Physician (Urology)  Renea Vila MD as Consulting Physician (Pulmonology)  Kirk Warner OD (Ophthalmology)  Patricia Gant MD as Consulting Physician (Endocrinology)  Juaquin Dozier MD as Consulting Physician (Orthopedic Surgery)  Kvng Morocho MD as Consulting Physician (Orthopedic Surgery)  Yenifer Yadav MD as Surgeon (General Surgery)  Deidre Chou, PhD (Psychology)  Shanae Laureano MD as Consulting Physician (Neurology)  Bright Howard MD as Consulting Physician (Nephrology)  Nicci Baptiste DPM as Surgeon ([facility])  Roberto Adrian MD as Consulting Physician (Gastroenterology)  Adrien Hdez DO as Consulting Physician (Cardiology)    Medical History Review  Past Medical, Family, and Social History reviewed and does contribute to the patient presenting condition    Health Maintenance

## 2023-11-21 NOTE — PATIENT INSTRUCTIONS
Thank you for choosing [facility]) as your healthcare provider as your care is important to us. Please arrive at your appointment on time. If you are unable to make your appointment, please call our office as soon as possible so that we may reschedule your appointment. Missing 3 appointments in a calendar year without notifying the office may lead to dismissal from the practice. We appreciate you calling at least 24 hours in advance, when possible. Thank you. New Updates for [facility]    Thank you for choosing US to give you the best care! [facility]) is always trying to think of new ways to help their patients. We are asking all patients to try out the new digital registration that is now available through your [facility] account Via the ruby you're now able to update your personal and registration information prior to your upcoming appointment. This will save you time once you arrive at the office to check-in, not to mention your information remains safe!! Many other perks come from signing up for an account, such as:  Requesting refills  Scheduling an appointment  Completing an E-Visit  Sending a message to the office/provider  Having access to your medication list  Paying your bill/copay prior to your appointment  Scheduling your yearly mammogram  Review your test results    If you are not familiar with [facility] please ask one of us and we will be happy to answer any questions or help you set-up your account.       Your [employer],

## 2023-11-22 ENCOUNTER — HOSPITAL ENCOUNTER (OUTPATIENT)
Dept: PHYSICAL THERAPY | Age: 68
Setting detail: THERAPIES SERIES
Discharge: HOME | End: 2023-11-22
Payer: MEDICARE

## 2023-11-22 NOTE — FLOWSHEET NOTE
[] [facility]  [address] [facility] [address]  [place]: [phone]   F: [phone]     Physical Therapy Cancel/No Show note    Date: 2023  Patient: Nevin Gustafson  : 1955  MRN: [id number]    Visit Count:   Cancels/No Shows to date:     For today's appointment patient:    []  Cancelled    [] Rescheduled appointment    [] No-show     Reason given by patient:    [x]  Patient ill    []  Conflicting appointment    [] No transportation      [] Conflict with work    [] No reason given    [] Weather related    [x] COVID-19    [] Other:      Comments: Patient to call and schedule one negative         [] Next appointment was confirmed    Electronically signed by: Judy Ford PTA

## 2023-11-29 ENCOUNTER — APPOINTMENT (OUTPATIENT)
Dept: PHYSICAL THERAPY | Age: 68
End: 2023-11-29
Payer: MEDICARE

## 2023-11-30 ENCOUNTER — HOSPITAL ENCOUNTER (OUTPATIENT)
Age: 68
Setting detail: SPECIMEN
Discharge: HOME | End: 2023-11-30

## 2023-11-30 DIAGNOSIS — I12.9 BENIGN HYPERTENSION WITH CHRONIC KIDNEY DISEASE, STAGE I: ICD-10-CM

## 2023-11-30 DIAGNOSIS — N18.1 CKD (CHRONIC KIDNEY DISEASE), STAGE I: ICD-10-CM

## 2023-11-30 DIAGNOSIS — E13.22 SECONDARY DIABETES MELLITUS WITH STAGE 1 CHRONIC KIDNEY DISEASE (HCC): ICD-10-CM

## 2023-11-30 DIAGNOSIS — N18.1 SECONDARY DIABETES MELLITUS WITH STAGE 1 CHRONIC KIDNEY DISEASE (HCC): ICD-10-CM

## 2023-11-30 DIAGNOSIS — I50.32 CHRONIC DIASTOLIC HEART FAILURE (HCC): ICD-10-CM

## 2023-11-30 DIAGNOSIS — N18.1 BENIGN HYPERTENSION WITH CHRONIC KIDNEY DISEASE, STAGE I: ICD-10-CM

## 2023-11-30 DIAGNOSIS — I13.0 BENIGN HYPERTENSIVE HEART AND CKD, STAGE 3 (GFR 30-59), W CHF (HCC): ICD-10-CM

## 2023-11-30 DIAGNOSIS — N18.30 BENIGN HYPERTENSIVE HEART AND CKD, STAGE 3 (GFR 30-59), W CHF (HCC): ICD-10-CM

## 2023-11-30 DIAGNOSIS — Z13.21 ENCOUNTER FOR VITAMIN DEFICIENCY SCREENING: ICD-10-CM

## 2023-11-30 DIAGNOSIS — C61 PROSTATE CANCER (HCC): ICD-10-CM

## 2023-11-30 DIAGNOSIS — E55.9 VITAMIN D DEFICIENCY: ICD-10-CM

## 2023-11-30 LAB
25(OH)D3 SERPL-MCNC: 37.2 NG/ML
ANION GAP SERPL CALCULATED.3IONS-SCNC: 11 MMOL/L (ref 9–17)
BACTERIA URNS QL MICRO: NORMAL
BILIRUB UR QL STRIP: NEGATIVE
BUN SERPL-MCNC: 17 MG/DL (ref 8–23)
CALCIUM SERPL-MCNC: 9.7 MG/DL (ref 8.6–10.4)
CASTS #/AREA URNS LPF: NORMAL /LPF (ref 0–8)
CHLORIDE SERPL-SCNC: 104 MMOL/L (ref 98–107)
CLARITY UR: CLEAR
CO2 SERPL-SCNC: 25 MMOL/L (ref 20–31)
COLOR UR: YELLOW
CREAT SERPL-MCNC: 0.8 MG/DL (ref 0.7–1.2)
CREAT UR-MCNC: 132.8 MG/DL (ref 39–259)
EPI CELLS #/AREA URNS HPF: NORMAL /HPF (ref 0–5)
GFR SERPL CREATININE-BSD FRML MDRD: >60 ML/MIN/1.73M2
GLUCOSE UR STRIP-MCNC: NEGATIVE MG/DL
HCT VFR BLD AUTO: 43.7 % (ref 40.7–50.3)
HGB BLD-MCNC: 13.6 G/DL (ref 13–17)
HGB UR QL STRIP.AUTO: NEGATIVE
KETONES UR STRIP-MCNC: NEGATIVE MG/DL
LEUKOCYTE ESTERASE UR QL STRIP: NEGATIVE
MAGNESIUM SERPL-MCNC: 1.9 MG/DL (ref 1.6–2.6)
MICROALBUMIN UR-MCNC: <12 MG/L
MICROALBUMIN/CREAT UR-RTO: NORMAL MCG/MG CREAT
NITRITE UR QL STRIP: NEGATIVE
PH UR STRIP: 5.5 [PH] (ref 5–8)
PHOSPHATE SERPL-MCNC: 3.5 MG/DL (ref 2.5–4.5)
POTASSIUM SERPL-SCNC: 4.7 MMOL/L (ref 3.7–5.3)
PROT UR STRIP-MCNC: NEGATIVE MG/DL
PSA SERPL-MCNC: 0.2 NG/ML (ref 0–4)
RBC #/AREA URNS HPF: NORMAL /HPF (ref 0–4)
SODIUM SERPL-SCNC: 140 MMOL/L (ref 135–144)
SP GR UR STRIP: 1.02 (ref 1–1.03)
TESTOST SERPL-MCNC: 6 NG/DL (ref 220–1000)
UROBILINOGEN UR STRIP-ACNC: NORMAL EU/DL (ref 0–1)
WBC #/AREA URNS HPF: NORMAL /HPF (ref 0–5)

## 2023-12-04 ENCOUNTER — OFFICE VISIT (OUTPATIENT)
Dept: UROLOGY | Age: 68
End: 2023-12-04
Payer: MEDICARE

## 2023-12-04 ENCOUNTER — OFFICE VISIT (OUTPATIENT)
Dept: GASTROENTEROLOGY | Age: 68
End: 2023-12-04
Payer: MEDICARE

## 2023-12-04 VITALS
HEIGHT: 72 IN | HEART RATE: 59 BPM | WEIGHT: 251 LBS | DIASTOLIC BLOOD PRESSURE: 65 MMHG | BODY MASS INDEX: 34 KG/M2 | SYSTOLIC BLOOD PRESSURE: 125 MMHG

## 2023-12-04 VITALS
DIASTOLIC BLOOD PRESSURE: 76 MMHG | WEIGHT: 268.2 LBS | RESPIRATION RATE: 16 BRPM | BODY MASS INDEX: 36.33 KG/M2 | TEMPERATURE: 96.8 F | SYSTOLIC BLOOD PRESSURE: 124 MMHG | HEIGHT: 72 IN

## 2023-12-04 DIAGNOSIS — Z86.010 HISTORY OF COLON POLYPS: Primary | ICD-10-CM

## 2023-12-04 DIAGNOSIS — Z79.818 ANDROGEN DEPRIVATION THERAPY: ICD-10-CM

## 2023-12-04 DIAGNOSIS — K59.09 CHRONIC CONSTIPATION: ICD-10-CM

## 2023-12-04 DIAGNOSIS — C61 PROSTATE CANCER (HCC): Primary | ICD-10-CM

## 2023-12-04 DIAGNOSIS — R23.2 HOT FLASHES: ICD-10-CM

## 2023-12-04 PROBLEM — F33.9 MAJOR DEPRESSIVE DISORDER, RECURRENT, UNSPECIFIED (HCC): Status: ACTIVE | Noted: 2017-01-11

## 2023-12-04 PROBLEM — M81.0 AGE-RELATED OSTEOPOROSIS WITHOUT CURRENT PATHOLOGICAL FRACTURE: Status: ACTIVE | Noted: 2017-01-11

## 2023-12-04 PROBLEM — M48.061 SPINAL STENOSIS OF LUMBAR REGION: Status: ACTIVE | Noted: 2017-01-11

## 2023-12-04 PROCEDURE — 1123F ACP DISCUSS/DSCN MKR DOCD: CPT | Performed by: UROLOGY

## 2023-12-04 PROCEDURE — 1123F ACP DISCUSS/DSCN MKR DOCD: CPT | Performed by: INTERNAL MEDICINE

## 2023-12-04 PROCEDURE — 99213 OFFICE O/P EST LOW 20 MIN: CPT | Performed by: UROLOGY

## 2023-12-04 PROCEDURE — 99204 OFFICE O/P NEW MOD 45 MIN: CPT | Performed by: INTERNAL MEDICINE

## 2023-12-04 ASSESSMENT — ENCOUNTER SYMPTOMS
EYE REDNESS: 0
WHEEZING: 0
ABDOMINAL PAIN: 0
RECTAL PAIN: 0
CONSTIPATION: 1
BLOOD IN STOOL: 0
SINUS PRESSURE: 0
VOICE CHANGE: 0
SHORTNESS OF BREATH: 0
NAUSEA: 0
TROUBLE SWALLOWING: 0
COUGH: 0
ANAL BLEEDING: 0
DIARRHEA: 0
BACK PAIN: 0
CONSTIPATION: 0
DIARRHEA: 0
VOMITING: 0
WHEEZING: 0
BACK PAIN: 0
NAUSEA: 0
ABDOMINAL DISTENTION: 0
SORE THROAT: 0
EYE PAIN: 0
VOMITING: 0
ABDOMINAL PAIN: 0
CHOKING: 0
COUGH: 0

## 2023-12-04 NOTE — PROGRESS NOTES
After obtaining written and verbal consent, and per orders of Dr. Tej Yeboah administered in RLQ of abdomen by Danni Florentino RN. Patient was instructed to remain in clinic for 20 mins following injection and report any adverse reactions to me immediately. He tolerated the injection without any complications. We reviewed that the side effects include hot flashes, fatigue, breast enlargement, diminished libido, ED and long term development of osteoporosis. The patient was told he will encouraged to take supplemental Calcium and Vitamin D to prevent the latter.

## 2023-12-04 NOTE — PROGRESS NOTES
[address]  [address] [facility] [address]  Dept: [address] [place] [address] Urology Office Note - Established    Patient:  Анна Sanon  YOB: 1955  Date: 12/4/2023    The patient is a 70 y.o. male who presents todayfor evaluation of the following problems:   Chief Complaint   Patient presents with    Prostate Cancer     3 months w/T and Eligard        HPI  This is a 76-year-old gentleman with advanced prostate cancer. He has been on Eligard every 6 months and takes [place]. His PSA has been low and stable at 0.2. He is continuing to have hot flashes but these have become tolerable. Summary of old records: N/A    Additional History: N/A    Procedures Today: N/A    Urinalysis today:  No results found for this visit on 12/04/23. Last several PSA's:  Lab Results   Component Value Date    PSA 0.20 11/30/2023    PSA 0.23 09/22/2023    PSA 0.23 06/15/2023     Last total testosterone:  Lab Results   Component Value Date    TESTOSTERONE 6 (L) 11/30/2023       AUA Symptom Score (12/4/2023):                                Last BUN and creatinine:  Lab Results   Component Value Date    BUN 17 11/30/2023     Lab Results   Component Value Date    CREATININE 0.8 11/30/2023       Additional Lab/Culture results: none    Imaging Reviewed during this Office Visit: none  (results were independently reviewed by physician and radiology report verified)    PAST MEDICAL, FAMILY AND SOCIAL HISTORY UPDATE:  Past Medical History:   Diagnosis Date    Acid reflux     Acute renal failure with acute tubular necrosis superimposed on stage 4 chronic kidney disease ([address]) 5/9/2022    Acute respiratory failure with hypoxia ([address]) 5/2/2022    Anemia, blood loss 10/7/2018    Arthritis     Benign hypertension with CKD (chronic kidney disease), stage II 5/12/2022    Bilateral leg edema 1/18/2021    C. difficile colitis 3/19/2020    Carcinoma of

## 2023-12-04 NOTE — PROGRESS NOTES
Reason for Referral:   No referring provider defined for this encounter. Chief Complaint   Patient presents with    Procedure     Patient is here today to be seen to discuss scheduling a colonoscopy. Patient has a history of CHF. 1. History of colon polyps    2. Chronic constipation            HISTORY OF PRESENT ILLNESS:   Patient seen to discuss regarding colonoscopy as he had a history of colon polyps removed in 2018. On further discussion, patient has history of chronic back pain musculoskeletal issues. He has been on pain medications. This may be etiology for his chronic constipation. Denies diarrhea. Does notice occasional scant hematochezia felt to be secondary to hemorrhoids. He has good appetite. No dysphagia dyspepsia. Mild heartburns relieved by PPI therapy. Denies chest pain, shortness of breath, palpitations. Good appetite. No abdominal pain, bloating etc.    Previous records reviewed. Last time patient was seen by me was in March 2020. Past Medical,Family, and Social History reviewed and does contribute to the patient presentingcondition. Patient's PMH/PSH,SH,PSYCH Hx, MEDs, ALLERGIES, and ROS were all reviewed and updated in the appropriate sections.     PAST MEDICAL HISTORY:  Past Medical History:   Diagnosis Date    Acid reflux     Acute renal failure with acute tubular necrosis superimposed on stage 4 chronic kidney disease ([address]) 5/9/2022    Acute respiratory failure with hypoxia ([address]) 5/2/2022    Anemia, blood loss 10/7/2018    Arthritis     Benign hypertension with CKD (chronic kidney disease), stage II 5/12/2022    Bilateral leg edema 1/18/2021    C. difficile colitis 3/19/2020    Carcinoma of prostate ([address]) 12/21/2021    Chronic bilateral low back pain with bilateral sciatica 11/3/2016    Chronic diastolic heart failure ([address]) 2/25/2021    Chronic renal disease, stage III [facility]) [740007] 5/12/2022    Complete tear of left rotator cuff 7/18/2018    COPD

## 2023-12-06 RX ORDER — POLYETHYLENE GLYCOL 3350, SODIUM SULFATE ANHYDROUS, SODIUM BICARBONATE, SODIUM CHLORIDE, POTASSIUM CHLORIDE 236; 22.74; 6.74; 5.86; 2.97 G/4L; G/4L; G/4L; G/4L; G/4L
4 POWDER, FOR SOLUTION ORAL ONCE
Qty: 4000 ML | Refills: 0 | Status: SHIPPED | OUTPATIENT
Start: 2023-12-06 | End: 2023-12-06

## 2023-12-07 NOTE — PRE-PROCEDURE INSTRUCTIONS
Have you received your Prep? Any questions with prep instructions? WORKING ON IT  Only Clear Liquid Diet day before. Y  Nothing to eat after midnight day before procedure. Y  Are you taking any blood thinners? If so, you need to Stop. STOPPED ASA  Remove any jewelry and body piercings. Y  Do you wear glasses? If so, please bring a case to store them in. Are you having any Covid symptoms? N  Do you have any new rashes, infections, etc. that we should be aware of?N  Do you have a ride home the day of surgery? It cannot be a cab or medical transportation. Y  Verify surgery time/date and what time to arrive at hospital.0800

## 2023-12-07 NOTE — TELEPHONE ENCOUNTER
He will need clearance from Dr. Valarie Bustamante and Dr. Hunter Malcolm, then [facility] clear him No

## 2023-12-08 ENCOUNTER — HOSPITAL ENCOUNTER (OUTPATIENT)
Dept: PREADMISSION TESTING | Age: 68
Setting detail: OUTPATIENT SURGERY
Discharge: HOME | End: 2023-12-12
Payer: MEDICARE

## 2023-12-08 ENCOUNTER — ANESTHESIA EVENT (OUTPATIENT)
Dept: ENDOSCOPY | Age: 68
End: 2023-12-08
Payer: MEDICARE

## 2023-12-08 VITALS — BODY MASS INDEX: 33.46 KG/M2 | WEIGHT: 247 LBS | HEIGHT: 72 IN

## 2023-12-08 NOTE — PROGRESS NOTES
Pre-op Instructions For Out-Patient Endoscopy Surgery    Medication Instructions:  Please stop herbs and any supplements now (includes vitamins and minerals). Please contact your surgeon and prescribing physician for pre-op instructions for any blood thinners. (Aspirin)    If you have inhalers/aerosol treatments at home, please use them the morning of your surgery and bring the inhalers with you to the hospital.    Please take the following medications the morning of your surgery with a sip of water:    Amlodipine    Surgery Instructions:  After midnight before surgery:  Do not eat or drink anything, including water, mints, gum, and hard candy. You may brush your teeth without swallowing. No smoking, chewing tobacco, or street drugs. Please shower or bathe before surgery. Please do not wear any cologne, lotion, powder, jewelry, piercings, perfume, makeup, nail polish, hair accessories, or hair spray on the day of surgery. Wear loose comfortable clothing. Leave your valuables at home but bring a payment source for any after-surgery prescriptions you plan to fill at [facility]. Bring a storage case for any glasses/contacts. An adult who is responsible for you MUST drive you home and should be with you for the first 24 hours after surgery. The Day of Surgery:  Arrive at [facility] Surgery Entrance at the time directed by your surgeon and check in at the desk. If you have a living will or healthcare power of , please bring a copy. You will be taken to the pre-op holding area where you will be prepared for surgery. A physical assessment will be performed by a nurse practitioner or house officer. Your IV will be started and you will meet your anesthesiologist.    When you go to surgery, your family will be directed to the surgical waiting room, where the doctor should speak with them after your surgery.     After surgery, you will be taken to the recovery

## 2023-12-11 ENCOUNTER — HOSPITAL ENCOUNTER (OUTPATIENT)
Age: 68
Setting detail: OUTPATIENT SURGERY
Discharge: HOME | End: 2023-12-11
Attending: INTERNAL MEDICINE | Admitting: INTERNAL MEDICINE
Payer: MEDICARE

## 2023-12-11 ENCOUNTER — ANESTHESIA (OUTPATIENT)
Dept: ENDOSCOPY | Age: 68
End: 2023-12-11
Payer: MEDICARE

## 2023-12-11 VITALS
OXYGEN SATURATION: 97 % | RESPIRATION RATE: 11 BRPM | HEART RATE: 65 BPM | DIASTOLIC BLOOD PRESSURE: 71 MMHG | WEIGHT: 247 LBS | TEMPERATURE: 97.1 F | BODY MASS INDEX: 33.46 KG/M2 | HEIGHT: 72 IN | SYSTOLIC BLOOD PRESSURE: 114 MMHG

## 2023-12-11 DIAGNOSIS — Z86.010 HISTORY OF COLON POLYPS: ICD-10-CM

## 2023-12-11 DIAGNOSIS — E11.51 TYPE 2 DIABETES MELLITUS WITH DIABETIC PERIPHERAL ANGIOPATHY WITHOUT GANGRENE, WITHOUT LONG-TERM CURRENT USE OF INSULIN (HCC): ICD-10-CM

## 2023-12-11 DIAGNOSIS — E11.42 TYPE 2 DIABETES MELLITUS WITH DIABETIC POLYNEUROPATHY, WITHOUT LONG-TERM CURRENT USE OF INSULIN (HCC): ICD-10-CM

## 2023-12-11 LAB
GLUCOSE BLD-MCNC: 112 MG/DL (ref 75–110)
GLUCOSE BLD-MCNC: 88 MG/DL (ref 75–110)

## 2023-12-11 PROCEDURE — 3700000001 HC ADD 15 MINUTES (ANESTHESIA): Performed by: INTERNAL MEDICINE

## 2023-12-11 PROCEDURE — 88305 TISSUE EXAM BY PATHOLOGIST: CPT

## 2023-12-11 PROCEDURE — 7100000010 HC PHASE II RECOVERY - FIRST 15 MIN: Performed by: INTERNAL MEDICINE

## 2023-12-11 PROCEDURE — 3609010600 HC COLONOSCOPY POLYPECTOMY SNARE/COLD BIOPSY: Performed by: INTERNAL MEDICINE

## 2023-12-11 PROCEDURE — 3700000000 HC ANESTHESIA ATTENDED CARE: Performed by: INTERNAL MEDICINE

## 2023-12-11 PROCEDURE — 7100000011 HC PHASE II RECOVERY - ADDTL 15 MIN: Performed by: INTERNAL MEDICINE

## 2023-12-11 PROCEDURE — 6360000002 HC RX W HCPCS: Performed by: NURSE ANESTHETIST, CERTIFIED REGISTERED

## 2023-12-11 PROCEDURE — 45380 COLONOSCOPY AND BIOPSY: CPT | Performed by: INTERNAL MEDICINE

## 2023-12-11 PROCEDURE — 2709999900 HC NON-CHARGEABLE SUPPLY: Performed by: INTERNAL MEDICINE

## 2023-12-11 PROCEDURE — 2580000003 HC RX 258: Performed by: ANESTHESIOLOGY

## 2023-12-11 PROCEDURE — 45385 COLONOSCOPY W/LESION REMOVAL: CPT | Performed by: INTERNAL MEDICINE

## 2023-12-11 PROCEDURE — 82947 ASSAY GLUCOSE BLOOD QUANT: CPT

## 2023-12-11 PROCEDURE — 2500000003 HC RX 250 WO HCPCS: Performed by: NURSE ANESTHETIST, CERTIFIED REGISTERED

## 2023-12-11 RX ORDER — TIRZEPATIDE 5 MG/.5ML
5 INJECTION, SOLUTION SUBCUTANEOUS WEEKLY
Status: CANCELLED | OUTPATIENT
Start: 2023-12-11

## 2023-12-11 RX ORDER — LIDOCAINE HYDROCHLORIDE 20 MG/ML
INJECTION, SOLUTION EPIDURAL; INFILTRATION; INTRACAUDAL; PERINEURAL PRN
Status: DISCONTINUED | OUTPATIENT
Start: 2023-12-11 | End: 2023-12-11 | Stop reason: SDUPTHER

## 2023-12-11 RX ORDER — SODIUM CHLORIDE 0.9 % (FLUSH) 0.9 %
5-40 SYRINGE (ML) INJECTION PRN
Status: DISCONTINUED | OUTPATIENT
Start: 2023-12-11 | End: 2023-12-11 | Stop reason: HOSPADM

## 2023-12-11 RX ORDER — LIDOCAINE HYDROCHLORIDE 10 MG/ML
1 INJECTION, SOLUTION EPIDURAL; INFILTRATION; INTRACAUDAL; PERINEURAL
Status: DISCONTINUED | OUTPATIENT
Start: 2023-12-11 | End: 2023-12-11 | Stop reason: HOSPADM

## 2023-12-11 RX ORDER — PROPOFOL 10 MG/ML
INJECTION, EMULSION INTRAVENOUS PRN
Status: DISCONTINUED | OUTPATIENT
Start: 2023-12-11 | End: 2023-12-11 | Stop reason: SDUPTHER

## 2023-12-11 RX ORDER — SODIUM CHLORIDE 9 MG/ML
INJECTION, SOLUTION INTRAVENOUS CONTINUOUS
Status: DISCONTINUED | OUTPATIENT
Start: 2023-12-11 | End: 2023-12-11 | Stop reason: HOSPADM

## 2023-12-11 RX ORDER — SODIUM CHLORIDE 0.9 % (FLUSH) 0.9 %
5-40 SYRINGE (ML) INJECTION EVERY 12 HOURS SCHEDULED
Status: DISCONTINUED | OUTPATIENT
Start: 2023-12-11 | End: 2023-12-11 | Stop reason: HOSPADM

## 2023-12-11 RX ORDER — SODIUM CHLORIDE 9 MG/ML
INJECTION, SOLUTION INTRAVENOUS PRN
Status: DISCONTINUED | OUTPATIENT
Start: 2023-12-11 | End: 2023-12-11 | Stop reason: HOSPADM

## 2023-12-11 RX ADMIN — PROPOFOL 20 MG: 10 INJECTION, EMULSION INTRAVENOUS at 10:16

## 2023-12-11 RX ADMIN — PROPOFOL 20 MG: 10 INJECTION, EMULSION INTRAVENOUS at 10:32

## 2023-12-11 RX ADMIN — PROPOFOL 20 MG: 10 INJECTION, EMULSION INTRAVENOUS at 10:03

## 2023-12-11 RX ADMIN — PROPOFOL 20 MG: 10 INJECTION, EMULSION INTRAVENOUS at 10:21

## 2023-12-11 RX ADMIN — PROPOFOL 70 MG: 10 INJECTION, EMULSION INTRAVENOUS at 09:59

## 2023-12-11 RX ADMIN — PROPOFOL 20 MG: 10 INJECTION, EMULSION INTRAVENOUS at 10:12

## 2023-12-11 RX ADMIN — PROPOFOL 20 MG: 10 INJECTION, EMULSION INTRAVENOUS at 10:06

## 2023-12-11 RX ADMIN — PROPOFOL 20 MG: 10 INJECTION, EMULSION INTRAVENOUS at 10:28

## 2023-12-11 RX ADMIN — PROPOFOL 20 MG: 10 INJECTION, EMULSION INTRAVENOUS at 10:20

## 2023-12-11 RX ADMIN — SODIUM CHLORIDE: 9 INJECTION, SOLUTION INTRAVENOUS at 09:19

## 2023-12-11 RX ADMIN — PROPOFOL 30 MG: 10 INJECTION, EMULSION INTRAVENOUS at 10:01

## 2023-12-11 RX ADMIN — PROPOFOL 20 MG: 10 INJECTION, EMULSION INTRAVENOUS at 10:26

## 2023-12-11 RX ADMIN — PROPOFOL 20 MG: 10 INJECTION, EMULSION INTRAVENOUS at 10:09

## 2023-12-11 RX ADMIN — PROPOFOL 20 MG: 10 INJECTION, EMULSION INTRAVENOUS at 10:24

## 2023-12-11 RX ADMIN — PROPOFOL 20 MG: 10 INJECTION, EMULSION INTRAVENOUS at 10:29

## 2023-12-11 RX ADMIN — PROPOFOL 20 MG: 10 INJECTION, EMULSION INTRAVENOUS at 10:22

## 2023-12-11 RX ADMIN — PROPOFOL 20 MG: 10 INJECTION, EMULSION INTRAVENOUS at 10:15

## 2023-12-11 RX ADMIN — PROPOFOL 30 MG: 10 INJECTION, EMULSION INTRAVENOUS at 10:04

## 2023-12-11 RX ADMIN — PROPOFOL 20 MG: 10 INJECTION, EMULSION INTRAVENOUS at 10:17

## 2023-12-11 RX ADMIN — LIDOCAINE HYDROCHLORIDE 40 MG: 20 INJECTION, SOLUTION EPIDURAL; INFILTRATION; INTRACAUDAL; PERINEURAL at 09:59

## 2023-12-11 ASSESSMENT — COPD QUESTIONNAIRES: CAT_SEVERITY: NO INTERVAL CHANGE

## 2023-12-11 ASSESSMENT — ENCOUNTER SYMPTOMS
SHORTNESS OF BREATH: 0
SORE THROAT: 0
BACK PAIN: 1
COUGH: 0

## 2023-12-11 ASSESSMENT — LIFESTYLE VARIABLES: SMOKING_STATUS: 1

## 2023-12-11 ASSESSMENT — PAIN - FUNCTIONAL ASSESSMENT: PAIN_FUNCTIONAL_ASSESSMENT: 0-10

## 2023-12-11 NOTE — H&P
Partners: Female     Social Determinants of Health     Financial Resource Strain: Low Risk  (7/28/2023)    Overall Financial Resource Strain (CARDIA)     Difficulty of Paying Living Expenses: Not hard at all   Transportation Needs: Unknown (7/28/2023)    PRAPARE - Transportation     Lack of Transportation (Non-Medical): No   Physical Activity: Inactive (12/29/2022)    Exercise Vital Sign     Days of Exercise per Week: 0 days     Minutes of Exercise per Session: 0 min   Housing Stability: Unknown (7/28/2023)    Housing Stability Vital Sign     Unstable Housing in the Last Year: No           REVIEW OF SYSTEMS      Allergies   Allergen Reactions    Succinylcholine Chloride Other (See Comments)     PATIENT HAS PSEUDOCHOLINESTERASE DEFICIENCY      Cymbalta [Duloxetine Hcl]      Taste loss      Gabapentin      Taste loss       No current facility-administered medications on file prior to encounter. Current Outpatient Medications on File Prior to Encounter   Medication Sig Dispense Refill    senna (SENOKOT) 8.6 MG tablet Take 1 tablet by mouth 2 times daily 60 tablet 11    Misc. Devices (HOME STYLE BED RAILS) MISC Use on bed to prevent fall 2 each 0    enzalutamide (XTANDI) 40 MG capsule Take 4 capsules by mouth daily 120 capsule 11    pregabalin (LYRICA) 200 MG capsule Take 1 capsule by mouth 3 times daily for 90 days. 270 capsule 0    Blood Glucose Monitoring Suppl (ONE TOUCH ULTRA 2) w/Device KIT 1 kit by Does not apply route daily Patient needs One Touch Verio flex meter 1 kit 0    blood glucose monitor strips Testing once a day.   Patient needs One Touch Verio flex brand 100 strip 3    pravastatin (PRAVACHOL) 80 MG tablet TAKE 1 TABLET EVERY EVENING 90 tablet 3    Tirzepatide (MOUNJARO) 5 MG/0.5ML SOPN SC injection Inject 0.5 mLs into the skin once a week Dose increased 7/31/2023 (Patient taking differently: Inject 0.5 mLs into the skin once a week Dose increased 7/31/2023, patient takes on Thursdays) 6 mL 2

## 2023-12-11 NOTE — OP NOTE
edith biopsy forceps and kept in the same jar. Rectum/Anus: examined in normal and retroflexed positions and was normal, has small hemorrhoids    Withdrawal Time was (minutes): 12          The colon was decompressed and the scope was removed. The patient tolerated the procedure without unusual events. During the procedure, the patient's blood pressure, pulse and oxygen saturation remained stable and documented. No unusual events occurred during the procedure. Patient was transferred to recovery room and will be discharged when criteria is met. Recommendations/Plan:   F/U Biopsies  F/U In Office as instructed  Discussed with the family  High fiber diet   Precautions to avoid constipation     Next colonoscopy: 3 years.   If Colonoscopy is less than 10 years the recommended reason is due:polyps, multiple polyps and a fair preparation    Electronically signed by Dev Dunlap MD  on 12/11/2023 at 10:41 AM

## 2023-12-11 NOTE — ANESTHESIA PRE PROCEDURE
Abdominal:             Vascular:   + PVD, aortic or cerebral (Stenosis of left carotid artery). Other Findings:           Anesthesia Plan      general     ASA 3     (TIVA)  Induction: intravenous. MIPS: Prophylactic antiemetics administered. Anesthetic plan and risks discussed with patient. Plan discussed with CRNA.                   Liz Landaverde MD   12/11/2023

## 2023-12-12 ENCOUNTER — TELEPHONE (OUTPATIENT)
Dept: GASTROENTEROLOGY | Age: 68
End: 2023-12-12

## 2023-12-12 NOTE — TELEPHONE ENCOUNTER
Writer called and left message for patient to call back and make a follow up appt from his procedure with dr Jostin Hand and make it for 2 weeks.

## 2023-12-13 LAB — SURGICAL PATHOLOGY REPORT: NORMAL

## 2023-12-13 NOTE — RESULT ENCOUNTER NOTE
Please notify patient: Multiple tubular adenomas, benign polyps but precancerous, to repeat colonoscopy in 3 to 5 years  Otherwise labs within normal limits  continue current treatment    Future Appointments  12/15/2023 1:15 PM    Erica Wood MD    [facility]        [facility]  3/11/2024  9:20 AM    [place] Hill MD        [facility]  3/15/2024  1:30 PM    Jael Sterling MD    [facility]               Barry Cruz

## 2023-12-18 DIAGNOSIS — E11.42 TYPE 2 DIABETES MELLITUS WITH DIABETIC POLYNEUROPATHY, WITHOUT LONG-TERM CURRENT USE OF INSULIN (HCC): ICD-10-CM

## 2023-12-18 RX ORDER — PREGABALIN 200 MG/1
200 CAPSULE ORAL 3 TIMES DAILY
Qty: 270 CAPSULE | Refills: 0 | OUTPATIENT
Start: 2023-12-18 | End: 2024-03-17

## 2023-12-19 DIAGNOSIS — Z86.010 HISTORY OF COLON POLYPS: ICD-10-CM

## 2024-01-04 ENCOUNTER — TELEPHONE (OUTPATIENT)
Dept: UROLOGY | Age: 69
End: 2024-01-04

## 2024-01-04 NOTE — TELEPHONE ENCOUNTER
Patient called in stating that he spoke with someone at [facility]. They are in need of some information before they can fill his medication. Information include: Health insurance coverage, financial status [place]. Patient will  14 day supply sample box of Xtandi.  Writer will call [facility] support after clinic this afternoon to further discuss.

## 2024-01-05 ENCOUNTER — OFFICE VISIT (OUTPATIENT)
Dept: GASTROENTEROLOGY | Age: 69
End: 2024-01-05

## 2024-01-05 VITALS
SYSTOLIC BLOOD PRESSURE: 150 MMHG | HEIGHT: 72 IN | WEIGHT: 244.8 LBS | BODY MASS INDEX: 33.16 KG/M2 | HEART RATE: 63 BPM | DIASTOLIC BLOOD PRESSURE: 83 MMHG

## 2024-01-05 DIAGNOSIS — Z86.010 HISTORY OF COLON POLYPS: Primary | ICD-10-CM

## 2024-01-05 DIAGNOSIS — K59.09 CHRONIC CONSTIPATION: ICD-10-CM

## 2024-01-05 DIAGNOSIS — C61 PROSTATE CANCER (HCC): Primary | ICD-10-CM

## 2024-01-05 RX ORDER — POLYETHYLENE GLYCOL 3350 17 G/17G
17 POWDER, FOR SOLUTION ORAL DAILY PRN
Qty: 510 G | Refills: 1 | Status: SHIPPED | OUTPATIENT
Start: 2024-01-05 | End: 2024-04-04

## 2024-01-05 ASSESSMENT — ENCOUNTER SYMPTOMS
SINUS PRESSURE: 0
DIARRHEA: 0
CONSTIPATION: 0
ABDOMINAL DISTENTION: 0
VOICE CHANGE: 0
VOMITING: 0
TROUBLE SWALLOWING: 0
BLOOD IN STOOL: 0
ANAL BLEEDING: 0
COUGH: 0
CHOKING: 0
ABDOMINAL PAIN: 0
NAUSEA: 0
RECTAL PAIN: 0
SORE THROAT: 0
BACK PAIN: 0
WHEEZING: 0

## 2024-01-05 NOTE — PROGRESS NOTES
TABLET BY MOUTH DAILY, Disp: 90 tablet, Rfl: 3    allopurinol (ZYLOPRIM) 100 MG tablet, Take 1 tablet by mouth daily, Disp: 90 tablet, Rfl: 3    budesonide-formoterol (SYMBICORT) 160-4.5 MCG/ACT AERO, Inhale 2 puffs into the lungs 2 times daily as needed, Disp: , Rfl:     cyanocobalamin 1000 MCG tablet, Take 1 tablet by mouth daily, Disp: , Rfl:     lidocaine (LMX) 4 % cream, 5 g topical 2-3 times a day as needed for pain, maximum 20 g/day, ok to apply on the breasts for breast pain as needed up to 3 times a day, Disp: 120 g, Rfl: 3    albuterol sulfate HFA (PROAIR HFA) 108 (90 Base) MCG/ACT inhaler, Inhale 2 puffs into the lungs every 6 hours as needed for Wheezing or Shortness of Breath (cough), Disp: 24 g, Rfl: 3    lisinopril (PRINIVIL;ZESTRIL) 20 MG tablet, Take 1 tablet by mouth daily, Disp: 90 tablet, Rfl: 3    oxyCODONE-acetaminophen (PERCOCET) 7.5-325 MG per tablet, Take 1 tablet by mouth every 6 hours as needed for Pain., Disp: , Rfl:     albuterol (PROVENTIL) (2.5 MG/3ML) 0.083% nebulizer solution, Take 3 mLs by nebulization every 4 hours as needed for Wheezing or Shortness of Breath, Disp: 120 each, Rfl: 3    aspirin EC 81 MG EC tablet, Take 1 tablet by mouth daily, Disp: 90 tablet, Rfl: 0    diclofenac sodium (VOLTAREN) 1 % GEL, Apply 2 g topically 2 times daily, Disp: 350 g, Rfl: 0    Collagen-Boron-Hyaluronic Acid (MOVE FREE ULTRA JOINT HEALTH) 40-5-3.3 MG TABS, Take 1 tablet by mouth daily, Disp: 90 tablet, Rfl: 3    Handicap Placard MISC, by Does not apply route Can't walk greater than 200 feet. Expires in 5 years., Disp: 1 each, Rfl: 0    OXYGEN, 3 L/min by CPAP route continuous Uses at night with CPAP, Disp: , Rfl:     Blood Pressure KIT, Diagnosis: HTN. Needs to check blood pressure 1-2 times a day until stable, then once a day. Goal blood pressure less than 135/85, and above 110/60., Disp: 1 kit, Rfl: 0    tiotropium-olodaterol (STIOLTO RESPIMAT) 2.5-2.5 MCG/ACT AERS, Inhale 2 puffs into the

## 2024-01-05 NOTE — TELEPHONE ENCOUNTER
Patient is on Xtandi. Elgible for [employer] Patient Assistance Program. New pharmacy is [facility]. New script is needed

## 2024-01-06 ENCOUNTER — TELEPHONE (OUTPATIENT)
Dept: FAMILY MEDICINE CLINIC | Age: 69
End: 2024-01-06

## 2024-01-06 NOTE — TELEPHONE ENCOUNTER
Patient has chronic pain, poorly controlled  Follows with pain management and PCP  Will continue to monitor        FYI  [facility]          Case Number: [id number]     Potential Clinical Concern: Concurrent Therapy: Concurrent use of three or more CNS-ACTIVE drugs for >= 30 days       NOTES  Our records indicate that your patient has filled three or more CNS-Active medications. The use of concurrent CNS-Active medications in older adults is associated with increased risk of falls and is deemed a serious safety concern. Please review and consider discontinuing one of the medications.         The following table shows the pharmacy claims related to the potential clinical concern identified above:     NOTES  Drug Name Fill Date Qty Days Supply Prescriber Name Prescriber Phone # Pharmacy Name Pharmacy Phone #   SAVELLA TAB 50MG 12/31/2023 90 90 ANABELL SNYDER -[phone] [facility] [phone]   PREGABALIN CAP 200MG 12/22/2023 270 90 ANABELL SNYDER [phone] [employer] [phone]   OXYCOD/APAP TAB 7.5-325 12/14/2023 120 30 ELVIA HARRISON -[phone] [id number] [phone]   OXYCOD/APAP TAB 7.5-325 11/10/2023 120 30 ELVIA HARRISON [phone] [id number] [phone]   SAVELLA TAB 50MG 10/24/2023 90 90 ANABELL SNYDER -[phone] [facility] [employer] [phone]   OXYCOD/APAP TAB 7.5-325 10/07/2023 120 30 ELVIA HARRISON -[phone] [id number] [phone]   PREGABALIN CAP 200MG 09/30/2023 270 90 BHAVIN BOYCE [phone] [facility] [phone]

## 2024-01-10 DIAGNOSIS — R26.81 GAIT INSTABILITY: Primary | ICD-10-CM

## 2024-01-10 DIAGNOSIS — Z91.81 AT RISK FOR FALLS: ICD-10-CM

## 2024-02-24 DIAGNOSIS — M1A.09X0 CHRONIC GOUT OF MULTIPLE SITES, UNSPECIFIED CAUSE: ICD-10-CM

## 2024-02-26 RX ORDER — ALLOPURINOL 100 MG/1
100 TABLET ORAL DAILY
Qty: 90 TABLET | Refills: 3 | Status: SHIPPED | OUTPATIENT
Start: 2024-02-26

## 2024-02-26 NOTE — TELEPHONE ENCOUNTER
Please Approve or Refuse.  Send to Pharmacy per Pt's Request:      Next Visit Date:  3/15/2024   Last Visit Date: 11/21/2023    Hemoglobin A1C (%)   Date Value   07/28/2023 5.7   04/07/2023 6.1   12/29/2022 5.8             ( goal A1C is < 7)   BP Readings from Last 3 Encounters:   01/05/24 (!) 150/83   12/15/23 130/70   12/11/23 114/71          (goal 120/80)  BUN   Date Value Ref Range Status   11/30/2023 17 8 - 23 mg/dL Final     Creatinine   Date Value Ref Range Status   11/30/2023 0.8 0.7 - 1.2 mg/dL Final     Potassium   Date Value Ref Range Status   11/30/2023 4.7 3.7 - 5.3 mmol/L Final

## 2024-02-29 DIAGNOSIS — N18.2 BENIGN HYPERTENSION WITH CKD (CHRONIC KIDNEY DISEASE), STAGE II: ICD-10-CM

## 2024-02-29 DIAGNOSIS — I50.32 CHRONIC DIASTOLIC HEART FAILURE (HCC): ICD-10-CM

## 2024-02-29 DIAGNOSIS — I12.9 BENIGN HYPERTENSION WITH CKD (CHRONIC KIDNEY DISEASE), STAGE II: ICD-10-CM

## 2024-03-01 RX ORDER — LISINOPRIL 20 MG/1
20 TABLET ORAL DAILY
Qty: 90 TABLET | Refills: 3 | Status: SHIPPED | OUTPATIENT
Start: 2024-03-01

## 2024-03-02 DIAGNOSIS — E11.42 TYPE 2 DIABETES MELLITUS WITH DIABETIC POLYNEUROPATHY, WITHOUT LONG-TERM CURRENT USE OF INSULIN (HCC): ICD-10-CM

## 2024-03-02 DIAGNOSIS — E11.51 TYPE 2 DIABETES MELLITUS WITH DIABETIC PERIPHERAL ANGIOPATHY WITHOUT GANGRENE, WITHOUT LONG-TERM CURRENT USE OF INSULIN (HCC): ICD-10-CM

## 2024-03-04 RX ORDER — TIRZEPATIDE 5 MG/.5ML
5 INJECTION, SOLUTION SUBCUTANEOUS WEEKLY
Qty: 2 ML | Refills: 0 | Status: SHIPPED | OUTPATIENT
Start: 2024-03-04 | End: 2024-03-26

## 2024-03-07 ENCOUNTER — HOSPITAL ENCOUNTER (OUTPATIENT)
Age: 69
Discharge: HOME | End: 2024-03-07
Payer: MEDICARE

## 2024-03-07 LAB
EST. AVERAGE GLUCOSE BLD GHB EST-MCNC: 88 MG/DL
HBA1C MFR BLD: 4.7 % (ref 4–6)
PSA SERPL-MCNC: 0.3 NG/ML (ref 0–4)
TESTOST SERPL-MCNC: 7 NG/DL (ref 193–740)

## 2024-03-07 PROCEDURE — 36415 COLL VENOUS BLD VENIPUNCTURE: CPT

## 2024-03-07 PROCEDURE — 84403 ASSAY OF TOTAL TESTOSTERONE: CPT

## 2024-03-07 PROCEDURE — 83036 HEMOGLOBIN GLYCOSYLATED A1C: CPT

## 2024-03-07 PROCEDURE — 84153 ASSAY OF PSA TOTAL: CPT

## 2024-03-11 ENCOUNTER — OFFICE VISIT (OUTPATIENT)
Dept: UROLOGY | Age: 69
End: 2024-03-11
Payer: MEDICARE

## 2024-03-11 VITALS
SYSTOLIC BLOOD PRESSURE: 128 MMHG | BODY MASS INDEX: 33.05 KG/M2 | TEMPERATURE: 97.8 F | WEIGHT: 244 LBS | HEART RATE: 68 BPM | DIASTOLIC BLOOD PRESSURE: 82 MMHG | OXYGEN SATURATION: 97 % | HEIGHT: 72 IN

## 2024-03-11 DIAGNOSIS — R23.2 HOT FLASHES: ICD-10-CM

## 2024-03-11 DIAGNOSIS — Z79.818 ANDROGEN DEPRIVATION THERAPY: ICD-10-CM

## 2024-03-11 DIAGNOSIS — C61 PROSTATE CANCER (HCC): Primary | ICD-10-CM

## 2024-03-11 PROCEDURE — 99213 OFFICE O/P EST LOW 20 MIN: CPT | Performed by: UROLOGY

## 2024-03-11 PROCEDURE — 1123F ACP DISCUSS/DSCN MKR DOCD: CPT | Performed by: UROLOGY

## 2024-03-11 ASSESSMENT — ENCOUNTER SYMPTOMS
EYES NEGATIVE: 1
BACK PAIN: 1
WHEEZING: 0
RESPIRATORY NEGATIVE: 1
VOMITING: 0
ABDOMINAL PAIN: 0
COUGH: 0
NAUSEA: 0
EYE REDNESS: 0
GASTROINTESTINAL NEGATIVE: 1
COLOR CHANGE: 0
EYE PAIN: 0
SHORTNESS OF BREATH: 0
ALLERGIC/IMMUNOLOGIC NEGATIVE: 1

## 2024-03-11 NOTE — PROGRESS NOTES
Review of Systems   Constitutional: Negative.  Negative for appetite change, chills and fever.   HENT: Negative.     Eyes: Negative.  Negative for pain, redness and visual disturbance.   Respiratory: Negative.  Negative for cough, shortness of breath and wheezing.    Cardiovascular: Negative.  Negative for chest pain and leg swelling.   Gastrointestinal: Negative.  Negative for abdominal pain, nausea and vomiting.   Endocrine: Negative.    Genitourinary: Negative.  Negative for difficulty urinating, dysuria, flank pain, frequency, hematuria, testicular pain and urgency.   Musculoskeletal:  Positive for back pain. Negative for joint swelling and myalgias.   Skin: Negative.  Negative for color change, rash and wound.   Allergic/Immunologic: Negative.    Neurological:  Positive for numbness. Negative for dizziness, tremors, weakness and headaches.   Hematological: Negative.  Negative for adenopathy. Does not bruise/bleed easily.   Psychiatric/Behavioral: Negative.

## 2024-03-11 NOTE — PROGRESS NOTES
[facility]  [address]  [facility] [address]  Dept: [phone]    [facility] Office Note - Established    Patient:  Bernardino Cote  YOB: 1955  Date: 3/11/2024    The patient is a 68 y.o. male who presents todayfor evaluation of the following problems:   Chief Complaint   Patient presents with    Elevated PSA       HPI  This is a 68-year-old gentleman with prostate cancer.  He has been on Eligard and Xtandi.  His Eligard injection was last given 3 months ago.  His PSA is 0.3 which is slightly higher than last time when it was 0.2.  He does continue to get hot flashes but is tolerating them well.    Summary of old records: N/A    Additional History: N/A    Procedures Today: N/A    Urinalysis today:  No results found for this visit on 03/11/24.  Last several PSA's:  Lab Results   Component Value Date    PSA 0.30 03/07/2024    PSA 0.20 11/30/2023    PSA 0.23 09/22/2023     Last total testosterone:  Lab Results   Component Value Date    TESTOSTERONE 7 (L) 03/07/2024       AUA Symptom Score (3/11/2024):                               Last BUN and creatinine:  Lab Results   Component Value Date    BUN 17 11/30/2023     Lab Results   Component Value Date    CREATININE 0.8 11/30/2023       Additional Lab/Culture results: none    Imaging Reviewed during this Office Visit: none  (results were independently reviewed by physician and radiology report verified)    PAST MEDICAL, FAMILY AND SOCIAL HISTORY UPDATE:  Past Medical History:   Diagnosis Date    Acid reflux     Acute renal failure with acute tubular necrosis superimposed on stage 4 chronic kidney disease (HCC) 05/09/2022    Acute respiratory failure with hypoxia (HCC) 05/02/2022    Anemia, blood loss 10/07/2018    Arthritis     Benign hypertension with CKD (chronic kidney disease), stage II 05/12/2022    Bilateral leg edema 01/18/2021    history of    C.

## 2024-03-15 ENCOUNTER — OFFICE VISIT (OUTPATIENT)
Dept: FAMILY MEDICINE CLINIC | Age: 69
End: 2024-03-15

## 2024-03-15 VITALS
TEMPERATURE: 97.3 F | WEIGHT: 239.6 LBS | BODY MASS INDEX: 32.45 KG/M2 | SYSTOLIC BLOOD PRESSURE: 106 MMHG | OXYGEN SATURATION: 98 % | HEIGHT: 72 IN | HEART RATE: 73 BPM | DIASTOLIC BLOOD PRESSURE: 70 MMHG

## 2024-03-15 DIAGNOSIS — M81.8 OTHER OSTEOPOROSIS WITHOUT CURRENT PATHOLOGICAL FRACTURE: ICD-10-CM

## 2024-03-15 DIAGNOSIS — R26.89 POOR BALANCE: ICD-10-CM

## 2024-03-15 DIAGNOSIS — E11.42 TYPE 2 DIABETES MELLITUS WITH DIABETIC POLYNEUROPATHY, WITHOUT LONG-TERM CURRENT USE OF INSULIN (HCC): ICD-10-CM

## 2024-03-15 DIAGNOSIS — F33.1 MODERATE EPISODE OF RECURRENT MAJOR DEPRESSIVE DISORDER (HCC): ICD-10-CM

## 2024-03-15 DIAGNOSIS — R29.898 DECREASED GRIP STRENGTH OF RIGHT HAND: ICD-10-CM

## 2024-03-15 DIAGNOSIS — M19.041 PRIMARY OSTEOARTHRITIS OF BOTH HANDS: ICD-10-CM

## 2024-03-15 DIAGNOSIS — I73.9 PVD (PERIPHERAL VASCULAR DISEASE) WITH CLAUDICATION (HCC): ICD-10-CM

## 2024-03-15 DIAGNOSIS — E78.5 HYPERLIPIDEMIA WITH TARGET LDL LESS THAN 70: ICD-10-CM

## 2024-03-15 DIAGNOSIS — R26.2 DIFFICULTY WALKING: ICD-10-CM

## 2024-03-15 DIAGNOSIS — Z00.00 MEDICARE ANNUAL WELLNESS VISIT, SUBSEQUENT: Primary | ICD-10-CM

## 2024-03-15 DIAGNOSIS — Z51.81 MEDICATION MONITORING ENCOUNTER: ICD-10-CM

## 2024-03-15 DIAGNOSIS — M19.042 PRIMARY OSTEOARTHRITIS OF BOTH HANDS: ICD-10-CM

## 2024-03-15 DIAGNOSIS — R29.898 DECREASED GRIP STRENGTH OF LEFT HAND: ICD-10-CM

## 2024-03-15 PROBLEM — F33.0 MILD EPISODE OF RECURRENT MAJOR DEPRESSIVE DISORDER (HCC): Status: RESOLVED | Noted: 2018-09-04 | Resolved: 2024-03-15

## 2024-03-15 PROBLEM — J96.11 CHRONIC HYPOXEMIC RESPIRATORY FAILURE (HCC): Status: RESOLVED | Noted: 2020-03-18 | Resolved: 2024-03-15

## 2024-03-15 PROBLEM — K63.5 POLYP OF COLON: Status: RESOLVED | Noted: 2023-11-21 | Resolved: 2024-03-15

## 2024-03-15 PROBLEM — E66.01 SEVERE OBESITY (BMI 35.0-39.9) WITH COMORBIDITY (HCC): Status: RESOLVED | Noted: 2020-02-02 | Resolved: 2024-03-15

## 2024-03-15 PROBLEM — R19.5 POSITIVE FIT (FECAL IMMUNOCHEMICAL TEST): Status: RESOLVED | Noted: 2017-04-11 | Resolved: 2024-03-15

## 2024-03-15 RX ORDER — PREGABALIN 200 MG/1
200 CAPSULE ORAL 3 TIMES DAILY
Qty: 270 CAPSULE | Refills: 0 | Status: SHIPPED | OUTPATIENT
Start: 2024-03-15 | End: 2024-06-13

## 2024-03-15 ASSESSMENT — PATIENT HEALTH QUESTIONNAIRE - PHQ9
9. THOUGHTS THAT YOU WOULD BE BETTER OFF DEAD, OR OF HURTING YOURSELF: 0
SUM OF ALL RESPONSES TO PHQ QUESTIONS 1-9: 15
SUM OF ALL RESPONSES TO PHQ9 QUESTIONS 1 & 2: 3
4. FEELING TIRED OR HAVING LITTLE ENERGY: 3
7. TROUBLE CONCENTRATING ON THINGS, SUCH AS READING THE NEWSPAPER OR WATCHING TELEVISION: 0
3. TROUBLE FALLING OR STAYING ASLEEP: 3
10. IF YOU CHECKED OFF ANY PROBLEMS, HOW DIFFICULT HAVE THESE PROBLEMS MADE IT FOR YOU TO DO YOUR WORK, TAKE CARE OF THINGS AT HOME, OR GET ALONG WITH OTHER PEOPLE: 1
SUM OF ALL RESPONSES TO PHQ QUESTIONS 1-9: 15
8. MOVING OR SPEAKING SO SLOWLY THAT OTHER PEOPLE COULD HAVE NOTICED. OR THE OPPOSITE, BEING SO FIGETY OR RESTLESS THAT YOU HAVE BEEN MOVING AROUND A LOT MORE THAN USUAL: 3
6. FEELING BAD ABOUT YOURSELF - OR THAT YOU ARE A FAILURE OR HAVE LET YOURSELF OR YOUR FAMILY DOWN: 0
2. FEELING DOWN, DEPRESSED OR HOPELESS: 0
SUM OF ALL RESPONSES TO PHQ QUESTIONS 1-9: 15
5. POOR APPETITE OR OVEREATING: 3
1. LITTLE INTEREST OR PLEASURE IN DOING THINGS: 3
SUM OF ALL RESPONSES TO PHQ QUESTIONS 1-9: 15

## 2024-03-15 ASSESSMENT — ENCOUNTER SYMPTOMS
BACK PAIN: 1
ABDOMINAL DISTENTION: 0
CHEST TIGHTNESS: 0
VOMITING: 0
ABDOMINAL PAIN: 0
CONSTIPATION: 0
APNEA: 1
COUGH: 0
DIARRHEA: 0
NAUSEA: 0
SHORTNESS OF BREATH: 1
WHEEZING: 0

## 2024-03-15 ASSESSMENT — VISUAL ACUITY
OD_CC: 20/20
OS_CC: 20/30

## 2024-03-15 NOTE — PROGRESS NOTES
Visit Information    Have you changed or started any medications since your last visit including any over-the-counter medicines, vitamins, or herbal medicines? no   Have you stopped taking any of your medications? Is so, why? -  no  Are you having any side effects from any of your medications? - no    Have you seen any other physician or provider since your last visit?  yes -    Have you had any other diagnostic tests since your last visit?  no   Have you been seen in the emergency room and/or had an admission in a hospital since we last saw you?  no   Have you had your routine dental cleaning in the past 6 months?  yes -      Do you have an active MyChart account? If no, what is the barrier?  Yes    Patient Care Team:  Asmita Hebert MD as PCP - General (Family Medicine)  Asmita Hebert MD as PCP - Empaneled Provider  Juan Nguyen MD as Consulting Physician (Urology)  Oren Alfaro MD as Consulting Physician (Pulmonology)  Dwain Carrington OD (Ophthalmology)  Jaki Desir MD as Consulting Physician (Endocrinology)  Zaria Blake MD as Consulting Physician (Orthopedic Surgery)  Kelechi Soriano MD as Consulting Physician (Orthopedic Surgery)  Jaycob Nichols MD as Surgeon (General Surgery)  Kimberly Chandler, PhD (Psychology)  Jimmie Camp MD as Consulting Physician (Neurology)  Jt Wood MD as Consulting Physician (Nephrology)  Moises Chamberlain DPM as Surgeon (Podiatry)  Cristobal Hooks MD as Consulting Physician (Gastroenterology)  Omar Tipton DO as Consulting Physician (Cardiology)    Medical History Review  Past Medical, Family, and Social History reviewed and does contribute to the patient presenting condition    Health Maintenance   Topic Date Due    Annual Wellness Visit (Medicare Advantage)  01/01/2024    Diabetic foot exam  04/07/2024    Low dose CT lung screening &/or counseling  05/16/2024    A1C test (Diabetic or Prediabetic)  06/07/2024 
disorder (HCC)  -     External Referral To Home Health  Medication monitoring encounter  -     External Referral To Home Health  -     DRUG SCREEN, PAIN; Future  Recommendations for Preventive Services Due: see orders and patient instructions/AVS.  Recommended screening schedule for the next 5-10 years is provided to the patient in written form: see Patient Instructions/AVS.     Return in 1 year (on 3/15/2025) for Visit type MEDICARE, VISION, PHQ9, MINICOG, HRA QUESIONS.     Subjective       Patient's complete Health Risk Assessment and screening values have been reviewed and are found in Flowsheets. The following problems were reviewed today and where indicated follow up appointments were made and/or referrals ordered.    Positive Risk Factor Screenings with Interventions:    Fall Risk:  Do you feel unsteady or are you worried about falling? : (!) yes  2 or more falls in past year?: no  Fall with injury in past year?: (!) yes     Interventions:    Reviewed medications, home hazards, visual acuity, and co-morbidities that can increase risk for falls  Referral: Physical Therapy (PT)-home PT ordered, continue to use walker with seat and wheels.     Depression:  PHQ-2 Score: 3  PHQ-9 Total Score: 15    Interpretation:  5-9 mild   10-14 moderate   15-19 moderately severe   20-27 severe     Interventions:  Patient comments: taking savella, he says his wife had open heart surgery and has been been very worried for her, she was recently admitted and EMS was called, he has been waking up at night to check on her if she is ok , he is also upset that he cannot see grand kids because cannot lift them up.  Continue Savella and will continue to monitor      Controlled Medication Review:      Today's Pain Level: Pain Score: SIX       Opioid Risk: (High risk score ?55) Opioid risk score: 70      Last PDMP Cornel as Reviewed:  Review User Review Instant Review Result   ANABELL SNYDER 3/15/2024  9:59 AM @   Reviewed PDMP [1]

## 2024-03-15 NOTE — PATIENT INSTRUCTIONS
[email or web address] and enter Q940 to learn more about \"Learning About Lung Cancer Screening.\"  Current as of: October 25, 2023               Content Version: 14.0  © 6536-5088 [employer].   Care instructions adapted under license by [employer]. If you have questions about a medical condition or this instruction, always ask your healthcare professional. [employer] disclaims any warranty or liability for your use of this information.

## 2024-03-28 DIAGNOSIS — E11.51 TYPE 2 DIABETES MELLITUS WITH DIABETIC PERIPHERAL ANGIOPATHY WITHOUT GANGRENE, WITHOUT LONG-TERM CURRENT USE OF INSULIN (HCC): ICD-10-CM

## 2024-03-28 DIAGNOSIS — E11.42 TYPE 2 DIABETES MELLITUS WITH DIABETIC POLYNEUROPATHY, WITHOUT LONG-TERM CURRENT USE OF INSULIN (HCC): ICD-10-CM

## 2024-03-28 RX ORDER — TIRZEPATIDE 5 MG/.5ML
INJECTION, SOLUTION SUBCUTANEOUS
Qty: 2 ML | Refills: 11 | Status: SHIPPED | OUTPATIENT
Start: 2024-03-28

## 2024-04-15 ENCOUNTER — TELEPHONE (OUTPATIENT)
Dept: PHARMACY | Facility: CLINIC | Age: 69
End: 2024-04-15

## 2024-04-17 ENCOUNTER — TELEPHONE (OUTPATIENT)
Dept: ONCOLOGY | Age: 69
End: 2024-04-17

## 2024-04-17 DIAGNOSIS — Z87.891 PERSONAL HISTORY OF NICOTINE DEPENDENCE: Primary | ICD-10-CM

## 2024-04-17 NOTE — TELEPHONE ENCOUNTER
Our records indicate that your patient is coming due for their annual lung cancer screening follow up testing.     For your convenience, we have pended the order for the scan for you. If you do not agree with the need for the test, please cancel the order and let us know.     Sincerely,    [facility]   Lung Cancer Screening Program    Auto printed reminder letter sent to patient.

## 2024-04-17 NOTE — TELEPHONE ENCOUNTER
Diagnosis Orders   1. Personal history of nicotine dependence  CT lung screen [Initial/Annual]           Future Appointments   Date Time Provider Department Center   6/10/2024 10:20 AM Juan Nguyen MD [facility]   7/16/2024 10:30 AM Asmita Hebert MD [facility]   3/18/2025  9:15 AM [facility]

## 2024-04-19 NOTE — TELEPHONE ENCOUNTER
My chart message not read. Will prepare and send letter to patient.    Merly Kraus CPhT.  Population Health Clinical   [facility]   Toll Free [phone] Option: 1  
Range Status   07/11/2023 24 <40 U/L Final     The 10-year ASCVD risk score (Laura MORGAN, et al., 2019) is: 26.9%    Values used to calculate the score:      Age: 68 years      Sex: Male      Is Non- : No      Diabetic: Yes      Tobacco smoker: No      Systolic Blood Pressure: 106 mmHg      Is BP treated: Yes      HDL Cholesterol: 41 mg/dL      Total Cholesterol: 182 mg/dL     PLAN  Per insurer report, LIS-0 - co-pays are based on tiers and patient is subject to coverage gap.    The following are interventions that have been identified:   Patient OVERDUE refilling mounjaro and active on home medication list.     Attempting to reach patient to review.  Left message asking for return call. Precipio message sent to patient.    Last Visit: 3.15.24  Next Visit: 7.16.24  Recent Visits  Date Type Provider Dept   03/15/24 Office Visit Asmita Hebert MD [facility]   11/21/23 Office Visit Jose Mckeon MD [facility]   07/28/23 Office Visit Asmita Hebert MD [facility]   04/07/23 Office Visit Asmita Hebert MD [facility]   12/29/22 Office Visit Cassandra Gasca APRN - CNP [facility]   10/26/22 Office Visit Asmita Hebert MD [facility]   Showing recent visits within past 540 days with a meds authorizing provider and meeting all other requirements  Future Appointments  Date Type Provider Dept   07/16/24 Appointment Asmita Hebert MD [facility]   Showing future appointments within next 150 days with a meds authorizing provider and meeting all other requirements    Future Appointments   Date Time Provider Department Center   6/10/2024 10:20 AM Juan Nguyen MD [facility]   7/16/2024 10:30 AM Asmita Hebert MD [facility]   3/18/2025  9:15 AM Asmita Hebert MD [facility]       Merly Kraus CphT  Population Health Clinical   [facility]  Department, toll free:

## 2024-05-21 ENCOUNTER — TELEPHONE (OUTPATIENT)
Dept: PHARMACY | Facility: CLINIC | Age: 69
End: 2024-05-21

## 2024-05-21 DIAGNOSIS — I10 ESSENTIAL HYPERTENSION: ICD-10-CM

## 2024-05-21 DIAGNOSIS — F33.0 MILD EPISODE OF RECURRENT MAJOR DEPRESSIVE DISORDER (HCC): ICD-10-CM

## 2024-05-21 DIAGNOSIS — N18.2 BENIGN HYPERTENSION WITH CKD (CHRONIC KIDNEY DISEASE), STAGE II: ICD-10-CM

## 2024-05-21 DIAGNOSIS — K21.9 GASTROESOPHAGEAL REFLUX DISEASE WITHOUT ESOPHAGITIS: ICD-10-CM

## 2024-05-21 DIAGNOSIS — I50.32 CHRONIC DIASTOLIC HEART FAILURE (HCC): ICD-10-CM

## 2024-05-21 DIAGNOSIS — I12.9 BENIGN HYPERTENSION WITH CKD (CHRONIC KIDNEY DISEASE), STAGE II: ICD-10-CM

## 2024-05-21 NOTE — TELEPHONE ENCOUNTER
[facility] CLINICAL PHARMACY: ADHERENCE REVIEW  Identified care gap per [employer] fills at [facility]: ACE/ARB, Diabetes, and Statin adherence    ASSESSMENT    ACE/ARB ADHERENCE    Insurance Records claims through 5/10/2024 (Prior Year PDC = 100% - PASSED ; YTD PDC = PASSED):     Lisinopril 20mg last filled on 3.8.24 for 90 day supply. Next refill due: 6    Prescribed si tablet/capsule daily    Per Insurer Portal: same as above.     Per [facility]:  3 refills remaining.    BP Readings from Last 3 Encounters:   03/15/24 106/70   24 128/82   24 (!) 150/83     Estimated Creatinine Clearance: 111 mL/min (based on SCr of 0.8 mg/dL).  Lab Results   Component Value Date    CREATININE 0.8 2023     Lab Results   Component Value Date    K 4.7 2023     DIABETES ADHERENCE    Insurance Records claims through 5/10/2024 (Prior Year PDC = 88% - PASSED ; YTD PDC = FIRST FILL; Potential Fail Date: 6.3.24):     Mounjaro 5mg last filled on 3.8.24 for 28 day supply. Next refill due: 24    Prescribed sig:  inject weekly    Per database: same as above. Insurance portal not showing refill date or next refill date.    Per [facility]:  11 refills remaining.    Lab Results   Component Value Date    LABA1C 4.7 2024    LABA1C 5.7 2023    LABA1C 6.1 2023     NOTE: A1c >9%    STATIN ADHERENCE    Insurance Records claims through 5/10/2024 (Prior Year PDC = 100% - PASSED ; YTD PDC = PASSED):     Pravastatin 80mg last filled on 24 for 90 day supply. Next refill due: 24    Prescribed si tablet/capsule daily    Per Insurer Portal: same as above.    Per [facility]:  may need future refills RX written 23 90 with 3 refills.    Lab Results   Component Value Date    CHOL 182 2023    TRIG 318 (H) 2023    HDL 41 2023     Lab Results   Component Value Date    LDL 77 2023      ALT   Date Value Ref Range Status   2023 22 5 - 41 U/L Final

## 2024-05-22 ENCOUNTER — TELEPHONE (OUTPATIENT)
Dept: UROLOGY | Age: 69
End: 2024-05-22

## 2024-05-22 RX ORDER — OMEPRAZOLE 20 MG/1
CAPSULE, DELAYED RELEASE ORAL
Qty: 90 CAPSULE | Refills: 3 | OUTPATIENT
Start: 2024-05-22

## 2024-05-22 RX ORDER — MILNACIPRAN HYDROCHLORIDE 50 MG/1
50 TABLET, FILM COATED ORAL DAILY
Qty: 90 TABLET | Refills: 3 | Status: SHIPPED | OUTPATIENT
Start: 2024-05-22

## 2024-05-22 RX ORDER — FUROSEMIDE 20 MG/1
20 TABLET ORAL DAILY
Qty: 90 TABLET | Refills: 3 | Status: SHIPPED | OUTPATIENT
Start: 2024-05-22

## 2024-05-22 RX ORDER — AMLODIPINE BESYLATE 10 MG/1
TABLET ORAL
Qty: 90 TABLET | Refills: 3 | Status: SHIPPED | OUTPATIENT
Start: 2024-05-22

## 2024-05-22 NOTE — TELEPHONE ENCOUNTER
Please Approve or Refuse.  Send to Pharmacy per Pt's Request:      Next Visit Date:  7/16/2024   Last Visit Date: 3/15/2024    Hemoglobin A1C (%)   Date Value   03/07/2024 4.7   07/28/2023 5.7   04/07/2023 6.1             ( goal A1C is < 7)   BP Readings from Last 3 Encounters:   03/15/24 106/70   03/11/24 128/82   01/05/24 (!) 150/83          (goal 120/80)  BUN   Date Value Ref Range Status   11/30/2023 17 8 - 23 mg/dL Final     Creatinine   Date Value Ref Range Status   11/30/2023 0.8 0.7 - 1.2 mg/dL Final     Potassium   Date Value Ref Range Status   11/30/2023 4.7 3.7 - 5.3 mmol/L Final

## 2024-05-22 NOTE — TELEPHONE ENCOUNTER
Pt LM on clinic line stating \"I have 7 days left of medication left and 0 refills.\"  Writer contacted [facility] at [phone].  Writer spoke with Caterina who confirmed the medication is set to be delivered 5/24/24 and there are 12 refills. Message was left for patient with this information.

## 2024-05-24 RX ORDER — FAMOTIDINE 20 MG/1
20 TABLET, FILM COATED ORAL 2 TIMES DAILY
Qty: 180 TABLET | Refills: 3 | Status: SHIPPED | OUTPATIENT
Start: 2024-05-24

## 2024-05-24 NOTE — TELEPHONE ENCOUNTER
Diagnosis Orders   1. Chronic diastolic heart failure (HCC)  furosemide (LASIX) 20 MG tablet      2. Benign hypertension with CKD (chronic kidney disease), stage II  furosemide (LASIX) 20 MG tablet      3. Mild episode of recurrent major depressive disorder (HCC)  SAVELLA 50 MG TABS      4. Essential hypertension  amLODIPine (NORVASC) 10 MG tablet      5. Gastroesophageal reflux disease without esophagitis  famotidine (PEPCID) 20 MG tablet           Future Appointments   Date Time Provider Department Center   6/10/2024 10:20 AM [facility]   7/16/2024 10:30 AM Asmita Hebert MD [facility]   3/18/2025  9:15 AM Asmita Hebert MD [facility]

## 2024-06-04 ENCOUNTER — HOSPITAL ENCOUNTER (OUTPATIENT)
Age: 69
Setting detail: SPECIMEN
Discharge: HOME | End: 2024-06-04

## 2024-06-04 DIAGNOSIS — C61 PROSTATE CANCER (HCC): ICD-10-CM

## 2024-06-04 LAB
PSA SERPL-MCNC: 0.3 NG/ML (ref 0–4)
TESTOST SERPL-MCNC: <3 NG/DL (ref 193–740)

## 2024-06-06 ENCOUNTER — HOSPITAL ENCOUNTER (OUTPATIENT)
Dept: CT IMAGING | Age: 69
Discharge: HOME | End: 2024-06-08
Attending: FAMILY MEDICINE
Payer: MEDICARE

## 2024-06-06 DIAGNOSIS — Z87.891 PERSONAL HISTORY OF NICOTINE DEPENDENCE: ICD-10-CM

## 2024-06-06 PROCEDURE — 71271 CT THORAX LUNG CANCER SCR C-: CPT

## 2024-06-06 NOTE — RESULT ENCOUNTER NOTE
Please notify patient: Stable emphysema, not to restart smoking ever again!!!!    There are calcifications in the blood vessels in the heart for which he has been following with cardiologist, [facility] cardiologist, per , he was supposed to follow-up in 6 months, last appointment was in August, to call [facility] cardiology and make an appointment          Future Appointments  6/10/2024  10:20 AM   Juan Nguyen MD        [facility]           [facility]  7/16/2024  10:30 AM   Asmita Hebert MD    [facility]  3/18/2025  9:15 AM    Asmita Hebert MD    [facility]

## 2024-06-10 ENCOUNTER — OFFICE VISIT (OUTPATIENT)
Dept: UROLOGY | Age: 69
End: 2024-06-10
Payer: MEDICARE

## 2024-06-10 VITALS
HEART RATE: 68 BPM | SYSTOLIC BLOOD PRESSURE: 112 MMHG | OXYGEN SATURATION: 100 % | BODY MASS INDEX: 32.37 KG/M2 | WEIGHT: 239 LBS | HEIGHT: 72 IN | TEMPERATURE: 97.8 F | DIASTOLIC BLOOD PRESSURE: 70 MMHG

## 2024-06-10 DIAGNOSIS — C61 PROSTATE CANCER (HCC): Primary | ICD-10-CM

## 2024-06-10 DIAGNOSIS — Z79.818 ANDROGEN DEPRIVATION THERAPY: ICD-10-CM

## 2024-06-10 DIAGNOSIS — R23.2 HOT FLASHES: ICD-10-CM

## 2024-06-10 PROCEDURE — 1123F ACP DISCUSS/DSCN MKR DOCD: CPT | Performed by: UROLOGY

## 2024-06-10 PROCEDURE — 99214 OFFICE O/P EST MOD 30 MIN: CPT | Performed by: UROLOGY

## 2024-06-10 PROCEDURE — 96402 CHEMO HORMON ANTINEOPL SQ/IM: CPT | Performed by: UROLOGY

## 2024-06-10 RX ORDER — TIOTROPIUM BROMIDE AND OLODATEROL 3.124; 2.736 UG/1; UG/1
SPRAY, METERED RESPIRATORY (INHALATION)
COMMUNITY
Start: 2024-05-30

## 2024-06-10 RX ORDER — CYCLOBENZAPRINE HCL 5 MG
TABLET ORAL
COMMUNITY
Start: 2024-04-04

## 2024-06-10 ASSESSMENT — ENCOUNTER SYMPTOMS
BACK PAIN: 0
SHORTNESS OF BREATH: 0
EYES NEGATIVE: 1
VOMITING: 0
COLOR CHANGE: 0
ABDOMINAL PAIN: 0
COUGH: 0
GASTROINTESTINAL NEGATIVE: 1
EYE PAIN: 0
NAUSEA: 0
EYE REDNESS: 0
WHEEZING: 0
RESPIRATORY NEGATIVE: 1
ALLERGIC/IMMUNOLOGIC NEGATIVE: 1

## 2024-06-10 NOTE — PROGRESS NOTES
Indication/Diagnosis: Prostate cancer (HCC)     After obtaining consent, and per orders of Dr Nguyen, injection of Eligard 45 mg was given in the Right lower quad. abdomne . Patient tolerated it well. Patient instructed to report any adverse reaction to me immediately.  
Review of Systems   Constitutional: Negative.  Negative for appetite change, chills and fever.   HENT: Negative.     Eyes: Negative.  Negative for pain, redness and visual disturbance.   Respiratory: Negative.  Negative for cough, shortness of breath and wheezing.    Cardiovascular: Negative.  Negative for chest pain and leg swelling.   Gastrointestinal: Negative.  Negative for abdominal pain, nausea and vomiting.   Endocrine: Negative.    Genitourinary: Negative.  Negative for difficulty urinating, dysuria, flank pain, frequency, hematuria, testicular pain and urgency.   Musculoskeletal: Negative.  Negative for back pain, joint swelling and myalgias.   Skin: Negative.  Negative for color change, rash and wound.   Allergic/Immunologic: Negative.    Neurological: Negative.  Negative for dizziness, tremors, weakness, numbness and headaches.   Hematological: Negative.  Negative for adenopathy. Does not bruise/bleed easily.   Psychiatric/Behavioral: Negative.       
Standing Expiration Date:   6/10/2025    Testosterone     Standing Status:   Future     Standing Expiration Date:   6/5/2025    MA THERAPEUTIC PROPHYLACTIC/DX INJECTION SUBQ/IM           Juan Nguyen MD    Agree with the ROS entered by the MA.

## 2024-06-23 DIAGNOSIS — E11.42 TYPE 2 DIABETES MELLITUS WITH DIABETIC POLYNEUROPATHY, WITHOUT LONG-TERM CURRENT USE OF INSULIN (HCC): ICD-10-CM

## 2024-06-24 RX ORDER — PREGABALIN 200 MG/1
200 CAPSULE ORAL 3 TIMES DAILY
Qty: 270 CAPSULE | Refills: 0 | Status: SHIPPED | OUTPATIENT
Start: 2024-06-24 | End: 2024-09-22

## 2024-07-03 DIAGNOSIS — E78.5 HYPERLIPIDEMIA WITH TARGET LDL LESS THAN 70: ICD-10-CM

## 2024-07-05 RX ORDER — PRAVASTATIN SODIUM 80 MG/1
80 TABLET ORAL NIGHTLY
Qty: 90 TABLET | Refills: 3 | Status: SHIPPED | OUTPATIENT
Start: 2024-07-05

## 2024-07-05 NOTE — TELEPHONE ENCOUNTER
Please Approve or Refuse.  Send to Pharmacy per Pt's Request:      Next Visit Date:  7/16/2024   Last Visit Date: 3/15/2024    Hemoglobin A1C (%)   Date Value   03/07/2024 4.7   07/28/2023 5.7   04/07/2023 6.1             ( goal A1C is < 7)   BP Readings from Last 3 Encounters:   06/10/24 112/70   03/15/24 106/70   03/11/24 128/82          (goal 120/80)  BUN   Date Value Ref Range Status   11/30/2023 17 8 - 23 mg/dL Final     Creatinine   Date Value Ref Range Status   11/30/2023 0.8 0.7 - 1.2 mg/dL Final     Potassium   Date Value Ref Range Status   11/30/2023 4.7 3.7 - 5.3 mmol/L Final

## 2024-07-16 ENCOUNTER — OFFICE VISIT (OUTPATIENT)
Dept: FAMILY MEDICINE CLINIC | Age: 69
End: 2024-07-16
Payer: MEDICARE

## 2024-07-16 VITALS
DIASTOLIC BLOOD PRESSURE: 70 MMHG | HEIGHT: 72 IN | BODY MASS INDEX: 32.67 KG/M2 | TEMPERATURE: 97.2 F | HEART RATE: 70 BPM | WEIGHT: 241.2 LBS | OXYGEN SATURATION: 99 % | SYSTOLIC BLOOD PRESSURE: 118 MMHG

## 2024-07-16 DIAGNOSIS — R29.6 FALLS FREQUENTLY: ICD-10-CM

## 2024-07-16 DIAGNOSIS — I50.32 CHRONIC DIASTOLIC HEART FAILURE (HCC): ICD-10-CM

## 2024-07-16 DIAGNOSIS — M79.641 PAIN IN BOTH HANDS: ICD-10-CM

## 2024-07-16 DIAGNOSIS — K59.03 DRUG-INDUCED CONSTIPATION: ICD-10-CM

## 2024-07-16 DIAGNOSIS — E11.42 TYPE 2 DIABETES MELLITUS WITH DIABETIC POLYNEUROPATHY, WITHOUT LONG-TERM CURRENT USE OF INSULIN (HCC): Primary | ICD-10-CM

## 2024-07-16 DIAGNOSIS — I12.9 BENIGN HYPERTENSION WITH CKD (CHRONIC KIDNEY DISEASE), STAGE II: ICD-10-CM

## 2024-07-16 DIAGNOSIS — M1A.09X0 CHRONIC GOUT OF MULTIPLE SITES, UNSPECIFIED CAUSE: ICD-10-CM

## 2024-07-16 DIAGNOSIS — R25.3 JERKING: ICD-10-CM

## 2024-07-16 DIAGNOSIS — E78.5 HYPERLIPIDEMIA WITH TARGET LDL LESS THAN 70: ICD-10-CM

## 2024-07-16 DIAGNOSIS — J44.9 COPD MIXED TYPE (HCC): ICD-10-CM

## 2024-07-16 DIAGNOSIS — F33.1 MODERATE EPISODE OF RECURRENT MAJOR DEPRESSIVE DISORDER (HCC): ICD-10-CM

## 2024-07-16 DIAGNOSIS — M79.642 PAIN IN BOTH HANDS: ICD-10-CM

## 2024-07-16 DIAGNOSIS — R26.89 POOR BALANCE: ICD-10-CM

## 2024-07-16 DIAGNOSIS — N18.2 BENIGN HYPERTENSION WITH CKD (CHRONIC KIDNEY DISEASE), STAGE II: ICD-10-CM

## 2024-07-16 PROBLEM — I73.00 RAYNAUD'S DISEASE WITHOUT GANGRENE: Status: ACTIVE | Noted: 2024-07-16

## 2024-07-16 LAB — HBA1C MFR BLD: 5 %

## 2024-07-16 PROCEDURE — 83036 HEMOGLOBIN GLYCOSYLATED A1C: CPT | Performed by: FAMILY MEDICINE

## 2024-07-16 PROCEDURE — 3044F HG A1C LEVEL LT 7.0%: CPT | Performed by: FAMILY MEDICINE

## 2024-07-16 PROCEDURE — 1123F ACP DISCUSS/DSCN MKR DOCD: CPT | Performed by: FAMILY MEDICINE

## 2024-07-16 PROCEDURE — 99214 OFFICE O/P EST MOD 30 MIN: CPT | Performed by: FAMILY MEDICINE

## 2024-07-16 RX ORDER — METHYLPREDNISOLONE 4 MG/1
TABLET ORAL
Qty: 1 KIT | Refills: 0 | Status: SHIPPED | OUTPATIENT
Start: 2024-07-16

## 2024-07-16 RX ORDER — LIDOCAINE 40 MG/G
CREAM TOPICAL
Qty: 120 G | Refills: 3 | Status: SHIPPED | OUTPATIENT
Start: 2024-07-16

## 2024-07-16 RX ORDER — SENNOSIDES A AND B 8.6 MG/1
1 TABLET, FILM COATED ORAL 2 TIMES DAILY PRN
Qty: 180 TABLET | Refills: 0 | Status: SHIPPED | OUTPATIENT
Start: 2024-07-16

## 2024-07-16 RX ORDER — POLYETHYLENE GLYCOL 3350 17 G/17G
17 POWDER, FOR SOLUTION ORAL DAILY
Qty: 1530 G | Refills: 3 | Status: SHIPPED | OUTPATIENT
Start: 2024-07-16

## 2024-07-16 RX ORDER — TIRZEPATIDE 5 MG/.5ML
5 INJECTION, SOLUTION SUBCUTANEOUS WEEKLY
Qty: 6 ML | Refills: 3 | Status: SHIPPED | OUTPATIENT
Start: 2024-07-16

## 2024-07-16 RX ORDER — ALLOPURINOL 100 MG/1
100 TABLET ORAL DAILY
Qty: 90 TABLET | Refills: 3 | Status: SHIPPED | OUTPATIENT
Start: 2024-07-16

## 2024-07-16 SDOH — ECONOMIC STABILITY: FOOD INSECURITY: WITHIN THE PAST 12 MONTHS, THE FOOD YOU BOUGHT JUST DIDN'T LAST AND YOU DIDN'T HAVE MONEY TO GET MORE.: NEVER TRUE

## 2024-07-16 SDOH — ECONOMIC STABILITY: INCOME INSECURITY: HOW HARD IS IT FOR YOU TO PAY FOR THE VERY BASICS LIKE FOOD, HOUSING, MEDICAL CARE, AND HEATING?: NOT HARD AT ALL

## 2024-07-16 SDOH — ECONOMIC STABILITY: FOOD INSECURITY: WITHIN THE PAST 12 MONTHS, YOU WORRIED THAT YOUR FOOD WOULD RUN OUT BEFORE YOU GOT MONEY TO BUY MORE.: NEVER TRUE

## 2024-07-16 ASSESSMENT — ENCOUNTER SYMPTOMS
VOMITING: 0
COLOR CHANGE: 1
WHEEZING: 0
BACK PAIN: 1
ABDOMINAL DISTENTION: 0
NAUSEA: 0
SHORTNESS OF BREATH: 1
APNEA: 1
ABDOMINAL PAIN: 0
CONSTIPATION: 1
DIARRHEA: 0
COUGH: 0
CHEST TIGHTNESS: 0

## 2024-07-16 ASSESSMENT — PATIENT HEALTH QUESTIONNAIRE - PHQ9
SUM OF ALL RESPONSES TO PHQ QUESTIONS 1-9: 13
SUM OF ALL RESPONSES TO PHQ QUESTIONS 1-9: 13
6. FEELING BAD ABOUT YOURSELF - OR THAT YOU ARE A FAILURE OR HAVE LET YOURSELF OR YOUR FAMILY DOWN: NEARLY EVERY DAY
4. FEELING TIRED OR HAVING LITTLE ENERGY: NEARLY EVERY DAY
5. POOR APPETITE OR OVEREATING: NOT AT ALL
8. MOVING OR SPEAKING SO SLOWLY THAT OTHER PEOPLE COULD HAVE NOTICED. OR THE OPPOSITE, BEING SO FIGETY OR RESTLESS THAT YOU HAVE BEEN MOVING AROUND A LOT MORE THAN USUAL: NOT AT ALL
10. IF YOU CHECKED OFF ANY PROBLEMS, HOW DIFFICULT HAVE THESE PROBLEMS MADE IT FOR YOU TO DO YOUR WORK, TAKE CARE OF THINGS AT HOME, OR GET ALONG WITH OTHER PEOPLE: SOMEWHAT DIFFICULT
7. TROUBLE CONCENTRATING ON THINGS, SUCH AS READING THE NEWSPAPER OR WATCHING TELEVISION: NEARLY EVERY DAY
SUM OF ALL RESPONSES TO PHQ QUESTIONS 1-9: 13
SUM OF ALL RESPONSES TO PHQ QUESTIONS 1-9: 13
3. TROUBLE FALLING OR STAYING ASLEEP: NOT AT ALL
2. FEELING DOWN, DEPRESSED OR HOPELESS: NEARLY EVERY DAY
SUM OF ALL RESPONSES TO PHQ9 QUESTIONS 1 & 2: 4
9. THOUGHTS THAT YOU WOULD BE BETTER OFF DEAD, OR OF HURTING YOURSELF: NOT AT ALL
1. LITTLE INTEREST OR PLEASURE IN DOING THINGS: SEVERAL DAYS

## 2024-07-16 NOTE — RESULT ENCOUNTER NOTE
Addressed during office visit today, hemoglobin A1c 5, slightly worse but very well-controlled, continue treatment recommended during the office visit.

## 2024-07-16 NOTE — PROGRESS NOTES
Visit Information    Have you changed or started any medications since your last visit including any over-the-counter medicines, vitamins, or herbal medicines? no   Have you stopped taking any of your medications? Is so, why? -  no  Are you having any side effects from any of your medications? - yes -     Have you seen any other physician or provider since your last visit?  yes -    Have you had any other diagnostic tests since your last visit?  NO   Have you been seen in the emergency room and/or had an admission in a hospital since we last saw you?  no   Have you had your routine dental cleaning in the past 6 months?  no     Do you have an active MyChart account? If no, what is the barrier?  Yes    Patient Care Team:  Asmita Hebert MD as PCP - General (Family Medicine)  Asmita Hebert MD as PCP - Empaneled Provider  Juan Nguyen MD as Consulting Physician (Urology)  Oren Alfaro MD as Consulting Physician (Pulmonology)  Dwain Carrington OD (Ophthalmology)  Jaki Desir MD as Consulting Physician (Endocrinology)  Zaria Blake MD as Consulting Physician (Orthopedic Surgery)  Kelechi Soriano MD as Consulting Physician (Orthopedic Surgery)  Jaycob Nichols MD as Surgeon (General Surgery)  Kmiberly Chandler, PhD (Psychology)  Jimmei Camp MD as Consulting Physician (Neurology)  Jt Wood MD as Consulting Physician (Nephrology)  Moises Chamberlain DPM as Surgeon (Podiatry)  Cristobal Hooks MD as Consulting Physician (Gastroenterology)  Omar Tipton DO as Consulting Physician (Cardiology)    Medical History Review  Past Medical, Family, and Social History reviewed and does contribute to the patient presenting condition    Health Maintenance   Topic Date Due    A1C test (Diabetic or Prediabetic)  06/07/2024    Lipids  07/31/2024    Flu vaccine (1) 08/01/2024    Diabetic retinal exam  09/21/2024    Diabetic Alb to Cr ratio (uACR) test  11/30/2024    GFR 
(L) 11/02/2022    HDL 35 (L) 07/30/2021    HDL 35 (L) 07/30/2021     Lab Results   Component Value Date    LDL 77 07/31/2023    LDL 44 11/02/2022    LDL 93 07/30/2021    LDL 93 07/30/2021       Lab Results   Component Value Date    CHOLHDLRATIO 4.4 07/31/2023    CHOLHDLRATIO 3.4 11/02/2022    CHOLHDLRATIO 4.4 07/30/2021    CHOLHDLRATIO 4.4 07/30/2021           Lab Results   Component Value Date    ZSAXVUFW92 831 07/31/2023     Lab Results   Component Value Date    FOLATE 13.9 07/31/2023     Lab Results   Component Value Date    VITD25 37.2 11/30/2023         On this date 7/16/2024 I have spent 39 minutes reviewing previous notes, test results and face to face with the patient discussing the diagnosis and importance of compliance with the treatment plan as well as documenting on the day of the visit.      This note was completed by using the assistance of a speech-recognition program. However, inadvertent computerized transcription errors may be present. Although every effort was made to ensure accuracy, no guarantees can be provided that every mistake has been identified and corrected by editing .      An electronic signature was used to authenticate this note.  Electronically signed by Asmita Hebert MD on 7/21/2024 at 11:36 AM

## 2024-07-24 ENCOUNTER — HOSPITAL ENCOUNTER (OUTPATIENT)
Age: 69
Setting detail: SPECIMEN
Discharge: HOME | End: 2024-07-24

## 2024-07-24 ENCOUNTER — OFFICE VISIT (OUTPATIENT)
Dept: NEUROLOGY | Age: 69
End: 2024-07-24
Payer: MEDICARE

## 2024-07-24 VITALS
BODY MASS INDEX: 32.1 KG/M2 | HEART RATE: 75 BPM | HEIGHT: 72 IN | DIASTOLIC BLOOD PRESSURE: 74 MMHG | SYSTOLIC BLOOD PRESSURE: 116 MMHG | WEIGHT: 237 LBS

## 2024-07-24 DIAGNOSIS — I12.9 BENIGN HYPERTENSION WITH CKD (CHRONIC KIDNEY DISEASE), STAGE II: ICD-10-CM

## 2024-07-24 DIAGNOSIS — G89.29 CHRONIC LOW BACK PAIN WITH SCIATICA, SCIATICA LATERALITY UNSPECIFIED, UNSPECIFIED BACK PAIN LATERALITY: ICD-10-CM

## 2024-07-24 DIAGNOSIS — G62.9 SENSORY MOTOR NEUROPATHY: Primary | ICD-10-CM

## 2024-07-24 DIAGNOSIS — M1A.09X0 CHRONIC GOUT OF MULTIPLE SITES, UNSPECIFIED CAUSE: ICD-10-CM

## 2024-07-24 DIAGNOSIS — E78.5 HYPERLIPIDEMIA WITH TARGET LDL LESS THAN 70: ICD-10-CM

## 2024-07-24 DIAGNOSIS — M54.40 CHRONIC LOW BACK PAIN WITH SCIATICA, SCIATICA LATERALITY UNSPECIFIED, UNSPECIFIED BACK PAIN LATERALITY: ICD-10-CM

## 2024-07-24 DIAGNOSIS — I50.32 CHRONIC DIASTOLIC HEART FAILURE (HCC): ICD-10-CM

## 2024-07-24 DIAGNOSIS — N18.2 BENIGN HYPERTENSION WITH CKD (CHRONIC KIDNEY DISEASE), STAGE II: ICD-10-CM

## 2024-07-24 DIAGNOSIS — M79.641 PAIN IN BOTH HANDS: ICD-10-CM

## 2024-07-24 DIAGNOSIS — E11.42 TYPE 2 DIABETES MELLITUS WITH DIABETIC POLYNEUROPATHY, WITHOUT LONG-TERM CURRENT USE OF INSULIN (HCC): ICD-10-CM

## 2024-07-24 DIAGNOSIS — M79.642 PAIN IN BOTH HANDS: ICD-10-CM

## 2024-07-24 LAB
ALBUMIN SERPL-MCNC: 4.5 G/DL (ref 3.5–5.2)
ALBUMIN/GLOB SERPL: 1 {RATIO} (ref 1–2.5)
ALP SERPL-CCNC: 99 U/L (ref 40–129)
ALT SERPL-CCNC: 11 U/L (ref 10–50)
ANION GAP SERPL CALCULATED.3IONS-SCNC: 12 MMOL/L (ref 9–16)
AST SERPL-CCNC: 18 U/L (ref 10–50)
BASOPHILS # BLD: 0.05 K/UL (ref 0–0.2)
BASOPHILS NFR BLD: 1 % (ref 0–2)
BILIRUB SERPL-MCNC: 0.3 MG/DL (ref 0–1.2)
BNP SERPL-MCNC: <36 PG/ML (ref 0–300)
BUN SERPL-MCNC: 10 MG/DL (ref 8–23)
CALCIUM SERPL-MCNC: 10.3 MG/DL (ref 8.6–10.4)
CHLORIDE SERPL-SCNC: 103 MMOL/L (ref 98–107)
CHOLEST SERPL-MCNC: 198 MG/DL (ref 0–199)
CHOLESTEROL/HDL RATIO: 4
CK SERPL-CCNC: 52 U/L (ref 39–308)
CO2 SERPL-SCNC: 25 MMOL/L (ref 20–31)
CREAT SERPL-MCNC: 0.7 MG/DL (ref 0.7–1.2)
CRP SERPL HS-MCNC: <3 MG/L (ref 0–5)
EOSINOPHIL # BLD: 0.03 K/UL (ref 0–0.44)
EOSINOPHILS RELATIVE PERCENT: 0 % (ref 1–4)
ERYTHROCYTE [DISTWIDTH] IN BLOOD BY AUTOMATED COUNT: 13.8 % (ref 11.8–14.4)
ERYTHROCYTE [SEDIMENTATION RATE] IN BLOOD BY PHOTOMETRIC METHOD: 36 MM/HR (ref 0–20)
GFR, ESTIMATED: >90 ML/MIN/1.73M2
GLUCOSE SERPL-MCNC: 81 MG/DL (ref 74–99)
HCT VFR BLD AUTO: 43.9 % (ref 40.7–50.3)
HDLC SERPL-MCNC: 50 MG/DL
HGB BLD-MCNC: 14 G/DL (ref 13–17)
IMM GRANULOCYTES # BLD AUTO: 0.03 K/UL (ref 0–0.3)
IMM GRANULOCYTES NFR BLD: 0 %
LDLC SERPL CALC-MCNC: 109 MG/DL (ref 0–100)
LYMPHOCYTES NFR BLD: 2.5 K/UL (ref 1.1–3.7)
LYMPHOCYTES RELATIVE PERCENT: 31 % (ref 24–43)
MCH RBC QN AUTO: 29.7 PG (ref 25.2–33.5)
MCHC RBC AUTO-ENTMCNC: 31.9 G/DL (ref 28.4–34.8)
MCV RBC AUTO: 93 FL (ref 82.6–102.9)
MONOCYTES NFR BLD: 0.49 K/UL (ref 0.1–1.2)
MONOCYTES NFR BLD: 6 % (ref 3–12)
NEUTROPHILS NFR BLD: 62 % (ref 36–65)
NEUTS SEG NFR BLD: 4.92 K/UL (ref 1.5–8.1)
NRBC BLD-RTO: 0 PER 100 WBC
PLATELET # BLD AUTO: 259 K/UL (ref 138–453)
PMV BLD AUTO: 11.3 FL (ref 8.1–13.5)
POTASSIUM SERPL-SCNC: 4.8 MMOL/L (ref 3.7–5.3)
PROT SERPL-MCNC: 7.9 G/DL (ref 6.6–8.7)
RBC # BLD AUTO: 4.72 M/UL (ref 4.21–5.77)
RHEUMATOID FACT SER NEPH-ACNC: <10 IU/ML (ref 0–13)
SODIUM SERPL-SCNC: 140 MMOL/L (ref 136–145)
TRIGL SERPL-MCNC: 196 MG/DL
TSH SERPL DL<=0.05 MIU/L-ACNC: 0.8 UIU/ML (ref 0.27–4.2)
URATE SERPL-MCNC: 5.1 MG/DL (ref 3.4–7)
VLDLC SERPL CALC-MCNC: 39 MG/DL
WBC OTHER # BLD: 8 K/UL (ref 3.5–11.3)

## 2024-07-24 PROCEDURE — 99215 OFFICE O/P EST HI 40 MIN: CPT | Performed by: PSYCHIATRY & NEUROLOGY

## 2024-07-24 PROCEDURE — 1123F ACP DISCUSS/DSCN MKR DOCD: CPT | Performed by: PSYCHIATRY & NEUROLOGY

## 2024-07-24 RX ORDER — AMITRIPTYLINE HYDROCHLORIDE 10 MG/1
10 TABLET, FILM COATED ORAL NIGHTLY
Qty: 30 TABLET | Refills: 0 | Status: SHIPPED | OUTPATIENT
Start: 2024-07-24

## 2024-07-24 RX ORDER — AMITRIPTYLINE HYDROCHLORIDE 10 MG/1
10 TABLET, FILM COATED ORAL NIGHTLY
Qty: 30 TABLET | Refills: 5 | Status: SHIPPED | OUTPATIENT
Start: 2024-07-24 | End: 2024-07-24

## 2024-07-24 ASSESSMENT — ENCOUNTER SYMPTOMS
BACK PAIN: 1
ALLERGIC/IMMUNOLOGIC NEGATIVE: 1
RESPIRATORY NEGATIVE: 1
EYES NEGATIVE: 1
GASTROINTESTINAL NEGATIVE: 1

## 2024-07-24 NOTE — PROGRESS NOTES
Active problem sensory neuropathy felt to be diabetic being agreeable on going on oral hypoglycemic on lyrica for neuropathic pain  . Bilateral arm numbness is from bilateral ulnar neuropathy nonsurgical with no compression at elbow .Right leg radicular weakness from prior back surgery with foot drop with chronic low back pain to proceed with MRI lumbar spine for low back pain last see February 2022 . The condition is neuropathy is worse with numbness going to knees along with hands . Metformins has been stopped with last Hga1c was 4.7 , March 2024 . He had spinal cord stimulator in low back through pain clinic for chronic ow back pain . Low back pain is now grade 4 over 10 mainly when up and about . He is more off balance still using walker with on going right foot drop . He wore right AFO feeling leg was weaker . He is falling about once per week mainly when not using walker .He is on  lyrica 200 mg po bid . Pain is in both feet of thawing burning quality of scale 5 over 10 more at night time when he foes to bed   He reports that elavil made him groggy in past . 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 Chronic bilateral L4-5 and S1 radiculopathy and S1 . Cannot rule out right peroneal neuropathy . Chronic bilateral C5-6 radiculopathy . Bilateral ulnar neuropathy proximal to innervation of  FCU.  He has been having leg pain for 5 years . There is also leg numbness affecting feet . This began initially in toes ascending to ankle level . There is also numbness of outer 3 fingers . 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 There are no bowel or bladder disturbance . Right foot has been numb for 10 years with left foot for the past last 6 months . He has throbbing in legs calves and feet all the time with baseline pain grade 5 over 10 worse at night with some interference with his

## 2024-07-25 LAB
ALBUMIN PERCENT: 54 % (ref 45–65)
ALBUMIN SERPL-MCNC: 4.2 G/DL (ref 3.2–5.2)
ALPHA 2 PERCENT: 15 % (ref 6–13)
ALPHA1 GLOB SERPL ELPH-MCNC: 0.3 G/DL (ref 0.1–0.4)
ALPHA1 GLOB SERPL ELPH-MCNC: 4 % (ref 3–6)
ALPHA2 GLOB SERPL ELPH-MCNC: 1.1 G/DL (ref 0.5–0.9)
B-GLOBULIN SERPL ELPH-MCNC: 1.1 G/DL (ref 0.5–1.1)
B-GLOBULIN SERPL ELPH-MCNC: 15 % (ref 11–19)
FOLATE SERPL-MCNC: 8.4 NG/ML (ref 4.8–24.2)
GAMMA GLOB SERPL ELPH-MCNC: 0.9 G/DL (ref 0.5–1.5)
GAMMA GLOBULIN %: 12 % (ref 9–20)
ITYP INTERPRETATION: NORMAL
PATH REV: NORMAL
PATHOLOGIST: ABNORMAL
PROT PATTERN SERPL ELPH-IMP: ABNORMAL
PROT SERPL-MCNC: 7.7 G/DL (ref 6.6–8.7)
TOTAL PROT. SUM,%: 100 % (ref 98–102)
TOTAL PROT. SUM: 7.6 G/DL (ref 6.3–8.2)
VIT B12 SERPL-MCNC: 1050 PG/ML (ref 232–1245)

## 2024-07-26 LAB
ANA SER QL IA: NEGATIVE
CCP AB SER IA-ACNC: 1.7 U/ML (ref 0–7)
DSDNA IGG SER QL IA: <0.5 IU/ML
NUCLEAR IGG SER IA-RTO: <0.1 U/ML
P E INTERPRETATION, U: NORMAL
PATHOLOGIST: NORMAL
SPECIMEN TYPE: NORMAL
URINE TOTAL PROTEIN: <4 MG/DL

## 2024-07-26 NOTE — RESULT ENCOUNTER NOTE
Please notify patient: Cholesterol is worse, I do not think he is taking the pravastatin 80 Mg daily    Triglycerides increased but improved 1 inflammatory marker is a bit high the other 1 is normal.  The electrophoresis shows increased some proteins, which can be seen with acute tissue damage like he is doing chemotherapy, or inflammation or infection.  He does not have infection though    Otherwise labs within normal limits  continue current treatment      Future Appointments  8/20/2024  2:15 PM    Chau Ochoa MD       [facility]           [facility]  9/11/2024  2:30 PM    Omar Tipton DO             [facility]        [facility]  9/16/2024  10:20 AM   Juan Nguyen MD        [facility]           [facility]  10/17/2024 9:00 AM    Asmita Hebert MD    [facility]               [facility]  3/18/2025  9:15 AM    [facility]

## 2024-08-09 ENCOUNTER — TELEPHONE (OUTPATIENT)
Dept: FAMILY MEDICINE CLINIC | Age: 69
End: 2024-08-09

## 2024-08-09 DIAGNOSIS — E11.42 TYPE 2 DIABETES MELLITUS WITH DIABETIC POLYNEUROPATHY, WITHOUT LONG-TERM CURRENT USE OF INSULIN (HCC): Primary | ICD-10-CM

## 2024-08-10 RX ORDER — GLUCOSAMINE HCL/CHONDROITIN SU 500-400 MG
CAPSULE ORAL
Qty: 100 STRIP | Refills: 3 | Status: SHIPPED | OUTPATIENT
Start: 2024-08-10

## 2024-08-10 RX ORDER — BLOOD-GLUCOSE METER
KIT MISCELLANEOUS
Qty: 1 KIT | Refills: 0 | Status: SHIPPED | OUTPATIENT
Start: 2024-08-10

## 2024-08-11 NOTE — TELEPHONE ENCOUNTER
Please let the patient know to  prescription from the pharmacy.    Orders Placed This Encounter   Medications    glucose monitoring kit     Sig: Please supply Patient with a glucose monitoring kit that insurance will cover.     Dispense:  1 kit     Refill:  0    blood glucose monitor strips     Sig: Testing once a day.  Please dispense according to patients insurance.     Dispense:  100 strip     Refill:  3    Lancets 30G MISC     Sig: Testing once a day.  Please dispense according to patients insurance.     Dispense:  100 each     Refill:  3         [facility] - [place], [place] - [address] [place] [address] - P [phone] - F [phone]  [address]  [address]  [facility] [address]  Phone: [phone] Fax: [phone]    [facility] [id number] [place], [place] - [address] -  [phone] - F [phone]  [address] [facility] [place] [address]  Phone: [phone] Fax: [phone]      No orders of the defined types were placed in this encounter.      Future Appointments   Date Time Provider Department Center   8/20/2024  2:15 PM Chau Ochoa MD [facility]   9/11/2024  2:30 PM Omar Tipton DO [facility]   9/16/2024 10:20 AM Juan Nguyen MD [facility]   10/17/2024  9:00 AM Asmita Hebert MD fp [facility] ECC DEP   1/2/2025  4:00 PM [facility] EMG  [facility] EMG [place]   1/2/2025  4:00 PM Feliciano Keller MD [facility]   3/18/2025  9:15 AM Asmita Hebert MD [facility] ECC DEP       Thank you!

## 2024-08-20 ENCOUNTER — OFFICE VISIT (OUTPATIENT)
Dept: GASTROENTEROLOGY | Age: 69
End: 2024-08-20
Payer: MEDICARE

## 2024-08-20 VITALS
TEMPERATURE: 97.5 F | DIASTOLIC BLOOD PRESSURE: 75 MMHG | BODY MASS INDEX: 32.28 KG/M2 | WEIGHT: 238 LBS | SYSTOLIC BLOOD PRESSURE: 129 MMHG

## 2024-08-20 DIAGNOSIS — K59.03 DRUG-INDUCED CONSTIPATION: Primary | ICD-10-CM

## 2024-08-20 DIAGNOSIS — J44.9 COPD MIXED TYPE (HCC): ICD-10-CM

## 2024-08-20 DIAGNOSIS — D12.6 TUBULAR ADENOMA OF COLON: ICD-10-CM

## 2024-08-20 PROCEDURE — 1123F ACP DISCUSS/DSCN MKR DOCD: CPT | Performed by: STUDENT IN AN ORGANIZED HEALTH CARE EDUCATION/TRAINING PROGRAM

## 2024-08-20 PROCEDURE — 99213 OFFICE O/P EST LOW 20 MIN: CPT | Performed by: STUDENT IN AN ORGANIZED HEALTH CARE EDUCATION/TRAINING PROGRAM

## 2024-08-20 RX ORDER — WHEAT DEXTRIN 3 G/3.8 G
4 POWDER (GRAM) ORAL 2 TIMES DAILY
Qty: 1 EACH | Refills: 0 | Status: SHIPPED | OUTPATIENT
Start: 2024-08-20

## 2024-08-20 RX ORDER — POLYETHYLENE GLYCOL 3350 17 G/17G
17 POWDER, FOR SOLUTION ORAL DAILY PRN
Qty: 1 EACH | Refills: 3 | Status: SHIPPED | OUTPATIENT
Start: 2024-08-20

## 2024-08-20 RX ORDER — POLYETHYLENE GLYCOL 3350 17 G/17G
17 POWDER, FOR SOLUTION ORAL DAILY
Qty: 510 G | Refills: 0 | Status: SHIPPED | OUTPATIENT
Start: 2024-08-20 | End: 2024-09-19

## 2024-08-20 RX ORDER — WHEAT DEXTRIN 3 G/3.8 G
4 POWDER (GRAM) ORAL 2 TIMES DAILY
Qty: 1 EACH | Refills: 0 | Status: SHIPPED | OUTPATIENT
Start: 2024-08-20 | End: 2024-08-20

## 2024-08-20 NOTE — PROGRESS NOTES
AND PLAN  1. Drug-induced constipation    2. COPD mixed type (HCC)    3. Tubular adenoma of colon    Recommend Benefiber scheduled and MiraLAX as needed   repeat colonoscopy 2027    Bernardino was seen today for constipation.    Diagnoses and all orders for this visit:    Drug-induced constipation  -     polyethylene glycol (GLYCOLAX) 17 GM/SCOOP powder; Take 17 g by mouth daily as needed (constipation)  -     Discontinue: Wheat Dextrin (BENEFIBER) POWD; Take 4 g by mouth in the morning and at bedtime    COPD mixed type (HCC)    Tubular adenoma of colon       Return in about 1 year (around 8/20/2025).     Thank you for allowing me to participate in the care of Mr. Cote. For any further questions please do not hesitate to contact me.      I have reviewed and agree with the MA/LPN ROS please refer to their documentation from today's encounter on a separate note.     Chau Ochoa MD, MPH  [facility]  Office #: [phone]     this note is created with the assistance of a speech recognition program.  While intending to generate a document that actually reflects the content of the visit, the document can still have some errors including those of syntax and sound a like substitutions which may escape proof reading.  It such instances, actual meaning can be extrapolated by contextual diversion.

## 2024-08-20 NOTE — TELEPHONE ENCOUNTER
Patient needs medications that were prescribed today to be sent to the [facility] and not [facility].

## 2024-08-20 NOTE — PATIENT INSTRUCTIONS
- I would like you to try to drink at least 64 ounces of water per day   - please increase your fiber intake to a goal of 25-30 grams per day. If you're not meeting your fiber demand with your diet, you can take supplements like benjamin seeds, flax seeds, fiber one, metamucil, citrucel, or benefiber  - please start using miralax, 1 scoop = 1 capful = 1 packet at night before bed. If you feel like this is ineffective, please increase it to 2 doses a day.

## 2024-09-10 ENCOUNTER — HOSPITAL ENCOUNTER (OUTPATIENT)
Age: 69
Setting detail: SPECIMEN
Discharge: HOME | End: 2024-09-10

## 2024-09-10 DIAGNOSIS — C61 PROSTATE CANCER (HCC): ICD-10-CM

## 2024-09-10 DIAGNOSIS — G62.9 SENSORY MOTOR NEUROPATHY: ICD-10-CM

## 2024-09-10 LAB
ERYTHROCYTE [SEDIMENTATION RATE] IN BLOOD BY PHOTOMETRIC METHOD: 10 MM/HR (ref 0–20)
EST. AVERAGE GLUCOSE BLD GHB EST-MCNC: 100 MG/DL
FOLATE SERPL-MCNC: 5.1 NG/ML (ref 4.8–24.2)
HBA1C MFR BLD: 5.1 % (ref 4–6)
PSA SERPL-MCNC: 0.4 NG/ML (ref 0–4)
TESTOST SERPL-MCNC: 8 NG/DL (ref 193–740)
TSH SERPL DL<=0.05 MIU/L-ACNC: 1.29 UIU/ML (ref 0.27–4.2)
VIT B12 SERPL-MCNC: 1155 PG/ML (ref 232–1245)

## 2024-09-11 LAB
ANA SER QL IA: NEGATIVE
DSDNA IGG SER QL IA: <0.5 IU/ML
NUCLEAR IGG SER IA-RTO: <0.1 U/ML

## 2024-09-12 LAB
ALBUMIN PERCENT: 53 % (ref 45–65)
ALBUMIN SERPL-MCNC: 3.6 G/DL (ref 3.2–5.2)
ALPHA 2 PERCENT: 15 % (ref 6–13)
ALPHA1 GLOB SERPL ELPH-MCNC: 0.3 G/DL (ref 0.1–0.4)
ALPHA1 GLOB SERPL ELPH-MCNC: 5 % (ref 3–6)
ALPHA2 GLOB SERPL ELPH-MCNC: 1.1 G/DL (ref 0.5–0.9)
B-GLOBULIN SERPL ELPH-MCNC: 1 G/DL (ref 0.5–1.1)
B-GLOBULIN SERPL ELPH-MCNC: 15 % (ref 11–19)
GAMMA GLOB SERPL ELPH-MCNC: 0.8 G/DL (ref 0.5–1.5)
GAMMA GLOBULIN %: 12 % (ref 9–20)
PATHOLOGIST: ABNORMAL
PROT PATTERN SERPL ELPH-IMP: ABNORMAL
PROT SERPL-MCNC: 6.8 G/DL (ref 6.6–8.7)
TOTAL PROT. SUM,%: 100 % (ref 98–102)
TOTAL PROT. SUM: 6.8 G/DL (ref 6.3–8.2)

## 2024-09-16 ENCOUNTER — OFFICE VISIT (OUTPATIENT)
Dept: UROLOGY | Age: 69
End: 2024-09-16
Payer: MEDICARE

## 2024-09-16 VITALS
BODY MASS INDEX: 32.1 KG/M2 | DIASTOLIC BLOOD PRESSURE: 62 MMHG | RESPIRATION RATE: 16 BRPM | WEIGHT: 237 LBS | HEART RATE: 72 BPM | HEIGHT: 72 IN | OXYGEN SATURATION: 97 % | SYSTOLIC BLOOD PRESSURE: 104 MMHG

## 2024-09-16 DIAGNOSIS — Z79.818 ANDROGEN DEPRIVATION THERAPY: ICD-10-CM

## 2024-09-16 DIAGNOSIS — R23.2 HOT FLASHES: ICD-10-CM

## 2024-09-16 DIAGNOSIS — R35.0 FREQUENCY OF MICTURITION: ICD-10-CM

## 2024-09-16 DIAGNOSIS — C61 PROSTATE CANCER (HCC): Primary | ICD-10-CM

## 2024-09-16 PROCEDURE — 99214 OFFICE O/P EST MOD 30 MIN: CPT | Performed by: UROLOGY

## 2024-09-16 PROCEDURE — 1123F ACP DISCUSS/DSCN MKR DOCD: CPT | Performed by: UROLOGY

## 2024-09-16 RX ORDER — AZITHROMYCIN 250 MG/1
TABLET, FILM COATED ORAL
COMMUNITY
Start: 2024-09-10

## 2024-09-16 ASSESSMENT — ENCOUNTER SYMPTOMS
WHEEZING: 0
BACK PAIN: 0
ABDOMINAL PAIN: 0
NAUSEA: 0
CONSTIPATION: 0
SHORTNESS OF BREATH: 0
EYE REDNESS: 0
EYE PAIN: 0
DIARRHEA: 0
VOMITING: 0
COUGH: 0

## 2024-10-11 NOTE — TELEPHONE ENCOUNTER
Please Approve or Refuse.   Send to Pharmacy per Pt's Request: [facility]      Next Visit Date:  7/28/2023   Last Visit Date: 4/7/2023    Hemoglobin A1C (%)   Date Value   04/07/2023 6.1   12/29/2022 5.8   09/23/2022 5.8             ( goal A1C is < 7)   BP Readings from Last 3 Encounters:   04/19/23 131/81   04/07/23 126/84   03/23/23 (!) 154/98          (goal 120/80)  BUN   Date Value Ref Range Status   03/16/2023 13 8 - 23 mg/dL Final     Creatinine   Date Value Ref Range Status   03/16/2023 0.67 (L) 0.70 - 1.20 mg/dL Final     POC Creatinine   Date Value Ref Range Status   04/19/2023 0.51 0.51 - 1.19 mg/dL Final     Potassium   Date Value Ref Range Status   03/16/2023 3.9 3.7 - 5.3 mmol/L Final Result letter sent to patient regarding blood work shows normal results.  Patient to follow up as planned.

## 2024-10-17 ENCOUNTER — OFFICE VISIT (OUTPATIENT)
Dept: FAMILY MEDICINE CLINIC | Age: 69
End: 2024-10-17

## 2024-10-17 VITALS
OXYGEN SATURATION: 99 % | BODY MASS INDEX: 31.83 KG/M2 | WEIGHT: 235 LBS | HEIGHT: 72 IN | SYSTOLIC BLOOD PRESSURE: 122 MMHG | TEMPERATURE: 97.6 F | HEART RATE: 78 BPM | DIASTOLIC BLOOD PRESSURE: 86 MMHG

## 2024-10-17 DIAGNOSIS — F33.1 MODERATE EPISODE OF RECURRENT MAJOR DEPRESSIVE DISORDER (HCC): ICD-10-CM

## 2024-10-17 DIAGNOSIS — I12.9 BENIGN HYPERTENSION WITH CKD (CHRONIC KIDNEY DISEASE), STAGE II: ICD-10-CM
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
## 2024-10-17 DIAGNOSIS — R29.898 WEAKNESS OF BOTH HANDS: ICD-10-CM

## 2024-10-17 DIAGNOSIS — I50.32 CHRONIC DIASTOLIC HEART FAILURE (HCC): ICD-10-CM

## 2024-10-17 DIAGNOSIS — K59.01 SLOW TRANSIT CONSTIPATION: ICD-10-CM

## 2024-10-17 DIAGNOSIS — E88.89: ICD-10-CM

## 2024-10-17 DIAGNOSIS — G62.9 POLYNEUROPATHY: ICD-10-CM

## 2024-10-17 DIAGNOSIS — E11.42 TYPE 2 DIABETES MELLITUS WITH DIABETIC POLYNEUROPATHY, WITHOUT LONG-TERM CURRENT USE OF INSULIN (HCC): Primary | ICD-10-CM

## 2024-10-17 DIAGNOSIS — M79.641 PAIN IN BOTH HANDS: ICD-10-CM

## 2024-10-17 DIAGNOSIS — M79.642 PAIN IN BOTH HANDS: ICD-10-CM

## 2024-10-17 DIAGNOSIS — E78.2 MIXED HYPERLIPIDEMIA: ICD-10-CM

## 2024-10-17 DIAGNOSIS — N18.2 BENIGN HYPERTENSION WITH CKD (CHRONIC KIDNEY DISEASE), STAGE II: ICD-10-CM

## 2024-10-17 PROBLEM — E11.43 TYPE 2 DIABETES MELLITUS WITH DIABETIC AUTONOMIC NEUROPATHY, WITHOUT LONG-TERM CURRENT USE OF INSULIN (HCC): Status: ACTIVE | Noted: 2017-03-25

## 2024-10-17 RX ORDER — PREGABALIN 200 MG/1
200 CAPSULE ORAL 2 TIMES DAILY
Qty: 180 CAPSULE | Refills: 0
Start: 2024-10-17 | End: 2025-01-15

## 2024-10-17 RX ORDER — POLYETHYLENE GLYCOL 3350 17 G/17G
17 POWDER, FOR SOLUTION ORAL DAILY
Qty: 750 G | Refills: 3 | Status: SHIPPED | OUTPATIENT
Start: 2024-10-17

## 2024-10-17 RX ORDER — SENNOSIDES A AND B 8.6 MG/1
1 TABLET, FILM COATED ORAL 2 TIMES DAILY PRN
Qty: 180 TABLET | Refills: 1 | Status: SHIPPED | OUTPATIENT
Start: 2024-10-17

## 2024-10-17 SDOH — ECONOMIC STABILITY: FOOD INSECURITY: WITHIN THE PAST 12 MONTHS, THE FOOD YOU BOUGHT JUST DIDN'T LAST AND YOU DIDN'T HAVE MONEY TO GET MORE.: NEVER TRUE

## 2024-10-17 SDOH — ECONOMIC STABILITY: FOOD INSECURITY: WITHIN THE PAST 12 MONTHS, YOU WORRIED THAT YOUR FOOD WOULD RUN OUT BEFORE YOU GOT MONEY TO BUY MORE.: NEVER TRUE

## 2024-10-17 SDOH — ECONOMIC STABILITY: INCOME INSECURITY: HOW HARD IS IT FOR YOU TO PAY FOR THE VERY BASICS LIKE FOOD, HOUSING, MEDICAL CARE, AND HEATING?: NOT HARD AT ALL

## 2024-10-17 ASSESSMENT — PATIENT HEALTH QUESTIONNAIRE - PHQ9
SUM OF ALL RESPONSES TO PHQ9 QUESTIONS 1 & 2: 0
4. FEELING TIRED OR HAVING LITTLE ENERGY: NEARLY EVERY DAY
9. THOUGHTS THAT YOU WOULD BE BETTER OFF DEAD, OR OF HURTING YOURSELF: SEVERAL DAYS
6. FEELING BAD ABOUT YOURSELF - OR THAT YOU ARE A FAILURE OR HAVE LET YOURSELF OR YOUR FAMILY DOWN: NEARLY EVERY DAY
8. MOVING OR SPEAKING SO SLOWLY THAT OTHER PEOPLE COULD HAVE NOTICED. OR THE OPPOSITE, BEING SO FIGETY OR RESTLESS THAT YOU HAVE BEEN MOVING AROUND A LOT MORE THAN USUAL: NOT AT ALL
2. FEELING DOWN, DEPRESSED OR HOPELESS: NOT AT ALL
SUM OF ALL RESPONSES TO PHQ QUESTIONS 1-9: 14
5. POOR APPETITE OR OVEREATING: MORE THAN HALF THE DAYS
3. TROUBLE FALLING OR STAYING ASLEEP: NEARLY EVERY DAY
SUM OF ALL RESPONSES TO PHQ QUESTIONS 1-9: 14
7. TROUBLE CONCENTRATING ON THINGS, SUCH AS READING THE NEWSPAPER OR WATCHING TELEVISION: MORE THAN HALF THE DAYS
SUM OF ALL RESPONSES TO PHQ QUESTIONS 1-9: 13
10. IF YOU CHECKED OFF ANY PROBLEMS, HOW DIFFICULT HAVE THESE PROBLEMS MADE IT FOR YOU TO DO YOUR WORK, TAKE CARE OF THINGS AT HOME, OR GET ALONG WITH OTHER PEOPLE: NOT DIFFICULT AT ALL
1. LITTLE INTEREST OR PLEASURE IN DOING THINGS: NOT AT ALL
SUM OF ALL RESPONSES TO PHQ QUESTIONS 1-9: 14

## 2024-10-17 ASSESSMENT — COLUMBIA-SUICIDE SEVERITY RATING SCALE - C-SSRS
6. HAVE YOU EVER DONE ANYTHING, STARTED TO DO ANYTHING, OR PREPARED TO DO ANYTHING TO END YOUR LIFE?: NO
2. HAVE YOU ACTUALLY HAD ANY THOUGHTS OF KILLING YOURSELF?: NO
1. WITHIN THE PAST MONTH, HAVE YOU WISHED YOU WERE DEAD OR WISHED YOU COULD GO TO SLEEP AND NOT WAKE UP?: NO

## 2024-10-17 NOTE — ASSESSMENT & PLAN NOTE
Chronic, at goal (stable), continue current treatment plan, medication adherence emphasized, and lifestyle modifications recommended  Check labs  Discussed low salt diet and BP and pulse monitoring.  Continue amlodipine 10 Mg daily, lisinopril 20 Mg daily  Orders:    CBC with Auto Differential; Future    Comprehensive Metabolic Panel; Future    Uric Acid; Future    TSH; Future    Magnesium; Future

## 2024-10-17 NOTE — ASSESSMENT & PLAN NOTE
Orders:    senna (SENOKOT) 8.6 MG tablet; Take 1 tablet by mouth 2 times daily as needed for Constipation

## 2024-10-17 NOTE — ASSESSMENT & PLAN NOTE
Chronic, failed to improve  Will increase dosage of Savella from 50 mg to 100 mg, to help with polyneuropathy, depression, chronic pain  Orders:    milnacipran HCl (SAVELLA) 100 MG TABS; Take 1 tablet by mouth daily Take with food. Dose increased 10/17/2024

## 2024-10-17 NOTE — ASSESSMENT & PLAN NOTE
Chronic, at goal (stable), continue current treatment plan, medication adherence emphasized, and lifestyle modifications recommended  Lab Results   Component Value Date    LVEF 60 05/02/2022    LVEFMODE Echo 12/05/2019     Nuclear stress test 11/15/2023 EF 51%  EF 50% on 8/29/2023  Will continue to monitor, he does see a new cardiologist and has appointment  Continue lisinopril 20 Mg daily      Orders:    Brain Natriuretic Peptide; Future

## 2024-10-17 NOTE — ASSESSMENT & PLAN NOTE
Chronic, not at goal  Patient has history of colon polyps, he also has history of blood in the stool recently  Constipation is persistent  Compliance with stool softeners discussed, to start taking MiraLAX every day and as needed Senokot if not having a bowel movement in 2 days  Increase fluids and fiber in diet.  Date of last Colonoscopy: 12/11/2023      Orders:    Jaycob Flores MD, General Surgery, [place]    senna (SENOKOT) 8.6 MG tablet; Take 1 tablet by mouth 2 times daily as needed for Constipation    polyethylene glycol (MIRALAX) 17 GM/SCOOP powder; Take 17 g by mouth daily

## 2024-10-17 NOTE — PROGRESS NOTES
Visit Information    Have you changed or started any medications since your last visit including any over-the-counter medicines, vitamins, or herbal medicines? no   Have you stopped taking any of your medications? Is so, why? -  no  Are you having any side effects from any of your medications? - no    Have you seen any other physician or provider since your last visit?  yes -    Have you had any other diagnostic tests since your last visit?  no   Have you been seen in the emergency room and/or had an admission in a hospital since we last saw you?  no   Have you had your routine dental cleaning in the past 6 months?  yes -      Do you have an active MyChart account? If no, what is the barrier?  Yes    “Have you had a diabetic eye exam?”    NO/ WILL NEED NEW EYE PROVIDER     Date of last diabetic eye exam: 9/21/2023           Patient Care Team:  Asmita Hebert MD as PCP - General (Family Medicine)  [facility]  Juan Nguyen MD as Consulting Physician (Urology)  Oren Alfaro MD as Consulting Physician (Pulmonology)  Dwain Carrington OD (Ophthalmology)  Jaki Desir MD as Consulting Physician (Endocrinology)  Zaria Blake MD as Consulting Physician (Orthopedic Surgery)  Kelechi Soriano MD as Consulting Physician (Orthopedic Surgery)  Jaycob Nichols MD as Surgeon (General Surgery)  Kimberly Chandler, PhD (Psychology)  Jimmie Camp MD as Consulting Physician (Neurology)  Jt Wood MD as Consulting Physician (Nephrology)  Moises Chamberlain DPM as Surgeon (Podiatry)  Omar Tipton DO as Consulting Physician (Cardiology)  Chau Ochoa MD (Gastroenterology)    Medical History Review  Past Medical, Family, and Social History reviewed and does contribute to the patient presenting condition    Health Maintenance   Topic Date Due    Flu vaccine (1) 08/01/2024    Diabetic retinal exam  09/21/2024    Diabetic Alb to Cr ratio (uACR) 
on the day of the visit.      This note was completed by using the assistance of a speech-recognition program. However, inadvertent computerized transcription errors may be present. Although every effort was made to ensure accuracy, no guarantees can be provided that every mistake has been identified and corrected by editing .      An electronic signature was used to authenticate this note.  Electronically signed by Asmita Hebert MD on 10/23/2024 at 8:17 PM

## 2024-10-17 NOTE — ASSESSMENT & PLAN NOTE
Orders:    CBC with Auto Differential; Future    Comprehensive Metabolic Panel; Future    Uric Acid; Future    TSH; Future    Magnesium; Future

## 2024-10-17 NOTE — ASSESSMENT & PLAN NOTE
Chronic, at goal (stable), continue current treatment plan and lifestyle modifications recommended    Well-controlled type 2 diabetes mellitus  Polyneuropathy in his feet and lower legs is not well-controlled, however he is at the maximum dosage of Lyrica 200 mg twice a day  We will do additional blood work for polyneuropathy, now also affecting his hands  For polyneuropathy, also to continue with vitamin B12 1000 mcg daily, will increase Savella from 50 mg to 100 mg  Lab Results   Component Value Date    LABA1C 5.1 09/10/2024    LABA1C 5.0 07/16/2024    LABA1C 4.7 03/07/2024       Orders:    pregabalin (LYRICA) 200 MG capsule; Take 1 capsule by mouth 2 times daily for 90 days. Max Daily Amount: 400 mg    CBC with Auto Differential; Future    Comprehensive Metabolic Panel; Future    Uric Acid; Future    TSH; Future      Will continue monitoring with hemoglobin A1c every 3 to 6 months  -daily feet exam, Foot care: advised to wash feet daily, pat dry and apply lotion at night, avoiding between toes. Need to look at feet daily and report to a physician any signs of inflammation or skin damage  -annual dilated eye exam  -Low carb, low fat diet, increase fruits and vegetables, and exercise 4-5 times a day 30-40 minutes a day discussed  -continue current treatment Mounjaro 5 mg weekly  -continue Aspirin 81 Mg  -continue lisinopril ACEI and statin pravastatin

## 2024-10-22 ENCOUNTER — HOSPITAL ENCOUNTER (OUTPATIENT)
Dept: GENERAL RADIOLOGY | Facility: CLINIC | Age: 69
Discharge: HOME | End: 2024-10-24
Payer: MEDICARE

## 2024-10-22 ENCOUNTER — TELEPHONE (OUTPATIENT)
Dept: FAMILY MEDICINE CLINIC | Age: 69
End: 2024-10-22

## 2024-10-22 ENCOUNTER — HOSPITAL ENCOUNTER (OUTPATIENT)
Facility: CLINIC | Age: 69
Discharge: HOME | End: 2024-10-24
Payer: MEDICARE

## 2024-10-22 ENCOUNTER — HOSPITAL ENCOUNTER (OUTPATIENT)
Age: 69
Setting detail: SPECIMEN
Discharge: HOME | End: 2024-10-22

## 2024-10-22 DIAGNOSIS — M79.641 PAIN IN BOTH HANDS: ICD-10-CM

## 2024-10-22 DIAGNOSIS — M79.642 PAIN IN BOTH HANDS: ICD-10-CM

## 2024-10-22 DIAGNOSIS — E78.2 MIXED HYPERLIPIDEMIA: ICD-10-CM

## 2024-10-22 DIAGNOSIS — G62.9 POLYNEUROPATHY: ICD-10-CM
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
## 2024-10-22 DIAGNOSIS — E11.51 TYPE 2 DIABETES MELLITUS WITH DIABETIC PERIPHERAL ANGIOPATHY WITHOUT GANGRENE, WITHOUT LONG-TERM CURRENT USE OF INSULIN (HCC): ICD-10-CM

## 2024-10-22 DIAGNOSIS — I50.32 CHRONIC DIASTOLIC HEART FAILURE (HCC): ICD-10-CM

## 2024-10-22 DIAGNOSIS — I12.9 BENIGN HYPERTENSION WITH CKD (CHRONIC KIDNEY DISEASE), STAGE II: ICD-10-CM

## 2024-10-22 DIAGNOSIS — N18.2 BENIGN HYPERTENSION WITH CKD (CHRONIC KIDNEY DISEASE), STAGE II: ICD-10-CM

## 2024-10-22 LAB
ALBUMIN SERPL-MCNC: 4.3 G/DL (ref 3.5–5.2)
ALBUMIN/GLOB SERPL: 1 {RATIO} (ref 1–2.5)
ALP SERPL-CCNC: 102 U/L (ref 40–129)
ALT SERPL-CCNC: 12 U/L (ref 10–50)
ANION GAP SERPL CALCULATED.3IONS-SCNC: 14 MMOL/L (ref 9–16)
AST SERPL-CCNC: 20 U/L (ref 10–50)
BASOPHILS # BLD: 0.08 K/UL (ref 0–0.2)
BASOPHILS NFR BLD: 1 % (ref 0–2)
BILIRUB SERPL-MCNC: 0.5 MG/DL (ref 0–1.2)
BNP SERPL-MCNC: <36 PG/ML (ref 0–300)
BUN SERPL-MCNC: 9 MG/DL (ref 8–23)
CALCIUM SERPL-MCNC: 10.1 MG/DL (ref 8.6–10.4)
CHLORIDE SERPL-SCNC: 104 MMOL/L (ref 98–107)
CHOLEST SERPL-MCNC: 180 MG/DL (ref 0–199)
CHOLESTEROL/HDL RATIO: 4
CO2 SERPL-SCNC: 24 MMOL/L (ref 20–31)
CREAT SERPL-MCNC: 0.8 MG/DL (ref 0.7–1.2)
CRP SERPL HS-MCNC: 5.7 MG/L (ref 0–5)
EOSINOPHIL # BLD: 0.18 K/UL (ref 0–0.44)
EOSINOPHILS RELATIVE PERCENT: 2 % (ref 1–4)
ERYTHROCYTE [DISTWIDTH] IN BLOOD BY AUTOMATED COUNT: 13.8 % (ref 11.8–14.4)
ERYTHROCYTE [SEDIMENTATION RATE] IN BLOOD BY PHOTOMETRIC METHOD: 8 MM/HR (ref 0–20)
FOLATE SERPL-MCNC: 6 NG/ML (ref 4.8–24.2)
GFR, ESTIMATED: >90 ML/MIN/1.73M2
GLUCOSE SERPL-MCNC: 71 MG/DL (ref 74–99)
HCT VFR BLD AUTO: 42.5 % (ref 40.7–50.3)
HDLC SERPL-MCNC: 40 MG/DL
HGB BLD-MCNC: 13.9 G/DL (ref 13–17)
IMM GRANULOCYTES # BLD AUTO: <0.03 K/UL (ref 0–0.3)
IMM GRANULOCYTES NFR BLD: 0 %
LDLC SERPL CALC-MCNC: 96 MG/DL (ref 0–100)
LYMPHOCYTES NFR BLD: 2.24 K/UL (ref 1.1–3.7)
LYMPHOCYTES RELATIVE PERCENT: 26 % (ref 24–43)
MAGNESIUM SERPL-MCNC: 2 MG/DL (ref 1.6–2.4)
MCH RBC QN AUTO: 30.4 PG (ref 25.2–33.5)
MCHC RBC AUTO-ENTMCNC: 32.7 G/DL (ref 28.4–34.8)
MCV RBC AUTO: 93 FL (ref 82.6–102.9)
MONOCYTES NFR BLD: 0.57 K/UL (ref 0.1–1.2)
MONOCYTES NFR BLD: 7 % (ref 3–12)
NEUTROPHILS NFR BLD: 64 % (ref 36–65)
NEUTS SEG NFR BLD: 5.38 K/UL (ref 1.5–8.1)
NRBC BLD-RTO: 0 PER 100 WBC
PLATELET # BLD AUTO: 296 K/UL (ref 138–453)
PMV BLD AUTO: 10.9 FL (ref 8.1–13.5)
POTASSIUM SERPL-SCNC: 4.3 MMOL/L (ref 3.7–5.3)
PROT SERPL-MCNC: 7.6 G/DL (ref 6.6–8.7)
RBC # BLD AUTO: 4.57 M/UL (ref 4.21–5.77)
SODIUM SERPL-SCNC: 142 MMOL/L (ref 136–145)
TRIGL SERPL-MCNC: 219 MG/DL
TSH SERPL DL<=0.05 MIU/L-ACNC: 1.03 UIU/ML (ref 0.27–4.2)
URATE SERPL-MCNC: 5.6 MG/DL (ref 3.4–7)
VIT B12 SERPL-MCNC: 1399 PG/ML (ref 232–1245)
VLDLC SERPL CALC-MCNC: 44 MG/DL
WBC OTHER # BLD: 8.5 K/UL (ref 3.5–11.3)

## 2024-10-22 PROCEDURE — 73120 X-RAY EXAM OF HAND: CPT

## 2024-10-22 PROCEDURE — 71101 X-RAY EXAM UNILAT RIBS/CHEST: CPT

## 2024-10-22 NOTE — TELEPHONE ENCOUNTER
[facility] called and stated the diagnosis for OT and PT referrals need to be switches.     OT needs to be \"pain in both hands, polyneuropathy; weakness of both hands.\"    PT needs to be \"fall, initial encounter\"     10/31/24 is scheduled appointment.    Please fix and they will keep an eye out for the new referrals in his chart.    Thank you.

## 2024-10-22 NOTE — TELEPHONE ENCOUNTER
Noted. Thank you!  I updated    Future Appointments   Date Time Provider Department Center   10/22/2024 11:50 AM [facility] XR RM 1 [place] [facility] [place]   10/31/2024  1:45 PM Francheska Prather, T[facility]   10/31/2024  2:45 PM Jayna Hong, PT [facility] PT [facility]   11/21/2024  2:45 PM Jaycob Nichols MD [facility] Gen Surg [facility]   12/16/2024 10:40 AM Juan Nguyen MD [facility]   1/30/2025  9:00 AM Asmita Hebert MD fp [facility] ECC DEP   3/18/2025  9:15 AM Asmita Hebert MD fp [facility] ECC DEP   3/19/2025  2:00 PM Omar Tipton DO AFL [facility]

## 2024-10-22 NOTE — TELEPHONE ENCOUNTER
Patient has seen the result but nobody contacted him.  I was [employer] by radiology to make sure this was addressed today    I called the patient, he was aware, he says pain level is low, does not need Lidoderm patch or any intercostal blocks from the pain management at this time.  He does have Percocet, Lyrica, and Savella increased recently and he has been taking them more  I reminded him to do nebulizer treatment    I reminded him to schedule the DEXA scan, he says he has the number    I told him to keep the Occupational Therapy appointment but to cancel the physical therapy appointment for now    The patient verbalizes understanding and agrees with the plan.   I also sent him a [employer] message    Result Notes and Patient Communication     Edit Comments   Seen Back to Top    Please notify patient: Patient does have 3 fractures of the ribs 8, 9, 10  I could refer him to pain management to do some blocks in the ribs  To schedule the DEXA scan I have ordered for him on 10/17/2024, then the imaging will do the prior authorization for it I am afraid he might have osteoporosis ...   Written by Asmita Hebert MD on 10/22/2024  1:08 PM EDT View Full Comments  Seen by patient Bernardino Cote on 10/22/2024  4:04 PM

## 2024-10-22 NOTE — RESULT ENCOUNTER NOTE
Please notify patient: Patient does have 3 fractures of the ribs 8, 9, 10  I could refer him to pain management to do some blocks in the ribs  To schedule the DEXA scan I have ordered for him on 10/17/2024, then the imaging will do the prior authorization for it I am afraid he might have osteoporosis      Future Appointments  10/31/2024 1:45 PM    Francheska Prather, OT         [facility] OT         [facility]  10/31/2024 2:45 PM    Jayna Hong, PT         [facility] PT         [facility]  11/21/2024 2:45 PM    Jaycob Nichols MD         [facility] Gen Surg         [facility]  12/16/2024 10:40 AM   Juan Nguyen MD        [facility]           [facility]  1/30/2025  9:00 AM    Asmita Hebert MD    fp [facility] ECC DEP  3/18/2025  9:15 AM    Asmita Hebert MD    [facility] ECC DEP  3/19/2025  2:00 PM    Omar Tipton DO             [facility]        [facility]

## 2024-10-23 ENCOUNTER — TELEPHONE (OUTPATIENT)
Dept: FAMILY MEDICINE CLINIC | Age: 69
End: 2024-10-23

## 2024-10-23 PROBLEM — E88.89: Status: ACTIVE | Noted: 2017-03-25

## 2024-10-23 PROBLEM — M79.641 PAIN IN BOTH HANDS: Status: ACTIVE | Noted: 2024-10-23

## 2024-10-23 PROBLEM — E11.43 TYPE 2 DIABETES MELLITUS WITH DIABETIC AUTONOMIC NEUROPATHY, WITHOUT LONG-TERM CURRENT USE OF INSULIN (HCC): Status: RESOLVED | Noted: 2017-03-25 | Resolved: 2024-10-23

## 2024-10-23 PROBLEM — E29.1 SECONDARY MALE HYPOGONADISM: Status: ACTIVE | Noted: 2024-10-23

## 2024-10-23 PROBLEM — M79.642 PAIN IN BOTH HANDS: Status: ACTIVE | Noted: 2024-10-23

## 2024-10-23 PROBLEM — G62.9 POLYNEUROPATHY: Status: ACTIVE | Noted: 2024-10-23

## 2024-10-23 NOTE — RESULT ENCOUNTER NOTE
Please notify patient: One of the inflammatory markers is very mildly increased and the other 1 is normal  Triglycerides increased avoid sweets  Kidney function is normal  Glucose level was low 71 he needs to eat 3 small meals a day and to avoid hypoglycemia      Otherwise labs within normal limits  continue current treatment    Only vitamin B6 and vitamin B1 levels pending if abnormal will let him know, if normal we will send him a MyChart note or a message    Future Appointments  10/31/2024 1:45 PM    Francheska Prather, OT         [facility] OT         [facility]  11/21/2024 2:45 PM    Jaycob Nichols MD         [facility] Surg         [facility]  12/16/2024 10:40 AM   Juan Nguyen MD        [facility]           [facility]  1/30/2025  9:00 AM    Asmiat Hebert MD    [facility] DEP  3/18/2025  9:15 AM    Asmita Hebert MD    [facility] DEP  3/19/2025  2:00 PM    Omar Tipton DO             [facility]        [facility]

## 2024-10-23 NOTE — ASSESSMENT & PLAN NOTE
Chronic, stable  Previously diagnosed after an anesthesia event many years ago, he has cellular cholinesterase deficiency, avoidance of the substance discussed, he is aware  Also allergies are updated,not to be given succinylcholine during surgical interventions

## 2024-10-23 NOTE — ASSESSMENT & PLAN NOTE
Chronic, NOT at goal  Check lipid panel and continue pravastatin 80 mg daily, compliance with pravastatin discussed  Lab Results   Component Value Date    CKTOTAL 52 07/24/2024    TROPHS 14 07/11/2023       Orders:    Lipid Panel; Future

## 2024-10-23 NOTE — ASSESSMENT & PLAN NOTE
Chronic, worsening, affecting both his hands and feet and lower legs  Continue maximum dosage of Lyrica 200 mg twice a day, will rule out  vitamin deficiencies B1, B6 and B12, will do x-rays, will refer to occupational therapy due to decreased  in his hands associated with polyneuropathy, will do x-rays to rule out inflammatory polyarthritis  Previously UPEP and SPEP 7/24/2024 negative for monoclonal gammopathy    Orders:    pregabalin (LYRICA) 200 MG capsule; Take 1 capsule by mouth 2 times daily for 90 days. Max Daily Amount: 400 mg    XR HAND RIGHT (2 VIEWS); Future    XR HAND LEFT (2 VIEWS); Future    Vitamin B12 & Folate; Future    Vitamin B1; Future    Vitamin B6; Future    [facility] Occupational [facility]

## 2024-10-23 NOTE — ASSESSMENT & PLAN NOTE
Chronic and worsening, he needs to start physical therapy  He is finally agreeable  Orders:    [facility]

## 2024-10-23 NOTE — TELEPHONE ENCOUNTER
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   1/30/2025  9:00 AM Asmita Hebert MD fp [facility] ECC DEP   3/18/2025  9:15 AM Asmita Hebert MD [facility] ECC DEP   3/19/2025  2:00 PM Omar Tipton DO AFL T[facility]       
Received a call from scheduling that the DEXA scan is unable  to be scheduled due to the diagnosis codes, they are unaccepted. Please advise.   
   furosemide (LASIX) 20 MG tablet TAKE 1 TABLET BY MOUTH DAILY 90 tablet 3    amLODIPine (NORVASC) 10 MG tablet TAKE 1 TABLET BY MOUTH DAILY FOR HIGH BLOOD PRESSURE 90 tablet 3    lisinopril (PRINIVIL;ZESTRIL) 20 MG tablet TAKE 1 TABLET BY MOUTH DAILY 90 tablet 3    enzalutamide (XTANDI) 40 MG capsule Take 4 capsules by mouth daily 120 capsule 11    naloxone 4 MG/0.1ML LIQD nasal spray 1 spray by Nasal route as needed for Opioid Reversal Patient needs counseling 1 each 3    budesonide-formoterol (SYMBICORT) 160-4.5 MCG/ACT AERO Inhale 2 puffs into the lungs 2 times daily as needed      cyanocobalamin 1000 MCG tablet Take 1 tablet by mouth daily      albuterol sulfate HFA (PROAIR HFA) 108 (90 Base) MCG/ACT inhaler Inhale 2 puffs into the lungs every 6 hours as needed for Wheezing or Shortness of Breath (cough) 24 g 3    oxyCODONE-acetaminophen (PERCOCET) 7.5-325 MG per tablet Take 1 tablet by mouth every 6 hours as needed for Pain.      albuterol (PROVENTIL) (2.5 MG/3ML) 0.083% nebulizer solution Take 3 mLs by nebulization every 4 hours as needed for Wheezing or Shortness of Breath 120 each 3    aspirin EC 81 MG EC tablet Take 1 tablet by mouth daily 90 tablet 0    Collagen-Boron-Hyaluronic Acid (MOVE FREE ULTRA JOINT HEALTH) 40-5-3.3 MG TABS Take 1 tablet by mouth daily 90 tablet 3    Handicap Placard MISC by Does not apply route Can't walk greater than 200 feet. Expires in 5 years. 1 each 0    OXYGEN 3 L/min by CPAP route continuous Uses at night with CPAP      ammonium lactate (AMLACTIN) 12 % cream APPLY TOPICALLY TO LEGS ONE TO TWO TIMES A DAY AS NEEDED 1 Bottle 3     No current facility-administered medications on file prior to visit.         Future Appointments   Date Time Provider Department Center   10/31/2024  1:45 PM Francheska Prather OT [facility]   11/21/2024  2:45 PM Jaycob Nichols MD MB Gen Surg [facility]   12/16/2024 10:40 AM Juan Nguyen MD [facility]   1/30/2025  9:00 AM Emilee

## 2024-10-23 NOTE — ASSESSMENT & PLAN NOTE
Chronic and worsening  Will rule out inflammatory osteoarthritis, will do x-rays, sedimentation rate, sed rate, start physical therapy due to polyneuropathy and decreased  in both hands  Prior rheumatologic workup 9/10/2024, URBANO negative, no monoclonal immunoglobulin  RF and cyclic Citrulline peptide antibody both - 7/24/2024  Orders:    XR HAND RIGHT (2 VIEWS); Future    XR HAND LEFT (2 VIEWS); Future    Sedimentation Rate; Future    C-Reactive Protein; Future    [facility] Occupational Therapy [facility]

## 2024-10-25 DIAGNOSIS — E11.42 TYPE 2 DIABETES MELLITUS WITH DIABETIC POLYNEUROPATHY, WITHOUT LONG-TERM CURRENT USE OF INSULIN (HCC): ICD-10-CM

## 2024-10-25 DIAGNOSIS — E53.1 VITAMIN B6 DEFICIENCY: Primary | ICD-10-CM

## 2024-10-25 LAB
PYRIDOXAL PHOS SERPL-SCNC: 15.6 NMOL/L (ref 20–125)
VIT B1 PYROPHOSHATE BLD-SCNC: 117 NMOL/L (ref 70–180)

## 2024-10-25 RX ORDER — LANOLIN ALCOHOL/MO/W.PET/CERES
50 CREAM (GRAM) TOPICAL DAILY
Qty: 90 TABLET | Refills: 3 | Status: SHIPPED | OUTPATIENT
Start: 2024-10-25

## 2024-10-25 NOTE — RESULT ENCOUNTER NOTE
Please notify patient: Vitamin B6 is very low, might be contributing to his severe polyneuropathy, I will send vitamin B6 to the pharmacy, mail order      Future Appointments  10/31/2024 1:45 PM    Francheska Prather, OT          MO[facility] OT         [facility]  11/21/2024 2:45 PM    Jaycob Nichols MD         [facility]  12/16/2024 10:40 AM   Juan Nguyen MD        [facility]           [facility]  1/30/2025  9:00 AM    Asmita Hebert MD    [facility] DEP  3/18/2025  9:15 AM    Asmita Hebert MD    [facility] DEP  3/19/2025  2:00 PM    Omar Tipton DO             AFL TCC [facility]        [facility]

## 2024-10-26 ENCOUNTER — PATIENT MESSAGE (OUTPATIENT)
Dept: FAMILY MEDICINE CLINIC | Age: 69
End: 2024-10-26

## 2024-10-26 DIAGNOSIS — E11.42 TYPE 2 DIABETES MELLITUS WITH DIABETIC POLYNEUROPATHY, WITHOUT LONG-TERM CURRENT USE OF INSULIN (HCC): ICD-10-CM

## 2024-10-26 DIAGNOSIS — R05.8 OTHER COUGH: Primary | ICD-10-CM

## 2024-10-26 DIAGNOSIS — M19.041 PRIMARY OSTEOARTHRITIS OF BOTH HANDS: ICD-10-CM

## 2024-10-26 DIAGNOSIS — M19.042 PRIMARY OSTEOARTHRITIS OF BOTH HANDS: ICD-10-CM

## 2024-10-26 DIAGNOSIS — M85.80 OSTEOPENIA DETERMINED BY X-RAY: Primary | ICD-10-CM

## 2024-10-26 DIAGNOSIS — S22.42XA CLOSED FRACTURE OF MULTIPLE RIBS OF LEFT SIDE, INITIAL ENCOUNTER: ICD-10-CM

## 2024-10-26 DIAGNOSIS — G62.9 POLYNEUROPATHY: ICD-10-CM

## 2024-10-26 DIAGNOSIS — M85.88 OTHER SPECIFIED DISORDERS OF BONE DENSITY AND STRUCTURE, OTHER SITE: ICD-10-CM

## 2024-10-26 NOTE — RESULT ENCOUNTER NOTE
Please notify patient: Bones are thinner described as generalized osteopenia  Mild arthritis in his bones  No inflammatory arthritis noted on the x-rays      Future Appointments  10/31/2024 1:45 PM    Francheska Prather, OT         [facility] OT         [facility]  11/21/2024 2:45 PM    Jaycob Nichols MD         [facility]  12/16/2024 10:40 AM   Juan Nguyen MD        [facility]           T[facility]  1/30/2025  9:00 AM    Asmita Hebert MD    [facility] DEP  3/18/2025  9:15 AM    Asmita Hebert MD    [facility] DEP  3/19/2025  2:00 PM    Omar Tipton DO             AFL TCC O[facility]        [facility]

## 2024-10-26 NOTE — RESULT ENCOUNTER NOTE
Please notify patient x-ray of the left hand shows osteopenia that means in her bones, described as generalized I tried to order DEXA scan again, please give him phone number to try to schedule the DEXA and ask the radiology department to make prior authorization, if approved to have the DEXA scan done    Also mild arthritis in the hands  Nothing else abnormal in the hands      Future Appointments  10/31/2024 1:45 PM    Francheska Prather, OT         [facility] OT         [facility]  11/21/2024 2:45 PM    Jaycob Nichols MD         MB Gen Surg         [facility]  12/16/2024 10:40 AM   Juan Nguyen MD        [facility]           [facility]  1/30/2025  9:00 AM    Asmita Hebert MD    [facility] DEP  3/18/2025  9:15 AM    Asmita Hebert MD    [facility] DEP  3/19/2025  2:00 PM    Omar Tipton, DO [facility] T[facility] O[facility]        A[facility]

## 2024-10-28 RX ORDER — PREGABALIN 200 MG/1
200 CAPSULE ORAL 2 TIMES DAILY
Qty: 180 CAPSULE | Refills: 0 | Status: SHIPPED | OUTPATIENT
Start: 2024-10-28 | End: 2025-01-26

## 2024-10-28 RX ORDER — BENZONATATE 100 MG/1
100 CAPSULE ORAL 3 TIMES DAILY PRN
Qty: 21 CAPSULE | Refills: 0 | Status: SHIPPED | OUTPATIENT
Start: 2024-10-28 | End: 2024-11-04

## 2024-10-31 ENCOUNTER — HOSPITAL ENCOUNTER (OUTPATIENT)
Dept: OCCUPATIONAL THERAPY | Age: 69
Setting detail: THERAPIES SERIES
Discharge: HOME | End: 2024-10-31
Payer: MEDICARE

## 2024-10-31 ENCOUNTER — APPOINTMENT (OUTPATIENT)
Dept: PHYSICAL THERAPY | Age: 69
End: 2024-10-31
Attending: FAMILY MEDICINE
Payer: MEDICARE

## 2024-10-31 PROCEDURE — 97166 OT EVAL MOD COMPLEX 45 MIN: CPT

## 2024-10-31 ASSESSMENT — 9 HOLE PEG TEST
TEST_RESULT: IMPAIRED
TEST_RESULT: FUNCTIONAL
TESTTIME_SECONDS: 23
TESTTIME_SECONDS: 35

## 2024-10-31 ASSESSMENT — PAIN DESCRIPTION - LOCATION: LOCATION: HAND

## 2024-10-31 ASSESSMENT — PAIN SCALES - GENERAL: PAINLEVEL_OUTOF10: 8

## 2024-10-31 ASSESSMENT — PAIN DESCRIPTION - ORIENTATION: ORIENTATION: LEFT;RIGHT

## 2024-10-31 ASSESSMENT — PAIN DESCRIPTION - PAIN TYPE: TYPE: ACUTE PAIN

## 2024-10-31 ASSESSMENT — PAIN DESCRIPTION - DESCRIPTORS: DESCRIPTORS: NUMBNESS;ACHING;THROBBING

## 2024-10-31 NOTE — THERAPY EVALUATION
Motor Skills:   Fine Motor Skills  Left 9-Hole Peg Test: Impaired (Pt below the 10th percentile for norms)  Left 9 Hole Peg Test Time (secs): 35  Right 9-Hole Peg Test: Functional (Pt in the 75th percentile for norms)  Right 9 Hole Peg Test Time (secs): 23     Outcome Measure(s) Completed:   Upper Extremity Functional Index   Today, do you or would you have any difficulty at all with:  Any of your usual work, housework, or school activities:: Extreme difficulty or unable to perform activity (housework)  Your usual hobbies, re creational or sporting activities:: Extreme difficulty or unable to perform activity  Lifting a bag of groceries to waist level:: Moderate difficulty (Pt can hold grocery bads)  Lifting a bag of groceries above your head:: Extreme difficulty or unable to perform activity  Grooming your hair:: Quite a bit of difficulty (pt washes hair with washcloth)  Pushing up on your hands (eg from bathtub or chair):: Quite a bit of difficulty  Preparing food (eg peeling, cutting):: No difficulty  Driving:: A little bit of difficulty (Little difficulty using lt hand.)  Vacuuming, sweeping or raking:: Extreme difficulty or unable to perform activity (Spouse does the vacuum /sweeping)  Dressing:: Moderate difficulty (Pt can put on his shorts/socks and spouse does his shirt.)  Doing up buttons:: Extreme difficulty or unable to perform activity (with ADL cloth in OT pt reports pain in fingers trying to push buttons through the hole)  Using tools or appliances:: No difficulty (Pt uses stove, he can turn on/off heater in all seasons  room.pt not using tools)  Opening doors:: No difficulty  Cleaning:: No difficulty (Pt does dishes -takes him long time.Pt has difficulty reaching. Wife does the rest of the cleaning.)  Tying or lacing shoes:: No difficulty  Sleeping:: Moderate difficulty (Pt gets up at night 3x or more d/t his hands/feet  (burning sensation))  Laundering clothes (eg washing, ironing, folding):: Extreme

## 2024-10-31 NOTE — FLOWSHEET NOTE
Bo Fall Risk Assessment    Patient Name:  Bernardino Cote  : 1955        Risk Factor Scale  Score   History of Falls [x] Yes  [] No 25  0 25   Secondary Diagnosis [] Yes  [x] No 15  0   0   Ambulatory Aid [] Furniture  [x] Rollator walker  [] None/bedrest/wheelchair/nurse 30  15  0   15   IV/Heparin Lock [] Yes  [x] No 20  0   0   Gait/Transferring [] Impaired  [] Weak  [x] Normal slow with pt using rollator 20  10  0     0   Mental Status [] Forgets limitations  [x] Oriented to own ability 15  0   0      Total:35     Based on the Assessment score: check the appropriate box.    []  No intervention needed   Low =   Score of 0-24    [x]  Use standard prevention interventions Moderate =  Score of 24-44   [x] Give patient handout and discuss fall prevention strategies   [] Establish goal of education for patient/family RE: fall prevention strategies  Pt states that MD wants him to wait before starting PT d/t his recent fall   []  Use high risk prevention interventions High = Score of 45 and higher   [] Give patient handout and discuss fall prevention strategies   [] Establish goal of education for patient/family Re: fall prevention strategies   [] Discuss lifeline / other resources    Electronically signed by:   Francheska Prather OT  Date: 10/31/2024

## 2024-11-05 ENCOUNTER — HOSPITAL ENCOUNTER (OUTPATIENT)
Dept: WOMENS IMAGING | Age: 69
Discharge: HOME | End: 2024-11-07
Attending: FAMILY MEDICINE
Payer: MEDICARE

## 2024-11-05 DIAGNOSIS — S22.42XA CLOSED FRACTURE OF MULTIPLE RIBS OF LEFT SIDE, INITIAL ENCOUNTER: ICD-10-CM

## 2024-11-05 DIAGNOSIS — M85.88 OTHER SPECIFIED DISORDERS OF BONE DENSITY AND STRUCTURE, OTHER SITE: ICD-10-CM

## 2024-11-05 DIAGNOSIS — M85.80 OSTEOPENIA DETERMINED BY X-RAY: ICD-10-CM

## 2024-11-05 PROCEDURE — 77080 DXA BONE DENSITY AXIAL: CPT

## 2024-11-06 ENCOUNTER — OFFICE VISIT (OUTPATIENT)
Dept: FAMILY MEDICINE CLINIC | Age: 69
End: 2024-11-06

## 2024-11-06 VITALS
HEART RATE: 71 BPM | DIASTOLIC BLOOD PRESSURE: 64 MMHG | SYSTOLIC BLOOD PRESSURE: 128 MMHG | TEMPERATURE: 98.2 F | OXYGEN SATURATION: 97 %

## 2024-11-06 DIAGNOSIS — R04.0 RECURRENT EPISTAXIS: Primary | ICD-10-CM

## 2024-11-06 RX ORDER — MUPIROCIN 20 MG/G
OINTMENT TOPICAL
Qty: 1 G | Refills: 0 | Status: SHIPPED | OUTPATIENT
Start: 2024-11-06

## 2024-11-06 ASSESSMENT — ENCOUNTER SYMPTOMS: SHORTNESS OF BREATH: 0

## 2024-11-06 NOTE — PROGRESS NOTES
polyps that I can see  Eyes:      General:         Right eye: No discharge.         Left eye: No discharge.      Conjunctiva/sclera: Conjunctivae normal.   Cardiovascular:      Rate and Rhythm: Normal rate and regular rhythm.      Heart sounds: Normal heart sounds. No murmur heard.  Pulmonary:      Effort: Pulmonary effort is normal. No respiratory distress.      Breath sounds: Normal breath sounds. No wheezing.   Neurological:      Mental Status: He is alert.   Psychiatric:         Mood and Affect: Mood normal.         Behavior: Behavior normal.         Thought Content: Thought content normal.         Judgment: Judgment normal.         Assessment & Plan:       Diagnosis Orders   1. Recurrent epistaxis  KANDIS - Mark Gautam MD, Otolaryngology, [place]    mupirocin (BACTROBAN) 2 % ointment      Patient was instructed to lean forward in the exam room chair and pinch the nose for 10 minutes.  Following this, bleeding seems to have resolved.  Rx sent for mupirocin ointment to apply 3 times daily x 3 days.  Given the recurrent nature of his nosebleeds, referral made to ENT.  Instructions given for future nosebleeds.    If you get another nosebleed:   Sit up straight. Tilting the head back will cause the blood to go down the throat and forward tilting will cause it to bleed more. Use the thumb and index finger to pinch the nose shut for 10 minutes. Use a clock. Do not check to see if the bleeding has stopped before the 10 minutes are up. If the bleeding has not stopped, pinch the nose shut for another 10 minutes.   When the bleeding has stopped, do not pick, rub, or blow nose for 12 hours to keep it from bleeding again.  If the bleeding recurs several times in a day, use oxymetazoline nasal spray inside the side of the nose that is bleeding prior to pinching the nose.   If the bleeding becomes hard to control, please go to the ER.    Call or return to clinic prn if these symptoms worsen or fail to improve as

## 2024-11-07 NOTE — RESULT ENCOUNTER NOTE
Please notify patient: Osteopenia, the bones are thinner, it is moderate   Does he have any dental issues?  If okay with urologist, we can try to order Reclast infusion and see if insurance will cover  Alternatively it is Fosamax weekly, which is a pill,, if no dental issues and no heartburn or acid reflux    Future Appointments   11/21/2024 2:45 PM    Jaycob Nichols MD         MB Gen Surg         [facility]  12/16/2024 10:40 AM   Juan Nguyen MD        . C URO           T[facility]  1/30/2025  9:00 AM    Asmita Hebert MD    [facility] DEP  3/18/2025  9:15 AM    Asmita Hebert MD    [facility] DEP  3/19/2025  2:00 PM    Omar Tipton DO             AFL TCC [facility]        A[facility]

## 2024-11-11 DIAGNOSIS — M85.80 OSTEOPENIA DETERMINED BY X-RAY: Primary | ICD-10-CM

## 2024-11-11 RX ORDER — CHOLECALCIFEROL (VITAMIN D3) 50 MCG
2000 TABLET ORAL DAILY
Qty: 90 TABLET | Refills: 0
Start: 2024-11-11

## 2024-12-09 ENCOUNTER — HOSPITAL ENCOUNTER (OUTPATIENT)
Age: 69
Setting detail: SPECIMEN
Discharge: HOME | End: 2024-12-09

## 2024-12-09 ENCOUNTER — PATIENT MESSAGE (OUTPATIENT)
Dept: UROLOGY | Age: 69
End: 2024-12-09

## 2024-12-09 DIAGNOSIS — C61 PROSTATE CANCER (HCC): ICD-10-CM

## 2024-12-09 LAB
PSA SERPL-MCNC: 0.6 NG/ML (ref 0–4)
TESTOST SERPL-MCNC: 12 NG/DL (ref 193–740)

## 2024-12-11 NOTE — TELEPHONE ENCOUNTER
PA for Eligard submitted to [facility] [phone] and clinicals uploaded online to Ref #   [id number]   Auth is pending

## 2024-12-16 ENCOUNTER — OFFICE VISIT (OUTPATIENT)
Dept: UROLOGY | Age: 69
End: 2024-12-16
Payer: MEDICARE

## 2024-12-16 VITALS
DIASTOLIC BLOOD PRESSURE: 78 MMHG | SYSTOLIC BLOOD PRESSURE: 136 MMHG | TEMPERATURE: 97 F | OXYGEN SATURATION: 97 % | WEIGHT: 239 LBS | HEIGHT: 72 IN | HEART RATE: 87 BPM | BODY MASS INDEX: 32.37 KG/M2

## 2024-12-16 DIAGNOSIS — R23.2 HOT FLASHES: ICD-10-CM

## 2024-12-16 DIAGNOSIS — R97.21 RISING PSA FOLLOWING TREATMENT FOR MALIGNANT NEOPLASM OF PROSTATE: ICD-10-CM

## 2024-12-16 DIAGNOSIS — M85.80 OSTEOPENIA, UNSPECIFIED LOCATION: ICD-10-CM

## 2024-12-16 DIAGNOSIS — Z79.818 ANDROGEN DEPRIVATION THERAPY: ICD-10-CM

## 2024-12-16 DIAGNOSIS — C61 PROSTATE CANCER (HCC): Primary | ICD-10-CM

## 2024-12-16 PROCEDURE — 1036F TOBACCO NON-USER: CPT | Performed by: UROLOGY

## 2024-12-16 PROCEDURE — G8427 DOCREV CUR MEDS BY ELIG CLIN: HCPCS | Performed by: UROLOGY

## 2024-12-16 PROCEDURE — 3017F COLORECTAL CA SCREEN DOC REV: CPT | Performed by: UROLOGY

## 2024-12-16 PROCEDURE — 1159F MED LIST DOCD IN RCRD: CPT | Performed by: UROLOGY

## 2024-12-16 PROCEDURE — 99214 OFFICE O/P EST MOD 30 MIN: CPT | Performed by: UROLOGY

## 2024-12-16 PROCEDURE — 96402 CHEMO HORMON ANTINEOPL SQ/IM: CPT | Performed by: UROLOGY

## 2024-12-16 PROCEDURE — 1123F ACP DISCUSS/DSCN MKR DOCD: CPT | Performed by: UROLOGY

## 2024-12-16 PROCEDURE — G8417 CALC BMI ABV UP PARAM F/U: HCPCS | Performed by: UROLOGY

## 2024-12-16 PROCEDURE — G8484 FLU IMMUNIZE NO ADMIN: HCPCS | Performed by: UROLOGY

## 2024-12-16 ASSESSMENT — ENCOUNTER SYMPTOMS
VOMITING: 0
BACK PAIN: 0
EYE PAIN: 0
COUGH: 0
SHORTNESS OF BREATH: 0
EYE REDNESS: 0
DIARRHEA: 0
WHEEZING: 0
ABDOMINAL PAIN: 0
NAUSEA: 0
CONSTIPATION: 0

## 2024-12-16 NOTE — PROGRESS NOTES
Dx:Prostate Cancer  After obtaining written and verbal consent, Eligard 45mg administered in LLQ of abdomen by Shelly Hurtado RN by order of Dr Nguyen. Patient was instructed to remain in clinic for 20 mins following injection and report any adverse reactions to me immediately. He tolerated the injection without any complications. We reviewed that the side effects include hot flashes, fatigue, breast enlargement, diminished libido, ED and long term development of osteoporosis.  The patient was told he will encouraged to take supplemental Calcium and Vitamin D to prevent the latter.

## 2024-12-16 NOTE — PROGRESS NOTES
[facility] PHYSICIANS [facility] UROLOGY C[facility]  [address]  [facility] [address]  Dept: [phone]    [facility] Urology Office Note - Established    Patient:  Bernardino Cote  YOB: 1955  Date: 12/16/2024    The patient is a 69 y.o. male who presents todayfor evaluation of the following problems:   Chief Complaint   Patient presents with    Other     3 month follow up        HPI  Is a very pleasant 69-year-old gentleman with advanced prostate cancer.  He has been on Eligard and Xtandi.  His PSA is crept up to 0.6.  It was 0.4 approximately 3 months ago.  He continues to have hot flashes but they are tolerable.  His urgency and frequency are stable.    Summary of old records: N/A    Additional History: N/A    Procedures Today: N/A    Urinalysis today:  No results found for this visit on 12/16/24.  Last several PSA's:  Lab Results   Component Value Date    PSA 0.60 12/09/2024    PSA 0.40 09/10/2024    PSA 0.30 06/04/2024     Last total testosterone:  Lab Results   Component Value Date    TESTOSTERONE 12 (L) 12/09/2024       AUA Symptom Score (12/16/2024):                               Last BUN and creatinine:  Lab Results   Component Value Date    BUN 9 10/22/2024     Lab Results   Component Value Date    CREATININE 0.8 10/22/2024       Additional Lab/Culture results: none    Imaging Reviewed during this Office Visit: none  (results were independently reviewed by physician and radiology report verified)    PAST MEDICAL, FAMILY AND SOCIAL HISTORY UPDATE:  Past Medical History:   Diagnosis Date    Acid reflux     Acute renal failure with acute tubular necrosis superimposed on stage 4 chronic kidney disease (HCC) 05/09/2022    Acute respiratory failure with hypoxia 05/02/2022    Anemia, blood loss 10/07/2018    Arthritis     Benign hypertension with CKD (chronic kidney disease), stage II 05/12/2022    Bilateral leg edema 01/18/2021    history of    C.

## 2024-12-18 NOTE — PROGRESS NOTES
Ambulated in valdez with walker and writer. Betty well . Pressure drsg intact to rt wrist. No s/s of  hematoma. No neuro defecits noted. Rt radial pulse palpable. Patient voided 300mls urine.
Patient admitted, consent signed and questions answered. Patient ready for procedure. Call light to reach with side rails up 2 of 2. Bilat groin rt wrist hair clipped with writer and Jerry Savage present. Family at bedside with patient. History and physical  needs to be completed.
Patient returned to  room post cardiac catheterization procedure. Assessment & VS obtained. Restrictions/Education reviewed with pt and wife. Post procedure pathway initiated. Pt without complications. Right radial site with Vasc band (11 ml's air) in place. Will remove in increments of 2mls/15min starting at 2pm until all air is removed. No hematoma noted. Wife at bedside. Will continue to monitor. Pulses palpable and unchanged. VS sequenced per policy.
Yes

## 2024-12-19 ENCOUNTER — TRANSCRIBE ORDERS (OUTPATIENT)
Dept: ADMINISTRATIVE | Age: 69
End: 2024-12-19

## 2024-12-19 DIAGNOSIS — I73.9 PERIPHERAL VASCULAR DISEASE, UNSPECIFIED (HCC): Primary | ICD-10-CM

## 2024-12-19 DIAGNOSIS — R52 INTRACTABLE PAIN: ICD-10-CM

## 2024-12-23 ENCOUNTER — HOSPITAL ENCOUNTER (OUTPATIENT)
Dept: VASCULAR LAB | Age: 69
Discharge: HOME | End: 2024-12-25
Attending: PODIATRIST
Payer: MEDICARE

## 2024-12-23 DIAGNOSIS — R52 INTRACTABLE PAIN: ICD-10-CM

## 2024-12-23 DIAGNOSIS — I73.9 PERIPHERAL VASCULAR DISEASE, UNSPECIFIED (HCC): ICD-10-CM

## 2024-12-23 LAB
VAS LEFT ABI: 0.82
VAS LEFT ARM BP: 126 MMHG
VAS LEFT CALF PRESSURE: 82 MMHG
VAS LEFT DORSALIS PEDIS BP: 95 MMHG
VAS LEFT LOW THIGH PRESSURE: 114 MMHG
VAS LEFT PTA BP: 103 MMHG
VAS LEFT TBI: 0.75
VAS LEFT TOE PRESSURE: 94 MMHG
VAS RIGHT ABI: 0.48
VAS RIGHT ARM BP: 123 MMHG
VAS RIGHT CALF PRESSURE: 50 MMHG
VAS RIGHT DORSALIS PEDIS BP: 57 MMHG
VAS RIGHT LOW THIGH PRESSURE: 80 MMHG
VAS RIGHT PTA BP: 60 MMHG
VAS RIGHT TBI: 0.52
VAS RIGHT TOE PRESSURE: 66 MMHG

## 2024-12-23 PROCEDURE — 93922 UPR/L XTREMITY ART 2 LEVELS: CPT

## 2024-12-23 PROCEDURE — 93922 UPR/L XTREMITY ART 2 LEVELS: CPT | Performed by: SURGERY

## 2025-01-06 NOTE — TELEPHONE ENCOUNTER
How Severe Is Your Skin Lesion?: mild Writer called [employer] support. They needed information on PART D insurance. Everything was verified over the phone with Momo Shepherd. They are going to expedite the  process. Patient was notified.  Verbalized understanding Is This A New Presentation, Or A Follow-Up?: Skin Lesions

## 2025-01-09 DIAGNOSIS — I12.9 BENIGN HYPERTENSION WITH CKD (CHRONIC KIDNEY DISEASE), STAGE II: ICD-10-CM

## 2025-01-09 DIAGNOSIS — N18.2 BENIGN HYPERTENSION WITH CKD (CHRONIC KIDNEY DISEASE), STAGE II: ICD-10-CM

## 2025-01-09 DIAGNOSIS — I50.32 CHRONIC DIASTOLIC HEART FAILURE (HCC): ICD-10-CM

## 2025-01-10 RX ORDER — LISINOPRIL 20 MG/1
20 TABLET ORAL DAILY
Qty: 90 TABLET | Refills: 3 | Status: SHIPPED | OUTPATIENT
Start: 2025-01-10

## 2025-01-10 NOTE — TELEPHONE ENCOUNTER
Please Approve or Refuse.  Send to Pharmacy per Pt's Request:      Next Visit Date:  1/30/2025   Last Visit Date: 10/17/2024    Hemoglobin A1C (%)   Date Value   09/10/2024 5.1   07/16/2024 5.0   03/07/2024 4.7             ( goal A1C is < 7)   BP Readings from Last 3 Encounters:   12/16/24 136/78   11/06/24 128/64   10/17/24 122/86          (goal 120/80)  BUN   Date Value Ref Range Status   10/22/2024 9 8 - 23 mg/dL Final     Creatinine   Date Value Ref Range Status   10/22/2024 0.8 0.70 - 1.20 mg/dL Final     Potassium   Date Value Ref Range Status   10/22/2024 4.3 3.7 - 5.3 mmol/L Final

## 2025-01-21 NOTE — TELEPHONE ENCOUNTER
Plan:   Eligard today  Cont xtandi  F/u 3 mo psa/T  Calcium and vit D     Assessment & Plan  Return in about 3 months (around 3/16/2025) for psa and T.

## 2025-01-30 ENCOUNTER — OFFICE VISIT (OUTPATIENT)
Dept: FAMILY MEDICINE CLINIC | Age: 70
End: 2025-01-30

## 2025-01-30 DIAGNOSIS — I12.9 BENIGN HYPERTENSION WITH CKD (CHRONIC KIDNEY DISEASE), STAGE II: ICD-10-CM

## 2025-01-30 DIAGNOSIS — E11.42 TYPE 2 DIABETES MELLITUS WITH DIABETIC POLYNEUROPATHY, WITHOUT LONG-TERM CURRENT USE OF INSULIN (HCC): Primary | ICD-10-CM

## 2025-01-30 DIAGNOSIS — I50.32 CHRONIC DIASTOLIC HEART FAILURE (HCC): ICD-10-CM

## 2025-01-30 DIAGNOSIS — C61 PROSTATE CANCER (HCC): ICD-10-CM

## 2025-01-30 DIAGNOSIS — N18.2 BENIGN HYPERTENSION WITH CKD (CHRONIC KIDNEY DISEASE), STAGE II: ICD-10-CM

## 2025-01-30 DIAGNOSIS — K59.01 SLOW TRANSIT CONSTIPATION: ICD-10-CM

## 2025-01-30 DIAGNOSIS — F33.1 MODERATE EPISODE OF RECURRENT MAJOR DEPRESSIVE DISORDER (HCC): ICD-10-CM

## 2025-01-30 DIAGNOSIS — L85.3 DRY SKIN DERMATITIS: ICD-10-CM

## 2025-01-30 DIAGNOSIS — Z23 NEED FOR INFLUENZA VACCINATION: ICD-10-CM

## 2025-01-30 DIAGNOSIS — R29.6 FREQUENT FALLS: ICD-10-CM

## 2025-01-30 DIAGNOSIS — J44.9 COPD MIXED TYPE (HCC): ICD-10-CM

## 2025-01-30 DIAGNOSIS — E88.89: ICD-10-CM

## 2025-01-30 DIAGNOSIS — G47.33 OSA ON CPAP: ICD-10-CM

## 2025-01-30 LAB — HBA1C MFR BLD: 5.9 %

## 2025-01-30 RX ORDER — LIDOCAINE 40 MG/G
CREAM TOPICAL
Qty: 120 G | Refills: 3 | Status: SHIPPED | OUTPATIENT
Start: 2025-01-30

## 2025-01-30 RX ORDER — ASPIRIN 81 MG
TABLET,CHEWABLE ORAL
Qty: 180 G | Refills: 3 | Status: SHIPPED | OUTPATIENT
Start: 2025-01-30

## 2025-01-30 RX ORDER — POLYETHYLENE GLYCOL 3350 17 G/17G
17 POWDER, FOR SOLUTION ORAL DAILY
Qty: 850 G | Refills: 3 | Status: SHIPPED | OUTPATIENT
Start: 2025-01-30

## 2025-01-30 RX ORDER — PREGABALIN 200 MG/1
200 CAPSULE ORAL 2 TIMES DAILY
Qty: 180 CAPSULE | Refills: 0 | Status: SHIPPED | OUTPATIENT
Start: 2025-01-30 | End: 2025-04-30

## 2025-01-30 RX ORDER — AMMONIUM LACTATE 12 G/100G
CREAM TOPICAL
Qty: 1 EACH | Refills: 3 | Status: SHIPPED | OUTPATIENT
Start: 2025-01-30

## 2025-01-30 SDOH — ECONOMIC STABILITY: FOOD INSECURITY: WITHIN THE PAST 12 MONTHS, THE FOOD YOU BOUGHT JUST DIDN'T LAST AND YOU DIDN'T HAVE MONEY TO GET MORE.: NEVER TRUE

## 2025-01-30 SDOH — ECONOMIC STABILITY: FOOD INSECURITY: WITHIN THE PAST 12 MONTHS, YOU WORRIED THAT YOUR FOOD WOULD RUN OUT BEFORE YOU GOT MONEY TO BUY MORE.: NEVER TRUE

## 2025-01-30 ASSESSMENT — PATIENT HEALTH QUESTIONNAIRE - PHQ9
SUM OF ALL RESPONSES TO PHQ QUESTIONS 1-9: 11
2. FEELING DOWN, DEPRESSED OR HOPELESS: NEARLY EVERY DAY
7. TROUBLE CONCENTRATING ON THINGS, SUCH AS READING THE NEWSPAPER OR WATCHING TELEVISION: SEVERAL DAYS
SUM OF ALL RESPONSES TO PHQ9 QUESTIONS 1 & 2: 3
3. TROUBLE FALLING OR STAYING ASLEEP: NEARLY EVERY DAY
8. MOVING OR SPEAKING SO SLOWLY THAT OTHER PEOPLE COULD HAVE NOTICED. OR THE OPPOSITE, BEING SO FIGETY OR RESTLESS THAT YOU HAVE BEEN MOVING AROUND A LOT MORE THAN USUAL: NOT AT ALL
5. POOR APPETITE OR OVEREATING: NOT AT ALL
1. LITTLE INTEREST OR PLEASURE IN DOING THINGS: NOT AT ALL
9. THOUGHTS THAT YOU WOULD BE BETTER OFF DEAD, OR OF HURTING YOURSELF: NOT AT ALL
4. FEELING TIRED OR HAVING LITTLE ENERGY: NEARLY EVERY DAY
SUM OF ALL RESPONSES TO PHQ QUESTIONS 1-9: 11
10. IF YOU CHECKED OFF ANY PROBLEMS, HOW DIFFICULT HAVE THESE PROBLEMS MADE IT FOR YOU TO DO YOUR WORK, TAKE CARE OF THINGS AT HOME, OR GET ALONG WITH OTHER PEOPLE: SOMEWHAT DIFFICULT
6. FEELING BAD ABOUT YOURSELF - OR THAT YOU ARE A FAILURE OR HAVE LET YOURSELF OR YOUR FAMILY DOWN: SEVERAL DAYS

## 2025-01-30 NOTE — PROGRESS NOTES
Visit Information    Have you changed or started any medications since your last visit including any over-the-counter medicines, vitamins, or herbal medicines? no   Have you stopped taking any of your medications? Is so, why? -  no  Are you having any side effects from any of your medications? - no    Have you seen any other physician or provider since your last visit?  yes -    Have you had any other diagnostic tests since your last visit?  yes -    Have you been seen in the emergency room and/or had an admission in a hospital since we last saw you?  no   Have you had your routine dental cleaning in the past 6 months?  yes -      Do you have an active MyChart account? If no, what is the barrier?  Yes    Patient Care Team:  Asmita Hebert MD as PCP - General (Family Medicine)  Asmita Hebert MD as PCP - Empaneled Provider  Juan Nguyen MD as Consulting Physician (Urology)  Oren Alfaro MD as Consulting Physician (Pulmonology)  Dwain Carrington OD (Ophthalmology)  Zaria Blake MD as Consulting Physician (Orthopedic Surgery)  Kelechi Soriano MD as Consulting Physician (Orthopedic Surgery)  Jaycob Nichols MD as Surgeon (General Surgery)  Kimberly Chandler, PhD (Psychology)  Jimmie Camp MD as Consulting Physician (Neurology)  Jt Wood MD as Consulting Physician (Nephrology)  Moises Chamberlain DPM as Surgeon (Podiatry)  Omar Tipton DO as Consulting Physician (Cardiology)  Chau Ochoa MD (Gastroenterology)  Bren Farrell MD as Surgeon (Vascular Surgery)    Medical History Review  Past Medical, Family, and Social History reviewed and does contribute to the patient presenting condition    Health Maintenance   Topic Date Due    Flu vaccine (1) 08/01/2024    Diabetic retinal exam  09/21/2024    Diabetic Alb to Cr ratio (uACR) test  11/30/2024    A1C test (Diabetic or Prediabetic)  12/10/2024    Annual Wellness Visit (Medicare Advantage)  01/01/2025    
NOT REPORTED 07/30/2021     Lab Results   Component Value Date    CHOLHDLRATIO 4.0 10/22/2024    CHOLHDLRATIO 4.0 07/24/2024    CHOLHDLRATIO 4.4 07/31/2023           Lab Results   Component Value Date    SFAVDSYM12 1399 (H) 10/22/2024     Lab Results   Component Value Date    FOLATE 6.0 10/22/2024     Lab Results   Component Value Date    VITD25 37.2 11/30/2023         On this date 1/30/2025 I have spent 39 minutes reviewing previous notes, test results and face to face with the patient discussing the diagnosis and importance of compliance with the treatment plan as well as documenting on the day of the visit.      This note was completed by using the assistance of a speech-recognition program. However, inadvertent computerized transcription errors may be present. Although every effort was made to ensure accuracy, no guarantees can be provided that every mistake has been identified and corrected by editing .      An electronic signature was used to authenticate this note.  Electronically signed by Asmita Hebert MD on 2/3/2025 at 8:25 PM

## 2025-01-30 NOTE — ASSESSMENT & PLAN NOTE
Chronic hypertension, borderline low systolic blood pressure today, patient says the blood pressure at home is within normal limits 120s over 80s to 70s.  He denies lightheadedness  Chronic kidney disease improved  Check labs  Close monitoring of the blood pressure discussed, he understands, risk of falls discussed, he understands  Goal BP bellow 130/80 and above 115/65, heart rate 56 to 90 bpm, to let me know if the blood pressure is low at home and will decrease the dosage of amlodipine  Discussed low salt diet and BP and pulse monitoring.    Continue amlodipine 10 Mg daily, furosemide 20 Mg daily, lisinopril 20 Mg daily    Orders:    CBC with Auto Differential; Future    Comprehensive Metabolic Panel; Future    Uric Acid; Future    TSH; Future    Phosphorus; Future    Magnesium; Future

## 2025-01-30 NOTE — ASSESSMENT & PLAN NOTE
Chronic, improved with chemotherapy, Xtandi 40 mg, taking 4 capsules by mouth daily  He also has brachytherapy  PSA is relatively stable at this time  Lab Results   Component Value Date    PSA 0.60 12/09/2024    PSA 0.40 09/10/2024    PSA 0.30 06/04/2024

## 2025-01-30 NOTE — ASSESSMENT & PLAN NOTE
Chronic, stable, avoidance of succinylcholine for anesthesia, discussed, he is aware  And continue to monitor

## 2025-01-30 NOTE — ASSESSMENT & PLAN NOTE
Chronic, worsening but very well-controlled type 2 diabetes mellitus  Chronic polyneuropathy not well-controlled, on Lyrica 200 Mg twice a day and Savella 100 Mg and he still says the pain is 9 out of 10 in his legs and all over the body  Also continue vitamins vitamin B12 1000 mcg daily, vitamin B6 50 Mg daily  He did try to cut down on Lyrica and the pain got worse in his legs  Lab Results   Component Value Date    LABA1C 5.9 01/30/2025    LABA1C 5.1 09/10/2024    LABA1C 5.0 07/16/2024     Will prescribe capsaicin cream which the podiatrist started him on and he says it has been helping  We will also prescribe lidocaine cream and can alternate the creams  Orders:    POCT glycosylated hemoglobin (Hb A1C)    Capsaicin 0.1 % CREA; Use topically every 8 hrs as needed for pain and neuropathy on the feet. Please give 3 tubes    pregabalin (LYRICA) 200 MG capsule; Take 1 capsule by mouth 2 times daily for 90 days. Max Daily Amount: 400 mg    lidocaine (LMX) 4 % cream; 5 g topical 2-3 times a day as needed for pain, maximum 20 g/day, ok to apply on the feet as needed up to 3 times a day    milnacipran HCl (SAVELLA) 100 MG TABS; Take 1 tablet by mouth daily    CBC with Auto Differential; Future    Comprehensive Metabolic Panel; Future    Vitamin B12 & Folate; Future    Uric Acid; Future    TSH; Future    Albumin/Creatinine Ratio, Urine; Future      Will monitor hemoglobin A1c every 6 months  Okay to self monitor blood glucose once a week  -daily feet exam, Foot care: advised to wash feet daily, pat dry and apply lotion at night, avoiding between toes. Need to look at feet daily and report to a physician any signs of inflammation or skin damage  -annual dilated eye exam  -Low carb, low fat diet, increase fruits and vegetables, and exercise 4-5 times a day 30-40 minutes a day discussed  -continue low carb diet and Mounjaro 5 Mg weekly  -continue Aspirin 81 Mg  -continue lisinopril ACEI and statin pravastatin

## 2025-01-30 NOTE — ASSESSMENT & PLAN NOTE
Chronic, improved with CPAP, benefits from CPAP, follows with Dr. Alfaro for monitoring  He has been compliant with CPAP use per his report

## 2025-01-30 NOTE — ASSESSMENT & PLAN NOTE
Chronic, stable  Lab Results   Component Value Date    LVEF 60 05/02/2022    LVEFMODE Echo 12/05/2019   We will continue to monitor labs  Continue lisinopril 20 Mg daily and furosemide 20 Mg daily    Orders:    Brain Natriuretic Peptide; Future

## 2025-01-30 NOTE — ASSESSMENT & PLAN NOTE
Chronic, improved since he completely quit smoking  Continue albuterol as needed  Continue Symbicort 2 puffs twice a day rinse mouth after use, follows with Dr. Alfaro

## 2025-01-30 NOTE — ASSESSMENT & PLAN NOTE
Chronic, same as before, not improving  Continue Savella 100 Mg daily, he does not want to stop taking it, as he always feels depressed      1/30/2025     9:07 AM 10/17/2024     8:57 AM 7/16/2024    10:31 AM 3/15/2024     9:24 AM 7/28/2023     2:39 PM 4/7/2023     2:53 PM 12/29/2022     1:06 PM   PHQ Scores   PHQ2 Score 3 0 4 3 1 3 3   PHQ9 Score 11 14 13 15 8 6 6     Interpretation of Total Score Depression Severity: 1-4 = Minimal depression, 5-9 = Mild depression, 10-14 = Moderate depression, 15-19 = Moderately severe depression, 20-27 = Severe depression      Orders:    milnacipran HCl (SAVELLA) 100 MG TABS; Take 1 tablet by mouth daily

## 2025-01-30 NOTE — ASSESSMENT & PLAN NOTE
Chronic, improved with medication, continue MiraLAX  He is up-to-date with colonoscopy  Date of last Colonoscopy: 12/11/2023      Orders:    polyethylene glycol (MIRALAX) 17 GM/SCOOP powder; Take 17 g by mouth daily

## 2025-01-31 NOTE — RESULT ENCOUNTER NOTE
Addressed during office visit today, POC A1c 5.9, worsening diabetes but very well-controlled, continue treatment recommended during the office visit.

## 2025-02-03 ENCOUNTER — TELEPHONE (OUTPATIENT)
Dept: FAMILY MEDICINE CLINIC | Age: 70
End: 2025-02-03

## 2025-02-03 VITALS
HEIGHT: 72 IN | SYSTOLIC BLOOD PRESSURE: 98 MMHG | BODY MASS INDEX: 31.18 KG/M2 | DIASTOLIC BLOOD PRESSURE: 62 MMHG | WEIGHT: 230.2 LBS | OXYGEN SATURATION: 97 % | TEMPERATURE: 98.6 F | HEART RATE: 72 BPM

## 2025-02-03 PROBLEM — L85.3 DRY SKIN DERMATITIS: Status: ACTIVE | Noted: 2025-02-03

## 2025-02-03 PROBLEM — R29.6 FREQUENT FALLS: Status: ACTIVE | Noted: 2025-02-03

## 2025-02-03 RX ORDER — ENZALUTAMIDE 40 MG/1
TABLET ORAL
Qty: 120 TABLET | Refills: 10 | Status: SHIPPED | OUTPATIENT
Start: 2025-02-03

## 2025-02-03 ASSESSMENT — ENCOUNTER SYMPTOMS: CONSTIPATION: 1

## 2025-02-03 NOTE — TELEPHONE ENCOUNTER
Patient has uncontrolled pain, depression and polyneuropathy, continue current treatment with close monitoring.  Patient was referred to physical therapy    Future Appointments   Date Time Provider Department Center   3/17/2025 11:10 AM Juan Nguyen MD [facility]   3/18/2025  9:15 AM Asmita Hebert MD [facility] DEP   3/19/2025  2:00 PM Omar Tipton, DO [facility]           1/30/2025     9:07 AM 10/17/2024     8:57 AM 7/16/2024    10:31 AM   PHQ-9    Little interest or pleasure in doing things 0 0 1   Feeling down, depressed, or hopeless 3 0 3   Trouble falling or staying asleep, or sleeping too much 3 3 0   Feeling tired or having little energy 3 3 3   Poor appetite or overeating 0 2 0   Feeling bad about yourself - or that you are a failure or have let yourself or your family down 1 3 3   Trouble concentrating on things, such as reading the newspaper or watching television 1 2 3   Moving or speaking so slowly that other people could have noticed. Or the opposite - being so fidgety or restless that you have been moving around a lot more than usual 0 0 0   Thoughts that you would be better off dead, or of hurting yourself in some way 0 1 0   PHQ-2 Score 3 0 4   PHQ-9 Total Score 11 14 13   If you checked off any problems, how difficult have these problems made it for you to do your work, take care of things at home, or get along with other people? 1 0 1           FYI  Sincerely,     [employer]          Case Number:[id number]     Potential Clinical Concern:Concurrent Therapy: Concurrent use of three or more CNS-ACTIVE drugs for >= 30 days       NOTES  Our records indicate that your patient has filled three or more CNS-Active medications. The use of concurrent CNS-Active medications in older adults is associated with increased risk of falls and is deemed a serious safety concern. Please review and consider discontinuing one of the medications.         The following table

## 2025-02-04 ENCOUNTER — CLINICAL DOCUMENTATION (OUTPATIENT)
Dept: OCCUPATIONAL THERAPY | Age: 70
End: 2025-02-04

## 2025-02-04 NOTE — FLOWSHEET NOTE
[] [facility]  Outpatient Rehabilitation &  Therapy  [address]  [phone]  F: [phone] [] [facility]  Outpatient Rehabilitation &  Therapy  [address]   [address]  P: [phone]  F: [phone] [] [facility]  Outpatient Rehabilitation &  Therapy  [address]  P: [phone]  F: [phone] [] [facility]  Outpatient Rehabilitation &  Therapy  [address]   P: [phone]  F: [phone] [x] [facility]   Outpatient Rehabilitation   & Therapy  [address]  P: [phone]   F: [phone]         Occupational Therapy Discharge Note    Date: 2025      Patient: Bernardino Cote  : 1955  MRN: [id number]    Physician: Asmita Hebert MD  Insurance: [facility] Medicare   Diagnosis: Medical Diagnosis: Pain in both hands [M79.641, M79.642]  Polyneuropathy [G62.9]  Weakness of both hands [R29.898] Pain in both hands (M79.641, M79.642), poly neuropathy (G62.9), weakness both hands (R29.898) Onset Date: 3/01/2020  Next  Appt: unknown  Total visits attended:1  Cancels/No shows:  Date of initial visit: 10/31/2024                Date of final visit: 10/31/2024      Subjective:    Treatment to Date:Evaluation only.        Discharge Status:     [] Pt recovered from conditions. Treatment goals were met.    [] Pt received maximum benefit. No further therapy indicated at this time.    [] Pt to continue exercise/home instructions independently.    [] Therapy interrupted due to:    [] Pt has 2 or more no shows/cancels, is discontinued per our policy.    [] Pt has completed prescribed number of treatment sessions.    [x] Other: Pt seen for evaluation only. No treatment at evaluation since therapy needed to get insurance approval for treatment. We received insurance approval for 20 visits from 2024 - 2025.Schedulers has left messages for pt to schedule. Last message 12/3/2024 they

## 2025-02-04 NOTE — ASSESSMENT & PLAN NOTE
Ongoing, getting worse, ambulates with walker with seat, multifactorial, polyneuropathy, history of 3 back surgeries, chronic right foot drop, peripheral vascular disease following with vascular specialist, Dr. Farrell, also deconditioning  Reluctantly he is agreeable to start physical therapy  During summer he does swimming   He also says he has been watching his grandkids a lot    Orders:    [facility] - [facility]

## 2025-02-04 NOTE — ASSESSMENT & PLAN NOTE
Worsening  Restart ammonium lactate cream    Orders:    ammonium lactate (AMLACTIN) 12 % cream; APPLY TOPICALLY TO LEGS ONE TO TWO TIMES A DAY AS NEEDED

## 2025-02-12 DIAGNOSIS — R05.8 OTHER COUGH: ICD-10-CM

## 2025-02-12 RX ORDER — BENZONATATE 100 MG/1
CAPSULE ORAL
Qty: 21 CAPSULE | Refills: 0 | Status: SHIPPED | OUTPATIENT
Start: 2025-02-12

## 2025-02-12 NOTE — TELEPHONE ENCOUNTER
Please Approve or Refuse.  Send to Pharmacy per Pt's Request:      Next Visit Date:  3/18/2025   Last Visit Date: 1/30/2025    Hemoglobin A1C (%)   Date Value   01/30/2025 5.9   09/10/2024 5.1   07/16/2024 5.0             ( goal A1C is < 7)   BP Readings from Last 3 Encounters:   01/30/25 98/62   12/16/24 136/78   11/06/24 128/64          (goal 120/80)  BUN   Date Value Ref Range Status   10/22/2024 9 8 - 23 mg/dL Final     Creatinine   Date Value Ref Range Status   10/22/2024 0.8 0.70 - 1.20 mg/dL Final     Potassium   Date Value Ref Range Status   10/22/2024 4.3 3.7 - 5.3 mmol/L Final

## 2025-02-17 DIAGNOSIS — R05.8 OTHER COUGH: ICD-10-CM

## 2025-02-17 RX ORDER — BENZONATATE 100 MG/1
CAPSULE ORAL
Qty: 21 CAPSULE | Refills: 0 | OUTPATIENT
Start: 2025-02-17

## 2025-02-19 ENCOUNTER — TELEPHONE (OUTPATIENT)
Dept: UROLOGY | Age: 70
End: 2025-02-19

## 2025-02-19 NOTE — TELEPHONE ENCOUNTER
Patient LM on clinic line stating \"I have not received my Xtandi refill.  I spoke with someone a few weeks ago and they said they would take care of it.  I have been out for a couple weeks now.\"  Writer called patient and informed him that Dr. Nguyen sent a refill on 2/3/25 and patient will reach out to [facility].

## 2025-02-20 NOTE — TELEPHONE ENCOUNTER
Patient came to office asking if we could fax his bank statements to [employer] for him.  He stated he spoke with them yesterday and they needed them.  He has no computer or any way to fax at home.  Writer called [employer] and was informed pt is past the re-enrollment period and and he would need to completely re-enroll. They are in need of financial statements and reenrollment form.   Writer faxed financials and returned bank statements and fax confirmation sheet to patient. Rachel STANLEY approved pt for 14 days of samples, as he has been out of medication for a couple of weeks.  She also signed forms.  They were faxed in to [employer] with confirmation received. Patient is updated and given 1 box of Xtandi samples.

## 2025-03-11 ENCOUNTER — RESULTS FOLLOW-UP (OUTPATIENT)
Dept: FAMILY MEDICINE CLINIC | Age: 70
End: 2025-03-11

## 2025-03-11 ENCOUNTER — HOSPITAL ENCOUNTER (OUTPATIENT)
Dept: GENERAL RADIOLOGY | Facility: CLINIC | Age: 70
Discharge: HOME | End: 2025-03-13
Payer: MEDICARE

## 2025-03-11 ENCOUNTER — HOSPITAL ENCOUNTER (OUTPATIENT)
Age: 70
Setting detail: SPECIMEN
Discharge: HOME | End: 2025-03-11

## 2025-03-11 ENCOUNTER — HOSPITAL ENCOUNTER (OUTPATIENT)
Facility: CLINIC | Age: 70
Discharge: HOME | End: 2025-03-13
Payer: MEDICARE

## 2025-03-11 DIAGNOSIS — N18.2 BENIGN HYPERTENSION WITH CKD (CHRONIC KIDNEY DISEASE), STAGE II: ICD-10-CM

## 2025-03-11 DIAGNOSIS — I12.9 BENIGN HYPERTENSION WITH CKD (CHRONIC KIDNEY DISEASE), STAGE II: ICD-10-CM

## 2025-03-11 DIAGNOSIS — I50.32 CHRONIC DIASTOLIC HEART FAILURE (HCC): ICD-10-CM

## 2025-03-11 DIAGNOSIS — C61 PROSTATE CANCER (HCC): ICD-10-CM

## 2025-03-11 DIAGNOSIS — M54.12 CERVICAL RADICULOPATHY: ICD-10-CM

## 2025-03-11 DIAGNOSIS — E11.42 TYPE 2 DIABETES MELLITUS WITH DIABETIC POLYNEUROPATHY, WITHOUT LONG-TERM CURRENT USE OF INSULIN (HCC): ICD-10-CM

## 2025-03-11 LAB
ALBUMIN SERPL-MCNC: 4 G/DL (ref 3.5–5.2)
ALBUMIN/GLOB SERPL: 1.3 {RATIO} (ref 1–2.5)
ALP SERPL-CCNC: 103 U/L (ref 40–129)
ALT SERPL-CCNC: 19 U/L (ref 10–50)
ANION GAP SERPL CALCULATED.3IONS-SCNC: 13 MMOL/L (ref 9–16)
AST SERPL-CCNC: 21 U/L (ref 10–50)
BASOPHILS # BLD: 0.06 K/UL (ref 0–0.2)
BASOPHILS NFR BLD: 1 % (ref 0–2)
BILIRUB SERPL-MCNC: 0.3 MG/DL (ref 0–1.2)
BNP SERPL-MCNC: <36 PG/ML (ref 0–125)
BUN SERPL-MCNC: 15 MG/DL (ref 8–23)
CALCIUM SERPL-MCNC: 10.4 MG/DL (ref 8.6–10.4)
CHLORIDE SERPL-SCNC: 105 MMOL/L (ref 98–107)
CO2 SERPL-SCNC: 24 MMOL/L (ref 20–31)
CREAT SERPL-MCNC: 1 MG/DL (ref 0.7–1.2)
CREAT UR-MCNC: 49.9 MG/DL (ref 39–259)
EOSINOPHIL # BLD: 0.13 K/UL (ref 0–0.44)
EOSINOPHILS RELATIVE PERCENT: 1 % (ref 1–4)
ERYTHROCYTE [DISTWIDTH] IN BLOOD BY AUTOMATED COUNT: 13.1 % (ref 11.8–14.4)
FOLATE SERPL-MCNC: 8.6 NG/ML (ref 4.8–24.2)
GFR, ESTIMATED: 81 ML/MIN/1.73M2
GLUCOSE SERPL-MCNC: 78 MG/DL (ref 74–99)
HCT VFR BLD AUTO: 41.1 % (ref 40.7–50.3)
HGB BLD-MCNC: 13.1 G/DL (ref 13–17)
IMM GRANULOCYTES # BLD AUTO: 0.03 K/UL (ref 0–0.3)
IMM GRANULOCYTES NFR BLD: 0 %
LYMPHOCYTES NFR BLD: 2.3 K/UL (ref 1.1–3.7)
LYMPHOCYTES RELATIVE PERCENT: 24 % (ref 24–43)
MAGNESIUM SERPL-MCNC: 2.1 MG/DL (ref 1.6–2.4)
MCH RBC QN AUTO: 29.2 PG (ref 25.2–33.5)
MCHC RBC AUTO-ENTMCNC: 31.9 G/DL (ref 28.4–34.8)
MCV RBC AUTO: 91.5 FL (ref 82.6–102.9)
MICROALBUMIN UR-MCNC: 19 MG/L (ref 0–20)
MICROALBUMIN/CREAT UR-RTO: 38 MCG/MG CREAT (ref 0–17)
MONOCYTES NFR BLD: 0.65 K/UL (ref 0.1–1.2)
MONOCYTES NFR BLD: 7 % (ref 3–12)
NEUTROPHILS NFR BLD: 67 % (ref 36–65)
NEUTS SEG NFR BLD: 6.25 K/UL (ref 1.5–8.1)
NRBC BLD-RTO: 0 PER 100 WBC
PHOSPHATE SERPL-MCNC: 3.3 MG/DL (ref 2.5–4.5)
PLATELET # BLD AUTO: 255 K/UL (ref 138–453)
PMV BLD AUTO: 11.4 FL (ref 8.1–13.5)
POTASSIUM SERPL-SCNC: 4.6 MMOL/L (ref 3.7–5.3)
PROT SERPL-MCNC: 7.1 G/DL (ref 6.6–8.7)
PSA SERPL-MCNC: 0.9 NG/ML (ref 0–4)
RBC # BLD AUTO: 4.49 M/UL (ref 4.21–5.77)
SODIUM SERPL-SCNC: 142 MMOL/L (ref 136–145)
TESTOST SERPL-MCNC: 6 NG/DL (ref 193–740)
TSH SERPL DL<=0.05 MIU/L-ACNC: 1.28 UIU/ML (ref 0.27–4.2)
URATE SERPL-MCNC: 6 MG/DL (ref 3.4–7)
VIT B12 SERPL-MCNC: >2000 PG/ML (ref 232–1245)
WBC OTHER # BLD: 9.4 K/UL (ref 3.5–11.3)

## 2025-03-11 PROCEDURE — 72050 X-RAY EXAM NECK SPINE 4/5VWS: CPT

## 2025-03-11 NOTE — RESULT ENCOUNTER NOTE
Please notify patient: Very mild decreased kidney function, to absolutely avoid ibuprofen, naproxen, Aleve, Motrin and dehydration to make sure he drinks 48 to 64 ounce water daily  Otherwise labs within normal limits  continue current treatment    Future Appointments  3/17/2025  11:10 AM   Juan Nguyen MD        [facility]           [facility]  3/18/2025  9:30 AM    Asmita Hebert MD    [facility]               [facility] DEP  3/19/2025  2:00 PM    Omar Tipton DO [facility]        [facility]

## 2025-03-12 ENCOUNTER — TELEPHONE (OUTPATIENT)
Dept: UROLOGY | Age: 70
End: 2025-03-12

## 2025-03-12 NOTE — TELEPHONE ENCOUNTER
Received a letter from [facility] [employer] stating that they are reaching out to the patient as they are needing income verification. Patients case will be closed until income information is given.

## 2025-03-14 ENCOUNTER — TRANSCRIBE ORDERS (OUTPATIENT)
Dept: ADMINISTRATIVE | Age: 70
End: 2025-03-14

## 2025-03-14 DIAGNOSIS — M54.2 CERVICALGIA: ICD-10-CM

## 2025-03-14 DIAGNOSIS — M54.12 RADICULOPATHY OF CERVICAL SPINE: Primary | ICD-10-CM

## 2025-03-14 RX ORDER — TIOTROPIUM BROMIDE AND OLODATEROL 3.124; 2.736 UG/1; UG/1
2 SPRAY, METERED RESPIRATORY (INHALATION) DAILY
COMMUNITY
Start: 2025-02-21

## 2025-03-14 RX ORDER — DOXYCYCLINE 100 MG/1
100 CAPSULE ORAL DAILY
COMMUNITY
Start: 2025-02-13 | End: 2025-03-18 | Stop reason: ALTCHOICE

## 2025-03-14 RX ORDER — TIRZEPATIDE 5 MG/.5ML
5 INJECTION, SOLUTION SUBCUTANEOUS WEEKLY
COMMUNITY
Start: 2025-03-04

## 2025-03-17 ENCOUNTER — OFFICE VISIT (OUTPATIENT)
Dept: UROLOGY | Age: 70
End: 2025-03-17
Payer: MEDICARE

## 2025-03-17 VITALS
TEMPERATURE: 97.8 F | BODY MASS INDEX: 31.15 KG/M2 | OXYGEN SATURATION: 96 % | SYSTOLIC BLOOD PRESSURE: 124 MMHG | WEIGHT: 230 LBS | HEART RATE: 76 BPM | DIASTOLIC BLOOD PRESSURE: 74 MMHG | HEIGHT: 72 IN

## 2025-03-17 DIAGNOSIS — Z79.818 ANDROGEN DEPRIVATION THERAPY: ICD-10-CM

## 2025-03-17 DIAGNOSIS — R23.2 HOT FLASHES: ICD-10-CM

## 2025-03-17 DIAGNOSIS — C61 PROSTATE CANCER (HCC): Primary | ICD-10-CM

## 2025-03-17 PROBLEM — M19.90 IDIOPATHIC OSTEOARTHRITIS: Status: ACTIVE | Noted: 2017-01-11

## 2025-03-17 PROCEDURE — 1123F ACP DISCUSS/DSCN MKR DOCD: CPT | Performed by: UROLOGY

## 2025-03-17 PROCEDURE — G8417 CALC BMI ABV UP PARAM F/U: HCPCS | Performed by: UROLOGY

## 2025-03-17 PROCEDURE — G8427 DOCREV CUR MEDS BY ELIG CLIN: HCPCS | Performed by: UROLOGY

## 2025-03-17 PROCEDURE — 3017F COLORECTAL CA SCREEN DOC REV: CPT | Performed by: UROLOGY

## 2025-03-17 PROCEDURE — 1036F TOBACCO NON-USER: CPT | Performed by: UROLOGY

## 2025-03-17 PROCEDURE — 1159F MED LIST DOCD IN RCRD: CPT | Performed by: UROLOGY

## 2025-03-17 PROCEDURE — 99214 OFFICE O/P EST MOD 30 MIN: CPT | Performed by: UROLOGY

## 2025-03-17 RX ORDER — ENZALUTAMIDE 40 MG/1
160 CAPSULE ORAL DAILY
Qty: 120 CAPSULE | Refills: 5 | Status: SHIPPED | OUTPATIENT
Start: 2025-03-17

## 2025-03-17 ASSESSMENT — ENCOUNTER SYMPTOMS
EYES NEGATIVE: 1
COUGH: 0
COLOR CHANGE: 0
NAUSEA: 0
GASTROINTESTINAL NEGATIVE: 1
VOMITING: 0
ALLERGIC/IMMUNOLOGIC NEGATIVE: 1
WHEEZING: 0
EYE REDNESS: 0
RESPIRATORY NEGATIVE: 1
BACK PAIN: 0
EYE PAIN: 0
SHORTNESS OF BREATH: 0
ABDOMINAL PAIN: 0

## 2025-03-17 NOTE — PROGRESS NOTES
Review of Systems   Constitutional: Negative.  Negative for appetite change, chills and fever.   HENT: Negative.     Eyes: Negative.  Negative for pain, redness and visual disturbance.   Respiratory: Negative.  Negative for cough, shortness of breath and wheezing.    Cardiovascular: Negative.  Negative for chest pain and leg swelling.   Gastrointestinal: Negative.  Negative for abdominal pain, nausea and vomiting.   Endocrine: Negative.    Genitourinary: Negative.  Negative for difficulty urinating, dysuria, flank pain, frequency, hematuria, testicular pain and urgency.   Musculoskeletal: Negative.  Negative for back pain, joint swelling and myalgias.   Skin: Negative.  Negative for color change, rash and wound.   Allergic/Immunologic: Negative.    Neurological: Negative.  Negative for dizziness, tremors, weakness, numbness and headaches.   Hematological: Negative.  Negative for adenopathy. Does not bruise/bleed easily.   Psychiatric/Behavioral: Negative.       
g 3    pregabalin (LYRICA) 200 MG capsule Take 1 capsule by mouth 2 times daily for 90 days. Max Daily Amount: 400 mg 180 capsule 0    ammonium lactate (AMLACTIN) 12 % cream APPLY TOPICALLY TO LEGS ONE TO TWO TIMES A DAY AS NEEDED 1 each 3    lidocaine (LMX) 4 % cream 5 g topical 2-3 times a day as needed for pain, maximum 20 g/day, ok to apply on the feet as needed up to 3 times a day 120 g 3    milnacipran HCl (SAVELLA) 100 MG TABS Take 1 tablet by mouth daily 90 tablet 3    polyethylene glycol (MIRALAX) 17 GM/SCOOP powder Take 17 g by mouth daily 850 g 3    lisinopril (PRINIVIL;ZESTRIL) 20 MG tablet TAKE 1 TABLET BY MOUTH DAILY 90 tablet 3    vitamin D (CHOLECALCIFEROL) 50 MCG (2000 UT) TABS tablet Take 1 tablet by mouth daily Not sent to the pharmacy 90 tablet 0    vitamin B-6 (PYRIDOXINE) 50 MG tablet Take 1 tablet by mouth daily 90 tablet 3    senna (SENOKOT) 8.6 MG tablet Take 1 tablet by mouth 2 times daily as needed for Constipation 180 tablet 1    Wheat Dextrin (BENEFIBER) POWD Take 4 g by mouth in the morning and at bedtime 1 each 0    glucose monitoring kit Please supply Patient with a glucose monitoring kit that insurance will cover. 1 kit 0    blood glucose monitor strips Testing once a day.  Please dispense according to patients insurance. 100 strip 3    Lancets 30G MISC Testing once a day.  Please dispense according to patients insurance. 100 each 3    allopurinol (ZYLOPRIM) 100 MG tablet Take 1 tablet by mouth daily 90 tablet 3    pravastatin (PRAVACHOL) 80 MG tablet TAKE 1 TABLET BY MOUTH EVERY  EVENING 90 tablet 3    famotidine (PEPCID) 20 MG tablet Take 1 tablet by mouth 2 times daily Replacing omeprazole due to osteoporosis 180 tablet 3    furosemide (LASIX) 20 MG tablet TAKE 1 TABLET BY MOUTH DAILY 90 tablet 3    amLODIPine (NORVASC) 10 MG tablet TAKE 1 TABLET BY MOUTH DAILY FOR HIGH BLOOD PRESSURE 90 tablet 3    enzalutamide (XTANDI) 40 MG capsule Take 4 capsules by mouth daily 120 capsule 11

## 2025-03-18 ENCOUNTER — OFFICE VISIT (OUTPATIENT)
Dept: FAMILY MEDICINE CLINIC | Age: 70
End: 2025-03-18
Payer: MEDICARE

## 2025-03-18 VITALS
SYSTOLIC BLOOD PRESSURE: 118 MMHG | OXYGEN SATURATION: 98 % | WEIGHT: 230 LBS | HEIGHT: 72 IN | TEMPERATURE: 98.6 F | HEART RATE: 66 BPM | DIASTOLIC BLOOD PRESSURE: 72 MMHG | BODY MASS INDEX: 31.15 KG/M2

## 2025-03-18 DIAGNOSIS — E11.42 TYPE 2 DIABETES MELLITUS WITH DIABETIC POLYNEUROPATHY, WITHOUT LONG-TERM CURRENT USE OF INSULIN (HCC): ICD-10-CM

## 2025-03-18 DIAGNOSIS — J44.9 COPD MIXED TYPE (HCC): ICD-10-CM

## 2025-03-18 DIAGNOSIS — Z00.00 MEDICARE ANNUAL WELLNESS VISIT, SUBSEQUENT: Primary | ICD-10-CM

## 2025-03-18 DIAGNOSIS — Z71.1 CONCERN ABOUT MEMORY: ICD-10-CM

## 2025-03-18 DIAGNOSIS — F33.2 SEVERE EPISODE OF RECURRENT MAJOR DEPRESSIVE DISORDER, WITHOUT PSYCHOTIC FEATURES (HCC): ICD-10-CM

## 2025-03-18 DIAGNOSIS — I12.9 BENIGN HYPERTENSION WITH CKD (CHRONIC KIDNEY DISEASE), STAGE II: ICD-10-CM

## 2025-03-18 DIAGNOSIS — N18.2 BENIGN HYPERTENSION WITH CKD (CHRONIC KIDNEY DISEASE), STAGE II: ICD-10-CM

## 2025-03-18 DIAGNOSIS — I73.9 PVD (PERIPHERAL VASCULAR DISEASE) WITH CLAUDICATION: ICD-10-CM

## 2025-03-18 DIAGNOSIS — C61 PROSTATE CANCER (HCC): ICD-10-CM

## 2025-03-18 PROCEDURE — 1036F TOBACCO NON-USER: CPT | Performed by: FAMILY MEDICINE

## 2025-03-18 PROCEDURE — G0439 PPPS, SUBSEQ VISIT: HCPCS | Performed by: FAMILY MEDICINE

## 2025-03-18 PROCEDURE — 99214 OFFICE O/P EST MOD 30 MIN: CPT | Performed by: FAMILY MEDICINE

## 2025-03-18 PROCEDURE — G8427 DOCREV CUR MEDS BY ELIG CLIN: HCPCS | Performed by: FAMILY MEDICINE

## 2025-03-18 PROCEDURE — 1123F ACP DISCUSS/DSCN MKR DOCD: CPT | Performed by: FAMILY MEDICINE

## 2025-03-18 PROCEDURE — 3017F COLORECTAL CA SCREEN DOC REV: CPT | Performed by: FAMILY MEDICINE

## 2025-03-18 PROCEDURE — 3023F SPIROM DOC REV: CPT | Performed by: FAMILY MEDICINE

## 2025-03-18 PROCEDURE — G8417 CALC BMI ABV UP PARAM F/U: HCPCS | Performed by: FAMILY MEDICINE

## 2025-03-18 PROCEDURE — 1125F AMNT PAIN NOTED PAIN PRSNT: CPT | Performed by: FAMILY MEDICINE

## 2025-03-18 PROCEDURE — 3044F HG A1C LEVEL LT 7.0%: CPT | Performed by: FAMILY MEDICINE

## 2025-03-18 PROCEDURE — 2022F DILAT RTA XM EVC RTNOPTHY: CPT | Performed by: FAMILY MEDICINE

## 2025-03-18 ASSESSMENT — ENCOUNTER SYMPTOMS
ABDOMINAL PAIN: 0
BLOOD IN STOOL: 0
COLOR CHANGE: 1
WHEEZING: 0
CHEST TIGHTNESS: 0
RECTAL PAIN: 0
APNEA: 1
COUGH: 0
SHORTNESS OF BREATH: 1
CONSTIPATION: 1
ABDOMINAL DISTENTION: 0
BACK PAIN: 1
VOMITING: 0
NAUSEA: 0
DIARRHEA: 0

## 2025-03-18 ASSESSMENT — PATIENT HEALTH QUESTIONNAIRE - PHQ9
1. LITTLE INTEREST OR PLEASURE IN DOING THINGS: NOT AT ALL
6. FEELING BAD ABOUT YOURSELF - OR THAT YOU ARE A FAILURE OR HAVE LET YOURSELF OR YOUR FAMILY DOWN: NEARLY EVERY DAY
4. FEELING TIRED OR HAVING LITTLE ENERGY: NEARLY EVERY DAY
5. POOR APPETITE OR OVEREATING: NEARLY EVERY DAY
SUM OF ALL RESPONSES TO PHQ QUESTIONS 1-9: 22
10. IF YOU CHECKED OFF ANY PROBLEMS, HOW DIFFICULT HAVE THESE PROBLEMS MADE IT FOR YOU TO DO YOUR WORK, TAKE CARE OF THINGS AT HOME, OR GET ALONG WITH OTHER PEOPLE: NOT DIFFICULT AT ALL
7. TROUBLE CONCENTRATING ON THINGS, SUCH AS READING THE NEWSPAPER OR WATCHING TELEVISION: NEARLY EVERY DAY
3. TROUBLE FALLING OR STAYING ASLEEP: NEARLY EVERY DAY
9. THOUGHTS THAT YOU WOULD BE BETTER OFF DEAD, OR OF HURTING YOURSELF: SEVERAL DAYS
8. MOVING OR SPEAKING SO SLOWLY THAT OTHER PEOPLE COULD HAVE NOTICED. OR THE OPPOSITE, BEING SO FIGETY OR RESTLESS THAT YOU HAVE BEEN MOVING AROUND A LOT MORE THAN USUAL: NEARLY EVERY DAY
2. FEELING DOWN, DEPRESSED OR HOPELESS: NEARLY EVERY DAY
SUM OF ALL RESPONSES TO PHQ QUESTIONS 1-9: 22
SUM OF ALL RESPONSES TO PHQ QUESTIONS 1-9: 21
SUM OF ALL RESPONSES TO PHQ QUESTIONS 1-9: 22

## 2025-03-18 ASSESSMENT — LIFESTYLE VARIABLES
HOW OFTEN DO YOU HAVE A DRINK CONTAINING ALCOHOL: NEVER
HOW MANY STANDARD DRINKS CONTAINING ALCOHOL DO YOU HAVE ON A TYPICAL DAY: PATIENT DOES NOT DRINK

## 2025-03-18 ASSESSMENT — VISUAL ACUITY
OD_CC: 20/40
OS_CC: 20/40

## 2025-03-18 ASSESSMENT — COLUMBIA-SUICIDE SEVERITY RATING SCALE - C-SSRS
2. HAVE YOU ACTUALLY HAD ANY THOUGHTS OF KILLING YOURSELF?: NO
1. WITHIN THE PAST MONTH, HAVE YOU WISHED YOU WERE DEAD OR WISHED YOU COULD GO TO SLEEP AND NOT WAKE UP?: NO
6. HAVE YOU EVER DONE ANYTHING, STARTED TO DO ANYTHING, OR PREPARED TO DO ANYTHING TO END YOUR LIFE?: NO

## 2025-03-18 NOTE — ASSESSMENT & PLAN NOTE
Chronic hypertension, well-controlled, continue current treatment amlodipine 10 Mg daily furosemide 20 Mg daily, lisinopril 20 Mg daily  Mild chronic kidney disease stage II stable, will continue to monitor, avoid calcium supplements due to calcium being towards the higher side    Orders:    ESTABLISHED, MOD MDM, 30-39 MIN [[id number]]

## 2025-03-18 NOTE — PROGRESS NOTES
03/03/2025    A1C test (Diabetic or Prediabetic)  04/30/2025    Diabetic foot exam  06/06/2025    Lung Cancer Screening &/or Counseling  06/06/2025    Lipids  10/22/2025    Depression Monitoring  01/30/2026    Diabetic Alb to Cr ratio (uACR) test  03/11/2026    GFR test (Diabetes, CKD 3-4, OR last GFR 15-59)  03/11/2026    Prostate Specific Antigen (PSA) Screening or Monitoring  03/11/2026    Colorectal Cancer Screen  12/11/2026    DTaP/Tdap/Td vaccine (2 - Td or Tdap) 03/01/2028    Flu vaccine  Completed    Shingles vaccine  Completed    Pneumococcal 50+ years Vaccine  Completed    Respiratory Syncytial Virus (RSV) Pregnant or age 60 yrs+  Completed    Hepatitis C screen  Completed    Hepatitis A vaccine  Aged Out    Hepatitis B vaccine  Aged Out    Hib vaccine  Aged Out    Polio vaccine  Aged Out    Meningococcal (ACWY) vaccine  Aged Out    Meningococcal B vaccine  Aged Out             
hearing aids. He has previously undergone hearing testing and was advised that hearing aids could alleviate his tinnitus. He reports that his tinnitus is most bothersome during sleep.    He is currently taking calcium supplements and reports normal bowel movements. He continues to experience hot flashes.    He is currently on Savella for depression and reports no issues with this medication.  Declines to change at this time    He is not experiencing any chest pain.  He reports shortness of breath at baseline.  He says he does use Stiolto.  He did not have any COPD exacerbation recently    He is not experiencing any constipation.    He is not experiencing any diaphoresis.    He reports his prostate cancer is worsening and he is waiting a phone call from another group for starting a new medication.  He is to schedule his PET scan and MRI cervical spine but he did not do it yet    Supplemental Information  He is not currently taking doxycycline. He has run out of Tessalon and is awaiting a refill. He uses Stiolto once daily and albuterol as needed. He is on 3 liters of oxygen at night per his CPAP    MEDICATIONS  Current: Savella, Lyrica, oxycodone, Stiolto, albuterol (as needed)  Discontinued: doxycycline, Tessalon    IMMUNIZATIONS  He is up to date with all his vaccines except the COVID-19 vaccine.    Severe depression.  Denies hallucinations or suicidal thoughts at this time    PHQ-2 Over the past 2 weeks, how often have you been bothered by any of the following problems?  Little interest or pleasure in doing things: Not at all  Feeling down, depressed, or hopeless: Nearly every day  PHQ-2 Score: 3  PHQ-9 Over the past 2 weeks, how often have you been bothered by any of the following problems?  Trouble falling or staying asleep, or sleeping too much: Nearly every day  Feeling tired or having little energy: Nearly every day  Poor appetite or overeating: Nearly every day  Feeling bad about yourself - or that you are a

## 2025-03-18 NOTE — PATIENT INSTRUCTIONS
device in the bathroom with you.   Where can you learn more?  Go to https://www.[employer]net/patientEd and enter G117 to learn more about \"Preventing Falls: Care Instructions.\"  Current as of: July 31, 2024  Content Version: 14.4  © 0418-6678 [employer].   Care instructions adapted under license by [employer]. If you have questions about a medical condition or this instruction, always ask your healthcare professional. [employer] disclaims any warranty or liability for your use of this information.         Learning About Mild Cognitive Impairment (MCI)  What is mild cognitive impairment (MCI)?     It's common to forget things sometimes as we get older. But some older people have memory loss that's more than normal aging. It's called mild cognitive impairment, or MCI. It is not the same as dementia.  People with the condition often know that their memory or mental function has changed. Tests may show some loss. But their minds work well overall. They can carry out daily tasks that are normal for them.  People with MCI have a higher chance of one day getting dementia. But not all people who have it will get dementia. Some people may stay the same over time.  What are the symptoms?  People with MCI have more memory loss than what occurs with normal aging. They may have increasing trouble with recalling words and keeping up with conversations. They may also have trouble remembering important events and making decisions.  What puts you at risk?  The risk of getting MCI increases with age. Having high blood pressure or having a family history of MCI may also increase your risk.  How is it diagnosed?  Your doctor will do a physical exam.  You may be asked questions to check your memory and other mental skills. Your doctor may also talk to close friends and family members. This can help the doctor figure out how your memory and other mental skills have changed.  You may get blood tests and tests

## 2025-03-18 NOTE — ASSESSMENT & PLAN NOTE
Chronic and worsening  His memory issues could be attributed to various factors such as depression, pain, and sleep disturbances. He will be referred to a neurologist for further evaluation.  Memory exercising discussed, he does play games with his 2 young great grandkids.  Orders:    ESTABLISHED, MOD MDM, 30-39 MIN [[id number]]    aKthy Ferguson MD, Neurology, [place]

## 2025-03-18 NOTE — ASSESSMENT & PLAN NOTE
Chronic and worsening, previously followed with Dr. Farrell, but did not see him in a long time, he says nobody contacted him  His podiatry did peripheral vascular testing, and he said he did contact Dr. Farrell's office but they did not call him back  On 12/23/2024 he had peripheral vascular disease testing showing resting KRISTIN 0.48 right side, severely decreased, left side resting KRISTIN 0.82 mildly decreased.  This is consistent with severe peripheral vascular disease right side, and mild peripheral vascular disease on the left side.  Will place new referral to vascular specialist  Continue aspirin 81 Mg daily and pravastatin 80 Mg daily  Orders:    ESTABLISHED, MOD MDM, 30-39 MIN [[id number]]    Ino Guerra MD, Vascular Surgery, [place]

## 2025-03-18 NOTE — ASSESSMENT & PLAN NOTE
Chronic type 2 diabetes mellitus, well-controlled, slightly worse than before, but at goal, continue current treatment, continue Mounjaro 5 Mg weekly, he declines to stop medication, he thinks the weight loss has been helping him. He reports a decrease in appetite since starting Mounjaro injections, but has been eating more proteins and even taking protein shakes. He is advised to continue the injections to avoid weight gain and to focus on eating protein-rich foods.    Chronic polyneuropathy, severe and apparently worsening, could be related to associated peripheral vascular disease worsening his symptoms  He has been diagnosed with neuropathy and has undergone testing under the care of Dr. Camp, per history description EMG. He has declined further testing for this condition.  Continue Lyrica 200 mg twice a day, vitamin B6, Savella, vitamin B12    He reports sores on his hands and feet, but no open skin, which have been evaluated and are consistent with a fungal infection. He is advised to continue using the prescribed cream from his foot doctor and to ensure it is applied consistently.    Lab Results   Component Value Date    LABA1C 5.9 01/30/2025    LABA1C 5.1 09/10/2024    LABA1C 5.0 07/16/2024       Orders:    ESTABLISHED, MOD MDM, 30-39 MIN [[id number]]

## 2025-03-19 PROBLEM — R61 EXCESSIVE SWEATING: Status: RESOLVED | Noted: 2022-05-02 | Resolved: 2025-03-19

## 2025-03-19 PROBLEM — K59.00 CONSTIPATION: Status: RESOLVED | Noted: 2023-11-21 | Resolved: 2025-03-19

## 2025-03-19 PROBLEM — D64.9 ANEMIA: Status: RESOLVED | Noted: 2018-10-07 | Resolved: 2025-03-19

## 2025-03-19 PROBLEM — F33.1 MODERATE EPISODE OF RECURRENT MAJOR DEPRESSIVE DISORDER (HCC): Status: RESOLVED | Noted: 2017-01-11 | Resolved: 2025-03-19

## 2025-03-19 PROBLEM — F33.2 SEVERE EPISODE OF RECURRENT MAJOR DEPRESSIVE DISORDER, WITHOUT PSYCHOTIC FEATURES (HCC): Status: ACTIVE | Noted: 2025-03-19

## 2025-03-19 PROBLEM — M85.80 OSTEOPENIA DETERMINED BY X-RAY: Status: RESOLVED | Noted: 2024-10-26 | Resolved: 2025-03-19

## 2025-03-19 PROBLEM — S22.42XA MULTIPLE CLOSED FRACTURES OF RIBS OF LEFT SIDE: Status: RESOLVED | Noted: 2024-10-26 | Resolved: 2025-03-19

## 2025-03-19 NOTE — ASSESSMENT & PLAN NOTE
Chronic and worsening due to pain he says and unable to do stuff that he was able to do before  His depression appears to be severe and worsening. He is currently on Savella, which he will continue as it helps with fibromyalgia-type pain and neuropathy.  In the past he was on Cymbalta, but he developed loss of taste and he does not want to try that anymore.  Will continue the same and continue to monitor depression  He has good support through his wife and has been watching great grandkids, which keeps him busy  Orders:    ESTABLISHED, MOD MDM, 30-39 MIN [[id number]]

## 2025-03-19 NOTE — ASSESSMENT & PLAN NOTE
Worsening, chronic, PSA is increasing despite his compliance with Xtandi    He is scheduled to have MRI cervical spine and PET scan, advised to schedule them  He is to start a new medication but nobody contacted him, advised to call urology office  Lab Results   Component Value Date    PSA 0.90 03/11/2025    PSA 0.60 12/09/2024    PSA 0.40 09/10/2024       Orders:    ESTABLISHED, MOD MDM, 30-39 MIN [[id number]]

## 2025-03-19 NOTE — ASSESSMENT & PLAN NOTE
Chronic and stable  To continue with Stiolto 2 puffs twice a day, and albuterol as needed, he is up-to-date with all the vaccines, he follows with Dr. Alfaro, will continue to monitor  Orders:    ESTABLISHED, MOD MDM, 30-39 MIN [[id number]]

## 2025-03-20 ENCOUNTER — HOSPITAL ENCOUNTER (OUTPATIENT)
Dept: MRI IMAGING | Age: 70
Discharge: HOME | End: 2025-03-22
Payer: MEDICARE

## 2025-03-20 DIAGNOSIS — M54.12 RADICULOPATHY OF CERVICAL SPINE: ICD-10-CM

## 2025-03-20 DIAGNOSIS — M54.2 CERVICALGIA: ICD-10-CM

## 2025-03-20 PROCEDURE — 72141 MRI NECK SPINE W/O DYE: CPT

## 2025-03-21 ENCOUNTER — HOSPITAL ENCOUNTER (OUTPATIENT)
Dept: NUCLEAR MEDICINE | Age: 70
Discharge: HOME | End: 2025-03-23
Attending: UROLOGY
Payer: MEDICARE

## 2025-03-21 DIAGNOSIS — C61 PROSTATE CANCER (HCC): ICD-10-CM

## 2025-03-21 PROCEDURE — 78815 PET IMAGE W/CT SKULL-THIGH: CPT

## 2025-03-21 PROCEDURE — 2500000003 HC RX 250 WO HCPCS: Performed by: UROLOGY

## 2025-03-21 PROCEDURE — 3430000000 HC RX DIAGNOSTIC RADIOPHARMACEUTICAL: Performed by: UROLOGY

## 2025-03-21 PROCEDURE — A9596 HC RX DIAGNOSTIC RADIOPHARMACEUTICAL: HCPCS | Performed by: UROLOGY

## 2025-03-21 RX ORDER — SODIUM CHLORIDE 0.9 % (FLUSH) 0.9 %
10 SYRINGE (ML) INJECTION ONCE
Status: COMPLETED | OUTPATIENT
Start: 2025-03-21 | End: 2025-03-21

## 2025-03-21 RX ADMIN — KIT FOR THE PREPARATION OF GALLIUM GA 68 GOZETOTIDE INJECTION 5.8 MILLICURIE: KIT INTRAVENOUS at 14:15

## 2025-03-21 RX ADMIN — SODIUM CHLORIDE, PRESERVATIVE FREE 10 ML: 5 INJECTION INTRAVENOUS at 14:15

## 2025-03-25 DIAGNOSIS — R05.8 OTHER COUGH: ICD-10-CM

## 2025-03-25 RX ORDER — ENZALUTAMIDE 40 MG/1
TABLET ORAL
Qty: 120 TABLET | Refills: 10 | Status: CANCELLED | OUTPATIENT
Start: 2025-03-25

## 2025-03-25 NOTE — TELEPHONE ENCOUNTER
Patient states that he is now out of medication and at his last visit on 3/17/25 Doctor told him that he had a new person that he send patient to for the Xtandi medication. Patient stated that doctor was going to have someone call him about getting his Xtandi form, and no one have call him yet and now he is out of medication.

## 2025-03-26 RX ORDER — BENZONATATE 100 MG/1
CAPSULE ORAL
Qty: 21 CAPSULE | Refills: 5 | Status: SHIPPED | OUTPATIENT
Start: 2025-03-26

## 2025-03-26 NOTE — TELEPHONE ENCOUNTER
Please Approve or Refuse.  Send to Pharmacy per Pt's Request: [facility]      Next Visit Date:  8/1/2025   Last Visit Date: 3/18/2025    Hemoglobin A1C (%)   Date Value   01/30/2025 5.9   09/10/2024 5.1   07/16/2024 5.0             ( goal A1C is < 7)   BP Readings from Last 3 Encounters:   03/19/25 118/70   03/18/25 118/72   03/17/25 124/74          (goal 120/80)  BUN   Date Value Ref Range Status   03/11/2025 15 8 - 23 mg/dL Final     Creatinine   Date Value Ref Range Status   03/11/2025 1.0 0.7 - 1.2 mg/dL Final     Potassium   Date Value Ref Range Status   03/11/2025 4.6 3.7 - 5.3 mmol/L Final

## 2025-03-27 ENCOUNTER — RESULTS FOLLOW-UP (OUTPATIENT)
Dept: UROLOGY | Age: 70
End: 2025-03-27

## 2025-03-27 RX ORDER — ENZALUTAMIDE 40 MG/1
4 TABLET ORAL DAILY
Qty: 360 TABLET | Refills: 1 | Status: SHIPPED
Start: 2025-03-27 | End: 2025-03-27 | Stop reason: CLARIF

## 2025-03-27 NOTE — TELEPHONE ENCOUNTER
Patient states he should receive his medication in the mail today. If he does not receive it, he will call office for samples.

## 2025-04-04 DIAGNOSIS — I50.32 CHRONIC DIASTOLIC HEART FAILURE (HCC): ICD-10-CM

## 2025-04-04 DIAGNOSIS — I12.9 BENIGN HYPERTENSION WITH CKD (CHRONIC KIDNEY DISEASE), STAGE II: ICD-10-CM

## 2025-04-04 DIAGNOSIS — N18.2 BENIGN HYPERTENSION WITH CKD (CHRONIC KIDNEY DISEASE), STAGE II: ICD-10-CM

## 2025-04-04 DIAGNOSIS — I10 ESSENTIAL HYPERTENSION: ICD-10-CM

## 2025-04-07 ENCOUNTER — OFFICE VISIT (OUTPATIENT)
Dept: UROLOGY | Age: 70
End: 2025-04-07
Payer: MEDICARE

## 2025-04-07 VITALS
WEIGHT: 231 LBS | DIASTOLIC BLOOD PRESSURE: 79 MMHG | SYSTOLIC BLOOD PRESSURE: 122 MMHG | BODY MASS INDEX: 31.29 KG/M2 | HEIGHT: 72 IN | OXYGEN SATURATION: 99 % | HEART RATE: 82 BPM

## 2025-04-07 DIAGNOSIS — C61 PROSTATE CANCER (HCC): Primary | ICD-10-CM

## 2025-04-07 DIAGNOSIS — R23.2 HOT FLASHES: ICD-10-CM

## 2025-04-07 DIAGNOSIS — R97.21 RISING PSA FOLLOWING TREATMENT FOR MALIGNANT NEOPLASM OF PROSTATE: ICD-10-CM

## 2025-04-07 PROCEDURE — 99214 OFFICE O/P EST MOD 30 MIN: CPT | Performed by: UROLOGY

## 2025-04-07 PROCEDURE — 1123F ACP DISCUSS/DSCN MKR DOCD: CPT | Performed by: UROLOGY

## 2025-04-07 PROCEDURE — 3017F COLORECTAL CA SCREEN DOC REV: CPT | Performed by: UROLOGY

## 2025-04-07 PROCEDURE — G8417 CALC BMI ABV UP PARAM F/U: HCPCS | Performed by: UROLOGY

## 2025-04-07 PROCEDURE — 1159F MED LIST DOCD IN RCRD: CPT | Performed by: UROLOGY

## 2025-04-07 PROCEDURE — 1036F TOBACCO NON-USER: CPT | Performed by: UROLOGY

## 2025-04-07 PROCEDURE — G8427 DOCREV CUR MEDS BY ELIG CLIN: HCPCS | Performed by: UROLOGY

## 2025-04-07 RX ORDER — FUROSEMIDE 20 MG/1
20 TABLET ORAL DAILY
Qty: 90 TABLET | Refills: 3 | Status: SHIPPED | OUTPATIENT
Start: 2025-04-07

## 2025-04-07 RX ORDER — DAROLUTAMIDE 300 MG/1
600 TABLET, FILM COATED ORAL 2 TIMES DAILY
Qty: 120 TABLET | Refills: 5 | Status: SHIPPED | OUTPATIENT
Start: 2025-04-07

## 2025-04-07 RX ORDER — AMLODIPINE BESYLATE 10 MG/1
10 TABLET ORAL DAILY
Qty: 90 TABLET | Refills: 3 | Status: SHIPPED | OUTPATIENT
Start: 2025-04-07

## 2025-04-07 ASSESSMENT — ENCOUNTER SYMPTOMS
BACK PAIN: 0
SHORTNESS OF BREATH: 0
EYE REDNESS: 0
EYE PAIN: 0
VOMITING: 0
WHEEZING: 0
ABDOMINAL PAIN: 0
CONSTIPATION: 0
DIARRHEA: 0
COUGH: 0
NAUSEA: 0

## 2025-04-07 NOTE — TELEPHONE ENCOUNTER
Please Approve or Refuse.  Send to Pharmacy per Pt's Request:      Next Visit Date:  8/1/2025   Last Visit Date: 3/18/2025    Hemoglobin A1C (%)   Date Value   01/30/2025 5.9   09/10/2024 5.1   07/16/2024 5.0             ( goal A1C is < 7)   BP Readings from Last 3 Encounters:   03/19/25 118/70   03/18/25 118/72   03/17/25 124/74          (goal 120/80)  BUN   Date Value Ref Range Status   03/11/2025 15 8 - 23 mg/dL Final     Creatinine   Date Value Ref Range Status   03/11/2025 1.0 0.7 - 1.2 mg/dL Final     Potassium   Date Value Ref Range Status   03/11/2025 4.6 3.7 - 5.3 mmol/L Final

## 2025-04-07 NOTE — PROGRESS NOTES
[facility] PHYSICIANS [facility], [facility] UROLOGY CENTER  [address]  [facility] [address]  Dept: [phone]    [facility] Urology Office Note - Established    Patient:  Bernardino Cote  YOB: 1955  Date: 4/7/2025    The patient is a 69 y.o. male who presents todayfor evaluation of the following problems:   Chief Complaint   Patient presents with    Other     3 week PSMA (Xtandi would cost $11 per month if sent to [facility] pharmacy)       HPI  This is a 69-year-old gentleman who has a rising PSA.  He has been on Eligard and Xtandi.  His PSMA PET shows no obvious metastatic disease.  His last Eligard injection was in December.    Summary of old records: N/A    Additional History: N/A    Procedures Today: N/A    Urinalysis today:  No results found for this visit on 04/07/25.  Last several PSA's:  Lab Results   Component Value Date    PSA 0.90 03/11/2025    PSA 0.60 12/09/2024    PSA 0.40 09/10/2024     Last total testosterone:  Lab Results   Component Value Date    TESTOSTERONE 6 (L) 03/11/2025       AUA Symptom Score (4/7/2025):                               Last BUN and creatinine:  Lab Results   Component Value Date    BUN 15 03/11/2025     Lab Results   Component Value Date    CREATININE 1.0 03/11/2025       Additional Lab/Culture results: none    Imaging Reviewed during this Office Visit: none  (results were independently reviewed by physician and radiology report verified)    PAST MEDICAL, FAMILY AND SOCIAL HISTORY UPDATE:  Past Medical History:   Diagnosis Date    Acid reflux     Acute renal failure with acute tubular necrosis superimposed on stage 4 chronic kidney disease (HCC) 05/09/2022    Acute respiratory failure with hypoxia 05/02/2022    Anemia 10/07/2018    Anemia, blood loss 10/07/2018    Arthritis     Benign hypertension with CKD (chronic kidney disease), stage II 05/12/2022    Bilateral leg edema 01/18/2021    history of    C. difficile

## 2025-04-14 ENCOUNTER — INITIAL CONSULT (OUTPATIENT)
Dept: VASCULAR SURGERY | Age: 70
End: 2025-04-14
Payer: MEDICARE

## 2025-04-14 VITALS
HEIGHT: 72 IN | OXYGEN SATURATION: 96 % | WEIGHT: 227 LBS | DIASTOLIC BLOOD PRESSURE: 79 MMHG | SYSTOLIC BLOOD PRESSURE: 160 MMHG | BODY MASS INDEX: 30.75 KG/M2 | HEART RATE: 79 BPM | RESPIRATION RATE: 16 BRPM

## 2025-04-14 DIAGNOSIS — I70.213 ATHEROSCLER OF NATIVE ARTERY OF BOTH LEGS WITH INTERMIT CLAUDICATION: Primary | ICD-10-CM

## 2025-04-14 PROCEDURE — 1159F MED LIST DOCD IN RCRD: CPT | Performed by: SURGERY

## 2025-04-14 PROCEDURE — 1125F AMNT PAIN NOTED PAIN PRSNT: CPT | Performed by: SURGERY

## 2025-04-14 PROCEDURE — G8427 DOCREV CUR MEDS BY ELIG CLIN: HCPCS | Performed by: SURGERY

## 2025-04-14 PROCEDURE — 1123F ACP DISCUSS/DSCN MKR DOCD: CPT | Performed by: SURGERY

## 2025-04-14 PROCEDURE — 1036F TOBACCO NON-USER: CPT | Performed by: SURGERY

## 2025-04-14 PROCEDURE — 99203 OFFICE O/P NEW LOW 30 MIN: CPT | Performed by: SURGERY

## 2025-04-14 PROCEDURE — G8417 CALC BMI ABV UP PARAM F/U: HCPCS | Performed by: SURGERY

## 2025-04-14 PROCEDURE — 3017F COLORECTAL CA SCREEN DOC REV: CPT | Performed by: SURGERY

## 2025-04-14 NOTE — PROGRESS NOTES
[facility], [facility] HEART AND VASCULAR  [address] [address]  Dept: [phone]     Patient: Bernardino Cote  : 1955  MRN: [id number]  DOS: 2025    Referring provider:  Asmita Hebert MD         HPI:  Bernardino Cote is a 69 y.o. male who comes to the office for the first time regarding bilateral lower extremity pain.  Recall that he has very significant neuropathy in both lower extremities.  He does not have a story consistent with rest pain.  1 foot is not worse than another and in fact he may have worse symptoms of neuropathy on the left compared to the right.  His KRISTIN on the right is 0.5 and on the left 0.8.  He has monophasic continuous-wave Doppler signals on the right tibials whereas they are biphasic on the left.  He walks with a walker at this point in time.  He has no ulcerations and no tissue loss.  He no longer smokes cigarettes and quit many many years ago.  He has high blood pressure and high cholesterol.  He uses aspirin 81 mg/day and has pravastatin at 80 mg/day.  Past Medical History:   Diagnosis Date    Acid reflux     Acute renal failure with acute tubular necrosis superimposed on stage 4 chronic kidney disease (HCC) 2022    Acute respiratory failure with hypoxia 2022    Anemia 10/07/2018    Anemia, blood loss 10/07/2018    Arthritis     Benign hypertension with CKD (chronic kidney disease), stage II 2022    Bilateral leg edema 2021    history of    C. difficile colitis 2020    Carcinoma of prostate 2021    Chronic bilateral low back pain with bilateral sciatica 2016    Chronic diastolic heart failure (HCC) 2021    Chronic hypoxemic respiratory failure 2020    Chronic renal disease, stage III ([facility]) [445123] 2022    Complete tear of left rotator cuff 2018    Constipation 2023    COPD (chronic obstructive pulmonary disease) (Formerly [facility])     COPD

## 2025-04-22 DIAGNOSIS — E11.42 TYPE 2 DIABETES MELLITUS WITH DIABETIC POLYNEUROPATHY, WITHOUT LONG-TERM CURRENT USE OF INSULIN (HCC): ICD-10-CM

## 2025-04-23 RX ORDER — TIRZEPATIDE 5 MG/.5ML
INJECTION, SOLUTION SUBCUTANEOUS
Qty: 6 ML | Refills: 3 | Status: SHIPPED | OUTPATIENT
Start: 2025-04-23

## 2025-04-23 NOTE — TELEPHONE ENCOUNTER
Patient needs nurse visit for blood pressure check  Future Appointments   Date Time Provider Department Center   5/19/2025 10:50 AM Juan Nguyen MD [facility]   8/1/2025  1:30 PM Asmita Hebert MD fp [facility] ECC DEP   9/24/2025  2:00 PM Omar Tipton DO [facility]   10/13/2025 11:15 AM Ino Koo MD [facility] HEART/VAS [facility]   3/18/2026 11:00 AM Asmita Hebert MD fp [facility] ECC DEP

## 2025-04-23 NOTE — TELEPHONE ENCOUNTER
Please Approve or Refuse.  Send to Pharmacy per Pt's Request:      Next Visit Date:  8/1/2025   Last Visit Date: 3/18/2025    Hemoglobin A1C (%)   Date Value   01/30/2025 5.9   09/10/2024 5.1   07/16/2024 5.0             ( goal A1C is < 7)   BP Readings from Last 3 Encounters:   04/14/25 (!) 160/79   04/07/25 122/79   03/19/25 118/70          (goal 120/80)  BUN   Date Value Ref Range Status   03/11/2025 15 8 - 23 mg/dL Final     Creatinine   Date Value Ref Range Status   03/11/2025 1.0 0.7 - 1.2 mg/dL Final     Potassium   Date Value Ref Range Status   03/11/2025 4.6 3.7 - 5.3 mmol/L Final

## 2025-04-27 DIAGNOSIS — E11.42 TYPE 2 DIABETES MELLITUS WITH DIABETIC POLYNEUROPATHY, WITHOUT LONG-TERM CURRENT USE OF INSULIN (HCC): ICD-10-CM

## 2025-04-27 RX ORDER — PREGABALIN 200 MG/1
200 CAPSULE ORAL 2 TIMES DAILY
Qty: 180 CAPSULE | Refills: 0 | Status: SHIPPED | OUTPATIENT
Start: 2025-04-27 | End: 2025-07-26

## 2025-05-07 ENCOUNTER — HOSPITAL ENCOUNTER (OUTPATIENT)
Age: 70
Setting detail: SPECIMEN
Discharge: HOME | End: 2025-05-07
Payer: MEDICARE

## 2025-05-07 DIAGNOSIS — C61 PROSTATE CANCER (HCC): ICD-10-CM

## 2025-05-07 LAB — PSA SERPL-MCNC: 0.87 NG/ML (ref 0–4)

## 2025-05-07 PROCEDURE — 84153 ASSAY OF PSA TOTAL: CPT

## 2025-05-07 PROCEDURE — 36415 COLL VENOUS BLD VENIPUNCTURE: CPT

## 2025-05-09 ENCOUNTER — RESULTS FOLLOW-UP (OUTPATIENT)
Dept: UROLOGY | Age: 70
End: 2025-05-09

## 2025-05-09 ENCOUNTER — TELEPHONE (OUTPATIENT)
Dept: FAMILY MEDICINE CLINIC | Age: 70
End: 2025-05-09

## 2025-05-09 NOTE — TELEPHONE ENCOUNTER
Lulu a Nurse Practitioner  from [facility] called in regarding Bernardino. She seen him this week in her office and said he got started on Nubeqa and since he started it he's been having more neuropathic pain and very achy. She said chemo is making the burning and tingling worse in his hands and arms. He sees Oncology Wednesday. She  is wondering if you would be ok if she changes his Lyrica to 150 MG 3x a day to see if it helps with his neuropathic pain. If you need to contact her she left her personal number because she is off today.         Thank you!

## 2025-05-09 NOTE — TELEPHONE ENCOUNTER
Yes, they can take over the lyrica and increase , but needs to be sent to mail order pharmacy    Future Appointments   Date Time Provider Department Center   5/19/2025 10:50 AM Juan Nguyen MD [facility]   8/1/2025  1:30 PM Asmita Hebert MD fp [facility] ECC DEP   9/24/2025  2:00 PM Omar Tipton DO [facility]   10/13/2025 11:15 AM Ino Koo MD [facility] HEART/VAS [facility]   3/18/2026 11:00 AM Asmita Hebert MD fp [facility] ECC DEP

## 2025-05-15 ENCOUNTER — OFFICE VISIT (OUTPATIENT)
Dept: FAMILY MEDICINE CLINIC | Age: 70
End: 2025-05-15
Payer: MEDICARE

## 2025-05-15 VITALS
BODY MASS INDEX: 30.85 KG/M2 | WEIGHT: 227.8 LBS | OXYGEN SATURATION: 98 % | HEIGHT: 72 IN | HEART RATE: 68 BPM | SYSTOLIC BLOOD PRESSURE: 120 MMHG | TEMPERATURE: 97.6 F | DIASTOLIC BLOOD PRESSURE: 60 MMHG

## 2025-05-15 DIAGNOSIS — E11.51 TYPE 2 DIABETES MELLITUS WITH DIABETIC PERIPHERAL ANGIOPATHY WITHOUT GANGRENE, WITHOUT LONG-TERM CURRENT USE OF INSULIN (HCC): ICD-10-CM

## 2025-05-15 DIAGNOSIS — S81.801A WOUND OF RIGHT LOWER EXTREMITY, INITIAL ENCOUNTER: Primary | ICD-10-CM

## 2025-05-15 DIAGNOSIS — I73.9 PVD (PERIPHERAL VASCULAR DISEASE) WITH CLAUDICATION: ICD-10-CM

## 2025-05-15 DIAGNOSIS — E11.42 TYPE 2 DIABETES MELLITUS WITH DIABETIC POLYNEUROPATHY, WITHOUT LONG-TERM CURRENT USE OF INSULIN (HCC): Primary | ICD-10-CM

## 2025-05-15 DIAGNOSIS — C61 PROSTATE CANCER (HCC): ICD-10-CM

## 2025-05-15 DIAGNOSIS — E11.42 TYPE 2 DIABETES MELLITUS WITH DIABETIC POLYNEUROPATHY, WITHOUT LONG-TERM CURRENT USE OF INSULIN (HCC): ICD-10-CM

## 2025-05-15 PROBLEM — R26.81 GAIT INSTABILITY: Status: RESOLVED | Noted: 2023-11-21 | Resolved: 2025-05-15

## 2025-05-15 PROBLEM — F41.8 OTHER SPECIFIED ANXIETY DISORDERS: Status: RESOLVED | Noted: 2022-05-04 | Resolved: 2025-05-15

## 2025-05-15 PROBLEM — G62.9 POLYNEUROPATHY: Status: RESOLVED | Noted: 2024-10-23 | Resolved: 2025-05-15

## 2025-05-15 PROBLEM — E78.1 HYPERTRIGLYCERIDEMIA: Status: RESOLVED | Noted: 2017-05-06 | Resolved: 2025-05-15

## 2025-05-15 PROBLEM — M62.81 MUSCULAR WEAKNESS: Status: RESOLVED | Noted: 2022-05-02 | Resolved: 2025-05-15

## 2025-05-15 PROBLEM — Z87.898 HISTORY OF SYNCOPE: Status: RESOLVED | Noted: 2022-05-12 | Resolved: 2025-05-15

## 2025-05-15 LAB — HBA1C MFR BLD: 5.9 %

## 2025-05-15 PROCEDURE — 1123F ACP DISCUSS/DSCN MKR DOCD: CPT | Performed by: FAMILY MEDICINE

## 2025-05-15 PROCEDURE — 1160F RVW MEDS BY RX/DR IN RCRD: CPT | Performed by: FAMILY MEDICINE

## 2025-05-15 PROCEDURE — 2022F DILAT RTA XM EVC RTNOPTHY: CPT | Performed by: FAMILY MEDICINE

## 2025-05-15 PROCEDURE — 1036F TOBACCO NON-USER: CPT | Performed by: FAMILY MEDICINE

## 2025-05-15 PROCEDURE — 3017F COLORECTAL CA SCREEN DOC REV: CPT | Performed by: FAMILY MEDICINE

## 2025-05-15 PROCEDURE — 3044F HG A1C LEVEL LT 7.0%: CPT | Performed by: FAMILY MEDICINE

## 2025-05-15 PROCEDURE — G8427 DOCREV CUR MEDS BY ELIG CLIN: HCPCS | Performed by: FAMILY MEDICINE

## 2025-05-15 PROCEDURE — 99214 OFFICE O/P EST MOD 30 MIN: CPT | Performed by: FAMILY MEDICINE

## 2025-05-15 PROCEDURE — 1159F MED LIST DOCD IN RCRD: CPT | Performed by: FAMILY MEDICINE

## 2025-05-15 PROCEDURE — 1126F AMNT PAIN NOTED NONE PRSNT: CPT | Performed by: FAMILY MEDICINE

## 2025-05-15 PROCEDURE — G8417 CALC BMI ABV UP PARAM F/U: HCPCS | Performed by: FAMILY MEDICINE

## 2025-05-15 PROCEDURE — 83036 HEMOGLOBIN GLYCOSYLATED A1C: CPT | Performed by: FAMILY MEDICINE

## 2025-05-15 RX ORDER — DOXYCYCLINE HYCLATE 100 MG
100 TABLET ORAL 2 TIMES DAILY
Qty: 14 TABLET | Refills: 0 | Status: SHIPPED | OUTPATIENT
Start: 2025-05-15 | End: 2025-05-22

## 2025-05-15 RX ORDER — MUPIROCIN 20 MG/G
OINTMENT TOPICAL
Qty: 30 G | Refills: 3 | Status: SHIPPED | OUTPATIENT
Start: 2025-05-15

## 2025-05-15 RX ORDER — PREGABALIN 150 MG/1
150 CAPSULE ORAL 2 TIMES DAILY
COMMUNITY
Start: 2025-05-09 | End: 2025-05-15 | Stop reason: CLARIF

## 2025-05-15 SDOH — ECONOMIC STABILITY: FOOD INSECURITY: WITHIN THE PAST 12 MONTHS, THE FOOD YOU BOUGHT JUST DIDN'T LAST AND YOU DIDN'T HAVE MONEY TO GET MORE.: NEVER TRUE

## 2025-05-15 SDOH — ECONOMIC STABILITY: FOOD INSECURITY: WITHIN THE PAST 12 MONTHS, YOU WORRIED THAT YOUR FOOD WOULD RUN OUT BEFORE YOU GOT MONEY TO BUY MORE.: NEVER TRUE

## 2025-05-15 ASSESSMENT — PATIENT HEALTH QUESTIONNAIRE - PHQ9
2. FEELING DOWN, DEPRESSED OR HOPELESS: NOT AT ALL
7. TROUBLE CONCENTRATING ON THINGS, SUCH AS READING THE NEWSPAPER OR WATCHING TELEVISION: NOT AT ALL
4. FEELING TIRED OR HAVING LITTLE ENERGY: NEARLY EVERY DAY
10. IF YOU CHECKED OFF ANY PROBLEMS, HOW DIFFICULT HAVE THESE PROBLEMS MADE IT FOR YOU TO DO YOUR WORK, TAKE CARE OF THINGS AT HOME, OR GET ALONG WITH OTHER PEOPLE: NOT DIFFICULT AT ALL
8. MOVING OR SPEAKING SO SLOWLY THAT OTHER PEOPLE COULD HAVE NOTICED. OR THE OPPOSITE, BEING SO FIGETY OR RESTLESS THAT YOU HAVE BEEN MOVING AROUND A LOT MORE THAN USUAL: NOT AT ALL
SUM OF ALL RESPONSES TO PHQ QUESTIONS 1-9: 6
9. THOUGHTS THAT YOU WOULD BE BETTER OFF DEAD, OR OF HURTING YOURSELF: NOT AT ALL
SUM OF ALL RESPONSES TO PHQ QUESTIONS 1-9: 6
5. POOR APPETITE OR OVEREATING: NOT AT ALL
SUM OF ALL RESPONSES TO PHQ QUESTIONS 1-9: 6
SUM OF ALL RESPONSES TO PHQ QUESTIONS 1-9: 6
3. TROUBLE FALLING OR STAYING ASLEEP: NEARLY EVERY DAY
6. FEELING BAD ABOUT YOURSELF - OR THAT YOU ARE A FAILURE OR HAVE LET YOURSELF OR YOUR FAMILY DOWN: NOT AT ALL
1. LITTLE INTEREST OR PLEASURE IN DOING THINGS: NOT AT ALL

## 2025-05-15 ASSESSMENT — ENCOUNTER SYMPTOMS
COUGH: 0
VOMITING: 0
WHEEZING: 0
ABDOMINAL PAIN: 0
BACK PAIN: 1
NAUSEA: 0
ABDOMINAL DISTENTION: 0
DIARRHEA: 0
BLOOD IN STOOL: 0
APNEA: 1
SHORTNESS OF BREATH: 1
CONSTIPATION: 0
CHEST TIGHTNESS: 0
RECTAL PAIN: 0
COLOR CHANGE: 1

## 2025-05-15 NOTE — ASSESSMENT & PLAN NOTE
Chronic and improved  - The patient's PET scan shows no metastasis, and his PSA levels have decreased from 0.90 to 0.87 in just a few weeks.  - He is currently on Nubeqa and Eligard injections, with the last injection administered in 12/2024.  - He is advised to continue his current treatment regimen and follow up with Dr. Nguyen in 6 weeks for a PSA check.  - Prescription for Nubeqa has been verified and confirmed.  Xtandi was stopped by his urologist in March.  He does have upcoming appointment soon    Lab Results   Component Value Date    PSA 0.87 05/07/2025    PSA 0.90 03/11/2025    PSA 0.60 12/09/2024       - His A1c level is 5.9, indicating good control.  - Kidney function is mildly decreased.  - He is advised to maintain a daily water intake of 48 ounces.  - Blood work has been ordered for 1 month from now.

## 2025-05-15 NOTE — ASSESSMENT & PLAN NOTE
Chronic and worsening  - The patient's right leg shows severe decreased circulation, while the left leg has mild decreased circulation on the PVR from 12/23/2024  - This condition is contributing to the delayed healing of the wound.  Discussed in detail with patient  - He is advised to continue monitoring his condition and follow up with Dr. Koo in 6 months for re-evaluation.  - Referral to wound care has been initiated to assist with wound management.  If the wound does not heal within 2 weeks, then he will need to see the vascular surgeon faster for possible intervention on the right leg.  To continue with high dosage pravastatin 80 Mg, could not tolerate other statins due to muscle pains

## 2025-05-15 NOTE — PROGRESS NOTES
have spent 34 minutes reviewing previous notes, test results and face to face with the patient discussing the diagnosis and importance of compliance with the treatment plan as well as documenting on the day of the visit.        The patient (or guardian, if applicable) and other individuals in attendance with the patient were advised that Artificial Intelligence will be utilized during this visit to record, process the conversation to generate a clinical note and to support improvement of the AI technology. The patient (or guardian, if applicable) and other individuals in attendance at the appointment consented to the use of AI, including the recording.      An electronic signature was used to authenticate this note.    --Asmita Hebert MD   Electronically signed by Asmita Hebert MD on 5/15/25 at 8:00 PM EDT

## 2025-05-15 NOTE — ASSESSMENT & PLAN NOTE
Chronic type 2 diabetes mellitus, well-controlled, most recent hemoglobin A1c 5.9, continue Mounjaro 5 Mg weekly  Chronic peripheral vascular disease and worsening, severe on the right side and mild on the left lower extremity  Orders:    CBC with Auto Differential; Future    Comprehensive Metabolic Panel; Future    TSH; Future    Uric Acid; Future    Vitamin B12 & Folate; Future    Hemoglobin A1C; Future

## 2025-05-15 NOTE — ASSESSMENT & PLAN NOTE
Type 2 diabetes mellitus very well-controlled  Chronic polyneuropathy and worsening, not well-controlled  Hemoglobin A1c 5.9 very well-controlled, to continue with Mounjaro 5 Mg weekly, denies side effects from it    Lab Results   Component Value Date    LABA1C 5.9 01/30/2025    LABA1C 5.1 09/10/2024    LABA1C 5.0 07/16/2024       The patient reports severe pain in his hands and feet, likely due to chemotherapy for prostate cancer.  - He is currently on Lyrica 200 mg twice a day. Pain management has suggested increasing the dosage to 150 mg three times a day.  He will let me know if pain management takes over the Lyrica or they want me to continue to prescribe it and what dosage would they like him to increase to.  He declines increasing the Lyrica to 200 mg 3 times a day which would be 600 mg/day and it could impair his memory  - He is advised to contact us if he needs assistance with adjusting the medication.  - Prescription for Lyrica has been reviewed and confirmed  Check labs in 1 month  Continue topical creams from podiatry for his neuropathy including capsaicin  Also continue Savella 100 Mg daily  Orders:    CBC with Auto Differential; Future    Comprehensive Metabolic Panel; Future    TSH; Future    Uric Acid; Future    Vitamin B12 & Folate; Future    Hemoglobin A1C; Future

## 2025-05-19 ENCOUNTER — OFFICE VISIT (OUTPATIENT)
Dept: UROLOGY | Age: 70
End: 2025-05-19
Payer: MEDICARE

## 2025-05-19 ENCOUNTER — RESULTS FOLLOW-UP (OUTPATIENT)
Dept: FAMILY MEDICINE CLINIC | Age: 70
End: 2025-05-19

## 2025-05-19 VITALS
DIASTOLIC BLOOD PRESSURE: 74 MMHG | HEART RATE: 58 BPM | OXYGEN SATURATION: 92 % | HEIGHT: 72 IN | TEMPERATURE: 97.2 F | BODY MASS INDEX: 30.75 KG/M2 | WEIGHT: 227 LBS | SYSTOLIC BLOOD PRESSURE: 118 MMHG

## 2025-05-19 DIAGNOSIS — R23.2 HOT FLASHES: ICD-10-CM

## 2025-05-19 DIAGNOSIS — R97.21 RISING PSA FOLLOWING TREATMENT FOR MALIGNANT NEOPLASM OF PROSTATE: ICD-10-CM

## 2025-05-19 DIAGNOSIS — C61 PROSTATE CANCER (HCC): Primary | ICD-10-CM

## 2025-05-19 PROCEDURE — 1159F MED LIST DOCD IN RCRD: CPT | Performed by: UROLOGY

## 2025-05-19 PROCEDURE — G8417 CALC BMI ABV UP PARAM F/U: HCPCS | Performed by: UROLOGY

## 2025-05-19 PROCEDURE — 3017F COLORECTAL CA SCREEN DOC REV: CPT | Performed by: UROLOGY

## 2025-05-19 PROCEDURE — 1123F ACP DISCUSS/DSCN MKR DOCD: CPT | Performed by: UROLOGY

## 2025-05-19 PROCEDURE — G8427 DOCREV CUR MEDS BY ELIG CLIN: HCPCS | Performed by: UROLOGY

## 2025-05-19 PROCEDURE — 96402 CHEMO HORMON ANTINEOPL SQ/IM: CPT | Performed by: UROLOGY

## 2025-05-19 PROCEDURE — 1036F TOBACCO NON-USER: CPT | Performed by: UROLOGY

## 2025-05-19 PROCEDURE — 99213 OFFICE O/P EST LOW 20 MIN: CPT | Performed by: UROLOGY

## 2025-05-19 ASSESSMENT — ENCOUNTER SYMPTOMS
BACK PAIN: 0
EYE REDNESS: 0
SHORTNESS OF BREATH: 0
WHEEZING: 0
GASTROINTESTINAL NEGATIVE: 1
EYE PAIN: 0
NAUSEA: 0
COLOR CHANGE: 0
RESPIRATORY NEGATIVE: 1
EYES NEGATIVE: 1
ALLERGIC/IMMUNOLOGIC NEGATIVE: 1
COUGH: 0
VOMITING: 0
ABDOMINAL PAIN: 0

## 2025-05-19 NOTE — PROGRESS NOTES
[facility] PHYSICIANS [facility], [facility] UROLOGY CENTER  [address]  [facility] [address]  Dept: [phone]    [facility] Urology Office Note - Established    Patient:  Bernardino Cote  YOB: 1955  Date: 5/19/2025    The patient is a 69 y.o. male who presents todayfor evaluation of the following problems:   Chief Complaint   Patient presents with    Prostate Cancer      6 weeks with Eligard and PSA (PA good thru 12/11/25)         HPI  This is a very pleasant 69-year-old gentleman with prostate cancer.  His PSA had been rising.  It seems to have stabilized and is 0.87.  It was 0.902 months ago.  He has been on Eligard and Nubeqa and is due for his Eligard injection today.    Summary of old records: N/A    Additional History: N/A    Procedures Today: N/A    Urinalysis today:  No results found for this visit on 05/19/25.  Last several PSA's:  Lab Results   Component Value Date    PSA 0.87 05/07/2025    PSA 0.90 03/11/2025    PSA 0.60 12/09/2024     Last total testosterone:  Lab Results   Component Value Date    TESTOSTERONE 6 (L) 03/11/2025       AUA Symptom Score (5/19/2025):                               Last BUN and creatinine:  Lab Results   Component Value Date    BUN 15 03/11/2025     Lab Results   Component Value Date    CREATININE 1.0 03/11/2025       Additional Lab/Culture results: none    Imaging Reviewed during this Office Visit: none  (results were independently reviewed by physician and radiology report verified)    PAST MEDICAL, FAMILY AND SOCIAL HISTORY UPDATE:  Past Medical History:   Diagnosis Date    Acid reflux     Acute renal failure with acute tubular necrosis superimposed on stage 4 chronic kidney disease (HCC) 05/09/2022    Acute respiratory failure with hypoxia (HCC) 05/02/2022    Anemia 10/07/2018    Anemia, blood loss 10/07/2018    Arthritis     Benign hypertension with CKD (chronic kidney disease), stage II 05/12/2022    Bilateral leg

## 2025-05-19 NOTE — RESULT ENCOUNTER NOTE
Addressed during office visit today, POC A1c 5.9, stable diabetes and very well-controlled, continue treatment recommended during the office visit.

## 2025-05-19 NOTE — PROGRESS NOTES
DX: Prostate Cancer  After obtaining written and verbal consent, and per orders of Dr. Nguyen, Kristen 45mg administered in RLQ of abdomen by Shelly Hurtado RN. Patient was instructed to remain in clinic for 20 mins following injection and report any adverse reactions to me immediately. He tolerated the injection without any complications. We reviewed that the side effects include hot flashes, fatigue, breast enlargement, diminished libido, ED and long term development of osteoporosis.  The patient was told he will encouraged to take supplemental Calcium and Vitamin D to prevent the latter.

## 2025-05-19 NOTE — DISCHARGE INSTRUCTIONS
[facility] WOUND and HYPERBARIC TREATMENT  CENTER      Visit  Discharge Instructions / Physician Orders  DATE:5/23/25     Home Care:NA     SUPPLIES ORDERED THRU:                     DATE LAST SUPPLIED     Wound Location:  Right  Lower leg     Cleanse with: Liquid antibacterial soap and water, rinse well      Dressing Orders:  Primary dressing    Jeanine      (may add normal saline if to dry)              Secondary dressing   Silicone border dressing                          Frequency:  Daily     Additional Orders: Increase protein to diet (meat, cheese, eggs, fish, peanut butter, nuts and beans)  Multivitamin daily  Elevate legs if swollen or wearing compression when sitting  OFFLOADING [] YES  TYPE:                  [x] NA    Weekly wound care visits until determined otherwise.    Antibiotic therapy-wound care related YES [] NO [x] NA[]  DRUG-                                                             Duration-             Script sent to-                      on (date)  MY CHART []     Smart Device  []     HYPERBARIC TREATMENT-                TREATMENT #                          Your next appointment with the Wound Care Center is in 2 weeks                                                                                                   (Please note your next appointment above and if you are unable to keep, kindly give a 24 hour notice. Thank you.)  If more than 15 min late we cannot guarantee you will be seen due to clinician schedule    Per Policy,  3 or more cancellations or no show may result in dismissal from program  If you experience any of the following, please call the Wound Care Center during business hours:  [phone]     * Increase in Pain  * Temperature over 101  * Increase in drainage from your wound  * Drainage with a foul odor  * Bleeding  * Increase in swelling  * Need for compression bandage changes due to slippage, breakthrough drainage.     If you need medical attention outside of the

## 2025-05-20 DIAGNOSIS — I73.9 PVD (PERIPHERAL VASCULAR DISEASE) WITH CLAUDICATION: Primary | ICD-10-CM

## 2025-05-20 DIAGNOSIS — S81.801A WOUND OF RIGHT LOWER EXTREMITY, INITIAL ENCOUNTER: ICD-10-CM

## 2025-05-21 ENCOUNTER — HOSPITAL ENCOUNTER (OUTPATIENT)
Age: 70
Setting detail: SPECIMEN
Discharge: HOME | End: 2025-05-21

## 2025-05-21 DIAGNOSIS — E11.51 TYPE 2 DIABETES MELLITUS WITH DIABETIC PERIPHERAL ANGIOPATHY WITHOUT GANGRENE, WITHOUT LONG-TERM CURRENT USE OF INSULIN (HCC): ICD-10-CM

## 2025-05-21 DIAGNOSIS — E11.42 TYPE 2 DIABETES MELLITUS WITH DIABETIC POLYNEUROPATHY, WITHOUT LONG-TERM CURRENT USE OF INSULIN (HCC): ICD-10-CM

## 2025-05-21 LAB
ALBUMIN SERPL-MCNC: 4 G/DL (ref 3.5–5.2)
ALBUMIN/GLOB SERPL: 1.3 {RATIO} (ref 1–2.5)
ALP SERPL-CCNC: 121 U/L (ref 40–129)
ALT SERPL-CCNC: 10 U/L (ref 10–50)
ANION GAP SERPL CALCULATED.3IONS-SCNC: 14 MMOL/L (ref 9–16)
AST SERPL-CCNC: 17 U/L (ref 10–50)
BASOPHILS # BLD: 0.09 K/UL (ref 0–0.2)
BASOPHILS NFR BLD: 1 % (ref 0–2)
BILIRUB SERPL-MCNC: 0.3 MG/DL (ref 0–1.2)
BUN SERPL-MCNC: 15 MG/DL (ref 8–23)
CALCIUM SERPL-MCNC: 10.3 MG/DL (ref 8.6–10.4)
CHLORIDE SERPL-SCNC: 100 MMOL/L (ref 98–107)
CO2 SERPL-SCNC: 24 MMOL/L (ref 20–31)
CREAT SERPL-MCNC: 1 MG/DL (ref 0.7–1.2)
EOSINOPHIL # BLD: 0.08 K/UL (ref 0–0.44)
EOSINOPHILS RELATIVE PERCENT: 1 % (ref 1–4)
ERYTHROCYTE [DISTWIDTH] IN BLOOD BY AUTOMATED COUNT: 13.3 % (ref 11.8–14.4)
FOLATE SERPL-MCNC: 4 NG/ML (ref 4.8–24.2)
GFR, ESTIMATED: 81 ML/MIN/1.73M2
GLUCOSE SERPL-MCNC: 79 MG/DL (ref 74–99)
HCT VFR BLD AUTO: 40.8 % (ref 40.7–50.3)
HGB BLD-MCNC: 13.4 G/DL (ref 13–17)
IMM GRANULOCYTES # BLD AUTO: <0.03 K/UL (ref 0–0.3)
IMM GRANULOCYTES NFR BLD: 0 %
LYMPHOCYTES NFR BLD: 2.85 K/UL (ref 1.1–3.7)
LYMPHOCYTES RELATIVE PERCENT: 33 % (ref 24–43)
MCH RBC QN AUTO: 29.8 PG (ref 25.2–33.5)
MCHC RBC AUTO-ENTMCNC: 32.8 G/DL (ref 28.4–34.8)
MCV RBC AUTO: 90.9 FL (ref 82.6–102.9)
MONOCYTES NFR BLD: 0.63 K/UL (ref 0.1–1.2)
MONOCYTES NFR BLD: 7 % (ref 3–12)
NEUTROPHILS NFR BLD: 58 % (ref 36–65)
NEUTS SEG NFR BLD: 5.09 K/UL (ref 1.5–8.1)
NRBC BLD-RTO: 0 PER 100 WBC
PLATELET # BLD AUTO: 279 K/UL (ref 138–453)
PMV BLD AUTO: 11.1 FL (ref 8.1–13.5)
POTASSIUM SERPL-SCNC: 4.7 MMOL/L (ref 3.7–5.3)
PROT SERPL-MCNC: 7.2 G/DL (ref 6.6–8.7)
RBC # BLD AUTO: 4.49 M/UL (ref 4.21–5.77)
SODIUM SERPL-SCNC: 138 MMOL/L (ref 136–145)
TSH SERPL DL<=0.05 MIU/L-ACNC: 0.79 UIU/ML (ref 0.27–4.2)
URATE SERPL-MCNC: 6.2 MG/DL (ref 3.4–7)
VIT B12 SERPL-MCNC: >2000 PG/ML (ref 232–1245)
WBC OTHER # BLD: 8.8 K/UL (ref 3.5–11.3)

## 2025-05-21 RX ORDER — DOXYCYCLINE HYCLATE 100 MG
100 TABLET ORAL 2 TIMES DAILY
Qty: 14 TABLET | Refills: 0 | Status: SHIPPED | OUTPATIENT
Start: 2025-05-21 | End: 2025-05-28

## 2025-05-21 RX ORDER — DOXYCYCLINE HYCLATE 100 MG
TABLET ORAL
Qty: 14 TABLET | Refills: 0 | OUTPATIENT
Start: 2025-05-21

## 2025-05-21 NOTE — TELEPHONE ENCOUNTER
Please let the patient know to  prescription from the pharmacy.  I ALSO PLACED NEW REFERRAL TO HIS VASCULAR TO CHECK HIS LEGS FOR PVD AGAIN, KEEP appointment WITH WOUND CARE TOO    Orders Placed This Encounter   Medications    doxycycline hyclate (VIBRA-TABS) 100 MG tablet     Sig: Take 1 tablet by mouth 2 times daily for 7 days     Dispense:  14 tablet     Refill:  0       [facility] [id number] - [place], [place] - [address] P [phone] - F [phone]  [address] [place] [address]  Phone: [phone] Fax: [phone]      Orders Placed This Encounter   Procedures    Ino Guerra MD, Vascular Surgery, [place]     Referral Priority:   Urgent     Referral Type:   Eval and Treat     Referral Reason:   Specialty Services Required     Referred to Provider:   Ino Koo MD     Requested Specialty:   Vascular Surgery     Number of Visits Requested:   1       Future Appointments   Date Time Provider Department Center   5/23/2025  2:15 PM Danay Wang, APRN - CNP [facility]   8/1/2025  1:30 PM Asmita Hebert MD fp [facility] ECC DEP   8/18/2025 10:50 AM Juan Nguyen MD [facility]   9/24/2025  2:00 PM Omar Tipton DO [facility]   10/13/2025 11:15 AM Ino Koo MD [facility] HEART/VAS [facility]   3/18/2026 11:00 AM Asmita Hebert MD fp [facility] ECC DEP       Thank you!

## 2025-05-21 NOTE — TELEPHONE ENCOUNTER
Pt states its not healing but its not looking any worse just as its been he states he goes to wound  Friday

## 2025-05-21 NOTE — TELEPHONE ENCOUNTER
Did he request refill of his doxycycline?  Is the wound not healing?  Needs to call Dr. Koo  Future Appointments   Date Time Provider Department Center   5/23/2025  2:15 PM Danay Wang APRN - CNP [facility]   8/1/2025  1:30 PM Asmita Hebert MD fp [facility] ECC DEP   8/18/2025 10:50 AM Juan Nguyen MD [facility]   9/24/2025  2:00 PM Omar Tipton DO AFL [facility]   10/13/2025 11:15 AM Ino Koo MD [facility] HEART/VAS [facility]   3/18/2026 11:00 AM Asmita Hebert MD fp [facility] ECC DEP

## 2025-05-21 NOTE — TELEPHONE ENCOUNTER
Please Approve or Refuse.  Send to Pharmacy per Pt's Request:      Next Visit Date:  8/1/2025   Last Visit Date: 5/15/2025    Hemoglobin A1C (%)   Date Value   05/15/2025 5.9   01/30/2025 5.9   09/10/2024 5.1             ( goal A1C is < 7)   BP Readings from Last 3 Encounters:   05/19/25 118/74   05/15/25 120/60   04/14/25 (!) 160/79          (goal 120/80)  BUN   Date Value Ref Range Status   03/11/2025 15 8 - 23 mg/dL Final     Creatinine   Date Value Ref Range Status   03/11/2025 1.0 0.7 - 1.2 mg/dL Final     Potassium   Date Value Ref Range Status   03/11/2025 4.6 3.7 - 5.3 mmol/L Final

## 2025-05-22 ENCOUNTER — RESULTS FOLLOW-UP (OUTPATIENT)
Dept: FAMILY MEDICINE CLINIC | Age: 70
End: 2025-05-22

## 2025-05-22 DIAGNOSIS — E11.51 TYPE 2 DIABETES MELLITUS WITH DIABETIC PERIPHERAL ANGIOPATHY WITHOUT GANGRENE, WITHOUT LONG-TERM CURRENT USE OF INSULIN (HCC): ICD-10-CM

## 2025-05-22 DIAGNOSIS — E53.8 FOLIC ACID DEFICIENCY: Primary | ICD-10-CM

## 2025-05-22 LAB
EST. AVERAGE GLUCOSE BLD GHB EST-MCNC: 91 MG/DL
HBA1C MFR BLD: 4.8 % (ref 4–6)

## 2025-05-22 RX ORDER — FOLIC ACID 1 MG/1
1 TABLET ORAL DAILY
Qty: 90 TABLET | Refills: 3 | Status: SHIPPED | OUTPATIENT
Start: 2025-05-22

## 2025-05-22 NOTE — RESULT ENCOUNTER NOTE
Please notify patient: Folic acid very low, needs to start folic acid 1 mg, it would improve his polyneuropathy.  Vitamin B12 was normal.  Kidney disease stage II very mild decreased same as before to drink 48 to 64 ounce byrd  Otherwise labs within normal limits  continue current treatment      I will send folic acid to the pharmacy  Future Appointments  5/23/2025  2:15 PM    Danay Wang APRN -* [facility]        [facility]  8/1/2025   1:30 PM    Asmita Hebert MD    fp [facility] ECC DEP  8/18/2025  10:50 AM   Juan Nguyen MD        [facility]           [facility]  9/24/2025  2:00 PM    Omar Tipton DO             AFL T[facility] O[facility]        [facility]  10/13/2025 11:15 AM   Ino Koo MD    [facility] HEART/VAS        [facility]  3/18/2026  11:00 AM   Asmita Hebert MD    fp [facility] DEP

## 2025-05-22 NOTE — RESULT ENCOUNTER NOTE
Please notify patient: Patient did not read the message, he might have difficulty checking his MyChart.  Also hemoglobin A1c improved  Folic acid very low, needs to start folic acid 1 mg, it would improve his polyneuropathy.  Vitamin B12 was normal.  Kidney disease stage II very mild decreased same as before to drink 48 to 64 ounce byrd  Otherwise labs within normal limits  continue current treatment    Future Appointments  5/23/2025  2:15 PM    Danay Wang APRN -* [facility] WND CAR        [facility]  8/1/2025   1:30 PM    Asmita Hebert MD    fp [facility] ECC DEP  8/18/2025  10:50 AM   Juan Nguyen MD        [facility]           [facility]  9/24/2025  2:00 PM    Omar Tipton DO             AFL TCC O[facility]        [facility]  10/13/2025 11:15 AM   Ino Koo MD    [facility] HEART/VAS        [facility]  3/18/2026  11:00 AM   Asmita Hebert MD    fp [facility] DEP

## 2025-05-23 ENCOUNTER — HOSPITAL ENCOUNTER (OUTPATIENT)
Dept: WOUND CARE | Age: 70
Discharge: HOME | End: 2025-05-23
Payer: MEDICARE

## 2025-05-23 VITALS
HEART RATE: 76 BPM | DIASTOLIC BLOOD PRESSURE: 67 MMHG | WEIGHT: 227 LBS | SYSTOLIC BLOOD PRESSURE: 102 MMHG | RESPIRATION RATE: 16 BRPM | TEMPERATURE: 98.1 F | HEIGHT: 72 IN | BODY MASS INDEX: 30.75 KG/M2

## 2025-05-23 DIAGNOSIS — S81.801D WOUND OF RIGHT LEG, SUBSEQUENT ENCOUNTER: Primary | ICD-10-CM

## 2025-05-23 DIAGNOSIS — E11.42 TYPE 2 DIABETES MELLITUS WITH DIABETIC POLYNEUROPATHY, WITHOUT LONG-TERM CURRENT USE OF INSULIN (HCC): ICD-10-CM

## 2025-05-23 PROCEDURE — 99213 OFFICE O/P EST LOW 20 MIN: CPT

## 2025-05-23 PROCEDURE — 11042 DBRDMT SUBQ TIS 1ST 20SQCM/<: CPT

## 2025-05-23 RX ORDER — LIDOCAINE HYDROCHLORIDE 20 MG/ML
JELLY TOPICAL PRN
OUTPATIENT
Start: 2025-05-23

## 2025-05-23 RX ORDER — LIDOCAINE HYDROCHLORIDE 40 MG/ML
SOLUTION TOPICAL PRN
OUTPATIENT
Start: 2025-05-23

## 2025-05-23 RX ORDER — LIDOCAINE HYDROCHLORIDE 40 MG/ML
SOLUTION TOPICAL PRN
Status: DISCONTINUED | OUTPATIENT
Start: 2025-05-23 | End: 2025-05-24 | Stop reason: HOSPADM

## 2025-05-23 ASSESSMENT — ENCOUNTER SYMPTOMS
COUGH: 0
RHINORRHEA: 0
VOMITING: 0
DIARRHEA: 0
NAUSEA: 0
SHORTNESS OF BREATH: 0

## 2025-05-23 NOTE — PROGRESS NOTES
L2-L4    DC ARTHROPLASTY GLENOHUMERAL JOINT TOTAL SHOULDER Left 07/18/2018    SHOULDER TOTAL ARTHROPLASTY REVERSE LEFT DJO & BICEP TENDON TRANSFER performed by Zaria Blake MD at [facility] OR    DC CLSD TX SHOULDER DISLC W/MANIPULATION REQ ANES Left 08/01/2018    SHOULDER CLOSED REDUCTION WITH C-ARM VISUALIZATION performed by Zaria Blake MD at [facility] OR    DC COLONOSCOPY W/BIOPSY SINGLE/MULTIPLE N/A 05/09/2017    COLONOSCOPY WITH BIOPSY performed by Chacha Torres DO at [facility] OR    DC CIARA SHOULDER ARTHRPLSTY HUMERAL/GLENOID COMPNT Left 08/03/2018    SHOULDER TOTAL REVERSE  ARTHROPLASTY REVISION performed by Zaria Blake MD at [facility] OR    SHOULDER SURGERY Right 1989?    rotator cuff repair    SHOULDER SURGERY Left 10/15/2020    SHOULDER ARTHROSCOPY W/INTEROP CULTURES performed by Zaria Blake MD at [facility] OR    SKIN BIOPSY      ear, forehead    SPINAL CORD STIMULATOR IMPLANT  09/05/2023    TONSILLECTOMY AND ADENOIDECTOMY      UPPER GASTROINTESTINAL ENDOSCOPY N/A 03/19/2020    EGD BIOPSY performed by Cristobal Hooks MD at [facility] ENDO       FAMILY HISTORY    Family History   Problem Relation Age of Onset    Diabetes Mother     Lung Cancer Brother     Liver Cancer Brother     Cancer Father        SOCIAL HISTORY    Social History     Tobacco Use    Smoking status: Former     Current packs/day: 0.00     Average packs/day: 1 pack/day for 35.0 years (35.0 ttl pk-yrs)     Types: Cigarettes     Start date: 7/8/2021     Quit date: 3/9/2022     Years since quitting: 3.2     Passive exposure: Past    Smokeless tobacco: Never   Vaping Use    Vaping status: Never Used   Substance Use Topics    Alcohol use: Not Currently     Alcohol/week: 0.0 standard drinks of alcohol    Drug use: No       ALLERGIES    Allergies   Allergen Reactions    Succinylcholine Chloride Other (See Comments)     PATIENT HAS PSEUDOCHOLINESTERASE DEFICIENCY      Cymbalta [Duloxetine Hcl]      Taste loss      Gabapentin      Taste loss

## 2025-05-23 NOTE — PLAN OF CARE
Problem: ABCDS Injury Assessment  Goal: Absence of physical injury  Outcome: Progressing     Problem: Cognitive:  Goal: Knowledge of wound care  Description: Knowledge of wound care  Outcome: Progressing  Goal: Understands risk factors for wounds  Description: Understands risk factors for wounds  Outcome: Progressing     Problem: Arterial:  Goal: Optimize blood flow for wound healing  Description: Optimize blood flow for wound healing  Outcome: Progressing     Problem: Falls - Risk of:  Goal: Will remain free from falls  Description: Will remain free from falls  Outcome: Progressing

## 2025-05-29 DIAGNOSIS — K21.9 GASTROESOPHAGEAL REFLUX DISEASE WITHOUT ESOPHAGITIS: ICD-10-CM

## 2025-05-29 RX ORDER — FAMOTIDINE 20 MG/1
TABLET, FILM COATED ORAL
Qty: 180 TABLET | Refills: 3 | Status: SHIPPED | OUTPATIENT
Start: 2025-05-29

## 2025-06-02 NOTE — DISCHARGE INSTRUCTIONS
[facility] WOUND and HYPERBARIC TREATMENT  CENTER                            Visit  Discharge Instructions / Physician Orders  DATE:6/5/25     Home Care:NA     SUPPLIES ORDERED THRU:     [employer]                DATE LAST SUPPLIED  6/5/2025     Wound Location:  Right  Lower leg     Cleanse with: Liquid antibacterial soap and water, rinse well      Dressing Orders:  Primary dressing    Jeanine      (may add normal saline if to dry)              Secondary dressing   Silicone border dressing                          Frequency:  Daily     Additional Orders: Increase protein to diet (meat, cheese, eggs, fish, peanut butter, nuts and beans)  Multivitamin daily  Elevate legs if swollen or wearing compression when sitting  OFFLOADING [] YES  TYPE:                  [x] NA     Weekly wound care visits until determined otherwise.     Antibiotic therapy-wound care related YES [] NO [x] NA[]  DRUG-                                                             Duration-             Script sent to-                      on (date)  MY CHART []     Smart Device  []      HYPERBARIC TREATMENT-                TREATMENT #                          Your next appointment with the Wound Care Center is in 2 weeks                                                                                                   (Please note your next appointment above and if you are unable to keep, kindly give a 24 hour notice. Thank you.)  If more than 15 min late we cannot guarantee you will be seen due to clinician schedule     Per Policy,  3 or more cancellations or no show may result in dismissal from program  If you experience any of the following, please call the Wound Care Center during business hours:  [phone]     * Increase in Pain  * Temperature over 101  * Increase in drainage from your wound  * Drainage with a foul odor  * Bleeding  * Increase in swelling  * Need for compression bandage changes due to slippage, breakthrough drainage.

## 2025-06-04 ENCOUNTER — TELEPHONE (OUTPATIENT)
Dept: FAMILY MEDICINE CLINIC | Age: 70
End: 2025-06-04

## 2025-06-04 DIAGNOSIS — E11.42 TYPE 2 DIABETES MELLITUS WITH DIABETIC POLYNEUROPATHY, WITHOUT LONG-TERM CURRENT USE OF INSULIN (HCC): Primary | ICD-10-CM

## 2025-06-04 RX ORDER — GLUCOSAMINE HCL/CHONDROITIN SU 500-400 MG
CAPSULE ORAL
Qty: 100 STRIP | Refills: 3 | Status: SHIPPED | OUTPATIENT
Start: 2025-06-04

## 2025-06-04 RX ORDER — BLOOD-GLUCOSE METER
KIT MISCELLANEOUS
Qty: 1 KIT | Refills: 0 | Status: SHIPPED | OUTPATIENT
Start: 2025-06-04

## 2025-06-04 RX ORDER — ALCOHOL ANTISEPTIC PADS
PADS, MEDICATED (EA) TOPICAL
Qty: 100 EACH | Refills: 3 | Status: SHIPPED | OUTPATIENT
Start: 2025-06-04

## 2025-06-04 NOTE — TELEPHONE ENCOUNTER
Diagnosis Orders   1. Type 2 diabetes mellitus with diabetic polyneuropathy, without long-term current use of insulin ([facility])  glucose monitoring kit    blood glucose monitor strips    Lancets 30G MISC           Future Appointments   Date Time Provider Department Center   6/5/2025 10:45 AM Danay Wang APRN - CNP [facility] WND CAR  [facility]   6/23/2025 11:00 AM Ino Koo MD SC HEART/VAS [facility]   8/1/2025  1:30 PM Asmita Hebert MD fp [facility] ECC DEP   8/18/2025 10:50 AM Juan Nguyen MD [facility]   9/24/2025  2:00 PM Omar Tipton DO AFL TCC [facility]   10/13/2025 11:15 AM Ino Koo MD [facility] HEART/VAS [facility]   3/18/2026 11:00 AM Asmita Hebert MD fp [facility] ECC DEP

## 2025-06-04 NOTE — TELEPHONE ENCOUNTER
Patient called and said he went to a hotel with [employer], he lost his sugar meter and all of his stuff to check his sugar and needs new stuff ordered.

## 2025-06-05 ENCOUNTER — HOSPITAL ENCOUNTER (OUTPATIENT)
Dept: WOUND CARE | Age: 70
Discharge: HOME | End: 2025-06-05
Payer: MEDICARE

## 2025-06-05 VITALS
HEIGHT: 72 IN | BODY MASS INDEX: 30.75 KG/M2 | WEIGHT: 227 LBS | TEMPERATURE: 98.4 F | DIASTOLIC BLOOD PRESSURE: 63 MMHG | HEART RATE: 85 BPM | RESPIRATION RATE: 20 BRPM | SYSTOLIC BLOOD PRESSURE: 94 MMHG

## 2025-06-05 DIAGNOSIS — S81.801D WOUND OF RIGHT LEG, SUBSEQUENT ENCOUNTER: Primary | ICD-10-CM

## 2025-06-05 DIAGNOSIS — E11.42 TYPE 2 DIABETES MELLITUS WITH DIABETIC POLYNEUROPATHY, WITHOUT LONG-TERM CURRENT USE OF INSULIN (HCC): ICD-10-CM

## 2025-06-05 PROCEDURE — 11042 DBRDMT SUBQ TIS 1ST 20SQCM/<: CPT

## 2025-06-05 RX ORDER — LIDOCAINE HYDROCHLORIDE 40 MG/ML
SOLUTION TOPICAL PRN
Status: DISCONTINUED | OUTPATIENT
Start: 2025-06-05 | End: 2025-06-06 | Stop reason: HOSPADM

## 2025-06-05 RX ORDER — LIDOCAINE HYDROCHLORIDE 40 MG/ML
SOLUTION TOPICAL PRN
OUTPATIENT
Start: 2025-06-05

## 2025-06-05 RX ORDER — LIDOCAINE HYDROCHLORIDE 20 MG/ML
JELLY TOPICAL PRN
OUTPATIENT
Start: 2025-06-05

## 2025-06-05 ASSESSMENT — PAIN DESCRIPTION - PAIN TYPE: TYPE: CHRONIC PAIN

## 2025-06-05 ASSESSMENT — PAIN DESCRIPTION - ORIENTATION: ORIENTATION: RIGHT;LOWER

## 2025-06-05 ASSESSMENT — ENCOUNTER SYMPTOMS
SHORTNESS OF BREATH: 0
RHINORRHEA: 0
DIARRHEA: 0
NAUSEA: 0
COUGH: 0
VOMITING: 0

## 2025-06-05 ASSESSMENT — PAIN DESCRIPTION - DESCRIPTORS: DESCRIPTORS: ACHING

## 2025-06-05 ASSESSMENT — PAIN SCALES - GENERAL: PAINLEVEL_OUTOF10: 3

## 2025-06-05 ASSESSMENT — PAIN DESCRIPTION - LOCATION: LOCATION: LEG

## 2025-06-05 ASSESSMENT — PAIN DESCRIPTION - ONSET: ONSET: ON-GOING

## 2025-06-05 ASSESSMENT — PAIN - FUNCTIONAL ASSESSMENT: PAIN_FUNCTIONAL_ASSESSMENT: PREVENTS OR INTERFERES SOME ACTIVE ACTIVITIES AND ADLS

## 2025-06-05 ASSESSMENT — PAIN DESCRIPTION - FREQUENCY: FREQUENCY: INTERMITTENT

## 2025-06-05 NOTE — PROGRESS NOTES
Wound Care Supplies      Supply Company:     [employer]      Ordering Center:     [facility]  [address] [address]  [phone]  WOUND CARE Dept: [phone]   FAX NUMBER [unfilled]    Patient Information:      Bernardino Cote  [address]  [place] [address]   [phone]   : 1955  AGE: 69 y.o.     GENDER: male   TODAYS DATE:  2025    Insurance:      PRIMARY INSURANCE:  Plan: [facility] MEDICARE COMPLETE  Coverage: [facility] MEDICARE  Effective Date: 2023  [id number] - (Medicare Managed)    SECONDARY INSURANCE:  Plan:   Coverage:   Effective Date:   @Lost Property HeavenGROUPNUM@    [unfilled]   [unfilled]     Patient Wound Information:      Problem List Items Addressed This Visit          Endocrine    Type 2 diabetes mellitus with diabetic polyneuropathy, without long-term current use of insulin (HCC)    Relevant Medications    lidocaine (XYLOCAINE) 4 % external solution    Other Relevant Orders    Initiate Outpatient Wound Care Protocol    Initiate Oxygen Therapy Protocol       Other    Wound of right leg, subsequent encounter - Primary    Relevant Medications    lidocaine (XYLOCAINE) 4 % external solution    Other Relevant Orders    Initiate Outpatient Wound Care Protocol    Initiate Oxygen Therapy Protocol       WOUNDS REQUIRING DRESSING SUPPLIES:     Wound 25 Pretibial Right;Lateral #1 (Active)   Wound Image   25 1341   Wound Etiology Arterial 25 1046   Dressing Status New drainage noted;Old drainage noted 25 1046   Wound Cleansed Soap and water 25 1046   Dressing/Treatment Other (comment) 25 1416   Wound Length (cm) 0.7 cm 25 1046   Wound Width (cm) 0.5 cm 25 1046   Wound Depth (cm) 0.1 cm 25 1046   Wound Surface Area (cm^2) 0.35 cm^2 25 1046   Change in Wound Size % (l*w) 12.5 25 1046   Wound Volume (cm^3) 0.035 cm^3 25 1046   Wound Healing % 56 25 1046   Post-Procedure Length (cm) 0.7 cm 
carotid artery 10/07/2018    hx of    Syncope and collapse 03/19/2020    Tobacco use 03/16/2020    Tubular adenoma of colon 4/11/17 05/13/2017    Type 2 diabetes mellitus with diabetic autonomic neuropathy, without long-term current use of insulin (HCC) 03/25/2017    Lab Results   Component Value Date    LABA1C 7.1 (H) 03/23/2017        Lab Results   Component Value Date    LABA1C 7.1 (H) 03/23/2017      Type 2 diabetes mellitus with hyperglycemia, without long-term current use of insulin (HCC) 03/25/2017    Lab Results Component Value Date  LABA1C 7.1 (H) 03/23/2017     Unintended weight loss 10/07/2018    Vitamin D deficiency 03/25/2017    Wears glasses        PAST SURGICAL HISTORY    Past Surgical History:   Procedure Laterality Date    BACK SURGERY      x 2     BACK SURGERY  01/05/2017    lumbar laminectomy L2, L3, L4    BRONCHOSCOPY N/A 03/13/2020    BRONCHOSCOPY performed by Camilo Whittaker MD at [facility]    CARDIAC CATHETERIZATION  2007    no stents    CARDIAC CATHETERIZATION  04/19/2023    CAROTID ENDARTERECTOMY Right 12/16/2019    Dr. Farrell    CARPAL TUNNEL RELEASE Bilateral     CHOLECYSTECTOMY, LAPAROSCOPIC N/A 02/16/2021    CHOLECYSTECTOMY LAPAROSCOPIC ROBOTIC XI performed by Jaycob Nichols MD at [facility]    COLONOSCOPY      COLONOSCOPY N/A 12/11/2023    COLONOSCOPY POLYPECTOMY SNARE/COLD BIOPSY performed by Cristobal Hooks MD at [facility]    ENDOSCOPY, COLON, DIAGNOSTIC      HERNIA REPAIR Left 11/18/2020    HERNIA INGUINAL REPAIR W/MESH OPEN performed by Jaycob Nichols MD at [facility]    KNEE SURGERY Left     LUMBAR LAMINECTOMY  01/05/2017    L2-L4    ID ARTHROPLASTY GLENOHUMERAL JOINT TOTAL SHOULDER Left 07/18/2018    SHOULDER TOTAL ARTHROPLASTY REVERSE LEFT DJO & BICEP TENDON TRANSFER performed by Zaria Blake MD at [facility]    ID CLSD TX SHOULDER DISLC W/MANIPULATION REQ ANES Left 08/01/2018    SHOULDER CLOSED REDUCTION WITH C-ARM VISUALIZATION performed by Zaria Blake MD at [facility]

## 2025-06-11 ENCOUNTER — TELEPHONE (OUTPATIENT)
Dept: FAMILY MEDICINE CLINIC | Age: 70
End: 2025-06-11

## 2025-06-11 DIAGNOSIS — E11.42 TYPE 2 DIABETES MELLITUS WITH DIABETIC POLYNEUROPATHY, WITHOUT LONG-TERM CURRENT USE OF INSULIN (HCC): ICD-10-CM

## 2025-06-11 DIAGNOSIS — E11.51 TYPE 2 DIABETES MELLITUS WITH DIABETIC PERIPHERAL ANGIOPATHY WITHOUT GANGRENE, WITHOUT LONG-TERM CURRENT USE OF INSULIN (HCC): Primary | ICD-10-CM

## 2025-06-11 RX ORDER — BLOOD-GLUCOSE METER
KIT MISCELLANEOUS
Qty: 1 KIT | Refills: 0 | Status: SHIPPED | OUTPATIENT
Start: 2025-06-11

## 2025-06-11 NOTE — TELEPHONE ENCOUNTER
Diagnosis Orders   1. Type 2 diabetes mellitus with diabetic peripheral angiopathy without gangrene, without long-term current use of insulin ([facility])  glucose monitoring kit      2. Type 2 diabetes mellitus with diabetic polyneuropathy, without long-term current use of insulin ([facility])  glucose monitoring kit           Future Appointments   Date Time Provider Department Center   6/19/2025  1:00 PM Danay Wang, APRN - CNP [facility] WND [facility]   6/23/2025 11:00 AM Ino Koo MD [facility] HEART/VAS [facility]   8/1/2025  1:30 PM Asmita Hebert MD fp [facility] ECC DEP   8/18/2025 10:50 AM Juan Nguyen MD [facility]   9/24/2025  2:00 PM Omar Tipton DO AFL TCC [facility]   10/13/2025 11:15 AM Ino Koo MD [facility] HEART/VAS [facility]   3/18/2026 11:00 AM Asmita Hebert MD fp [facility] ECC DEP        Diagnosis Orders   1. Type 2 diabetes mellitus with diabetic peripheral angiopathy without gangrene, without long-term current use of insulin ([facility])  glucose monitoring kit      2. Type 2 diabetes mellitus with diabetic polyneuropathy, without long-term current use of insulin ([facility])  glucose monitoring kit           Future Appointments   Date Time Provider Department Center   6/19/2025  1:00 PM Danay Wang, APRN - CNP [facility]   6/23/2025 11:00 AM Ino Koo MD [facility] HEART/VAS [facility]   8/1/2025  1:30 PM Asmita Hebert MD fp [facility] ECC DEP   8/18/2025 10:50 AM Juan Nguyen MD [facility]   9/24/2025  2:00 PM Omar Tipton DO AFL TCC [facility]   10/13/2025 11:15 AM Ino Koo MD [facility] HEART/VAS [facility]   3/18/2026 11:00 AM Asmita Hebert MD fp [facility] ECC DEP

## 2025-06-11 NOTE — TELEPHONE ENCOUNTER
[employer] sent a fax over needing clarification on the patients meter that was sent over, they said it was sent over for freestyle freedom lite, but he use to use the Verio Flex Kit so just wanted clarification. Thank you

## 2025-06-16 NOTE — DISCHARGE INSTRUCTIONS
[facility] WOUND and HYPERBARIC TREATMENT  CENTER                            Visit  Discharge Instructions / Physician Orders  DATE:6/19/25     Home Care:NA     SUPPLIES ORDERED THRU:     [employer]                DATE LAST SUPPLIED  6/5/2025     Wound Location:  Right  Lower leg     Cleanse with: Liquid antibacterial soap and water, rinse well      Dressing Orders:  Primary dressing    Jeanine      (may add normal saline if to dry)              Secondary dressing   Silicone border dressing                          Frequency:  Daily     Additional Orders: Increase protein to diet (meat, cheese, eggs, fish, peanut butter, nuts and beans)  Multivitamin daily  Elevate legs if swollen or wearing compression when sitting  OFFLOADING [] YES  TYPE:                  [x] NA     Weekly wound care visits until determined otherwise.     Antibiotic therapy-wound care related YES [] NO [x] NA[]  DRUG-                                                             Duration-             Script sent to-                      on (date)  MY CHART []     Smart Device  []      HYPERBARIC TREATMENT-                TREATMENT #                          Your next appointment with the Wound Care Center is in 2 weeks                                                                                                   (Please note your next appointment above and if you are unable to keep, kindly give a 24 hour notice. Thank you.)  If more than 15 min late we cannot guarantee you will be seen due to clinician schedule     Per Policy,  3 or more cancellations or no show may result in dismissal from program  If you experience any of the following, please call the Wound Care Center during business hours:  [phone]     * Increase in Pain  * Temperature over 101  * Increase in drainage from your wound  * Drainage with a foul odor  * Bleeding  * Increase in swelling  * Need for compression bandage changes due to slippage, breakthrough drainage.

## 2025-06-18 DIAGNOSIS — E11.51 TYPE 2 DIABETES MELLITUS WITH DIABETIC PERIPHERAL ANGIOPATHY WITHOUT GANGRENE, WITHOUT LONG-TERM CURRENT USE OF INSULIN (HCC): Primary | ICD-10-CM

## 2025-06-18 DIAGNOSIS — K21.9 GASTROESOPHAGEAL REFLUX DISEASE WITHOUT ESOPHAGITIS: ICD-10-CM

## 2025-06-18 DIAGNOSIS — E11.42 TYPE 2 DIABETES MELLITUS WITH DIABETIC POLYNEUROPATHY, WITHOUT LONG-TERM CURRENT USE OF INSULIN (HCC): ICD-10-CM

## 2025-06-18 RX ORDER — BLOOD-GLUCOSE METER
KIT MISCELLANEOUS
Qty: 1 KIT | Refills: 0 | Status: SHIPPED | OUTPATIENT
Start: 2025-06-18

## 2025-06-18 RX ORDER — ALCOHOL ANTISEPTIC PADS
PADS, MEDICATED (EA) TOPICAL
Qty: 100 EACH | Refills: 3 | Status: SHIPPED | OUTPATIENT
Start: 2025-06-18

## 2025-06-18 RX ORDER — FAMOTIDINE 20 MG/1
20 TABLET, FILM COATED ORAL 2 TIMES DAILY
Qty: 180 TABLET | Refills: 3 | Status: SHIPPED | OUTPATIENT
Start: 2025-06-18

## 2025-06-18 RX ORDER — GLUCOSAMINE HCL/CHONDROITIN SU 500-400 MG
CAPSULE ORAL
Qty: 100 STRIP | Refills: 3 | Status: SHIPPED | OUTPATIENT
Start: 2025-06-18

## 2025-06-18 NOTE — TELEPHONE ENCOUNTER
Patient called and stated he threw away his blood glucose meter he said it's a one touch and he needs a new one along with the test strips.        Thank you!

## 2025-06-18 NOTE — TELEPHONE ENCOUNTER
Please Approve or Refuse.  Send to Pharmacy per Pt's Request:      Next Visit Date:  8/1/2025   Last Visit Date: 5/15/2025    Hemoglobin A1C (%)   Date Value   05/21/2025 4.8   05/15/2025 5.9   01/30/2025 5.9             ( goal A1C is < 7)   BP Readings from Last 3 Encounters:   06/05/25 94/63   05/23/25 102/67   05/19/25 118/74          (goal 120/80)  BUN   Date Value Ref Range Status   05/21/2025 15 8 - 23 mg/dL Final     Creatinine   Date Value Ref Range Status   05/21/2025 1.0 0.7 - 1.2 mg/dL Final     Potassium   Date Value Ref Range Status   05/21/2025 4.7 3.7 - 5.3 mmol/L Final

## 2025-06-19 ENCOUNTER — HOSPITAL ENCOUNTER (OUTPATIENT)
Dept: WOUND CARE | Age: 70
Discharge: HOME | End: 2025-06-19
Payer: MEDICARE

## 2025-06-19 VITALS
SYSTOLIC BLOOD PRESSURE: 132 MMHG | RESPIRATION RATE: 20 BRPM | DIASTOLIC BLOOD PRESSURE: 56 MMHG | TEMPERATURE: 97.2 F | WEIGHT: 223 LBS | HEART RATE: 71 BPM | BODY MASS INDEX: 30.24 KG/M2

## 2025-06-19 DIAGNOSIS — E11.42 TYPE 2 DIABETES MELLITUS WITH DIABETIC POLYNEUROPATHY, WITHOUT LONG-TERM CURRENT USE OF INSULIN (HCC): ICD-10-CM

## 2025-06-19 DIAGNOSIS — S81.801D WOUND OF RIGHT LEG, SUBSEQUENT ENCOUNTER: Primary | ICD-10-CM

## 2025-06-19 PROCEDURE — 11042 DBRDMT SUBQ TIS 1ST 20SQCM/<: CPT | Performed by: NURSE PRACTITIONER

## 2025-06-19 PROCEDURE — 11042 DBRDMT SUBQ TIS 1ST 20SQCM/<: CPT

## 2025-06-19 RX ORDER — LIDOCAINE HYDROCHLORIDE 40 MG/ML
SOLUTION TOPICAL PRN
Status: DISCONTINUED | OUTPATIENT
Start: 2025-06-19 | End: 2025-06-20 | Stop reason: HOSPADM

## 2025-06-19 RX ORDER — LIDOCAINE HYDROCHLORIDE 20 MG/ML
JELLY TOPICAL PRN
OUTPATIENT
Start: 2025-06-19

## 2025-06-19 RX ORDER — LIDOCAINE HYDROCHLORIDE 40 MG/ML
SOLUTION TOPICAL PRN
OUTPATIENT
Start: 2025-06-19

## 2025-06-19 RX ADMIN — LIDOCAINE HYDROCHLORIDE 10 ML: 40 SOLUTION TOPICAL at 13:17

## 2025-06-19 ASSESSMENT — ENCOUNTER SYMPTOMS
VOMITING: 0
RHINORRHEA: 0
COUGH: 0
NAUSEA: 0
DIARRHEA: 0
SHORTNESS OF BREATH: 0

## 2025-06-19 NOTE — PROGRESS NOTES
[facility]   Progress Note and Procedure Note      Bernardino Cote  MEDICAL RECORD NUMBER:  [id number]  AGE: 69 y.o.   GENDER: male  : 1955  EPISODE DATE:  2025    Subjective:     Chief Complaint   Patient presents with    Wound Check     Right lower leg         HISTORY of PRESENT ILLNESS HPI     Bernardino Cote is a 69 y.o. male who presents today for wound/ulcer evaluation.   History of Wound Context: presents in follow up on right lower leg wound.   Wound/Ulcer Pain Timing/Severity: none  Quality of pain: N/A  Severity:  0 / 10   Modifying Factors: None  Associated Signs/Symptoms: none    Ulcer Identification:  Ulcer Type: arterial  Contributing Factors: arterial insufficiency         PAST MEDICAL HISTORY        Diagnosis Date    Acid reflux     Acute renal failure with acute tubular necrosis superimposed on stage 4 chronic kidney disease (HCC) 2022    Acute respiratory failure with hypoxia ([facility]) 2022    Anemia 10/07/2018    Anemia, blood loss 10/07/2018    Arthritis     Benign hypertension with CKD (chronic kidney disease), stage II 2022    Bilateral leg edema 2021    history of    C. difficile colitis 2020    Carcinoma of prostate ([facility]) 2021    Chronic bilateral low back pain with bilateral sciatica 2016    Chronic diastolic heart failure ([facility]) 2021    Chronic hypoxemic respiratory failure ([facility]) 2020    Chronic renal disease, stage III ([facility]) [889985] 2022    Complete tear of left rotator cuff 2018    Constipation 2023    COPD (chronic obstructive pulmonary disease) ([facility])     COPD exacerbation ([facility]) 2022    Coronary artery disease involving native coronary artery of native heart without angina pectoris 2020    Degenerative disc disease, cervical 2019    PLASCENCIA (dyspnea on exertion) 2021    Essential hypertension 2016    Excessive sweating 2022    Gait instability

## 2025-06-19 NOTE — PLAN OF CARE
Problem: Cognitive:  Goal: Knowledge of wound care  Description: Knowledge of wound care  Outcome: Progressing  Goal: Understands risk factors for wounds  Description: Understands risk factors for wounds  Outcome: Progressing     Problem: Arterial:  Goal: Optimize blood flow for wound healing  Description: Optimize blood flow for wound healing  Outcome: Progressing     Problem: Falls - Risk of:  Goal: Will remain free from falls  Description: Will remain free from falls  Outcome: Progressing

## 2025-06-22 DIAGNOSIS — K21.9 GASTROESOPHAGEAL REFLUX DISEASE WITHOUT ESOPHAGITIS: ICD-10-CM

## 2025-06-23 ENCOUNTER — OFFICE VISIT (OUTPATIENT)
Dept: VASCULAR SURGERY | Age: 70
End: 2025-06-23
Payer: MEDICARE

## 2025-06-23 VITALS
WEIGHT: 225 LBS | HEIGHT: 72 IN | OXYGEN SATURATION: 95 % | DIASTOLIC BLOOD PRESSURE: 69 MMHG | BODY MASS INDEX: 30.48 KG/M2 | HEART RATE: 77 BPM | SYSTOLIC BLOOD PRESSURE: 148 MMHG

## 2025-06-23 DIAGNOSIS — I70.211 ATHEROSCLEROSIS OF NATIVE ARTERY OF RIGHT LOWER EXTREMITY WITH INTERMITTENT CLAUDICATION: ICD-10-CM

## 2025-06-23 DIAGNOSIS — I70.201 ATHEROSCLEROSIS OF NATIVE ARTERY OF RIGHT LOWER EXTREMITY, WITH UNSPECIFIED PRESENCE OF CLINICAL MANIFESTATION: Primary | ICD-10-CM

## 2025-06-23 PROCEDURE — 99213 OFFICE O/P EST LOW 20 MIN: CPT | Performed by: SURGERY

## 2025-06-23 PROCEDURE — 1123F ACP DISCUSS/DSCN MKR DOCD: CPT | Performed by: SURGERY

## 2025-06-23 PROCEDURE — G8417 CALC BMI ABV UP PARAM F/U: HCPCS | Performed by: SURGERY

## 2025-06-23 PROCEDURE — 3017F COLORECTAL CA SCREEN DOC REV: CPT | Performed by: SURGERY

## 2025-06-23 PROCEDURE — 1036F TOBACCO NON-USER: CPT | Performed by: SURGERY

## 2025-06-23 PROCEDURE — G8428 CUR MEDS NOT DOCUMENT: HCPCS | Performed by: SURGERY

## 2025-06-23 RX ORDER — FAMOTIDINE 20 MG/1
20 TABLET, FILM COATED ORAL 2 TIMES DAILY
Qty: 180 TABLET | Refills: 3 | Status: SHIPPED | OUTPATIENT
Start: 2025-06-23

## 2025-06-23 NOTE — PROGRESS NOTES
[facility] HEART AND VASCULAR  [address] [address]  Dept: [phone]     Patient: Bernardino Cote  : 1955  MRN: [id number]  DOS: 2025            HPI:  Bernardino Cote is a 70 y.o. male who returns to the office regarding a small punctate ulceration on the lateral aspect of his distal calf on the right lower extremity.  Recall that I was watching his right lower extremity for arterial disease.  I thought he was coming in for an ulcer on the foot which would require arteriography.  This is a small ulcer not in an arterial ischemic distribution.  It is more venous in nature however I think he probably injured his lower extremity and this is taking quite a long time to heal.  It has not worsened in size.  I have reviewed the pictures in the media over the last several months.  I do not think it has improved in size either.    Review of Systems    Vitals:    25 1041   BP: (!) 148/69   BP Site: Right Upper Arm   Patient Position: Sitting   BP Cuff Size: Large Adult   Pulse: 77   SpO2: 95%   Weight: 102.1 kg (225 lb)   Height: 1.829 m (6')          Physical Exam  On examination it measures approximately 0.75 cm in its greatest diameter.  There is no surrounding erythema as there once was on previous examination by the wound care center.  He continues to not have popliteal dorsalis pedis or posterior tibial pulses on the right due to SFA occlusion.    Assessment:  1. Atherosclerosis of native artery of right lower extremity, with unspecified presence of clinical manifestation    2. Atherosclerosis of native artery of right lower extremity with intermittent claudication          Plan:  At this point I am not enthusiastic about going to the operating room to investigate this with arteriography and/or intervention.  I would allow this some time to heal.  This is not limb threatening currently.  We will see him monthly and see

## 2025-06-23 NOTE — TELEPHONE ENCOUNTER
Please Approve or Refuse.  Send to Pharmacy per Pt's Request:      Next Visit Date:  8/1/2025   Last Visit Date: 5/15/2025    Hemoglobin A1C (%)   Date Value   05/21/2025 4.8   05/15/2025 5.9   01/30/2025 5.9             ( goal A1C is < 7)   BP Readings from Last 3 Encounters:   06/19/25 (!) 132/56   06/05/25 94/63   05/23/25 102/67          (goal 120/80)  BUN   Date Value Ref Range Status   05/21/2025 15 8 - 23 mg/dL Final     Creatinine   Date Value Ref Range Status   05/21/2025 1.0 0.7 - 1.2 mg/dL Final     Potassium   Date Value Ref Range Status   05/21/2025 4.7 3.7 - 5.3 mmol/L Final

## 2025-06-27 DIAGNOSIS — M1A.09X0 CHRONIC GOUT OF MULTIPLE SITES, UNSPECIFIED CAUSE: ICD-10-CM

## 2025-06-29 RX ORDER — ALLOPURINOL 100 MG/1
100 TABLET ORAL DAILY
Qty: 90 TABLET | Refills: 3 | Status: SHIPPED | OUTPATIENT
Start: 2025-06-29

## 2025-07-02 NOTE — DISCHARGE INSTRUCTIONS
[facility] WOUND and HYPERBARIC TREATMENT  CENTER                            Visit  Discharge Instructions / Physician Orders  DATE: 7/3/25     Home Care:NA     SUPPLIES ORDERED THRU:     [employer]                DATE LAST SUPPLIED  6/5/2025     Wound Location:  Right  Lower leg     Cleanse with: Liquid antibacterial soap and water, rinse well      Dressing Orders:  Primary dressing    Jeanine      (may add normal saline if to dry)              Secondary dressing   Silicone border dressing                          Frequency:  Daily     Additional Orders: Increase protein to diet (meat, cheese, eggs, fish, peanut butter, nuts and beans)  Multivitamin daily  Elevate legs if swollen or wearing compression when sitting  OFFLOADING [] YES  TYPE:                  [x] NA     Weekly wound care visits until determined otherwise.     Antibiotic therapy-wound care related YES [] NO [x] NA[]  DRUG-                                                             Duration-             Script sent to-                      on (date)  MY CHART []     Smart Device  []      HYPERBARIC TREATMENT-                TREATMENT #                          Your next appointment with the Wound Care Center is in 2 weeks                                                                                                   (Please note your next appointment above and if you are unable to keep, kindly give a 24 hour notice. Thank you.)  If more than 15 min late we cannot guarantee you will be seen due to clinician schedule     Per Policy,  3 or more cancellations or no show may result in dismissal from program  If you experience any of the following, please call the Wound Care Center during business hours:  [phone]     * Increase in Pain  * Temperature over 101  * Increase in drainage from your wound  * Drainage with a foul odor  * Bleeding  * Increase in swelling  * Need for compression bandage changes due to slippage, breakthrough drainage.

## 2025-07-03 ENCOUNTER — HOSPITAL ENCOUNTER (OUTPATIENT)
Dept: WOUND CARE | Age: 70
Discharge: HOME | End: 2025-07-03

## 2025-07-09 ENCOUNTER — HOSPITAL ENCOUNTER (OUTPATIENT)
Dept: WOUND CARE | Age: 70
Discharge: HOME | End: 2025-07-09
Payer: MEDICARE

## 2025-07-09 VITALS
HEIGHT: 72 IN | HEART RATE: 86 BPM | SYSTOLIC BLOOD PRESSURE: 98 MMHG | TEMPERATURE: 98.1 F | DIASTOLIC BLOOD PRESSURE: 55 MMHG | WEIGHT: 225 LBS | BODY MASS INDEX: 30.48 KG/M2 | RESPIRATION RATE: 20 BRPM

## 2025-07-09 DIAGNOSIS — S81.801D WOUND OF RIGHT LEG, SUBSEQUENT ENCOUNTER: Primary | ICD-10-CM

## 2025-07-09 DIAGNOSIS — E11.42 TYPE 2 DIABETES MELLITUS WITH DIABETIC POLYNEUROPATHY, WITHOUT LONG-TERM CURRENT USE OF INSULIN (HCC): ICD-10-CM

## 2025-07-09 PROCEDURE — 97597 DBRDMT OPN WND 1ST 20 CM/<: CPT | Performed by: NURSE PRACTITIONER

## 2025-07-09 PROCEDURE — 99213 OFFICE O/P EST LOW 20 MIN: CPT

## 2025-07-09 PROCEDURE — 97597 DBRDMT OPN WND 1ST 20 CM/<: CPT

## 2025-07-09 RX ORDER — LIDOCAINE HYDROCHLORIDE 20 MG/ML
JELLY TOPICAL PRN
OUTPATIENT
Start: 2025-07-09

## 2025-07-09 RX ORDER — LIDOCAINE HYDROCHLORIDE 40 MG/ML
SOLUTION TOPICAL PRN
OUTPATIENT
Start: 2025-07-09

## 2025-07-09 RX ORDER — LIDOCAINE HYDROCHLORIDE 40 MG/ML
SOLUTION TOPICAL PRN
Status: DISCONTINUED | OUTPATIENT
Start: 2025-07-09 | End: 2025-07-10 | Stop reason: HOSPADM

## 2025-07-09 RX ADMIN — LIDOCAINE HYDROCHLORIDE 5 ML: 40 SOLUTION TOPICAL at 13:08

## 2025-07-09 ASSESSMENT — PAIN SCALES - GENERAL: PAINLEVEL_OUTOF10: 0

## 2025-07-09 NOTE — PROGRESS NOTES
Wound Care Supplies      Supply Company:     [employer]    Ordering Center:     [facility]  [address] [address]  [phone]  WOUND CARE Dept: [phone]   FAX NUMBER [phone]  PHONE [phone]    Patient Information:      Bernardino Cote  [address] [address]   [phone]   : 1955  AGE: 70 y.o.     GENDER: male   TODAYS DATE:  2025    Insurance:      PRIMARY INSURANCE:  Plan: [facility] MEDICARE COMPLETE  Coverage: [facility] MEDICARE  Effective Date: 2023  [id number] - (Medicare Managed)    SECONDARY INSURANCE:  Plan:   Coverage:   Effective Date:   @[email or web address] Security@    @achvrID@   @SECApplication SecurityGROUPNUM@     Patient Wound Information:      Problem List Items Addressed This Visit          Endocrine    Type 2 diabetes mellitus with diabetic polyneuropathy, without long-term current use of insulin (HCC)    Relevant Medications    lidocaine (XYLOCAINE) 4 % external solution    Other Relevant Orders    Initiate Outpatient Wound Care Protocol    Initiate Oxygen Therapy Protocol       Other    Wound of right leg, subsequent encounter - Primary    Relevant Medications    lidocaine (XYLOCAINE) 4 % external solution    Other Relevant Orders    Initiate Outpatient Wound Care Protocol    Initiate Oxygen Therapy Protocol       WOUNDS REQUIRING DRESSING SUPPLIES:     Wound 25 Pretibial Right;Lateral #1 (Active)   Wound Image   25 1307   Wound Etiology Arterial 25 1307   Dressing Status Intact;Dry 25 1307   Wound Cleansed Vashe 25 1307   Dressing/Treatment Other (comment) 25 1416   Wound Length (cm) 0 cm 25 1307   Wound Width (cm) 0 cm 25 1307   Wound Depth (cm) 0 cm 25 1307   Wound Surface Area (cm^2) 0 cm^2 25 1307   Change in Wound Size % (l*w) 100 25 1307   Wound Volume (cm^3) 0 cm^3 25 1307   Wound Healing % 100 25 1307   Post-Procedure Length (cm) 0.3 cm 25 1307 
1307   Wound Length (cm) 1.4 cm 07/09/25 1307   Wound Width (cm) 0.3 cm 07/09/25 1307   Wound Depth (cm) 0.1 cm 07/09/25 1307   Wound Surface Area (cm^2) 0.42 cm^2 07/09/25 1307   Wound Volume (cm^3) 0.042 cm^3 07/09/25 1307   Post-Procedure Length (cm) 1.4 cm 07/09/25 1307   Post-Procedure Width (cm) 0.3 cm 07/09/25 1307   Post-Procedure Depth (cm) 0.1 cm 07/09/25 1307   Post-Procedure Surface Area (cm^2) 0.42 cm^2 07/09/25 1307   Post-Procedure Volume (cm^3) 0.042 cm^3 07/09/25 1307   Wound Assessment Pink/red 07/09/25 1307   Drainage Amount Moderate (25-50%) 07/09/25 1307   Drainage Description Serosanguinous 07/09/25 1307   Odor None 07/09/25 1307   Alba-wound Assessment Blanchable erythema 07/09/25 1307   Margins Defined edges 07/09/25 1307   Wound Thickness Description not for Pressure Injury Full thickness 07/09/25 1307   Number of days: 0          Percent of Wound(s)/Ulcer(s) Debrided: 100%    Total Surface Area Debrided:  0.42 sq cm     Estimated Blood Loss:  Minimal    Hemostasis Achieved:  by pressure    Procedural Pain:  0  / 10     Post Procedural Pain:  0 / 10     Response to treatment:  Well tolerated by patient.       Plan:     Treatment Note please see Discharge Instructions    Written patient dismissal instructions given to patient and signed by patient or POA.           Electronically signed by JAVI WU CNP on 7/9/2025 at 1:22 PM

## 2025-07-09 NOTE — DISCHARGE INSTRUCTIONS
[facility] WOUND and HYPERBARIC TREATMENT  CENTER                            Visit  Discharge Instructions / Physician Orders  DATE: 7/9/25     Home Care:NA     SUPPLIES ORDERED THRU:     [facility]                DATE LAST SUPPLIED  7/9/25     Wound Location:  Right  Lower leg x 2     Cleanse with: Liquid antibacterial soap and water, rinse well      Dressing Orders:   Right lower leg -Primary dressing    Jeanine      (may add normal saline if to dry)              Secondary dressing   Silicone border dressing                        Right lower leg lat -healed  Frequency:  Daily     Additional Orders: Increase protein to diet (meat, cheese, eggs, fish, peanut butter, nuts and beans)  Multivitamin daily  Elevate legs if swollen or wearing compression when sitting  OFFLOADING [] YES  TYPE:                  [x] NA     Weekly wound care visits until determined otherwise.     Antibiotic therapy-wound care related YES [] NO [x] NA[]  DRUG-                                                             Duration-             Script sent to-                      on (date)  MY CHART []     Smart Device  []      HYPERBARIC TREATMENT-                TREATMENT #                          Your next appointment with the Wound Care Center is in 2 weeks                                                                                                   (Please note your next appointment above and if you are unable to keep, kindly give a 24 hour notice. Thank you.)  If more than 15 min late we cannot guarantee you will be seen due to clinician schedule     Per Policy,  3 or more cancellations or no show may result in dismissal from program  If you experience any of the following, please call the Wound Care Center during business hours:  [phone]     * Increase in Pain  * Temperature over 101  * Increase in drainage from your wound  * Drainage with a foul odor  * Bleeding  * Increase in swelling  * Need for compression bandage

## 2025-07-09 NOTE — PLAN OF CARE
Problem: Pain  Goal: Verbalizes/displays adequate comfort level or baseline comfort level  Outcome: Progressing     Problem: Cognitive:  Goal: Knowledge of wound care  Description: Knowledge of wound care  Outcome: Progressing  Goal: Understands risk factors for wounds  Description: Understands risk factors for wounds  Outcome: Progressing     Problem: Arterial:  Goal: Optimize blood flow for wound healing  Description: Optimize blood flow for wound healing  Outcome: Progressing     Problem: Falls - Risk of:  Goal: Will remain free from falls  Description: Will remain free from falls  Outcome: Progressing

## 2025-07-16 ENCOUNTER — TELEPHONE (OUTPATIENT)
Dept: FAMILY MEDICINE CLINIC | Age: 70
End: 2025-07-16

## 2025-07-16 ENCOUNTER — OFFICE VISIT (OUTPATIENT)
Dept: FAMILY MEDICINE CLINIC | Age: 70
End: 2025-07-16

## 2025-07-16 ENCOUNTER — ENROLLMENT (OUTPATIENT)
Dept: CARE COORDINATION | Age: 70
End: 2025-07-16

## 2025-07-16 VITALS
WEIGHT: 230.2 LBS | SYSTOLIC BLOOD PRESSURE: 125 MMHG | BODY MASS INDEX: 31.18 KG/M2 | HEIGHT: 72 IN | DIASTOLIC BLOOD PRESSURE: 71 MMHG | HEART RATE: 76 BPM | OXYGEN SATURATION: 96 %

## 2025-07-16 DIAGNOSIS — T07.XXXA ABRASIONS OF MULTIPLE SITES: ICD-10-CM
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
## 2025-07-16 DIAGNOSIS — M79.18 MYALGIA, MULTIPLE SITES: Primary | ICD-10-CM

## 2025-07-16 ASSESSMENT — ENCOUNTER SYMPTOMS
BOWEL INCONTINENCE: 0
VOMITING: 0
NAUSEA: 0
VISUAL CHANGE: 0
ABDOMINAL PAIN: 0

## 2025-07-16 NOTE — TELEPHONE ENCOUNTER
Patient called in and stated he fell and hurt his elbow, left knee, and hip. He stated he has a wound on right leg he is seeing wound care for but now has new cuts from falling. He is scheduled 08/01 with you I offered sooner appt he declined. He stated if anything gets worse he'll call back.

## 2025-07-16 NOTE — PROGRESS NOTES
[facility] WALK-IN FAMILY MEDICINE  [address]  [address]  [facility] [address]  Dept: [phone]  Dept Fax: [phone]    Bernardino Cote is a 70 y.o. male who presents to the urgent care today for his medical conditions/complaints as notedbelow.  Bernardino Cote is c/o of Generalized Body Aches (Shoulder back hip elbow pain)      HPI:     70 yr old male presents for check of rt shoulder, left elbow,rt hand pain and multiple abrasions s/p fall at his home around 6 pm last night.   Was going out front door to porch, lost balance and landed on wood porch on left side with rt hand under him. Believes scratched rt hand on door jam when he fell.   Did not hit head, no dizziness, no loc, no chest pain.   Could not get up, wife had to help him.   Takes ASA daily, no blood thinners.   Took Percocet (has for back pain), heat to rt shoulder    Superficial wounds to Rt hand, left elbow, lateral aspects angie lower legs. Multiple aches and pains but does declines any new bony pains. Does not feel anything is broken and declines xrays.  Washed affected areas with soap and water, antibiotic ointment    Tetanus is UTD.   He reports he only came here because he called his PCP office to let them know he is still falling even though he has been through physical therapy for balance and he wants to discuss this with his PCP.  He was told by PCP office to come here for evaluation and possible x-rays.  He has a cane and a walker at home.  He states he uses them.    Fall  The accident occurred 12 to 24 hours ago. The fall occurred while walking (lost balance). Distance fallen: standing on flloor. He landed on Hard floor (wood floor). The volume of blood lost was minimal. The point of impact was the left shoulder and left hip. Pain location: no bony pain, just feels sore. The pain is moderate. The symptoms are aggravated by movement and pressure on injury (walking).

## 2025-07-16 NOTE — PATIENT INSTRUCTIONS
Wash wounds daily with soap and water, apply antibiotic ointment 1-2 times daily to affected areas  Leave wounds open to air as able  Up with your walker or cane from home at all times

## 2025-07-16 NOTE — TELEPHONE ENCOUNTER
Noted. Thank you!    Future Appointments   Date Time Provider Department Center   7/23/2025 12:45 PM Danay Wang APRN - CNP [facility]   7/28/2025 11:30 AM Ino Koo MD SC HEART/VAS [facility]   8/1/2025  1:30 PM Asmita Hebert MD fp [facility] ECC DEP   8/18/2025 10:50 AM Juan Nguyen MD [facility]RO [facility]   9/24/2025  2:00 PM Omar Tipton DO AFL TCC [facility]   10/13/2025 11:15 AM Ino Koo MD SC HEART/VAS [facility]   3/18/2026 11:00 AM Asmita Hebert MD fp [facility] ECC DEP

## 2025-07-16 NOTE — TELEPHONE ENCOUNTER
To go to urgent care now, to have evaluation and possible orders for x-rays appropriate based on the exam    Future Appointments   Date Time Provider Department Center   7/23/2025 12:45 PM Danay Wang APRN - CNP [facility]   7/28/2025 11:30 AM Ino Koo MD SC HEART/VAS [facility]   8/1/2025  1:30 PM Asmita Hebert MD fp [facility] ECC DEP   8/18/2025 10:50 AM Juan Nguyen MD [facility]RO [facility]   9/24/2025  2:00 PM Omar Tipton DO AFL TCC O[facility] AFL [facility]   10/13/2025 11:15 AM Ino Koo MD SC HEART/VAS [facility]   3/18/2026 11:00 AM Asmita Hebert MD fp [facility] ECC DEP

## 2025-07-17 ENCOUNTER — CARE COORDINATION (OUTPATIENT)
Dept: CARE COORDINATION | Age: 70
End: 2025-07-17

## 2025-07-17 NOTE — CARE COORDINATION
Ambulatory Care Coordination Note     2025 11:16 AM     Patient Current Location:  Home: [address] [address]     This patient was received as a referral from Provider.    ACM contacted the patient by telephone. Verified name and  with patient as identifiers. Provided introduction to self, and explanation of the ACM role.   Patient accepted care management services at this time.          ACM: Ibis Mcintosh RN     Challenges to be reviewed by the provider   Additional needs identified to be addressed with provider Yes  Bernardino went to the walk in clinic yesterday after his fall. He still feels sore today. He said he was just going out the front door to the porch, and he said everything \"shifted\" and he fell. He said that has never happened before. He is still having falls due to balance issues. He plans to discuss with you at his  appointment. Discussed the neurology referral from March. Didn't see an appt scheduled. Will follow up on that.                Method of communication with provider: chart routing.    Utilization: Initial Call - N/A    Care Summary Note: Spoke to Bernardino. Introduced self and reason for call. He went to the [facility] walk in clinic, yesterday, after a fall. He declined to do xrays. He states he is still a little sore, but able to bear weight on his legs and walking ok. He said he was just going out to get on his scooter. When he stepped out on the porch, something \"shifted\" and he fell. He said that has never happened before. He also has foot drop on his right side. He stated that he continues to have trouble with his balance. He has done PT and doesn't feel that helped.   He states his blood sugars are good- 93, 112. He drinks a Boost every night for the nutrition.   He has been following regularly with vascular and the wound clinic for a wound on his right lower leg.   He was referred to neurology back in March but does not have an appt scheduled. Will follow up on

## 2025-07-17 NOTE — TELEPHONE ENCOUNTER
Noted. Thank you!  Then observation for now  He declined x-rays, no bone tenderness during the exam at the urgent care, I reviewed the note from urgent care.  He does have a walker with seat, to take it slowly    Future Appointments   Date Time Provider Department Center   7/23/2025 12:45 PM Danay Wang APRN - CNP [facility] WND CAR [facility]   7/28/2025 11:30 AM Ino Koo MD SC HEART/VAS [facility]   8/1/2025  1:30 PM Asmita Hebert MD fp [facility] ECC DEP   8/18/2025 10:50 AM Juan Nguyen MD [facility]RO [facility]   9/24/2025  2:00 PM Omar Tipton DO AFL TCC [facility] C   10/13/2025 11:15 AM Ino Koo MD SC HEART/VAS [facility]   3/18/2026 11:00 AM Asmita Hebert MD fp [facility] ECC DEP

## 2025-07-17 NOTE — DISCHARGE INSTRUCTIONS
[facility] WOUND and HYPERBARIC TREATMENT  CENTER                            Visit  Discharge Instructions / Physician Orders  DATE: 7/23/25     Home Care:NA     SUPPLIES ORDERED THRU:     [facility] [employer]                DATE LAST SUPPLIED  7/9/25     Wound Location:  Right  Lower leg x 2     Cleanse with: Liquid antibacterial soap and water, rinse well      Dressing Orders:   Right lower leg -Primary dressing    Jeanine      (may add normal saline if to dry)              Secondary dressing   Silicone border dressing                        Right lower leg lat -healed  Frequency:  Daily     Additional Orders: Increase protein to diet (meat, cheese, eggs, fish, peanut butter, nuts and beans)  Multivitamin daily  Elevate legs if swollen or wearing compression when sitting  OFFLOADING [] YES  TYPE:                  [x] NA     Weekly wound care visits until determined otherwise.     Antibiotic therapy-wound care related YES [] NO [x] NA[]  DRUG-                                                             Duration-             Script sent to-                      on (date)  MY CHART []     Smart Device  []      HYPERBARIC TREATMENT-                TREATMENT #                          Your next appointment with the Wound Care Center is in 2 weeks                                                                                                   (Please note your next appointment above and if you are unable to keep, kindly give a 24 hour notice. Thank you.)  If more than 15 min late we cannot guarantee you will be seen due to clinician schedule     Per Policy,  3 or more cancellations or no show may result in dismissal from program  If you experience any of the following, please call the Wound Care Center during business hours:  [phone]     * Increase in Pain  * Temperature over 101  * Increase in drainage from your wound  * Drainage with a foul odor  * Bleeding  * Increase in swelling  * Need for compression bandage

## 2025-07-17 NOTE — TELEPHONE ENCOUNTER
I will suggest physical therapy, I can place a referral, let me know if he agrees I know he still drives   The referral can be at [place] location if he agrees, please let me know.      Future Appointments   Date Time Provider Department Center   7/23/2025 12:45 PM Danay Wang APRN - CNP [facility] WND [facility]   7/28/2025 11:30 AM Ino Koo MD SC HEART/VAS [facility]   8/1/2025  1:30 PM Asmita Hebert MD fp [facility] ECC DEP   8/18/2025 10:50 AM Juan Nguyen MD [facility]   9/24/2025  2:00 PM Omar Tipton DO AFL TCC [facility]   10/13/2025 11:15 AM Ino Koo MD SC HEART/VAS [facility]   3/18/2026 11:00 AM Asmita Hebert MD fp [facility] ECC DEP

## 2025-07-18 ENCOUNTER — CARE COORDINATION (OUTPATIENT)
Dept: CARE COORDINATION | Age: 70
End: 2025-07-18

## 2025-07-18 DIAGNOSIS — R42 DIZZINESS: Primary | ICD-10-CM

## 2025-07-18 NOTE — CARE COORDINATION
Referral for vestibular therapy placed     Diagnosis Orders   1. [facility] Physical [facility]

## 2025-07-18 NOTE — CARE COORDINATION
Ambulatory Care Coordination Note     2025 5:06 PM     Patient Current Location:  Home: [address] [address]     Patient contacted the ACM by telephone. Verified name and  with patient as identifiers.         ACM: Ibis Mcintosh RN     Challenges to be reviewed by the provider   Additional needs identified to be addressed with provider Yes  Jeremy just called me, stating he fell again yesterday, out in the street. He said when he walks, he feels like he is leaning. He also said sounds seem louder in his ears. He fell on the same side and places. No new injuries. I'm wondering if he needs vestibular therapy? He is agreeable to try if indicated. I also told him I will call his neurology office to see about an appt , as there may be something else going on.                Method of communication with provider: chart routing.    Utilization: Patient has not had any utilization since our last call.     Care Summary Note: see above    Offered patient enrollment in the Remote Patient Monitoring (RPM) program for in-home monitoring: Yes, but did not enroll at this time: already monitoring with home equipment.     Assessments Completed:   General Assessment    Do you have any symptoms that are causing concern?: Yes  Progression since Onset: Gradually Worsening  Reported Symptoms: Other (Comment: balance trouble, fell again)          Medications Reviewed:   Patient denies any changes with medications and reports taking all medications as prescribed.    Advance Care Planning:   Not reviewed during this call     Care Planning:   Not completed during this call    PCP/Specialist follow up:   Future Appointments         Provider Specialty Dept Phone    2025 12:45 PM Danay Wang, APRN - CNP Wound Ostomy [phone]    2025 11:30 AM Ino Koo MD Vascular Surgery [phone]    2025 1:30 PM Asmita Hebert MD Family Medicine [phone]    2025 10:50 AM Juan Nguyen

## 2025-07-18 NOTE — CARE COORDINATION
Ambulatory Care Coordination Note     7/18/2025 8:20 AM     Patient outreach attempt by this AC today to perform care management follow up . [facility] was unable to reach the patient by telephone today;   left voice message requesting a return phone call to this [facility].     ACM: Ibis Mcintosh RN     Care Summary Note: patient had left a message on [facility]'s voicemail, last night, requesting a return call.     4:35 PM Attempted to reach patient again; his wife answered and said he wasn't home. She wasn't sure what he had called about. Will follow up next week.     PCP/Specialist follow up:   Future Appointments         Provider Specialty Dept Phone    7/23/2025 12:45 PM Danay Wang APRN - CNP Wound Ostomy [phone]    7/28/2025 11:30 AM Ino Koo MD Vascular Surgery [phone]    8/1/2025 1:30 PM Asmita Hebert MD Family Medicine [phone]    8/18/2025 10:50 AM Juan Nguyen MD Urology [phone]    9/24/2025 2:00 PM Omar Tipton DO Cardiology [phone]    10/13/2025 11:15 AM Ino Koo MD Vascular Surgery [phone]    3/18/2026 11:00 AM Asmita Hebert MD Family Medicine [phone]            Follow Up:   Plan for next [facility] outreach in approximately 1 week to complete:  - CC Protocol assessments  - disease specific assessments  - medication review  - goal progression  - RPM  - follow-up appointment with neurology.

## 2025-07-21 ENCOUNTER — CARE COORDINATION (OUTPATIENT)
Dept: CARE COORDINATION | Age: 70
End: 2025-07-21

## 2025-07-21 ASSESSMENT — ENCOUNTER SYMPTOMS: DYSPNEA ASSOCIATED WITH: EXERTION

## 2025-07-21 NOTE — TELEPHONE ENCOUNTER
Noted. Thank you!    Future Appointments   Date Time Provider Department Center   7/23/2025 12:45 PM Danay Wang APRN - CNP [facility] WND CAR [facility]   7/28/2025 11:30 AM Ino Koo MD SC HEART/VAS [facility]   8/1/2025  1:30 PM Asmita Hebert MD fp [facility] ECC DEP   8/1/2025  2:45 PM Kirk Alvarez, PT STCZ MOB PT [facility]   8/18/2025 10:50 AM Juan Nguyen MD [facility]   9/24/2025  2:00 PM Omar Tipton,  AFL TCC OREG [facility]   10/13/2025 11:15 AM Ino Koo MD SC HEART/VAS [facility]   3/18/2026 11:00 AM Asmita Hebert MD fp [facility] ECC DEP

## 2025-07-21 NOTE — CARE COORDINATION
Ambulatory Care Coordination Note     2025 10:53 AM     Patient Current Location:  Home: [address] [address]     ACM contacted the patient by telephone. Verified name and  with patient as identifiers.         ACM: Ibis Mcintosh RN     Challenges to be reviewed by the provider   Additional needs identified to be addressed with provider Yes  Jeremy stated that for the last 3-4 days, he has had swelling in hie legs/feet/toes. He said he can barely get his shoes on. He denies any chest pain, increased shortness of breath, or weight gain. He denies any increased intake of salt or fluids. He denies any pain in his legs/calves. He states compliance with his lasix 20 mg daily.   He does have compression stockings but hasn't been wearing them because the swelling had gone. Encouraged him to start wearing them again. He does have follow up with vascular on .                Method of communication with provider: chart routing.    Utilization: Patient has not had any utilization since our last call.     Care Summary Note: Called the neurology office to follow up on the referral from March. They mentioned that he had seen another provider previously. Explained that the patient requested a new provider. They will have the  reach out to him to get an appt with Dr. Beal.   Called Bernardino for follow up. Informed him of the neurology referral and that they should be calling him. Also informed him of the PT referral for vestibular therapy.   He stated that for the last 3-4 days, he has had swelling in hie legs/feet/toes. He said he can barely get his shoes on. He denies any chest pain, increased shortness of breath, or weight gain. He denies any increased intake of salt or fluids. He denies any pain in his legs/calves. He states compliance with his lasix 20 mg daily.   He does have compression stockings but hasn't been wearing them because the swelling had gone. Encouraged him to start wearing

## 2025-07-21 NOTE — CARE COORDINATION
Called Bernardino back to inform him of instructions per PCP. His wife, Jenna, answered the phone and stated that he was at the dentist. Informed her that he is to increase his lasix dose to 40 mg today and tomorrow. Explained that if he already took the 20 mg dose this morning, have him take an additional dose when he gets home; and 2-20 mg tablets tomorrow. Instructed her to have him eat a banana to help replace any potassium that he may lose. She wrote down the instructions and verbalized understanding. She will have him call ACM if any questions. Will follow up in 2 days.

## 2025-07-21 NOTE — TELEPHONE ENCOUNTER
Can take furosemide 40 Mg for 2 days and eat 1 banana    Future Appointments   Date Time Provider Department Center   7/23/2025 12:45 PM Danay Wang, APRN - CNP STCZ WND CAR [facility]   7/28/2025 11:30 AM Ino Koo MD SC HEART/VAS [facility]   8/1/2025  1:30 PM Asmita Hebert MD fp [facility] ECC DEP   8/1/2025  2:45 PM Kirk Alvarez, PT STCZ MOB PT [facility]   8/18/2025 10:50 AM Juan Nguyen MD [facility]   9/24/2025  2:00 PM Omar Tipton DO AFL TCC [facility]   10/13/2025 11:15 AM Ino Koo MD SC HEART/VAS [facility]   3/18/2026 11:00 AM Asmita Hebert MD fp [facility] ECC DEP

## 2025-07-23 ENCOUNTER — HOSPITAL ENCOUNTER (OUTPATIENT)
Dept: WOUND CARE | Age: 70
Discharge: HOME | End: 2025-07-23
Payer: MEDICARE

## 2025-07-23 VITALS
RESPIRATION RATE: 19 BRPM | HEART RATE: 80 BPM | TEMPERATURE: 98.3 F | WEIGHT: 230 LBS | BODY MASS INDEX: 31.15 KG/M2 | HEIGHT: 72 IN | DIASTOLIC BLOOD PRESSURE: 64 MMHG | SYSTOLIC BLOOD PRESSURE: 122 MMHG

## 2025-07-23 DIAGNOSIS — E11.42 TYPE 2 DIABETES MELLITUS WITH DIABETIC POLYNEUROPATHY, WITHOUT LONG-TERM CURRENT USE OF INSULIN (HCC): ICD-10-CM

## 2025-07-23 DIAGNOSIS — S81.801D WOUND OF RIGHT LEG, SUBSEQUENT ENCOUNTER: Primary | ICD-10-CM

## 2025-07-23 PROCEDURE — 11042 DBRDMT SUBQ TIS 1ST 20SQCM/<: CPT | Performed by: NURSE PRACTITIONER

## 2025-07-23 PROCEDURE — 11042 DBRDMT SUBQ TIS 1ST 20SQCM/<: CPT

## 2025-07-23 RX ORDER — LIDOCAINE HYDROCHLORIDE 20 MG/ML
JELLY TOPICAL PRN
OUTPATIENT
Start: 2025-07-23

## 2025-07-23 RX ORDER — LIDOCAINE HYDROCHLORIDE 40 MG/ML
SOLUTION TOPICAL PRN
Status: DISCONTINUED | OUTPATIENT
Start: 2025-07-23 | End: 2025-07-24 | Stop reason: HOSPADM

## 2025-07-23 RX ORDER — LIDOCAINE HYDROCHLORIDE 40 MG/ML
SOLUTION TOPICAL PRN
OUTPATIENT
Start: 2025-07-23

## 2025-07-23 RX ADMIN — LIDOCAINE HYDROCHLORIDE 5 ML: 40 SOLUTION TOPICAL at 13:11

## 2025-07-23 ASSESSMENT — ENCOUNTER SYMPTOMS
SHORTNESS OF BREATH: 0
VOMITING: 0
COUGH: 0
DIARRHEA: 0
NAUSEA: 0
RHINORRHEA: 0

## 2025-07-23 ASSESSMENT — PAIN SCALES - GENERAL: PAINLEVEL_OUTOF10: 0

## 2025-07-23 NOTE — PROGRESS NOTES
[facility] Wound Care Center   Progress Note and Procedure Note      Bernardino Cote  MEDICAL RECORD NUMBER:  [id number]  AGE: 70 y.o.   GENDER: male  : 1955  EPISODE DATE:  2025    Subjective:     Chief Complaint   Patient presents with    Wound Check     RLE         HISTORY of PRESENT ILLNESS HPI     Bernardino Cote is a 70 y.o. male who presents today for wound/ulcer evaluation.   History of Wound Context: presents in follow up on right lower leg wound. Pretibial wound is healed.   Wound/Ulcer Pain Timing/Severity: none  Quality of pain: N/A  Severity:  0 / 10   Modifying Factors: None  Associated Signs/Symptoms: none    Ulcer Identification:  Ulcer Type: arterial  Contributing Factors: arterial insufficiency         PAST MEDICAL HISTORY        Diagnosis Date    Acid reflux     Acute renal failure with acute tubular necrosis superimposed on stage 4 chronic kidney disease ([facility]) 2022    Acute respiratory failure with hypoxia ([facility]) 2022    Anemia 10/07/2018    Anemia, blood loss 10/07/2018    Arthritis     Benign hypertension with CKD (chronic kidney disease), stage II 2022    Bilateral leg edema 2021    history of    C. difficile colitis 2020    Carcinoma of prostate ([facility]) 2021    Chronic bilateral low back pain with bilateral sciatica 2016    Chronic diastolic heart failure ([facility]) 2021    Chronic hypoxemic respiratory failure ([facility]) 2020    Chronic renal disease, stage III ([facility]) [322461] 2022    Complete tear of left rotator cuff 2018    Constipation 2023    COPD (chronic obstructive pulmonary disease) ([facility])     COPD exacerbation ([facility]) 2022    Coronary artery disease involving native coronary artery of native heart without angina pectoris 2020    Degenerative disc disease, cervical 2019    PLASCENCIA (dyspnea on exertion) 2021    Essential hypertension 2016    Excessive sweating 2022

## 2025-07-24 ENCOUNTER — CARE COORDINATION (OUTPATIENT)
Dept: CARE COORDINATION | Age: 70
End: 2025-07-24

## 2025-07-27 DIAGNOSIS — E78.5 HYPERLIPIDEMIA WITH TARGET LDL LESS THAN 70: ICD-10-CM

## 2025-07-28 ENCOUNTER — OFFICE VISIT (OUTPATIENT)
Dept: VASCULAR SURGERY | Age: 70
End: 2025-07-28
Payer: MEDICARE

## 2025-07-28 VITALS
HEIGHT: 72 IN | HEART RATE: 73 BPM | SYSTOLIC BLOOD PRESSURE: 126 MMHG | WEIGHT: 230 LBS | DIASTOLIC BLOOD PRESSURE: 69 MMHG | OXYGEN SATURATION: 96 % | BODY MASS INDEX: 31.15 KG/M2

## 2025-07-28 DIAGNOSIS — I65.23 BILATERAL CAROTID ARTERY STENOSIS: ICD-10-CM

## 2025-07-28 DIAGNOSIS — I70.235 ATHEROSCLEROSIS OF NATIVE ARTERIES OF RIGHT LEG WITH ULCERATION OF OTHER PART OF FOOT (HCC): Primary | ICD-10-CM

## 2025-07-28 DIAGNOSIS — I70.213 ATHEROSCLER OF NATIVE ARTERY OF BOTH LEGS WITH INTERMIT CLAUDICATION: ICD-10-CM

## 2025-07-28 PROCEDURE — G8428 CUR MEDS NOT DOCUMENT: HCPCS | Performed by: SURGERY

## 2025-07-28 PROCEDURE — G8417 CALC BMI ABV UP PARAM F/U: HCPCS | Performed by: SURGERY

## 2025-07-28 PROCEDURE — 99213 OFFICE O/P EST LOW 20 MIN: CPT | Performed by: SURGERY

## 2025-07-28 PROCEDURE — 1036F TOBACCO NON-USER: CPT | Performed by: SURGERY

## 2025-07-28 PROCEDURE — 3017F COLORECTAL CA SCREEN DOC REV: CPT | Performed by: SURGERY

## 2025-07-28 PROCEDURE — 1123F ACP DISCUSS/DSCN MKR DOCD: CPT | Performed by: SURGERY

## 2025-07-28 RX ORDER — PRAVASTATIN SODIUM 80 MG/1
80 TABLET ORAL EVERY EVENING
Qty: 90 TABLET | Refills: 3 | Status: SHIPPED | OUTPATIENT
Start: 2025-07-28

## 2025-07-28 NOTE — TELEPHONE ENCOUNTER
Please Approve or Refuse.  Send to Pharmacy per Pt's Request:      Next Visit Date:  8/1/2025   Last Visit Date: 5/15/2025    Hemoglobin A1C (%)   Date Value   05/21/2025 4.8   05/15/2025 5.9   01/30/2025 5.9             ( goal A1C is < 7)   BP Readings from Last 3 Encounters:   07/23/25 122/64   07/16/25 125/71   07/09/25 (!) 98/55          (goal 120/80)  BUN   Date Value Ref Range Status   05/21/2025 15 8 - 23 mg/dL Final     Creatinine   Date Value Ref Range Status   05/21/2025 1.0 0.7 - 1.2 mg/dL Final     Potassium   Date Value Ref Range Status   05/21/2025 4.7 3.7 - 5.3 mmol/L Final

## 2025-07-28 NOTE — PROGRESS NOTES
[facility], [facility]ART [facility] VASCULAR  [address] [place] [address]  Dept: [phone]     Patient: Bernardino Cote  : 1955  MRN: [id number]  DOS: 2025            HPI:  Bernardino Cote is a 70 y.o. male who returns to the office regarding his right lower extremity punctate ulceration on the lateral aspect of the distal calf.  This really has not changed and I still think it is an injury rather than an ischemic ulcer.  He does have arterial disease with an KRISTIN of about 0.4-0.5 on the right and slightly higher on the left.  He does claudicate but has no rest pain.  He has no other ischemic ulceration.  The ulcer has been there for several months.    Review of Systems    Vitals:    25 1114   BP: 126/69   BP Site: Right Upper Arm   Patient Position: Sitting   BP Cuff Size: Large Adult   Pulse: 73   SpO2: 96%   Weight: 104.3 kg (230 lb)   Height: 1.829 m (6')          Physical Exam  On examination the ulcer appears as described above.  It is approximately half a centimeter in its greatest diameter.  There is no surrounding cellulitis.  There is no surrounding erythema.  There is a scab in the ulcer which is not draining.  He has no distal pulses on both lower extremities.    Assessment:  1. Atherosclerosis of native arteries of right leg with ulceration of other part of foot (HCC)    2. Atheroscler of native artery of both legs with intermit claudication    3. Bilateral carotid artery stenosis          Plan:  At this point he still has a question of carotid stenosis which was studied roughly a year ago.  We will go ahead with carotid duplex examination as well as an KRISTIN and PVR which was done in 2024.  He understands and agrees and we will see him back to the office in 3 months with those studies completed    Electronically signed by:  Ino Koo MD

## 2025-07-30 ENCOUNTER — TRANSCRIBE ORDERS (OUTPATIENT)
Dept: ADMINISTRATIVE | Age: 70
End: 2025-07-30

## 2025-07-30 DIAGNOSIS — J44.9 CHRONIC OBSTRUCTIVE PULMONARY DISEASE, UNSPECIFIED COPD TYPE (HCC): Primary | ICD-10-CM

## 2025-08-01 ENCOUNTER — OFFICE VISIT (OUTPATIENT)
Dept: FAMILY MEDICINE CLINIC | Age: 70
End: 2025-08-01

## 2025-08-01 ENCOUNTER — HOSPITAL ENCOUNTER (OUTPATIENT)
Dept: PHYSICAL THERAPY | Age: 70
Setting detail: THERAPIES SERIES
Discharge: HOME | End: 2025-08-01
Attending: FAMILY MEDICINE
Payer: MEDICARE

## 2025-08-01 ENCOUNTER — HOSPITAL ENCOUNTER (OUTPATIENT)
Age: 70
Setting detail: SPECIMEN
Discharge: HOME | End: 2025-08-01

## 2025-08-01 ENCOUNTER — HOSPITAL ENCOUNTER (OUTPATIENT)
Dept: GENERAL RADIOLOGY | Facility: CLINIC | Age: 70
Discharge: HOME | End: 2025-08-01
Payer: MEDICARE

## 2025-08-01 VITALS
SYSTOLIC BLOOD PRESSURE: 120 MMHG | TEMPERATURE: 97.8 F | HEIGHT: 72 IN | OXYGEN SATURATION: 99 % | BODY MASS INDEX: 31.42 KG/M2 | WEIGHT: 232 LBS | DIASTOLIC BLOOD PRESSURE: 60 MMHG | HEART RATE: 71 BPM

## 2025-08-01 DIAGNOSIS — E11.42 TYPE 2 DIABETES MELLITUS WITH DIABETIC POLYNEUROPATHY, WITHOUT LONG-TERM CURRENT USE OF INSULIN (HCC): ICD-10-CM

## 2025-08-01 DIAGNOSIS — M54.12 CERVICAL RADICULOPATHY: ICD-10-CM

## 2025-08-01 DIAGNOSIS — N18.2 BENIGN HYPERTENSION WITH CKD (CHRONIC KIDNEY DISEASE), STAGE II: ICD-10-CM

## 2025-08-01 DIAGNOSIS — R29.6 FREQUENT FALLS: ICD-10-CM

## 2025-08-01 DIAGNOSIS — F33.1 MODERATE EPISODE OF RECURRENT MAJOR DEPRESSIVE DISORDER (HCC): ICD-10-CM

## 2025-08-01 DIAGNOSIS — I12.9 BENIGN HYPERTENSION WITH CKD (CHRONIC KIDNEY DISEASE), STAGE II: ICD-10-CM

## 2025-08-01 DIAGNOSIS — R07.89 CHEST WALL TENDERNESS: ICD-10-CM

## 2025-08-01 DIAGNOSIS — H93.13 TINNITUS OF BOTH EARS: ICD-10-CM

## 2025-08-01 DIAGNOSIS — E53.8 FOLIC ACID DEFICIENCY: ICD-10-CM

## 2025-08-01 DIAGNOSIS — G89.29 CHRONIC BILATERAL LOW BACK PAIN WITH BILATERAL SCIATICA: ICD-10-CM

## 2025-08-01 DIAGNOSIS — E55.9 VITAMIN D DEFICIENCY: ICD-10-CM

## 2025-08-01 DIAGNOSIS — Z71.1 CONCERN ABOUT MEMORY: ICD-10-CM

## 2025-08-01 DIAGNOSIS — M54.42 CHRONIC BILATERAL LOW BACK PAIN WITH BILATERAL SCIATICA: ICD-10-CM

## 2025-08-01 DIAGNOSIS — E11.42 TYPE 2 DIABETES MELLITUS WITH DIABETIC POLYNEUROPATHY, WITHOUT LONG-TERM CURRENT USE OF INSULIN (HCC): Primary | ICD-10-CM

## 2025-08-01 DIAGNOSIS — M54.41 CHRONIC BILATERAL LOW BACK PAIN WITH BILATERAL SCIATICA: ICD-10-CM

## 2025-08-01 LAB
25(OH)D3 SERPL-MCNC: 29 NG/ML (ref 30–100)
ALBUMIN SERPL-MCNC: 4.4 G/DL (ref 3.5–5.2)
ALBUMIN/GLOB SERPL: 1.7 {RATIO} (ref 1–2.5)
ALP SERPL-CCNC: 109 U/L (ref 40–129)
ALT SERPL-CCNC: 20 U/L (ref 10–50)
ANION GAP SERPL CALCULATED.3IONS-SCNC: 12 MMOL/L (ref 9–16)
AST SERPL-CCNC: 21 U/L (ref 10–50)
BILIRUB SERPL-MCNC: 0.3 MG/DL (ref 0–1.2)
BUN SERPL-MCNC: 13 MG/DL (ref 8–23)
CALCIUM SERPL-MCNC: 10.2 MG/DL (ref 8.6–10.4)
CHLORIDE SERPL-SCNC: 100 MMOL/L (ref 98–107)
CO2 SERPL-SCNC: 25 MMOL/L (ref 20–31)
CREAT SERPL-MCNC: 0.9 MG/DL (ref 0.7–1.2)
ERYTHROCYTE [DISTWIDTH] IN BLOOD BY AUTOMATED COUNT: 13.4 % (ref 11.8–14.4)
FOLATE SERPL-MCNC: 8 NG/ML (ref 4.8–24.2)
GFR, ESTIMATED: >90 ML/MIN/1.73M2
GLUCOSE SERPL-MCNC: 79 MG/DL (ref 74–99)
HCT VFR BLD AUTO: 39.5 % (ref 40.7–50.3)
HGB BLD-MCNC: 12.9 G/DL (ref 13–17)
MAGNESIUM SERPL-MCNC: 2.1 MG/DL (ref 1.6–2.4)
MCH RBC QN AUTO: 29.4 PG (ref 25.2–33.5)
MCHC RBC AUTO-ENTMCNC: 32.7 G/DL (ref 28.4–34.8)
MCV RBC AUTO: 90 FL (ref 82.6–102.9)
NRBC BLD-RTO: 0 PER 100 WBC
PHOSPHATE SERPL-MCNC: 3.4 MG/DL (ref 2.5–4.5)
PLATELET # BLD AUTO: 309 K/UL (ref 138–453)
PMV BLD AUTO: 11.1 FL (ref 8.1–13.5)
POTASSIUM SERPL-SCNC: 4.9 MMOL/L (ref 3.7–5.3)
PROT SERPL-MCNC: 7 G/DL (ref 6.6–8.7)
RBC # BLD AUTO: 4.39 M/UL (ref 4.21–5.77)
SODIUM SERPL-SCNC: 137 MMOL/L (ref 136–145)
TSH SERPL DL<=0.05 MIU/L-ACNC: 1.05 UIU/ML (ref 0.27–4.2)
URATE SERPL-MCNC: 5.9 MG/DL (ref 3.4–7)
VIT B12 SERPL-MCNC: 1388 PG/ML (ref 232–1245)
WBC OTHER # BLD: 10.1 K/UL (ref 3.5–11.3)

## 2025-08-01 PROCEDURE — 71046 X-RAY EXAM CHEST 2 VIEWS: CPT

## 2025-08-01 PROCEDURE — 73502 X-RAY EXAM HIP UNI 2-3 VIEWS: CPT

## 2025-08-01 PROCEDURE — 97162 PT EVAL MOD COMPLEX 30 MIN: CPT

## 2025-08-01 PROCEDURE — 73030 X-RAY EXAM OF SHOULDER: CPT

## 2025-08-01 PROCEDURE — 73110 X-RAY EXAM OF WRIST: CPT

## 2025-08-01 PROCEDURE — 73070 X-RAY EXAM OF ELBOW: CPT

## 2025-08-01 RX ORDER — FOLIC ACID 1 MG/1
1 TABLET ORAL DAILY
Qty: 90 TABLET | Refills: 3 | Status: SHIPPED | OUTPATIENT
Start: 2025-08-01

## 2025-08-01 SDOH — ECONOMIC STABILITY: FOOD INSECURITY: WITHIN THE PAST 12 MONTHS, YOU WORRIED THAT YOUR FOOD WOULD RUN OUT BEFORE YOU GOT MONEY TO BUY MORE.: NEVER TRUE

## 2025-08-01 SDOH — ECONOMIC STABILITY: FOOD INSECURITY: WITHIN THE PAST 12 MONTHS, THE FOOD YOU BOUGHT JUST DIDN'T LAST AND YOU DIDN'T HAVE MONEY TO GET MORE.: NEVER TRUE

## 2025-08-01 ASSESSMENT — PATIENT HEALTH QUESTIONNAIRE - PHQ9
2. FEELING DOWN, DEPRESSED OR HOPELESS: NEARLY EVERY DAY
6. FEELING BAD ABOUT YOURSELF - OR THAT YOU ARE A FAILURE OR HAVE LET YOURSELF OR YOUR FAMILY DOWN: NEARLY EVERY DAY
SUM OF ALL RESPONSES TO PHQ QUESTIONS 1-9: 13
10. IF YOU CHECKED OFF ANY PROBLEMS, HOW DIFFICULT HAVE THESE PROBLEMS MADE IT FOR YOU TO DO YOUR WORK, TAKE CARE OF THINGS AT HOME, OR GET ALONG WITH OTHER PEOPLE: VERY DIFFICULT
SUM OF ALL RESPONSES TO PHQ QUESTIONS 1-9: 13
1. LITTLE INTEREST OR PLEASURE IN DOING THINGS: SEVERAL DAYS
8. MOVING OR SPEAKING SO SLOWLY THAT OTHER PEOPLE COULD HAVE NOTICED. OR THE OPPOSITE, BEING SO FIGETY OR RESTLESS THAT YOU HAVE BEEN MOVING AROUND A LOT MORE THAN USUAL: NOT AT ALL
SUM OF ALL RESPONSES TO PHQ QUESTIONS 1-9: 13
9. THOUGHTS THAT YOU WOULD BE BETTER OFF DEAD, OR OF HURTING YOURSELF: NOT AT ALL
5. POOR APPETITE OR OVEREATING: SEVERAL DAYS
SUM OF ALL RESPONSES TO PHQ QUESTIONS 1-9: 13
3. TROUBLE FALLING OR STAYING ASLEEP: SEVERAL DAYS
4. FEELING TIRED OR HAVING LITTLE ENERGY: SEVERAL DAYS
7. TROUBLE CONCENTRATING ON THINGS, SUCH AS READING THE NEWSPAPER OR WATCHING TELEVISION: NEARLY EVERY DAY

## 2025-08-01 NOTE — CONSULTS
[facility]  [address] [place] [address]    P: [phone]  F: [phone]     PHYSICAL THERAPY VESTIBULAR EVALUATION      Date:  2025  Patient: Bernardino Cote  : 1955  MRN: [id number]  Referring Provider: Asmita Hebert MD      Insurance: [facility] [place] Medicare- auth after eval (medical necessity)   Medical Diagnosis: added R29.6  Frequent falls,  R42 (ICD-10-CM) - Dizziness    Rehab Codes: R26.2, R26.81  Onset date: 25  Next 's appt.: 25    Subjective:   HPI: Reports today due to balance issues. Notes he's been falling a lot. Notes he has neuropathy and his feet and hand are painful all the time. Notes Tinnitus that started in . Notes the last fall he really banged himself up that was about a 1.5 weeks ago. Says he felt heavy to one side and fell over. Notes imbalance when bending over, falls forward. Note diminished sensation in feet and hands.  Notes he has had some health decline since COVID. Denies dizziness. Saw PCP today who referred him to ENT.  History of prostate CA, takes a medication to manage that. Notes his memory isnt great, isnt understanding things that would normally would. Notes he usually falls when pivoting/turning when in his garage putting away tools etc. Admits to sedentary lifestyle. Sits most of the day. States he realizes his 4WW is too short, this is the one he only uses when going out and about.     CC:  imbalance and falls       Past Medical History:   Diagnosis Date    Acid reflux     Acute renal failure with acute tubular necrosis superimposed on stage 4 chronic kidney disease (HCC) 2022    Acute respiratory failure with hypoxia (HCC) 2022    Anemia 10/07/2018    Anemia, blood loss 10/07/2018    Arthritis     Benign hypertension with CKD (chronic kidney disease), stage II 2022    Bilateral leg edema 2021    history of    C. difficile colitis 2020

## 2025-08-01 NOTE — CONSULTS
Bo Fall Risk Assessment    Risk Factor Scale  Score   History of Falls [x] Yes  [] No 25  0 25   Secondary Diagnosis [x] Yes  [] No 15  0 15   Ambulatory Aid [] Furniture  [x] Crutches/cane/walker  [] None/bedrest/wheelchair/nurse 30  15  0 15   IV/Heparin Lock [] Yes  [x] No 20  0 0   Gait/Transferring [] Impaired  [x] Weak  [] Normal/bedrest/immobile 20  10  0 10   Mental Status [] Forgets limitations  [x] Oriented to own ability 15  0 0      Total:65     Based on the Assessment score: check the appropriate box.    []  No intervention needed   Low =   Score of 0-24    []  Use standard prevention interventions Moderate =  Score of 24-44   [] Give patient handout and discuss fall prevention strategies   [] Establish goal of education for patient/family RE: fall prevention strategies    [x]  Use high risk prevention interventions High = Score of 45 and higher   [x] Give patient handout and discuss fall prevention strategies   [x] Establish goal of education for patient/family Re: fall prevention strategies   [x] Discuss lifeline / other resources    Electronically signed by:   Kirk Alvarez PT  Date: 8/1/2025

## 2025-08-04 PROBLEM — Z87.891 PERSONAL HISTORY OF NICOTINE DEPENDENCE: Status: ACTIVE | Noted: 2025-08-04

## 2025-08-04 PROBLEM — R07.89 CHEST WALL TENDERNESS: Status: ACTIVE | Noted: 2025-08-04

## 2025-08-04 PROBLEM — M54.12 CERVICAL RADICULOPATHY: Status: ACTIVE | Noted: 2025-08-04

## 2025-08-04 RX ORDER — PREGABALIN 150 MG/1
150 CAPSULE ORAL 3 TIMES DAILY
Qty: 270 CAPSULE | Refills: 0
Start: 2025-08-04 | End: 2025-11-02

## 2025-08-05 ENCOUNTER — CARE COORDINATION (OUTPATIENT)
Dept: CARE COORDINATION | Age: 70
End: 2025-08-05

## 2025-08-05 ASSESSMENT — ENCOUNTER SYMPTOMS: DYSPNEA ASSOCIATED WITH: EXERTION

## 2025-08-06 ENCOUNTER — HOSPITAL ENCOUNTER (OUTPATIENT)
Dept: WOUND CARE | Age: 70
Discharge: HOME | End: 2025-08-06
Payer: MEDICARE

## 2025-08-06 VITALS
WEIGHT: 232 LBS | DIASTOLIC BLOOD PRESSURE: 68 MMHG | HEIGHT: 72 IN | HEART RATE: 69 BPM | SYSTOLIC BLOOD PRESSURE: 146 MMHG | RESPIRATION RATE: 18 BRPM | BODY MASS INDEX: 31.42 KG/M2 | TEMPERATURE: 97.3 F

## 2025-08-06 DIAGNOSIS — E11.42 TYPE 2 DIABETES MELLITUS WITH DIABETIC POLYNEUROPATHY, WITHOUT LONG-TERM CURRENT USE OF INSULIN (HCC): ICD-10-CM

## 2025-08-06 DIAGNOSIS — S81.801D WOUND OF RIGHT LEG, SUBSEQUENT ENCOUNTER: Primary | ICD-10-CM

## 2025-08-06 PROCEDURE — 99212 OFFICE O/P EST SF 10 MIN: CPT | Performed by: NURSE PRACTITIONER

## 2025-08-06 PROCEDURE — 99212 OFFICE O/P EST SF 10 MIN: CPT

## 2025-08-06 RX ORDER — LIDOCAINE HYDROCHLORIDE 20 MG/ML
JELLY TOPICAL PRN
Status: CANCELLED | OUTPATIENT
Start: 2025-08-06

## 2025-08-06 RX ORDER — LIDOCAINE HYDROCHLORIDE 40 MG/ML
SOLUTION TOPICAL PRN
Status: CANCELLED | OUTPATIENT
Start: 2025-08-06

## 2025-08-06 RX ORDER — LIDOCAINE HYDROCHLORIDE 40 MG/ML
SOLUTION TOPICAL PRN
Status: DISCONTINUED | OUTPATIENT
Start: 2025-08-06 | End: 2025-08-06

## 2025-08-06 ASSESSMENT — ENCOUNTER SYMPTOMS
SHORTNESS OF BREATH: 0
VOMITING: 0
COUGH: 0
RHINORRHEA: 0
DIARRHEA: 0
NAUSEA: 0

## 2025-08-06 ASSESSMENT — PAIN SCALES - GENERAL: PAINLEVEL_OUTOF10: 0

## 2025-08-07 ENCOUNTER — OFFICE VISIT (OUTPATIENT)
Dept: FAMILY MEDICINE CLINIC | Age: 70
End: 2025-08-07

## 2025-08-07 VITALS
TEMPERATURE: 97.5 F | DIASTOLIC BLOOD PRESSURE: 77 MMHG | OXYGEN SATURATION: 96 % | SYSTOLIC BLOOD PRESSURE: 119 MMHG | HEART RATE: 78 BPM

## 2025-08-07 DIAGNOSIS — H60.11 CELLULITIS OF RIGHT EAR: Primary | ICD-10-CM

## 2025-08-07 RX ORDER — METHYLPREDNISOLONE 4 MG/1
TABLET ORAL
Qty: 1 KIT | Refills: 0 | Status: SHIPPED | OUTPATIENT
Start: 2025-08-07 | End: 2025-08-13

## 2025-08-07 RX ORDER — CEPHALEXIN 500 MG/1
500 CAPSULE ORAL 4 TIMES DAILY
Qty: 28 CAPSULE | Refills: 0 | Status: SHIPPED | OUTPATIENT
Start: 2025-08-07 | End: 2025-08-14

## 2025-08-07 RX ORDER — DIAPER,BRIEF,INFANT-TODD,DISP
EACH MISCELLANEOUS
Qty: 1 EACH | Refills: 0 | Status: CANCELLED | OUTPATIENT
Start: 2025-08-07 | End: 2025-08-14

## 2025-08-07 ASSESSMENT — ENCOUNTER SYMPTOMS
SHORTNESS OF BREATH: 0
COUGH: 0
FACIAL SWELLING: 0
SORE THROAT: 0
DIARRHEA: 0
RHINORRHEA: 0
COLOR CHANGE: 1
VOMITING: 0
NAIL CHANGES: 0
EYE PAIN: 0

## 2025-08-11 ENCOUNTER — HOSPITAL ENCOUNTER (OUTPATIENT)
Age: 70
Setting detail: SPECIMEN
Discharge: HOME | End: 2025-08-11

## 2025-08-11 DIAGNOSIS — C61 PROSTATE CANCER (HCC): ICD-10-CM

## 2025-08-11 LAB
PSA SERPL-MCNC: 0.65 NG/ML (ref 0–4)
TESTOST SERPL-MCNC: <3 NG/DL (ref 193–740)

## 2025-08-13 ENCOUNTER — HOSPITAL ENCOUNTER (OUTPATIENT)
Dept: VASCULAR LAB | Age: 70
Discharge: HOME | End: 2025-08-15
Attending: SURGERY
Payer: MEDICARE

## 2025-08-13 ENCOUNTER — HOSPITAL ENCOUNTER (OUTPATIENT)
Dept: PULMONOLOGY | Age: 70
Discharge: HOME | End: 2025-08-13
Attending: SURGERY
Payer: MEDICARE

## 2025-08-13 DIAGNOSIS — I65.23 BILATERAL CAROTID ARTERY STENOSIS: ICD-10-CM

## 2025-08-13 DIAGNOSIS — J44.9 CHRONIC OBSTRUCTIVE PULMONARY DISEASE, UNSPECIFIED COPD TYPE (HCC): ICD-10-CM

## 2025-08-13 LAB
DLCO %PRED: NORMAL
DLCO PRED: NORMAL
DLCO/VA %PRED: NORMAL
DLCO/VA PRED: NORMAL
DLCO/VA: NORMAL
DLCO: NORMAL
EXPIRATORY TIME: NORMAL
FEF 25-75% %PRED-PRE: NORMAL
FEF 25-75% PRED: NORMAL
FEF 25-75-PRE: NORMAL
FEV1 %PRED-PRE: NORMAL
FEV1 PRED: NORMAL
FEV1/FVC %PRED-PRE: NORMAL
FEV1/FVC PRED: NORMAL
FEV1/FVC: NORMAL
FEV1: NORMAL
FVC %PRED-PRE: NORMAL
FVC PRED: NORMAL
FVC: NORMAL
GAW %PRED: NORMAL
GAW PRED: NORMAL
GAW: NORMAL
IC PRE %PRED: NORMAL
IC PRED: NORMAL
IC: NORMAL
MVV %PRED-PRE: NORMAL
MVV PRED: NORMAL
MVV-PRE: NORMAL
PEF %PRED-PRE: NORMAL
PEF PRED: NORMAL
PEF-PRE: NORMAL
RAW %PRED: NORMAL
RAW PRED: NORMAL
RAW: NORMAL
RV PRE %PRED: NORMAL
RV PRED: NORMAL
RV: NORMAL
SVC %PRED: NORMAL
SVC PRED: NORMAL
SVC: NORMAL
TLC PRE %PRED: NORMAL
TLC PRED: NORMAL
TLC: NORMAL
VA %PRED: NORMAL
VA PRED: NORMAL
VA: NORMAL
VAS LEFT CCA DIST EDV: 25.2 CM/S
VAS LEFT CCA DIST PSV: 88.9 CM/S
VAS LEFT CCA MID EDV: 25.2 CM/S
VAS LEFT CCA MID PSV: 96.3 CM/S
VAS LEFT CCA PROX EDV: 29 CM/S
VAS LEFT CCA PROX PSV: 133.1 CM/S
VAS LEFT ECA EDV: 16.7 CM/S
VAS LEFT ECA PSV: 96.3 CM/S
VAS LEFT ICA DIST EDV: 25.2 CM/S
VAS LEFT ICA DIST PSV: 104.8 CM/S
VAS LEFT ICA MID EDV: 23.5 CM/S
VAS LEFT ICA MID PSV: 133.1 CM/S
VAS LEFT ICA PROX EDV: 49.2 CM/S
VAS LEFT ICA PROX PSV: 158.1 CM/S
VAS LEFT ICA/CCA PSV: 1.8 NO UNITS
VAS LEFT VERTEBRAL EDV: 9.7 CM/S
VAS LEFT VERTEBRAL PSV: 43 CM/S
VAS RIGHT CCA DIST EDV: 24.5 CM/S
VAS RIGHT CCA DIST PSV: 95.7 CM/S
VAS RIGHT CCA MID EDV: 25.8 CM/S
VAS RIGHT CCA MID PSV: 100.9 CM/S
VAS RIGHT CCA PROX EDV: 24.5 CM/S
VAS RIGHT CCA PROX PSV: 99.6 CM/S
VAS RIGHT ECA EDV: 15 CM/S
VAS RIGHT ECA PSV: 95.4 CM/S
VAS RIGHT ICA DIST EDV: 17.5 CM/S
VAS RIGHT ICA DIST PSV: 70.2 CM/S
VAS RIGHT ICA MID EDV: 18.5 CM/S
VAS RIGHT ICA MID PSV: 61.1 CM/S
VAS RIGHT ICA PROX EDV: 22.8 CM/S
VAS RIGHT ICA PROX PSV: 99.2 CM/S
VAS RIGHT ICA/CCA PSV: 1 NO UNITS
VAS RIGHT VERTEBRAL EDV: 20.6 CM/S
VAS RIGHT VERTEBRAL PSV: 57.4 CM/S
VTG %PRED: NORMAL
VTG PRED: NORMAL
VTG: NORMAL

## 2025-08-13 PROCEDURE — 93880 EXTRACRANIAL BILAT STUDY: CPT | Performed by: SURGERY

## 2025-08-13 PROCEDURE — 94726 PLETHYSMOGRAPHY LUNG VOLUMES: CPT

## 2025-08-13 PROCEDURE — 94729 DIFFUSING CAPACITY: CPT

## 2025-08-13 PROCEDURE — 94060 EVALUATION OF WHEEZING: CPT

## 2025-08-13 PROCEDURE — 93880 EXTRACRANIAL BILAT STUDY: CPT

## 2025-08-13 PROCEDURE — 6370000000 HC RX 637 (ALT 250 FOR IP): Performed by: NURSE PRACTITIONER

## 2025-08-13 PROCEDURE — 94664 DEMO&/EVAL PT USE INHALER: CPT

## 2025-08-13 RX ORDER — ALBUTEROL SULFATE 90 UG/1
2 INHALANT RESPIRATORY (INHALATION) ONCE
Status: COMPLETED | OUTPATIENT
Start: 2025-08-13 | End: 2025-08-13

## 2025-08-13 RX ADMIN — ALBUTEROL SULFATE 2 PUFF: 90 AEROSOL, METERED RESPIRATORY (INHALATION) at 13:39

## 2025-08-14 ENCOUNTER — HOSPITAL ENCOUNTER (OUTPATIENT)
Dept: PHYSICAL THERAPY | Age: 70
Setting detail: THERAPIES SERIES
Discharge: HOME | End: 2025-08-14
Attending: FAMILY MEDICINE
Payer: MEDICARE

## 2025-08-14 PROCEDURE — 97110 THERAPEUTIC EXERCISES: CPT

## 2025-08-18 ENCOUNTER — OFFICE VISIT (OUTPATIENT)
Dept: UROLOGY | Age: 70
End: 2025-08-18
Payer: MEDICARE

## 2025-08-18 VITALS
WEIGHT: 232 LBS | DIASTOLIC BLOOD PRESSURE: 68 MMHG | BODY MASS INDEX: 31.42 KG/M2 | HEART RATE: 73 BPM | TEMPERATURE: 97.4 F | HEIGHT: 72 IN | OXYGEN SATURATION: 93 % | SYSTOLIC BLOOD PRESSURE: 114 MMHG

## 2025-08-18 DIAGNOSIS — M85.80 OSTEOPENIA, UNSPECIFIED LOCATION: ICD-10-CM

## 2025-08-18 DIAGNOSIS — R23.2 HOT FLASHES: ICD-10-CM

## 2025-08-18 DIAGNOSIS — C61 PROSTATE CANCER (HCC): Primary | ICD-10-CM

## 2025-08-18 PROCEDURE — G8427 DOCREV CUR MEDS BY ELIG CLIN: HCPCS | Performed by: UROLOGY

## 2025-08-18 PROCEDURE — 99214 OFFICE O/P EST MOD 30 MIN: CPT | Performed by: UROLOGY

## 2025-08-18 PROCEDURE — 3017F COLORECTAL CA SCREEN DOC REV: CPT | Performed by: UROLOGY

## 2025-08-18 PROCEDURE — 1123F ACP DISCUSS/DSCN MKR DOCD: CPT | Performed by: UROLOGY

## 2025-08-18 PROCEDURE — 1159F MED LIST DOCD IN RCRD: CPT | Performed by: UROLOGY

## 2025-08-18 PROCEDURE — G8417 CALC BMI ABV UP PARAM F/U: HCPCS | Performed by: UROLOGY

## 2025-08-18 PROCEDURE — 1036F TOBACCO NON-USER: CPT | Performed by: UROLOGY

## 2025-08-18 ASSESSMENT — ENCOUNTER SYMPTOMS
COLOR CHANGE: 0
ALLERGIC/IMMUNOLOGIC NEGATIVE: 1
WHEEZING: 0
SHORTNESS OF BREATH: 1
ABDOMINAL PAIN: 0
EYE PAIN: 0
VOMITING: 0
EYE REDNESS: 0
NAUSEA: 0
GASTROINTESTINAL NEGATIVE: 1
BACK PAIN: 1
COUGH: 0

## 2025-08-19 ENCOUNTER — CARE COORDINATION (OUTPATIENT)
Dept: CARE COORDINATION | Age: 70
End: 2025-08-19

## 2025-08-19 DIAGNOSIS — M25.512 CHRONIC PAIN OF BOTH SHOULDERS: Primary | ICD-10-CM

## 2025-08-19 DIAGNOSIS — G89.29 CHRONIC PAIN OF BOTH SHOULDERS: Primary | ICD-10-CM

## 2025-08-19 DIAGNOSIS — M25.511 CHRONIC PAIN OF BOTH SHOULDERS: Primary | ICD-10-CM

## 2025-08-19 SDOH — ECONOMIC STABILITY: FOOD INSECURITY: WITHIN THE PAST 12 MONTHS, THE FOOD YOU BOUGHT JUST DIDN'T LAST AND YOU DIDN'T HAVE MONEY TO GET MORE.: NEVER TRUE

## 2025-08-19 SDOH — ECONOMIC STABILITY: FOOD INSECURITY: WITHIN THE PAST 12 MONTHS, YOU WORRIED THAT YOUR FOOD WOULD RUN OUT BEFORE YOU GOT MONEY TO BUY MORE.: NEVER TRUE

## 2025-08-20 ENCOUNTER — HOSPITAL ENCOUNTER (OUTPATIENT)
Dept: PHYSICAL THERAPY | Age: 70
Setting detail: THERAPIES SERIES
Discharge: HOME | End: 2025-08-20
Attending: FAMILY MEDICINE
Payer: MEDICARE

## 2025-08-20 ENCOUNTER — CARE COORDINATION (OUTPATIENT)
Dept: CARE COORDINATION | Age: 70
End: 2025-08-20

## 2025-08-20 PROCEDURE — 97110 THERAPEUTIC EXERCISES: CPT

## 2025-08-22 ENCOUNTER — HOSPITAL ENCOUNTER (OUTPATIENT)
Dept: PHYSICAL THERAPY | Age: 70
Setting detail: THERAPIES SERIES
Discharge: HOME | End: 2025-08-22
Attending: FAMILY MEDICINE
Payer: MEDICARE

## 2025-08-22 PROCEDURE — 97110 THERAPEUTIC EXERCISES: CPT

## 2025-08-25 ENCOUNTER — CARE COORDINATION (OUTPATIENT)
Dept: CARE COORDINATION | Age: 70
End: 2025-08-25

## 2025-08-25 ASSESSMENT — ENCOUNTER SYMPTOMS: DYSPNEA ASSOCIATED WITH: EXERTION

## 2025-08-29 ENCOUNTER — HOSPITAL ENCOUNTER (OUTPATIENT)
Dept: PHYSICAL THERAPY | Age: 70
Setting detail: THERAPIES SERIES
Discharge: HOME | End: 2025-08-29
Attending: FAMILY MEDICINE
Payer: MEDICARE

## 2025-08-29 PROCEDURE — 97110 THERAPEUTIC EXERCISES: CPT

## 2025-09-03 ENCOUNTER — HOSPITAL ENCOUNTER (OUTPATIENT)
Dept: PHYSICAL THERAPY | Age: 70
Setting detail: THERAPIES SERIES
Discharge: HOME | End: 2025-09-03
Attending: FAMILY MEDICINE
Payer: MEDICARE

## 2025-09-03 PROCEDURE — 97110 THERAPEUTIC EXERCISES: CPT

## 2025-09-03 PROCEDURE — 97112 NEUROMUSCULAR REEDUCATION: CPT

## 2025-09-05 ENCOUNTER — HOSPITAL ENCOUNTER (OUTPATIENT)
Dept: PHYSICAL THERAPY | Age: 70
Setting detail: THERAPIES SERIES
Discharge: HOME | End: 2025-09-05
Attending: FAMILY MEDICINE
Payer: MEDICARE

## 2025-09-05 PROCEDURE — 97112 NEUROMUSCULAR REEDUCATION: CPT

## 2025-09-05 PROCEDURE — 97110 THERAPEUTIC EXERCISES: CPT

## (undated) DEVICE — GLOVE ORANGE PI 7   MSG9070

## (undated) DEVICE — TUBING, SUCTION, 3/16" X 10', STRAIGHT: Brand: MEDLINE

## (undated) DEVICE — 1010 S-DRAPE TOWEL DRAPE 10/BX: Brand: STERI-DRAPE™

## (undated) DEVICE — CANNULA SEAL

## (undated) DEVICE — STRAP POS MP 30X3 IN HK LOOP CLOSURE FOAM DISP

## (undated) DEVICE — GOWN,SIRUS,POLYRNF,BRTHSLV,XL,30/CS: Brand: MEDLINE

## (undated) DEVICE — COVER,TABLE,60X90,STERILE: Brand: MEDLINE

## (undated) DEVICE — POUCH INSTR W6.75XL11.5IN FRST 2 PKT ADH FOR ORTH AND

## (undated) DEVICE — INTENDED FOR TISSUE SEPARATION, AND OTHER PROCEDURES THAT REQUIRE A SHARP SURGICAL BLADE TO PUNCTURE OR CUT.: Brand: BARD-PARKER ® CARBON RIB-BACK BLADES

## (undated) DEVICE — SUTURE PROL SZ 3-0 L18IN NONABSORBABLE BLU L30MM FS-1 3/8 8663G

## (undated) DEVICE — GLOVE ORTHO 7 1/2   MSG9475

## (undated) DEVICE — YANKAUER,OPEN TIP,W/O VENT,STERILE: Brand: MEDLINE INDUSTRIES, INC.

## (undated) DEVICE — SOLUTION IV IRRIG WATER 1000ML POUR BRL 2F7114

## (undated) DEVICE — FORCEPS BX L240CM WRK CHN 2.8MM STD CAP W/ NDL MIC MESH

## (undated) DEVICE — SET HNDPC W COAX BNE CLN TIP SUCT TB BTTRY PWR DISPOSABLE

## (undated) DEVICE — COVER,TABLE,HEAVY DUTY,50"X90",STRL: Brand: MEDLINE

## (undated) DEVICE — TUBING SUCT 12FR MAL ALUM SHFT FN CAP VENT UNIV CONN W/ OBT

## (undated) DEVICE — SYRINGE MED 50ML LUERSLIP TIP

## (undated) DEVICE — SUTURE VIC + LEN CP1 0 70CM VCP267H

## (undated) DEVICE — BANDAGE,SELF ADHRNT,COFLEX,4"X5YD,STRL: Brand: COLABEL

## (undated) DEVICE — BLANKET WRM W29.9XL79.1IN UP BODY FORC AIR MISTRAL-AIR

## (undated) DEVICE — DUAL CUT SAGITTAL BLADE

## (undated) DEVICE — BANDAGE COBAN 6 IN WND 6INX5YD FOAM

## (undated) DEVICE — SUTURE FIBERWIRE SZ 2 W/ TAPERED NEEDLE BLUE L38IN NONABSORB BLU L26.5MM 1/2 CIRCLE AR7200

## (undated) DEVICE — KIT DRN FLAT W/ 100CC EVAC 10MM FULL PERF

## (undated) DEVICE — IMMOBILIZER SHLDR L10.5-17IN D7IN SLNG W/ 15DEG ABD PLLW

## (undated) DEVICE — ST CHARLES BRONCHOSCOPY PACK: Brand: MEDLINE INDUSTRIES, INC.

## (undated) DEVICE — DEFENDO AIR WATER SUCTION AND BIOPSY VALVE KIT FOR  OLYMPUS: Brand: DEFENDO AIR/WATER/SUCTION AND BIOPSY VALVE

## (undated) DEVICE — SHEET, ORTHO, SPLIT, STERILE: Brand: MEDLINE

## (undated) DEVICE — CONTAINER,SPECIMEN,4OZ,OR STRL: Brand: MEDLINE

## (undated) DEVICE — 3M™ IOBAN™ 2 ANTIMICROBIAL INCISE DRAPE 6651EZ: Brand: IOBAN™ 2

## (undated) DEVICE — SNARE ENDOSCP L240CM LOOP W13MM DIA2.4MM SHT THROW SM OVL

## (undated) DEVICE — DRAPE,EXTREMITY,89X128,STERILE: Brand: MEDLINE

## (undated) DEVICE — SOLUTION IV IRRIG POUR BRL 0.9% SODIUM CHL 2F7124

## (undated) DEVICE — SEALER LAP SM L18.8CM OPN JAW HAND/FOOT SWCH FORCETRIAD

## (undated) DEVICE — DRESSING TRNSPAR W5XL4.5IN FLM SHT SEMIPERMEABLE WIND

## (undated) DEVICE — KIT POS ULT SHLDR PT CARE REHAB SLNG

## (undated) DEVICE — [TOMCAT CUTTER, ARTHROSCOPIC SHAVER BLADE,  DO NOT RESTERILIZE,  DO NOT USE IF PACKAGE IS DAMAGED,  KEEP DRY,  KEEP AWAY FROM SUNLIGHT]: Brand: FORMULA

## (undated) DEVICE — SUTURE ETHBND SZ 2-0 L18IN NONABSORBABLE GRN L26MM CT-2 1/2 CX26D

## (undated) DEVICE — SUTURE VCRL + SZ 3 0 L54IN ABSRB UD POLYGLACTIN 910 W VCP285G

## (undated) DEVICE — SOLUTION SURG PREP POV IOD 7.5% 4 OZ

## (undated) DEVICE — GLOVE ORANGE PI 7 1/2   MSG9075

## (undated) DEVICE — SYRINGE 20ML LL S/C 50

## (undated) DEVICE — Z INACTIVE USE 2641839 CLIP INT M L POLYMER LOK LIG HEM O LOK

## (undated) DEVICE — TROCARS: Brand: KII® BALLOON BLUNT TIP SYSTEM

## (undated) DEVICE — Device

## (undated) DEVICE — SUTURE MCRYL + SZ 4-0 L27IN ABSRB UD L19MM PS-2 3/8 CIR MCP426H

## (undated) DEVICE — HANDPIECE SET WITH BONE CLEANING TIP AND SUCTION TUBE: Brand: INTERPULSE

## (undated) DEVICE — BLADE LARYNSCP SZ 4 TI DISP SPECTRM DVM

## (undated) DEVICE — SUTURE VCRL + SZ 3-0 L36IN ABSRB UD L36MM CT-1 1/2 CIR VCP944H

## (undated) DEVICE — FORCEPS BX L240CM JAW DIA2.4MM ORNG L CAP W/ NDL DISP RAD

## (undated) DEVICE — Z DUP USE 2257490 ADHESIVE SKIN CLSRE 036ML TPCL 2CTL CNCRLTE HIGH VSCSTY DRMB

## (undated) DEVICE — COVER,MAYO STAND,XL,STERILE: Brand: MEDLINE

## (undated) DEVICE — AIRLIFE™ NASAL OXYGEN CANNULA CURVED, FLARED TIP, WITH 7 FEET (2.1 M) CRUSH RESISTANT TUBING, OVER-THE-EAR STYLE: Brand: AIRLIFE™

## (undated) DEVICE — BLADELESS OBTURATOR: Brand: WECK VISTA

## (undated) DEVICE — Z INACTIVE USE 2660664 SOLUTION IRRIG 3000ML 0.9% SOD CHL USP UROMATIC PLAS CONT

## (undated) DEVICE — 3M™ WARMING BLANKET, LOWER BODY, 10 PER CASE, 42568: Brand: BAIR HUGGER™

## (undated) DEVICE — POLYP TRAP: Brand: TRAPEASE®

## (undated) DEVICE — DISC ABSRB YEL FLR POLYETH MGMT SUCT QUICKSUITE

## (undated) DEVICE — TOWEL,OR,DSP,ST,BLUE,STD,6/PK,12PK/CS: Brand: MEDLINE

## (undated) DEVICE — BLADE SAW RESECTOR CUT 4MM

## (undated) DEVICE — SPONGE GZ W4XL4IN RAYON POLY FILL CVR W/ NONWOVEN FAB

## (undated) DEVICE — GOWN,AURORA,NONREINFORCED,LARGE: Brand: MEDLINE

## (undated) DEVICE — PENROSE DRAIN 18 X .5" SILICONE: Brand: MEDLINE

## (undated) DEVICE — ST CHARLES MINOR ABDOMINAL PK: Brand: MEDLINE INDUSTRIES, INC.

## (undated) DEVICE — Z INACTIVE USE 2653177 SPONGE GZ W2XL2IN NONWOVEN 4 PLY FASTER WICKING ABIL AVANT

## (undated) DEVICE — SUTURE VCRL + SZ 2-0 L27IN ABSRB WHT SH 1/2 CIR TAPERCUT VCP417H

## (undated) DEVICE — BLANKET WRM W40.2XL55.9IN IORT LO BODY + MISTRAL AIR

## (undated) DEVICE — YANKAUER,BULB TIP,W/O VENT,RIGID,STERILE: Brand: MEDLINE

## (undated) DEVICE — HYPODERMIC SAFETY NEEDLE: Brand: MAGELLAN

## (undated) DEVICE — SYRINGE, LUER SLIP, 20ML: Brand: MEDLINE

## (undated) DEVICE — Z DISCONTINUED BY MEDLINE USE 2711682 TRAY SKIN PREP DRY W/ PREM GLV

## (undated) DEVICE — SINGLE PORT MANIFOLD: Brand: NEPTUNE 2

## (undated) DEVICE — PAD,NON-ADHERENT,3X8,STERILE,LF,1/PK: Brand: MEDLINE

## (undated) DEVICE — SUTURE PROL SZ 3-0 L30IN NONABSORBABLE BLU L26MM SH 1/2 CIR 8832H

## (undated) DEVICE — BITEBLOCK 54FR W/ DENT RIM BLOX

## (undated) DEVICE — BANDAGE COMPR W6INXL5YD SELF ADH COHESIVE CO FLX

## (undated) DEVICE — SOLUTION SCRB 4OZ 4% CHG H2O AIDED FOR PREOPERATIVE SKIN

## (undated) DEVICE — CANNULA ARTHSCP L7CM DIA6MM CONIC TIP THRD NONSHIELDED DISP

## (undated) DEVICE — ENDO KIT W/SYRINGE: Brand: MEDLINE INDUSTRIES, INC.

## (undated) DEVICE — MARKER,SKIN,WI/RULER AND LABELS: Brand: MEDLINE

## (undated) DEVICE — STRIP,CLOSURE,WOUND,MEDI-STRIP,1/2X4: Brand: MEDLINE

## (undated) DEVICE — 4-PORT MANIFOLD: Brand: NEPTUNE 2

## (undated) DEVICE — SPONGE LAP W18XL18IN WHT COT 4 PLY FLD STRUNG RADPQ DISP ST

## (undated) DEVICE — SUTURE PERMAHAND SZ 0 L30IN NONABSORBABLE BLK FSL L30MM 3/8 680H

## (undated) DEVICE — SUTURE ETHBND EXCEL SZ 1 L30IN NONABSORBABLE GRN L36MM CT-1 X425H

## (undated) DEVICE — GOWN,SIRUS,POLYRNF,BRTHSLV,LG,30/CS: Brand: MEDLINE

## (undated) DEVICE — SUTURE VCRL SZ 0 L36IN ABSRB UD CT-1 L36MM 1/2 CIR TAPR PNT VCP946H

## (undated) DEVICE — SPONGE DRN W4XL4IN RAYON/POLYESTER 6 PLY NONWOVEN PRECUT

## (undated) DEVICE — ARM DRAPE

## (undated) DEVICE — GOWN,SIRUS,NONRNF,SETINSLV,XL,20/CS: Brand: MEDLINE

## (undated) DEVICE — COVER,MAYO STAND,STERILE: Brand: MEDLINE

## (undated) DEVICE — JELLY,LUBE,STERILE,FLIP TOP,TUBE,2-OZ: Brand: MEDLINE

## (undated) DEVICE — TROCAR: Brand: KII FIOS FIRST ENTRY

## (undated) DEVICE — MERCY HEALTH ST CHARLES: Brand: MEDLINE INDUSTRIES, INC.

## (undated) DEVICE — DRAIN SURG PENROSE 0.5X18 IN CLOSED WND DRAINAGE PREM SIL

## (undated) DEVICE — GAUZE,SPONGE,FLUFF,6"X6.75",STRL,5/TRAY: Brand: MEDLINE

## (undated) DEVICE — SOLUTION IV IRRIG LACTATED RINGERS 3000ML 2B7487

## (undated) DEVICE — TUBING, SUCTION, 9/32" X 20', STRAIGHT: Brand: MEDLINE INDUSTRIES, INC.

## (undated) DEVICE — BAG SPEC REM 224ML W4XL6IN DIA10MM 1 HND GYN DISP ENDOPCH

## (undated) DEVICE — SUTURE PROL SZ 3-0 L18IN NONABSORBABLE BLU L24MM FS-1 3/8 8684G

## (undated) DEVICE — SUTURE FIBERWIRE SZ 2 L38IN NONABSORBABLE BLU L36.6MM 1/2 AR7202

## (undated) DEVICE — 90-S, SUCTION PROBE, NON-BENDABLE, MAX CUT LEVEL 11: Brand: SERFAS ENERGY

## (undated) DEVICE — 3M™ STERI-DRAPE™ U-DRAPE 1015: Brand: STERI-DRAPE™

## (undated) DEVICE — SUTURE PDS II SZ 0 L27IN ABSRB VLT UR-6 L26MM 1/2 CIR D7185

## (undated) DEVICE — [ARTHROSCOPY PUMP,  DO NOT USE IF PACKAGE IS DAMAGED,  KEEP DRY,  KEEP AWAY FROM SUNLIGHT,  PROTECT FROM HEAT AND RADIOACTIVE SOURCES.]: Brand: FLOSTEADY

## (undated) DEVICE — SHEET, T, LAPAROTOMY, STERILE: Brand: MEDLINE

## (undated) DEVICE — ST CHARLES GEN LAPAROSCOPY PK: Brand: MEDLINE INDUSTRIES, INC.

## (undated) DEVICE — SUCTION IRRIGATOR: Brand: ENDOWRIST

## (undated) DEVICE — GOWN,POLY REINFORCED,LG: Brand: MEDLINE

## (undated) DEVICE — TUBING, SUCTION, 9/32" X 12', STRAIGHT: Brand: MEDLINE INDUSTRIES, INC.

## (undated) DEVICE — DRESSING,GAUZE,XEROFORM,CURAD,1"X8",ST: Brand: CURAD

## (undated) DEVICE — IMMOBILIZER SHLDR AD XL L20IN D10IN BLK POLY COT CLSR ENV